# Patient Record
Sex: FEMALE | Race: WHITE | NOT HISPANIC OR LATINO | Employment: OTHER | URBAN - METROPOLITAN AREA
[De-identification: names, ages, dates, MRNs, and addresses within clinical notes are randomized per-mention and may not be internally consistent; named-entity substitution may affect disease eponyms.]

---

## 2017-01-03 PROCEDURE — 99000 SPECIMEN HANDLING OFFICE-LAB: CPT | Performed by: INTERNAL MEDICINE

## 2017-01-03 PROCEDURE — G0328 FECAL BLOOD SCRN IMMUNOASSAY: HCPCS | Performed by: INTERNAL MEDICINE

## 2017-01-04 DIAGNOSIS — Z12.11 COLON CANCER SCREENING: ICD-10-CM

## 2017-01-04 LAB — HEMOCCULT STL QL IA: POSITIVE

## 2017-01-12 ENCOUNTER — OFFICE VISIT (OUTPATIENT)
Dept: INTERNAL MEDICINE | Facility: CLINIC | Age: 77
End: 2017-01-12
Payer: COMMERCIAL

## 2017-01-12 VITALS
BODY MASS INDEX: 57.56 KG/M2 | DIASTOLIC BLOOD PRESSURE: 60 MMHG | HEIGHT: 59 IN | HEART RATE: 69 BPM | OXYGEN SATURATION: 98 % | SYSTOLIC BLOOD PRESSURE: 126 MMHG | WEIGHT: 285.5 LBS | TEMPERATURE: 97.2 F

## 2017-01-12 DIAGNOSIS — D64.9 ANEMIA, UNSPECIFIED TYPE: ICD-10-CM

## 2017-01-12 DIAGNOSIS — Z12.11 COLON CANCER SCREENING: ICD-10-CM

## 2017-01-12 DIAGNOSIS — R19.5 OCCULT BLOOD IN STOOLS: ICD-10-CM

## 2017-01-12 DIAGNOSIS — N18.30 CKD (CHRONIC KIDNEY DISEASE) STAGE 3, GFR 30-59 ML/MIN (H): Primary | ICD-10-CM

## 2017-01-12 DIAGNOSIS — Z78.0 ASYMPTOMATIC MENOPAUSAL STATE: ICD-10-CM

## 2017-01-12 DIAGNOSIS — E66.01 MORBID OBESITY DUE TO EXCESS CALORIES (H): ICD-10-CM

## 2017-01-12 PROCEDURE — 99214 OFFICE O/P EST MOD 30 MIN: CPT | Performed by: INTERNAL MEDICINE

## 2017-01-12 NOTE — NURSING NOTE
"Chief Complaint   Patient presents with     Results       Initial /60 mmHg  Pulse 69  Temp(Src) 97.2  F (36.2  C) (Oral)  Ht 4' 11\" (1.499 m)  Wt 285 lb 8 oz (129.502 kg)  BMI 57.63 kg/m2  SpO2 98% Estimated body mass index is 57.63 kg/(m^2) as calculated from the following:    Height as of this encounter: 4' 11\" (1.499 m).    Weight as of this encounter: 285 lb 8 oz (129.502 kg).  BP completed using cuff size: naeem Charles CMA      "

## 2017-01-12 NOTE — PROGRESS NOTES
SUBJECTIVE:                                                    Lucille Rojas is a 76 year old female who presents to clinic today for the following health issues:    Follow up 12/8/16 lab results for hgb and kidney function   Component      Latest Ref Rng 12/8/2016   Sodium      133 - 144 mmol/L 144   Potassium      3.4 - 5.3 mmol/L 5.3   Chloride      94 - 109 mmol/L 110 (H)   Carbon Dioxide      20 - 32 mmol/L 30   Anion Gap      3 - 14 mmol/L 4   Glucose      70 - 99 mg/dL 127 (H)   Urea Nitrogen      7 - 30 mg/dL 41 (H)   Creatinine      0.52 - 1.04 mg/dL 1.17 (H)   GFR Estimate      >60 mL/min/1.7m2 45 (L)   GFR Estimate If Black      >60 mL/min/1.7m2 54 (L)   Calcium      8.5 - 10.1 mg/dL 9.4   WBC      4.0 - 11.0 10e9/L 7.6   RBC Count      3.8 - 5.2 10e12/L 3.62 (L)   Hemoglobin      11.7 - 15.7 g/dL 10.0 (L)   Hematocrit      35.0 - 47.0 % 34.3 (L)   MCV      78 - 100 fl 95   MCH      26.5 - 33.0 pg 27.6   MCHC      31.5 - 36.5 g/dL 29.2 (L)   RDW      10.0 - 15.0 % 14.6   Platelet Count      150 - 450 10e9/L 288   Diff Method       Automated Method   Albumin Fraction      3.7 - 5.1 g/dL 3.7   Alpha 1 Fraction      0.2 - 0.4 g/dL 0.4   Alpha 2 Fraction      0.5 - 0.9 g/dL 1.0 (H)   Beta Fraction      0.6 - 1.0 g/dL 0.9   Gamma Fraction      0.7 - 1.6 g/dL 0.9   Monoclonal Peak      0.0 g/dL 0.0   ELP Interpretation:       Essentially normal electrophoretic pattern.  No monoclonal protein seen. . . .   Cholesterol      <200 mg/dL 149   Triglycerides      <150 mg/dL 126   HDL Cholesterol      >49 mg/dL 47 (L)   LDL Cholesterol Calculated      <100 mg/dL 77   Non HDL Cholesterol      <130 mg/dL 102   Iron      35 - 180 ug/dL 36   Iron Binding Cap      240 - 430 ug/dL 274   Iron Saturation Index      15 - 46 % 13 (L)   Ferritin      8 - 252 ng/mL 51   Vitamin B12      193 - 986 pg/mL 571   Occult Blood Scn FIT      NEG      Component      Latest Ref Rng 1/3/2017   Sodium      133 - 144 mmol/L     Potassium      3.4 - 5.3 mmol/L    Chloride      94 - 109 mmol/L    Carbon Dioxide      20 - 32 mmol/L    Anion Gap      3 - 14 mmol/L    Glucose      70 - 99 mg/dL    Urea Nitrogen      7 - 30 mg/dL    Creatinine      0.52 - 1.04 mg/dL    GFR Estimate      >60 mL/min/1.7m2    GFR Estimate If Black      >60 mL/min/1.7m2    Calcium      8.5 - 10.1 mg/dL    WBC      4.0 - 11.0 10e9/L    RBC Count      3.8 - 5.2 10e12/L    Hemoglobin      11.7 - 15.7 g/dL    Hematocrit      35.0 - 47.0 %    MCV      78 - 100 fl    MCH      26.5 - 33.0 pg    MCHC      31.5 - 36.5 g/dL    RDW      10.0 - 15.0 %    Platelet Count      150 - 450 10e9/L    Diff Method          Albumin Fraction      3.7 - 5.1 g/dL    Alpha 1 Fraction      0.2 - 0.4 g/dL    Alpha 2 Fraction      0.5 - 0.9 g/dL    Beta Fraction      0.6 - 1.0 g/dL    Gamma Fraction      0.7 - 1.6 g/dL    Monoclonal Peak      0.0 g/dL    ELP Interpretation:          Cholesterol      <200 mg/dL    Triglycerides      <150 mg/dL    HDL Cholesterol      >49 mg/dL    LDL Cholesterol Calculated      <100 mg/dL    Non HDL Cholesterol      <130 mg/dL    Iron      35 - 180 ug/dL    Iron Binding Cap      240 - 430 ug/dL    Iron Saturation Index      15 - 46 %    Ferritin      8 - 252 ng/mL    Vitamin B12      193 - 986 pg/mL    Occult Blood Scn FIT      NEG Positive (A)       Problem list and histories reviewed & adjusted, as indicated.  Additional history: as documented    Patient Active Problem List   Diagnosis     Hyperlipidemia LDL goal <100     Advance care planning     Macular degeneration     Apnea     Morbid obesity due to excess calories (H)     CKD (chronic kidney disease) stage 3, GFR 30-59 ml/min     Acquired hypothyroidism     Benign essential hypertension     Gastroesophageal reflux disease without esophagitis     Type 2 diabetes mellitus with stage 3 chronic kidney disease, without long-term current use of insulin (H)     Anemia, unspecified type     Past Surgical  History   Procedure Laterality Date     Colonoscopy       Tonsillectomy       Appendectomy       Hernia repair       inguinal x 2     Colonoscopy N/A 10/27/2014     Procedure: COMBINED COLONOSCOPY, SINGLE OR MULTIPLE BIOPSY/POLYPECTOMY BY BIOPSY;  Surgeon: Jose Alfredo Morejon MD;  Location:  GI       Social History   Substance Use Topics     Smoking status: Never Smoker      Smokeless tobacco: Never Used     Alcohol Use: 0.0 oz/week     0 Standard drinks or equivalent per week      Comment: rarely     History reviewed. No pertinent family history.      Current Outpatient Prescriptions   Medication Sig Dispense Refill     miconazole (MICATIN; MICRO GUARD) 2 % powder Apply topically as needed for itching or other       order for DME Equipment being ordered: wheeled walker 1 Device 0     Ferrous Sulfate (IRON SUPPLEMENT PO) Take 325 mg by mouth       Glycerin-Polysorbate 80 (REFRESH DRY EYE THERAPY OP)        levothyroxine (SYNTHROID, LEVOTHROID) 200 MCG tablet Take 1 tablet (200 mcg) by mouth daily 90 tablet 3     simvastatin (ZOCOR) 10 MG tablet Take 1 tablet (10 mg) by mouth At Bedtime 90 tablet 3     lisinopril (PRINIVIL,ZESTRIL) 10 MG tablet Take 1 tablet (10 mg) by mouth daily 90 tablet 3     nystatin (MYCOSTATIN) cream Apply topically 2 times daily       Multiple Vitamins-Minerals (PRESERVISION AREDS) CAPS Take 1 tablet by mouth 2 times daily        aspirin 81 MG tablet Take 1 tablet by mouth daily. 30 tablet 0     Allergies   Allergen Reactions     Penicillins      BP Readings from Last 3 Encounters:   01/12/17 126/60   12/08/16 127/67   11/22/16 114/60    Wt Readings from Last 3 Encounters:   01/12/17 285 lb 8 oz (129.502 kg)   12/08/16 289 lb (131.09 kg)   11/22/16 291 lb 8 oz (132.224 kg)                    ROS:  C: NEGATIVE for fever, chills, change in weight  E/M: NEGATIVE for ear, mouth and throat problems  R: NEGATIVE for significant cough or SOB  CV: NEGATIVE for chest pain, palpitations or  "peripheral edema  GI: NEGATIVE for nausea, abdominal pain, heartburn, or change in bowel habits  : NEGATIVE for frequency, dysuria, or hematuria  M: NEGATIVE for significant arthralgias or myalgia  P: NEGATIVE for changes in mood or affect    OBJECTIVE:                                                    /60 mmHg  Pulse 69  Temp(Src) 97.2  F (36.2  C) (Oral)  Ht 4' 11\" (1.499 m)  Wt 285 lb 8 oz (129.502 kg)  BMI 57.63 kg/m2  SpO2 98%  Body mass index is 57.63 kg/(m^2).  GENERAL:  alert and no distress  EYES: Eyes grossly normal to inspection, extraocular movements - intact, and PERRL  HENT: ear canals- normal; TMs- normal; Nose- normal; Mouth- no ulcers, no lesions  NECK: no tenderness, no adenopathy, no asymmetry, no masses, no stiffness; thyroid- normal to palpation  RESP: lungs clear to auscultation - no rales, no rhonchi, no wheezes  CV: regular rates and rhythm, normal S1 S2, no S3 or S4 and no click or rub   ABDOMEN: obese, morbid  PSYCH: Alert and oriented times 3; speech- coherent , normal rate and volume; able to articulate logical thoughts, able to abstract reason, no tangential thoughts, no hallucinations or delusions, affect- normal         ASSESSMENT/PLAN:                                                      (N18.3) CKD (chronic kidney disease) stage 3, GFR 30-59 ml/min  (primary encounter diagnosis)  Comment: suspect CKD and suggested need for baseline assessment per Nephrology referral  Plan: NEPHROLOGY ADULT REFERRAL            (D64.9) Anemia, unspecified type  Comment: suspect ACD due to underlying CKD, see labs done  Plan:     (E66.01) Morbid obesity due to excess calories (H)  Comment: weight loss again discussed  Plan:     (R19.5) Occult blood in stools  Comment: discussed options available and patient feels may be related to recent issues with hemorrhoids, suggested treating with repeat FIT, if + then consider UGI assessment  Plan: Fecal colorectal cancer screen (FIT)        "     (Z12.11) Colon cancer screening  Comment: as above  Plan: Fecal colorectal cancer screen (FIT)            (Z78.0) Asymptomatic menopausal state  Comment: ordered as screening  Plan: DX Hip/Pelvis/Spine            See Patient Instructions    Fausto Flynn MD  [facility]    25 minutes spent with this patient, face to face, discussing treatment options for listed problems above as well as side effects of appropriate medications.  Counseling time extended beyond 50% of the clinic visit.  Medication dosing, treatment plan and follow-up were discussed. Also reviewed need for primary care testing for patient.

## 2017-01-12 NOTE — MR AVS SNAPSHOT
After Visit Summary   1/12/2017    Lucille Rojas    MRN: [id number]           Patient Information     Date Of Birth          1940        Visit Information        Provider Department      1/12/2017 9:40 AM Fausto Flynn MD [facility]        Today's Diagnoses     CKD (chronic kidney disease) stage 3, GFR 30-59 ml/min    -  1     Anemia, unspecified type         Morbid obesity due to excess calories (H)         Occult blood in stools         Colon cancer screening            Follow-ups after your visit        Additional Services     NEPHROLOGY ADULT REFERRAL       Your provider has referred you to: JOAO: Zhanna Bryan [phone]   [email or web address]    Please be aware that coverage of these services is subject to the terms and limitations of your health insurance plan.  Call member services at your health plan with any benefit or coverage questions.      Reason for referral:  CKD  Please bring the following to your appointment:    >>   Any x-rays, CTs or MRIs which have been performed.  Contact the facility where they were done to arrange for  prior to your scheduled appointment.   >>   List of current medications   >>   This referral request   >>   Any documents/labs given to you for this referral                  Future tests that were ordered for you today     Open Future Orders        Priority Expected Expires Ordered    Fecal colorectal cancer screen (FIT) Routine 2/2/2017 4/6/2017 1/12/2017            Who to contact     If you have questions or need follow up information about today's clinic visit or your schedule please contact [facility] directly at [phone].  Normal or non-critical lab and imaging results will be communicated to you by MyChart, letter or phone within 4 business days after the clinic has received the results. If you do not hear from us within 7 days, please contact the clinic  "through [employer] or phone. If you have a critical or abnormal lab result, we will notify you by phone as soon as possible.  Submit refill requests through [employer] or call your pharmacy and they will forward the refill request to us. Please allow 3 business days for your refill to be completed.          Additional Information About Your Visit        Soufunhart Information     [employer] lets you send messages to your doctor, view your test results, renew your prescriptions, schedule appointments and more. To sign up, go to [email or web address] . Click on \"Log in\" on the left side of the screen, which will take you to the Welcome page. Then click on \"Sign up Now\" on the right side of the page.     You will be asked to enter the access code listed below, as well as some personal information. Please follow the directions to create your username and password.     Your access code is: [email or web address]  Expires: 2017  1:21 PM     Your access code will  in 90 days. If you need help or a new code, please call your [place] clinic or [phone].        Care EveryWhere ID     This is your Care EveryWhere ID. This could be used by other organizations to access your [place] medical records  [id number]        Your Vitals Were     Pulse Temperature Height BMI (Body Mass Index) Pulse Oximetry       69 97.2  F (36.2  C) (Oral) 4' 11\" (1.499 m) 57.63 kg/m2 98%        Blood Pressure from Last 3 Encounters:   17 126/60   16 127/67   16 114/60    Weight from Last 3 Encounters:   17 285 lb 8 oz (129.502 kg)   16 289 lb (131.09 kg)   16 291 lb 8 oz (132.224 kg)              We Performed the Following     NEPHROLOGY ADULT REFERRAL        Primary Care Provider Office Phone # Fax #    Fausto Flynn [phone]       [facility] [place] [address] [facility] [address]        Thank you!     Thank you for choosing [facility]  for your care. Our " goal is always to provide you with excellent care. Hearing back from our patients is one way we can continue to improve our services. Please take a few minutes to complete the written survey that you may receive in the mail after your visit with us. Thank you!             Your Updated Medication List - Protect others around you: Learn how to safely use, store and throw away your medicines at www.disposemymeds.org.          This list is accurate as of: 1/12/17  9:59 AM.  Always use your most recent med list.                   Brand Name Dispense Instructions for use    aspirin 81 MG tablet     30 tablet    Take 1 tablet by mouth daily.       IRON SUPPLEMENT PO      Take 325 mg by mouth       levothyroxine 200 MCG tablet    SYNTHROID/LEVOTHROID    90 tablet    Take 1 tablet (200 mcg) by mouth daily       lisinopril 10 MG tablet    PRINIVIL/ZESTRIL    90 tablet    Take 1 tablet (10 mg) by mouth daily       miconazole 2 % powder    MICATIN; MICRO GUARD     Apply topically as needed for itching or other       nystatin cream    MYCOSTATIN     Apply topically 2 times daily       order for DME     1 Device    Equipment being ordered: wheeled walker       PRESERVISION AREDS Caps      Take 1 tablet by mouth 2 times daily       REFRESH DRY EYE THERAPY OP          simvastatin 10 MG tablet    ZOCOR    90 tablet    Take 1 tablet (10 mg) by mouth At Bedtime

## 2017-01-19 ENCOUNTER — RADIANT APPOINTMENT (OUTPATIENT)
Dept: BONE DENSITY | Facility: CLINIC | Age: 77
End: 2017-01-19
Attending: INTERNAL MEDICINE
Payer: COMMERCIAL

## 2017-01-19 DIAGNOSIS — Z78.0 ASYMPTOMATIC MENOPAUSAL STATE: ICD-10-CM

## 2017-01-19 PROCEDURE — 77080 DXA BONE DENSITY AXIAL: CPT | Performed by: INTERNAL MEDICINE

## 2017-01-23 ENCOUNTER — TELEPHONE (OUTPATIENT)
Dept: INTERNAL MEDICINE | Facility: CLINIC | Age: 77
End: 2017-01-23

## 2017-02-21 ENCOUNTER — TRANSFERRED RECORDS (OUTPATIENT)
Dept: HEALTH INFORMATION MANAGEMENT | Facility: CLINIC | Age: 77
End: 2017-02-21

## 2017-02-21 DIAGNOSIS — R19.5 OCCULT BLOOD IN STOOLS: ICD-10-CM

## 2017-02-21 DIAGNOSIS — Z12.11 COLON CANCER SCREENING: ICD-10-CM

## 2017-02-21 LAB
CREAT SERPL-MCNC: 1.16 MG/DL (ref 0.6–0.93)
GLUCOSE SERPL-MCNC: 114 MG/DL (ref 65–99)
POTASSIUM SERPL-SCNC: 5.8 MMOL/L (ref 3.5–5.3)

## 2017-02-21 PROCEDURE — 82274 ASSAY TEST FOR BLOOD FECAL: CPT | Performed by: INTERNAL MEDICINE

## 2017-02-22 LAB — HEMOCCULT STL QL IA: NEGATIVE

## 2017-02-28 ENCOUNTER — OFFICE VISIT (OUTPATIENT)
Dept: INTERNAL MEDICINE | Facility: CLINIC | Age: 77
End: 2017-02-28
Payer: COMMERCIAL

## 2017-02-28 VITALS
WEIGHT: 280.8 LBS | BODY MASS INDEX: 56.61 KG/M2 | SYSTOLIC BLOOD PRESSURE: 122 MMHG | TEMPERATURE: 97.8 F | HEART RATE: 101 BPM | OXYGEN SATURATION: 97 % | DIASTOLIC BLOOD PRESSURE: 68 MMHG | HEIGHT: 59 IN

## 2017-02-28 DIAGNOSIS — N18.30 TYPE 2 DIABETES MELLITUS WITH STAGE 3 CHRONIC KIDNEY DISEASE, WITHOUT LONG-TERM CURRENT USE OF INSULIN (H): ICD-10-CM

## 2017-02-28 DIAGNOSIS — E11.22 TYPE 2 DIABETES MELLITUS WITH STAGE 3 CHRONIC KIDNEY DISEASE, WITHOUT LONG-TERM CURRENT USE OF INSULIN (H): ICD-10-CM

## 2017-02-28 DIAGNOSIS — N18.30 CKD (CHRONIC KIDNEY DISEASE) STAGE 3, GFR 30-59 ML/MIN (H): Primary | ICD-10-CM

## 2017-02-28 DIAGNOSIS — E66.01 MORBID OBESITY DUE TO EXCESS CALORIES (H): ICD-10-CM

## 2017-02-28 PROCEDURE — 99213 OFFICE O/P EST LOW 20 MIN: CPT | Performed by: INTERNAL MEDICINE

## 2017-02-28 NOTE — MR AVS SNAPSHOT
After Visit Summary   2/28/2017    Lucille Rojas    MRN: [id number]           Patient Information     Date Of Birth          1940        Visit Information        Provider Department      2/28/2017 8:40 AM Fausto Flynn MD [facility]        Today's Diagnoses     CKD (chronic kidney disease) stage 3, GFR 30-59 ml/min    -  1    Type 2 diabetes mellitus with stage 3 chronic kidney disease, without long-term current use of insulin (H)        Morbid obesity due to excess calories (H)           Follow-ups after your visit        Follow-up notes from your care team     Return in about 6 months (around 8/28/2017) for Lab Work.      Future tests that were ordered for you today     Open Future Orders        Priority Expected Expires Ordered    Hemoglobin Routine 6/1/2017 6/30/2017 2/28/2017    Basic metabolic panel Routine 6/1/2017 6/30/2017 2/28/2017    Hemoglobin A1c Routine 6/1/2017 6/30/2017 2/28/2017            Who to contact     If you have questions or need follow up information about today's clinic visit or your schedule please contact [facility] directly at [phone].  Normal or non-critical lab and imaging results will be communicated to you by MyChart, letter or phone within 4 business days after the clinic has received the results. If you do not hear from us within 7 days, please contact the clinic through FTBprohart or phone. If you have a critical or abnormal lab result, we will notify you by phone as soon as possible.  Submit refill requests through ADARTIS or call your pharmacy and they will forward the refill request to us. Please allow 3 business days for your refill to be completed.          Additional Information About Your Visit        MyChart Information     ADARTIS lets you send messages to your doctor, view your test results, renew your prescriptions, schedule appointments and more. To sign up, go to [email or web address] .  "Click on \"Log in\" on the left side of the screen, which will take you to the Welcome page. Then click on \"Sign up Now\" on the right side of the page.     You will be asked to enter the access code listed below, as well as some personal information. Please follow the directions to create your username and password.     Your access code is: [email or web address]  Expires: 2017  9:06 AM     Your access code will  in 90 days. If you need help or a new code, please call your [place] clinic or [phone].        Care EveryWhere ID     This is your Care EveryWhere ID. This could be used by other organizations to access your [place] medical records  [id number]        Your Vitals Were     Pulse Temperature Height Pulse Oximetry BMI (Body Mass Index)       101 97.8  F (36.6  C) (Oral) 4' 11\" (1.499 m) 97% 56.71 kg/m2        Blood Pressure from Last 3 Encounters:   17 122/68   17 126/60   16 127/67    Weight from Last 3 Encounters:   17 280 lb 12.8 oz (127.4 kg)   17 285 lb 8 oz (129.5 kg)   16 289 lb (131.1 kg)                 Today's Medication Changes          These changes are accurate as of: 17  9:06 AM.  If you have any questions, ask your nurse or doctor.               Start taking these medicines.        Dose/Directions    ACE/ARB NOT PRESCRIBED (INTENTIONAL)   Used for:  CKD (chronic kidney disease) stage 3, GFR 30-59 ml/min, Type 2 diabetes mellitus with stage 3 chronic kidney disease, without long-term current use of insulin (H)   Started by:  Fausto Flynn MD        Please choose reason not prescribed, below   Refills:  0            Where to get your medicines      Some of these will need a paper prescription and others can be bought over the counter.  Ask your nurse if you have questions.     You don't need a prescription for these medications     ACE/ARB NOT PRESCRIBED (INTENTIONAL)                Primary Care Provider Office Phone # Fax #    Fausto Flynn MD " [phone]       [facility] [address]  [facility] [address]        Thank you!     Thank you for choosing [facility]  for your care. Our goal is always to provide you with excellent care. Hearing back from our patients is one way we can continue to improve our services. Please take a few minutes to complete the written survey that you may receive in the mail after your visit with us. Thank you!             Your Updated Medication List - Protect others around you: Learn how to safely use, store and throw away your medicines at www.disposemymeds.org.          This list is accurate as of: 2/28/17  9:06 AM.  Always use your most recent med list.                   Brand Name Dispense Instructions for use    ACE/ARB NOT PRESCRIBED (INTENTIONAL)      Please choose reason not prescribed, below       aspirin 81 MG tablet     30 tablet    Take 1 tablet by mouth daily.       IRON SUPPLEMENT PO      Take 325 mg by mouth       levothyroxine 200 MCG tablet    SYNTHROID/LEVOTHROID    90 tablet    Take 1 tablet (200 mcg) by mouth daily       miconazole 2 % powder    MICATIN; MICRO GUARD     Apply topically as needed for itching or other       nystatin cream    MYCOSTATIN     Apply topically 2 times daily       order for DME     1 Device    Equipment being ordered: wheeled walker       PRESERVISION AREDS Caps      Take 1 tablet by mouth 2 times daily       REFRESH DRY EYE THERAPY OP          simvastatin 10 MG tablet    ZOCOR    90 tablet    Take 1 tablet (10 mg) by mouth At Bedtime

## 2017-02-28 NOTE — NURSING NOTE
"Chief Complaint   Patient presents with     Chronic Disease Management       Initial /68 (BP Location: Left arm, Patient Position: Chair, Cuff Size: Adult Regular)  Pulse 101  Temp 97.8  F (36.6  C) (Oral)  Ht 4' 11\" (1.499 m)  Wt 280 lb 12.8 oz (127.4 kg)  SpO2 97%  BMI 56.71 kg/m2 Estimated body mass index is 56.71 kg/(m^2) as calculated from the following:    Height as of this encounter: 4' 11\" (1.499 m).    Weight as of this encounter: 280 lb 12.8 oz (127.4 kg).  Medication Reconciliation: complete   Kim Charles CMA      "

## 2017-02-28 NOTE — PROGRESS NOTES
SUBJECTIVE:                                                    Lucille Rojas is a 76 year old female who presents to clinic today for the following health issues:    Recheck hgb and kidney function from 12/8/16 labs     Creatinine   Date Value Ref Range Status   12/08/2016 1.17 (H) 0.52 - 1.04 mg/dL Final       Problem list and histories reviewed & adjusted, as indicated.  Additional history: as documented    Patient Active Problem List   Diagnosis     Hyperlipidemia LDL goal <100     Advance care planning     Macular degeneration     Apnea     Morbid obesity due to excess calories (H)     CKD (chronic kidney disease) stage 3, GFR 30-59 ml/min     Acquired hypothyroidism     Benign essential hypertension     Gastroesophageal reflux disease without esophagitis     Type 2 diabetes mellitus with stage 3 chronic kidney disease, without long-term current use of insulin (H)     Anemia, unspecified type     Past Surgical History   Procedure Laterality Date     Colonoscopy       Tonsillectomy       Appendectomy       Hernia repair       inguinal x 2     Colonoscopy N/A 10/27/2014     Procedure: COMBINED COLONOSCOPY, SINGLE OR MULTIPLE BIOPSY/POLYPECTOMY BY BIOPSY;  Surgeon: Jose Alfredo Morejon MD;  Location:  GI       Social History   Substance Use Topics     Smoking status: Never Smoker     Smokeless tobacco: Never Used     Alcohol use 0.0 oz/week     0 Standard drinks or equivalent per week      Comment: rarely     History reviewed. No pertinent family history.      Current Outpatient Prescriptions   Medication Sig Dispense Refill     ACE/ARB NOT PRESCRIBED, INTENTIONAL, Please choose reason not prescribed, below       miconazole (MICATIN; MICRO GUARD) 2 % powder Apply topically as needed for itching or other       order for DME Equipment being ordered: wheeled walker 1 Device 0     Ferrous Sulfate (IRON SUPPLEMENT PO) Take 325 mg by mouth       Glycerin-Polysorbate 80 (REFRESH DRY EYE THERAPY OP)         "levothyroxine (SYNTHROID, LEVOTHROID) 200 MCG tablet Take 1 tablet (200 mcg) by mouth daily 90 tablet 3     simvastatin (ZOCOR) 10 MG tablet Take 1 tablet (10 mg) by mouth At Bedtime 90 tablet 3     nystatin (MYCOSTATIN) cream Apply topically 2 times daily       Multiple Vitamins-Minerals (PRESERVISION AREDS) CAPS Take 1 tablet by mouth 2 times daily        aspirin 81 MG tablet Take 1 tablet by mouth daily. 30 tablet 0     Allergies   Allergen Reactions     Penicillins      BP Readings from Last 3 Encounters:   02/28/17 122/68   01/12/17 126/60   12/08/16 127/67    Wt Readings from Last 3 Encounters:   02/28/17 280 lb 12.8 oz (127.4 kg)   01/12/17 285 lb 8 oz (129.5 kg)   12/08/16 289 lb (131.1 kg)                  Labs reviewed in EPIC    Reviewed and updated as needed this visit by clinical staff  Tobacco  Allergies  Med Hx  Surg Hx  Fam Hx  Soc Hx      Reviewed and updated as needed this visit by Provider         ROS:  C: NEGATIVE for fever, chills, change in weight  E/M: NEGATIVE for ear, mouth and throat problems  R: NEGATIVE for significant cough or SOB  CV: NEGATIVE for chest pain, palpitations or peripheral edema  GI: NEGATIVE for nausea, abdominal pain, heartburn, or change in bowel habits  : NEGATIVE for frequency, dysuria, or hematuria  M: NEGATIVE for significant arthralgias or myalgia  H: NEGATIVE for bleeding problems  P: NEGATIVE for changes in mood or affect    OBJECTIVE:                                                    /68 (BP Location: Left arm, Patient Position: Chair, Cuff Size: Adult Regular)  Pulse 101  Temp 97.8  F (36.6  C) (Oral)  Ht 4' 11\" (1.499 m)  Wt 280 lb 12.8 oz (127.4 kg)  SpO2 97%  BMI 56.71 kg/m2  Body mass index is 56.71 kg/(m^2).  GENERAL: healthy, alert and no distressusing wheeled walker  EYES: Eyes grossly normal to inspection, extraocular movements - intact, and PERRL  HENT: ear canals- normal; TMs- normal; Nose- normal; Mouth- no ulcers, no lesions  NECK: no " tenderness, no adenopathy, no asymmetry, no masses, no stiffness; thyroid- normal to palpation  RESP: lungs clear to auscultation - no rales, no rhonchi, no wheezes  CV: regular rates and rhythm, normal S1 S2, no S3 or S4 and no click or rub   ABDOMEN: obese, morbid  PSYCH: Alert and oriented times 3; speech- coherent , normal rate and volume; able to articulate logical thoughts, able to abstract reason, no tangential thoughts, no hallucinations or delusions, affect- normal     ASSESSMENT/PLAN:                                                      (N18.3) CKD (chronic kidney disease) stage 3, GFR 30-59 ml/min  (primary encounter diagnosis)  Comment: following per Nephrology and Dr. Jain  Plan: ACE/ARB NOT PRESCRIBED, INTENTIONAL,,         Hemoglobin, Basic metabolic panel, holding ACE therapy            (E11.22,  N18.3) Type 2 diabetes mellitus with stage 3 chronic kidney disease, without long-term current use of insulin (H)  Comment:   Lab Results   Component Value Date    A1C 5.6 12/01/2016    A1C 5.9 08/01/2016    A1C 6.1 09/24/2015    A1C 6.0 12/29/2014    A1C 5.9 10/21/2014     Plan: ACE/ARB NOT PRESCRIBED, INTENTIONAL,,         Hemoglobin A1c        Stable on therapy    (E66.01) Morbid obesity due to excess calories (H)  Comment: discussed dietary changes as is losing weight  Plan:     See Patient Instructions    Fausto Flynn MD  [facility]    THE MEDICATION LIST HAS BEEN FULLY RECONCILED BY THE M.D. AND THE NURSING STAFF.

## 2017-03-02 ENCOUNTER — MEDICAL CORRESPONDENCE (OUTPATIENT)
Dept: HEALTH INFORMATION MANAGEMENT | Facility: CLINIC | Age: 77
End: 2017-03-02

## 2017-03-02 DIAGNOSIS — N18.30 CHRONIC KIDNEY DISEASE, STAGE III (MODERATE) (H): Primary | ICD-10-CM

## 2017-03-02 DIAGNOSIS — E87.5 HYPERKALEMIA: ICD-10-CM

## 2017-03-27 ENCOUNTER — OFFICE VISIT (OUTPATIENT)
Dept: FAMILY MEDICINE | Facility: CLINIC | Age: 77
End: 2017-03-27
Payer: COMMERCIAL

## 2017-03-27 VITALS
HEART RATE: 82 BPM | RESPIRATION RATE: 16 BRPM | TEMPERATURE: 98.8 F | HEIGHT: 59 IN | WEIGHT: 289 LBS | OXYGEN SATURATION: 93 % | BODY MASS INDEX: 58.26 KG/M2

## 2017-03-27 DIAGNOSIS — S80.12XA: Primary | ICD-10-CM

## 2017-03-27 PROCEDURE — 99213 OFFICE O/P EST LOW 20 MIN: CPT | Performed by: FAMILY MEDICINE

## 2017-03-27 NOTE — MR AVS SNAPSHOT
"              After Visit Summary   3/27/2017    Lucille Rojas    MRN: [id number]           Patient Information     Date Of Birth          1940        Visit Information        Provider Department      3/27/2017 1:00 PM Amari Park MD [facility]        Today's Diagnoses     Contusion of lower limb, left, initial encounter    -  1      Care Instructions    Ice pack to area for 4-5 days, then Alternate ice & heat.          Follow-ups after your visit        Who to contact     If you have questions or need follow up information about today's clinic visit or your schedule please contact [facility] directly at [phone].  Normal or non-critical lab and imaging results will be communicated to you by MyChart, letter or phone within 4 business days after the clinic has received the results. If you do not hear from us within 7 days, please contact the clinic through [employer] or phone. If you have a critical or abnormal lab result, we will notify you by phone as soon as possible.  Submit refill requests through [employer] or call your pharmacy and they will forward the refill request to us. Please allow 3 business days for your refill to be completed.          Additional Information About Your Visit        MyChart Information     Ins[employer] lets you send messages to your doctor, view your test results, renew your prescriptions, schedule appointments and more. To sign up, go to [email or web address] . Click on \"Log in\" on the left side of the screen, which will take you to the Welcome page. Then click on \"Sign up Now\" on the right side of the page.     You will be asked to enter the access code listed below, as well as some personal information. Please follow the directions to create your username and password.     Your access code is: [email or web address]  Expires: 2017 10:06 AM     Your access code will  in 90 days. If you need help or a new code, " "please call your [place] clinic or [phone].        Care EveryWhere ID     This is your Care EveryWhere ID. This could be used by other organizations to access your [place] medical records  [id number]        Your Vitals Were     Pulse Temperature Respirations Height Pulse Oximetry BMI (Body Mass Index)    82 98.8  F (37.1  C) (Tympanic) 16 4' 11\" (1.499 m) 93% 58.37 kg/m2       Blood Pressure from Last 3 Encounters:   02/28/17 122/68   01/12/17 126/60   12/08/16 127/67    Weight from Last 3 Encounters:   03/27/17 289 lb (131.1 kg)   02/28/17 280 lb 12.8 oz (127.4 kg)   01/12/17 285 lb 8 oz (129.5 kg)              Today, you had the following     No orders found for display       Primary Care Provider Office Phone # Fax #    Fausto Flynn [phone]       [facility] [address] [place] [id number]        Thank you!     Thank you for choosing [facility]  for your care. Our goal is always to provide you with excellent care. Hearing back from our patients is one way we can continue to improve our services. Please take a few minutes to complete the written survey that you may receive in the mail after your visit with us. Thank you!             Your Updated Medication List - Protect others around you: Learn how to safely use, store and throw away your medicines at www.disposemymeds.org.          This list is accurate as of: 3/27/17  1:39 PM.  Always use your most recent med list.                   Brand Name Dispense Instructions for use    ACE/ARB NOT PRESCRIBED (INTENTIONAL)      Please choose reason not prescribed, below       aspirin 81 MG tablet     30 tablet    Take 1 tablet by mouth daily.       IRON SUPPLEMENT PO      Take 325 mg by mouth       levothyroxine 200 MCG tablet    SYNTHROID/LEVOTHROID    90 tablet    Take 1 tablet (200 mcg) by mouth daily       miconazole 2 % powder    MICATIN; MICRO GUARD     Apply topically as needed for " itching or other Reported on 3/27/2017       nystatin cream    MYCOSTATIN     Apply topically 2 times daily       order for DME     1 Device    Equipment being ordered: wheeled walker       PRESERVISION AREDS Caps      Take 1 tablet by mouth 2 times daily       REFRESH DRY EYE THERAPY OP          simvastatin 10 MG tablet    ZOCOR    90 tablet    Take 1 tablet (10 mg) by mouth At Bedtime

## 2017-03-27 NOTE — PROGRESS NOTES
"  SUBJECTIVE:                                                    Lucille Rojas is a 76 year old female who presents to clinic today for the following health issues:      Musculoskeletal problem/pain      Duration: 12 days    Description  Location: right leg    Intensity:  moderate    Accompanying signs and symptoms: lump on upper outer thigh    History  Previous similar problem: no   Previous evaluation:  none    Precipitating or alleviating factors:  Trauma or overuse: YES- fell  Aggravating factors include: sleeping    Therapies tried and outcome: nothing           Problem list and histories reviewed & adjusted, as indicated.  Additional history: as documented    Labs reviewed in EPIC    Reviewed and updated as needed this visit by clinical staff  Tobacco  Allergies  Med Hx  Surg Hx  Fam Hx  Soc Hx      Reviewed and updated as needed this visit by Provider         ROS:  CONSTITUTIONAL:NEGATIVE for fever, chills, change in weight  INTEGUMENTARY/SKIN: NEGATIVE for worrisome rashes, moles or lesions  MUSCULOSKELETAL: NEGATIVE for significant arthralgias or myalgia and POSITIVE  for lump Rt lateral thigh    OBJECTIVE:                                                    Pulse 82  Temp 98.8  F (37.1  C) (Tympanic)  Resp 16  Ht 4' 11\" (1.499 m)  Wt 289 lb (131.1 kg)  SpO2 93%  BMI 58.37 kg/m2  Body mass index is 58.37 kg/(m^2).  GENERAL APPEARANCE: healthy, alert and no distress  MS: extremities normal- no gross deformities noted  ORTHO: Rt thigh lateral lower shows 2 cm subcutaneous swelling, not tender         ASSESSMENT/PLAN:                                                        ICD-10-CM    1. Contusion of lower limb, left, initial encounter S80.12XA        Follow up with Provider - as needed if not improving   Patient Instructions   Ice pack to area for 4-5 days, then alternate ice & heat.        Amari Park MD  [facility]    "

## 2017-04-04 DIAGNOSIS — E78.5 HYPERLIPIDEMIA LDL GOAL <100: ICD-10-CM

## 2017-04-04 RX ORDER — SIMVASTATIN 10 MG
TABLET ORAL
Qty: 90 TABLET | Refills: 3 | Status: SHIPPED | OUTPATIENT
Start: 2017-04-04 | End: 2018-04-03

## 2017-04-04 NOTE — TELEPHONE ENCOUNTER
simvastatin (ZOCOR     Last Written Prescription Date: 02/15/17  Last Fill Quantity: 90, # refills: 3  Last Office Visit with G, P or [facility] prescribing provider: 03/27/17       Lab Results   Component Value Date    CHOL 149 12/08/2016     Lab Results   Component Value Date    HDL 47 12/08/2016     Lab Results   Component Value Date    LDL 77 12/08/2016     Lab Results   Component Value Date    TRIG 126 12/08/2016     Lab Results   Component Value Date    CHOLHDLRATIO 3.4 05/09/2013

## 2017-07-20 ENCOUNTER — TELEPHONE (OUTPATIENT)
Dept: NURSING | Facility: CLINIC | Age: 77
End: 2017-07-20

## 2017-07-20 ENCOUNTER — OFFICE VISIT (OUTPATIENT)
Dept: INTERNAL MEDICINE | Facility: CLINIC | Age: 77
End: 2017-07-20
Payer: COMMERCIAL

## 2017-07-20 ENCOUNTER — RADIANT APPOINTMENT (OUTPATIENT)
Dept: ULTRASOUND IMAGING | Facility: CLINIC | Age: 77
End: 2017-07-20
Attending: INTERNAL MEDICINE
Payer: COMMERCIAL

## 2017-07-20 VITALS
DIASTOLIC BLOOD PRESSURE: 72 MMHG | BODY MASS INDEX: 58.57 KG/M2 | HEART RATE: 78 BPM | WEIGHT: 290 LBS | OXYGEN SATURATION: 95 % | SYSTOLIC BLOOD PRESSURE: 126 MMHG | TEMPERATURE: 97.6 F

## 2017-07-20 DIAGNOSIS — M79.604 BILATERAL LEG PAIN: ICD-10-CM

## 2017-07-20 DIAGNOSIS — M79.605 BILATERAL LEG PAIN: ICD-10-CM

## 2017-07-20 DIAGNOSIS — M71.22 BAKER'S CYST, LEFT: ICD-10-CM

## 2017-07-20 DIAGNOSIS — M79.605 BILATERAL LEG PAIN: Primary | ICD-10-CM

## 2017-07-20 DIAGNOSIS — M79.604 BILATERAL LEG PAIN: Primary | ICD-10-CM

## 2017-07-20 PROCEDURE — 99214 OFFICE O/P EST MOD 30 MIN: CPT | Performed by: INTERNAL MEDICINE

## 2017-07-20 PROCEDURE — 93970 EXTREMITY STUDY: CPT

## 2017-07-20 RX ORDER — PREDNISONE 20 MG/1
20 TABLET ORAL DAILY
Qty: 5 TABLET | Refills: 0 | Status: SHIPPED | OUTPATIENT
Start: 2017-07-20 | End: 2017-07-23

## 2017-07-20 NOTE — PROGRESS NOTES
"  SUBJECTIVE:                                                    Lucille Rojas is a 77 year old female who presents to clinic today for the following health issues:      Leg pain    Problems taking medications regularly: No    Medication side effects: none    Diet: regular (no restrictions)      Pain  Bilateral LE in proximal calf areas. No localizing pain in the  Ankles or toes. Some tenderness left posterior knee.. Hx elevated Uric acid levels  Nov 2016 when had acute gout flare. Not on preventative med. Hx CKD3 and followed by nephrology.  PCP is Dr Flynn. No trauma.  No acute warmth or erythema noted by pt in the joints. Hx well controlled DM. Not checking sugars    Pt's past medical history, family history, habits, medications and allergies were reviewed with the patient today.   Most recent lab results reviewed with pt. Problem list and histories reviewed & adjusted, as indicated.  Additional history as below:      Additional ROS:   Constitutional, HEENT, Cardiovascular, Pulmonary, GI and , Neuro, MSK and Psych review of systems/symptoms are otherwise negative or unchanged from previous, except as noted above.      OBJECTIVE:  /72  Pulse 78  Temp 97.6  F (36.4  C) (Oral)  Wt 290 lb (131.5 kg)  SpO2 95%  BMI 58.57 kg/m2   Estimated body mass index is 58.57 kg/(m^2) as calculated from the following:    Height as of 3/27/17: 4' 11\" (1.499 m).    Weight as of this encounter: 290 lb (131.5 kg).  Eye: PERRL, EOMI  HENT: ear canals and TM's normal and nose and mouth without ulcers or lesions   Neck: no adenopathy. Thyroid normal to palpation. No bruits  Pulm: Lungs clear to auscultation   CV: Regular rates and rhythm  GI: Soft, obese, nontender, Normal active bowel sounds, No hepatosplenomegaly or masses palpable  Ext: Peripheral pulses intact. Mild BLE edema (which pt states is chronic). Tenderness to palpation left popliteal area with possible Baker's cyst palpable. Tenderness to palpation " bilateral posterior calf area over superficial veins. No calf mass palpable  Neuro: Normal strength and tone, sensory exam grossly normal    Assessment/Plan: (See plan discussion below for further details)  1. Bilateral leg pain  - US Lower Extremity Venous Duplex Bilateral; Future  - ORTHOPEDICS ADULT REFERRAL  Prednisone 20mg daily for 5 days    2. Baker's cyst, left  Prednisone 20mg daily for 5 days  - ORTHOPEDICS ADULT REFERRAL    Plan discussion:  Venous Doppler ultrasound obtained showing left Baker's cyst but no DVT. Exam also consistent with possible mild phlebitis though no significant phlebitis visualized on ultrasound. Patient has a history of diabetes but last A1c 5.6. Unable to use nonsteroidal anti-inflammatory meds with history of CK deep. Will therefore treat with prednisone 20 mg daily for 5 days. Patient to see orthopedics in addition regarding the Julian's cyst       Colton Oh MD  Internal Medicine Department  [facility]

## 2017-07-20 NOTE — MR AVS SNAPSHOT
After Visit Summary   7/20/2017    Lucille Rojas    MRN: [id number]           Patient Information     Date Of Birth          1940        Visit Information        Provider Department      7/20/2017 11:00 AM Colton Oh MD [facility]        Today's Diagnoses     Bilateral leg pain    -  1    Baker's cyst, left           Follow-ups after your visit        Additional Services     ORTHOPEDICS ADULT REFERRAL       Your provider has referred you to: [facility] [phone]   [email or web address]      Please be aware that coverage of these services is subject to the terms and limitations of your health insurance plan.  Call member services at your health plan with any benefit or coverage questions.      Please bring the following to your appointment:    >>   Any x-rays, CTs or MRIs which have been performed.  Contact the facility where they were done to arrange for  prior to your scheduled appointment.    >>   List of current medications   >>   This referral request   >>   Any documents/labs given to you for this referral                  Who to contact     If you have questions or need follow up information about today's clinic visit or your schedule please contact [facility] directly at [phone].  Normal or non-critical lab and imaging results will be communicated to you by MyChart, letter or phone within 4 business days after the clinic has received the results. If you do not hear from us within 7 days, please contact the clinic through MyChart or phone. If you have a critical or abnormal lab result, we will notify you by phone as soon as possible.  Submit refill requests through [employer] or call your pharmacy and they will forward the refill request to us. Please allow 3 business days for your refill to be completed.          Additional Information About Your Visit        MyChart  "Information     [employer] lets you send messages to your doctor, view your test results, renew your prescriptions, schedule appointments and more. To sign up, go to [email or web address] . Click on \"Log in\" on the left side of the screen, which will take you to the Welcome page. Then click on \"Sign up Now\" on the right side of the page.     You will be asked to enter the access code listed below, as well as some personal information. Please follow the directions to create your username and password.     Your access code is: [email or web address]  Expires: 10/21/2017  5:40 PM     Your access code will  in 90 days. If you need help or a new code, please call your [place] clinic or [phone].        Care EveryWhere ID     This is your Care EveryWhere ID. This could be used by other organizations to access your [place] medical records  [id number]        Your Vitals Were     Pulse Temperature Pulse Oximetry BMI (Body Mass Index)          78 97.6  F (36.4  C) (Oral) 95% 58.57 kg/m2         Blood Pressure from Last 3 Encounters:   17 126/72   17 122/68   17 126/60    Weight from Last 3 Encounters:   17 290 lb (131.5 kg)   17 289 lb (131.1 kg)   17 280 lb 12.8 oz (127.4 kg)              We Performed the Following     ORTHOPEDICS ADULT REFERRAL          Today's Medication Changes          These changes are accurate as of: 17 11:59 PM.  If you have any questions, ask your nurse or doctor.               Start taking these medicines.        Dose/Directions    predniSONE 20 MG tablet   Commonly known as:  DELTASONE   Used for:  Bilateral leg pain   Started by:  Colton Oh MD        Dose:  20 mg   Take 1 tablet (20 mg) by mouth daily for 5 days   Quantity:  5 tablet   Refills:  0            Where to get your medicines      These medications were sent to [facility] [address] [place], [place] - [address] AT [facility] [place]  [address], " [facility] [address]     Phone:  [phone]     predniSONE 20 MG tablet                Primary Care Provider Office Phone # Fax #    Fausto Flynn [phone]       [facility] [address]  [facility] [address]        Equal Access to Services     IVAN IVERSON : Hadii aad ku hadasho Soomaali, waaxda luqadaha, qaybta kaalmada adeegyada, waxay idiin hayaan adeeg khorlysh la'annabellen ah. So [facility] [phone].    ATENCIÓN: Si habla español, tiene a zurita disposición servicios gratuitos de asistencia lingüística. Llame al [phone].    We comply with applicable federal civil rights laws and [place] laws. We do not discriminate on the basis of race, color, national origin, age, disability sex, sexual orientation or gender identity.            Thank you!     Thank you for choosing [facility]  for your care. Our goal is always to provide you with excellent care. Hearing back from our patients is one way we can continue to improve our services. Please take a few minutes to complete the written survey that you may receive in the mail after your visit with us. Thank you!             Your Updated Medication List - Protect others around you: Learn how to safely use, store and throw away your medicines at www.disposemymeds.org.          This list is accurate as of: 7/20/17 11:59 PM.  Always use your most recent med list.                   Brand Name Dispense Instructions for use Diagnosis    ACE/ARB NOT PRESCRIBED (INTENTIONAL)      Please choose reason not prescribed, below    CKD (chronic kidney disease) stage 3, GFR 30-59 ml/min, Type 2 diabetes mellitus with stage 3 chronic kidney disease, without long-term current use of insulin (H)       aspirin 81 MG tablet     30 tablet    Take 1 tablet by mouth daily.    Type 2 diabetes, HbA1C goal < 8% (H)       IRON SUPPLEMENT PO      Take 325 mg by mouth        levothyroxine 200 MCG tablet    SYNTHROID/LEVOTHROID    90  tablet    Take 1 tablet (200 mcg) by mouth daily    Type 2 diabetes mellitus with stage 3 chronic kidney disease (H)       miconazole 2 % powder    MICATIN; MICRO GUARD     Apply topically as needed for itching or other Reported on 3/27/2017        nystatin cream    MYCOSTATIN     Apply topically 2 times daily        order for DME     1 Device    Equipment being ordered: wheeled walker    Morbid obesity due to excess calories (H), Type 2 diabetes mellitus with stage 3 chronic kidney disease, without long-term current use of insulin (H)       predniSONE 20 MG tablet    DELTASONE    5 tablet    Take 1 tablet (20 mg) by mouth daily for 5 days    Bilateral leg pain       PRESERVISION AREDS Caps      Take 1 tablet by mouth 2 times daily        REFRESH DRY EYE THERAPY OP           simvastatin 10 MG tablet    ZOCOR    90 tablet    TAKE 1 TABLET BY MOUTH AT BEDTIME    Hyperlipidemia LDL goal <100

## 2017-07-20 NOTE — TELEPHONE ENCOUNTER
Made patient 11 AM appointment with Dr. Oh. She is willing to be seen but wants to know if this can be addressed over the phone, as it is hard for her to walk. Otherwise she will come if for appointment and use wheelchair.    Lucille Rojas is a 77 year old female who calls with possible gout flare up, says was told to call if this happens again.    NURSING ASSESSMENT:  Description:  Pain in both of legs, can hardly walk. Hurts to put foot down/bear weight. Pain from knee to ankle. Calf and shin pain. Swelling in both ankles, right >left. Skin is tight.  Precip. factors:  Has had this before in one leg and took pills for 5 days that helped  Associated symptoms:  Denies redness/warmth/CP/SOB.  Pain scale (0-10)   9/10  Last exam/Treatment:  2/28/17  Allergies:   Allergies   Allergen Reactions     Penicillins        MEDICATIONS:   Appears was prescribed prednisone in ED in November for acute gouty arthritis.      NURSING PLAN: Routed to provider Yes    RECOMMENDED DISPOSITION:  See in 2-4 hours, another person to drive   Will comply with recommendation: Yes  If further questions/concerns or if symptoms do not improve, worsen or new symptoms develop, call your PCP or [facility] as soon as possible.      Guideline used:  Telephone Triage Protocols for Nurses, Fifth Edition, Pamela Buckley RN

## 2017-07-20 NOTE — NURSING NOTE
"Chief Complaint   Patient presents with     Arthritis       Initial /72  Pulse 78  Temp 97.6  F (36.4  C) (Oral)  Wt 290 lb (131.5 kg)  SpO2 95%  BMI 58.57 kg/m2 Estimated body mass index is 58.57 kg/(m^2) as calculated from the following:    Height as of 3/27/17: 4' 11\" (1.499 m).    Weight as of this encounter: 290 lb (131.5 kg).  Medication Reconciliation: complete  "

## 2017-07-21 ASSESSMENT — PATIENT HEALTH QUESTIONNAIRE - PHQ9: SUM OF ALL RESPONSES TO PHQ QUESTIONS 1-9: 15

## 2017-07-23 ENCOUNTER — NURSE TRIAGE (OUTPATIENT)
Dept: NURSING | Facility: CLINIC | Age: 77
End: 2017-07-23

## 2017-07-23 ENCOUNTER — TELEPHONE (OUTPATIENT)
Dept: NURSING | Facility: CLINIC | Age: 77
End: 2017-07-23

## 2017-07-23 DIAGNOSIS — M79.604 BILATERAL LEG PAIN: ICD-10-CM

## 2017-07-23 DIAGNOSIS — M79.605 BILATERAL LEG PAIN: ICD-10-CM

## 2017-07-23 RX ORDER — PREDNISONE 20 MG/1
20 TABLET ORAL DAILY
Qty: 3 TABLET | Refills: 0 | Status: SHIPPED | OUTPATIENT
Start: 2017-07-23 | End: 2017-09-06

## 2017-07-23 NOTE — TELEPHONE ENCOUNTER
Clinic Action Needed:None  Reason for Call:Patient calling reporting she was seen in clinic on 7/20/17 for a baker's cyst and leg pain. Patient started Prednisone 20 mg and lost prescription on 7/22/17 after completing 2 doses.  Patient is requesting to reorder prescription for remaining 3 tablets today.  Paged Dr Neeta Fuentes through [facility] at 1143 a.m. To call Margaret at  [phone].  Dr Fuentes returned page. Approved refill of Prednisone 20 mg tablets 3 tablets no refills.   Patient notified and verbalized understanding.     Margaret Bhakta RN  [facility]

## 2017-07-23 NOTE — TELEPHONE ENCOUNTER
Clinic Action Needed:None  Reason for Call:Patient calling reporting she was seen in clinic on 7/20/17 for a baker's cyst and leg pain. Patient started Prednisone 20 mg and lost prescription on 7/22/17 after completing 2 doses.  Patient is requesting to reorder prescription for remaining 3 tablets today.  Paged Dr Neeta Fuentes through [facility] at 1143 a.m. To call Margaret at  [phone].  Dr Fuentes returned page. Approved refill of Prednisone 20 mg tablets 3 tablets no refills.   Patient notified and verbalized understanding.     Margaret Bhakta RN  [facility]

## 2017-08-24 ENCOUNTER — TRANSFERRED RECORDS (OUTPATIENT)
Dept: HEALTH INFORMATION MANAGEMENT | Facility: CLINIC | Age: 77
End: 2017-08-24

## 2017-09-01 ENCOUNTER — TELEPHONE (OUTPATIENT)
Dept: NURSING | Facility: CLINIC | Age: 77
End: 2017-09-01

## 2017-09-01 NOTE — TELEPHONE ENCOUNTER
I cannot fill out a form until patient is seen for BP check.  If dental appt is urgent then suggest she seek another clinic to get BP checked and form signed

## 2017-09-01 NOTE — TELEPHONE ENCOUNTER
Patient was at [facility] dental hygiene clinic on 8/15/17.  They would not clean teeth d/t high blood pressure at appointment.  [facility] sent over a form for PCP to fill out to give medical clearance.  Form located under Media tab in Epic.  Patient has taken blood pressure several times since dental appointment and blood pressure has ranged from -150 and DBP 60-80.  Denies any symptoms of high blood pressure.  Reports she was very stressed prior to dental appointment this is why her blood pressure was elevated at appointment.  Please review form from [facility] and inform patient once form is filled out.  Patient has another dental appointment set up on 9/6/17, requesting form be completed prior to then.  [place]17) please call [phone] to inform patient on or before Tuesday.

## 2017-09-02 DIAGNOSIS — N18.30 TYPE 2 DIABETES MELLITUS WITH STAGE 3 CHRONIC KIDNEY DISEASE (H): ICD-10-CM

## 2017-09-02 DIAGNOSIS — E11.22 TYPE 2 DIABETES MELLITUS WITH STAGE 3 CHRONIC KIDNEY DISEASE (H): ICD-10-CM

## 2017-09-02 RX ORDER — LEVOTHYROXINE SODIUM 200 UG/1
TABLET ORAL
Qty: 90 TABLET | Refills: 3 | Status: CANCELLED | OUTPATIENT
Start: 2017-09-02

## 2017-09-03 NOTE — TELEPHONE ENCOUNTER
levothyroxine (SYNTHROID, LEVOTHROID) 200 MCG tablet     Last Written Prescription Date: 8/1/16  Last Quantity: 90, # refills: 3  Last Office Visit with [facility], [facility] or [facility] prescribing provider: 7/20/17        TSH   Date Value Ref Range Status   12/01/2016 2.81 0.30 - 5.00 mcU/mL Final

## 2017-09-06 ENCOUNTER — OFFICE VISIT (OUTPATIENT)
Dept: INTERNAL MEDICINE | Facility: CLINIC | Age: 77
End: 2017-09-06
Payer: COMMERCIAL

## 2017-09-06 VITALS
SYSTOLIC BLOOD PRESSURE: 137 MMHG | HEIGHT: 59 IN | BODY MASS INDEX: 58 KG/M2 | WEIGHT: 287.7 LBS | DIASTOLIC BLOOD PRESSURE: 72 MMHG | TEMPERATURE: 98 F | HEART RATE: 78 BPM

## 2017-09-06 DIAGNOSIS — I10 BENIGN ESSENTIAL HYPERTENSION: Chronic | ICD-10-CM

## 2017-09-06 DIAGNOSIS — N18.30 TYPE 2 DIABETES MELLITUS WITH STAGE 3 CHRONIC KIDNEY DISEASE, WITHOUT LONG-TERM CURRENT USE OF INSULIN (H): Primary | ICD-10-CM

## 2017-09-06 DIAGNOSIS — E11.22 TYPE 2 DIABETES MELLITUS WITH STAGE 3 CHRONIC KIDNEY DISEASE, WITHOUT LONG-TERM CURRENT USE OF INSULIN (H): Primary | ICD-10-CM

## 2017-09-06 DIAGNOSIS — E03.9 ACQUIRED HYPOTHYROIDISM: Chronic | ICD-10-CM

## 2017-09-06 DIAGNOSIS — N18.30 CKD (CHRONIC KIDNEY DISEASE) STAGE 3, GFR 30-59 ML/MIN (H): ICD-10-CM

## 2017-09-06 LAB
ANION GAP SERPL CALCULATED.3IONS-SCNC: 3 MMOL/L (ref 3–14)
BUN SERPL-MCNC: 19 MG/DL (ref 7–30)
CALCIUM SERPL-MCNC: 9.4 MG/DL (ref 8.5–10.1)
CHLORIDE SERPL-SCNC: 106 MMOL/L (ref 94–109)
CO2 SERPL-SCNC: 34 MMOL/L (ref 20–32)
CREAT SERPL-MCNC: 1 MG/DL (ref 0.52–1.04)
GFR SERPL CREATININE-BSD FRML MDRD: 53 ML/MIN/1.7M2
GLUCOSE SERPL-MCNC: 147 MG/DL (ref 70–99)
HBA1C MFR BLD: 6.2 % (ref 4.3–6)
HGB BLD-MCNC: 11.8 G/DL (ref 11.7–15.7)
POTASSIUM SERPL-SCNC: 4.8 MMOL/L (ref 3.4–5.3)
SODIUM SERPL-SCNC: 143 MMOL/L (ref 133–144)

## 2017-09-06 PROCEDURE — 83036 HEMOGLOBIN GLYCOSYLATED A1C: CPT | Performed by: INTERNAL MEDICINE

## 2017-09-06 PROCEDURE — 80048 BASIC METABOLIC PNL TOTAL CA: CPT | Performed by: INTERNAL MEDICINE

## 2017-09-06 PROCEDURE — 99214 OFFICE O/P EST MOD 30 MIN: CPT | Performed by: INTERNAL MEDICINE

## 2017-09-06 PROCEDURE — 85018 HEMOGLOBIN: CPT | Performed by: INTERNAL MEDICINE

## 2017-09-06 PROCEDURE — 36415 COLL VENOUS BLD VENIPUNCTURE: CPT | Performed by: INTERNAL MEDICINE

## 2017-09-06 RX ORDER — LEVOTHYROXINE SODIUM 200 UG/1
200 TABLET ORAL DAILY
Qty: 90 TABLET | Refills: 3 | Status: SHIPPED | OUTPATIENT
Start: 2017-09-06 | End: 2018-10-03

## 2017-09-06 NOTE — MR AVS SNAPSHOT
"              After Visit Summary   9/6/2017    Lucille Rojas    MRN: [id number]           Patient Information     Date Of Birth          1940        Visit Information        Provider Department      9/6/2017 9:00 AM Fausto Flynn MD [facility]        Today's Diagnoses     Type 2 diabetes mellitus with stage 3 chronic kidney disease, without long-term current use of insulin (H)    -  1    CKD (chronic kidney disease) stage 3, GFR 30-59 ml/min        Benign essential hypertension        Acquired hypothyroidism           Follow-ups after your visit        Follow-up notes from your care team     Return in about 6 weeks (around 10/18/2017), or if symptoms worsen or fail to improve, for Lab Work, Routine Visit.      Who to contact     If you have questions or need follow up information about today's clinic visit or your schedule please contact [facility] directly at [phone].  Normal or non-critical lab and imaging results will be communicated to you by MyChart, letter or phone within 4 business days after the clinic has received the results. If you do not hear from us within 7 days, please contact the clinic through [employer] or phone. If you have a critical or abnormal lab result, we will notify you by phone as soon as possible.  Submit refill requests through [employer] or call your pharmacy and they will forward the refill request to us. Please allow 3 business days for your refill to be completed.          Additional Information About Your Visit        MyChart Information     [employer] lets you send messages to your doctor, view your test results, renew your prescriptions, schedule appointments and more. To sign up, go to [email or web address] . Click on \"Log in\" on the left side of the screen, which will take you to the Welcome page. Then click on \"Sign up Now\" on the right side of the page.     You will be asked to enter the access code listed below, as " "well as some personal information. Please follow the directions to create your username and password.     Your access code is: [email or web address]  Expires: 10/21/2017  5:40 PM     Your access code will  in 90 days. If you need help or a new code, please call your [place] clinic or [phone].        Care EveryWhere ID     This is your Care EveryWhere ID. This could be used by other organizations to access your [place] medical records  [id number]        Your Vitals Were     Pulse Temperature Height BMI (Body Mass Index)          78 98  F (36.7  C) (Oral) 4' 11\" (1.499 m) 58.11 kg/m2         Blood Pressure from Last 3 Encounters:   17 137/72   17 126/72   17 122/68    Weight from Last 3 Encounters:   17 287 lb 11.2 oz (130.5 kg)   17 290 lb (131.5 kg)   17 289 lb (131.1 kg)              We Performed the Following     Basic metabolic panel     Hemoglobin A1c     Hemoglobin          Where to get your medicines      These medications were sent to [facility] - [facility] [address]  [address], [facility] [address]     Phone:  [phone]     levothyroxine 200 MCG tablet          Primary Care Provider Office Phone # Fax #    Fausto Flynn [phone]       [address] [place] [id number]        Equal Access to Services     GORDON IVERSON : Hadii ginger henning hadasho Soliu, waaxda luqadaha, qaybta kaalmada adeegyada, waxgumaro vanessa lloyd. So [facility] [phone].    ATENCIÓN: Si habla español, tiene a zurita disposición servicios gratuitos de asistencia lingüística. Ronni al [phone].    We comply with applicable federal civil rights laws and [place] laws. We do not discriminate on the basis of race, color, national origin, age, disability sex, sexual orientation or gender identity.            Thank you!     Thank you for choosing [facility]  for your care. Our goal is always " to provide you with excellent care. Hearing back from our patients is one way we can continue to improve our services. Please take a few minutes to complete the written survey that you may receive in the mail after your visit with us. Thank you!             Your Updated Medication List - Protect others around you: Learn how to safely use, store and throw away your medicines at www.disposemymeds.org.          This list is accurate as of: 9/6/17  9:21 AM.  Always use your most recent med list.                   Brand Name Dispense Instructions for use Diagnosis    ACE/ARB NOT PRESCRIBED (INTENTIONAL)      Please choose reason not prescribed, below    CKD (chronic kidney disease) stage 3, GFR 30-59 ml/min, Type 2 diabetes mellitus with stage 3 chronic kidney disease, without long-term current use of insulin (H)       aspirin 81 MG tablet     30 tablet    Take 1 tablet by mouth daily.    Type 2 diabetes, HbA1C goal < 8% (H)       IRON SUPPLEMENT PO      Take 325 mg by mouth        levothyroxine 200 MCG tablet    SYNTHROID/LEVOTHROID    90 tablet    Take 1 tablet (200 mcg) by mouth daily    Acquired hypothyroidism       miconazole 2 % powder    MICATIN; MICRO GUARD     Apply topically as needed for itching or other Reported on 3/27/2017        nystatin cream    MYCOSTATIN     Apply topically 2 times daily        order for DME     1 Device    Equipment being ordered: wheeled walker    Morbid obesity due to excess calories (H), Type 2 diabetes mellitus with stage 3 chronic kidney disease, without long-term current use of insulin (H)       PRESERVISION AREDS Caps      Take 1 tablet by mouth 2 times daily        REFRESH DRY EYE THERAPY OP           simvastatin 10 MG tablet    ZOCOR    90 tablet    TAKE 1 TABLET BY MOUTH AT BEDTIME    Hyperlipidemia LDL goal <100

## 2017-09-06 NOTE — LETTER
[facility]  [address] [address]  [phone]      9/6/2017       Lucille Rojas  [address] [facility] [address]        Dear Lucille,      Your Hemoglobin A1C and blood sugar tests look good and I would continue with your medication without change.  These tests should be repeated in 6 months.    I am pleased to inform you that your routine blood work including your hemoglobin, sodium, potassium, calcium and kidney function tests are stable.    Your Hemoglobin A1C and blood sugar tests look good although are higher and I would continue with your medication without change.  This test should be repeated in 6 months.    Sincerely,      {[facility] Physiian List:[id number]}  Internal Medicine

## 2017-09-06 NOTE — NURSING NOTE
"Chief Complaint   Patient presents with     Hypertension     Forms       Initial /72  Pulse 78  Temp 98  F (36.7  C) (Oral)  Ht 4' 11\" (1.499 m)  Wt 287 lb 11.2 oz (130.5 kg)  BMI 58.11 kg/m2 Estimated body mass index is 58.11 kg/(m^2) as calculated from the following:    Height as of this encounter: 4' 11\" (1.499 m).    Weight as of this encounter: 287 lb 11.2 oz (130.5 kg).  Medication Reconciliation: complete   Kim Charles, CHELO      "

## 2017-09-06 NOTE — PROGRESS NOTES
SUBJECTIVE:   Lucille Rojas is a 77 year old female who presents to clinic today for the following health issues:    Forms received from [facility] dental [facility] stating that patient was seen for dental work on 8/15/17 and had elevated BP readings (188/72, 167/68). Dental clinic requesting that patient is evaluated and cleared by PCP prior to additional dental work.     Hypertension Follow-up      Outpatient blood pressures are being checked at home.  Results are 130's/70's.    Low Salt Diet: low salt        Amount of exercise or physical activity: None    Problems taking medications regularly: No    Medication side effects: none  Diet: regular (no restrictions)      Problem list and histories reviewed & adjusted, as indicated.  Additional history: as documented    Patient Active Problem List   Diagnosis     Hyperlipidemia LDL goal <100     Advance care planning     Macular degeneration     Apnea     Morbid obesity due to excess calories (H)     CKD (chronic kidney disease) stage 3, GFR 30-59 ml/min     Acquired hypothyroidism     Benign essential hypertension     Gastroesophageal reflux disease without esophagitis     Type 2 diabetes mellitus with stage 3 chronic kidney disease, without long-term current use of insulin (H)     Anemia, unspecified type     Past Surgical History:   Procedure Laterality Date     APPENDECTOMY       COLONOSCOPY       COLONOSCOPY N/A 10/27/2014    Procedure: COMBINED COLONOSCOPY, SINGLE OR MULTIPLE BIOPSY/POLYPECTOMY BY BIOPSY;  Surgeon: Jose Alfredo Morejon MD;  Location:  GI     HERNIA REPAIR      inguinal x 2     TONSILLECTOMY         Social History   Substance Use Topics     Smoking status: Never Smoker     Smokeless tobacco: Never Used     Alcohol use 0.0 oz/week     0 Standard drinks or equivalent per week      Comment: rarely     History reviewed. No pertinent family history.      Current Outpatient Prescriptions   Medication Sig Dispense Refill     simvastatin (ZOCOR) 10 MG  "tablet TAKE 1 TABLET BY MOUTH AT BEDTIME 90 tablet 3     miconazole (MICATIN; MICRO GUARD) 2 % powder Apply topically as needed for itching or other Reported on 3/27/2017       Ferrous Sulfate (IRON SUPPLEMENT PO) Take 325 mg by mouth       Glycerin-Polysorbate 80 (REFRESH DRY EYE THERAPY OP)        levothyroxine (SYNTHROID, LEVOTHROID) 200 MCG tablet Take 1 tablet (200 mcg) by mouth daily 90 tablet 3     nystatin (MYCOSTATIN) cream Apply topically 2 times daily       Multiple Vitamins-Minerals (PRESERVISION AREDS) CAPS Take 1 tablet by mouth 2 times daily        aspirin 81 MG tablet Take 1 tablet by mouth daily. 30 tablet 0     ACE/ARB NOT PRESCRIBED, INTENTIONAL, Please choose reason not prescribed, below       order for DME Equipment being ordered: wheeled walker (Patient not taking: Reported on 7/20/2017) 1 Device 0     Allergies   Allergen Reactions     Penicillins      BP Readings from Last 3 Encounters:   09/06/17 137/72   07/20/17 126/72   02/28/17 122/68    Wt Readings from Last 3 Encounters:   09/06/17 287 lb 11.2 oz (130.5 kg)   07/20/17 290 lb (131.5 kg)   03/27/17 289 lb (131.1 kg)                        Reviewed and updated as needed this visit by clinical staffTobacco  Allergies  Med Hx  Surg Hx  Fam Hx  Soc Hx      Reviewed and updated as needed this visit by Provider         ROS:  C: NEGATIVE for fever, chills, change in weight  E/M: NEGATIVE for ear, mouth and throat problems  R: NEGATIVE for significant cough or SOB  CV: NEGATIVE for chest pain, palpitations or peripheral edema  GI: NEGATIVE for nausea, abdominal pain, heartburn, or change in bowel habits  : NEGATIVE for frequency, dysuria, or hematuria  M: NEGATIVE for significant arthralgias or myalgia  H: NEGATIVE for bleeding problems  P: NEGATIVE for changes in mood or affect    OBJECTIVE:                                                    /72  Pulse 78  Temp 98  F (36.7  C) (Oral)  Ht 4' 11\" (1.499 m)  Wt 287 lb 11.2 oz " (130.5 kg)  BMI 58.11 kg/m2  Body mass index is 58.11 kg/(m^2).  GENERAL: alert and no distress using walker  EYES: Eyes grossly normal to inspection, extraocular movements - intact, and PERRL  HENT: ear canals- normal; TMs- normal; Nose- normal; Mouth- no ulcers, no lesions  NECK: no tenderness, no adenopathy, no asymmetry, no masses, no stiffness; thyroid- normal to palpation  RESP: lungs clear to auscultation - no rales, no rhonchi, no wheezes  CV: regular rates and rhythm, normal S1 S2, no S3 or S4 and no click or rub   ABDOMEN: obese  MS: extremities- no gross deformities noted  PSYCH: Alert and oriented times 3; speech- coherent , normal rate and volume; able to articulate logical thoughts, able to abstract reason, no tangential thoughts, no hallucinations or delusions, affect- normal       Lab Results   Component Value Date    A1C 5.6 12/01/2016    A1C 5.9 08/01/2016    A1C 6.1 09/24/2015    A1C 6.0 12/29/2014    A1C 5.9 10/21/2014       ASSESSMENT/PLAN:                                                      (E11.22,  N18.3) Type 2 diabetes mellitus with stage 3 chronic kidney disease, without long-term current use of insulin (H)  (primary encounter diagnosis)  Comment: stable on therapy, diet  Plan:     (N18.3) CKD (chronic kidney disease) stage 3, GFR 30-59 ml/min  Comment:    Creatinine   Date Value Ref Range Status   02/21/2017 1.16 (H) 0.60 - 0.93 mg/dL Final   ]    Plan: repeat at 6 months    (I10) Benign essential hypertension  Comment: stable on therapy, OK for teeth cleaning  Plan:       (E03.9) Acquired hypothyroidism  Comment: labs as ordered  Plan: levothyroxine (SYNTHROID/LEVOTHROID) 200 MCG         tablet            See Patient Instructions    Fausto Flynn MD  [facility]    THE MEDICATION LIST HAS BEEN FULLY RECONCILED BY THE M.D. AND THE NURSING STAFF.  25 minutes spent with this patient, face to face, discussing treatment options for listed problems above as well as  side effects of appropriate medications.  Counseling time extended beyond 50% of the clinic visit.  Medication dosing, treatment plan and follow-up were discussed. Also reviewed need for primary care testing for patient.

## 2017-11-22 ENCOUNTER — RADIANT APPOINTMENT (OUTPATIENT)
Dept: GENERAL RADIOLOGY | Facility: CLINIC | Age: 77
End: 2017-11-22
Attending: INTERNAL MEDICINE
Payer: COMMERCIAL

## 2017-11-22 ENCOUNTER — OFFICE VISIT (OUTPATIENT)
Dept: INTERNAL MEDICINE | Facility: CLINIC | Age: 77
End: 2017-11-22
Payer: COMMERCIAL

## 2017-11-22 VITALS
HEART RATE: 89 BPM | OXYGEN SATURATION: 94 % | TEMPERATURE: 98.4 F | SYSTOLIC BLOOD PRESSURE: 128 MMHG | DIASTOLIC BLOOD PRESSURE: 74 MMHG

## 2017-11-22 DIAGNOSIS — M25.571 PAIN IN JOINT, ANKLE AND FOOT, RIGHT: Primary | ICD-10-CM

## 2017-11-22 DIAGNOSIS — E66.01 MORBID OBESITY DUE TO EXCESS CALORIES (H): ICD-10-CM

## 2017-11-22 DIAGNOSIS — M25.571 PAIN IN JOINT, ANKLE AND FOOT, RIGHT: ICD-10-CM

## 2017-11-22 PROCEDURE — 99214 OFFICE O/P EST MOD 30 MIN: CPT | Performed by: INTERNAL MEDICINE

## 2017-11-22 PROCEDURE — 73610 X-RAY EXAM OF ANKLE: CPT | Mod: RT

## 2017-11-22 PROCEDURE — 73590 X-RAY EXAM OF LOWER LEG: CPT | Mod: RT

## 2017-11-22 RX ORDER — HYDROCODONE BITARTRATE AND ACETAMINOPHEN 5; 325 MG/1; MG/1
1-2 TABLET ORAL EVERY 4 HOURS PRN
Qty: 20 TABLET | Refills: 0 | Status: SHIPPED | OUTPATIENT
Start: 2017-11-22 | End: 2018-06-21

## 2017-11-22 NOTE — NURSING NOTE
"Chief Complaint   Patient presents with     Leg Pain       Initial /74  Pulse 89  Temp 98.4  F (36.9  C) (Oral)  SpO2 94% Estimated body mass index is 58.11 kg/(m^2) as calculated from the following:    Height as of 9/6/17: 4' 11\" (1.499 m).    Weight as of 9/6/17: 287 lb 11.2 oz (130.5 kg).  Medication Reconciliation: complete   Kim Charles CMA      "

## 2017-11-22 NOTE — PROGRESS NOTES
SUBJECTIVE:   Lucille Rojas is a 77 year old female who presents to clinic today for the following health issues:    30 minutes late for appt. but seen    Poor historian.    Musculoskeletal problem/pain      Duration: 10 days     Description  Location: R>L leg pain, swelling, pain in ankles R>L    Intensity:  Mild?    Accompanying signs and symptoms: swelling, pain in shins     History  Previous similar problem: no   Previous evaluation:  none    Precipitating or alleviating factors:  Trauma or overuse: no   Aggravating factors include: standing and walking    Therapies tried and outcome: aspirin- slight relief       Problem list and histories reviewed & adjusted, as indicated.  Additional history: as documented    Patient Active Problem List   Diagnosis     Hyperlipidemia LDL goal <100     Advance care planning     Macular degeneration     Apnea     Morbid obesity due to excess calories (H)     CKD (chronic kidney disease) stage 3, GFR 30-59 ml/min     Acquired hypothyroidism     Benign essential hypertension     Gastroesophageal reflux disease without esophagitis     Type 2 diabetes mellitus with stage 3 chronic kidney disease, without long-term current use of insulin (H)     Anemia, unspecified type     Past Surgical History:   Procedure Laterality Date     APPENDECTOMY       COLONOSCOPY       COLONOSCOPY N/A 10/27/2014    Procedure: COMBINED COLONOSCOPY, SINGLE OR MULTIPLE BIOPSY/POLYPECTOMY BY BIOPSY;  Surgeon: Jose Alfredo Morejon MD;  Location:  GI     HERNIA REPAIR      inguinal x 2     TONSILLECTOMY         Social History   Substance Use Topics     Smoking status: Never Smoker     Smokeless tobacco: Never Used     Alcohol use 0.0 oz/week     0 Standard drinks or equivalent per week      Comment: rarely     History reviewed. No pertinent family history.      Current Outpatient Prescriptions   Medication Sig Dispense Refill     levothyroxine (SYNTHROID/LEVOTHROID) 200 MCG tablet Take 1 tablet (200  mcg) by mouth daily 90 tablet 3     simvastatin (ZOCOR) 10 MG tablet TAKE 1 TABLET BY MOUTH AT BEDTIME 90 tablet 3     ACE/ARB NOT PRESCRIBED, INTENTIONAL, Please choose reason not prescribed, below       miconazole (MICATIN; MICRO GUARD) 2 % powder Apply topically as needed for itching or other Reported on 3/27/2017       order for DME Equipment being ordered: wheeled walker (Patient not taking: Reported on 7/20/2017) 1 Device 0     Ferrous Sulfate (IRON SUPPLEMENT PO) Take 325 mg by mouth       Glycerin-Polysorbate 80 (REFRESH DRY EYE THERAPY OP)        nystatin (MYCOSTATIN) cream Apply topically 2 times daily       Multiple Vitamins-Minerals (PRESERVISION AREDS) CAPS Take 1 tablet by mouth 2 times daily        aspirin 81 MG tablet Take 1 tablet by mouth daily. 30 tablet 0     Allergies   Allergen Reactions     Penicillins      BP Readings from Last 3 Encounters:   09/06/17 137/72   07/20/17 126/72   02/28/17 122/68    Wt Readings from Last 3 Encounters:   09/06/17 287 lb 11.2 oz (130.5 kg)   07/20/17 290 lb (131.5 kg)   03/27/17 289 lb (131.1 kg)         Reviewed and updated as needed this visit by clinical staffTobacco  Allergies  Med Hx  Surg Hx  Fam Hx  Soc Hx      Reviewed and updated as needed this visit by Provider         ROS:  C: NEGATIVE for fever, chills, change in weight  E/M: NEGATIVE for ear, mouth and throat problems  R: NEGATIVE for significant cough or SOB  CV: NEGATIVE for chest pain, palpitations or peripheral edema  GI: NEGATIVE for nausea, abdominal pain, heartburn, or change in bowel habits  : NEGATIVE for frequency, dysuria, or hematuria  N: NEGATIVE for weakness, dizziness or paresthesias  H: NEGATIVE for bleeding problems  P: NEGATIVE for changes in mood or affect    OBJECTIVE:                                                    /74  Pulse 89  Temp 98.4  F (36.9  C) (Oral)  SpO2 94%  There is no height or weight on file to calculate BMI.  GENERAL: alert  In wheelchair,  morbidly obese  RESP: lungs clear to auscultation - no rales, no rhonchi, no wheezes  CV: regular rates and rhythm, normal S1 S2, no S3 or S4 and  no click or rub   MS: extremities- no gross deformities noted, noted 1+ edema LE bilaterally, no assymetry, no calf pain, mild tenderness to ankle joint space, able to bear weight.  Negative Homans  NEURO: no focal changes, slowed gait  PSYCH: Alert and oriented times 3; speech- coherent , normal rate and volume; able to articulate logical thoughts, able to abstract reason, no tangential thoughts, no hallucinations or delusions, affect- normal     ASSESSMENT/PLAN:                                                      (M25.571) Pain in joint, ankle and foot, right  (primary encounter diagnosis)  Comment: etiology unclear, vague history, appears more mechanical, suggest imaging  Plan: XR Ankle Right G/E 3 Views, XR Tibia & Fibula         Right 2 Views, HYDROcodone-acetaminophen         (NORCO) 5-325 MG per tablet            (E66.01) Morbid obesity due to excess calories (H)  Comment: noted as complicating above  Plan:     See Patient Instructions    Fausto Flynn MD  [facility]    25 minutes spent with this patient, face to face, discussing treatment options for listed problems above as well as side effects of appropriate medications.  Counseling time extended beyond 50% of the clinic visit.  Medication dosing, treatment plan and follow-up were discussed. Also reviewed need for primary care testing for patient.

## 2017-11-22 NOTE — MR AVS SNAPSHOT
"              After Visit Summary   2017    Lucille Rojas    MRN: [id number]           Patient Information     Date Of Birth          1940        Visit Information        Provider Department      2017 11:00 AM Fausto Flynn MD [facility]        Today's Diagnoses     Pain in joint, ankle and foot, right    -  1    Morbid obesity due to excess calories (H)           Follow-ups after your visit        Follow-up notes from your care team     Return if symptoms worsen or fail to improve.      Who to contact     If you have questions or need follow up information about today's clinic visit or your schedule please contact [facility] directly at [phone].  Normal or non-critical lab and imaging results will be communicated to you by MyChart, letter or phone within 4 business days after the clinic has received the results. If you do not hear from us within 7 days, please contact the clinic through MyChart or phone. If you have a critical or abnormal lab result, we will notify you by phone as soon as possible.  Submit refill requests through [employer] or call your pharmacy and they will forward the refill request to us. Please allow 3 business days for your refill to be completed.          Additional Information About Your Visit        MyChart Information     [employer] lets you send messages to your doctor, view your test results, renew your prescriptions, schedule appointments and more. To sign up, go to [email or web address] . Click on \"Log in\" on the left side of the screen, which will take you to the Welcome page. Then click on \"Sign up Now\" on the right side of the page.     You will be asked to enter the access code listed below, as well as some personal information. Please follow the directions to create your username and password.     Your access code is: [email or web address]  Expires: 2018 12:05 PM     Your access code will  in 90 days. If " you need help or a new code, please call your [place] clinic or [phone].        Care EveryWhere ID     This is your Care EveryWhere ID. This could be used by other organizations to access your [place] medical records  [id number]        Your Vitals Were     Pulse Temperature Pulse Oximetry             89 98.4  F (36.9  C) (Oral) 94%          Blood Pressure from Last 3 Encounters:   11/22/17 128/74   09/06/17 137/72   07/20/17 126/72    Weight from Last 3 Encounters:   09/06/17 287 lb 11.2 oz (130.5 kg)   07/20/17 290 lb (131.5 kg)   03/27/17 289 lb (131.1 kg)                 Today's Medication Changes          These changes are accurate as of: 11/22/17 12:05 PM.  If you have any questions, ask your nurse or doctor.               Start taking these medicines.        Dose/Directions    HYDROcodone-acetaminophen 5-325 MG per tablet   Commonly known as:  NORCO   Used for:  Pain in joint, ankle and foot, right   Started by:  Fausto Flynn MD        Dose:  1-2 tablet   Take 1-2 tablets by mouth every 4 hours as needed for moderate to severe pain maximum 3 tablet(s) per day   Quantity:  20 tablet   Refills:  0            Where to get your medicines      Some of these will need a paper prescription and others can be bought over the counter.  Ask your nurse if you have questions.     Bring a paper prescription for each of these medications     HYDROcodone-acetaminophen 5-325 MG per tablet                Primary Care Provider Office Phone # Fax #    Fausto Flynn [phone]       [address] [place] [address]        Equal Access to Services     [facility]: Hadii ginger ku hadasho Soomaali, waaxda luqadaha, qaybta kaalmada miky pineda. So [facility] [phone].    ATENCIÓN: Si habla español, tiene a zurita disposición servicios gratuitos de asistencia lingüística. Llame al [phone].    We comply with applicable federal civil rights laws and  [place] laws. We do not discriminate on the basis of race, color, national origin, age, disability, sex, sexual orientation, or gender identity.            Thank you!     Thank you for choosing [facility]  for your care. Our goal is always to provide you with excellent care. Hearing back from our patients is one way we can continue to improve our services. Please take a few minutes to complete the written survey that you may receive in the mail after your visit with us. Thank you!             Your Updated Medication List - Protect others around you: Learn how to safely use, store and throw away your medicines at www.disposemymeds.org.          This list is accurate as of: 11/22/17 12:05 PM.  Always use your most recent med list.                   Brand Name Dispense Instructions for use Diagnosis    ACE/ARB/ARNI NOT PRESCRIBED (INTENTIONAL)      Please choose reason not prescribed, below    CKD (chronic kidney disease) stage 3, GFR 30-59 ml/min, Type 2 diabetes mellitus with stage 3 chronic kidney disease, without long-term current use of insulin (H)       aspirin 81 MG tablet     30 tablet    Take 1 tablet by mouth daily.    Type 2 diabetes, HbA1C goal < 8% (H)       HYDROcodone-acetaminophen 5-325 MG per tablet    NORCO    20 tablet    Take 1-2 tablets by mouth every 4 hours as needed for moderate to severe pain maximum 3 tablet(s) per day    Pain in joint, ankle and foot, right       IRON SUPPLEMENT PO      Take 325 mg by mouth        levothyroxine 200 MCG tablet    SYNTHROID/LEVOTHROID    90 tablet    Take 1 tablet (200 mcg) by mouth daily    Acquired hypothyroidism       miconazole 2 % powder    MICATIN; MICRO GUARD     Apply topically as needed for itching or other Reported on 3/27/2017        nystatin cream    MYCOSTATIN     Apply topically 2 times daily        order for DME     1 Device    Equipment being ordered: wheeled walker    Morbid obesity due to excess calories (H), Type 2  diabetes mellitus with stage 3 chronic kidney disease, without long-term current use of insulin (H)       PRESERVISION AREDS Caps      Take 1 tablet by mouth 2 times daily        REFRESH DRY EYE THERAPY OP           simvastatin 10 MG tablet    ZOCOR    90 tablet    TAKE 1 TABLET BY MOUTH AT BEDTIME    Hyperlipidemia LDL goal <100

## 2017-11-30 ENCOUNTER — TELEPHONE (OUTPATIENT)
Dept: INTERNAL MEDICINE | Facility: CLINIC | Age: 77
End: 2017-11-30

## 2017-11-30 ENCOUNTER — HOSPITAL ENCOUNTER (EMERGENCY)
Facility: CLINIC | Age: 77
Discharge: HOME | End: 2017-11-30
Attending: EMERGENCY MEDICINE | Admitting: EMERGENCY MEDICINE
Payer: MEDICARE

## 2017-11-30 VITALS
HEART RATE: 84 BPM | BODY MASS INDEX: 56.93 KG/M2 | WEIGHT: 290 LBS | SYSTOLIC BLOOD PRESSURE: 152 MMHG | DIASTOLIC BLOOD PRESSURE: 51 MMHG | HEIGHT: 60 IN | OXYGEN SATURATION: 97 % | RESPIRATION RATE: 16 BRPM | TEMPERATURE: 97.5 F

## 2017-11-30 DIAGNOSIS — R04.0 EPISTAXIS: ICD-10-CM

## 2017-11-30 PROCEDURE — 30901 CONTROL OF NOSEBLEED: CPT | Mod: LT

## 2017-11-30 PROCEDURE — 99284 EMERGENCY DEPT VISIT MOD MDM: CPT | Mod: 25

## 2017-11-30 RX ORDER — CLINDAMYCIN HCL 300 MG
300 CAPSULE ORAL 3 TIMES DAILY
Qty: 9 CAPSULE | Refills: 0 | Status: SHIPPED | OUTPATIENT
Start: 2017-11-30 | End: 2017-12-03

## 2017-11-30 NOTE — ED AVS SNAPSHOT
Emergency Department    [address] [facility] [address]    Phone:  [phone]    Fax:  [phone]                                       Lucille Rojas   MRN: [id number]    Department:   Emergency Department   Date of Visit:  11/30/2017           Patient Information     Date Of Birth          1940        Your diagnoses for this visit were:     Epistaxis        You were seen by Michael Joel MD.      Follow-up Information     Schedule an appointment as soon as possible for a visit with Sebastián Blanton MD.    Specialty:  Otolaryngology    Why:  For pack removal, Monday at the latest    Contact information:    [facility]  [address] [id number] [phone]          Discharge Instructions         Nosebleed  The skin inside your nose is fragile and filled with blood vessels. That's why even a slight injury to your nose sometimes may cause bleeding. Hard nose blowing, dry winter air, colds, and nose-picking can also cause nosebleeds. Medicines such as warfarin, aspirin, and other blood thinners can make it more likely to have a nosebleed that is difficult to stop. Normally, nosebleeds aren't a cause for concern. But in some cases, they can mean that you have a more serious health problem. Know when to seek medical care for a nosebleed.  When to go to the emergency room (ER)  Most nosebleeds aren t a medical emergency. In fact, you often can treat them yourself. But see your healthcare provider if you have nosebleeds often. And seek care right away if you:    Have a head injury    Have bleeding that lasts more than 15 to 30 minutes or is severe    Feel weak or faint    Have trouble breathing  What to expect in the ER    You will be examined and may have blood tests.    You may be given medicated nose drops to stop the nosebleed.    The doctor may pack gauze into your nose to put pressure on the vessel and help stop bleeding.    The bleeding vessel may be  cauterized. During this procedure, the vessel is burned with an electrical device or chemical. Your nose is first numbed so you won t feel any pain.    In rare cases, you may need surgery to control the bleeding.  Home care for a nosebleed    Don't blow your nose for 12 hours after the bleeding stops. This will allow a strong blood clot to form. Don't pick your nose. This may restart bleeding.    Don't drink alcohol or hot liquids for the next 2 days. Alcohol and hot liquids can dilate blood vessels in your nose. This can cause bleeding to start again.    Don't take ibuprofen, naproxen, or medicines that contain aspirin. These thin the blood and may cause your nose to bleed. You may take acetaminophen for pain, unless another pain medicine was prescribed.    If the bleeding starts again, sit up and lean forward to prevent swallowing blood. Pinch your nose tightly on both sides for 10 to 15 minutes. Time yourself. Don t release the pressure on your nose until 10 minutes is up. If bleeding doesn't stop, continue to pinch your nose. Call your healthcare provider.    If you have a cold, allergies, or dry nasal membranes, lubricate the nasal passages. Apply a small amount of petroleum jelly inside the nose with a cotton swab twice a day (morning and night).    Don't overheat your home. This can dry the air and make your condition worse.    Put a humidifier in the room where you sleep. This will add moisture to the air.    Use a saline nasal spray to keep nasal passages moist.    Don't pick your nose. Keep fingernails trimmed to decrease risk of bleeds.    Don't smoke.    Follow all other home care instructions from your healthcare provider.    Call your healthcare provider if you have any questions or concerns.  Date Last Reviewed: 10/1/2016    7920-6989 [employer]. [address] [facility], [place] [address]. All rights reserved. This information is not intended as a substitute for professional medical  care. Always follow your healthcare professional's instructions.          Nosebleed (Adult)    Bleeding from the nose most commonly occurs because of injury or drying and cracking of the inner lining of the nose. Most nosebleeds are because of dry air or nose-picking. They can occur during a common cold or an allergy attack. They can also occur on a very hot day, or from dry air in the winter.  If the bleeding site is found, it may be cauterized. This means it is treated to cause a blood clot to form. This may be done with a chemical, heat, or electricity. If the bleeding continues after the site is cauterized, or if the site cannot be found, packing may be put in your nose. This is to apply pressure and stop the bleeding. The packing may be made of gauze or sponge. A small balloon catheter is sometimes used. These must be removed by your doctor. Some types of packing dissolve on their own.  Home care    If packing was put in your nose, unless told otherwise, do not pull on it or try to remove it yourself. You will be given an appointment to have it removed. You may also have been given antibiotics to prevent a sinus infection. If so, finish all of the medicine.    Do not blow your nose for 12 hours after the bleeding stops. This will allow a strong blood clot to form. Do not pick your nose. This may restart bleeding.    Avoid drinking alcohol and hot liquids for the next 2 days. Alcohol or hot liquids in your mouth can dilate blood vessels in your nose. This can cause bleeding to start again.    Do not take ibuprofen, naproxen, or medicines that contain aspirin. These thin the blood and may cause your nose to bleed. You may take acetaminophen for pain, unless another pain medicine was prescribed.    If the bleeding starts again, sit up and lean forward to prevent swallowing blood. Pinch your nose tightly on both sides, as shown above, for 10 to 15 minutes. Time yourself. Don t release the pressure on your nose  "until 10 minutes is up. If bleeding does not stop, continue to pinch your nose and call your healthcare provider or return to this facility.    If you have a cold, allergies, or dry nasal membranes, lubricate the nasal passages. Apply a small amount of petroleum jelly inside the nose with a cotton swab twice a day (morning and night).    Avoid overheating your home. This can dry the air and make your condition worse.    Put a humidifier in the room where you sleep. This will add moisture to the air.    Use a saline nasal spray to keep nasal passages moist.    Do not pick your nose. Keep fingernails trimmed to decrease risk of bleeds.    Do not smoke.  Follow-up care  Follow up with your healthcare provider, or as advised. Nasal packing should be rechecked or removed within 2 to 3 days.  When to seek medical advice  Call your healthcare provider right away if any of these occur.    You have another nosebleed that you cannot control    Dizziness, weakness, or fainting    You become tired or confused    Fever of 100.4 F (38 C) or higher, or as directed by your healthcare provider    Headache    Sinus or facial pain    Shortness of breath or trouble breathing  Date Last Reviewed: 3/22/2015    2639-2082 [employer]. [address] [place] [address]. All rights reserved. This information is not intended as a substitute for professional medical care. Always follow your healthcare professional's instructions.      Discharge Instructions  Epistaxis    Today you were seen for a nosebleed.   Nosebleeds (the medical term is \"epistaxis\") are very common. Almost every person has had at least one in their lifetime.  Although the amount of blood loss can appear dramatic, nosebleeds rarely cause serious problems.  The most common causes are dry air or nose picking, but they also are common in people who have allergies, high blood pressure, or are on blood thinners (such as Coumadin, Aspirin or Plavix). If you " or your child gets a nosebleed, the important thing is to know how to take care of it. With the right self-care, most nosebleeds will stop on their own.    Generally, every Emergency Department visit should have a follow-up clinic visit with either a primary or a specialty clinic/provider. Please follow-up as instructed by your emergency provider today.    Return to the Emergency Department if:    Your nose is bleeding a very large amount of blood and you are unable to stop it.    You get very pale, faint, or tired.    You cannot get the bleeding to stop after following these instructions.    Treatment:    Your provider may tell you to use a decongestant nose spray, like Afrin  (oxymetazoline), in both nostrils in the morning and at night for the next three days. Do not use this medicine for more than three days at a time.  If you do, it will cause nasal congestion.     Use a moisturizer. A small amount of Vaseline  to the inside of your nostrils for moisture before bed is one option. There are nasal sprays available over-the-counter for this purpose as well.  Using a humidifier in your bedroom or home will help as well when the air is dry.    For the next three days, do not blow your nose or put anything in your nose. You may sniffle, or dab the outside of your nose.    Do not bend with your head below your waist for the next three days. Do not lift anything so heavy that you have to strain.     If you received nasal packing, please do not remove the packing until seen by an Ear, Nose, and Throat (ENT) specialist.  If antibiotics have been given with the packing, please take as directed.    If your nose starts to bleed again:    Blow your nose to get rid of some of the clots that have formed inside your nostrils. This may increase the bleeding temporarily, but that is okay.    Spray decongestant nose spray (like Afrin ) into both nostrils to constrict the blood vessels.    Sit or stand while bending forward  slightly at the waist. Do not lie down or tilt your head back. This may cause you to swallow blood and can lead to vomiting.     the soft part of BOTH nostrils at the bottom of your nose and squeeze your nose closed for at least 5 minutes (for children) or 10 to 15 minutes (for adults). Use a clock to time yourself. Do not release the pressure every so often to check whether the bleeding has stopped. Many people hurt their chances of stopping the bleeding by releasing the pressure too soon or too often.    If you follow the steps outlined above, and your nose continues to bleed, repeat all the steps once more. Apply pressure for a total of at least 30 minutes. If you continue to bleed even then, seek medical attention.  If you were given a prescription for medicine here today, be sure to read all of the information (including the package insert) that comes with your prescription.  This will include important information about the medicine, its side effects, and any warnings that you need to know about.  The pharmacist who fills the prescription can provide more information and answer questions you may have about the medicine.  If you have questions or concerns that the pharmacist cannot address, please call or return to the Emergency Department.   Remember that you can always come back to the Emergency Department if you are not able to see your regular provider in the amount of time listed above, if you get any new symptoms, or if there is anything that worries you.      He may remove the packing in 2 days, sooner if needed given your CPAP  .        24 Hour Appointment Hotline       To make an appointment at any [facility], call [phone] ([phone]). If you don't have a family doctor or clinic, we will help you find one. [facility] are conveniently located to serve the needs of you and your family.             Review of your medicines      START taking        Dose / Directions Last dose  taken    clindamycin 300 MG capsule   Commonly known as:  CLEOCIN   Dose:  300 mg   Quantity:  9 capsule        Take 1 capsule (300 mg) by mouth 3 times daily for 3 days   Refills:  0          Our records show that you are taking the medicines listed below. If these are incorrect, please call your family doctor or clinic.        Dose / Directions Last dose taken    ACE/ARB/ARNI NOT PRESCRIBED (INTENTIONAL)        Please choose reason not prescribed, below   Refills:  0        aspirin 81 MG tablet   Dose:  81 mg   Quantity:  30 tablet        Take 1 tablet by mouth daily.   Refills:  0        HYDROcodone-acetaminophen 5-325 MG per tablet   Commonly known as:  NORCO   Dose:  1-2 tablet   Quantity:  20 tablet        Take 1-2 tablets by mouth every 4 hours as needed for moderate to severe pain maximum 3 tablet(s) per day   Refills:  0        IRON SUPPLEMENT PO   Dose:  325 mg        Take 325 mg by mouth   Refills:  0        levothyroxine 200 MCG tablet   Commonly known as:  SYNTHROID/LEVOTHROID   Dose:  200 mcg   Quantity:  90 tablet        Take 1 tablet (200 mcg) by mouth daily   Refills:  3        miconazole 2 % powder   Commonly known as:  MICATIN; MICRO GUARD        Apply topically as needed for itching or other Reported on 3/27/2017   Refills:  0        nystatin cream   Commonly known as:  MYCOSTATIN        Apply topically 2 times daily   Refills:  0        order for DME   Quantity:  1 Device        Equipment being ordered: wheeled walker   Refills:  0        PRESERVISION AREDS Caps   Dose:  1 tablet        Take 1 tablet by mouth 2 times daily   Refills:  0        REFRESH DRY EYE THERAPY OP        Refills:  0        simvastatin 10 MG tablet   Commonly known as:  ZOCOR   Quantity:  90 tablet        TAKE 1 TABLET BY MOUTH AT BEDTIME   Refills:  3                Prescriptions were sent or printed at these locations (1 Prescription)                   Other Prescriptions                Printed at Department/Unit printer  (1 of 1)         clindamycin (CLEOCIN) 300 MG capsule                Orders Needing Specimen Collection     None      Pending Results     No orders found from 11/28/2017 to 12/1/2017.            Pending Culture Results     No orders found from 11/28/2017 to 12/1/2017.            Pending Results Instructions     If you had any lab results that were not finalized at the time of your Discharge, you can call the ED Lab Result RN at [phone]. You will be contacted by this team for any positive Lab results or changes in treatment. The nurses are available 7 days a week from 10A to 6:30P.  You can leave a message 24 hours per day and they will return your call.        Test Results From Your Hospital Stay               Clinical Quality Measure: Blood Pressure Screening     Your blood pressure was checked while you were in the emergency department today. The last reading we obtained was  BP: 152/51 . Please read the guidelines below about what these numbers mean and what you should do about them.  If your systolic blood pressure (the top number) is less than 120 and your diastolic blood pressure (the bottom number) is less than 80, then your blood pressure is normal. There is nothing more that you need to do about it.  If your systolic blood pressure (the top number) is 120-139 or your diastolic blood pressure (the bottom number) is 80-89, your blood pressure may be higher than it should be. You should have your blood pressure rechecked within a year by a primary care provider.  If your systolic blood pressure (the top number) is 140 or greater or your diastolic blood pressure (the bottom number) is 90 or greater, you may have high blood pressure. High blood pressure is treatable, but if left untreated over time it can put you at risk for heart attack, stroke, or kidney failure. You should have your blood pressure rechecked by a primary care provider within the next 4 weeks.  If your provider in the emergency department  "today gave you specific instructions to follow-up with your doctor or provider even sooner than that, you should follow that instruction and not wait for up to 4 weeks for your follow-up visit.        Thank you for choosing [facility]       Thank you for choosing [facility] for your care. Our goal is always to provide you with excellent care. Hearing back from our patients is one way we can continue to improve our services. Please take a few minutes to complete the written survey that you may receive in the mail after you visit with us. Thank you!        [employer] Information     [employer] lets you send messages to your doctor, view your test results, renew your prescriptions, schedule appointments and more. To sign up, go to [email or web address] . Click on \"Log in\" on the left side of the screen, which will take you to the Welcome page. Then click on \"Sign up Now\" on the right side of the page.     You will be asked to enter the access code listed below, as well as some personal information. Please follow the directions to create your username and password.     Your access code is: [email or web address]  Expires: 2018 12:05 PM     Your access code will  in 90 days. If you need help or a new code, please call your [facility] clinic or [phone].        Care EveryWhere ID     This is your Care EveryWhere ID. This could be used by other organizations to access your [facility] medical records  [id number]        Equal Access to Services     IVAN IVERSON : Hadii ginger Bruno, waaxda shaina, qaybta kaalmada miriam, miky neal . So [facility] [phone].    ATENCIÓN: Si habla español, tiene a zurita disposición servicios gratuitos de asistencia lingüística. Ronni danielle [phone].    We comply with applicable federal civil rights laws and [place] laws. We do not discriminate on the basis of race, color, national origin, age, disability, sex, sexual orientation, or gender identity.       "      After Visit Summary       This is your record. Keep this with you and show to your community pharmacist(s) and doctor(s) at your next visit.

## 2017-11-30 NOTE — ED AVS SNAPSHOT
Emergency Department    [address] [place] [id number]    Phone:  [phone]    Fax:  [phone]                                       Lucille Rojas   MRN: [id number]    Department:   Emergency Department   Date of Visit:  11/30/2017           After Visit Summary Signature Page     I have received my discharge instructions, and my questions have been answered. I have discussed any challenges I see with this plan with the nurse or doctor.    ..........................................................................................................................................  Patient/Patient Representative Signature      ..........................................................................................................................................  Patient Representative Print Name and Relationship to Patient    ..................................................               ................................................  Date                                            Time    ..........................................................................................................................................  Reviewed by Signature/Title    ...................................................              ..............................................  Date                                                            Time

## 2017-11-30 NOTE — ED PROVIDER NOTES
History     Chief Complaint:  Epistaxis      HPI   Lucille Rojas is a 77 year old female with a medical history significant for diabetes type 2 and hypertension, among others, who presents to the emergency department today for evaluation of epistaxis. The patient reports that yesterday she woke up with blood on her pajamas and under her nose. She states that her nose did not bleed for the rest of the day yesterday. She reports that she woke up this morning around 0700 with blood coming out of her nose. She notes that the majority of the blood is coming from the left nares and has also felt blood running down her throat. She also reports noting clots in the blood. The patient states that she takes 81 mg of aspirin once daily and also took acetaminophen late last night. She denies taking any other anti-coagulants. The patient reports that she uses CPAP and oxygen at night for her sleep apnea.    Allergies:  Penicillins     Medications:    Norco  Synthroid  Zocor  Miconazole  Refresh eye drops  Mycostatin cream  Aspirin 81 mg    Past Medical History:    Chronic kidney disease stage 3  Gastroesophageal reflux disease without esophagitis  Hypertension  Hyperlipidemia  Hypothyroidism  Macular degeneration  Sleep apnea  Type 2 diabetes    Past Surgical History:    Appendectomy  Hernia repair (x2), inguinal  Tonsillectomy    Family History:    No past pertinent family history.    Social History:  The patient was accompanied to the ED by her .  Smoking Status: Never Smoker  Smokeless Tobacco: Never Used  Alcohol Use: Positive (rarely)  Marital Status:       Review of Systems   HENT: Positive for nosebleeds (left nares).         Positive for blood in the posterior throat.   All other systems reviewed and are negative.    Physical Exam     Patient Vitals for the past 24 hrs:   BP Temp Pulse Resp SpO2 Height Weight   11/30/17 0908 152/51 97.5  F (36.4  C) 84 16 97 % 1.524 m (5') 131.5 kg (290 lb)     Physical  Exam  General: Resting comfortably on the gurney  Head:  The scalp, face, and head appear normal  Eyes:  The pupils are normal    Conjunctivae and sclera appear normal  ENT:    The nose is normal    There is no active bleeding at this time    There are no recently bleeding areas to the anterior septum bilaterally    She notes most of the bleeding had been on the left    Given the bleeding recurrence, I placed a posterior pack on the left    Ears/pinnae are normal  Neck:  Normal range of motion  Skin:  No rash or lesions noted.  Neuro: Speech is normal and fluent  Psych:  Awake. Alert.  Normal affect.      Appropriate interactions    Emergency Department Course     Procedures:  Physician: Michael Joel MD    Procedure:  Nosebleed management    The patient's left nare was prepped with atomized 4% Lidocaine and Afrin nasal spray. The nose was packed with a long Rapid Rhino, and no bleeding ensued.  After a period of observation, the patient was reassessed and no bleeding was noted.  There were no complications to the procedure.    Emergency Department Course:  Nursing notes and vitals reviewed.  I performed an exam of the patient as documented above.   0959 I performed a rapid rhino nasal packing, as per the above procedure note.  1005 I discussed the treatment plan with the patient. They expressed understanding of this plan and consented to discharge. They will be discharged home with instructions for care and follow up. In addition, the patient will return to the emergency department if their symptoms persist, worsen, if new symptoms arise or if there is any concern.  All questions were answered.    Impression & Plan      Medical Decision Making:  This patient presents with recent epistaxis on the left. Bleeding is controlled at this time with direct pressure. It appears that there had been some left posterior nasal bleeding. The exact site of the bleeding is not seen on detailed examination of both nostrils. A  left posterior nosebleed is most likely. The patient is on CPAP and has not been using the humidification. This may play a role. She is also on oxygen at night. A pack was placed empirically to allow healing of the left posterior presumed nosebleed source. If the patient cannot navigate her CPAP she was instructed in how to remove the pack. Ideally she can place the CPAP and oxygen mask over the nose and leave the pack in for at least 2 days. She will be placed on empiric clindamycin.    Diagnosis:    ICD-10-CM    1. Epistaxis R04.0        Disposition:  The patient is discharged to home.    Discharge Medications:  Discharge Medication List as of 11/30/2017 10:20 AM      START taking these medications    Details   clindamycin (CLEOCIN) 300 MG capsule Take 1 capsule (300 mg) by mouth 3 times daily for 3 days, Disp-9 capsule, R-0, Local Print           Scribe Disclosure:  Satya COLLAZO, am serving as a scribe at 9:55 AM on 11/30/2017 to document services personally performed by Michael Joel MD based on my observations and the provider's statements to me.   EMERGENCY DEPARTMENT       Michael Joel MD  11/30/17 1198

## 2017-11-30 NOTE — DISCHARGE INSTRUCTIONS
Nosebleed  The skin inside your nose is fragile and filled with blood vessels. That's why even a slight injury to your nose sometimes may cause bleeding. Hard nose blowing, dry winter air, colds, and nose-picking can also cause nosebleeds. Medicines such as warfarin, aspirin, and other blood thinners can make it more likely to have a nosebleed that is difficult to stop. Normally, nosebleeds aren't a cause for concern. But in some cases, they can mean that you have a more serious health problem. Know when to seek medical care for a nosebleed.  When to go to the emergency room (ER)  Most nosebleeds aren t a medical emergency. In fact, you often can treat them yourself. But see your healthcare provider if you have nosebleeds often. And seek care right away if you:    Have a head injury    Have bleeding that lasts more than 15 to 30 minutes or is severe    Feel weak or faint    Have trouble breathing  What to expect in the ER    You will be examined and may have blood tests.    You may be given medicated nose drops to stop the nosebleed.    The doctor may pack gauze into your nose to put pressure on the vessel and help stop bleeding.    The bleeding vessel may be cauterized. During this procedure, the vessel is burned with an electrical device or chemical. Your nose is first numbed so you won t feel any pain.    In rare cases, you may need surgery to control the bleeding.  Home care for a nosebleed    Don't blow your nose for 12 hours after the bleeding stops. This will allow a strong blood clot to form. Don't pick your nose. This may restart bleeding.    Don't drink alcohol or hot liquids for the next 2 days. Alcohol and hot liquids can dilate blood vessels in your nose. This can cause bleeding to start again.    Don't take ibuprofen, naproxen, or medicines that contain aspirin. These thin the blood and may cause your nose to bleed. You may take acetaminophen for pain, unless another pain medicine was  prescribed.    If the bleeding starts again, sit up and lean forward to prevent swallowing blood. Pinch your nose tightly on both sides for 10 to 15 minutes. Time yourself. Don t release the pressure on your nose until 10 minutes is up. If bleeding doesn't stop, continue to pinch your nose. Call your healthcare provider.    If you have a cold, allergies, or dry nasal membranes, lubricate the nasal passages. Apply a small amount of petroleum jelly inside the nose with a cotton swab twice a day (morning and night).    Don't overheat your home. This can dry the air and make your condition worse.    Put a humidifier in the room where you sleep. This will add moisture to the air.    Use a saline nasal spray to keep nasal passages moist.    Don't pick your nose. Keep fingernails trimmed to decrease risk of bleeds.    Don't smoke.    Follow all other home care instructions from your healthcare provider.    Call your healthcare provider if you have any questions or concerns.  Date Last Reviewed: 10/1/2016    8410-6749 [employer]. [address] [place] [address]. All rights reserved. This information is not intended as a substitute for professional medical care. Always follow your healthcare professional's instructions.          Nosebleed (Adult)    Bleeding from the nose most commonly occurs because of injury or drying and cracking of the inner lining of the nose. Most nosebleeds are because of dry air or nose-picking. They can occur during a common cold or an allergy attack. They can also occur on a very hot day, or from dry air in the winter.  If the bleeding site is found, it may be cauterized. This means it is treated to cause a blood clot to form. This may be done with a chemical, heat, or electricity. If the bleeding continues after the site is cauterized, or if the site cannot be found, packing may be put in your nose. This is to apply pressure and stop the bleeding. The packing may be made  of gauze or sponge. A small balloon catheter is sometimes used. These must be removed by your doctor. Some types of packing dissolve on their own.  Home care    If packing was put in your nose, unless told otherwise, do not pull on it or try to remove it yourself. You will be given an appointment to have it removed. You may also have been given antibiotics to prevent a sinus infection. If so, finish all of the medicine.    Do not blow your nose for 12 hours after the bleeding stops. This will allow a strong blood clot to form. Do not pick your nose. This may restart bleeding.    Avoid drinking alcohol and hot liquids for the next 2 days. Alcohol or hot liquids in your mouth can dilate blood vessels in your nose. This can cause bleeding to start again.    Do not take ibuprofen, naproxen, or medicines that contain aspirin. These thin the blood and may cause your nose to bleed. You may take acetaminophen for pain, unless another pain medicine was prescribed.    If the bleeding starts again, sit up and lean forward to prevent swallowing blood. Pinch your nose tightly on both sides, as shown above, for 10 to 15 minutes. Time yourself. Don t release the pressure on your nose until 10 minutes is up. If bleeding does not stop, continue to pinch your nose and call your healthcare provider or return to this facility.    If you have a cold, allergies, or dry nasal membranes, lubricate the nasal passages. Apply a small amount of petroleum jelly inside the nose with a cotton swab twice a day (morning and night).    Avoid overheating your home. This can dry the air and make your condition worse.    Put a humidifier in the room where you sleep. This will add moisture to the air.    Use a saline nasal spray to keep nasal passages moist.    Do not pick your nose. Keep fingernails trimmed to decrease risk of bleeds.    Do not smoke.  Follow-up care  Follow up with your healthcare provider, or as advised. Nasal packing should be  "rechecked or removed within 2 to 3 days.  When to seek medical advice  Call your healthcare provider right away if any of these occur.    You have another nosebleed that you cannot control    Dizziness, weakness, or fainting    You become tired or confused    Fever of 100.4 F (38 C) or higher, or as directed by your healthcare provider    Headache    Sinus or facial pain    Shortness of breath or trouble breathing  Date Last Reviewed: 3/22/2015    9856-4711 [employer]. [address] [place] [address]. All rights reserved. This information is not intended as a substitute for professional medical care. Always follow your healthcare professional's instructions.      Discharge Instructions  Epistaxis    Today you were seen for a nosebleed.   Nosebleeds (the medical term is \"epistaxis\") are very common. Almost every person has had at least one in their lifetime.  Although the amount of blood loss can appear dramatic, nosebleeds rarely cause serious problems.  The most common causes are dry air or nose picking, but they also are common in people who have allergies, high blood pressure, or are on blood thinners (such as Coumadin, Aspirin or Plavix). If you or your child gets a nosebleed, the important thing is to know how to take care of it. With the right self-care, most nosebleeds will stop on their own.    Generally, every Emergency Department visit should have a follow-up clinic visit with either a primary or a specialty clinic/provider. Please follow-up as instructed by your emergency provider today.    Return to the Emergency Department if:    Your nose is bleeding a very large amount of blood and you are unable to stop it.    You get very pale, faint, or tired.    You cannot get the bleeding to stop after following these instructions.    Treatment:    Your provider may tell you to use a decongestant nose spray, like Afrin  (oxymetazoline), in both nostrils in the morning and at night for the " next three days. Do not use this medicine for more than three days at a time.  If you do, it will cause nasal congestion.     Use a moisturizer. A small amount of Vaseline  to the inside of your nostrils for moisture before bed is one option. There are nasal sprays available over-the-counter for this purpose as well.  Using a humidifier in your bedroom or home will help as well when the air is dry.    For the next three days, do not blow your nose or put anything in your nose. You may sniffle, or dab the outside of your nose.    Do not bend with your head below your waist for the next three days. Do not lift anything so heavy that you have to strain.     If you received nasal packing, please do not remove the packing until seen by an Ear, Nose, and Throat (ENT) specialist.  If antibiotics have been given with the packing, please take as directed.    If your nose starts to bleed again:    Blow your nose to get rid of some of the clots that have formed inside your nostrils. This may increase the bleeding temporarily, but that is okay.    Spray decongestant nose spray (like Afrin ) into both nostrils to constrict the blood vessels.    Sit or stand while bending forward slightly at the waist. Do not lie down or tilt your head back. This may cause you to swallow blood and can lead to vomiting.     the soft part of BOTH nostrils at the bottom of your nose and squeeze your nose closed for at least 5 minutes (for children) or 10 to 15 minutes (for adults). Use a clock to time yourself. Do not release the pressure every so often to check whether the bleeding has stopped. Many people hurt their chances of stopping the bleeding by releasing the pressure too soon or too often.    If you follow the steps outlined above, and your nose continues to bleed, repeat all the steps once more. Apply pressure for a total of at least 30 minutes. If you continue to bleed even then, seek medical attention.  If you were given a  prescription for medicine here today, be sure to read all of the information (including the package insert) that comes with your prescription.  This will include important information about the medicine, its side effects, and any warnings that you need to know about.  The pharmacist who fills the prescription can provide more information and answer questions you may have about the medicine.  If you have questions or concerns that the pharmacist cannot address, please call or return to the Emergency Department.   Remember that you can always come back to the Emergency Department if you are not able to see your regular provider in the amount of time listed above, if you get any new symptoms, or if there is anything that worries you.      He may remove the packing in 2 days, sooner if needed given your CPAP  .

## 2017-12-01 ENCOUNTER — HOSPITAL ENCOUNTER (EMERGENCY)
Facility: CLINIC | Age: 77
Discharge: HOME | End: 2017-12-01
Attending: EMERGENCY MEDICINE | Admitting: EMERGENCY MEDICINE
Payer: MEDICARE

## 2017-12-01 ENCOUNTER — TELEPHONE (OUTPATIENT)
Dept: NURSING | Facility: CLINIC | Age: 77
End: 2017-12-01

## 2017-12-01 ENCOUNTER — NURSE TRIAGE (OUTPATIENT)
Dept: NURSING | Facility: CLINIC | Age: 77
End: 2017-12-01

## 2017-12-01 VITALS
BODY MASS INDEX: 56.92 KG/M2 | RESPIRATION RATE: 18 BRPM | TEMPERATURE: 98.2 F | HEART RATE: 89 BPM | DIASTOLIC BLOOD PRESSURE: 55 MMHG | WEIGHT: 289.9 LBS | SYSTOLIC BLOOD PRESSURE: 131 MMHG | HEIGHT: 60 IN | OXYGEN SATURATION: 95 %

## 2017-12-01 DIAGNOSIS — R04.0 EPISTAXIS: ICD-10-CM

## 2017-12-01 PROCEDURE — 99282 EMERGENCY DEPT VISIT SF MDM: CPT

## 2017-12-01 NOTE — ED PROVIDER NOTES
History     Chief Complaint:  Epistaxis     HPI   Lucille Rojas is a 77 year old female who takes 81mg of aspirin daily with history of anemia who presents to the emergency department today for evaluation of epistaxis. Per chart review the patient was seen in the emergency department yesterday for evaluation of similar symptoms at which time the below epistaxis management procedure was performed. She was then discharged home and instructed to follow up in clinic for Rapid Rhino removal from her nostril in 2-3 days. The patient states yesterday evening her nostril started to bleed again, coming out of her nose and draining down the back of her throat. She reports this morning some very mild bleeding from her nostril as well. She has not soaked through her present packing. She did not use her CPAP machine last night due to the nasal packing.     11/30/2017 ER Procedures:  Physician: Michael Joel MD  Procedure:  Nosebleed management  The patient's left nare was prepped with atomized 4% Lidocaine and Afrin nasal spray. The nose was packed with a long Rapid Rhino, and no bleeding ensued.  After a period of observation, the patient was reassessed and no bleeding was noted.  There were no complications to the procedure.    Allergies:  Penicillins      Medications:    levothyroxine (SYNTHROID/LEVOTHROID) 200 MCG tablet  simvastatin (ZOCOR) 10 MG tablet  ACE/ARB NOT PRESCRIBED, INTENTIONAL,  miconazole (MICATIN; MICRO GUARD) 2 % powder  Ferrous Sulfate (IRON SUPPLEMENT PO)  Glycerin-Polysorbate 80 (REFRESH DRY EYE THERAPY OP)  nystatin (MYCOSTATIN) cream  aspirin 81 MG tablet    Past Medical History:    HTN (hypertension)  Hyperlipidemia   Hypothyroidism   Macular degeneration  Sleep apnea   Type 2 diabetes    Past Surgical History:    Appendectomy  Colonoscopy x2  Hernia repair x2  Tonsillectomy     Family History:    History reviewed. No pertinent family history.      Social History:  The patient was accompanied  to the ED by her .  Smoking Status: Never smoker  Smokeless Tobacco: Never used   Alcohol Use: Rarely    Marital Status:        Review of Systems   HENT: Positive for nosebleeds.    All other systems reviewed and are negative.    Physical Exam     Patient Vitals for the past 24 hrs:   BP Temp Temp src Pulse Resp SpO2 Height Weight   12/01/17 1225 - - - - - 95 % - -   12/01/17 1149 131/55 98.2  F (36.8  C) Temporal 89 18 - 1.524 m (5') 131.5 kg (289 lb 14.5 oz)      Physical Exam  Physical Exam   General:  Sitting on bed with  at bedside, comfortable appearing.   HENT:  No obvious trauma to head. No blood in posterior oropharynx.   Right Ear:  External ear normal.   Left Ear:  External ear normal.   Nose:  Nose normal. Nasal packing in left nares. No bleeding or blood around it. No blood in right nares.  Eyes:  Conjunctivae and EOM are normal.  Neck: Normal range of motion. Neck supple. No tracheal deviation present.   Pulm/Chest: No respiratory distress  M/S: Normal range of motion.   Neuro: Alert. GCS 15.  Skin: Skin is warm and dry. No rash noted. Not diaphoretic.   Psych: Normal mood and affect. Behavior is normal.    Emergency Department Course     Emergency Department Course:  Nursing notes and vitals reviewed.  1208 I performed an exam of the patient as documented above.   Plan explained to the Patient. Patient discharged home with instructions regarding supportive care, medications, and reasons to return. The importance of close follow-up was reviewed .   Impression & Plan    Medical Decision Making:  Lucille Rojas is a very pleasant 77 year old female who presents for evaluation of nasal bleeding and epistaxis.  The bleeding was controlled with interventions in the ED yesterday at which time she was seen and Rhino Rocket was placed, but the balloon, she reports, was not filled as bleeding was controlled without inflating it then. Today, the deflated balloon in the packing was inflated  with 3 cc of air, and there did not appear to be any new or worsening bleeding. Therefore supportive outpatient management is indicated.  Close follow-up with ENT and primary care; see discharge instructions.    The treatment plan was discussed with the patient and they expressed understanding of this plan and consented to the plan.  In addition, the patient will return to the emergency department if their symptoms persist, worsen, if new symptoms arise or if there is any concern as other pathology may be present that is not evident at this time. They also understand the importance of close follow up in the clinic and if unable to do so will return to the emergency department for a reevaluation. All questions were answered.    Diagnosis:    ICD-10-CM    1. Epistaxis R04.0        Disposition:  Discharged to home.     Scribe Disclosure:  Darrell COLLAZO, am serving as a scribe at 11:58 AM on 12/1/2017 to document services personally performed by Sonu Blair DO based on my observations and the provider's statements to me.    12/1/2017    EMERGENCY DEPARTMENT         Sonu Blair DO  12/01/17 1878

## 2017-12-01 NOTE — ED AVS SNAPSHOT
Emergency Department    [address] [place] [id number]    Phone:  [phone]    Fax:  [phone]                                       Lucille Rojas   MRN: [id number]    Department:   Emergency Department   Date of Visit:  12/1/2017           After Visit Summary Signature Page     I have received my discharge instructions, and my questions have been answered. I have discussed any challenges I see with this plan with the nurse or doctor.    ..........................................................................................................................................  Patient/Patient Representative Signature      ..........................................................................................................................................  Patient Representative Print Name and Relationship to Patient    ..................................................               ................................................  Date                                            Time    ..........................................................................................................................................  Reviewed by Signature/Title    ...................................................              ..............................................  Date                                                            Time

## 2017-12-01 NOTE — ED AVS SNAPSHOT
Emergency Department    [address] [facility] [address]    Phone:  [phone]    Fax:  [phone]                                       Lucille Rojas   MRN: [id number]    Department:   Emergency Department   Date of Visit:  12/1/2017           Patient Information     Date Of Birth          1940        Your diagnoses for this visit were:     Epistaxis        You were seen by Sonu Blair DO.      Follow-up Information     Follow up with Lee Lott MD In 3 days.    Specialty:  Otolaryngology    Contact information:    [facility] OTOLARYNGOLOGY  [address] [id number] [phone]          Follow up with  Emergency Department.    Specialty:  EMERGENCY MEDICINE    Why:  If symptoms worsen    Contact information:    [address] [facility] [id number] [phone]        Discharge Instructions         Nosebleed  The skin inside your nose is fragile and filled with blood vessels. That's why even a slight injury to your nose sometimes may cause bleeding. Hard nose blowing, dry winter air, colds, and nose-picking can also cause nosebleeds. Medicines such as warfarin, aspirin, and other blood thinners can make it more likely to have a nosebleed that is difficult to stop. Normally, nosebleeds aren't a cause for concern. But in some cases, they can mean that you have a more serious health problem. Know when to seek medical care for a nosebleed.  When to go to the emergency room (ER)  Most nosebleeds aren t a medical emergency. In fact, you often can treat them yourself. But see your healthcare provider if you have nosebleeds often. And seek care right away if you:    Have a head injury    Have bleeding that lasts more than 15 to 30 minutes or is severe    Feel weak or faint    Have trouble breathing  What to expect in the ER    You will be examined and may have blood tests.    You may be given medicated nose drops to stop the nosebleed.    The  doctor may pack gauze into your nose to put pressure on the vessel and help stop bleeding.    The bleeding vessel may be cauterized. During this procedure, the vessel is burned with an electrical device or chemical. Your nose is first numbed so you won t feel any pain.    In rare cases, you may need surgery to control the bleeding.  Home care for a nosebleed    Don't blow your nose for 12 hours after the bleeding stops. This will allow a strong blood clot to form. Don't pick your nose. This may restart bleeding.    Don't drink alcohol or hot liquids for the next 2 days. Alcohol and hot liquids can dilate blood vessels in your nose. This can cause bleeding to start again.    Don't take ibuprofen, naproxen, or medicines that contain aspirin. These thin the blood and may cause your nose to bleed. You may take acetaminophen for pain, unless another pain medicine was prescribed.    If the bleeding starts again, sit up and lean forward to prevent swallowing blood. Pinch your nose tightly on both sides for 10 to 15 minutes. Time yourself. Don t release the pressure on your nose until 10 minutes is up. If bleeding doesn't stop, continue to pinch your nose. Call your healthcare provider.    If you have a cold, allergies, or dry nasal membranes, lubricate the nasal passages. Apply a small amount of petroleum jelly inside the nose with a cotton swab twice a day (morning and night).    Don't overheat your home. This can dry the air and make your condition worse.    Put a humidifier in the room where you sleep. This will add moisture to the air.    Use a saline nasal spray to keep nasal passages moist.    Don't pick your nose. Keep fingernails trimmed to decrease risk of bleeds.    Don't smoke.    Follow all other home care instructions from your healthcare provider.    Call your healthcare provider if you have any questions or concerns.  Date Last Reviewed: 10/1/2016    2381-9642 [employer]. [address] [place] [address]  Road, [place] [address]. All rights reserved. This information is not intended as a substitute for professional medical care. Always follow your healthcare professional's instructions.          24 Hour Appointment Hotline       To make an appointment at any [facility], call [phone] ([phone]). If you don't have a family doctor or clinic, we will help you find one. [facility] are conveniently located to serve the needs of you and your family.             Review of your medicines      Our records show that you are taking the medicines listed below. If these are incorrect, please call your family doctor or clinic.        Dose / Directions Last dose taken    ACE/ARB/ARNI NOT PRESCRIBED (INTENTIONAL)        Please choose reason not prescribed, below   Refills:  0        aspirin 81 MG tablet   Dose:  81 mg   Quantity:  30 tablet        Take 1 tablet by mouth daily.   Refills:  0        clindamycin 300 MG capsule   Commonly known as:  CLEOCIN   Dose:  300 mg   Quantity:  9 capsule        Take 1 capsule (300 mg) by mouth 3 times daily for 3 days   Refills:  0        HYDROcodone-acetaminophen 5-325 MG per tablet   Commonly known as:  NORCO   Dose:  1-2 tablet   Quantity:  20 tablet        Take 1-2 tablets by mouth every 4 hours as needed for moderate to severe pain maximum 3 tablet(s) per day   Refills:  0        IRON SUPPLEMENT PO   Dose:  325 mg        Take 325 mg by mouth   Refills:  0        levothyroxine 200 MCG tablet   Commonly known as:  SYNTHROID/LEVOTHROID   Dose:  200 mcg   Quantity:  90 tablet        Take 1 tablet (200 mcg) by mouth daily   Refills:  3        miconazole 2 % powder   Commonly known as:  MICATIN; MICRO GUARD        Apply topically as needed for itching or other Reported on 3/27/2017   Refills:  0        nystatin cream   Commonly known as:  MYCOSTATIN        Apply topically 2 times daily   Refills:  0        order for DME   Quantity:  1 Device        Equipment being ordered:  wheeled walker   Refills:  0        PRESERVISION AREDS Caps   Dose:  1 tablet        Take 1 tablet by mouth 2 times daily   Refills:  0        REFRESH DRY EYE THERAPY OP        Refills:  0        simvastatin 10 MG tablet   Commonly known as:  ZOCOR   Quantity:  90 tablet        TAKE 1 TABLET BY MOUTH AT BEDTIME   Refills:  3                Orders Needing Specimen Collection     None      Pending Results     No orders found from 11/29/2017 to 12/2/2017.            Pending Culture Results     No orders found from 11/29/2017 to 12/2/2017.            Pending Results Instructions     If you had any lab results that were not finalized at the time of your Discharge, you can call the ED Lab Result RN at [phone]. You will be contacted by this team for any positive Lab results or changes in treatment. The nurses are available 7 days a week from 10A to 6:30P.  You can leave a message 24 hours per day and they will return your call.        Test Results From Your Hospital Stay               Clinical Quality Measure: Blood Pressure Screening     Your blood pressure was checked while you were in the emergency department today. The last reading we obtained was  BP: 131/55 . Please read the guidelines below about what these numbers mean and what you should do about them.  If your systolic blood pressure (the top number) is less than 120 and your diastolic blood pressure (the bottom number) is less than 80, then your blood pressure is normal. There is nothing more that you need to do about it.  If your systolic blood pressure (the top number) is 120-139 or your diastolic blood pressure (the bottom number) is 80-89, your blood pressure may be higher than it should be. You should have your blood pressure rechecked within a year by a primary care provider.  If your systolic blood pressure (the top number) is 140 or greater or your diastolic blood pressure (the bottom number) is 90 or greater, you may have high blood pressure. High  "blood pressure is treatable, but if left untreated over time it can put you at risk for heart attack, stroke, or kidney failure. You should have your blood pressure rechecked by a primary care provider within the next 4 weeks.  If your provider in the emergency department today gave you specific instructions to follow-up with your doctor or provider even sooner than that, you should follow that instruction and not wait for up to 4 weeks for your follow-up visit.        Thank you for choosing [facility]       Thank you for choosing [facility] for your care. Our goal is always to provide you with excellent care. Hearing back from our patients is one way we can continue to improve our services. Please take a few minutes to complete the written survey that you may receive in the mail after you visit with us. Thank you!        [facility] Information     [employer] lets you send messages to your doctor, view your test results, renew your prescriptions, schedule appointments and more. To sign up, go to [email or web address] . Click on \"Log in\" on the left side of the screen, which will take you to the Welcome page. Then click on \"Sign up Now\" on the right side of the page.     You will be asked to enter the access code listed below, as well as some personal information. Please follow the directions to create your username and password.     Your access code is: [email or web address]  Expires: 2018 12:05 PM     Your access code will  in 90 days. If you need help or a new code, please call your [facility] clinic or [phone].        Care EveryWhere ID     This is your Care EveryWhere ID. This could be used by other organizations to access your [facility] medical records  [id number]        Equal Access to Services     GORDON IVERSON : Hadmaritza Bruno, mihir merritt, miky dillard. So [facility] [phone].    ATENCIÓN: Si habla español, tiene a zurita disposición " servicios gratuitos de asistencia lingüística. Ronni danielle [phone].    We comply with applicable federal civil rights laws and [place] laws. We do not discriminate on the basis of race, color, national origin, age, disability, sex, sexual orientation, or gender identity.            After Visit Summary       This is your record. Keep this with you and show to your community pharmacist(s) and doctor(s) at your next visit.

## 2017-12-01 NOTE — TELEPHONE ENCOUNTER
"Lucille Rojas is a 77 year old female who calls with epistaxis.    NURSING ASSESSMENT:  Description:  Epistaxis   Onset/duration:  Onset - initially began yesterday morning and was seen in the ER.   Bleeding subsided in the ER and nose was packed with a long Rapid Rhino.  Reports bleeding started again last night and has continued to start and stop since then.  Stated, \"I can taste the blood in the back of my mouth.\"  Bleeding past packing.    Precip. factors:  unknown  Associated symptoms:  Reports slight dizziness and intermittent headaches. No headache now.        Allergies:   Allergies   Allergen Reactions     Penicillins      NURSING PLAN: Nursing advice to patient to seek medical care within 2 hours    RECOMMENDED DISPOSITION:  To ED/UC for evaluation, another person to drive - patient stated her  will drive her to the ER now  Will comply with recommendation: Yes  If further questions/concerns or if symptoms do not improve, worsen or new symptoms develop, call your PCP or [facility] as soon as possible.      Guideline used:  Telephone Triage Protocols for Nurses, Fifth Edition, Pamela Nguyen RN    "

## 2017-12-01 NOTE — TELEPHONE ENCOUNTER
"Patient seen in ER yesterday for a nosebleed.  Had the left nostril packed.   Patient states that since last evening she has had a pink drainage that \"drips\" from the left nostril.   \"I have to dab below my nose to get it to stop.\"   Denies pain.  Was up 8 times during the night. Was not able to wear her cpap, could not get a good seal with the nasal packing in. Put her cpap mask up to her face a few times during the night to get some oxygen.   Is able to taste blood.     Protocol and care advice reviewed.  Patient will go to the ER at [facility] for eval now.    Reason for Disposition    [1] Has nasal packing AND [2] now bleeding around the packing (Exception: few drops or ooze)    Additional Information    Negative: Fainted or too weak to stand following large blood loss    Negative: Sounds like a life-threatening emergency to the triager    Negative: [1] Bleeding present > 30 minutes AND [2] using correct method of direct pressure    Negative: [1] Bleeding now AND [2] second call after being instructed in correct technique of direct pressure    Negative: Dizziness or lightheadedness    Negative: Pale skin (pallor) of new onset or worsening    Negative: [1] Has nasal packing (inserted by health care provider to control bleeding) AND [2] new rash    Protocols used: NOSEBLEED-ADULT-    "

## 2017-12-01 NOTE — DISCHARGE INSTRUCTIONS
Nosebleed  The skin inside your nose is fragile and filled with blood vessels. That's why even a slight injury to your nose sometimes may cause bleeding. Hard nose blowing, dry winter air, colds, and nose-picking can also cause nosebleeds. Medicines such as warfarin, aspirin, and other blood thinners can make it more likely to have a nosebleed that is difficult to stop. Normally, nosebleeds aren't a cause for concern. But in some cases, they can mean that you have a more serious health problem. Know when to seek medical care for a nosebleed.  When to go to the emergency room (ER)  Most nosebleeds aren t a medical emergency. In fact, you often can treat them yourself. But see your healthcare provider if you have nosebleeds often. And seek care right away if you:    Have a head injury    Have bleeding that lasts more than 15 to 30 minutes or is severe    Feel weak or faint    Have trouble breathing  What to expect in the ER    You will be examined and may have blood tests.    You may be given medicated nose drops to stop the nosebleed.    The doctor may pack gauze into your nose to put pressure on the vessel and help stop bleeding.    The bleeding vessel may be cauterized. During this procedure, the vessel is burned with an electrical device or chemical. Your nose is first numbed so you won t feel any pain.    In rare cases, you may need surgery to control the bleeding.  Home care for a nosebleed    Don't blow your nose for 12 hours after the bleeding stops. This will allow a strong blood clot to form. Don't pick your nose. This may restart bleeding.    Don't drink alcohol or hot liquids for the next 2 days. Alcohol and hot liquids can dilate blood vessels in your nose. This can cause bleeding to start again.    Don't take ibuprofen, naproxen, or medicines that contain aspirin. These thin the blood and may cause your nose to bleed. You may take acetaminophen for pain, unless another pain medicine was  prescribed.    If the bleeding starts again, sit up and lean forward to prevent swallowing blood. Pinch your nose tightly on both sides for 10 to 15 minutes. Time yourself. Don t release the pressure on your nose until 10 minutes is up. If bleeding doesn't stop, continue to pinch your nose. Call your healthcare provider.    If you have a cold, allergies, or dry nasal membranes, lubricate the nasal passages. Apply a small amount of petroleum jelly inside the nose with a cotton swab twice a day (morning and night).    Don't overheat your home. This can dry the air and make your condition worse.    Put a humidifier in the room where you sleep. This will add moisture to the air.    Use a saline nasal spray to keep nasal passages moist.    Don't pick your nose. Keep fingernails trimmed to decrease risk of bleeds.    Don't smoke.    Follow all other home care instructions from your healthcare provider.    Call your healthcare provider if you have any questions or concerns.  Date Last Reviewed: 10/1/2016    7768-3731 [employer]. [address] [place] [address], [place] [address]. All rights reserved. This information is not intended as a substitute for professional medical care. Always follow your healthcare professional's instructions.

## 2017-12-05 ENCOUNTER — TELEPHONE (OUTPATIENT)
Dept: NURSING | Facility: CLINIC | Age: 77
End: 2017-12-05

## 2017-12-05 ENCOUNTER — TRANSFERRED RECORDS (OUTPATIENT)
Dept: HEALTH INFORMATION MANAGEMENT | Facility: CLINIC | Age: 77
End: 2017-12-05

## 2017-12-05 NOTE — TELEPHONE ENCOUNTER
Patient asking for xray results from 11/22 again. Informed. Notes that she will be seeing ortho doctor for back issues. Ankle hurts when getting in bed, she will think about seeing podiatry. In ED twice recently for nose bleed. Better now. Bleeding has stopped. Has balloon. Said she will be seeing ENT in next couple days and getting packing removed.  FYI to PCP.

## 2017-12-18 ENCOUNTER — TRANSFERRED RECORDS (OUTPATIENT)
Dept: HEALTH INFORMATION MANAGEMENT | Facility: CLINIC | Age: 77
End: 2017-12-18

## 2018-02-12 ENCOUNTER — OFFICE VISIT (OUTPATIENT)
Dept: INTERNAL MEDICINE | Facility: CLINIC | Age: 78
End: 2018-02-12
Payer: COMMERCIAL

## 2018-02-12 VITALS
BODY MASS INDEX: 59.07 KG/M2 | OXYGEN SATURATION: 98 % | RESPIRATION RATE: 18 BRPM | SYSTOLIC BLOOD PRESSURE: 130 MMHG | HEIGHT: 59 IN | HEART RATE: 68 BPM | WEIGHT: 293 LBS | DIASTOLIC BLOOD PRESSURE: 72 MMHG | TEMPERATURE: 98 F

## 2018-02-12 DIAGNOSIS — I10 BENIGN ESSENTIAL HYPERTENSION: Chronic | ICD-10-CM

## 2018-02-12 DIAGNOSIS — N18.30 TYPE 2 DIABETES MELLITUS WITH STAGE 3 CHRONIC KIDNEY DISEASE, WITHOUT LONG-TERM CURRENT USE OF INSULIN (H): ICD-10-CM

## 2018-02-12 DIAGNOSIS — F32.0 MILD MAJOR DEPRESSION (H): Primary | ICD-10-CM

## 2018-02-12 DIAGNOSIS — E11.22 TYPE 2 DIABETES MELLITUS WITH STAGE 3 CHRONIC KIDNEY DISEASE, WITHOUT LONG-TERM CURRENT USE OF INSULIN (H): ICD-10-CM

## 2018-02-12 DIAGNOSIS — N18.30 CKD (CHRONIC KIDNEY DISEASE) STAGE 3, GFR 30-59 ML/MIN (H): ICD-10-CM

## 2018-02-12 DIAGNOSIS — E03.9 ACQUIRED HYPOTHYROIDISM: Chronic | ICD-10-CM

## 2018-02-12 DIAGNOSIS — E66.01 MORBID OBESITY DUE TO EXCESS CALORIES (H): ICD-10-CM

## 2018-02-12 DIAGNOSIS — Z12.31 VISIT FOR SCREENING MAMMOGRAM: ICD-10-CM

## 2018-02-12 PROCEDURE — 99214 OFFICE O/P EST MOD 30 MIN: CPT | Performed by: INTERNAL MEDICINE

## 2018-02-12 RX ORDER — CITALOPRAM HYDROBROMIDE 10 MG/1
10 TABLET ORAL DAILY
Qty: 90 TABLET | Refills: 1 | Status: SHIPPED | OUTPATIENT
Start: 2018-02-12 | End: 2018-07-19

## 2018-02-12 RX ORDER — CITALOPRAM HYDROBROMIDE 20 MG/1
20 TABLET ORAL DAILY
Qty: 90 TABLET | Refills: 1 | Status: CANCELLED | OUTPATIENT
Start: 2018-02-12

## 2018-02-12 NOTE — NURSING NOTE
"Chief Complaint   Patient presents with     Depression       Initial /72  Pulse 68  Temp 98  F (36.7  C)  Resp 18  Ht 4' 11\" (1.499 m)  Wt 300 lb (136.1 kg)  SpO2 98%  BMI 60.59 kg/m2 Estimated body mass index is 60.59 kg/(m^2) as calculated from the following:    Height as of this encounter: 4' 11\" (1.499 m).    Weight as of this encounter: 300 lb (136.1 kg).  Medication Reconciliation: complete   Kim Charles, CHELO      "

## 2018-02-12 NOTE — MR AVS SNAPSHOT
After Visit Summary   2/12/2018    Lucille Rojas    MRN: [id number]           Patient Information     Date Of Birth          1940        Visit Information        Provider Department      2/12/2018 10:20 AM Fausto Flynn MD [facility]        Today's Diagnoses     Mild major depression (H)    -  1    Type 2 diabetes mellitus with stage 3 chronic kidney disease, without long-term current use of insulin (H)        Morbid obesity due to excess calories (H)        CKD (chronic kidney disease) stage 3, GFR 30-59 ml/min        Acquired hypothyroidism        Benign essential hypertension        Visit for screening mammogram           Follow-ups after your visit        Additional Services     MENTAL HEALTH REFERRAL  - Adult; Outpatient Treatment; Individual/Couples/Family/Group Therapy/Health Psychology; [facility]: Located within [facility] [phone]; We will contact you to schedule the appointment or please call with any questions       All scheduling is subject to the client's specific insurance plan & benefits, provider/location availability, and provider clinical specialities.  Please arrive 15 minutes early for your first appointment and bring your completed paperwork.    Please be aware that coverage of these services is subject to the terms and limitations of your health insurance plan.  Call member services at your health plan with any benefit or coverage questions.                            Follow-up notes from your care team     Return in about 2 months (around 4/12/2018), or if symptoms worsen or fail to improve, for Lab Work.      Future tests that were ordered for you today     Open Future Orders        Priority Expected Expires Ordered    *MA Screening Digital Bilateral Routine  2/12/2019 2/12/2018    Comprehensive metabolic panel Routine 2/12/2018 5/31/2018 2/12/2018    Lipid Profile Routine 2/12/2018 5/31/2018 2/12/2018    Hemoglobin A1c Routine 2/12/2018  "2018    Albumin Random Urine Quantitative with Creat Ratio Routine 2018    TSH with free T4 reflex Routine 2018            Who to contact     If you have questions or need follow up information about today's clinic visit or your schedule please contact [facility] directly at [phone].  Normal or non-critical lab and imaging results will be communicated to you by OBMedicalhart, letter or phone within 4 business days after the clinic has received the results. If you do not hear from us within 7 days, please contact the clinic through HeatSynct or phone. If you have a critical or abnormal lab result, we will notify you by phone as soon as possible.  Submit refill requests through SumAll or call your pharmacy and they will forward the refill request to us. Please allow 3 business days for your refill to be completed.          Additional Information About Your Visit        SumAll Information     SumAll lets you send messages to your doctor, view your test results, renew your prescriptions, schedule appointments and more. To sign up, go to [email or web address] . Click on \"Log in\" on the left side of the screen, which will take you to the Welcome page. Then click on \"Sign up Now\" on the right side of the page.     You will be asked to enter the access code listed below, as well as some personal information. Please follow the directions to create your username and password.     Your access code is: [email or web address]  Expires: 2018 12:05 PM     Your access code will  in 90 days. If you need help or a new code, please call your [place] clinic or [phone].        Care EveryWhere ID     This is your Care EveryWhere ID. This could be used by other organizations to access your [place] medical records  [id number]        Your Vitals Were     Pulse Temperature Respirations Height Pulse Oximetry BMI (Body Mass Index)    68 98  F " "(36.7  C) 18 4' 11\" (1.499 m) 98% 60.59 kg/m2       Blood Pressure from Last 3 Encounters:   02/12/18 130/72   12/01/17 131/55   11/30/17 152/51    Weight from Last 3 Encounters:   02/12/18 300 lb (136.1 kg)   12/01/17 289 lb 14.5 oz (131.5 kg)   11/30/17 290 lb (131.5 kg)              We Performed the Following     MENTAL HEALTH REFERRAL  - Adult; Outpatient Treatment; Individual/Couples/Family/Group Therapy/Health Psychology; G: [facility] [phone]; We will contact you to schedule the appointment or please call with any questions          Today's Medication Changes          These changes are accurate as of 2/12/18 10:52 AM.  If you have any questions, ask your nurse or doctor.               Start taking these medicines.        Dose/Directions    citalopram 10 MG tablet   Commonly known as:  celeXA   Used for:  Mild major depression (H)   Started by:  Fausto Flynn MD        Dose:  10 mg   Take 1 tablet (10 mg) by mouth daily   Quantity:  90 tablet   Refills:  1            Where to get your medicines      These medications were sent to [facility] [address]  [address], [facility] [address]     Phone:  [phone]     citalopram 10 MG tablet                Primary Care Provider Office Phone # Fax #    Fausto Flynn [phone]       [address] [place] [phone]        Equal Access to Services     [facility] AH: Hadii ginger ku hadasho Soomaali, waaxda luqadaha, qaybta kaalmada adeegyada, miky lloyd. So [facility] [phone].    ATENCIÓN: Si habla español, tiene a zurita disposición servicios gratuitos de asistencia lingüística. Llame al [phone].    We comply with applicable federal civil rights laws and [place] laws. We do not discriminate on the basis of race, color, national origin, age, disability, sex, sexual orientation, or gender identity.            Thank you!     Thank " you for choosing [facility]  for your care. Our goal is always to provide you with excellent care. Hearing back from our patients is one way we can continue to improve our services. Please take a few minutes to complete the written survey that you may receive in the mail after your visit with us. Thank you!             Your Updated Medication List - Protect others around you: Learn how to safely use, store and throw away your medicines at www.disposemymeds.org.          This list is accurate as of 2/12/18 10:52 AM.  Always use your most recent med list.                   Brand Name Dispense Instructions for use Diagnosis    ACE/ARB/ARNI NOT PRESCRIBED (INTENTIONAL)      Please choose reason not prescribed, below    CKD (chronic kidney disease) stage 3, GFR 30-59 ml/min, Type 2 diabetes mellitus with stage 3 chronic kidney disease, without long-term current use of insulin (H)       aspirin 81 MG tablet     30 tablet    Take 1 tablet by mouth daily.    Type 2 diabetes, HbA1C goal < 8% (H)       citalopram 10 MG tablet    celeXA    90 tablet    Take 1 tablet (10 mg) by mouth daily    Mild major depression (H)       HYDROcodone-acetaminophen 5-325 MG per tablet    NORCO    20 tablet    Take 1-2 tablets by mouth every 4 hours as needed for moderate to severe pain maximum 3 tablet(s) per day    Pain in joint, ankle and foot, right       IRON SUPPLEMENT PO      Take 325 mg by mouth        levothyroxine 200 MCG tablet    SYNTHROID/LEVOTHROID    90 tablet    Take 1 tablet (200 mcg) by mouth daily    Acquired hypothyroidism       miconazole 2 % powder    MICATIN; MICRO GUARD     Apply topically as needed for itching or other Reported on 3/27/2017        nystatin cream    MYCOSTATIN     Apply topically 2 times daily        order for DME     1 Device    Equipment being ordered: wheeled walker    Morbid obesity due to excess calories (H), Type 2 diabetes mellitus with stage 3 chronic kidney disease,  without long-term current use of insulin (H)       PRESERVISION AREDS Caps      Take 1 tablet by mouth 2 times daily        REFRESH DRY EYE THERAPY OP           simvastatin 10 MG tablet    ZOCOR    90 tablet    TAKE 1 TABLET BY MOUTH AT BEDTIME    Hyperlipidemia LDL goal <100

## 2018-02-12 NOTE — PROGRESS NOTES
SUBJECTIVE:   Lucille Rojas is a 77 year old female who presents to clinic today for the following health issues:      Abnormal Mood Symptoms      Duration: Couple of years- on and off     Description:  Depression: YES  Anxiety: YES  Panic attacks: no      Accompanying signs and symptoms: see PHQ-9 and RUSLAN scores    History (similar episodes/previous evaluation): None    Precipitating or alleviating factors: Increased arguing with      Therapies tried and outcome: none    Patient states that she has struggled with depression and anxiety symptoms for many years but has put off doing anything about it.  She recently has been arguing more with her  about financial issues and had a discussion with her  during confession who at that time suggested that she may benefit from a medical assessment.  She reports oftentimes thinking about the stressors in her life particularly in her marriage.  She at times has thought about death but has no active plans or arrangements to harm herself or others.  She states that she gets anxious at times and oftentimes does not have many people to talk to other than her .  She is open to treatment options which were discussed.      Problem list and histories reviewed & adjusted, as indicated.  Additional history: as documented    Patient Active Problem List   Diagnosis     Hyperlipidemia LDL goal <100     Advance care planning     Macular degeneration     Apnea     Morbid obesity due to excess calories (H)     CKD (chronic kidney disease) stage 3, GFR 30-59 ml/min     Acquired hypothyroidism     Benign essential hypertension     Gastroesophageal reflux disease without esophagitis     Type 2 diabetes mellitus with stage 3 chronic kidney disease, without long-term current use of insulin (H)     Anemia, unspecified type     Past Surgical History:   Procedure Laterality Date     APPENDECTOMY       COLONOSCOPY       COLONOSCOPY N/A 10/27/2014    Procedure: COMBINED  COLONOSCOPY, SINGLE OR MULTIPLE BIOPSY/POLYPECTOMY BY BIOPSY;  Surgeon: Jose Alfredo Morejon MD;  Location:  GI     HERNIA REPAIR      inguinal x 2     TONSILLECTOMY         Social History   Substance Use Topics     Smoking status: Never Smoker     Smokeless tobacco: Never Used     Alcohol use 0.0 oz/week     0 Standard drinks or equivalent per week      Comment: rarely     History reviewed. No pertinent family history.      Current Outpatient Prescriptions   Medication Sig Dispense Refill     citalopram (CELEXA) 10 MG tablet Take 1 tablet (10 mg) by mouth daily 90 tablet 1     levothyroxine (SYNTHROID/LEVOTHROID) 200 MCG tablet Take 1 tablet (200 mcg) by mouth daily 90 tablet 3     simvastatin (ZOCOR) 10 MG tablet TAKE 1 TABLET BY MOUTH AT BEDTIME 90 tablet 3     order for DME Equipment being ordered: wheeled walker 1 Device 0     Ferrous Sulfate (IRON SUPPLEMENT PO) Take 325 mg by mouth       Glycerin-Polysorbate 80 (REFRESH DRY EYE THERAPY OP)        nystatin (MYCOSTATIN) cream Apply topically 2 times daily       Multiple Vitamins-Minerals (PRESERVISION AREDS) CAPS Take 1 tablet by mouth 2 times daily        aspirin 81 MG tablet Take 1 tablet by mouth daily. 30 tablet 0     HYDROcodone-acetaminophen (NORCO) 5-325 MG per tablet Take 1-2 tablets by mouth every 4 hours as needed for moderate to severe pain maximum 3 tablet(s) per day 20 tablet 0     ACE/ARB NOT PRESCRIBED, INTENTIONAL, Please choose reason not prescribed, below       miconazole (MICATIN; MICRO GUARD) 2 % powder Apply topically as needed for itching or other Reported on 3/27/2017       Allergies   Allergen Reactions     Penicillins      BP Readings from Last 3 Encounters:   12/01/17 131/55   11/30/17 152/51   11/22/17 128/74    Wt Readings from Last 3 Encounters:   12/01/17 289 lb 14.5 oz (131.5 kg)   11/30/17 290 lb (131.5 kg)   09/06/17 287 lb 11.2 oz (130.5 kg)           Reviewed and updated as needed this visit by clinical staff  Tobacco   "Allergies  Meds  Problems  Med Hx  Surg Hx  Fam Hx  Soc Hx        Reviewed and updated as needed this visit by Provider       ROS:  C: NEGATIVE for fever, chills, change in weight  E/M: NEGATIVE for ear, mouth and throat problems  R: NEGATIVE for significant cough or SOB  CV: NEGATIVE for chest pain, palpitations or peripheral edema  GI: NEGATIVE for nausea, abdominal pain, heartburn, or change in bowel habits  : NEGATIVE for frequency, dysuria, or hematuria  M: NEGATIVE for significant arthralgias or myalgia  H: NEGATIVE for bleeding problems    OBJECTIVE:                                                    /72  Pulse 68  Temp 98  F (36.7  C)  Resp 18  Ht 4' 11\" (1.499 m)  Wt 300 lb (136.1 kg)  SpO2 98%  BMI 60.59 kg/m2  Body mass index is 60.59 kg/(m^2).  GENERAL: alert and no distress using wheeled walker  EYES: Eyes grossly normal to inspection, extraocular movements - intact, and PERRL  HENT: ear canals- normal; TMs- normal; Nose- normal; Mouth- no ulcers, no lesions  NECK: no tenderness, no adenopathy, no asymmetry, no masses, no stiffness; thyroid- normal to palpation  RESP: lungs clear to auscultation - no rales, no rhonchi, no wheezes  CV: regular rates and rhythm, normal S1 S2, no S3 or S4 and no click or rub   MS: extremities- no gross deformities noted  Morbidly obese.  NEURO: no focal changes  PSYCH: Alert and oriented times 3; speech- coherent , normal rate and volume; able to articulate logical thoughts, able to abstract reason, no tangential thoughts, no hallucinations or delusions, affect- flat and has good insight into concerns.       ASSESSMENT/PLAN:                                                      (F32.0) Mild major depression (H)  (primary encounter diagnosis)  Comment: I have elected to start a low-dose SSRI and also arrange for outpatient counseling and a follow-up with me in 6 weeks.  I discussed with the patient the options of observational care versus counseling " versus medical therapy versus combination therapy.  After discussion about the risks and benefits of each considering her individual status we have opted to start low-dose medication and counseling in conjunction.  Plan: TSH with free T4 reflex, MENTAL HEALTH REFERRAL        - Adult; Outpatient Treatment;         Individual/Couples/Family/Group Therapy/Health         [place] [facility] [place] [facility]         [phone]; We will contact you to schedule        the appointment or please call with any         questions, citalopram (CELEXA) 10 MG tablet        I've explained to her that drugs of the SSRI class can have side effects such as weight gain, sexual dysfunction, insomnia, headache, nausea. These medications are generally effective at alleviating symptoms of anxiety and/or depression. Let me know if significant side effects do occur.      (E11.22,  N18.3) Type 2 diabetes mellitus with stage 3 chronic kidney disease, without long-term current use of insulin (H)  Comment: Patient states that her labs as well as medication refills are all being done through her endocrinologist Dr. Roman  Plan: Comprehensive metabolic panel, Lipid Profile,         Hemoglobin A1c, Albumin Random Urine         Quantitative with Creat Ratio, TSH with free T4        reflex        Informed her that labs have been placed for her to get those done at her convenience per    (E66.01) Morbid obesity due to excess calories (H)  Comment: We discussed exercise and the benefits potentially of weight reduction and improving not only her blood sugar control but also her mood and depression as lack of activity is contributing to the symptom  Plan:     (N18.3) CKD (chronic kidney disease) stage 3, GFR 30-59 ml/min  Comment: Repeat labs as noted.  Plan:   Creatinine   Date Value Ref Range Status   09/06/2017 1.00 0.52 - 1.04 mg/dL Final       (E03.9) Acquired hypothyroidism  Comment:   TSH   Date Value Ref Range Status   12/01/2016 2.81  0.30 - 5.00 mcU/mL Final     Plan: I have reordered a TSH to be completed    (I10) Benign essential hypertension  Comment: Blood pressure appears to be controlled and stable on therapy per  Plan: Comprehensive metabolic panel            (Z12.31) Visit for screening mammogram  Comment: I have also advised patient that it appears that she is due for her mammogram.  Plan: *MA Screening Digital Bilateral        I have ordered as future    Reviewed also colonoscopy which appears to be up-to-date    See Patient Instructions    Fausto Flynn MD  [facility]    25 minutes spent with this patient, face to face, discussing treatment options for listed problems above as well as side effects of appropriate medications.  Counseling time extended beyond 50% of the clinic visit.  Medication dosing, treatment plan and follow-up were discussed. Also reviewed need for primary care testing for patient.

## 2018-02-13 ASSESSMENT — PATIENT HEALTH QUESTIONNAIRE - PHQ9: SUM OF ALL RESPONSES TO PHQ QUESTIONS 1-9: 9

## 2018-03-21 ENCOUNTER — OFFICE VISIT (OUTPATIENT)
Dept: PSYCHOLOGY | Facility: CLINIC | Age: 78
End: 2018-03-21
Attending: INTERNAL MEDICINE
Payer: COMMERCIAL

## 2018-03-21 DIAGNOSIS — F33.0 MDD (MAJOR DEPRESSIVE DISORDER), RECURRENT EPISODE, MILD (H): Primary | ICD-10-CM

## 2018-03-21 PROCEDURE — 90791 PSYCH DIAGNOSTIC EVALUATION: CPT | Performed by: SOCIAL WORKER

## 2018-03-21 ASSESSMENT — ANXIETY QUESTIONNAIRES
5. BEING SO RESTLESS THAT IT IS HARD TO SIT STILL: SEVERAL DAYS
GAD7 TOTAL SCORE: 9
3. WORRYING TOO MUCH ABOUT DIFFERENT THINGS: MORE THAN HALF THE DAYS
2. NOT BEING ABLE TO STOP OR CONTROL WORRYING: SEVERAL DAYS
7. FEELING AFRAID AS IF SOMETHING AWFUL MIGHT HAPPEN: SEVERAL DAYS
1. FEELING NERVOUS, ANXIOUS, OR ON EDGE: SEVERAL DAYS
6. BECOMING EASILY ANNOYED OR IRRITABLE: SEVERAL DAYS
IF YOU CHECKED OFF ANY PROBLEMS ON THIS QUESTIONNAIRE, HOW DIFFICULT HAVE THESE PROBLEMS MADE IT FOR YOU TO DO YOUR WORK, TAKE CARE OF THINGS AT HOME, OR GET ALONG WITH OTHER PEOPLE: SOMEWHAT DIFFICULT

## 2018-03-21 ASSESSMENT — PATIENT HEALTH QUESTIONNAIRE - PHQ9: 5. POOR APPETITE OR OVEREATING: MORE THAN HALF THE DAYS

## 2018-03-21 NOTE — Clinical Note
Completed assesment. Full DA to follow. Seems interested in continuing with therapy. Feels helped by the antidepressant you prescribed.

## 2018-03-21 NOTE — MR AVS SNAPSHOT
"                  MRN:[id number]                      After Visit Summary   3/21/2018    Lucille Rojas    MRN: [id number]           Visit Information        Provider Department      3/21/2018 2:00 PM Adamaris Urias, [facility] Generic      Your next 10 appointments already scheduled     Mar 28, 2018 11:00 AM CDT   Return Visit with Adamaris Urias [facility]    [address]  [facility] [address]   [phone]            2018  1:00 PM CDT   Return Visit with Adamaris Urias [facility]    [address]  [facility] [address] [phone]              MyChart Information     [email or web address] lets you send messages to your doctor, view your test results, renew your prescriptions, schedule appointments and more. To sign up, go to [email or web address] . Click on \"Log in\" on the left side of the screen, which will take you to the Welcome page. Then click on \"Sign up Now\" on the right side of the page.     You will be asked to enter the access code listed below, as well as some personal information. Please follow the directions to create your username and password.     Your access code is: [email or web address]  Expires: 2018  3:14 PM     Your access code will  in 90 days. If you need help or a new code, please call your [place] clinic or [phone].        Care EveryWhere ID     This is your Care EveryWhere ID. This could be used by other organizations to access your [place] medical records  [id number]        Equal Access to Services     [facility] ZAYRA AH: Hadii ginger Bruno, wabrigidoda luqadaha, qaybta kaalmada miriam, miky lloyd. So [facility] [phone].    ATENCIÓN: Si habla español, tiene a zurita disposición servicios gratuitos de asistencia lingüística. Llame al [phone].    We comply with applicable federal civil rights " laws and [place] laws. We do not discriminate on the basis of race, color, national origin, age, disability, sex, sexual orientation, or gender identity.

## 2018-03-21 NOTE — PROGRESS NOTES
Progress Note - Initial Session    Client Name:  Lucille Rojas Date: 3/21/2018           Service Type: Individual      Session Start Time: 2pm  Session End Time: 250      Session Length: 38 - 52      Session #: 1     Attendees: Client attended alone         Diagnostic Assessment in progress.  Unable to complete documentation at the conclusion of the first session due to time limits.      Mental Status Assessment:  Appearance:   casual, mild body odor   Eye Contact:   Good   Psychomotor Behavior: Normal   Attitude:   Cooperative   Orientation:   All  Speech   Rate / Production: Slow    Volume:  Normal   Mood:    Anxious  Depressed   Affect:    Appropriate   Thought Content:  Clear  Rumination   Thought Form:  Coherent  Logical   Insight:    Fair       Safety Issues and Plan for Safety and Risk Management:  Client denies current fears or concerns for personal safety.  Client reports the following current or recent suicidal ideation or behaviors: 6 months ago to about a month and a half ago experienced passive SI with the depression. Since starting medication, this has ceased..  Client denies current or recent homicidal ideation or behaviors.  Client denies current or recent self injurious behavior or ideation.  Client denies other safety concerns.  A safety and risk management plan has been developed including: agrees should SI return or intensify she will have  remove guns from home and get a gun safe and reach out for support.Agreeing to present at an ER when indicated for emergent evaluation.   Client reports there are firearms in the house. are stored unloaded  with ammunition kept separate..      Diagnostic Criteria:   - Depressed mood. Note: In children and adolescents, can be irritable mood.     - Diminished interest or pleasure in all, or almost all, activities.    - Fatigue or loss of energy.    - Feelings of worthlessness or excessive guilt.    - Recurrent thoughts of death (not  just fear of dying), recurrent suicidal ideation without a specific plan, or a suicide attempt or a specific plan for committing suicide.         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  WHODAS 2.0 (12 item)            This questionnaire asks about difficulties due to health conditions. Health conditions  include  disease or illnesses, other health problems that may be short or long lasting,  injuries, mental health or emotional problems, and problems with alcohol or drugs.                     Think back over the past 30 days and answer these questions, thinking about how much  difficulty you had doing the following activities. For each question, please [place] only  one response.    S1 Standing for long periods such as 30 minutes? Severe =       4   S2 Taking care of household responsibilities? Moderate =   3   S3 Learning a new task, for example, learning how to get to a new place? None =         1   S4 How much of a problem do you have joining community activities (for example, festivals, Oriental orthodox or other activities) in the same way as anyone else can? Mild =           2   S5 How much have you been emotionally affected by your health problems? Severe =       4     In the past 30 days, how much difficulty did you have in:   S6 Concentrating on doing something for ten minutes? Mild =           2   S7 Walking a long distance such as a kilometer (or equivalent)? Extreme / or cannot do = 5   S8 Washing your whole body? None =         1   S9 Getting dressed? None =         1   S10 Dealing with people you do not know? Mild =           2   S11 Maintaining a friendship? Moderate =   3   S12 Your day to day work? None =         1     H1 Overall, in the past 30  days, how many days were these difficulties present? Record number of days 30   H2 In the past 30 days, for how many days were you totally unable to carry out your usual activities or work because of any health condition? Record number of days  3   H3 In the past 30 days, not counting the days that you were totally unable, for how many days did you cut back or reduce your usual activities or work because of any health condition? Record number of days 20       Collateral Reports Completed:  Routed note to PCP      PLAN: (Homework, other):  Client stated that she may follow up for ongoing services with [facility].  Scheduled for future appnts. Returns to clinic in one week.  Day tx may be indicated if SI returns. C/o feeling in a rut and spending too much of her time at home. Ct reports that the medication prescribed is helping a lot as evidenced by her low mood scores, denial of further SI, and self report in general.      Adamaris Urias, [facility], 3/21/2018

## 2018-03-22 ASSESSMENT — PATIENT HEALTH QUESTIONNAIRE - PHQ9: SUM OF ALL RESPONSES TO PHQ QUESTIONS 1-9: 5

## 2018-03-22 ASSESSMENT — ANXIETY QUESTIONNAIRES: GAD7 TOTAL SCORE: 9

## 2018-03-28 ENCOUNTER — OFFICE VISIT (OUTPATIENT)
Dept: PSYCHOLOGY | Facility: CLINIC | Age: 78
End: 2018-03-28
Attending: INTERNAL MEDICINE
Payer: COMMERCIAL

## 2018-03-28 DIAGNOSIS — F33.0 MAJOR DEPRESSIVE DISORDER, RECURRENT EPISODE, MILD (H): Primary | ICD-10-CM

## 2018-03-28 PROCEDURE — 90834 PSYTX W PT 45 MINUTES: CPT | Performed by: SOCIAL WORKER

## 2018-03-28 ASSESSMENT — ANXIETY QUESTIONNAIRES
5. BEING SO RESTLESS THAT IT IS HARD TO SIT STILL: NOT AT ALL
7. FEELING AFRAID AS IF SOMETHING AWFUL MIGHT HAPPEN: NOT AT ALL
1. FEELING NERVOUS, ANXIOUS, OR ON EDGE: SEVERAL DAYS
6. BECOMING EASILY ANNOYED OR IRRITABLE: NOT AT ALL
2. NOT BEING ABLE TO STOP OR CONTROL WORRYING: NOT AT ALL
3. WORRYING TOO MUCH ABOUT DIFFERENT THINGS: NOT AT ALL
IF YOU CHECKED OFF ANY PROBLEMS ON THIS QUESTIONNAIRE, HOW DIFFICULT HAVE THESE PROBLEMS MADE IT FOR YOU TO DO YOUR WORK, TAKE CARE OF THINGS AT HOME, OR GET ALONG WITH OTHER PEOPLE: SOMEWHAT DIFFICULT
GAD7 TOTAL SCORE: 1

## 2018-03-28 ASSESSMENT — PATIENT HEALTH QUESTIONNAIRE - PHQ9: 5. POOR APPETITE OR OVEREATING: NOT AT ALL

## 2018-03-28 NOTE — MR AVS SNAPSHOT
"                  MRN:[id number]                      After Visit Summary   3/28/2018    Lucille Rojas    MRN: [id number]           Visit Information        Provider Department      3/28/2018 11:00 AM Adamaris Urias [facility] Generic      Your next 10 appointments already scheduled     2018  1:00 PM CDT   Return Visit with Adamaris Urias [facility] ([facility])    [address]  [facility] [address]   [phone]            May 02, 2018 11:00 AM CDT   Return Visit with Adamaris Urias [facility] ([facility])    [address]  [facility] [address]   [phone]              MyChart Information     [facility] lets you send messages to your doctor, view your test results, renew your prescriptions, schedule appointments and more. To sign up, go to [email or web address] . Click on \"Log in\" on the left side of the screen, which will take you to the Welcome page. Then click on \"Sign up Now\" on the right side of the page.     You will be asked to enter the access code listed below, as well as some personal information. Please follow the directions to create your username and password.     Your access code is: [email or web address]  Expires: 2018  3:14 PM     Your access code will  in 90 days. If you need help or a new code, please call your [facility] clinic or [phone].        Care EveryWhere ID     This is your Care EveryWhere ID. This could be used by other organizations to access your [facility] medical records  [id number]        Equal Access to Services     [facility] ZAYRA AH: Hadii ginger Bruno, wabrigidoda luqadaha, qaybta kaalmada miriam, miky lloyd. So [facility] [phone].    ATENCIÓN: Si habla español, tiene a zurita disposición servicios gratuitos de asistencia lingüística. Llame al [phone].    We comply with applicable federal civil rights " laws and [place] laws. We do not discriminate on the basis of race, color, national origin, age, disability, sex, sexual orientation, or gender identity.

## 2018-03-28 NOTE — PROGRESS NOTES
Progress Note    Client Name: Lucille Rojas  Date: 3/28/2018           Service Type: Individual      Session Start Time: 11am  Session End Time: 1145      Session Length: 45     Session #: 2     Attendees: Client attended alone    Treatment Plan Last Reviewed: in progress  PHQ-9 / RUSLAN-7 : 3;2     DATA      Progress Since Last Session (Related to Symptoms / Goals / Homework):   Symptoms: Stable    Homework: Completed in session      Episode of Care Goals: Minimal progress - PREPARATION (Decided to change - considering how); Intervened by negotiating a change plan and determining options / strategies for behavior change, identifying triggers, exploring social supports, and working towards setting a date to begin behavior change     Current / Ongoing Stressors and Concerns:   Aging and health concerns; relational estrangement with an adult daughter; mood issues.     Treatment Objective(s) Addressed in This Session:   Improve management of mood and ct would like to learn to appreciate herself more.  Would also like to dev better boundaries with oldest daughter.     Intervention:   Build self awareness, educate through CBT lens.        ASSESSMENT: Current Emotional / Mental Status (status of significant symptoms):   Risk status (Self / Other harm or suicidal ideation)   Client denies current fears or concerns for personal safety.   Client denies current or recent suicidal ideation or behaviors.   Client denies current or recent homicidal ideation or behaviors.   Client denies current or recent self injurious behavior or ideation.   Client denies other safety concerns.   A safety and risk management plan has not been developed at this time, however client was given the after-hours number / 911 should there be a change in any of these risk factors.     Appearance:   Appropriate    Eye Contact:   Good    Psychomotor Behavior: Normal    Attitude:   Cooperative     Orientation:   All   Speech    Rate / Production: Normal     Volume:  Soft    Mood:    Anxious  Depressed  Irritable    Affect:    Appropriate    Thought Content:  Clear  Rumination    Thought Form:  Coherent  Logical    Insight:    Fair      Medication Review:   No changes to current psychiatric medication(s)     Medication Compliance:   Yes with good benefit.     Changes in Health Issues:   None reported     Chemical Use Review:   Substance Use: Chemical use reviewed, no active concerns identified      Tobacco Use: No current tobacco use.       Collateral Reports Completed:   Not Applicable    PLAN: (Client Tasks / Therapist Tasks / Other)  Complete tx plan; returns in 2 weeks.        PREMA Del Rosario; 3/28/2018                                                           ________________________________________________________________________

## 2018-03-29 ASSESSMENT — ANXIETY QUESTIONNAIRES: GAD7 TOTAL SCORE: 1

## 2018-03-29 ASSESSMENT — PATIENT HEALTH QUESTIONNAIRE - PHQ9: SUM OF ALL RESPONSES TO PHQ QUESTIONS 1-9: 3

## 2018-04-03 DIAGNOSIS — E78.5 HYPERLIPIDEMIA LDL GOAL <100: ICD-10-CM

## 2018-04-03 NOTE — LETTER
[facility]  [address] [id number] [phone]            Lucille Rojas  [address] [place] [address]        April 4, 2018    Dear Lucille,    While refilling your prescription today, we noticed that you are due to have labs drawn.  We will refill your prescription for 30 days, but a follow-up appointment must be made before any additional refills can be approved.     Taking care of your health is important to us and we look forward to seeing you in the near future.  Please call us at [phone] or [phone] (or use [facility]) to schedule an appointment.     Please disregard this notice if you have already made an appointment.    Sincerely,        [facility]

## 2018-04-03 NOTE — TELEPHONE ENCOUNTER
"Requested Prescriptions   Pending Prescriptions Disp Refills     simvastatin (ZOCOR) 10 MG tablet [Pharmacy Med Name: SIMVASTATIN 10 MG Tablet] 90 tablet 3     Sig: TAKE 1 TABLET AT BEDTIME    Statins Protocol Failed    4/3/2018  4:20 PM       Failed - LDL on file in past 12 months    Recent Labs   Lab Test  12/08/16   0906   LDL  77            Passed - No abnormal creatine kinase in past 12 months    No lab results found.            Passed - Recent (12 mo) or future (30 days) visit within the authorizing provider's specialty    Patient had office visit in the last 12 months or has a visit in the next 30 days with authorizing provider or within the authorizing provider's specialty.  See \"Patient Info\" tab in inbasket, or \"Choose Columns\" in Meds & Orders section of the refill encounter.           Passed - Patient is age 18 or older       Passed - No active pregnancy on record       Passed - No positive pregnancy test in past 12 months        Last Written Prescription Date:  4/4/17  Last Fill Quantity: 90,  # refills: 3   Last office visit: 2/12/2018 with prescribing provider:  2/12/18   Future Office Visit:   Next 5 appointments (look out 90 days)     Apr 18, 2018  1:00 PM CDT   Return Visit with Adamaris Urias, [facility] ([facility])    [address] [place] [address] [address]  [facility] [address]   [phone]            May 02, 2018 11:00 AM CDT   Return Visit with Adamaris Urias, [facility] ([facility])    [address] [address]  [facility] [address]   [phone]                   "

## 2018-04-04 RX ORDER — SIMVASTATIN 10 MG
TABLET ORAL
Qty: 30 TABLET | Refills: 0 | Status: SHIPPED | OUTPATIENT
Start: 2018-04-04 | End: 2018-06-21

## 2018-04-10 NOTE — PROGRESS NOTES
"Service Date: 2018      INITIAL ASSESSMENT      IDENTIFYING INFORMATION:  Lucille is a 77-year-old  white female referred to this intake by Dr. Fausto Flynn, her current GP, and she is alone in session with the author.      CLIENT'S STATEMENT OF PRESENTING CONCERN:  \"Depression.\"      HISTORY OF PRESENTING CONCERN:  Client reports that her PCP felt this was a good idea for her to engage in individual therapy, stating she feels she is \"in a rut,\" having some sleep issues, depression, arguing more with spouse, increased stress and kind of being irritable.\"  She has macular degeneration and does not drive and her spouse drives her everywhere and she feels she is with him .  She states depression began in  during a separation from her  around marital difficulties and has been gradually increasing over the last year or year and a half.  She recently, 6 weeks ago, began taking Citalopram 10 mg and feels this has had a very positive benefit.  She states she had passive SI 6 months ago, but since the medication, this has dissipated.  She has some family and friends for support and does not list strengths.      SOCIAL HISTORY:  Client has been  for 57 years and lives alone with spouse in their own home.  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  She was born and raised in [place], [place] and describes her childhood as \"worked on a farm, loved by parents, 4H member, 17 years of [employer]ism school\" and states she is the youngest of 5.  She has 4 living adult children and describes these relationships as \"not too good at times.\"   A son, Zaki,  13 years ago in a car accident at the age of 42.  Client denies involvement with the legal system, past or current.  She achieved 1 year of college at the [employer] in the [employer] and did complete an AA degree.  She was raised Pentecostalism and continues to practice attending weekly mass and English is " her preferred written and spoken language.      MENTAL HEALTH HISTORY:  Client is unaware of mental health issues in her family of origin.  She herself has had no previous counseling.      CHEMICAL HEALTH HISTORY:  Client reports that her father had alcohol issues and her brother also has alcohol issues.  No one has been in recovery.  She herself describes no problems historically or currently with substance abuse issues.  Her CAGE-AID score is 0.  She drinks once a month, 1 drink, diet pop and 1-2 cups of coffee a day.  Denies tobacco, marijuana or other abuse.      SIGNIFICANT LOSSES, TRAUMATIC EVENTS AND ABUSE HISTORY:  Client cites a significant loss the loss of her parents and all of her siblings  as well as her son, Shayne.  Client denies a history of physical, sexual or emotional abuse or neglect.      SAFETY ISSUES AND PLAN FOR SAFETY AND RISK MANAGEMENT:  Client reports passive SI without attempt or plan 6 months ago, but none since.  She denies a history of attempt or problems with this.  She denies harm to other people or property.  She states that there are firearms in her home, but they are stored unloaded and the ammunition is kept separate.      MEDICAL HISTORY:  Client obtains her medical care from Dr. Flynn at [facility].  She does not have a history of psychiatry.  Her medical history is significant for high blood pressure, diabetes type 2, obesity, high cholesterol, macular degeneration diagnosed 5 years ago.  She also has arthritis in her ankle and gout.  She reports chronic pain sometimes to a level of 8.  She is overweight and is trying to eat less.        ALLERGIES:  SHE IS ALLERGIC TO PENICILLIN.        MEDICATIONS: Citalopram 10mg. See EPIC for list of other medications.     MENTAL STATUS ASSESSMENT:  Client appeared her stated age and was casually groomed and dressed.  Her eye contact was good and she was oriented x 4.  Her mood was down and affect appropriate.  Thought  content was clear with denial of hallucinations, delusions, paranoia, SI or HI.  Thought form was logical, coherent and goal directed.  Psychomotor behavior was normal and speech rate and volume were both normal.      PSYCHOLOGICAL SYMPTOMS:  Client reports relationship problems with her adult children, financial stressors, her 's health as a stressor.  Her PHQ9 score is a 5, citing feeling depressed, sleep disturbance, fatigue and feeling badly about herself.  Her GAD7 score is a 9, citing worry, nervousness, difficulty relaxing, restlessness, irritability and fearfulness.      FUNCTIONAL STATUS:  Client reports stable ability generally to manage activities of daily living, but is looking forward to broadening her base of support by attending individual therapy in an effort to better cope with her life stressors and mood concerns.      DSM 5 DIAGNOSES:   1.  Anxiety and depression.   2.  Psychosocial stressors and context: health and aging issues in herself and spouse; relational difficulties within family.     3.  WhoDos 2.0 is a 31; attendance agreement has been reviewed and signed by client.      PRELIMINARY TREATMENT PLAN:  Initial focus will be on the provision of individual supportive therapy with a CBT focus.  Goal is to improve client's coping with stressors and mood issues.  Involvement of family is not indicated at this point.  Client returns to clinic in 1-2 weeks at which point we will begin the process of treatment goal planning.  This intake may be released to client upon her request with written authorization.         MACHO ALEJANDRO, LICSW             D: 04/10/2018   T: 04/10/2018   MT: AL      Name:     GLORIA LARA   MRN:      [id number]        Account:      [id number]   :      1940           Service Date: 2018      Document: [id number]

## 2018-04-18 ENCOUNTER — OFFICE VISIT (OUTPATIENT)
Dept: PSYCHOLOGY | Facility: CLINIC | Age: 78
End: 2018-04-18
Payer: COMMERCIAL

## 2018-04-18 DIAGNOSIS — F33.0 MAJOR DEPRESSIVE DISORDER, RECURRENT EPISODE, MILD (H): Primary | ICD-10-CM

## 2018-04-18 PROCEDURE — 90834 PSYTX W PT 45 MINUTES: CPT | Performed by: SOCIAL WORKER

## 2018-04-18 ASSESSMENT — ANXIETY QUESTIONNAIRES
GAD7 TOTAL SCORE: 7
3. WORRYING TOO MUCH ABOUT DIFFERENT THINGS: SEVERAL DAYS
7. FEELING AFRAID AS IF SOMETHING AWFUL MIGHT HAPPEN: SEVERAL DAYS
1. FEELING NERVOUS, ANXIOUS, OR ON EDGE: SEVERAL DAYS
2. NOT BEING ABLE TO STOP OR CONTROL WORRYING: SEVERAL DAYS
6. BECOMING EASILY ANNOYED OR IRRITABLE: SEVERAL DAYS
5. BEING SO RESTLESS THAT IT IS HARD TO SIT STILL: SEVERAL DAYS
IF YOU CHECKED OFF ANY PROBLEMS ON THIS QUESTIONNAIRE, HOW DIFFICULT HAVE THESE PROBLEMS MADE IT FOR YOU TO DO YOUR WORK, TAKE CARE OF THINGS AT HOME, OR GET ALONG WITH OTHER PEOPLE: SOMEWHAT DIFFICULT

## 2018-04-18 ASSESSMENT — PATIENT HEALTH QUESTIONNAIRE - PHQ9: 5. POOR APPETITE OR OVEREATING: SEVERAL DAYS

## 2018-04-18 NOTE — MR AVS SNAPSHOT
"                  MRN:[id number]                      After Visit Summary   2018    Lucille Rojas    MRN: [id number]           Visit Information        Provider Department      2018 1:00 PM Adamaris Urias LICSW [facility] Generic      Your next 10 appointments already scheduled     May 11, 2018 10:30 AM CDT   Return Visit with Adamaris UriasPREMA   [facility] ([facility])    [address]  [facility] [address] [phone]              MyChart Information     [facility] [employer] lets you send messages to your doctor, view your test results, renew your prescriptions, schedule appointments and more. To sign up, go to [email or web address] . Click on \"Log in\" on the left side of the screen, which will take you to the Welcome page. Then click on \"Sign up Now\" on the right side of the page.     You will be asked to enter the access code listed below, as well as some personal information. Please follow the directions to create your username and password.     Your access code is: [email or web address]  Expires: 2018  3:14 PM     Your access code will  in 90 days. If you need help or a new code, please call your [place] clinic or [phone].        Care EveryWhere ID     This is your Care EveryWhere ID. This could be used by other organizations to access your [facility] medical records  [id number]        Equal Access to Services     [facility] FarmJULIET : Hadii ginger henning hadasho Soomaali, waaxda luqadaha, qaybta kaalmada adeegyada, waxay vanessa neal . So [facility] [phone].    ATENCIÓN: Si habla español, tiene a zurita disposición servicios gratuitos de asistencia lingüística. Llame al [phone].    We comply with applicable federal civil rights laws and [place] laws. We do not discriminate on the basis of race, color, national origin, age, disability, sex, sexual orientation, or gender identity.            "

## 2018-04-18 NOTE — PROGRESS NOTES
"                                             Progress Note    Client Name: Lucille Rojas  Date: 4/18/2018           Service Type: Individual      Session Start Time: 115  Session End Time: 145      Session Length: 30     Session #: 3     Attendees: Client attended alone    Treatment Plan Last Reviewed: see below.  PHQ-9 / RUSLAN-7 : 9;7     DATA      Progress Since Last Session (Related to Symptoms / Goals / Homework):   Symptoms: Stable    Homework: Achieved / completed to satisfaction      Episode of Care Goals: Satisfactory progress - ACTION (Actively working towards change); Intervened by reinforcing change plan / affirming steps taken.Able to follow through with calling her granddaughter and chatting for 30 minutes. Trying to get family to agree on a family portrait.     Current / Ongoing Stressors and Concerns:   Health and aging issues. Extended family issues. Doesn't get along with a daughter and her kids. Recently began taking an SSRI with very good benefit.     Treatment Objective(s) Addressed in This Session:   improve management of mood  Reports: \"I'm doing really good; anabella like I don't have to come anymore\".     Intervention:   However agreeing  to come in once a month for check in around mood management and support.        ASSESSMENT: Current Emotional / Mental Status (status of significant symptoms):   Risk status (Self / Other harm or suicidal ideation)   Client denies current fears or concerns for personal safety.   Client denies current or recent suicidal ideation or behaviors.   Client denies current or recent homicidal ideation or behaviors.   Client denies current or recent self injurious behavior or ideation.   Client denies other safety concerns.   A safety and risk management plan has not been developed at this time, however client was given the after-hours number / 911 should there be a change in any of these risk factors.     Appearance:   casually dressed with mild body odor.    Eye " Contact:   Good    Psychomotor Behavior: Normal    Attitude:   Cooperative    Orientation:   All   Speech    Rate / Production: Normal     Volume:  Soft    Mood:    Normal, pleasant, proud of her improvement.   Affect:    Appropriate    Thought Content:  Clear  Rumination    Thought Form:  Coherent  Logical    Insight:    Fair      Medication Review:   No changes to current psychiatric medication(s)     Medication Compliance:   Yes     Changes in Health Issues:   None reported     Chemical Use Review:   Substance Use: Chemical use reviewed, no active concerns identified      Tobacco Use: No current tobacco use.       Collateral Reports Completed:   Not Applicable    PLAN: (Client Tasks / Therapist Tasks / Other)  Returns in 3-4 weeks; cont as planned.        Adamaris Urias, [facility]4/18/2018                                                           ________________________________________________________________________    Treatment Plan    Client's Name: Lucille Rojas  YOB: 1940    Date: 4/18/2018      DSM-V Diagnoses: 296.32 (F33.1) Major Depressive Disorder, Recurrent Episode, Moderate _ and With anxious distress  Psychosocial / Contextual Factors: health and aging; extended family stressors.  WHODAS: see intake    Referral / Collaboration:  Referral to another professional/service is not indicated at this time..    Anticipated number of session or this episode of care: eval every 90 days.      MeasurableTreatment Goal(s) related to diagnosis / functional impairment(s)  Goal 1: Client will improve management of mood    I will know I've met my goal when I feel less irritable and I'm nicer to my .      Objective #A (Client Action)    Client will Decrease frequency and intensity of feeling down, depressed, hopeless  Identify negative self-talk and behaviors: challenge core beliefs, myths, and actions.  Status: new     Intervention(s)  Therapist will teach CBT and use mood screens as evidence  of progress..    Objective #B  Client will learn & utilize at least 1 assertive communication skills weekly; thereby improving communication with her family members.  Status: new     Intervention(s)  Therapist will teach assertiveness skills..    Client has reviewed and agreed to the above plan.      Adamaris Urias, PREMA  April 18, 2018

## 2018-04-19 ASSESSMENT — ANXIETY QUESTIONNAIRES: GAD7 TOTAL SCORE: 7

## 2018-04-19 ASSESSMENT — PATIENT HEALTH QUESTIONNAIRE - PHQ9: SUM OF ALL RESPONSES TO PHQ QUESTIONS 1-9: 9

## 2018-05-11 ENCOUNTER — OFFICE VISIT (OUTPATIENT)
Dept: PSYCHOLOGY | Facility: CLINIC | Age: 78
End: 2018-05-11
Payer: COMMERCIAL

## 2018-05-11 DIAGNOSIS — F33.0 MAJOR DEPRESSIVE DISORDER, RECURRENT EPISODE, MILD (H): Primary | ICD-10-CM

## 2018-05-11 PROCEDURE — 90834 PSYTX W PT 45 MINUTES: CPT | Performed by: SOCIAL WORKER

## 2018-05-11 ASSESSMENT — ANXIETY QUESTIONNAIRES
7. FEELING AFRAID AS IF SOMETHING AWFUL MIGHT HAPPEN: NOT AT ALL
3. WORRYING TOO MUCH ABOUT DIFFERENT THINGS: NOT AT ALL
2. NOT BEING ABLE TO STOP OR CONTROL WORRYING: NOT AT ALL
IF YOU CHECKED OFF ANY PROBLEMS ON THIS QUESTIONNAIRE, HOW DIFFICULT HAVE THESE PROBLEMS MADE IT FOR YOU TO DO YOUR WORK, TAKE CARE OF THINGS AT HOME, OR GET ALONG WITH OTHER PEOPLE: NOT DIFFICULT AT ALL
5. BEING SO RESTLESS THAT IT IS HARD TO SIT STILL: SEVERAL DAYS
GAD7 TOTAL SCORE: 2
6. BECOMING EASILY ANNOYED OR IRRITABLE: NOT AT ALL
1. FEELING NERVOUS, ANXIOUS, OR ON EDGE: NOT AT ALL

## 2018-05-11 ASSESSMENT — PATIENT HEALTH QUESTIONNAIRE - PHQ9: 5. POOR APPETITE OR OVEREATING: SEVERAL DAYS

## 2018-05-11 NOTE — MR AVS SNAPSHOT
"                  MRN:[id number]                      After Visit Summary   2018    Lucille Rojas    MRN: [id number]           Visit Information        Provider Department      2018 10:30 AM Adamaris Urias LICSW [facility] Generic      Your next 10 appointments already scheduled     2018  1:00 PM CDT   Return Visit with Adamaris PREMA Urias   [facility] ([facility])    [address]  [address]  [facility] [address] [phone]              MyChart Information     [employer] lets you send messages to your doctor, view your test results, renew your prescriptions, schedule appointments and more. To sign up, go to [email or web address] . Click on \"Log in\" on the left side of the screen, which will take you to the Welcome page. Then click on \"Sign up Now\" on the right side of the page.     You will be asked to enter the access code listed below, as well as some personal information. Please follow the directions to create your username and password.     Your access code is: [email or web address]  Expires: 2018  3:14 PM     Your access code will  in 90 days. If you need help or a new code, please call your [place] clinic or [phone].        Care EveryWhere ID     This is your Care EveryWhere ID. This could be used by other organizations to access your [place] medical records  [id number]        Equal Access to Services     [facility]: Hadii ginger henning hadasho Soomaali, waaxda luqadaha, qaybta kaalmada adeegyada, waxay vanessa neal . So [facility] [phone].    ATENCIÓN: Si habla español, tiene a zurita disposición servicios gratuitos de asistencia lingüística. Llame al [phone].    We comply with applicable federal civil rights laws and [place] laws. We do not discriminate on the basis of race, color, national origin, age, disability, sex, sexual orientation, or gender identity.            "

## 2018-05-11 NOTE — PROGRESS NOTES
"                                             Progress Note    Client Name: Lucille Rojas  Date: 5/11/2018           Service Type: Individual      Session Start Time: 1030am  Session End Time: 1120      Session Length: 50     Session #: 4     Attendees: Client and Spouse / Significant Other    Treatment Plan Last Reviewed: see below.  PHQ-9 / RUSLAN-7 : 4;2     DATA      Progress Since Last Session (Related to Symptoms / Goals / Homework):   Symptoms: Stable    Homework: Achieved / completed to satisfaction      Episode of Care Goals: Satisfactory progress - ACTION (Actively working towards change); Intervened by reinforcing change plan / affirming steps taken.  Shw invited 80 yo spouse Ayad into appnt so he \"could see what we talk about\".       Current / Ongoing Stressors and Concerns:   Health and aging issues. Extended family issues. Doesn't get along with a daughter and her kids. Recently began taking an SSRI with very good benefit.     Treatment Objective(s) Addressed in This Session:   improve management of mood  Last session:Reports: \"I'm doing really good; anabella like I don't have to come anymore\".   Current: Continuing to do well.  concurs and says she is much easier to live with. Both chimed in re their conflicts with 2 out of the 4 adult children.  Both reported having had a good time at the [place] for her bday.     Intervention:   However agreeing  to come in once a month for check in around mood management and support. Today focus was on improved mood and its impact as well as on strategies for letting go of expectations that the 2 middle children would change.        ASSESSMENT: Current Emotional / Mental Status (status of significant symptoms):   Risk status (Self / Other harm or suicidal ideation)   Client denies current fears or concerns for personal safety.   Client denies current or recent suicidal ideation or behaviors.   Client denies current or recent homicidal ideation or " behaviors.   Client denies current or recent self injurious behavior or ideation.   Client denies other safety concerns.   A safety and risk management plan has not been developed at this time, however client was given the after-hours number / 911 should there be a change in any of these risk factors.     Appearance:   better grooming-no odor.    Eye Contact:   Good    Psychomotor Behavior: Normal    Attitude:   Cooperative    Orientation:   All   Speech    Rate / Production: Normal     Volume:  Soft    Mood:    Normal, pleasant, proud of her improvement.   Affect:    Appropriate    Thought Content:  Clear  Rumination    Thought Form:  Coherent  Logical    Insight:    Fair      Medication Review:   No changes to current psychiatric medication(s)     Medication Compliance:   Yes     Changes in Health Issues:   None reported     Chemical Use Review:   Substance Use: Chemical use reviewed, no active concerns identified      Tobacco Use: No current tobacco use.       Collateral Reports Completed:   Routed note to PCP    PLAN: (Client Tasks / Therapist Tasks / Other)  Returns in 4 weeks; cont as planned.        Adamaris Urias, [facility] 5/11/2018                                                           ________________________________________________________________________    Treatment Plan    Client's Name: Lucille Rojas  YOB: 1940    Date: 4/18/2018      DSM-V Diagnoses: 296.32 (F33.1) Major Depressive Disorder, Recurrent Episode, Moderate _ and With anxious distress  Psychosocial / Contextual Factors: health and aging; extended family stressors.  WHODAS: see intake    Referral / Collaboration:  Referral to another professional/service is not indicated at this time..    Anticipated number of session or this episode of care: eval every 90 days.      MeasurableTreatment Goal(s) related to diagnosis / functional impairment(s)  Goal 1: Client will improve management of mood    I will know I've met my goal  when I feel less irritable and I'm nicer to my .      Objective #A (Client Action)    Client will Decrease frequency and intensity of feeling down, depressed, hopeless  Identify negative self-talk and behaviors: challenge core beliefs, myths, and actions.  Status: new     Intervention(s)  Therapist will teach CBT and use mood screens as evidence of progress..    Objective #B  Client will learn & utilize at least 1 assertive communication skills weekly; thereby improving communication with her family members.  Status: new     Intervention(s)  Therapist will teach assertiveness skills..    Client has reviewed and agreed to the above plan.      Adamaris Urias, [facility]  April 18, 2018

## 2018-05-12 ASSESSMENT — ANXIETY QUESTIONNAIRES: GAD7 TOTAL SCORE: 2

## 2018-05-12 ASSESSMENT — PATIENT HEALTH QUESTIONNAIRE - PHQ9: SUM OF ALL RESPONSES TO PHQ QUESTIONS 1-9: 4

## 2018-06-07 ENCOUNTER — OFFICE VISIT (OUTPATIENT)
Dept: PSYCHOLOGY | Facility: CLINIC | Age: 78
End: 2018-06-07
Payer: COMMERCIAL

## 2018-06-07 DIAGNOSIS — F33.0 MAJOR DEPRESSIVE DISORDER, RECURRENT EPISODE, MILD (H): Primary | ICD-10-CM

## 2018-06-07 PROCEDURE — 90834 PSYTX W PT 45 MINUTES: CPT | Performed by: SOCIAL WORKER

## 2018-06-07 ASSESSMENT — ANXIETY QUESTIONNAIRES
1. FEELING NERVOUS, ANXIOUS, OR ON EDGE: NOT AT ALL
5. BEING SO RESTLESS THAT IT IS HARD TO SIT STILL: SEVERAL DAYS
6. BECOMING EASILY ANNOYED OR IRRITABLE: SEVERAL DAYS
7. FEELING AFRAID AS IF SOMETHING AWFUL MIGHT HAPPEN: SEVERAL DAYS
3. WORRYING TOO MUCH ABOUT DIFFERENT THINGS: SEVERAL DAYS
IF YOU CHECKED OFF ANY PROBLEMS ON THIS QUESTIONNAIRE, HOW DIFFICULT HAVE THESE PROBLEMS MADE IT FOR YOU TO DO YOUR WORK, TAKE CARE OF THINGS AT HOME, OR GET ALONG WITH OTHER PEOPLE: SOMEWHAT DIFFICULT
GAD7 TOTAL SCORE: 5
2. NOT BEING ABLE TO STOP OR CONTROL WORRYING: NOT AT ALL

## 2018-06-07 ASSESSMENT — PATIENT HEALTH QUESTIONNAIRE - PHQ9: 5. POOR APPETITE OR OVEREATING: SEVERAL DAYS

## 2018-06-07 NOTE — MR AVS SNAPSHOT
"                  MRN:[id number]                      After Visit Summary   2018    Lucille Rojas    MRN: [id number]           Visit Information        Provider Department      2018 1:00 PM Adamaris Urias [facility] Generic      Your next 10 appointments already scheduled     2018  8:30 AM CDT   LAB with [facility]   [facility] ([address] [address]   [phone]           Please do not eat 10-12 hours before your appointment if you are coming in fasting for labs on lipids, cholesterol, or glucose (sugar). This does not apply to pregnant women. Water, hot tea and black coffee (with nothing added) are okay. Do not drink other fluids, diet soda or chew gum.            2018  8:45 AM CDT   New Visit with Michael Nolasco DPM   [facility]    [address] [place] [address]   [phone]            2018 12:00 PM CDT   Return Visit with Adamaris Urias [facility] ([facility])    [address] [place] [address] [address]  [facility] [address] [phone]              MyChart Information     [employer] lets you send messages to your doctor, view your test results, renew your prescriptions, schedule appointments and more. To sign up, go to [email or web address] . Click on \"Log in\" on the left side of the screen, which will take you to the Welcome page. Then click on \"Sign up Now\" on the right side of the page.     You will be asked to enter the access code listed below, as well as some personal information. Please follow the directions to create your username and password.     Your access code is: [email or web address]  Expires: 2018  3:14 PM     Your access code will  in 90 days. If you need help or a new code, please call your [place] clinic or [phone].      "   Care EveryWhere ID     This is your Care EveryWhere ID. This could be used by other organizations to access your [place] medical records  [id number]        Equal Access to Services     IVAN IVERSON : Lorena Bruno, mihir merritt, alix pineda, miky lloyd. So [facility] [phone].    ATENCIÓN: Si habla español, tiene a zurita disposición servicios gratuitos de asistencia lingüística. Llame al [phone].    We comply with applicable federal civil rights laws and [place] laws. We do not discriminate on the basis of race, color, national origin, age, disability, sex, sexual orientation, or gender identity.

## 2018-06-07 NOTE — PROGRESS NOTES
Progress Note    Client Name: Lucille Rojas  Date: 6/7/2018             Service Type: Individual      Session Start Time: 1pm  Session End Time: 145      Session Length: 45     Session #: 5     Attendees: Client attended alone    Treatment Plan Last Reviewed: see below.  PHQ-9 / RUSLAN-7 : 7;5     DATA      Progress Since Last Session (Related to Symptoms / Goals / Homework):   Symptoms: Stable    Homework: Achieved / completed to satisfaction      Episode of Care Goals: Satisfactory progress - ACTION (Actively working towards change); Intervened by reinforcing change plan / affirming steps taken.  Continues to work on letting go of certain expectations she has around her family relationships. Had a great mothers day. Some increased stress with having had to take spouse to the ER last weekend after he began slurring words. She reported being able to handle this better than she thought she could. He had an MRI with negative findings.     Current / Ongoing Stressors and Concerns:   Health and aging issues. Extended family issues. Doesn't get along with a daughter and her kids. Recently began taking an SSRI with very good benefit.     Treatment Objective(s) Addressed in This Session:   improve management of mood     Current: Continuing to do well. Encouraging activites without kids.  Processed her upcoming worries about oldest daughters sons wedding in July.Discussed strategies to ensure she and spouse stay in present mode and have a good time.     Intervention:   However agreeing  to come in once a month for check in around mood management and support. Today focus was on improved mood and its impact as well as on continuing with strategies for letting go of expectations that the 2 middle children would change.        ASSESSMENT: Current Emotional / Mental Status (status of significant symptoms):   Risk status (Self / Other harm or suicidal ideation)   Client denies current  fears or concerns for personal safety.   Client denies current or recent suicidal ideation or behaviors.   Client denies current or recent homicidal ideation or behaviors.   Client denies current or recent self injurious behavior or ideation.   Client denies other safety concerns.   A safety and risk management plan has not been developed at this time, however client was given the after-hours number / 911 should there be a change in any of these risk factors.     Appearance:   better grooming-no odor.    Eye Contact:   Good    Psychomotor Behavior: Normal    Attitude:   Cooperative    Orientation:   All   Speech    Rate / Production: Normal     Volume:  Soft    Mood:    Normal, pleasant, proud of her improvement.   Affect:    Appropriate    Thought Content:  Clear  Rumination    Thought Form:  Coherent  Logical    Insight:    Fair      Medication Review:   No changes to current psychiatric medication(s). Has an upcoming appt with her GP and will discuss med efficacy then     Medication Compliance:   Yes     Changes in Health Issues:   None reported     Chemical Use Review:   Substance Use: Chemical use reviewed, no active concerns identified      Tobacco Use: No current tobacco use.       Collateral Reports Completed:   Routed note to PCP    PLAN: (Client Tasks / Therapist Tasks / Other)  Returns in 4 weeks; cont as planned.        Adamaris Urias, [facility] 6/7/2018                                                           ________________________________________________________________________    Treatment Plan    Client's Name: Lucille Rojas  YOB: 1940    Date: 4/18/2018      DSM-V Diagnoses: 296.32 (F33.1) Major Depressive Disorder, Recurrent Episode, Moderate _ and With anxious distress  Psychosocial / Contextual Factors: health and aging; extended family stressors.  WHODAS: see intake    Referral / Collaboration:  Referral to another professional/service is not indicated at this  time..    Anticipated number of session or this episode of care: eval every 90 days.      MeasurableTreatment Goal(s) related to diagnosis / functional impairment(s)  Goal 1: Client will improve management of mood    I will know I've met my goal when I feel less irritable and I'm nicer to my .      Objective #A (Client Action)    Client will Decrease frequency and intensity of feeling down, depressed, hopeless  Identify negative self-talk and behaviors: challenge core beliefs, myths, and actions.  Status: new     Intervention(s)  Therapist will teach CBT and use mood screens as evidence of progress..    Objective #B  Client will learn & utilize at least 1 assertive communication skills weekly; thereby improving communication with her family members.  Status: new     Intervention(s)  Therapist will teach assertiveness skills..    Client has reviewed and agreed to the above plan.      Adamaris Urias, [facility]  April 18, 2018

## 2018-06-08 ASSESSMENT — PATIENT HEALTH QUESTIONNAIRE - PHQ9: SUM OF ALL RESPONSES TO PHQ QUESTIONS 1-9: 7

## 2018-06-08 ASSESSMENT — ANXIETY QUESTIONNAIRES: GAD7 TOTAL SCORE: 5

## 2018-06-12 DIAGNOSIS — N18.30 TYPE 2 DIABETES MELLITUS WITH STAGE 3 CHRONIC KIDNEY DISEASE, WITHOUT LONG-TERM CURRENT USE OF INSULIN (H): ICD-10-CM

## 2018-06-12 DIAGNOSIS — E11.22 TYPE 2 DIABETES MELLITUS WITH STAGE 3 CHRONIC KIDNEY DISEASE, WITHOUT LONG-TERM CURRENT USE OF INSULIN (H): ICD-10-CM

## 2018-06-12 DIAGNOSIS — E78.5 HYPERLIPIDEMIA LDL GOAL <100: ICD-10-CM

## 2018-06-12 DIAGNOSIS — F32.0 MILD MAJOR DEPRESSION (H): ICD-10-CM

## 2018-06-12 DIAGNOSIS — I10 BENIGN ESSENTIAL HYPERTENSION: Chronic | ICD-10-CM

## 2018-06-12 LAB — HBA1C MFR BLD: 5.7 % (ref 0–5.6)

## 2018-06-12 PROCEDURE — 84443 ASSAY THYROID STIM HORMONE: CPT | Performed by: INTERNAL MEDICINE

## 2018-06-12 PROCEDURE — 80053 COMPREHEN METABOLIC PANEL: CPT | Performed by: INTERNAL MEDICINE

## 2018-06-12 PROCEDURE — 36415 COLL VENOUS BLD VENIPUNCTURE: CPT | Performed by: INTERNAL MEDICINE

## 2018-06-12 PROCEDURE — 80061 LIPID PANEL: CPT | Performed by: INTERNAL MEDICINE

## 2018-06-12 PROCEDURE — 83036 HEMOGLOBIN GLYCOSYLATED A1C: CPT | Performed by: INTERNAL MEDICINE

## 2018-06-12 NOTE — LETTER
[facility]  [address] [address]  [phone]      6/13/2018       Lucille Rojas  [address] [facility] [address]        Dear Lucille,    Your sodium, potassium and kidney function tests are slightly abnormal and should be addressed with a follow-up appointment here in the clinic.  Please make sure you are not taking any supplemental potassium and not drinking enough fluid and keeping yourself well-hydrated.    Your Hemoglobin A1C and blood sugar tests look good and I would continue with your medication without change.  These tests should be repeated in 6 months.    Your cholesterol looks good and I would not change anything at this point but would repeat your labs in 6 months.  In addition, your liver function tests are completely normal and should be rechecked with your next cholesterol level.    Your thyroid function tests look good and thus I would not change anything at this point.      Sincerely,      Fausto Flynn MD  Internal Medicine

## 2018-06-13 LAB
ALBUMIN SERPL-MCNC: 3.5 G/DL (ref 3.4–5)
ALP SERPL-CCNC: 110 U/L (ref 40–150)
ALT SERPL W P-5'-P-CCNC: 15 U/L (ref 0–50)
ANION GAP SERPL CALCULATED.3IONS-SCNC: 10 MMOL/L (ref 3–14)
AST SERPL W P-5'-P-CCNC: 14 U/L (ref 0–45)
BILIRUB SERPL-MCNC: 0.2 MG/DL (ref 0.2–1.3)
BUN SERPL-MCNC: 19 MG/DL (ref 7–30)
CALCIUM SERPL-MCNC: 8.9 MG/DL (ref 8.5–10.1)
CHLORIDE SERPL-SCNC: 109 MMOL/L (ref 94–109)
CHOLEST SERPL-MCNC: 114 MG/DL
CO2 SERPL-SCNC: 29 MMOL/L (ref 20–32)
CREAT SERPL-MCNC: 1.14 MG/DL (ref 0.52–1.04)
GFR SERPL CREATININE-BSD FRML MDRD: 46 ML/MIN/1.7M2
GLUCOSE SERPL-MCNC: 118 MG/DL (ref 70–99)
HDLC SERPL-MCNC: 41 MG/DL
LDLC SERPL CALC-MCNC: 51 MG/DL
NONHDLC SERPL-MCNC: 73 MG/DL
POTASSIUM SERPL-SCNC: 5.4 MMOL/L (ref 3.4–5.3)
PROT SERPL-MCNC: 7.2 G/DL (ref 6.8–8.8)
SODIUM SERPL-SCNC: 148 MMOL/L (ref 133–144)
TRIGL SERPL-MCNC: 109 MG/DL
TSH SERPL DL<=0.005 MIU/L-ACNC: 2 MU/L (ref 0.4–4)

## 2018-06-15 ENCOUNTER — OFFICE VISIT (OUTPATIENT)
Dept: PODIATRY | Facility: CLINIC | Age: 78
End: 2018-06-15
Payer: COMMERCIAL

## 2018-06-15 VITALS
SYSTOLIC BLOOD PRESSURE: 152 MMHG | WEIGHT: 293 LBS | HEIGHT: 60 IN | BODY MASS INDEX: 57.52 KG/M2 | DIASTOLIC BLOOD PRESSURE: 88 MMHG

## 2018-06-15 DIAGNOSIS — E66.01 MORBID OBESITY DUE TO EXCESS CALORIES (H): ICD-10-CM

## 2018-06-15 DIAGNOSIS — E11.42 DIABETIC POLYNEUROPATHY ASSOCIATED WITH TYPE 2 DIABETES MELLITUS (H): ICD-10-CM

## 2018-06-15 DIAGNOSIS — M21.41 PES PLANUS OF BOTH FEET: ICD-10-CM

## 2018-06-15 DIAGNOSIS — M21.42 PES PLANUS OF BOTH FEET: ICD-10-CM

## 2018-06-15 DIAGNOSIS — L84 PRE-ULCERATIVE CORN OR CALLOUS: ICD-10-CM

## 2018-06-15 DIAGNOSIS — M79.671 PAIN IN BOTH FEET: Primary | ICD-10-CM

## 2018-06-15 DIAGNOSIS — M79.672 PAIN IN BOTH FEET: Primary | ICD-10-CM

## 2018-06-15 PROCEDURE — 99203 OFFICE O/P NEW LOW 30 MIN: CPT | Performed by: PODIATRIST

## 2018-06-15 NOTE — MR AVS SNAPSHOT
"              After Visit Summary   6/15/2018    Lucille Rojas    MRN: [id number]           Patient Information     Date Of Birth          1940        Visit Information        Provider Department      6/15/2018 1:45 PM Karo Cates DPM, Podiatry/Foot and Ankle Surgery [facility]        Today's Diagnoses     Pain in both feet    -  1    Pre-ulcerative corn or callous        Diabetic polyneuropathy associated with type 2 diabetes mellitus (H)        Morbid obesity due to excess calories (H)          Care Instructions    Thank you for choosing [facility]!    DR. CATES'S CLINIC SCHEDULE  MONDAY AM - SEYM[place] TUESDAY - [place]   [address] [facility] [address] [address] [place] [address] [place] [address]   [phone] / [phone] / [phone]       WEDNESDAY - [place] FRIDAY AM - [facility]   [address]   [place] [address] [place] [address]   [phone] / [phone]       FRIDAY PM - [place] SCHEDULE SURGERY: [phone]   [address] BILLING QUESTIONS: [phone]   TON[place] H[place] [address] AFTER HOURS: [phone] / [phone] APPOINTMENTS: [phone]     Consumer Price Line (CPL) [phone]       DIABETES AND YOUR FEET  Diabetes can result in several problems in the feet including ulcers (open sores) and amputations. Two of the most important reasons why people develop foot problems when they have diabetes is : 1. Neuropathy (loss of feeling)  2. Vascular disease (loss or decrease of blood flow).    Neuropathy is a term used to describe a loss of nerve function.  Patients with diabetes are at risk of developing neuropathy if their sugars continue to run high and are above the normal value. One theory for neuropathy is that the \"extra\" sugar in the body enters the nerves and is broken down. These by-products build up in the nerve causing it to " swell and impairing nerve function. Often times, this can be prevented by controlling your sugars, dieting and exercise.    When a person develops neuropathy, they usually begin to feel numbness or tingling in their feet and sometime in their legs.  Other symptoms may include painful burning or hot feet, tingling or feeling like insects or ants are crawling on your feet or legs.  If the diabetes is sever and the sugars run high for long periods of time, neuropathy can also occur in the hands.    Vascular disease  is a term used to describe a loss or decrease in circulation (blood flow). There is a problem in getting blood and oxygen to areas that need it. Similar to neuropathy, sugars can build up in the walls of the arteries (blood vessels) and cause them to become swollen, thickened and hardened. This decreases the amount of blood that can go to an area that needs it. Though this is common in the legs of diabetic patients, it can also affect other arteries (blood vessels) in the body such as in the heart and eyes.    In the legs, vascular disease usually results in cramping. Patients who develop leg cramps after walking the same distance every time (i.e. One block, half a mile, ect.) need to let their doctors know so that their circulation may be checked. Cramps causing severe pain in the feet and/or legs while sleeping and the cramps go away when you stand or hang your legs off the side of the bed, may also be a sign of poor blood circulation.  Occasional cramping in cold weather or on rare occasions with activity may not be due to poor circulation, but you should inform your doctor.    PREVENTION OF THESE DISEASES  The key to prevention is good blood sugar control. Poor blood sugar control is a big reason many of these problems start. Physical activity (exercise) is a very good way to help decrease your blood sugars. Exercise can lower your blood sugar, blood pressure, and cholesterol. It also reduces your risk  for heart disease and stroke, relieves stress, and strengthens your heart, muscles and bones.  In addition, regular activity helps insulin work better, improves your blood circulation, and keeps your joints flexible. If you're trying to lose weight, a combination of exercise and wise food choices can help you reach your target weight and maintain it.      PAIN MANAGEMENT  1.Blood Sugar Control - Most important  2. Medications such as:  Amytriptylline, duloxetine, gabapentin, lyrica, tramadol  3. Nutritional therapy:  Vitamin B6 (100mg daily), Vitamin B12 (75mcg daily), Vitamin D 2000 IU daily), Alpha-Lipoic Acid (600-1800mg daily), Acetyl-L-Carnitine (500-1000mg TID, L-methyl folate (1500mcg daily)    ** Metformin can block Vitamin B6 and B12 so it is important to supplement**    FOOT CARE RECOMMENDATIONS   1. Wash your feet with lukewarm water and a mild soap and then dry them thoroughly, especially between the toes.     2. Examine your feet daily looking for cuts, corns, blisters, cracks, ect, especially after wearing new shoes. Make sure to look between your toes. If you cannot see the bottom of your feet, set a mirror on the floor and hold your foot over it, or ask a spouse, friend or family member to examine your feet for you. Contact your doctor immediately if new problems are noted or if sores are not healing.     3. Immediately apply moisturizer to the tops and bottoms of your feet, avoiding areas between the toes. Hand lotion (Intesive Care, Lisa, Eucerin, Neutrogena, Curel, ect) is sufficient unless your doctor prescribes a medicated lotion. Apply sunscreen to your feet when going swimming outside.     4. Use clean comfortable shoes, wear white socks (if you have any bleeding or drainage, you will see it on white socks). Socks should not have thick seams or cut off the circulation around the leg. Break in new shoes slowly and rotate with older shoes until broken in. Check the inside of your shoes with  your hand to look for areas of irritation or objects that may have fallen into your shoes.       5. Keep slippers by the side of your bed for use during the night.     6.  Shoes should be fitted by a professional and should not cause areas of irritation.  Check your feet regularly when wearing a new pair of shoes and replace them as needed.     7.  Talk to your doctor about proper exercise. Exercise and stretching stimulate blood flow to your feet and maintain proper glucose levels.     8.  Monitor your blood glucose level as instructed by your doctor. Notify your doctor immediately if your blood sugar is abnormally high or low.    9. Cut your nails straight across, but then gently round any sharp edges with a cardboard nail file. If you have neuropathy, peripheral vascular disease or cannot see that well to trim your own toenails contact [employer] ([phone]) or [employer] ([phone]).      THINGS TO AVOID DOING   1.  Do not soak your feet if you have an open sore. Use only lukewarm water and always check the temperature with your hand as hot water can easily burn your feet.       2.  Never use a hot water bottle or heating pad on your feet. Also do not apply cold compresses to your feet. With decreased sensation, you could burn or freeze your feet.       3.  Do not apply any of these to your feet:    -  Over the counter medicine for corns or warts    -  Harsh chemicals like boric acid    -  Do not self-treat corns, cuts, blisters or infections. Always consult your doctor.       4.  Do not wear sandals, slippers or walk barefoot, especially on hot sand or concrete or other harsh surfaces.     5.  If you smoke, stop!!!    CALLUS / CORNS / IPKs  When there is excessive friction or pressure on the skin, the body responds by making the skin thicker to protect the deeper structures from becoming exposed. While this works well to protect the deeper structures, the thickened skin can increase pressure and  pain.    CALLUS: Flat, diffuse thickening are simple calluses and they are usually caused by friction. Often these are the result of rubbing on a shoe or going barefoot.    CORNS: Calluses with a central core between the toes are called corns. These result from prominent joints on adjacent toes rubbing together. Theses are a symptom of bone malalignment and will always recur unless the underlying bones are addressed surgically.    IPKs: Calluses with a central core on the ball of the foot are usually IPKs (intractable plantar keratosis). These are caused by excessive pressure from the metatarsals, the bones that make up the ball of the foot. Often one of these bones is too long or too prominent.  Again, these will always recur unless the underlying bone issue is addressed. There is no cure for these. They will either go away by themselves, recur, or more could develop.    ROUTINE MAINTENANCE  1. File them down with a pumice stone or callus file a couple times a week.   2. An electric callus removing device. Amope Pedi Perfect Electronic Pedicure Foot File and Callus Remover can be a good option.   3. Lotion can be applied to soften the callus. A urea based cream such as Kersal or Vanicream or thicker cream with shea butter are good options.  4. Toe spacers or toe covers can be used for corns, gel pads can be used for other lesions on the bottom of the foot.   If there is a surgical pathology noted, such as a prominent bone, often this needs to be addressed surgically to minimize recurrence. However, sometimes the lesion simply migrates to another spot after surgery, so it is not a guaranteed cure.               Body Mass Index (BMI)  Many things can cause foot and ankle problems. Foot structure, activity level, foot mechanics and injuries are common causes of pain.  One very important issue that often goes unmentioned, is body weight. Extra weight can cause increased stress on muscles, ligaments, bones and tendons.   Sometimes just a few extra pounds is all it takes to put one over her/his threshold. Without reducing that stress, it can be difficult to alleviate pain. Some people are uncomfortable addressing this issue, but we feel it is important for you to think about it. As Foot &  Ankle specialists, our job is addressing the lower extremity problem and possible causes. Regarding extra body weight, we encourage patients to discuss diet and weight management plans with their primary care doctors. It is this team approach that gives you the best opportunity for pain relief and getting you back on your feet.                  Follow-ups after your visit        Additional Services     ORTHOTICS REFERRAL       Please be aware that coverage of these services is subject to the terms and limitations of your health insurance plan.  Call member services at your health plan with any benefit or coverage questions.      Please bring the following to your appointment:    >>   Any x-rays, CTs or MRIs which have been performed.  Contact the facility where they were done to arrange for  prior to your scheduled appointment.  Any new CT, MRI or other procedures ordered by your specialist must be performed at a [facility] facility or coordinated by your clinic's referral office.    >>   List of current medications   >>   This referral request   >>   Any documents/labs given to you for this referral    ==This Referral PRINTS in the [facility] (ORTHOTICS & PROSTHETICS) Central scheduling office ==     The [facility] Central Scheduling staff will contact patient to arrange appointments. Central Scheduling Phone #:  TONIA Honeycutt  [phone]     Diabetic shoes and inserts with offloading under right great toe and left 5th metatarsal head                  Your next 10 appointments already scheduled     Jun 21, 2018 10:00 AM CDT   Office Visit with Fausto Flynn MD   [facility] ([facility]  [facility])    [address] [id number] [phone]           Bring a current list of meds and any records pertaining to this visit. For Physicals, please bring immunization records and any forms needing to be filled out. Please arrive 10 minutes early to complete paperwork.            Jul 24, 2018 12:00 PM CDT   [facility] [place] [facility] [place]    [address]  [address]  [place] [address] [phone]              Who to contact     If you have questions or need follow up information about today's clinic visit or your schedule please contact [facility] directly at [phone].  Normal or non-critical lab and imaging results will be communicated to you by MyChart, letter or phone within 4 business days after the clinic has received the results. If you do not hear from us within 7 days, please contact the clinic through MyChart or phone. If you have a critical or abnormal lab result, we will notify you by phone as soon as possible.  Submit refill requests through [employer] or call your pharmacy and they will forward the refill request to us. Please allow 3 business days for your refill to be completed.          Additional Information About Your Visit        Care EveryWhere ID     This is your Care EveryWhere ID. This could be used by other organizations to access your [facility] medical records  [id number]        Your Vitals Were     Height BMI (Body Mass Index)                5' (1.524 m) 58.59 kg/m2           Blood Pressure from Last 3 Encounters:   06/15/18 152/88   02/12/18 130/72   12/01/17 131/55    Weight from Last 3 Encounters:   06/15/18 300 lb (136.1 kg)   02/12/18 300 lb (136.1 kg)   12/01/17 289 lb 14.5 oz (131.5 kg)              We Performed the Following     ORTHOTICS REFERRAL        Primary Care Provider Office Phone # Fax #    Fausto Flynn [phone]       [address]  [facility] [address]        Equal Access to Services     SEAMUSGORDON ZAYRA : Hadii ginger henning jaxsonmarques Kaseyali, wabrigidoda aliciadavidha, qakaseyta darwinsisijordan pineda, miky elliottorlykartik lloyd. So [facility] [phone].    ATENCIÓN: Si habla español, tiene a zurita disposición servicios gratuitos de asistencia lingüística. Llame al [phone].    We comply with applicable federal civil rights laws and [place] laws. We do not discriminate on the basis of race, color, national origin, age, disability, sex, sexual orientation, or gender identity.            Thank you!     Thank you for choosing [facility]  for your care. Our goal is always to provide you with excellent care. Hearing back from our patients is one way we can continue to improve our services. Please take a few minutes to complete the written survey that you may receive in the mail after your visit with us. Thank you!             Your Updated Medication List - Protect others around you: Learn how to safely use, store and throw away your medicines at www.disposemymeds.org.          This list is accurate as of 6/15/18  2:05 PM.  Always use your most recent med list.                   Brand Name Dispense Instructions for use Diagnosis    ACE/ARB/ARNI NOT PRESCRIBED (INTENTIONAL)      Please choose reason not prescribed, below    CKD (chronic kidney disease) stage 3, GFR 30-59 ml/min, Type 2 diabetes mellitus with stage 3 chronic kidney disease, without long-term current use of insulin (H)       aspirin 81 MG tablet     30 tablet    Take 1 tablet by mouth daily.    Type 2 diabetes, HbA1C goal < 8% (H)       citalopram 10 MG tablet    celeXA    90 tablet    Take 1 tablet (10 mg) by mouth daily    Mild major depression (H)       HYDROcodone-acetaminophen 5-325 MG per tablet    NORCO    20 tablet    Take 1-2 tablets by mouth every 4 hours as needed for moderate to severe pain maximum 3 tablet(s) per day    Pain in joint, ankle and foot, right        IRON SUPPLEMENT PO      Take 325 mg by mouth        levothyroxine 200 MCG tablet    SYNTHROID/LEVOTHROID    90 tablet    Take 1 tablet (200 mcg) by mouth daily    Acquired hypothyroidism       miconazole 2 % powder    MICATIN; MICRO GUARD     Apply topically as needed for itching or other Reported on 3/27/2017        nystatin cream    MYCOSTATIN     Apply topically 2 times daily        order for DME     1 Device    Equipment being ordered: wheeled walker    Morbid obesity due to excess calories (H), Type 2 diabetes mellitus with stage 3 chronic kidney disease, without long-term current use of insulin (H)       PRESERVISION AREDS Caps      Take 1 tablet by mouth 2 times daily        REFRESH DRY EYE THERAPY OP           simvastatin 10 MG tablet    ZOCOR    30 tablet    TAKE 1 TABLET AT BEDTIME    Hyperlipidemia LDL goal <100

## 2018-06-15 NOTE — LETTER
6/15/2018         RE: Lucille Rojas  [address] [facility] [address]        Dear Colleague,    Thank you for referring your patient, Lucille Rojas, to the [facility]. Please see a copy of my visit note below.    PATIENT HISTORY:   Lucille Rojas is a 78 year old female who presents to clinic for pain to both feet. Under right great toe and left 5th toe. Mostly when walking. Denies injury. Notes she get callus and nail care done every 6 weeks and it tends to feel better after that. Wondering if anything else can be done for the calluses.     Review of Systems:  Patient denies fever, chills, rash, wound, stiffness,numbness, weakness, heart burn, blood in stool, chest pain with activity, calf pain when walking, shortness of breath with activity, chronic cough, easy bleeding/bruising, swelling of ankles, excessive thirst, fatigue, depression, anxiety.  Patient admits to limping.     PAST MEDICAL HISTORY:   Past Medical History:   Diagnosis Date     HTN (hypertension) 5/9/2013     Hyperlipidemia LDL goal <130 5/9/2013     Hypothyroidism 5/9/2013     Macular degeneration 9/18/2013     Sleep apnea     CPAP at night, O2 during naps     Type 2 diabetes, HbA1C goal < 8% (H) 5/9/2013        PAST SURGICAL HISTORY:   Past Surgical History:   Procedure Laterality Date     APPENDECTOMY       COLONOSCOPY       COLONOSCOPY N/A 10/27/2014    Procedure: COMBINED COLONOSCOPY, SINGLE OR MULTIPLE BIOPSY/POLYPECTOMY BY BIOPSY;  Surgeon: Jose Alfredo Morejon MD;  Location:  GI     HERNIA REPAIR      inguinal x 2     TONSILLECTOMY          MEDICATIONS:   Current Outpatient Prescriptions:      ACE/ARB NOT PRESCRIBED, INTENTIONAL,, Please choose reason not prescribed, below, Disp: , Rfl:      aspirin 81 MG tablet, Take 1 tablet by mouth daily., Disp: 30 tablet, Rfl: 0     citalopram (CELEXA) 10 MG tablet, Take 1 tablet (10 mg) by mouth daily, Disp: 90 tablet, Rfl: 1     Ferrous Sulfate (IRON  SUPPLEMENT PO), Take 325 mg by mouth, Disp: , Rfl:      Glycerin-Polysorbate 80 (REFRESH DRY EYE THERAPY OP), , Disp: , Rfl:      HYDROcodone-acetaminophen (NORCO) 5-325 MG per tablet, Take 1-2 tablets by mouth every 4 hours as needed for moderate to severe pain maximum 3 tablet(s) per day, Disp: 20 tablet, Rfl: 0     levothyroxine (SYNTHROID/LEVOTHROID) 200 MCG tablet, Take 1 tablet (200 mcg) by mouth daily, Disp: 90 tablet, Rfl: 3     miconazole (MICATIN; MICRO GUARD) 2 % powder, Apply topically as needed for itching or other Reported on 3/27/2017, Disp: , Rfl:      Multiple Vitamins-Minerals (PRESERVISION AREDS) CAPS, Take 1 tablet by mouth 2 times daily , Disp: , Rfl:      nystatin (MYCOSTATIN) cream, Apply topically 2 times daily, Disp: , Rfl:      order for DME, Equipment being ordered: wheeled walker, Disp: 1 Device, Rfl: 0     simvastatin (ZOCOR) 10 MG tablet, TAKE 1 TABLET AT BEDTIME, Disp: 30 tablet, Rfl: 0     ALLERGIES:    Allergies   Allergen Reactions     Penicillins         SOCIAL HISTORY:   Social History     Social History     Marital status:      Spouse name: N/A     Number of children: N/A     Years of education: N/A     Occupational History     Not on file.     Social History Main Topics     Smoking status: Never Smoker     Smokeless tobacco: Never Used     Alcohol use 0.0 oz/week     0 Standard drinks or equivalent per week      Comment: rarely     Drug use: No     Sexual activity: No     Other Topics Concern     Not on file     Social History Narrative        FAMILY HISTORY: No family history on file.     EXAM:Vitals: /88  Ht 5' (1.524 m)  Wt 300 lb (136.1 kg)  BMI 58.59 kg/m2  BMI= Body mass index is 58.59 kg/(m^2).    General appearance: Patient is alert and fully cooperative with history & exam.  No sign of distress is noted during the visit. Morbidly obese.     Psychiatric: Affect is pleasant & appropriate.  Patient appears motivated to improve health.     Respiratory:  Breathing is regular & unlabored while sitting.     HEENT: Hearing is intact to spoken word.  Speech is clear.  No gross evidence of visual impairment that would impact ambulation.     Dermatologic: localized pre ulcerative lesions under right great toe and left 5th metatarsal head. No open lesions on debridement.      Vascular: DP & PT pulses are intact & regular bilaterally.  edema and varicosities noted.  CFT and skin temperature is normal to both lower extremities.     Neurologic: Lower extremity sensation is diminished.      Musculoskeletal: Patient is ambulatory with cane and wheelchair. Decrease arch height.     A1C: 5.7 (6/2018)     ASSESSMENT:    Pain in both feet  Pre-ulcerative corn or callous  Diabetic polyneuropathy associated with type 2 diabetes mellitus (H)  Morbid obesity due to excess calories (H)  Pes planus of both feet     PLAN:  Review[facility] in [facility]. Talked about diabetic foot care. Recommend continue seeing nurses for nail and callus care. Discussed causes of keratomas.  They are due to areas of increase friction.  Hammertoes can create these as they put more pressure to the metatarsal head.  Discussed treatments such as using foot file, pumice stone, metatarsal pads, orthotics, and not walking barefoot.     Discussed calluses can lead to ulcers. Recommend diabetic shoes and inserts. Recommend lotioning feet daily       Karo Cates DPM, Podiatry/Foot and Ankle Surgery    Weight management plan: Patient was referred to their PCP to discuss a diet and exercise plan.    Patient to follow up with Primary Care provider regarding elevated blood pressure.        Again, thank you for allowing me to participate in the care of your patient.        Sincerely,        Karo Cates DPM, Podiatry/Foot and Ankle Surgery

## 2018-06-15 NOTE — PATIENT INSTRUCTIONS
"Thank you for choosing [facility]!    DR. EDEN'S CLINIC SCHEDULE  MONDAY AM - DAWN TUESDAY - [place] [place]   [address] [address] [address] [address] [place] [address]   [phone] / [phone] / [phone]       WEDNESDAY - [place] FRIDAY AM - [facility]   [address]   [place] [address] [place] [address] [address]   [phone] / [phone]       FRIDAY PM - [place] SCHEDULE SURGERY: [phone]   [address] BILLING QUESTIONS: [phone]   [place] [address] AFTER HOURS: [phone] APPOINTMENTS: [phone]     Consumer Price Line (CPL) [phone]       DIABETES AND YOUR FEET  Diabetes can result in several problems in the feet including ulcers (open sores) and amputations. Two of the most important reasons why people develop foot problems when they have diabetes is : 1. Neuropathy (loss of feeling)  2. Vascular disease (loss or decrease of blood flow).    Neuropathy is a term used to describe a loss of nerve function.  Patients with diabetes are at risk of developing neuropathy if their sugars continue to run high and are above the normal value. One theory for neuropathy is that the \"extra\" sugar in the body enters the nerves and is broken down. These by-products build up in the nerve causing it to swell and impairing nerve function. Often times, this can be prevented by controlling your sugars, dieting and exercise.    When a person develops neuropathy, they usually begin to feel numbness or tingling in their feet and sometime in their legs.  Other symptoms may include painful burning or hot feet, tingling or feeling like insects or ants are crawling on your feet or legs.  If the diabetes is sever and the sugars run high for long periods of time, neuropathy can also occur in the hands.    Vascular disease  is a term used to describe a " loss or decrease in circulation (blood flow). There is a problem in getting blood and oxygen to areas that need it. Similar to neuropathy, sugars can build up in the walls of the arteries (blood vessels) and cause them to become swollen, thickened and hardened. This decreases the amount of blood that can go to an area that needs it. Though this is common in the legs of diabetic patients, it can also affect other arteries (blood vessels) in the body such as in the heart and eyes.    In the legs, vascular disease usually results in cramping. Patients who develop leg cramps after walking the same distance every time (i.e. One block, half a mile, ect.) need to let their doctors know so that their circulation may be checked. Cramps causing severe pain in the feet and/or legs while sleeping and the cramps go away when you stand or hang your legs off the side of the bed, may also be a sign of poor blood circulation.  Occasional cramping in cold weather or on rare occasions with activity may not be due to poor circulation, but you should inform your doctor.    PREVENTION OF THESE DISEASES  The key to prevention is good blood sugar control. Poor blood sugar control is a big reason many of these problems start. Physical activity (exercise) is a very good way to help decrease your blood sugars. Exercise can lower your blood sugar, blood pressure, and cholesterol. It also reduces your risk for heart disease and stroke, relieves stress, and strengthens your heart, muscles and bones.  In addition, regular activity helps insulin work better, improves your blood circulation, and keeps your joints flexible. If you're trying to lose weight, a combination of exercise and wise food choices can help you reach your target weight and maintain it.      PAIN MANAGEMENT  1.Blood Sugar Control - Most important  2. Medications such as:  Amytriptylline, duloxetine, gabapentin, lyrica, tramadol  3. Nutritional therapy:  Vitamin B6 (100mg daily),  Vitamin B12 (75mcg daily), Vitamin D 2000 IU daily), Alpha-Lipoic Acid (600-1800mg daily), Acetyl-L-Carnitine (500-1000mg TID, L-methyl folate (1500mcg daily)    ** Metformin can block Vitamin B6 and B12 so it is important to supplement**    FOOT CARE RECOMMENDATIONS   1. Wash your feet with lukewarm water and a mild soap and then dry them thoroughly, especially between the toes.     2. Examine your feet daily looking for cuts, corns, blisters, cracks, ect, especially after wearing new shoes. Make sure to look between your toes. If you cannot see the bottom of your feet, set a mirror on the floor and hold your foot over it, or ask a spouse, friend or family member to examine your feet for you. Contact your doctor immediately if new problems are noted or if sores are not healing.     3. Immediately apply moisturizer to the tops and bottoms of your feet, avoiding areas between the toes. Hand lotion (Intesive Care, Lisa, Eucerin, Neutrogena, Curel, ect) is sufficient unless your doctor prescribes a medicated lotion. Apply sunscreen to your feet when going swimming outside.     4. Use clean comfortable shoes, wear white socks (if you have any bleeding or drainage, you will see it on white socks). Socks should not have thick seams or cut off the circulation around the leg. Break in new shoes slowly and rotate with older shoes until broken in. Check the inside of your shoes with your hand to look for areas of irritation or objects that may have fallen into your shoes.       5. Keep slippers by the side of your bed for use during the night.     6.  Shoes should be fitted by a professional and should not cause areas of irritation.  Check your feet regularly when wearing a new pair of shoes and replace them as needed.     7.  Talk to your doctor about proper exercise. Exercise and stretching stimulate blood flow to your feet and maintain proper glucose levels.     8.  Monitor your blood glucose level as instructed by your  doctor. Notify your doctor immediately if your blood sugar is abnormally high or low.    9. Cut your nails straight across, but then gently round any sharp edges with a cardboard nail file. If you have neuropathy, peripheral vascular disease or cannot see that well to trim your own toenails contact [facility] ([phone]) or [facility] ([phone]).      THINGS TO AVOID DOING   1.  Do not soak your feet if you have an open sore. Use only lukewarm water and always check the temperature with your hand as hot water can easily burn your feet.       2.  Never use a hot water bottle or heating pad on your feet. Also do not apply cold compresses to your feet. With decreased sensation, you could burn or freeze your feet.       3.  Do not apply any of these to your feet:    -  Over the counter medicine for corns or warts    -  Harsh chemicals like boric acid    -  Do not self-treat corns, cuts, blisters or infections. Always consult your doctor.       4.  Do not wear sandals, slippers or walk barefoot, especially on hot sand or concrete or other harsh surfaces.     5.  If you smoke, stop!!!    CALLUS / CORNS / IPKs  When there is excessive friction or pressure on the skin, the body responds by making the skin thicker to protect the deeper structures from becoming exposed. While this works well to protect the deeper structures, the thickened skin can increase pressure and pain.    CALLUS: Flat, diffuse thickening are simple calluses and they are usually caused by friction. Often these are the result of rubbing on a shoe or going barefoot.    CORNS: Calluses with a central core between the toes are called corns. These result from prominent joints on adjacent toes rubbing together. Theses are a symptom of bone malalignment and will always recur unless the underlying bones are addressed surgically.    IPKs: Calluses with a central core on the ball of the foot are usually IPKs (intractable plantar keratosis). These are  caused by excessive pressure from the metatarsals, the bones that make up the ball of the foot. Often one of these bones is too long or too prominent.  Again, these will always recur unless the underlying bone issue is addressed. There is no cure for these. They will either go away by themselves, recur, or more could develop.    ROUTINE MAINTENANCE  1. File them down with a pumice stone or callus file a couple times a week.   2. An electric callus removing device. Amope Pedi Perfect Electronic Pedicure Foot File and Callus Remover can be a good option.   3. Lotion can be applied to soften the callus. A urea based cream such as Kersal or Vanicream or thicker cream with shea butter are good options.  4. Toe spacers or toe covers can be used for corns, gel pads can be used for other lesions on the bottom of the foot.   If there is a surgical pathology noted, such as a prominent bone, often this needs to be addressed surgically to minimize recurrence. However, sometimes the lesion simply migrates to another spot after surgery, so it is not a guaranteed cure.               Body Mass Index (BMI)  Many things can cause foot and ankle problems. Foot structure, activity level, foot mechanics and injuries are common causes of pain.  One very important issue that often goes unmentioned, is body weight. Extra weight can cause increased stress on muscles, ligaments, bones and tendons.  Sometimes just a few extra pounds is all it takes to put one over her/his threshold. Without reducing that stress, it can be difficult to alleviate pain. Some people are uncomfortable addressing this issue, but we feel it is important for you to think about it. As Foot &  Ankle specialists, our job is addressing the lower extremity problem and possible causes. Regarding extra body weight, we encourage patients to discuss diet and weight management plans with their primary care doctors. It is this team approach that gives you the best opportunity  for pain relief and getting you back on your feet.

## 2018-06-15 NOTE — PROGRESS NOTES
PATIENT HISTORY:   Lucille Rojas is a 78 year old female who presents to clinic for pain to both feet. Under right great toe and left 5th toe. Mostly when walking. Denies injury. Notes she get callus and nail care done every 6 weeks and it tends to feel better after that. Wondering if anything else can be done for the calluses.     Review of Systems:  Patient denies fever, chills, rash, wound, stiffness,numbness, weakness, heart burn, blood in stool, chest pain with activity, calf pain when walking, shortness of breath with activity, chronic cough, easy bleeding/bruising, swelling of ankles, excessive thirst, fatigue, depression, anxiety.  Patient admits to limping.     PAST MEDICAL HISTORY:   Past Medical History:   Diagnosis Date     HTN (hypertension) 5/9/2013     Hyperlipidemia LDL goal <130 5/9/2013     Hypothyroidism 5/9/2013     Macular degeneration 9/18/2013     Sleep apnea     CPAP at night, O2 during naps     Type 2 diabetes, HbA1C goal < 8% (H) 5/9/2013        PAST SURGICAL HISTORY:   Past Surgical History:   Procedure Laterality Date     APPENDECTOMY       COLONOSCOPY       COLONOSCOPY N/A 10/27/2014    Procedure: COMBINED COLONOSCOPY, SINGLE OR MULTIPLE BIOPSY/POLYPECTOMY BY BIOPSY;  Surgeon: Jose Alfredo Morejon MD;  Location:  GI     HERNIA REPAIR      inguinal x 2     TONSILLECTOMY          MEDICATIONS:   Current Outpatient Prescriptions:      ACE/ARB NOT PRESCRIBED, INTENTIONAL,, Please choose reason not prescribed, below, Disp: , Rfl:      aspirin 81 MG tablet, Take 1 tablet by mouth daily., Disp: 30 tablet, Rfl: 0     citalopram (CELEXA) 10 MG tablet, Take 1 tablet (10 mg) by mouth daily, Disp: 90 tablet, Rfl: 1     Ferrous Sulfate (IRON SUPPLEMENT PO), Take 325 mg by mouth, Disp: , Rfl:      Glycerin-Polysorbate 80 (REFRESH DRY EYE THERAPY OP), , Disp: , Rfl:      HYDROcodone-acetaminophen (NORCO) 5-325 MG per tablet, Take 1-2 tablets by mouth every 4 hours as needed for moderate to  severe pain maximum 3 tablet(s) per day, Disp: 20 tablet, Rfl: 0     levothyroxine (SYNTHROID/LEVOTHROID) 200 MCG tablet, Take 1 tablet (200 mcg) by mouth daily, Disp: 90 tablet, Rfl: 3     miconazole (MICATIN; MICRO GUARD) 2 % powder, Apply topically as needed for itching or other Reported on 3/27/2017, Disp: , Rfl:      Multiple Vitamins-Minerals (PRESERVISION AREDS) CAPS, Take 1 tablet by mouth 2 times daily , Disp: , Rfl:      nystatin (MYCOSTATIN) cream, Apply topically 2 times daily, Disp: , Rfl:      order for DME, Equipment being ordered: wheeled walker, Disp: 1 Device, Rfl: 0     simvastatin (ZOCOR) 10 MG tablet, TAKE 1 TABLET AT BEDTIME, Disp: 30 tablet, Rfl: 0     ALLERGIES:    Allergies   Allergen Reactions     Penicillins         SOCIAL HISTORY:   Social History     Social History     Marital status:      Spouse name: N/A     Number of children: N/A     Years of education: N/A     Occupational History     Not on file.     Social History Main Topics     Smoking status: Never Smoker     Smokeless tobacco: Never Used     Alcohol use 0.0 oz/week     0 Standard drinks or equivalent per week      Comment: rarely     Drug use: No     Sexual activity: No     Other Topics Concern     Not on file     Social History Narrative        FAMILY HISTORY: No family history on file.     EXAM:Vitals: /88  Ht 5' (1.524 m)  Wt 300 lb (136.1 kg)  BMI 58.59 kg/m2  BMI= Body mass index is 58.59 kg/(m^2).    General appearance: Patient is alert and fully cooperative with history & exam.  No sign of distress is noted during the visit. Morbidly obese.     Psychiatric: Affect is pleasant & appropriate.  Patient appears motivated to improve health.     Respiratory: Breathing is regular & unlabored while sitting.     HEENT: Hearing is intact to spoken word.  Speech is clear.  No gross evidence of visual impairment that would impact ambulation.     Dermatologic: localized pre ulcerative lesions under right great toe  and left 5th metatarsal head. No open lesions on debridement.      Vascular: DP & PT pulses are intact & regular bilaterally.  edema and varicosities noted.  CFT and skin temperature is normal to both lower extremities.     Neurologic: Lower extremity sensation is diminished.      Musculoskeletal: Patient is ambulatory with cane and wheelchair. Decrease arch height.     A1C: 5.7 (6/2018)     ASSESSMENT:    Pain in both feet  Pre-ulcerative corn or callous  Diabetic polyneuropathy associated with type 2 diabetes mellitus (H)  Morbid obesity due to excess calories (H)  Pes planus of both feet     PLAN:  Reviewed patient[facility] chart in [facility]. Talked about diabetic foot care. Recommend continue seeing nurses for nail and callus care. Discussed causes of keratomas.  They are due to areas of increase friction.  Hammertoes can create these as they put more pressure to the metatarsal head.  Discussed treatments such as using foot file, pumice stone, metatarsal pads, orthotics, and not walking barefoot.     Discussed calluses can lead to ulcers. Recommend diabetic shoes and inserts. Recommend lotioning feet daily       Karo Cates DPM, Podiatry/Foot and Ankle Surgery    Weight management plan: Patient was referred to their PCP to discuss a diet and exercise plan.    Patient to follow up with Primary Care provider regarding elevated blood pressure.

## 2018-06-21 ENCOUNTER — OFFICE VISIT (OUTPATIENT)
Dept: INTERNAL MEDICINE | Facility: CLINIC | Age: 78
End: 2018-06-21
Payer: COMMERCIAL

## 2018-06-21 VITALS
RESPIRATION RATE: 16 BRPM | DIASTOLIC BLOOD PRESSURE: 68 MMHG | WEIGHT: 278.2 LBS | OXYGEN SATURATION: 93 % | HEART RATE: 76 BPM | BODY MASS INDEX: 54.33 KG/M2 | TEMPERATURE: 98 F | SYSTOLIC BLOOD PRESSURE: 116 MMHG

## 2018-06-21 DIAGNOSIS — E66.01 MORBID OBESITY DUE TO EXCESS CALORIES (H): ICD-10-CM

## 2018-06-21 DIAGNOSIS — N18.30 CKD (CHRONIC KIDNEY DISEASE) STAGE 3, GFR 30-59 ML/MIN (H): ICD-10-CM

## 2018-06-21 DIAGNOSIS — I10 BENIGN ESSENTIAL HYPERTENSION: Chronic | ICD-10-CM

## 2018-06-21 DIAGNOSIS — Z13.89 SCREENING FOR DIABETIC PERIPHERAL NEUROPATHY: ICD-10-CM

## 2018-06-21 DIAGNOSIS — E78.5 HYPERLIPIDEMIA LDL GOAL <100: ICD-10-CM

## 2018-06-21 DIAGNOSIS — N18.30 TYPE 2 DIABETES MELLITUS WITH STAGE 3 CHRONIC KIDNEY DISEASE, WITHOUT LONG-TERM CURRENT USE OF INSULIN (H): Primary | ICD-10-CM

## 2018-06-21 DIAGNOSIS — E11.22 TYPE 2 DIABETES MELLITUS WITH STAGE 3 CHRONIC KIDNEY DISEASE, WITHOUT LONG-TERM CURRENT USE OF INSULIN (H): Primary | ICD-10-CM

## 2018-06-21 LAB
ANION GAP SERPL CALCULATED.3IONS-SCNC: 7 MMOL/L (ref 3–14)
BUN SERPL-MCNC: 20 MG/DL (ref 7–30)
CALCIUM SERPL-MCNC: 9 MG/DL (ref 8.5–10.1)
CHLORIDE SERPL-SCNC: 105 MMOL/L (ref 94–109)
CO2 SERPL-SCNC: 32 MMOL/L (ref 20–32)
CREAT SERPL-MCNC: 1.1 MG/DL (ref 0.52–1.04)
CREAT UR-MCNC: 11 MG/DL
GFR SERPL CREATININE-BSD FRML MDRD: 48 ML/MIN/1.7M2
GLUCOSE SERPL-MCNC: 123 MG/DL (ref 70–99)
MICROALBUMIN UR-MCNC: 26 MG/L
MICROALBUMIN/CREAT UR: 240.74 MG/G CR (ref 0–25)
POTASSIUM SERPL-SCNC: 4.8 MMOL/L (ref 3.4–5.3)
SODIUM SERPL-SCNC: 144 MMOL/L (ref 133–144)

## 2018-06-21 PROCEDURE — 82043 UR ALBUMIN QUANTITATIVE: CPT | Performed by: INTERNAL MEDICINE

## 2018-06-21 PROCEDURE — 99207 C FOOT EXAM  NO CHARGE: CPT | Performed by: INTERNAL MEDICINE

## 2018-06-21 PROCEDURE — 36415 COLL VENOUS BLD VENIPUNCTURE: CPT | Performed by: INTERNAL MEDICINE

## 2018-06-21 PROCEDURE — 80048 BASIC METABOLIC PNL TOTAL CA: CPT | Performed by: INTERNAL MEDICINE

## 2018-06-21 PROCEDURE — 99214 OFFICE O/P EST MOD 30 MIN: CPT | Performed by: INTERNAL MEDICINE

## 2018-06-21 RX ORDER — SIMVASTATIN 10 MG
10 TABLET ORAL AT BEDTIME
Qty: 90 TABLET | Refills: 3 | Status: SHIPPED | OUTPATIENT
Start: 2018-06-21 | End: 2019-05-16

## 2018-06-21 NOTE — LETTER
[facility]  [address] [address]  [phone]      6/21/2018       Lucille Rojas  [address] [address]        Dear Lucille,    Your sodium and potassium have returned to normal and your basic panel remains stable including your kidney function.  I would consider rechecking these for reassurance again in 6 months when you are due for your blood sugar tests.    Please make sure that you are drinking enough fluid as we discussed.    Sincerely,      Fausto Flynn MD  Internal Medicine

## 2018-06-21 NOTE — MR AVS SNAPSHOT
After Visit Summary   6/21/2018    Lucille Rojas    MRN: [id number]           Patient Information     Date Of Birth          1940        Visit Information        Provider Department      6/21/2018 10:00 AM Fausto Flynn MD [facility]        Today's Diagnoses     Type 2 diabetes mellitus with stage 3 chronic kidney disease, without long-term current use of insulin (H)    -  1    Morbid obesity due to excess calories (H)        CKD (chronic kidney disease) stage 3, GFR 30-59 ml/min        Benign essential hypertension        Hyperlipidemia LDL goal <100        Screening for diabetic peripheral neuropathy           Follow-ups after your visit        Follow-up notes from your care team     Return if symptoms worsen or fail to improve.      Your next 10 appointments already scheduled     Jul 24, 2018 12:00 PM CDT   Return Visit with PREMA Del Rosario   [facility] ([facility])    [address]  [facility] [address] [phone]              Who to contact     If you have questions or need follow up information about today's clinic visit or your schedule please contact [facility] directly at [phone].  Normal or non-critical lab and imaging results will be communicated to you by MyChart, letter or phone within 4 business days after the clinic has received the results. If you do not hear from us within 7 days, please contact the clinic through MyChart or phone. If you have a critical or abnormal lab result, we will notify you by phone as soon as possible.  Submit refill requests through [employer] or call your pharmacy and they will forward the refill request to us. Please allow 3 business days for your refill to be completed.          Additional Information About Your Visit        Care EveryWhere ID     This is your Care EveryWhere ID. This could be used by other organizations to access your [facility]  records  [id number]        Your Vitals Were     Pulse Temperature Respirations Pulse Oximetry BMI (Body Mass Index)       76 98  F (36.7  C) (Oral) 16 93% 54.33 kg/m2        Blood Pressure from Last 3 Encounters:   06/21/18 116/68   06/15/18 152/88   02/12/18 130/72    Weight from Last 3 Encounters:   06/21/18 278 lb 3.2 oz (126.2 kg)   06/15/18 300 lb (136.1 kg)   02/12/18 300 lb (136.1 kg)              We Performed the Following     Albumin Random Urine Quantitative with Creat Ratio     Basic metabolic panel     DEPRESSION ACTION PLAN (DAP)     FOOT EXAM  NO CHARGE [20250.114]          Today's Medication Changes          These changes are accurate as of 6/21/18 10:21 AM.  If you have any questions, ask your nurse or doctor.               These medicines have changed or have updated prescriptions.        Dose/Directions    simvastatin 10 MG tablet   Commonly known as:  ZOCOR   This may have changed:  See the new instructions.   Used for:  Hyperlipidemia LDL goal <100   Changed by:  Fausto Flynn MD        Dose:  10 mg   Take 1 tablet (10 mg) by mouth At Bedtime   Quantity:  90 tablet   Refills:  3            Where to get your medicines      These medications were sent to [facility] - [facility] [address]  [address] [address]     Phone:  [phone]     simvastatin 10 MG tablet                Primary Care Provider Office Phone # Fax #    Fausto Flynn [phone]       [address] [place] [address]        Equal Access to Services     GORDON IVERSON AH: Hadii ginger ku hadasho Soomaali, waaxda luqadaha, qaybta kaalmada adeegyada, miky idiin hayaan adesherif lloyd. So [facility] [phone].    ATENCIÓN: Si habla español, tiene a zurita disposición servicios gratuitos de asistencia lingüística. Llame al [phone].    We comply with applicable federal civil rights laws and [place] laws. We do not discriminate on the basis of race,  color, national origin, age, disability, sex, sexual orientation, or gender identity.            Thank you!     Thank you for choosing [facility]  for your care. Our goal is always to provide you with excellent care. Hearing back from our patients is one way we can continue to improve our services. Please take a few minutes to complete the written survey that you may receive in the mail after your visit with us. Thank you!             Your Updated Medication List - Protect others around you: Learn how to safely use, store and throw away your medicines at www.disposemymeds.org.          This list is accurate as of 6/21/18 10:21 AM.  Always use your most recent med list.                   Brand Name Dispense Instructions for use Diagnosis    ACE/ARB/ARNI NOT PRESCRIBED (INTENTIONAL)      Please choose reason not prescribed, below    CKD (chronic kidney disease) stage 3, GFR 30-59 ml/min, Type 2 diabetes mellitus with stage 3 chronic kidney disease, without long-term current use of insulin (H)       aspirin 81 MG tablet     30 tablet    Take 1 tablet by mouth daily.    Type 2 diabetes, HbA1C goal < 8% (H)       citalopram 10 MG tablet    celeXA    90 tablet    Take 1 tablet (10 mg) by mouth daily    Mild major depression (H)       IRON SUPPLEMENT PO      Take 325 mg by mouth        levothyroxine 200 MCG tablet    SYNTHROID/LEVOTHROID    90 tablet    Take 1 tablet (200 mcg) by mouth daily    Acquired hypothyroidism       miconazole 2 % powder    MICATIN; MICRO GUARD     Apply topically as needed for itching or other Reported on 3/27/2017        nystatin cream    MYCOSTATIN     Apply topically 2 times daily        order for DME     1 Device    Equipment being ordered: wheeled walker    Morbid obesity due to excess calories (H), Type 2 diabetes mellitus with stage 3 chronic kidney disease, without long-term current use of insulin (H)       PRESERVISION AREDS Caps      Take 1 tablet by mouth 2  times daily        REFRESH DRY EYE THERAPY OP           simvastatin 10 MG tablet    ZOCOR    90 tablet    Take 1 tablet (10 mg) by mouth At Bedtime    Hyperlipidemia LDL goal <100

## 2018-06-21 NOTE — PROGRESS NOTES
SUBJECTIVE:   Lucille Rojas is a 78 year old female who presents to clinic today for the following health issues:    Follow up 6/13 lab results for kidney function, sodium and potassium based on recent labs done as documented and listed below.  She is feeling well defines no other complaints.    She reports that she is not taking any obvious supplements or over-the-counter potassium tablets.  She does not do the best she can do and probably keeping hydrated and her medications of otherwise not changed.    Chronic Kidney Disease Follow-up      Current NSAID use?  No    Depression Followup    Status since last visit: Improved     See PHQ-9 for current symptoms.  Other associated symptoms: None    Complicating factors:   Significant life event:  No   Current substance abuse:  None  Anxiety or Panic symptoms:  No    PHQ-9 4/18/2018 5/11/2018 6/7/2018   Total Score 9 4 7   Q9: Suicide Ideation Not at all Not at all Not at all     PHQ-9  English  PHQ-9   Any Language  Suicide Assessment Five-step Evaluation and Treatment (SAFE-T)      Amount of exercise or physical activity: None    Problems taking medications regularly: No    Medication side effects: none    Diet: regular (no restrictions)    LDL Cholesterol Calculated   Date Value Ref Range Status   06/12/2018 51 <100 mg/dL Final     Comment:     Desirable:       <100 mg/dl       Problem list and histories reviewed & adjusted, as indicated.  Additional history: as documented    Patient Active Problem List   Diagnosis     Hyperlipidemia LDL goal <100     Advance care planning     Macular degeneration     Apnea     Morbid obesity due to excess calories (H)     CKD (chronic kidney disease) stage 3, GFR 30-59 ml/min     Acquired hypothyroidism     Benign essential hypertension     Gastroesophageal reflux disease without esophagitis     Type 2 diabetes mellitus with stage 3 chronic kidney disease, without long-term current use of insulin (H)     Anemia, unspecified type      MDD (major depressive disorder), recurrent episode, mild (H)     Past Surgical History:   Procedure Laterality Date     APPENDECTOMY       COLONOSCOPY       COLONOSCOPY N/A 10/27/2014    Procedure: COMBINED COLONOSCOPY, SINGLE OR MULTIPLE BIOPSY/POLYPECTOMY BY BIOPSY;  Surgeon: Jose Alfredo Morejon MD;  Location:  GI     HERNIA REPAIR      inguinal x 2     TONSILLECTOMY         Social History   Substance Use Topics     Smoking status: Never Smoker     Smokeless tobacco: Never Used     Alcohol use 0.0 oz/week     0 Standard drinks or equivalent per week      Comment: rarely     No family history on file.      Current Outpatient Prescriptions   Medication Sig Dispense Refill     aspirin 81 MG tablet Take 1 tablet by mouth daily. 30 tablet 0     citalopram (CELEXA) 10 MG tablet Take 1 tablet (10 mg) by mouth daily 90 tablet 1     Ferrous Sulfate (IRON SUPPLEMENT PO) Take 325 mg by mouth       Glycerin-Polysorbate 80 (REFRESH DRY EYE THERAPY OP)        levothyroxine (SYNTHROID/LEVOTHROID) 200 MCG tablet Take 1 tablet (200 mcg) by mouth daily 90 tablet 3     miconazole (MICATIN; MICRO GUARD) 2 % powder Apply topically as needed for itching or other Reported on 3/27/2017       Multiple Vitamins-Minerals (PRESERVISION AREDS) CAPS Take 1 tablet by mouth 2 times daily        nystatin (MYCOSTATIN) cream Apply topically 2 times daily       simvastatin (ZOCOR) 10 MG tablet Take 1 tablet (10 mg) by mouth At Bedtime 90 tablet 3     ACE/ARB NOT PRESCRIBED, INTENTIONAL, Please choose reason not prescribed, below       order for DME Equipment being ordered: wheeled walker 1 Device 0     [DISCONTINUED] simvastatin (ZOCOR) 10 MG tablet TAKE 1 TABLET AT BEDTIME 30 tablet 0     Allergies   Allergen Reactions     Penicillins      BP Readings from Last 3 Encounters:   06/15/18 152/88   02/12/18 130/72   12/01/17 131/55    Wt Readings from Last 3 Encounters:   06/15/18 300 lb (136.1 kg)   02/12/18 300 lb (136.1 kg)   12/01/17 289  lb 14.5 oz (131.5 kg)           Reviewed and updated as needed this visit by clinical staff       Reviewed and updated as needed this visit by Provider       ROS:  CONSTITUTIONAL: NEGATIVE for fever, chills, change in weight  ENT/MOUTH: NEGATIVE for ear, mouth and throat problems  RESP: NEGATIVE for significant cough or SOB  CV: NEGATIVE for chest pain, palpitations or peripheral edema  GI: NEGATIVE for nausea, abdominal pain, heartburn, or change in bowel habits  : NEGATIVE for frequency, dysuria, or hematuria  MUSCULOSKELETAL: NEGATIVE for significant arthralgias or myalgia  HEME: NEGATIVE for bleeding problems  PSYCHIATRIC: NEGATIVE for changes in mood or affect    OBJECTIVE:                                                    /68  Pulse 76  Temp 98  F (36.7  C) (Oral)  Resp 16  Wt 278 lb 3.2 oz (126.2 kg)  SpO2 93%  BMI 54.33 kg/m2  Body mass index is 54.33 kg/(m^2).  GENERAL: alert and no distress  EYES: Eyes grossly normal to inspection, extraocular movements - intact, and PERRL  HENT: ear canals- normal; TMs- normal; Nose- normal; Mouth- no ulcers, no lesions  NECK: no tenderness, no adenopathy, no asymmetry, no masses, no stiffness; thyroid- normal to palpation  RESP: lungs clear to auscultation - no rales, no rhonchi, no wheezes  CV: regular rates and rhythm, normal S1 S2, no S3 or S4 and no click or rub   ABDOMEN: obese, morbid  MS: extremities- no gross deformities noted  PSYCH: Alert and oriented times 3; speech- coherent , normal rate and volume; able to articulate logical thoughts, able to abstract reason, no tangential thoughts, no hallucinations or delusions, affect- normal    TSH   Date Value Ref Range Status   06/12/2018 2.00 0.40 - 4.00 mU/L Final          ASSESSMENT/PLAN:                                                        (E11.22,  N18.3) Type 2 diabetes mellitus with stage 3 chronic kidney disease, without long-term current use of insulin (H)  (primary encounter  diagnosis)  Comment: Ace/ARB held due to risk of hyperkalemia  Lab Results   Component Value Date    A1C 5.7 06/12/2018    A1C 6.2 09/06/2017    A1C 5.6 12/01/2016    A1C 5.9 08/01/2016    A1C 6.1 09/24/2015     Plan: FOOT EXAM  NO CHARGE [09952.114], Albumin         Random Urine Quantitative with Creat Ratio        Stable on current therapy continue with meds as ordered    (E66.01) Morbid obesity due to excess calories (H)  Comment: Encouraged ongoing and continued weight redock  Plan:     (N18.3) CKD (chronic kidney disease) stage 3, GFR 30-59 ml/min  Comment: Repeat labs as ordered and encouraged hydration  Plan: Basic metabolic panel            (I10) Benign essential hypertension  Comment: Stable on current therapy continue with meds as  Plan: Basic metabolic panel            (E78.5) Hyperlipidemia LDL goal <100  Comment: At goal on therapy continue with medicine as a  Plan: simvastatin (ZOCOR) 10 MG tablet            (Z13.89) Screening for diabetic peripheral neuropathy  Comment: Stable without issue  Plan: FOOT EXAM  NO CHARGE [42580.114]            Patient was again advised to mammogram need.    See Patient Instructions    Fausto Flynn MD  [facility]    THE MEDICATION LIST HAS BEEN FULLY RECONCILED BY THE M.D. AND THE NURSING STAFF.    25 minutes spent with this patient, face to face, discussing treatment options for listed problems above as well as side effects of appropriate medications.  Counseling time extended beyond 50% of the clinic visit.  Medication dosing, treatment plan and follow-up were discussed. Also reviewed need for primary care testing for patient.

## 2018-06-21 NOTE — LETTER
My Depression Action Plan  Name: Lucille Rojas   Date of Birth 1940  Date: 6/21/2018    My doctor: Fausto Flynn   My clinic: [address] [address] [phone]          GREEN    ZONE   Good Control    What it looks like:     Things are going generally well. You have normal up s and down s. You may even feel depressed from time to time, but bad moods usually last less than a day.   What you need to do:  1. Continue to care for yourself (see self care plan)  2. Check your depression survival kit and update it as needed  3. Follow your physician s recommendations including any medication.  4. Do not stop taking medication unless you consult with your physician first.           YELLOW         ZONE Getting Worse    What it looks like:     Depression is starting to interfere with your life.     It may be hard to get out of bed; you may be starting to isolate yourself from others.    Symptoms of depression are starting to last most all day and this has happened for several days.     You may have suicidal thoughts but they are not constant.   What you need to do:     1. Call your care team, your response to treatment will improve if you keep your care team informed of your progress. Yellow periods are signs an adjustment may need to be made.     2. Continue your self-care, even if you have to fake it!    3. Talk to someone in your support network    4. Open up your depression survival kit           RED    ZONE Medical Alert - Get Help    What it looks like:     Depression is seriously interfering with your life.     You may experience these or other symptoms: You can t get out of bed most days, can t work or engage in other necessary activities, you have trouble taking care of basic hygiene, or basic responsibilities, thoughts of suicide or death that will not go away, self-injurious behavior.     What you need to do:  1. Call your care  team and request a same-day appointment. If they are not available (weekends or after hours) call your local crisis line, emergency room or 911.            Depression Self Care Plan / Survival Kit    Self-Care for Depression  Here s the deal. Your body and mind are really not as separate as most people think.  What you do and think affects how you feel and how you feel influences what you do and think. This means if you do things that people who feel good do, it will help you feel better.  Sometimes this is all it takes.  There is also a place for medication and therapy depending on how severe your depression is, so be sure to consult with your medical provider and/ or Behavioral Health Consultant if your symptoms are worsening or not improving.     In order to better manage my stress, I will:    Exercise  Get some form of exercise, every day. This will help reduce pain and release endorphins, the  feel good  chemicals in your brain. This is almost as good as taking antidepressants!  This is not the same as joining a gym and then never going! (they count on that by the way ) It can be as simple as just going for a walk or doing some gardening, anything that will get you moving.      Hygiene   Maintain good hygiene (Get out of bed in the morning, Make your bed, Brush your teeth, Take a shower, and Get dressed like you were going to work, even if you are unemployed).  If your clothes don't fit try to get ones that do.    Diet  I will strive to eat foods that are good for me, drink plenty of water, and avoid excessive sugar, caffeine, alcohol, and other mood-altering substances.  Some foods that are helpful in depression are: complex carbohydrates, B vitamins, flaxseed, fish or fish oil, fresh fruits and vegetables.    Psychotherapy  I agree to participate in Individual Therapy (if recommended).    Medication  If prescribed medications, I agree to take them.  Missing doses can result in serious side effects.  I  understand that drinking alcohol, or other illicit drug use, may cause potential side effects.  I will not stop my medication abruptly without first discussing it with my provider.    Staying Connected With Others  I will stay in touch with my friends, family members, and my primary care provider/team.    Use your imagination  Be creative.  We all have a creative side; it doesn t matter if it s oil painting, sand castles, or mud pies! This will also kick up the endorphins.    Witness Beauty  (AKA stop and smell the roses) Take a look outside, even in mid-winter. Notice colors, textures. Watch the squirrels and birds.     Service to others  Be of service to others.  There is always someone else in need.  By helping others we can  get out of ourselves  and remember the really important things.  This also provides opportunities for practicing all the other parts of the program.    Humor  Laugh and be silly!  Adjust your TV habits for less news and crime-drama and more comedy.    Control your stress  Try breathing deep, massage therapy, biofeedback, and meditation. Find time to relax each day.     My support system    Clinic Contact:  Phone number:    Contact 1:  Phone number:    Contact 2:  Phone number:    [facility]/:  Phone number:    Therapist:  Phone number:    Local crisis center:    Phone number:    Other community support:  Phone number:

## 2018-07-19 DIAGNOSIS — F32.0 MILD MAJOR DEPRESSION (H): ICD-10-CM

## 2018-07-19 RX ORDER — CITALOPRAM HYDROBROMIDE 10 MG/1
TABLET ORAL
Qty: 90 TABLET | Refills: 1 | Status: SHIPPED | OUTPATIENT
Start: 2018-07-19 | End: 2018-12-19

## 2018-07-19 NOTE — TELEPHONE ENCOUNTER
"Requested Prescriptions   Pending Prescriptions Disp Refills     citalopram (CELEXA) 10 MG tablet [Pharmacy Med Name: CITALOPRAM HYDROBROMIDE 10 MG Tablet] 90 tablet 1     Sig: TAKE 1 TABLET (10 MG) BY MOUTH DAILY    SSRIs Protocol Passed    7/19/2018  3:30 AM       Passed - PHQ-9 score less than 5 in past 6 months    Please review last PHQ-9 score.          Passed - Patient is age 18 or older       Passed - No active pregnancy on record       Passed - No positive pregnancy test in last 12 months       Passed - Recent (6 mo) or future (30 days) visit within the authorizing provider's specialty    Patient had office visit in the last 6 months or has a visit in the next 30 days with authorizing provider or within the authorizing provider's specialty.  See \"Patient Info\" tab in inbasket, or \"Choose Columns\" in Meds & Orders section of the refill encounter.       Last Written Prescription Date:  02/12/2018  Last Fill Quantity: 90,  # refills: 01   Last office visit: 6/21/2018 with prescribing provider:  06/21/2018   Future Office Visit:   Next 5 appointments (look out 90 days)     Jul 24, 2018 12:00 PM CDT   Return Visit with Adamaris Urias, [facility] ([facility])    [address]  [address]  [place] [address] [phone]                        "

## 2018-07-19 NOTE — TELEPHONE ENCOUNTER
PHQ-9 SCORE 4/18/2018 5/11/2018 6/7/2018   Total Score 9 4 7     Routing refill request to provider for review/approval because:   out of range:  phq9 >4

## 2018-07-24 ENCOUNTER — OFFICE VISIT (OUTPATIENT)
Dept: PSYCHOLOGY | Facility: CLINIC | Age: 78
End: 2018-07-24
Payer: COMMERCIAL

## 2018-07-24 DIAGNOSIS — F33.0 MAJOR DEPRESSIVE DISORDER, RECURRENT EPISODE, MILD (H): Primary | ICD-10-CM

## 2018-07-24 PROCEDURE — 90834 PSYTX W PT 45 MINUTES: CPT | Performed by: SOCIAL WORKER

## 2018-07-24 ASSESSMENT — PATIENT HEALTH QUESTIONNAIRE - PHQ9: 5. POOR APPETITE OR OVEREATING: NOT AT ALL

## 2018-07-24 ASSESSMENT — ANXIETY QUESTIONNAIRES
5. BEING SO RESTLESS THAT IT IS HARD TO SIT STILL: SEVERAL DAYS
7. FEELING AFRAID AS IF SOMETHING AWFUL MIGHT HAPPEN: NOT AT ALL
6. BECOMING EASILY ANNOYED OR IRRITABLE: SEVERAL DAYS
1. FEELING NERVOUS, ANXIOUS, OR ON EDGE: SEVERAL DAYS
2. NOT BEING ABLE TO STOP OR CONTROL WORRYING: NOT AT ALL
IF YOU CHECKED OFF ANY PROBLEMS ON THIS QUESTIONNAIRE, HOW DIFFICULT HAVE THESE PROBLEMS MADE IT FOR YOU TO DO YOUR WORK, TAKE CARE OF THINGS AT HOME, OR GET ALONG WITH OTHER PEOPLE: SOMEWHAT DIFFICULT
3. WORRYING TOO MUCH ABOUT DIFFERENT THINGS: SEVERAL DAYS
GAD7 TOTAL SCORE: 4

## 2018-07-24 NOTE — MR AVS SNAPSHOT
MRN:[id number]                      After Visit Summary   7/24/2018    Lucille Rojas    MRN: [id number]           Visit Information        Provider Department      7/24/2018 12:00 PM Adamaris Urias LICSW [facility] Generic      Your next 10 appointments already scheduled     Aug 29, 2018 10:00 AM CDT   Return Visit with Adamaris PREMA Urias   [facility] ([facility])    [address]  [place] [address] [phone]              Care EveryWhere ID     This is your Care EveryWhere ID. This could be used by other organizations to access your [place] medical records  [id number]        Equal Access to Services     IVAN IVERSON : Lorena Bruno, wabrigidoda shaina, qaoliver kaalmajordan pineda, miky lloyd. So [facility] [phone].    ATENCIÓN: Si habla español, tiene a zurita disposición servicios gratuitos de asistencia lingüística. Llame al [phone].    We comply with applicable federal civil rights laws and [place] laws. We do not discriminate on the basis of race, color, national origin, age, disability, sex, sexual orientation, or gender identity.

## 2018-07-24 NOTE — PROGRESS NOTES
Progress Note    Client Name: Lucille Rojas  Date: 7/24/2018               Service Type: Individual      Session Start Time: 12pm  Session End Time: 1245      Session Length: 45     Session #: 6     Attendees: Client attended alone    Treatment Plan Last Reviewed: see below.  PHQ-9 / RUSLAN-7 : 6;4, decreasing     DATA      Progress Since Last Session (Related to Symptoms / Goals / Homework):   Symptoms: Stable    Homework: Achieved / completed to satisfaction      Episode of Care Goals: Satisfactory progress - ACTION (Actively working towards change); Intervened by reinforcing change plan / affirming steps taken.  Continues to work on letting go of certain expectations she has around her family relationships. Family wedding went well.        Current / Ongoing Stressors and Concerns:   Health and aging issues. Extended family issues. Doesn't get along with a daughter and her kids. Recently began taking an SSRI with very good benefit.  Maintaining benefit.     Treatment Objective(s) Addressed in This Session:   improve management of mood     Current: Continuing to do well. Encouraging activites without kids.  Letting go of conflicts with more ease. Complained today that spouse is not wearing his hearing aids which can cause conflict between them. May consider bringing him into session is she cannot get him to return to his dr for consultation.     Intervention:   However agreeing  to come in once a month for check in around mood management and support. Today focus was on improved mood and its impact as well as on continuing with strategies for letting go of expectations. Some discussion around fact  has knee replacement surgery tomorrow and her hesitations around asking adult children for help at home.       ASSESSMENT: Current Emotional / Mental Status (status of significant symptoms):   Risk status (Self / Other harm or suicidal ideation)   Client denies current  fears or concerns for personal safety.   Client denies current or recent suicidal ideation or behaviors.   Client denies current or recent homicidal ideation or behaviors.   Client denies current or recent self injurious behavior or ideation.   Client denies other safety concerns.   A safety and risk management plan has not been developed at this time, however client was given the after-hours number / 911 should there be a change in any of these risk factors.     Appearance:   better grooming-no odor.    Eye Contact:   Good    Psychomotor Behavior: Normal    Attitude:   Cooperative    Orientation:   All   Speech    Rate / Production: Normal     Volume:  Soft    Mood:    Normal, pleasant, proud of her improvement.   Affect:    Appropriate    Thought Content:  Clear  Rumination    Thought Form:  Coherent  Logical    Insight:    Fair      Medication Review:   No changes to current psychiatric medication(s).    Medication Compliance:   Yes     Changes in Health Issues:   None reported     Chemical Use Review:   Substance Use: Chemical use reviewed, no active concerns identified      Tobacco Use: No current tobacco use.       Collateral Reports Completed:   Routed note to PCP    PLAN: (Client Tasks / Therapist Tasks / Other)  Returns in 4 weeks; cont as planned.        Adamaris Urias, [facility] 7/24/2018                                                           ________________________________________________________________________    Treatment Plan    Client's Name: Lucille Rojas  YOB: 1940    Date: 4/18/2018      DSM-V Diagnoses: 296.32 (F33.1) Major Depressive Disorder, Recurrent Episode, Moderate _ and With anxious distress  Psychosocial / Contextual Factors: health and aging; extended family stressors.  WHODAS: see intake    Referral / Collaboration:  Referral to another professional/service is not indicated at this time..    Anticipated number of session or this episode of care: eval every 90  days.      MeasurableTreatment Goal(s) related to diagnosis / functional impairment(s)  Goal 1: Client will improve management of mood    I will know I've met my goal when I feel less irritable and I'm nicer to my .      Objective #A (Client Action)    Client will Decrease frequency and intensity of feeling down, depressed, hopeless  Identify negative self-talk and behaviors: challenge core beliefs, myths, and actions.  Status: new     Intervention(s)  Therapist will teach CBT and use mood screens as evidence of progress..    Objective #B  Client will learn & utilize at least 1 assertive communication skills weekly; thereby improving communication with her family members.  Status: new     Intervention(s)  Therapist will teach assertiveness skills..    Client has reviewed and agreed to the above plan.      Adamaris Urias, PREMA  April 18, 2018

## 2018-07-25 ASSESSMENT — PATIENT HEALTH QUESTIONNAIRE - PHQ9: SUM OF ALL RESPONSES TO PHQ QUESTIONS 1-9: 6

## 2018-07-25 ASSESSMENT — ANXIETY QUESTIONNAIRES: GAD7 TOTAL SCORE: 4

## 2018-08-23 ENCOUNTER — OFFICE VISIT (OUTPATIENT)
Dept: PSYCHOLOGY | Facility: CLINIC | Age: 78
End: 2018-08-23
Payer: COMMERCIAL

## 2018-08-23 DIAGNOSIS — F33.0 MAJOR DEPRESSIVE DISORDER, RECURRENT EPISODE, MILD (H): Primary | ICD-10-CM

## 2018-08-23 PROCEDURE — 90834 PSYTX W PT 45 MINUTES: CPT | Performed by: SOCIAL WORKER

## 2018-08-23 NOTE — Clinical Note
Dr Flynn, Are you aware that this client may be in need of meeting with your care coordinator for SW intervention, referral for services? She reported to me in session that the police cited them for having a hoarding situation making access difficult and unsafe after she called 911 last month when spouse had fallen and couldn't get back up. I have invited her adult daughter into next session but that is a month away.  Ct states her kids are aware of their living conditions. Eladio

## 2018-08-23 NOTE — MR AVS SNAPSHOT
MRN:[id number]                      After Visit Summary   8/23/2018    Lucille Rojas    MRN: [id number]           Visit Information        Provider Department      8/23/2018 3:00 PM Adamaris Urias LICSW [facility] Generic      Your next 10 appointments already scheduled     Oct 03, 2018 11:00 AM CDT   Return Visit with Adamaris PREMA Urias   [facility] ([facility])    [address]  [place] [address] [phone]              Care EveryWhere ID     This is your Care EveryWhere ID. This could be used by other organizations to access your [place] medical records  [id number]        Equal Access to Services     IVAN IVERSON : Lorena Bruno, mihir merritt, qaoliver kaalmajordan pineda, miky lloyd. So [facility] [phone].    ATENCIÓN: Si habla español, tiene a zurita disposición servicios gratuitos de asistencia lingüística. Llame al [phone].    We comply with applicable federal civil rights laws and [place] laws. We do not discriminate on the basis of race, color, national origin, age, disability, sex, sexual orientation, or gender identity.

## 2018-08-24 ASSESSMENT — ANXIETY QUESTIONNAIRES
2. NOT BEING ABLE TO STOP OR CONTROL WORRYING: SEVERAL DAYS
GAD7 TOTAL SCORE: 4
3. WORRYING TOO MUCH ABOUT DIFFERENT THINGS: SEVERAL DAYS
7. FEELING AFRAID AS IF SOMETHING AWFUL MIGHT HAPPEN: NOT AT ALL
6. BECOMING EASILY ANNOYED OR IRRITABLE: SEVERAL DAYS
IF YOU CHECKED OFF ANY PROBLEMS ON THIS QUESTIONNAIRE, HOW DIFFICULT HAVE THESE PROBLEMS MADE IT FOR YOU TO DO YOUR WORK, TAKE CARE OF THINGS AT HOME, OR GET ALONG WITH OTHER PEOPLE: SOMEWHAT DIFFICULT
5. BEING SO RESTLESS THAT IT IS HARD TO SIT STILL: NOT AT ALL
1. FEELING NERVOUS, ANXIOUS, OR ON EDGE: NOT AT ALL

## 2018-08-24 ASSESSMENT — PATIENT HEALTH QUESTIONNAIRE - PHQ9: 5. POOR APPETITE OR OVEREATING: SEVERAL DAYS

## 2018-08-24 NOTE — PROGRESS NOTES
Progress Note    Client Name: Lucille Roajs  Date: 8/23/2018               Service Type: Individual      Session Start Time: 3pm  Session End Time: 345      Session Length: 45     Session #: 7     Attendees: Client attended alone    Treatment Plan Last Reviewed: see below.  PHQ-9 / RUSLAN-7 : 3;4  DATA      Progress Since Last Session (Related to Symptoms / Goals / Homework):   Symptoms: Stable    Homework: Achieved / completed to satisfaction      Episode of Care Goals: Satisfactory progress - ACTION (Actively working towards change); Intervened by reinforcing change plan / affirming steps taken.  Asked to bring in her 78 yo .  They both are using walkers. He had knee replacement last month and presents slowed with slurred speech. She reported they have been getting pressured by their adult children to clean out their house and think about moving. They are both reluctant and point to much conflict between them and Nancy pritchett-daughter.  This daughter thinks dad is depressed; he doesn't nor does client feel he is. Provider recommended they see his PCP for consultation.  He apparently fell last month before knee surgery and hit his head. She could not lift him and 911 was called. The police cited them for having an unsafe house and ct admitted they are hoarders. She stated the adult children have come over to try and help but admits she and spouse are reluctant to make change or to move.   Current / Ongoing Stressors and Concerns:   Health and aging issues. Extended family issues. Doesn't get along with a daughter and her kids. Recently began taking an SSRI with very good benefit.  Maintaining benefit.     Treatment Objective(s) Addressed in This Session:   improve management of mood     Current: While they both say their relationship is better than at last session, both are stressed by his recovery process and the state of their home and need ot make changes. Ct  agreeing ot invite her adult daughter Carmen into next appnt for collateral information gathering.     Intervention:   Encouraging them to stay open to the fact that their helath and aging needs are necessitating certain changes in their lives. Attempted to help them see that some of these changes will make them both more safe and andless stressed.    ASSESSMENT: Current Emotional / Mental Status (status of significant symptoms):   Risk status (Self / Other harm or suicidal ideation)   Client denies current fears or concerns for personal safety.   Client denies current or recent suicidal ideation or behaviors.   Client denies current or recent homicidal ideation or behaviors.   Client denies current or recent self injurious behavior or ideation.   Client denies other safety concerns.   A safety and risk management plan has not been developed at this time, however client was given the after-hours number / 911 should there be a change in any of these risk factors.     Appearance:   better grooming-no odor.    Eye Contact:   Good    Psychomotor Behavior: Normal    Attitude:   Cooperative    Orientation:   All   Speech    Rate / Production: Normal     Volume:  Soft    Mood:    Anxious  Irritable    Affect:    Appropriate    Thought Content:  Clear  Rumination    Thought Form:  Coherent  Logical    Insight:    Fair      Medication Review:   No changes to current psychiatric medication(s).    Medication Compliance:   Yes     Changes in Health Issues:   None reported     Chemical Use Review:   Substance Use: Chemical use reviewed, no active concerns identified      Tobacco Use: No current tobacco use.       Collateral Reports Completed:   Routed note to PCP    PLAN: (Client Tasks / Therapist Tasks / Other)  Returns in 4 weeks;  Will engage adult  system to assess need for resources and referrals.      Adamaris Urias, TANNERSW 8/23/2018                                                            ________________________________________________________________________    Treatment Plan    Client's Name: Lucille Rojas  YOB: 1940    Date: 4/18/2018      DSM-V Diagnoses: 296.32 (F33.1) Major Depressive Disorder, Recurrent Episode, Moderate _ and With anxious distress  Psychosocial / Contextual Factors: health and aging; extended family stressors.  WHODAS: see intake    Referral / Collaboration:  Referral to another professional/service is not indicated at this time..    Anticipated number of session or this episode of care: eval every 90 days.      MeasurableTreatment Goal(s) related to diagnosis / functional impairment(s)  Goal 1: Client will improve management of mood    I will know I've met my goal when I feel less irritable and I'm nicer to my .      Objective #A (Client Action)    Client will Decrease frequency and intensity of feeling down, depressed, hopeless  Identify negative self-talk and behaviors: challenge core beliefs, myths, and actions.  Status: new     Intervention(s)  Therapist will teach CBT and use mood screens as evidence of progress..    Objective #B  Client will learn & utilize at least 1 assertive communication skills weekly; thereby improving communication with her family members.  Status: new     Intervention(s)  Therapist will teach assertiveness skills..    Client has reviewed and agreed to the above plan.      Adamaris Urias, [facility]  April 18, 2018

## 2018-08-25 ASSESSMENT — PATIENT HEALTH QUESTIONNAIRE - PHQ9: SUM OF ALL RESPONSES TO PHQ QUESTIONS 1-9: 3

## 2018-08-25 ASSESSMENT — ANXIETY QUESTIONNAIRES: GAD7 TOTAL SCORE: 4

## 2018-10-03 DIAGNOSIS — E03.9 ACQUIRED HYPOTHYROIDISM: Chronic | ICD-10-CM

## 2018-10-03 RX ORDER — LEVOTHYROXINE SODIUM 200 UG/1
TABLET ORAL
Qty: 90 TABLET | Refills: 2 | Status: SHIPPED | OUTPATIENT
Start: 2018-10-03 | End: 2019-05-16

## 2018-10-03 NOTE — TELEPHONE ENCOUNTER
"Requested Prescriptions   Pending Prescriptions Disp Refills     levothyroxine (SYNTHROID/LEVOTHROID) 200 MCG tablet [Pharmacy Med Name: LEVOTHYROXINE SODIUM 200 MCG Tablet] 90 tablet 3     Sig: TAKE 1 TABLET EVERY DAY    Thyroid Protocol Passed    10/3/2018  3:33 PM       Passed - Patient is 12 years or older       Passed - Recent (12 mo) or future (30 days) visit within the authorizing provider's specialty    Patient had office visit in the last 12 months or has a visit in the next 30 days with authorizing provider or within the authorizing provider's specialty.  See \"Patient Info\" tab in inbasket, or \"Choose Columns\" in Meds & Orders section of the refill encounter.           Passed - Normal TSH on file in past 12 months    Recent Labs   Lab Test  06/12/18   0834   TSH  2.00             Passed - No active pregnancy on record    If patient is pregnant or has had a positive pregnancy test, please check TSH.         Passed - No positive pregnancy test in past 12 months    If patient is pregnant or has had a positive pregnancy test, please check TSH.          Prescription approved per [facility] Refill Protocol.    "

## 2018-11-19 ENCOUNTER — ALLIED HEALTH/NURSE VISIT (OUTPATIENT)
Dept: NURSING | Facility: CLINIC | Age: 78
End: 2018-11-19
Payer: COMMERCIAL

## 2018-11-19 DIAGNOSIS — Z23 NEED FOR PROPHYLACTIC VACCINATION AND INOCULATION AGAINST INFLUENZA: Primary | ICD-10-CM

## 2018-11-19 PROCEDURE — 90662 IIV NO PRSV INCREASED AG IM: CPT

## 2018-11-19 PROCEDURE — G0008 ADMIN INFLUENZA VIRUS VAC: HCPCS

## 2018-11-19 NOTE — MR AVS SNAPSHOT
After Visit Summary   11/19/2018    Lucille Rojas    MRN: [id number]           Patient Information     Date Of Birth          1940        Visit Information        Provider Department      11/19/2018 4:00 PM [facility] - NURSE [facility]        Today's Diagnoses     Need for prophylactic vaccination and inoculation against influenza    -  1       Follow-ups after your visit        Who to contact     If you have questions or need follow up information about today's clinic visit or your schedule please contact [facility] directly at [phone].  Normal or non-critical lab and imaging results will be communicated to you by MyChart, letter or phone within 4 business days after the clinic has received the results. If you do not hear from us within 7 days, please contact the clinic through MyChart or phone. If you have a critical or abnormal lab result, we will notify you by phone as soon as possible.  Submit refill requests through [employer] or call your pharmacy and they will forward the refill request to us. Please allow 3 business days for your refill to be completed.          Additional Information About Your Visit        Care EveryWhere ID     This is your Care EveryWhere ID. This could be used by other organizations to access your [facility] medical records  [id number]         Blood Pressure from Last 3 Encounters:   06/21/18 116/68   06/15/18 152/88   02/12/18 130/72    Weight from Last 3 Encounters:   06/21/18 278 lb 3.2 oz (126.2 kg)   06/15/18 300 lb (136.1 kg)   02/12/18 300 lb (136.1 kg)              We Performed the Following     ADMIN INFLUENZA (For MEDICARE Patients ONLY) []     FLU VACCINE, INCREASED ANTIGEN, PRESV FREE, AGE 65+ [12239]        Primary Care Provider Office Phone # Fax #    Fausto Flynn [phone]       [address] [place] [id number]        Equal Access to Services     IVAN IVERSON AH:  Hadii aad ku hadpaolao Sopadminiali, waaxda luqadaha, qaybta kaalmada miriam, miky miramontesalton gabino. So [facility] [phone].    ATENCIÓN: Si nam corral, tiene a zurita disposición servicios gratuitos de asistencia lingüística. Llame al [phone].    We comply with applicable federal civil rights laws and [place] laws. We do not discriminate on the basis of race, color, national origin, age, disability, sex, sexual orientation, or gender identity.            Thank you!     Thank you for choosing [facility]  for your care. Our goal is always to provide you with excellent care. Hearing back from our patients is one way we can continue to improve our services. Please take a few minutes to complete the written survey that you may receive in the mail after your visit with us. Thank you!             Your Updated Medication List - Protect others around you: Learn how to safely use, store and throw away your medicines at www.disposemymeds.org.          This list is accurate as of 11/19/18  4:15 PM.  Always use your most recent med list.                   Brand Name Dispense Instructions for use Diagnosis    ACE/ARB/ARNI NOT PRESCRIBED (INTENTIONAL)      Please choose reason not prescribed, below    CKD (chronic kidney disease) stage 3, GFR 30-59 ml/min (H), Type 2 diabetes mellitus with stage 3 chronic kidney disease, without long-term current use of insulin (H)       aspirin 81 MG tablet     30 tablet    Take 1 tablet by mouth daily.    Type 2 diabetes, HbA1C goal < 8% (H)       citalopram 10 MG tablet    celeXA    90 tablet    TAKE 1 TABLET (10 MG) BY MOUTH DAILY    Mild major depression (H)       IRON SUPPLEMENT PO      Take 325 mg by mouth        levothyroxine 200 MCG tablet    SYNTHROID/LEVOTHROID    90 tablet    TAKE 1 TABLET EVERY DAY    Acquired hypothyroidism       miconazole 2 % powder    MICATIN; MICRO GUARD     Apply topically as needed for itching or other Reported on  3/27/2017        nystatin cream    MYCOSTATIN     Apply topically 2 times daily        order for DME     1 Device    Equipment being ordered: wheeled walker    Morbid obesity due to excess calories (H), Type 2 diabetes mellitus with stage 3 chronic kidney disease, without long-term current use of insulin (H)       PRESERVISION AREDS Caps      Take 1 tablet by mouth 2 times daily        REFRESH DRY EYE THERAPY OP           simvastatin 10 MG tablet    ZOCOR    90 tablet    Take 1 tablet (10 mg) by mouth At Bedtime    Hyperlipidemia LDL goal <100

## 2018-11-19 NOTE — PROGRESS NOTES

## 2018-12-19 DIAGNOSIS — F32.0 MILD MAJOR DEPRESSION (H): ICD-10-CM

## 2018-12-19 RX ORDER — CITALOPRAM HYDROBROMIDE 10 MG/1
TABLET ORAL
Qty: 30 TABLET | Refills: 0 | Status: SHIPPED | OUTPATIENT
Start: 2018-12-19 | End: 2019-05-16

## 2018-12-19 NOTE — LETTER
[facility]  [address] [id number] [phone]            Lucille Rojas  [id number]  [facility] [id number]        December 19, 2018    Dear Lucille,    While refilling your prescription today, we noticed that you are due for an appointment with your provider.  We will refill your prescription for 30 days, but a follow-up appointment must be made before any additional refills can be approved.     Taking care of your health is important to us and we look forward to seeing you in the near future.  Please call us at [phone] or [phone] (or use [email or web address]) to schedule an appointment.     Please disregard this notice if you have already made an appointment.    Sincerely,        [facility]

## 2018-12-19 NOTE — TELEPHONE ENCOUNTER
"Requested Prescriptions   Pending Prescriptions Disp Refills     citalopram (CELEXA) 10 MG tablet [Pharmacy Med Name: CITALOPRAM HYDROBROMIDE 10 MG Tablet] 90 tablet 1     Sig: TAKE 1 TABLET EVERY DAY    SSRIs Protocol Failed - 12/19/2018  3:18 PM       Failed - Recent (6 mo) or future (30 days) visit within the authorizing provider's specialty    Patient had office visit in the last 6 months or has a visit in the next 30 days with authorizing provider or within the authorizing provider's specialty.  See \"Patient Info\" tab in inbasket, or \"Choose Columns\" in Meds & Orders section of the refill encounter.           Passed - PHQ-9 score less than 5 in past 6 months    Please review last PHQ-9 score.          Passed - Patient is age 18 or older       Passed - No active pregnancy on record       Passed - No positive pregnancy test in last 12 months      Last Written Prescription Date:  7/19/18  Last Fill Quantity: 90,  # refills: 1   Last office visit: 6/21/2018 with prescribing provider:  PCP   Future Office Visit:      PHQ-9 SCORE 6/7/2018 7/24/2018 8/24/2018   PHQ-9 Total Score 7 6 3     Medication is being filled for 1 time refill only due to:  Patient needs to be seen because due for office visit. letter sent.    "

## 2019-04-03 ENCOUNTER — FCC EXTENDED DOCUMENTATION (OUTPATIENT)
Dept: PSYCHOLOGY | Facility: CLINIC | Age: 79
End: 2019-04-03

## 2019-04-03 NOTE — PROGRESS NOTES
"                    Discharge Summary  Multiple Sessions    Client Name: Lucille Rojas MRN#: [id number] YOB: 1940      Intake / Discharge Date: 3/21/18; 8/23/18      DSM5 Diagnoses: (Sustained by DSM5 Criteria Listed Above)  Diagnoses: 296.31 (F33.0) Major Depressive Disorder, Recurrent Episode, Mild With anxious distress  Psychosocial & Contextual Factors: relational strain with adult children  WHODAS 2.0 (12 item) Score: see intake          Presenting Concern:  \"Depression\".      Reason for Discharge:  Client is satisfied with progress and Client did not return      Disposition at Time of Last Encounter:   Comments:   Stable with improvement at last encounter.     Risk Management:   Client denies a history of suicidal ideation, suicide attempts, self-injurious behavior, homicidal ideation, homicidal behavior and and other safety concerns  A safety and risk management plan has not been developed at this time, however client was given the after-hours number / 911 should there be a change in any of these risk factors.      Referred To:  Discharged but may resume with [facility] in future if desired.        Adamaris Urias, LICSW   4/3/2019  "

## 2019-04-29 ENCOUNTER — OFFICE VISIT (OUTPATIENT)
Dept: PODIATRY | Facility: CLINIC | Age: 79
End: 2019-04-29
Payer: MEDICARE

## 2019-04-29 VITALS
DIASTOLIC BLOOD PRESSURE: 72 MMHG | HEIGHT: 60 IN | BODY MASS INDEX: 53.2 KG/M2 | SYSTOLIC BLOOD PRESSURE: 116 MMHG | WEIGHT: 271 LBS

## 2019-04-29 DIAGNOSIS — L84 CALLUS OF FOOT: ICD-10-CM

## 2019-04-29 DIAGNOSIS — M79.671 BILATERAL FOOT PAIN: Primary | ICD-10-CM

## 2019-04-29 DIAGNOSIS — N18.30 TYPE 2 DIABETES MELLITUS WITH STAGE 3 CHRONIC KIDNEY DISEASE, WITHOUT LONG-TERM CURRENT USE OF INSULIN (H): ICD-10-CM

## 2019-04-29 DIAGNOSIS — M79.672 BILATERAL FOOT PAIN: Primary | ICD-10-CM

## 2019-04-29 DIAGNOSIS — E11.22 TYPE 2 DIABETES MELLITUS WITH STAGE 3 CHRONIC KIDNEY DISEASE, WITHOUT LONG-TERM CURRENT USE OF INSULIN (H): ICD-10-CM

## 2019-04-29 PROCEDURE — 99213 OFFICE O/P EST LOW 20 MIN: CPT | Performed by: PODIATRIST

## 2019-04-29 ASSESSMENT — MIFFLIN-ST. JEOR: SCORE: 1620.75

## 2019-04-29 NOTE — PROGRESS NOTES
ASSESSMENT/PLAN:    Encounter Diagnoses   Name Primary?     Bilateral foot pain Yes     Callus of foot      Type 2 diabetes mellitus with stage 3 chronic kidney disease, without long-term current use of insulin (H)      Using a #15 scalpel I trimmed both calluses down. No bleeding.  I told her that if this provided relief and if she wants the service done in the future, she will need to sign an ABN waiver form and there might be an out of pocket expense. I did not charge for it today, as I am not sure she was aware of the potential cost.     Pt is instructed to inspect feet daily, wear a supportive shoe at all times, and avoid walking barefoot.  She is instructed to call if any sores develop.   Importance of good blood sugar control emphasized.   Pt verbalized understanding.    Pt is referred to the [facility] for caden prescription orthoses and extra-depth shoes.      No diagnosis found.    Body mass index is 52.93 kg/m .    Weight management plan: Patient was referred to their PCP to discuss a diet and exercise plan.      Michael Nolasco DPM, FACFAS, MS    [facility]      ____________________________________________________________________    HPI:         Chief Complaint: she questions if she has corns on both feet  Onset of problem: 6 months  Pain/ discomfort is described as:  Burning, deep ache, throbbing, shooting  Frequency:  Daily     The pain is made worse with weight bearing  Previous treatment: application of a cream    Type 2 DM; Last Hgb A1C = 5.7.  She reports numbness in her feet.    *  Patient Active Problem List   Diagnosis     Hyperlipidemia LDL goal <100     Advance care planning     Macular degeneration     Apnea     Morbid obesity due to excess calories (H)     CKD (chronic kidney disease) stage 3, GFR 30-59 ml/min (H)     Acquired hypothyroidism     Benign essential hypertension     Gastroesophageal reflux disease without  esophagitis     Type 2 diabetes mellitus with stage 3 chronic kidney disease, without long-term current use of insulin (H)     Anemia, unspecified type     MDD (major depressive disorder), recurrent episode, mild (H)   *  *  Past Surgical History:   Procedure Laterality Date     APPENDECTOMY       COLONOSCOPY       COLONOSCOPY N/A 10/27/2014    Procedure: COMBINED COLONOSCOPY, SINGLE OR MULTIPLE BIOPSY/POLYPECTOMY BY BIOPSY;  Surgeon: Jose Alfredo Morejon MD;  Location: [facility]     HERNIA REPAIR      inguinal x 2     TONSILLECTOMY     *  *  Current Outpatient Medications   Medication Sig Dispense Refill     ACE/ARB NOT PRESCRIBED, INTENTIONAL, Please choose reason not prescribed, below       aspirin 81 MG tablet Take 1 tablet by mouth daily. 30 tablet 0     citalopram (CELEXA) 10 MG tablet TAKE 1 TABLET EVERY DAY 30 tablet 0     Ferrous Sulfate (IRON SUPPLEMENT PO) Take 325 mg by mouth       Glycerin-Polysorbate 80 (REFRESH DRY EYE THERAPY OP)        levothyroxine (SYNTHROID/LEVOTHROID) 200 MCG tablet TAKE 1 TABLET EVERY DAY 90 tablet 2     miconazole (MICATIN; MICRO GUARD) 2 % powder Apply topically as needed for itching or other Reported on 3/27/2017       Multiple Vitamins-Minerals (PRESERVISION AREDS) CAPS Take 1 tablet by mouth 2 times daily        nystatin (MYCOSTATIN) cream Apply topically 2 times daily       order for DME Equipment being ordered: wheeled walker 1 Device 0     simvastatin (ZOCOR) 10 MG tablet Take 1 tablet (10 mg) by mouth At Bedtime 90 tablet 3       ROS:     A 10-point review of systems was performed and is positive for that noted above in the HPI and as seen below.  All other areas are negative.     Numbness in feet?  yes   Calf pain with walking? yes  Recent foot/ankle injury? no  Weight change over past 12 months? 30# loss  Self perception as overweight? yes  Recent flu-like symptoms? no  Joint pain other than feet ? hands    Social History: Employment:  no;  Exercise/Physical activity:  " no;  Tobacco use:  no  Social History     Socioeconomic History     Marital status:      Spouse name: Not on file     Number of children: Not on file     Years of education: Not on file     Highest education level: Not on file   Occupational History     Not on file   Social Needs     Financial resource strain: Not on file     Food insecurity:     Worry: Not on file     Inability: Not on file     Transportation needs:     Medical: Not on file     Non-medical: Not on file   Tobacco Use     Smoking status: Never Smoker     Smokeless tobacco: Never Used   Substance and Sexual Activity     Alcohol use: Yes     Alcohol/week: 0.0 oz     Comment: rarely     Drug use: No     Sexual activity: Never   Lifestyle     Physical activity:     Days per week: Not on file     Minutes per session: Not on file     Stress: Not on file   Relationships     Social connections:     Talks on phone: Not on file     Gets together: Not on file     Attends [employer]ism service: Not on file     Active member of club or organization: Not on file     Attends meetings of clubs or organizations: Not on file     Relationship status: Not on file     Intimate partner violence:     Fear of current or ex partner: Not on file     Emotionally abused: Not on file     Physically abused: Not on file     Forced sexual activity: Not on file   Other Topics Concern     Parent/sibling w/ CABG, MI or angioplasty before 65F 55M? Not Asked   Social History Narrative     Not on file       Family history:  No family history on file.    Rheumatoid arthritis:  parent  Foot Problems: no  Diabetes: no      EXAM:    Vitals: /72   Ht 1.524 m (5')   Wt 122.9 kg (271 lb)   BMI 52.93 kg/m    BMI: Body mass index is 52.93 kg/m .  Height: 5' 0\"    Constitutional/ general:  Pt is in no apparent distress, appears well-nourished.  Cooperative with history and physical exam.     Vascular:  Pedal pulses are palpable bilaterally for both the DP and PT arteries.  CFT < 3 " sec.  No edema.  Pedal hair growth noted.     Neuro:  Alert and oriented x 3. Coordinated gait.  Light touch sensation is intact to the L4, L5, S1 distributions. No obvious deficits.  No evidence of neurological-based weakness, spasticity, or contracture in the lower extremities.     Derm: Normal texture and turgor.  No erythema, ecchymosis, or cyanosis.  No open lesions. Thick, prominent hyperkeratotic lesions:  Plantar medial right hallux; plantar lateral left 5th metatarsal head region    Nails are elongated x 10. Some are discolored and thickened.     Musculoskeletal:    Lower extremity muscle strength is normal.  Patient is ambulatory without an assistive device or brace .  Decrease in medial longitudinal arch with weight bearing.   Michael Nolasco DPM, FACFAS, MS    [facility] Podiat[facility] & Ankle Surgery

## 2019-04-29 NOTE — LETTER
4/29/2019         RE: Lucille Rojas  [id number]  [facility] [address]        Dear Colleague,    Thank you for referring your patient, Lucille Rojas, to the [facility]. Please see a copy of my visit note below.      ASSESSMENT/PLAN:    Encounter Diagnoses   Name Primary?     Bilateral foot pain Yes     Callus of foot      Type 2 diabetes mellitus with stage 3 chronic kidney disease, without long-term current use of insulin (H)      Using a #15 scalpel I trimmed both calluses down. No bleeding.  I told her that if this provided relief and if she wants the service done in the future, she will need to sign an ABN waiver form and there might be an out of pocket expense. I did not charge for it today, as I am not sure she was aware of the potential cost.     Pt is instructed to inspect feet daily, wear a supportive shoe at all times, and avoid walking barefoot.  She is instructed to call if any sores develop.   Importance of good blood sugar control emphasized.   Pt verbalized understanding.    Pt is referred to the [facility] for caden prescription orthoses and extra-depth shoes.      No diagnosis found.    Body mass index is 52.93 kg/m .    Weight management plan: Patient was referred to their PCP to discuss a diet and exercise plan.      Michael Nolasco DPM, FACFAS, MS    [facility]      ____________________________________________________________________    HPI:         Chief Complaint: she questions if she has corns on both feet  Onset of problem: 6 months  Pain/ discomfort is described as:  Burning, deep ache, throbbing, shooting  Frequency:  Daily     The pain is made worse with weight bearing  Previous treatment: application of a cream    Type 2 DM; Last Hgb A1C = 5.7.  She reports numbness in her feet.    *  Patient Active Problem List   Diagnosis     Hyperlipidemia LDL goal <100     Advance care  planning     Macular degeneration     Apnea     Morbid obesity due to excess calories (H)     CKD (chronic kidney disease) stage 3, GFR 30-59 ml/min (H)     Acquired hypothyroidism     Benign essential hypertension     Gastroesophageal reflux disease without esophagitis     Type 2 diabetes mellitus with stage 3 chronic kidney disease, without long-term current use of insulin (H)     Anemia, unspecified type     MDD (major depressive disorder), recurrent episode, mild (H)   *  *  Past Surgical History:   Procedure Laterality Date     APPENDECTOMY       COLONOSCOPY       COLONOSCOPY N/A 10/27/2014    Procedure: COMBINED COLONOSCOPY, SINGLE OR MULTIPLE BIOPSY/POLYPECTOMY BY BIOPSY;  Surgeon: Jose Alfredo Morejon MD;  Location:  GI     HERNIA REPAIR      inguinal x 2     TONSILLECTOMY     *  *  Current Outpatient Medications   Medication Sig Dispense Refill     ACE/ARB NOT PRESCRIBED, INTENTIONAL, Please choose reason not prescribed, below       aspirin 81 MG tablet Take 1 tablet by mouth daily. 30 tablet 0     citalopram (CELEXA) 10 MG tablet TAKE 1 TABLET EVERY DAY 30 tablet 0     Ferrous Sulfate (IRON SUPPLEMENT PO) Take 325 mg by mouth       Glycerin-Polysorbate 80 (REFRESH DRY EYE THERAPY OP)        levothyroxine (SYNTHROID/LEVOTHROID) 200 MCG tablet TAKE 1 TABLET EVERY DAY 90 tablet 2     miconazole (MICATIN; MICRO GUARD) 2 % powder Apply topically as needed for itching or other Reported on 3/27/2017       Multiple Vitamins-Minerals (PRESERVISION AREDS) CAPS Take 1 tablet by mouth 2 times daily        nystatin (MYCOSTATIN) cream Apply topically 2 times daily       order for DME Equipment being ordered: wheeled walker 1 Device 0     simvastatin (ZOCOR) 10 MG tablet Take 1 tablet (10 mg) by mouth At Bedtime 90 tablet 3       ROS:     A 10-point review of systems was performed and is positive for that noted above in the HPI and as seen below.  All other areas are negative.     Numbness in feet?  yes   Calf pain  with walking? yes  Recent foot/ankle injury? no  Weight change over past 12 months? 30# loss  Self perception as overweight? yes  Recent flu-like symptoms? no  Joint pain other than feet ? hands    Social History: Employment:  no;  Exercise/Physical activity:  no;  Tobacco use:  no  Social History     Socioeconomic History     Marital status:      Spouse name: Not on file     Number of children: Not on file     Years of education: Not on file     Highest education level: Not on file   Occupational History     Not on file   Social Needs     Financial resource strain: Not on file     Food insecurity:     Worry: Not on file     Inability: Not on file     Transportation needs:     Medical: Not on file     Non-medical: Not on file   Tobacco Use     Smoking status: Never Smoker     Smokeless tobacco: Never Used   Substance and Sexual Activity     Alcohol use: Yes     Alcohol/week: 0.0 oz     Comment: rarely     Drug use: No     Sexual activity: Never   Lifestyle     Physical activity:     Days per week: Not on file     Minutes per session: Not on file     Stress: Not on file   Relationships     Social connections:     Talks on phone: Not on file     Gets together: Not on file     Attends Jainism service: Not on file     Active member of club or organization: Not on file     Attends meetings of clubs or organizations: Not on file     Relationship status: Not on file     Intimate partner violence:     Fear of current or ex partner: Not on file     Emotionally abused: Not on file     Physically abused: Not on file     Forced sexual activity: Not on file   Other Topics Concern     Parent/sibling w/ CABG, MI or angioplasty before 65F 55M? Not Asked   Social History Narrative     Not on file       Family history:  No family history on file.    Rheumatoid arthritis:  parent  Foot Problems: no  Diabetes: no      EXAM:    Vitals: /72   Ht 1.524 m (5')   Wt 122.9 kg (271 lb)   BMI 52.93 kg/m     BMI: Body mass  "index is 52.93 kg/m .  Height: 5' 0\"    Constitutional/ general:  Pt is in no apparent distress, appears well-nourished.  Cooperative with history and physical exam.     Vascular:  Pedal pulses are palpable bilaterally for both the DP and PT arteries.  CFT < 3 sec.  No edema.  Pedal hair growth noted.     Neuro:  Alert and oriented x 3. Coordinated gait.  Light touch sensation is intact to the L4, L5, S1 distributions. No obvious deficits.  No evidence of neurological-based weakness, spasticity, or contracture in the lower extremities.     Derm: Normal texture and turgor.  No erythema, ecchymosis, or cyanosis.  No open lesions. Thick, prominent hyperkeratotic lesions:  Plantar medial right hallux; plantar lateral left 5th metatarsal head region    Nails are elongated x 10. Some are discolored and thickened.     Musculoskeletal:    Lower extremity muscle strength is normal.  Patient is ambulatory without an assistive device or brace .  Decrease in medial longitudinal arch with weight bearing.   Michael Nolasco DPM, FACFAS, MS    [facility]le [facility]                Again, thank you for allowing me to participate in the care of your patient.        Sincerely,        Michael Nolasco DPM    "

## 2019-04-29 NOTE — PATIENT INSTRUCTIONS
Thank you for choosing [facility]!    DR. GORMAN'S CLINIC LOCATIONS     MONDAY - [place] WEDNESDAY - [place]   [address] [place] [address] TONIA Ibanez [id number] [phone] / [phone] / [phone]       THURSDAY - [place] SCHEDULE SURGERY: [phone]   [address] APPOINTMENTS: [phone]   [place] [address] BILLING QUESTIONS: [phone] / [phone]       FOOT CARE NURSES    [employer]   $55 nails - $10 calluses  [phone]  (Travels to your home) [employer] - $40  [phone]  [email or web address] for locations or they can come to your home   Yonny Egan DPM  [address] [place] [address] [phone] [employer]  [phone]  (Travels to your home)   [facility]  [address], TONIA Ibanez [id number] [phone] Footworks  [phone]  [facility]   Catalina Parrish DPM  [address] [place] [address] [phone] [facility]   [phone]  [facility]   [facility]  [phone]  [facility]  (does not take BCBS) [facility]   [phone]         [facility]UST[facility] ORTHOTICS LOCATIONS  [facility]  [address]  [place] [address]  Phone: [phone]  Fax: [phone] [facility]  [address]  [place] [address]  Phone: [phone]   Fax: [phone]   [facility]  [address]  [place] [address]  Phone: [phone]  Fax: [phone] [facility]  [address]  [place] [address]  Phone: [phone]   Fax: [phone]   [facility]   [address]  [place] [address]  Phone: [phone]   Fax: [phone] * Please call any location listed to make an appointment for a  casting/fitting. Your referral was sent to their central office and they will all have the order on file.     WEARING YOUR CUSTOM FOOT ORTHOTICS   Most insurance plans cover one pair of orthotics per year. You must check with your   insurance plan to see what your payment responsibility will be. Please call your   insurance company by calling the number on the back of your insurance card.   Orthotic's are non-refundable and non-returnable.   Orthotics are made of various designs. Some orthotics are covered with material that extends beyond your toes. If your orthotic is of this design, you will likely need to trim the toe end to get a proper fit. The insole from your shoe can be used as a template. Simply overlay the shoe insert on top of the custom orthotic. Align the heel end while tracing the length of the insert onto the custom orthotic. Use a large scissor to trim the toe end until you get a proper fit in the shoe.   The orthotic needs to be pushed as far back in the shoe as possible. The heel portion should not ride forward so as not to irritate your heel.   Orthotics are designed to work with socks. Excessive perspiration will shorten the life span of the orthotics. Remove the orthotic from the shoe frequently for proper drying.   The break-in period lasts for weeks. People new to orthotics will likely experience new aches and pains. The orthotic is forcing your foot into a new position. Arch, foot and leg muscle aches and fatigue are common during these weeks. Minor discomfort can be considered normal break in phenomenon. Start wearing your orthotic around your home your first day. Limited activity for one to two hours is recommended. You can increase one or two additional hours each day provided the aches and pains are subsiding. The degree of discomfort, fatigue and problems will dictate the speed of break in. You may require multiple weeks to work up to full time use.   Do not continue wearing your  orthotics if they are creating problems such as blisters or sores. Do not hesitate to call the clinic to speak with a nurse regarding orthotic   break in, fit, trimming, etc. You may also need to see the doctor if the orthotics are   simply not working out. Adjustments are sometimes made to improve orthotic   function.     Orthotics will only work in certain styles and types of shoes. Orthotics rarely work in dress shoes. Slip-ons, clogs, sandals and heels are particularly troublesome. Specially designed orthotics may be necessary for these types of shoes. Your custom orthotic was designed for activities that require appropriate walking or running shoes. Lace up athletic shoes, walking shoes or work boots should work appropriately. You may need a wider or longer shoe. Shoes with a removable  or insert work best. In general, you want to remove an insert from the shoe before placing the orthotic into the shoe. Shoes without a removable liner may not work as well.     When purchasing new shoes, bring your orthotics along to get a proper fit. Shop at stores that are familiar with orthotics.   Frequent washing of the orthotic may shorten the life span of the top cover. The top cover can be replaced but will generally last one to five years depending on use and foot perspiration.     CALLUS / CORNS / IPKs  When there is excessive friction or pressure on the skin, the body responds by making the skin thicker to protect the deeper structures from becoming exposed. While this works well to protect the deeper structures, the thickened skin can increase pressure and pain.    CALLUS: Flat, diffuse thickening are simple calluses and they are usually caused by friction. Often these are the result of rubbing on a shoe or going barefoot.    CORNS: Calluses with a central core between the toes are called corns. These result from prominent joints on adjacent toes rubbing together. Theses are a symptom of bone malalignment  and will always recur unless the underlying bones are addressed surgically.    IPKs: Calluses with a central core on the ball of the foot are usually IPKs (intractable plantar keratosis). These are caused by excessive pressure from the metatarsals, the bones that make up the ball of the foot. Often one of these bones is too long or too prominent.  Again, these will always recur unless the underlying bone issue is addressed. There is no cure for these. They will either go away by themselves, recur, or more could develop.    ROUTINE MAINTENANCE  1. File them down with a pumice stone or callus file a couple times a week.   2. An electric callus removing device. Amope Pedi Perfect Electronic Pedicure Foot File and Callus Remover can be a good option.   3. Lotion can be applied to soften the callus. A urea based cream such as Kersal or Vanicream or thicker cream with shea butter are good options.  4. Toe spacers or toe covers can be used for corns, gel pads can be used for other lesions on the bottom of the foot.   If there is a surgical pathology noted, such as a prominent bone, often this needs to be addressed surgically to minimize recurrence. However, sometimes the lesion simply migrates to another spot after surgery, so it is not a guaranteed cure.     There was also discussion of the cost structure of callus care if they were to come back and have it treated in the clinic. Explained that if insurance does not cover it, they would be billed. This charge could range from $100 - $160.  They were also provided information on places to get the callus treatment.              BODY WEIGHT AND YOUR FEET  The following information is included in the after visit summary for all patients. Body weight can be a sensitive issue to discuss in clinic, but we think the following information is very important. Although we focus on the feet and ankles, we do support the overall health of our patients.     Many things can cause foot  and ankle problems. Foot structure, activity level, foot mechanics and injuries are common causes of pain. One very important issue that often goes unmentioned, is body weight. Extra weight can cause increased stress on muscles, ligaments, bones and tendons. Sometimes just a few extra pounds is all it takes to put one over her/his threshold. Without reducing that stress, it can be difficult to alleviate pain. As Foot & Ankle specialists, our job is addressing the lower extremity problem and possible causes. Regarding extra body weight, we encourage patients to discuss diet and weight management plans with their primary care doctors. It is this team approach that gives you the best opportunity for pain relief and getting you back on your feet.      [facility] has a Comprehensive Weight Management Program. This program includes counseling, education, non-surgical and surgical approaches to weight loss. If you are interested in learning more either talk to you primary care provider or call [phone].

## 2019-05-13 ENCOUNTER — TRANSFERRED RECORDS (OUTPATIENT)
Dept: HEALTH INFORMATION MANAGEMENT | Facility: CLINIC | Age: 79
End: 2019-05-13

## 2019-05-15 ENCOUNTER — MEDICAL CORRESPONDENCE (OUTPATIENT)
Dept: HEALTH INFORMATION MANAGEMENT | Facility: CLINIC | Age: 79
End: 2019-05-15

## 2019-05-16 ENCOUNTER — TRANSFERRED RECORDS (OUTPATIENT)
Dept: HEALTH INFORMATION MANAGEMENT | Facility: CLINIC | Age: 79
End: 2019-05-16

## 2019-05-16 ENCOUNTER — OFFICE VISIT (OUTPATIENT)
Dept: INTERNAL MEDICINE | Facility: CLINIC | Age: 79
End: 2019-05-16
Payer: MEDICARE

## 2019-05-16 VITALS
TEMPERATURE: 98.4 F | BODY MASS INDEX: 54.15 KG/M2 | OXYGEN SATURATION: 90 % | RESPIRATION RATE: 19 BRPM | HEART RATE: 64 BPM | DIASTOLIC BLOOD PRESSURE: 59 MMHG | WEIGHT: 275.8 LBS | HEIGHT: 60 IN | SYSTOLIC BLOOD PRESSURE: 132 MMHG

## 2019-05-16 DIAGNOSIS — F32.0 MILD MAJOR DEPRESSION (H): ICD-10-CM

## 2019-05-16 DIAGNOSIS — E66.01 MORBID OBESITY DUE TO EXCESS CALORIES (H): ICD-10-CM

## 2019-05-16 DIAGNOSIS — E78.5 HYPERLIPIDEMIA LDL GOAL <100: ICD-10-CM

## 2019-05-16 DIAGNOSIS — E03.9 ACQUIRED HYPOTHYROIDISM: Chronic | ICD-10-CM

## 2019-05-16 DIAGNOSIS — Z12.31 VISIT FOR SCREENING MAMMOGRAM: ICD-10-CM

## 2019-05-16 DIAGNOSIS — N18.30 TYPE 2 DIABETES MELLITUS WITH STAGE 3 CHRONIC KIDNEY DISEASE, WITHOUT LONG-TERM CURRENT USE OF INSULIN (H): ICD-10-CM

## 2019-05-16 DIAGNOSIS — Z12.11 COLON CANCER SCREENING: ICD-10-CM

## 2019-05-16 DIAGNOSIS — Z00.00 MEDICARE ANNUAL WELLNESS VISIT, SUBSEQUENT: Primary | ICD-10-CM

## 2019-05-16 DIAGNOSIS — E11.22 TYPE 2 DIABETES MELLITUS WITH STAGE 3 CHRONIC KIDNEY DISEASE, WITHOUT LONG-TERM CURRENT USE OF INSULIN (H): ICD-10-CM

## 2019-05-16 DIAGNOSIS — I10 BENIGN ESSENTIAL HYPERTENSION: Chronic | ICD-10-CM

## 2019-05-16 LAB
ALBUMIN SERPL-MCNC: 3.6 G/DL (ref 3.4–5)
ALP SERPL-CCNC: 104 U/L (ref 40–150)
ALT SERPL W P-5'-P-CCNC: 17 U/L (ref 0–50)
ANION GAP SERPL CALCULATED.3IONS-SCNC: 7 MMOL/L (ref 3–14)
AST SERPL W P-5'-P-CCNC: 13 U/L (ref 0–45)
BILIRUB SERPL-MCNC: 0.3 MG/DL (ref 0.2–1.3)
BUN SERPL-MCNC: 26 MG/DL (ref 7–30)
CALCIUM SERPL-MCNC: 8.9 MG/DL (ref 8.5–10.1)
CHLORIDE SERPL-SCNC: 108 MMOL/L (ref 94–109)
CO2 SERPL-SCNC: 29 MMOL/L (ref 20–32)
CREAT SERPL-MCNC: 1.03 MG/DL (ref 0.52–1.04)
GFR SERPL CREATININE-BSD FRML MDRD: 51 ML/MIN/{1.73_M2}
GLUCOSE SERPL-MCNC: 102 MG/DL (ref 70–99)
HGB BLD-MCNC: 11.2 G/DL (ref 11.7–15.7)
POTASSIUM SERPL-SCNC: 4.3 MMOL/L (ref 3.4–5.3)
PROT SERPL-MCNC: 7.1 G/DL (ref 6.8–8.8)
SODIUM SERPL-SCNC: 144 MMOL/L (ref 133–144)

## 2019-05-16 PROCEDURE — 36415 COLL VENOUS BLD VENIPUNCTURE: CPT | Performed by: INTERNAL MEDICINE

## 2019-05-16 PROCEDURE — G0439 PPPS, SUBSEQ VISIT: HCPCS | Performed by: INTERNAL MEDICINE

## 2019-05-16 PROCEDURE — 85018 HEMOGLOBIN: CPT | Performed by: INTERNAL MEDICINE

## 2019-05-16 PROCEDURE — 80053 COMPREHEN METABOLIC PANEL: CPT | Performed by: INTERNAL MEDICINE

## 2019-05-16 RX ORDER — LEVOTHYROXINE SODIUM 200 UG/1
200 TABLET ORAL DAILY
Qty: 90 TABLET | Refills: 3 | Status: SHIPPED | OUTPATIENT
Start: 2019-05-16 | End: 2020-07-08

## 2019-05-16 RX ORDER — CITALOPRAM HYDROBROMIDE 10 MG/1
10 TABLET ORAL DAILY
Qty: 90 TABLET | Refills: 3 | Status: SHIPPED | OUTPATIENT
Start: 2019-05-16 | End: 2020-07-08

## 2019-05-16 RX ORDER — SIMVASTATIN 10 MG
10 TABLET ORAL AT BEDTIME
Qty: 90 TABLET | Refills: 3 | Status: SHIPPED | OUTPATIENT
Start: 2019-05-16 | End: 2020-07-08

## 2019-05-16 ASSESSMENT — MIFFLIN-ST. JEOR: SCORE: 1637.52

## 2019-05-16 ASSESSMENT — PATIENT HEALTH QUESTIONNAIRE - PHQ9: SUM OF ALL RESPONSES TO PHQ QUESTIONS 1-9: 3

## 2019-05-16 NOTE — LETTER
[facility] [place]  [address] [place] [address]  [phone]      5/16/2019       Lucille Rojas  [address]  [facility] [address]        Dear Lucille,    These note that I have placed an order for you to do your mammogram and if you would like to schedule it you can make an appointment at your convenience and I will then get to the results.    Sincerely,      Fausto Flynn MD  Internal Medicine

## 2019-05-16 NOTE — LETTER
[facility]  [address] [place] [address]  [phone]      5/16/2019       Lucille Rojas  [address]  [facility] [address]        Dear Lucille,    I am pleased to inform you that your routine blood work including your sodium, potassium, calcium, kidney and liver function tests are all stable.    Your hemoglobin remains stable although still slightly low.  There are many things that can cause a low hemoglobin and there are some further tests that can be done to better evaluate this.  If you would like to pursue this further please follow-up at your convenience so we can discuss your options otherwise I would repeat your hemoglobin test in 3 months for reassurance comparison.    Sincerely,      Fausto Flynn MD  Internal Medicine

## 2019-05-16 NOTE — PROGRESS NOTES
"  SUBJECTIVE:   Lucille Rojas is a 79 year old female who presents for Preventive Visit.  Are you in the first 12 months of your Medicare Part B coverage?  No    Physical Health:    In general, how would you rate your overall physical health? fair    Outside of work, how many days during the week do you exercise? none    Outside of work, approximately how many minutes a day do you exercise?not applicable    If you drink alcohol do you typically have >3 drinks per day or >7 drinks per week? No    Do you usually eat at least 4 servings of fruit and vegetables a day, include whole grains & fiber and avoid regularly eating high fat or \"junk\" foods? NO    Do you have any problems taking medications regularly?  No    Do you have any side effects from medications? none    Needs assistance for the following daily activities: transportation, preparing meals and bathing    Which of the following safety concerns are present in your home?  none identified     Hearing impairment: No    In the past 6 months, have you been bothered by leaking of urine? yes    Mental Health:    In general, how would you rate your overall mental or emotional health? good  PHQ-2 Score:      Do you feel safe in your environment? Yes    Do you have a Health Care Directive? No: Advance care planning was reviewed with patient; patient declined at this time.    Additional concerns to address?  No    Fall risk:  Fall Risk Assessment not completed.    Cognitive Screenin) Repeat 3 items (Leader, Season, Table)    2) Clock draw: NORMAL  3) 3 item recall Recalls 2 objects   Results: NORMAL clock, 1-2 items recalled: COGNITIVE IMPAIRMENT LESS LIKELY    Mini-CogTM Copyright ALEAH Jones. Licensed by the author for use in [facility]; reprinted with permission (mariah@.[email or web address]). All rights reserved.      Do you have sleep apnea, excessive snoring or daytime drowsiness?: no    PROBLEMS TO ADD ON...  1. Patient would like to discuss getting a new " walker     Reviewed and updated as needed this visit by clinical staff         Reviewed and updated as needed this visit by Provider        Social History     Tobacco Use     Smoking status: Never Smoker     Smokeless tobacco: Never Used   Substance Use Topics     Alcohol use: Yes     Alcohol/week: 0.0 oz     Comment: rarely                           Current providers sharing in care for this patient include:   Patient Care Team:  Fausto Flynn MD as PCP - General (Internal Medicine)  Fausto Flynn MD as Assigned PCP    The following health maintenance items are reviewed in Epic and correct as of today:  Health Maintenance   Topic Date Due     EYE EXAM Q1 YEAR  05/10/1941     ZOSTER IMMUNIZATION (1 of 2) 05/10/1988     MEDICARE ANNUAL WELLNESS VISIT  12/08/2017     ADVANCE DIRECTIVE PLANNING Q5 YRS  09/18/2018     A1C Q6 MO  12/12/2018     DTAP/TDAP/TD IMMUNIZATION (2 - Td) 01/01/2019     PHQ-9 Q6 MONTHS  02/23/2019     LIPID MONITORING Q1 YEAR  06/12/2019     FOOT EXAM Q1 YEAR  06/21/2019     CREATININE Q1 YEAR  06/21/2019     FALL RISK ASSESSMENT  06/21/2019     MICROALBUMIN Q1 YEAR  06/21/2019     TSH W/ FREE T4 REFLEX Q2 YEAR  06/12/2020     DEXA SCAN SCREENING (SYSTEM ASSIGNED)  Completed     DEPRESSION ACTION PLAN  Completed     INFLUENZA VACCINE  Completed     PNEUMOCOCCAL IMMUNIZATION 65+ LOW/MEDIUM RISK  Completed     IPV IMMUNIZATION  Aged Out     MENINGITIS IMMUNIZATION  Aged Out       Immunization History   Administered Date(s) Administered     Influenza (High Dose) 3 valent vaccine 10/29/2013, 11/20/2014, 09/24/2015, 10/20/2016, 11/19/2018     Pneumo Conj 13-V (2010&after) 09/24/2015     Pneumococcal 23 valent 01/01/2013     Tdap (Adacel,Boostrix) 01/01/2009         ROS:  CONSTITUTIONAL: NEGATIVE for fever, chills, change in weight  INTEGUMENTARY/SKIN: NEGATIVE for worrisome rashes, moles or lesions  EYES: NEGATIVE for vision changes or irritation  ENT/MOUTH: NEGATIVE for ear, mouth and throat  problems  RESP: NEGATIVE for significant cough or SOB  BREAST: NEGATIVE for masses, tenderness or discharge  CV: NEGATIVE for chest pain, palpitations or peripheral edema  GI: NEGATIVE for nausea, abdominal pain, heartburn, or change in bowel habits  : NEGATIVE for frequency, dysuria, or hematuria  MUSCULOSKELETAL: NEGATIVE for significant arthralgias or myalgia  NEURO: NEGATIVE for weakness, dizziness or paresthesias  ENDOCRINE: NEGATIVE for temperature intolerance, skin/hair changes  HEME: NEGATIVE for bleeding problems  PSYCHIATRIC: NEGATIVE for changes in mood or affect    OBJECTIVE:   /59   Pulse 64   Temp 98.4  F (36.9  C) (Oral)   Resp 19   Ht 1.524 m (5')   Wt 125.1 kg (275 lb 12.8 oz)   SpO2 90%   BMI 53.86 kg/m   Estimated body mass index is 52.93 kg/m  as calculated from the following:    Height as of 4/29/19: 1.524 m (5').    Weight as of 4/29/19: 122.9 kg (271 lb).  EXAM:   GENERAL: healthy, alert and no distress  EYES: Eyes grossly normal to inspection, PERRL and conjunctivae and sclerae normal  HENT: ear canals and TM's normal, nose and mouth without ulcers or lesions  NECK: no adenopathy, no asymmetry, masses, or scars and thyroid normal to palpation  RESP: lungs clear to auscultation - no rales, rhonchi or wheezes  CV: regular rate and rhythm, normal S1 S2, no S3 or S4, no murmur, click or rub, no peripheral edema and peripheral pulses strong  ABDOMEN: obese, soft, nontender, no hepatosplenomegaly, no masses and bowel sounds normal  MS: no gross musculoskeletal defects noted, no edema  NEURO: Normal strength and tone, mentation intact and speech normal  PSYCH: mentation appears normal, affect normal/bright    Lab Results   Component Value Date    A1C 5.7 06/12/2018    A1C 6.2 09/06/2017    A1C 5.6 12/01/2016    A1C 5.9 08/01/2016    A1C 6.1 09/24/2015       ASSESSMENT / PLAN:   1. Medicare annual wellness visit, subsequent  Advised Shingrix vaccination and ADT update  - Hemoglobin  -  Comprehensive metabolic panel    2. Type 2 diabetes mellitus with stage 3 chronic kidney disease, without long-term current use of insulin (H)  Stable and followed per Dr. Roman and recently seen, see scanned lab sheet  - order for DME; Equipment being ordered: wheeled walker with hand breaks  Dispense: 1 Device; Refill: 0    3. Morbid obesity due to excess calories (H)  Advised weight loss  - order for DME; Equipment being ordered: wheeled walker with hand breaks  Dispense: 1 Device; Refill: 0    4. Hyperlipidemia LDL goal <100  Labs as fasting per rotuine  - simvastatin (ZOCOR) 10 MG tablet; Take 1 tablet (10 mg) by mouth At Bedtime  Dispense: 90 tablet; Refill: 3    5. Benign essential hypertension  Stable on therapy    6. Acquired hypothyroidism  Labs as fasting done per Endocrinology  - levothyroxine (SYNTHROID/LEVOTHROID) 200 MCG tablet; Take 1 tablet (200 mcg) by mouth daily  Dispense: 90 tablet; Refill: 3    7. Mild major depression (H)  Stable per PHQ 9  - citalopram (CELEXA) 10 MG tablet; Take 1 tablet (10 mg) by mouth daily  Dispense: 90 tablet; Refill: 3    8. Colon cancer screening  As per FIT screen  - Fecal colorectal cancer screen (FIT); Future      9. (Z12.31) Visit for screening mammogram  Comment:   Plan: *MA Screening Digital Bilateral                End of Life Planning:  Patient currently has an advanced directive: No.  I have verified the patient's ablity to prepare an advanced directive/make health care decisions.  Literature was provided to assist patient in preparing an advanced directive.    COUNSELING:  Reviewed preventive health counseling, as reflected in patient instructions       Regular exercise       Healthy diet/nutrition    Estimated body mass index is 52.93 kg/m  as calculated from the following:    Height as of 4/29/19: 1.524 m (5').    Weight as of 4/29/19: 122.9 kg (271 lb).       reports that she has never smoked. She has never used smokeless tobacco.      Appropriate  preventive services were discussed with this patient, including applicable screening as appropriate for cardiovascular disease, diabetes, osteopenia/osteoporosis, and glaucoma.  As appropriate for age/gender, discussed screening for colorectal cancer, prostate cancer, breast cancer, and cervical cancer. Checklist reviewing preventive services available has been given to the patient.    Reviewed patients plan of care and provided an AVS. The Basic Care Plan (routine screening as documented in Health Maintenance) for Lucille meets the Care Plan requirement. This Care Plan has been established and reviewed with the Patient.    Counseling Resources:  ATP IV Guidelines  Pooled Cohorts Equation Calculator  Breast Cancer Risk Calculator  FRAX Risk Assessment  ICSI Preventive Guidelines  Dietary Guidelines for Americans, 2010  USDA's MyPlate  ASA Prophylaxis  Lung CA Screening    Fausto Flynn MD  [facility]

## 2019-05-16 NOTE — PATIENT INSTRUCTIONS
Patient Education   Personalized Prevention Plan  You are due for the preventive services outlined below.  Your care team is available to assist you in scheduling these services.  If you have already completed any of these items, please share that information with your care team to update in your medical record.  Health Maintenance Due   Topic Date Due     Eye Exam - yearly  [birth year]10/1941     Zoster (Shingles) Vaccine (1 of 2) 05/10/1988     Annual Wellness Visit  12/08/2017     Discuss Advance Directive Planning  09/18/2018     A1C (Diabetes) Lab - every 6 months  12/12/2018     Diptheria Tetanus Pertussis (DTAP/TDAP/TD) Vaccine (2 - Td) 01/01/2019     Depression Assessment - every 6 months  02/23/2019     Cholesterol Lab - yearly  06/12/2019

## 2019-07-02 ENCOUNTER — TELEPHONE (OUTPATIENT)
Dept: INTERNAL MEDICINE | Facility: CLINIC | Age: 79
End: 2019-07-02

## 2019-12-03 ENCOUNTER — PATIENT OUTREACH (OUTPATIENT)
Dept: CARE COORDINATION | Facility: CLINIC | Age: 79
End: 2019-12-03

## 2019-12-03 DIAGNOSIS — N18.30 TYPE 2 DIABETES MELLITUS WITH STAGE 3 CHRONIC KIDNEY DISEASE, WITHOUT LONG-TERM CURRENT USE OF INSULIN (H): ICD-10-CM

## 2019-12-03 DIAGNOSIS — E11.22 TYPE 2 DIABETES MELLITUS WITH STAGE 3 CHRONIC KIDNEY DISEASE, WITHOUT LONG-TERM CURRENT USE OF INSULIN (H): ICD-10-CM

## 2019-12-03 DIAGNOSIS — E66.01 MORBID OBESITY DUE TO EXCESS CALORIES (H): Primary | ICD-10-CM

## 2019-12-03 ASSESSMENT — ACTIVITIES OF DAILY LIVING (ADL): DEPENDENT_IADLS:: TRANSPORTATION

## 2019-12-03 NOTE — LETTER
Left Hand CARE COORDINATION  [facility]  [address] , [place] [address]    December 3, 2019    Lucille Rojas  [id number]  [facility] [address]      Dear Lucille,    I am a clinic care coordinator who works with Fausto Flynn MD at [facility]. I wanted to thank you for spending the time to talk with me and provide you with my contact information so that you can call me with questions or concerns about your health care. Below is a description of clinic care coordination and how I can further assist you.     The clinic care coordinator is a registered nurse and/or  who understand the health care system. The goal of clinic care coordination is to help you manage your health and improve access to the [facility] system in the most efficient manner. The registered nurse can assist you in meeting your health care goals by providing education, coordinating services, and strengthening the communication among your providers. The  can assist you with financial, behavioral, psychosocial, chemical dependency, counseling, and/or psychiatric resources.    Please feel free to contact me at [phone] with any questions or concerns. We at [facility] are focused on providing you with the highest-quality healthcare experience possible and that all starts with you.     Sincerely,     Philip Beltran RN  Clinic Care Coordinator  [facility]  Ph: [phone]    Enclosed: I have enclosed a copy of the Complex Care Plan. This has helpful information and goals that we have talked about. Please keep this in an easy to access place to use as needed.  Additionally, on the following page, I have included a list of snow removal companies you can look into as we discussed.      SNOW REMOVAL COMPANIES    [employer] -- Ph: [phone]    [facility] -- Ph: [phone]    [email or web address]  (sliding scale) -- Ph: [phone]    [employer] -- Ph: [phone]

## 2019-12-03 NOTE — LETTER
[facility]  Complex Care Plan  About Me:    Patient Name:  Lucille Rojas    YOB: 1940  Age:         79 year old   [place] MRN:    [id number] Telephone Information:  Home Phone [phone]   Mobile none       Address:  [address]  [place] [place] [address] Email address:  No e-mail address on record      Emergency Contact(s)    Name Relationship Lgl Grd Work Phone Home Phone Mobile Phone   1. EMIL ROJAS Spouse  none [phone] none   2. HARRIET ROJAS Daughter  none [phone] none           Primary language:  English     needed? No   [facility] [employer] [facility]:  [phone] op. 1  Other communication barriers: Glasses  Preferred Method of Communication:  Mail  Current living arrangement: I live in a private home with spouse  Mobility Status/ Medical Equipment: Independent w/Device    Health Maintenance  Health Maintenance Reviewed: Due/Overdue   Health Maintenance Due   Topic Date Due     EYE EXAM  1940     ZOSTER IMMUNIZATION (1 of 2) 05/10/1988     A1C  12/12/2018     DTAP/TDAP/TD IMMUNIZATION (2 - Td) 01/01/2019     LIPID  06/12/2019     MICROALBUMIN  06/21/2019     DIABETIC FOOT EXAM  06/21/2019     INFLUENZA VACCINE (1) 09/01/2019     PHQ-9  11/16/2019     My Access Plan  Medical Emergency 911   Primary Clinic Line [facility] - [phone]   24 Hour Appointment Line [phone] or  [phone]) (toll-free)   24 Hour Nurse Line [phone] (toll-free)   Preferred Urgent Care [facility], [phone]   Preferred Hospital [facility]  [phone]   Preferred Pharmacy [id number] - [place], [place] - [address] AT [facility] OF [address]     Behavioral Health Crisis Line The National Suicide Prevention Lifeline at 1-601.563.6805 or 911       My Care Team Members  Patient Care Team       Relationship Specialty  Notifications Start End    Fausto Flynn MD PCP - General Internal Medicine  10/6/14     Phone: [phone] Fax: [phone]         [address] [place] [address]    Fausto Flynn MD Assigned PCP   10/12/14     Phone: [phone] Fax: [phone]         [address] [place] [address]    Philip Beltran, RN Lead Care Coordinator Primary Care -   12/3/19     Phone: [phone]         Yonny Roman MD MD INTERNAL MEDICINE - ENDOCRINOLOGY, DIABETES & METABOLISM  12/3/19     Phone: [phone] Fax: [phone]         [facility] [address] [address]            My Care Plans  Self Management and Treatment Plan  Goals and (Comments)  Goals        General    Being Active (pt-stated)     Notes - Note edited  12/3/2019 11:00 AM by Philip Beltran, RN    Goal Statement: I will begin exercising regularly to improve my overall wellbeing.  Measure of Success: The patient will confirm regular exercise routine.  Supportive Steps to Achieve: The patient will utilize her membership at the [facility] at least 1 time per week.  CCC RN will continue routine patient outreaches to provide ongoing support and additional resources as needed.  Barriers: Limited mobility.  Strengths: The patient understands the importance of regular exercise in order to improve and maintain health.  Date to Achieve By: 5/31/20  Patient expressed understanding of goal: Yes             Action Plans on File:    Depression       My Medical and Care Information  Problem List   Patient Active Problem List   Diagnosis     Hyperlipidemia LDL goal <100     Advance care planning     Macular degeneration     Apnea     Morbid obesity due to excess calories (H)     CKD (chronic kidney disease) stage 3, GFR 30-59 ml/min (H)     Acquired hypothyroidism     Benign essential hypertension     Gastroesophageal reflux disease without esophagitis     Type 2 diabetes mellitus with stage 3 chronic kidney  disease, without long-term current use of insulin (H)     Anemia, unspecified type     MDD (major depressive disorder), recurrent episode, mild (H)      Current Medications:  Current Outpatient Medications   Medication     ACE/ARB NOT PRESCRIBED, INTENTIONAL,     aspirin 81 MG tablet     citalopram (CELEXA) 10 MG tablet     Ferrous Sulfate (IRON SUPPLEMENT PO)     Glycerin-Polysorbate 80 (REFRESH DRY EYE THERAPY OP)     levothyroxine (SYNTHROID/LEVOTHROID) 200 MCG tablet     miconazole (MICATIN; MICRO GUARD) 2 % powder     Multiple Vitamins-Minerals (PRESERVISION AREDS) CAPS     nystatin (MYCOSTATIN) cream     order for DME     order for DME     simvastatin (ZOCOR) 10 MG tablet     No current facility-administered medications for this visit.      Care Coordination Start Date: 12/3/2019   Frequency of Care Coordination: monthly   Form Last Updated: 12/03/2019

## 2019-12-03 NOTE — PROGRESS NOTES
Clinic Care Coordination Contact    Clinic Care Coordination Contact  OUTREACH    Referral Information:  Referral Source: Self-patient/Caregiver  Primary Diagnosis: Psychosocial  Chief Complaint   Patient presents with     Clinic Care Coordination - Initial     exercise goal     Clinical Concerns:  The patient began speaking with CCC RN while CCC RN was outreaching to her  for follow-up.  The patient was interested in partnering with Care Coordination to work toward goal of exercising; see Goal below.    The patient updated that she and her  have a membership to the [employer] but have not been going lately.  She would like to start going regularly as she understands the importance of exercise for her overall health.    During conversation, patient also mentioned that her  has been going out to shovel occasionally, but that when there is a lot of snow, it's not very safe for him to be the one doing it.  They do have children that come by intermittently and help with snow shoveling as needed, but they do not have a consistent service they use at this time.  CCC RN will provide patient/ with a list of snow removal companies in their area so she can call and see if any of the companies would work for them.    Medication Management:  No current medication questions or concerns per patient.  The patient stated she has not yet received her flu vaccine but is willing to do so.  CCC RN explained importance of receiving the flu vaccine and encouraged her to get hers this week, if possible; she was agreeable.    Functional Status:  Dependent ADLs: Ambulation-cane, Ambulation-walker  Dependent IADLs: Transportation  Mobility Status: Independent w/Device    Living Situation:  Current living arrangement: I live in a private home with spouse  Type of residence: Private home - stairs    Diet/Exercise/Sleep:  Diet: Diabetic diet  Exercise: Currently not exercising  Inadequate activity/exercise (GOAL):  Yes  Significant changes in sleep pattern (GOAL): No    Transportation:  Transportation concerns (GOAL): No  Transportation means: [employer] mobility     Psychosocial:  Mental health DX: Yes  Mental health DX how managed: Medication  Mental health management concern (GOAL): No  Informal Support system: Spouse, Children     Resources and Interventions:  Supplies used at home: Diabetic Supplies  Equipment Currently Used at Home: cane, straight, walker, standard, glucometer    Referrals Placed: snow removal [employer]     Goals:   Goals        General    Being Active (pt-stated)     Notes - Note edited  12/3/2019 11:00 AM by Philip Beltran RN    Goal Statement: I will begin exercising regularly to improve my overall wellbeing.  Measure of Success: The patient will confirm regular exercise routine.  Supportive Steps to Achieve: The patient will utilize her membership at the [facility] at least 1 time per week.  CCC RN will continue routine patient outreaches to provide ongoing support and additional resources as needed.  Barriers: Limited mobility.  Strengths: The patient understands the importance of regular exercise in order to improve and maintain health.  Date to Achieve By: 5/31/20  Patient expressed understanding of goal: Yes          Patient/Caregiver understanding: Yes    Outreach Frequency: monthly    Plan: The patient will work with her  to go to the [facility] weekly in order to establish regular exercise routine as discussed.  She will get her flu vaccine this week as discussed.  She will inquire with snow removal companies as provided by CCC RN to see if any would be able to assist them with snow removal this winter.  The patient will contact CCC RN with questions or concerns.  CCC RN will outreach to patient in approximately 1 month to get updates on patient status, assess goal progress, and offer additional support and resources as indicated.    Philip Beltran, RN  Clinic Care Coordinator  [facility]   [facility]  Ph: [phone]

## 2019-12-04 ENCOUNTER — TRANSFERRED RECORDS (OUTPATIENT)
Dept: HEALTH INFORMATION MANAGEMENT | Facility: CLINIC | Age: 79
End: 2019-12-04

## 2020-01-23 ENCOUNTER — PATIENT OUTREACH (OUTPATIENT)
Dept: CARE COORDINATION | Facility: CLINIC | Age: 80
End: 2020-01-23

## 2020-01-23 ASSESSMENT — ACTIVITIES OF DAILY LIVING (ADL): DEPENDENT_IADLS:: TRANSPORTATION

## 2020-01-23 NOTE — PROGRESS NOTES
Clinic Care Coordination Contact    Follow Up Progress Note      Assessment: The patient updated that she has continued working to clean and organize their house.  They have a lot of stuff and have been working to go through it for some time now.  The patient has been doing seated/lying exercises when she remembers but hasn't been doing it consistently.  She also hasn't been going to the [facility] as previously discussed due to the weather.  She would like to focus on exercising more routinely, however, because she wants to lose weight this year.  At this time, she is interested in trying to utilize the stationary bike in their basement and continuing to do seated/lying exercises.    Goals addressed this encounter:   Goals Addressed                 This Visit's Progress       Patient Stated      Being Active (pt-stated)   30%     Goal Statement: I will begin exercising regularly to improve my overall wellbeing.  Measure of Success: The patient will confirm regular exercise routine.  Supportive Steps to Achieve: The patient will utilize her membership at the [facility] at least 1 time per week.  CCC RN will continue routine patient outreaches to provide ongoing support and additional resources as needed.  Barriers: Limited mobility.  Strengths: The patient understands the importance of regular exercise in order to improve and maintain health.  Date to Achieve By: 5/31/20  Patient expressed understanding of goal: Yes    As of today's date 1/23/2020 goal is met at 26 - 50%.   Goal Status:  Active        Intervention/Education provided during outreach: CCC RN reviewed the patient's goal and discussed barriers she mentioned such as the weather and working on other projects.  CCC RN provided the patient with encouragement for already trying to do seated/lying exercises.  The patient feels these types of exercises are manageable for her so CCC RN will mail her additional exercises she can try.  The patient denied need for further  resources/supports as it relates to her goal at this time.     Outreach Frequency: monthly    Plan: The patient will continue working toward regular exercise as discussed and will consider some new exercises as provided by CCC RN.  She will contact CCC RN with any additional questions or concerns.  Care Coordination will outreach in approximately 1 month to get updates on patient status, assess goal progress, and offer additional support and resources as indicated.    Delegation to CHW:    Patient outreach in approximately 1 month to inquire about patient's progress with Goal.    Philip Beltran RN  Clinic Care Coordinator  [facility]  Ph: [phone]

## 2020-02-20 ENCOUNTER — PATIENT OUTREACH (OUTPATIENT)
Dept: CARE COORDINATION | Facility: CLINIC | Age: 80
End: 2020-02-20

## 2020-02-20 NOTE — PROGRESS NOTES
02/20/20-Community Health Worker called the patient for Clinic Care Coordination outreach follow up on goals and action steps.  Spoke to Lucille.    Discussed:    Patient has been doing exercising waist up.    Patient has been taking care of  for the last 4 weeks due to 's severe pain, so patient has been getting more exercising in due to being a caregiver to .    Patient has [employer] and is planning on going to [employer] for food next week.    [facility]  _______________________________________________  Delegation from BREONNA Raphael to CHW:  Please outreach to patient to check in on her goal of exercising more regularly.  Delegation Complete.  _______________________________________________  Goals:  Goal Statement: I will begin exercising regularly to improve my overall wellbeing.  Measure of Success: The patient will confirm regular exercise routine.  Supportive Steps to Achieve: The patient will utilize her membership at the [facility] at least 1 time per week.  CCC RN will continue routine patient outreaches to provide ongoing support and additional resources as needed.  Barriers: Limited mobility.  Strengths: The patient understands the importance of regular exercise in order to improve and maintain health.  Date to Achieve By: 5/31/20  _______________________________________________Next Outreach: 03/20/20  Planned Outreach Frequency: Monthly  Preferred Phone Number: [phone]     Enrollment Date: 12/03/19  Last Care Plan Assessment Date: 12/03/19

## 2020-03-23 ENCOUNTER — PATIENT OUTREACH (OUTPATIENT)
Dept: CARE COORDINATION | Facility: CLINIC | Age: 80
End: 2020-03-23

## 2020-03-23 NOTE — PROGRESS NOTES
Clinic Care Coordination Contact     Follow Up Progress Note   The Community Health Worker called the patient for follow up on goals and action steps. Spoke to Lucille.     Discussed:     Patient stated that her and her  are staying in unless they have a doctor's appointment.  They both have been in the house since the end of January.    Patient stated that her exercising has not been going to good. Patient stated that she has a baker's cyst behind her knee and is causing some discomfort.    Patient expressed that she will be out of milk soon and a few other groceries.  Patient will ask her son if he can do some grocery shopping.    Patient's prescriptions are now mailed to her home, so she will not have to pick them up  ______________________________________________     Goals addressed this encounter:   Goals addressed:   Goal Statement: I will begin exercising regularly to improve my overall wellbeing.  Measure of Success: The patient will confirm regular exercise routine.  Supportive Steps to Achieve: The patient will utilize her membership at the [facility] at least 1 time per week.  CCC RN will continue routine patient outreaches to provide ongoing support and additional resources as needed.  Barriers: Limited mobility.  Strengths: The patient understands the importance of regular exercise in order to improve and maintain health.  Date to Achieve By: 5/31/20  Patient expressed understanding of goal: Yes   As of today's date 3/23/2020 goal is met at 0 - 25%.   Goal Status:  Showing progress     Intervention/Education provided during outreach: CHW gave patient resources to assist with grocery shopping:  Help at your door, [employer].        Care Coordinator will follow up in 04/24/20     Next Outreach: 04/24/20     ZANE Demarco  Clinic Care Coordination  [facility]  Phone: [phone]

## 2020-03-24 NOTE — PROGRESS NOTES
Clinic Care Coordination Contact    Follow Up Progress Note   The Community Health Worker called the patient for follow up on goals and action steps. Spoke to Lucille.    Discussed:     Patient stated that her and her  are staying in unless they have a doctor's appointment.  They both have been in the house since the end of January.    Patient stated that her exercising has not been going to good. Patient stated that she has a baker's cyst behind her knee and is causing some discomfort.    Patient expressed that she will be out of milk soon and a few other groceries.  Patient will ask her son if he can do some grocery shopping.    Patient's prescriptions are now mailed to her home, so she will not have to pick them up  ______________________________________________    Goals addressed this encounter:   Goals addressed:   Goal Statement: I will begin exercising regularly to improve my overall wellbeing.  Measure of Success: The patient will confirm regular exercise routine.  Supportive Steps to Achieve: The patient will utilize her membership at the [facility] at least 1 time per week.  CCC RN will continue routine patient outreaches to provide ongoing support and additional resources as needed.  Barriers: Limited mobility.  Strengths: The patient understands the importance of regular exercise in order to improve and maintain health.  Date to Achieve By: 5/31/20  Patient expressed understanding of goal: Yes   As of today's date 3/23/2020 goal is met at 0 - 25%.   Goal Status:  Showing progress    Intervention/Education provided during outreach: CHW gave patient resources to assist with grocery shopping:  Help at your door, [employer].      Care Coordinator will follow up in 04/24/20    Next Outreach: 04/24/20    ZANE Demarco  Clinic Care Coordination  [facility]  Phone: [phone]

## 2020-03-25 ENCOUNTER — PATIENT OUTREACH (OUTPATIENT)
Dept: CARE COORDINATION | Facility: CLINIC | Age: 80
End: 2020-03-25

## 2020-03-25 ASSESSMENT — ACTIVITIES OF DAILY LIVING (ADL): DEPENDENT_IADLS:: TRANSPORTATION

## 2020-03-25 NOTE — PROGRESS NOTES
Clinic Care Coordination Contact    Situation: Patient chart reviewed by care coordinator.    Background: Care Coordination following patient to assist with Goal as below.    Assessment: Per chart review, patient has been actively working with CC CHW to accomplish Goal.  Patient's goal remains appropriate and relevant at this time.    Goals Addressed                 This Visit's Progress       Patient Stated      Being Active (pt-stated)   30%     Goal Statement: I will begin exercising regularly to improve my overall wellbeing.  Measure of Success: The patient will confirm regular exercise routine.  Supportive Steps to Achieve: The patient will utilize her membership at the [facility] at least 1 time per week.  CCC RN will continue routine patient outreaches to provide ongoing support and additional resources as needed.  Barriers: Limited mobility.  Strengths: The patient understands the importance of regular exercise in order to improve and maintain health.  Date to Achieve By: 5/31/20  Patient expressed understanding of goal: Yes    As of today's date 1/23/2020 goal is met at 26 - 50%.   Goal Status:  Active   As of today's date 3/23/2020 goal is met at 0 - 25%.   Goal Status:  Active - Patient has baker's cyst behind knee causing discomfort and making it difficult to exercise currently.          Plan/Recommendations: The patient will continue working with Care Coordination to achieve Goal as above.  CC RN will send patient updated copy of Complex Care Plan.  CC RN will review patient chart again in approximately 6 weeks to assess patient status and goal progress with outreach to patient as indicated.    Philip Beltran RN  Clinic Care Coordinator  [facility]  Ph: [phone]

## 2020-03-25 NOTE — LETTER
[facility]  Complex Care Plan  About Me:    Patient Name:  Lucille Rojas    YOB: 1940  Age:         79 year old   [facility] MRN:    [id number] Telephone Information:  Home Phone [phone]   Mobile none       Address:  [address]  [place] [address] Email address:  No e-mail address on record      Emergency Contact(s)    Name Relationship Lgl Grd Work Phone Home Phone Mobile Phone   1. EMIL ROJAS Spouse  none [phone] none   2. HARRIET ROJAS Daughter  none [phone] none           Primary language:  English     needed? No   [employer]:  [phone] op. 1  Other communication barriers: Glasses  Preferred Method of Communication:  Mail  Current living arrangement: I live in a private home with spouse  Mobility Status/ Medical Equipment: Independent w/Device    Health Maintenance  Health Maintenance Reviewed: Due/Overdue   Health Maintenance Due   Topic Date Due     EYE EXAM  1940     ZOSTER IMMUNIZATION (1 of 2) 05/10/1988     A1C  12/12/2018     DTAP/TDAP/TD IMMUNIZATION (2 - Td) 01/01/2019     LIPID  06/12/2019     MICROALBUMIN  06/21/2019     DIABETIC FOOT EXAM  06/21/2019     INFLUENZA VACCINE (1) 09/01/2019     PHQ-9  11/16/2019     My Access Plan  Medical Emergency 911   Primary Clinic Line [facility] - [phone]   24 Hour Appointment Line [phone] or  [phone]) (toll-free)   24 Hour Nurse Line [phone] (toll-free)   Preferred Urgent Care [facility], [phone]   Preferred Hospital [facility]  [phone]   Preferred Pharmacy [id number] - [place], [place] - [address] AT [facility] [address]     Behavioral Health Crisis Line The National Suicide Prevention Lifeline at 1-295.506.5259 or 911       My Care Team Members  Patient Care Team       Relationship Specialty  Notifications Start End    Fausto Flynn MD PCP - General Internal Medicine  10/6/14     Phone: [phone] Fax: [phone]         [address] [place] [address]    Fausto Flynn MD Assigned PCP   10/12/14     Phone: [phone] Fax: [phone]         [address] [place] [address]    Philip Beltran, RN Lead Care Coordinator Primary Care - CC  12/3/19     Phone: [phone]         Yonny Roman MD MD INTERNAL MEDICINE - ENDOCRINOLOGY, DIABETES & METABOLISM  12/3/19     Phone: [phone] Fax: [phone]         [facility] [address] [facility] [address] [facility] [address]    Rosanne Valadez MA Community Health Worker Primary Care - CC  1/22/20             My Care Plans  Self Management and Treatment Plan  Goals and (Comments)  Goals        General    Being Active (pt-stated)     Notes - Note edited  3/25/2020  1:44 PM by Philip Beltran, RN    Goal Statement: I will begin exercising regularly to improve my overall wellbeing.  Measure of Success: The patient will confirm regular exercise routine.  Supportive Steps to Achieve: The patient will utilize her membership at the [facility] at least 1 time per week.  [facility] RN will continue routine patient outreaches to provide ongoing support and additional resources as needed.  Barriers: Limited mobility.  Strengths: The patient understands the importance of regular exercise in order to improve and maintain health.  Date to Achieve By: 5/31/20  Patient expressed understanding of goal: Yes             Action Plans on File:    Depression      My Medical and Care Information  Problem List   Patient Active Problem List   Diagnosis     Hyperlipidemia LDL goal <100     Advance care planning     Macular degeneration     Apnea     Morbid obesity due to excess calories (H)     CKD (chronic kidney disease) stage 3, GFR 30-59 ml/min (H)     Acquired hypothyroidism     Benign essential hypertension     Gastroesophageal reflux disease  without esophagitis     Type 2 diabetes mellitus with stage 3 chronic kidney disease, without long-term current use of insulin (H)     Anemia, unspecified type     MDD (major depressive disorder), recurrent episode, mild (H)      Current Medications:  Current Outpatient Medications   Medication     ACE/ARB NOT PRESCRIBED, INTENTIONAL,     aspirin 81 MG tablet     citalopram (CELEXA) 10 MG tablet     Ferrous Sulfate (IRON SUPPLEMENT PO)     Glycerin-Polysorbate 80 (REFRESH DRY EYE THERAPY OP)     levothyroxine (SYNTHROID/LEVOTHROID) 200 MCG tablet     miconazole (MICATIN; MICRO GUARD) 2 % powder     Multiple Vitamins-Minerals (PRESERVISION AREDS) CAPS     nystatin (MYCOSTATIN) cream     order for DME     order for DME     simvastatin (ZOCOR) 10 MG tablet     No current facility-administered medications for this visit.        Care Coordination Start Date: 12/3/2019   Frequency of Care Coordination: monthly   Form Last Updated: 03/25/2020

## 2020-05-01 ENCOUNTER — NURSE TRIAGE (OUTPATIENT)
Dept: CARE COORDINATION | Facility: CLINIC | Age: 80
End: 2020-05-01

## 2020-05-01 NOTE — TELEPHONE ENCOUNTER
CC RN received voicemail from patient reporting that she thinks she might have a Baker's cyst behind her knee; she is wondering if she needs to come into clinic to see PCP Dr. Flynn.  Her  recently had a fall and was in the ED so he will likely be needing a follow-up appointment with Dr. Flynn, as well, so the patient is wanting to coordinate their appointments if she also needs to come in to be seen.    CC RN will request assistance from PCP clinic triage for identifying what visit type would be needed and scheduling patient accordingly.    Philip Beltran, RN  Clinic Care Coordinator  [facility], & [facility]  Ph: [phone]

## 2020-05-01 NOTE — TELEPHONE ENCOUNTER
Patient Contact    Attempt # 1    Was call answered?  No.  No answer.    On call back, triage knee pain/symptoms. Try again 5/1/2020.

## 2020-05-04 ENCOUNTER — PATIENT OUTREACH (OUTPATIENT)
Dept: CARE COORDINATION | Facility: CLINIC | Age: 80
End: 2020-05-04

## 2020-05-04 ASSESSMENT — ACTIVITIES OF DAILY LIVING (ADL): DEPENDENT_IADLS:: TRANSPORTATION

## 2020-05-04 NOTE — PROGRESS NOTES
Clinic Care Coordination Contact    Follow Up Progress Note      Assessment:    The patient reports she has been having continued pain behind her right knee which is the same pain she previously had with her baker's cyst.    The patient was originally going to follow up with PCP for her knee pain, but now plans to contact [facility] for follow-up instead as that is who she previously saw for her knee.  She hopes she can get a cortisone injection again as that previously helped her pain.    The patient stated she has been working to do seated exercises during TV commercials in order to keep up her strength and keep active despite her knee pain slowing her down.    The patient reported good support from her children during the current COVID-19 pandemic.  Her children bring them groceries and sometimes come and just sit in their vehicles so they can check on them and talk from a safe distance.    The patient has been working on cleaning her basement some so they can get a new washer and dryer.    Goals addressed this encounter:   Goals Addressed                 This Visit's Progress       Patient Stated      Being Active (pt-stated)   40%     Goal Statement: I will begin exercising regularly to improve my overall wellbeing.  Measure of Success: The patient will confirm regular exercise routine.  Supportive Steps to Achieve: The patient will utilize her membership at the [facility] at least 1 time per week.  CCC RN will continue routine patient outreaches to provide ongoing support and additional resources as needed.  Barriers: Limited mobility.  Strengths: The patient understands the importance of regular exercise in order to improve and maintain health.  Date to Achieve By: date extended to 8/31/20  Patient expressed understanding of goal: Yes    As of today's date 1/23/2020 goal is met at 26 - 50%.   Goal Status:  Active   As of today's date 3/23/2020 goal is met at 0 - 25%.   Goal Status:  Active - Patient  has baker's cyst behind knee causing discomfort and making it difficult to exercise currently.  As of today's date 5/4/2020 goal is met at 26 - 50%.   Goal Status:  Active - Doing seated exercises most days during TV commercials.  Continues having pain to her right knee due to what she thinks is a baker's cyst.        Intervention/Education provided during outreach: CC RN provided the patient with encouragement for continued physical activity despite her knee pain.  CC RN inquired if patient need any additional examples of seated exercises; she declined stating she has plenty and her  has some from when he did PT.  CC RN ensured patient has contact information for [facility]; she confirmed she has the phone number to schedule.    Plan:     The patient will schedule an appointment with [facility] for follow-up/management of the baker's cyst behind her right knee.    The patient will continue doing seated exercises regularly as discussed per her Goal.    The patient will contact CC RN with additional questions or concerns.    Care Coordination will outreach to patient in approximately 1 month to get updates on patient status, assess goal progress, and offer additional support and resources as indicated.    Philip Beltran, RN  Clinic Care Coordinator  [facility]  Ph: [phone]

## 2020-05-04 NOTE — TELEPHONE ENCOUNTER
CC RN spoke with patient; she will schedule an appointment with [facility] as she remembers that is where she previously went for her knee pain.  No further action needed; closing encounter/

## 2020-06-01 ENCOUNTER — TRANSFERRED RECORDS (OUTPATIENT)
Dept: HEALTH INFORMATION MANAGEMENT | Facility: CLINIC | Age: 80
End: 2020-06-01

## 2020-06-01 LAB — RETINOPATHY: NORMAL

## 2020-06-04 ENCOUNTER — TRANSFERRED RECORDS (OUTPATIENT)
Dept: HEALTH INFORMATION MANAGEMENT | Facility: CLINIC | Age: 80
End: 2020-06-04

## 2020-06-04 LAB
ALBUMIN (URINE) MG/SPEC: 3.1 MG/DL
ALBUMIN/CREATININE RATIO: 37 MCG/MG CREAT
CHOLEST SERPL-MCNC: 148 MG/DL
CREAT SERPL-MCNC: 1.07 MG/DL (ref 0.6–0.88)
CREATININE (URINE): 84 MG/DL (ref 20–275)
GFR SERPL CREATININE-BSD FRML MDRD: 48 ML/MIN/1.73M2
GLUCOSE SERPL-MCNC: 93 MG/DL (ref 65–99)
HBA1C MFR BLD: 5.6 % (ref 4–6)
HDLC SERPL-MCNC: 47 MG/DL
LDLC SERPL CALC-MCNC: 77 MG/DL
NONHDLC SERPL-MCNC: 101 MG/DL
POTASSIUM SERPL-SCNC: 5.4 MMOL/L (ref 3.5–5.3)
TRIGL SERPL-MCNC: 140 MG/DL
TSH SERPL-ACNC: 0.93 UIU/ML (ref 0.3–5)

## 2020-06-09 ENCOUNTER — TELEPHONE (OUTPATIENT)
Dept: INTERNAL MEDICINE | Facility: CLINIC | Age: 80
End: 2020-06-09

## 2020-06-09 ENCOUNTER — TELEPHONE (OUTPATIENT)
Dept: PODIATRY | Facility: CLINIC | Age: 80
End: 2020-06-09

## 2020-06-09 DIAGNOSIS — L84 CALLUS OF FOOT: ICD-10-CM

## 2020-06-09 DIAGNOSIS — N18.3 TYPE 2 DIABETES MELLITUS WITH STAGE 3 CHRONIC KIDNEY DISEASE, UNSPECIFIED WHETHER LONG TERM INSULIN USE: Primary | ICD-10-CM

## 2020-06-09 DIAGNOSIS — R20.2 PARESTHESIA OF BOTH FEET: ICD-10-CM

## 2020-06-09 DIAGNOSIS — M79.672 BILATERAL FOOT PAIN: ICD-10-CM

## 2020-06-09 DIAGNOSIS — M79.671 BILATERAL FOOT PAIN: ICD-10-CM

## 2020-06-09 DIAGNOSIS — E11.22 TYPE 2 DIABETES MELLITUS WITH STAGE 3 CHRONIC KIDNEY DISEASE, UNSPECIFIED WHETHER LONG TERM INSULIN USE: Primary | ICD-10-CM

## 2020-06-09 NOTE — TELEPHONE ENCOUNTER
Reason for Call: Request for an order or referral:    Order or referral being requested: orthotic shoes for diabetic pts    Date needed: as soon as possible    Has the patient been seen by the PCP for this problem? YES    Additional comments: Pt asking for New Rx for orthotic shoes from the diabetes store/[facility]    Phone number Patient can be reached at:  Home number on file [phone] (home)    Best Time:  any    Can we leave a detailed message on this number?  Not Applicable   Please call pt to help with new rx for the shoes.    Thank you.  [facility]    Call taken on 6/9/2020 at 5:08 PM by Hilaria Lopes

## 2020-06-10 ENCOUNTER — PATIENT OUTREACH (OUTPATIENT)
Dept: CARE COORDINATION | Facility: CLINIC | Age: 80
End: 2020-06-10

## 2020-06-10 NOTE — PROGRESS NOTES
"Clinic Care Coordination Contact  Community Health Worker Follow Up    Patient Reports:    Patient stated that \" I'm alive doing okay\".    Patient stated that she is really discouraged.    Patient has been working on getting a washer and dryer for two years, and she is getting tired washing clothes by hand.  Patient can not longer carry the clothes to put on a clothesline outside.  Patient has been trying to clean a room on the main floor to get the washer and dryer installed.  One daughter has been \"fighting\" her on that.  Patient stated that she can not longer go up and down the basement stairs, that it is \"too much\" for her.    Patient stated that her  is not helping at all with any of the chores, and is having a hard time keeping his balance.    Patient stated that she did not get her stimulus check, but her  did.  They filed taxes together.  The person at [employer] is looking into it.    Patient stated that the neighbors have been helping with their grocery shopping- getting milk and bread.    Patient stated that [employer] was able to deliver food to them.  Patient has not gone out in months.    Patient stated that one of her daughter's helps with a few things around the house, and she wears a mask.    Patient stated that her arthritis is getting bad, but it seems a bit better this week.    Patient is having a difficult time reading anything, and even seeing faces on the TV.  Patient stated that her eyes are going worse.    Patient is having a hard time writing out the bills.     Patient stated that she is meeting with the masha, and hired someone to do mow their lawn.    One daughter came over with first alert wristbands for the patient and spouse.    Patient stated that she is getting exhausted.    Patient stated that her other children are staying away due to Covid-19.    Patient stated that her son gave her a bush for her garden, and it is still not planted.         Goals:   Goals Addressed as " of 6/10/2020 at 4:20 PM                 Today    5/4/20       Patient Stated      Being Active (pt-stated)   On track  40%    Added 12/3/19 by Philip Beltran, RN     Goal Statement: I will begin exercising regularly to improve my overall wellbeing.  Measure of Success: The patient will confirm regular exercise routine.  Supportive Steps to Achieve: The patient will utilize her membership at the [facility] at least 1 time per week.  CCC RN will continue routine patient outreaches to provide ongoing support and additional resources as needed.  Barriers: Limited mobility.  Strengths: The patient understands the importance of regular exercise in order to improve and maintain health.  Date to Achieve By: date extended to 8/31/20  Patient expressed understanding of goal: Yes    As of today's date 1/23/2020 goal is met at 26 - 50%.   Goal Status:  Active   As of today's date 3/23/2020 goal is met at 0 - 25%.   Goal Status:  Active - Patient has baker's cyst behind knee causing discomfort and making it difficult to exercise currently.  As of today's date 5/4/2020 goal is met at 26 - 50%.   Goal Status:  Active - Doing seated exercises most days during TV commercials.  Continues having pain to her right knee due to what she thinks is a baker's cyst.  As of today's date 6/10/2020 goal is met at 26 - 50%.   Goal Status:  Active           Intervention/Education: CHW used Empathy and Active listening to understand the patients barriers and struggles.    CHW Next Follow-up: One Month    CHW Plan: Patient will reach out to other daughter for assistance with writing out bills.    Next Outreach: 07/9/20    ZANE Demarco  Clinic Care Coordination  [facility] Clinics: [facility], [facility], and [facility]  Phone: [phone]

## 2020-06-15 NOTE — TELEPHONE ENCOUNTER
I called and spoke with patient and informed her orders have been placed. Patient was also given contact information below.  Patient verbalized understanding to me.     [facility]   [id number]  [place] [address]  Phone: [phone]: [phone]      Lynette ALFARO CMA

## 2020-07-07 DIAGNOSIS — F32.0 MILD MAJOR DEPRESSION (H): ICD-10-CM

## 2020-07-07 DIAGNOSIS — E03.9 ACQUIRED HYPOTHYROIDISM: Chronic | ICD-10-CM

## 2020-07-07 DIAGNOSIS — E78.5 HYPERLIPIDEMIA LDL GOAL <100: ICD-10-CM

## 2020-07-08 RX ORDER — CITALOPRAM HYDROBROMIDE 10 MG/1
TABLET ORAL
Qty: 90 TABLET | Refills: 0 | Status: SHIPPED | OUTPATIENT
Start: 2020-07-08 | End: 2020-07-09

## 2020-07-08 RX ORDER — LEVOTHYROXINE SODIUM 200 UG/1
TABLET ORAL
Qty: 90 TABLET | Refills: 0 | Status: SHIPPED | OUTPATIENT
Start: 2020-07-08 | End: 2020-07-09

## 2020-07-08 RX ORDER — SIMVASTATIN 10 MG
TABLET ORAL
Qty: 90 TABLET | Refills: 0 | Status: SHIPPED | OUTPATIENT
Start: 2020-07-08 | End: 2020-07-09

## 2020-07-09 ENCOUNTER — PATIENT OUTREACH (OUTPATIENT)
Dept: CARE COORDINATION | Facility: CLINIC | Age: 80
End: 2020-07-09

## 2020-07-09 ENCOUNTER — VIRTUAL VISIT (OUTPATIENT)
Dept: INTERNAL MEDICINE | Facility: CLINIC | Age: 80
End: 2020-07-09
Payer: MEDICARE

## 2020-07-09 DIAGNOSIS — F32.0 MILD MAJOR DEPRESSION (H): ICD-10-CM

## 2020-07-09 DIAGNOSIS — E03.9 ACQUIRED HYPOTHYROIDISM: Chronic | ICD-10-CM

## 2020-07-09 DIAGNOSIS — E11.22 TYPE 2 DIABETES MELLITUS WITH STAGE 3 CHRONIC KIDNEY DISEASE, WITHOUT LONG-TERM CURRENT USE OF INSULIN (H): Primary | ICD-10-CM

## 2020-07-09 DIAGNOSIS — N18.30 TYPE 2 DIABETES MELLITUS WITH STAGE 3 CHRONIC KIDNEY DISEASE, WITHOUT LONG-TERM CURRENT USE OF INSULIN (H): Primary | ICD-10-CM

## 2020-07-09 DIAGNOSIS — E78.5 HYPERLIPIDEMIA LDL GOAL <100: ICD-10-CM

## 2020-07-09 DIAGNOSIS — N18.30 CKD (CHRONIC KIDNEY DISEASE) STAGE 3, GFR 30-59 ML/MIN (H): ICD-10-CM

## 2020-07-09 DIAGNOSIS — I10 BENIGN ESSENTIAL HYPERTENSION: Chronic | ICD-10-CM

## 2020-07-09 DIAGNOSIS — E66.01 MORBID OBESITY DUE TO EXCESS CALORIES (H): ICD-10-CM

## 2020-07-09 PROCEDURE — 99442 ZZC PHYSICIAN TELEPHONE EVALUATION 11-20 MIN: CPT | Performed by: INTERNAL MEDICINE

## 2020-07-09 RX ORDER — CITALOPRAM HYDROBROMIDE 10 MG/1
TABLET ORAL
Qty: 90 TABLET | Refills: 1 | Status: SHIPPED | OUTPATIENT
Start: 2020-07-09 | End: 2021-04-01

## 2020-07-09 RX ORDER — SIMVASTATIN 10 MG
TABLET ORAL
Qty: 90 TABLET | Refills: 1 | Status: SHIPPED | OUTPATIENT
Start: 2020-07-09 | End: 2021-04-01

## 2020-07-09 RX ORDER — LEVOTHYROXINE SODIUM 200 UG/1
200 TABLET ORAL DAILY
Qty: 90 TABLET | Refills: 1 | Status: SHIPPED | OUTPATIENT
Start: 2020-07-09 | End: 2021-04-01

## 2020-07-09 ASSESSMENT — ACTIVITIES OF DAILY LIVING (ADL): DEPENDENT_IADLS:: TRANSPORTATION

## 2020-07-09 ASSESSMENT — PATIENT HEALTH QUESTIONNAIRE - PHQ9: SUM OF ALL RESPONSES TO PHQ QUESTIONS 1-9: 6

## 2020-07-09 NOTE — Clinical Note
Please abstract the following data from this visit with this patient into the appropriate field in Epic:    Tests that can be patient reported without a hard copy:    Eye exam with ophthalmology on this date: 06/2020    Other Tests found in the patient's chart through Chart Review/Care Everywhere:        Note to Abstraction: If this section is blank, no results were found via Chart Review/Care Everywhere.

## 2020-07-09 NOTE — NURSING NOTE
Chief Complaint   Patient presents with     Depression     Thyroid Disease     Lipids     There were no vitals taken for this visit. Estimated body mass index is 53.86 kg/m  as calculated from the following:    Height as of 5/16/19: 1.524 m (5').    Weight as of 5/16/19: 125.1 kg (275 lb 12.8 oz).        Health Maintenance due pending provider review:  PHQ9 and Eye Exam    ABSTRACT--Pt had Eye Exam done on this date: 06/2020 with this group:   Dr. Shepard, [facility], results were normal, SENT TO ABSTRACTING     Jacki Pathak CMA

## 2020-07-09 NOTE — PROGRESS NOTES
"Clinic Care Coordination Contact    Situation: Patient chart reviewed by care coordinator.     Background: Care Coordination following patient to assist with Goal as below.     Assessment: Per chart review, patient spoke with CC CHW on 6/12/20 and reported difficulty working toward her Goal as below due to her arthritis and feeling \"exhausted\"; patient reported she can no longer go down to their basement due to difficulty with the stairs.  Patient also reported to CC CHW that she has been having trouble keeping up with household tasks.    Goals        Patient Stated      Being Active (pt-stated)      Goal Statement: I will begin exercising regularly to improve my overall wellbeing.  Measure of Success: The patient will confirm regular exercise routine.  Supportive Steps to Achieve: The patient will utilize her membership at the [facility] at least 1 time per week.  CCC RN will continue routine patient outreaches to provide ongoing support and additional resources as needed.  Barriers: Limited mobility.  Strengths: The patient understands the importance of regular exercise in order to improve and maintain health.  Date to Achieve By: date extended to 8/31/20  Patient expressed understanding of goal: Yes        Plan/Recommendations: CC RN will request that CC CHW discuss with patient her reported difficulties with mobility and household tasks and inquire if patient would like to explore options for assistance and support.  CC RN will await updates from CC CHW following patient outreach.    Philip Beltran, RN  Clinic Care Coordinator  [facility] [place]  Ph: [phone]  "

## 2020-07-09 NOTE — PROGRESS NOTES
"Lucille Rojas is a 80 year old female who is being evaluated via a billable telephone visit.      The patient has been notified of following:     \"This telephone visit will be conducted via a call between you and your physician/provider. We have found that certain health care needs can be provided without the need for a physical exam.  This service lets us provide the care you need with a short phone conversation.  If a prescription is necessary we can send it directly to your pharmacy.  If lab work is needed we can place an order for that and you can then stop by our lab to have the test done at a later time.    Telephone visits are billed at different rates depending on your insurance coverage. During this emergency period, for some insurers they may be billed the same as an in-person visit.  Please reach out to your insurance provider with any questions.    If during the course of the call the physician/provider feels a telephone visit is not appropriate, you will not be charged for this service.\"    Patient has given verbal consent for Telephone visit?  Yes    What phone number would you like to be contacted at? [phone]    How would you like to obtain your AVS? Mail a copy    Subjective     Lucille Rojas is a 80 year old female who presents via phone visit today for the following health issues:    HPI:    Hyperlipidemia Follow-Up      Are you regularly taking any medication or supplement to lower your cholesterol?   Yes- simvastatin    Are you having muscle aches or other side effects that you think could be caused by your cholesterol lowering medication?  No    Depression Followup    How are you doing with your depression since your last visit? No change    Are you having other symptoms that might be associated with depression? No    Have you had a significant life event?  OTHER: at home since middle of [place]     Are you feeling anxious or having panic attacks?   No    Do you have any concerns with " your use of alcohol or other drugs? No    Social History     Tobacco Use     Smoking status: Never Smoker     Smokeless tobacco: Never Used   Substance Use Topics     Alcohol use: Yes     Alcohol/week: 0.0 standard drinks     Comment: rarely     Drug use: No     PHQ 7/24/2018 8/24/2018 5/16/2019   PHQ-9 Total Score 6 3 3   Q9: Thoughts of better off dead/self-harm past 2 weeks Not at all Not at all Not at all     RUSLAN-7 SCORE 6/7/2018 7/24/2018 8/24/2018   Total Score 5 4 4     Last PHQ-9 7/9/2020   1.  Little interest or pleasure in doing things 0   2.  Feeling down, depressed, or hopeless 3   3.  Trouble falling or staying asleep, or sleeping too much 0   4.  Feeling tired or having little energy 3   5.  Poor appetite or overeating 0   6.  Feeling bad about yourself 0   7.  Trouble concentrating 0   8.  Moving slowly or restless 0   Q9: Thoughts of better off dead/self-harm past 2 weeks 0   PHQ-9 Total Score 6   Difficulty at work, home, or with people Very difficult     She states that overall she really feels that her depression and anxiety symptoms are under pretty good control and is not interested in changing any medication at present.    Suicide Assessment Five-step Evaluation and Treatment (SAFE-T)    Hypothyroidism Follow-up      Since last visit, patient describes the following symptoms: Weight stable, no hair loss, no skin changes, no constipation, no loose stools      How many servings of fruits and vegetables do you eat daily?  2-3    On average, how many sweetened beverages do you drink each day (Examples: soda, juice, sweet tea, etc.  Do NOT count diet or artificially sweetened beverages)?   1-2    How many days per week do you exercise enough to make your heart beat faster? 3 or less    How many minutes a day do you exercise enough to make your heart beat faster? 9 or less    How many days per week do you miss taking your medication? 0       Patient Active Problem List   Diagnosis     Hyperlipidemia  LDL goal <100     Advance care planning     Macular degeneration     Apnea     Morbid obesity due to excess calories (H)     CKD (chronic kidney disease) stage 3, GFR 30-59 ml/min (H)     Acquired hypothyroidism     Benign essential hypertension     Gastroesophageal reflux disease without esophagitis     Type 2 diabetes mellitus with stage 3 chronic kidney disease, without long-term current use of insulin (H)     Anemia, unspecified type     MDD (major depressive disorder), recurrent episode, mild (H)     Past Surgical History:   Procedure Laterality Date     APPENDECTOMY       COLONOSCOPY       COLONOSCOPY N/A 10/27/2014    Procedure: COMBINED COLONOSCOPY, SINGLE OR MULTIPLE BIOPSY/POLYPECTOMY BY BIOPSY;  Surgeon: Jose Alfredo Morejon MD;  Location:  GI     HERNIA REPAIR      inguinal x 2     TONSILLECTOMY         Social History     Tobacco Use     Smoking status: Never Smoker     Smokeless tobacco: Never Used   Substance Use Topics     Alcohol use: Yes     Alcohol/week: 0.0 standard drinks     Comment: rarely     No family history on file.      Current Outpatient Medications   Medication Sig Dispense Refill     ACE/ARB NOT PRESCRIBED, INTENTIONAL, Please choose reason not prescribed, below       aspirin 81 MG tablet Take 1 tablet by mouth daily. 30 tablet 0     citalopram (CELEXA) 10 MG tablet TAKE 1 TABLET (10MG) BY MOUTH DAILY 90 tablet 0     Ferrous Sulfate (IRON SUPPLEMENT PO) Take 325 mg by mouth       Glycerin-Polysorbate 80 (REFRESH DRY EYE THERAPY OP)        levothyroxine (SYNTHROID/LEVOTHROID) 200 MCG tablet TAKE 1 TABLET BY MOUTH DAILY 90 tablet 0     miconazole (MICATIN; MICRO GUARD) 2 % powder Apply topically as needed for itching or other Reported on 3/27/2017       nystatin (MYCOSTATIN) cream Apply topically 2 times daily       order for DME Equipment being ordered: wheeled walker with hand breaks 1 Device 0     simvastatin (ZOCOR) 10 MG tablet TAKE ONE TABLET BY MOUTH AT BEDTIME  90 tablet 0      Multiple Vitamins-Minerals (PRESERVISION AREDS) CAPS Take 1 tablet by mouth 2 times daily        order for DME Equipment being ordered: wheeled walker (Patient not taking: Reported on 7/9/2020) 1 Device 0     Allergies   Allergen Reactions     Penicillins      BP Readings from Last 3 Encounters:   05/16/19 132/59   04/29/19 116/72   06/21/18 116/68    Wt Readings from Last 3 Encounters:   05/16/19 125.1 kg (275 lb 12.8 oz)   04/29/19 122.9 kg (271 lb)   06/21/18 126.2 kg (278 lb 3.2 oz)            Reviewed and updated as needed this visit by Provider         Review of Systems   CONSTITUTIONAL: NEGATIVE for fever, chills, change in weight  EYES: NEGATIVE for vision changes or irritation  ENT/MOUTH: NEGATIVE for ear, mouth and throat problems  RESP: NEGATIVE for significant cough or SOB  CV: NEGATIVE for chest pain, palpitations or peripheral edema  GI: NEGATIVE for nausea, abdominal pain, heartburn, or change in bowel habits  : NEGATIVE for frequency, dysuria, or hematuria  MUSCULOSKELETAL: NEGATIVE for significant arthralgias or myalgia  NEURO: NEGATIVE for weakness, dizziness or paresthesias  HEME: NEGATIVE for bleeding problems       Objective   Reported vitals:  There were no vitals taken for this visit.   healthy, alert and no distress  PSYCH: Alert and oriented times 3; coherent speech, normal   rate and volume, able to articulate logical thoughts, able   to abstract reason, no tangential thoughts, no hallucinations   or delusions  Her affect is normal  RESP: No cough, no audible wheezing, able to talk in full sentences  Remainder of exam unable to be completed due to telephone visits        Assessment/Plan:    1. Type 2 diabetes mellitus with stage 3 chronic kidney disease, without long-term current use of insulin (H)  Patient followed per endocrinology and Dr. Mata.  Recent A1c done and appears stable    2. CKD (chronic kidney disease) stage 3, GFR 30-59 ml/min (H)  Noted creatinine 1.07, 1-month ago  essentially stable    3. Benign essential hypertension  At goal on therapy continuing with current medical management    4. Hyperlipidemia LDL goal <100  Lipid panel performed per endocrinology and have recommended hepatic panel as this appears not to have been done  - Hepatic panel; Future  - simvastatin (ZOCOR) 10 MG tablet; TAKE ONE TABLET BY MOUTH AT BEDTIME  Dispense: 90 tablet; Refill: 1    5. Morbid obesity due to excess calories (H)  Encouraged weight loss    6. Mild major depression (H)  Stable on therapy per patient  - citalopram (CELEXA) 10 MG tablet; TAKE 1 TABLET (10MG) BY MOUTH DAILY  Dispense: 90 tablet; Refill: 1    7. Acquired hypothyroidism  Labs done with TSH as noted  - levothyroxine (SYNTHROID/LEVOTHROID) 200 MCG tablet; Take 1 tablet (200 mcg) by mouth daily  Dispense: 90 tablet; Refill: 1    Advised again need for tetanus booster and shingles vaccination.    Return in about 2 weeks (around 7/23/2020) for Lab Work appointment.    Phone call duration:  13 minutes    Fausto Flynn MD

## 2020-07-13 ENCOUNTER — PATIENT OUTREACH (OUTPATIENT)
Dept: NURSING | Facility: CLINIC | Age: 80
End: 2020-07-13
Payer: MEDICARE

## 2020-07-14 NOTE — PROGRESS NOTES
Clinic Care Coordination Contact  Community Health Worker Follow Up  Spoke with Lucille    Goals:   Goals Addressed as of 7/13/2020 at 1:21 PM                 7/13/20    6/10/20       Patient Stated      Being Active (pt-stated)   50%  On track    Added 12/3/19 by Philip Beltran, RN     Goal Statement: I will begin exercising regularly to improve my overall wellbeing.  Measure of Success: The patient will confirm regular exercise routine.  Supportive Steps to Achieve: The patient will utilize her membership at the [facility] at least 1 time per week.  CCC RN will continue routine patient outreaches to provide ongoing support and additional resources as needed.  Barriers: Limited mobility.  Strengths: The patient understands the importance of regular exercise in order to improve and maintain health.  Date to Achieve By: date extended to 8/31/20  Patient expressed understanding of goal: Yes    As of today's date 1/23/2020 goal is met at 26 - 50%.   Goal Status:  Active   As of today's date 3/23/2020 goal is met at 0 - 25%.   Goal Status:  Active - Patient has baker's cyst behind knee causing discomfort and making it difficult to exercise currently.  As of today's date 5/4/2020 goal is met at 26 - 50%.   Goal Status:  Active - Doing seated exercises most days during TV commercials.  Continues having pain to her right knee due to what she thinks is a baker's cyst.  As of today's date 6/10/2020 goal is met at 26 - 50%.   Goal Status:  Active   7/13/20-CHW    Patient reported that the exercises she is currently doing is while she is sitting down.  Patient does her exercises while watching TV.  Patient reported that she does arm exercises.  Patient is not doing bending exercises due to her baker's cyst.        Discussed:  Patient reported that her  is currently in the hospital due to pneumonia.  Patient's  called out for her, he was having a difficult time breathing, and the patient called 911.  The patient  stated that in two weeks, her children will be over to help sort and throw away items.  The patient reported that a masha will be out in August to get the hoses and the plumbing ready for a new washer and dryer located on the main level instead of the basement. Patient reported that she is interested in resources for assistance and support at home, but would like to wait until her house gets cleared out a bit.     Intervention and Education during outreach: CHW discussed with the patient that there are resources available to help her with cleaning and other in home support.    CHW Plan: will call in one month.  Patient will continue going through her items.    Next Outreach: 8/13/20    ZANE Demarco  Clinic Care Coordination  [facility]: [facility]  Phone: [phone]

## 2020-07-17 ENCOUNTER — PATIENT OUTREACH (OUTPATIENT)
Dept: NURSING | Facility: CLINIC | Age: 80
End: 2020-07-17
Payer: MEDICARE

## 2020-07-17 ASSESSMENT — ACTIVITIES OF DAILY LIVING (ADL): DEPENDENT_IADLS:: TRANSPORTATION

## 2020-07-17 NOTE — LETTER
[place] CARE COORDINATION  [facility]  [address], [place] [address]    July 20, 2020        Lucille Rojas  [address] [place] [address]          Dear Connor Adkins is an updated Complex Care Plan for your continued enrollment in Care Coordination. Please let us know if you have additional questions, concerns, or goals that we can assist with.    Sincerely,    Philip Beltran RN        Critical access hospital  Complex Care Plan  About Me:    Patient Name:  Lucille Rojas    YOB: 1940  Age:         80 year old   [facility] MRN:    [id number] Telephone Information:  Home Phone [phone]   Mobile none       Address:  [address]  [place] [address] Email address:  No e-mail address on record      Emergency Contact(s)    Name Relationship Lgl Grd Work Phone Home Phone Mobile Phone   1. EMIL ROJAS Spouse  none [phone] none   2. HARRIET ROJAS Daughter  none [phone] none           Primary language:  English     needed? No   [employer]:  [phone] op. 1  Other communication barriers: Glasses  Preferred Method of Communication:  Mail  Current living arrangement: I live in a private home with spouse  Mobility Status/ Medical Equipment: Independent w/Device    Health Maintenance  Health Maintenance Reviewed: Due/Overdue   Health Maintenance Due   Topic Date Due     HEPATITIS B IMMUNIZATION (1 of 3 - Risk 3-dose series) 05/10/1957     ZOSTER IMMUNIZATION (1 of 2) 05/10/1988     DTAP/TDAP/TD IMMUNIZATION (2 - Td) 01/01/2019     DIABETIC FOOT EXAM  06/21/2019     MEDICARE ANNUAL WELLNESS VISIT  05/16/2020     BMP  05/16/2020     My Access Plan  Medical Emergency 911   Primary Clinic Line [facility] - [phone]   24 Hour Appointment Line [phone] or  [phone]) (toll-free)   24 Hour Nurse Line [phone] (toll-free)   Preferred Urgent  Care [facility], [phone]   Preferred Hospital [facility] [place]  [phone]   Preferred Pharmacy [id number] - [place], [place] - [address] AT [facility] OF [address]     Behavioral Health Crisis Line The National Suicide Prevention Lifeline at 1-273.564.5505 or 911     My Care Team Members  Patient Care Team       Relationship Specialty Notifications Start End    Fausto Flynn MD PCP - General Internal Medicine  10/6/14     Phone: [phone] Fax: [phone]         [address][1] [address]    Fausto Flynn MD Assigned PCP   10/12/14     Phone: [phone] Fax: [phone]         [address] [place] [address]    Philip Beltran RN Lead Care Coordinator Primary Care - CC  12/3/19     Phone: [phone]         Yonny Roman MD MD INTERNAL MEDICINE - ENDOCRINOLOGY, DIABETES & METABOLISM  12/3/19     Phone: [phone] Fax: [phone]         [facility]Y CLINIC [facility] [address] [facility] [address] [facility] [address]    Rosanne Valadez MA Community Health Worker Primary Care - CC  1/22/20             My Care Plans  Self Management and Treatment Plan  Goals and (Comments)  Goals        General    1. Psychosocial (pt-stated)     Notes - Note edited  7/20/2020 10:30 AM by Philip Beltran, RN    Goal Statement: I will explore additional options for support.  Date Goal set: 7/17/20  Barriers: limited mobility, poor eyesight, limited knowledge of available resources  Strengths: motivated to find additional sources of help  Date to Achieve By: 1/31/21  Patient expressed understanding of goal: Yes  Action steps to achieve this goal:  1. I will inquire about financial resources through Financial Resource referral with help from Care Coordination.  2. I will explore community options for help with cleaning/de-cluttering my home.  3. I will continue working with Care Coordination to address barriers to  achieving my goal.      2. Being Active (pt-stated)     Notes - Note edited  6/10/2020  4:19 PM by Rosanne Valadez MA    Goal Statement: I will begin exercising regularly to improve my overall wellbeing.  Measure of Success: The patient will confirm regular exercise routine.  Supportive Steps to Achieve: The patient will utilize her membership at the [facility] at least 1 time per week.  CCC RN will continue routine patient outreaches to provide ongoing support and additional resources as needed.  Barriers: Limited mobility.  Strengths: The patient understands the importance of regular exercise in order to improve and maintain health.  Date to Achieve By: date extended to 8/31/20  Patient expressed understanding of goal: Yes           Action Plans on File:   Depression       My Medical and Care Information  Problem List   Patient Active Problem List   Diagnosis     Hyperlipidemia LDL goal <100     Advance care planning     Macular degeneration     Apnea     Morbid obesity due to excess calories (H)     CKD (chronic kidney disease) stage 3, GFR 30-59 ml/min (H)     Acquired hypothyroidism     Benign essential hypertension     Gastroesophageal reflux disease without esophagitis     Type 2 diabetes mellitus with stage 3 chronic kidney disease, without long-term current use of insulin (H)     Anemia, unspecified type     MDD (major depressive disorder), recurrent episode, mild (H)      Current Medications:  Current Outpatient Medications   Medication     ACE/ARB NOT PRESCRIBED, INTENTIONAL,     aspirin 81 MG tablet     citalopram (CELEXA) 10 MG tablet     Ferrous Sulfate (IRON SUPPLEMENT PO)     Glycerin-Polysorbate 80 (REFRESH DRY EYE THERAPY OP)     levothyroxine (SYNTHROID/LEVOTHROID) 200 MCG tablet     miconazole (MICATIN; MICRO GUARD) 2 % powder     Multiple Vitamins-Minerals (PRESERVISION AREDS) CAPS     nystatin (MYCOSTATIN) cream     order for DME     order for DME     simvastatin (ZOCOR) 10 MG tablet     Care  Coordination Start Date: 12/3/2019   Frequency of Care Coordination: monthly   Form Last Updated: 07/20/2020

## 2020-07-17 NOTE — PROGRESS NOTES
Clinic Care Coordination Contact    Follow Up Progress Note - CC RN follow-up related to CC CHW recent outreach to patient with patient report of interest in additional resources for support/assitance at home.     Assessment:    Patient reported feeling overwhelmed and stressed in setting of ongoing efforts of cleaning/de-cluttering their home and having very little support.  They have 4 children, but only 2 of them have been helping much, and of those 2, one is their daughter who is currently upset with patient/ and hasn't been speaking to them.    They have been primarily been relying on their son for help recently.  He has been helping to drive them for appointments and/or errands.    Patient reported they will be having a  coming out in a couple of weeks to work on getting their plumbing up-to-date so they can get a new washer and dryer installed on their main level so they don't have to go down to the basement.    Patient stated that they need to have things cleaned up and de-cluttered prior to the  coming out so she has been feeling more stress recently.  Their children did arrange for a dumpster to be placed at their home in a couple of weeks so they can make more progress getting rid of things.    Patient updated that she has been making efforts to get up-to-date with some of her health maintenance items.  She did try to go get the shingles vaccine recently but stated she was quoted $175 for one vaccine, which she cannot currently afford.    Goals addressed this encounter:   Goals Addressed                 This Visit's Progress       Patient Stated      1. Psychosocial (pt-stated)        Goal Statement: I will explore additional options for support.  Date Goal set: 7/17/20  Barriers: limited mobility, poor eyesight, limited knowledge of available resources  Strengths: motivated to find additional sources of help  Date to Achieve By: 1/31/21  Patient expressed understanding of goal:  Yes  Action steps to achieve this goal:  1. I will inquire about financial resources through Financial Resource referral with help from Care Coordination.  2. I will explore community options for help with cleaning/de-cluttering my home.  3. I will continue working with Care Coordination to address barriers to achieving my goal.        2. Being Active (pt-stated)   50%     Goal Statement: I will begin exercising regularly to improve my overall wellbeing.  Measure of Success: The patient will confirm regular exercise routine.  Supportive Steps to Achieve: The patient will utilize her membership at the [facility] at least 1 time per week.  CCC RN will continue routine patient outreaches to provide ongoing support and additional resources as needed.  Barriers: Limited mobility.  Strengths: The patient understands the importance of regular exercise in order to improve and maintain health.  Date to Achieve By: date extended to 8/31/20  Patient expressed understanding of goal: Yes    As of today's date 1/23/2020 goal is met at 26 - 50%.   Goal Status:  Active   As of today's date 3/23/2020 goal is met at 0 - 25%.   Goal Status:  Active - Patient has baker's cyst behind knee causing discomfort and making it difficult to exercise currently.  As of today's date 5/4/2020 goal is met at 26 - 50%.   Goal Status:  Active - Doing seated exercises most days during TV commercials.  Continues having pain to her right knee due to what she thinks is a baker's cyst.  As of today's date 6/10/2020 goal is met at 26 - 50%.   Goal Status:  Active         Intervention/Education provided during outreach:    CC RN provided supportive listening to patient as she expressed ongoing stress and difficulty in light of limited support and ability to do things on her own.    CC RN provided patient with phone number for patient assistance for shingles vaccine company ([employer] for Shing[employer] vaccine) and encouraged her to call to inquire about eligibility to  receive financial assistance for vaccine.    CC RN provided patient with information about [facility] donation pick-up; she will consider this option and enlist the help of her son to set this up, if needed.    CC RN discussed option of county resources; patient agreeable to Financial Resource Worker referral to begin process.  CC RN completed FRW referral screening questions with patient and will refer patient for FRW follow-up.     Outreach Frequency: monthly    Plan:     CC RN will forward financial screening questions to Financial Resource Worker and CC CHW and request patient follow-up for financial/community resource options, if applicable.    The patient will contact [employer] patient assistance to inquire about eligibility to receive financial assistance for shingles vaccine.    The patient will consider using donation pick-up services to assist in continued clearing out/de-cluttering of her home.    The patient agreed to contact CC RN with additional questions or concerns.    Care Coordination will outreach to patient in approximately 1 month to get updates on patient status, assess goal progress, and offer additional support and resources as indicated.    Philip Beltran RN  Clinic Care Coordinator  [facility]  Ph: [phone]

## 2020-07-20 ENCOUNTER — PATIENT OUTREACH (OUTPATIENT)
Dept: NURSING | Facility: CLINIC | Age: 80
End: 2020-07-20
Payer: MEDICARE

## 2020-07-20 ASSESSMENT — ACTIVITIES OF DAILY LIVING (ADL): DEPENDENT_IADLS:: TRANSPORTATION

## 2020-07-20 NOTE — PROGRESS NOTES
"Clinic Care Coordination Contact    Follow Up Progress Note      Assessment:  CC RN received following message from FRW:    \"Pt is over income for SNAP and for MA. SNAP guidelines for a household of 2 is $2,326/m and for MA for a household of 2 is $1,428/m. Their household income is $2,700/m. FRW's don't help pt's apply for waiver services, that will need to be a SW referral and they will connect the pt with the county for a waiver assessment.\"    Per huddle with GAVIN TINEO, called and spoke with patient to discuss option of pursuing disability certification.    Goals Addressed                 This Visit's Progress       Patient Stated      1. Psychosocial (pt-stated)   0%     Goal Statement: I will explore additional options for support.  Date Goal set: 7/17/20  Barriers: limited mobility, poor eyesight, limited knowledge of available resources  Strengths: motivated to find additional sources of help  Date to Achieve By: 1/31/21  Patient expressed understanding of goal: Yes  Action steps to achieve this goal:  1. I will inquire about financial resources through Financial Resource referral with help from Care Coordination.  2. I will explore community options for help with cleaning/de-cluttering my home.  3. I will continue working with Care Coordination to address barriers to achieving my goal.        Intervention/Education provided during outreach:     Patient agreeable to applying for disability certification.  CC RN provided patient with information and phone number for [facility] (ph: [phone]) which patient agreed to call to inquire about beginning process of disability certification.      Outreach Frequency: monthly    Plan:     CC RN will request CC CHW in a couple of weeks to follow up with patient's progress with initiating disability certification.    CHW Delegation:   1)  Due Date: 8/3/20       Delegation: Outreach to patient to get updates on progress with contacting [facility] (ph " [phone]) to inquire about disability certification.  If patient has not yet called, please assist patient to make initial call.    Philip Beltran RN  Clinic Care Coordinator  [facility]  Ph: [phone]

## 2020-08-03 ENCOUNTER — PATIENT OUTREACH (OUTPATIENT)
Dept: NURSING | Facility: CLINIC | Age: 80
End: 2020-08-03
Payer: MEDICARE

## 2020-08-03 NOTE — PROGRESS NOTES
Clinic Care Coordination Contact  Community Health Worker Follow Up  Scheduled an appointment with patient on 8/12/20 at 10:30am to have a 3 way conference call with [facility] ( [phone]) to inquire about disability certification.      Next Outreach: 8/12/20    ZANE Demarco  Clinic Care Coordination  [facility] Clinics: [facility], [facility], and [facility]  Phone: [phone]

## 2020-08-11 ENCOUNTER — PATIENT OUTREACH (OUTPATIENT)
Dept: CARE COORDINATION | Facility: CLINIC | Age: 80
End: 2020-08-11

## 2020-08-11 ASSESSMENT — ACTIVITIES OF DAILY LIVING (ADL): DEPENDENT_IADLS:: TRANSPORTATION

## 2020-08-11 NOTE — PROGRESS NOTES
Clinic Care Coordination Contact    Situation: Patient chart reviewed by care coordinator.    Background: Patient enrolled with Care Coordination; current goals of obtaining additional support and regular exercise.  Care Coordination currently assisting patient with SMRT assessment to inquire about her eligibility to be certified disabled.    Assessment: Per chart review, CC CHW has scheduled conference call for tomorrow 8/12/20 with patient and [facility] to discuss disability certification.    Plan/Recommendations: CC RN will await updates following CC CHW call with patient and [facility].    Philip Beltran RN  Clinic Care Coordinator  [facility]  Ph: [phone]

## 2020-08-13 ENCOUNTER — PATIENT OUTREACH (OUTPATIENT)
Dept: CARE COORDINATION | Facility: CLINIC | Age: 80
End: 2020-08-13

## 2020-08-18 NOTE — PROGRESS NOTES
Program: MA- SMRT   County: [facility] Case #:  [facility] Worker:   ATIF #:   Renewal Month:  Date Applied:     FRW Outreach:   8/19/20: Certain population application and release of information complete. This will be mailed to pt to sign and return back to [facility] with verification. FRW will check in in 2 weeks. Once pt returns application to [facility], FRW will notify team.    8/18/20: Scheduled phone visit for 10am tomorrow to complete certain population for MA with SMRT referral.     Verification: social security benefit letter, pension statement, assisted account, bank account, life insurance, trust amount, burial plot, bcbs monthly premiums     Health Insurance:  Medicare and BCBS    Referral/Screening:  The patient is trying to apply for MA with the goal of getting SMRT determination.       Household: 2  Income: social security for both and pension for spouse

## 2020-08-27 ENCOUNTER — OFFICE VISIT (OUTPATIENT)
Dept: INTERNAL MEDICINE | Facility: CLINIC | Age: 80
End: 2020-08-27
Payer: MEDICARE

## 2020-08-27 VITALS
BODY MASS INDEX: 53.91 KG/M2 | DIASTOLIC BLOOD PRESSURE: 72 MMHG | TEMPERATURE: 97 F | OXYGEN SATURATION: 93 % | HEART RATE: 61 BPM | RESPIRATION RATE: 17 BRPM | HEIGHT: 59 IN | SYSTOLIC BLOOD PRESSURE: 132 MMHG | WEIGHT: 267.4 LBS

## 2020-08-27 DIAGNOSIS — E78.5 HYPERLIPIDEMIA LDL GOAL <100: ICD-10-CM

## 2020-08-27 DIAGNOSIS — E66.01 MORBID OBESITY DUE TO EXCESS CALORIES (H): ICD-10-CM

## 2020-08-27 DIAGNOSIS — N18.30 CKD (CHRONIC KIDNEY DISEASE) STAGE 3, GFR 30-59 ML/MIN (H): ICD-10-CM

## 2020-08-27 DIAGNOSIS — E11.22 TYPE 2 DIABETES MELLITUS WITH STAGE 3 CHRONIC KIDNEY DISEASE, WITHOUT LONG-TERM CURRENT USE OF INSULIN (H): ICD-10-CM

## 2020-08-27 DIAGNOSIS — Z12.11 COLON CANCER SCREENING: ICD-10-CM

## 2020-08-27 DIAGNOSIS — N18.30 TYPE 2 DIABETES MELLITUS WITH STAGE 3 CHRONIC KIDNEY DISEASE, WITHOUT LONG-TERM CURRENT USE OF INSULIN (H): ICD-10-CM

## 2020-08-27 DIAGNOSIS — I10 BENIGN ESSENTIAL HYPERTENSION: Chronic | ICD-10-CM

## 2020-08-27 DIAGNOSIS — Z00.00 ENCOUNTER FOR MEDICARE ANNUAL WELLNESS EXAM: Primary | ICD-10-CM

## 2020-08-27 LAB
ALBUMIN SERPL-MCNC: 3.6 G/DL (ref 3.4–5)
ALP SERPL-CCNC: 112 U/L (ref 40–150)
ALT SERPL W P-5'-P-CCNC: 15 U/L (ref 0–50)
ANION GAP SERPL CALCULATED.3IONS-SCNC: 4 MMOL/L (ref 3–14)
AST SERPL W P-5'-P-CCNC: 13 U/L (ref 0–45)
BILIRUB DIRECT SERPL-MCNC: <0.1 MG/DL (ref 0–0.2)
BILIRUB SERPL-MCNC: 0.2 MG/DL (ref 0.2–1.3)
BUN SERPL-MCNC: 19 MG/DL (ref 7–30)
CALCIUM SERPL-MCNC: 9.6 MG/DL (ref 8.5–10.1)
CHLORIDE SERPL-SCNC: 109 MMOL/L (ref 94–109)
CO2 SERPL-SCNC: 31 MMOL/L (ref 20–32)
CREAT SERPL-MCNC: 0.98 MG/DL (ref 0.52–1.04)
CREAT UR-MCNC: 154 MG/DL
GFR SERPL CREATININE-BSD FRML MDRD: 54 ML/MIN/{1.73_M2}
GLUCOSE SERPL-MCNC: 112 MG/DL (ref 70–99)
HGB BLD-MCNC: 11 G/DL (ref 11.7–15.7)
MICROALBUMIN UR-MCNC: 104 MG/L
MICROALBUMIN/CREAT UR: 67.53 MG/G CR (ref 0–25)
POTASSIUM SERPL-SCNC: 4.1 MMOL/L (ref 3.4–5.3)
PROT SERPL-MCNC: 7.3 G/DL (ref 6.8–8.8)
SODIUM SERPL-SCNC: 144 MMOL/L (ref 133–144)

## 2020-08-27 PROCEDURE — 80048 BASIC METABOLIC PNL TOTAL CA: CPT | Performed by: INTERNAL MEDICINE

## 2020-08-27 PROCEDURE — 36415 COLL VENOUS BLD VENIPUNCTURE: CPT | Performed by: INTERNAL MEDICINE

## 2020-08-27 PROCEDURE — 85018 HEMOGLOBIN: CPT | Performed by: INTERNAL MEDICINE

## 2020-08-27 PROCEDURE — 99213 OFFICE O/P EST LOW 20 MIN: CPT | Mod: 25 | Performed by: INTERNAL MEDICINE

## 2020-08-27 PROCEDURE — 80076 HEPATIC FUNCTION PANEL: CPT | Performed by: INTERNAL MEDICINE

## 2020-08-27 PROCEDURE — 82043 UR ALBUMIN QUANTITATIVE: CPT | Performed by: INTERNAL MEDICINE

## 2020-08-27 PROCEDURE — G0439 PPPS, SUBSEQ VISIT: HCPCS | Performed by: INTERNAL MEDICINE

## 2020-08-27 ASSESSMENT — MIFFLIN-ST. JEOR: SCORE: 1578.55

## 2020-08-27 NOTE — PATIENT INSTRUCTIONS
Patient Education   Personalized Prevention Plan  You are due for the preventive services outlined below.  Your care team is available to assist you in scheduling these services.  If you have already completed any of these items, please share that information with your care team to update in your medical record.  Health Maintenance Due   Topic Date Due     Hepatitis B Vaccine (1 of 3 - Risk 3-dose series) 05/10/1959     Zoster (Shingles) Vaccine (1 of 2) 05/10/1988     Diptheria Tetanus Pertussis (DTAP/TDAP/TD) Vaccine (2 - Td) 01/01/2019     Diabetic Foot Exam  06/21/2019     Annual Wellness Visit  05/16/2020     Basic Metabolic Panel  05/16/2020     Flu Vaccine (1) 09/01/2020        Patient Education   Personalized Prevention Plan  You are due for the preventive services outlined below.  Your care team is available to assist you in scheduling these services.  If you have already completed any of these items, please share that information with your care team to update in your medical record.  Health Maintenance Due   Topic Date Due     Hepatitis B Vaccine (1 of 3 - Risk 3-dose series) [birth year]10/1959     Zoster (Shingles) Vaccine (1 of 2) 05/10/1988     Diptheria Tetanus Pertussis (DTAP/TDAP/TD) Vaccine (2 - Td) 01/01/2019     Diabetic Foot Exam  06/21/2019     Annual Wellness Visit  05/16/2020     Basic Metabolic Panel  05/16/2020     Flu Vaccine (1) 09/01/2020

## 2020-08-27 NOTE — LETTER
[facility]  [address] [address]  [phone]      8/27/2020       Lucille Rojas  [address]  [facility] [address]        Dear Lucille,    Your hemoglobin still remains low but is stable.  If you would like to further evaluate this  Further studies could be performed.  The studies may help us better determine the exact cause of your slightly low baseline hemoglobin.    I am pleased to inform you that your routine blood work including your sodium, potassium, calcium, kidney and liver function tests are all normal.    Your blood sugar function test is also slightly abnormal and elevated and should be rechecked here in the clinic in 3-6 months with a follow-up visit with me, fasting.  I will look forward to seeing you at that time and please call to make an appointment.  In the meantime, please work on your diet limiting your carbohydrates and getting some good, regular exercise.      Sincerely,      Fausto Flynn MD  Internal Medicine

## 2020-08-27 NOTE — PROGRESS NOTES
"  SUBJECTIVE:   Lucille Rojas is a 80 year old female who presents for Preventive Visit.  Are you in the first 12 months of your Medicare Part B coverage?  No    Physical Health:    In general, how would you rate your overall physical health? fair    Outside of work, how many days during the week do you exercise? none    Outside of work, approximately how many minutes a day do you exercise?not applicable    If you drink alcohol do you typically have >3 drinks per day or >7 drinks per week? No    Do you usually eat at least 4 servings of fruit and vegetables a day, include whole grains & fiber and avoid regularly eating high fat or \"junk\" foods? NO    Do you have any problems taking medications regularly?  No    Do you have any side effects from medications? none    Needs assistance for the following daily activities: transportation, shopping, preparing meals, housework and bathing    Which of the following safety concerns are present in your home?  none identified     Hearing impairment: No    In the past 6 months, have you been bothered by leaking of urine? yes    Mental Health:    In general, how would you rate your overall mental or emotional health? good  PHQ-2 Score:      Do you feel safe in your environment? Yes    Have you ever done Advance Care Planning? (For example, a Health Directive, POLST, or a discussion with a medical provider or your loved ones about your wishes): No, advance care planning information given to patient to review.  Patient declined advance care planning discussion at this time.    Additional concerns to address?  No    Fall risk:  Fallen 2 or more times in the past year?: No  Any fall with injury in the past year?: No  Cognitive Screenin) Repeat 3 items (Leader, Season, Table)    2) Clock draw: NORMAL  3) 3 item recall: Recalls 3 objects  Results: 3 items recalled: COGNITIVE IMPAIRMENT LESS LIKELY    Mini-CogTM Copyright S Karen. Licensed by the author for use in [facility]; reprinted with permission (soob@.[facility]). All rights reserved.      Do you have sleep apnea, excessive snoring or daytime drowsiness?: no        Reviewed and updated as needed this visit by clinical staff         Reviewed and updated as needed this visit by Provider        Social History     Tobacco Use     Smoking status: Never Smoker     Smokeless tobacco: Never Used   Substance Use Topics     Alcohol use: Yes     Alcohol/week: 0.0 standard drinks     Comment: rarely                           Current providers sharing in care for this patient include:   Patient Care Team:  Fausto Flynn MD as PCP - General (Internal Medicine)  Fausto Flynn MD as Assigned PCP  Philip Beltran RN as Lead Care Coordinator (Primary Care - CC)  Yonny Roman MD as MD (INTERNAL MEDICINE - ENDOCRINOLOGY, DIABETES & METABOLISM)  Rosanne Valadez MA as Community Health Worker (Primary Care - CC)  Domonique Sands as Financial Resource Worker    The following health maintenance items are reviewed in Epic and correct as of today:  Health Maintenance   Topic Date Due     HEPATITIS B IMMUNIZATION (1 of 3 - Risk 3-dose series) [birth year]10[birth year]1957     ZOSTER IMMUNIZATION (1 of 2) 05/10/1988     DTAP/TDAP/TD IMMUNIZATION (2 - Td) 01/01/2019     DIABETIC FOOT EXAM  06/21/2019     MEDICARE ANNUAL WELLNESS VISIT  05/16/2020     BMP  05/16/2020     INFLUENZA VACCINE (1) 09/01/2020     A1C  12/04/2020     PHQ-9  01/09/2021     FALL RISK ASSESSMENT  05/04/2021     EYE EXAM  06/01/2021     LIPID  06/04/2021     MICROALBUMIN  06/04/2021     ADVANCE CARE PLANNING  05/16/2024     DEXA  Completed     DEPRESSION ACTION PLAN  Completed     PNEUMOCOCCAL IMMUNIZATION 65+ LOW/MEDIUM RISK  Completed     IPV IMMUNIZATION  Aged Out     MENINGITIS IMMUNIZATION  Aged Out       Immunization History   Administered Date(s) Administered     Influenza (High Dose) 3 valent vaccine 10/29/2013, 11/20/2014, 09/24/2015, 10/20/2016, 11/19/2018     Pneumo  "Conj 13-V (2010&after) 09/24/2015     Pneumococcal 23 valent 01/01/2013     Tdap (Adacel,Boostrix) 01/01/2009         ROS:  CONSTITUTIONAL: NEGATIVE for fever, chills, change in weight  EYES: NEGATIVE for vision changes or irritation  ENT/MOUTH: NEGATIVE for ear, mouth and throat problems  RESP: NEGATIVE for significant cough or SOB  CV: NEGATIVE for chest pain, palpitations or peripheral edema  GI: NEGATIVE for nausea, abdominal pain, heartburn, or change in bowel habits  : NEGATIVE for frequency, dysuria, or hematuria  NEURO: NEGATIVE for weakness, dizziness or paresthesias  HEME: NEGATIVE for bleeding problems  PSYCHIATRIC: NEGATIVE for changes in mood or affect    OBJECTIVE:   /72   Pulse 61   Temp 97  F (36.1  C) (Temporal)   Resp 17   Ht 1.499 m (4' 11\")   Wt 121.3 kg (267 lb 6.4 oz)   SpO2 93%   BMI 54.01 kg/m   Estimated body mass index is 53.86 kg/m  as calculated from the following:    Height as of 5/16/19: 1.524 m (5').    Weight as of 5/16/19: 125.1 kg (275 lb 12.8 oz).  EXAM:   GENERAL: alert and no distress, using assist aide  EYES: Eyes grossly normal to inspection, PERRL and conjunctivae and sclerae normal  HENT: ear canals and TM's normal, nose and mouth without ulcers or lesions  NECK: no adenopathy, no asymmetry, masses, or scars and thyroid normal to palpation  RESP: lungs clear to auscultation - no rales, rhonchi or wheezes  CV: regular rate and rhythm, normal S1 S2, no S3 or S4, no click or rub, no peripheral edema and peripheral pulses strong  ABDOMEN: obese  MS: no gross musculoskeletal defects noted, no edema  NEURO: Normal strength and tone, mentation intact and speech normal  PSYCH: mentation appears normal, affect normal/bright    Lab Results   Component Value Date    A1C 5.6 06/04/2020    A1C 5.7 06/12/2018    A1C 6.2 09/06/2017    A1C 5.6 12/01/2016    A1C 5.9 08/01/2016     LDL Cholesterol Calculated   Date Value Ref Range Status   06/04/2020 77 <100 mg/dL Final "     Creatinine   Date Value Ref Range Status   06/04/2020 1.07 (A) 0.60 - 0.88 mg/dL Final       ASSESSMENT / PLAN:   1. Encounter for Medicare annual wellness exam  Advised updated shingles vaccine and tetanus booster.  - Hemoglobin    2. Type 2 diabetes mellitus with stage 3 chronic kidney disease, without long-term current use of insulin (H)  Stable with most noted A1c 5.6.  Recommend recheck every 6 months  - Albumin Random Urine Quantitative with Creat Ratio    3. Morbid obesity due to excess calories (H)  Encouraged ongoing weight loss for which patient is having some success    4. CKD (chronic kidney disease) stage 3, GFR 30-59 ml/min (H)  Following and recheck baseline creatinine  - Basic metabolic panel  (Ca, Cl, CO2, Creat, Gluc, K, Na, BUN)    5. Benign essential hypertension  Stable on therapy continue with current medical management  - Basic metabolic panel  (Ca, Cl, CO2, Creat, Gluc, K, Na, BUN)    6. Hyperlipidemia LDL goal <100  At goal with recent LDL level of 77, check hepatic panel    7. Colon cancer screening  Advise routine FIT testing, she has no interest in further colonoscopies.  - Fecal colorectal cancer screen (FIT); Future    COUNSELING:  Reviewed preventive health counseling, as reflected in patient instructions       Regular exercise       Healthy diet/nutrition    Estimated body mass index is 53.86 kg/m  as calculated from the following:    Height as of 5/16/19: 1.524 m (5').    Weight as of 5/16/19: 125.1 kg (275 lb 12.8 oz).    Weight management plan: Discussed healthy diet and exercise guidelines    She reports that she has never smoked. She has never used smokeless tobacco.    Appropriate preventive services were discussed with this patient, including applicable screening as appropriate for cardiovascular disease, diabetes, osteopenia/osteoporosis, and glaucoma.  As appropriate for age/gender, discussed screening for colorectal cancer, prostate cancer, breast cancer, and cervical  cancer. Checklist reviewing preventive services available has been given to the patient.    Reviewed patients plan of care and provided an AVS. The Basic Care Plan (routine screening as documented in Health Maintenance) for Lucille meets the Care Plan requirement. This Care Plan has been established and reviewed with the Patient.    Counseling Resources:  ATP IV Guidelines  Pooled Cohorts Equation Calculator  Breast Cancer Risk Calculator  BRCA-Related Cancer Risk Assessment: FHS-7 Tool  FRAX Risk Assessment  ICSI Preventive Guidelines  Dietary Guidelines for Americans, 2010  USDA's MyPlate  ASA Prophylaxis  Lung CA Screening    Fausto Flynn MD  [facility]

## 2020-08-28 ENCOUNTER — PATIENT OUTREACH (OUTPATIENT)
Dept: NURSING | Facility: CLINIC | Age: 80
End: 2020-08-28
Payer: MEDICARE

## 2020-08-28 DIAGNOSIS — Z12.11 COLON CANCER SCREENING: ICD-10-CM

## 2020-08-28 NOTE — PROGRESS NOTES
Clinic Care Coordination Contact  Community Health Worker Follow Up    Discussed:  Patient stated that she is waiting to hear back from [facility] and the [facility].  Patient stated that her electrical work has been done, and now she is looking for a washer and dryer.    Did not discuss goals at this time.  Call for an outreach on patient's , and talked with patient as well.    Next Outreach: 9/15/20  Planned Outreach Frequency: Monthly    ZANE Demarco  Clinic Care Coordination  [facility]  Phone: [phone]

## 2020-08-29 PROCEDURE — 82274 ASSAY TEST FOR BLOOD FECAL: CPT | Performed by: INTERNAL MEDICINE

## 2020-09-03 ENCOUNTER — PATIENT OUTREACH (OUTPATIENT)
Dept: CARE COORDINATION | Facility: CLINIC | Age: 80
End: 2020-09-03

## 2020-09-03 NOTE — PROGRESS NOTES
Clinic Care Coordination Contact  Program: MA- SMRT   County: [facility] Case #:  [facility] Worker:   ATIF #:   Renewal Month:  Date Applied:     FRW Outreach:   9/8/20: FRW received a VM from pt that she got my message and is going to drop off the application at [facility]. FRW will connect with her in 2-3 weeks to allow time for processing.   9/3/20: Outreach attempted x 1. Left message on voicemail with call back information and requested return call.  Plan: FRW will call again within one week.   8/19/20: Certain population application and release of information complete. This will be mailed to pt to sign and return back to [facility] with verification. FRW will check in in 2 weeks. Once pt returns application to [facility], FRW will notify team.    8/18/20: Scheduled phone visit for 10am tomorrow to complete certain population for MA with SMRT referral.     Verification: social security benefit letter, pension statement, long term account, bank account, life insurance, trust amount, burial plot, bcbs monthly premiums     Health Insurance:  Medicare and BCBS    Referral/Screening:  The patient is trying to apply for MA with the goal of getting SMRT determination.       Household: 2  Income: social security for both and pension for spouse          Goals addressed this encounter:   Goals Addressed                 This Visit's Progress      Financial Wellbeing (pt-stated)   20%     Goal Statement: I will apply for MA with SMRT eligibility within the next 2 weeks   Date goal set: 8/18/2020  Barriers: n/a at this time  Strengths: n/a at this time   Date to Achieve By: December 2020  Patient expressed understanding of goal:  Action steps to achieve this goal:  1. I applied for MA through [facility] on 9/4/2020  2. I will receive notices in the mail regarding verification or forms I may need to complete and return by the due date.   3. I understand I may receive a phone call from [facility]  or SMRT team for further information regarding my application.   4. I will connect with my team if I have any questions.   5. I will connect with my Financial Resource Worker at the Clinic Kristen at [phone] if I need any assistance.

## 2020-09-04 ENCOUNTER — PATIENT OUTREACH (OUTPATIENT)
Dept: CARE COORDINATION | Facility: CLINIC | Age: 80
End: 2020-09-04

## 2020-09-04 ASSESSMENT — ACTIVITIES OF DAILY LIVING (ADL): DEPENDENT_IADLS:: TRANSPORTATION

## 2020-09-04 NOTE — PROGRESS NOTES
Clinic Care Coordination Contact    Situation: Patient chart reviewed by care coordinator.     Background: Care Coordination following patient to assist with goals as below.     Assessment: Per chart review, patient outreach completed by  CHW on 8/28/20.  Patient updated that she was waiting to hear from [facility] and [facility] at that time.  Per chart review, patient outreach attempted by FRW on 9/3/20; unable to reach patient, another attempt to be made in 1 week.  Patient's goals remain appropriate and relevant at this time.    Goals        Patient Stated      1. Psychosocial (pt-stated)      Goal Statement: I will explore additional options for support.  Date Goal set: 7/17/20  Barriers: limited mobility, poor eyesight, limited knowledge of available resources  Strengths: motivated to find additional sources of help  Date to Achieve By: 1/31/21  Patient expressed understanding of goal: Yes  Action steps to achieve this goal:  1. I will inquire about financial resources through Financial Resource referral with help from Care Coordination.  2. I will explore community options for help with cleaning/de-cluttering my home.  3. I will continue working with Care Coordination to address barriers to achieving my goal.        2. Being Active (pt-stated)      Goal Statement: I will begin exercising regularly to improve my overall wellbeing.  Measure of Success: The patient will confirm regular exercise routine.  Supportive Steps to Achieve: The patient will utilize her membership at the [facility] at least 1 time per week.  CCC RN will continue routine patient outreaches to provide ongoing support and additional resources as needed.  Barriers: Limited mobility.  Strengths: The patient understands the importance of regular exercise in order to improve and maintain health.  Date to Achieve By: date extended to 8/31/20  Patient expressed understanding of goal: Yes        Financial Wellbeing (pt-stated)      Goal Statement: I  will apply for MA with SMRT eligibility within the next 2 weeks   Date goal set: 8/18/2020  Barriers: n/a at this time  Strengths: n/a at this time   Date to Achieve By: December 2020  Patient expressed understanding of goal:  Action steps to achieve this goal:  1. I applied for MA through [facility] on 8/19/20.   2. I will receive notices in the mail regarding verification or forms I may need to complete and return by the due date.   3. I understand I may receive a phone call from [facility] for further information regarding my application.   4. I will connect with my team if I have any questions.   5. I will connect with my Financial Resource Worker at the Clinic [facility] at [phone] if I need any assistance.         Plan/Recommendations: The patient will continue working with Care Coordination and FRW to achieve goals as above.  CC RN will review patient chart again in approximately 2 weeks to assess patient status and goal progress with outreach to patient as indicated.    Philip Beltrna RN  Clinic Care Coordinator  [facility]  Ph: [phone]

## 2020-09-09 LAB — HEMOCCULT STL QL IA: NEGATIVE

## 2020-09-28 ENCOUNTER — PATIENT OUTREACH (OUTPATIENT)
Dept: CARE COORDINATION | Facility: CLINIC | Age: 80
End: 2020-09-28

## 2020-09-28 NOTE — PROGRESS NOTES
Clinic Care Coordination Contact  Program: MA- SMRT   County: [facility] Case #:  [facility] Worker:   ATIF #:   Renewal Month:  Date Applied:     FRW Outreach:   9/28/20: FRW spoke with pt. It turns out she never sent in the application. FRW reminded her that the processing time is almost 3 months and the importance of getting the application in. She is going to mail it to [facility] this week. FRW will connect with her early next week to make sure she sent it in.   9/8/20: FRW received a VM from pt that she got my message and is going to drop off the application at [facility]. FRW will connect with her in 2-3 weeks to allow time for processing.   9/3/20: Outreach attempted x 1. Left message on voicemail with call back information and requested return call.  Plan: FRW will call again within one week.   8/19/20: Certain population application and release of information complete. This will be mailed to pt to sign and return back to [facility] with verification. FRW will check in in 2 weeks. Once pt returns application to [facility], FRW will notify team.    8/18/20: Scheduled phone visit for 10am tomorrow to complete certain population for MA with SMRT referral.     Verification: social security benefit letter, pension statement, half-way account, bank account, life insurance, trust amount, burial plot, bcbs monthly premiums     Health Insurance:  Medicare and BCBS    Referral/Screening:  The patient is trying to apply for MA with the goal of getting SMRT determination.       Household: 2  Income: social security for both and pension for spouse.         Goals addressed this encounter:   Goals Addressed                 This Visit's Progress      Financial Wellbeing (pt-stated)   20%     Goal Statement: I will apply for MA with SMRT eligibility within the next 2 weeks   Date goal set: 8/18/2020  Barriers: n/a at this time  Strengths: n/a at this time   Date to Achieve By: December 2020  Patient  expressed understanding of goal:  Action steps to achieve this goal:  1. I applied for MA through [facility] on 9/4/2020  2. I will receive notices in the mail regarding verification or forms I may need to complete and return by the due date.   3. I understand I may receive a phone call from [facility] or [facility] team for further information regarding my application.   4. I will connect with my team if I have any questions.   5. I will connect with my Financial Resource Worker at the Clinic [facility] at [phone] if I need any assistance.

## 2020-10-01 ENCOUNTER — TELEPHONE (OUTPATIENT)
Dept: INTERNAL MEDICINE | Facility: CLINIC | Age: 80
End: 2020-10-01

## 2020-10-01 ENCOUNTER — NURSE TRIAGE (OUTPATIENT)
Dept: CARE COORDINATION | Facility: CLINIC | Age: 80
End: 2020-10-01

## 2020-10-01 ENCOUNTER — PATIENT OUTREACH (OUTPATIENT)
Dept: NURSING | Facility: CLINIC | Age: 80
End: 2020-10-01
Payer: MEDICARE

## 2020-10-01 NOTE — TELEPHONE ENCOUNTER
On review of patient's chart and correspondence that has taken place, would agree with plan for patient to see Podiatry as recommended.  Patient also provided with a nail cutting service ([facility]) which she can keep as reference for future use.  No further recommendations from Care Coordination.    Philip Beltran RN  Clinic Care Coordinator  [facility]  Ph: [phone]

## 2020-10-01 NOTE — TELEPHONE ENCOUNTER
We do not have the ability to cut these toenails if needed suggest she may be better suited to see podiatry.

## 2020-10-01 NOTE — TELEPHONE ENCOUNTER
Consent to communicate on file.   Left voicemail informing patient that Dr. Nolasco is not able to work her in tomorrow. He recommends she keep her appointment with Dr. Aguirre at 8:15. Address and phone number given. Asked that she call back if she is unable to keep the appointment.     LACI Rodriguez RN

## 2020-10-01 NOTE — TELEPHONE ENCOUNTER
Called pt and gave her [facility] phone number . Also left message for [facility] nurse to see if she charges through insurance.Mariely Lucas RN

## 2020-10-01 NOTE — TELEPHONE ENCOUNTER
"Clinic Care Coordination Contact  Patient stated that she is having a lot of toe, foot, and leg pain.  Patient stated that the pain is going up her legs, and \"feels\" like the pain is going into the bone.    Sent a telephone message to [facility] to please call patient.    ZANE Demarco  Clinic Care Coordination  [facility]  Phone: [phone]  "

## 2020-10-01 NOTE — TELEPHONE ENCOUNTER
Issues of ankle pain, foot pain should be directed to podiatry.  Changes in fungus of nails might be best suited to be seen in dermatology to determine whether treatment is warranted

## 2020-10-01 NOTE — TELEPHONE ENCOUNTER
See separate message from today. Patient is looking for new nail cutting service, has not had done since January due to COVID pandemic. Has questions regarding insurance and this too. Can CC help at all? Or should she be directed to speak to podiatry about this? She will hopefully be seeing them tomorrow.

## 2020-10-01 NOTE — TELEPHONE ENCOUNTER
Nurse gave patient number for [facility] per below but there are still Multiple other issues as noted- possible gout, possible fungus, feet/ankle/leg pain, foot swelling, etc. Should she see podiatry or IM ?

## 2020-10-01 NOTE — TELEPHONE ENCOUNTER
I am bound by physical distancing guidelines, with a full AM clinic and a meeting in the middle of it.  I am not able to see Lucille tomorrow.  This does not sound urgent or emergent. She should keep her appointment with Dr. Aguirre, otherwise I am happy to see her when there is an opening available.     Dr. Nolasco

## 2020-10-01 NOTE — TELEPHONE ENCOUNTER
Patient informed. She will start by seeing podiatry. She is really wanting to see Dr. Nolasco tomorrow. Appears Dr. Mir has opening tomorrow so transferred to schedule with him, but she is wondering if Dr. Nolasco can work her in instead? She wants to start by seeing him for all of below issues and then see derm and/or IM if needed if directed to do so by podiatry. Please advise.

## 2020-10-01 NOTE — TELEPHONE ENCOUNTER
"Called patient. Is a diabetic. Usually gets toenails trimmed every 6 weeks but with COVID pandemic has not had them done since January.  has been doing \"doctoring\".  says patient's nails look like claws. They hurt when goes to bed, even just having sheet on them hurts. Takes a couple tylenol before going to bed and that helps. Hurts all the way up into legs too. Hurts in both feet but right worse. Mostly the ankle and the foot hurt, gets pain in knee once in awhile too as well. Feels like sharp pain like bones are not matching up or \"slipping\". That pain does not happen every day and is a quick hurt. Feet feel bad all the time and ankle too. Has had gout in right foot before. Also notes the last time got nails trimmed the lady that did it said she had fungus. When the leg pain comes it is both legs but more in the ankle. Right foot is swollen. Cannot tell if red or warm. Even the toenails themselves hurt. [facility] get [facility]ails [facility]. Now they moved somewhere else and there is a lot of people there so has not gone there yet, concerned for exposure to COVID. Advised patient be seen today especially with symptoms and diabetes but she declines due to having an  coming to her house. Declines UC/appt today.     Ok to schedule appt tomorrow with partner? Or she is wondering if she should see Dr. Nolasco? Says she has seen him before for swelling of foot and thinks he ordered diabetic shoes.  "

## 2020-10-01 NOTE — TELEPHONE ENCOUNTER
Additional Information    Foot pain is the main symptom    Negative: Followed an ankle or foot injury    Negative: Ankle pain is the main symptom    Negative: Entire foot is cool or blue in comparison to other foot    Negative: Purple or black skin on foot or toe    Negative: Red area or streak and fever    Negative: Swollen foot and fever    Negative: Patient sounds very sick or weak to the triager    Swollen foot (EXCEPTIONs: localized bump from bunions, calluses, insect bite, sting)    SEVERE pain (e.g., excruciating, unable to do any normal activities)     sometimes    Commented on: Looks like a boil, infected sore, deep ulcer, or other infected rash (spreading redness, pus)     ?    Protocols used: LEG PAIN-A-OH, FOOT PAIN-A-OH

## 2020-10-02 ENCOUNTER — OFFICE VISIT (OUTPATIENT)
Dept: PODIATRY | Facility: CLINIC | Age: 80
End: 2020-10-02
Payer: MEDICARE

## 2020-10-02 VITALS
DIASTOLIC BLOOD PRESSURE: 70 MMHG | HEIGHT: 59 IN | SYSTOLIC BLOOD PRESSURE: 138 MMHG | WEIGHT: 268 LBS | BODY MASS INDEX: 54.03 KG/M2

## 2020-10-02 DIAGNOSIS — L60.3 DYSTROPHIC NAIL: Primary | ICD-10-CM

## 2020-10-02 DIAGNOSIS — L85.9 HYPERKERATOSIS: ICD-10-CM

## 2020-10-02 DIAGNOSIS — B37.2 CANDIDA ONYCHOMYCOSIS: ICD-10-CM

## 2020-10-02 DIAGNOSIS — M79.671 RIGHT FOOT PAIN: ICD-10-CM

## 2020-10-02 PROCEDURE — 99214 OFFICE O/P EST MOD 30 MIN: CPT | Performed by: PODIATRIST

## 2020-10-02 ASSESSMENT — MIFFLIN-ST. JEOR: SCORE: 1581.27

## 2020-10-02 NOTE — PATIENT INSTRUCTIONS
Thank you for choosing [facility] / Foot & Ankle Surgery!    DR. MAHAJAN'S CLINIC LOCATIONS:   [place] [id number] [facility] [address]   [place] [address]                        [place] [address]   [phone]      SET UP SURGERY: [phone]   APPOINTMENTS: [phone]   BILLING QUESTIONS: [phone]   FAX NUMBER: [phone]     Follow up: as needed      Please read through the following handouts and if you have any questions, please feel free to call us or send a [employer] message!      PRICE THERAPY  Many aches and pains throughout the foot and ankle can be helped with many simple treatments. This is usually described as PRICE Therapy.      P - Protection - often times, inflammation/pain in the lower extremity is not able to improve simply because the areas involved are never allowed to rest. Every step we take can bother the problematic area. Protecting those areas is an important step in the healing process. This may involve a walking cast boot, a special insert/orthotic device, an ankle brace, or simply avoiding barefoot walking.    R - Rest - in addition to protecting the foot/ankle, resting is an important, but often times difficult, treatment option. Getting off your feet when they bother you, and specifically avoiding activities that cause pain/discomfort, are very beneficial to prevent, and treat, foot/ankle pain.      I - Ice - icing regularly can help to decrease inflammation and swelling in the foot, thus decreasing pain. Using an ice pack or a bag of frozen veggies works very well. Ice for 20 minutes multiple times per day as needed.  Do not place the ice directly on the skin as this can cause tissue damage.    C - Compression - using a compression wrap or an ACE wrap can help to decrease swelling, which can help to decrease pain. Wearing the  wraps is generally not needed at night, but they should be worn on a regular basis when you are going to be on your feet for prolonged periods as gravity tends to pull fluids down to your feet/ankles.    E - Elevation - elevating your lower extremities multiple times daily for 15-20 minutes can help to decrease swelling, which works well in decreasing pain levels.    NSAID/Tylenol - Anti-inflammatories like Aleve or ibuprofen, and/or a pain medication, such as Tylenol, can help to improve pain levels and get the issue resolved sooner rather than later. Anyone with liver issues should be careful with Tylenol, and anyone with high blood pressure or heart, stomach or kidney issues should be careful with anti-inflammatories. Please ask if you have questions about these medications, including dosage.    ROUTINE FOOT CARE (NAIL TRIMMING / CALLUSES)    Go to afcna.org (American Foot Care Nurses Association) and search for providers near you.  Otherwise, this is a list we have gathered of  recommended locations/providers in [place].    [facility]   [phone]   [facility]  [phone]  [email or web address]   [facility]  [phone]  [place] [facility]  [phone]  [facility].   Catalina Parrish, DPM  [address] [place] [address] [phone] Yonny Egan, DPM  [address] [place] [address] [phone]   [facility]  [phone] [address] [place] [address]  [facility]  Dr. Lee Huffman  [phone]  [facility]  [phone]  [place] & [place] Locations  (does not take BCBS) FYI:  *Some providers accept insurance while others are out of pocket. Please contact them for details*       (BMI) Body Mass Index  Many things can cause foot and ankle problems. Foot structure, activity level, foot mechanics and injuries are common causes of pain.One very important issue that often goes unmentioned, is body  weight.  Extra weight can cause increased stress on muscles, ligaments, bones and tendons. Sometimes just a few extra pounds is all it takes to put one over her/his threshold. Without reducing that stress, it can be difficult to alleviate pain. Some people are uncomfortable addressing this issue, but we feel it is important for you to think about it. As Foot & Ankle specialists, our job is addressing the lower extremity problem and possible causes. Regarding extra body weight, we encourage patients to discuss diet and weight management plans with their primary care doctors. It is this team approach that gives you the best opportunity for pain relief and getting you back on your feet.

## 2020-10-02 NOTE — PROGRESS NOTES
"Foot & Ankle Surgery   October 2, 2020    S:  Pt is seen today for evaluation of swelling, fungus, as well as right foot pain.  Has seen Dr Nolasco, got inserts from him June 2020.  Right foot pain with foot elevated or being off feet, dangles feet over edge of bed.  Better with shoes vs without.  Pain 9/10 \"daily, worse \"right away when gets up\".  Tylenol, elevation for treatment.    Vitals:    10/02/20 0811   BP: 138/70   Weight: 121.6 kg (268 lb)   Height: 1.499 m (4' 11\")   '      ROS - Pos for CC.  Patient denies current nausea, vomiting, chills, fevers, belly pain, calf pain, chest pain or SOB.  Complete remainder of ROS it otherwise neg.      PE:  Gen:   No apparent distress  Eye:    Visual scanning without deficit  Ear:    Response to auditory stimuli wnl  Lung:    Non-labored breathing on RA noted  Abd:    NTND per patient report  Lymph:    Mild bilateral lower extremity edema, including dorsal R forefoot  Vasc:    Pulses are palpable, CFT minimally delayed  Neuro:    Light touch sensation intact to all sensory nerve distributions without paresthesias  Derm:    Mycotic, dystrophic long nails 1-5 bilateral.  Small hyperkeratosis plantar R great toe  MSK:    right lower extremity - tender 3rd and 2nd interspace, but main issue is diffuse dorsal forefoot discomfort without evidence of stress fracture  Calf:    Neg for redness, swelling or tenderness      Assessment:  80 year old female with dystrophic/mycoti long nails bilateral; painful hyperkeratosis R great toe in setting of DMII; right foot pain/swelling      Plan:  Discussed etiologies, anatomy and options  1.  Dystrophic/mycotic nail 1-5 bilateral in setting of DMII  -debrided in length/thickness, no charge  -nail care handout for future routine nail care    2.  Painful hyperkeratotic lesion R great toe plantar  -debrided with 15 blade without incident, no wound noted.  No charge    3.  Right foot pain/swelling  -initially was described as rest pain, but " her pulses are readily palpable  -with more questioning, it seems dorsal R foot pain/swelling is cause of discomfort  -RICE/NSAID vs tylenol  -tensogrip for edema  Control  -comfortable shoe gear; minimize shoeless ambulation  -consider xrays, walking boot. However, she uses a walker or 2 canes and may be unstable with a boot      Follow up:  prn or sooner with acute issues           Archie Aguirre DPM FACFAS FACFAOM  Podiatric Foot & Ankle Surgeon  [facility]  [phone]

## 2020-10-02 NOTE — TELEPHONE ENCOUNTER
"Was nail cutting services addressed at podiatry appt today? Patient reported toenails like \"claws\", not cut since January due to COVID pandemic. Had questions on services available and insurance coverage.  "

## 2020-10-02 NOTE — TELEPHONE ENCOUNTER
Yes, toenails were addressed in Podiatry today. Patient was given AVS with a list of routine nail care places to get nails trimmed, [facility]     She would need to contact the nail care places given to her and her insurance regarding coverage.     Lynette ALFARO CMA

## 2020-10-05 NOTE — PROGRESS NOTES
Clinic Care Coordination Contact  Community Health Worker Follow Up    Goals:   Goals Addressed as of 10/5/2020 at 7:50 AM                 10/1/20    9/3/20       Patient Stated      1. Psychosocial (pt-stated)   20%      Added 7/17/20 by Philip Beltran, RN     Goal Statement: I will explore additional options for support.  Date Goal set: 7/17/20  Barriers: limited mobility, poor eyesight, limited knowledge of available resources  Strengths: motivated to find additional sources of help  Date to Achieve By: 1/31/21  Patient expressed understanding of goal: Yes  Action steps to achieve this goal:  1. I will inquire about financial resources through Financial Resource referral with help from Care Coordination.  2. I will explore community options for help with cleaning/de-cluttering my home.  3. I will continue working with Care Coordination to address barriers to achieving my goal.        2. Being Active (pt-stated)   50%      Added 12/3/19 by Philip Beltran, RN     Goal Statement: I will begin exercising regularly to improve my overall wellbeing.  Measure of Success: The patient will confirm regular exercise routine.  Supportive Steps to Achieve: The patient will utilize her membership at the [facility] at least 1 time per week.  CCC RN will continue routine patient outreaches to provide ongoing support and additional resources as needed.  Barriers: Limited mobility.  Strengths: The patient understands the importance of regular exercise in order to improve and maintain health.  Date to Achieve By: date extended to 8/31/20  Patient expressed understanding of goal: Yes    As of today's date 1/23/2020 goal is met at 26 - 50%.   Goal Status:  Active   As of today's date 3/23/2020 goal is met at 0 - 25%.   Goal Status:  Active - Patient has baker's cyst behind knee causing discomfort and making it difficult to exercise currently.  As of today's date 5/4/2020 goal is met at 26 - 50%.   Goal Status:  Active - Doing seated  exercises most days during TV commercials.  Continues having pain to her right knee due to what she thinks is a baker's cyst.  As of today's date 6/10/2020 goal is met at 26 - 50%.   Goal Status:  Active         Financial Wellbeing (pt-stated)   20%  20%    Added 9/3/20 by Domonique Sands     Goal Statement: I will apply for MA with SMRT eligibility within the next 2 weeks   Date goal set: 8/18/2020  Barriers: n/a at this time  Strengths: n/a at this time   Date to Achieve By: December 2020  Patient expressed understanding of goal:  Action steps to achieve this goal:  1. I applied for MA through [facility] on 9/4/2020  2. I will receive notices in the mail regarding verification or forms I may need to complete and return by the due date.   3. I understand I may receive a phone call from [facility] or SMRT team for further information regarding my application.   4. I will connect with my team if I have any questions.   5. I will connect with my Financial Resource Worker at the Clinic [facility] at [phone] if I need any assistance.           Discussed:  Patient stated that her plumbing is now done, and is on the first floor for her to be able to wash and dry her clothes.  Patient stated that she hasn't had a washer or a dryer for two years.  Patient stated that the  will be going to her home today for review.  Patient stated that the washer and dryer will be delivered in a few weeks.  Patient stated that she is still going through stuff in the house.  Patient reported getting rid of 23 dozen of fernando jars, and she is going to try to look through a few boxes everyday.  Patient stated that her feet really hurt, so she has not been exercising.  Patient stated that he toe nails and very long, and they haven't been trimmed since January, and she is a diabetic.  Patient stated that her pain goes from her toes up to her knee, and it is a sharp pain.  Patient reported that her foot is swollen, and it is  hard to put on shoes.  Patient reported that due to her pain, she is not exercising or doing too much walking.  Patient stated that she does not want to have any in her home right now due to Covid-19.    Intervention and Education during outreach: Used Empathy and Active listening to understand the patients barriers and struggles.  CHW encouraged patient to contact CC if there are any other changes or resources needed. Patient acknowledged understanding      CHW Plan: sent a telephone message to [facility] to please call patient regarding her pain.  Will follow up in one month.      Next Outreach: 10/30/20    ZANE Demarco  Clinic Care Coordination  [facility]: [facility]  Phone: [phone]

## 2020-10-07 ENCOUNTER — PATIENT OUTREACH (OUTPATIENT)
Dept: CARE COORDINATION | Facility: CLINIC | Age: 80
End: 2020-10-07

## 2020-10-07 NOTE — PROGRESS NOTES
Clinic Care Coordination Contact  Program: MA- SMRT   County: [facility] Case #:  [facility] Worker:   PMI #:   Renewal Month:  Date Applied:     FRW Outreach:   10/13/20: FRW received a message yesterday that pt still has questions on what to sign for her MA application. FRW called pt and left detailed VM that she needs to sign all the highlighted signature areas and if she still has questions she can call [phone]. FRW will attempt to call her again within one week.   10/7/20: FRW spoke with pt. She is having her daughter come over tomorrow to fill in a couple account numbers on the application that she can't see. FRW told pt to have her daughter call if needed tomorrow and reminded her again how important it is to get that application in.   9/28/20: FRW spoke with pt. It turns out she never sent in the application. FRW reminded her that the processing time is almost 3 months and the importance of getting the application in. She is going to mail it to [facility] this week. FRW will connect with her early next week to make sure she sent it in.       Verification: social security benefit letter, pension statement, correction account, bank account, life insurance, trust amount, burial plot, bcbs monthly premiums     Health Insurance:  Medicare and BCBS    Referral/Screening:  The patient is trying to apply for MA with the goal of getting SMRT determination.       Household: 2  Income: social security for both and pension for spouse.          Goals addressed this encounter:   Goals Addressed                 This Visit's Progress      Financial Wellbeing (pt-stated)   20%     Goal Statement: I will apply for MA with SMRT eligibility within the next 2 weeks   Date goal set: 8/18/2020  Barriers: n/a at this time  Strengths: n/a at this time   Date to Achieve By: December 2020  Patient expressed understanding of goal:  Action steps to achieve this goal:  1. I applied for MA through [facility] on  9/4/2020  2. I will receive notices in the mail regarding verification or forms I may need to complete and return by the due date.   3. I understand I may receive a phone call from [facility] or [facility] team for further information regarding my application.   4. I will connect with my team if I have any questions.   5. I will connect with my Financial Resource Worker at the Clinic [facility] at [phone] if I need any assistance.

## 2020-10-12 ENCOUNTER — PATIENT OUTREACH (OUTPATIENT)
Dept: NURSING | Facility: CLINIC | Age: 80
End: 2020-10-12
Payer: MEDICARE

## 2020-10-12 ASSESSMENT — ACTIVITIES OF DAILY LIVING (ADL): DEPENDENT_IADLS:: TRANSPORTATION

## 2020-10-12 NOTE — PROGRESS NOTES
Clinic Care Coordination Contact    Follow Up Progress Note      Assessment:    Patient updated that things have been busy at their house lately.  They have been working with their daughter to finally get a new washer and dryer on the main level of their home.  Their washer is installed, they are waiting for the dryer to be delivered.    Patient reported that she is nearly done with her MA application but is stuck on one section that she thinks her daughter may need to sign, but patient isn't sure.  She requested a call back from [facility] for guidance.    Patient still has not received any updates/information about why she didn't receive her stimulus check earlier this year.  She stated her daughter is helping look into it.      Patient confirmed she did get her toenails cut during her recent Podiatry appointment.  She was given a tensogrip compression bandage to wear on her right foot as it has been painful and swollen.  Patient noted her symptoms to be improving each day.  She will return for a follow-up Podiatry visit if she continues having problems.    Patient reported she has been more active lately due to how busy they have been around the house, though she hasn't done any formal exercise.    Goals addressed this encounter:   Goals Addressed                 This Visit's Progress       Patient Stated      1. Psychosocial (pt-stated)   30%     Goal Statement: I will explore additional options for support.  Date Goal set: 7/17/20  Barriers: limited mobility, poor eyesight, limited knowledge of available resources  Strengths: motivated to find additional sources of help  Date to Achieve By: 1/31/21  Patient expressed understanding of goal: Yes    Action steps to achieve this goal:  1. I will inquire about financial resources through Financial Resource referral with help from Care Coordination.  2. I will explore community options for help with cleaning/de-cluttering my home.  3. I will continue working with Care  Coordination to address barriers to achieving my goal.        2. Being Active (pt-stated)   50%     Goal Statement: I will begin exercising regularly to improve my overall wellbeing.  Measure of Success: The patient will confirm regular exercise routine.  Supportive Steps to Achieve: The patient will utilize her membership at the [facility] at least 1 time per week.  CCC RN will continue routine patient outreaches to provide ongoing support and additional resources as needed.  Barriers: Limited mobility.  Strengths: The patient understands the importance of regular exercise in order to improve and maintain health.  Date to Achieve By: date extended to 11/30/20  Patient expressed understanding of goal: Yes        Intervention/Education provided during outreach:    CC RN provided patient with encouragement for continued efforts toward goals as above.    CC RN provided the patient with IRS help number ([phone]) in case she needs additional assistance regarding her stimulus check.    CC RN instructed patient to monitor her feet closely and to return for follow-up Podiatry appointment with any worsening symptoms.    CC RN encouraged patient to continue leaning on her children, as needed, for additional support.     Outreach Frequency: monthly    Plan:    Patient will continue working toward above goals as discussed.    CC RN will request that FRW follow up with patient to address patient's question regarding MA application.    Patient will continue monitoring her feet and will follow up with Podiatry for any additional concerns.    Patient agreed to contact CC RN with any additional questions or concerns.    Care Coordination will outreach to patient in approximately 1 month to get updates on patient status, assess goal progress, and offer additional support and resources as indicated.    Philip Beltran, RN  Clinic Care Coordinator  [facility]  Ph: [phone]

## 2020-10-12 NOTE — LETTER
[place] CARE COORDINATION  [facility]  [address], [place] [address]    October 12, 2020        Lucille Rojas  [address] [facility] [address]          Dear Connor Adkins is an updated Complex Care Plan for your continued enrollment in Care Coordination. Please let us know if you have additional questions, concerns, or goals that we can assist with.    Sincerely,    Philip Beltran RN        [facility]  Complex Care Plan  About Me:    Patient Name:  Lucille Rojas    YOB: 1940  Age:         80 year old   [place] MRN:    [id number] Telephone Information:  Home Phone [phone]   Mobile none       Address:  [address]  [place] [facility] [place] [address] Email address:  No e-mail address on record      Emergency Contact(s)    Name Relationship Lgl Grd Work Phone Home Phone Mobile Phone   1. EMIL ROJAS Spouse  none [phone] none   2. HARRIET ROJAS Daughter  none [phone] none           Primary language:  English     needed? No   [place] [facility]:  [phone] op. 1  Other communication barriers: Glasses  Preferred Method of Communication:  Mail  Current living arrangement: I live in a private home with spouse  Mobility Status/ Medical Equipment: Independent w/Device    Health Maintenance  Health Maintenance Reviewed: Due/Overdue   Health Maintenance Due   Topic Date Due     HEPATITIS B IMMUNIZATION (1 of 3 - Risk 3-dose series) 05/10/1957     ZOSTER IMMUNIZATION (1 of 2) 05/10/1988     DTAP/TDAP/TD IMMUNIZATION (2 - Td) 01/01/2019     DIABETIC FOOT EXAM  06/21/2019     INFLUENZA VACCINE (1) 09/01/2020     My Access Plan  Medical Emergency 911   Primary Clinic Line [facility] - [phone]   24 Hour Appointment Line [phone] or  [phone]) (toll-free)   24 Hour Nurse Line [phone] (toll-free)   Preferred Urgent Care [facility]  13-Mar-2020 14:08 [place], [phone]   Preferred Hospital [facility]  [phone]   Preferred Pharmacy [id number] - [place], [place] - [address] [facility]     Behavioral Health Crisis Line The National Suicide Prevention Lifeline at 1-127.863.8997 or 911     My Care Team Members  Patient Care Team       Relationship Specialty Notifications Start End    Fausto Flynn MD PCP - General Internal Medicine  10/6/14     Phone: [phone] Fax: [phone]         [address] [place] [address]    Fausto Flynn MD Assigned PCP   10/12/14     Phone: [phone] Fax: [phone]         [address] [place] [address]    Philip Beltran, RN Lead Care Coordinator Primary Care - CC  12/3/19     Phone: [phone]         Yonny Roman MD MD INTERNAL MEDICINE - ENDOCRINOLOGY, DIABETES & METABOLISM  12/3/19     Phone: [phone] Fax: [phone]         ENDOCRINOLOGY CLINIC [facility] [address] [facility] [address] [facility] [address]    Rosanne Valadez MA Community Health Worker Primary Care - CC  1/22/20     Phone: [phone]         Domonique Sands Financial Resource Worker   8/18/20             My Care Plans  Self Management and Treatment Plan  Goals and (Comments)  Goals        Patient Stated      1. Psychosocial (pt-stated)      Goal Statement: I will explore additional options for support.  Date Goal set: 7/17/20  Barriers: limited mobility, poor eyesight, limited knowledge of available resources  Strengths: motivated to find additional sources of help  Date to Achieve By: 1/31/21  Patient expressed understanding of goal: Yes    Action steps to achieve this goal:  1. I will inquire about financial resources through Financial Resource referral with help from Care Coordination.  2. I will explore community options for help with cleaning/de-cluttering my home.  3. I will continue working with Care Coordination to address barriers to achieving my goal.        2.  Being Active (pt-stated)      Goal Statement: I will begin exercising regularly to improve my overall wellbeing.  Measure of Success: The patient will confirm regular exercise routine.  Supportive Steps to Achieve: The patient will utilize her membership at the [facility] at least 1 time per week.  CCC RN will continue routine patient outreaches to provide ongoing support and additional resources as needed.  Barriers: Limited mobility.  Strengths: The patient understands the importance of regular exercise in order to improve and maintain health.  Date to Achieve By: date extended to 11/30/20  Patient expressed understanding of goal: Yes        Financial Wellbeing (pt-stated)      Goal Statement: I will apply for MA with SMRT eligibility within the next 2 weeks   Date goal set: 8/18/2020  Barriers: n/a at this time  Strengths: n/a at this time   Date to Achieve By: December 2020  Patient expressed understanding of goal:  Action steps to achieve this goal:  1. I applied for MA through [facility] on 9/4/2020  2. I will receive notices in the mail regarding verification or forms I may need to complete and return by the due date.   3. I understand I may receive a phone [facility] for further information regarding my application.   4. I will connect with my team if I have any questions.   5. I will connect with my Financial Resource Worker at the Clinic [facility] at [phone] if I need any assistance.           Action Plans on File:   Depression    Advance Care Plans/Directives Type:   None on file       My Medical and Care Information  Problem List   Patient Active Problem List   Diagnosis     Hyperlipidemia LDL goal <100     Advance care planning     Macular degeneration     Apnea     Morbid obesity due to excess calories (H)     CKD (chronic kidney disease) stage 3, GFR 30-59 ml/min     Acquired hypothyroidism     Benign essential hypertension     Gastroesophageal reflux disease without  esophagitis     Type 2 diabetes mellitus with stage 3 chronic kidney disease, without long-term current use of insulin (H)     Anemia, unspecified type     MDD (major depressive disorder), recurrent episode, mild (H)      Current Medications:    Current Outpatient Medications   Medication Instructions     ACE/ARB NOT PRESCRIBED, INTENTIONAL, Please choose reason not prescribed, below     aspirin (ASA) 81 mg, Oral, DAILY     citalopram (CELEXA) 10 MG tablet TAKE 1 TABLET (10MG) BY MOUTH DAILY     Ferrous Sulfate (IRON SUPPLEMENT PO) 325 mg, Oral     Glycerin-Polysorbate 80 (REFRESH DRY EYE THERAPY OP) Ophthalmic     levothyroxine (SYNTHROID/LEVOTHROID) 200 mcg, Oral, DAILY     miconazole (MICATIN; MICRO GUARD) 2 % powder Topical, PRN, Reported on 3/27/2017     Multiple Vitamins-Minerals (PRESERVISION AREDS) CAPS 1 tablet, Oral, 2 TIMES DAILY     nystatin (MYCOSTATIN) cream Topical, 2 TIMES DAILY     order for DME Equipment being ordered: wheeled walker     order for DME Equipment being ordered: wheeled walker with hand breaks     simvastatin (ZOCOR) 10 MG tablet TAKE ONE TABLET BY MOUTH AT BEDTIME      Care Coordination Start Date: 12/3/2019   Frequency of Care Coordination: monthly   Form Last Updated: 10/12/2020

## 2020-10-21 ENCOUNTER — PATIENT OUTREACH (OUTPATIENT)
Dept: CARE COORDINATION | Facility: CLINIC | Age: 80
End: 2020-10-21

## 2020-10-21 NOTE — PROGRESS NOTES
Clinic Care Coordination Contact  Program: MA- SMRT   County: [facility] Case #:  [facility] Worker:   ATIF #:   Renewal Month:  Date Applied:     FRW Outreach:   10/2720: FRW received a VM from pt that she did sent in the application to [facility]. FRW will call [facility] in 2 weeks to ensure that application was received.   10/21/20: Outreach attempted x 1. Left message on voicemail with call back information and requested return call.  Plan: FRW will call again within one week. Calling to make sure pt returned rashel to [facility].   10/13/20: FRW received a message yesterday that pt still has questions on what to sign for her MA application. FRW called pt and left detailed VM that she needs to sign all the highlighted signature areas and if she still has questions she can call [phone]. FRW will attempt to call her again within one week. - DARCII spoke with pt later in the afternoon, FRW went over the areas that she needs to sign. Pt understands. FRW will follow up next week to make sure application was sent to [facility].  10/7/20: FRW spoke with pt. She is having her daughter come over tomorrow to fill in a couple account numbers on the application that she can't see. FRW told pt to have her daughter call if needed tomorrow and reminded her again how important it is to get that application in.   9/28/20: FRW spoke with pt. It turns out she never sent in the application. FRW reminded her that the processing time is almost 3 months and the importance of getting the application in. She is going to mail it to [facility] this week. FRW will connect with her early next week to make sure she sent it in.       Verification: social security benefit letter, pension statement, [employer] account, bank account, life insurance, trust amount, burial plot, bcbs monthly premiums     Health Insurance:  Medicare and BCBS    Referral/Screening:  The patient is trying to apply for MA with the goal of  getting SMRT determination.       Household: 2  Income: social security for both and pension for spouse.          Goals addressed this encounter:   Goals Addressed                 This Visit's Progress      Financial Wellbeing (pt-stated)   20%     Goal Statement: I will apply for MA with SMRT eligibility within the next 2 weeks   Date goal set: 8/18/2020  Barriers: n/a at this time  Strengths: n/a at this time   Date to Achieve By: December 2020  Patient expressed understanding of goal:  Action steps to achieve this goal:  1. I applied for MA through [facility] on 9/4/2020  2. I will receive notices in the mail regarding verification or forms I may need to complete and return by the due date.   3. I understand I may receive a phone call from [facility] or SMRT team for further information regarding my application.   4. I will connect with my team if I have any questions.   5. I will connect with my Financial Resource Worker at the Clinic [facility] at [phone] if I need any assistance.

## 2020-11-10 ENCOUNTER — PATIENT OUTREACH (OUTPATIENT)
Dept: CARE COORDINATION | Facility: CLINIC | Age: 80
End: 2020-11-10

## 2020-11-10 NOTE — PROGRESS NOTES
Clinic Care Coordination Contact  Program: MA- SMRT   County: [facility] Case #:   [facility] Worker:   PMI #:   Renewal Month:  Date Applied: Around 10/13-10/21    FRW Outreach:   11/10/20: FRW called [facility] and spoke with Representative, Rosanne. Application was received and is pending verification of all assets. Patient had called [facility] yesterday and was told which verification is needed. Rosanne stated that the case was not flagged for SMRT even though it was listed on all the pages of her application. Rosanne is going to work on this the next couple days and get the case flagged for a SMRT review. CCC team updated. FRW will check in again next week.   10/27/20: FRW received a VM from pt that she did sent in the application to [facility]. FRW will call [facility] in 2 weeks to ensure that application was received.       Verification: social security benefit letter, pension statement, snf account, bank account, life insurance, trust amount, burial plot, bcbs monthly premiums     Health Insurance:  Medicare and BCBS    Referral/Screening:  The patient is trying to apply for MA with the goal of getting SMRT determination.       Household: 2  Income: social security for both and pension for spouse.          Goals addressed this encounter:   Goals Addressed                 This Visit's Progress      Financial Wellbeing (pt-stated)   20%     Goal Statement: I will apply for MA with SMRT eligibility within the next 2 weeks   Date goal set: 8/18/2020  Barriers: n/a at this time  Strengths: n/a at this time   Date to Achieve By: December 2020  Patient expressed understanding of goal:  Action steps to achieve this goal:  1. I applied for MA through [facility] on 9/4/2020  2. I will receive notices in the mail regarding verification or forms I may need to complete and return by the due date.   3. I understand I may receive a phone call from [facility] or SMRT team for further  information regarding my application.   4. I will connect with my team if I have any questions.   5. I will connect with my Financial Resource Worker at the Clinic [facility] at [phone] if I need any assistance.

## 2020-11-13 ENCOUNTER — PATIENT OUTREACH (OUTPATIENT)
Dept: NURSING | Facility: CLINIC | Age: 80
End: 2020-11-13
Payer: MEDICARE

## 2020-11-13 NOTE — PROGRESS NOTES
Clinic Care Coordination Contact  The Community Health Worker called for a Care Coordination outreach.   Spoke to Lucille.    Phone visit is scheduled on 11/19/20 at 10:00am with CHW.    ZANE Demarco  Clinic Care Coordination  [facility]: Margie [facility]   Phone: [phone]

## 2020-11-19 ENCOUNTER — PATIENT OUTREACH (OUTPATIENT)
Dept: NURSING | Facility: CLINIC | Age: 80
End: 2020-11-19
Payer: MEDICARE

## 2020-11-19 NOTE — PROGRESS NOTES
Clinic Care Coordination Contact  Community Health Worker Follow Up    Goals:   Goals Addressed as of 11/19/2020 at 10:40 AM                 Today    10/12/20       Patient Stated      1. Psychosocial (pt-stated)   40%  30%    Added 7/17/20 by Philip Beltran, RN     Goal Statement: I will explore additional options for support.  Date Goal set: 7/17/20  Barriers: limited mobility, poor eyesight, limited knowledge of available resources  Strengths: motivated to find additional sources of help  Date to Achieve By: 1/31/21  Patient expressed understanding of goal: Yes    Action steps to achieve this goal:  1. I will inquire about financial resources through Financial Resource referral with help from Care Coordination.  2. I will explore community options for help with cleaning/de-cluttering my home.  3. I will continue working with Care Coordination to address barriers to achieving my goal.        2. Being Active (pt-stated)   60%  50%    Added 12/3/19 by Philip Beltran, RN     Goal Statement: I will begin exercising regularly to improve my overall wellbeing.  Measure of Success: The patient will confirm regular exercise routine.  Supportive Steps to Achieve: The patient will utilize her membership at the [facility] at least 1 time per week.  CCC RN will continue routine patient outreaches to provide ongoing support and additional resources as needed.  Barriers: Limited mobility.  Strengths: The patient understands the importance of regular exercise in order to improve and maintain health.  Date to Achieve By: date extended to 11/30/20  Patient expressed understanding of goal: Yes    As of today's date 1/23/2020 goal is met at 26 - 50%.   Goal Status:  Active   As of today's date 3/23/2020 goal is met at 0 - 25%.   Goal Status:  Active - Patient has baker's cyst behind knee causing discomfort and making it difficult to exercise currently.  As of today's date 5/4/2020 goal is met at 26 - 50%.   Goal Status:  Active - Doing  seated exercises most days during TV commercials.  Continues having pain to her right knee due to what she thinks is a baker's cyst.  As of today's date 6/10/2020 goal is met at 26 - 50%.   Goal Status:  Active         Financial Wellbeing (pt-stated)   40%      Added 9/3/20 by Domonique Sands     Goal Statement: I will apply for MA with SMRT eligibility within the next 2 weeks   Date goal set: 8/18/2020  Barriers: n/a at this time  Strengths: n/a at this time   Date to Achieve By: December 2020  Patient expressed understanding of goal:  Action steps to achieve this goal:  1. I applied for MA through [facility] on 9/4/2020  2. I will receive notices in the mail regarding verification or forms I may need to complete and return by the due date.   3. I understand I may receive a phone call from [facility] or [facility] for further information regarding my application.   4. I will connect with my team if I have any questions.   5. I will connect with my Financial Resource Worker at the Clinic [facility] at [phone] if I need any assistance.           Discussed:    Patient stated that she is doing pretty good.    Patient stated that she has been working on some legal stuff involving her life insurance policy, but her daughter will now be helping with that.    Patient stated that she did not qualify for any waivers or services from the [facility].      Patient stated that she finally received her stimulus check.    Patient stated that she has been enjoying washing clothes in her new washer, but the dryer hasn't arrived yet, since on backorder from September.    Patient stated that she is doing her exercises with her , more of the arm, and getting up using the legs.    Patient stated that she has been trying to eat more greens.  Food is being delivered twice a month from [employer].    Patient stated that she is going through a lot of sanitary napkins because she is leaking at night.    Patient stated  That she uses  a cane when walking due to her knee pain.    Patient stated that she is interested in more in home resources, but first she has to more room and clear a path.  She is going through a lot of stuff still in her home.      Intervention and Education during outreach: CHW encouraged patient to contact CC if there are any other changes or resources needed.  Patient acknowledged understanding.      CHW Plan: Patient will continue going through her stuff at home.  Patient will continue to exercise daily.    Next Outreach:  12/14/2020    ZANE Demarco  Clinic Care Coordination  [facility]: [place] [facility] [place]  Phone: [phone]

## 2020-11-20 ENCOUNTER — PATIENT OUTREACH (OUTPATIENT)
Dept: CARE COORDINATION | Facility: CLINIC | Age: 80
End: 2020-11-20

## 2020-11-20 NOTE — PROGRESS NOTES
Clinic Care Coordination Contact  Program: [facility]   County: [facility] Case #:  [id number]   [facility] Worker:   PMI #:   Renewal Month:  Date Applied: Around 10/13-10/21    FRW Outreach:   11/23/20: FRW spoke with a [facility] representative regarding patient's case. Patient does not qualify to be referred to SMRT based off age and other factors. [facility] spoke with patient last week. The  asked her what she is needing help with, she said shingle shots and prescriptions. The  referred her to Medicare Part D. The  also discussed the Elderly Waiver with her which would help with in-home care. Patient was interested in this. She is within income guidelines for Elderly Waiver but is over the $3,000 asset limit. She can shift her assets to her . FRW will send a message to CCC team to follow up with patient on Elderly Waiver and other resources if needed. No further needs from FRW.   11/20/20: FRW attempted to call patient twice. The phone just kept ringing, no voicemail box. FRW will call [facility] to check on case status.   11/10/20: FRW called [facility] and spoke with Representative, Rosanne. Application was received and is pending verification of all assets. Patient had called [facility] yesterday and was told which verification is needed. Rosanne stated that the case was not flagged for SMRT even though it was listed on all the pages of her application. Rosanne is going to work on this the next couple days and get the case flagged for a SMRT review. CCC team updated. FRW will check in again next week.   10/27/20: FRW received a VM from pt that she did sent in the application to [facility]. FRW will call [facility] in 2 weeks to ensure that application was received.       Verification: social security benefit letter, pension statement, [employer] account, bank account, life insurance, trust amount, burial plot, bcbs monthly premiums      Health Insurance:  Medicare and BCBS    Referral/Screening:  The patient is trying to apply for MA with the goal of getting SMRT determination.       Household: 2  Income: social security for both and pension for spouse.          Goals addressed this encounter:   Goals Addressed                 This Visit's Progress      Financial Wellbeing (pt-stated)   20%     Goal Statement: I will apply for MA with SMRT eligibility within the next 2 weeks   Date goal set: 8/18/2020  Barriers: n/a at this time  Strengths: n/a at this time   Date to Achieve By: December 2020  Patient expressed understanding of goal:  Action steps to achieve this goal:  1. I applied for MA through [facility] on 9/4/2020  2. I will receive notices in the mail regarding verification or forms I may need to complete and return by the due date.   3. I understand I may receive a phone call from [facility] or SMRT team for further information regarding my application.   4. I will connect with my team if I have any questions.   5. I will connect with my Financial Resource Worker at the Clinic [facility] at [phone] if I need any assistance.

## 2020-12-07 ENCOUNTER — PATIENT OUTREACH (OUTPATIENT)
Dept: NURSING | Facility: CLINIC | Age: 80
End: 2020-12-07
Payer: MEDICARE

## 2020-12-07 NOTE — PROGRESS NOTES
Clinic Care Coordination Contact  Community Health Worker Follow Up    Goals:   Goals Addressed as of 12/7/2020 at 11:00 AM                 Today    11/19/20       Patient Stated      1. Psychosocial (pt-stated)   40%  40%    Added 7/17/20 by Philip Beltran, RN     Goal Statement: I will explore additional options for support.  Date Goal set: 7/17/20  Barriers: limited mobility, poor eyesight, limited knowledge of available resources  Strengths: motivated to find additional sources of help  Date to Achieve By: 1/31/21  Patient expressed understanding of goal: Yes    Action steps to achieve this goal:  1. I will inquire about financial resources through Financial Resource referral with help from Care Coordination.  2. I will explore community options for help with cleaning/de-cluttering my home.  3. I will continue working with Care Coordination to address barriers to achieving my goal.        2. Being Active (pt-stated)   60%  60%    Added 12/3/19 by Philip Beltran, RN     Goal Statement: I will begin exercising regularly to improve my overall wellbeing.  Measure of Success: The patient will confirm regular exercise routine.  Supportive Steps to Achieve: The patient will utilize her membership at the [facility] at least 1 time per week.  CCC RN will continue routine patient outreaches to provide ongoing support and additional resources as needed.  Barriers: Limited mobility.  Strengths: The patient understands the importance of regular exercise in order to improve and maintain health.  Date to Achieve By: date extended to 11/30/20  Patient expressed understanding of goal: Yes    As of today's date 1/23/2020 goal is met at 26 - 50%.   Goal Status:  Active   As of today's date 3/23/2020 goal is met at 0 - 25%.   Goal Status:  Active - Patient has baker's cyst behind knee causing discomfort and making it difficult to exercise currently.  As of today's date 5/4/2020 goal is met at 26 - 50%.   Goal Status:  Active - Doing  seated exercises most days during TV commercials.  Continues having pain to her right knee due to what she thinks is a baker's cyst.  As of today's date 6/10/2020 goal is met at 26 - 50%.   Goal Status:  Active         Discussed:  Patient stated that she is doing exercises everyday.  Patient reported that it is getting harder for her to see the TV.  Patient stated that [employer] delivers food twice a month, and her kids will drop off milk and bread.  Patient reported that she is still waiting for the dryer to be delivered.  It was ordered in September.  Patient is going to call today to check on the status of the order.  Patient stated that she is going to call [facility] to check on coverage for a shingles shot.  Patient stated that [facility] is going to visit her home on 12/16/20.    Intervention and Education during outreach: CHW encouraged patient to contact CC if there are any other changes or resources needed.  Patient acknowledged understanding.     CHW Plan: Patient will continue to do her exercises.     Next Outreach: 1/5/2021    AZNE Demarco  Clinic Care Coordination  [facility] Clin[facility]: [place]ie [place]  Phone: [phone]

## 2020-12-08 ENCOUNTER — PATIENT OUTREACH (OUTPATIENT)
Dept: CARE COORDINATION | Facility: CLINIC | Age: 80
End: 2020-12-08

## 2020-12-08 ASSESSMENT — ACTIVITIES OF DAILY LIVING (ADL): DEPENDENT_IADLS:: TRANSPORTATION

## 2020-12-08 NOTE — PROGRESS NOTES
Clinic Care Coordination Contact    Situation: Patient chart reviewed by care coordinator.     Background: Care Coordination following patient to assist with goals as below.     Assessment: Per chart review, patient outreach completed by CC CHW on 12/7/20.  Patient is actively working to accomplish goals.  Per FRW update, [facility] and other factors.  Patient had been in contact with [facility]  who discussed Elderly Waiver which patient was interested in exploring.  Per CC CHW note, [facility] would be coming to patient's home on 12/16/20.  Patient noted she is in the process of communicating with her insurance regarding the shingles shot.  Patient continues doing daily home exercises as part of her goal to be active. Patient's goals remain appropriate and relevant at this time.    Goals        Patient Stated      1. Psychosocial (pt-stated)      Goal Statement: I will explore additional options for support.  Date Goal set: 7/17/20  Barriers: limited mobility, poor eyesight, limited knowledge of available resources  Strengths: motivated to find additional sources of help  Date to Achieve By: 1/31/21  Patient expressed understanding of goal: Yes    Action steps to achieve this goal:  1. I will inquire about financial resources through Financial Resource referral with help from Care Coordination.  2. I will explore community options for help with cleaning/de-cluttering my home.  3. I will continue working with Care Coordination to address barriers to achieving my goal.        2. Being Active (pt-stated)      Goal Statement: I will begin exercising regularly to improve my overall wellbeing.  Measure of Success: The patient will confirm regular exercise routine.  Supportive Steps to Achieve: The patient will utilize her membership at the [facility] at least 1 time per week.  CCC RN will continue routine patient outreaches to provide ongoing support and  additional resources as needed.  Barriers: Limited mobility.  Strengths: The patient understands the importance of regular exercise in order to improve and maintain health.  Date to Achieve By: date extended to 11/30/20  Patient expressed understanding of goal: Yes    As of today's date 1/23/2020 goal is met at 26 - 50%.   Goal Status:  Active   As of today's date 3/23/2020 goal is met at 0 - 25%.   Goal Status:  Active - Patient has baker's cyst behind knee causing discomfort and making it difficult to exercise currently.  As of today's date 5/4/2020 goal is met at 26 - 50%.   Goal Status:  Active - Doing seated exercises most days during TV commercials.  Continues having pain to her right knee due to what she thinks is a baker's cyst.  As of today's date 6/10/2020 goal is met at 26 - 50%.   Goal Status:  Active         Plan/Recommendations: The patient will continue working with Care Coordination to achieve goals as above.  CC CHW will continue with outreaches at this time with next outreach in approximately 1 month. CC CHW will inform CC RN of any concerns or changes regarding patient as needed.  CC RN will review patient's chart in approximately 6 weeks and outreach to patient as indicated.    Philip Beltran, RN  Clinic Care Coordinator  [facility]  Ph: [phone]

## 2021-01-27 ENCOUNTER — PATIENT OUTREACH (OUTPATIENT)
Dept: NURSING | Facility: CLINIC | Age: 81
End: 2021-01-27
Payer: MEDICARE

## 2021-01-27 NOTE — PROGRESS NOTES
Clinic Care Coordination Contact  Community Health Worker Follow Up    Goals:   Goals Addressed as of 1/27/2021 at 12:04 PM                 Today    12/7/20       Patient Stated      1. Psychosocial (pt-stated)   40%  40%    Added 7/17/20 by Philip Beltran, RN     Goal Statement: I will explore additional options for support.  Date Goal set: 7/17/20  Barriers: limited mobility, poor eyesight, limited knowledge of available resources  Strengths: motivated to find additional sources of help  Date to Achieve By: 1/31/21  Patient expressed understanding of goal: Yes    Action steps to achieve this goal:  1. I will inquire about financial resources through Financial Resource referral with help from Care Coordination.  2. I will explore community options for help with cleaning/de-cluttering my home.  3. I will continue working with Care Coordination to address barriers to achieving my goal.        2. Being Active (pt-stated)   70%  60%    Added 12/3/19 by Philip Beltran, RN     Goal Statement: I will begin exercising regularly to improve my overall wellbeing.  Measure of Success: The patient will confirm regular exercise routine.  Supportive Steps to Achieve: The patient will utilize her membership at the [facility] at least 1 time per week.  CCC RN will continue routine patient outreaches to provide ongoing support and additional resources as needed.  Barriers: Limited mobility.  Strengths: The patient understands the importance of regular exercise in order to improve and maintain health.  Date to Achieve By: date extended to 11/30/20  Patient expressed understanding of goal: Yes    As of today's date 1/23/2020 goal is met at 26 - 50%.   Goal Status:  Active   As of today's date 3/23/2020 goal is met at 0 - 25%.   Goal Status:  Active - Patient has baker's cyst behind knee causing discomfort and making it difficult to exercise currently.  As of today's date 5/4/2020 goal is met at 26 - 50%.   Goal Status:  Active - Doing  seated exercises most days during TV commercials.  Continues having pain to her right knee due to what she thinks is a baker's cyst.  As of today's date 6/10/2020 goal is met at 26 - 50%.   Goal Status:  Active         Discussed:    Patient stated that she exercises daily, and walks around the house a lot.  Patient stated that she has not been to the [facility] due to Covid, but is exercising at home.    Patient stated that her daughter is helping her with paperwork and insurance medical claims.  Daughter spent 4 hours one day filling out forms.    Patient stated that it is difficult for her to sit more than an hour at a time.    Patient stated that she broke her eye glasses, and has an appointment with the eye doctor.    Patient stated that there are also other upcoming appointments for her and her .    Patient stated that her dryer finally arrived.  The dryer that was on order for many months was not available, so they paid more money to get a different dryer.  The dryer is installed, so now she has both a working washing machine and a dryer on the main floor.    Patient stated that her kids came to help clean the house, but unsure of where certain things are now.    Patient stated that she believes both her and her  are both physically better than one month ago except her eyes are progressively getting worse.     Patient stated that [employer] is still delivering food to there home.    Patient stated that she still would like her goal of in home support, but her home is cluttered at this time.  She is working on this goal.    Intervention and Education during outreach: CHW encouraged patient to contact CC if there are any other changes or resources needed.  Patient acknowledged understanding.     Plan: Patient will continue to do daily walking and exercises.  Patient will continue to de-clutter her home.  CHW will follow up in one month.    Next Outreach: 2/26/21    ZANE Demarco  Clinic Care  Coordination  [facility]: [place]i [place], [place] [place], Women's, and [facility]  Phone: [phone]

## 2021-02-04 ENCOUNTER — TELEPHONE (OUTPATIENT)
Dept: CARE COORDINATION | Facility: CLINIC | Age: 81
End: 2021-02-04

## 2021-02-04 DIAGNOSIS — E66.01 MORBID OBESITY DUE TO EXCESS CALORIES (H): Primary | ICD-10-CM

## 2021-02-04 DIAGNOSIS — E11.22 TYPE 2 DIABETES MELLITUS WITH STAGE 3 CHRONIC KIDNEY DISEASE, WITHOUT LONG-TERM CURRENT USE OF INSULIN, UNSPECIFIED WHETHER STAGE 3A OR 3B CKD (H): ICD-10-CM

## 2021-02-04 DIAGNOSIS — N18.30 TYPE 2 DIABETES MELLITUS WITH STAGE 3 CHRONIC KIDNEY DISEASE, WITHOUT LONG-TERM CURRENT USE OF INSULIN, UNSPECIFIED WHETHER STAGE 3A OR 3B CKD (H): ICD-10-CM

## 2021-02-04 DIAGNOSIS — H35.30 MACULAR DEGENERATION, UNSPECIFIED LATERALITY, UNSPECIFIED TYPE: ICD-10-CM

## 2021-02-04 NOTE — TELEPHONE ENCOUNTER
CC RN called to speak with patient's  for Care Coordination follow-up and patient asked for help getting a cane.    Patient noted she uses two canes to walk.  Their home is not well-equipped for her to use a walker, hence why she uses two separate canes instead.  Patient requesting orders for two canes from PCP.  She would like orders sent to [facility] (ph: [phone] fax: [phone]).    CC RN will pend DME order to PCP for review and approval.    Philip Beltran, RN  Clinic Care Coordinator  [facility]  [facility]  Ph: [phone]

## 2021-02-09 ENCOUNTER — IMMUNIZATION (OUTPATIENT)
Dept: NURSING | Facility: CLINIC | Age: 81
End: 2021-02-09
Payer: MEDICARE

## 2021-02-09 ENCOUNTER — PATIENT OUTREACH (OUTPATIENT)
Dept: CARE COORDINATION | Facility: CLINIC | Age: 81
End: 2021-02-09

## 2021-02-09 PROCEDURE — 91300 PR COVID VAC PFIZER DIL RECON 30 MCG/0.3 ML IM: CPT

## 2021-02-09 PROCEDURE — 0001A PR COVID VAC PFIZER DIL RECON 30 MCG/0.3 ML IM: CPT

## 2021-02-09 ASSESSMENT — ACTIVITIES OF DAILY LIVING (ADL): DEPENDENT_IADLS:: TRANSPORTATION

## 2021-02-09 NOTE — PROGRESS NOTES
Clinic Care Coordination Contact    Situation: Patient chart reviewed by care coordinator.     Background: Care Coordination following patient to assist with goals as below.     Assessment: Per chart review, patient outreach completed by CC CHW on 1/27/21.  Patient is actively working to accomplish goals.  Patient reported daily exercise at home; they haven't been going to the [facility] due to current pandemic.  Patient's daughter has been assisting patient/ with things like paperwork in light of patient's limitations due to her poor vision.  Patient's children have also been coming to help clean their home.  Patient's Psychosocial goal below remains appropriate and relevant at this time; Being Active goal will be completed per patient report of goal met.    Goals Addressed                 This Visit's Progress       Patient Stated      1. Psychosocial (pt-stated)        Goal Statement: I will explore additional options for support.  Date Goal set: 7/17/20 // revised on 2/9/21  Barriers: limited mobility, poor eyesight, limited knowledge of available resources  Strengths: motivated to find additional sources of help  Date to Achieve By: 1/31/21 // extended to 5/31/21  Patient expressed understanding of goal: Yes    Action steps to achieve this goal:  1. I will inquire about financial resources through Financial Resource referral with help from Care Coordination.  2. I will explore community options for help with cleaning/de-cluttering my home.  3. I will continue leaning on support from my children/family for the things I am unable to do.  4. I will consider in-home options for additional support.  5. I will continue working with Care Coordination to address barriers to achieving my goal.        COMPLETED: 2. Being Active (pt-stated)   100%     Goal Statement: I will begin exercising regularly to improve my overall wellbeing.  Measure of Success: The patient will confirm regular exercise routine.  Supportive Steps  to Achieve: The patient will utilize her membership at the [facility] at least 1 time per week.  CCC RN will continue routine patient outreaches to provide ongoing support and additional resources as needed.  Barriers: Limited mobility.  Strengths: The patient understands the importance of regular exercise in order to improve and maintain health.  Date to Achieve By: date extended to 11/30/20  Patient expressed understanding of goal: Yes    As of today's date 1/23/2020 goal is met at 26 - 50%.   Goal Status:  Active   As of today's date 3/23/2020 goal is met at 0 - 25%.   Goal Status:  Active - Patient has baker's cyst behind knee causing discomfort and making it difficult to exercise currently.  As of today's date 5/4/2020 goal is met at 26 - 50%.   Goal Status:  Active - Doing seated exercises most days during TV commercials.  Continues having pain to her right knee due to what she thinks is a baker's cyst.  As of today's date 6/10/2020 goal is met at 26 - 50%.   Goal Status:  Active         Plan/Recommendations: The patient will continue working with Care Coordination to achieve Psychosocial goal as above.  CC RN will send patient updated Complex Care Plan.  CC CHW will continue with outreaches at this time with next outreach in approximately 2-3 weeks. CC CHW will inform CC RN of any concerns or changes regarding patient as needed.  CC RN will review patient's chart in approximately 6 weeks and outreach to patient as indicated.    Philip Beltran, RN  Clinic Care Coordinator  [facility]  Ph: [phone]

## 2021-02-09 NOTE — LETTER
[facility] CARE COORDINATION  [facility]  [address], [place] [address]    February 9, 2021        Lucille Rojas  [address] [facility] [address]          Dear Connor Adkins is an updated Complex Care Plan for your continued enrollment in Care Coordination. Please let us know if you have additional questions, concerns, or goals that we can assist with.    Sincerely,    Philip Beltran RN        Pending sale to [facility]  Complex Care Plan  About Me:    Patient Name:  Lucille Rojas    YOB: 1940  Age:         80 year old   [place] MRN:    [id number] Telephone Information:  Home Phone [phone]   Mobile none       Address:  [address]  [facility] [address] Email address:  No e-mail address on record      Emergency Contact(s)    Name Relationship Lgl Grd Work Phone Home Phone Mobile Phone   1. EMIL ROJAS Spouse  none [phone] none   2. HARRIET ROJAS Daughter  none [phone] none           Primary language:  English     needed? No   [facility] [employer]:  [phone] op. 1  Other communication barriers: Glasses  Preferred Method of Communication:  Mail  Current living arrangement: I live in a private home with spouse  Mobility Status/ Medical Equipment: Independent w/Device    Health Maintenance  Health Maintenance Reviewed: Due/Overdue   Health Maintenance Due   Topic Date Due     COVID-19 Vaccine (1 of 2) 05[birth year]10/1956     ZOSTER IMMUNIZATION (1 of 2) 05/10/1988     DTAP/TDAP/TD IMMUNIZATION (2 - Td) 01/01/2019     DIABETIC FOOT EXAM  06/21/2019     A1C  12/04/2020     PHQ-9  01/09/2021     My Access Plan  Medical Emergency 911   Primary Clinic Line [facility] - [phone]   24 Hour Appointment Line [phone] or  [phone]) (toll-free)   24 Hour Nurse Line [phone] (toll-free)   Preferred Urgent Care [facility], [phone]    Preferred Hospital [facility]  [phone]   Preferred Pharmacy [facility] [place], [place] - [address] [address] [facility]     Behavioral Health Crisis Line The National Suicide Prevention Lifeline at 1-957.252.7599 or 911     My Care Team Members  Patient Care Team       Relationship Specialty Notifications Start End    Fausto Flynn MD PCP - General Internal Medicine  10/6/14     Phone: [phone] Fax: [phone]         [address] [place] [address]    Fausto Flynn MD Assigned PCP   10/12/14     Phone: [phone] Fax: [phone]         [address] [place] [address]    Philip Beltran, RN Lead Care Coordinator Primary Care - CC  12/3/19     Phone: [phone]         Yonny Roman MD MD INTERNAL MEDICINE - ENDOCRINOLOGY, DIABETES & METABOLISM  12/3/19     Phone: [phone] Fax: [phone]         ENDOCRINOLOGY CLINIC [facility] [address] [facility] [address] [facility] [address]    Rosanne Valadez MA Community Health Worker Primary Care - CC  1/22/20     Phone: [phone]         Archie Aguirre DPM Assigned Musculoskeletal Provider   10/23/20     Phone: [phone] Fax: [phone]         [address] [facility] [address] [facility] [address]            My Care Plans  Self Management and Treatment Plan  Goals and (Comments)  Goals        General    1. Psychosocial (pt-stated)     Notes - Note edited  2/9/2021  2:25 PM by Philip Beltran, RN    Goal Statement: I will explore additional options for support.  Date Goal set: 7/17/20 // revised on 2/9/21  Barriers: limited mobility, poor eyesight, limited knowledge of available resources  Strengths: motivated to find additional sources of help  Date to Achieve By: 1/31/21 // extended to 5/31/21  Patient expressed understanding of goal: Yes    Action steps to achieve this goal:  1. I will inquire about financial resources through Financial Resource referral with help from Care Coordination.  2. I  will explore community options for help with cleaning/de-cluttering my home.  3. I will continue leaning on support from my children/family for the things I am unable to do.  4. I will consider in-home options for additional support.  5. I will continue working with Care Coordination to address barriers to achieving my goal.           Action Plans on File:   Depression    Advance Care Plans/Directives Type: None on file       My Medical and Care Information  Problem List   Patient Active Problem List   Diagnosis     Hyperlipidemia LDL goal <100     Advance care planning     Macular degeneration     Apnea     Morbid obesity due to excess calories (H)     CKD (chronic kidney disease) stage 3, GFR 30-59 ml/min     Acquired hypothyroidism     Benign essential hypertension     Gastroesophageal reflux disease without esophagitis     Type 2 diabetes mellitus with stage 3 chronic kidney disease, without long-term current use of insulin (H)     Anemia, unspecified type     MDD (major depressive disorder), recurrent episode, mild (H)      Current Medications:  Current Outpatient Medications   Medication Instructions     ACE/ARB NOT PRESCRIBED, INTENTIONAL, Please choose reason not prescribed, below     aspirin (ASA) 81 mg, Oral, DAILY     citalopram (CELEXA) 10 MG tablet TAKE 1 TABLET (10MG) BY MOUTH DAILY     Ferrous Sulfate (IRON SUPPLEMENT PO) 325 mg, Oral     Glycerin-Polysorbate 80 (REFRESH DRY EYE THERAPY OP) Ophthalmic     levothyroxine (SYNTHROID/LEVOTHROID) 200 mcg, Oral, DAILY     miconazole (MICATIN; MICRO GUARD) 2 % powder Topical, PRN, Reported on 3/27/2017     Multiple Vitamins-Minerals (PRESERVISION AREDS) CAPS 1 tablet, Oral, 2 TIMES DAILY     nystatin (MYCOSTATIN) cream Topical, 2 TIMES DAILY     order for DME Equipment being ordered: wheeled walker     order for DME Equipment being ordered: wheeled walker with hand breaks     simvastatin (ZOCOR) 10 MG tablet TAKE ONE TABLET BY MOUTH AT BEDTIME      Care  Coordination Start Date: 12/3/2019   Frequency of Care Coordination: monthly   Form Last Updated: 02/09/2021

## 2021-02-11 ENCOUNTER — TRANSFERRED RECORDS (OUTPATIENT)
Dept: HEALTH INFORMATION MANAGEMENT | Facility: CLINIC | Age: 81
End: 2021-02-11

## 2021-03-02 ENCOUNTER — IMMUNIZATION (OUTPATIENT)
Dept: NURSING | Facility: CLINIC | Age: 81
End: 2021-03-02
Payer: MEDICARE

## 2021-03-02 PROCEDURE — 91300 PR COVID VAC PFIZER DIL RECON 30 MCG/0.3 ML IM: CPT

## 2021-03-02 PROCEDURE — 0002A PR COVID VAC PFIZER DIL RECON 30 MCG/0.3 ML IM: CPT

## 2021-03-18 ENCOUNTER — PATIENT OUTREACH (OUTPATIENT)
Dept: NURSING | Facility: CLINIC | Age: 81
End: 2021-03-18
Payer: MEDICARE

## 2021-03-18 NOTE — PROGRESS NOTES
Clinic Care Coordination Contact  Community Health Worker Follow Up    Goals:   Goals Addressed as of 3/18/2021 at 1:14 PM                 Today    1/27/21       Patient Stated      1. Psychosocial (pt-stated)   50%  40%    Added 7/17/20 by Philip Beltran RN     Goal Statement: I will explore additional options for support.  Date Goal set: 7/17/20 // revised on 2/9/21  Barriers: limited mobility, poor eyesight, limited knowledge of available resources  Strengths: motivated to find additional sources of help  Date to Achieve By: 1/31/21 // extended to 5/31/21  Patient expressed understanding of goal: Yes    Action steps to achieve this goal:  1. I will inquire about financial resources through Financial Resource referral with help from Care Coordination.  2. I will explore community options for help with cleaning/de-cluttering my home.  3. I will continue leaning on support from my children/family for the things I am unable to do.  4. I will consider in-home options for additional support.  5. I will continue working with Care Coordination to address barriers to achieving my goal.        Discussed:    Patient reported that her daughter is helping with the paperwork and with paying the bills.  Patient stated that there was an issue with a life insurance policy, but her daughter is taking care of that.    Patient stated that it is too difficult for her to write a check.    Patient stated that she and her daughter are trying to figure out how much it will cost to run the household.      Patient reported that she and her  had the Coivid-19 vaccinations.    Patient reported that yesterday, she and her  along with her daughter's family drove downtown to talk with a .    Patient stated that her daughter is in charge of the family land of 40 acres, and her other children are not happy about this decision.    Patient stated that her 's best friend of 40 years will be told that he can no longer  hunt on their land.    Patient stated that on the way back home from visiting the , they had a [employer] hamburger, and the patient enjoyed this.    Patient reported that her single son is being more active and helpful.    Patient stated that she has been doing some cleaning and found a box from 1983 and threw everything away.    Patient reported that she is in pain from her ankle to her leg.    Patient stated that she uses two canes at night or if she is sitting for awhile.  Patient stated that she moves both of her legs at night so they don't hurt.    Patient stated that she broke her glasses, and she thought it would cost her $150.00 to replace, but it ended up being a missing screw, no charge.    Patient stated that she had a scab on her face, and it was removed.  It was pre-cancerous.    Patient stated that [employer] continues to bring groceries.    Patient stated that she and her  use [employer] for $3.50 for both of them instead of per person for one way.    Patient stated that she really appreciates the call.      Intervention and Education during outreach: CHW encouraged patient to contact CC if there are any other changes or resources needed.  Patient acknowledged understanding.      Plan: Patient will continue to use her canes.  Patient will continue to work with her daughter on the family bills.  Patient will work on going through items in her home.    Next Outreach: 4/14/21    ZANE Demarco  Clinic Care Coordination  [facility] Clinics: [facility] [place], [place], [place], and [facility]  Phone: [phone]

## 2021-03-24 ENCOUNTER — PATIENT OUTREACH (OUTPATIENT)
Dept: CARE COORDINATION | Facility: CLINIC | Age: 81
End: 2021-03-24

## 2021-03-24 ASSESSMENT — ACTIVITIES OF DAILY LIVING (ADL): DEPENDENT_IADLS:: TRANSPORTATION

## 2021-03-24 NOTE — PROGRESS NOTES
Care Coordination Clinician Chart Review    Situation: Patient chart reviewed by care coordinator.    Background: Care Coordination initial assessment and enrollment to Care Coordination was 12/3/2019.  Patient centered goals were developed with participation from patient.  CC RN handed patient off to CHW for continued outreach every 30 days.    Assessment: Per chart review, patient outreach completed by CHW on 3/18/21.  Patient is actively working to accomplish goal.  Patient continues receiving assistance from her daughter to get their affairs in order and ensure they have appropriate resources.  Patient's goal remains appropriate and relevant at this time.  Patient is not due for updated Complex Care Plan.  Annual assessment to be completed annually and as needed.    Goals        Patient Stated      1. Psychosocial (pt-stated)      Goal Statement: I will explore additional options for support.  Date Goal set: 7/17/20 // revised on 2/9/21  Barriers: limited mobility, poor eyesight, limited knowledge of available resources  Strengths: motivated to find additional sources of help  Date to Achieve By: 1/31/21 // extended to 5/31/21  Patient expressed understanding of goal: Yes    Action steps to achieve this goal:  1. I will inquire about financial resources through Financial Resource referral with help from Care Coordination.  2. I will explore community options for help with cleaning/de-cluttering my home.  3. I will continue leaning on support from my children/family for the things I am unable to do.  4. I will consider in-home options for additional support.  5. I will continue working with Care Coordination to address barriers to achieving my goal.          Plan/Recommendations: The patient will continue working with Care Coordination to achieve goal as above.  On next CHW outreach, would recommend inquiring with patient if she feels goal can be completed (i.e. does patient feel they have appropriate  support/resources in place at this time) and transition to Maintenance.  CHW will involve CC RN as needed or if patient is ready to move to maintenance.  CC RN will continue to monitor progress to goals and CHW outreaches every 6 weeks.  Care Plan updated and sent to patient: Shante Beltran RN  Clinic Care Coordinator  [facility]  Ph: [phone]

## 2021-03-31 ENCOUNTER — NURSE TRIAGE (OUTPATIENT)
Dept: INTERNAL MEDICINE | Facility: CLINIC | Age: 81
End: 2021-03-31

## 2021-03-31 DIAGNOSIS — E03.9 ACQUIRED HYPOTHYROIDISM: Chronic | ICD-10-CM

## 2021-03-31 DIAGNOSIS — E78.5 HYPERLIPIDEMIA LDL GOAL <100: ICD-10-CM

## 2021-03-31 DIAGNOSIS — F32.0 MILD MAJOR DEPRESSION (H): ICD-10-CM

## 2021-04-01 RX ORDER — LEVOTHYROXINE SODIUM 200 UG/1
200 TABLET ORAL DAILY
Qty: 90 TABLET | Refills: 0 | Status: SHIPPED | OUTPATIENT
Start: 2021-04-01 | End: 2021-06-03

## 2021-04-01 RX ORDER — CITALOPRAM HYDROBROMIDE 10 MG/1
TABLET ORAL
Qty: 90 TABLET | Refills: 0 | Status: SHIPPED | OUTPATIENT
Start: 2021-04-01 | End: 2021-04-07 | Stop reason: DRUGHIGH

## 2021-04-01 RX ORDER — SIMVASTATIN 10 MG
TABLET ORAL
Qty: 90 TABLET | Refills: 0 | Status: SHIPPED | OUTPATIENT
Start: 2021-04-01 | End: 2021-06-03

## 2021-04-01 ASSESSMENT — PATIENT HEALTH QUESTIONNAIRE - PHQ9: SUM OF ALL RESPONSES TO PHQ QUESTIONS 1-9: 21

## 2021-04-01 NOTE — TELEPHONE ENCOUNTER
Pt advised, she is aware that if she has any acute mental health status changes, she knows she can go into the ER for eval prior to her clinic appt. Pt stated understanding, and agreed to plan of care.    appt with Dr. Flynn: 4/7/2021 1:00 PM.

## 2021-04-01 NOTE — TELEPHONE ENCOUNTER
"Patient screened positive for thoughts of better off dead or hurting herself in some way. She stated, \" I know I would never do anything like that though.\" Patient states that she feels like everything has been going wrong lately. Things breaking down, does not feel like putting things away so there is stuff everywhere. States that she is having difficulty seeing so daughter has taken over paying her bills. Has to walk with 2 canes. Kids don't want to visit do to stuff on chairs. Has a friend who is negative and brings her down.  Patient states that she spoke with a therapist that she liked a few years ago. States that she would really like someone to talk to.    Patient very willing to schedule appointment with Dr. Flynn. Needs to call [facility], so scheduled for next week.    Gave patient the clinic number if she needs someone to talk to in the meantime.       Additional Information    Negative: Patient attempted suicide    Negative: Patient is threatening suicide now    Negative: Violent behavior, or threatening to physically hurt or kill someone    Negative: Patient is very confused (disoriented, slurred speech) and no other adult (e.g., friend or family member) available    Negative: Difficult to awaken or acting very confused (disoriented, slurred speech) and of new onset    Negative: Sounds like a life-threatening emergency to the triager    Depression is the main symptom and is not threatening suicide    Negative: Patient attempted suicide    Negative: Patient is threatening suicide now    Negative: Violent behavior, or threatening to physically hurt or kill someone    Negative: Patient is very confused (disoriented, slurred speech) and no other adult (e.g., friend or family member) available    Negative: Difficult to awaken or acting very confused (disoriented, slurred speech) and new onset    Negative: Sounds like a life-threatening emergency to the triager    Negative: Alcohol use, abuse or " dependence, question or problem related to    Negative: Drug abuse or dependence, question or problem related to    Negative: Suicide thoughts, threats, attempts, or questions    Negative: Anxiety is main problem or symptom    Negative: Depression and unable to do any of normal activities (e.g., self care, school, work; in comparison to baseline).    Negative: Very strange or confused behavior    Negative: Patient sounds very sick or weak to the triager    Requesting to talk with a counselor (mental health worker, psychiatrist, etc.)    Negative: Fever > 101 F (38.3 C)    Sometimes has thoughts of suicide    Symptoms interfere with work or school    Depression is worsening (e.g.,sleeping poorly, less able to do activities of daily living)    Negative: Alcohol or drug abuse, known or suspected    Negative: New or changed psychiatric medications > 2 weeks ago and not feeling any better    Negative: Significant weight loss (> 10 pounds or 5 kg) and not dieting    Protocols used: SUICIDE SGFTBRIC-J-LF, DEPRESSION-A-OH    Pamela Arellano RN

## 2021-04-01 NOTE — TELEPHONE ENCOUNTER
Simvastatin and levothyroxine refilled per protocol. Due for labs in June.  Citalopram - Routing refill request to provider for review/approval because:  PHQ 9 21, thoughts better off dead. See note below.  Patient was very grateful for the call and scheduled for next week.  Pamela Arellano RN

## 2021-04-07 ENCOUNTER — OFFICE VISIT (OUTPATIENT)
Dept: INTERNAL MEDICINE | Facility: CLINIC | Age: 81
End: 2021-04-07
Payer: MEDICARE

## 2021-04-07 VITALS
HEIGHT: 59 IN | DIASTOLIC BLOOD PRESSURE: 66 MMHG | OXYGEN SATURATION: 92 % | BODY MASS INDEX: 55.48 KG/M2 | TEMPERATURE: 97.2 F | WEIGHT: 275.2 LBS | RESPIRATION RATE: 17 BRPM | SYSTOLIC BLOOD PRESSURE: 130 MMHG | HEART RATE: 78 BPM

## 2021-04-07 DIAGNOSIS — N18.30 STAGE 3 CHRONIC KIDNEY DISEASE, UNSPECIFIED WHETHER STAGE 3A OR 3B CKD (H): ICD-10-CM

## 2021-04-07 DIAGNOSIS — E66.01 MORBID OBESITY DUE TO EXCESS CALORIES (H): ICD-10-CM

## 2021-04-07 DIAGNOSIS — I10 BENIGN ESSENTIAL HYPERTENSION: Chronic | ICD-10-CM

## 2021-04-07 DIAGNOSIS — N18.30 TYPE 2 DIABETES MELLITUS WITH STAGE 3 CHRONIC KIDNEY DISEASE, WITHOUT LONG-TERM CURRENT USE OF INSULIN, UNSPECIFIED WHETHER STAGE 3A OR 3B CKD (H): ICD-10-CM

## 2021-04-07 DIAGNOSIS — E11.22 TYPE 2 DIABETES MELLITUS WITH STAGE 3 CHRONIC KIDNEY DISEASE, WITHOUT LONG-TERM CURRENT USE OF INSULIN, UNSPECIFIED WHETHER STAGE 3A OR 3B CKD (H): ICD-10-CM

## 2021-04-07 DIAGNOSIS — F33.0 MDD (MAJOR DEPRESSIVE DISORDER), RECURRENT EPISODE, MILD (H): Primary | ICD-10-CM

## 2021-04-07 LAB
HBA1C MFR BLD: 5.8 % (ref 0–5.6)
HGB BLD-MCNC: 10.6 G/DL (ref 11.7–15.7)

## 2021-04-07 PROCEDURE — 85018 HEMOGLOBIN: CPT | Performed by: INTERNAL MEDICINE

## 2021-04-07 PROCEDURE — 36415 COLL VENOUS BLD VENIPUNCTURE: CPT | Performed by: INTERNAL MEDICINE

## 2021-04-07 PROCEDURE — 83036 HEMOGLOBIN GLYCOSYLATED A1C: CPT | Performed by: INTERNAL MEDICINE

## 2021-04-07 PROCEDURE — 99214 OFFICE O/P EST MOD 30 MIN: CPT | Performed by: INTERNAL MEDICINE

## 2021-04-07 RX ORDER — CITALOPRAM HYDROBROMIDE 20 MG/1
20 TABLET ORAL DAILY
Qty: 90 TABLET | Refills: 1 | Status: SHIPPED | OUTPATIENT
Start: 2021-04-07 | End: 2022-03-16

## 2021-04-07 ASSESSMENT — MIFFLIN-ST. JEOR: SCORE: 1613.93

## 2021-04-07 NOTE — PROGRESS NOTES
"    Assessment & Plan     MDD (major depressive disorder), recurrent episode, mild (H)  We will replace referral for mental health counseling, increase citalopram to 20 mg daily.  - citalopram (CELEXA) 20 MG tablet; Take 1 tablet (20 mg) by mouth daily  - MENTAL HEALTH REFERRAL  - Adult; Outpatient Treatment; Individual/Couples/Family/Group Therapy/Health Psychology; [facility] [phone]; We will contact you to schedule the appointment or please call with any questions    Type 2 diabetes mellitus with stage 3 chronic kidney disease, without long-term current use of insulin, unspecified whether stage 3a or 3b CKD (H)  Recheck A1c level with noted lab values listed below.  - Hemoglobin A1c    Stage 3 chronic kidney disease, unspecified whether stage 3a or 3b CKD  Stable and following hemoglobin, creatinine listed:    Creatinine   Date Value Ref Range Status   08/27/2020 0.98 0.52 - 1.04 mg/dL Final       - Hemoglobin    Morbid obesity due to excess calories (H)  Encourage ongoing weight loss.    Benign essential hypertension  Stable on therapy continue with current medical management       BMI:   Estimated body mass index is 55.58 kg/m  as calculated from the following:    Height as of this encounter: 1.499 m (4' 11\").    Weight as of this encounter: 124.8 kg (275 lb 3.2 oz).   Weight management plan: Discussed healthy diet and exercise guidelines    See Patient Instructions    Discussed with her updating her immunizations including her shingles and tetanus booster as well as obtaining a follow-up mammogram and she would like to wait to her physical exam.    Return in about 6 months (around 10/7/2021) for if symptoms recur or worsen, Medicare Wellness Exam.    Fausto Flynn MD  [facility] Ad[facility] is a 80 year old who presents for the following health issues     HPI:    Depression Followup    How are you doing with your depression since " your last visit? Worsened     Are you having other symptoms that might be associated with depression? Yes:  problems concentrating     Have you had a significant life event?  OTHER: Isolation with pandemic, vision problems     Are you feeling anxious or having panic attacks?   YES    Do you have any concerns with your use of alcohol or other drugs? No     She states he has had some issues in regards to some stressors in her life that centered around her children.  She has seen a licensed social worker and counseling back in 2018 and does have interest in seeing them again.  She has been taking her citalopram at 10 mg daily.    Social History     Tobacco Use     Smoking status: Never Smoker     Smokeless tobacco: Never Used   Substance Use Topics     Alcohol use: Yes     Alcohol/week: 0.0 standard drinks     Comment: rarely     Drug use: No     PHQ 5/16/2019 7/9/2020 4/1/2021   PHQ-9 Total Score 3 6 21   Q9: Thoughts of better off dead/self-harm past 2 weeks Not at all Not at all Several days     RUSLAN-7 SCORE 6/7/2018 7/24/2018 8/24/2018   Total Score 5 4 4     Last PHQ-9 4/1/2021   1.  Little interest or pleasure in doing things 3   2.  Feeling down, depressed, or hopeless 2   3.  Trouble falling or staying asleep, or sleeping too much 2   4.  Feeling tired or having little energy 3   5.  Poor appetite or overeating 3   6.  Feeling bad about yourself 3   7.  Trouble concentrating 3   8.  Moving slowly or restless 1   Q9: Thoughts of better off dead/self-harm past 2 weeks 1   PHQ-9 Total Score 21   Difficulty at work, home, or with people Extremely dIfficult     RUSLAN-7  8/24/2018   1. Feeling nervous, anxious, or on edge 0   2. Not being able to stop or control worrying 1   3. Worrying too much about different things 1   4. Trouble relaxing 1   5. Being so restless that it is hard to sit still 0   6. Becoming easily annoyed or irritable 1   7. Feeling afraid, as if something awful might happen 0   RUSLAN-7 Total Score 4  "  If you checked any problems, how difficult have they made it for you to do your work, take care of things at home, or get along with other people? Somewhat difficult       Review of Systems     EYES: NEGATIVE for vision changes or irritation  ENT/MOUTH: NEGATIVE for ear, mouth and throat problems  RESP: NEGATIVE for significant cough or SOB  CV: NEGATIVE for chest pain, palpitations or peripheral edema  GI: NEGATIVE for nausea, abdominal pain, heartburn, or change in bowel habits  : NEGATIVE for frequency, dysuria, or hematuria  MUSCULOSKELETAL: NEGATIVE for significant arthralgias or myalgia  NEURO: NEGATIVE for weakness, dizziness or paresthesias  HEME: NEGATIVE for bleeding problems      Objective    /66   Pulse 78   Temp 97.2  F (36.2  C) (Temporal)   Resp 17   Ht 1.499 m (4' 11\")   Wt 124.8 kg (275 lb 3.2 oz)   SpO2 92%   BMI 55.58 kg/m    Body mass index is 55.58 kg/m .     Physical Exam   GENERAL: alert and no distress  EYES: Eyes grossly normal to inspection, PERRL and conjunctivae and sclerae normal  HENT: ear canals and TM's normal, nose and mouth without ulcers or lesions  NECK: no adenopathy, no asymmetry, masses, or scars and thyroid normal to palpation  Morbidly obese.  RESP: lungs clear to auscultation - no rales, rhonchi or wheezes  CV: regular rate and rhythm, normal S1 S2, no S3 or S4, no click or rub  MS: no gross musculoskeletal defects noted using a wheeled walker  NEURO: No focal changes  PSYCH: mentation appears normal, affect flat        Lab Results   Component Value Date    A1C 5.6 06/04/2020    A1C 5.7 06/12/2018    A1C 6.2 09/06/2017    A1C 5.6 12/01/2016    A1C 5.9 08/01/2016       TSH   Date Value Ref Range Status   06/04/2020 0.93 0.30 - 5.00 uIU/mL Final     Creatinine   Date Value Ref Range Status   08/27/2020 0.98 0.52 - 1.04 mg/dL Final     Lab Results   Component Value Date    ALT 15 08/27/2020       "

## 2021-04-07 NOTE — LETTER
[facility]  [address] [address]  [phone]      4/7/2021       Lucille Rojas  [address]  [facility] [address]        Dear Lucille,    Your Hemoglobin A1C and blood sugar tests look good and I would continue with your medication without change.  These tests should be repeated in 6 months.    Your hemoglobin still remains slightly low but is stable as compared to the last few times it is been checked.  Your hemoglobin results are consistent with a normochromic normocytic anemia which typically can include chronic infectious/inflammatory conditions, hypersplenomegaly, renal disease, medication reactions, nutritional deficiencies, and acute hemorrhage.  There are some additional studies, as previously discussed, that could be done to better evaluate this if you are interested.    Sincerely,      Fausto Flynn MD  Internal Medicine

## 2021-04-22 ENCOUNTER — PATIENT OUTREACH (OUTPATIENT)
Dept: CARE COORDINATION | Facility: CLINIC | Age: 81
End: 2021-04-22

## 2021-04-22 NOTE — PROGRESS NOTES
Clinic Care Coordination Contact  [facility]/Voicemail  The Community Health Worker called for a scheduled phone Care Coordination outreach visit 4/22/21 at 1:30pm to follow up on goals and action steps.   Patient was a no show.  CHW called three times within 30 minutes.     Clinical Data: Care Coordinator Outreach  Outreach attempted x 1.  Left message on patient's voicemail with call back information and requested return call.  Plan: Care Coordinator will try to reach patient again in 3-5 business days.    Next Outreach: 4/29/21    ZANE Demarco  Clinic Care Coordination  [facility] Clin[facility]: [facility] [place], [facility], and [facility]  Phone: [phone]

## 2021-05-14 ENCOUNTER — PATIENT OUTREACH (OUTPATIENT)
Dept: NURSING | Facility: CLINIC | Age: 81
End: 2021-05-14
Payer: MEDICARE

## 2021-05-14 NOTE — PROGRESS NOTES
Clinic Care Coordination Contact  Community Health Worker Follow Up    Goals:   Goals Addressed as of 5/14/2021 at 11:56 AM                 Today    3/18/21       Patient Stated      1. Psychosocial (pt-stated)   60%  50%    Added 7/17/20 by Philip Beltran RN     Goal Statement: I will explore additional options for support.  Date Goal set: 7/17/20 // revised on 2/9/21  Barriers: limited mobility, poor eyesight, limited knowledge of available resources  Strengths: motivated to find additional sources of help  Date to Achieve By: 1/31/21 // extended to 5/31/21  Patient expressed understanding of goal: Yes    Action steps to achieve this goal:  1. I will inquire about financial resources through Financial Resource referral with help from Care Coordination.  2. I will explore community options for help with cleaning/de-cluttering my home.  3. I will continue leaning on support from my children/family for the things I am unable to do.  4. I will consider in-home options for additional support.  5. I will continue working with Care Coordination to address barriers to achieving my goal.        Discussed:    Patient stated that her daughter is taking care of the paperwork, and she is not too worry about it.    Patient stated that she is not in too much pain recently, and it doesn't take her as long to settle into bed.    Patient stated that she is doing exercises mostly while sitting, but the exercises are helping.    Patient stated that she is walking better with her cane, but does not go downstairs.    Patient stated that she hired a cleaning lady, and she comes to clean for 2 hours every two weeks.  This person was hired and came out 2 weeks ago for the first time.  She cleaned the bathroom for the entire time.  She will ask about cleaning her refrigerator, and go through her cupboards.  Patient stated that she pays $20.00 an hour, $40.00 each visit, $80.00 per month.    Patient stated that she has been talking with a  counselor over the phone, and feels much better.  Patient will call again if needed.      Patient stated that the counselor raised her dose of her medication.  She has a noticed a difference, she is trying to be less critical.  Patient stated that she hasn't felt like moving out.    Patient stated that the counselor suggested that she gets out with lady friends.  Patient stated that she will plan to visit with a nearby friend or have a friend over and sit outside.      Patient stated that she is going to open the windows to let the outside in with the fresh air.    Patient stated that she and her  went out of town for one day up [place] to [place] to visit her niece and her niece's . The drive work out well, and her son-in-law stopped when needed for breaks.  The patient has 4 large boxes of fabric that she wanted to give to her niece for quilting.      Intervention and Education during outreach: CHW gave patient affirmations to have a cleaning lady help with household chores.  CHW offered encouragement to patient for talking with a counselor and to reach out again if needed.  CHW encouraged patient to contact CC if there are any other changes or resources needed.  Patient acknowledged understanding.        Plan: Patient will visit with a friend.  Patient will contact the counselor if needed.  Patient will continue to do exercises.  CHW will outreach in one month.    Next Outreach: 6/14/21    ZANE Demarco  Clinic Care Coordination  [facility] Clinics: [facility] [place], [facility] and [place] [facility]  Phone: [phone]

## 2021-05-24 ENCOUNTER — PATIENT OUTREACH (OUTPATIENT)
Dept: CARE COORDINATION | Facility: CLINIC | Age: 81
End: 2021-05-24

## 2021-05-24 ASSESSMENT — ACTIVITIES OF DAILY LIVING (ADL): DEPENDENT_IADLS:: TRANSPORTATION

## 2021-05-24 NOTE — PROGRESS NOTES
Care Coordination Clinician Chart Review    Situation: Patient chart reviewed by care coordinator.    Background: Care Coordination initial assessment and enrollment to Care Coordination was 12/3/2019.  Patient centered goals were developed with participation from patient.  CC RN handed patient off to CHW for continued outreach every 30 days.    Assessment: Per chart review, patient outreach completed by CHW on 5/14/21.  Patient is actively working to accomplish goal.  Patient noted her daughter has continued to help her with paperwork items.  Patient hired a cleaning lady to help with household cleaning.  Patient has been walking fairly well with her cane.  She has been following up with mental health counselor for her depression.  Patient's goal remains appropriate and relevant at this time.  Patient is due for updated Complex Care Plan.  Annual assessment to be completed annually and as needed.    Goals        Patient Stated      1. Psychosocial (pt-stated)      Goal Statement: I will explore additional options for support.  Date Goal set: 7/17/20 // revised on 2/9/21  Barriers: limited mobility, poor eyesight, limited knowledge of available resources  Strengths: motivated to find additional sources of help  Date to Achieve By: 1/31/21 // extended to 5/31/21  Patient expressed understanding of goal: Yes    Action steps to achieve this goal:  1. I will inquire about financial resources through Financial Resource referral with help from Care Coordination.  2. I will explore community options for help with cleaning/de-cluttering my home.  3. I will continue leaning on support from my children/family for the things I am unable to do.  4. I will consider in-home options for additional support.  5. I will continue working with Care Coordination to address barriers to achieving my goal.        Plan/Recommendations: The patient will continue working with Care Coordination to achieve goal as above.  CHW will involve GAVIN ANN  as needed or if patient is ready to move to maintenance.  CC RN will continue to monitor progress to goals and CHW outreaches every 6 weeks.  Care Plan updated and sent to patient: Yes    Philip Beltran, RN  Clinic Care Coordinator  [facility]  [facility]  Ph: [phone]

## 2021-05-24 NOTE — LETTER
[facility] CARE COORDINATION  [facility]  [address], [place] [address]    May 24, 2021        Lucille Rojas  [address] [facility] [address]          Dear Connor Adkins is an updated Complex Care Plan for your continued enrollment in Care Coordination. Please let us know if you have additional questions, concerns, or goals that we can assist with.    Sincerely,    Philip Beltran, RN  Clinic Care Coordinator  [facility]  [facility], [facility]  Ph: [phone]            [facility]  Complex Care Plan  About Me:    Patient Name:  Lucille Rojas    YOB: 1940  Age:         81 year old   [place] MRN:    [id number] Telephone Information:  Home Phone [phone]   Mobile none       Address:  [address] [facility] [address] Email address:  No e-mail address on record      Emergency Contact(s)    Name Relationship Lgl Grd Work Phone Home Phone Mobile Phone   1. EMIL RJOAS Spouse  none [phone] none   2. HARRIET ROJAS Daughter  none [phone] none           Primary language:  English     needed? No   [place] [employer]:  [phone] op. 1  Other communication barriers: Glasses  Preferred Method of Communication:  Mail  Current living arrangement: I live in a private home with spouse  Mobility Status/ Medical Equipment: Independent w/Device    Health Maintenance  Health Maintenance Reviewed:   Health Maintenance Due   Topic Date Due     ZOSTER IMMUNIZATION (1 of 2) Never done     DTAP/TDAP/TD IMMUNIZATION (2 - Td) 01/01/2019     DIABETIC FOOT EXAM  06/21/2019     EYE EXAM  06/01/2021     LIPID  06/04/2021     My Access Plan  Medical Emergency 911   Primary Clinic Line [facility] - [phone]   24 Hour Appointment Line [phone] or  [phone]) (toll-free)   24 Hour Nurse Line [phone]  (toll-free)   Preferred Urgent Care [facility], [phone]   Preferred Hospital [facility] [place]  [phone]   Preferred Pharmacy [id number] - [place], [place] - [address] [facility] Crisis Line The National Suicide Prevention Lifeline at 1-244.572.3227 or 911       My Care Team Members  Patient Care Team       Relationship Specialty Notifications Start End    Fausto Flynn MD PCP - General Internal Medicine  10/6/14     Phone: [phone] Fax: [phone]         [address] [place] [id number]    Fausto Flynn MD Assigned PCP   10/12/14     Phone: [phone] Fax: [phone]         [address] [place] [id number]    Philip Beltran RN Lead Care Coordinator Primary Care - [facility] Admissions 12/3/19     Phone: [phone]         Yonny Roman MD MD INTERNAL MEDICINE - ENDOCRINOLOGY, DIABETES & METABOLISM  12/3/19     Phone: [phone] Fax: [phone]         ENDOCR[facility]OLOGY CLINIC [facility] [address] [facility] [address] [facility] [address]    Rosanne Valadez MA Community Health Worker Primary Care - [facility]  1/22/20     Phone: [phone]         Archie Aguirre DPM Assigned Musculoskeletal Provider   10/23/20     Phone: [phone] Fax: [phone]         [address] [facility] [address] [facility] [address]            My Care Plans  Self Management and Treatment Plan  Goals and (Comments)  Goals        Patient Stated      1. Psychosocial (pt-stated)      Goal Statement: I will explore additional options for support.  Date Goal set: 7/17/20 // revised on 2/9/21  Barriers: limited mobility, poor eyesight, limited knowledge of available resources  Strengths: motivated to find additional sources of help  Date to Achieve By: 1/31/21 // extended to 5/31/21  Patient expressed understanding of goal: Yes    Action steps to achieve this goal:  1. I will inquire about financial resources through Financial Resource referral  with help from Care Coordination.  2. I will explore community options for help with cleaning/de-cluttering my home.  3. I will continue leaning on support from my children/family for the things I am unable to do.  4. I will consider in-home options for additional support.  5. I will continue working with Care Coordination to address barriers to achieving my goal.        Action Plans on File:   Depression  Advance Care Plans/Directives Type: none on file    Honoring Choices    Advance Care Planning and Health Care Directives  When it comes to decisions about your health care, it s important that your voice is heard. You may not always be able to speak for yourself.    We encourage you to have discussions with your loved ones, cultural or spiritual leaders and health care providers about your goals, values, beliefs and choices.    We are a part of [employer] [place] , supporting and promoting the benefits of advance care planning conversations.    Our goals are to:    Help you make informed decisions about your healthcare choices and ensure that those choices are honored    Offer advance care planning discussions with trained staff    Ensure your choices are clearly defined, documented and available in your medical record    Translate your choices into medical orders as needed    Why is Advance Care Planning important?    Know what your health care choices are and decide what is right for you    An unexpected illness or injury could leave you unable to participate in important treatment decisions    When choices are left to others to decide that responsibility can be difficult and stressful    By discussing and outlining your choices, your voice is heard in the care you want to receive    How can I learn more?  We offer free classes at multiple locations, days and times. Our trained facilitators will provide information and guide you through a Health Care Directive document. They can also review, notarize and  add your document to your medical record.    Call scrible at [phone] or toll free at [phone] for assistance.       My Medical and Care Information  Problem List   Patient Active Problem List   Diagnosis     Hyperlipidemia LDL goal <100     Advance care planning     Macular degeneration     Apnea     Morbid obesity due to excess calories (H)     CKD (chronic kidney disease) stage 3, GFR 30-59 ml/min     Acquired hypothyroidism     Benign essential hypertension     Gastroesophageal reflux disease without esophagitis     Type 2 diabetes mellitus with stage 3 chronic kidney disease, without long-term current use of insulin (H)     Anemia, unspecified type     MDD (major depressive disorder), recurrent episode, mild (H)      Current Medications:  Current Outpatient Medications   Medication Instructions     ACE/ARB NOT PRESCRIBED, INTENTIONAL, Please choose reason not prescribed, below     aspirin (ASA) 81 mg, Oral, DAILY     citalopram (CELEXA) 20 mg, Oral, DAILY     Ferrous Sulfate (IRON SUPPLEMENT PO) 325 mg, Oral     Glycerin-Polysorbate 80 (REFRESH DRY EYE THERAPY OP) Ophthalmic     levothyroxine (SYNTHROID/LEVOTHROID) 200 mcg, Oral, DAILY     miconazole (MICATIN; MICRO GUARD) 2 % powder Topical, PRN, Reported on 3/27/2017     Multiple Vitamins-Minerals (PRESERVISION AREDS) CAPS 1 tablet, Oral, 2 TIMES DAILY     nystatin (MYCOSTATIN) cream Topical, 2 TIMES DAILY     order for DME Equipment being ordered: wheeled walker with hand breaks     simvastatin (ZOCOR) 10 MG tablet TAKE ONE TABLET BY MOUTH AT BEDTIME      Care Coordination Start Date: 12/3/2019   Frequency of Care Coordination: monthly   Form Last Updated: 05/24/2021

## 2021-06-02 DIAGNOSIS — E78.5 HYPERLIPIDEMIA LDL GOAL <100: ICD-10-CM

## 2021-06-02 DIAGNOSIS — E03.9 ACQUIRED HYPOTHYROIDISM: Chronic | ICD-10-CM

## 2021-06-03 RX ORDER — SIMVASTATIN 10 MG
TABLET ORAL
Qty: 90 TABLET | Refills: 3 | Status: SHIPPED | OUTPATIENT
Start: 2021-06-03 | End: 2021-11-22

## 2021-06-03 RX ORDER — LEVOTHYROXINE SODIUM 200 UG/1
TABLET ORAL
Qty: 90 TABLET | Refills: 3 | Status: SHIPPED | OUTPATIENT
Start: 2021-06-03 | End: 2022-03-31

## 2021-06-09 ENCOUNTER — TRANSFERRED RECORDS (OUTPATIENT)
Dept: HEALTH INFORMATION MANAGEMENT | Facility: CLINIC | Age: 81
End: 2021-06-09

## 2021-06-17 ENCOUNTER — PATIENT OUTREACH (OUTPATIENT)
Dept: NURSING | Facility: CLINIC | Age: 81
End: 2021-06-17
Payer: MEDICARE

## 2021-06-17 NOTE — PROGRESS NOTES
Clinic Care Coordination Contact  The Community Health Worker called for a Care Coordination outreach.      Phone Visit with CHW is scheduled for 6/23/21 at 1:00pm     Next Outreach: 6/23/21    ZANE Demarco  Clinic Care Coordination  [facility] Clinics: Dayami [place], [place], [place], and [facility]  Phone: [phone]

## 2021-06-23 ENCOUNTER — PATIENT OUTREACH (OUTPATIENT)
Dept: NURSING | Facility: CLINIC | Age: 81
End: 2021-06-23
Payer: MEDICARE

## 2021-06-23 NOTE — PROGRESS NOTES
"Clinic Care Coordination Contact  Community Health Worker Follow Up    Goals:   Goals Addressed as of 2021 at 1:20 PM                 Today    21       Patient Stated      1. Psychosocial (pt-stated)   70%  60%    Added 20 by Philip Beltran RN     Goal Statement: I will explore additional options for support.  Date Goal set: 20 // revised on 21  Barriers: limited mobility, poor eyesight, limited knowledge of available resources  Strengths: motivated to find additional sources of help  Date to Achieve By: 21 // extended to 21  Patient expressed understanding of goal: Yes    Action steps to achieve this goal:  1. I will inquire about financial resources through Financial Resource referral with help from Care Coordination.  2. I will explore community options for help with cleaning/de-cluttering my home.  3. I will continue leaning on support from my children/family for the things I am unable to do.  4. I will consider in-home options for additional support.  5. I will continue working with Care Coordination to address barriers to achieving my goal.        Discussed:    Patient stated that she is doing \"pretty good\".    Patient stated that she fell out of bed two weeks ago, and her area near the bed is so full of stuff, that she couldn't fall flat.  EMS was called and lifted her up.  Patient did not have any sores, her knee hurt.  Patient did not go to the hospital.    Patient stated that the cleaning lady helped clean the bedroom yesterday, and there is room to move around.      Patient stated that the cleaning lady still comes twice a month to help clean.    Patient stated that the stove , it finally gave out.  The stove is from 8.  Patient was told that there are no longer parts, and she will need a new stove.    Patient stated that she hasn't had any lady friends over yet, and she is trying hard not to talk to one friend.    Patient stated that her daughter that helps her " and her , recently had surgery.      Patient stated that her  has been talking more to her.    Patient stated that things are much better and she hasn't needed to call her counselor.      Intervention and Education during outreach: CHW encouraged patient to contact CC if there are any other changes or resources needed.  Patient acknowledged understanding.        Plan: Patient will continue to have someone clean her home.  Patient will continue to have her daughter do the billing.    Next Outreach:  7/23/21    ZANE Demarco  Clinic Care Coordination  [facility] Clinics: [place], and [place] for Women  Phone: [phone]

## 2021-06-29 ENCOUNTER — PATIENT OUTREACH (OUTPATIENT)
Dept: CARE COORDINATION | Facility: CLINIC | Age: 81
End: 2021-06-29

## 2021-06-29 ASSESSMENT — ACTIVITIES OF DAILY LIVING (ADL): DEPENDENT_IADLS:: TRANSPORTATION

## 2021-06-30 NOTE — PROGRESS NOTES
Care Coordination Clinician Chart Review    Situation: Patient chart reviewed by care coordinator.    Background: Care Coordination initial assessment and enrollment to Care Coordination was 12/3/2019; updated assessment completed on 10/12/2020.  Patient centered goals were developed with participation from patient.  CC RN handed patient off to CHW for continued outreach every 30 days.    Assessment: Per chart review, patient outreach completed by CHW on 6/23/21.  Patient is actively working to accomplish goal.  Patient noted she continues receiving housekeeping services and that her daughter has been helping her and her  more recently.  Patient's goal remains appropriate and relevant at this time.  Patient is not due for updated Complex Care Plan.  Annual assessment to be completed annually and as needed.    Goals        Patient Stated      1. Psychosocial (pt-stated)      Goal Statement: I will explore additional options for support.  Date Goal set: 7/17/20 // revised on 2/9/21  Barriers: limited mobility, poor eyesight, limited knowledge of available resources  Strengths: motivated to find additional sources of help  Date to Achieve By: 1/31/21 // extended to 7/31/21  Patient expressed understanding of goal: Yes    Action steps to achieve this goal:  1. I will inquire about financial resources through Financial Resource referral with help from Care Coordination.  2. I will explore community options for help with cleaning/de-cluttering my home.  3. I will continue leaning on support from my children/family for the things I am unable to do.  4. I will consider in-home options for additional support.  5. I will continue working with Care Coordination to address barriers to achieving my goal.        Plan/Recommendations: The patient will continue working with Care Coordination to achieve goal as above.  CHW will involve CC RN as needed or if patient is ready to move to maintenance.  CC RN will continue to  monitor progress to goals and CHW outreaches every 6 weeks.  Care Plan updated and sent to patient: Shante Beltran RN  Clinic Care Coordinator  [facility]  [facility]  Ph: [phone]

## 2021-07-29 ENCOUNTER — PATIENT OUTREACH (OUTPATIENT)
Dept: NURSING | Facility: CLINIC | Age: 81
End: 2021-07-29
Payer: MEDICARE

## 2021-07-29 ENCOUNTER — TELEPHONE (OUTPATIENT)
Dept: CARE COORDINATION | Facility: CLINIC | Age: 81
End: 2021-07-29

## 2021-07-29 NOTE — PROGRESS NOTES
Clinic Care Coordination Contact  The Community Health Worker called for a Care Coordination outreach to follow up on goals and action steps. Spoke to Lucille.      Patient stated that she is not too well today.    Patient stated that she is trying to get a new CPAP machine and an oxygen concentrator.  Patient's old CPAP machine is not working and has been recalled.  Patient stated that she spoke with Clint and was told that she will need to come to their office to bring the old CPAP machine for an exchange.  Patient discussed with Clint that she does not drive or have transportation, and it is difficult for her.      Patient stated that she contact the [facility], and has not heard back from them.      Patient stated that she is getting really frustrated, and being passed around with no help.    CHW scheduled a phone visit to discuss goals. The appointment is on 8/2/21 at 1:00pm.      CHW will route a message to [facility] regarding CPAP and an oxygen concentrator.      Next Outreach: 8/2/21    ZANE Demarco  Clinic Care Coordination  [facility] Clinics: [place], and [place] for Women  Phone: [phone]

## 2021-07-29 NOTE — TELEPHONE ENCOUNTER
Clinic Care Coordination Contact  Patient stated that she needs a new CPAP.  The one she has is no longer working and has been recalled.  Patient also needs an oxygen concentrator.  Patient stated that she would need these delivered as she does not drive.  Please call patient to discuss.  Thank you.    Rosanne Valadez, KM  Clinic Care Coordination  [facility] Clinics: Dayami [place], Randi, [place], and [employer] for Women  Phone: [phone]

## 2021-07-30 NOTE — TELEPHONE ENCOUNTER
Pt needs to contact the [facility] for this.   They have managed this in the past.  Pt advised, stated understanding, and agreed to plan of care.

## 2021-08-02 ENCOUNTER — PATIENT OUTREACH (OUTPATIENT)
Dept: NURSING | Facility: CLINIC | Age: 81
End: 2021-08-02
Payer: MEDICARE

## 2021-08-02 NOTE — PROGRESS NOTES
Clinic Care Coordination Contact  Community Health Worker Follow Up    Goals:   Goals Addressed as of 8/2/2021 at 1:00 PM                    Today    6/23/21       Patient Stated       1. Psychosocial (pt-stated)   80%  70%    Added 7/17/20 by Philip Beltran RN      Goal Statement: I will explore additional options for support.  Date Goal set: 7/17/20 // revised on 2/9/21  Barriers: limited mobility, poor eyesight, limited knowledge of available resources  Strengths: motivated to find additional sources of help  Date to Achieve By: 1/31/21 // extended to 8/30/21  Patient expressed understanding of goal: Yes    Action steps to achieve this goal:  1. I will inquire about financial resources through Financial Resource referral with help from Care Coordination.  2. I will explore community options for help with cleaning/de-cluttering my home.  3. I will continue leaning on support from my children/family for the things I am unable to do.  4. I will consider in-home options for additional support.  5. I will continue working with Care Coordination to address barriers to achieving my goal.        Discussed:    Patient stated that her daughter has been busy getting all of their legal paperwork / documents completed.    Patient stated that she has not yet purchased a new stove since our last conversation.  The stove was 50 years old.  Patient stated that she has been using an air fryer for some meals since she does not have a working stove right now.    Patient stated that [employer] is still delivering food twice a month.      Intervention and Education during outreach: CHW encouraged patient to contact CC if there are any other changes or resources needed.  Patient acknowledged understanding.      Plan: CHW will follow up in one month.    Next Outreach: 9/1/21    ZANE Demarco  Clinic Care Coordination  [facility] Clinics: Dayami [place], Randi, [place], and [place] for Women  Phone: [phone]

## 2021-08-23 ENCOUNTER — PATIENT OUTREACH (OUTPATIENT)
Dept: CARE COORDINATION | Facility: CLINIC | Age: 81
End: 2021-08-23

## 2021-08-23 ASSESSMENT — ACTIVITIES OF DAILY LIVING (ADL): DEPENDENT_IADLS:: TRANSPORTATION

## 2021-08-23 NOTE — PROGRESS NOTES
Clinic Care Coordination Contact    Care Coordination Clinician Chart Review  Situation: Patient chart reviewed by care coordinator.       Background: Care Coordination initial assessment and enrollment to Care Coordination was 12/3/2019.   Patient centered goals were developed with participation from patient.  NOMAN ALONSO handed patient off to CHW for continued outreach every 30 days.        Assessment: Per chart review, patient outreach completed by CC CHW on 8/2/2021.  Patient is actively working to accomplish goal.  Patient's goal remains appropriate and relevant at this time.   Patient is not due for updated Plan of Care.  Annual assessment will be due 12/3/2021.      Goals        Patient Stated       1. Psychosocial (pt-stated)       Goal Statement: I will explore additional options for support.  Date Goal set: 7/17/20 // revised on 2/9/21  Barriers: limited mobility, poor eyesight, limited knowledge of available resources  Strengths: motivated to find additional sources of help  Date to Achieve By: 1/31/21 // extended to 8/30/21  Patient expressed understanding of goal: Yes    Action steps to achieve this goal:  1. I will inquire about financial resources through Financial Resource referral with help from Care Coordination.  2. I will explore community options for help with cleaning/de-cluttering my home.  3. I will continue leaning on support from my children/family for the things I am unable to do.  4. I will consider in-home options for additional support.  5. I will continue working with Care Coordination to address barriers to achieving my goal.                Plan/Recommendations: The patient will continue working with Care Coordination to achieve goal as above.  CHW will involve NOMAN ALONSO as needed or if patient is ready to move to maintenance.  NOMAN CC will continue to monitor progress to goals and CHW outreaches every 6 weeks.   Plan of Care updated and mailed to patient: SNOW BerryW  Clinic  Care Coordinator  [facility]  [phone]  [email or web address]

## 2021-08-30 ENCOUNTER — TRANSFERRED RECORDS (OUTPATIENT)
Dept: HEALTH INFORMATION MANAGEMENT | Facility: CLINIC | Age: 81
End: 2021-08-30

## 2021-09-16 ENCOUNTER — PATIENT OUTREACH (OUTPATIENT)
Dept: NURSING | Facility: CLINIC | Age: 81
End: 2021-09-16
Payer: MEDICARE

## 2021-09-16 NOTE — PROGRESS NOTES
Clinic Care Coordination Contact  The Community Health Worker called for a Care Coordination outreach to follow up on goals and action steps. Spoke to Lucille.    Scheduled a phone visit with CHW on 9/21/21 at 2:00pm.    CHW gave patient contact information to call [facility]  [phone].    Rosanne Valadez, ZANE  Clinic Care Coordination  [facility]: [facility], [facility], [facility], and [facility]  Phone: [phone]

## 2021-09-20 ENCOUNTER — NURSE TRIAGE (OUTPATIENT)
Dept: INTERNAL MEDICINE | Facility: CLINIC | Age: 81
End: 2021-09-20

## 2021-09-20 ENCOUNTER — HOSPITAL ENCOUNTER (INPATIENT)
Facility: CLINIC | Age: 81
LOS: 19 days | Discharge: NURSING, REHAB OR LONG-TERM CARE | DRG: 840 | End: 2021-10-09
Attending: EMERGENCY MEDICINE | Admitting: STUDENT IN AN ORGANIZED HEALTH CARE EDUCATION/TRAINING PROGRAM
Payer: MEDICARE

## 2021-09-20 ENCOUNTER — APPOINTMENT (OUTPATIENT)
Dept: CT IMAGING | Facility: CLINIC | Age: 81
DRG: 840 | End: 2021-09-20
Attending: EMERGENCY MEDICINE
Payer: MEDICARE

## 2021-09-20 DIAGNOSIS — R06.81 APNEA: ICD-10-CM

## 2021-09-20 DIAGNOSIS — D64.9 SEVERE ANEMIA: ICD-10-CM

## 2021-09-20 DIAGNOSIS — K86.89 PANCREATIC MASS: ICD-10-CM

## 2021-09-20 DIAGNOSIS — E66.01 MORBID OBESITY DUE TO EXCESS CALORIES (H): ICD-10-CM

## 2021-09-20 DIAGNOSIS — D64.9 ANEMIA, UNSPECIFIED TYPE: Primary | ICD-10-CM

## 2021-09-20 LAB
ABO/RH(D): NORMAL
ALBUMIN SERPL-MCNC: 3.4 G/DL (ref 3.4–5)
ALP SERPL-CCNC: 101 U/L (ref 40–150)
ALT SERPL W P-5'-P-CCNC: 16 U/L (ref 0–50)
ANION GAP SERPL CALCULATED.3IONS-SCNC: 5 MMOL/L (ref 3–14)
ANTIBODY SCREEN: NEGATIVE
AST SERPL W P-5'-P-CCNC: 10 U/L (ref 0–45)
ATRIAL RATE - MUSE: 69 BPM
BASOPHILS # BLD AUTO: 0 10E3/UL (ref 0–0.2)
BASOPHILS NFR BLD AUTO: 1 %
BILIRUB SERPL-MCNC: 0.4 MG/DL (ref 0.2–1.3)
BLD PROD TYP BPU: NORMAL
BLD PROD TYP BPU: NORMAL
BLOOD COMPONENT TYPE: NORMAL
BLOOD COMPONENT TYPE: NORMAL
BUN SERPL-MCNC: 24 MG/DL (ref 7–30)
CALCIUM SERPL-MCNC: 8.5 MG/DL (ref 8.5–10.1)
CHLORIDE BLD-SCNC: 108 MMOL/L (ref 94–109)
CO2 SERPL-SCNC: 28 MMOL/L (ref 20–32)
CODING SYSTEM: NORMAL
CODING SYSTEM: NORMAL
CREAT SERPL-MCNC: 1.09 MG/DL (ref 0.52–1.04)
CROSSMATCH: NORMAL
CROSSMATCH: NORMAL
D DIMER PPP FEU-MCNC: 0.43 UG/ML FEU (ref 0–0.5)
DIASTOLIC BLOOD PRESSURE - MUSE: NORMAL MMHG
EOSINOPHIL # BLD AUTO: 0.3 10E3/UL (ref 0–0.7)
EOSINOPHIL NFR BLD AUTO: 4 %
ERYTHROCYTE [DISTWIDTH] IN BLOOD BY AUTOMATED COUNT: 20.8 % (ref 10–15)
GFR SERPL CREATININE-BSD FRML MDRD: 47 ML/MIN/1.73M2
GLUCOSE BLD-MCNC: 105 MG/DL (ref 70–99)
GLUCOSE BLDC GLUCOMTR-MCNC: 115 MG/DL (ref 70–99)
HCT VFR BLD AUTO: 19.7 % (ref 35–47)
HGB BLD-MCNC: 4.7 G/DL (ref 11.7–15.7)
IMM GRANULOCYTES # BLD: 0.1 10E3/UL
IMM GRANULOCYTES NFR BLD: 1 %
INTERPRETATION ECG - MUSE: NORMAL
IRON SATN MFR SERPL: 2 % (ref 15–46)
IRON SERPL-MCNC: 9 UG/DL (ref 35–180)
ISSUE DATE AND TIME: NORMAL
ISSUE DATE AND TIME: NORMAL
LIPASE SERPL-CCNC: 124 U/L (ref 73–393)
LYMPHOCYTES # BLD AUTO: 1.4 10E3/UL (ref 0.8–5.3)
LYMPHOCYTES NFR BLD AUTO: 18 %
MCH RBC QN AUTO: 15.9 PG (ref 26.5–33)
MCHC RBC AUTO-ENTMCNC: 23.9 G/DL (ref 31.5–36.5)
MCV RBC AUTO: 67 FL (ref 78–100)
MONOCYTES # BLD AUTO: 0.7 10E3/UL (ref 0–1.3)
MONOCYTES NFR BLD AUTO: 10 %
NEUTROPHILS # BLD AUTO: 5.1 10E3/UL (ref 1.6–8.3)
NEUTROPHILS NFR BLD AUTO: 66 %
NRBC # BLD AUTO: 0.1 10E3/UL
NRBC BLD AUTO-RTO: 1 /100
NT-PROBNP SERPL-MCNC: 880 PG/ML (ref 0–1800)
P AXIS - MUSE: 22 DEGREES
PLATELET # BLD AUTO: 412 10E3/UL (ref 150–450)
POTASSIUM BLD-SCNC: 4.1 MMOL/L (ref 3.4–5.3)
PR INTERVAL - MUSE: 160 MS
PROT SERPL-MCNC: 6.9 G/DL (ref 6.8–8.8)
QRS DURATION - MUSE: 102 MS
QT - MUSE: 388 MS
QTC - MUSE: 415 MS
R AXIS - MUSE: -25 DEGREES
RBC # BLD AUTO: 2.96 10E6/UL (ref 3.8–5.2)
SARS-COV-2 RNA RESP QL NAA+PROBE: NEGATIVE
SODIUM SERPL-SCNC: 141 MMOL/L (ref 133–144)
SPECIMEN EXPIRATION DATE: NORMAL
SYSTOLIC BLOOD PRESSURE - MUSE: NORMAL MMHG
T AXIS - MUSE: 46 DEGREES
TIBC SERPL-MCNC: 416 UG/DL (ref 240–430)
TROPONIN I SERPL-MCNC: <0.015 UG/L (ref 0–0.04)
TSH SERPL DL<=0.005 MIU/L-ACNC: 1.73 MU/L (ref 0.4–4)
UNIT ABO/RH: NORMAL
UNIT ABO/RH: NORMAL
UNIT NUMBER: NORMAL
UNIT NUMBER: NORMAL
UNIT STATUS: NORMAL
UNIT STATUS: NORMAL
UNIT TYPE ISBT: 6200
UNIT TYPE ISBT: 6200
VENTRICULAR RATE- MUSE: 69 BPM
WBC # BLD AUTO: 7.6 10E3/UL (ref 4–11)

## 2021-09-20 PROCEDURE — 99223 1ST HOSP IP/OBS HIGH 75: CPT | Mod: AI | Performed by: STUDENT IN AN ORGANIZED HEALTH CARE EDUCATION/TRAINING PROGRAM

## 2021-09-20 PROCEDURE — 84443 ASSAY THYROID STIM HORMONE: CPT | Performed by: EMERGENCY MEDICINE

## 2021-09-20 PROCEDURE — 85379 FIBRIN DEGRADATION QUANT: CPT | Performed by: EMERGENCY MEDICINE

## 2021-09-20 PROCEDURE — 93005 ELECTROCARDIOGRAM TRACING: CPT

## 2021-09-20 PROCEDURE — P9016 RBC LEUKOCYTES REDUCED: HCPCS | Performed by: EMERGENCY MEDICINE

## 2021-09-20 PROCEDURE — 82040 ASSAY OF SERUM ALBUMIN: CPT | Performed by: EMERGENCY MEDICINE

## 2021-09-20 PROCEDURE — 86923 COMPATIBILITY TEST ELECTRIC: CPT | Performed by: EMERGENCY MEDICINE

## 2021-09-20 PROCEDURE — 85025 COMPLETE CBC W/AUTO DIFF WBC: CPT | Performed by: EMERGENCY MEDICINE

## 2021-09-20 PROCEDURE — 36415 COLL VENOUS BLD VENIPUNCTURE: CPT | Performed by: EMERGENCY MEDICINE

## 2021-09-20 PROCEDURE — 99207 PR NO CHARGE LOS: CPT | Performed by: NURSE PRACTITIONER

## 2021-09-20 PROCEDURE — 84466 ASSAY OF TRANSFERRIN: CPT | Performed by: STUDENT IN AN ORGANIZED HEALTH CARE EDUCATION/TRAINING PROGRAM

## 2021-09-20 PROCEDURE — U0005 INFEC AGEN DETEC AMPLI PROBE: HCPCS | Performed by: EMERGENCY MEDICINE

## 2021-09-20 PROCEDURE — 120N000001 HC R&B MED SURG/OB

## 2021-09-20 PROCEDURE — 83880 ASSAY OF NATRIURETIC PEPTIDE: CPT | Performed by: EMERGENCY MEDICINE

## 2021-09-20 PROCEDURE — 250N000011 HC RX IP 250 OP 636: Performed by: EMERGENCY MEDICINE

## 2021-09-20 PROCEDURE — 99285 EMERGENCY DEPT VISIT HI MDM: CPT | Mod: 25

## 2021-09-20 PROCEDURE — C9803 HOPD COVID-19 SPEC COLLECT: HCPCS

## 2021-09-20 PROCEDURE — 250N000009 HC RX 250: Performed by: EMERGENCY MEDICINE

## 2021-09-20 PROCEDURE — 74177 CT ABD & PELVIS W/CONTRAST: CPT | Mod: MG

## 2021-09-20 PROCEDURE — 86901 BLOOD TYPING SEROLOGIC RH(D): CPT | Performed by: EMERGENCY MEDICINE

## 2021-09-20 PROCEDURE — 83550 IRON BINDING TEST: CPT | Performed by: STUDENT IN AN ORGANIZED HEALTH CARE EDUCATION/TRAINING PROGRAM

## 2021-09-20 PROCEDURE — 84484 ASSAY OF TROPONIN QUANT: CPT | Performed by: EMERGENCY MEDICINE

## 2021-09-20 PROCEDURE — 83690 ASSAY OF LIPASE: CPT | Performed by: EMERGENCY MEDICINE

## 2021-09-20 RX ORDER — PROCHLORPERAZINE MALEATE 5 MG
5 TABLET ORAL EVERY 6 HOURS PRN
Status: DISCONTINUED | OUTPATIENT
Start: 2021-09-20 | End: 2021-10-09 | Stop reason: HOSPADM

## 2021-09-20 RX ORDER — IOPAMIDOL 755 MG/ML
135 INJECTION, SOLUTION INTRAVASCULAR ONCE
Status: COMPLETED | OUTPATIENT
Start: 2021-09-20 | End: 2021-09-20

## 2021-09-20 RX ORDER — ONDANSETRON 4 MG/1
4 TABLET, ORALLY DISINTEGRATING ORAL EVERY 6 HOURS PRN
Status: DISCONTINUED | OUTPATIENT
Start: 2021-09-20 | End: 2021-09-22

## 2021-09-20 RX ORDER — ACETAMINOPHEN 650 MG/1
650 SUPPOSITORY RECTAL EVERY 6 HOURS PRN
Status: DISCONTINUED | OUTPATIENT
Start: 2021-09-20 | End: 2021-10-09 | Stop reason: HOSPADM

## 2021-09-20 RX ORDER — POLYETHYLENE GLYCOL 3350 17 G/17G
17 POWDER, FOR SOLUTION ORAL 2 TIMES DAILY PRN
Status: DISCONTINUED | OUTPATIENT
Start: 2021-09-20 | End: 2021-10-09 | Stop reason: HOSPADM

## 2021-09-20 RX ORDER — ONDANSETRON 2 MG/ML
4 INJECTION INTRAMUSCULAR; INTRAVENOUS EVERY 6 HOURS PRN
Status: DISCONTINUED | OUTPATIENT
Start: 2021-09-20 | End: 2021-09-22

## 2021-09-20 RX ORDER — PROCHLORPERAZINE 25 MG
12.5 SUPPOSITORY, RECTAL RECTAL EVERY 12 HOURS PRN
Status: DISCONTINUED | OUTPATIENT
Start: 2021-09-20 | End: 2021-10-09 | Stop reason: HOSPADM

## 2021-09-20 RX ORDER — ACETAMINOPHEN 325 MG/1
650 TABLET ORAL EVERY 6 HOURS PRN
Status: DISCONTINUED | OUTPATIENT
Start: 2021-09-20 | End: 2021-10-09 | Stop reason: HOSPADM

## 2021-09-20 RX ORDER — NICOTINE POLACRILEX 4 MG
15-30 LOZENGE BUCCAL
Status: DISCONTINUED | OUTPATIENT
Start: 2021-09-20 | End: 2021-10-09 | Stop reason: HOSPADM

## 2021-09-20 RX ORDER — BISACODYL 10 MG
10 SUPPOSITORY, RECTAL RECTAL DAILY PRN
Status: DISCONTINUED | OUTPATIENT
Start: 2021-09-20 | End: 2021-10-09 | Stop reason: HOSPADM

## 2021-09-20 RX ORDER — ACETAMINOPHEN 325 MG/1
650 TABLET ORAL 3 TIMES DAILY PRN
COMMUNITY
End: 2021-12-21

## 2021-09-20 RX ORDER — DEXTROSE MONOHYDRATE 25 G/50ML
25-50 INJECTION, SOLUTION INTRAVENOUS
Status: DISCONTINUED | OUTPATIENT
Start: 2021-09-20 | End: 2021-10-09 | Stop reason: HOSPADM

## 2021-09-20 RX ORDER — FUROSEMIDE 10 MG/ML
20 INJECTION INTRAMUSCULAR; INTRAVENOUS ONCE
Status: COMPLETED | OUTPATIENT
Start: 2021-09-21 | End: 2021-09-21

## 2021-09-20 RX ORDER — LIDOCAINE 40 MG/G
CREAM TOPICAL
Status: DISCONTINUED | OUTPATIENT
Start: 2021-09-20 | End: 2021-10-09 | Stop reason: HOSPADM

## 2021-09-20 RX ORDER — SENNOSIDES 8.6 MG
8.6 TABLET ORAL 2 TIMES DAILY PRN
Status: DISCONTINUED | OUTPATIENT
Start: 2021-09-20 | End: 2021-10-09 | Stop reason: HOSPADM

## 2021-09-20 RX ADMIN — SODIUM CHLORIDE 80 ML: 9 INJECTION, SOLUTION INTRAVENOUS at 19:15

## 2021-09-20 RX ADMIN — IOPAMIDOL 135 ML: 755 INJECTION, SOLUTION INTRAVENOUS at 19:14

## 2021-09-20 ASSESSMENT — ENCOUNTER SYMPTOMS
DIARRHEA: 0
ANAL BLEEDING: 0
FLANK PAIN: 0
BLOOD IN STOOL: 0
FEVER: 0
DYSURIA: 0
SORE THROAT: 0
COUGH: 0
CHILLS: 1
ABDOMINAL PAIN: 1
CHEST TIGHTNESS: 1
SHORTNESS OF BREATH: 1
VOMITING: 0
CONSTIPATION: 1

## 2021-09-20 NOTE — TELEPHONE ENCOUNTER
"Pt called the clinic with 2 problems: SOB and chest pain and constipation.     Chest Pain:   1. LOCATION: \"Where does it hurt?\"     Chest pain up front.   2. RADIATION: \"Does the pain go anywhere else?\" (e.g., into neck, jaw, arms, back)      Pain to back, shoulders, arms, and torso.  3. ONSET: \"When did the chest pain begin?\" (Minutes, hours or days)       1 month ago.   4. PATTERN \"Does the pain come and go, or has it been constant since it started?\"  \"Does it get worse with exertion?\"       Comes and goes, SOB worse with going up the stairs. Feels like stabbing and squeezing. Feels palpitations.   5. DURATION: \"How long does it last\" (e.g., seconds, minutes, hours)      Unclear.   6. SEVERITY: \"How bad is the pain?\"  (e.g., Scale 1-10; mild, moderate, or severe)     Moderate.   7. CARDIAC RISK FACTORS: \"Do you have any history of heart problems or risk factors for heart disease?\" (e.g., prior heart attack, angina; high blood pressure, diabetes, being overweight, high cholesterol, smoking, or strong family history of heart disease)      Overweight, GERD, see problem list for more.   8. PULMONARY RISK FACTORS: \"Do you have any history of lung disease?\"  (e.g., blood clots in lung, asthma, emphysema, birth control pills)      Using CPAP at home, has trouble getting oxygen from [facility] [place].   9. CAUSE: \"What do you think is causing the chest pain?\"      Pt not sure.   10. OTHER SYMPTOMS: \"Do you have any other symptoms?\" (e.g., dizziness, nausea, vomiting, sweating, fever, difficulty breathing, cough)        Sometimes dizziness, no nausea, SOB present.     Constipation:     Problem started about 2 weeks ago. Pt had 1 BM 1 week ago orange/yellow color the size of a finger. This week pt had several pea size BMs which were black in color (with much effort). Passes gas, has urge to go but, can't. Pain from belly button down is intermittent and sometimes goes up to the chest - longest pain was an hour. Rated as moderate to " severe. Denies nausea and vomiting.Two hernia surgeries 10 years ago.    This could be a bowel obstruction. Pt advised to go to ER now with a  or call 911. Pt agreed with this plan. Plans on [facility].     Reason for Disposition    Pain also present in shoulder(s) or arm(s) or jaw  (Exception: pain is clearly made worse by movement)    Difficulty breathing    Dizziness or lightheadedness    Protocols used: CHEST PAIN-A-AH

## 2021-09-20 NOTE — ED PROVIDER NOTES
History   Chief Complaint:  Shortness of Breath and Constipation     HPI   Lucille Rojas is a 81 year old female who presents with SOB and constipation.  She reports that she has had SOB with exertion and chest tightness for almost 2 months.  When she gets the chest tightness, it feels like someone has their arms around her giving her a bear hug.  She does not currently have any chest pain.  The chest tightness and SOB resolve with rest.  She states she can't walk very far before developing the symptoms.  She also states that she hasn't had a BM in 1.5 weeks and will intermittently develop generalized abdominal cramping. She has a history of Obstructive Sleep Apnea and uses Bipap with an oxygen concentrator at night.  She states she is supposed to have oxygen at night also, however she has been having trouble with the Oxygen company who told her that she has to have another Sleep Study at the Pulmonologist's office in order for them to deliver the Oxygen, and there has been trouble with communication from her doctor's office about scheduling the test.  No UTI symptoms.  No cough/cold symptoms.  No fever/chills.  No sore throat.  No COVID-19 exposure.  NO vomiting. No leg pain/swelling. She has not been eating much for the past 2 weeks.  She received COVID-19 vaccinations in February.  She has not noticed any blood in her stool however she has had some intermittent dark stools in the recent past.    Review of Systems   Constitutional: Positive for chills. Negative for fever.   HENT: Negative for congestion and sore throat.    Respiratory: Positive for chest tightness and shortness of breath. Negative for cough.    Cardiovascular: Positive for chest pain. Negative for leg swelling.   Gastrointestinal: Positive for abdominal pain and constipation. Negative for anal bleeding, blood in stool, diarrhea and vomiting.   Genitourinary: Negative for dysuria, flank pain and urgency.      Allergies:  Penicillins    Medications:  Aspirin 81 mg  Celexa  Synthroid/Levothroid  Micatin; micro guard  Mycostatin  Zocor    Past Medical History:    Hypertension  Hypothyroidism  Macular degeneration  Sleep apnea  Type II diabetes  MDD  Anemia  GERD without esophagitis  Acquired hypothyroidism  Stage 3 CKD    Past Surgical History:    Appendectomy  Colonoscopy (x2)  Hernia repair (inguinal, x2)  Tonsillectomy    Social History:  PCP: Fausto Flynn  Presents with a family member    Physical Exam     Patient Vitals for the past 24 hrs:   BP Temp Temp src Pulse Resp SpO2   09/20/21 1850 (!) 172/71 -- -- 91 24 100 %   09/20/21 1835 -- -- -- -- -- (!) 86 %   09/20/21 1303 (!) 148/33 97.6  F (36.4  C) Temporal 61 22 98 %       Physical Exam  Nursing note and vitals reviewed.  Constitutional:  Appears well-developed and well-nourished. Morbidly obese.  Appears SOB when she moves around the bed   HENT:   Head:    Atraumatic.   Mouth/Throat:   Oropharynx is clear and moist. No oropharyngeal exudate.   Eyes:    Pupils are equal, round, and reactive to light.   Neck:    Normal range of motion. Neck supple.      No tracheal deviation present. No thyromegaly present.   Cardiovascular:  Normal rate, regular rhythm, no murmur   Pulmonary/Chest: Breath sounds are clear and equal without wheezes or crackles.  Abdominal:   Soft. Bowel sounds are normal. Exhibits no distension and      no mass. There is no tenderness.      There is no rebound and no guarding.   Musculoskeletal:  Exhibits no edema.   Lymphadenopathy:  No cervical adenopathy.   Neurological:   Alert and oriented to person, place, and time.   Skin:    Skin is warm and dry. No rash noted. No pallor.     Emergency Department Course   ECG  ECG taken at 1734, ECG read at 1757  Normal sinus rhythm with sinus arrhythmia. Nonspecific ST wave abnormality. Abnormal ECG.    No significant changes as compared to prior, dated 11/18/16.  Rate 69 bpm. MD interval 160 ms.  QRS duration 102 ms. QT/QTc 388/415 ms. P-R-T axes 22 -25 46.     Imaging:  CT Chest/Abdomen/Pelvis with IV Contrast:  1.  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 This encases the celiac trunk and superior mesenteric artery and vein. GI consultation recommended.  2.  Moderate size right pleural effusion and right basilar atelectasis.  As per radiology.    Laboratory:  Symptomatic influenza A/B & SARS-CoV2 (COVID-19) virus PCR multiplex: negative    ABO/RH Type and Screen: ABO/RH(D) A pos, Antibody Screen negative  Nt Probnp Inpatient (BNP): 880  CMP: Creatinine 1.09 (H), Glucose 105 (H), o/w WNL   TSH with Free T4 Reflex: 1.73  (1727) Troponin: <0.015  Lipase: 124  D Dimer Qualitative: 0.43  CBC: WBC: 7.6, HGB: 4.7 (L!!), PLT: 412  Transferrin: pending  Iron and Iron Binding Capacity: Iron 9 (L), Iron Bindin, Iron Saturation 2 (L)    Emergency Department Course:    Reviewed:  I reviewed nursing notes, vitals and past medical history    Assessments:   I obtained history and examined the patient as noted above.    I rechecked the patient and explained findings.     Consults:    I spoke with hospitalist Dr. Rodriguez about the patient's condition and need for admission.     Disposition:  The patient was admitted to the hospital under the care of Dr. Rodriguez.     Impression & Plan     Medical Decision Making:  I found this patient to have severe anemia as well as a large pancreatic mass concerning for acute pancreatic cancer.  Her anemia could be due to a combination of anemia of chronic disease plus occult GI bleeding since she has been having dark stools.  She was given blood transfusion of 2 units packed RBCs and admitted to the hospital under the care of the hospitalist.  There was no sign of bowel obstruction or infection.    Covid-19  Lucille Rojas was evaluated during a global COVID-19 pandemic, which necessitated consideration that the patient might be at  risk for infection with the SARS-CoV-2 virus that causes COVID-19.   Applicable protocols for evaluation were followed during the patient's care. COVID-19 was considered as part of the patient's evaluation. The plan for testing is: a test was obtained during this visit.    Diagnosis:    ICD-10-CM    1. Severe anemia  D64.9    2. Pancreatic mass  K86.89      Scribe Disclosure:  Karen COLLAZO, am serving as a scribe at 5:05 PM on 9/20/2021 to document services personally performed by Cristina Burns MD based on my observations and the provider's statements to me.          Cristina Burns MD  09/21/21 0120

## 2021-09-20 NOTE — ED NOTES
[facility]  ED Nurse Handoff Report    ED Chief complaint: Shortness of Breath and Constipation      ED Diagnosis:   Final diagnoses:   None       Code Status: Full Code    Allergies:   Allergies   Allergen Reactions     Penicillins        Patient Story: Lucille comes to the ER for shortness of breath that's been happening for the last few months. She uses oxygen at night. Her hgb is 4.7. Awaiting type and screen and transfusion orders. She is pale. She denies rectal bleeding. She also c/o constipation.      Treatments and/or interventions provided: blood  Patient's response to treatments and/or interventions: tbd    To be done/followed up on inpatient unit:  cont to monitor, endo    Does this patient have any cognitive concerns?: none    Activity level - Baseline/Home:  Stand with Assist  Activity Level - Current:   Stand with Assist    Patient's Preferred language: English   Needed?: No    Isolation: None  Infection: Not Applicable  Patient tested for COVID 19 prior to admission: YES  Bariatric?: No    Vital Signs:   Vitals:    09/20/21 1303   BP: (!) 148/33   Pulse: 61   Resp: 22   Temp: 97.6  F (36.4  C)   TempSrc: Temporal   SpO2: 98%       Cardiac Rhythm:     Was the PSS-3 completed:   Yes  What interventions are required if any?               Family Comments: none  OBS brochure/video discussed/provided to patient/family: N/A              Name of person given brochure if not patient: na              Relationship to patient: na    For the majority of the shift this patient's behavior was Green.   Behavioral interventions performed were na.    ED NURSE PHONE NUMBER: [phone]

## 2021-09-20 NOTE — ED NOTES
DATE:  9/20/2021   TIME OF RECEIPT FROM LAB:  0001  LAB TEST:  hgb  LAB VALUE:  4.7  RESULTS GIVEN WITH READ-BACK TO (PROVIDER):  Cristina Burns MD  TIME LAB VALUE REPORTED TO PROVIDER:   5757

## 2021-09-20 NOTE — H&P
[facility]    History and Physical - Hospitalist Service       Date of Admission:  9/20/2021    Assessment & Plan      Lucille Rojas is a 81 year old female admitted on 9/20/2021. She presents with SOB and constipation.    Severe, symptomatic anemia  Acute on chronic anemia  Likely GI Bleed, possibly from metastatic spread of presumed pancreatic cancer  Reports several weeks of ongoing symptoms indicating a somewhat chronic course. Has noted black, tarry stools for at least 3-4 weeks.  - admit to inpatient  - 2 unit(s) PRBC ordered in ED, recheck hgb post  - conditional unit PRBC ordered  - iron studies  - GI consult to consider EGD vs colonoscopy either inpatient or outpatient    Presumed primary pancreatic cancer  Noted on CT.  -Consult to GI  -Consult to heme-onc  -Discussed with patient option of palliative care/hospice care at any point and she wished.  She will plan to fully explore her options before making a decision  -Offered to call her  or other family members on admission, but patient declined stating she wants them to get some sleep and she will tell them in the morning.    Constipation  Reports 1 BM in last 1.5 weeks. No significant stool burden on admission CT.  No obstruction noted.  - PRN stool regimen    CKD 3  - monitor    DMT2  - diet controlled  - last A1c 5.8 on 4/7/21  - will not recheck A1c due to severe anemia  - BID BG checks, if normal in 24-48 hours, consider stopping  - monitor off insulin for now    Obesity  -noted    SHAVON  - on BiPAP with O2 PTA, continue    Hypothyroid  - continue PTA levothyroxine once verified    HLD  - continue PTA statin once verified       Diet:   Regular, NPO at MN in case of scope 9/21  DVT Prophylaxis: Pneumatic Compression Devices  Espitia Catheter: Not present  Central Lines: None  Code Status:   FULL CODE, discussed on admission. She will think about code status in coming days and consider change, but is not ready at this  time.     Clinically Significant Risk Factors Present on Admission              # Platelet Defect: home medication list includes an antiplatelet medication      Disposition Plan   Expected discharge: 2-4 days recommended to TBD once hemoglobin stable and safe disposition plan/ TCU bed available.     The patient's care was discussed with the Bedside Nurse, Patient and ED MD Team.    Desmond Rodriguez MD  [facility]  Securely message with the Vocera Web Console (learn more here)  Text page via [facility] Paging/Directory      ______________________________________________________________________    Chief Complaint   SOB and constipation    History is obtained from the patient, electronic health record and emergency department physician    History of Present Illness   Lucille Rojas is a 81 year old female who history of obesity, SHAVON, DMT2 diet-controlled, HLD who presents to the hospital on 9/20/2021 with approximately 3 months of progressive shortness of breath, dyspnea on exertion, chest tightness on exertion.  She also reports dark black tarry stools for approximately 3 to 4 weeks.  She states that her chest tightness only occurs when she is exerting herself or immediately after exerting herself.  It never comes on after period of rest.  She otherwise notes that she has been generally fatigued, had decreased appetite over the same time.  As well.  No other obvious source of bleeding other than a single bruise on her leg noted several months ago, she cannot remember which leg.    In the emergency department she is under the care of Dr. Burns, case discussed with her.  Labs are notable for hemoglobin of 4.7.  MCV 67.  CT is notable for a large pancreatic head mass that is encasing her celiac trunk.  There is a right-sided pleural effusion.    Review of Systems    The 10 point Review of Systems is negative other than noted in the HPI or here.     Past Medical History    I have reviewed this  patient's medical history and updated it with pertinent information if needed.   Past Medical History:   Diagnosis Date     HTN (hypertension) 5/9/2013     Hyperlipidemia LDL goal <130 5/9/2013     Hypothyroidism 5/9/2013     Macular degeneration 9/18/2013     Sleep apnea     CPAP at night, O2 during naps     Type 2 diabetes, HbA1C goal < 8% (H) 5/9/2013       Past Surgical History   I have reviewed this patient's surgical history and updated it with pertinent information if needed.  Past Surgical History:   Procedure Laterality Date     APPENDECTOMY       COLONOSCOPY       COLONOSCOPY N/A 10/27/2014    Procedure: COMBINED COLONOSCOPY, SINGLE OR MULTIPLE BIOPSY/POLYPECTOMY BY BIOPSY;  Surgeon: Jose Alfredo Morejon MD;  Location:  GI     HERNIA REPAIR      inguinal x 2     TONSILLECTOMY         Social History   I have reviewed this patient's social history and updated it with pertinent information if needed.  Social History     Tobacco Use     Smoking status: Never Smoker     Smokeless tobacco: Never Used   Substance Use Topics     Alcohol use: Yes     Alcohol/week: 0.0 standard drinks     Comment: rarely     Drug use: No       Family History     Reports history of leukemia in her son, ovarian cancer in a daughter, thyroid cancer in a daughter, and prostate cancer in her father.    Prior to Admission Medications   Prior to Admission Medications   Prescriptions Last Dose Informant Patient Reported? Taking?   ACE/ARB NOT PRESCRIBED, INTENTIONAL,   No No   Sig: Please choose reason not prescribed, below   Ferrous Sulfate (IRON SUPPLEMENT PO)   Yes No   Sig: Take 325 mg by mouth   Glycerin-Polysorbate 80 (REFRESH DRY EYE THERAPY OP)   Yes No   Multiple Vitamins-Minerals (PRESERVISION AREDS) CAPS   Yes No   Sig: Take 1 tablet by mouth 2 times daily    aspirin 81 MG tablet   No No   Sig: Take 1 tablet by mouth daily.   citalopram (CELEXA) 20 MG tablet   No No   Sig: Take 1 tablet (20 mg) by mouth daily   levothyroxine  (SYNTHROID/LEVOTHROID) 200 MCG tablet   No No   Sig: TAKE ONE TABLET BY MOUTH ONE TIME DAILY    miconazole (MICATIN; MICRO GUARD) 2 % powder   Yes No   Sig: Apply topically as needed for itching or other Reported on 3/27/2017   nystatin (MYCOSTATIN) cream   Yes No   Sig: Apply topically 2 times daily   order for DME   No No   Sig: Equipment being ordered: wheeled walker with hand breaks   simvastatin (ZOCOR) 10 MG tablet   No No   Sig: TAKE ONE TABLET BY MOUTH AT BEDTIME       Facility-Administered Medications: None     Allergies   Allergies   Allergen Reactions     Penicillins        Physical Exam   Vital Signs: Temp: 97.6  F (36.4  C) Temp src: Temporal BP: (!) 148/33 Pulse: 61   Resp: 22 SpO2: 98 % O2 Device: None (Room air)    Weight: 0 lbs 0 oz    Constitutional: Awake, alert, cooperative, no apparent cardiopulmonary distress.  Does appear fatigued.  Obese  Eyes: Conjunctiva and pupils examined and normal.  HEENT: Moist mucous membranes, normal dentition.  Respiratory: Clear to auscultation bilaterally, but somewhat diminished on the right.  Lung sounds distant throughout, likely due to body habitus.  No clear adventitial sounds.  Cardiovascular: Regular rate and rhythm, normal S1 and S2, and no murmur noted.  GI: Soft, non-distended, non-tender, normal bowel sounds.  Skin: No rashes, no cyanosis, no edema noted on exposed skin.  Musculoskeletal: No joint swelling, erythema or tenderness. No gross bony abnormalities  Neurologic: Cranial nerves 2-12 grossly intact, normal strength and sensation.  Psychiatric: Alert, oriented to person, place and time, no obvious anxiety or depression.      Data   Data reviewed today: I reviewed all medications, new labs and imaging results over the last 24 hours. I personally reviewed the chest CT image(s) showing Right-sided pleural effusion and the abdominal CT image(s) showing Large pancreatic head mass encasing celiac trunk.    Recent Labs   Lab 09/20/21  1727   WBC 7.6    HGB 4.7*   MCV 67*         POTASSIUM 4.1   CHLORIDE 108   CO2 28   BUN 24   CR 1.09*   ANIONGAP 5   IGOR 8.5   *   ALBUMIN 3.4   PROTTOTAL 6.9   BILITOTAL 0.4   ALKPHOS 101   ALT 16   AST 10   LIPASE 124   TROPONIN <0.015     Most Recent 3 CBC's:Recent Labs   Lab Test 09/20/21  1727 04/07/21  1327 08/27/20  1305 09/06/17  0957 12/08/16  0906 11/18/16  1040 11/18/16  1040   WBC 7.6  --   --   --  7.6  --  8.5   HGB 4.7* 10.6* 11.0*   < > 10.0*   < > 10.7*   MCV 67*  --   --   --  95  --  92     --   --   --  288  --  273    < > = values in this interval not displayed.     Most Recent 3 BMP's:Recent Labs   Lab Test 09/20/21  1727 08/27/20  1305 06/04/20  0000 05/16/19  0951 05/16/19  0951    144  --   --  144   POTASSIUM 4.1 4.1 5.4*   < > 4.3   CHLORIDE 108 109  --   --  108   CO2 28 31  --   --  29   BUN 24 19  --   --  26   CR 1.09* 0.98 1.07*   < > 1.03   ANIONGAP 5 4  --   --  7   IGOR 8.5 9.6  --   --  8.9   * 112* 93   < > 102*    < > = values in this interval not displayed.     Most Recent 2 LFT's:Recent Labs   Lab Test 09/20/21 1727 08/27/20  1305   AST 10 13   ALT 16 15   ALKPHOS 101 112   BILITOTAL 0.4 0.2     Recent Results (from the past 24 hour(s))   CT Chest/Abdomen/Pelvis w Contrast    Narrative    EXAM: CT CHEST/ABDOMEN/PELVIS W CONTRAST  LOCATION: [facility]  DATE/TIME: 9/20/2021 7:15 PM    INDICATION: new severe anemia, SOB, chest tightness, abdominal pain and constipation  COMPARISON: None.  TECHNIQUE: CT scan of the chest, abdomen, and pelvis was performed following injection of IV contrast. Multiplanar reformats were obtained. Dose reduction techniques were used.   CONTRAST: 135mL Isovue-370    FINDINGS:   LUNGS AND PLEURA: Moderate size right pleural effusion and subsegmental atelectasis right lower lobe. The left lung is clear.    MEDIASTINUM/AXILLAE: No enlarged mediastinal or hilar lymph nodes. Atherosclerotic disease thoracic  aorta.    CORONARY ARTERY CALCIFICATION: Mild.    HEPATOBILIARY: Hepatomegaly measuring 23 cm in length. Several tiny stones in the gallbladder.    PANCREAS: Soft tissue mass arising from the uncinate process of the pancreatic head measuring 5.5 x 3.1 x 7.6 cm. This extends superiorly and encases the celiac trunk, the superior mesenteric artery and the superior mesenteric vein. The splenic and   portal veins are patent.    SPLEEN: Normal.    ADRENAL GLANDS: Normal.    KIDNEYS/BLADDER: Tiny hypodensity lower pole right kidney too small to further characterize, but likely represents a cyst. No renal calculi or hydronephrosis.    BOWEL: No evidence for bowel obstruction. No significant constipation.    LYMPH NODES: No enlarged retroperitoneal nodes.    VASCULATURE: Atherosclerotic disease abdominal aorta and iliac arteries.    PELVIC ORGANS: Hysterectomy.    MUSCULOSKELETAL: Postop changes anterior abdominal wall. Edematous changes subcutaneous fat anterior abdominal wall. Hypertrophic changes thoracolumbar spine.      Impression    IMPRESSION:  1.  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 This encases the celiac trunk and superior mesenteric artery and vein. GI consultation recommended.  2.  Moderate size right pleural effusion and right basilar atelectasis.

## 2021-09-20 NOTE — ED TRIAGE NOTES
Pt reports SOB and constipation. Pt states only 1 BM in the last 2 weeks.   Wears oxygen at noc time only. COVID vaccinated

## 2021-09-21 ENCOUNTER — ANESTHESIA EVENT (OUTPATIENT)
Dept: GASTROENTEROLOGY | Facility: CLINIC | Age: 81
DRG: 840 | End: 2021-09-21
Payer: MEDICARE

## 2021-09-21 ENCOUNTER — ANESTHESIA (OUTPATIENT)
Dept: GASTROENTEROLOGY | Facility: CLINIC | Age: 81
DRG: 840 | End: 2021-09-21
Payer: MEDICARE

## 2021-09-21 ENCOUNTER — PATIENT OUTREACH (OUTPATIENT)
Dept: CARE COORDINATION | Facility: CLINIC | Age: 81
End: 2021-09-21

## 2021-09-21 LAB
ALBUMIN SERPL-MCNC: 3.3 G/DL (ref 3.4–5)
ALP SERPL-CCNC: 104 U/L (ref 40–150)
ALT SERPL W P-5'-P-CCNC: 17 U/L (ref 0–50)
ANION GAP SERPL CALCULATED.3IONS-SCNC: 6 MMOL/L (ref 3–14)
AST SERPL W P-5'-P-CCNC: 14 U/L (ref 0–45)
BILIRUB SERPL-MCNC: 0.5 MG/DL (ref 0.2–1.3)
BUN SERPL-MCNC: 22 MG/DL (ref 7–30)
CALCIUM SERPL-MCNC: 8.4 MG/DL (ref 8.5–10.1)
CHLORIDE BLD-SCNC: 107 MMOL/L (ref 94–109)
CO2 SERPL-SCNC: 26 MMOL/L (ref 20–32)
CREAT SERPL-MCNC: 1.1 MG/DL (ref 0.52–1.04)
ERYTHROCYTE [DISTWIDTH] IN BLOOD BY AUTOMATED COUNT: 22.5 % (ref 10–15)
GFR SERPL CREATININE-BSD FRML MDRD: 47 ML/MIN/1.73M2
GLUCOSE BLD-MCNC: 123 MG/DL (ref 70–99)
GLUCOSE BLDC GLUCOMTR-MCNC: 112 MG/DL (ref 70–99)
GLUCOSE BLDC GLUCOMTR-MCNC: 151 MG/DL (ref 70–99)
GLUCOSE BLDC GLUCOMTR-MCNC: 96 MG/DL (ref 70–99)
HCT VFR BLD AUTO: 27.3 % (ref 35–47)
HGB BLD-MCNC: 7.1 G/DL (ref 11.7–15.7)
HGB BLD-MCNC: 7.2 G/DL (ref 11.7–15.7)
LDH SERPL L TO P-CCNC: 155 U/L (ref 81–234)
MCH RBC QN AUTO: 18.7 PG (ref 26.5–33)
MCHC RBC AUTO-ENTMCNC: 26.4 G/DL (ref 31.5–36.5)
MCV RBC AUTO: 71 FL (ref 78–100)
PLATELET # BLD AUTO: 412 10E3/UL (ref 150–450)
POTASSIUM BLD-SCNC: 4.1 MMOL/L (ref 3.4–5.3)
PROT SERPL-MCNC: 6.7 G/DL (ref 6.8–8.8)
PROT SERPL-MCNC: 6.9 G/DL (ref 6.8–8.8)
RBC # BLD AUTO: 3.85 10E6/UL (ref 3.8–5.2)
SODIUM SERPL-SCNC: 139 MMOL/L (ref 133–144)
TRANSFERRIN SERPL-MCNC: 321 MG/DL (ref 210–360)
TROPONIN I SERPL-MCNC: <0.015 UG/L (ref 0–0.04)
UPPER EUS: NORMAL
WBC # BLD AUTO: 15.6 10E3/UL (ref 4–11)

## 2021-09-21 PROCEDURE — 88271 CYTOGENETICS DNA PROBE: CPT | Performed by: INTERNAL MEDICINE

## 2021-09-21 PROCEDURE — 370N000017 HC ANESTHESIA TECHNICAL FEE, PER MIN: Performed by: INTERNAL MEDICINE

## 2021-09-21 PROCEDURE — 0DB98ZZ EXCISION OF DUODENUM, VIA NATURAL OR ARTIFICIAL OPENING ENDOSCOPIC: ICD-10-PCS | Performed by: INTERNAL MEDICINE

## 2021-09-21 PROCEDURE — 250N000013 HC RX MED GY IP 250 OP 250 PS 637: Performed by: INTERNAL MEDICINE

## 2021-09-21 PROCEDURE — 84484 ASSAY OF TROPONIN QUANT: CPT | Performed by: INTERNAL MEDICINE

## 2021-09-21 PROCEDURE — 99222 1ST HOSP IP/OBS MODERATE 55: CPT | Performed by: INTERNAL MEDICINE

## 2021-09-21 PROCEDURE — 88305 TISSUE EXAM BY PATHOLOGIST: CPT | Mod: TC | Performed by: INTERNAL MEDICINE

## 2021-09-21 PROCEDURE — 36415 COLL VENOUS BLD VENIPUNCTURE: CPT | Performed by: INTERNAL MEDICINE

## 2021-09-21 PROCEDURE — 88275 CYTOGENETICS 100-300: CPT | Performed by: INTERNAL MEDICINE

## 2021-09-21 PROCEDURE — 999N000157 HC STATISTIC RCP TIME EA 10 MIN

## 2021-09-21 PROCEDURE — 250N000009 HC RX 250: Performed by: NURSE ANESTHETIST, CERTIFIED REGISTERED

## 2021-09-21 PROCEDURE — 36415 COLL VENOUS BLD VENIPUNCTURE: CPT | Performed by: STUDENT IN AN ORGANIZED HEALTH CARE EDUCATION/TRAINING PROGRAM

## 2021-09-21 PROCEDURE — 85018 HEMOGLOBIN: CPT | Performed by: INTERNAL MEDICINE

## 2021-09-21 PROCEDURE — 120N000001 HC R&B MED SURG/OB

## 2021-09-21 PROCEDURE — 250N000011 HC RX IP 250 OP 636: Performed by: INTERNAL MEDICINE

## 2021-09-21 PROCEDURE — 250N000011 HC RX IP 250 OP 636: Performed by: NURSE PRACTITIONER

## 2021-09-21 PROCEDURE — 0DB68ZX EXCISION OF STOMACH, VIA NATURAL OR ARTIFICIAL OPENING ENDOSCOPIC, DIAGNOSTIC: ICD-10-PCS | Performed by: INTERNAL MEDICINE

## 2021-09-21 PROCEDURE — 0FBG8ZX EXCISION OF PANCREAS, VIA NATURAL OR ARTIFICIAL OPENING ENDOSCOPIC, DIAGNOSTIC: ICD-10-PCS | Performed by: INTERNAL MEDICINE

## 2021-09-21 PROCEDURE — 250N000011 HC RX IP 250 OP 636: Performed by: REGISTERED NURSE

## 2021-09-21 PROCEDURE — 258N000003 HC RX IP 258 OP 636: Performed by: REGISTERED NURSE

## 2021-09-21 PROCEDURE — 84155 ASSAY OF PROTEIN SERUM: CPT | Performed by: INTERNAL MEDICINE

## 2021-09-21 PROCEDURE — 43242 EGD US FINE NEEDLE BX/ASPIR: CPT | Performed by: INTERNAL MEDICINE

## 2021-09-21 PROCEDURE — 250N000025 HC SEVOFLURANE, PER MIN: Performed by: INTERNAL MEDICINE

## 2021-09-21 PROCEDURE — 250N000009 HC RX 250: Performed by: REGISTERED NURSE

## 2021-09-21 PROCEDURE — 83615 LACTATE (LD) (LDH) ENZYME: CPT | Performed by: INTERNAL MEDICINE

## 2021-09-21 PROCEDURE — 94660 CPAP INITIATION&MGMT: CPT

## 2021-09-21 PROCEDURE — 43239 EGD BIOPSY SINGLE/MULTIPLE: CPT | Performed by: INTERNAL MEDICINE

## 2021-09-21 PROCEDURE — 258N000003 HC RX IP 258 OP 636: Performed by: INTERNAL MEDICINE

## 2021-09-21 PROCEDURE — 88342 IMHCHEM/IMCYTCHM 1ST ANTB: CPT | Mod: TC | Performed by: INTERNAL MEDICINE

## 2021-09-21 PROCEDURE — 999N000010 HC STATISTIC ANES STAT CODE-CRNA PER MINUTE: Performed by: INTERNAL MEDICINE

## 2021-09-21 PROCEDURE — 99233 SBSQ HOSP IP/OBS HIGH 50: CPT | Performed by: INTERNAL MEDICINE

## 2021-09-21 PROCEDURE — 85027 COMPLETE CBC AUTOMATED: CPT | Performed by: STUDENT IN AN ORGANIZED HEALTH CARE EDUCATION/TRAINING PROGRAM

## 2021-09-21 PROCEDURE — BD49ZZZ ULTRASONOGRAPHY OF DUODENUM: ICD-10-PCS | Performed by: INTERNAL MEDICINE

## 2021-09-21 PROCEDURE — 80053 COMPREHEN METABOLIC PANEL: CPT | Performed by: STUDENT IN AN ORGANIZED HEALTH CARE EDUCATION/TRAINING PROGRAM

## 2021-09-21 RX ORDER — ONDANSETRON 2 MG/ML
4 INJECTION INTRAMUSCULAR; INTRAVENOUS EVERY 30 MIN PRN
Status: DISCONTINUED | OUTPATIENT
Start: 2021-09-21 | End: 2021-09-21 | Stop reason: HOSPADM

## 2021-09-21 RX ORDER — DEXAMETHASONE SODIUM PHOSPHATE 4 MG/ML
INJECTION, SOLUTION INTRA-ARTICULAR; INTRALESIONAL; INTRAMUSCULAR; INTRAVENOUS; SOFT TISSUE PRN
Status: DISCONTINUED | OUTPATIENT
Start: 2021-09-21 | End: 2021-09-21

## 2021-09-21 RX ORDER — PROPOFOL 10 MG/ML
INJECTION, EMULSION INTRAVENOUS PRN
Status: DISCONTINUED | OUTPATIENT
Start: 2021-09-21 | End: 2021-09-21

## 2021-09-21 RX ORDER — LEVOTHYROXINE SODIUM 100 UG/1
200 TABLET ORAL
Status: DISCONTINUED | OUTPATIENT
Start: 2021-09-22 | End: 2021-10-09 | Stop reason: HOSPADM

## 2021-09-21 RX ORDER — CITALOPRAM HYDROBROMIDE 20 MG/1
20 TABLET ORAL DAILY
Status: DISCONTINUED | OUTPATIENT
Start: 2021-09-21 | End: 2021-10-09 | Stop reason: HOSPADM

## 2021-09-21 RX ORDER — SODIUM CHLORIDE, SODIUM LACTATE, POTASSIUM CHLORIDE, CALCIUM CHLORIDE 600; 310; 30; 20 MG/100ML; MG/100ML; MG/100ML; MG/100ML
INJECTION, SOLUTION INTRAVENOUS CONTINUOUS PRN
Status: DISCONTINUED | OUTPATIENT
Start: 2021-09-21 | End: 2021-09-21

## 2021-09-21 RX ORDER — ONDANSETRON 2 MG/ML
4 INJECTION INTRAMUSCULAR; INTRAVENOUS EVERY 6 HOURS PRN
Status: DISCONTINUED | OUTPATIENT
Start: 2021-09-21 | End: 2021-10-09 | Stop reason: HOSPADM

## 2021-09-21 RX ORDER — NALOXONE HYDROCHLORIDE 0.4 MG/ML
0.2 INJECTION, SOLUTION INTRAMUSCULAR; INTRAVENOUS; SUBCUTANEOUS
Status: DISCONTINUED | OUTPATIENT
Start: 2021-09-21 | End: 2021-10-09 | Stop reason: HOSPADM

## 2021-09-21 RX ORDER — LABETALOL 20 MG/4 ML (5 MG/ML) INTRAVENOUS SYRINGE
10
Status: DISCONTINUED | OUTPATIENT
Start: 2021-09-21 | End: 2021-09-21 | Stop reason: HOSPADM

## 2021-09-21 RX ORDER — ONDANSETRON 4 MG/1
4 TABLET, ORALLY DISINTEGRATING ORAL EVERY 6 HOURS PRN
Status: DISCONTINUED | OUTPATIENT
Start: 2021-09-21 | End: 2021-10-09 | Stop reason: HOSPADM

## 2021-09-21 RX ORDER — SODIUM CHLORIDE, SODIUM LACTATE, POTASSIUM CHLORIDE, CALCIUM CHLORIDE 600; 310; 30; 20 MG/100ML; MG/100ML; MG/100ML; MG/100ML
INJECTION, SOLUTION INTRAVENOUS CONTINUOUS
Status: DISCONTINUED | OUTPATIENT
Start: 2021-09-21 | End: 2021-09-21 | Stop reason: HOSPADM

## 2021-09-21 RX ORDER — FENTANYL CITRATE 50 UG/ML
50 INJECTION, SOLUTION INTRAMUSCULAR; INTRAVENOUS EVERY 5 MIN PRN
Status: DISCONTINUED | OUTPATIENT
Start: 2021-09-21 | End: 2021-09-21 | Stop reason: HOSPADM

## 2021-09-21 RX ORDER — SIMVASTATIN 10 MG
10 TABLET ORAL AT BEDTIME
Status: DISCONTINUED | OUTPATIENT
Start: 2021-09-21 | End: 2021-10-09 | Stop reason: HOSPADM

## 2021-09-21 RX ORDER — LIDOCAINE 40 MG/G
CREAM TOPICAL
Status: DISCONTINUED | OUTPATIENT
Start: 2021-09-21 | End: 2021-09-21 | Stop reason: HOSPADM

## 2021-09-21 RX ORDER — NALOXONE HYDROCHLORIDE 0.4 MG/ML
0.4 INJECTION, SOLUTION INTRAMUSCULAR; INTRAVENOUS; SUBCUTANEOUS
Status: DISCONTINUED | OUTPATIENT
Start: 2021-09-21 | End: 2021-10-09 | Stop reason: HOSPADM

## 2021-09-21 RX ORDER — ONDANSETRON 2 MG/ML
INJECTION INTRAMUSCULAR; INTRAVENOUS PRN
Status: DISCONTINUED | OUTPATIENT
Start: 2021-09-21 | End: 2021-09-21

## 2021-09-21 RX ORDER — MEPERIDINE HYDROCHLORIDE 25 MG/ML
12.5 INJECTION INTRAMUSCULAR; INTRAVENOUS; SUBCUTANEOUS EVERY 5 MIN PRN
Status: DISCONTINUED | OUTPATIENT
Start: 2021-09-21 | End: 2021-09-21 | Stop reason: HOSPADM

## 2021-09-21 RX ORDER — ONDANSETRON 4 MG/1
4 TABLET, ORALLY DISINTEGRATING ORAL EVERY 30 MIN PRN
Status: DISCONTINUED | OUTPATIENT
Start: 2021-09-21 | End: 2021-09-21 | Stop reason: HOSPADM

## 2021-09-21 RX ORDER — HYDROMORPHONE HYDROCHLORIDE 1 MG/ML
0.4 INJECTION, SOLUTION INTRAMUSCULAR; INTRAVENOUS; SUBCUTANEOUS EVERY 5 MIN PRN
Status: DISCONTINUED | OUTPATIENT
Start: 2021-09-21 | End: 2021-09-21 | Stop reason: HOSPADM

## 2021-09-21 RX ORDER — HYDROXYZINE HYDROCHLORIDE 10 MG/1
10 TABLET, FILM COATED ORAL EVERY 6 HOURS PRN
Status: DISCONTINUED | OUTPATIENT
Start: 2021-09-21 | End: 2021-09-21 | Stop reason: HOSPADM

## 2021-09-21 RX ORDER — FLUMAZENIL 0.1 MG/ML
0.2 INJECTION, SOLUTION INTRAVENOUS
Status: ACTIVE | OUTPATIENT
Start: 2021-09-21 | End: 2021-09-22

## 2021-09-21 RX ORDER — PANTOPRAZOLE SODIUM 40 MG/1
40 TABLET, DELAYED RELEASE ORAL
Status: DISCONTINUED | OUTPATIENT
Start: 2021-09-21 | End: 2021-09-24

## 2021-09-21 RX ADMIN — PHENYLEPHRINE HYDROCHLORIDE 100 MCG: 10 INJECTION INTRAVENOUS at 15:42

## 2021-09-21 RX ADMIN — PHENYLEPHRINE HYDROCHLORIDE 150 MCG: 10 INJECTION INTRAVENOUS at 15:21

## 2021-09-21 RX ADMIN — PHENYLEPHRINE HYDROCHLORIDE 100 MCG: 10 INJECTION INTRAVENOUS at 15:25

## 2021-09-21 RX ADMIN — ONDANSETRON 4 MG: 2 INJECTION INTRAMUSCULAR; INTRAVENOUS at 15:28

## 2021-09-21 RX ADMIN — CITALOPRAM HYDROBROMIDE 20 MG: 20 TABLET ORAL at 17:54

## 2021-09-21 RX ADMIN — SUCCINYLCHOLINE CHLORIDE 100 MG: 20 INJECTION, SOLUTION INTRAMUSCULAR; INTRAVENOUS; PARENTERAL at 15:11

## 2021-09-21 RX ADMIN — PHENYLEPHRINE HYDROCHLORIDE 100 MCG: 10 INJECTION INTRAVENOUS at 15:49

## 2021-09-21 RX ADMIN — SUCCINYLCHOLINE CHLORIDE 100 MG: 20 INJECTION, SOLUTION INTRAMUSCULAR; INTRAVENOUS; PARENTERAL at 15:08

## 2021-09-21 RX ADMIN — SIMVASTATIN 10 MG: 10 TABLET, FILM COATED ORAL at 22:20

## 2021-09-21 RX ADMIN — DEXAMETHASONE SODIUM PHOSPHATE 4 MG: 4 INJECTION, SOLUTION INTRA-ARTICULAR; INTRALESIONAL; INTRAMUSCULAR; INTRAVENOUS; SOFT TISSUE at 15:20

## 2021-09-21 RX ADMIN — ROCURONIUM BROMIDE 30 MG: 10 INJECTION INTRAVENOUS at 15:21

## 2021-09-21 RX ADMIN — FUROSEMIDE 20 MG: 10 INJECTION, SOLUTION INTRAVENOUS at 00:14

## 2021-09-21 RX ADMIN — IRON SUCROSE 300 MG: 20 INJECTION, SOLUTION INTRAVENOUS at 17:44

## 2021-09-21 RX ADMIN — PROPOFOL 50 MG: 10 INJECTION, EMULSION INTRAVENOUS at 15:10

## 2021-09-21 RX ADMIN — SUGAMMADEX 200 MG: 100 INJECTION, SOLUTION INTRAVENOUS at 15:59

## 2021-09-21 RX ADMIN — PANTOPRAZOLE SODIUM 40 MG: 40 TABLET, DELAYED RELEASE ORAL at 17:54

## 2021-09-21 RX ADMIN — PROPOFOL 50 MG: 10 INJECTION, EMULSION INTRAVENOUS at 15:08

## 2021-09-21 RX ADMIN — SODIUM CHLORIDE, POTASSIUM CHLORIDE, SODIUM LACTATE AND CALCIUM CHLORIDE: 600; 310; 30; 20 INJECTION, SOLUTION INTRAVENOUS at 15:02

## 2021-09-21 RX ADMIN — PANTOPRAZOLE SODIUM 40 MG: 40 TABLET, DELAYED RELEASE ORAL at 10:36

## 2021-09-21 ASSESSMENT — ACTIVITIES OF DAILY LIVING (ADL)
ADLS_ACUITY_SCORE: 21
ADLS_ACUITY_SCORE: 21
ADLS_ACUITY_SCORE: 15
ADLS_ACUITY_SCORE: 19
ADLS_ACUITY_SCORE: 17

## 2021-09-21 ASSESSMENT — MIFFLIN-ST. JEOR: SCORE: 1619.36

## 2021-09-21 NOTE — PLAN OF CARE
Summary: SOB, constipation, pancreatic mass  DATE & TIME 9/21/21 3765-8180  Cognitive Concerns/ Orientation : A&0x4   BEHAVIOR & AGGRESSION TOOL COLOR: green  CIWA SCORE: na   ABNL VS/O2: BP: 147/46, O2 98% on 2ltrs NC, desat to 81% on RA with minimal activity. LS clear/dim. TAVARES  MOBILITY: Up w 1 GB and cane to BSC and chair  PAIN MANAGMENT: Denies  DIET: Reg - NPO for EUS and EGD  BOWEL/BLADDER: BSC, continent  ABNL LAB/BG: HGB 7.2 (s/p 2u PRBC overnight for HGB 4.7), checking q8h starting 1400, no s/s of active bleeding. Creat 1.10  DRAIN/DEVICES: PIV SL  TELEMETRY RHYTHM: na  SKIN: pale  TESTS/PROCEDURES: EUS and EGD scheduled at 1400.  D/C DAY/GOALS/PLACE: TBD  OTHER IMPORTANT INFO:   GI and hem/onc following  Uses BiPAP at HS  Pt will receive 1x dose of iron sucrose 300 mg after procedures.

## 2021-09-21 NOTE — PROGRESS NOTES
Clinic Care Coordination Contact  The Community Health Worker planned to call for a scheduled Care Coordination outreach to follow up on goals and action steps.     Did not call patient.    Per Chart Review, patient is currently admitted at [facility] as of 9/20/21 for Severe anemia, Pancreatic mass.    AZNE Demarco  Clinic Care Coordination  [facility] Clinics: [facility] [place], [place], [place], and [facility]  Phone: [phone]

## 2021-09-21 NOTE — ANESTHESIA PROCEDURE NOTES
Airway       Patient location during procedure: OR       Procedure Start/Stop Times: 9/21/2021 3:11 PM  Staff -        Anesthesiologist:  Teto Blair DO       CRNA: Bubba Juarez APRN CRNA       Performed By: CRNA  Consent for Airway        Urgency: elective  Indications and Patient Condition       Indications for airway management: meagan-procedural       Induction type:intravenous       Mask difficulty assessment: 2 - vent by mask + OA or adjuvant +/- NMBA    Final Airway Details       Final airway type: endotracheal airway       Successful airway: ETT - single  Endotracheal Airway Details        ETT size (mm): 7.0       Cuffed: yes       Cuff volume (mL): 10       Successful intubation technique: video laryngoscopy       VL Blade Size: Crocker 3       Grade View of Cords: 1       Adjucts: stylet       Position: Right       Measured from: lips       Secured at (cm): 22       Bite block used: None    Post intubation assessment        Placement verified by: capnometry and equal breath sounds        Number of attempts at approach: 1       Number of other approaches attempted: 0       Secured with: pink tape       Ease of procedure: easy       Dentition: Intact and Unchanged

## 2021-09-21 NOTE — CONSULTS
Consult Date: 09/21/2021    ONCOLOGY CONSULTATION    REQUESTING PHYSICIAN:    This consult has been requested by Dr. Desmond Rodriguez for pancreatic mass and anemia.    HISTORY OF PRESENT ILLNESS:    Mrs. Rojas is an 81-year-old female with multiple medical problems including hypertension, diabetes mellitus and hyperlipidemia.  She has not been feeling well for about the last 3 months.  She has worsening fatigue.  She was also getting short of breath on minimal exertion.  Because of these worsening symptoms, she presented to the Emergency Room on 09/20/2021 and had multiple investigations done.  -Hemoglobin of 4.7 with MCV of 67. Normal WBC and platelets.  -CMP normal except mildly elevated creatinine of 1.09.  -Iron of 9 and saturation index of 2%.   -D-dimer normal.  -COVID-19 negative.  -CT chest, abdomen and pelvis reveals large pancreatic head mass.  This encases the celiac trunk, superior mesenteric artery and superior mesenteric vein.  There is also moderate-size right pleural effusion.  There is hepatomegaly.  There are gallstones.  No lymphadenopathy.    REVIEW OF SYSTEMS:  No headache.  Some lightheadedness.  No neck pain.  Sometimes she feels some chest tightness on walking.  She has been having some pain in upper abdomen and lower chest. Sometimes in the back/flank area.  No urinary complaint.  Some constipation.  No bleeding from any site.  No black stool.  No fever or chills.  Denies any recent weight loss.  All other review of systems negative.    ALLERGIES:  REVIEWED.    MEDICATIONS:  Reviewed.    PAST MEDICAL HISTORY:     1.  Hypertension.  2.  Hyperlipidemia.  3.  Hypothyroidism.  4.  Sleep apnea.  5.  Type 2 diabetes mellitus.  6.  Appendectomy.  7.  Hernia repair times twice.  8.  Tonsillectomy.    SOCIAL HISTORY:    -No history of smoking.  -No alcohol use.    PHYSICAL EXAMINATION:    GENERAL:  She is alert, oriented x 3.  VITAL SIGNS:  Reviewed.  Not in any distress.  Rest of the systems not  examined.    LABORATORY DATA:  Reviewed.    ASSESSMENT:     1.  An 81-year-old female with large pancreatic head mass.  This is clinically consistent with pancreatic cancer.  2.  Severe microcytic anemia.  3.  Right pleural effusion.  4.  Fatigue and shortness of breath secondary to anemia.  5.  Iron deficiency.  She likely has been having chronic upper gastrointestinal bleed.  6.  Other medical problems including hypertension and diabetes mellitus.    RECOMMENDATIONS:     1.  I had a long discussion with the patient.   and daughter were at the bedside.    Labs were reviewed with the patient.  The patient has severe microcytic anemia secondary to iron deficiency.  I think the patient has been having chronic upper GI bleed.  She does have a pancreatic mass likely invading into duodenum causing bleeding.  Other possibility is colon mass/polyp causing bleeding.      I explained to the patient that because of severe anemia, she has been having symptoms of fatigue and shortness of breath on exertion.    2.  For treatment of her anemia, she has received blood transfusion.  Her hemoglobin is up to 7.2.  She should be transfused for hemoglobin below 7.    She does have iron deficiency.  We will give her IV Venofer.  This will help further with anemia.    3.  The patient needs GI workup for her severe anemia.  She is going to get EGD and EUS.  If EGD does not reveal any cause of anemia, she should get a colonoscopy.  She did have her last colonoscopy done in 10/2014 which had revealed diverticulosis and 3 mm colon polyp.    4.  Discussed regarding pancreatic head mass.  This is clinically consistent with pancreatic cancer.  The patient is going to get EUS today.  We will wait for the biopsy.    5.  The patient has right pleural effusion.  This could be malignant effusion.  The patient should have an ultrasound-guided thoracocentesis and fluid should be sent for cytology.    6.  The patient and family had multiple  questions, which were all answered.  Oncology will continue to follow.  Case discussed with Dr. Catina Loomis.    Thanks for the consult.    TOTAL TIME SPENT:  60 minutes.  Time spent in today's visit, review of chart/investigations today, communicating with other physicians and documentations.    John Srinivasan MD        D: 2021   T: 2021   MT: ROSALVA    Name:     GLORIA LARA  MRN:      [id number]        Account:      [id number]   :      1940           Consult Date: 2021     Document: [id number]

## 2021-09-21 NOTE — PROGRESS NOTES
[facility]    Hospitalist Progress Note    Date of Service (when I saw the patient): 09/21/2021  Admit date: 9/20/2021    Assessment & Plan   Lucille Rojas is a 81 year old female admitted on 9/20/2021. She presents with SOB and constipation.     Severe, symptomatic anemia  Acute on chronic anemia  Reports several weeks of ongoing symptoms indicating a somewhat chronic course. Has noted black, tarry stools for at least 3-4 weeks.  * S/p 2 units with Hgb 4.7 > 7.2    - Protonix 40 mg BID  - conditional unit PRBC ordered  - EGD on 09/21/21 revealed a few gastric erosions and a few tiny aphthous ulcers in the duodenum, but not severe enough to account for anemia.   - Colonoscopy likely.   - Iron studies reveal low levels > venofer ordered.   - Transfuse to keep hgb > 7. Hgb ordered q 12 hours.     Recent Labs   Lab 09/21/21  0324 09/20/21  1727   HGB 7.2* 4.7*       Presumed primary pancreatic cancer Noted on CT.  R pleural effusion   - EUS on 09/21/21 with biopsy. Preliminary cyto positive for malignancy, awaiting final path.   - Discussed with  daughter and patient.   - Discussed with Dr. Srinivasan, oncology, get thoracentesis with cytology tomorrow.     Loud systolic murmur  TAVARES, CP and light-headedness  with minimal exertion. Undoubtedly,  Anemia contributes and also on chronic oxygen at home, but recently has not been able to get continued oxygen through PCP. Initial troponin negative. CXR with R pleural effusion.   - check echo, place on telemetry, follow up troponin.   - consider further workup for ischemic disease pending results of echo.      Constipation  Reports 1 BM in last 1.5 weeks. No significant stool burden on admission CT.  No obstruction noted.  - PRN stool regimen     CKD 3  - monitor     DMT2 diet controlled.  last A1c 5.8 on 4/7/21. Did not recheck due to severe anemia  - BID BS, will stop if remains well controlled when eating again.     Recent Labs   Lab  09/21/21  1654 09/21/21  1006 09/21/21  0324 09/20/21  2244 09/20/21  1727   * 96 123* 115* 105*        Obesity, SHAVON with hypoventilation - Per family she requires oxygen during the day as well, but has not been having trouble getting it ordered through PCP.   - on BiPAP with O2 PTA, continue     Hypothyroid  - continue PTA levothyroxine     Depression  - continue PTA citalopram     HLD  - continue PTA statin    Diet: Orders Placed This Encounter      NPO per Anesthesia Guidelines for Procedure/Surgery Except for: Meds     IVF: None  Espitia Catheter: Not present     DVT Prophylaxis: Pneumatic Compression Devices  Code Status: No CPR- Do NOT Intubate     Disposition: Expected discharge 2 days once we have all tests complete, including colonoscopy and echocardiogram. Considering ischemic workup.   Communication: Discussed with patient, daughter, , GI and oncology on 09/21/21    Catina Loomis  Hospitalist Service  [facility]  Securely message with the Vocera Web Console (learn more here)  Text page via [employer] Paging/Directory    Total time spent:  > 35 minutes  Time spent counseling, coordination of care: 25 minutes including discussion with care team,family, GI and oncology, personal review and interpretation of labs and studies as noted above    Interval History   Events over last 24 hours as outlined above.   When directly asked about chest pain, patient states she has been having recurring chest pressure that radiates into her jaw with minimal exertion, sometimes coming on at night. No significant heart burn. She also notes associated SOB and light-headedness. No palpitations or fainting spells. She has noted some difficulty swallowing bread, no pain with swallowing.      No abdominal pain, N/V/D.     -Data reviewed today: I reviewed all new labs and imaging results over the last 24 hours. I personally reviewed the EKG tracing showing NSR without acute ischemic changes.  .    Physical Exam   Temp: 97.9  F (36.6  C) Temp src: Oral BP: (!) 147/46 Pulse: 73   Resp: 18 SpO2: 96 % O2 Device: Nasal cannula Oxygen Delivery: 3 LPM  There were no vitals filed for this visit.  Vital Signs with Ranges  Temp:  [97.4  F (36.3  C)-98.2  F (36.8  C)] 97.9  F (36.6  C)  Pulse:  [] 73  Resp:  [17-32] 18  BP: (125-186)/(33-94) 147/46  FiO2 (%):  [30 %] 30 %  SpO2:  [86 %-100 %] 96 %  I/O last 3 completed shifts:  In: 832   Out: 925 [Urine:925]    Today's Exam  Constitutional: NAD,   Neuropsyche:  alert and oriented, answers questions appropriately.   Respiratory: Breathing appears mildly labored on O2, decreased air exchange, Decreased in R lower base.   no wheezes, no crackles. Cardiovascular: Regular rate and rhythm, 1+ edema.  GI:  soft, NT/ND, BS normal  Skin/Integumen:  No acute rash or sign of bleeding.     Medications   All medications reviewed on 09/21/21        citalopram  20 mg Oral Daily     iron sucrose (VENOFER) intermittent infusion  300 mg Intravenous Once     [START ON 9/22/2021] levothyroxine  200 mcg Oral QAM AC     pantoprazole  40 mg Oral BID AC     simvastatin  10 mg Oral At Bedtime     sodium chloride (PF)  3 mL Intracatheter Q8H       Data   Recent Labs   Lab 09/21/21  0324 09/20/21  2244 09/20/21  1727   WBC 15.6*  --  7.6   HGB 7.2*  --  4.7*   MCV 71*  --  67*     --  412     --  141   POTASSIUM 4.1  --  4.1   CHLORIDE 107  --  108   CO2 26  --  28   BUN 22  --  24   CR 1.10*  --  1.09*   ANIONGAP 6  --  5   IGOR 8.4*  --  8.5   * 115* 105*   ALBUMIN 3.3*  --  3.4   PROTTOTAL 6.7*  --  6.9   BILITOTAL 0.5  --  0.4   ALKPHOS 104  --  101   ALT 17  --  16   AST 14  --  10   LIPASE  --   --  124   TROPONIN  --   --  <0.015       Recent Results (from the past 24 hour(s))   CT Chest/Abdomen/Pelvis w Contrast    Narrative    EXAM: CT CHEST/ABDOMEN/PELVIS W CONTRAST  LOCATION: [facility]  DATE/TIME: 9/20/2021 7:15 PM    INDICATION:  new severe anemia, SOB, chest tightness, abdominal pain and constipation  COMPARISON: None.  TECHNIQUE: CT scan of the chest, abdomen, and pelvis was performed following injection of IV contrast. Multiplanar reformats were obtained. Dose reduction techniques were used.   CONTRAST: 135mL Isovue-370    FINDINGS:   LUNGS AND PLEURA: Moderate size right pleural effusion and subsegmental atelectasis right lower lobe. The left lung is clear.    MEDIASTINUM/AXILLAE: No enlarged mediastinal or hilar lymph nodes. Atherosclerotic disease thoracic aorta.    CORONARY ARTERY CALCIFICATION: Mild.    HEPATOBILIARY: Hepatomegaly measuring 23 cm in length. Several tiny stones in the gallbladder.    PANCREAS: Soft tissue mass arising from the uncinate process of the pancreatic head measuring 5.5 x 3.1 x 7.6 cm. This extends superiorly and encases the celiac trunk, the superior mesenteric artery and the superior mesenteric vein. The splenic and   portal veins are patent.    SPLEEN: Normal.    ADRENAL GLANDS: Normal.    KIDNEYS/BLADDER: Tiny hypodensity lower pole right kidney too small to further characterize, but likely represents a cyst. No renal calculi or hydronephrosis.    BOWEL: No evidence for bowel obstruction. No significant constipation.    LYMPH NODES: No enlarged retroperitoneal nodes.    VASCULATURE: Atherosclerotic disease abdominal aorta and iliac arteries.    PELVIC ORGANS: Hysterectomy.    MUSCULOSKELETAL: Postop changes anterior abdominal wall. Edematous changes subcutaneous fat anterior abdominal wall. Hypertrophic changes thoracolumbar spine.      Impression    IMPRESSION:  1.  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 This encases the celiac trunk and superior mesenteric artery and vein. GI consultation recommended.  2.  Moderate size right pleural effusion and right basilar atelectasis.

## 2021-09-21 NOTE — PROGRESS NOTES
SPIRITUAL HE[facility] SERVICES Progress Note  [facility]    Visited pt due to [facility] request in pt chart, via staff. I introduced myself to pt and her spouse Cooper, who was present.    Lucille shared her illness history with me and the feelings of unexpectedness that she feels. She also talked openly about he seriousness of the condition she may have and also shared life review stories about family dynamics, interspersed with humor and laughter. She reported that she has family support and that prayer is helpful to her and spouse. I offered prayer and words of our care and support for them.     I offered reflective listening and affirmation of feelings, meaning, and experience.    [facility] remains available.      Remedios Duarte  Chaplain Resident

## 2021-09-21 NOTE — CONSULTS
GASTROENTEROLOGY CONSULTATION      Lucille Rojas  [id number]  [facility] [id number]  81 year old female     Admission Date/Time: 9/20/2021  Primary Care Provider: Fausto Flynn     We were asked to see the patient in consultation by Dr. Loomis  for evaluation of anemia and pancreatic mass.    ASSESSMENT:    1. Pancreatic mass - suspicious for malignancy, discussed with patient and her  today. We will plan for EUS today for diagnosis. Oncology already consulted.  2. Anemia - one episode of melena, thus concern for subacute GI bleed. We will pursue EGD today with the EUS for further evaluation, pending the EUS diagnosis, not certain colonoscopy will need be pursued, but await the EUS results and clinical course    RECOMMENDATIONS:  1. Pantoprazole twice a day  2. Monitor hemoglobin, transfuse, as necessary  3. EGD/EUS today  4. Keep NPO until after the EGD/EUS    Mahamed Rodriguez MD   [facility]  [phone]      ________________________________________________________________________        HPI:  Lucille Rojas is a 81 year old female who has a history of type II diabetes, obstructive sleep apnea, obesity. She presents with dyspnea on exertion and fatigue. She reports one episode of black stools a few weeks ago, but otherwise has had constipation with passing small, hard stools. No hematochezia. No fevers. No weight loss. Some generalized abdominal discomfort and bloating. Decreased appetite the last 2 weeks.      - Hemoglobin was 10.6 on 4/7/2021 and now 4.7 on admit, increase to 7.2 after transfusion    Previous endoscopies:  - Colonoscopy 10/2014: Diverticulosis in sigmoid and descending colon, small tubular adenoma removed from splenic flexure    ROS: A comprehensive ten point review of systems was negative aside from those in mentioned in the HPI.      PAST MEDICAL HISTORY:  Past Medical History:   Diagnosis Date     HTN (hypertension) 5/9/2013     Hyperlipidemia LDL goal  <130 5/9/2013     Hypothyroidism 5/9/2013     Macular degeneration 9/18/2013     Sleep apnea     CPAP at night, O2 during naps     Type 2 diabetes, HbA1C goal < 8% (H) 5/9/2013     SOCIAL HISTORY:  Social History     Tobacco Use     Smoking status: Never Smoker     Smokeless tobacco: Never Used   Substance Use Topics     Alcohol use: Yes     Alcohol/week: 0.0 standard drinks     Comment: rarely     Drug use: No     FAMILY HISTORY:  No family history on file.  ALLERGIES:   Allergies   Allergen Reactions     Penicillins      MEDICATIONS:   Prior to Admission medications    Medication Sig Start Date End Date Taking? Authorizing Provider   acetaminophen (TYLENOL) 325 MG tablet Take 325-650 mg by mouth 2 times daily as needed for mild pain   Yes Unknown, Entered By History   aspirin 81 MG tablet Take 1 tablet by mouth daily. 5/9/13  Yes Fausto Flynn MD   citalopram (CELEXA) 20 MG tablet Take 1 tablet (20 mg) by mouth daily 4/7/21  Yes Fausto Flynn MD   Glycerin-Polysorbate 80 (REFRESH DRY EYE THERAPY OP) Apply to eye 2 times daily as needed    Yes Reported, Patient   levothyroxine (SYNTHROID/LEVOTHROID) 200 MCG tablet TAKE ONE TABLET BY MOUTH ONE TIME DAILY  6/3/21  Yes Fausto Flynn MD   Multiple Vitamins-Minerals (PRESERVISION AREDS) CAPS Take 1 tablet by mouth 2 times daily    Yes Reported, Patient   simvastatin (ZOCOR) 10 MG tablet TAKE ONE TABLET BY MOUTH AT BEDTIME  6/3/21  Yes Fausto Flynn MD   ACE/ARB NOT PRESCRIBED, INTENTIONAL, Please choose reason not prescribed, below 2/28/17   Fausto Flynn MD   order for DME Equipment being ordered: wheeled walker with hand breaks 5/16/19   Fausto Flynn MD     PHYSICAL EXAM:   BP (!) 147/46 (BP Location: Left arm)   Pulse 73   Temp 97.9  F (36.6  C) (Oral)   Resp 18   SpO2 96%    GEN: Alert, oriented x3, NAD.  TESSY: AT, anicteric, OP without erythema, exudate, or ulcers.    NECK: Supple.    LYMPH: No LAD noted.  HRT: RRR, no JOSH  LUNGS: CTA  ABD:  +BS, mild epi tender, obese, no rebound or guarding.  SKIN: No rash, jaundice   NEURO: MS intact       ADDITIONAL DATA:   I reviewed the patient's new clinical lab test results.   Recent Labs   Lab Test 09/21/21 0324 09/20/21 1727 04/07/21  1327 09/06/17  0957 12/08/16  0906   WBC 15.6* 7.6  --   --  7.6   HGB 7.2* 4.7* 10.6*   < > 10.0*   MCV 71* 67*  --   --  95    412  --   --  288    < > = values in this interval not displayed.     Recent Labs   Lab Test 09/21/21 0324 09/20/21  2244 09/20/21 1727 08/27/20  1305     --  141 144   POTASSIUM 4.1  --  4.1 4.1   CHLORIDE 107  --  108 109   CO2 26  --  28 31   BUN 22  --  24 19   CR 1.10*  --  1.09* 0.98   ANIONGAP 6  --  5 4   IGOR 8.4*  --  8.5 9.6   * 115* 105* 112*     Recent Labs   Lab Test 09/21/21 0324 09/20/21 1727 08/27/20  1305 08/01/16  0713 09/11/15  1542   ALBUMIN 3.3* 3.4 3.6   < > 3.5   BILITOTAL 0.5 0.4 0.2   < > 0.2   ALT 17 16 15   < > 23   AST 14 10 13   < > 12   LIPASE  --  124  --   --   --    AMYLASE  --   --   --   --  64    < > = values in this interval not displayed.        I reviewed the patient's new imaging results.     EXAM: CT CHEST/ABDOMEN/PELVIS W CONTRAST  LOCATION: [facility]  DATE/TIME: 9/20/2021 7:15 PM     INDICATION: new severe anemia, SOB, chest tightness, abdominal pain and constipation  COMPARISON: None.  TECHNIQUE: CT scan of the chest, abdomen, and pelvis was performed following injection of IV contrast. Multiplanar reformats were obtained. Dose reduction techniques were used.   CONTRAST: 135mL Isovue-370     FINDINGS:   LUNGS AND PLEURA: Moderate size right pleural effusion and subsegmental atelectasis right lower lobe. The left lung is clear.     MEDIASTINUM/AXILLAE: No enlarged mediastinal or hilar lymph nodes. Atherosclerotic disease thoracic aorta.     CORONARY ARTERY CALCIFICATION: Mild.     HEPATOBILIARY: Hepatomegaly measuring 23 cm in length. Several tiny stones in  the gallbladder.     PANCREAS: Soft tissue mass arising from the uncinate process of the pancreatic head measuring 5.5 x 3.1 x 7.6 cm. This extends superiorly and encases the celiac trunk, the superior mesenteric artery and the superior mesenteric vein. The splenic and   portal veins are patent.     SPLEEN: Normal.     ADRENAL GLANDS: Normal.     KIDNEYS/BLADDER: Tiny hypodensity lower pole right kidney too small to further characterize, but likely represents a cyst. No renal calculi or hydronephrosis.     BOWEL: No evidence for bowel obstruction. No significant constipation.     LYMPH NODES: No enlarged retroperitoneal nodes.     VASCULATURE: Atherosclerotic disease abdominal aorta and iliac arteries.     PELVIC ORGANS: Hysterectomy.     MUSCULOSKELETAL: Postop changes anterior abdominal wall. Edematous changes subcutaneous fat anterior abdominal wall. Hypertrophic changes thoracolumbar spine.                                                                      IMPRESSION:  1.  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 This encases the celiac trunk and superior mesenteric artery and vein. GI consultation recommended.  2.  Moderate size right pleural effusion and right basilar atelectasis.

## 2021-09-21 NOTE — PROGRESS NOTES
Hospital Medicine Cross Cover:  Paged for elevated blood pressure just after starting the second unit of blood.  Blood pressure has been elevated since arrival in the ED.  Heart rate has been fluctuating from the 70s to 90s.  After first unit has some faint wheezing.  No recent echocardiogram.  CT chest abdomen pelvis tonight with moderate size right pleural effusion and right basilar atelectasis.    Plan:  -Continue blood 100 an hour  -Lasix 20 mg IV x1 now    ALIE Alfaro, CNP  Hospitalist Service, House Officer  [facility]     Text Page  Pager: [phone]

## 2021-09-21 NOTE — PLAN OF CARE
Patient A&0x4. Up with 1 GB and cane to BSC. Patient appears pale. Lungs diminished. On 2 ltrs 02 via NC. @nd unit of PRBC started on arrival to the floor. At 15 min check BP had elevated 139 to 183 and had an episode of wheezing with decreased 02.   Notified Hospitalist - received order to give IV lasix and continue Blood at 100ml/hr. NO edema noted. Denies pain. DNR/I.

## 2021-09-21 NOTE — ANESTHESIA CARE TRANSFER NOTE
Patient: Lucille Rojas    Procedure(s):  ESOPHAGOGASTRODUODENOSCOPY, WITH FINE NEEDLE ASPIRATION BIOPSY, WITH ENDOSCOPIC ULTRASOUND GUIDANCE  Esophagogastroduodenoscopy, With Biopsy    Diagnosis: Pancreatic mass [K86.89]  Anemia [D64.9]  Diagnosis Additional Information: No value filed.    Anesthesia Type:   General     Note:    Oropharynx: oropharynx clear of all foreign objects  Level of Consciousness: awake  Oxygen Supplementation: face mask  Level of Supplemental Oxygen (L/min / FiO2): 4  Independent Airway: airway patency satisfactory and stable  Dentition: dentition unchanged  Vital Signs Stable: post-procedure vital signs reviewed and stable  Report to RN Given: handoff report given  Patient transferred to: PACU    Handoff Report: Identifed the Patient, Identified the Reponsible Provider, Reviewed the pertinent medical history, Discussed the surgical course, Reviewed Intra-OP anesthesia mangement and issues during anesthesia, Set expectations for post-procedure period and Allowed opportunity for questions and acknowledgement of understanding      Vitals:  Vitals Value Taken Time   /63 09/21/21 1615   Temp 36.3  C (97.3  F) 09/21/21 1615   Pulse 72 09/21/21 1620   Resp 18 09/21/21 1620   SpO2 94 % 09/21/21 1620   Vitals shown include unvalidated device data.    Electronically Signed By: ALIE Jesus CRNA  September 21, 2021  4:20 PM

## 2021-09-21 NOTE — PROGRESS NOTES
notified Hospitalist of patients BP increase from 139/94 to 183/68 at 15 min check at start of 2nd unit of blood along w episode decrease in 02 to 92% on 2ltrs that self corrected.. Received order to give Lasix and continue blood at 100ml/hr

## 2021-09-21 NOTE — PROVIDER NOTIFICATION
Paged on call hospitalist, Margaret Mejia regarding if wanting telementry orders for acute GI bleed and low hmg of 4.7. Deferred tele for now.

## 2021-09-21 NOTE — PLAN OF CARE
Pt admitted with severe anemia and pancreatic mass,A/O x4, VSS , O2sats 94% on 3LPM,has exertional dyspnea, received 2 pints of blood. Hb Currently 7.2 post transfusion, Denies pain, Ax1 per cane and GB, on Bipap at night, NPO since midnight, awaits GI and oncology consult

## 2021-09-21 NOTE — ANESTHESIA PREPROCEDURE EVALUATION
Anesthesia Pre-Procedure Evaluation    Patient: Lucille Rojas   MRN: [id number] : 1940        Preoperative Diagnosis: Pancreatic mass [K86.89]  Anemia [D64.9]   Procedure : Procedure(s):  ENDOSCOPIC ULTRASOUND, ESOPHAGOSCOPY / UPPER GASTROINTESTINAL TRACT (GI)     Past Medical History:   Diagnosis Date     HTN (hypertension) 2013     Hyperlipidemia LDL goal <130 2013     Hypothyroidism 2013     Macular degeneration 2013     Sleep apnea     CPAP at night, O2 during naps     Type 2 diabetes, HbA1C goal < 8% (H) 2013      Past Surgical History:   Procedure Laterality Date     APPENDECTOMY       COLONOSCOPY       COLONOSCOPY N/A 10/27/2014    Procedure: COMBINED COLONOSCOPY, SINGLE OR MULTIPLE BIOPSY/POLYPECTOMY BY BIOPSY;  Surgeon: Jose Alfredo Morejon MD;  Location: [facility]     HERNIA REPAIR      inguinal x 2     TONSILLECTOMY        Allergies   Allergen Reactions     Penicillins       Social History     Tobacco Use     Smoking status: Never Smoker     Smokeless tobacco: Never Used   Substance Use Topics     Alcohol use: Yes     Alcohol/week: 0.0 standard drinks     Comment: rarely      Wt Readings from Last 1 Encounters:   21 124.8 kg (275 lb 3.2 oz)        Anesthesia Evaluation   Pt has had prior anesthetic.     No history of anesthetic complications       ROS/MED HX  ENT/Pulmonary:     (+) sleep apnea, uses CPAP,     Neurologic:       Cardiovascular:     (+) Dyslipidemia hypertension-----    METS/Exercise Tolerance:     Hematologic: Comments: Severe, symptomatic anemia  Acute on chronic anemia  Likely GI Bleed, possibly from metastatic spread of presumed pancreatic cancer    (+) anemia,     Musculoskeletal:       GI/Hepatic:     (+) GERD,     Renal/Genitourinary:     (+) renal disease,     Endo:     (+) type II DM, thyroid problem, hypothyroidism, Obesity,     Psychiatric/Substance Use:       Infectious Disease:       Malignancy:       Other:            Physical Exam    Airway         Mallampati: III       Respiratory Devices and Support         Dental       (+) missing      Cardiovascular       Comment: Harsh systolic ejection murmur    (+) murmur       Pulmonary   pulmonary exam normal                OUTSIDE LABS:  CBC:   Lab Results   Component Value Date    WBC 15.6 (H) 09/21/2021    WBC 7.6 09/20/2021    HGB 7.2 (L) 09/21/2021    HGB 4.7 (LL) 09/20/2021    HCT 27.3 (L) 09/21/2021    HCT 19.7 (L) 09/20/2021     09/21/2021     09/20/2021     BMP:   Lab Results   Component Value Date     09/21/2021     09/20/2021    POTASSIUM 4.1 09/21/2021    POTASSIUM 4.1 09/20/2021    CHLORIDE 107 09/21/2021    CHLORIDE 108 09/20/2021    CO2 26 09/21/2021    CO2 28 09/20/2021    BUN 22 09/21/2021    BUN 24 09/20/2021    CR 1.10 (H) 09/21/2021    CR 1.09 (H) 09/20/2021    GLC 96 09/21/2021     (H) 09/21/2021     COAGS: No results found for: PTT, INR, FIBR  POC:   Lab Results   Component Value Date    BG 94 10/27/2014     HEPATIC:   Lab Results   Component Value Date    ALBUMIN 3.3 (L) 09/21/2021    PROTTOTAL 6.7 (L) 09/21/2021    ALT 17 09/21/2021    AST 14 09/21/2021    ALKPHOS 104 09/21/2021    BILITOTAL 0.5 09/21/2021     OTHER:   Lab Results   Component Value Date    A1C 5.8 (H) 04/07/2021    IGOR 8.4 (L) 09/21/2021    LIPASE 124 09/20/2021    AMYLASE 64 09/11/2015    TSH 1.73 09/20/2021    T4 1.65 05/09/2013       Anesthesia Plan    ASA Status:  3   NPO Status:  NPO Appropriate    Anesthesia Type: General.     - Airway: ETT   Induction: Intravenous.   Maintenance: Balanced.        Consents    Anesthesia Plan(s) and associated risks, benefits, and realistic alternatives discussed. Questions answered and patient/representative(s) expressed understanding.     - Discussed with:  Patient      - Extended Intubation/Ventilatory Support Discussed: No.      - Patient is DNR/DNI Status: No    Use of blood products discussed: Yes.     - Discussed with: Patient.     -  Consented: consented to blood products            Reason for refusal: other.     Postoperative Care    Pain management: IV analgesics, Oral pain medications, Multi-modal analgesia.   PONV prophylaxis: Ondansetron (or other 5HT-3), Dexamethasone or Solumedrol     Comments:                Teto Blair DO

## 2021-09-21 NOTE — H&P (VIEW-ONLY)
GASTROENTEROLOGY CONSULTATION      Lucille Rojas  [id number]  [facility] [id number]  81 year old female     Admission Date/Time: 9/20/2021  Primary Care Provider: Fausto Flynn     We were asked to see the patient in consultation by Dr. Loomis  for evaluation of anemia and pancreatic mass.    ASSESSMENT:    1. Pancreatic mass - suspicious for malignancy, discussed with patient and her  today. We will plan for EUS today for diagnosis. Oncology already consulted.  2. Anemia - one episode of melena, thus concern for subacute GI bleed. We will pursue EGD today with the EUS for further evaluation, pending the EUS diagnosis, not certain colonoscopy will need be pursued, but await the EUS results and clinical course    RECOMMENDATIONS:  1. Pantoprazole twice a day  2. Monitor hemoglobin, transfuse, as necessary  3. EGD/EUS today  4. Keep NPO until after the EGD/EUS    Mahamed Rodriguez MD   [facility]  [phone]      ________________________________________________________________________        HPI:  Lucille Rojas is a 81 year old female who has a history of type II diabetes, obstructive sleep apnea, obesity. She presents with dyspnea on exertion and fatigue. She reports one episode of black stools a few weeks ago, but otherwise has had constipation with passing small, hard stools. No hematochezia. No fevers. No weight loss. Some generalized abdominal discomfort and bloating. Decreased appetite the last 2 weeks.      - Hemoglobin was 10.6 on 4/7/2021 and now 4.7 on admit, increase to 7.2 after transfusion    Previous endoscopies:  - Colonoscopy 10/2014: Diverticulosis in sigmoid and descending colon, small tubular adenoma removed from splenic flexure    ROS: A comprehensive ten point review of systems was negative aside from those in mentioned in the HPI.      PAST MEDICAL HISTORY:  Past Medical History:   Diagnosis Date     HTN (hypertension) 5/9/2013     Hyperlipidemia LDL goal  <130 5/9/2013     Hypothyroidism 5/9/2013     Macular degeneration 9/18/2013     Sleep apnea     CPAP at night, O2 during naps     Type 2 diabetes, HbA1C goal < 8% (H) 5/9/2013     SOCIAL HISTORY:  Social History     Tobacco Use     Smoking status: Never Smoker     Smokeless tobacco: Never Used   Substance Use Topics     Alcohol use: Yes     Alcohol/week: 0.0 standard drinks     Comment: rarely     Drug use: No     FAMILY HISTORY:  No family history on file.  ALLERGIES:   Allergies   Allergen Reactions     Penicillins      MEDICATIONS:   Prior to Admission medications    Medication Sig Start Date End Date Taking? Authorizing Provider   acetaminophen (TYLENOL) 325 MG tablet Take 325-650 mg by mouth 2 times daily as needed for mild pain   Yes Unknown, Entered By History   aspirin 81 MG tablet Take 1 tablet by mouth daily. 5/9/13  Yes Fausto Flynn MD   citalopram (CELEXA) 20 MG tablet Take 1 tablet (20 mg) by mouth daily 4/7/21  Yes Fausto Flynn MD   Glycerin-Polysorbate 80 (REFRESH DRY EYE THERAPY OP) Apply to eye 2 times daily as needed    Yes Reported, Patient   levothyroxine (SYNTHROID/LEVOTHROID) 200 MCG tablet TAKE ONE TABLET BY MOUTH ONE TIME DAILY  6/3/21  Yes Fausto Flynn MD   Multiple Vitamins-Minerals (PRESERVISION AREDS) CAPS Take 1 tablet by mouth 2 times daily    Yes Reported, Patient   simvastatin (ZOCOR) 10 MG tablet TAKE ONE TABLET BY MOUTH AT BEDTIME  6/3/21  Yes Fausto Flynn MD   ACE/ARB NOT PRESCRIBED, INTENTIONAL, Please choose reason not prescribed, below 2/28/17   Fausto Flynn MD   order for DME Equipment being ordered: wheeled walker with hand breaks 5/16/19   Fausto Flynn MD     PHYSICAL EXAM:   BP (!) 147/46 (BP Location: Left arm)   Pulse 73   Temp 97.9  F (36.6  C) (Oral)   Resp 18   SpO2 96%    GEN: Alert, oriented x3, NAD.  TESSY: AT, anicteric, OP without erythema, exudate, or ulcers.    NECK: Supple.    LYMPH: No LAD noted.  HRT: RRR, no JOSH  LUNGS: CTA  ABD:  +BS, mild epi tender, obese, no rebound or guarding.  SKIN: No rash, jaundice   NEURO: MS intact       ADDITIONAL DATA:   I reviewed the patient's new clinical lab test results.   Recent Labs   Lab Test 09/21/21 0324 09/20/21 1727 04/07/21  1327 09/06/17  0957 12/08/16  0906   WBC 15.6* 7.6  --   --  7.6   HGB 7.2* 4.7* 10.6*   < > 10.0*   MCV 71* 67*  --   --  95    412  --   --  288    < > = values in this interval not displayed.     Recent Labs   Lab Test 09/21/21 0324 09/20/21  2244 09/20/21 1727 08/27/20  1305     --  141 144   POTASSIUM 4.1  --  4.1 4.1   CHLORIDE 107  --  108 109   CO2 26  --  28 31   BUN 22  --  24 19   CR 1.10*  --  1.09* 0.98   ANIONGAP 6  --  5 4   IGOR 8.4*  --  8.5 9.6   * 115* 105* 112*     Recent Labs   Lab Test 09/21/21 0324 09/20/21 1727 08/27/20  1305 08/01/16  0713 09/11/15  1542   ALBUMIN 3.3* 3.4 3.6   < > 3.5   BILITOTAL 0.5 0.4 0.2   < > 0.2   ALT 17 16 15   < > 23   AST 14 10 13   < > 12   LIPASE  --  124  --   --   --    AMYLASE  --   --   --   --  64    < > = values in this interval not displayed.        I reviewed the patient's new imaging results.     EXAM: CT CHEST/ABDOMEN/PELVIS W CONTRAST  LOCATION: [facility]  DATE/TIME: 9/20/2021 7:15 PM     INDICATION: new severe anemia, SOB, chest tightness, abdominal pain and constipation  COMPARISON: None.  TECHNIQUE: CT scan of the chest, abdomen, and pelvis was performed following injection of IV contrast. Multiplanar reformats were obtained. Dose reduction techniques were used.   CONTRAST: 135mL Isovue-[id number]     FINDINGS:   LUNGS AND PLEURA: Moderate size right pleural effusion and subsegmental atelectasis right lower lobe. The left lung is clear.     MEDIASTINUM/AXILLAE: No enlarged mediastinal or hilar lymph nodes. Atherosclerotic disease thoracic aorta.     CORONARY ARTERY CALCIFICATION: Mild.     HEPATOBILIARY: Hepatomegaly measuring 23 cm in length. Several tiny stones in  the gallbladder.     PANCREAS: Soft tissue mass arising from the uncinate process of the pancreatic head measuring 5.5 x 3.1 x 7.6 cm. This extends superiorly and encases the celiac trunk, the superior mesenteric artery and the superior mesenteric vein. The splenic and   portal veins are patent.     SPLEEN: Normal.     ADRENAL GLANDS: Normal.     KIDNEYS/BLADDER: Tiny hypodensity lower pole right kidney too small to further characterize, but likely represents a cyst. No renal calculi or hydronephrosis.     BOWEL: No evidence for bowel obstruction. No significant constipation.     LYMPH NODES: No enlarged retroperitoneal nodes.     VASCULATURE: Atherosclerotic disease abdominal aorta and iliac arteries.     PELVIC ORGANS: Hysterectomy.     MUSCULOSKELETAL: Postop changes anterior abdominal wall. Edematous changes subcutaneous fat anterior abdominal wall. Hypertrophic changes thoracolumbar spine.                                                                      IMPRESSION:  1.  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 This encases the celiac trunk and superior mesenteric artery and vein. GI consultation recommended.  2.  Moderate size right pleural effusion and right basilar atelectasis.

## 2021-09-21 NOTE — PROGRESS NOTES
RECEIVING UNIT ED HANDOFF REVIEW    ED Nurse Handoff Report was reviewed by: Lina Thomas RN on September 20, 2021 at 9:39 PM

## 2021-09-22 ENCOUNTER — APPOINTMENT (OUTPATIENT)
Dept: ULTRASOUND IMAGING | Facility: CLINIC | Age: 81
DRG: 840 | End: 2021-09-22
Attending: INTERNAL MEDICINE
Payer: MEDICARE

## 2021-09-22 ENCOUNTER — ANESTHESIA EVENT (OUTPATIENT)
Dept: MEDSURG UNIT | Facility: CLINIC | Age: 81
End: 2021-09-22

## 2021-09-22 LAB
APPEARANCE FLD: ABNORMAL
BLD PROD TYP BPU: NORMAL
BLD PROD TYP BPU: NORMAL
BLOOD COMPONENT TYPE: NORMAL
BLOOD COMPONENT TYPE: NORMAL
CODING SYSTEM: NORMAL
CODING SYSTEM: NORMAL
COLOR FLD: YELLOW
CROSSMATCH: NORMAL
CROSSMATCH: NORMAL
GLUCOSE BLDC GLUCOMTR-MCNC: 111 MG/DL (ref 70–99)
GLUCOSE BLDC GLUCOMTR-MCNC: 139 MG/DL (ref 70–99)
GLUCOSE BLDC GLUCOMTR-MCNC: 146 MG/DL (ref 70–99)
GLUCOSE BLDC GLUCOMTR-MCNC: 151 MG/DL (ref 70–99)
GLUCOSE FLD-MCNC: 112 MG/DL
GRAM STAIN RESULT: NORMAL
GRAM STAIN RESULT: NORMAL
HGB BLD-MCNC: 6.4 G/DL (ref 11.7–15.7)
HGB BLD-MCNC: 7.5 G/DL (ref 11.7–15.7)
ISSUE DATE AND TIME: NORMAL
ISSUE DATE AND TIME: NORMAL
LDH FLD L TO P-CCNC: 83 U/L
LYMPHOCYTES NFR FLD MANUAL: NORMAL %
MONOS+MACROS NFR FLD MANUAL: NORMAL %
NEUTS BAND NFR FLD MANUAL: NORMAL %
PROT FLD-MCNC: 2.3 G/DL
UNIT ABO/RH: NORMAL
UNIT ABO/RH: NORMAL
UNIT NUMBER: NORMAL
UNIT NUMBER: NORMAL
UNIT STATUS: NORMAL
UNIT STATUS: NORMAL
UNIT TYPE ISBT: 6200
UNIT TYPE ISBT: 6200

## 2021-09-22 PROCEDURE — 250N000013 HC RX MED GY IP 250 OP 250 PS 637: Performed by: INTERNAL MEDICINE

## 2021-09-22 PROCEDURE — 250N000009 HC RX 250: Performed by: STUDENT IN AN ORGANIZED HEALTH CARE EDUCATION/TRAINING PROGRAM

## 2021-09-22 PROCEDURE — 85018 HEMOGLOBIN: CPT | Performed by: INTERNAL MEDICINE

## 2021-09-22 PROCEDURE — 0W9B3ZZ DRAINAGE OF LEFT PLEURAL CAVITY, PERCUTANEOUS APPROACH: ICD-10-PCS | Performed by: RADIOLOGY

## 2021-09-22 PROCEDURE — 36415 COLL VENOUS BLD VENIPUNCTURE: CPT | Performed by: INTERNAL MEDICINE

## 2021-09-22 PROCEDURE — 99233 SBSQ HOSP IP/OBS HIGH 50: CPT | Performed by: INTERNAL MEDICINE

## 2021-09-22 PROCEDURE — 99232 SBSQ HOSP IP/OBS MODERATE 35: CPT | Performed by: INTERNAL MEDICINE

## 2021-09-22 PROCEDURE — 83615 LACTATE (LD) (LDH) ENZYME: CPT | Performed by: INTERNAL MEDICINE

## 2021-09-22 PROCEDURE — P9016 RBC LEUKOCYTES REDUCED: HCPCS | Performed by: STUDENT IN AN ORGANIZED HEALTH CARE EDUCATION/TRAINING PROGRAM

## 2021-09-22 PROCEDURE — 999N000157 HC STATISTIC RCP TIME EA 10 MIN

## 2021-09-22 PROCEDURE — 87070 CULTURE OTHR SPECIMN AEROBIC: CPT | Performed by: INTERNAL MEDICINE

## 2021-09-22 PROCEDURE — 94660 CPAP INITIATION&MGMT: CPT

## 2021-09-22 PROCEDURE — 86923 COMPATIBILITY TEST ELECTRIC: CPT | Performed by: STUDENT IN AN ORGANIZED HEALTH CARE EDUCATION/TRAINING PROGRAM

## 2021-09-22 PROCEDURE — 272N000706 US THORACENTESIS

## 2021-09-22 PROCEDURE — 84157 ASSAY OF PROTEIN OTHER: CPT | Performed by: INTERNAL MEDICINE

## 2021-09-22 PROCEDURE — 88305 TISSUE EXAM BY PATHOLOGIST: CPT | Mod: TC | Performed by: INTERNAL MEDICINE

## 2021-09-22 PROCEDURE — 89050 BODY FLUID CELL COUNT: CPT | Performed by: INTERNAL MEDICINE

## 2021-09-22 PROCEDURE — 120N000001 HC R&B MED SURG/OB

## 2021-09-22 PROCEDURE — 999N000154 HC STATISTIC RADIOLOGY XRAY, US, CT, MAR, NM

## 2021-09-22 PROCEDURE — 82945 GLUCOSE OTHER FLUID: CPT | Performed by: INTERNAL MEDICINE

## 2021-09-22 PROCEDURE — 87205 SMEAR GRAM STAIN: CPT | Performed by: INTERNAL MEDICINE

## 2021-09-22 PROCEDURE — 89051 BODY FLUID CELL COUNT: CPT | Performed by: INTERNAL MEDICINE

## 2021-09-22 RX ORDER — LIDOCAINE HYDROCHLORIDE 10 MG/ML
10 INJECTION, SOLUTION EPIDURAL; INFILTRATION; INTRACAUDAL; PERINEURAL ONCE
Status: COMPLETED | OUTPATIENT
Start: 2021-09-22 | End: 2021-09-22

## 2021-09-22 RX ORDER — MAGNESIUM CARB/ALUMINUM HYDROX 105-160MG
296 TABLET,CHEWABLE ORAL ONCE
Status: COMPLETED | OUTPATIENT
Start: 2021-09-23 | End: 2021-09-23

## 2021-09-22 RX ORDER — POLYETHYLENE GLYCOL 3350 17 G/17G
238 POWDER, FOR SOLUTION ORAL ONCE
Status: COMPLETED | OUTPATIENT
Start: 2021-09-22 | End: 2021-09-22

## 2021-09-22 RX ORDER — BISACODYL 5 MG
10 TABLET, DELAYED RELEASE (ENTERIC COATED) ORAL ONCE
Status: COMPLETED | OUTPATIENT
Start: 2021-09-22 | End: 2021-09-22

## 2021-09-22 RX ORDER — LIDOCAINE 40 MG/G
CREAM TOPICAL
Status: CANCELLED | OUTPATIENT
Start: 2021-09-22

## 2021-09-22 RX ORDER — OXYCODONE HYDROCHLORIDE 5 MG/1
5 TABLET ORAL EVERY 4 HOURS PRN
Status: DISCONTINUED | OUTPATIENT
Start: 2021-09-22 | End: 2021-10-09 | Stop reason: HOSPADM

## 2021-09-22 RX ORDER — ONDANSETRON 2 MG/ML
4 INJECTION INTRAMUSCULAR; INTRAVENOUS
Status: CANCELLED | OUTPATIENT
Start: 2021-09-22

## 2021-09-22 RX ADMIN — PANTOPRAZOLE SODIUM 40 MG: 40 TABLET, DELAYED RELEASE ORAL at 15:48

## 2021-09-22 RX ADMIN — SIMVASTATIN 10 MG: 10 TABLET, FILM COATED ORAL at 22:20

## 2021-09-22 RX ADMIN — LIDOCAINE HYDROCHLORIDE 10 ML: 10 INJECTION, SOLUTION EPIDURAL; INFILTRATION; INTRACAUDAL; PERINEURAL at 10:40

## 2021-09-22 RX ADMIN — BISACODYL 10 MG: 5 TABLET, COATED ORAL at 15:48

## 2021-09-22 RX ADMIN — POLYETHYLENE GLYCOL 3350 238 G: 17 POWDER, FOR SOLUTION ORAL at 16:39

## 2021-09-22 RX ADMIN — LEVOTHYROXINE SODIUM 200 MCG: 100 TABLET ORAL at 08:43

## 2021-09-22 RX ADMIN — CITALOPRAM HYDROBROMIDE 20 MG: 20 TABLET ORAL at 08:43

## 2021-09-22 RX ADMIN — PANTOPRAZOLE SODIUM 40 MG: 40 TABLET, DELAYED RELEASE ORAL at 08:43

## 2021-09-22 ASSESSMENT — ACTIVITIES OF DAILY LIVING (ADL)
ADLS_ACUITY_SCORE: 17
ADLS_ACUITY_SCORE: 15
ADLS_ACUITY_SCORE: 17
ADLS_ACUITY_SCORE: 15

## 2021-09-22 NOTE — PLAN OF CARE
DATE & TIME: 9/22/21 5911-8706    Cognitive Concerns/ Orientation : Pt A/Ox4   BEHAVIOR & AGGRESSION TOOL COLOR: Green   ABNL VS/O2: VSS on 3L NC sating mid 90%, desat to 88-90% on 2L while asleep.   MOBILITY: Assist x1 with gait belt and cane to BSC, fall risk  PAIN MANAGMENT: Denies  DIET: Clear liquids, NPO from midnight for colonoscopy tomorrow.   BOWEL/BLADDER: Continent, prefers to use external catheter at night   ABNL LAB/BG: Hgb 6.4, s/p 1U PRBC, recheck at 1800, no signs of active bleeding. , 146.   DRAIN/DEVICES: IV SL  TELEMETRY RHYTHM: NSR   SKIN: Pale, some redness around groin area, otherwise intact.   TESTS/PROCEDURES: s/p US guided thoracentesis today, 450 ml verenice color fluid drained, cytology results pending. Colonoscopy and Echo complete tomorrow.   D/C DATE: Discharge pending GI, Hem/Onc work up.   OTHER IMPORTANT INFO: Lung sounds diminished. Pt lethargic since returning from thoracentesis, arouses to voice and gentle touch. Thoracentesis site band aid on Rt middle back CDI. Colonoscopy tomorrow, has received po Dulcolax 10 mg x1, bowel prep (miralax) in progress, tolerating so far, no BM yet. Scheduled for Mag citrate at 0400. GI/Hem/Onc following.

## 2021-09-22 NOTE — PLAN OF CARE
Summary: SOB, constipation  DATE & TIME 9/21/21 3189-5031  Cognitive Concerns/ Orientation : A&0x4   BEHAVIOR & AGGRESSION TOOL COLOR: green  CIWA SCORE: na   ABNL VS/O2: VSS, O2 low 90% on 4L NC, TAVARES  MOBILITY: Up w 1 GB and cane to BSC and chair  PAIN MANAGMENT: Denies  DIET: clear liquid   BOWEL/BLADDER: BSC, continent  ABNL LAB/BG: HGB 7.1 (s/p 2u PRBC overnight for HGB 4.7), checking q12h, no s/s of active bleeding. Creat 1.10  DRAIN/DEVICES: PIV SL  TELEMETRY RHYTHM: na  SKIN: pale  TESTS/PROCEDURES: s/p EUS and EGD, US guided thoracentesis tomorrow.    D/C DAY/GOALS/PLACE: TBD  OTHER IMPORTANT INFO:   GI and hem/onc following  Uses BiPAP at HS  Pt receiving 1x dose of iron sucrose 300 mg after procedures.

## 2021-09-22 NOTE — PROVIDER NOTIFICATION
MD Notification    Notified Person: MD    Notified Person Name: Dr. Puente    Notification Date/Time: 9/22/21 2530    Notification Interaction: Telephone    Purpose of Notification: Critical Hgb 6.4    Orders Received:    Comments:

## 2021-09-22 NOTE — PROGRESS NOTES
RADIOLOGY PROCEDURE NOTE  Patient name: Lucille Rojas  MRN: [id number]  : 1940    Pre-procedure diagnosis: Left pleural effusion  Post-procedure diagnosis: Same    Procedure Date/Time: 2021  10:57 AM  Procedure: Thoracentesis  Estimated blood loss: None  Specimen(s) collected with description: Pleural fluid.  The patient tolerated the procedure well with no immediate complications.    See imaging dictation for procedural details and findings.    Provider name: Yonny Cornell MD  Assistant(s):None

## 2021-09-22 NOTE — PROGRESS NOTES
Care Suites Procedure Nursing Note    Patient Information  Name: Lucille Rojas  Age: 81 year old    Procedure  Procedure: right thoracentesis  Procedure start time: 1030  Procedure complete time: 1048  Concerns/abnormal assessment: n/a  If abnormal assessment, provider notified: N/A  Plan/Other: Removed 450 ml yellow fluid from right sided paracentesis.  Site CDI.  Blood transfusing without difficulty.  Flying squad notified to return patient to [facility] [place].  Monitor.    Marya Tesfaye RN

## 2021-09-22 NOTE — PROGRESS NOTES
[facility]    Hospitalist Progress Note    Date of Service (when I saw the patient): 09/22/2021  Admit date: 9/20/2021    Assessment & Plan   Lucille Rojas is a 81 year old female admitted on 9/20/2021. She presents with SOB and constipation.     Severe, symptomatic anemia  Acute blood loss anemia on chronic anemia  Reports several weeks of ongoing symptoms indicating a somewhat chronic course. Has noted black, tarry stools for at least 3-4 weeks.  * S/p 2 units with Hgb 4.7 > 7.2    - Protonix 40 mg BID  - Transfuse 1 unit pRBC for hgb 6.5 on 09/22/21, remains hemodynamically stable.   - EGD on 09/21/21 revealed a few gastric erosions and a few tiny aphthous ulcers in the duodenum, but not severe enough to account for anemia.   - NPO at midnight with colonoscopy planned for 9/23/21.   - Iron studies reveal low levels > venofer ordered.   - Transfuse to keep hgb > 7. Hgb ordered q 12 hours.     Recent Labs   Lab 09/22/21  0612 09/21/21  1802 09/21/21  0324 09/20/21  1727   HGB 6.4* 7.1* 7.2* 4.7*       Presumed primary pancreatic cancer Noted on CT.  R pleural effusion, transudate   S/p thoracentesis - 450 ml of verenice fluid on 09/22/21   S/p EUS on 09/21/21 with biopsy. Preliminary cyto positive for malignancy, awaiting final path.   - follow biopsy results and cytology on pleural fluid.   - follow up with oncology, Dr. Srinivasan to discuss results following discharge.     Loud systolic murmur  TAVARES, CP and light-headedness with minimal exertion. Undoubtedly,  Anemia contributes and also on chronic oxygen at home, but recently has not been able to get continued oxygen through PCP. Initial troponin negative. CXR with R pleural effusion.   Transudate R pleural effusion, edema as above.   * serial troponins negative    - awaiting echo, place on telemetry, follow up troponin.   - check BNP  - consider further workup for ischemic disease pending results of echo.      Constipation  Reports 1 BM in  last 1.5 weeks. No significant stool burden on admission CT.  No obstruction noted.  - PRN stool regimen  - bowel prep initiated on 09/22/21 for colonoscopy.      CKD 3  - monitor     DMT2 diet controlled.  last A1c 5.8 on 4/7/21. Did not recheck due to severe anemia  - BID BS, will stop if remains well controlled when eating again.     Recent Labs   Lab 09/22/21  0937 09/22/21  0210 09/21/21  2107 09/21/21  1654 09/21/21  1006 09/21/21  0324   * 151* 151* 112* 96 123*        Obesity, SHAVON with hypoventilation syndrome   Chronic hypoxia on home oxygen - Per family she requires oxygen during the day as well, but has not been having trouble getting it ordered through PCP.     - on BiPAP with O2 PTA  - wean O2 as tolerates     Hypothyroid  - continue PTA levothyroxine     Depression  - continue PTA citalopram     HLD  - continue PTA statin    Diet: Orders Placed This Encounter      Clear Liquid Diet      NPO per Anesthesia Guidelines for Procedure/Surgery Except for: Meds     IVF: None  Espitia Catheter: Not present     DVT Prophylaxis: Pneumatic Compression Devices  Code Status: No CPR- Do NOT Intubate     Disposition: Expected discharge 2 days once we have all tests complete, including colonoscopy and echocardiogram. Considering ischemic workup.   Communication: Discussed with patient, daughter, , GI and oncology on 09/21/21    Catina Loomis  Hospitalist Service  [facility]  Securely message with the Vocera Web Console (learn more here)  Text page via [employer] Paging/Directory    Interval History   Events over last 24 hours as outlined above.   Patient sleeping and did not want to fully awaken to talk to me. Denied any concerns. Discussed with RN. No CP, SOB, or light-headedness. No N/V/D.  Tolerating diet.     -Data reviewed today: I reviewed all new labs and imaging results over the last 24 hours. I personally reviewed no images or EKG's today.    Physical Exam   Temp: 97.7  F  (36.5  C) (end of blood transfusion) Temp src: Oral BP: (!) 152/58 Pulse: 81   Resp: 16 SpO2: 96 % O2 Device: Nasal cannula Oxygen Delivery: 3 LPM  Vitals:    09/21/21 1451   Weight: 124.3 kg (274 lb)     Vital Signs with Ranges  Temp:  [97.3  F (36.3  C)-99.9  F (37.7  C)] 97.7  F (36.5  C)  Pulse:  [66-89] 81  Resp:  [16-24] 16  BP: (114-166)/(35-74) 152/58  FiO2 (%):  [30 %-40 %] 40 %  SpO2:  [88 %-100 %] 96 %  I/O last 3 completed shifts:  In: 100 [I.V.:100]  Out: 775 [Urine:775]    Today's Exam  Constitutional: NAD,   Neuropsyche:  Sleeping, awakens momentarily, answers questions appropriately.   Respiratory: Breathing comfortably, decreased air exchange, Decreased in R lower base.   no wheezes, no crackles.   Cardiovascular: Regular rate and rhythm with loud early systolic murmur, 1+ edema.  GI:  soft, NT/ND, BS normal  Skin/Integumen:  No acute rash or sign of bleeding.     Medications   All medications reviewed on 09/22/21     - MEDICATION INSTRUCTIONS -         citalopram  20 mg Oral Daily     [START ON 9/23/2021] iron sucrose (VENOFER) intermittent infusion  300 mg Intravenous Once     levothyroxine  200 mcg Oral QAM AC     [START ON 9/23/2021] magnesium citrate  296 mL Oral Once     pantoprazole  40 mg Oral BID AC     simvastatin  10 mg Oral At Bedtime     sodium chloride (PF)  3 mL Intracatheter Q8H       Data   Recent Labs   Lab 09/22/21  0937 09/22/21  0612 09/22/21  0210 09/21/21  2107 09/21/21  1802 09/21/21  1654 09/21/21  1006 09/21/21  0324 09/20/21  2244 09/20/21  1727   WBC  --   --   --   --   --   --   --  15.6*  --  7.6   HGB  --  6.4*  --   --  7.1*  --   --  7.2*  --  4.7*   MCV  --   --   --   --   --   --   --  71*  --  67*   PLT  --   --   --   --   --   --   --  412  --  412   NA  --   --   --   --   --   --   --  139  --  141   POTASSIUM  --   --   --   --   --   --   --  4.1  --  4.1   CHLORIDE  --   --   --   --   --   --   --  107  --  108   CO2  --   --   --   --   --   --   --  26   --  28   BUN  --   --   --   --   --   --   --  22  --  24   CR  --   --   --   --   --   --   --  1.10*  --  1.09*   ANIONGAP  --   --   --   --   --   --   --  6  --  5   IGOR  --   --   --   --   --   --   --  8.4*  --  8.5   *  --  151* 151*  --    < >   < > 123*   < > 105*   ALBUMIN  --   --   --   --   --   --   --  3.3*  --  3.4   PROTTOTAL  --   --   --   --  6.9  --   --  6.7*  --  6.9   BILITOTAL  --   --   --   --   --   --   --  0.5  --  0.4   ALKPHOS  --   --   --   --   --   --   --  104  --  101   ALT  --   --   --   --   --   --   --  17  --  16   AST  --   --   --   --   --   --   --  14  --  10   LIPASE  --   --   --   --   --   --   --   --   --  124   TROPONIN  --   --   --   --  <0.015  --   --   --   --  <0.015    < > = values in this interval not displayed.       Recent Results (from the past 24 hour(s))   US Thoracentesis    Narrative    ULTRASOUND GUIDED THORACENTESIS  9/22/2021 11:01 AM     HISTORY: Pancreatic malignancy with right pleural effusion.    TECHNIQUE:  Ultrasound was used to evaluate for the presence and best  approach for drainage of a pleural effusion. Written and oral informed  consent was obtained. A pause for the cause procedure to verify the  correct patient and correct procedure. The skin overlying the right  chest posteriorly was prepped and draped in the usual sterile fashion.  The subcutaneous tissues were anesthetized with 10mL 1% Lidocaine. A  catheter was advanced into the pleural space and 450 mL of  verenice  colored fluid was drained. Patient was monitored by nurse under my  direct supervision throughout the exam. Ultrasound images were  permanently stored.  There were no immediate complications. Patient  left the ultrasound suite in satisfactory condition.      Impression    IMPRESSION: Technically successful thoracentesis without immediate  complications.     KARO RUSHING MD         SYSTEM ID:  [id number]

## 2021-09-22 NOTE — PLAN OF CARE
DATE & TIME: 9/21/21 6501-2945    Cognitive Concerns/ Orientation : Pt A/Ox4   BEHAVIOR & AGGRESSION TOOL COLOR: Green   ABNL VS/O2: VSS on BiPap overnight  MOBILITY: Assist x1 with gait belt and cane to BSC, fall risk  PAIN MANAGMENT: Denies  DIET: Clear liquids  BOWEL/BLADDER: Continent. Pt requests purewick overnight.  ABNL LAB/BG: Hgb 7.1, 6.4(unit currently being prepared)  DRAIN/DEVICES: IV SL  TELEMETRY RHYTHM: SR  SKIN: Pale  TESTS/PROCEDURES: EUS and EGD today/US guided thoracentesis today  D/C DATE: Discharge pending progress  OTHER IMPORTANT INFO: Lung sounds diminished. Pt lethargic this evening.

## 2021-09-22 NOTE — PROGRESS NOTES
GASTROENTEROLOGY PROGRESS NOTE     IMPRESSION:  1.  Pancreatic mass-see EUS results below, await pathology.  2.  Severe anemia-EGD did reveal some small duodenal ulcers, however, no findings significant to account for the severe anemia, thus we will pursue colonoscopy tomorrow.    RECOMMENDATIONS:  1.  Clear diet today.  2.  N.p.o. at midnight.  3.  Colonoscopy tomorrow with MAC sedation.    Mahamed Rodriguez MD  [facility]  [phone]      ________________________________________________________________________      SUBJECTIVE:  Patient reports she feels much better compared to when she came in.  No GI bleeding.       OBJECTIVE:  BP (!) 152/58   Pulse 81   Temp 97.7  F (36.5  C) (Oral)   Resp 16   Ht 1.524 m (5')   Wt 124.3 kg (274 lb)   SpO2 96%   BMI 53.51 kg/m    Temp (24hrs), Av.3  F (36.8  C), Min:97.3  F (36.3  C), Max:99.9  F (37.7  C)    Patient Vitals for the past 72 hrs:   Weight   21 1451 124.3 kg (274 lb)        PHYSICAL EXAM  GEN: Alert, NAD.    HRT: reg  LUNGS: CTA  ABD: +BS, non-tender, non-distended, no rebound or guarding.    Additional Data:  I have reviewed the patient's new clinical lab results:     Recent Labs   Lab Test 21  0612 21  1802 21  0324 21  1727 21  1727 17  0957 16  0906   WBC  --   --  15.6*  --  7.6  --  7.6   HGB 6.4* 7.1* 7.2*   < > 4.7*   < > 10.0*   MCV  --   --  71*  --  67*  --  95   PLT  --   --  412  --  412  --  288    < > = values in this interval not displayed.     Recent Labs   Lab Test 21  0937 21  0210 21  2107 21  1006 21  0324 21  2244 21  1727 20  1305   0000   NA  --   --   --   --  139  --  141 144  --    POTASSIUM  --   --   --   --  4.1  --  4.1 4.1  --    CHLORIDE  --   --   --   --  107  --  108 109  --    CO2  --   --   --   --  26  --  28 31  --    BUN  --   --   --   --  22  --  24 19  --    CR  --   --   --   --  1.10*  --  1.09* 0.98   --    ANIONGAP  --   --   --   --  6  --  5 4  --    IGOR  --   --   --   --  8.4*  --  8.5 9.6  --    * 151* 151*   < > 123*   < > 105* 112*   < >    < > = values in this interval not displayed.     Recent Labs   Lab Test 09/21/21  0324 09/20/21  1727 08/27/20  1305 08/01/16  0713 09/11/15  1542   ALBUMIN 3.3* 3.4 3.6   < > 3.5   BILITOTAL 0.5 0.4 0.2   < > 0.2   ALT 17 16 15   < > 23   AST 14 10 13   < > 12   LIPASE  --  124  --   --   --    AMYLASE  --   --   --   --  64    < > = values in this interval not displayed.        EGD/EUS 9/21/21    Impression:               - Normal esophagus.                             - Non-bleeding erosive gastropathy. Biopsied.                             - Few tiny aphthous ulcers in duodenal bulb                             - A single tiny duodenal polyp. Removed with biopsy                             forceps                             - There was no sign of significant pathology in the                             common bile duct.                             - A mass was identified in the uncinate process of                             the pancreas. This was staged T4 N0 M0 by                             endosonographic criteria. The staging applies if                             malignancy is confirmed. Fine needle aspiration                             performed. Appearance not typical for                             adenocarcinoma.                             - EGD findings not significant enough to account                             for degree of anemia

## 2021-09-22 NOTE — PROGRESS NOTES
Service Date: 2021    SUBJECTIVE:  Mrs. Lara is an 81-year-old female with pancreatic mass.  EUS was done yesterday.  There is a mass in the uncinate process of the pancreas measuring 33 mm.  There was evidence of invasion into superior mesenteric artery and the celiac trunk.  FNA was done.  No enlarged lymph nodes seen.    The patient also has severe anemia.  She does have iron deficiency.  Cause of anemia is not clear. EGD/EUS revealed nonbleeding erosive gastropathy.  There were a few tiny aphthous ulcers in duodenal bulb.   There might have been some upper GI bleed.  For further workup, colonoscopy is planned.    She does have a pleural effusion.  Thoracocentesis of right pleural effusion was done.    I met with the patient.  She feels weak.  No headache.  Some lightheadedness.  She has mild shortness of breath.  No recurrent vomiting.  Appetite is poor.    PHYSICAL EXAMINATION:    GENERAL:  She was not in any distress.  She looked weak.  Rest of the system not examined.    LABORATORY DATA:  Reviewed.    ASSESSMENT:    1.  An 81-year-old female with pancreatic mass suspicious for pancreatic cancer.  2.  Severe iron deficiency anemia.  3.  Right pleural effusion, status post thoracocentesis.    PLAN:     1.  The patient has pancreatic mass.  FNA was done.  Results will be available in the next few days.  Depending on the pathology, further investigation will be planned.  2.  The patient has iron deficiency anemia.  For evaluation, she will get a colonoscopy.  For iron deficiency, she received IV Venofer yesterday.  We will give her another dose tomorrow.  3.  She has pleural effusion.  Thoracocentesis was done.  We will wait for cytology.  4.  Case discussed with Dr. Vladislav Dominique.  Oncology will continue to follow.    John Srinivasan MD        D: 2021   T: 2021   MT: [id number]    Name:     GLORIA LARA  MRN:      [id number]        Account:      [id number]   :      1940            Service Date: 09/22/2021       Document: [id number]

## 2021-09-22 NOTE — PROGRESS NOTES
"CLINICAL NUTRITION SERVICES  -  ASSESSMENT NOTE      Malnutrition:   % Weight Loss:  None noted  % Intake:  <75% for > 7 days (non-severe malnutrition) - suspected, at least   Subcutaneous Fat Loss:  None observed  Muscle Loss:  None observed - potentially masked by adiposity   Fluid Retention:  None noted    Malnutrition Diagnosis: Patient does not meet two of the above criteria necessary for diagnosing malnutrition at this time, remains at risk pending further decline         REASON FOR ASSESSMENT  Lucille Rojas is a 81 year old female seen by Registered Dietitian for Admission Nutrition Risk Screen, MST score 2  - Have you recently lost weight without trying in the last six months?: Unsure (2)   - Have you been eating poorly because of a decreased appetite?: no (0)      NUTRITION HISTORY  Chart reviewed and discussed with patient (very limited communication, keeping her eyes closed and only responding to yes or no questions)  Nods yes when asked if she has had trouble with appetite lately, whispers \"for a few weeks\"  Shakes head no when asked if she has noticed any recent accidental wt loss     Per H&P, patient here with acute on chronic anemia from likely GI bleed in the setting of presumed metastatic spread of pancreatic cancer   No known food allergies       CURRENT NUTRITION ORDERS  Diet Order:     NPO    Current Intake/Tolerance:  None yet  Colonoscopy planned tomorrow   Pancreatic mass pathology pending       NUTRITION FOCUSED PHYSICAL ASSESSMENT FOR DIAGNOSING MALNUTRITION)  Yes         Observed:    No nutrition-related physical findings observed - potential for losses masked by adiposity     Obtained from Chart/Interdisciplinary Team:  None noted      ANTHROPOMETRICS  Height: 5' 0\"  Weight: 274 lbs 0 oz  Body mass index is 53.51 kg/m .  Weight Status:  Obesity Grade III BMI >40  IBW: 45.4 kg   % IBW: 273%  Weight History: No sig wt loss recorded   Wt Readings from Last 10 Encounters:   09/21/21 124.3 " kg (274 lb)   04/07/21 124.8 kg (275 lb 3.2 oz)   10/02/20 121.6 kg (268 lb)   08/27/20 121.3 kg (267 lb 6.4 oz)   05/16/19 125.1 kg (275 lb 12.8 oz)   04/29/19 122.9 kg (271 lb)   06/21/18 126.2 kg (278 lb 3.2 oz)   06/15/18 136.1 kg (300 lb)   02/12/18 136.1 kg (300 lb)   12/01/17 131.5 kg (289 lb 14.5 oz)       LABS  Labs reviewed    MEDICATIONS  Medications reviewed      ASSESSED NUTRITION NEEDS PER APPROVED PRACTICE GUIDELINES:    Dosing Weight 124 kg actual (energy); 45 kg IBW (protein)  Estimated Energy Needs: 5646-9968 kcals (11-14 Kcal/Kg)  Justification: obese  Estimated Protein Needs: 90+ grams protein (2+ kg/kg IBW)  Justification: obesity guidelines  and preservation of lean body mass  Estimated Fluid Needs: per MD     MALNUTRITION:  % Weight Loss:  None noted  % Intake:  <75% for > 7 days (non-severe malnutrition) - suspected, at least   Subcutaneous Fat Loss:  None observed  Muscle Loss:  None observed - potentially masked by adiposity   Fluid Retention:  None noted    Malnutrition Diagnosis: Patient does not meet two of the above criteria necessary for diagnosing malnutrition at this time, remains at risk pending further decline     NUTRITION DIAGNOSIS:  Inadequate oral intake related to decreased appetite and NPO as evidenced by intakes 0% for the past 2 days and suspected <75% for the past 7+ days       NUTRITION INTERVENTIONS  Recommendations / Nutrition Prescription  ADAT  Will offer nutritional supplements w/ diet advancement       Implementation  Nutrition education: Per Provider order if indicated       Nutrition Goals  Diet adv >FL with 48 hrs       MONITORING AND EVALUATION:  Progress towards goals will be monitored and evaluated per protocol and Practice Guidelines          Lupe Prado RD, LD  Clinical Dietitian   Unit pager [phone]

## 2021-09-23 ENCOUNTER — ANESTHESIA (OUTPATIENT)
Dept: MEDSURG UNIT | Facility: CLINIC | Age: 81
End: 2021-09-23

## 2021-09-23 ENCOUNTER — APPOINTMENT (OUTPATIENT)
Dept: GENERAL RADIOLOGY | Facility: CLINIC | Age: 81
DRG: 840 | End: 2021-09-23
Attending: INTERNAL MEDICINE
Payer: MEDICARE

## 2021-09-23 ENCOUNTER — APPOINTMENT (OUTPATIENT)
Dept: CARDIOLOGY | Facility: CLINIC | Age: 81
DRG: 840 | End: 2021-09-23
Attending: INTERNAL MEDICINE
Payer: MEDICARE

## 2021-09-23 LAB
ALBUMIN UR-MCNC: 50 MG/DL
AMMONIA PLAS-SCNC: 38 UMOL/L (ref 10–50)
ANION GAP SERPL CALCULATED.3IONS-SCNC: 4 MMOL/L (ref 3–14)
APPEARANCE UR: ABNORMAL
BACTERIA #/AREA URNS HPF: ABNORMAL /HPF
BASE EXCESS BLDA CALC-SCNC: 5.8 MMOL/L (ref -9–1.8)
BASE EXCESS BLDA CALC-SCNC: 8.8 MMOL/L (ref -9–1.8)
BILIRUB UR QL STRIP: NEGATIVE
BUN SERPL-MCNC: 18 MG/DL (ref 7–30)
CALCIUM SERPL-MCNC: 8.2 MG/DL (ref 8.5–10.1)
CHLORIDE BLD-SCNC: 105 MMOL/L (ref 94–109)
CO2 SERPL-SCNC: 32 MMOL/L (ref 20–32)
COLOR UR AUTO: ABNORMAL
CREAT SERPL-MCNC: 1.16 MG/DL (ref 0.52–1.04)
ERYTHROCYTE [DISTWIDTH] IN BLOOD BY AUTOMATED COUNT: 24 % (ref 10–15)
GFR SERPL CREATININE-BSD FRML MDRD: 44 ML/MIN/1.73M2
GLUCOSE BLD-MCNC: 135 MG/DL (ref 70–99)
GLUCOSE BLDC GLUCOMTR-MCNC: 113 MG/DL (ref 70–99)
GLUCOSE BLDC GLUCOMTR-MCNC: 134 MG/DL (ref 70–99)
GLUCOSE BLDC GLUCOMTR-MCNC: 150 MG/DL (ref 70–99)
GLUCOSE UR STRIP-MCNC: NEGATIVE MG/DL
HCO3 BLD-SCNC: 35 MMOL/L (ref 21–28)
HCO3 BLD-SCNC: 37 MMOL/L (ref 21–28)
HCT VFR BLD AUTO: 28.4 % (ref 35–47)
HGB BLD-MCNC: 7.5 G/DL (ref 11.7–15.7)
HGB BLD-MCNC: 7.5 G/DL (ref 11.7–15.7)
HGB UR QL STRIP: ABNORMAL
KETONES UR STRIP-MCNC: NEGATIVE MG/DL
LEUKOCYTE ESTERASE UR QL STRIP: ABNORMAL
LVEF ECHO: NORMAL
MCH RBC QN AUTO: 20.2 PG (ref 26.5–33)
MCHC RBC AUTO-ENTMCNC: 26.4 G/DL (ref 31.5–36.5)
MCV RBC AUTO: 77 FL (ref 78–100)
MUCOUS THREADS #/AREA URNS LPF: PRESENT /LPF
NITRATE UR QL: NEGATIVE
NT-PROBNP SERPL-MCNC: 4405 PG/ML (ref 0–1800)
O2/TOTAL GAS SETTING VFR VENT: 35 %
O2/TOTAL GAS SETTING VFR VENT: 5 %
PATH REPORT.COMMENTS IMP SPEC: NORMAL
PATH REPORT.COMMENTS IMP SPEC: NORMAL
PATH REPORT.FINAL DX SPEC: NORMAL
PATH REPORT.GROSS SPEC: NORMAL
PATH REPORT.MICROSCOPIC SPEC OTHER STN: NORMAL
PCO2 BLD: 77 MM HG (ref 35–45)
PCO2 BLD: 85 MM HG (ref 35–45)
PH BLD: 7.22 [PH] (ref 7.35–7.45)
PH BLD: 7.29 [PH] (ref 7.35–7.45)
PH UR STRIP: 5.5 [PH] (ref 5–7)
PHOTO IMAGE: NORMAL
PLATELET # BLD AUTO: 351 10E3/UL (ref 150–450)
PO2 BLD: 121 MM HG (ref 80–105)
PO2 BLD: 56 MM HG (ref 80–105)
POTASSIUM BLD-SCNC: 4.2 MMOL/L (ref 3.4–5.3)
RBC # BLD AUTO: 3.71 10E6/UL (ref 3.8–5.2)
RBC URINE: 40 /HPF
SODIUM SERPL-SCNC: 141 MMOL/L (ref 133–144)
SP GR UR STRIP: 1.02 (ref 1–1.03)
SQUAMOUS EPITHELIAL: 1 /HPF
UROBILINOGEN UR STRIP-MCNC: NORMAL MG/DL
WBC # BLD AUTO: 14 10E3/UL (ref 4–11)
WBC CLUMPS #/AREA URNS HPF: PRESENT /HPF
WBC URINE: 166 /HPF

## 2021-09-23 PROCEDURE — 250N000011 HC RX IP 250 OP 636: Performed by: INTERNAL MEDICINE

## 2021-09-23 PROCEDURE — 36415 COLL VENOUS BLD VENIPUNCTURE: CPT | Performed by: INTERNAL MEDICINE

## 2021-09-23 PROCEDURE — 255N000002 HC RX 255 OP 636: Performed by: STUDENT IN AN ORGANIZED HEALTH CARE EDUCATION/TRAINING PROGRAM

## 2021-09-23 PROCEDURE — 83880 ASSAY OF NATRIURETIC PEPTIDE: CPT | Performed by: INTERNAL MEDICINE

## 2021-09-23 PROCEDURE — 999N000208 ECHOCARDIOGRAM COMPLETE

## 2021-09-23 PROCEDURE — 87086 URINE CULTURE/COLONY COUNT: CPT | Performed by: INTERNAL MEDICINE

## 2021-09-23 PROCEDURE — 258N000003 HC RX IP 258 OP 636: Performed by: INTERNAL MEDICINE

## 2021-09-23 PROCEDURE — 250N000013 HC RX MED GY IP 250 OP 250 PS 637: Performed by: INTERNAL MEDICINE

## 2021-09-23 PROCEDURE — 93306 TTE W/DOPPLER COMPLETE: CPT | Mod: 26 | Performed by: INTERNAL MEDICINE

## 2021-09-23 PROCEDURE — 120N000001 HC R&B MED SURG/OB

## 2021-09-23 PROCEDURE — 99291 CRITICAL CARE FIRST HOUR: CPT | Performed by: INTERNAL MEDICINE

## 2021-09-23 PROCEDURE — 82140 ASSAY OF AMMONIA: CPT | Performed by: INTERNAL MEDICINE

## 2021-09-23 PROCEDURE — 81001 URINALYSIS AUTO W/SCOPE: CPT | Performed by: INTERNAL MEDICINE

## 2021-09-23 PROCEDURE — 88305 TISSUE EXAM BY PATHOLOGIST: CPT | Mod: 26 | Performed by: PATHOLOGY

## 2021-09-23 PROCEDURE — 85018 HEMOGLOBIN: CPT | Performed by: INTERNAL MEDICINE

## 2021-09-23 PROCEDURE — 94660 CPAP INITIATION&MGMT: CPT

## 2021-09-23 PROCEDURE — 82803 BLOOD GASES ANY COMBINATION: CPT | Performed by: INTERNAL MEDICINE

## 2021-09-23 PROCEDURE — 250N000013 HC RX MED GY IP 250 OP 250 PS 637: Performed by: STUDENT IN AN ORGANIZED HEALTH CARE EDUCATION/TRAINING PROGRAM

## 2021-09-23 PROCEDURE — 80048 BASIC METABOLIC PNL TOTAL CA: CPT | Performed by: INTERNAL MEDICINE

## 2021-09-23 PROCEDURE — 71045 X-RAY EXAM CHEST 1 VIEW: CPT

## 2021-09-23 PROCEDURE — 85027 COMPLETE CBC AUTOMATED: CPT | Performed by: INTERNAL MEDICINE

## 2021-09-23 PROCEDURE — 999N000157 HC STATISTIC RCP TIME EA 10 MIN

## 2021-09-23 PROCEDURE — 87040 BLOOD CULTURE FOR BACTERIA: CPT | Performed by: INTERNAL MEDICINE

## 2021-09-23 PROCEDURE — 99232 SBSQ HOSP IP/OBS MODERATE 35: CPT | Performed by: INTERNAL MEDICINE

## 2021-09-23 PROCEDURE — 36600 WITHDRAWAL OF ARTERIAL BLOOD: CPT

## 2021-09-23 RX ORDER — CEFTRIAXONE 1 G/1
1 INJECTION, POWDER, FOR SOLUTION INTRAMUSCULAR; INTRAVENOUS EVERY 24 HOURS
Status: DISCONTINUED | OUTPATIENT
Start: 2021-09-23 | End: 2021-09-26

## 2021-09-23 RX ORDER — FUROSEMIDE 10 MG/ML
40 INJECTION INTRAMUSCULAR; INTRAVENOUS 2 TIMES DAILY
Status: COMPLETED | OUTPATIENT
Start: 2021-09-23 | End: 2021-09-24

## 2021-09-23 RX ORDER — FUROSEMIDE 10 MG/ML
40 INJECTION INTRAMUSCULAR; INTRAVENOUS ONCE
Status: COMPLETED | OUTPATIENT
Start: 2021-09-23 | End: 2021-09-23

## 2021-09-23 RX ORDER — POTASSIUM CHLORIDE 1500 MG/1
20 TABLET, EXTENDED RELEASE ORAL DAILY
Status: DISCONTINUED | OUTPATIENT
Start: 2021-09-23 | End: 2021-09-24

## 2021-09-23 RX ORDER — POTASSIUM CHLORIDE 750 MG/1
20 CAPSULE, EXTENDED RELEASE ORAL DAILY
Status: DISCONTINUED | OUTPATIENT
Start: 2021-09-23 | End: 2021-09-23 | Stop reason: CLARIF

## 2021-09-23 RX ADMIN — HUMAN ALBUMIN MICROSPHERES AND PERFLUTREN 9 ML: 10; .22 INJECTION, SOLUTION INTRAVENOUS at 08:38

## 2021-09-23 RX ADMIN — IRON SUCROSE 300 MG: 20 INJECTION, SOLUTION INTRAVENOUS at 05:51

## 2021-09-23 RX ADMIN — ACETAMINOPHEN 650 MG: 325 TABLET, FILM COATED ORAL at 04:57

## 2021-09-23 RX ADMIN — CITALOPRAM HYDROBROMIDE 20 MG: 20 TABLET ORAL at 10:43

## 2021-09-23 RX ADMIN — LEVOTHYROXINE SODIUM 200 MCG: 100 TABLET ORAL at 10:43

## 2021-09-23 RX ADMIN — MAGNESIUM CITRATE 296 ML: 1.75 LIQUID ORAL at 04:06

## 2021-09-23 RX ADMIN — CEFTRIAXONE SODIUM 1 G: 1 INJECTION, POWDER, FOR SOLUTION INTRAMUSCULAR; INTRAVENOUS at 13:46

## 2021-09-23 RX ADMIN — ACETAMINOPHEN 650 MG: 325 TABLET, FILM COATED ORAL at 22:24

## 2021-09-23 RX ADMIN — FUROSEMIDE 40 MG: 10 INJECTION, SOLUTION INTRAVENOUS at 13:46

## 2021-09-23 RX ADMIN — FUROSEMIDE 40 MG: 10 INJECTION, SOLUTION INTRAVENOUS at 16:57

## 2021-09-23 RX ADMIN — PANTOPRAZOLE SODIUM 40 MG: 40 TABLET, DELAYED RELEASE ORAL at 10:43

## 2021-09-23 ASSESSMENT — ACTIVITIES OF DAILY LIVING (ADL)
ADLS_ACUITY_SCORE: 23
ADLS_ACUITY_SCORE: 17
ADLS_ACUITY_SCORE: 19
ADLS_ACUITY_SCORE: 17
ADLS_ACUITY_SCORE: 19
ADLS_ACUITY_SCORE: 23

## 2021-09-23 NOTE — PROGRESS NOTES
Pt was on Bipap with ST mode. I changed to AVAPS setting to improve her returm volumes and minute ventilation. RT will continue to follow.

## 2021-09-23 NOTE — PLAN OF CARE
DATE & TIME: 9/23/21 7748-7975                Cognitive Concerns/Orientation : has been lethargic, arouses to voice and gentle touch but drifts off in mid conversation.   BEHAVIOR & AGGRESSION TOOL COLOR: Green             ABNL VS/O2: VSS on 3-5L NC sating mid 90%, d/t increased pCO2 85, pt has been on BiPAP.   MOBILITY: Assist x1 with gait belt and cane to BSC, fall risk. Has not been OOB today d/t lethargy.   PAIN MANAGMENT: Denies  DIET: NPO while on Bipap.   BOWEL/BLADDER: has been incontinent of bowel and bladder today, BM loose stools x3, bowel prepped overnight for colonoscopy today, urbina placed today to monitor output, adequate UOP (has received 40 mg IV lasix x2).   ABNL LAB/BG: Hgb 7.5 (s/p 1U PRBC for hgb 6.4), no signs of active bleeding. , 134, pCO2 85, repeat was 77, Pro BNP 4405.   DRAIN/DEVICES: IV SL  TELEMETRY RHYTHM: NSR   SKIN: Pale, some redness around groin area, otherwise intact.   TESTS/PROCEDURES: Colonoscopy cancelled d/t lethargy and acute encephalopathy.    D/C DATE: Discharge pending GI, Hem/Onc work up.   OTHER IMPORTANT INFO: Lung sounds diminished. ABG with critical pCO2 85, placed on Bipap, repeat pCO2 was 77, RT following for Bipap management, pt remains lethargic. Arouses to voice and gentle touch. UA abn, UC pending, started on IV Rocephin q24h. Also started on IV Lasix 40 mg BID, adequate UOP. Repeat BC x2 drawn, results pending. New [facility] consult in place.

## 2021-09-23 NOTE — PROGRESS NOTES
[facility] note    Spoke with Dr. Loomis. Patient has mental status changes, increase BNP/CHF. We will cancel procedure today. Ok for PO intake when mental status changes improve. We will likely pursue the colonoscopy just prior to discharge after the current issues are resolved. She will need anesthesia for her colonoscopy.    We will continue to follow the patient with you. We will see her tomorrow. Please call with any GI questions or concerns in the interim.     Mahamed Rodriguez MD   [facility]  [phone]

## 2021-09-23 NOTE — ANESTHESIA POSTPROCEDURE EVALUATION
Patient: Lucille Rojas    Procedure(s):  ESOPHAGOGASTRODUODENOSCOPY, WITH FINE NEEDLE ASPIRATION BIOPSY, WITH ENDOSCOPIC ULTRASOUND GUIDANCE  Esophagogastroduodenoscopy, With Biopsy    Diagnosis:Pancreatic mass [K86.89]  Anemia [D64.9]  Diagnosis Additional Information: No value filed.    Anesthesia Type:  General    Note:  Disposition: Inpatient   Postop Pain Control: Uneventful            Sign Out: Well controlled pain   PONV: No   Neuro/Psych: Uneventful            Sign Out: Acceptable/Baseline neuro status   Airway/Respiratory: Uneventful            Sign Out: Acceptable/Baseline resp. status   CV/Hemodynamics: Uneventful            Sign Out: Acceptable CV status; No obvious hypovolemia; No obvious fluid overload   Other NRE: NONE   DID A NON-ROUTINE EVENT OCCUR? No           Last vitals:  Vitals Value Taken Time   /65 09/21/21 1710   Temp 37.2  C (98.9  F) 09/21/21 1710   Pulse 71 09/21/21 1711   Resp 19 09/21/21 1712   SpO2 96 % 09/21/21 1711   Vitals shown include unvalidated device data.    Electronically Signed By: Teto Blair DO  September 23, 2021  5:47 AM

## 2021-09-23 NOTE — PROGRESS NOTES
Pt placed on Bipap after her ABG came back with high PCO2.     Results for GLORIA LARA (MRN [id number]) as of 9/23/2021 12:38   Ref. Range 9/23/2021 12:07   pH Arterial Latest Ref Range: 7.35 - 7.45  7.22 (L)   pCO2 Arterial Latest Ref Range: 35 - 45 mm Hg 85 (HH)   PO2 Arterial Latest Ref Range: 80 - 105 mm Hg 121 (H)   Bicarbonate Arterial Latest Ref Range: 21 - 28 mmol/L 35 (H)   Base Excess Art Latest Ref Range: -9.0 - 1.8 mmol/L 5.8 (H)   FIO2 Unknown 5

## 2021-09-23 NOTE — PROVIDER NOTIFICATION
MD Notification    Notified Person: MD    Notified Person Name: Vladislav    Notification Date/Time: 9/23/2021 1228    Notification Interaction: Text     Purpose of Notification: Critical PCO2 Arterial 85    Orders Received: Pending    Comments: Took critical for bedside RN

## 2021-09-23 NOTE — PLAN OF CARE
Reviewed follow up VBG (although reported as ABG, appears to be venous)   Some improvement in CO2   Discussed with RT, they are going to adjust setting on BiPAP to improve ventilation.  Patient is DNR/DNI.   Keep saturations > 89% but < 94%.   Discussed with daughter and . They would like to be notified if any clinically worsening.   They understand prognosis is guarded but currently stable.

## 2021-09-23 NOTE — PROGRESS NOTES
[facility]    Hospitalist Progress Note    Date of Service (when I saw the patient): 09/23/2021  Admit date: 9/20/2021    Assessment & Plan   Lucille Rojas is a 81 year old female admitted on 9/20/2021. She presents with SOB and constipation.     Severe, symptomatic anemia  Acute blood loss anemia on chronic anemia  Reports several weeks of ongoing symptoms indicating a somewhat chronic course. Has noted black, tarry stools for at least 3-4 weeks.  S/p EGD on 09/21/21 revealed a few gastric erosions and a few tiny aphthous ulcers in the duodenum, but not severe enough to account for anemia.   * S/p 2 units with Hgb 4.7 > 7.2 on admission  * S/p 1 unit for hgb 6.4 > 7.4 on 9/22/21.     - Protonix 40 mg BID   - Hold on colonoscopy planned for 9/23/21, due to acute encephalopathy. Discussed with Dr. Rodriguez.   - Iron studies reveal low levels > venofer ordered.   - Transfuse to keep hgb > 7. Hgb ordered q 12 hours.     Recent Labs   Lab 09/23/21  0557 09/22/21  1910 09/22/21  0612 09/21/21  1802 09/21/21  0324 09/20/21  1727   HGB 7.5* 7.5* 6.4* 7.1* 7.2* 4.7*       Presumed primary pancreatic cancer Noted on CT.  R pleural effusion, transudate   S/p thoracentesis - 450 ml of verenice fluid on 09/22/21   S/p EUS on 09/21/21 with biopsy. Preliminary cyto positive for malignancy, awaiting final path.   - follow biopsy results and cytology on pleural fluid.   - follow up with oncology, Dr. Srinivasan to discuss results following discharge.     Acute encephalopathy - On 09/23/21, patient remains oriented and following commands but very lethargic on 09/23/21. She drifts off in mid-sentence. Labs significant for leukocytosis, hypercapnia on ABG and elevated BNP. She is afebrile, hemodynamically stable, stable oxygenation.   - Resume Home bipap, with recheck ABG ordered.   - Treat for suspected CHF (see below)    - Infection evaluation and treatment of UTI as below.     Acute on chronic hypercapnia -  ABG:  pH 7.22, CO2 85, O2 121.   Obesity, SHAVON with hypoventilation syndrome   Chronic hypoxia on home oxygen - Per family she requires oxygen during the day as well, but has not been having trouble getting it ordered through PCP.   - on BiPAP with O2 PTA  - wean O2 as tolerates to keep O2 saturations under 94%    Probable UTI - Patient lethargic and unable to give clear history, regarding urinary symptoms. Her UA shows 166 WBC, small nitrates.   Leukocytosis - No T over 100 since admission. Hemodynamically stable. No localizing symptoms of infection. No Urinary symptoms. UA done. CT chest/abdomen/pelvis as above, no sign of localized infection.     Recent Labs   Lab 09/23/21  0557 09/21/21  0324 09/20/21  1727   WBC 14.0* 15.6* 7.6     - F/u on pleural cx but very low suspicion of infection in this location.   - Check BCx and UCx on 09/23/21 due to encephalopathy with leukocytosis.     Acute decompensated HF, unknown EF - presenting with acute encephalopathy, hypercapnia on 09/23/21. Workup revealed BNP up to 4000's from 800 at admission. Mild pulmonary edema on CXR. Oxygenation stable, but on 3 L.   Loud systolic murmur concerning for aortic stenosis.   TAVARES, CP and light-headedness with minimal exertion. Undoubtedly,  Anemia contributes and also on chronic oxygen at home, but recently has not been able to get continued oxygen through PCP. Initial troponin negative. CXR with R pleural effusion.   Transudate R pleural effusion, edema on admission.   * Serial troponins negative    - awaiting echo,   - On 09/23/21, BNP quite elevated at 4000 on 09/23/21 (up from 800s at admission) CXR reviewed and shows mild intestinal markings, suspect edema. > start lasix 40 mg IV BID.   - strict I and Os (place urbina if needed), daily weights.   - consider further workup for ischemic disease pending results of echo.      Constipation  Reports 1 BM in last 1.5 weeks. No significant stool burden on admission CT.  No obstruction noted.  -  PRN stool regimen  - bowel prep initiated on 09/22/21 for colonoscopy.      CKD 3  - monitor     DMT2 diet controlled.  last A1c 5.8 on 4/7/21. Did not recheck due to severe anemia  - BID BS, will stop if remains well controlled when eating again.     Recent Labs   Lab 09/23/21  0557 09/23/21  0208 09/22/21  2235 09/22/21  1725 09/22/21  0937 09/22/21  0210   * 150* 139* 146* 111* 151*        Hypothyroid  - continue PTA levothyroxine     Depression  - continue PTA citalopram     HLD  - continue PTA statin    Diet: Orders Placed This Encounter      NPO per Anesthesia Guidelines for Procedure/Surgery Except for: Meds     IVF: None  Espitia Catheter: Not present     DVT Prophylaxis: Pneumatic Compression Devices  Code Status: No CPR- Do NOT Intubate     Disposition: Expected discharge 2 days once we have all tests complete, including colonoscopy and echocardiogram. Considering ischemic workup.   Communication: Discussed with patient, GI and RN on 09/23/21     Catina Loomis  Hospitalist Service  [facility]  Securely message with the Vocera Web Console (learn more here)  Text page via [employer] Paging/Directory    Time Spent on this Encounter   Lucille was seen and evaluated by me on 09/23/21.  She was in critical condition as the result of acute encephalopathy from hypercapnia secondary to acute decompensated heart failure, obesity hypoventilation syndrome and probable UTI. .    Her condition is now Stable.      The acute issues managed by me today include  As above.    Supportive interventions provided and/or ordered by me include as above.     Total Critical Care time spent by me, excluding procedures, was 40 minutes.    Catina Loomis MD      Interval History   Events over last 24 hours as outlined above.   Patient sleeping and did not want to fully awaken to talk to me. Denied any concerns. Discussed with RN. No CP, SOB, or light-headedness. No N/V/D.  Tolerating diet.     -Data  reviewed today: I reviewed all new labs and imaging results over the last 24 hours. I personally reviewed no images or EKG's today.    Physical Exam   Temp: 97.3  F (36.3  C) Temp src: Oral BP: 134/65 Pulse: 85   Resp: 16 SpO2: 95 % O2 Device: Oxymask Oxygen Delivery: 5 LPM  Vitals:    09/21/21 1451   Weight: 124.3 kg (274 lb)     Vital Signs with Ranges  Temp:  [97.3  F (36.3  C)-99.6  F (37.6  C)] 97.3  F (36.3  C)  Pulse:  [75-90] 85  Resp:  [16-22] 16  BP: (124-152)/(35-65) 134/65  SpO2:  [91 %-97 %] 95 %  I/O last 3 completed shifts:  In: -   Out: 300 [Urine:300]    Today's Exam  Constitutional: NAD,   Neuropsyche:  Sleeping, awakens momentarily, answers questions appropriately.   Respiratory: Breathing comfortably, decreased air exchange, Decreased in R lower base.   no wheezes, no crackles.   Cardiovascular: Regular rate and rhythm with loud early systolic murmur, 1+ edema.  GI:  soft, NT/ND, BS normal  Skin/Integumen:  No acute rash or sign of bleeding.     Medications   All medications reviewed on 09/22/21     - MEDICATION INSTRUCTIONS -         citalopram  20 mg Oral Daily     levothyroxine  200 mcg Oral QAM AC     pantoprazole  40 mg Oral BID AC     simvastatin  10 mg Oral At Bedtime     sodium chloride (PF)  3 mL Intracatheter Q8H       Data   Recent Labs   Lab 09/23/21  0557 09/23/21  0208 09/22/21  2235 09/22/21  1910 09/22/21  1725 09/22/21  0937 09/22/21  0612 09/21/21  2107 09/21/21  1802 09/21/21  1006 09/21/21  0324 09/20/21  2244 09/20/21  1727   WBC 14.0*  --   --   --   --   --   --   --   --   --  15.6*  --  7.6   HGB 7.5*  --   --  7.5*  --   --  6.4*   < > 7.1*  --  7.2*  --  4.7*   MCV 77*  --   --   --   --   --   --   --   --   --  71*  --  67*     --   --   --   --   --   --   --   --   --  412  --  412     --   --   --   --   --   --   --   --   --  139  --  141   POTASSIUM 4.2  --   --   --   --   --   --   --   --   --  4.1  --  4.1   CHLORIDE 105  --   --   --   --   --    --   --   --   --  107  --  108   CO2 32  --   --   --   --   --   --   --   --   --  26  --  28   BUN 18  --   --   --   --   --   --   --   --   --  22  --  24   CR 1.16*  --   --   --   --   --   --   --   --   --  1.10*  --  1.09*   ANIONGAP 4  --   --   --   --   --   --   --   --   --  6  --  5   IGOR 8.2*  --   --   --   --   --   --   --   --   --  8.4*  --  8.5   * 150* 139*  --    < >   < >  --    < >  --    < > 123*   < > 105*   ALBUMIN  --   --   --   --   --   --   --   --   --   --  3.3*  --  3.4   PROTTOTAL  --   --   --   --   --   --   --   --  6.9  --  6.7*  --  6.9   BILITOTAL  --   --   --   --   --   --   --   --   --   --  0.5  --  0.4   ALKPHOS  --   --   --   --   --   --   --   --   --   --  104  --  101   ALT  --   --   --   --   --   --   --   --   --   --  17  --  16   AST  --   --   --   --   --   --   --   --   --   --  14  --  10   LIPASE  --   --   --   --   --   --   --   --   --   --   --   --  124   TROPONIN  --   --   --   --   --   --   --   --  <0.015  --   --   --  <0.015    < > = values in this interval not displayed.       Recent Results (from the past 24 hour(s))   US Thoracentesis    Narrative    ULTRASOUND GUIDED THORACENTESIS  9/22/2021 11:01 AM     HISTORY: Pancreatic malignancy with right pleural effusion.    TECHNIQUE:  Ultrasound was used to evaluate for the presence and best  approach for drainage of a pleural effusion. Written and oral informed  consent was obtained. A pause for the cause procedure to verify the  correct patient and correct procedure. The skin overlying the right  chest posteriorly was prepped and draped in the usual sterile fashion.  The subcutaneous tissues were anesthetized with 10mL 1% Lidocaine. A  catheter was advanced into the pleural space and 450 mL of  verenice  colored fluid was drained. Patient was monitored by nurse under my  direct supervision throughout the exam. Ultrasound images were  permanently stored.  There were no immediate  complications. Patient  left the ultrasound suite in satisfactory condition.      Impression    IMPRESSION: Technically successful thoracentesis without immediate  complications.     KARO RUSHING MD         SYSTEM ID:  [id number]

## 2021-09-23 NOTE — PLAN OF CARE
DATE & TIME: 9/22/21 1900-0730                   Cognitive Concerns/ Orientation : Pt A/Ox4, lethargic arouses to repeated stimulation and voice  BEHAVIOR & AGGRESSION TOOL COLOR: Green             ABNL VS/O2: VSS on 5L Oxymask sating mid 90%, BIPAP overnight  MOBILITY: Total cares, not OOB, fall risk  PAIN MANAGMENT: Denies  DIET: Clear liquids, NPO from midnight    BOWEL/BLADDER: Incontinent, Purewick overnight, bowel prepped stools watery tan   ABNL LAB/BG: Hgb 7.5, , 150.   DRAIN/DEVICES: IV SL  TELEMETRY RHYTHM: NSR   SKIN: Pale, some redness around groin area, otherwise intact, thoracentesis site CDI   TESTS/PROCEDURES: CT showed pancreatic mass and pleural effusion Had thoracentesis with 450 ml removed and EUS with biopsy on Wed. Colonoscopy and Echo complete planned for Thurs  D/C DATE: Discharge pending consults  OTHER IMPORTANT INFO: GI, Hem/Onc following

## 2021-09-24 LAB
ABO/RH(D): NORMAL
ANION GAP SERPL CALCULATED.3IONS-SCNC: 2 MMOL/L (ref 3–14)
ANTIBODY SCREEN: NEGATIVE
BASE EXCESS BLDV CALC-SCNC: 12.2 MMOL/L (ref -7.7–1.9)
BLD PROD TYP BPU: NORMAL
BLOOD COMPONENT TYPE: NORMAL
BUN SERPL-MCNC: 22 MG/DL (ref 7–30)
CALCIUM SERPL-MCNC: 8.2 MG/DL (ref 8.5–10.1)
CHLORIDE BLD-SCNC: 104 MMOL/L (ref 94–109)
CO2 SERPL-SCNC: 37 MMOL/L (ref 20–32)
CODING SYSTEM: NORMAL
CREAT SERPL-MCNC: 1.28 MG/DL (ref 0.52–1.04)
CREAT SERPL-MCNC: 1.32 MG/DL (ref 0.52–1.04)
CROSSMATCH: NORMAL
ERYTHROCYTE [DISTWIDTH] IN BLOOD BY AUTOMATED COUNT: 25.8 % (ref 10–15)
GFR SERPL CREATININE-BSD FRML MDRD: 37 ML/MIN/1.73M2
GFR SERPL CREATININE-BSD FRML MDRD: 39 ML/MIN/1.73M2
GLUCOSE BLD-MCNC: 94 MG/DL (ref 70–99)
GLUCOSE BLDC GLUCOMTR-MCNC: 136 MG/DL (ref 70–99)
GLUCOSE BLDC GLUCOMTR-MCNC: 89 MG/DL (ref 70–99)
GLUCOSE BLDC GLUCOMTR-MCNC: 90 MG/DL (ref 70–99)
GLUCOSE BLDC GLUCOMTR-MCNC: 95 MG/DL (ref 70–99)
GLUCOSE BLDC GLUCOMTR-MCNC: 96 MG/DL (ref 70–99)
HCO3 BLDV-SCNC: 39 MMOL/L (ref 21–28)
HCT VFR BLD AUTO: 26.1 % (ref 35–47)
HGB BLD-MCNC: 7 G/DL (ref 11.7–15.7)
ISSUE DATE AND TIME: NORMAL
MCH RBC QN AUTO: 20.7 PG (ref 26.5–33)
MCHC RBC AUTO-ENTMCNC: 26.8 G/DL (ref 31.5–36.5)
MCV RBC AUTO: 77 FL (ref 78–100)
O2/TOTAL GAS SETTING VFR VENT: 2 %
PATH REPORT.COMMENTS IMP SPEC: NORMAL
PATH REPORT.FINAL DX SPEC: NORMAL
PATH REPORT.GROSS SPEC: NORMAL
PATH REPORT.RELEVANT HX SPEC: NORMAL
PCO2 BLDV: 70 MM HG (ref 40–50)
PH BLDV: 7.36 [PH] (ref 7.32–7.43)
PLATELET # BLD AUTO: 293 10E3/UL (ref 150–450)
PO2 BLDV: 75 MM HG (ref 25–47)
POTASSIUM BLD-SCNC: 3.8 MMOL/L (ref 3.4–5.3)
POTASSIUM BLD-SCNC: 3.8 MMOL/L (ref 3.4–5.3)
RBC # BLD AUTO: 3.38 10E6/UL (ref 3.8–5.2)
SODIUM SERPL-SCNC: 143 MMOL/L (ref 133–144)
SPECIMEN EXPIRATION DATE: NORMAL
TROPONIN I SERPL-MCNC: <0.015 UG/L (ref 0–0.04)
UNIT ABO/RH: NORMAL
UNIT NUMBER: NORMAL
UNIT STATUS: NORMAL
UNIT TYPE ISBT: 6200
WBC # BLD AUTO: 11.2 10E3/UL (ref 4–11)

## 2021-09-24 PROCEDURE — 82565 ASSAY OF CREATININE: CPT | Performed by: INTERNAL MEDICINE

## 2021-09-24 PROCEDURE — 84484 ASSAY OF TROPONIN QUANT: CPT | Performed by: INTERNAL MEDICINE

## 2021-09-24 PROCEDURE — 36415 COLL VENOUS BLD VENIPUNCTURE: CPT | Performed by: INTERNAL MEDICINE

## 2021-09-24 PROCEDURE — 250N000013 HC RX MED GY IP 250 OP 250 PS 637: Performed by: INTERNAL MEDICINE

## 2021-09-24 PROCEDURE — 120N000001 HC R&B MED SURG/OB

## 2021-09-24 PROCEDURE — 85027 COMPLETE CBC AUTOMATED: CPT | Performed by: INTERNAL MEDICINE

## 2021-09-24 PROCEDURE — 88112 CYTOPATH CELL ENHANCE TECH: CPT | Mod: 26

## 2021-09-24 PROCEDURE — 94660 CPAP INITIATION&MGMT: CPT

## 2021-09-24 PROCEDURE — 86923 COMPATIBILITY TEST ELECTRIC: CPT | Performed by: STUDENT IN AN ORGANIZED HEALTH CARE EDUCATION/TRAINING PROGRAM

## 2021-09-24 PROCEDURE — 80048 BASIC METABOLIC PNL TOTAL CA: CPT | Performed by: INTERNAL MEDICINE

## 2021-09-24 PROCEDURE — P9016 RBC LEUKOCYTES REDUCED: HCPCS | Performed by: STUDENT IN AN ORGANIZED HEALTH CARE EDUCATION/TRAINING PROGRAM

## 2021-09-24 PROCEDURE — 88305 TISSUE EXAM BY PATHOLOGIST: CPT | Mod: 26

## 2021-09-24 PROCEDURE — 86900 BLOOD TYPING SEROLOGIC ABO: CPT | Performed by: INTERNAL MEDICINE

## 2021-09-24 PROCEDURE — 84132 ASSAY OF SERUM POTASSIUM: CPT | Performed by: INTERNAL MEDICINE

## 2021-09-24 PROCEDURE — 82803 BLOOD GASES ANY COMBINATION: CPT | Performed by: INTERNAL MEDICINE

## 2021-09-24 PROCEDURE — 999N000157 HC STATISTIC RCP TIME EA 10 MIN

## 2021-09-24 PROCEDURE — 99222 1ST HOSP IP/OBS MODERATE 55: CPT | Mod: 25 | Performed by: INTERNAL MEDICINE

## 2021-09-24 PROCEDURE — 250N000011 HC RX IP 250 OP 636: Performed by: INTERNAL MEDICINE

## 2021-09-24 PROCEDURE — 86301 IMMUNOASSAY TUMOR CA 19-9: CPT | Performed by: INTERNAL MEDICINE

## 2021-09-24 PROCEDURE — 99233 SBSQ HOSP IP/OBS HIGH 50: CPT | Performed by: INTERNAL MEDICINE

## 2021-09-24 RX ORDER — POTASSIUM CHLORIDE 1500 MG/1
20 TABLET, EXTENDED RELEASE ORAL 2 TIMES DAILY
Status: COMPLETED | OUTPATIENT
Start: 2021-09-24 | End: 2021-09-25

## 2021-09-24 RX ORDER — FUROSEMIDE 10 MG/ML
40 INJECTION INTRAMUSCULAR; INTRAVENOUS 2 TIMES DAILY
Status: COMPLETED | OUTPATIENT
Start: 2021-09-24 | End: 2021-09-25

## 2021-09-24 RX ORDER — POTASSIUM CHLORIDE 1500 MG/1
20 TABLET, EXTENDED RELEASE ORAL 2 TIMES DAILY
Status: DISCONTINUED | OUTPATIENT
Start: 2021-09-24 | End: 2021-09-24

## 2021-09-24 RX ORDER — PANTOPRAZOLE SODIUM 20 MG/1
20 TABLET, DELAYED RELEASE ORAL
Status: DISCONTINUED | OUTPATIENT
Start: 2021-09-25 | End: 2021-09-26

## 2021-09-24 RX ADMIN — LEVOTHYROXINE SODIUM 200 MCG: 100 TABLET ORAL at 08:49

## 2021-09-24 RX ADMIN — FUROSEMIDE 40 MG: 10 INJECTION, SOLUTION INTRAVENOUS at 21:57

## 2021-09-24 RX ADMIN — SIMVASTATIN 10 MG: 10 TABLET, FILM COATED ORAL at 21:57

## 2021-09-24 RX ADMIN — POTASSIUM CHLORIDE 20 MEQ: 1500 TABLET, EXTENDED RELEASE ORAL at 08:52

## 2021-09-24 RX ADMIN — PANTOPRAZOLE SODIUM 40 MG: 40 TABLET, DELAYED RELEASE ORAL at 08:48

## 2021-09-24 RX ADMIN — FUROSEMIDE 40 MG: 10 INJECTION, SOLUTION INTRAVENOUS at 08:49

## 2021-09-24 RX ADMIN — POTASSIUM CHLORIDE 20 MEQ: 1500 TABLET, EXTENDED RELEASE ORAL at 21:57

## 2021-09-24 RX ADMIN — CITALOPRAM HYDROBROMIDE 20 MG: 20 TABLET ORAL at 08:48

## 2021-09-24 RX ADMIN — CEFTRIAXONE SODIUM 1 G: 1 INJECTION, POWDER, FOR SOLUTION INTRAMUSCULAR; INTRAVENOUS at 13:28

## 2021-09-24 ASSESSMENT — ACTIVITIES OF DAILY LIVING (ADL)
ADLS_ACUITY_SCORE: 19
ADLS_ACUITY_SCORE: 23
ADLS_ACUITY_SCORE: 19

## 2021-09-24 NOTE — PROGRESS NOTES
Service Date: 2021    SUBJECTIVE:  Ms. Rojas is an 81-year-old female with a large pancreatic mass.  EUS and FNA were done.  Pathology is pending.  She also has a right pleural effusion.  Thoracocentesis was done.  Cytology is pending.    The patient also is anemic.  She has received blood transfusion.  Hemoglobin is stable around 7.5.  She has iron deficiency.  IV Venofer has been given.    I met with the patient.  She is very weak.  She is also short of breath.  She is on BiPAP.    PHYSICAL EXAMINATION:  She is very weak.  She is on BiPAP.  Rest of systems not examined.    LABS:  Reviewed.    ASSESSMENT:    1.  An 81-year-old female with pancreatic mass.  This is highly suspicious for malignancy.  2.  Right pleural effusion, status post thoracocentesis.  Could be malignant effusion.  3.  Anemia.  4.  Iron deficiency.  5.  Worsening weakness and shortness of breath.    PLAN:    1.  FNA from pancreatic mass and pleural fluid cytology are pending.  Hopefully, they will be back in the next few days.  2.  There has been no change in the patient's overall condition.  She is more weak.  She is more short of breath.  If her condition does not improve, we may not be able to do anything if pancreatic cancer is confirmed.  3.  The patient is anemic.  She received transfusion and IV iron.  She should be transfused for hemoglobin below 7.  4.  Hematology/Oncology team will continue to follow.    John Srinivasan MD        D: 2021   T: 2021   MT: [facility]    Name:     GLORIA ROJAS  MRN:      [id number]        Account:      [id number]   :      1940           Service Date: 2021       Document: [id number]

## 2021-09-24 NOTE — PLAN OF CARE
Pt is A&Ox2-3 disoriented to time and occasionally place. VSS on 5L O2 via NC. Type and screen done this shift, released transfuse order at 0900 but still waiting for blood to be ready. Biopsy results pending. Nupur for strict I&O's. NPO- Hospitalist paged for clarification whether Pt was NPO d/t bipap or planned colonoscopy. Tele is SR w/ PAC. Discharge pending colonoscopy. Denied pain this shift. PIV, SL.

## 2021-09-24 NOTE — PLAN OF CARE
DATE & TIME: 21 4574-4647                Cognitive Concerns/Orientation: has been lethargic, arouses to voice and gentle touch but drifts off in mid conversation. Awake and alert for two hours.  BEHAVIOR & AGGRESSION TOOL COLOR: Green             ABNL VS/O2: VSS on AVAPS. 5L Oxymask use for 2 hours in evening and then returned to AVAPS.   MOBILITY: Total cares not OOB. T&R  PAIN MANAGMENT: Upper Back pain gave PRN Tylenol 625 mg   DIET: NPO  BOWEL/BLADDER: Espitia patent for strict I&Os.   ABNL LAB/BG: Hgb 7.5. , 89, Pro BNP 4405.   DRAIN/DEVICES: IV SL  TELEMETRY RHYTHM: NSR   SKIN: Pale, some redness around groin area, otherwise intact.   TESTS/PROCEDURES: None scheduled.   D/C DATE: Discharge pending GI, Hem/Onc work up.   OTHER IMPORTANT INFO: Rocephin Q24hr. IV Lasix BID. New [facility] consult in place. Hem/Onc and GI following.         Update 30: Morning Hgb 7.0. Type and screen , order placed for new one. Will release transfusion orders when resulted.

## 2021-09-24 NOTE — PROGRESS NOTES
[facility]  [facility]  Gastroenterology Progress note    Interval History:      Patient awake on Bipap.  Hemoglobin 7.0.      Vital Signs:      /42 (BP Location: Left arm)   Pulse 68   Temp 99  F (37.2  C) (Axillary)   Resp 18   Ht 1.524 m (5')   Wt 124.3 kg (274 lb)   SpO2 99%   BMI 53.51 kg/m    Temp (24hrs), Av.6  F (37  C), Min:97.8  F (36.6  C), Max:99.4  F (37.4  C)    Patient Vitals for the past 72 hrs:   Weight   21 1451 124.3 kg (274 lb)       Intake/Output Summary (Last 24 hours) at 2021 0907  Last data filed at 2021 0600  Gross per 24 hour   Intake --   Output 2900 ml   Net -2900 ml         Constitutional: NAD, comfortable  Cardiovascular: RRR, normal S1, S2   Respiratory: CTAB  Abdomen: soft, non-tender, nondistended    Additional Comments:  ROS, FH, SH: See initial GI consult for details.    Laboratory Data:  Recent Labs   Lab Test 21  0609 21  1921 21  0557 21  1802 21  0324   WBC 11.2*  --  14.0*  --  15.6*   HGB 7.0* 7.5* 7.5*   < > 7.2*   MCV 77*  --  77*  --  71*     --  351  --  412    < > = values in this interval not displayed.     Recent Labs   Lab Test 21  0609 21  0557 21  0324    141 139   POTASSIUM 3.8 4.2 4.1   CHLORIDE 104 105 107   CO2 37* 32 26   BUN 22 18 22   CR 1.32* 1.16* 1.10*   ANIONGAP 2* 4 6   IGOR 8.2* 8.2* 8.4*     Recent Labs   Lab Test 21  1149 21  0324 21  1727 20  1305 16  0713 09/11/15  1542   ALBUMIN  --  3.3* 3.4 3.6   < > 3.5   BILITOTAL  --  0.5 0.4 0.2   < > 0.2   DBIL  --   --   --  <0.1  --   --    ALT  --  17 16 15   < > 23   AST  --  14 10 13   < > 12   ALKPHOS  --  104 101 112   < > 110   PROTEIN 50 *  --   --   --   --   --    LIPASE  --   --  124  --   --   --    AMYLASE  --   --   --   --   --  64    < > = values in this interval not displayed.         Assessment:  80 yo female with pancreatic  mass.  Endoscopic ultrasound showed non-bleeding erosive gastropathy, few tiny aphthous ulcers in the duodenal bulb, single tiny duodenal polyp, no significant pathology in the common bile duct, mass in the uncinate process of the pancreas.  Stage T4 N0 M0.  Biopsy pending.   Patient also with severe anemia.  Biopsy findings were not felt to be enough to account for anemia.  Colonoscopy planned for yesterday but cancelled due to mental status changed and increased BNP/CHF.   Hemoglobin 7.0.   Plan:    -  Biopsy pending.  -  Colonoscopy on hold due to CHF/mental status changes.  Can consider pursuing prior to discharge depending on clinical course.  -  Monitor H/H; transfuse prn.  -  Will follow peripherally over the weekend.  Dr. Patel rounding.  Please call with questions.        DIANA Segura  [facility]  Office:  [phone] call if needed after 12PM  Cell:  [phone], not available after 12PM at this number

## 2021-09-24 NOTE — PROGRESS NOTES
FNA pending. CA 19-9 ordered. Please call if acute questions over the weekend. Otherwise will see her Monday when path returns     Alexander Sebastian MD  [phone]

## 2021-09-24 NOTE — PROGRESS NOTES
[facility]    Hospitalist Progress Note    Date of Service (when I saw the patient): 09/24/2021  Admit date: 9/20/2021    Assessment & Plan   Lucille Rojas is a 81 year old female admitted on 9/20/2021. She presents with SOB and constipation.     Severe, symptomatic anemia at admission.   Acute blood loss anemia on chronic anemia  Reports several weeks of ongoing symptoms indicating a somewhat chronic course. Has noted black, tarry stools for at least 3-4 weeks.  S/p EGD on 09/21/21 revealed a few gastric erosions and a few tiny aphthous ulcers in the duodenum, but not severe enough to account for anemia.   * S/p 2 units with Hgb 4.7 > 7.2 on admission  * S/p 1 unit for hgb 6.4 > 7.4 on 9/22/21.   * Iron studies reveal low levels > S/p venofer 300 mg IV x 2, last dose 9/23/21.     - Change protonix to 20 mg daily.   - Plan for colonoscopy to investigate source of blood loss. On hold due to acute encephalopathy with respiratory failure (see below) on 9/23/21. Discussed with Dr. Rodriguez. GI will reevaluate on Monday.   - Transfuse to keep hgb > 7.      Recent Labs   Lab 09/24/21  0609 09/23/21  1921 09/23/21  0557 09/22/21  1910 09/22/21  0612 09/21/21  1802   HGB 7.0* 7.5* 7.5* 7.5* 6.4* 7.1*       Presumed primary pancreatic cancer Noted on CT.  R pleural effusion, transudate   S/p thoracentesis - 450 ml of verenice fluid on 09/22/21   S/p EUS on 09/21/21 with biopsy. Preliminary cyto positive for malignancy, awaiting final path.     - follow biopsy results and cytology on pleural fluid. (abnormal cells noted on cell count) Pending on 09/24/21.   - Oncology will follow up re: pathology results on Monday, 9/27.     Acute encephalopathy, RESOLVING - On 09/23/21, patient remained oriented and following commands but very lethargic, drifting off in mid-sentence. Labs significant for leukocytosis, hypercapnia on ABG and elevated BNP. She is afebrile, hemodynamically stable, stable oxygenation.      - On 09/24/21 patient is alert and answering questions appropriately, making good eye contact.   - Treat hypercapnia as below.   - Treat CHF as below.   - Treat UTI as below.     Acute on chronic hypercapnia -  ABG: pH 7.22, CO2 85, O2 121 while on 5L on 9/23/21.   Obesity, SHAVON with hypoventilation syndrome   Chronic hypoxia on home oxygen - Per family she requires oxygen during the day as well, but has had trouble getting this ordered through PCP. On BiPAP at night with O2 PTA    - wean O2 as tolerates to keep O2 saturations under 94%  - BiPAP whenever in bed. Appreciate RT for adjusting BIPAP setting to improve ventilation during acute hypercapnia.   - When awake > up in chair.   - VBG this AM 09/24/21 pH 7.36 / CO2 70.    UTI - Patient lethargic and unable to give clear history, regarding urinary symptoms. Her UA shows 166 WBC, small nitrates.   Leukocytosis - No T over 100 since admission. Hemodynamically stable. No localizing symptoms of infection. No Urinary symptoms. UA done. CT chest/abdomen/pelvis as above, no sign of localized infection.     Recent Labs   Lab 09/24/21  0609 09/23/21  0557 09/21/21  0324 09/20/21  1727   WBC 11.2* 14.0* 15.6* 7.6     - F/u on pleural cx but very low suspicion of infection in this location.   - UCx from 9/23/21: > 100,000 gram negative bacilli. Susceptibility pending.   - Started on IV ceftriaxone 1 g daily on 9/23/21.   - BCx NGTD    Acute decompensated HF, unknown EF - developing acute encephalopathy, hypercapnia on 09/23/21. Workup revealed BNP up to 4000's from 800 at admission. Mild pulmonary edema on CXR. Oxygenation stable, but on 3 L.   Mod to severe aortic stenosis, new diagnosis   TAVARES, CP and light-headedness with minimal exertion - Concerning for symptomatic aortic stenosis, although anemia and chronic obesity hypoventilation syndrome with chronic hyoxia undoubtedly contribute. Initial troponin negative. CXR with R pleural effusion.   Transudate R pleural  "effusion, edema on admission. S/p pleurocentesis on 9/22.   * Serial troponins negative  * Echo EF 60-65% No WMAs. RV fxn normal. Mod to severe aortic stenosis with aortic mean valve area of 1 cm2, mean gradient 37.5 mm Hg.     - On 09/23/21, BNP quite elevated at 4000 on 09/23/21 (up from 800s at admission) CXR reviewed and shows mild intestinal markings, suspect edema.   - Start lasix 40 mg IV BID on 9/23/21 x 3 doses.   - On 09/24/21 Cr up a bit at 1.3, K 3.8,  2.9 L UOP over last 24 hours. No weight today.   - Recheck Cr this afternoon. If continues to go up, hold further doses of lasix for now.   - urbina placed on 9/23/21 for strict I and O's.   - please get daily weights.   - Cardiology consulted on 09/24/21 re: input on options (TAVR) and prognosis.     Addendum Cr down despite receiving lasix this AM, therefore continue lasix x 2 more doses with potassium.     Goals of Care. I do not think she is a candidate for TAVR and that aortic stenosis as well as obesity hypoventilation syndrome will limit options for treatment of cancer. I have discussed this with patient and family. Awaiting input from cardiology and oncology. Her daughter is hopeful to be able to get her home so she can spend \"few days in her own bed.\" They are very realistic about current prognosis. Awaiting input from cardiology and oncology.     Addendum: Discussed with Dr. Srinivasan, oncology, who agreed that she is not a candidate for chemotherapy and that he would recommend palliative approach. Reviewed cardiology notes who recommended palliative approach as well, not a candidate for TAVR. Met with patient, daughter and  and discussed guarded prognosis and limited options for treatment of aortic stenosis, anemia, and cancer. I recommended that we try to optimize her respiratory status and ensure anemia is stable prior to discharge. However, with regards to anemia, cancer and aortic stenosis, she is not likely to tolerate further " interventions. The risk of decompensation is too great and rather than prolonging her life, they would likely shorten it. I answered all their questions and they expressed understanding and acceptance, but need time to process. They would like to meet with palliative care to consider options of remaining open to non-aggressive inpatient treatments in the future vs home on hospice.      Constipation  Reports 1 BM in last 1.5 weeks. No significant stool burden on admission CT.  No obstruction noted.  - PRN stool regimen  - bowel prep on 09/22/21 for colonoscopy.      CKD 3  - monitor     DMT2 diet controlled.  last A1c 5.8 on 4/7/21. Did not recheck due to severe anemia  - BID BS, will stop if remains well controlled when eating again.     Recent Labs   Lab 09/24/21  1235 09/24/21  0825 09/24/21  0609 09/24/21  0209 09/23/21  2218 09/23/21  1158   GLC 90 96 94 89 113* 134*        Hypothyroid TSH normal at admission.   - continue PTA levothyroxine     Depression  - continue PTA citalopram     HLD  - continue PTA statin    Diet: Orders Placed This Encounter      NPO per Anesthesia Guidelines for Procedure/Surgery Except for: Meds     IVF: None  Espitia Catheter: PRESENT, indication: Strict 1-2 Hour I&O     DVT Prophylaxis: Pneumatic Compression Devices  Code Status: No CPR- Do NOT Intubate     Disposition: Expected discharge TBD.   Communication: Discussed with patient, RN, daughter on 09/24/21 Discussed guarded prognosis. Awaiting input from oncology and cardiology.     Caitna Loomis  Hospitalist Service  [facility]  Securely message with the Vocera Web Console (learn more here)  Text page via [employer] Paging/Directory      Catina Loomis MD    Total time spent:  > 35 minutes  Time spent counseling, coordination of care: 25 minutes including discussion with care team, daughter, , personal review and interpretation of labs and studies as noted above    Time Spent on this Encounter   I  spent from 4:35 PM to 5:10 PM  an additional 35 minutes today face to face with Lucille Rojas and family.  This time is in addition to the time spent doing today's subsequent visit or admission.     Catina Loomis MD      Interval History   Events over last 24 hours as outlined above.   Patient awake, responding to questions appropriately, oriented to situation. Breathing stable, denies pain. No N/V/D. Remains in bed on BiPAP.     -Data reviewed today: I reviewed all new labs and imaging results over the last 24 hours. I personally reviewed no images or EKG's today.    Physical Exam   Temp: 99  F (37.2  C) Temp src: Axillary BP: 116/42 Pulse: 68   Resp: 18 SpO2: 99 % O2 Device: BiPAP/CPAP Oxygen Delivery: 5 LPM  Vitals:    09/21/21 1451   Weight: 124.3 kg (274 lb)     Vital Signs with Ranges  Temp:  [97.8  F (36.6  C)-99.4  F (37.4  C)] 99  F (37.2  C)  Pulse:  [68-83] 68  Resp:  [16-18] 18  BP: (116-143)/(42-56) 116/42  SpO2:  [92 %-99 %] 99 %  I/O last 3 completed shifts:  In: -   Out: 3710 [Urine:3710]    Today's Exam  Constitutional: NAD,   Neuropsyche:  Alert and oriented, answers questions appropriately.   Respiratory: Breathing comfortably on BiPAP, decreased air exchange. No crackles or wheezes.   Cardiovascular: Regular rate and rhythm with loud early systolic murmur, 1+ edema.  GI:  soft, NT/ND, BS normal  Skin/Integumen:  No acute rash or sign of bleeding.     Medications   All medications reviewed on 09/24/21.     - MEDICATION INSTRUCTIONS -         cefTRIAXone  1 g Intravenous Q24H     citalopram  20 mg Oral Daily     levothyroxine  200 mcg Oral QAM AC     pantoprazole  40 mg Oral BID AC     potassium chloride  20 mEq Oral BID     simvastatin  10 mg Oral At Bedtime     sodium chloride (PF)  3 mL Intracatheter Q8H       Data   Recent Labs   Lab 09/24/21  1235 09/24/21  0825 09/24/21  0609 09/23/21  2218 09/23/21  1921 09/23/21  1158 09/23/21  0557 09/21/21  2107 09/21/21  1802 09/21/21  1006  09/21/21  0324 09/20/21  2244 09/20/21  1727   WBC  --   --  11.2*  --   --   --  14.0*  --   --   --  15.6*  --  7.6   HGB  --   --  7.0*  --  7.5*  --  7.5*   < > 7.1*  --  7.2*  --  4.7*   MCV  --   --  77*  --   --   --  77*  --   --   --  71*  --  67*   PLT  --   --  293  --   --   --  351  --   --   --  412  --  412   NA  --   --  143  --   --   --  141  --   --   --  139  --  141   POTASSIUM  --   --  3.8  --   --   --  4.2  --   --   --  4.1  --  4.1   CHLORIDE  --   --  104  --   --   --  105  --   --   --  107  --  108   CO2  --   --  37*  --   --   --  32  --   --   --  26  --  28   BUN  --   --  22  --   --   --  18  --   --   --  22  --  24   CR  --   --  1.32*  --   --   --  1.16*  --   --   --  1.10*  --  1.09*   ANIONGAP  --   --  2*  --   --   --  4  --   --   --  6  --  5   IGOR  --   --  8.2*  --   --   --  8.2*  --   --   --  8.4*  --  8.5   GLC 90 96 94   < >  --    < > 135*   < >  --    < > 123*   < > 105*   ALBUMIN  --   --   --   --   --   --   --   --   --   --  3.3*  --  3.4   PROTTOTAL  --   --   --   --   --   --   --   --  6.9  --  6.7*  --  6.9   BILITOTAL  --   --   --   --   --   --   --   --   --   --  0.5  --  0.4   ALKPHOS  --   --   --   --   --   --   --   --   --   --  104  --  101   ALT  --   --   --   --   --   --   --   --   --   --  17  --  16   AST  --   --   --   --   --   --   --   --   --   --  14  --  10   LIPASE  --   --   --   --   --   --   --   --   --   --   --   --  124   TROPONIN  --   --  <0.015  --   --   --   --   --  <0.015  --   --   --  <0.015    < > = values in this interval not displayed.       No results found for this or any previous visit (from the past 24 hour(s)).

## 2021-09-24 NOTE — CONSULTS
[facility]    Cardiology Consultation     Date of Admission:  9/20/2021    Assessment & Plan       This is a 81 year old female with PMH significant for anemia with hemoglobin of 7 in setting of black, tarry stools, newly diagnosed acute encephalopathy, presumed pancreatic cancer noted on CT s/p biopsy and EUS 9/21, moderate aortic stenosis and acute respiratory failure - combined hypercapnic and hypoxic, with acute decompensated heart failure with BNP 4000s and pulmonary edema on CXR. Cardiology consulted for aortic stenosis.    1. Acute decompensated heart failure: HFpEF, diastolic dysfunction   -continue 40 IV lasix bid and daily weights and Is/Os  -echocardiogram completed, demonstrated LVEF 60-65% with moderate-severe Aortic stenosis  -unable to get ischemic evaluation in setting of possible GI bleed and anemia (cannot give anticoagulants)    2. Aortic stenosis: mean gradient 38 mmHg with NASIR 1.0 cm2 consistent with moderate-severe (more on the severe side).  -patient given comorbidities is not a candidate for TAVR or SAVR at this time   -would consider palliative consult depending on clinical course, aortic stenosis would make her a poor surgical and procedural candidate and she could not undergo repair of aortic valve without ischemic evaluation and the ability to be on blood thinner therapy thus would need full GI work up beforehand   -avoid nitrates     Please page on call cardiologist over the weekend with any questions or if assistance needed with diuresis or any hemodynamic changes. Will sign off for now.     Ginna Johnson MD    Code Status    No CPR- Do NOT Intubate    Reason for Consult     Aortic stenosis     Primary Care Physician   *Fausto Flynn    Chief Complaint     SOB    History of Present Illness     This is a 81 year old female with PMH HFpEF, mod-severe aortic stenosis, recently diagnosed pancreatic  Mass likely malignancy, hypoxic respiratory failure combined  hypercapneic and hypoxic, with melena and anemia, Hemoglobin of 7, admitted to medicine for work up of her anemia and pancreatic mass, s/p biopsy on 9/21. Cardiology consulted for aortic stenosis and decompensated heart failure. BNPs in 4000s. Echocardiogram demonstrates normal LVEF with moderate-severe aortic stenosis, mean gradient 37 mmHg. Evidence of increased filling pressures with mild LVH.     History is difficult to obtain. Patient is uncomfortable on bipap and complains of mainly being thirsty. She has shortness of breath. No pain, pressure. No syncope.       Past Medical History   I have reviewed this patient's medical history and updated it with pertinent information if needed.   Past Medical History:   Diagnosis Date     HTN (hypertension) 5/9/2013     Hyperlipidemia LDL goal <130 5/9/2013     Hypothyroidism 5/9/2013     Macular degeneration 9/18/2013     Sleep apnea     CPAP at night, O2 during naps     Type 2 diabetes, HbA1C goal < 8% (H) 5/9/2013       Past Surgical History   I have reviewed this patient's surgical history and updated it with pertinent information if needed.  Past Surgical History:   Procedure Laterality Date     APPENDECTOMY       COLONOSCOPY       COLONOSCOPY N/A 10/27/2014    Procedure: COMBINED COLONOSCOPY, SINGLE OR MULTIPLE BIOPSY/POLYPECTOMY BY BIOPSY;  Surgeon: Jose Alfredo Morejon MD;  Location:  GI     ESOPHAGOSCOPY, GASTROSCOPY, DUODENOSCOPY (EGD), COMBINED N/A 9/21/2021    Procedure: ESOPHAGOGASTRODUODENOSCOPY, WITH FINE NEEDLE ASPIRATION BIOPSY, WITH ENDOSCOPIC ULTRASOUND GUIDANCE;  Surgeon: Vera Benitez MD;  Location:  GI     ESOPHAGOSCOPY, GASTROSCOPY, DUODENOSCOPY (EGD), COMBINED N/A 9/21/2021    Procedure: Esophagogastroduodenoscopy, With Biopsy;  Surgeon: Vera Benitez MD;  Location:  GI     HERNIA REPAIR      inguinal x 2     TONSILLECTOMY         Prior to Admission Medications   Prior to Admission Medications   Prescriptions Last  Dose Informant Patient Reported? Taking?   ACE/ARB NOT PRESCRIBED, INTENTIONAL,   No No   Sig: Please choose reason not prescribed, below   Glycerin-Polysorbate 80 (REFRESH DRY EYE THERAPY OP) prn  Yes Yes   Sig: Apply to eye 2 times daily as needed    Multiple Vitamins-Minerals (PRESERVISION AREDS) CAPS 9/19/2021 at Unknown time  Yes Yes   Sig: Take 1 tablet by mouth 2 times daily    acetaminophen (TYLENOL) 325 MG tablet prn  Yes Yes   Sig: Take 325-650 mg by mouth 2 times daily as needed for mild pain   aspirin 81 MG tablet 9/19/2021 at Unknown time  No Yes   Sig: Take 1 tablet by mouth daily.   citalopram (CELEXA) 20 MG tablet 9/19/2021 at Unknown time  No Yes   Sig: Take 1 tablet (20 mg) by mouth daily   levothyroxine (SYNTHROID/LEVOTHROID) 200 MCG tablet 9/19/2021 at Unknown time  No Yes   Sig: TAKE ONE TABLET BY MOUTH ONE TIME DAILY    order for DME   No No   Sig: Equipment being ordered: wheeled walker with hand breaks   simvastatin (ZOCOR) 10 MG tablet 9/19/2021 at Unknown time  No Yes   Sig: TAKE ONE TABLET BY MOUTH AT BEDTIME       Facility-Administered Medications: None     Allergies   Allergies   Allergen Reactions     Penicillins        Social History   I have reviewed this patient's social history and updated it with pertinent information if needed. Lucille Rojas  reports that she has never smoked. She has never used smokeless tobacco. She reports current alcohol use. She reports that she does not use drugs.    Family History   I have reviewed this patient's family history and updated it with pertinent information if needed.   History reviewed. No pertinent family history.    Review of Systems   The 10 point Review of Systems is negative other than noted in the HPI or here.     Physical Exam   Temp: 99  F (37.2  C) Temp src: Axillary BP: 116/42 Pulse: 68   Resp: 18 SpO2: 99 % O2 Device: BiPAP/CPAP Oxygen Delivery: 5 LPM  Vital Signs with Ranges  Temp:  [97.8  F (36.6  C)-99.4  F (37.4  C)] 99  F  (37.2  C)  Pulse:  [68-83] 68  Resp:  [16-18] 18  BP: (116-143)/(42-56) 116/42  SpO2:  [92 %-99 %] 99 %  274 lbs 0 oz    Gen: on bipap, alert and oriented  CV: rrr, no mrg although distant heart sounds  Lungs: diminished BS at bases, exp wheezing  ABD: soft, non distended  EXT: 1+  Edema, wwp     Data   Results for orders placed or performed during the hospital encounter of 09/20/21 (from the past 24 hour(s))   Blood gas arterial   Result Value Ref Range    pH Arterial 7.29 (L) 7.35 - 7.45    pCO2 Arterial 77 (HH) 35 - 45 mm Hg    pO2 Arterial 56 (L) 80 - 105 mm Hg    FIO2 35     Bicarbonate Arterial 37 (H) 21 - 28 mmol/L    Base Excess/Deficit (+/-) 8.8 (H) -9.0 - 1.8 mmol/L   Hemoglobin   Result Value Ref Range    Hemoglobin 7.5 (L) 11.7 - 15.7 g/dL   Glucose by meter   Result Value Ref Range    GLUCOSE BY METER POCT 113 (H) 70 - 99 mg/dL   Glucose by meter   Result Value Ref Range    GLUCOSE BY METER POCT 89 70 - 99 mg/dL   Blood gas venous   Result Value Ref Range    pH Venous 7.36 7.32 - 7.43    pCO2 Venous 70 (H) 40 - 50 mm Hg    pO2 Venous 75 (H) 25 - 47 mm Hg    Bicarbonate Venous 39 (H) 21 - 28 mmol/L    Base Excess/Deficit (+/-) 12.2 (H) -7.7 - 1.9 mmol/L    FIO2 2    Basic metabolic panel   Result Value Ref Range    Sodium 143 133 - 144 mmol/L    Potassium 3.8 3.4 - 5.3 mmol/L    Chloride 104 94 - 109 mmol/L    Carbon Dioxide (CO2) 37 (H) 20 - 32 mmol/L    Anion Gap 2 (L) 3 - 14 mmol/L    Urea Nitrogen 22 7 - 30 mg/dL    Creatinine 1.32 (H) 0.52 - 1.04 mg/dL    Calcium 8.2 (L) 8.5 - 10.1 mg/dL    Glucose 94 70 - 99 mg/dL    GFR Estimate 37 (L) >60 mL/min/1.73m2   CBC with platelets   Result Value Ref Range    WBC Count 11.2 (H) 4.0 - 11.0 10e3/uL    RBC Count 3.38 (L) 3.80 - 5.20 10e6/uL    Hemoglobin 7.0 (L) 11.7 - 15.7 g/dL    Hematocrit 26.1 (L) 35.0 - 47.0 %    MCV 77 (L) 78 - 100 fL    MCH 20.7 (L) 26.5 - 33.0 pg    MCHC 26.8 (L) 31.5 - 36.5 g/dL    RDW 25.8 (H) 10.0 - 15.0 %    Platelet Count 293  150 - 450 10e3/uL   Troponin I   Result Value Ref Range    Troponin I <0.015 0.000 - 0.045 ug/L   ABO/Rh type and screen *Canceled*    Narrative    The following orders were created for panel order ABO/Rh type and screen.  Procedure                               Abnormality         Status                     ---------                               -----------         ------                       Please view results for these tests on the individual orders.   ABO/Rh type and screen    Narrative    The following orders were created for panel order ABO/Rh type and screen.  Procedure                               Abnormality         Status                     ---------                               -----------         ------                     Adult Type and Screen[[id number]]                            Edited Result - FINAL        Please view results for these tests on the individual orders.   Adult Type and Screen   Result Value Ref Range    ABO/RH(D) A POS     Antibody Screen Negative Negative    SPECIMEN EXPIRATION DATE [id number]    Glucose by meter   Result Value Ref Range    GLUCOSE BY METER POCT 96 70 - 99 mg/dL   Glucose by meter   Result Value Ref Range    GLUCOSE BY METER POCT 90 70 - 99 mg/dL

## 2021-09-25 LAB
ANION GAP SERPL CALCULATED.3IONS-SCNC: 4 MMOL/L (ref 3–14)
BACTERIA UR CULT: ABNORMAL
BACTERIA UR CULT: ABNORMAL
BASE EXCESS BLDV CALC-SCNC: 15.2 MMOL/L (ref -7.7–1.9)
BUN SERPL-MCNC: 25 MG/DL (ref 7–30)
CALCIUM SERPL-MCNC: 8.2 MG/DL (ref 8.5–10.1)
CANCER AG19-9 SERPL IA-ACNC: 5 U/ML
CHLORIDE BLD-SCNC: 99 MMOL/L (ref 94–109)
CO2 SERPL-SCNC: 39 MMOL/L (ref 20–32)
CREAT SERPL-MCNC: 1.17 MG/DL (ref 0.52–1.04)
GFR SERPL CREATININE-BSD FRML MDRD: 43 ML/MIN/1.73M2
GLUCOSE BLD-MCNC: 107 MG/DL (ref 70–99)
GLUCOSE BLDC GLUCOMTR-MCNC: 100 MG/DL (ref 70–99)
GLUCOSE BLDC GLUCOMTR-MCNC: 119 MG/DL (ref 70–99)
GLUCOSE BLDC GLUCOMTR-MCNC: 93 MG/DL (ref 70–99)
HCO3 BLDV-SCNC: 43 MMOL/L (ref 21–28)
HGB BLD-MCNC: 8 G/DL (ref 11.7–15.7)
O2/TOTAL GAS SETTING VFR VENT: 50 %
PCO2 BLDV: 71 MM HG (ref 40–50)
PH BLDV: 7.39 [PH] (ref 7.32–7.43)
PO2 BLDV: 66 MM HG (ref 25–47)
POTASSIUM BLD-SCNC: 3.7 MMOL/L (ref 3.4–5.3)
SODIUM SERPL-SCNC: 142 MMOL/L (ref 133–144)

## 2021-09-25 PROCEDURE — 250N000013 HC RX MED GY IP 250 OP 250 PS 637: Performed by: INTERNAL MEDICINE

## 2021-09-25 PROCEDURE — 82803 BLOOD GASES ANY COMBINATION: CPT | Performed by: INTERNAL MEDICINE

## 2021-09-25 PROCEDURE — 99233 SBSQ HOSP IP/OBS HIGH 50: CPT | Performed by: HOSPITALIST

## 2021-09-25 PROCEDURE — 250N000011 HC RX IP 250 OP 636: Performed by: HOSPITALIST

## 2021-09-25 PROCEDURE — 250N000011 HC RX IP 250 OP 636: Performed by: INTERNAL MEDICINE

## 2021-09-25 PROCEDURE — 250N000013 HC RX MED GY IP 250 OP 250 PS 637: Performed by: STUDENT IN AN ORGANIZED HEALTH CARE EDUCATION/TRAINING PROGRAM

## 2021-09-25 PROCEDURE — 120N000001 HC R&B MED SURG/OB

## 2021-09-25 PROCEDURE — 85018 HEMOGLOBIN: CPT | Performed by: INTERNAL MEDICINE

## 2021-09-25 PROCEDURE — 36415 COLL VENOUS BLD VENIPUNCTURE: CPT | Performed by: INTERNAL MEDICINE

## 2021-09-25 PROCEDURE — 80048 BASIC METABOLIC PNL TOTAL CA: CPT | Performed by: INTERNAL MEDICINE

## 2021-09-25 RX ORDER — FUROSEMIDE 10 MG/ML
40 INJECTION INTRAMUSCULAR; INTRAVENOUS DAILY
Status: COMPLETED | OUTPATIENT
Start: 2021-09-25 | End: 2021-09-26

## 2021-09-25 RX ADMIN — SIMVASTATIN 10 MG: 10 TABLET, FILM COATED ORAL at 20:16

## 2021-09-25 RX ADMIN — ACETAMINOPHEN 650 MG: 325 TABLET, FILM COATED ORAL at 16:02

## 2021-09-25 RX ADMIN — CITALOPRAM HYDROBROMIDE 20 MG: 20 TABLET ORAL at 08:12

## 2021-09-25 RX ADMIN — FUROSEMIDE 40 MG: 10 INJECTION, SOLUTION INTRAVENOUS at 16:03

## 2021-09-25 RX ADMIN — Medication 1 MG: at 21:56

## 2021-09-25 RX ADMIN — ACETAMINOPHEN 650 MG: 325 TABLET, FILM COATED ORAL at 21:56

## 2021-09-25 RX ADMIN — LEVOTHYROXINE SODIUM 200 MCG: 100 TABLET ORAL at 08:12

## 2021-09-25 RX ADMIN — POTASSIUM CHLORIDE 20 MEQ: 1500 TABLET, EXTENDED RELEASE ORAL at 08:12

## 2021-09-25 RX ADMIN — FUROSEMIDE 40 MG: 10 INJECTION, SOLUTION INTRAVENOUS at 08:12

## 2021-09-25 RX ADMIN — CEFTRIAXONE SODIUM 1 G: 1 INJECTION, POWDER, FOR SOLUTION INTRAMUSCULAR; INTRAVENOUS at 14:18

## 2021-09-25 RX ADMIN — PANTOPRAZOLE SODIUM 20 MG: 20 TABLET, DELAYED RELEASE ORAL at 08:12

## 2021-09-25 ASSESSMENT — ACTIVITIES OF DAILY LIVING (ADL)
ADLS_ACUITY_SCORE: 21
ADLS_ACUITY_SCORE: 19
ADLS_ACUITY_SCORE: 21
ADLS_ACUITY_SCORE: 19

## 2021-09-25 ASSESSMENT — MIFFLIN-ST. JEOR: SCORE: 1552.5

## 2021-09-25 NOTE — PLAN OF CARE
A&Ox3, confused. Disoriented to situation at times, pt is in disbelief and not sleeping well. Turn/repo on nocs. Not OOB this shift. VSS on 6 LPM NC. 2 gm NA diet. Blood transfusion complete. AM lab recheck. IV SL. Espitia intact with good UOP. No BM this shift. Tele NSR. Biopsy pending. Discharge pending colonoscopy.

## 2021-09-25 NOTE — PLAN OF CARE
A&Ox4 this shift. VSS on 5L O2. Denies pain. Up A2/lift. Espitia in place with adequate UOP. Large BM x1. Blood transfusion to be started. Biopsy pending. Discharge pending colonoscopy.

## 2021-09-25 NOTE — PROGRESS NOTES
[facility]    Hospitalist Progress Note    Date of Service (when I saw the patient): 09/25/2021  Admit date: 9/20/2021      Interval History      Care resumed, chart reviewed, discussed with nursing staff and patient was evaluated.  Patient reports she woke up early and could not sleep since then.  -She reports she is feeling better overall.  -Noted good diuresis, close to 3L in last 24 hours with 2 doses of Lasix IV.  Oxygen requirement has improved to 4 LPM, could be titrated down.  Patient did not use BiPAP overnight.  -Reports history of SHAVON, uses BiPAP at home, 4 LPM NC O2 at night with BiPAP.  -Denies chest pain, cough, nausea, diarrhea, abdominal pain.  -Received 1 unit PRBC, hemoglobin increased appropriately from 7 to 8 on recheck this morning.     Assessment & Plan   Lucille Rojas is a 81 year old female admitted on 9/20/2021. She presented with SOB and constipation.     Severe, symptomatic anemia at admission.   Acute blood loss anemia on chronic anemia  Reports several weeks of ongoing symptoms indicating a somewhat chronic course. Has noted black, tarry stools for at least 3-4 weeks.  S/p EGD on 09/21/21 revealed a few gastric erosions and a few tiny aphthous ulcers in the duodenum, but not severe enough to account for anemia.   * S/p 2 units with Hgb 4.7 > 7.2 on admission  * S/p 1 unit for hgb 6.4 > 7.4 on 9/22/21. And 1 unit on 9/24 Hb 7->8  * Iron studies reveal low levels > S/p venofer 300 mg IV x 2, last dose 9/23/21.   - Changed protonix to 20 mg daily.   - Plan for colonoscopy to investigate source of blood loss but on hold due to acute encephalopathy with respiratory failure (see below) on 9/23/21. GI will reevaluate on Monday.   - Transfuse to keep hgb > 7.      Recent Labs   Lab 09/25/21  0737 09/24/21  0609 09/23/21  1921 09/23/21  0557 09/22/21  1910 09/22/21  0612   HGB 8.0* 7.0* 7.5* 7.5* 7.5* 6.4*       Presumed primary pancreatic cancer Noted on CT.  R  pleural effusion, transudate   S/p thoracentesis - 450 ml of verenice fluid on 09/22/21   S/p EUS on 09/21/21 with biopsy. Preliminary cyto positive for malignancy, awaiting final path.     - Cytology on pleural fluid negative for malignant cells  - Oncology will follow up re: pathology results on Monday, 9/27.     Acute encephalopathy, Resolved - On 09/23/21, patient remained oriented and following commands but very lethargic, drifting off in mid-sentence. Labs significant for leukocytosis, hypercapnia on ABG and elevated BNP. She is afebrile, hemodynamically stable, stable oxygenation.   -  - On 09/25, patient is AAOx3, and answering questions appropriately, making good eye contact.   - Treat hypercapnia as below.   - Treat CHF as below.   - Treat UTI as below.     Acute on chronic hypercapnia -  ABG: pH 7.22, CO2 85, O2 121 while on 5L on 9/23/21.   Obesity, SHAVON with hypoventilation syndrome   Chronic hypoxia on home oxygen - Per family she requires oxygen during the day as well, but has had trouble getting this ordered through PCP. On BiPAP at night with4 LPM O2 PTA    - wean O2 as tolerates to keep O2 saturations around 90-93%  - BiPAP whenever in bed. Appreciate RT for adjusting BIPAP setting to improve ventilation during acute hypercapnia.   - When awake > up in chair.      UTI - Patient lethargic and unable to give clear history, regarding urinary symptoms. Her UA shows 166 WBC, small nitrates.   Leukocytosis - No T over 100 since admission. Hemodynamically stable. No localizing symptoms of infection. No Urinary symptoms. UA done. CT chest/abdomen/pelvis as above, no sign of localized infection.     Recent Labs   Lab 09/24/21  0609 09/23/21  0557 09/21/21  0324 09/20/21  1727   WBC 11.2* 14.0* 15.6* 7.6     - F/u on pleural cx but very low suspicion of infection in this location.   - UCx from 9/23/21: > 100,000 gram negative bacilli. Susceptibility pending.   - Started on IV ceftriaxone 1 g daily on 9/23/21.  "  - BCx NGTD    Acute decompensated HF, unknown EF - developing acute encephalopathy, hypercapnia on 09/23/21. Workup revealed BNP up to 4000's from 800 at admission. Mild pulmonary edema on CXR. Oxygenation stable, but on 3 L.   Mod to severe aortic stenosis, new diagnosis   TAVARES, CP and light-headedness with minimal exertion - Concerning for symptomatic aortic stenosis, although anemia and chronic obesity hypoventilation syndrome with chronic hyoxia undoubtedly contribute. Initial troponin negative. CXR with R pleural effusion.   Transudate R pleural effusion, edema on admission. S/p thoracentesis on 9/22.   * Serial troponins negative  * Echo EF 60-65% No WMAs. RV fxn normal. Mod to severe aortic stenosis with aortic mean valve area of 1 cm2, mean gradient 37.5 mm Hg.     - On 09/23/21, BNP quite elevated at 4000 on 09/23/21 (up from 800s at admission) CXR reviewed and shows mild intestinal markings, suspect edema.   - Will continue lasix 40 mg IV daily, follow BMP, Cr 1.1   - urbina placed on 9/23/21 for strict I and O's. follow Daily weights.   - Cardiology consulted on 09/24/21 re: input on options (TAVR) and prognosis.      Goals of Care. I do not think she is a candidate for TAVR and that aortic stenosis as well as obesity hypoventilation syndrome will limit options for treatment of cancer. This was discussed this with patient and family. Her daughter is hopeful to be able to get her home so she can spend \"few days in her own bed.\" They are very realistic about current prognosis.      Dr. Srinivasan, oncology, also agrees that she is not a candidate for chemotherapy and that he would recommend palliative approach. Reviewed cardiology notes who recommended palliative approach as well, not a candidate for TAVR. this was discussed with her daughter and , including a guarded prognosis and limited options for treatment of aortic stenosis, anemia, and cancer. I recommended that we try to optimize her respiratory " status and ensure anemia is stable prior to discharge. However, with regards to anemia, cancer and aortic stenosis, she is not likely to tolerate further interventions. The risk of decompensation is too great and rather than prolonging her life, they would likely shorten it. I answered all their questions and they expressed understanding and acceptance, but need time to process. They would like to meet with palliative care to consider options of remaining open to non-aggressive inpatient treatments in the future vs home on hospice.      Constipation  Reports 1 BM in last 1.5 weeks. No significant stool burden on admission CT.  No obstruction noted.  - PRN stool regimen  - bowel prep on 09/22/21 for colonoscopy.      CKD 3  - monitor     DMT2 diet controlled.  last A1c 5.8 on 4/7/21. Did not recheck due to severe anemia  -Blood sugars within normal limit, discontinue checks    Recent Labs   Lab 09/25/21  0909 09/25/21  0737 09/25/21  0217 09/24/21  2049 09/24/21  1736 09/24/21  1235   * 107* 93 136* 95 90        Hypothyroid TSH normal at admission.   - Continue PTA levothyroxine     Depression  - Continue PTA citalopram     HLD  - Continue PTA statin    Diet: Orders Placed This Encounter      Combination Diet 2 gm NA Diet     IVF: None  Espitia Catheter: PRESENT, indication: Strict 1-2 Hour I&O     DVT Prophylaxis: Pneumatic Compression Devices  Code Status: No CPR- Do NOT Intubate     Disposition: Expected discharge TBD.   Communication: Discussed with patient, RN, daughter on 09/25    Ben Eubanks  Hospitalist Service  [facility]  Securely message with the Vocera Web Console (learn more here)  Text page via [employer] Paging/Directory      Ben Eubanks MD  Hosptialist    -Data reviewed today: I reviewed all new labs and imaging results over the last 24 hours. I personally reviewed no images or EKG's today.    Physical Exam   Temp: 98.2  F (36.8  C) Temp src: Oral BP: 129/48 Pulse:  74   Resp: 20 SpO2: 97 % O2 Device: Nasal cannula Oxygen Delivery: 6 LPM  Vitals:    09/21/21 1451 09/25/21 0558   Weight: 124.3 kg (274 lb) 117.6 kg (259 lb 4.2 oz)     Vital Signs with Ranges  Temp:  [97.9  F (36.6  C)-98.2  F (36.8  C)] 98.2  F (36.8  C)  Pulse:  [73-78] 74  Resp:  [16-22] 20  BP: (106-135)/(39-74) 129/48  SpO2:  [94 %-98 %] 97 %  I/O last 3 completed shifts:  In: 350   Out: 2960 [Urine:2960]       Constitutional: Alert awake, oriented x3, very pleasant and appears comfortable.  HEENT: PERRLA, EOMI.  Respiratory: Bilateral equal air entry, clear though diminished anteriorly, bibasilar crackles present.  No wheezing.  Normal work of breathing.  Was on 6 LPM NC O2, decreased to 4 LPM during exam and saturating 94-96%.     Cardiovascular: S1-S2 regular with loud early systolic murmur, 1+ edema.  GI:  soft, NT/ND, BS normal  Skin/Integumen:  No acute rash or sign of bleeding.     Medications   All medications reviewed on 09/25/2021      - MEDICATION INSTRUCTIONS -         cefTRIAXone  1 g Intravenous Q24H     citalopram  20 mg Oral Daily     levothyroxine  200 mcg Oral QAM AC     pantoprazole  20 mg Oral QAM AC     simvastatin  10 mg Oral At Bedtime     sodium chloride (PF)  3 mL Intracatheter Q8H       Data   Recent Labs   Lab 09/25/21  0909 09/25/21  0737 09/25/21  0217 09/24/21  1736 09/24/21  1523 09/24/21  0825 09/24/21  0609 09/23/21  2218 09/23/21  1921 09/23/21  1158 09/23/21  0557 09/21/21  2107 09/21/21  1802 09/21/21  1006 09/21/21  0324 09/20/21  2244 09/20/21  1727   WBC  --   --   --   --   --   --  11.2*  --   --   --  14.0*  --   --   --  15.6*  --  7.6   HGB  --  8.0*  --   --   --   --  7.0*  --  7.5*  --  7.5*   < > 7.1*  --  7.2*  --  4.7*   MCV  --   --   --   --   --   --  77*  --   --   --  77*  --   --   --  71*  --  67*   PLT  --   --   --   --   --   --  293  --   --   --  351  --   --   --  412  --  412   NA  --  142  --   --   --   --  143  --   --   --  141  --   --   --   139  --  141   POTASSIUM  --  3.7  --   --  3.8  --  3.8  --   --   --  4.2  --   --   --  4.1  --  4.1   CHLORIDE  --  99  --   --   --   --  104  --   --   --  105  --   --   --  107  --  108   CO2  --  39*  --   --   --   --  37*  --   --   --  32  --   --   --  26  --  28   BUN  --  25  --   --   --   --  22  --   --   --  18  --   --   --  22  --  24   CR  --  1.17*  --   --  1.28*  --  1.32*  --   --   --  1.16*  --   --   --  1.10*  --  1.09*   ANIONGAP  --  4  --   --   --   --  2*  --   --   --  4  --   --   --  6  --  5   IGOR  --  8.2*  --   --   --   --  8.2*  --   --   --  8.2*  --   --   --  8.4*  --  8.5   * 107* 93   < >  --    < > 94   < >  --    < > 135*   < >  --    < > 123*   < > 105*   ALBUMIN  --   --   --   --   --   --   --   --   --   --   --   --   --   --  3.3*  --  3.4   PROTTOTAL  --   --   --   --   --   --   --   --   --   --   --   --  6.9  --  6.7*  --  6.9   BILITOTAL  --   --   --   --   --   --   --   --   --   --   --   --   --   --  0.5  --  0.4   ALKPHOS  --   --   --   --   --   --   --   --   --   --   --   --   --   --  104  --  101   ALT  --   --   --   --   --   --   --   --   --   --   --   --   --   --  17  --  16   AST  --   --   --   --   --   --   --   --   --   --   --   --   --   --  14  --  10   LIPASE  --   --   --   --   --   --   --   --   --   --   --   --   --   --   --   --  124   TROPONIN  --   --   --   --   --   --  <0.015  --   --   --   --   --  <0.015  --   --   --  <0.015    < > = values in this interval not displayed.       No results found for this or any previous visit (from the past 24 hour(s)).

## 2021-09-25 NOTE — PLAN OF CARE
Pt is A&Ox2/3- disoriented to situation and confused. Pt complained of abdominal pain, Tylenol given x1. VSS on 4L O2 NC. Espitia in place. BM this shift. 2g Na diet. Ax1/2 w/ GB, pivot to BSC. Hgb resulted at 8.0 this AM. Discharge pending colonoscopy.

## 2021-09-26 LAB
ANION GAP SERPL CALCULATED.3IONS-SCNC: 3 MMOL/L (ref 3–14)
BASOPHILS # BLD AUTO: 0.1 10E3/UL (ref 0–0.2)
BASOPHILS NFR BLD AUTO: 1 %
BUN SERPL-MCNC: 28 MG/DL (ref 7–30)
CALCIUM SERPL-MCNC: 8.4 MG/DL (ref 8.5–10.1)
CHLORIDE BLD-SCNC: 96 MMOL/L (ref 94–109)
CO2 SERPL-SCNC: 40 MMOL/L (ref 20–32)
CREAT SERPL-MCNC: 1.17 MG/DL (ref 0.52–1.04)
EOSINOPHIL # BLD AUTO: 0.7 10E3/UL (ref 0–0.7)
EOSINOPHIL NFR BLD AUTO: 6 %
ERYTHROCYTE [DISTWIDTH] IN BLOOD BY AUTOMATED COUNT: 27.9 % (ref 10–15)
GFR SERPL CREATININE-BSD FRML MDRD: 43 ML/MIN/1.73M2
GLUCOSE BLD-MCNC: 116 MG/DL (ref 70–99)
GLUCOSE BLDC GLUCOMTR-MCNC: 116 MG/DL (ref 70–99)
GLUCOSE BLDC GLUCOMTR-MCNC: 170 MG/DL (ref 70–99)
HCT VFR BLD AUTO: 33 % (ref 35–47)
HGB BLD-MCNC: 8.9 G/DL (ref 11.7–15.7)
IMM GRANULOCYTES # BLD: 0.1 10E3/UL
IMM GRANULOCYTES NFR BLD: 1 %
LYMPHOCYTES # BLD AUTO: 1.2 10E3/UL (ref 0.8–5.3)
LYMPHOCYTES NFR BLD AUTO: 11 %
MCH RBC QN AUTO: 21.5 PG (ref 26.5–33)
MCHC RBC AUTO-ENTMCNC: 27 G/DL (ref 31.5–36.5)
MCV RBC AUTO: 80 FL (ref 78–100)
MONOCYTES # BLD AUTO: 1.1 10E3/UL (ref 0–1.3)
MONOCYTES NFR BLD AUTO: 10 %
NEUTROPHILS # BLD AUTO: 7.5 10E3/UL (ref 1.6–8.3)
NEUTROPHILS NFR BLD AUTO: 71 %
NRBC # BLD AUTO: 0 10E3/UL
NRBC BLD AUTO-RTO: 0 /100
PLATELET # BLD AUTO: 300 10E3/UL (ref 150–450)
POTASSIUM BLD-SCNC: 3.7 MMOL/L (ref 3.4–5.3)
RBC # BLD AUTO: 4.14 10E6/UL (ref 3.8–5.2)
SODIUM SERPL-SCNC: 139 MMOL/L (ref 133–144)
WBC # BLD AUTO: 10.6 10E3/UL (ref 4–11)

## 2021-09-26 PROCEDURE — 80048 BASIC METABOLIC PNL TOTAL CA: CPT | Performed by: HOSPITALIST

## 2021-09-26 PROCEDURE — 36415 COLL VENOUS BLD VENIPUNCTURE: CPT | Performed by: HOSPITALIST

## 2021-09-26 PROCEDURE — 250N000013 HC RX MED GY IP 250 OP 250 PS 637: Performed by: STUDENT IN AN ORGANIZED HEALTH CARE EDUCATION/TRAINING PROGRAM

## 2021-09-26 PROCEDURE — 120N000001 HC R&B MED SURG/OB

## 2021-09-26 PROCEDURE — 85025 COMPLETE CBC W/AUTO DIFF WBC: CPT | Performed by: HOSPITALIST

## 2021-09-26 PROCEDURE — 99233 SBSQ HOSP IP/OBS HIGH 50: CPT | Performed by: HOSPITALIST

## 2021-09-26 PROCEDURE — 94660 CPAP INITIATION&MGMT: CPT

## 2021-09-26 PROCEDURE — 250N000013 HC RX MED GY IP 250 OP 250 PS 637: Performed by: HOSPITALIST

## 2021-09-26 PROCEDURE — 250N000013 HC RX MED GY IP 250 OP 250 PS 637: Performed by: INTERNAL MEDICINE

## 2021-09-26 PROCEDURE — 250N000013 HC RX MED GY IP 250 OP 250 PS 637: Performed by: ANESTHESIOLOGY

## 2021-09-26 PROCEDURE — 999N000157 HC STATISTIC RCP TIME EA 10 MIN

## 2021-09-26 PROCEDURE — 250N000011 HC RX IP 250 OP 636: Performed by: HOSPITALIST

## 2021-09-26 RX ORDER — CEFUROXIME AXETIL 500 MG/1
500 TABLET ORAL EVERY 12 HOURS SCHEDULED
Status: COMPLETED | OUTPATIENT
Start: 2021-09-26 | End: 2021-09-29

## 2021-09-26 RX ORDER — PANTOPRAZOLE SODIUM 40 MG/1
40 TABLET, DELAYED RELEASE ORAL
Status: DISCONTINUED | OUTPATIENT
Start: 2021-09-27 | End: 2021-10-09 | Stop reason: HOSPADM

## 2021-09-26 RX ADMIN — OXYCODONE HYDROCHLORIDE 5 MG: 5 TABLET ORAL at 01:30

## 2021-09-26 RX ADMIN — ACETAMINOPHEN 650 MG: 325 TABLET, FILM COATED ORAL at 20:06

## 2021-09-26 RX ADMIN — CEFUROXIME AXETIL 500 MG: 500 TABLET ORAL at 20:06

## 2021-09-26 RX ADMIN — FUROSEMIDE 40 MG: 10 INJECTION, SOLUTION INTRAVENOUS at 08:56

## 2021-09-26 RX ADMIN — SIMVASTATIN 10 MG: 10 TABLET, FILM COATED ORAL at 20:06

## 2021-09-26 RX ADMIN — PANTOPRAZOLE SODIUM 20 MG: 20 TABLET, DELAYED RELEASE ORAL at 08:55

## 2021-09-26 RX ADMIN — LEVOTHYROXINE SODIUM 200 MCG: 100 TABLET ORAL at 08:54

## 2021-09-26 RX ADMIN — CEFUROXIME AXETIL 500 MG: 500 TABLET ORAL at 10:44

## 2021-09-26 RX ADMIN — CITALOPRAM HYDROBROMIDE 20 MG: 20 TABLET ORAL at 08:55

## 2021-09-26 ASSESSMENT — ACTIVITIES OF DAILY LIVING (ADL)
ADLS_ACUITY_SCORE: 21
ADLS_ACUITY_SCORE: 19
ADLS_ACUITY_SCORE: 19
ADLS_ACUITY_SCORE: 21

## 2021-09-26 NOTE — PLAN OF CARE
A&Ox3, d/t time, forgetful. Came in the room with pt without O2 on (was laying on tubing), applied O2 again with SaO2 in the 50's range, jumped back up to >90%--pt then stable on 3L O2, MD aware. VSS, now on 1L O2. TAVARES, dim LS. Pt agreed to using Bipap tonight. Denies pain. Up A1 gb+w, sat in chair. Espitia in place with adequate UOP, IV lasix given. No BM this shift, uses BSC. Tolerates 2g Na diet, BG checks WNL. Bandaid on back CDI. Redness to abd folds & under breasts. Hgb: 8.9. PIV SL. Biopsy pending. Possible colonoscopy in near future. Continue to monitor.

## 2021-09-26 NOTE — PROGRESS NOTES
[facility]    Hospitalist Progress Note    Date of Service (when I saw the patient): 09/26/2021  Admit date: 9/20/2021      Interval History      Patient was seen and evaluated this morning, sitting up in the chair, asking about care conference tomorrow.  She seems well oriented today even though nursing reporting patient to be quite forgetful and disoriented.  Reports dyspnea has improved, hypoxia has improved and currently down to 3 LPM NC O2.  Denies chest pain.  Per nursing, patient had taken her O2 off and was desaturating this morning, SPO2 came up promptly to mid 90s on 3 LPM NC O2.  -Had bowel movement, no hematochezia or melena.  Anemia is stable, in fact Hb is 8.9 today, was 8 yesterday.  -Continues to have good response with diuretic, 1.7L urine output, creatinine stable.  -As per the nursing staff, she did not want to use BiPAP overnight.  I discussed with patient at length and she is agreeable to use BiPAP tonight.   -Received 1 unit PRBC, hemoglobin increased appropriately from 7 to 8 on recheck this morning.     Assessment & Plan   Lucille Rojas is a 81 year old female admitted on 9/20/2021. She presented with SOB and constipation.     Severe, symptomatic anemia at admission.   Acute blood loss anemia on chronic anemia  Reports several weeks of ongoing symptoms indicating a somewhat chronic course. Has noted black, tarry stools for at least 3-4 weeks.  S/p EGD on 09/21/21 revealed a few gastric erosions and a few tiny aphthous ulcers in the duodenum, but not severe enough to account for anemia.   * S/p 2 units with Hgb 4.7 > 7.2 on admission  * S/p 1 unit for hgb 6.4 > 7.4 on 9/22/21. And 1 unit on 9/24 Hb 7->8  * Iron studies reveal low levels > S/p venofer 300 mg IV x 2, last dose 9/23/21.   - Protonix 40 mg daily.   - Plan for colonoscopy to investigate source of blood loss but on hold due to acute encephalopathy with respiratory failure (see below) on 9/23/21. GI will  reevaluate on Monday.   - Transfuse to keep hgb > 7.      Recent Labs   Lab 09/26/21  0907 09/25/21  0737 09/24/21  0609 09/23/21  1921 09/23/21  0557 09/22/21  1910   HGB 8.9* 8.0* 7.0* 7.5* 7.5* 7.5*       Presumed primary pancreatic cancer Noted on CT.  R pleural effusion, transudate   S/p thoracentesis - 450 ml of verenice fluid on 09/22/21   S/p EUS on 09/21/21 with biopsy. Preliminary cyto positive for malignancy, awaiting final path.     - Cytology on pleural fluid negative for malignant cells  - Oncology will follow up re: pathology results on Monday, 9/27.   - care conference 9/27    Acute encephalopathy, Resolved - On 09/23/21, patient remained oriented and following commands but very lethargic, drifting off in mid-sentence. Labs significant for leukocytosis, hypercapnia on ABG and elevated BNP. She is afebrile, hemodynamically stable, stable oxygenation.   - Since 09/25, patient has been AAOx3, and answering questions appropriately, making good eye contact. Per nursing, patient does appear forgetful.  - Treating hypercapnia CHF and UTI as below.     Acute on chronic hypercapnia -  ABG: pH 7.22, CO2 85, O2 121 while on 5L on 9/23/21.   Obesity, SHAVON with hypoventilation syndrome   Chronic hypoxia on home oxygen - Per family she requires oxygen during the day as well, but has had trouble getting this ordered through PCP. On BiPAP at night with4 LPM O2 PTA  - Continue to wean O2 as tolerates to keep O2 saturations around 90-93%  - BiPAP whenever in bed. Appreciate RT for adjusting BIPAP setting to improve ventilation during acute hypercapnia.   - When awake > up in chair.      E coli UTI - Patient lethargic and unable to give clear history, regarding urinary symptoms. Her UA shows 166 WBC, small nitrates.   Leukocytosis - No T over 100 since admission. Hemodynamically stable. No localizing symptoms of infection. No Urinary symptoms. UA done. CT chest/abdomen/pelvis as above, no sign of localized infection.  "    Recent Labs   Lab 09/26/21  0907 09/24/21  0609 09/23/21  0557 09/21/21  0324 09/20/21  1727   WBC 10.6 11.2* 14.0* 15.6* 7.6     - F/u on pleural cx -NGTD but very low suspicion of infection in this location.   - UCx from 9/23/21: 2 strains of E. coli noted, sensitive to Rocephin which the patient is currently getting.  -Change Rocephin to p.o. Ceftin.   - BCx NGTD and patient is afebrile    Acute decompensated HF, unknown EF - developing acute encephalopathy, hypercapnia on 09/23/21. Workup revealed BNP up to 4000's from 800 at admission. Mild pulmonary edema on CXR. Oxygenation stable, but on 3 L.   Mod to severe aortic stenosis, new diagnosis   TAVARES, CP and light-headedness with minimal exertion - Concerning for symptomatic aortic stenosis, although anemia and chronic obesity hypoventilation syndrome with chronic hyoxia undoubtedly contribute. Initial troponin negative. CXR with R pleural effusion.   Transudate R pleural effusion, edema on admission. S/p thoracentesis on 9/22.   * Serial troponins negative  * Echo EF 60-65% No WMAs. RV fxn normal. Mod to severe aortic stenosis with aortic mean valve area of 1 cm2, mean gradient 37.5 mm Hg.     - On 09/23/21, BNP quite elevated at 4000 on 09/23/21 (up from 800s at admission) CXR reviewed and shows mild intestinal markings, suspect edema.   - has been on IV lasix, received this am 9/26 already, Cr 1.1, follow I&O and will order lasix tomorrow AM, likely PO.   - Strict I and O's. follow Daily weights.   - Cardiology consulted on 09/24/21 re: input on options (TAVR) and prognosis.      Goals of Care. I do not think she is a candidate for TAVR and that aortic stenosis as well as obesity hypoventilation syndrome will limit options for treatment of cancer. This was discussed this with patient and family. Her daughter is hopeful to be able to get her home so she can spend \"few days in her own bed.\" They are very realistic about current prognosis.      Dr. Srinivasan, " oncology, also agrees that she is not a candidate for chemotherapy and that he would recommend palliative approach. Reviewed cardiology notes who recommended palliative approach as well, not a candidate for TAVR. this was discussed with her daughter and , including a guarded prognosis and limited options for treatment of aortic stenosis, anemia, and cancer. I recommended that we try to optimize her respiratory status and ensure anemia is stable prior to discharge. However, with regards to anemia, cancer and aortic stenosis, she is not likely to tolerate further interventions. The risk of decompensation is too great and rather than prolonging her life, they would likely shorten it. I answered all their questions and they expressed understanding and acceptance, but need time to process. They would like to meet with palliative care to consider options of remaining open to non-aggressive inpatient treatments in the future vs home on hospice.   - Care conference 9/27, family prefer in the afternoon.     Constipation  Reports 1 BM in last 1.5 weeks. No significant stool burden on admission CT.  No obstruction noted.  - PRN stool regimen     CKD 3  - monitor     DMT2 diet controlled.  last A1c 5.8 on 4/7/21. Did not recheck due to severe anemia  -Blood sugars within normal limit, discontinue checks    Recent Labs   Lab 09/26/21  0907 09/25/21  1733 09/25/21  0909 09/25/21  0737 09/25/21  0217 09/24/21  2049   * 100* 119* 107* 93 136*        Hypothyroid TSH normal at admission.   - Continue PTA levothyroxine     Depression  - Continue PTA citalopram     HLD  - Continue PTA statin    Diet: Orders Placed This Encounter      Combination Diet 2 gm NA Diet     IVF: None  Espitia Catheter: PRESENT, indication: Strict 1-2 Hour I&O     DVT Prophylaxis: Pneumatic Compression Devices  Code Status: No CPR- Do NOT Intubate     Disposition: Expected discharge TBD, pending care conference and goals of care and further work  up. Likely 2 days.   Communication: Discussed with patient, RN, daughter on 09/26    Ben Eubanks  Hospitalist Service  [facility]  Securely message with the Vocera Web Console (learn more here)  Text page via [employer] Paging/Directory      Ben Eubanks MD  Hosptialist    -Data reviewed today: I reviewed all new labs and imaging results over the last 24 hours. I personally reviewed no images or EKG's today.    Physical Exam   Temp: 97.6  F (36.4  C) Temp src: Oral BP: 121/54 Pulse: 71   Resp: 20 SpO2: 95 % O2 Device: Nasal cannula Oxygen Delivery: 3 LPM  Vitals:    09/21/21 1451 09/25/21 0558   Weight: 124.3 kg (274 lb) 117.6 kg (259 lb 4.2 oz)     Vital Signs with Ranges  Temp:  [97.4  F (36.3  C)-98  F (36.7  C)] 97.6  F (36.4  C)  Pulse:  [] 71  Resp:  [16-20] 20  BP: (120-135)/(53-59) 121/54  SpO2:  [95 %-97 %] 95 %  I/O last 3 completed shifts:  In: 150 [P.O.:150]  Out: 1725 [Urine:1725]       Constitutional: Up in chair, Alert awake, oriented x3, very pleasant and appears comfortable.  HEENT: PERRLA, EOMI.  Respiratory: Bilateral equal air entry, clear though diminished anteriorly, bibasilar crackles present.  No wheezing.  Normal work of breathing.  On 3 LPM saturating 94-96%.     Cardiovascular: S1-S2 regular with loud early systolic murmur, 1+ edema.  GI:  soft, NT/ND, BS normal  Skin/Integumen:  No acute rash or sign of bleeding.     Medications   All medications reviewed on 09/26/2021      - MEDICATION INSTRUCTIONS -         cefuroxime  500 mg Oral Q12H DAVID     citalopram  20 mg Oral Daily     levothyroxine  200 mcg Oral QAM AC     [START ON 9/27/2021] pantoprazole  40 mg Oral QAM AC     simvastatin  10 mg Oral At Bedtime     sodium chloride (PF)  3 mL Intracatheter Q8H       Data   Recent Labs   Lab 09/26/21  0907 09/25/21  1733 09/25/21  0909 09/25/21  0737 09/25/21  0217 09/24/21  1736 09/24/21  1523 09/24/21  0825 09/24/21  0609 09/23/21  1158 09/23/21  0557  09/21/21  2107 09/21/21  1802 09/21/21  1006 09/21/21  0324 09/20/21  2244 09/20/21  1727   WBC 10.6  --   --   --   --   --   --   --  11.2*  --  14.0*  --   --   --  15.6*  --  7.6   HGB 8.9*  --   --  8.0*  --   --   --   --  7.0*   < > 7.5*   < > 7.1*  --  7.2*  --  4.7*   MCV 80  --   --   --   --   --   --   --  77*  --  77*  --   --   --  71*  --  67*     --   --   --   --   --   --   --  293  --  351  --   --   --  412  --  412     --   --  142  --   --   --   --  143  --  141  --   --   --  139  --  141   POTASSIUM 3.7  --   --  3.7  --   --  3.8   < > 3.8  --  4.2  --   --   --  4.1  --  4.1   CHLORIDE 96  --   --  99  --   --   --   --  104  --  105  --   --   --  107  --  108   CO2 40*  --   --  39*  --   --   --   --  37*  --  32  --   --   --  26  --  28   BUN 28  --   --  25  --   --   --   --  22  --  18  --   --   --  22  --  24   CR 1.17*  --   --  1.17*  --   --  1.28*   < > 1.32*  --  1.16*  --   --   --  1.10*  --  1.09*   ANIONGAP 3  --   --  4  --   --   --   --  2*  --  4  --   --   --  6  --  5   IGOR 8.4*  --   --  8.2*  --   --   --   --  8.2*  --  8.2*  --   --   --  8.4*  --  8.5   * 100* 119* 107*   < >   < >  --    < > 94   < > 135*   < >  --    < > 123*   < > 105*   ALBUMIN  --   --   --   --   --   --   --   --   --   --   --   --   --   --  3.3*  --  3.4   PROTTOTAL  --   --   --   --   --   --   --   --   --   --   --   --  6.9  --  6.7*  --  6.9   BILITOTAL  --   --   --   --   --   --   --   --   --   --   --   --   --   --  0.5  --  0.4   ALKPHOS  --   --   --   --   --   --   --   --   --   --   --   --   --   --  104  --  101   ALT  --   --   --   --   --   --   --   --   --   --   --   --   --   --  17  --  16   AST  --   --   --   --   --   --   --   --   --   --   --   --   --   --  14  --  10   LIPASE  --   --   --   --   --   --   --   --   --   --   --   --   --   --   --   --  124   TROPONIN  --   --   --   --   --   --   --   --  <0.015  --   --   --   <0.015  --   --   --  <0.015    < > = values in this interval not displayed.       No results found for this or any previous visit (from the past 24 hour(s)).

## 2021-09-27 LAB
ANION GAP SERPL CALCULATED.3IONS-SCNC: 4 MMOL/L (ref 3–14)
BACTERIA PLR CULT: NO GROWTH
BUN SERPL-MCNC: 28 MG/DL (ref 7–30)
CALCIUM SERPL-MCNC: 8.5 MG/DL (ref 8.5–10.1)
CHLORIDE BLD-SCNC: 95 MMOL/L (ref 94–109)
CO2 SERPL-SCNC: 40 MMOL/L (ref 20–32)
CREAT SERPL-MCNC: 1.02 MG/DL (ref 0.52–1.04)
GFR SERPL CREATININE-BSD FRML MDRD: 51 ML/MIN/1.73M2
GLUCOSE BLD-MCNC: 114 MG/DL (ref 70–99)
GLUCOSE BLDC GLUCOMTR-MCNC: 117 MG/DL (ref 70–99)
HGB BLD-MCNC: 9.4 G/DL (ref 11.7–15.7)
POTASSIUM BLD-SCNC: 3.9 MMOL/L (ref 3.4–5.3)
SODIUM SERPL-SCNC: 139 MMOL/L (ref 133–144)

## 2021-09-27 PROCEDURE — 999N000157 HC STATISTIC RCP TIME EA 10 MIN

## 2021-09-27 PROCEDURE — 250N000013 HC RX MED GY IP 250 OP 250 PS 637: Performed by: HOSPITALIST

## 2021-09-27 PROCEDURE — 82947 ASSAY GLUCOSE BLOOD QUANT: CPT | Performed by: HOSPITALIST

## 2021-09-27 PROCEDURE — 250N000013 HC RX MED GY IP 250 OP 250 PS 637: Performed by: INTERNAL MEDICINE

## 2021-09-27 PROCEDURE — 120N000001 HC R&B MED SURG/OB

## 2021-09-27 PROCEDURE — 99233 SBSQ HOSP IP/OBS HIGH 50: CPT | Performed by: INTERNAL MEDICINE

## 2021-09-27 PROCEDURE — 99232 SBSQ HOSP IP/OBS MODERATE 35: CPT | Performed by: HOSPITALIST

## 2021-09-27 PROCEDURE — 94660 CPAP INITIATION&MGMT: CPT

## 2021-09-27 PROCEDURE — 36415 COLL VENOUS BLD VENIPUNCTURE: CPT | Performed by: INTERNAL MEDICINE

## 2021-09-27 PROCEDURE — 85018 HEMOGLOBIN: CPT | Performed by: INTERNAL MEDICINE

## 2021-09-27 PROCEDURE — 82374 ASSAY BLOOD CARBON DIOXIDE: CPT | Performed by: HOSPITALIST

## 2021-09-27 RX ADMIN — PANTOPRAZOLE SODIUM 40 MG: 40 TABLET, DELAYED RELEASE ORAL at 06:37

## 2021-09-27 RX ADMIN — CEFUROXIME AXETIL 500 MG: 500 TABLET ORAL at 21:04

## 2021-09-27 RX ADMIN — LEVOTHYROXINE SODIUM 200 MCG: 100 TABLET ORAL at 09:04

## 2021-09-27 RX ADMIN — SIMVASTATIN 10 MG: 10 TABLET, FILM COATED ORAL at 21:04

## 2021-09-27 RX ADMIN — CITALOPRAM HYDROBROMIDE 20 MG: 20 TABLET ORAL at 09:04

## 2021-09-27 RX ADMIN — CEFUROXIME AXETIL 500 MG: 500 TABLET ORAL at 09:04

## 2021-09-27 ASSESSMENT — ACTIVITIES OF DAILY LIVING (ADL)
ADLS_ACUITY_SCORE: 19

## 2021-09-27 NOTE — PLAN OF CARE
DATE & TIME:  9/27/2021 2123-5885            Cognitive Concerns/Orientation : A&Ox3, forgetful  BEHAVIOR & AGGRESSION TOOL COLOR: Green             ABNL VS/O2: VSS, on 2L sating 95%, desat to 88% on RA. Using bipap when sleeping.  MOBILITY: Assist of one with walker/cane and gait belt   PAIN MANAGMENT: Denies pain.  DIET: 2 gm Na, fair appetite.  BOWEL/BLADDER: Espitia in place, incontinent of BM at times  ABNL LAB/BG:  Hgb 9.4, Cr 1.02  DRAIN/DEVICES: R arm PIV SL  TELEMETRY RHYTHM: Sinus dysrhythmia.  SKIN: Pale, some redness around groin area and under breasts- miconazole in use. Scattered bruising   TESTS/PROCEDURES: None.  D/C DATE: Discharge pending consults  OTHER IMPORTANT INFO:  continues on oral ceftin- plan for family care conference between 1-4pm today. Final results of pancreatic biopsy are pending, initial was positive for malignancy. BGM discontinued.

## 2021-09-27 NOTE — PLAN OF CARE
DATE & TIME: 9/27/2021 4238-6826           Cognitive Concerns/Orientation : A&Ox3, forgetful  BEHAVIOR & AGGRESSION TOOL COLOR: Green             ABNL VS/O2: VSS, on 2L sating 95%, desat to 88% on RA. MD ordered to use bipap when sleeping.   MOBILITY: Assist of one with walker and gait belt   PAIN MANAGMENT: Denies pain.  DIET: 2 gm Na, fair appetite.  BOWEL/BLADDER: Espitia in place, incontinent of bowel, BM x1 earlier today.   ABNL LAB/BG:  Hgb 9.4, Cr 1.02  DRAIN/DEVICES: R arm PIV SL  TELEMETRY RHYTHM: Sinus dysrhythmia.  SKIN: Pale, some redness around groin area and under breasts- miconazole in use. Scattered bruising   TESTS/PROCEDURES: None.  D/C DATE: Discharge pending consults  OTHER IMPORTANT INFO:  continues on oral ceftin q12h. Final results of pancreatic biopsy are pending, initial was positive for malignancy. BGM discontinued.

## 2021-09-27 NOTE — PROGRESS NOTES
GASTROENTEROLOGY PROGRESS NOTE     SUBJECTIVE:  Confusion improving. On 2.5-3L O2 (baseline 2L prn at home and nocturnal BiPAP). No stools today. Had brown stool yesterday. No abdominal pain. Tolerating diet.     FNA results still pending.     OBJECTIVE:  BP (!) 130/39 (BP Location: Left arm)   Pulse 100   Temp 97.9  F (36.6  C) (Axillary)   Resp 21   Ht 1.524 m (5')   Wt 117.6 kg (259 lb 4.2 oz)   SpO2 99%   BMI 50.63 kg/m    Temp (24hrs), Av.9  F (36.6  C), Min:97.9  F (36.6  C), Max:97.9  F (36.6  C)    Patient Vitals for the past 72 hrs:   Weight   21 0558 117.6 kg (259 lb 4.2 oz)       Intake/Output Summary (Last 24 hours) at 2021 1005  Last data filed at 2021 0641  Gross per 24 hour   Intake 270 ml   Output 1600 ml   Net -1330 ml        PHYSICAL EXAM  Gen: alert, oriented to self/time, place  Abd: obese, soft, mild epigastric tenderness, otherwise nontender.     Additional Comments:  ROS, FH, SH: See initial GI consult for details.     I have reviewed the patient's new clinical lab results:     Recent Labs   Lab Test 21  0859 21  0907 21  0737 21  0609 21  0609 21  1921 21  0557   WBC  --  10.6  --   --  11.2*  --  14.0*   HGB 9.4* 8.9* 8.0*   < > 7.0*   < > 7.5*   MCV  --  80  --   --  77*  --  77*   PLT  --  300  --   --  293  --  351    < > = values in this interval not displayed.     Recent Labs   Lab Test 21  0859 21  0907 21  0737   POTASSIUM 3.9 3.7 3.7   CHLORIDE 95 96 99   CO2 40* 40* 39*   BUN 28 28 25   ANIONGAP 4 3 4     Recent Labs   Lab Test 21  1149 21  0324 21  1727 20  1305 16  0713 09/11/15  1542   ALBUMIN  --  3.3* 3.4 3.6   < > 3.5   BILITOTAL  --  0.5 0.4 0.2   < > 0.2   ALT  --  17 16 15   < > 23   AST  --  14 10 13   < > 12   PROTEIN 50 *  --   --   --   --   --    LIPASE  --   --  124  --   --   --    AMYLASE  --   --   --   --   --  64    < > = values in this interval  not displayed.        Assessment/Plan:  81 year old female with PMH significant for obesity, SHAVON, chronic hypoxia on home O2 (nocturnal BiPAP), admitted 9/20 with symptoms of SOB, constipation, melena with workup notable for CHF, UTI, severe anemia (HGB 4.7), and CT concerning for pancreatic cancer with right pleural effusion s/p thoracentesis and EUS, FNA.     1. Pancreatic mass. EUS 9/21 noted pancreatic mass in the uncinate process s/p FNA. Results pending. No significant abdominal pain. Tolerating diet.     2. Severe anemia. HGB overall stable. Looks like she last received a unit of blood on 9/24 with improvement in HGB to 8.9 yesterday, improved further today 9.4. No overt bleeding noted. EUS 9/21 showed nonbleeding erosive gastropathy, and a few tiny duodenal aphthous ulcers, small duodenal polyp consistent with tubular adenoma without dysplasia. Gastric biopsy with scant chronic inflammation, no H pylori. Was to have colonoscopy last week but had increased confusion/encephalopathy, increased breathing requirements. Therefore colonoscopy was canceled.     --Patient is not showing any overt bleeding with stable HGB therefore urgent colonoscopy not needed at this time.   --Care conference arranged for later today. In light of possible pancreatic cancer and further discussions regarding goals of care, will hold off on arranging colonoscopy until we can review care conference decisions/treatment plan.   --Monitor HGB and transfuse prn.   --Daily PPI.   --EVE.     Reviewed with Dr. Keene.     Gypsy Burks, [facility] ([facility])

## 2021-09-27 NOTE — PLAN OF CARE
DATE & TIME:  9/26/21 3-11pm shift              Cognitive Concerns/Orientation : A&Ox3, d/t time/situation  BEHAVIOR & AGGRESSION TOOL COLOR: Green             ABNL VS/O2: VSS, on 2L when awake in bed/ambulating desats. BIPAP PTA overnight- MD strictly encouraging continued use when sleeping  MOBILITY: A1 gb+w/cane  PAIN MANAGMENT: uncomfortable in bed- moved to bariatric chair tonight for rest.  DIET: 2 gm Na, BG checks  BOWEL/BLADDER: Espitia in place, incontinent of BM at times  ABNL LAB/BG: Co2 77, Hgb 8.9, crt 1.17  DRAIN/DEVICES: R arm PIV SL  TELEMETRY RHYTHM: sinus dysrhythmia with occasional PVC  SKIN: Pale, some redness around groin area and under breasts- miconazole in use.  TESTS/PROCEDURES: Colonoscopy cancelled 9/23 d/t lethargy and acute encephalopathy  D/C DATE: Discharge pending consults  OTHER IMPORTANT INFO: IV Lasix BID completed, continues on oral ceftin- plan for family  care conference between 1-4pm tomorrow 9/27. Final results of pancreatic biopsy are pending, initial was positive for malignancy.

## 2021-09-27 NOTE — PROGRESS NOTES
[facility]    Hospitalist Progress Note    Date of Service (when I saw the patient): 09/27/2021  Admit date: 9/20/2021      Interval History      Patient was seen and evaluated this morning, up in the recliner using CPAP.  No acute issues reported.  Denies abdominal pain or dyspnea.  Hypoxia has continued to improve and currently on 2 LPM NC O2.  Hemoglobin is trending up.     Assessment & Plan   Lucille Rojas is a 81 year old female admitted on 9/20/2021. She presented with SOB and constipation.     Severe, symptomatic anemia at admission.   Acute blood loss anemia on chronic anemia  Reports several weeks of ongoing symptoms indicating a somewhat chronic course. Has noted black, tarry stools for at least 3-4 weeks.  S/p EGD on 09/21/21 revealed a few gastric erosions and a few tiny aphthous ulcers in the duodenum, but not severe enough to account for anemia.   * S/p 2 units with Hgb 4.7 > 7.2 on admission, 1 unit on 9/22/21 and 1 unit on 9/24 with appropriate rise in Hb.  9.4 today.  * Iron studies reveal low levels > S/p venofer 300 mg IV x 2, last dose 9/23/21.   - Protonix 40 mg daily.   - Colonoscopy on hold due to acute encephalopathy with respiratory failure (see below) on 9/23/21.  Now with no overt bleeding and stable hemoglobin, will discuss further with family during care conference.  - Transfuse to keep hgb > 7.      Recent Labs   Lab 09/27/21  0859 09/26/21  0907 09/25/21  0737 09/24/21  0609 09/23/21  1921 09/23/21  0557   HGB 9.4* 8.9* 8.0* 7.0* 7.5* 7.5*       Presumed primary pancreatic cancer Noted on CT.  R pleural effusion, transudate   S/p thoracentesis - 450 ml of verenice fluid on 09/22/21   S/p EUS on 09/21/21 with biopsy. Preliminary cyto positive for malignancy, awaiting final path.     - Cytology on pleural fluid negative for malignant cells  - Oncology will follow up re: pathology results    - care conference 9/27    Acute encephalopathy, Resolved - On 09/23/21,  patient remained oriented and following commands but very lethargic, drifting off in mid-sentence. Labs significant for leukocytosis, hypercapnia on ABG and elevated BNP. She is afebrile, hemodynamically stable, stable oxygenation.   - Since 09/25, patient has been AAOx3, and answering questions appropriately, making good eye contact. Per nursing, patient does appear forgetful.  - Treating hypercapnia CHF and UTI as below.     Acute on chronic hypercapnia -  ABG: pH 7.22, CO2 85, O2 121 while on 5L on 9/23/21.   Obesity, SHAVON with hypoventilation syndrome   Chronic hypoxia on home oxygen - Per family she requires oxygen during the day as well, but has had trouble getting this ordered through PCP. On BiPAP at night with4 LPM O2 PTA  - Continue to wean O2 as tolerates to keep O2 saturations around 90-93%  - BiPAP whenever in bed. Appreciate RT for adjusting BIPAP setting to improve ventilation during acute hypercapnia.   - When awake > up in chair.      E coli UTI - Patient lethargic and unable to give clear history, regarding urinary symptoms. Her UA shows 166 WBC, small nitrates.   Leukocytosis - No T over 100 since admission. Hemodynamically stable. No localizing symptoms of infection. No Urinary symptoms. UA done. CT chest/abdomen/pelvis as above, no sign of localized infection.     Recent Labs   Lab 09/26/21  0907 09/24/21  0609 09/23/21  0557 09/21/21  0324 09/20/21  1727   WBC 10.6 11.2* 14.0* 15.6* 7.6     - F/u on pleural cx -NGTD but very low suspicion of infection in this location.   - UCx from 9/23/21: 2 strains of E. coli noted, sensitive to Rocephin which the patient is currently getting.  -Change Rocephin to p.o. Ceftin.   - BCx NGTD and patient is afebrile    Acute decompensated HF, unknown EF - developing acute encephalopathy, hypercapnia on 09/23/21. Workup revealed BNP up to 4000's from 800 at admission. Mild pulmonary edema on CXR. Oxygenation stable, but on 3 L.   Mod to severe aortic stenosis, new  "diagnosis   TAVARES, CP and light-headedness with minimal exertion - Concerning for symptomatic aortic stenosis, although anemia and chronic obesity hypoventilation syndrome with chronic hyoxia undoubtedly contribute. Initial troponin negative. CXR with R pleural effusion.   Transudate R pleural effusion, edema on admission. S/p thoracentesis on 9/22.   * Serial troponins negative  * Echo EF 60-65% No WMAs. RV fxn normal. Mod to severe aortic stenosis with aortic mean valve area of 1 cm2, mean gradient 37.5 mm Hg.     - On 09/23/21, BNP quite elevated at 4000 on 09/23/21 (up from 800s at admission) CXR reviewed and shows mild intestinal markings, suspect edema.   - was managed with IV lasix, Cr up and stable at 1.1, respiratory status is markedly improved.  Skip Lasix today, follow weight and possibly p.o. Lasix starting 9/28.  - Strict I and O's. follow Daily weights.   - Cardiology consulted on 09/24/21 re: input on options (TAVR) and prognosis.      Goals of Care. I do not think she is a candidate for TAVR and that aortic stenosis as well as obesity hypoventilation syndrome will limit options for treatment of cancer. This was discussed this with patient and family. Her daughter is hopeful to be able to get her home so she can spend \"few days in her own bed.\" They are very realistic about current prognosis.      Dr. Srinivasan, oncology, also agrees that she is not a candidate for chemotherapy and that he would recommend palliative approach. Reviewed cardiology notes who recommended palliative approach as well, not a candidate for TAVR. this was discussed with her daughter and , including a guarded prognosis and limited options for treatment of aortic stenosis, anemia, and cancer. I recommended that we try to optimize her respiratory status and ensure anemia is stable prior to discharge. However, with regards to anemia, cancer and aortic stenosis, she is not likely to tolerate further interventions. The risk of " decompensation is too great and rather than prolonging her life, they would likely shorten it. I answered all their questions and they expressed understanding and acceptance, but need time to process. They would like to meet with palliative care to consider options of remaining open to non-aggressive inpatient treatments in the future vs home on hospice.   - Care conference this afternoon.     Constipation  No significant stool burden on admission CT.  No obstruction noted.  - PRN stool regimen     CKD 3  - monitor     DMT2 diet controlled.  last A1c 5.8 on 4/7/21. Did not recheck due to severe anemia  -Blood sugars within normal limit, discontinue checks    Recent Labs   Lab 09/27/21  0918 09/27/21  0859 09/26/21  2121 09/26/21  1726 09/26/21  0907 09/25/21  1733   * 114* 116* 170* 116* 100*        Hypothyroid TSH normal at admission.   - Continue PTA levothyroxine     Depression  - Continue PTA citalopram     HLD  - Continue PTA statin    Diet: Orders Placed This Encounter      Combination Diet 2 gm NA Diet     IVF: None  Espitia Catheter: PRESENT, indication: Strict 1-2 Hour I&O     DVT Prophylaxis: Pneumatic Compression Devices  Code Status: No CPR- Do NOT Intubate     Disposition: Expected discharge TBD, pending care conference and goals of care and further work up. Likely 1-2 days.   Communication: Discussed with patient, RN, daughter on 09/27    Ben Eubanks  Hospitalist Service  [facility]  Securely message with the Vocera Web Console (learn more here)  Text page via [facility] Paging/Directory      Ben Eubanks MD  Hosptialist    -Data reviewed today: I reviewed all new labs  results over the last 24 hours. I personally reviewed no images or EKG's today.    Physical Exam   Temp: 98  F (36.7  C) Temp src: Oral BP: 137/73 Pulse: 99   Resp: 18 SpO2: 95 % O2 Device: BiPAP/CPAP Oxygen Delivery: 2 LPM  Vitals:    09/21/21 1451 09/25/21 0558   Weight: 124.3 kg (274 lb) 117.6 kg  (259 lb 4.2 oz)     Vital Signs with Ranges  Temp:  [97.9  F (36.6  C)-98  F (36.7  C)] 98  F (36.7  C)  Pulse:  [] 99  Resp:  [18-21] 18  BP: (130-137)/(39-73) 137/73  SpO2:  [92 %-99 %] 95 %  I/O last 3 completed shifts:  In: 270 [P.O.:270]  Out: 1600 [Urine:1600]       Constitutional: Up in chair, Alert awake, oriented x3, very pleasant and appears comfortable.  HEENT: PERRLA, EOMI.  Respiratory: Bilateral equal air entry, clear though diminished anteriorly, bibasilar crackles present.  No wheezing.  Normal work of breathing.  On 3 LPM saturating 94-96%.     Cardiovascular: S1-S2 regular with loud early systolic murmur, 1+ edema.  GI:  soft, NT/ND, BS normal  Skin/Integumen:  No acute rash or sign of bleeding.     Medications   All medications reviewed on 09/27/2021      - MEDICATION INSTRUCTIONS -         cefuroxime  500 mg Oral Q12H DAVID     citalopram  20 mg Oral Daily     levothyroxine  200 mcg Oral QAM AC     pantoprazole  40 mg Oral QAM AC     simvastatin  10 mg Oral At Bedtime     sodium chloride (PF)  3 mL Intracatheter Q8H       Data   Recent Labs   Lab 09/27/21  0918 09/27/21  0859 09/26/21  2121 09/26/21  1726 09/26/21  0907 09/25/21  0909 09/25/21  0737 09/24/21  0825 09/24/21  0609 09/23/21  1158 09/23/21  0557 09/21/21  2107 09/21/21  1802 09/21/21  1006 09/21/21  0324 09/20/21  2244 09/20/21  1727   WBC  --   --   --   --  10.6  --   --   --  11.2*  --  14.0*  --   --   --  15.6*  --  7.6   HGB  --  9.4*  --   --  8.9*  --  8.0*   < > 7.0*   < > 7.5*   < > 7.1*  --  7.2*  --  4.7*   MCV  --   --   --   --  80  --   --   --  77*  --  77*  --   --   --  71*  --  67*   PLT  --   --   --   --  300  --   --   --  293  --  351  --   --   --  412  --  412   NA  --  139  --   --  139  --  142   < > 143  --  141  --   --   --  139  --  141   POTASSIUM  --  3.9  --   --  3.7  --  3.7   < > 3.8  --  4.2  --   --   --  4.1  --  4.1   CHLORIDE  --  95  --   --  96  --  99   < > 104  --  105  --   --   --   107  --  108   CO2  --  40*  --   --  40*  --  39*   < > 37*  --  32  --   --   --  26  --  28   BUN  --  28  --   --  28  --  25   < > 22  --  18  --   --   --  22  --  24   CR  --  1.02  --   --  1.17*  --  1.17*   < > 1.32*  --  1.16*  --   --   --  1.10*  --  1.09*   ANIONGAP  --  4  --   --  3  --  4   < > 2*  --  4  --   --   --  6  --  5   IGOR  --  8.5  --   --  8.4*  --  8.2*   < > 8.2*  --  8.2*  --   --   --  8.4*  --  8.5   * 114* 116*   < > 116*   < > 107*   < > 94   < > 135*   < >  --    < > 123*   < > 105*   ALBUMIN  --   --   --   --   --   --   --   --   --   --   --   --   --   --  3.3*  --  3.4   PROTTOTAL  --   --   --   --   --   --   --   --   --   --   --   --  6.9  --  6.7*  --  6.9   BILITOTAL  --   --   --   --   --   --   --   --   --   --   --   --   --   --  0.5  --  0.4   ALKPHOS  --   --   --   --   --   --   --   --   --   --   --   --   --   --  104  --  101   ALT  --   --   --   --   --   --   --   --   --   --   --   --   --   --  17  --  16   AST  --   --   --   --   --   --   --   --   --   --   --   --   --   --  14  --  10   LIPASE  --   --   --   --   --   --   --   --   --   --   --   --   --   --   --   --  124   TROPONIN  --   --   --   --   --   --   --   --  <0.015  --   --   --  <0.015  --   --   --  <0.015    < > = values in this interval not displayed.       No results found for this or any previous visit (from the past 24 hour(s)).

## 2021-09-27 NOTE — PLAN OF CARE
Cognitive Concerns/Orientation : A&Ox3, forgetful  BEHAVIOR & AGGRESSION TOOL COLOR: Green             ABNL VS/O2: VSS, on 2L when awake in bed/ambulating desats. BIPAP PTA overnight- MD strictly encouraging continued use when sleeping  MOBILITY: Assist of one with walker/cane and gait belt   PAIN MANAGMENT: uncomfortable in bed- moved to bariatric chair tonight for rest.  DIET: 2 gm Na  BOWEL/BLADDER: Espitia in place, incontinent of BM at times  ABNL LAB/BG: Co2 77, Hgb 8.9, crt 1.17  DRAIN/DEVICES: R arm PIV SL  TELEMETRY RHYTHM: Sinus dysrhythmia with occasional PVC  SKIN: Pale, some redness around groin area and under breasts- miconazole in use. Scattered bruising   TESTS/PROCEDURES: Colonoscopy cancelled 9/23 d/t lethargy and acute encephalopathy  D/C DATE: Discharge pending consults  OTHER IMPORTANT INFO: IV Lasix BID completed, continues on oral ceftin- plan for family care conference between 1-4pm today. Final results of pancreatic biopsy are pending, initial was positive for malignancy.

## 2021-09-28 ENCOUNTER — APPOINTMENT (OUTPATIENT)
Dept: PHYSICAL THERAPY | Facility: CLINIC | Age: 81
DRG: 840 | End: 2021-09-28
Attending: HOSPITALIST
Payer: MEDICARE

## 2021-09-28 LAB
BACTERIA BLD CULT: NO GROWTH
BACTERIA BLD CULT: NO GROWTH
FERRITIN SERPL-MCNC: 151 NG/ML (ref 8–252)
HGB BLD-MCNC: 8.4 G/DL (ref 11.7–15.7)
IRON SATN MFR SERPL: 14 % (ref 15–46)
IRON SERPL-MCNC: 33 UG/DL (ref 35–180)
SARS-COV-2 RNA RESP QL NAA+PROBE: NEGATIVE
TIBC SERPL-MCNC: 229 UG/DL (ref 240–430)

## 2021-09-28 PROCEDURE — 250N000011 HC RX IP 250 OP 636: Performed by: HOSPITALIST

## 2021-09-28 PROCEDURE — 258N000003 HC RX IP 258 OP 636: Performed by: HOSPITALIST

## 2021-09-28 PROCEDURE — 99232 SBSQ HOSP IP/OBS MODERATE 35: CPT | Performed by: INTERNAL MEDICINE

## 2021-09-28 PROCEDURE — 120N000001 HC R&B MED SURG/OB

## 2021-09-28 PROCEDURE — 36415 COLL VENOUS BLD VENIPUNCTURE: CPT | Performed by: INTERNAL MEDICINE

## 2021-09-28 PROCEDURE — 94660 CPAP INITIATION&MGMT: CPT

## 2021-09-28 PROCEDURE — 250N000013 HC RX MED GY IP 250 OP 250 PS 637: Performed by: INTERNAL MEDICINE

## 2021-09-28 PROCEDURE — 85018 HEMOGLOBIN: CPT | Performed by: INTERNAL MEDICINE

## 2021-09-28 PROCEDURE — 250N000013 HC RX MED GY IP 250 OP 250 PS 637: Performed by: HOSPITALIST

## 2021-09-28 PROCEDURE — 99233 SBSQ HOSP IP/OBS HIGH 50: CPT | Performed by: HOSPITALIST

## 2021-09-28 PROCEDURE — 97116 GAIT TRAINING THERAPY: CPT | Mod: GP

## 2021-09-28 PROCEDURE — 82728 ASSAY OF FERRITIN: CPT | Performed by: INTERNAL MEDICINE

## 2021-09-28 PROCEDURE — 83550 IRON BINDING TEST: CPT | Performed by: INTERNAL MEDICINE

## 2021-09-28 PROCEDURE — 97161 PT EVAL LOW COMPLEX 20 MIN: CPT | Mod: GP

## 2021-09-28 PROCEDURE — U0003 INFECTIOUS AGENT DETECTION BY NUCLEIC ACID (DNA OR RNA); SEVERE ACUTE RESPIRATORY SYNDROME CORONAVIRUS 2 (SARS-COV-2) (CORONAVIRUS DISEASE [COVID-19]), AMPLIFIED PROBE TECHNIQUE, MAKING USE OF HIGH THROUGHPUT TECHNOLOGIES AS DESCRIBED BY CMS-2020-01-R: HCPCS | Performed by: HOSPITALIST

## 2021-09-28 PROCEDURE — 97530 THERAPEUTIC ACTIVITIES: CPT | Mod: GP

## 2021-09-28 PROCEDURE — 999N000157 HC STATISTIC RCP TIME EA 10 MIN

## 2021-09-28 RX ORDER — CARBOXYMETHYLCELLULOSE SODIUM 5 MG/ML
1 SOLUTION/ DROPS OPHTHALMIC
Status: DISCONTINUED | OUTPATIENT
Start: 2021-09-28 | End: 2021-10-09 | Stop reason: HOSPADM

## 2021-09-28 RX ORDER — FUROSEMIDE 20 MG
20 TABLET ORAL DAILY
Status: DISCONTINUED | OUTPATIENT
Start: 2021-09-28 | End: 2021-10-09 | Stop reason: HOSPADM

## 2021-09-28 RX ADMIN — LEVOTHYROXINE SODIUM 200 MCG: 100 TABLET ORAL at 08:46

## 2021-09-28 RX ADMIN — CITALOPRAM HYDROBROMIDE 20 MG: 20 TABLET ORAL at 08:46

## 2021-09-28 RX ADMIN — IRON SUCROSE 200 MG: 20 INJECTION, SOLUTION INTRAVENOUS at 10:11

## 2021-09-28 RX ADMIN — PANTOPRAZOLE SODIUM 40 MG: 40 TABLET, DELAYED RELEASE ORAL at 08:46

## 2021-09-28 RX ADMIN — SIMVASTATIN 10 MG: 10 TABLET, FILM COATED ORAL at 21:28

## 2021-09-28 RX ADMIN — CEFUROXIME AXETIL 500 MG: 500 TABLET ORAL at 21:27

## 2021-09-28 RX ADMIN — CEFUROXIME AXETIL 500 MG: 500 TABLET ORAL at 08:46

## 2021-09-28 RX ADMIN — FUROSEMIDE 20 MG: 20 TABLET ORAL at 09:17

## 2021-09-28 ASSESSMENT — ACTIVITIES OF DAILY LIVING (ADL)
ADLS_ACUITY_SCORE: 19
ADLS_ACUITY_SCORE: 23
ADLS_ACUITY_SCORE: 23
ADLS_ACUITY_SCORE: 19

## 2021-09-28 ASSESSMENT — MIFFLIN-ST. JEOR: SCORE: 1539.5

## 2021-09-28 NOTE — PLAN OF CARE
DATE & TIME: 9/28/2021 [id number]          Cognitive Concerns/Orientation : Alert and oriented x 4, forgetful at times.   BEHAVIOR & AGGRESSION TOOL COLOR: Green             ABNL VS/O2: VSS on 2L, AVAPS when asleep  MOBILITY: A x1, GB/W, T&R when in bed  PAIN MANAGMENT: Denies  DIET: 2 gm Na  BOWEL/BLADDER: Espitia removed earlier shift, incontinent of bowel and bladder, purewick in place.  ABNL LAB/BG:  Hgb 8.4, no signs of active bleeding.  DRAIN/DEVICES: R arm PIV SL  TELEMETRY RHYTHM: SR   SKIN: Rash underneath breasts and abdominal fold, Miconazole powder applied. Scattered bruising   TESTS/PROCEDURES: None  D/C DATE: Discharge pending consults  OTHER IMPORTANT INFO: PO Ceftin q12h. Final results of pancreatic biopsy are pending, initial was positive for malignancy. GI and hem/onc following. Received  IV iron sucrose x 1. Asymptomatic COVID19 negative. PT recommending home with home care PT vs TCU.

## 2021-09-28 NOTE — PROGRESS NOTES
09/28/21 1700   Quick Adds   Type of Visit Initial PT Evaluation   Living Environment   People in home spouse   Current Living Arrangements house   Home Accessibility stairs to enter home   Number of Stairs, Main Entrance 4   Stair Railings, Main Entrance railings on both sides of stairs   Self-Care   Usual Activity Tolerance moderate   Current Activity Tolerance fair   Disability/Function   Fall history within last six months yes   Number of times patient has fallen within last six months 2   General Information   Onset of Illness/Injury or Date of Surgery 09/20/21   Referring Physician Ben Eubanks MD   Patient/Family Therapy Goals Statement (PT) Not stated   Pertinent History of Current Problem (include personal factors and/or comorbidities that impact the POC) Patient admitted on 9/20/21 for SOB and constipation. Patient found to have Severe, symptomatic anemia (hgb 4.6 on admission) and Presumed primary pancreatic cancer. Patient with PMH of newly diagnosed acute encephalopathy, presumed pancreatic cancer noted on CT s/p biopsy and EUS 9/21, moderate aortic stenosis and acute respiratory failure - combined hypercapnic and hypoxic, with acute decompensated heart failure with BNP 4000s and pulmonary edema on CXR.    Existing Precautions/Restrictions fall   Weight-Bearing Status - LLE full weight-bearing   Weight-Bearing Status - RLE full weight-bearing   General Observations Patient sitting in chair, agreeable to working with PT   Pain Assessment   Patient Currently in Pain No   Integumentary/Edema   Integumentary/Edema no deficits were identifed   Posture    Posture Protracted shoulders;Forward head position   Range of Motion (ROM)   ROM Comment B LE ROM WNL   Strength   Strength Comments Not formally assessed but at least 3+/5 with functional transfers and gait   Bed Mobility   Comment (Bed Mobility) Not assessed   Transfers   Transfer Safety Comments Sit>stand from chair with FWW and Min A    Gait/Stairs (Locomotion)   Comment (Gait/Stairs) Patient able to take a few steps from bed to chair with FWW and CGA to Min A, small slow steps taken bilaterally   Balance   Balance Comments Somewhat unsteady with ambulation, but only needing CGA to support    Clinical Impression   Criteria for Skilled Therapeutic Intervention yes, treatment indicated   PT Diagnosis (PT) Impaired mobility and gait   Influenced by the following impairments weakness, decreased tolerance to activity   Functional limitations due to impairments bed mobility, transfers, gait, stairs   Clinical Presentation Stable/Uncomplicated   Clinical Presentation Rationale Based on PMH, current presentation, and social support    Clinical Decision Making (Complexity) low complexity   Therapy Frequency (PT) Daily   Predicted Duration of Therapy Intervention (days/wks) 3 days   Planned Therapy Interventions (PT) bed mobility training;balance training;gait training;stair training;strengthening;transfer training   Risk & Benefits of therapy have been explained evaluation/treatment results reviewed;care plan/treatment goals reviewed;risks/benefits reviewed;current/potential barriers reviewed;participants voiced agreement with care plan;participants included;patient   PT Discharge Planning    PT Discharge Recommendation (DC Rec) home with assist;home with home care physical therapy;Transitional Care Facility   PT Rationale for DC Rec Patient mod I at baseline with SEC in the home and 4WW out of the home; notes decreased tolerance to activity at baseline, limited to short household ambulation distances; wears O2 at home, but only at night. Patient currently requiring CGA for all mobility/ambulation with FWW, patient fatigues easily. Patient would benefit from ongoing skilled therapy intervention and discharge to TCU to further address functional limitations and improve overall independence with mobility/ambulation. If patient declining transfer to TCU,  would require discharge to home with A x 1 for all mobility/ambulation with FWW; would benefit from HH PT to above functional limitations; would qualify as she requires use of AD to leave the home.    PT Brief overview of current status  Sit<>stand CGA to Min A with FWW, short gait in room with FWW and CGA   Total Evaluation Time   Total Evaluation Time (Minutes) 7

## 2021-09-28 NOTE — PROGRESS NOTES
Service Date: 09/27/2021    SUBJECTIVE:  Ms. Rojas is an 81-year-old female with large pancreatic head mass.  The patient had EUS and FNA done.  I spoke to the pathologist.  It is not pancreatic cancer. Could be lymphoma.  The patient had right pleural effusion.  Thoracocentesis was done.  Cytology is negative for malignancy.    The patient also had severe anemia.  She has received transfusion and IV iron.  Hemoglobin is better at 9.4 today.    She also has aortic stenosis.  Cardiology following.     Family meeting was done.  I attended it.  Dr. Eubanks  was there.  Multiple family members including  and daughter were present.  Son was on the phone.    I explained to them that final pathology from pancreatic mass FNA is pending.  This does not look like pancreatic adenocarcinoma.  It is being worked up for lymphoma.  If we do not get an answer, patient will need a repeat biopsy.    I explained to them that pleural fluid cytology is negative for malignancy.  This pleural effusion is from aortic stenosis.    In last 1-2 days, the patient's overall condition is better.  Her shortness of breath is better.  Her fatigue is less.    PHYSICAL EXAMINATION:    GENERAL:  Alert and oriented x3.  Not in acute distress.  Rest of systems not examined.    LABS:  Reviewed.    ASSESSMENT:    1.  An 81-year-old female with pancreatic mass.  2.  Right pleural effusion secondary to aortic stenosis.  3.  Microcytic anemia secondary to iron deficiency.  4.  Iron deficiency.  5.  Other medical problems including hypertension and diabetes mellitus.    PLAN:    1.  We will wait for final pathology from pancreatic mass FNA.  If that is negative, we will schedule repeat biopsy.  2.  For anemia, patient has received blood transfusion and also IV iron.  Her hemoglobin has improved.  We will continue to monitor it.  3.  She has iron deficiency.  Colonoscopy will be done when her overall condition is stable.  4.  Family had multiple  questions, which were all answered.  Oncology team will continue to follow.    TOTAL TIME SPENT:  40 minutes.  Time spent in today's visit, review of chart/investigations today and documentations.    John Srinivasan MD        D: 2021   T: 2021   MT: [id number]    Name:     GLORIA LARA  MRN:      [id number]        Account:      [id number]   :      [id number]           Service Date: 2021       Document: [id number]

## 2021-09-28 NOTE — PROGRESS NOTES
[facility]    Hospitalist Progress Note    Date of Service (when I saw the patient): 09/28/2021  Admit date: 9/20/2021      Interval History      No acute issues reported, no hematochezia or melena.  -Denies dyspnea, reports that she slept good with CPAP overnight, on 2 LPM NC O2.     Assessment & Plan   Lucille Rojas is a 81 year old female admitted on 9/20/2021. She presented with SOB and constipation.     Severe, symptomatic anemia at admission.   Acute blood loss anemia on chronic anemia  Reports several weeks of ongoing symptoms indicating a somewhat chronic course. Has noted black, tarry stools for at least 3-4 weeks.  S/p EGD on 09/21/21 revealed a few gastric erosions and a few tiny aphthous ulcers in the duodenum, but not severe enough to account for anemia.   * S/p 2 units with Hgb 4.7 > 7.2 on admission, 1 unit on 9/22/21 and 1 unit on 9/24 with appropriate rise in Hb.  9.4 today.  * Iron studies reveal low levels > S/p venofer 300 mg IV x 2, last dose 9/23/21.  Recheck iron level is a still low, will give 2 more doses of 200 mg IV each.  - Protonix 40 mg daily.   - Colonoscopy on hold due to acute encephalopathy with respiratory failure (see below) on 9/23/21.  Now with no overt bleeding and stable hemoglobin, and given risk with procedure given cardiac disease, no plan for colonoscopy at this time.  - Transfuse to keep hgb > 7.      Recent Labs   Lab 09/28/21  0818 09/27/21  0859 09/26/21  0907 09/25/21  0737 09/24/21  0609 09/23/21  1921   HGB 8.4* 9.4* 8.9* 8.0* 7.0* 7.5*       Presumed primary pancreatic cancer Noted on CT.  R pleural effusion, transudate   S/p thoracentesis - 450 ml of verenice fluid on 09/22/21   S/p EUS on 09/21/21 with biopsy. Awaiting final path.     - Cytology on pleural fluid negative for malignant cells  - care conference 9/27, discussed at length, plan is to follow up on pathology results and re discuss further plan     Acute encephalopathy,  Resolved - On 09/23/21, patient remained oriented and following commands but very lethargic, drifting off in mid-sentence. Labs significant for leukocytosis, hypercapnia on ABG and elevated BNP. She is afebrile, hemodynamically stable, stable oxygenation.   - Since 09/25, patient has been AAOx3, and answering questions appropriately, making good eye contact. Per nursing, patient does appear forgetful.  - Treating hypercapnia CHF and UTI as below.     Acute on chronic hypercapnia -  ABG: pH 7.22, CO2 85, O2 121 while on 5L on 9/23/21.   Obesity, SHAVON with hypoventilation syndrome   Chronic hypoxia on home oxygen - Per family she requires oxygen during the day as well, but has had trouble getting this ordered through PCP. On BiPAP at night with4 LPM O2 PTA  - Continue to wean O2 as tolerates to keep O2 saturations around 90-93%  - BiPAP whenever in bed. Appreciate RT for adjusting BIPAP setting to improve ventilation during acute hypercapnia.   - When awake > up in chair.      E coli UTI - Patient lethargic and unable to give clear history regarding urinary symptoms at presentation. UA showed 166 WBC, small nitrates.   Leukocytosis - No T over 100 since admission. Hemodynamically stable. No localizing symptoms of infection. No Urinary symptoms. UA done. CT chest/abdomen/pelvis as above, no sign of localized infection.  - F/u on pleural cx -NGTD but very low suspicion of infection in this location.   - UCx from 9/23/21: 2 strains of E. coli noted, sensitive to Rocephin   -Initially on IV Rocephin, changed to p.o. Ceftin based on sensitivity to complete course.   - BCx NGTD and patient is afebrile, encephalopathy now resolved.    Acute decompensated HF, unknown EF - developing acute encephalopathy, hypercapnia on 09/23/21. Workup revealed BNP up to 4000's from 800 at admission. Mild pulmonary edema on CXR. Oxygenation stable, but on 3 L.   Mod to severe aortic stenosis, new diagnosis   TAVARES, CP and light-headedness with  minimal exertion - Concerning for symptomatic aortic stenosis, although anemia and chronic obesity hypoventilation syndrome with chronic hyoxia undoubtedly contribute. Initial troponin negative. CXR with R pleural effusion.   Transudate R pleural effusion, edema on admission. S/p thoracentesis on 9/22.   * Serial troponins negative  * Echo EF 60-65% No WMAs. RV fxn normal. Mod to severe aortic stenosis with aortic mean valve area of 1 cm2, mean gradient 37.5 mm Hg.     - On 09/23/21, BNP quite elevated at 4000 on 09/23/21 (up from 800s at admission) CXR reviewed and shows mild intestinal markings, suspect edema.   - was managed with IV lasix, Cr up and stable at 1.1, respiratory status has markedly improved.   -Now starting p.o. Lasix low-dose for maintenance.  Check BMP.  Recurring.  -Patient is incontinent, given her recent UTI, DC Espitia catheter now.  Follow Daily weights.   - Cardiology consulted on 09/24/21 re: input on options (TAVR) and prognosis.  With her unknown source of bleeding and since GI work-up could not be completed, patient not on candidate for anticoagulation and so not a candidate for further work-up including angiogram or TAVR.     Goals of Care. Care conference 9/27, myself and Dr. Srinivasan present.  Patient, her , 2 daughters as well present and her son also present over the phone.  It was reviewed with family that patient is not a candidate for TAVR and that aortic stenosis as well as obesity hypoventilation and since GI work up could not be completed to find source of bleeding. Also she is of high risk for a GI work up as well. And her illnesses could also limit options for treatment of presumed cancer. Although we do not have a definitive diagnosis yet. So.  This point, plan is to await final pathology report.  It is possible that it may not yield a diagnosis in which case family would like to call her repeat biopsy.  Family hopeful to be able to get her home eventually so she can  "spend \"few days in her own bed.\" They are very realistic about current prognosis.        Constipation  No significant stool burden on admission CT.  No obstruction noted.  - PRN stool regimen     CKD 3  - monitor     DMT2 diet controlled.  last A1c 5.8 on 4/7/21. Did not recheck due to severe anemia  -Blood sugars within normal limit, discontinue checks     Hypothyroid TSH normal at admission.   - Continue PTA levothyroxine     Depression  - Continue PTA citalopram     HLD  - Continue PTA statin    Diet: Orders Placed This Encounter      Combination Diet 2 gm NA Diet     IVF: None  Espitia Catheter: PRESENT, indication: Strict 1-2 Hour I&O     DVT Prophylaxis: Pneumatic Compression Devices  Code Status: No CPR- Do NOT Intubate     Disposition: Expected discharge TBD, based on final pathology report, which is expected in another day or 2.    Communication: Discussed with patient and family during extended care conference on 09/27    Ben Eubanks  Hospitalist Service  [facility]  Securely message with the Vocera Web Console (learn more here)  Text page via [employer] Paging/Directory      Ben Eubanks MD  Hosptialist    -Data reviewed today: I reviewed all new labs  results over the last 24 hours. I personally reviewed no images or EKG's today.    Physical Exam   Temp: 98  F (36.7  C) Temp src: Oral BP: 138/57 Pulse: 71   Resp: 16 SpO2: 96 % O2 Device: Nasal cannula Oxygen Delivery: 3 LPM  Vitals:    09/21/21 1451 09/25/21 0558 09/28/21 0632   Weight: 124.3 kg (274 lb) 117.6 kg (259 lb 4.2 oz) 116.3 kg (256 lb 6.3 oz)     Vital Signs with Ranges  Temp:  [98  F (36.7  C)-98.2  F (36.8  C)] 98  F (36.7  C)  Pulse:  [71-99] 71  Resp:  [16-18] 16  BP: (115-138)/(55-79) 138/57  SpO2:  [95 %-97 %] 96 %  I/O last 3 completed shifts:  In: 240 [P.O.:240]  Out: 1050 [Urine:1050]       Constitutional: Alert awake, oriented x3, very pleasant and appears comfortable.  HEENT: PERRLA, EOMI.  Respiratory: " Bilateral equal air entry, no crackles or wheezing, normal work of breathing.  On 2-3 LPM    Cardiovascular: S1-S2 regular with loud early systolic murmur, trace edema.  GI:  soft, NT/ND, BS normal  Skin/Integumen:  No acute rash or sign of bleeding.     Medications   All medications reviewed on 09/28/2021        cefuroxime  500 mg Oral Q12H DAVID     citalopram  20 mg Oral Daily     levothyroxine  200 mcg Oral QAM AC     pantoprazole  40 mg Oral QAM AC     simvastatin  10 mg Oral At Bedtime     sodium chloride (PF)  3 mL Intracatheter Q8H       Data   Recent Labs   Lab 09/28/21  0818 09/27/21  0918 09/27/21  0859 09/26/21  2121 09/26/21  1726 09/26/21  0907 09/25/21  0909 09/25/21  0737 09/24/21  0825 09/24/21  0609 09/23/21  1158 09/23/21  0557 09/21/21  2107 09/21/21  1802   WBC  --   --   --   --   --  10.6  --   --   --  11.2*  --  14.0*  --   --    HGB 8.4*  --  9.4*  --   --  8.9*   < > 8.0*   < > 7.0*   < > 7.5*   < > 7.1*   MCV  --   --   --   --   --  80  --   --   --  77*  --  77*  --   --    PLT  --   --   --   --   --  300  --   --   --  293  --  351  --   --    NA  --   --  139  --   --  139  --  142   < > 143  --  141   < >  --    POTASSIUM  --   --  3.9  --   --  3.7  --  3.7   < > 3.8  --  4.2   < >  --    CHLORIDE  --   --  95  --   --  96  --  99   < > 104  --  105   < >  --    CO2  --   --  40*  --   --  40*  --  39*   < > 37*  --  32   < >  --    BUN  --   --  28  --   --  28  --  25   < > 22  --  18   < >  --    CR  --   --  1.02  --   --  1.17*  --  1.17*   < > 1.32*  --  1.16*   < >  --    ANIONGAP  --   --  4  --   --  3  --  4   < > 2*  --  4   < >  --    IGOR  --   --  8.5  --   --  8.4*  --  8.2*   < > 8.2*  --  8.2*   < >  --    GLC  --  117* 114* 116*   < > 116*   < > 107*   < > 94   < > 135*   < >  --    PROTTOTAL  --   --   --   --   --   --   --   --   --   --   --   --   --  6.9   TROPONIN  --   --   --   --   --   --   --   --   --  <0.015  --   --   --  <0.015    < > = values in this  interval not displayed.       No results found for this or any previous visit (from the past 24 hour(s)).

## 2021-09-28 NOTE — PROGRESS NOTES
Pt remained on AVAPS overnight without complication.  Mepilex and liquicell in place and no skin breakdown was observed.

## 2021-09-28 NOTE — PROGRESS NOTES
"CLINICAL NUTRITION SERVICES - REASSESSMENT NOTE    Recommendations Ordered by Registered Dietitian (RD):   - Encourage balanced meals TID.    Malnutrition:   % Weight Loss:  Weight loss does not meet criteria for malnutrition   % Intake:  <75% for > 7 days (non-severe malnutrition)  Subcutaneous Fat Loss:  Thoracic region mild-moderate depletion  Muscle Loss:  Clavicle bone region mild depletion, Acromion bone region mild depletion and Scapular bone region mild depletion  Fluid Retention:  None noted    Malnutrition Diagnosis: Non-Severe malnutrition  In Context of:  Acute illness or injury  Chronic illness or disease     EVALUATION OF PROGRESS TOWARD GOALS   Diet: 2g Na diet  Intake/Tolerance:   - eating better. Tolerated 100% of 2 adequate meals yesterday. This morning pt tells me she ate most of her breakfast - ryan, fruit, pancake, cereal, milk.   - \"I'm probably eating more here than I would at home\".   - Noted that her abdomen is large, and feeling hard this morning.   - BM x2 yesterday   - Weight 116.3 kg today. Stable from 9/25. ?loos of up to ~20# since April 2021 (7.2% in ~6 months)     ASSESSED NUTRITION NEEDS: 9/22  Dosing Weight 124 kg actual (energy); 45 kg IBW (protein)  Estimated Energy Needs: 2177-8219 kcals (11-14 Kcal/Kg)  Justification: obese  Estimated Protein Needs: 90+ grams protein (2+ kg/kg IBW)  Justification: obesity guidelines  and preservation of lean body mass  Estimated Fluid Needs: per MD     Previous Goals:   Diet adv >FL with 48 hrs   Evaluation: Met    Previous Nutrition Diagnosis:   Inadequate oral intake related to decreased appetite and NPO as evidenced by intakes 0% for the past 2 days and suspected <75% for the past 7+ days   Evaluation: Improving    MALNUTRITION  % Weight Loss:  Weight loss does not meet criteria for malnutrition   % Intake:  <75% for > 7 days (non-severe malnutrition)  Subcutaneous Fat Loss:  Thoracic region mild-moderate depletion  Muscle Loss:  Clavicle " bone region mild depletion, Acromion bone region mild depletion and Scapular bone region mild depletion  Fluid Retention:  None noted    Malnutrition Diagnosis: Non-Severe malnutrition  In Context of:  Acute illness or injury  Chronic illness or disease    CURRENT NUTRITION DIAGNOSIS  Inadequate oral intake related to decreased appetite and period of NPO/liquid diets as evidenced by only ~1 day with good oral intake since admission.     INTERVENTIONS  Recommendations / Nutrition Prescription  Diet per MD  Encourage balanced meals TID.     Implementation  None new    Goals  Intake of >75% balanced meals TID.       MONITORING AND EVALUATION:  Progress towards goals will be monitored and evaluated per protocol and Practice Guidelines    Fariha Smart RD, LD  [facility] [place], 64, 53, [facility]   Pager: [phone]  Weekend Pager: [phone]

## 2021-09-28 NOTE — PLAN OF CARE
DATE & TIME: 9/27/2021 9271-1811           Cognitive Concerns/Orientation : Alert, mostly confused. Disoriented to time, place, and situation at times. Forgetful.  BEHAVIOR & AGGRESSION TOOL COLOR: Green             ABNL VS/O2: VSS on 2L, AVAPS when asleep  MOBILITY: A x1, GB/W, T&R when in bed  PAIN MANAGMENT: Denies  DIET: 2 gm Na  BOWEL/BLADDER: Espitia patent, incontinent of bowel  ABNL LAB/BG:  Hgb 9.4, Cr 1.02  DRAIN/DEVICES: R arm PIV SL  TELEMETRY RHYTHM: SR with BBB  SKIN: Rash underneath breasts and abdominal fold, Miconazole powder applied. Scattered bruising   TESTS/PROCEDURES: None  D/C DATE: Discharge pending consults  OTHER IMPORTANT INFO: PO Ceftin q12h. Final results of pancreatic biopsy are pending, initial was positive for malignancy. GI and hem/onc following

## 2021-09-28 NOTE — PROGRESS NOTES
Service Date: 2021    SUBJECTIVE:  Ms. Rojas is an 81-year-old female with a large pancreatic head mass.  EUS and FNA were done.  Final pathology is pending.  The patient also had right pleural effusion.  Thoracocentesis was done.  Cytology is negative. The patient also has aortic stenosis.  Cardiology is following.    On admission, she had severe anemia.  She has iron deficiency.  She received IV iron and blood transfusion.  Hemoglobin is better.  GI is following.  Initial plan was for colonoscopy.  Because of her health, they are not planning for colonoscopy at this time.    I met with the patient.  The patient feels little better. A little stronger. Shortness of breath has improved.  Not in any severe pain.  No nausea or vomiting.    PHYSICAL EXAMINATION:    GENERAL:  She was alert and oriented x 3.  Not in any distress.  The rest of the systems not examined.    LABORATORY DATA:  Reviewed.    ASSESSMENT:    1.  An 81-year-old female with large pancreatic mass.  Pathology is pending.  2.  Microcytic anemia.  3.  Iron deficiency.  4.  Aortic stenosis.    PLAN:    1.  I explained to the patient that pathology from pancreatic mass FNA is still pending. If it is nondiagnostic/inconclusive, she wants to have a repeat biopsy.  Hopefully, pathology will be available in the next 1-2 days.  2.  Her anemia overall has been stable.  She will be transfused as needed for hemoglobin below 7.  3.  Iron studies were checked.  She is still iron deficient.  We will give her 1 more dose of IV Venofer.  4.  She had a few questions, which were all answered.  Hematology/Oncology will continue to follow.    John Srinivasan MD        D: 2021   T: 2021   MT: [id number]    Name:     GLORIA ROJAS  MRN:      [id number]        Account:      [id number]   :      1940           Service Date: 2021       Document: [id number]

## 2021-09-28 NOTE — PROGRESS NOTES
GASTROENTEROLOGY PROGRESS NOTE     SUBJECTIVE:  Confusion seems better today, only mild. On 2.5-3L O2 (baseline 2L prn at home and nocturnal BiPAP). Stools are brown. No abdominal pain. Tolerating diet.     FNA results still pending.     OBJECTIVE:  /57   Pulse 71   Temp 98  F (36.7  C) (Oral)   Resp 16   Ht 1.524 m (5')   Wt 116.3 kg (256 lb 6.3 oz)   SpO2 96%   BMI 50.07 kg/m    Temp (24hrs), Av.9  F (36.6  C), Min:97.9  F (36.6  C), Max:97.9  F (36.6  C)    Patient Vitals for the past 72 hrs:   Weight   21 0632 116.3 kg (256 lb 6.3 oz)       Intake/Output Summary (Last 24 hours) at 2021 1005  Last data filed at 2021 0641  Gross per 24 hour   Intake 270 ml   Output 1600 ml   Net -1330 ml        PHYSICAL EXAM  Gen: alert, oriented to self/time, place  Abd: obese, soft, mild epigastric tenderness, otherwise nontender.     Additional Comments:  ROS, FH, SH: See initial GI consult for details.     I have reviewed the patient's new clinical lab results:     Recent Labs   Lab Test 21  0818 21  0859 21  0907 21  0737 21  0609 21  1921 21  0557   WBC  --   --  10.6  --  11.2*  --  14.0*   HGB 8.4* 9.4* 8.9*   < > 7.0*   < > 7.5*   MCV  --   --  80  --  77*  --  77*   PLT  --   --  300  --  293  --  351    < > = values in this interval not displayed.     Recent Labs   Lab Test 21  0859 21  0907 21  0737   POTASSIUM 3.9 3.7 3.7   CHLORIDE 95 96 99   CO2 40* 40* 39*   BUN 28 28 25   ANIONGAP 4 3 4     Recent Labs   Lab Test 21  1149 21  0324 21  1727 20  1305 16  0713 09/11/15  1542   ALBUMIN  --  3.3* 3.4 3.6   < > 3.5   BILITOTAL  --  0.5 0.4 0.2   < > 0.2   ALT  --  17 16 15   < > 23   AST  --  14 10 13   < > 12   PROTEIN 50 *  --   --   --   --   --    LIPASE  --   --  124  --   --   --    AMYLASE  --   --   --   --   --  64    < > = values in this interval not displayed.        Assessment/Plan:  81  year old female with PMH significant for obesity, SHAVON, chronic hypoxia on home O2 (nocturnal BiPAP), admitted 9/20 with symptoms of SOB, constipation, melena with workup notable for CHF, UTI, severe anemia (HGB 4.7), and CT concerning for pancreatic cancer with right pleural effusion s/p thoracentesis and EUS, FNA.     1. Pancreatic mass. EUS 9/21 noted pancreatic mass in the uncinate process s/p FNA. Results pending, pathology is favoring possible lymphoma but final results not yet available. No significant abdominal pain. Tolerating diet.     2. Severe anemia. HGB overall stable. Looks like she last received a unit of blood on 9/24 with hgb now stable. No overt bleeding noted. EUS 9/21 showed nonbleeding erosive gastropathy, and a few tiny duodenal aphthous ulcers, small duodenal polyp consistent with tubular adenoma without dysplasia. Gastric biopsy with scant chronic inflammation, no H pylori. Was to have colonoscopy last week but canceled due to cardiac status, breathing, and confusion. With stable hgb, no plan for colonoscopy at this time. Last colonoscopy 2014.  - continue ppi    Lisa Keene MD  [facility]  [phone] (cell/pager)

## 2021-09-28 NOTE — PLAN OF CARE
DATE & TIME: 9/28/2021 3349-3410           Cognitive Concerns/Orientation : Alert and oriented x 4, forgetful at times.   BEHAVIOR & AGGRESSION TOOL COLOR: Green             ABNL VS/O2: VSS on 2L, AVAPS when asleep  MOBILITY: A x1, GB/W, T&R when in bed  PAIN MANAGMENT: Denies  DIET: 2 gm Na  BOWEL/BLADDER: Espitia removed, incontinent of bowel and bladder, purewick in place.  ABNL LAB/BG:  Hgb 8.4, no signs of active bleeding.  DRAIN/DEVICES: R arm PIV SL  TELEMETRY RHYTHM: SR   SKIN: Rash underneath breasts and abdominal fold, Miconazole powder applied. Scattered bruising   TESTS/PROCEDURES: None  D/C DATE: Discharge pending consults  OTHER IMPORTANT INFO: PO Ceftin q12h. Final results of pancreatic biopsy are pending, initial was positive for malignancy. GI and hem/onc following. Received  IV iron sucrose x 1. Asymptomatic COVID 19 sample collected, result pending.

## 2021-09-29 ENCOUNTER — APPOINTMENT (OUTPATIENT)
Dept: OCCUPATIONAL THERAPY | Facility: CLINIC | Age: 81
DRG: 840 | End: 2021-09-29
Attending: HOSPITALIST
Payer: MEDICARE

## 2021-09-29 ENCOUNTER — APPOINTMENT (OUTPATIENT)
Dept: PHYSICAL THERAPY | Facility: CLINIC | Age: 81
DRG: 840 | End: 2021-09-29
Payer: MEDICARE

## 2021-09-29 LAB
ANION GAP SERPL CALCULATED.3IONS-SCNC: 2 MMOL/L (ref 3–14)
BUN SERPL-MCNC: 23 MG/DL (ref 7–30)
CALCIUM SERPL-MCNC: 8.6 MG/DL (ref 8.5–10.1)
CHLORIDE BLD-SCNC: 96 MMOL/L (ref 94–109)
CO2 SERPL-SCNC: 38 MMOL/L (ref 20–32)
CREAT SERPL-MCNC: 1.04 MG/DL (ref 0.52–1.04)
GFR SERPL CREATININE-BSD FRML MDRD: 50 ML/MIN/1.73M2
GLUCOSE BLD-MCNC: 151 MG/DL (ref 70–99)
HGB BLD-MCNC: 9.5 G/DL (ref 11.7–15.7)
POTASSIUM BLD-SCNC: 3.6 MMOL/L (ref 3.4–5.3)
SODIUM SERPL-SCNC: 136 MMOL/L (ref 133–144)

## 2021-09-29 PROCEDURE — 85018 HEMOGLOBIN: CPT | Performed by: INTERNAL MEDICINE

## 2021-09-29 PROCEDURE — 250N000011 HC RX IP 250 OP 636: Performed by: HOSPITALIST

## 2021-09-29 PROCEDURE — 97535 SELF CARE MNGMENT TRAINING: CPT | Mod: GO | Performed by: OCCUPATIONAL THERAPIST

## 2021-09-29 PROCEDURE — 120N000001 HC R&B MED SURG/OB

## 2021-09-29 PROCEDURE — 258N000003 HC RX IP 258 OP 636: Performed by: HOSPITALIST

## 2021-09-29 PROCEDURE — 99232 SBSQ HOSP IP/OBS MODERATE 35: CPT | Performed by: HOSPITALIST

## 2021-09-29 PROCEDURE — 97165 OT EVAL LOW COMPLEX 30 MIN: CPT | Mod: GO | Performed by: OCCUPATIONAL THERAPIST

## 2021-09-29 PROCEDURE — 250N000013 HC RX MED GY IP 250 OP 250 PS 637: Performed by: STUDENT IN AN ORGANIZED HEALTH CARE EDUCATION/TRAINING PROGRAM

## 2021-09-29 PROCEDURE — 94660 CPAP INITIATION&MGMT: CPT

## 2021-09-29 PROCEDURE — 250N000013 HC RX MED GY IP 250 OP 250 PS 637: Performed by: HOSPITALIST

## 2021-09-29 PROCEDURE — 250N000013 HC RX MED GY IP 250 OP 250 PS 637: Performed by: INTERNAL MEDICINE

## 2021-09-29 PROCEDURE — 82374 ASSAY BLOOD CARBON DIOXIDE: CPT | Performed by: HOSPITALIST

## 2021-09-29 PROCEDURE — 36415 COLL VENOUS BLD VENIPUNCTURE: CPT | Performed by: INTERNAL MEDICINE

## 2021-09-29 PROCEDURE — 97530 THERAPEUTIC ACTIVITIES: CPT | Mod: GP

## 2021-09-29 PROCEDURE — 999N000157 HC STATISTIC RCP TIME EA 10 MIN

## 2021-09-29 RX ADMIN — Medication 1 MG: at 00:10

## 2021-09-29 RX ADMIN — LEVOTHYROXINE SODIUM 200 MCG: 100 TABLET ORAL at 08:44

## 2021-09-29 RX ADMIN — IRON SUCROSE 200 MG: 20 INJECTION, SOLUTION INTRAVENOUS at 08:51

## 2021-09-29 RX ADMIN — CITALOPRAM HYDROBROMIDE 20 MG: 20 TABLET ORAL at 08:44

## 2021-09-29 RX ADMIN — SIMVASTATIN 10 MG: 10 TABLET, FILM COATED ORAL at 21:25

## 2021-09-29 RX ADMIN — Medication 1 DROP: at 00:11

## 2021-09-29 RX ADMIN — FUROSEMIDE 20 MG: 20 TABLET ORAL at 08:44

## 2021-09-29 RX ADMIN — CEFUROXIME AXETIL 500 MG: 500 TABLET ORAL at 21:24

## 2021-09-29 RX ADMIN — PANTOPRAZOLE SODIUM 40 MG: 40 TABLET, DELAYED RELEASE ORAL at 08:44

## 2021-09-29 RX ADMIN — CEFUROXIME AXETIL 500 MG: 500 TABLET ORAL at 08:44

## 2021-09-29 ASSESSMENT — ACTIVITIES OF DAILY LIVING (ADL)
ADLS_ACUITY_SCORE: 21
ADLS_ACUITY_SCORE: 23
ADLS_ACUITY_SCORE: 25
ADLS_ACUITY_SCORE: 25
ADLS_ACUITY_SCORE: 23
ADLS_ACUITY_SCORE: 25

## 2021-09-29 NOTE — PROGRESS NOTES
MD Notification    Notified Person: MD    Notified Person Name: Reji    Notification Date/Time: 9/28/21 2130    Notification Interaction: Paged     Purpose of Notification:  Pt requesting refresh eye drops.    Orders Received: Ordered.    Comments:

## 2021-09-29 NOTE — PLAN OF CARE
DATE & TIME: 9/28/2021 4941-3399          Cognitive Concerns/Orientation: Alert and oriented x 4, forgetful at times.   BEHAVIOR & AGGRESSION TOOL COLOR: Green             ABNL VS/O2: VSS on 3L, AVAPS when asleep  MOBILITY: A x1, GB/W  PAIN MANAGMENT: Denies  DIET: 2 gm Na  BOWEL/BLADDER: Incontinent of bowel and bladder, purewick in place. BM x1  ABNL LAB/BG:  Hgb 8.4, no signs of active bleeding. Asymptomatic COVID19 negative.   DRAIN/DEVICES: L arm PIV SL  TELEMETRY RHYTHM: SR   SKIN: Scattered bruising   TESTS/PROCEDURES: None  D/C DATE: Discharge pending consults. PT recommending home with home care PT vs TCU.   OTHER IMPORTANT INFO: PO Ceftin q12h. Final results of pancreatic biopsy are pending, initial was positive for malignancy. GI and hem/onc following.    Detail Level: Detailed Number Of Freeze-Thaw Cycles: 3 freeze-thaw cycles Render Note In Bullet Format When Appropriate: No Medical Necessity Information: It is in your best interest to select a reason for this procedure from the list below. All of these items fulfill various CMS LCD requirements except the new and changing color options. Post-Care Instructions: I reviewed with the patient in detail post-care instructions. Patient is to wear sunprotection, and avoid picking at any of the treated lesions. Pt may apply Vaseline to crusted or scabbing areas. Medical Necessity Clause: This procedure was medically necessary because the lesions that were treated were: Consent: The patient's consent was obtained including but not limited to risks of crusting, scabbing, blistering, scarring, darker or lighter pigmentary change, recurrence, incomplete removal and infection.

## 2021-09-29 NOTE — PROGRESS NOTES
Patient was last exposed to a COVID+ person 9/25. Patient's day 1 of their 14 day quarantine is 9/25 (last day of exposure). If patient remains asymptomatic for the full 14 days and has no concerns for exposure patient can come out of quarantine October 9th..

## 2021-09-29 NOTE — PROGRESS NOTES
09/29/21 1200   Quick Adds   Type of Visit Initial Occupational Therapy Evaluation   Living Environment   People in home spouse   Current Living Arrangements house   Home Accessibility stairs to enter home   Number of Stairs, Main Entrance 4   Stair Railings, Main Entrance railings on both sides of stairs   Self-Care   Usual Activity Tolerance moderate   Current Activity Tolerance fair   Equipment Currently Used at Home grab bar, toilet;grab bar, tub/shower;shower chair;walker, rolling;other (see comments)  (comfort height toilet)   Activity/Exercise/Self-Care Comment Per pt. has become progressively weaker over the last fewe weeks;Pt.'s spouse assists w/ LE dressing--shoes/socks, pt. able to yaima underwear/panst;pt. gets assist for bathing/tub transfer, and for medication/pill box set-up;pt. does the laundry, both pt./spouse cook. Pt. no longer drives.   Instrumental Activities of Daily Living (IADL)   IADL Comments pt. does the laundry, some cooking   Disability/Function   Wear Glasses or Blind yes   Vision Management glases;h/o macular degeneration   Concentrating, Remembering or Making Decisions Difficulty   (per chart, forgetful at times)   Difficulty Eating/Swallowing no   Walking or Climbing Stairs Difficulty yes   Walking or Climbing Stairs ambulation difficulty, requires equipment   Dressing/Bathing Difficulty yes   Dressing/Bathing bathing difficulty, requires equipment;bathing difficulty, assistance 1 person;dressing difficulty, requires equipment;dressing difficulty, assistance 1 person   Toileting issues yes   Toileting Assistance toileting difficulty, requires equipment   Fall history within last six months yes   Number of times patient has fallen within last six months 2   Change in Functional Status Since Onset of Current Illness/Injury yes   General Information   Onset of Illness/Injury or Date of Surgery 09/20/21   Referring Physician Ben Eubanks MD    Additional Occupational Profile  Info/Pertinent History of Current Problem OT:Per chart--Patient admitted on 9/20/21 for SOB and constipation. Patient found to have Severe, symptomatic anemia (hgb 4.6 on admission) and Presumed primary pancreatic cancer. Patient with PMH of newly diagnosed acute encephalopathy, presumed pancreatic cancer noted on CT s/p biopsy and EUS 9/21, moderate aortic stenosis and acute respiratory failure - combined hypercapnic and hypoxic, with acute decompensated heart failure with BNP 4000s and pulmonary edema on CXR.    Existing Precautions/Restrictions fall;oxygen therapy device and L/min  (2L)   General Observations and Info Pt. up in chair, agreeable to OT;pleasant and coooperative   Cognitive Status Examination   Orientation Status orientation to person, place and time   Follows Commands WNL;WFL   Cognitive Status Comments slow processing/responses noted;difficulty word-finding;STM recall 2/3 words after delay   Visual Perception   Impact of Vision Impairment on Function (Vision) h/o macular degeneration   Sensory   Sensory Comments no numbness/tingling reported   Pain Assessment   Patient Currently in Pain No   Posture   Posture forward head position;protracted shoulders   Range of Motion Comprehensive   Comment, General Range of Motion BUE WFL   Strength Comprehensive (MMT)   Comment, General Manual Muscle Testing (MMT) Assessment gen. weakness;decreased activity paul.    Coordination   Upper Extremity Coordination No deficits were identified   Transfers   Transfer Comments sit-stand w/ CGA/WW   Balance   Balance Comments impaired   Lower Body Dressing Assessment   [place] Level (Lower Body Dressing) moderate assist (50% patient effort);maximum assist (25% patient effort)   Grooming Assessment   [place] Level (Grooming) contact guard assist   Clinical Impression   Criteria for Skilled Therapeutic Interventions Met (OT) yes   OT Diagnosis Decline in ADL performance   OT Problem List-Impairments impacting ADL  problems related to;activity tolerance impaired;balance;cognition;flexibility;mobility;strength;vision  (will monitor cognition)   Assessment of Occupational Performance 3-5 Performance Deficits   Identified Performance Deficits dressing, bathing, toileting, grooming/standing ADL's, fx. transfers/mobility   Planned Therapy Interventions (OT) ADL retraining;cognition;strengthening;home program guidelines;progressive activity/exercise  (will monitor cognition)   Clinical Decision Making Complexity (OT) low complexity   Therapy Frequency (OT) Daily   Predicted Duration of Therapy 3-5 days   Risk & Benefits of therapy have been explained evaluation/treatment results reviewed;care plan/treatment goals reviewed;risks/benefits reviewed;current/potential barriers reviewed;participants voiced agreement with care plan;participants included;patient   OT Discharge Planning    OT Discharge Recommendation (DC Rec) home;Home with assist;home with home care occupational therapy;Transitional Care Facility   OT Rationale for DC Rec OT:Pt. currently below I/ADL baseline and would benfit from cont.skilled OT intervention to maximize safety/indep. prior to discharge home;if pt. were to DC home, would need A X 1 for ADL's, transfers, mobility + Home OT/Home PT/Home RN/HHC for bathing assist   OT Brief overview of current status  CGA sit-stand transfer + room mobility using WW   Total Evaluation Time (Minutes)   Total Evaluation Time (Minutes) 14

## 2021-09-29 NOTE — PROGRESS NOTES
[facility]    Hospitalist Progress Note    Date of Service (when I saw the patient): 09/29/2021  Admit date: 9/20/2021      Interval History      Up in the recliner this am, denies dyspnea. Afebrile. Stable on 2 LPM O2.   - Patient's daughter Carmen reported that patient's Son had visited patient on 25th, then he was not feeling good and had COVID test on 28th and is positive.    - No acute issues reported, no hematochezia or melena.     Assessment & Plan   Lucille Rojas is a 81 year old female admitted on 9/20/2021. She presented with SOB and constipation.     Severe, symptomatic anemia at admission.   Acute blood loss anemia on chronic anemia  Reports several weeks of ongoing symptoms indicating a somewhat chronic course. Has noted black, tarry stools for at least 3-4 weeks.  S/p EGD on 09/21/21 revealed a few gastric erosions and a few tiny aphthous ulcers in the duodenum, but not severe enough to account for anemia.   * S/p 2 units with Hgb 4.7 > 7.2 on admission, 1 unit on 9/22/21 and 1 unit on 9/24 with appropriate rise in Hb.  9.4 today.  * Iron studies reveal low levels > S/p venofer 300 mg IV x 2, last dose 9/23/21.  Recheck iron level is a still low, will give 2 more doses of 200 mg IV each.  - Protonix 40 mg daily.   - Colonoscopy held initially due to acute encephalopathy with respiratory failure (see below) on 9/23/21.  Now with no overt bleeding and stable hemoglobin, and given risk with procedure given cardiac conditions, no plan for colonoscopy at this time.   - Transfuse to keep hgb > 7.      Recent Labs   Lab 09/28/21  0818 09/27/21  0859 09/26/21  0907 09/25/21  0737 09/24/21  0609 09/23/21  1921   HGB 8.4* 9.4* 8.9* 8.0* 7.0* 7.5*       Presumed primary pancreatic cancer Noted on CT.  R pleural effusion, transudate   S/p thoracentesis - 450 ml of verenice fluid on 09/22/21   S/p EUS on 09/21/21 with biopsy. Awaiting final path.     - Cytology on pleural fluid negative for  malignant cells  - Care conference 9/27, discussed at length, plan is to follow up on pathology results and re discuss further plan. Per Dr Srinivasan who is following, and have discussed with pathologist, result expected in 1-2 days.    Acute encephalopathy, Resolved - On 09/23/21, patient remained oriented and following commands but very lethargic, drifting off in mid-sentence. Labs significant for leukocytosis, hypercapnia on ABG and elevated BNP. She is afebrile, hemodynamically stable, stable oxygenation.   - Since 09/25, patient has been AAOx3, and answering questions appropriately, making good eye contact. Per nursing, patient does appear forgetful.  - Treating hypercapnia CHF and UTI as below.     Acute on chronic hypercapnia -  ABG: pH 7.22, CO2 85, O2 121 while on 5L on 9/23/21.   Obesity, SHAVON with hypoventilation syndrome   Chronic hypoxia on home oxygen - Per family she requires oxygen during the day as well, but has had trouble getting this ordered through PCP. On BiPAP at night with4 LPM O2 PTA  - Continue to wean O2 as tolerates to keep O2 saturations around 90-93%  - BiPAP at night and during naps during day as well.   - When awake > up in chair.    E coli UTI - Patient lethargic and unable to give clear history regarding urinary symptoms at presentation. UA showed 166 WBC, small nitrates.   Leukocytosis - No T over 100 since admission. Hemodynamically stable. No localizing symptoms of infection. No Urinary symptoms. UA done. CT chest/abdomen/pelvis as above, no sign of localized infection.  - F/u on pleural cx -NGTD but very low suspicion of infection in this location.   - UCx from 9/23/21: 2 strains of E. coli noted, sensitive to Rocephin   - Initially on IV Rocephin, changed to p.o. Ceftin based on sensitivity to complete 7 days course.   - BCx NGTD and patient is afebrile, encephalopathy now resolved.    Acute decompensated HF, unknown EF - developing acute encephalopathy, hypercapnia on 09/23/21.  Workup revealed BNP up to 4000's from 800 at admission. Mild pulmonary edema on CXR. Oxygenation stable, but on 3 L.   Mod to severe aortic stenosis, new diagnosis   TAVARES, CP and light-headedness with minimal exertion - Concerning for symptomatic aortic stenosis, although anemia and chronic obesity hypoventilation syndrome with chronic hyoxia undoubtedly contribute. Initial troponin negative. CXR with R pleural effusion.   Transudate R pleural effusion, edema on admission. S/p thoracentesis on 9/22.   * Serial troponins negative  * Echo EF 60-65% No WMAs. RV fxn normal. Mod to severe aortic stenosis with aortic mean valve area of 1 cm2, mean gradient 37.5 mm Hg.     - On 09/23/21, BNP quite elevated at 4000 on 09/23/21 (up from 800s at admission) CXR reviewed and shows mild intestinal markings, suspect edema.   - was managed with IV lasix, Cr slightly up and so lasix held and stable at 1.1, respiratory status has markedly improved and now stable at 2 LPM.   - Low dose p.o. Lasix started 9/28, follow daily weight and periodic BMP. Patient is incontinent, given her recent UTI, Espitai catheter discontinued.  - Cardiology consulted on 09/24/21 re: input on options (TAVR) and prognosis.  With her unknown source of bleeding and since GI work-up could not be completed, patient not on candidate for anticoagulation and so not a candidate for further work-up including angiogram or TAVR.     Goals of Care. Care conference 9/27, myself and Dr. Srinivasan present.  Patient, her , 2 daughters as well present and her son also present over the phone.  It was reviewed with family that patient is not a candidate for TAVR and that aortic stenosis as well as obesity hypoventilation and since GI work up could not be completed to find source of bleeding. Also she is of high risk for a GI work up as well. And her illnesses could also limit options for treatment of presumed cancer. Although we do not have a definitive diagnosis yet. So.   "This point, plan is to await final pathology report.  It is possible that it may not yield a diagnosis in which case family would like to call her repeat biopsy.  Family hopeful to be able to get her home eventually so she can spend \"few days in her own bed.\" They are very realistic about current prognosis.        Constipation  No significant stool burden on admission CT.  No obstruction noted.  - PRN stool regimen     CKD 3  - monitor     DMT2 diet controlled.  last A1c 5.8 on 4/7/21. Did not recheck due to severe anemia  -Blood sugars within normal limit, discontinue checks     Hypothyroid TSH normal at admission.   - Continue PTA levothyroxine     Depression  - Continue PTA citalopram     HLD  - Continue PTA statin    COVID 19 exposure  Per her daughter, patient's Son who visited patient 9/25 had symptoms and tested positive on 9/28. Patient considered exposed and needs to be on quarantine for 14 days per protocol. Continue weekly testing as per routine protocol. To test sooner if any COVID 19 symptoms. Last test 9/28 negative.     Diet: Orders Placed This Encounter      Combination Diet 2 gm NA Diet     IVF: None  Espitia Catheter: Not present     DVT Prophylaxis: Pneumatic Compression Devices  Code Status: No CPR- Do NOT Intubate     Disposition: Expected discharge TBD, based on further plan after final pathology report, which is expected today or tomorrow.   Communication: Discussed with patient and family during extended care conference on 09/27, daughter Carmen updated today 9/29    Ben Eubanks  Hospitalist Service  [facility]  Securely message with the Vocera Web Console (learn more here)  Text page via [employer] Paging/Directory    -Data reviewed today: I reviewed all new labs  results over the last 24 hours. I personally reviewed no images or EKG's today.    Physical Exam   Temp: 97.5  F (36.4  C) Temp src: Axillary BP: 136/59 Pulse: 70   Resp: 18 SpO2: 97 % O2 Device: BiPAP/CPAP " Oxygen Delivery: 3 LPM  Vitals:    09/21/21 1451 09/25/21 0558 09/28/21 0632   Weight: 124.3 kg (274 lb) 117.6 kg (259 lb 4.2 oz) 116.3 kg (256 lb 6.3 oz)     Vital Signs with Ranges  Temp:  [97.5  F (36.4  C)-98.1  F (36.7  C)] 97.5  F (36.4  C)  Pulse:  [70-78] 70  Resp:  [16-18] 18  BP: (106-136)/(44-61) 136/59  FiO2 (%):  [50 %] 50 %  SpO2:  [97 %-100 %] 97 %  I/O last 3 completed shifts:  In: 240 [P.O.:240]  Out: 850 [Urine:850]       Constitutional: Alert awake, oriented x3 though forgetful, very pleasant and appears comfortable.  HEENT: PERRLA, EOMI.  Respiratory: Bilateral equal air entry, no crackles or wheezing, normal work of breathing.  On 2-3 LPM    Cardiovascular: S1-S2 regular with loud early systolic murmur, trace edema.  GI:  soft, NT/ND, BS normal  Skin/Integumen:  No acute rash or sign of bleeding.     Medications   All medications reviewed on 09/29/2021        cefuroxime  500 mg Oral Q12H DAVID     citalopram  20 mg Oral Daily     furosemide  20 mg Oral Daily     levothyroxine  200 mcg Oral QAM AC     pantoprazole  40 mg Oral QAM AC     simvastatin  10 mg Oral At Bedtime     sodium chloride (PF)  3 mL Intracatheter Q8H       Data   Recent Labs   Lab 09/28/21  0818 09/27/21  0918 09/27/21  0859 09/26/21  2121 09/26/21  1726 09/26/21  0907 09/25/21  0909 09/25/21  0737 09/24/21  0825 09/24/21  0609 09/23/21  1158 09/23/21  0557   WBC  --   --   --   --   --  10.6  --   --   --  11.2*  --  14.0*   HGB 8.4*  --  9.4*  --   --  8.9*   < > 8.0*   < > 7.0*   < > 7.5*   MCV  --   --   --   --   --  80  --   --   --  77*  --  77*   PLT  --   --   --   --   --  300  --   --   --  293  --  351   NA  --   --  139  --   --  139  --  142   < > 143  --  141   POTASSIUM  --   --  3.9  --   --  3.7  --  3.7   < > 3.8  --  4.2   CHLORIDE  --   --  95  --   --  96  --  99   < > 104  --  105   CO2  --   --  40*  --   --  40*  --  39*   < > 37*  --  32   BUN  --   --  28  --   --  28  --  25   < > 22  --  18   CR  --    --  1.02  --   --  1.17*  --  1.17*   < > 1.32*  --  1.16*   ANIONGAP  --   --  4  --   --  3  --  4   < > 2*  --  4   IGOR  --   --  8.5  --   --  8.4*  --  8.2*   < > 8.2*  --  8.2*   GLC  --  117* 114* 116*   < > 116*   < > 107*   < > 94   < > 135*   TROPONIN  --   --   --   --   --   --   --   --   --  <0.015  --   --     < > = values in this interval not displayed.       No results found for this or any previous visit (from the past 24 hour(s)).

## 2021-09-29 NOTE — PLAN OF CARE
DATE & TIME: 9/29/2021 4972-5959         Cognitive Concerns/Orientation : A&Ox4, forgetful at times.   BEHAVIOR & AGGRESSION TOOL COLOR: Green             ABNL VS/O2: VSS on 2L, AVAPS when asleep  MOBILITY: A x1, GB/W, T&R when in bed  PAIN MANAGMENT: Denies  DIET: 2 gm Na  BOWEL/BLADDER:  incontinent of bowel and bladder, purewick in place. BM x 2.  ABNL LAB/BG:  Hgb 9.5, no signs of active bleeding.  DRAIN/DEVICES: R arm PIV SL  TELEMETRY RHYTHM: SR/SD with first degree AVB  SKIN: Rash underneath breasts and abdominal fold, applied powder. Scattered bruising   TESTS/PROCEDURES: None  D/C DATE: Discharge pending final results of pancreatic biopsy, initial was positive for malignancy. Hem/Onc following.   OTHER IMPORTANT INFO: PO Ceftin q12h. GI and hem/onc following. Received  IV iron sucrose x 1. Droplet isolation initiated due to exposure to COVID+ person. Quarantine for 14 days per current guidelines. The day of exposure was 9/25, if pt remains asymptomatic, can discontinue isolation on 10/8.

## 2021-09-29 NOTE — CONSULTS
Care Management Initial Consult    General Information  Assessment completed with: Patient, Family, Spouse or significant other,    Type of CM/SW Visit: Initial Assessment    Primary Care Provider verified and updated as needed:     Readmission within the last 30 days: no previous admission in last 30 days      Reason for Consult: discharge planning  Advance Care Planning: Advance Care Planning Reviewed: no concerns identified          Communication Assessment  Patient's communication style: spoken language (English or Bilingual)    Hearing Difficulty or Deaf: no   Wear Glasses or Blind: yes    Cognitive  Cognitive/Neuro/Behavioral: WDL  Level of Consciousness: alert  Arousal Level: opens eyes spontaneously  Orientation: oriented x 4  Mood/Behavior: calm, cooperative  Best Language: 0 - No aphasia  Speech: clear, spontaneous, logical    Living Environment:   People in home: spouse     Current living Arrangements: house      Able to return to prior arrangements:  (plan is pending; PT is recommending TCU currently)       Family/Social Support:  Care provided by: self  Provides care for: no one, unable/limited ability to care for self  Marital Status:   Wife, Children          Description of Support System: Involved, Supportive    Support Assessment: Adequate family and caregiver support, Adequate social supports    Current Resources:   Patient receiving home care services:       Community Resources:    Equipment currently used at home: cane, straight, grab bar, toilet, grab bar, tub/shower, raised toilet seat, shower chair, walker, standard  Supplies currently used at home:      Employment/Financial:  Employment Status: retired        Financial Concerns: No concerns identified           Lifestyle & Psychosocial Needs:  Social Determinants of Health     Tobacco Use: Low Risk      Smoking Tobacco Use: Never Smoker     Smokeless Tobacco Use: Never Used   Alcohol Use:      Frequency of Alcohol Consumption:       Average Number of Drinks:      Frequency of Binge Drinking:    Financial Resource Strain:      Difficulty of Paying Living Expenses:    Food Insecurity:      Worried About Running Out of Food in the Last Year:      Ran Out of Food in the Last Year:    Transportation Needs:      Lack of Transportation (Medical):      Lack of Transportation (Non-Medical):    Physical Activity:      Days of Exercise per Week:      Minutes of Exercise per Session:    Stress:      Feeling of Stress :    Social Connections:      Frequency of Communication with Friends and Family:      Frequency of Social Gatherings with Friends and Family:      Attends Anabaptism Services:      Active Member of Clubs or Organizations:      Attends Club or Organization Meetings:      Marital Status:    Intimate Partner Violence:      Fear of Current or Ex-Partner:      Emotionally Abused:      Physically Abused:      Sexually Abused:    Depression: At risk     PHQ-2 Score: 5   Housing Stability:      Unable to Pay for Housing in the Last Year:      Number of Places Lived in the Last Year:      Unstable Housing in the Last Year:        Functional Status:  Prior to admission patient needed assistance:              Mental Health Status:          Chemical Dependency Status:                Values/Beliefs:  Spiritual, Cultural Beliefs, Anabaptism Practices, Values that affect care:  (Synagogue)               Additional Information:    Per chart review pt was admitted on 9/20/21 for severe anemia.  Family meeting/care conference was held on 9/27/21 with multiple family members present; final pathology report remains pending and will help guide discharge plans once results are in.  PT has assessed pt and recommends TCU or home with assist x1 with all mobility + HC.  OT has yet to assess pt.      Dio reached out to pt to discuss potential discharge plans and pt asked that spouse be called.  Dio called spouse, Cooper, and he asked that Sw contact dtrRadha.  Dio  discussed with Cooper PT assessment and per Cooper, it would be difficult for him to assist pt at home.  Cooper confirms that he and pt reside together in a home independently.  Sw spoke with Radha and Radha also stated that returning home with needing assistance could also be difficult.  Radha and Cooper are familiar with TCU and is agreeable to this plan if appropriate and recommended by care team.  Per Cooper/Radha, they prefer [facility], [facility], [facility] (not agreeable to [facility] as they had poor previous experience).  Sw will f/u with spouse/dtr once results are in and discharge plan is more concrete.      Family confirmed pt was exposed Saturday, 9/25/21, by son that later tested positive with COVID.        Lucille Barragan, TANNERSW

## 2021-09-29 NOTE — PLAN OF CARE
DATE & TIME: 9/29/2021 [id number]           Cognitive Concerns/Orientation : Alert and oriented x 4, forgetful at times.   BEHAVIOR & AGGRESSION TOOL COLOR: Green             ABNL VS/O2: VSS on 2L, AVAPS when asleep  MOBILITY: A x1, GB/W, T&R when in bed  PAIN MANAGMENT: Denies  DIET: 2 gm Na  BOWEL/BLADDER:  incontinent of bowel and bladder, purewick in place. BM x 2.  ABNL LAB/BG:  Hgb 9.5, no signs of active bleeding.  DRAIN/DEVICES: R arm PIV SL  TELEMETRY RHYTHM: SR   SKIN: Rash underneath breasts and abdominal fold, Miconazole powder applied. Scattered bruising   TESTS/PROCEDURES: None  D/C DATE: Discharge pending consults  OTHER IMPORTANT INFO: PO Ceftin q12h. Final results of pancreatic biopsy are pending, initial was positive for malignancy. GI and hem/onc following. Received  IV iron sucrose x 1. Droplet isolation initiated due to COVID+ family member.

## 2021-09-30 ENCOUNTER — APPOINTMENT (OUTPATIENT)
Dept: PHYSICAL THERAPY | Facility: CLINIC | Age: 81
DRG: 840 | End: 2021-09-30
Payer: MEDICARE

## 2021-09-30 LAB
HGB BLD-MCNC: 9.3 G/DL (ref 11.7–15.7)
PATH REPORT.COMMENTS IMP SPEC: ABNORMAL
PATH REPORT.COMMENTS IMP SPEC: YES
PATH REPORT.COMMENTS IMP SPEC: YES
PATH REPORT.FINAL DX SPEC: ABNORMAL
PATH REPORT.GROSS SPEC: ABNORMAL
PATH REPORT.MICROSCOPIC SPEC OTHER STN: ABNORMAL

## 2021-09-30 PROCEDURE — 88172 CYTP DX EVAL FNA 1ST EA SITE: CPT | Mod: 26 | Performed by: PATHOLOGY

## 2021-09-30 PROCEDURE — 999N000157 HC STATISTIC RCP TIME EA 10 MIN

## 2021-09-30 PROCEDURE — 99232 SBSQ HOSP IP/OBS MODERATE 35: CPT | Performed by: HOSPITALIST

## 2021-09-30 PROCEDURE — 88341 IMHCHEM/IMCYTCHM EA ADD ANTB: CPT | Mod: 26 | Performed by: PATHOLOGY

## 2021-09-30 PROCEDURE — 85018 HEMOGLOBIN: CPT | Performed by: INTERNAL MEDICINE

## 2021-09-30 PROCEDURE — 88342 IMHCHEM/IMCYTCHM 1ST ANTB: CPT | Mod: 26 | Performed by: PATHOLOGY

## 2021-09-30 PROCEDURE — 88173 CYTOPATH EVAL FNA REPORT: CPT | Mod: 26 | Performed by: PATHOLOGY

## 2021-09-30 PROCEDURE — 250N000013 HC RX MED GY IP 250 OP 250 PS 637: Performed by: HOSPITALIST

## 2021-09-30 PROCEDURE — 36415 COLL VENOUS BLD VENIPUNCTURE: CPT | Performed by: INTERNAL MEDICINE

## 2021-09-30 PROCEDURE — 97116 GAIT TRAINING THERAPY: CPT | Mod: GP | Performed by: PHYSICAL THERAPIST

## 2021-09-30 PROCEDURE — 250N000013 HC RX MED GY IP 250 OP 250 PS 637: Performed by: INTERNAL MEDICINE

## 2021-09-30 PROCEDURE — 120N000001 HC R&B MED SURG/OB

## 2021-09-30 PROCEDURE — 97530 THERAPEUTIC ACTIVITIES: CPT | Mod: GP | Performed by: PHYSICAL THERAPIST

## 2021-09-30 PROCEDURE — 88305 TISSUE EXAM BY PATHOLOGIST: CPT | Mod: 26 | Performed by: PATHOLOGY

## 2021-09-30 RX ORDER — VIT C/E/ZN/COPPR/LUTEIN/ZEAXAN 60 MG-6 MG
2 CAPSULE ORAL DAILY
Status: DISCONTINUED | OUTPATIENT
Start: 2021-09-30 | End: 2021-10-09 | Stop reason: HOSPADM

## 2021-09-30 RX ORDER — CARBOXYMETHYLCELLULOSE SODIUM 5 MG/ML
1 SOLUTION/ DROPS OPHTHALMIC 2 TIMES DAILY
Status: DISCONTINUED | OUTPATIENT
Start: 2021-09-30 | End: 2021-10-09 | Stop reason: HOSPADM

## 2021-09-30 RX ADMIN — LEVOTHYROXINE SODIUM 200 MCG: 100 TABLET ORAL at 09:22

## 2021-09-30 RX ADMIN — FUROSEMIDE 20 MG: 20 TABLET ORAL at 09:22

## 2021-09-30 RX ADMIN — Medication 2 CAPSULE: at 12:38

## 2021-09-30 RX ADMIN — SIMVASTATIN 10 MG: 10 TABLET, FILM COATED ORAL at 21:00

## 2021-09-30 RX ADMIN — Medication 1 DROP: at 20:59

## 2021-09-30 RX ADMIN — PANTOPRAZOLE SODIUM 40 MG: 40 TABLET, DELAYED RELEASE ORAL at 09:22

## 2021-09-30 RX ADMIN — CITALOPRAM HYDROBROMIDE 20 MG: 20 TABLET ORAL at 09:22

## 2021-09-30 RX ADMIN — Medication 1 DROP: at 12:39

## 2021-09-30 ASSESSMENT — ACTIVITIES OF DAILY LIVING (ADL)
ADLS_ACUITY_SCORE: 27
ADLS_ACUITY_SCORE: 25

## 2021-09-30 NOTE — PROGRESS NOTES
Spoke to pathologist. Pancreatic FNA does not reveal adenocarcinoma or neuroendocrine tumor. There is atypical lymphoid cells suspicious for lymphoma.  -Patient will need repeat pancreatic biopsy. Sample should be sent for flow cytometry.    John Srinivasan MD          Final Diagnosis   Specimen A: Pancreas, head, mass, endoscopic ultrasound-guided fine needle aspiration:                 Interpretation:                  Atypical cells present.              See comment.                 Adequacy:                 Satisfactory for evaluation         Electronically signed by Cynthia Sepulveda on 9/30/2021 at  4:44 PM   Comment   [facility]   Immunohistochemical stains are performed with appropriate controls. Cytokeratin AE1/AE3 and CD56 are negative, arguing against an epithelial or neuroendocrine malignancy. CD45 LCA is strongly positive, consistent with a lymphoid population, with CD3 and CD20 immunostains positive for T and B cell population. Based on the current immunoprofile and limited tissue sample, it is challenging to discern whether this population is reactive or compatible with a malignant process. FISH cytogenetics for Bcl-2, Bcl-6 and c-myc are ordered and will be reported separately. Resampling is recommended for a complete workout of the lymphoid population.     This case was shown to hematopathology and the diagnosis represents a consensus.  Dr. Srinivasan was called with results on September 30, 2021 at 4:40 PM.

## 2021-09-30 NOTE — PROGRESS NOTES
Care Management Follow Up    Length of Stay (days): 10    Expected Discharge Date: 10/01/2021     Concerns to be Addressed: discharge planning     Patient plan of care discussed at interdisciplinary rounds: Yes    Anticipated Discharge Disposition:       Anticipated Discharge Services:    Anticipated Discharge DME:      Patient/family educated on Medicare website which has current facility and service quality ratings: yes  Education Provided on the Discharge Plan:    Patient/Family in Agreement with the Plan:      Referrals Placed by CM/SW:    Private pay costs discussed: Not applicable    Additional Information:  Read through progress notes to see tentative discharge date. Sent out referrals to [facility] via discharge on the double to [facility], and [facility].    Will continue to follow.     TORIE Pollard

## 2021-09-30 NOTE — PLAN OF CARE
Cognitive Concerns/Orientation : A&Ox4, forgetful at times.   BEHAVIOR & AGGRESSION TOOL COLOR: Green             ABNL VS/O2: VSS on 2L, AVAPS when asleep  MOBILITY: A x1, GB/W  PAIN MANAGMENT: Denies  DIET: 2 gm Na  BOWEL/BLADDER:  incontinent of bowel and bladder,  No BM this shift.   ABNL LAB/BG:  Hgb 9.5, no signs of active bleeding. Pancreatic biopsy initial positive for malignancy.   DRAIN/DEVICES: L arm PIV SL  TELEMETRY RHYTHM: N/A  SKIN: Rash underneath breasts and abdominal fold. Scattered bruising. Red area over old IV site on R forearm.   TESTS/PROCEDURES: None  D/C DATE: Discharge pending final results of pancreatic biopsy. Planning TCU  OTHER IMPORTANT INFO: PO Ceftin q12h completed. GI and hem/onc following. Droplet isolation initiated due to exposure to COVID+ person. Quarantine for 14 days per current guidelines. The day of exposure was 9/25, if pt remains asymptomatic, can discontinue isolation on 10/8.

## 2021-09-30 NOTE — PROGRESS NOTES
Care Management Follow Up    Length of Stay (days): 10    Expected Discharge Date: 10/01/2021     Concerns to be Addressed: discharge planning     Patient plan of care discussed at interdisciplinary rounds: Yes    Anticipated Discharge Disposition:       Anticipated Discharge Services:    Anticipated Discharge DME:      Patient/family educated on Medicare website which has current facility and service quality ratings: yes  Education Provided on the Discharge Plan:  yes  Patient/Family in Agreement with the Plan:  yes    Referrals Placed by CM/SW:  Post Acute Facilities   Private pay costs discussed: Not applicable    Additional Information:  SW following for discharge planning. Referral sent to TCU choices, per previous SW note. Noted pt in covid isolation due to exposure, but covid negative. This could make placement challenging. [facility] said no. [facility] (Covid TCU) said no. [facility] and [facility] (Covid TCU) full. [facility] is full. [facility] is assessing. Waiting for returning calls from [facility] and [place].       MACHO Willett, LGSW  [phone]  [facility]

## 2021-09-30 NOTE — PROGRESS NOTES
"[facility]    Hospitalist Progress Note    Date of Service (when I saw the patient): 09/30/2021  Admit date: 9/20/2021      Interval History      Up in the recliner this am, denies SOB or other acute symptoms. Continues on 2L NC. She is on \"quarantine\" status due to possible covid exposure. Reports no concerning symptoms. She would like eye drops and Preservision vitamins ordered - discussed that we have generic form of vitamin. Changed eye drops to scheduled BID  Patient requests to speak to dietitian as she cannot read menu due to poor eyesight and has had trouble with ordering due to low salt dietary restriction as well.     - Patient's daughter Carmen reported that patient's Son had visited patient on 25th, then he was not feeling good and had COVID test on 28th and is positive.    - No acute issues reported, no hematochezia or melena.     Assessment & Plan   Lucille Rojas is a 81 year old female admitted on 9/20/2021. She presented with SOB and constipation.     Severe, symptomatic anemia at admission.   Acute blood loss anemia on chronic anemia  Reports several weeks of ongoing symptoms indicating a somewhat chronic course. Has noted black, tarry stools for at least 3-4 weeks.  S/p EGD on 09/21/21 revealed a few gastric erosions and a few tiny aphthous ulcers in the duodenum, but not severe enough to account for anemia.   * S/p 2 units with Hgb 4.7 > 7.2 on admission, 1 unit on 9/22/21 and 1 unit on 9/24 with appropriate rise in Hb.  9.4 today.  * Iron studies reveal low levels > S/p venofer 300 mg IV x 2, last dose 9/23/21.  Recheck iron level is a still low, will give 2 more doses of 200 mg IV each.  - Protonix 40 mg daily.   - Colonoscopy held initially due to acute encephalopathy with respiratory failure (see below) on 9/23/21.  Now with no overt bleeding and stable hemoglobin, and given risk with procedure given cardiac conditions, no plan for colonoscopy at this time.   - " Is This A New Presentation, Or A Follow-Up?: Skin Lesion How Severe Is Your Skin Lesion?: moderate Transfuse to keep hgb > 7.    - Hgb has been stable for days    Presumed primary pancreatic cancer Noted on CT.  R pleural effusion, transudate   S/p thoracentesis - 450 ml of verenice fluid on 09/22/21   S/p EUS on 09/21/21 with biopsy. Awaiting final path.   - Cytology on pleural fluid negative for malignant cells  - Care conference 9/27, discussed at length, plan is to follow up on pathology results and re discuss further plan. Per Dr Srinivasan who is following, and have discussed with pathologist, result expected in 1-2 days. Still pending today.    Acute encephalopathy, Resolved - On 09/23/21, patient remained oriented and following commands but very lethargic, drifting off in mid-sentence. Labs significant for leukocytosis, hypercapnia on ABG and elevated BNP. She is afebrile, hemodynamically stable, stable oxygenation.   - Since 09/25, patient has been AAOx3, and answering questions appropriately, making good eye contact. Per nursing, patient does appear forgetful.  - Treating hypercapnia CHF and UTI as below.     Acute on chronic hypercapnia -  ABG: pH 7.22, CO2 85, O2 121 while on 5L on 9/23/21.   Obesity, SHAVON with hypoventilation syndrome   Chronic hypoxia on home oxygen - Per family she requires oxygen during the day as well, but has had trouble getting this ordered through PCP. On BiPAP at night with4 LPM O2 PTA  - Continue to wean O2 as tolerates to keep O2 saturations around 90-93%  - BiPAP at night and during naps during day as well.   - When awake > up in chair.    E coli UTI - Patient lethargic and unable to give clear history regarding urinary symptoms at presentation. UA showed 166 WBC, small nitrates.   Leukocytosis - No T over 100 since admission. Hemodynamically stable. No localizing symptoms of infection. No Urinary symptoms. UA done. CT chest/abdomen/pelvis as above, no sign of localized infection.  - F/u on pleural cx -NGTD but very low suspicion of infection in this location.   - UCx from 9/23/21:  2 strains of E. coli noted, sensitive to Rocephin   - Initially on IV Rocephin, changed to p.o. Ceftin based on sensitivity to complete 7 days course.   - BCx NGTD and patient is afebrile, encephalopathy now resolved.    Acute decompensated HF, unknown EF - developing acute encephalopathy, hypercapnia on 09/23/21. Workup revealed BNP up to 4000's from 800 at admission. Mild pulmonary edema on CXR. Oxygenation stable, but on 3 L.   Mod to severe aortic stenosis, new diagnosis   TAVARES, CP and light-headedness with minimal exertion - Concerning for symptomatic aortic stenosis, although anemia and chronic obesity hypoventilation syndrome with chronic hyoxia undoubtedly contribute. Initial troponin negative. CXR with R pleural effusion.   Transudate R pleural effusion, edema on admission. S/p thoracentesis on 9/22.   * Serial troponins negative  * Echo EF 60-65% No WMAs. RV fxn normal. Mod to severe aortic stenosis with aortic mean valve area of 1 cm2, mean gradient 37.5 mm Hg.   - On 09/23/21, BNP quite elevated at 4000 on 09/23/21 (up from 800s at admission) CXR reviewed and shows mild intestinal markings, suspect edema.   - was managed with IV lasix, Cr slightly up and so lasix held and stable at 1.1, respiratory status has markedly improved and now stable at 2 LPM.   - Low dose p.o. Lasix started 9/28, follow daily weight and periodic BMP. Patient is incontinent, given her recent UTI, Espitia catheter discontinued.  - Cardiology consulted on 09/24/21 re: input on options (TAVR) and prognosis.  With her unknown source of bleeding and since GI work-up could not be completed, patient not on candidate for anticoagulation and so not a candidate for further work-up including angiogram or TAVR.     Severe macular degeneration  Vision loss  - ordered schedule eye drops and ocular vitamins  - assistance with menu per nutrition. Appreciate assistance.    Goals of Care. Care conference 9/27, myself and Dr. Srinivasan present.  Patient,  Has Your Skin Lesion Been Treated?: not been treated "her , 2 daughters as well present and her son also present over the phone.  It was reviewed with family that patient is not a candidate for TAVR and that aortic stenosis as well as obesity hypoventilation and since GI work up could not be completed to find source of bleeding. Also she is of high risk for a GI work up as well. And her illnesses could also limit options for treatment of presumed cancer. Although we do not have a definitive diagnosis yet. So.  This point, plan is to await final pathology report.  It is possible that it may not yield a diagnosis in which case family would like to repeat biopsy.  Family hopeful to be able to get her home eventually so she can spend \"few days in her own bed.\" They are very realistic about current prognosis.      CKD 3  - monitor     DMT2 diet controlled.  last A1c 5.8 on 4/7/21. Did not recheck due to severe anemia  -Blood sugars within normal limit, discontinue checks     Hypothyroid TSH normal at admission.   - Continue PTA levothyroxine     Depression  - Continue PTA citalopram     HLD  - Continue PTA statin    COVID 19 exposure  Per her daughter, patient's Son who visited patient 9/25 had symptoms and tested positive on 9/28. Patient considered exposed and needs to be on quarantine for 14 days per protocol. Continue weekly testing as per routine protocol. To test sooner if any COVID 19 symptoms. Last test 9/28 negative.     Diet: Orders Placed This Encounter      Combination Diet 2 gm NA Diet     Espitia Catheter: Not present     DVT Prophylaxis: Pneumatic Compression Devices  Code Status: No CPR- Do NOT Intubate     Disposition: Expected discharge TBD, based on further plan after final pathology report  Communication: Discussed with patient and bedside RN.     Vera Chisholm  Hospitalist Service  [facility]  Securely message with the Vocera Web Console (learn more here)  Text page via [employer] Paging/Directory    -Data reviewed today: I " What Type Of Note Output Would You Prefer (Optional)?: Standard Output reviewed all new labs  results over the last 24 hours. I personally reviewed no images or EKG's today.    Physical Exam   Temp: 97.9  F (36.6  C) Temp src: Oral BP: 128/53 Pulse: 62   Resp: 18 SpO2: 97 % O2 Device: BiPAP/CPAP Oxygen Delivery: 2 LPM  Vitals:    09/21/21 1451 09/25/21 0558 09/28/21 0632   Weight: 124.3 kg (274 lb) 117.6 kg (259 lb 4.2 oz) 116.3 kg (256 lb 6.3 oz)     Vital Signs with Ranges  Temp:  [97.9  F (36.6  C)-98.1  F (36.7  C)] 97.9  F (36.6  C)  Pulse:  [62-72] 62  Resp:  [18] 18  BP: (128-144)/(53-58) 128/53  FiO2 (%):  [50 %] 50 %  SpO2:  [96 %-100 %] 97 %  I/O last 3 completed shifts:  In: 840 [P.O.:840]  Out: 600 [Urine:600]       Constitutional: Alert awake, oriented x3 though forgetful, very pleasant and appears comfortable.  HEENT: PERRLA, EOMI.  Respiratory: Bilateral equal air entry, no crackles or wheezing, normal work of breathing.  On 2-3 LPM    Cardiovascular: S1-S2 regular with loud early systolic murmur, trace edema.  GI:  soft, NT/ND, BS normal  Skin/Integumen:  No acute rash or sign of bleeding.     Medications   All medications reviewed on 09/30/2021        citalopram  20 mg Oral Daily     furosemide  20 mg Oral Daily     levothyroxine  200 mcg Oral QAM AC     pantoprazole  40 mg Oral QAM AC     simvastatin  10 mg Oral At Bedtime     sodium chloride (PF)  3 mL Intracatheter Q8H       Data   Recent Labs   Lab 09/29/21  1031 09/28/21  0818 09/27/21  0918 09/27/21  0859 09/26/21  1726 09/26/21  0907 09/24/21  0825 09/24/21  0609   WBC  --   --   --   --   --  10.6  --  11.2*   HGB 9.5* 8.4*  --  9.4*  --  8.9*   < > 7.0*   MCV  --   --   --   --   --  80  --  77*   PLT  --   --   --   --   --  300  --  293     --   --  139  --  139   < > 143   POTASSIUM 3.6  --   --  3.9  --  3.7   < > 3.8   CHLORIDE 96  --   --  95  --  96   < > 104   CO2 38*  --   --  40*  --  40*   < > 37*   BUN 23  --   --  28  --  28   < > 22   CR 1.04  --   --  1.02  --  1.17*   < > 1.32*   ANIONGAP  2*  --   --  4  --  3   < > 2*   IGOR 8.6  --   --  8.5  --  8.4*   < > 8.2*   *  --  117* 114*   < > 116*   < > 94   TROPONIN  --   --   --   --   --   --   --  <0.015    < > = values in this interval not displayed.       No results found for this or any previous visit (from the past 24 hour(s)).

## 2021-09-30 NOTE — PLAN OF CARE
DATE & TIME: 9/29/2021 7644-5820       Cognitive Concerns/Orientation : A&Ox4, forgetful at times.   BEHAVIOR & AGGRESSION TOOL COLOR: Green             ABNL VS/O2: VSS on 2L, AVAPS when asleep  MOBILITY: A x1, GB/W  PAIN MANAGMENT: Denies  DIET: 2 gm Na  BOWEL/BLADDER:  incontinent of bowel and bladder, purewick in place. No BM this shift.   ABNL LAB/BG:  Hgb 9.5, no signs of active bleeding. Pancreatic biopsy initial positive for malignancy.   DRAIN/DEVICES: L arm PIV SL  TELEMETRY RHYTHM: SR with BBB  SKIN: Rash underneath breasts and abdominal fold. Scattered bruising. Red area over old IV site on R forearm.   TESTS/PROCEDURES: None  D/C DATE: Discharge pending final results of pancreatic biopsy.  OTHER IMPORTANT INFO: PO Ceftin q12h completed. GI and hem/onc following. Droplet isolation initiated due to exposure to COVID+ person. Quarantine for 14 days per current guidelines. The day of exposure was 9/25, if pt remains asymptomatic, can discontinue isolation on 10/8.

## 2021-09-30 NOTE — PROGRESS NOTES
"Consult received - Pt/Family request \"Patient request for questions about menu. Also cannot read menu and is unclear on what she can order. Low salt diet.\"    - We are following. See RD note dated 9/28 for last assessment.   - Ordered Room Service w/ Assistance to help patient order and answer questions.   - RD will follow per policy.     Fariha Smart RD,   [facility] [place], [address], [facility]   Pager: [phone]  Weekend Pager: [phone]    "

## 2021-10-01 ENCOUNTER — APPOINTMENT (OUTPATIENT)
Dept: OCCUPATIONAL THERAPY | Facility: CLINIC | Age: 81
DRG: 840 | End: 2021-10-01
Payer: MEDICARE

## 2021-10-01 ENCOUNTER — APPOINTMENT (OUTPATIENT)
Dept: PHYSICAL THERAPY | Facility: CLINIC | Age: 81
DRG: 840 | End: 2021-10-01
Payer: MEDICARE

## 2021-10-01 ENCOUNTER — DOCUMENTATION ONLY (OUTPATIENT)
Dept: OTHER | Facility: CLINIC | Age: 81
End: 2021-10-01

## 2021-10-01 PROCEDURE — 120N000001 HC R&B MED SURG/OB

## 2021-10-01 PROCEDURE — 999N000157 HC STATISTIC RCP TIME EA 10 MIN

## 2021-10-01 PROCEDURE — 99232 SBSQ HOSP IP/OBS MODERATE 35: CPT | Performed by: HOSPITALIST

## 2021-10-01 PROCEDURE — 250N000013 HC RX MED GY IP 250 OP 250 PS 637: Performed by: INTERNAL MEDICINE

## 2021-10-01 PROCEDURE — 97535 SELF CARE MNGMENT TRAINING: CPT | Mod: GO

## 2021-10-01 PROCEDURE — 250N000013 HC RX MED GY IP 250 OP 250 PS 637: Performed by: HOSPITALIST

## 2021-10-01 PROCEDURE — 94660 CPAP INITIATION&MGMT: CPT

## 2021-10-01 PROCEDURE — 97116 GAIT TRAINING THERAPY: CPT | Mod: GP | Performed by: PHYSICAL THERAPIST

## 2021-10-01 PROCEDURE — 97530 THERAPEUTIC ACTIVITIES: CPT | Mod: GP | Performed by: PHYSICAL THERAPIST

## 2021-10-01 RX ADMIN — CITALOPRAM HYDROBROMIDE 20 MG: 20 TABLET ORAL at 09:28

## 2021-10-01 RX ADMIN — LEVOTHYROXINE SODIUM 200 MCG: 100 TABLET ORAL at 09:28

## 2021-10-01 RX ADMIN — Medication 2 CAPSULE: at 09:28

## 2021-10-01 RX ADMIN — SIMVASTATIN 10 MG: 10 TABLET, FILM COATED ORAL at 21:55

## 2021-10-01 RX ADMIN — FUROSEMIDE 20 MG: 20 TABLET ORAL at 09:28

## 2021-10-01 RX ADMIN — PANTOPRAZOLE SODIUM 40 MG: 40 TABLET, DELAYED RELEASE ORAL at 09:28

## 2021-10-01 RX ADMIN — Medication 1 DROP: at 21:55

## 2021-10-01 RX ADMIN — Medication 1 DROP: at 09:28

## 2021-10-01 ASSESSMENT — ACTIVITIES OF DAILY LIVING (ADL)
ADLS_ACUITY_SCORE: 25
ADLS_ACUITY_SCORE: 23
ADLS_ACUITY_SCORE: 25

## 2021-10-01 NOTE — PLAN OF CARE
Cognitive Concerns/Orientation : A&Ox4, forgetful at times.   BEHAVIOR & AGGRESSION TOOL COLOR: Green             ABNL VS/O2: VSS on 2L O2 NC while awake, BiPAP overnight.   MOBILITY: Assist of 1, walker, GB   PAIN MANAGMENT: Denies pain   DIET: 2 gm Na diet, tolerating, good appetite.   BOWEL/BLADDER:  incontinent of b/b at times, using purewick in bed, otherwise up to bathroom. Loose stools, no signs of bleeding.   ABNL LAB/BG:  Hgb 9.3  DRAIN/DEVICES: L arm PIV, saline locked   TELEMETRY RHYTHM: N/A  SKIN: Rash underneath breasts and abdominal fold. Scattered bruising. Red area over old IV site on R forearm.   TESTS/PROCEDURES: Plan for EUS with fine needle biopsy 10/5  D/C DATE: Discharge pending final results of pancreatic biopsy. Planning TCU  OTHER IMPORTANT INFO: GI and hem/onc following. Droplet isolation maintained, continues to be COVID asymptomatic.

## 2021-10-01 NOTE — PLAN OF CARE
DATE & TIME: 9/30/2021   Cognitive Concerns/Orientation : A&Ox4, forgetful at times.   BEHAVIOR & AGGRESSION TOOL COLOR: Green             ABNL VS/O2: VSS on 2L, AVAPS when asleep  MOBILITY: A x1, GB/W  PAIN MANAGMENT: Denies  DIET: 2 gm Na  BOWEL/BLADDER:  incontinent of bowel and bladder,  No BM this shift. Purewick in place  ABNL LAB/BG:  Hgb 9.5, no signs of active bleeding. Pancreatic biopsy initial positive for malignancy.   DRAIN/DEVICES: L arm PIV SL  TELEMETRY RHYTHM: N/A  SKIN: Rash underneath breasts and abdominal fold. Scattered bruising. Red area over old IV site on R forearm.   TESTS/PROCEDURES: None  D/C DATE: Discharge pending final results of pancreatic biopsy. Planning TCU  OTHER IMPORTANT INFO: PO Ceftin q12h completed. GI and hem/onc following. Droplet isolation initiated due to exposure to COVID+ person. Quarantine for 14 days per current guidelines. The day of exposure was 9/25, if pt remains asymptomatic, can discontinue isolation on 10/8.

## 2021-10-01 NOTE — PROGRESS NOTES
GI recommendations noted. Await repeated biopsy results . Chart check only today. EUS possbily next week per GI .    Please call if questions.    Ritu Sebastian MD  [phone]

## 2021-10-01 NOTE — PROGRESS NOTES
[facility]  [facility]  Gastroenterology Progress note    Interval History:      Biopsy difficult to discern between reactive vs malignant process per heme/onc.  FISH cytogenetics ordered.  Resampling recommended.  Patient exposed to person positive for COVID-19 .  Patient currently in quarantine, can discontinue isolation 10/8 if asymptomatic.        Vital Signs:      /55 (BP Location: Left arm)   Pulse 71   Temp 97.9  F (36.6  C) (Axillary)   Resp 20   Ht 1.524 m (5')   Wt 116.3 kg (256 lb 6.3 oz)   SpO2 97%   BMI 50.07 kg/m    Temp (24hrs), Av.7  F (36.5  C), Min:97.3  F (36.3  C), Max:97.9  F (36.6  C)    No data found.    Intake/Output Summary (Last 24 hours) at 10/1/2021 0817  Last data filed at 2021 1633  Gross per 24 hour   Intake 500 ml   Output --   Net 500 ml         Constitutional: NAD, comfortable  Cardiovascular: RRR, normal S1, S2   Respiratory: CTAB      Additional Comments:  ROS, FH, SH: See initial GI consult for details.    Laboratory Data:  Recent Labs   Lab Test 21  1018 21  1031 21  0818 21  0859 21  0907 21  0737 21  0609 21  1921 21  0557   WBC  --   --   --   --  10.6  --  11.2*  --  14.0*   HGB 9.3* 9.5* 8.4*   < > 8.9*   < > 7.0*   < > 7.5*   MCV  --   --   --   --  80  --  77*  --  77*   PLT  --   --   --   --  300  --  293  --  351    < > = values in this interval not displayed.     Recent Labs   Lab Test 21  1031 21  0859 21  0907    139 139   POTASSIUM 3.6 3.9 3.7   CHLORIDE 96 95 96   CO2 38* 40* 40*   BUN 23 28 28   CR 1.04 1.02 1.17*   ANIONGAP 2* 4 3   IGOR 8.6 8.5 8.4*     Recent Labs   Lab Test 21  1149 21  0324 21  1727 20  1305 16  0713 09/11/15  1542   ALBUMIN  --  3.3* 3.4 3.6   < > 3.5   BILITOTAL  --  0.5 0.4 0.2   < > 0.2   DBIL  --   --   --  <0.1  --   --    ALT  --  17 16 15   < > 23   AST  --  14 10  13   < > 12   ALKPHOS  --  104 101 112   < > 110   PROTEIN 50 *  --   --   --   --   --    LIPASE  --   --  124  --   --   --    AMYLASE  --   --   --   --   --  64    < > = values in this interval not displayed.         Assessment:  82 yo female with past medical history of obesity, SHAVON, chronic hypoxia on home O2, admitted 9/20 with symptoms of SOB, constipation, melena with workup notable for CHF, UTI, severe anemia, and CT concerning for pancreatic cancer with right pleural effusion s/p thoracentesis.  EUS with FNA performed 9.21.  Results reactive vs malignancy per heme/onc.  FISH testing ordered.  Re-sampling recommended.  Case complicated by patient exposure to COVID-19 9/25.  She is currently asymptomatic and in quarantine until 10/8.  Hemoglobin stable at 9.3.  No overt bleeding.  EUS showed nonbleeding erosive gastropathy and a few tiny duodenal aphtous ulcers, small duodenal polyp consistent with tubular adenoma without dysplasia.  Biopsy with scant chronic inflammation, no H. Pylori.  Colonoscopy last week cancelled due to cardiac status, confusion.   Plan:    -  Will discuss timing of repeat EUS with FNA with Dr. Benitez in the setting of recent COVID-19 exposure and quarantine.  -  Monitor H/H; transfuse prn.  -  No plans for colonoscopy given stable hemoglobin.  -  PPI.    ADDENDUM;  Case discussed with Dr. Benitez.  Patient not npo today.  Will plan for EUS next week for re-biopsy.      Izabella Smith, PAC  [facility]  Office:  [phone] call if needed after 12PM  Cell:  [phone], not available after 12PM at this number

## 2021-10-01 NOTE — PLAN OF CARE
DATE & TIME: 9/30/2021 3490-5336  Cognitive Concerns/Orientation : A&Ox4, forgetful at times.   BEHAVIOR & AGGRESSION TOOL COLOR: Green             ABNL VS/O2: VSS on 2L, CPAP at HS  MOBILITY: A x1, GB/W  PAIN MANAGMENT: Denies  DIET: 2 gm Na  BOWEL/BLADDER:  incontinent of bowel and bladder,  No BM this shift. Purewick in place  ABNL LAB/BG:  Hgb 9.5, no signs of active bleeding. Pancreatic biopsy initial positive for malignancy.   DRAIN/DEVICES: L arm PIV SL  TELEMETRY RHYTHM: N/A  SKIN: Rash underneath breasts and abdominal fold. Scattered bruising. Red area over old IV site on R forearm.   TESTS/PROCEDURES: None  D/C DATE: Discharge pending final results of pancreatic biopsy. Planning TCU  OTHER IMPORTANT INFO: PO Ceftin q12h completed. GI and hem/onc following. Droplet isolation initiated due to exposure to COVID+ person. Quarantine for 14 days per current guidelines. The day of exposure was 9/25, if pt remains asymptomatic, can discontinue isolation on 10/8.

## 2021-10-01 NOTE — PROGRESS NOTES
[facility]    Hospitalist Progress Note    Date of Service (when I saw the patient): 10/01/2021  Admit date: 9/20/2021      Interval History   Patient took a break from walking with PT to discuss pathology results and plan of care. She had no acute complaints today. I also updated her daughter Carmen by phone.    - Patient's daughter Carmen reported that patient's Son had visited patient on 25th, then he was not feeling good and had COVID test on 28th and is positive.    - No acute issues reported, no hematochezia or melena.     Assessment & Plan   Lucille Rojas is a 81 year old female admitted on 9/20/2021. She presented with SOB and constipation.     Severe, symptomatic anemia at admission.   Acute blood loss anemia on chronic anemia  Reports several weeks of ongoing symptoms indicating a somewhat chronic course. Has noted black, tarry stools for at least 3-4 weeks.  S/p EGD on 09/21/21 revealed a few gastric erosions and a few tiny aphthous ulcers in the duodenum, but not severe enough to account for anemia.   * S/p 2 units with Hgb 4.7 > 7.2 on admission, 1 unit on 9/22/21 and 1 unit on 9/24 with appropriate rise in Hb.  9.4 today.  * Iron studies reveal low levels > S/p venofer 300 mg IV x 2, last dose 9/23/21.  Recheck iron level is a still low, will give 2 more doses of 200 mg IV each.  - Protonix 40 mg daily.   - Colonoscopy held initially due to acute encephalopathy with respiratory failure (see below) on 9/23/21.  Now with no overt bleeding and stable hemoglobin, and given risk with procedure given cardiac conditions, no plan for colonoscopy at this time.   - Transfuse to keep hgb > 7.    - Hgb has been stable for days    Presumed primary pancreatic cancer Noted on CT.  R pleural effusion, transudate   S/p thoracentesis - 450 ml of verenice fluid on 09/22/21   S/p EUS on 09/21/21 with biopsy. Awaiting final path.   - Cytology on pleural fluid negative for malignant cells  - Pathology  inconclusive. Additional tests sent on sample by oncology. Possible repeat endoscopy next week for additional tissue sampling.    Acute encephalopathy, Resolved - On 09/23/21, patient remained oriented and following commands but very lethargic, drifting off in mid-sentence. Labs significant for leukocytosis, hypercapnia on ABG and elevated BNP. She is afebrile, hemodynamically stable, stable oxygenation.   - Since 09/25, patient has been AAOx3, and answering questions appropriately, making good eye contact. Per nursing, patient does appear forgetful.  - Treating hypercapnia CHF and UTI as below.     Acute on chronic hypercapnia -  ABG: pH 7.22, CO2 85, O2 121 while on 5L on 9/23/21.   Obesity, SHAVON with hypoventilation syndrome   Chronic hypoxia on home oxygen - Per family she requires oxygen during the day as well, but has had trouble getting this ordered through PCP. On BiPAP at night with4 LPM O2 PTA  - Continue to wean O2 as tolerates to keep O2 saturations around 90-93%  - BiPAP at night and during naps during day as well.   - When awake > up in chair.    E coli UTI - Patient lethargic and unable to give clear history regarding urinary symptoms at presentation. UA showed 166 WBC, small nitrates.   Leukocytosis - No T over 100 since admission. Hemodynamically stable. No localizing symptoms of infection. No Urinary symptoms. UA done. CT chest/abdomen/pelvis as above, no sign of localized infection.  - F/u on pleural cx -NGTD but very low suspicion of infection in this location.   - UCx from 9/23/21: 2 strains of E. coli noted, sensitive to Rocephin   - Initially on IV Rocephin, changed to p.o. Ceftin based on sensitivity to complete 7 days course.   - BCx NGTD and patient is afebrile, encephalopathy now resolved.    Acute decompensated HF, unknown EF - developing acute encephalopathy, hypercapnia on 09/23/21. Workup revealed BNP up to 4000's from 800 at admission. Mild pulmonary edema on CXR. Oxygenation stable,  but on 3 L.   Mod to severe aortic stenosis, new diagnosis   TAVARES, CP and light-headedness with minimal exertion - Concerning for symptomatic aortic stenosis, although anemia and chronic obesity hypoventilation syndrome with chronic hyoxia undoubtedly contribute. Initial troponin negative. CXR with R pleural effusion.   Transudate R pleural effusion, edema on admission. S/p thoracentesis on 9/22.   * Serial troponins negative  * Echo EF 60-65% No WMAs. RV fxn normal. Mod to severe aortic stenosis with aortic mean valve area of 1 cm2, mean gradient 37.5 mm Hg.   - On 09/23/21, BNP quite elevated at 4000 on 09/23/21 (up from 800s at admission) CXR reviewed and shows mild intestinal markings, suspect edema.   - was managed with IV lasix, Cr slightly up and so lasix held and stable at 1.1, respiratory status has markedly improved and now stable at 2 LPM.   - Low dose p.o. Lasix started 9/28, follow daily weight and periodic BMP. Patient is incontinent, given her recent UTI, Espitia catheter discontinued.  - Cardiology consulted on 09/24/21 re: input on options (TAVR) and prognosis.  With her unknown source of bleeding and since GI work-up could not be completed, patient not on candidate for anticoagulation and so not a candidate for further work-up including angiogram or TAVR.     Severe macular degeneration  Vision loss  - ordered schedule eye drops and ocular vitamins  - assistance with menu per nutrition. Appreciate assistance.    Goals of Care. Care conference 9/27, myself and Dr. Srinivasan present.  Patient, her , 2 daughters as well present and her son also present over the phone.  It was reviewed with family that patient is not a candidate for TAVR and that aortic stenosis as well as obesity hypoventilation and since GI work up could not be completed to find source of bleeding. Also she is of high risk for a GI work up as well. And her illnesses could also limit options for treatment of presumed cancer. Although  "we do not have a definitive diagnosis yet. So.  This point, plan is to await final pathology report.  It is possible that it may not yield a diagnosis in which case family would like to repeat biopsy.  Family hopeful to be able to get her home eventually so she can spend \"few days in her own bed.\" They are very realistic about current prognosis.      CKD 3  - monitor     DMT2 diet controlled.  last A1c 5.8 on 4/7/21. Did not recheck due to severe anemia  -Blood sugars within normal limit, discontinue checks     Hypothyroid TSH normal at admission.   - Continue PTA levothyroxine     Depression  - Continue PTA citalopram     HLD  - Continue PTA statin    COVID 19 exposure  Per her daughter, patient's Son who visited patient 9/25 had symptoms and tested positive on 9/28. Patient considered exposed and needs to be on quarantine for 14 days per protocol. Continue weekly testing as per routine protocol. To test sooner if any COVID 19 symptoms. Last test 9/28 negative.     Diet: Orders Placed This Encounter      Combination Diet 2 gm NA Diet      NPO for Medical/Clinical Reasons Except for: Meds     Espitia Catheter: Not present     DVT Prophylaxis: Pneumatic Compression Devices  Code Status: No CPR- Do NOT Intubate     Disposition: Expected discharge TBD, based on further plan after repeat biopsy with pathology  Communication: Discussed with patient and bedside RN.     Vera Chisholm  Hospitalist Service  [facility]  Securely message with the Vocera Web Console (learn more here)  Text page via [employer] Paging/Directory    -Data reviewed today: I reviewed all new labs  results over the last 24 hours. I personally reviewed no images or EKG's today.    Physical Exam   Temp: 97.9  F (36.6  C) Temp src: Axillary BP: 133/55 Pulse: 71   Resp: 20 SpO2: 97 % O2 Device: BiPAP/CPAP Oxygen Delivery: 1 LPM  Vitals:    09/21/21 1451 09/25/21 0558 09/28/21 0632   Weight: 124.3 kg (274 lb) 117.6 kg (259 lb 4.2 oz) " 116.3 kg (256 lb 6.3 oz)     Vital Signs with Ranges  Temp:  [97.3  F (36.3  C)-97.9  F (36.6  C)] 97.9  F (36.6  C)  Pulse:  [71-86] 71  Resp:  [18-22] 20  BP: (125-137)/(43-86) 133/55  SpO2:  [93 %-99 %] 97 %  I/O last 3 completed shifts:  In: 500 [P.O.:500]  Out: -        Constitutional: Alert awake, oriented x3 though forgetful, very pleasant and appears comfortable.  HEENT: PERRLA, EOMI.  Respiratory: Bilateral equal air entry, no crackles or wheezing, normal work of breathing.  On 2-3 LPM    Cardiovascular: S1-S2 regular with loud early systolic murmur, trace edema.  GI:  soft, NT/ND, BS normal  Skin/Integumen:  No acute rash or sign of bleeding.           Medications   All medications reviewed on 10/01/2021        artificial tears ophthalmic solution  1 drop Both Eyes BID     citalopram  20 mg Oral Daily     furosemide  20 mg Oral Daily     levothyroxine  200 mcg Oral QAM AC     multivitamin  with lutein  2 capsule Oral Daily     pantoprazole  40 mg Oral QAM AC     simvastatin  10 mg Oral At Bedtime     sodium chloride (PF)  3 mL Intracatheter Q8H       Data   Recent Labs   Lab 09/30/21  1018 09/29/21  1031 09/28/21  0818 09/27/21  0918 09/27/21  0859 09/27/21  0859 09/26/21  1726 09/26/21  0907   WBC  --   --   --   --   --   --   --  10.6   HGB 9.3* 9.5* 8.4*  --    < > 9.4*  --  8.9*   MCV  --   --   --   --   --   --   --  80   PLT  --   --   --   --   --   --   --  300   NA  --  136  --   --   --  139  --  139   POTASSIUM  --  3.6  --   --   --  3.9  --  3.7   CHLORIDE  --  96  --   --   --  95  --  96   CO2  --  38*  --   --   --  40*  --  40*   BUN  --  23  --   --   --  28  --  28   CR  --  1.04  --   --   --  1.02  --  1.17*   ANIONGAP  --  2*  --   --   --  4  --  3   IGOR  --  8.6  --   --   --  8.5  --  8.4*   GLC  --  151*  --  117*  --  114*   < > 116*    < > = values in this interval not displayed.       No results found for this or any previous visit (from the past 24 hour(s)).

## 2021-10-02 ENCOUNTER — APPOINTMENT (OUTPATIENT)
Dept: PHYSICAL THERAPY | Facility: CLINIC | Age: 81
DRG: 840 | End: 2021-10-02
Payer: MEDICARE

## 2021-10-02 ENCOUNTER — APPOINTMENT (OUTPATIENT)
Dept: OCCUPATIONAL THERAPY | Facility: CLINIC | Age: 81
DRG: 840 | End: 2021-10-02
Payer: MEDICARE

## 2021-10-02 PROCEDURE — 250N000013 HC RX MED GY IP 250 OP 250 PS 637: Performed by: HOSPITALIST

## 2021-10-02 PROCEDURE — 250N000013 HC RX MED GY IP 250 OP 250 PS 637: Performed by: INTERNAL MEDICINE

## 2021-10-02 PROCEDURE — 999N000157 HC STATISTIC RCP TIME EA 10 MIN

## 2021-10-02 PROCEDURE — 120N000001 HC R&B MED SURG/OB

## 2021-10-02 PROCEDURE — 99232 SBSQ HOSP IP/OBS MODERATE 35: CPT | Performed by: HOSPITALIST

## 2021-10-02 PROCEDURE — 97535 SELF CARE MNGMENT TRAINING: CPT | Mod: GO

## 2021-10-02 PROCEDURE — 97116 GAIT TRAINING THERAPY: CPT | Mod: GP

## 2021-10-02 PROCEDURE — 97530 THERAPEUTIC ACTIVITIES: CPT | Mod: GP

## 2021-10-02 RX ADMIN — Medication 2 CAPSULE: at 09:22

## 2021-10-02 RX ADMIN — CITALOPRAM HYDROBROMIDE 20 MG: 20 TABLET ORAL at 09:22

## 2021-10-02 RX ADMIN — Medication 1 DROP: at 20:13

## 2021-10-02 RX ADMIN — SIMVASTATIN 10 MG: 10 TABLET, FILM COATED ORAL at 20:13

## 2021-10-02 RX ADMIN — LEVOTHYROXINE SODIUM 200 MCG: 100 TABLET ORAL at 09:22

## 2021-10-02 RX ADMIN — FUROSEMIDE 20 MG: 20 TABLET ORAL at 09:22

## 2021-10-02 RX ADMIN — Medication 1 DROP: at 09:22

## 2021-10-02 RX ADMIN — PANTOPRAZOLE SODIUM 40 MG: 40 TABLET, DELAYED RELEASE ORAL at 09:22

## 2021-10-02 ASSESSMENT — ACTIVITIES OF DAILY LIVING (ADL)
ADLS_ACUITY_SCORE: 23
ADLS_ACUITY_SCORE: 24
ADLS_ACUITY_SCORE: 23
ADLS_ACUITY_SCORE: 24

## 2021-10-02 ASSESSMENT — MIFFLIN-ST. JEOR: SCORE: 1542.24

## 2021-10-02 NOTE — PLAN OF CARE
DATE & TIME: 10/1/2021 1033-3762  Cognitive Concerns/Orientation : A&Ox4, forgetful at times.   BEHAVIOR & AGGRESSION TOOL COLOR: Green             ABNL VS/O2: VSS on 2L O2 NC while awake, BiPAP placed by RT for overnight.    MOBILITY: Assist of 1, walker, GB   PAIN MANAGMENT: Denies pain   DIET: 2 gm Na diet, tolerating, good appetite.   BOWEL/BLADDER:  incontinent of b/b at times, using purewick in bed, otherwise up to bathroom.   ABNL LAB/BG:  Hgb 9.3  DRAIN/DEVICES: L arm PIV, saline locked   TELEMETRY RHYTHM: N/A  SKIN: Rash underneath breasts and abdominal fold. Scattered bruising. Red area over old IV site on R forearm.   TESTS/PROCEDURES: Plan for EUS with fine needle biopsy 10/5  D/C DATE: Discharge pending final results of pancreatic biopsy. Planning TCU  OTHER IMPORTANT INFO: GI and hem/onc following. Droplet isolation maintained, continues to be COVID asymptomatic.

## 2021-10-02 NOTE — PLAN OF CARE
Cognitive Concerns/ Orientation : A&O x 4, forgetful at times   BEHAVIOR & AGGRESSION TOOL COLOR: Green  ABNL VS/O2: VSS on 2L O2 NC, BiPAP overnight.   MOBILITY: Assist x 1, GB and walker  PAIN MANAGMENT: Denies pain   DIET: 2 gram Na diet, tolerating, good appetite.   BOWEL/BLADDER: Up to bathroom, incontinent at times. BM x1 today.  ABNL LAB/BG: no new labs today   DRAIN/DEVICES: PIV, saline locked   TELEMETRY RHYTHM: n/a  SKIN: Scattered bruises. Redness underneath breasts and abdominal folds, powder applied. Redness to old IV site on right forearm  TESTS/PROCEDURES: Plan for EUS with fine needle biopsy on 10/5/21  D/C DATE: Discharge pending final results of repeat pancreatic biopsy. TCU recommended.   OTHER IMPORTANT INFO: GI, Hem/Onc following. Droplet precautions maintained. Continues to be asymptomatic for COVID. LS diminished, TAVARES, denies CP/tightness.

## 2021-10-02 NOTE — PROGRESS NOTES
[facility]    Hospitalist Progress Note    Date of Service (when I saw the patient): 10/02/2021  Admit date: 9/20/2021      Interval History   Patient noted that she normally has 4 loose-antwan stools daily but had not yet had a BM today. She was sitting up eating lunch at around 11:30 am. Told her I would follow up on bowel tomorrow.    - Patient's daughter Carmen reported that patient's Son had visited patient on 25th, then he was not feeling good and had COVID test on 28th and is positive.    - No acute issues reported, no hematochezia or melena.     Assessment & Plan   Lucille Rojas is a 81 year old female admitted on 9/20/2021. She presented with SOB and constipation.     Severe, symptomatic anemia at admission.   Acute blood loss anemia on chronic anemia  Reports several weeks of ongoing symptoms indicating a somewhat chronic course. Has noted black, tarry stools for at least 3-4 weeks.  S/p EGD on 09/21/21 revealed a few gastric erosions and a few tiny aphthous ulcers in the duodenum, but not severe enough to account for anemia.   * S/p 2 units with Hgb 4.7 > 7.2 on admission, 1 unit on 9/22/21 and 1 unit on 9/24 with appropriate rise in Hb.  9.4 today.  * Iron studies reveal low levels > S/p venofer 300 mg IV x 2, last dose 9/23/21.  Recheck iron level is a still low, will give 2 more doses of 200 mg IV each.  - Protonix 40 mg daily.   - Colonoscopy held initially due to acute encephalopathy with respiratory failure (see below) on 9/23/21.  Now with no overt bleeding and stable hemoglobin, and given risk with procedure given cardiac conditions, no plan for colonoscopy at this time.   - Transfuse to keep hgb > 7.    - Hgb has been stable for days    Presumed primary pancreatic cancer Noted on CT.  R pleural effusion, transudate   S/p thoracentesis - 450 ml of verenice fluid on 09/22/21   S/p EUS on 09/21/21 with biopsy. Awaiting final path.   - Cytology on pleural fluid negative for  malignant cells  - Pathology inconclusive. Additional tests sent on sample by oncology. Possible repeat endoscopy next week for additional tissue sampling.    Acute encephalopathy, Resolved - On 09/23/21, patient remained oriented and following commands but very lethargic, drifting off in mid-sentence. Labs significant for leukocytosis, hypercapnia on ABG and elevated BNP. She is afebrile, hemodynamically stable, stable oxygenation.   - Since 09/25, patient has been AAOx3, and answering questions appropriately, making good eye contact. Per nursing, patient does appear forgetful.  - Treating hypercapnia CHF and UTI as below.     Acute on chronic hypercapnia -  ABG: pH 7.22, CO2 85, O2 121 while on 5L on 9/23/21.   Obesity, SHAVON with hypoventilation syndrome   Chronic hypoxia on home oxygen - Per family she requires oxygen during the day as well, but has had trouble getting this ordered through PCP. On BiPAP at night with4 LPM O2 PTA  - Continue to wean O2 as tolerates to keep O2 saturations around 90-93%  - BiPAP at night and during naps during day as well.   - When awake > up in chair.    E coli UTI - Patient lethargic and unable to give clear history regarding urinary symptoms at presentation. UA showed 166 WBC, small nitrates.   Leukocytosis - No T over 100 since admission. Hemodynamically stable. No localizing symptoms of infection. No Urinary symptoms. UA done. CT chest/abdomen/pelvis as above, no sign of localized infection.  - F/u on pleural cx -NGTD but very low suspicion of infection in this location.   - UCx from 9/23/21: 2 strains of E. coli noted, sensitive to Rocephin   - Initially on IV Rocephin, changed to p.o. Ceftin based on sensitivity. Completed 7 day course.  - BCx NGTD and patient is afebrile, encephalopathy now resolved.    Acute decompensated HF, unknown EF - developing acute encephalopathy, hypercapnia on 09/23/21. Workup revealed BNP up to 4000's from 800 at admission. Mild pulmonary edema on  CXR. Oxygenation stable, but on 3 L.   Mod to severe aortic stenosis, new diagnosis   TAVARES, CP and light-headedness with minimal exertion - Concerning for symptomatic aortic stenosis, although anemia and chronic obesity hypoventilation syndrome with chronic hyoxia undoubtedly contribute. Initial troponin negative. CXR with R pleural effusion.   Transudate R pleural effusion, edema on admission. S/p thoracentesis on 9/22.   * Serial troponins negative  * Echo EF 60-65% No WMAs. RV fxn normal. Mod to severe aortic stenosis with aortic mean valve area of 1 cm2, mean gradient 37.5 mm Hg.   - On 09/23/21, BNP quite elevated at 4000 on 09/23/21 (up from 800s at admission) CXR reviewed and shows mild intestinal markings, suspect edema.   - was managed with IV lasix, Cr slightly up and so lasix held and stable at 1.1, respiratory status has markedly improved and now stable at 2 LPM.   - Low dose p.o. Lasix started 9/28, follow daily weight and periodic BMP. Patient is incontinent, given her recent UTI, Espitia catheter discontinued.  - Cardiology consulted on 09/24/21 re: input on options (TAVR) and prognosis.  With her unknown source of bleeding and since GI work-up could not be completed, patient not on candidate for anticoagulation and so not a candidate for further work-up including angiogram or TAVR.     Severe macular degeneration  Vision loss  - ordered schedule eye drops and ocular vitamins  - assistance with menu per nutrition. Appreciate assistance.    Goals of Care. Care conference 9/27, myself and Dr. Srinivasan present.  Patient, her , 2 daughters as well present and her son also present over the phone.  It was reviewed with family that patient is not a candidate for TAVR and that aortic stenosis as well as obesity hypoventilation and since GI work up could not be completed to find source of bleeding. Also she is of high risk for a GI work up as well. And her illnesses could also limit options for treatment of  "presumed cancer. Although we do not have a definitive diagnosis yet. So.  This point, plan is to await final pathology report.  It is possible that it may not yield a diagnosis in which case family would like to repeat biopsy.  Family hopeful to be able to get her home eventually so she can spend \"few days in her own bed.\" They are very realistic about current prognosis.      CKD 3  - monitor     DMT2 diet controlled.  last A1c 5.8 on 4/7/21. Did not recheck due to severe anemia  -Blood sugars within normal limit, discontinue checks     Hypothyroid TSH normal at admission.   - Continue PTA levothyroxine     Depression  - Continue PTA citalopram     HLD  - Continue PTA statin    COVID 19 exposure  Per her daughter, patient's Son who visited patient 9/25 had symptoms and tested positive on 9/28. Patient considered exposed and needs to be on quarantine for 14 days per protocol. Continue weekly testing as per routine protocol. To test sooner if any COVID 19 symptoms. Last test 9/28 negative.     Diet: Orders Placed This Encounter      Combination Diet 2 gm NA Diet      NPO for Medical/Clinical Reasons Except for: Meds     Espitia Catheter: Not present     DVT Prophylaxis: Pneumatic Compression Devices  Code Status: No CPR- Do NOT Intubate     Disposition: Expected discharge TBD, based on further plan after repeat biopsy with pathology  Communication: Discussed with patient and bedside RN. Carmen called 10/1.    Vera [facility]  Hospitalist Service  [facility]  Securely message with the Vocera Web Console (learn more here)  Text page via [employer] Paging/Directory    -Data reviewed today: I reviewed all new labs  results over the last 24 hours. I personally reviewed no images or EKG's today.    Physical Exam   Temp: 97.9  F (36.6  C) Temp src: Axillary BP: 127/58 Pulse: 71   Resp: 18 SpO2: 95 % O2 Device: Nasal cannula Oxygen Delivery: 3 LPM  Vitals:    09/25/21 0558 09/28/21 0632 10/02/21 0410 "   Weight: 117.6 kg (259 lb 4.2 oz) 116.3 kg (256 lb 6.3 oz) 116.6 kg (257 lb)     Vital Signs with Ranges  Temp:  [97.5  F (36.4  C)-97.9  F (36.6  C)] 97.9  F (36.6  C)  Pulse:  [65-80] 71  Resp:  [16-20] 18  BP: (127-135)/(47-58) 127/58  SpO2:  [95 %-100 %] 95 %  I/O last 3 completed shifts:  In: 720 [P.O.:720]  Out: -        Constitutional: Alert awake, oriented x3 though forgetful, very pleasant and appears comfortable.  HEENT: PERRLA, EOMI.  Respiratory: Bilateral equal air entry, no crackles or wheezing, normal work of breathing.  On 2-3 LPM    Cardiovascular: S1-S2 regular with loud early systolic murmur, trace edema.  GI:  soft, NT/ND, BS normal  Skin/Integumen:  No acute rash or sign of bleeding.           Medications   All medications reviewed on 10/02/2021        artificial tears ophthalmic solution  1 drop Both Eyes BID     citalopram  20 mg Oral Daily     furosemide  20 mg Oral Daily     levothyroxine  200 mcg Oral QAM AC     multivitamin  with lutein  2 capsule Oral Daily     pantoprazole  40 mg Oral QAM AC     simvastatin  10 mg Oral At Bedtime     sodium chloride (PF)  3 mL Intracatheter Q8H       Data   Recent Labs   Lab 09/30/21  1018 09/29/21  1031 09/28/21  0818 09/27/21  0918 09/27/21  0859 09/27/21  0859 09/26/21  1726 09/26/21  0907   WBC  --   --   --   --   --   --   --  10.6   HGB 9.3* 9.5* 8.4*  --    < > 9.4*  --  8.9*   MCV  --   --   --   --   --   --   --  80   PLT  --   --   --   --   --   --   --  300   NA  --  136  --   --   --  139  --  139   POTASSIUM  --  3.6  --   --   --  3.9  --  3.7   CHLORIDE  --  96  --   --   --  95  --  96   CO2  --  38*  --   --   --  40*  --  40*   BUN  --  23  --   --   --  28  --  28   CR  --  1.04  --   --   --  1.02  --  1.17*   ANIONGAP  --  2*  --   --   --  4  --  3   IGOR  --  8.6  --   --   --  8.5  --  8.4*   GLC  --  151*  --  117*  --  114*   < > 116*    < > = values in this interval not displayed.       No results found for this or any  previous visit (from the past 24 hour(s)).

## 2021-10-02 NOTE — PROGRESS NOTES
I put pt on BIPAP AVAPS 350/20,EPAP 8,50%No flowsheet data found.   P 12,Max P 26. BBS: Diminished. SPO2 93%

## 2021-10-02 NOTE — PLAN OF CARE
DATE & TIME: 10/1/21, 5240 - 9286    Cognitive Concerns/ Orientation : A&O x 4, forgetful at times   BEHAVIOR & AGGRESSION TOOL COLOR: Green  ABNL VS/O2: VSS on BIPAP  MOBILITY: Assist x 1, GB and walker  PAIN MANAGMENT: Denied  DIET: 2 gram Sodium  BOWEL/BLADDER: Up to BSC. Incontinent at times  ABNL LAB/BG: NA  DRAIN/DEVICES: PIV SL  TELEMETRY RHYTHM: NA  SKIN: Scattered bruises. Rash underneath breasts and abdominal folds. Redness to old IV site on right forearm  TESTS/PROCEDURES: Plan for EUS with fine needle biopsy on 10/5/21  D/C DATE: Discharge pending final results of repeat pancreatic biopsy. TCU recommended  OTHER IMPORTANT INFO: GI, Hem/Onc following. Droplet precautions maintained. Asymptomatic for COVID

## 2021-10-03 ENCOUNTER — APPOINTMENT (OUTPATIENT)
Dept: OCCUPATIONAL THERAPY | Facility: CLINIC | Age: 81
DRG: 840 | End: 2021-10-03
Payer: MEDICARE

## 2021-10-03 ENCOUNTER — APPOINTMENT (OUTPATIENT)
Dept: PHYSICAL THERAPY | Facility: CLINIC | Age: 81
DRG: 840 | End: 2021-10-03
Payer: MEDICARE

## 2021-10-03 PROCEDURE — 999N000128 HC STATISTIC PERIPHERAL IV START W/O US GUIDANCE

## 2021-10-03 PROCEDURE — 999N000157 HC STATISTIC RCP TIME EA 10 MIN

## 2021-10-03 PROCEDURE — 97116 GAIT TRAINING THERAPY: CPT | Mod: GP

## 2021-10-03 PROCEDURE — 97530 THERAPEUTIC ACTIVITIES: CPT | Mod: GP

## 2021-10-03 PROCEDURE — 120N000001 HC R&B MED SURG/OB

## 2021-10-03 PROCEDURE — 250N000013 HC RX MED GY IP 250 OP 250 PS 637: Performed by: HOSPITALIST

## 2021-10-03 PROCEDURE — 250N000013 HC RX MED GY IP 250 OP 250 PS 637: Performed by: INTERNAL MEDICINE

## 2021-10-03 PROCEDURE — 94660 CPAP INITIATION&MGMT: CPT

## 2021-10-03 PROCEDURE — 97535 SELF CARE MNGMENT TRAINING: CPT | Mod: GO

## 2021-10-03 PROCEDURE — 99232 SBSQ HOSP IP/OBS MODERATE 35: CPT | Performed by: HOSPITALIST

## 2021-10-03 RX ADMIN — Medication 2 CAPSULE: at 09:35

## 2021-10-03 RX ADMIN — FUROSEMIDE 20 MG: 20 TABLET ORAL at 09:35

## 2021-10-03 RX ADMIN — SIMVASTATIN 10 MG: 10 TABLET, FILM COATED ORAL at 22:02

## 2021-10-03 RX ADMIN — Medication 1 DROP: at 22:02

## 2021-10-03 RX ADMIN — LEVOTHYROXINE SODIUM 200 MCG: 100 TABLET ORAL at 09:35

## 2021-10-03 RX ADMIN — PANTOPRAZOLE SODIUM 40 MG: 40 TABLET, DELAYED RELEASE ORAL at 09:35

## 2021-10-03 RX ADMIN — CITALOPRAM HYDROBROMIDE 20 MG: 20 TABLET ORAL at 09:35

## 2021-10-03 RX ADMIN — Medication 1 DROP: at 09:34

## 2021-10-03 ASSESSMENT — ACTIVITIES OF DAILY LIVING (ADL)
ADLS_ACUITY_SCORE: 24

## 2021-10-03 NOTE — PROGRESS NOTES
[facility]    Hospitalist Progress Note    Date of Service (when I saw the patient): 10/03/2021  Admit date: 9/20/2021      Interval History   No acute issues today. Possible procedure tomorrow.    - Patient's daughter Carmen reported that patient's Son had visited patient on 25th, then he was not feeling good and had COVID test on 28th and is positive.    - No acute issues reported, no hematochezia or melena.     Assessment & Plan   Lucille Rojas is a 81 year old female admitted on 9/20/2021. She presented with SOB and constipation.     Severe, symptomatic anemia at admission.   Acute blood loss anemia on chronic anemia  Reports several weeks of ongoing symptoms indicating a somewhat chronic course. Has noted black, tarry stools for at least 3-4 weeks.  S/p EGD on 09/21/21 revealed a few gastric erosions and a few tiny aphthous ulcers in the duodenum, but not severe enough to account for anemia.   * S/p 2 units with Hgb 4.7 > 7.2 on admission, 1 unit on 9/22/21 and 1 unit on 9/24 with appropriate rise in Hb.  9.4 today.  * Iron studies reveal low levels > S/p venofer 300 mg IV x 2, last dose 9/23/21.  Recheck iron level is a still low, will give 2 more doses of 200 mg IV each.  - Protonix 40 mg daily.   - Colonoscopy held initially due to acute encephalopathy with respiratory failure (see below) on 9/23/21.  Now with no overt bleeding and stable hemoglobin, and given risk with procedure given cardiac conditions, no plan for colonoscopy at this time.   - Transfuse to keep hgb > 7.    - Hgb has been stable for days. Will recheck labs tomorrow.    Presumed primary pancreatic cancer Noted on CT.  R pleural effusion, transudate   S/p thoracentesis - 450 ml of verenice fluid on 09/22/21   S/p EUS on 09/21/21 with biopsy. Awaiting final path.   - Cytology on pleural fluid negative for malignant cells  - Pathology inconclusive. Additional tests sent on sample by oncology. Possible repeat endoscopy  next week for additional tissue sampling.  - NPO at midnight in case endoscopy can be done tomorrow.    Acute encephalopathy, Resolved - On 09/23/21, patient remained oriented and following commands but very lethargic, drifting off in mid-sentence. Labs significant for leukocytosis, hypercapnia on ABG and elevated BNP. She is afebrile, hemodynamically stable, stable oxygenation.   - Since 09/25, patient has been AAOx3, and answering questions appropriately, making good eye contact. Per nursing, patient does appear forgetful.  - Treating hypercapnia CHF and UTI as below.     Acute on chronic hypercapnia -  ABG: pH 7.22, CO2 85, O2 121 while on 5L on 9/23/21.   Obesity, SHAVON with hypoventilation syndrome   Chronic hypoxia on home oxygen - Per family she requires oxygen during the day as well, but has had trouble getting this ordered through PCP. On BiPAP at night with4 LPM O2 PTA  - Continue to wean O2 as tolerates to keep O2 saturations around 90-93%  - BiPAP at night and during naps during day as well.   - When awake > up in chair.    E coli UTI - Patient lethargic and unable to give clear history regarding urinary symptoms at presentation. UA showed 166 WBC, small nitrates.   Leukocytosis - No T over 100 since admission. Hemodynamically stable. No localizing symptoms of infection. No Urinary symptoms. UA done. CT chest/abdomen/pelvis as above, no sign of localized infection.  - F/u on pleural cx -NGTD but very low suspicion of infection in this location.   - UCx from 9/23/21: 2 strains of E. coli noted, sensitive to Rocephin   - Initially on IV Rocephin, changed to p.o. Ceftin based on sensitivity. Completed 7 day course.  - BCx NGTD and patient is afebrile, encephalopathy now resolved.    Acute decompensated HF, unknown EF - developing acute encephalopathy, hypercapnia on 09/23/21. Workup revealed BNP up to 4000's from 800 at admission. Mild pulmonary edema on CXR. Oxygenation stable, but on 3 L.   Mod to severe  aortic stenosis, new diagnosis   TAVARES, CP and light-headedness with minimal exertion - Concerning for symptomatic aortic stenosis, although anemia and chronic obesity hypoventilation syndrome with chronic hyoxia undoubtedly contribute. Initial troponin negative. CXR with R pleural effusion.   Transudate R pleural effusion, edema on admission. S/p thoracentesis on 9/22.   * Serial troponins negative  * Echo EF 60-65% No WMAs. RV fxn normal. Mod to severe aortic stenosis with aortic mean valve area of 1 cm2, mean gradient 37.5 mm Hg.   - On 09/23/21, BNP quite elevated at 4000 on 09/23/21 (up from 800s at admission) CXR reviewed and shows mild intestinal markings, suspect edema.   - was managed with IV lasix, Cr slightly up and so lasix held and stable at 1.1, respiratory status has markedly improved and now stable at 2 LPM.   - Low dose p.o. Lasix started 9/28, follow daily weight and periodic BMP. Patient is incontinent, given her recent UTI, Espitia catheter discontinued.  - Cardiology consulted on 09/24/21 re: input on options (TAVR) and prognosis.  With her unknown source of bleeding and since GI work-up could not be completed, patient not on candidate for anticoagulation and so not a candidate for further work-up including angiogram or TAVR.     Severe macular degeneration  Vision loss  - ordered schedule eye drops and ocular vitamins  - assistance with menu per nutrition. Appreciate assistance.    Goals of Care. Care conference 9/27, myself and Dr. Srinivasan present.  Patient, her , 2 daughters as well present and her son also present over the phone.  It was reviewed with family that patient is not a candidate for TAVR and that aortic stenosis as well as obesity hypoventilation and since GI work up could not be completed to find source of bleeding. Also she is of high risk for a GI work up as well. And her illnesses could also limit options for treatment of presumed cancer. Although we do not have a definitive  "diagnosis yet. So.  This point, plan is to await final pathology report.  It is possible that it may not yield a diagnosis in which case family would like to repeat biopsy.  Family hopeful to be able to get her home eventually so she can spend \"few days in her own bed.\" They are very realistic about current prognosis.      CKD 3  - monitor     DMT2 diet controlled.  last A1c 5.8 on 4/7/21. Did not recheck due to severe anemia  -Blood sugars within normal limit, discontinue checks     Hypothyroid TSH normal at admission.   - Continue PTA levothyroxine     Depression  - Continue PTA citalopram     HLD  - Continue PTA statin    COVID 19 exposure  Per her daughter, patient's Son who visited patient 9/25 had symptoms and tested positive on 9/28. Patient considered exposed and needs to be on quarantine for 14 days per protocol. Continue weekly testing as per routine protocol. To test sooner if any COVID 19 symptoms. Last test 9/28 negative.     Diet: Orders Placed This Encounter      Combination Diet 2 gm NA Diet      NPO for Medical/Clinical Reasons Except for: Meds      NPO per Anesthesia Guidelines for Procedure/Surgery Except for: Meds     Espitia Catheter: Not present     DVT Prophylaxis: Pneumatic Compression Devices  Code Status: No CPR- Do NOT Intubate     Disposition: Expected discharge TBD, based on further plan after repeat biopsy with pathology  Communication: Discussed with patient and bedside RN. Carmen called 10/1.    [facility]  Hospitalist Service  [facility]  Securely message with the Vocera Web Console (learn more here)  Text page via [employer] Paging/Directory    -Data reviewed today: I reviewed all new labs  results over the last 24 hours. I personally reviewed no images or EKG's today.    Physical Exam   Temp: 97.4  F (36.3  C) Temp src: Axillary BP: (!) 147/49 Pulse: 74   Resp: 20 SpO2: 97 % O2 Device: BiPAP/CPAP Oxygen Delivery: 2 LPM  Vitals:    09/25/21 0558 09/28/21 0632 " 10/02/21 0410   Weight: 117.6 kg (259 lb 4.2 oz) 116.3 kg (256 lb 6.3 oz) 116.6 kg (257 lb)     Vital Signs with Ranges  Temp:  [97.4  F (36.3  C)-97.8  F (36.6  C)] 97.4  F (36.3  C)  Pulse:  [74-87] 74  Resp:  [18-20] 20  BP: (112-147)/(44-53) 147/49  SpO2:  [96 %-100 %] 97 %  I/O last 3 completed shifts:  In: 120 [P.O.:120]  Out: -        Constitutional: Alert awake, oriented x3 though forgetful, very pleasant and appears comfortable.  HEENT: PERRLA, EOMI.  Respiratory: Bilateral equal air entry, no crackles or wheezing, normal work of breathing.  On 2-3 LPM    Cardiovascular: S1-S2 regular with loud early systolic murmur, trace edema.  GI:  soft, NT/ND, BS normal  Skin/Integumen:  No acute rash or sign of bleeding.           Medications   All medications reviewed on 10/03/2021        artificial tears ophthalmic solution  1 drop Both Eyes BID     citalopram  20 mg Oral Daily     furosemide  20 mg Oral Daily     levothyroxine  200 mcg Oral QAM AC     multivitamin  with lutein  2 capsule Oral Daily     pantoprazole  40 mg Oral QAM AC     simvastatin  10 mg Oral At Bedtime     sodium chloride (PF)  3 mL Intracatheter Q8H       Data   Recent Labs   Lab 09/30/21  1018 09/29/21  1031 09/28/21  0818 09/27/21  0918 09/27/21  0859 09/27/21  0859   HGB 9.3* 9.5* 8.4*  --    < > 9.4*   NA  --  136  --   --   --  139   POTASSIUM  --  3.6  --   --   --  3.9   CHLORIDE  --  96  --   --   --  95   CO2  --  38*  --   --   --  40*   BUN  --  23  --   --   --  28   CR  --  1.04  --   --   --  1.02   ANIONGAP  --  2*  --   --   --  4   IGOR  --  8.6  --   --   --  8.5   GLC  --  151*  --  117*  --  114*    < > = values in this interval not displayed.       No results found for this or any previous visit (from the past 24 hour(s)).

## 2021-10-03 NOTE — PLAN OF CARE
DATE & TIME: 10/2/2021 6172-2863  Cognitive Concerns/ Orientation : A/O x 4, forgetful at times   BEHAVIOR & AGGRESSION TOOL COLOR: Green, calm/cooperative  ABNL VS/O2: VSS on 2L O2 NC, BiPAP overnight.   MOBILITY: Assist x 1/GB/walker  PAIN MANAGMENT: Denies pain   DIET: 2 gram Na diet, tolerating, good appetite.   BOWEL/BLADDER: Up to bathroom, incontinent at times, no BM this shift  ABNL LAB/BG: none today  DRAIN/DEVICES: PIV, SL  TELEMETRY RHYTHM: n/a  SKIN: Scattered bruises. Redness underneath breasts and abdominal folds, powder applied. Redness to old IV site on right forearm  TESTS/PROCEDURES: Plan for EUS with fine needle biopsy on 10/5/21  D/C DATE: Discharge pending final results of repeat pancreatic biopsy. TCU recommended.   OTHER IMPORTANT INFO: Pt denies pain/SOB, LS-diminished, TAVARES, no cough, GI, Hem/Onc following. Droplet precautions maintained. Continues to be asymptomatic for COVID.

## 2021-10-03 NOTE — PROGRESS NOTES
GI Brief Note    Repeat EUS for biopsies is currently scheduled for Tuesday, Oct. 5 at 2:30pm with Dr. Olivier.    Lisa Keene MD  [facility]  [phone]

## 2021-10-03 NOTE — PLAN OF CARE
Cognitive Concerns/ Orientation : A/O x 4, forgetful at times   BEHAVIOR & AGGRESSION TOOL COLOR: Green, calm/cooperative  ABNL VS/O2: VSS on 2L O2 NC, BiPAP overnight.   MOBILITY: SBA w/walker and GB  PAIN MANAGMENT: Denies pain   DIET: 2 gram Na diet, tolerating, good appetite. - NPO at midnight, incase procedure can be done tomorrow.  BOWEL/BLADDER: Up to bathroom, incontinent at times,  ABNL LAB/BG: none today  DRAIN/DEVICES: PIV, SL  TELEMETRY RHYTHM: n/a  SKIN: Scattered bruises. Redness underneath breasts and abdominal folds, powder applied. Redness to old IV site on right forearm  TESTS/PROCEDURES: Plan for EUS with fine needle biopsy on 10/5/21 - NPO at midnight tonight incase it can be moved to tomorrow.   D/C DATE: Discharge pending final results of repeat pancreatic biopsy. TCU recommended.   OTHER IMPORTANT INFO: LS diminished, TAVARES, no cough. GI, Hem/Onc following. Droplet precautions maintained. Continues to be asymptomatic for COVID.

## 2021-10-04 ENCOUNTER — APPOINTMENT (OUTPATIENT)
Dept: OCCUPATIONAL THERAPY | Facility: CLINIC | Age: 81
DRG: 840 | End: 2021-10-04
Payer: MEDICARE

## 2021-10-04 ENCOUNTER — APPOINTMENT (OUTPATIENT)
Dept: PHYSICAL THERAPY | Facility: CLINIC | Age: 81
DRG: 840 | End: 2021-10-04
Payer: MEDICARE

## 2021-10-04 LAB
ANION GAP SERPL CALCULATED.3IONS-SCNC: 2 MMOL/L (ref 3–14)
BASOPHILS # BLD AUTO: 0 10E3/UL (ref 0–0.2)
BASOPHILS NFR BLD AUTO: 0 %
BUN SERPL-MCNC: 24 MG/DL (ref 7–30)
CALCIUM SERPL-MCNC: 8.5 MG/DL (ref 8.5–10.1)
CHLORIDE BLD-SCNC: 100 MMOL/L (ref 94–109)
CO2 SERPL-SCNC: 38 MMOL/L (ref 20–32)
CREAT SERPL-MCNC: 1.01 MG/DL (ref 0.52–1.04)
EOSINOPHIL # BLD AUTO: 0.4 10E3/UL (ref 0–0.7)
EOSINOPHIL NFR BLD AUTO: 6 %
ERYTHROCYTE [DISTWIDTH] IN BLOOD BY AUTOMATED COUNT: ABNORMAL %
GFR SERPL CREATININE-BSD FRML MDRD: 52 ML/MIN/1.73M2
GLUCOSE BLD-MCNC: 92 MG/DL (ref 70–99)
HCT VFR BLD AUTO: 28.8 % (ref 35–47)
HGB BLD-MCNC: 8.1 G/DL (ref 11.7–15.7)
IMM GRANULOCYTES # BLD: 0 10E3/UL
IMM GRANULOCYTES NFR BLD: 0 %
LYMPHOCYTES # BLD AUTO: 1.1 10E3/UL (ref 0.8–5.3)
LYMPHOCYTES NFR BLD AUTO: 17 %
MCH RBC QN AUTO: 23.8 PG (ref 26.5–33)
MCHC RBC AUTO-ENTMCNC: 28.1 G/DL (ref 31.5–36.5)
MCV RBC AUTO: 85 FL (ref 78–100)
MONOCYTES # BLD AUTO: 0.8 10E3/UL (ref 0–1.3)
MONOCYTES NFR BLD AUTO: 11 %
NEUTROPHILS # BLD AUTO: 4.6 10E3/UL (ref 1.6–8.3)
NEUTROPHILS NFR BLD AUTO: 66 %
NRBC # BLD AUTO: 0 10E3/UL
NRBC BLD AUTO-RTO: 0 /100
PLATELET # BLD AUTO: 291 10E3/UL (ref 150–450)
POTASSIUM BLD-SCNC: 4.2 MMOL/L (ref 3.4–5.3)
RBC # BLD AUTO: 3.4 10E6/UL (ref 3.8–5.2)
SODIUM SERPL-SCNC: 140 MMOL/L (ref 133–144)
WBC # BLD AUTO: 6.9 10E3/UL (ref 4–11)

## 2021-10-04 PROCEDURE — 250N000013 HC RX MED GY IP 250 OP 250 PS 637: Performed by: STUDENT IN AN ORGANIZED HEALTH CARE EDUCATION/TRAINING PROGRAM

## 2021-10-04 PROCEDURE — 999N000157 HC STATISTIC RCP TIME EA 10 MIN

## 2021-10-04 PROCEDURE — 85025 COMPLETE CBC W/AUTO DIFF WBC: CPT | Performed by: HOSPITALIST

## 2021-10-04 PROCEDURE — 250N000013 HC RX MED GY IP 250 OP 250 PS 637: Performed by: INTERNAL MEDICINE

## 2021-10-04 PROCEDURE — 80048 BASIC METABOLIC PNL TOTAL CA: CPT | Performed by: HOSPITALIST

## 2021-10-04 PROCEDURE — 99232 SBSQ HOSP IP/OBS MODERATE 35: CPT | Performed by: HOSPITALIST

## 2021-10-04 PROCEDURE — 250N000013 HC RX MED GY IP 250 OP 250 PS 637: Performed by: HOSPITALIST

## 2021-10-04 PROCEDURE — 36415 COLL VENOUS BLD VENIPUNCTURE: CPT | Performed by: HOSPITALIST

## 2021-10-04 PROCEDURE — 94660 CPAP INITIATION&MGMT: CPT

## 2021-10-04 PROCEDURE — 120N000001 HC R&B MED SURG/OB

## 2021-10-04 PROCEDURE — 97116 GAIT TRAINING THERAPY: CPT | Mod: GP

## 2021-10-04 PROCEDURE — 97535 SELF CARE MNGMENT TRAINING: CPT | Mod: GO

## 2021-10-04 PROCEDURE — 97530 THERAPEUTIC ACTIVITIES: CPT | Mod: GP

## 2021-10-04 RX ADMIN — Medication 1 MG: at 01:25

## 2021-10-04 RX ADMIN — Medication 1 DROP: at 08:54

## 2021-10-04 RX ADMIN — SIMVASTATIN 10 MG: 10 TABLET, FILM COATED ORAL at 22:00

## 2021-10-04 RX ADMIN — Medication 1 DROP: at 21:59

## 2021-10-04 RX ADMIN — CITALOPRAM HYDROBROMIDE 20 MG: 20 TABLET ORAL at 08:55

## 2021-10-04 RX ADMIN — FUROSEMIDE 20 MG: 20 TABLET ORAL at 08:55

## 2021-10-04 RX ADMIN — PANTOPRAZOLE SODIUM 40 MG: 40 TABLET, DELAYED RELEASE ORAL at 08:55

## 2021-10-04 RX ADMIN — LEVOTHYROXINE SODIUM 200 MCG: 100 TABLET ORAL at 08:55

## 2021-10-04 RX ADMIN — Medication 2 CAPSULE: at 08:56

## 2021-10-04 ASSESSMENT — ACTIVITIES OF DAILY LIVING (ADL)
ADLS_ACUITY_SCORE: 25
ADLS_ACUITY_SCORE: 24
ADLS_ACUITY_SCORE: 23
ADLS_ACUITY_SCORE: 25

## 2021-10-04 ASSESSMENT — MIFFLIN-ST. JEOR: SCORE: 1551.5

## 2021-10-04 NOTE — PLAN OF CARE
DATE & TIME: 10/4/2021 days     Cognitive Concerns/ Orientation : alert and oriented x4    BEHAVIOR & AGGRESSION TOOL COLOR: green   CIWA SCORE:  na    ABNL VS/O2: VSS on 02 at 1 liter sats well uses BiPap at night   MOBILITY:  up with assistance of 1 and walker and gait belt   PAIN MANAGMENT:  denies   DIET:  2 gram Na and NPO after MN for procedure tomorrow  BOWEL/BLADDER:  continent occasionally has some incontinence no stools this shift   ABNL LAB/BG:  C02 38 hgb 8.1 anion gap 2   DRAIN/DEVICES:  SL  TELEMETRY RHYTHM:  na  SKIN:  redness under breasts and panus   TESTS/PROCEDURES: will have EUS and pancreatic biopsy at 1430 tomorrow. NPO after MN   D/C DATE:  uncertain   Discharge Barriers: needs to have EUS and pancreatic biopsy tomorrow and results    OTHER IMPORTANT INFO:  Pt up in the room and tolerated well, up in the chair for most of this shift. Ate well. VSS ,pt aware of biopsy tomorrow. Sats well on 1 liter.

## 2021-10-04 NOTE — PROGRESS NOTES
[facility] Progress Note     Interval History:    Denies abdominal pain. Good appetite.     Physical Exam:    /52 (BP Location: Left arm)   Pulse 75   Temp 97.5  F (36.4  C) (Axillary)   Resp 18   Ht 1.524 m (5')   Wt 117.5 kg (259 lb 0.7 oz)   SpO2 94%   BMI 50.59 kg/m    Temp (24hrs), Av.8  F (36.6  C), Min:97.5  F (36.4  C), Max:98  F (36.7  C)    Patient Vitals for the past 72 hrs:   Weight   10/04/21 0703 117.5 kg (259 lb 0.7 oz)   10/02/21 0410 116.6 kg (257 lb)       Intake/Output Summary (Last 24 hours) at 10/4/2021 0958  Last data filed at 10/3/2021 1900  Gross per 24 hour   Intake 240 ml   Output --   Net 240 ml       Constitutional: No acute distress  Cardiovascular: RRR, normal S1/S2,  Respiratory: Effort normal, CTA bilaterally  Abdomen: Soft, large, BS+, nontender    Laboratory Data  Recent Labs   Lab Test 10/04/21  0808 21  1018 21  1031 21  0859 21  0907 21  0737 21  0609   WBC 6.9  --   --   --  10.6  --  11.2*   HGB 8.1* 9.3* 9.5*   < > 8.9*   < > 7.0*   MCV 85  --   --   --  80  --  77*     --   --   --  300  --  293    < > = values in this interval not displayed.     Recent Labs   Lab Test 10/04/21  0808 21  1031 21  0859    136 139   POTASSIUM 4.2 3.6 3.9   CHLORIDE 100 96 95   CO2 38* 38* 40*   BUN 24 23 28   CR 1.01 1.04 1.02   ANIONGAP 2* 2* 4   IGOR 8.5 8.6 8.5     Recent Labs   Lab Test 21  1149 21  0324 21  1727 20  1305 16  0713 09/11/15  1542   ALBUMIN  --  3.3* 3.4 3.6   < > 3.5   BILITOTAL  --  0.5 0.4 0.2   < > 0.2   DBIL  --   --   --  <0.1  --   --    ALT  --  17 16 15   < > 23   AST  --  14 10 13   < > 12   ALKPHOS  --  104 101 112   < > 110   PROTEIN 50 *  --   --   --   --   --    LIPASE  --   --  124  --   --   --    AMYLASE  --   --   --   --   --  64    < > = values in this interval not displayed.       Imaging  EXAM: CT CHEST/ABDOMEN/PELVIS W CONTRAST  LOCATION: [facility]  [facility]  DATE/TIME: 9/20/2021 7:15 PM     IMPRESSION:  1.  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 This encases the celiac trunk and superior mesenteric artery and vein. GI consultation recommended.  2.  Moderate size right pleural effusion and right basilar atelectasis.  Endoscopic Workup  Upper EUS 09/21/2021  2:03 PM     Impression:               - Normal esophagus.                             - Non-bleeding erosive gastropathy. Biopsied.                             - Few tiny aphthous ulcers in duodenal bulb                             - A single tiny duodenal polyp. Removed with biopsy                             forceps                             - There was no sign of significant pathology in the                             common bile duct.                             - A mass was identified in the uncinate process of                             the pancreas. This was staged T4 N0 M0 by                             endosonographic criteria. The staging applies if                             malignancy is confirmed. Fine needle aspiration                             performed. Appearance not typical for                             adenocarcinoma.                             - EGD findings not significant enough to account                             for degree of anemia     Assessment & Plan:  82 yo female with past medical history of obesity, SHAVON, chronic hypoxia on home O2, admitted 9/20 with symptoms of SOB, constipation, melena with workup notable for CHF, UTI, severe anemia, and CT concerning for pancreatic cancer with right pleural effusion s/p thoracentesis.  EUS with FNA performed 9.21.  Results reactive vs malignancy per heme/onc.  FISH testing ordered.  Re-sampling recommended.    Case complicated by patient exposure to COVID-19 9/25.  She is currently asymptomatic and in quarantine until 10/8. COVID 19 9/20 and 9/28 negative     Hemoglobin  stable 8-9 without overt bleeding.  EUS showed nonbleeding erosive gastropathy and a few tiny duodenal aphtous ulcers, small duodenal polyp consistent with tubular adenoma without dysplasia.  Biopsy with scant chronic inflammation, no H. Pylori.  Colonoscopy last week cancelled due to cardiac status, confusion.     Plan  - EUS with repeat biopsy 2:30pm on Tuesday,Oct. 5, 2021 with Dr. Olivier   - Monitor H/H; transfuse prn.  - PPI  - Can eat today. NPO at midnight  - Discussed plan with RN     Patience Burris, CNP  [facility]  Cell: [phone] until 12PM   Office: [phone]

## 2021-10-04 NOTE — PROGRESS NOTES
CLINICAL NUTRITION SERVICES - REASSESSMENT NOTE    Recommendations Ordered by Registered Dietitian (RD):   - Room service w/ assist    Malnutrition: (9/28)   % Weight Loss:  Weight loss does not meet criteria for malnutrition   % Intake:  <75% for > 7 days (non-severe malnutrition)  Subcutaneous Fat Loss:  Thoracic region mild-moderate depletion  Muscle Loss:  Clavicle bone region mild depletion, Acromion bone region mild depletion and Scapular bone region mild depletion  Fluid Retention:  None noted     Malnutrition Diagnosis: Non-Severe malnutrition  In Context of:  Acute illness or injury  Chronic illness or disease     EVALUATION OF PROGRESS TOWARD GOALS   Diet: 2g Na diet  Room service w/ assist    Intake/Tolerance:   - Good appetite. Pt is being seen by nutrition associate to help her better understand sodium content of foods, and offer suggestions. She tells me that she has trouble reading due to macular degeneration. Ordered Green[place]ic toast, ryan for breakfast.   - Tolerating % of meals since 9/25.    - Last BM x3 on 10/1  - Last wt 117.5 kg today. Stable.     ASSESSED NUTRITION NEEDS: 9/22  Dosing Weight 124 kg actual (energy); 45 kg IBW (protein)  Estimated Energy Needs: 9234-5075 kcals (11-14 Kcal/Kg)  Justification: obese  Estimated Protein Needs: 90+ grams protein (2+ kg/kg IBW)  Justification: obesity guidelines  and preservation of lean body mass  Estimated Fluid Needs: per MD     NEW FINDINGS:   - Plan EUS w/ repeat biopsy tomorrow.     Previous Goals:   Intake of >75% balanced meals TID.   Evaluation: Met    Previous Nutrition Diagnosis:   Inadequate oral intake related to decreased appetite and period of NPO/liquid diets as evidenced by only ~1 day with good oral intake since admission  Evaluation: Completed    CURRENT NUTRITION DIAGNOSIS  No nutrition diagnosis identified at this time    INTERVENTIONS  Recommendations / Nutrition Prescription  Continue 2g Na diet  Room service w/ assistance      Implementation  Nutrition Education: reviewed diet order and role of nutrition associate in assisting with ordering process on an altered diet.     Goals  Intake of >75% balanced meals TID.     MONITORING AND EVALUATION:  Progress towards goals will be monitored and evaluated per protocol and Practice Guidelines    Fariha Smart RD, LD  [facility], [facility], [facility], [facility]   Pager: [phone]  Weekend Pager: [phone]

## 2021-10-04 NOTE — PLAN OF CARE
DATE & TIME: 10/3/21, 2300 - 0730   Cognitive Concerns/ Orientation : A&O x 4   BEHAVIOR & AGGRESSION TOOL COLOR: Green   ABNL VS/O2: VSS on BIPAP  MOBILITY: Up SBA with GB and walker to the bathroom  PAIN MANAGMENT: Denied  DIET: NPO maintained at midnight in case Endoscopy can be done today  BOWEL/BLADDER: Up to bathroom with some incontinence  ABNL LAB/BG: AM labs pending  DRAIN/DEVICES: PIV SL  TELEMETRY RHYTHM: NA  SKIN: Scattered bruises. Redness underneath breast and abdominal folds. Redness to old IV site on right forearm  TESTS/PROCEDURES: For possible Esophagogastroduodenoscopy, with fine needle aspiration biopsy with Endoscopic ultrasound guidance today. If not procedure is scheduled for Tuesday, 10/5/21  D/C DATE: Discharge pending final results of repeat pancreatic biopsy. TCU recommended  OTHER IMPORTANT INFO: LS diminished. TAVARES. GI, Hem/Onc following. Patient remains asymptomatic for COVID-19. Droplet precautions maintained

## 2021-10-04 NOTE — PLAN OF CARE
DATE & TIME: 10/3/2021 4780-3153  Cognitive Concerns/ Orientation : A/O x 4, forgetful at times, pleasant.   BEHAVIOR & AGGRESSION TOOL COLOR: Green, calm/cooperative  ABNL VS/O2: VSS on 2L O2 NC, BiPAP overnight. Desats when up and moving on room air.   MOBILITY: SBA w/walker and GB  PAIN MANAGMENT: Denies pain   DIET: 2 gram Na diet, tolerating, good appetite. NPO at midnight in case procedure can be done tomorrow.  BOWEL/BLADDER: Up to bathroom, incontinent at times. No BM.  ABNL LAB/BG: none today  DRAIN/DEVICES: New PIV placed, SL  TELEMETRY RHYTHM: n/a  SKIN: Scattered bruises. Redness underneath breasts and abdominal folds, powder applied. Redness to old IV site on right forearm  TESTS/PROCEDURES: Plan for EUS with fine needle biopsy on 10/5/21. NPO at midnight tonight incase it can be done tomorrow.   D/C DATE: Discharge pending final results of repeat pancreatic biopsy. TCU recommended.   OTHER IMPORTANT INFO: LS diminished, TAVARES, no cough. GI, Hem/Onc following. Droplet precautions maintained. Continues to be asymptomatic for COVID.

## 2021-10-04 NOTE — PROGRESS NOTES
[facility]    Hospitalist Progress Note    Date of Service (when I saw the patient): 10/04/2021  Admit date: 9/20/2021      Interval History   - Patient very talkative as usual. No acute complaints. Updated daughter Carmen at end of day.   - Address tomorrow whether patient should stay for pathology result and if so, whether there is leeway on visitor issue. This has been a daily question. I gave daughter Carmen number to floor to follow up on visitor issue and also gave her instructions on how to drop off herbal supplement they would like patient to take - explained to her that it would need to be reviewed by pharmacist to r/o interactions, etc.  - No acute issues reported, no hematochezia or melena.     - Patient's daughter Carmen reported that patient's Son had visited patient on 25th, then he was not feeling good and had COVID test on 28th and is positive.        Assessment & Plan   Lucille Rojas is a 81 year old female admitted on 9/20/2021. She presented with SOB and constipation.     Severe, symptomatic anemia at admission.   Acute blood loss anemia on chronic anemia  Reports several weeks of ongoing symptoms indicating a somewhat chronic course. Has noted black, tarry stools for at least 3-4 weeks.  S/p EGD on 09/21/21 revealed a few gastric erosions and a few tiny aphthous ulcers in the duodenum, but not severe enough to account for anemia.   * S/p 2 units with Hgb 4.7 > 7.2 on admission, 1 unit on 9/22/21 and 1 unit on 9/24 with appropriate rise in Hb.  9.4 today.  * Iron studies reveal low levels > S/p venofer 300 mg IV x 2, last dose 9/23/21.  Recheck iron level is a still low, will give 2 more doses of 200 mg IV each.  - Protonix 40 mg daily.   - Colonoscopy held initially due to acute encephalopathy with respiratory failure (see below) on 9/23/21.  Now with no overt bleeding and stable hemoglobin, and given risk with procedure given cardiac conditions, no plan for colonoscopy at  this time.   - Transfuse to keep hgb > 7.    - Hgb relatively stable at 8.1    Presumed primary pancreatic cancer Noted on CT.  R pleural effusion, transudate   S/p thoracentesis - 450 ml of verenice fluid on 09/22/21   S/p EUS on 09/21/21 with biopsy. Awaiting final path.   - Cytology on pleural fluid negative for malignant cells  - Pathology inconclusive. Additional tests sent on sample by oncology. Possible repeat endoscopy next week for additional tissue sampling.  - NPO at midnight for endoscopy tomorrow.    Acute encephalopathy, Resolved - On 09/23/21, patient remained oriented and following commands but very lethargic, drifting off in mid-sentence. Labs significant for leukocytosis, hypercapnia on ABG and elevated BNP. She is afebrile, hemodynamically stable, stable oxygenation.   - Since 09/25, patient has been AAOx3, and answering questions appropriately, making good eye contact. Per nursing, patient does appear forgetful.  - Treating hypercapnia CHF and UTI as below.     Acute on chronic hypercapnia -  ABG: pH 7.22, CO2 85, O2 121 while on 5L on 9/23/21.   Obesity, SHAVON with hypoventilation syndrome   Chronic hypoxia on home oxygen - Per family she requires oxygen during the day as well, but has had trouble getting this ordered through PCP. On BiPAP at night with4 LPM O2 PTA  - Continue to wean O2 as tolerates to keep O2 saturations around 90-93%  - BiPAP at night and during naps during day as well.   - When awake > up in chair.    E coli UTI - Patient lethargic and unable to give clear history regarding urinary symptoms at presentation. UA showed 166 WBC, small nitrates.   Leukocytosis - No T over 100 since admission. Hemodynamically stable. No localizing symptoms of infection. No Urinary symptoms. UA done. CT chest/abdomen/pelvis as above, no sign of localized infection.  - F/u on pleural cx -NGTD but very low suspicion of infection in this location.   - UCx from 9/23/21: 2 strains of E. coli noted,  sensitive to Rocephin   - Initially on IV Rocephin, changed to p.o. Ceftin based on sensitivity. Completed 7 day course.  - BCx NGTD and patient is afebrile, encephalopathy now resolved.    Acute decompensated HF, unknown EF - developing acute encephalopathy, hypercapnia on 09/23/21. Workup revealed BNP up to 4000's from 800 at admission. Mild pulmonary edema on CXR. Oxygenation stable, but on 3 L.   Mod to severe aortic stenosis, new diagnosis   TAVARES, CP and light-headedness with minimal exertion - Concerning for symptomatic aortic stenosis, although anemia and chronic obesity hypoventilation syndrome with chronic hyoxia undoubtedly contribute. Initial troponin negative. CXR with R pleural effusion.   Transudate R pleural effusion, edema on admission. S/p thoracentesis on 9/22.   * Serial troponins negative  * Echo EF 60-65% No WMAs. RV fxn normal. Mod to severe aortic stenosis with aortic mean valve area of 1 cm2, mean gradient 37.5 mm Hg.   - On 09/23/21, BNP quite elevated at 4000 on 09/23/21 (up from 800s at admission) CXR reviewed and shows mild intestinal markings, suspect edema.   - was managed with IV lasix, Cr slightly up and so lasix held and stable at 1.1, respiratory status has markedly improved and now stable at 2 LPM.   - Low dose p.o. Lasix started 9/28, follow daily weight and periodic BMP. Patient is incontinent, given her recent UTI, Espitia catheter discontinued.  - Cardiology consulted on 09/24/21 re: input on options (TAVR) and prognosis.  With her unknown source of bleeding and since GI work-up could not be completed, patient not on candidate for anticoagulation and so not a candidate for further work-up including angiogram or TAVR.     Severe macular degeneration  Vision loss  - ordered schedule eye drops and ocular vitamins  - assistance with menu per nutrition. Appreciate assistance.    Goals of Care. Care conference 9/27, myself and Dr. Srinivasan present.  Patient, her , 2 daughters as  "well present and her son also present over the phone.  It was reviewed with family that patient is not a candidate for TAVR and that aortic stenosis as well as obesity hypoventilation and since GI work up could not be completed to find source of bleeding. Also she is of high risk for a GI work up as well. And her illnesses could also limit options for treatment of presumed cancer. Although we do not have a definitive diagnosis yet. So.  This point, plan is to await final pathology report.  It is possible that it may not yield a diagnosis in which case family would like to repeat biopsy.  Family hopeful to be able to get her home eventually so she can spend \"few days in her own bed.\" They are very realistic about current prognosis.      CKD 3  - monitor     DMT2 diet controlled.  last A1c 5.8 on 4/7/21. Did not recheck due to severe anemia  -Blood sugars within normal limit, discontinue checks     Hypothyroid TSH normal at admission.   - Continue PTA levothyroxine     Depression  - Continue PTA citalopram     HLD  - Continue PTA statin    COVID 19 exposure  Per her daughter, patient's Son who visited patient 9/25 had symptoms and tested positive on 9/28. Patient considered exposed and needs to be on quarantine for 14 days per protocol. Continue weekly testing as per routine protocol. To test sooner if any COVID 19 symptoms. Last test 9/28 negative.     Diet: Orders Placed This Encounter      NPO for Medical/Clinical Reasons Except for: Meds      Combination Diet 2 gm NA Diet      NPO per Anesthesia Guidelines for Procedure/Surgery Except for: Meds     Espitia Catheter: Not present     DVT Prophylaxis: Pneumatic Compression Devices  Code Status: No CPR- Do NOT Intubate     Disposition: Expected discharge TBD, based on further plan after repeat biopsy with pathology  Communication: Discussed with patient and bedside RN. Carmen called 10/1.    [facility]  Hospitalist Service  [facility]  Securely message with the Vocera Web Console (learn more here)  Text page via [employer] Paging/Directory    -Data reviewed today: I reviewed all new labs  results over the last 24 hours. I personally reviewed no images or EKG's today.    Physical Exam   Temp: 97.5  F (36.4  C) Temp src: Axillary BP: 120/52 Pulse: 75   Resp: 18 SpO2: 93 % O2 Device: Nasal cannula Oxygen Delivery: 2 LPM  Vitals:    09/28/21 0632 10/02/21 0410 10/04/21 0703   Weight: 116.3 kg (256 lb 6.3 oz) 116.6 kg (257 lb) 117.5 kg (259 lb 0.7 oz)     Vital Signs with Ranges  Temp:  [97.5  F (36.4  C)-98  F (36.7  C)] 97.5  F (36.4  C)  Pulse:  [74-78] 75  Resp:  [18-20] 18  BP: (120-130)/(52-56) 120/52  SpO2:  [93 %-97 %] 93 %  I/O last 3 completed shifts:  In: 240 [P.O.:240]  Out: -        Constitutional: Alert awake, oriented x3 though forgetful, very pleasant and appears comfortable.  HEENT: PERRLA, EOMI.  Respiratory: Bilateral equal air entry, no crackles or wheezing, normal work of breathing.  On 2-3 LPM    Cardiovascular: S1-S2 regular with loud early systolic murmur, trace edema.  GI:  soft, NT/ND, BS normal  Skin/Integumen:  No acute rash or sign of bleeding.           Medications   All medications reviewed on 10/04/2021        artificial tears ophthalmic solution  1 drop Both Eyes BID     citalopram  20 mg Oral Daily     furosemide  20 mg Oral Daily     levothyroxine  200 mcg Oral QAM AC     multivitamin  with lutein  2 capsule Oral Daily     pantoprazole  40 mg Oral QAM AC     simvastatin  10 mg Oral At Bedtime     sodium chloride (PF)  3 mL Intracatheter Q8H       Data   Recent Labs   Lab 10/04/21  0808 09/30/21  1018 09/29/21  1031   WBC 6.9  --   --    HGB 8.1* 9.3* 9.5*   MCV 85  --   --      --   --      --  136   POTASSIUM 4.2  --  3.6   CHLORIDE 100  --  96   CO2 38*  --  38*   BUN 24  --  23   CR 1.01  --  1.04   ANIONGAP 2*  --  2*   IGOR 8.5  --  8.6   GLC 92  --  151*       No results found for this or any  previous visit (from the past 24 hour(s)).

## 2021-10-05 ENCOUNTER — ANESTHESIA (OUTPATIENT)
Dept: GASTROENTEROLOGY | Facility: CLINIC | Age: 81
DRG: 840 | End: 2021-10-05
Payer: MEDICARE

## 2021-10-05 ENCOUNTER — RESULTS ONLY (OUTPATIENT)
Dept: ENDOSCOPY | Facility: CLINIC | Age: 81
End: 2021-10-05

## 2021-10-05 ENCOUNTER — ANESTHESIA EVENT (OUTPATIENT)
Dept: GASTROENTEROLOGY | Facility: CLINIC | Age: 81
DRG: 840 | End: 2021-10-05
Payer: MEDICARE

## 2021-10-05 ENCOUNTER — APPOINTMENT (OUTPATIENT)
Dept: PHYSICAL THERAPY | Facility: CLINIC | Age: 81
DRG: 840 | End: 2021-10-05
Payer: MEDICARE

## 2021-10-05 LAB
GLUCOSE BLDC GLUCOMTR-MCNC: 125 MG/DL (ref 70–99)
SARS-COV-2 RNA RESP QL NAA+PROBE: NEGATIVE
UPPER EUS: NORMAL

## 2021-10-05 PROCEDURE — 250N000011 HC RX IP 250 OP 636: Performed by: NURSE ANESTHETIST, CERTIFIED REGISTERED

## 2021-10-05 PROCEDURE — 88342 IMHCHEM/IMCYTCHM 1ST ANTB: CPT | Mod: TC | Performed by: INTERNAL MEDICINE

## 2021-10-05 PROCEDURE — 250N000013 HC RX MED GY IP 250 OP 250 PS 637: Performed by: INTERNAL MEDICINE

## 2021-10-05 PROCEDURE — 999N000010 HC STATISTIC ANES STAT CODE-CRNA PER MINUTE: Performed by: INTERNAL MEDICINE

## 2021-10-05 PROCEDURE — 999N000157 HC STATISTIC RCP TIME EA 10 MIN

## 2021-10-05 PROCEDURE — 250N000013 HC RX MED GY IP 250 OP 250 PS 637: Performed by: HOSPITALIST

## 2021-10-05 PROCEDURE — 250N000013 HC RX MED GY IP 250 OP 250 PS 637: Performed by: NURSE ANESTHETIST, CERTIFIED REGISTERED

## 2021-10-05 PROCEDURE — 43242 EGD US FINE NEEDLE BX/ASPIR: CPT | Performed by: INTERNAL MEDICINE

## 2021-10-05 PROCEDURE — 258N000003 HC RX IP 258 OP 636: Performed by: NURSE ANESTHETIST, CERTIFIED REGISTERED

## 2021-10-05 PROCEDURE — 87635 SARS-COV-2 COVID-19 AMP PRB: CPT | Performed by: INTERNAL MEDICINE

## 2021-10-05 PROCEDURE — 99233 SBSQ HOSP IP/OBS HIGH 50: CPT | Performed by: INTERNAL MEDICINE

## 2021-10-05 PROCEDURE — 120N000001 HC R&B MED SURG/OB

## 2021-10-05 PROCEDURE — 0FBG8ZX EXCISION OF PANCREAS, VIA NATURAL OR ARTIFICIAL OPENING ENDOSCOPIC, DIAGNOSTIC: ICD-10-PCS | Performed by: INTERNAL MEDICINE

## 2021-10-05 PROCEDURE — 97530 THERAPEUTIC ACTIVITIES: CPT | Mod: GP

## 2021-10-05 PROCEDURE — 370N000017 HC ANESTHESIA TECHNICAL FEE, PER MIN: Performed by: INTERNAL MEDICINE

## 2021-10-05 PROCEDURE — 250N000025 HC SEVOFLURANE, PER MIN: Performed by: INTERNAL MEDICINE

## 2021-10-05 PROCEDURE — 88185 FLOWCYTOMETRY/TC ADD-ON: CPT | Performed by: PATHOLOGY

## 2021-10-05 PROCEDURE — 88189 FLOWCYTOMETRY/READ 16 & >: CPT | Performed by: PATHOLOGY

## 2021-10-05 PROCEDURE — 97116 GAIT TRAINING THERAPY: CPT | Mod: GP

## 2021-10-05 PROCEDURE — 88305 TISSUE EXAM BY PATHOLOGIST: CPT | Mod: TC | Performed by: INTERNAL MEDICINE

## 2021-10-05 PROCEDURE — 94660 CPAP INITIATION&MGMT: CPT

## 2021-10-05 PROCEDURE — 88184 FLOWCYTOMETRY/ TC 1 MARKER: CPT | Performed by: PATHOLOGY

## 2021-10-05 PROCEDURE — 250N000009 HC RX 250: Performed by: NURSE ANESTHETIST, CERTIFIED REGISTERED

## 2021-10-05 RX ORDER — FENTANYL CITRATE 50 UG/ML
25 INJECTION, SOLUTION INTRAMUSCULAR; INTRAVENOUS
Status: CANCELLED | OUTPATIENT
Start: 2021-10-05

## 2021-10-05 RX ORDER — ONDANSETRON 2 MG/ML
INJECTION INTRAMUSCULAR; INTRAVENOUS PRN
Status: DISCONTINUED | OUTPATIENT
Start: 2021-10-05 | End: 2021-10-05

## 2021-10-05 RX ORDER — ONDANSETRON 2 MG/ML
4 INJECTION INTRAMUSCULAR; INTRAVENOUS EVERY 6 HOURS PRN
Status: DISCONTINUED | OUTPATIENT
Start: 2021-10-05 | End: 2021-10-05

## 2021-10-05 RX ORDER — SODIUM CHLORIDE, SODIUM LACTATE, POTASSIUM CHLORIDE, CALCIUM CHLORIDE 600; 310; 30; 20 MG/100ML; MG/100ML; MG/100ML; MG/100ML
INJECTION, SOLUTION INTRAVENOUS CONTINUOUS
Status: CANCELLED | OUTPATIENT
Start: 2021-10-05

## 2021-10-05 RX ORDER — ONDANSETRON 4 MG/1
4 TABLET, ORALLY DISINTEGRATING ORAL EVERY 30 MIN PRN
Status: CANCELLED | OUTPATIENT
Start: 2021-10-05

## 2021-10-05 RX ORDER — LIDOCAINE HYDROCHLORIDE 20 MG/ML
INJECTION, SOLUTION INFILTRATION; PERINEURAL PRN
Status: DISCONTINUED | OUTPATIENT
Start: 2021-10-05 | End: 2021-10-05

## 2021-10-05 RX ORDER — PROPOFOL 10 MG/ML
INJECTION, EMULSION INTRAVENOUS PRN
Status: DISCONTINUED | OUTPATIENT
Start: 2021-10-05 | End: 2021-10-05

## 2021-10-05 RX ORDER — ALBUTEROL SULFATE 90 UG/1
AEROSOL, METERED RESPIRATORY (INHALATION) PRN
Status: DISCONTINUED | OUTPATIENT
Start: 2021-10-05 | End: 2021-10-05

## 2021-10-05 RX ORDER — LIDOCAINE 40 MG/G
CREAM TOPICAL
Status: DISCONTINUED | OUTPATIENT
Start: 2021-10-05 | End: 2021-10-05 | Stop reason: HOSPADM

## 2021-10-05 RX ORDER — HYDROMORPHONE HYDROCHLORIDE 1 MG/ML
0.2 INJECTION, SOLUTION INTRAMUSCULAR; INTRAVENOUS; SUBCUTANEOUS EVERY 5 MIN PRN
Status: CANCELLED | OUTPATIENT
Start: 2021-10-05

## 2021-10-05 RX ORDER — ONDANSETRON 2 MG/ML
4 INJECTION INTRAMUSCULAR; INTRAVENOUS EVERY 30 MIN PRN
Status: CANCELLED | OUTPATIENT
Start: 2021-10-05

## 2021-10-05 RX ORDER — FENTANYL CITRATE 50 UG/ML
25 INJECTION, SOLUTION INTRAMUSCULAR; INTRAVENOUS EVERY 5 MIN PRN
Status: CANCELLED | OUTPATIENT
Start: 2021-10-05

## 2021-10-05 RX ORDER — DEXAMETHASONE SODIUM PHOSPHATE 4 MG/ML
INJECTION, SOLUTION INTRA-ARTICULAR; INTRALESIONAL; INTRAMUSCULAR; INTRAVENOUS; SOFT TISSUE PRN
Status: DISCONTINUED | OUTPATIENT
Start: 2021-10-05 | End: 2021-10-05

## 2021-10-05 RX ORDER — OXYCODONE HYDROCHLORIDE 5 MG/1
5 TABLET ORAL EVERY 4 HOURS PRN
Status: CANCELLED | OUTPATIENT
Start: 2021-10-05

## 2021-10-05 RX ORDER — FLUMAZENIL 0.1 MG/ML
0.2 INJECTION, SOLUTION INTRAVENOUS
Status: ACTIVE | OUTPATIENT
Start: 2021-10-05 | End: 2021-10-06

## 2021-10-05 RX ORDER — SODIUM CHLORIDE, SODIUM LACTATE, POTASSIUM CHLORIDE, CALCIUM CHLORIDE 600; 310; 30; 20 MG/100ML; MG/100ML; MG/100ML; MG/100ML
INJECTION, SOLUTION INTRAVENOUS CONTINUOUS PRN
Status: DISCONTINUED | OUTPATIENT
Start: 2021-10-05 | End: 2021-10-05

## 2021-10-05 RX ORDER — ONDANSETRON 4 MG/1
4 TABLET, ORALLY DISINTEGRATING ORAL EVERY 6 HOURS PRN
Status: DISCONTINUED | OUTPATIENT
Start: 2021-10-05 | End: 2021-10-05

## 2021-10-05 RX ADMIN — ALBUTEROL SULFATE 2 PUFF: 108 INHALANT RESPIRATORY (INHALATION) at 16:06

## 2021-10-05 RX ADMIN — PHENYLEPHRINE HYDROCHLORIDE 100 MCG: 10 INJECTION INTRAVENOUS at 15:20

## 2021-10-05 RX ADMIN — ALBUTEROL SULFATE 6 PUFF: 108 INHALANT RESPIRATORY (INHALATION) at 15:23

## 2021-10-05 RX ADMIN — PANTOPRAZOLE SODIUM 40 MG: 40 TABLET, DELAYED RELEASE ORAL at 08:01

## 2021-10-05 RX ADMIN — DEXAMETHASONE SODIUM PHOSPHATE 4 MG: 4 INJECTION, SOLUTION INTRA-ARTICULAR; INTRALESIONAL; INTRAMUSCULAR; INTRAVENOUS; SOFT TISSUE at 15:28

## 2021-10-05 RX ADMIN — Medication 1 DROP: at 21:17

## 2021-10-05 RX ADMIN — LIDOCAINE HYDROCHLORIDE 60 MG: 20 INJECTION, SOLUTION INFILTRATION; PERINEURAL at 15:20

## 2021-10-05 RX ADMIN — Medication 1 CAPSULE: at 08:53

## 2021-10-05 RX ADMIN — PHENYLEPHRINE HYDROCHLORIDE 100 MCG: 10 INJECTION INTRAVENOUS at 15:49

## 2021-10-05 RX ADMIN — PROPOFOL 50 MG: 10 INJECTION, EMULSION INTRAVENOUS at 15:20

## 2021-10-05 RX ADMIN — CITALOPRAM HYDROBROMIDE 20 MG: 20 TABLET ORAL at 08:53

## 2021-10-05 RX ADMIN — SODIUM CHLORIDE, POTASSIUM CHLORIDE, SODIUM LACTATE AND CALCIUM CHLORIDE: 600; 310; 30; 20 INJECTION, SOLUTION INTRAVENOUS at 15:18

## 2021-10-05 RX ADMIN — Medication 1 DROP: at 08:10

## 2021-10-05 RX ADMIN — FUROSEMIDE 20 MG: 20 TABLET ORAL at 08:53

## 2021-10-05 RX ADMIN — SUCCINYLCHOLINE CHLORIDE 160 MG: 20 INJECTION, SOLUTION INTRAMUSCULAR; INTRAVENOUS; PARENTERAL at 15:20

## 2021-10-05 RX ADMIN — ONDANSETRON 4 MG: 2 INJECTION INTRAMUSCULAR; INTRAVENOUS at 15:31

## 2021-10-05 RX ADMIN — SIMVASTATIN 10 MG: 10 TABLET, FILM COATED ORAL at 21:17

## 2021-10-05 RX ADMIN — LEVOTHYROXINE SODIUM 200 MCG: 100 TABLET ORAL at 08:00

## 2021-10-05 ASSESSMENT — ACTIVITIES OF DAILY LIVING (ADL)
ADLS_ACUITY_SCORE: 23
ADLS_ACUITY_SCORE: 23
ADLS_ACUITY_SCORE: 24
ADLS_ACUITY_SCORE: 23
ADLS_ACUITY_SCORE: 23

## 2021-10-05 ASSESSMENT — COPD QUESTIONNAIRES
CAT_SEVERITY: MODERATE
COPD: 1

## 2021-10-05 ASSESSMENT — LIFESTYLE VARIABLES: TOBACCO_USE: 1

## 2021-10-05 NOTE — PROGRESS NOTES
Care Management Follow Up    Length of Stay (days): 15    Expected Discharge Date: 10/06/2021     Concerns to be Addressed: discharge planning     Patient plan of care discussed at interdisciplinary rounds: Yes    Anticipated Discharge Disposition: Transitional Care     Anticipated Discharge Services:    Anticipated Discharge DME:      Patient/family educated on Medicare website which has current facility and service quality ratings: yes  Education Provided on the Discharge Plan:    Patient/Family in Agreement with the Plan: yes    Referrals Placed by CM/SW: Post Acute Facilities  Private pay costs discussed: Not applicable    Additional Information:    Per DOD all TCU referrals have declined except for [facility] which remains pending.  Sw called [facility] and left vm with admissions.    Update:  [facility] has declined.  Sw called spouse to discuss pt discharging home with assist per PT/OT recommendations.  Spouse stated that he is not healthy himself and would not be able to assist.  Spouse stated that all other family members work and are busy, also being unable to help.  Spouse is hopeful pt can remain in hospital until results are known so a more concrete discharge plan can be made; Sw encouraged spouse to discuss this with MD.  Spouse is in agreement with still looking for TCU placements.  Spouse wants TCUs that offer good care, with higher medicare ratings.  All previous referrals have declined but Sw stated that these TCUs will be reviewed again as bed availability may have changed.  [facility] declines due to BiPap and [facility] will not consider a pt in isolation for COVID.  Sw resent referral to [facility] as they previously declined due to not having beds.  Dio then connected with dtr and she asked that referrals be sent to [facility]; referrals sent.      Update:  [facility] does not have a private room available at this time; declined.  Current pending are:  [facility] (would need  private room for isolation)    Update:  [facility] will reconsider pt with bipap b/c she has her own, however, they do not have a bed available; recommended Sw call back in a few days.      Lucille Barragan, LICSW

## 2021-10-05 NOTE — PLAN OF CARE
DATE & TIME: 10/4/21, 9925 - 3065    Cognitive Concerns/ Orientation : A&O x 4   BEHAVIOR & AGGRESSION TOOL COLOR: Green   ABNL VS/O2: VSS on BIPAP  MOBILITY: SBA with GB and walker  PAIN MANAGMENT: Denied  DIET: NPO at midnight  BOWEL/BLADDER: Voids in bathroom with some urinary incontinence  ABNL LAB/BG: NA  DRAIN/DEVICES: None  TELEMETRY RHYTHM: NA  SKIN: Redness beneath breast and abdominal folds  TESTS/PROCEDURES: For EUS and pancreatic biopsy at 1430 today  D/C DATE: TBD, based on further plans after repeat biopsy with biopsy  Discharge Barriers: For EUS and pancreatic biopsy today  OTHER IMPORTANT INFO: Hem/Onc and GI following. Patient asymptomatic of COVID-19. Droplets precautions maintained

## 2021-10-05 NOTE — PLAN OF CARE
DATE & TIME: 10/5/2021 days     Cognitive Concerns/ Orientation :  alert and oriented x 4   BEHAVIOR & AGGRESSION TOOL COLOR: green   CIWA SCORE:  na    ABNL VS/O2:  VSS on 02 at 1 liter uses Bipap at night   MOBILITY:  up with sba and walker and gaitbelt   PAIN MANAGMENT:  denies   DIET:  NPO for EUS  BOWEL/BLADDER:  continent some incontinence at times   ABNL LAB/BG:  hgb 8.1  DRAIN/DEVICES:  none   TELEMETRY RHYTHM:  na   SKIN:  bruises noted   TESTS/PROCEDURES:  Will have EUS with biopsy today around 1430  D/C DATE:  possibly tomorrow  Discharge Barriers:  needs acceptance at TCU   OTHER IMPORTANT INFO:  Pt was up in the chair and walked in the halls and tolerated well. NPO for EUS and biopsy. MD states pt is ready for discharge tomorrow if has TCU to go to.

## 2021-10-05 NOTE — PROGRESS NOTES
[facility]    HOSPITALIST PROGRESS NOTE :   --------------------------------------------------    Date of Admission:  9/20/2021    Cumulative Summary: Lucille Rojas is a 81 year old female who was admitted on 9/20/2021 when she presented with shortness of breath and constipation and was found to have severe symptomatic anemia of 4.7.    Assessment & Plan     Severe, symptomatic anemia on admission, presented with Hgb of 4.7  Acute blood loss anemia on chronic anemia  Reported several weeks of ongoing symptoms indicating a somewhat chronic course. Has noted black, tarry stools for at least 3-4 weeks.  S/p EGD on 09/21/21 revealed a few gastric erosions and a few tiny aphthous ulcers in the duodenum, but not severe enough to account for anemia.   * S/p 2 units with Hgb 4.7 > 7.2 on admission, 1 unit on 9/22/21 and 1 unit on 9/24 with appropriate rise in Hb is 9.3 this morning and stable  * Iron studies revealed low levels > S/p venofer 300 mg IV x 2, last dose 9/23/21.  Recheck iron level was still low, gave 2 more doses of 200 mg IV each.    --Patient care was assumed this morning, patient was seen and examined.  --Continue patient on Protonix 40 mg p.o. daily.  --Currently patient is n.p.o., plan to undergo repeat biopsy this afternoon around 2:30 PM.  --Colonoscopy was discussed initially but then was held later due to acute encephalopathy with respiratory failure see below, patient now has no overt bleeding and has a stable hemoglobin.  Given risk with procedure given cardiac conditions at this point there is no plan for colonoscopy.  --Transfuse to keep hemoglobin more than 7.  --Discussed with patient that post procedure, hopefully patient is ready to be discharged and does not have to wait in the hospital for biopsy results.     Presumed primary pancreatic cancer Noted on CT.  R pleural effusion, transudate   S/p thoracentesis - 450 ml of verenice fluid on 09/22/21   S/p EUS on  09/21/21 with biopsy. Awaiting final path.   -- Cytology on pleural fluid negative for malignant cells  -- Pathology inconclusive. Additional tests sent on sample by oncology.Plan for repeat endoscopy later today for additional tissue sampling.  -- NPO at midnight for endoscopy today, start on diet after the endoscopy as per GI recommendations      Acute encephalopathy, Resolved - On 09/23/21, patient remained oriented and following commands but very lethargic, drifting off in mid-sentence. Labs significant for leukocytosis, hypercapnia on ABG and elevated BNP. She is afebrile, hemodynamically stable, stable oxygenation.     -- Since 09/25, patient has been AAOx3, and answering questions appropriately, making good eye contact. Per nursing, patient does appear forgetful.  -- Treating hypercapnia CHF and UTI as below.      Acute on chronic hypercapnia -  ABG: pH 7.22, CO2 85, O2 121 while on 5L on 9/23/21.   Obesity, SHAVON with hypoventilation syndrome   Chronic hypoxia on home oxygen - Per family she requires oxygen during the day as well, but has had trouble getting this ordered through PCP. On BiPAP at night with4 LPM O2 PTA    -- Continue to wean O2 as tolerates to keep O2 saturations around 90-93%  -- BiPAP at night and during naps during day as well.   -- When awake > up in chair.     E coli UTI - Patient lethargic and unable to give clear history regarding urinary symptoms at presentation. UA showed 166 WBC, small nitrates.   Leukocytosis - No T over 100 since admission. Hemodynamically stable. No localizing symptoms of infection. No Urinary symptoms. UA done. CT chest/abdomen/pelvis as above, no sign of localized infection.  - F/u on pleural cx -NGTD but very low suspicion of infection in this location.     -- UCx from 9/23/21: 2 strains of E. coli noted, sensitive to Rocephin   -- Initially on IV Rocephin, changed to p.o. Ceftin based on sensitivity.Completed 7 day course.  - BCx NGTD and patient is afebrile,  encephalopathy now resolved.     Acute decompensated HF, unknown EF - developing acute encephalopathy, hypercapnia on 09/23/21. Workup revealed BNP up to 4000's from 800 at admission. Mild pulmonary edema on CXR. Oxygenation stable, but on 3 L.   Mod to severe aortic stenosis, new diagnosis   TAVARES, CP and light-headedness with minimal exertion - Concerning for symptomatic aortic stenosis, although anemia and chronic obesity hypoventilation syndrome with chronic hyoxia undoubtedly contribute. Initial troponin negative. CXR with R pleural effusion.   Transudate R pleural effusion, edema on admission. S/p thoracentesis on 9/22.     * Serial troponins negative  * Echo EF 60-65% No WMAs. RV fxn normal. Mod to severe aortic stenosis with aortic mean valve area of 1 cm2, mean gradient 37.5 mm Hg.   -- On 09/23/21, BNP quite elevated at 4000 on 09/23/21 (up from 800s at admission) CXR reviewed and shows mild intestinal markings, suspect edema.   -- was managed with IV lasix, Cr slightly up and so lasix held and stable at 1.1, respiratory status has markedly improved and now stable at 2 LPM.   -- Low dose p.o. Lasix started 9/28, follow daily weight and periodic BMP. Patient is incontinent, given her recent UTI, Espitia catheter discontinued.  -- Cardiology consulted on 09/24/21 re: input on options (TAVR) and prognosis. With her unknown source of bleeding and since GI work-up could not be completed, patient not on candidate for anticoagulation and so not a candidate for further work-up including angiogram or TAVR.      Severe macular degeneration  Vision loss  -- ordered schedule eye drops and ocular vitamins  -- assistance with menu per nutrition. Appreciate assistance.     CKD 3  -- monitor     DMT2 diet controlled.  last A1c 5.8 on 4/7/21. Did not recheck due to severe anemia  --Blood sugars within normal limit, discontinue checks      Hypothyroid TSH normal at admission.   -- Continue PTA levothyroxine      Depression  --  "Continue PTA citalopram     HLD  -- Continue PTA statin     COVID 19 exposure  Per her daughter, patient's Son who visited patient 9/25 had symptoms and tested positive on 9/28. Patient considered exposed and needs to be on quarantine for 14 days per protocol. Continue weekly testing as per routine protocol. To test sooner if any COVID 19 symptoms. Last test 9/28 negative.  -- will order weekly COVID test today      Goals of Care. Care conference 9/27, myself and Dr. Srinivasan present.  Patient, her , 2 daughters as well present and her son also present over the phone.  It was reviewed with family that patient is not a candidate for TAVR and that aortic stenosis as well as obesity hypoventilation and since GI work up could not be completed to find source of bleeding. Also she is of high risk for a GI work up as well. And her illnesses could also limit options for treatment of presumed cancer. Although we do not have a definitive diagnosis yet. So.  This point, plan is to await final pathology report.  It is possible that it may not yield a diagnosis in which case family would like to repeat biopsy.  Family hopeful to be able to get her home eventually so she can spend \"few days in her own bed.\" They are very realistic about current prognosis.     Diet: Room Service  NPO per Anesthesia Guidelines for Procedure/Surgery Except for: Meds    Espitia Catheter: Not present  DVT Prophylaxis: Pneumatic Compression Devices  Code Status: No CPR- Do NOT Intubate    The patient's care was discussed with the Bedside Nurse, Patient and Patient's Family.    Disposition Plan   Expected discharge: 10/06/2021 , patient was initially recommended to be discharged to TCU, care coordinators they have been sending referral, so far she has been declined by most TCU's.  Patient did work with physical therapy this morning who now are recommending home with assist.  Unfortunately patient elderly 82-year-old  would not be able to " provide the assist neither does the family members who work full-time.  I did review discharge planning with patient and then also called his daughter Carmen.  I updated them that patient will probably be ready to be discharged from medical point of view tomorrow after the biopsy procedure but we will wait for safe disposition planning.  Highly appreciate care coordinators and  help. More than 35 min of the time was spend in care today, more than 50% of the time was spent in patient care coordination and counseling.    Chelsy Swann MD, FACP  Text Page (7am - 6pm)    ----------------------------------------------------------------------------------------------------------------------    Interval History   Patient care was assumed this morning, patient was seen and examined.  Sitting in chair, currently n.p.o., aware about plan for undergoing repeat biopsy this afternoon.  Patient is otherwise denying any chest pain, palpitations or shortness of breath.  She did work with therapy for now recommending discharge to home with assist.  Unfortunately according to patient her 82-year-old  who is not in a very good health himself would not be able to provide to assist at this time, the same goes for her family members who works full-time.  At this time aware option remains to continue to see if patient can be accepted to one of the transitional care unit.  I did discuss with patient and family that patient will probably be medically ready for discharge tomorrow and does not need to wait in the hospital for biopsy results which can take several days.    -Data reviewed today: I reviewed all new labs and imaging results over the last 24 hours.    I personally reviewed no images or EKG's today.    Physical Exam   Temp: 97.9  F (36.6  C) Temp src: Oral BP: (!) 143/64 Pulse: 71   Resp: 18 SpO2: 98 % O2 Device: Nasal cannula Oxygen Delivery: 1 LPM  Vitals:    09/28/21 0632 10/02/21 0410 10/04/21 0703   Weight:  116.3 kg (256 lb 6.3 oz) 116.6 kg (257 lb) 117.5 kg (259 lb 0.7 oz)     Vital Signs with Ranges  Temp:  [97.2  F (36.2  C)-98.1  F (36.7  C)] 97.9  F (36.6  C)  Pulse:  [71-77] 71  Resp:  [16-18] 18  BP: (117-143)/(43-64) 143/64  SpO2:  [85 %-98 %] 98 %  I/O last 3 completed shifts:  In: 770 [P.O.:770]  Out: 100 [Urine:100]    GENERAL: Alert , awake and oriented. NAD. Conversational, appropriate, wearing nasal cannula   HEENT: Normocephalic. EOMI. No icterus or injection. Nares normal.   LUNGS: Clear to auscultation. No dyspnea at rest.   HEART: Regular rate. Extremities perfused.   ABDOMEN: Soft, nontender, and nondistended. Positive bowel sounds.   EXTREMITIES: No LE edema noted.   NEUROLOGIC: Moves extremities x4 on command. No acute focal neurologic abnormalities noted.     Medications       artificial tears ophthalmic solution  1 drop Both Eyes BID     citalopram  20 mg Oral Daily     furosemide  20 mg Oral Daily     levothyroxine  200 mcg Oral QAM AC     multivitamin  with lutein  2 capsule Oral Daily     pantoprazole  40 mg Oral QAM AC     simvastatin  10 mg Oral At Bedtime     sodium chloride (PF)  3 mL Intracatheter Q8H       Data   Recent Labs   Lab 10/04/21  0808 09/30/21  1018 09/29/21  1031   WBC 6.9  --   --    HGB 8.1* 9.3* 9.5*   MCV 85  --   --      --   --      --  136   POTASSIUM 4.2  --  3.6   CHLORIDE 100  --  96   CO2 38*  --  38*   BUN 24  --  23   CR 1.01  --  1.04   ANIONGAP 2*  --  2*   IGOR 8.5  --  8.6   GLC 92  --  151*       Imaging:   No results found for this or any previous visit (from the past 24 hour(s)).

## 2021-10-05 NOTE — ANESTHESIA PREPROCEDURE EVALUATION
Anesthesia Pre-Procedure Evaluation    Patient: Lucille Rojas   MRN: [id number] : 1940        Preoperative Diagnosis: Pancreatic mass [K86.89]    Procedure : Procedure(s):  ESOPHAGOGASTRODUODENOSCOPY, WITH FINE NEEDLE ASPIRATION BIOPSY, WITH ENDOSCOPIC ULTRASOUND GUIDANCE          Past Medical History:   Diagnosis Date     HTN (hypertension) 2013     Hyperlipidemia LDL goal <130 2013     Hypothyroidism 2013     Macular degeneration 2013     Sleep apnea     CPAP at night, O2 during naps     Type 2 diabetes, HbA1C goal < 8% (H) 2013      Past Surgical History:   Procedure Laterality Date     APPENDECTOMY       COLONOSCOPY       COLONOSCOPY N/A 10/27/2014    Procedure: COMBINED COLONOSCOPY, SINGLE OR MULTIPLE BIOPSY/POLYPECTOMY BY BIOPSY;  Surgeon: Jose Alfredo Morejon MD;  Location: [facility]     ESOPHAGOSCOPY, GASTROSCOPY, DUODENOSCOPY (EGD), COMBINED N/A 2021    Procedure: ESOPHAGOGASTRODUODENOSCOPY, WITH FINE NEEDLE ASPIRATION BIOPSY, WITH ENDOSCOPIC ULTRASOUND GUIDANCE;  Surgeon: Vera Benitez MD;  Location: [facility]     ESOPHAGOSCOPY, GASTROSCOPY, DUODENOSCOPY (EGD), COMBINED N/A 2021    Procedure: Esophagogastroduodenoscopy, With Biopsy;  Surgeon: Vera Benitez MD;  Location:  GI     HERNIA REPAIR      inguinal x 2     TONSILLECTOMY        Allergies   Allergen Reactions     Penicillins       Social History     Tobacco Use     Smoking status: Never Smoker     Smokeless tobacco: Never Used   Substance Use Topics     Alcohol use: Yes     Alcohol/week: 0.0 standard drinks     Comment: rarely      Wt Readings from Last 1 Encounters:   10/04/21 117.5 kg (259 lb 0.7 oz)        Anesthesia Evaluation            ROS/MED HX  ENT/Pulmonary:     (+) sleep apnea, uses CPAP, tobacco use, Past use, moderate,  COPD,     Neurologic: Comment: encephalopathy      Cardiovascular:     (+) Dyslipidemia hypertension----- (-) CAD and CHF   METS/Exercise Tolerance:      Hematologic:     (+) anemia,     Musculoskeletal:       GI/Hepatic: Comment: Pancreatic mass, likely metastatic cancer    (+) GERD,     Renal/Genitourinary:       Endo:     (+) type II DM, thyroid problem, hypothyroidism, Obesity,     Psychiatric/Substance Use:       Infectious Disease:       Malignancy:       Other:            Physical Exam    Airway        Mallampati: II   TM distance: > 3 FB   Neck ROM: full   Mouth opening: > 3 cm    Respiratory Devices and Support         Dental           Cardiovascular   cardiovascular exam normal          Pulmonary           (+) decreased breath sounds           OUTSIDE LABS:  CBC:   Lab Results   Component Value Date    WBC 6.9 10/04/2021    WBC 10.6 09/26/2021    HGB 8.1 (L) 10/04/2021    HGB 9.3 (L) 09/30/2021    HCT 28.8 (L) 10/04/2021    HCT 33.0 (L) 09/26/2021     10/04/2021     09/26/2021     BMP:   Lab Results   Component Value Date     10/04/2021     09/29/2021    POTASSIUM 4.2 10/04/2021    POTASSIUM 3.6 09/29/2021    CHLORIDE 100 10/04/2021    CHLORIDE 96 09/29/2021    CO2 38 (H) 10/04/2021    CO2 38 (H) 09/29/2021    BUN 24 10/04/2021    BUN 23 09/29/2021    CR 1.01 10/04/2021    CR 1.04 09/29/2021    GLC 92 10/04/2021     (H) 09/29/2021     COAGS: No results found for: PTT, INR, FIBR  POC:   Lab Results   Component Value Date    BGM 94 10/27/2014     HEPATIC:   Lab Results   Component Value Date    ALBUMIN 3.3 (L) 09/21/2021    PROTTOTAL 6.9 09/21/2021    ALT 17 09/21/2021    AST 14 09/21/2021    ALKPHOS 104 09/21/2021    BILITOTAL 0.5 09/21/2021    ARLEY 38 09/23/2021     OTHER:   Lab Results   Component Value Date    PH 7.29 (L) 09/23/2021    A1C 5.8 (H) 04/07/2021    IGOR 8.5 10/04/2021    LIPASE 124 09/20/2021    AMYLASE 64 09/11/2015    TSH 1.73 09/20/2021    T4 1.65 05/09/2013       Anesthesia Plan    ASA Status:  4      Anesthesia Type: General.     - Airway: ETT   Induction: Intravenous, RSI, Propofol.   Maintenance:  Balanced.   Techniques and Equipment:     - Airway: Video-Laryngoscope         Consents    Anesthesia Plan(s) and associated risks, benefits, and realistic alternatives discussed. Questions answered and patient/representative(s) expressed understanding.     - Discussed with:  Patient         Postoperative Care       PONV prophylaxis: Ondansetron (or other 5HT-3), Dexamethasone or Solumedrol     Comments:                Shawn Escobar MD

## 2021-10-05 NOTE — ANESTHESIA CARE TRANSFER NOTE
Patient: Lucille Rojas    Procedure: Procedure(s):  ESOPHAGOGASTRODUODENOSCOPY, WITH FINE NEEDLE ASPIRATION BIOPSY, WITH ENDOSCOPIC ULTRASOUND GUIDANCE       Diagnosis: Pancreatic mass [K86.89]  Diagnosis Additional Information: No value filed.    Anesthesia Type:   General     Note:    Oropharynx: oropharynx clear of all foreign objects  Level of Consciousness: awake  Patient oxygen source: Bipap in PACU.    Independent Airway: airway patency satisfactory and stable  Dentition: dentition unchanged  Vital Signs Stable: post-procedure vital signs reviewed and stable  Report to RN Given: handoff report given  Patient transferred to: PACU    Handoff Report: Identifed the Patient, Identified the Reponsible Provider, Reviewed the pertinent medical history, Discussed the surgical course, Reviewed Intra-OP anesthesia mangement and issues during anesthesia, Set expectations for post-procedure period and Allowed opportunity for questions and acknowledgement of understanding      Vitals:  Vitals Value Taken Time   /77 10/05/21 1626   Temp     Pulse 81 10/05/21 1629   Resp 20 10/05/21 1629   SpO2 98 % 10/05/21 1629   Vitals shown include unvalidated device data.    Electronically Signed By: ALIE Cruz CRNA  October 5, 2021  4:29 PM

## 2021-10-05 NOTE — ANESTHESIA PROCEDURE NOTES
Airway       Patient location during procedure: OR       Procedure Start/Stop Times: 10/5/2021 3:22 PM  Staff -        Anesthesiologist:  Shawn Escobar MD       CRNA: Jordyn Oconnell APRN CRNA       Performed By: CRNAIndications and Patient Condition       Indications for airway management: meagan-procedural       Induction type:RSI       Mask difficulty assessment: 0 - not attempted    Final Airway Details       Final airway type: endotracheal airway       Successful airway: ETT - single  Endotracheal Airway Details        ETT size (mm): 7.0       Cuffed: yes       Successful intubation technique: video laryngoscopy       VL Blade Size: Crocker 3       Grade View of Cords: 1       Adjucts: stylet       Position: Right       Measured from: gums/teeth       Secured at (cm): 22       Bite block used: None    Post intubation assessment        Placement verified by: capnometry, equal breath sounds and chest rise        Number of attempts at approach: 1       Secured with: pink tape and commercial tube morales       Ease of procedure: easy       Dentition: Intact and Unchanged

## 2021-10-05 NOTE — PLAN OF CARE
DATE & TIME: 10/4/2021 5031-2127                   Cognitive Concerns/ Orientation : AOx4    BEHAVIOR & AGGRESSION TOOL COLOR: green   CIWA SCORE:  na     ABNL VS/O2: VSS on NC 1L 1 liter through out evening, BiPAP on @ HS.  MOBILITY:  Ax1 walker and gait belt   PAIN MANAGMENT:  denies   DIET:  2 gram Na, NPO @ MN for procedure tomorrow  BOWEL/BLADDER:  continent occasionally has some incontinence no stools this shift   ABNL LAB/BG:  C02 38 hgb 8.1 anion gap 2   DRAIN/DEVICES:  no IV access  TELEMETRY RHYTHM:  na  SKIN:  redness under breasts and panus   TESTS/PROCEDURES: EUS with pancreatic biopsy at 1430 tomorrow. NPO after MN   D/C DATE:  uncertain   Discharge Barriers: EUS and pancreatic biopsy tomorrow and results    OTHER IMPORTANT INFO:  Droplet precautions r/t potential covid exposure. Pt asymptomatic this shift.

## 2021-10-05 NOTE — OR NURSING
Okay per MDA to transfer back to floor with instructions to give floor RN to have patient wear her home Bipap (with oxygen as needed) when resting tonight and sleeping overnight.

## 2021-10-06 ENCOUNTER — APPOINTMENT (OUTPATIENT)
Dept: OCCUPATIONAL THERAPY | Facility: CLINIC | Age: 81
DRG: 840 | End: 2021-10-06
Payer: MEDICARE

## 2021-10-06 ENCOUNTER — APPOINTMENT (OUTPATIENT)
Dept: PHYSICAL THERAPY | Facility: CLINIC | Age: 81
DRG: 840 | End: 2021-10-06
Payer: MEDICARE

## 2021-10-06 LAB
HGB BLD-MCNC: 8.1 G/DL (ref 11.7–15.7)
PATH REPORT.COMMENTS IMP SPEC: ABNORMAL
PATH REPORT.COMMENTS IMP SPEC: YES
PATH REPORT.FINAL DX SPEC: ABNORMAL
PATH REPORT.MICROSCOPIC SPEC OTHER STN: ABNORMAL
PATH REPORT.RELEVANT HX SPEC: ABNORMAL

## 2021-10-06 PROCEDURE — 97116 GAIT TRAINING THERAPY: CPT | Mod: GP

## 2021-10-06 PROCEDURE — 999N000157 HC STATISTIC RCP TIME EA 10 MIN

## 2021-10-06 PROCEDURE — 85018 HEMOGLOBIN: CPT | Performed by: INTERNAL MEDICINE

## 2021-10-06 PROCEDURE — 99232 SBSQ HOSP IP/OBS MODERATE 35: CPT | Performed by: INTERNAL MEDICINE

## 2021-10-06 PROCEDURE — 97535 SELF CARE MNGMENT TRAINING: CPT | Mod: GO | Performed by: OCCUPATIONAL THERAPIST

## 2021-10-06 PROCEDURE — 82787 IGG 1 2 3 OR 4 EACH: CPT | Performed by: INTERNAL MEDICINE

## 2021-10-06 PROCEDURE — 36415 COLL VENOUS BLD VENIPUNCTURE: CPT | Performed by: INTERNAL MEDICINE

## 2021-10-06 PROCEDURE — 250N000013 HC RX MED GY IP 250 OP 250 PS 637: Performed by: HOSPITALIST

## 2021-10-06 PROCEDURE — 94660 CPAP INITIATION&MGMT: CPT

## 2021-10-06 PROCEDURE — 120N000001 HC R&B MED SURG/OB

## 2021-10-06 PROCEDURE — 250N000013 HC RX MED GY IP 250 OP 250 PS 637: Performed by: INTERNAL MEDICINE

## 2021-10-06 RX ADMIN — PANTOPRAZOLE SODIUM 40 MG: 40 TABLET, DELAYED RELEASE ORAL at 08:57

## 2021-10-06 RX ADMIN — Medication 1 DROP: at 08:56

## 2021-10-06 RX ADMIN — SIMVASTATIN 10 MG: 10 TABLET, FILM COATED ORAL at 21:46

## 2021-10-06 RX ADMIN — CITALOPRAM HYDROBROMIDE 20 MG: 20 TABLET ORAL at 08:57

## 2021-10-06 RX ADMIN — Medication 2 CAPSULE: at 08:56

## 2021-10-06 RX ADMIN — Medication 1 DROP: at 21:46

## 2021-10-06 RX ADMIN — LEVOTHYROXINE SODIUM 200 MCG: 100 TABLET ORAL at 08:56

## 2021-10-06 RX ADMIN — FUROSEMIDE 20 MG: 20 TABLET ORAL at 08:57

## 2021-10-06 ASSESSMENT — ACTIVITIES OF DAILY LIVING (ADL)
ADLS_ACUITY_SCORE: 23
ADLS_ACUITY_SCORE: 23
ADLS_ACUITY_SCORE: 24
ADLS_ACUITY_SCORE: 23

## 2021-10-06 ASSESSMENT — MIFFLIN-ST. JEOR: SCORE: 1551.32

## 2021-10-06 NOTE — PLAN OF CARE
Cognitive Concerns/ Orientation : A/O x 4   BEHAVIOR & AGGRESSION TOOL COLOR: Green, calm/cooperative    ABNL VS/O2: VSS on O2 4l, BIPAP on this shift  MOBILITY: SBA/GB/walker  PAIN MANAGMENT: Denies   DIET: 2gm sodium, tolerating well  BOWEL/BLADDER: Voids in bathroom with incontinent at times, no BM  ABNL LAB/BG: hgb 8.1- recheck today  DRAIN/DEVICES: PIV is SL  TELEMETRY RHYTHM: NA  SKIN: Redness beneath breast and abdominal folds  TESTS/PROCEDURES: had EUS and pancreatic biopsy @ 1430 10/5  D/C DATE: pending  TCU placement  Discharge Barriers: Needs acceptance at TCU since  unable to assist pt.  OTHER IMPORTANT INFO: Pt slept well on BIPAP, up to BR x 2 voiding, no BM, denies pain, TAVARES, no cough, set to be in droplet isolation until 10/8 and may be a barrier to TCU acceptance.

## 2021-10-06 NOTE — PROGRESS NOTES
[facility]    HOSPITALIST PROGRESS NOTE :   --------------------------------------------------    Date of Admission:  9/20/2021    Cumulative Summary: Lucille Rojas is a 81 year old female who was admitted on 9/20/2021 when she presented with shortness of breath and constipation and was found to have severe symptomatic anemia of 4.7.    Assessment & Plan     Severe, symptomatic anemia on admission, presented with Hgb of 4.7  Acute blood loss anemia on chronic anemia  Reported several weeks of ongoing symptoms indicating a somewhat chronic course. Has noted black, tarry stools for at least 3-4 weeks.  S/p EGD on 09/21/21 revealed a few gastric erosions and a few tiny aphthous ulcers in the duodenum, but not severe enough to account for anemia.   * S/p 2 units with Hgb 4.7 > 7.2 on admission, 1 unit on 9/22/21 and 1 unit on 9/24 with appropriate rise in Hb is 9.3 this morning and stable  * Iron studies revealed low levels > S/p venofer 300 mg IV x 2, last dose 9/23/21.  Recheck iron level was still low, gave 2 more doses of 200 mg IV each.    --Patient was seen and examined, denying any complaints, encourage her to continue to increase her mobilization during the hospitalization.  --Protonix 40 mg p.o. daily.  --S/p EUS and biopsy on October 5, awaiting pathology result might take some time.  --Appreciate gastroenterology help, will follow up on IgG subclasses, per GI differentials include autoimmune pancreatitis versus lymphoma.  --Colonoscopy was discussed initially but then was held later due to acute encephalopathy with respiratory failure see below, patient now has no overt bleeding and has a stable hemoglobin.  Given risk with procedure given cardiac conditions at this point there is no plan for colonoscopy.  --Transfuse to keep hemoglobin more than 7.    Addendum;   Flow Interpretation   A. Pancreas, Tissue:  -CD10 positive lambda-monotypic B cells with increased forward scatter  (71%)  No aberrant immunophenotype on T cells  See comment      Electronically signed by Erik Garcia MD on 10/6/2021 at  1:55 PM   Comment     These immunophenotypic findings are supportive of B-cell lymphoma in the appropriate morphologic context but are not independently diagnostic of B-cell lymphoma. Please correlate with separately reported cytologic and/or morphologic interpretation  for final diagnosis and subclassification.    The immunophenotypic findings are consistent with a clonal B cell population. 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  The differential diagnosis includes diffuse large B-cell lymphoma, follicular lymphoma, Burkitt lymphoma, and other.  Final interpretation requires correlation with results of other ancillary studies, morphologic, and clinical features.     -- will discuss with GI , will also inform       Presumed primary pancreatic cancer Noted on CT.  R pleural effusion, transudate   S/p thoracentesis - 450 ml of verenice fluid on 09/22/21   S/p EUS on 09/21/21 with biopsy. Awaiting final path.   -- Cytology on pleural fluid negative for malignant cells  -- Pathology inconclusive. Additional tests sent on sample by oncology.  --Patient underwent repeat endoscopy, endoscopic ultrasound and tissue biopsy on October 5, awaiting pathology result  --Further follow-up with gastroenterology.     Acute encephalopathy, Resolved - On 09/23/21, patient remained oriented and following commands but very lethargic, drifting off in mid-sentence. Labs significant for leukocytosis, hypercapnia on ABG and elevated BNP. She is afebrile, hemodynamically stable, stable oxygenation.     -- Since 09/25, patient is alert awake and oriented, following commands seems to be at the baseline.  -- Treating hypercapnia CHF and UTI as below.      Acute on chronic hypercapnia -  ABG: pH 7.22, CO2 85, O2 121 while on 5L on 9/23/21.   Obesity, SHAVON with hypoventilation syndrome   Chronic hypoxia on home  oxygen - Per family she requires oxygen during the day as well, but has had trouble getting this ordered through PCP. On BiPAP at night with4 LPM O2 PTA    -- Continue to wean O2 as tolerates to keep O2 saturations around 90-93%  -- BiPAP at night and during naps during day as well.   -- When awake > up in chair.     E coli UTI - Patient lethargic and unable to give clear history regarding urinary symptoms at presentation. UA showed 166 WBC, small nitrates.   Leukocytosis - No T over 100 since admission. Hemodynamically stable. No localizing symptoms of infection. No Urinary symptoms. UA done. CT chest/abdomen/pelvis as above, no sign of localized infection.  - F/u on pleural cx -NGTD but very low suspicion of infection in this location.     -- UCx from 9/23/21: 2 strains of E. coli noted, sensitive to Rocephin   -- Initially on IV Rocephin, changed to p.o. Ceftin based on sensitivity.Completed 7 day course.  - BCx NGTD and patient is afebrile, encephalopathy now resolved.     Acute decompensated HF, unknown EF - developing acute encephalopathy, hypercapnia on 09/23/21. Workup revealed BNP up to 4000's from 800 at admission. Mild pulmonary edema on CXR. Oxygenation stable, but on 3 L.   Mod to severe aortic stenosis, new diagnosis   TAVARES, CP and light-headedness with minimal exertion - Concerning for symptomatic aortic stenosis, although anemia and chronic obesity hypoventilation syndrome with chronic hyoxia undoubtedly contribute. Initial troponin negative. CXR with R pleural effusion.   Transudate R pleural effusion, edema on admission. S/p thoracentesis on 9/22.     * Serial troponins negative  * Echo EF 60-65% No WMAs. RV fxn normal. Mod to severe aortic stenosis with aortic mean valve area of 1 cm2, mean gradient 37.5 mm Hg.     -- On 09/23/21, BNP quite elevated at 4000 on 09/23/21 (up from 800s at admission) CXR reviewed and showed mild intestinal markings, suspect edema.   -- was managed with IV lasix, Cr  slightly up and so lasix held and stable at 1.1, respiratory status has markedly improved and now stable at 2 LPM.   -- Low dose p.o. Lasix started 9/28, follow daily weight and periodic BMP. Patient is incontinent, given her recent UTI, Espitia catheter discontinued.  -- Cardiology consulted on 09/24/21 re: input on options (TAVR) and prognosis. With her unknown source of bleeding and since GI work-up could not be completed, patient not on candidate for anticoagulation and so not a candidate for further work-up including angiogram or TAVR.      Severe macular degeneration  Vision loss  -- Ordered schedule eye drops and ocular vitamins  -- assistance with menu per nutrition. Appreciate assistance.     CKD 3  -- monitor     DMT2 diet controlled.  last A1c 5.8 on 4/7/21. Did not recheck due to severe anemia  --Blood sugars within normal limit, discontinue checks      Hypothyroid TSH normal at admission.   -- Continue PTA levothyroxine      Depression  -- Continue PTA citalopram     HLD  -- Continue PTA statin     COVID 19 exposure  Per her daughter, patient's Son who visited patient 9/25 had symptoms and tested positive on 9/28. Patient considered exposed and needs to be on quarantine for 14 days per protocol. Continue weekly testing as per routine protocol. To test sooner if any COVID 19 symptoms. Last test 9/28 negative.  -- will order weekly COVID test today      Goals of Care.   Care conference 9/27,  and Dr. Srinivasan present.   Patient, her , 2 daughters were also present and her son also present over the phone.  It was reviewed with family that patient is not a candidate for TAVR and that aortic stenosis as well as obesity hypoventilation and since GI work up could not be completed to find source of bleeding. Also she is of high risk for a GI work up as well. And her illnesses could also limit options for treatment of presumed cancer. Although we do not have a definitive diagnosis yet.Biopsy was  repeated yesterday as it was discussed in family conference.  Patient and her daughter Carmen were updated yesterday that patient is now stable to be discharged from medical point of view and does not have to wait in hospital for biopsy result.  Unfortunately patient remains in quarantine till October 8, patient does not think that at this time she can be discharged home with assist of family.  Family is hopeful to be able to get her home eventually.    Diet: Room Service  2 Gram Sodium Diet    Espitia Catheter: Not present  DVT Prophylaxis: Pneumatic Compression Devices  Code Status: No CPR- Do NOT Intubate    The patient's care was discussed with the Bedside Nurse, Patient and Patient's Family.    Disposition Plan   Expected discharge: 10/06/2021 , patient was initially recommended to be discharged to TCU, care coordinators they have been sending referral, so far she has been declined by most TCU's.   Patient continues to work with therapy, they are recommending now home with assist.  Unfortunately patient elderly 82-year-old  would not be able to provide the assist neither does the family members who work full-time.  I did review discharge planning with patient. Her daughter Carmen was updated yesterday . I updated them that patient is ready to be discharged from medical point of view , awaiting safe disposition plan.    Chelsy Swann MD, FACP  Text Page (7am - 6pm)    ----------------------------------------------------------------------------------------------------------------------    Interval History    Patient was seen and examined this morning, lying in bed, looks clinically improved, underwent biopsy yesterday and tolerated the procedure well  Encourage patient to increase her mobilization during the hospitalization as we are waiting for her to go to TCU, as patient currently remains in quarantine it is difficult to find TCU for her, she was relieved to hear that her Covid PCR came back negative from  October 5.  Patient is otherwise denying any chest pain, palpitations or shortness of breath.,  Discussed with her that at this time she seems to be medically stable for discharge and we just need to come up with safe disposition plan.    -Data reviewed today: I reviewed all new labs and imaging results over the last 24 hours.    I personally reviewed no images or EKG's today.    Physical Exam   Temp: 97.9  F (36.6  C) Temp src: Oral BP: 113/52 Pulse: 69   Resp: 16 SpO2: 96 % O2 Device: Nasal cannula Oxygen Delivery: 3 LPM  Vitals:    10/02/21 0410 10/04/21 0703 10/06/21 0333   Weight: 116.6 kg (257 lb) 117.5 kg (259 lb 0.7 oz) 117.5 kg (259 lb)     Vital Signs with Ranges  Temp:  [97.4  F (36.3  C)-98.5  F (36.9  C)] 97.9  F (36.6  C)  Pulse:  [69-85] 69  Resp:  [16-23] 16  BP: (113-181)/(52-71) 113/52  FiO2 (%):  [60 %] 60 %  SpO2:  [93 %-100 %] 96 %  I/O last 3 completed shifts:  In: 200 [I.V.:200]  Out: -     GENERAL: Alert , awake and oriented. NAD. Conversational, appropriate, wearing nasal cannula   HEENT: Normocephalic. EOMI. No icterus or injection. Nares normal.   LUNGS: Clear to auscultation. No dyspnea at rest.   HEART: Regular rate. Extremities perfused.   ABDOMEN: Soft, nontender, and nondistended. Positive bowel sounds.   EXTREMITIES: No LE edema noted.   NEUROLOGIC: Moves extremities x4 on command. No acute focal neurologic abnormalities noted.     Medications       artificial tears ophthalmic solution  1 drop Both Eyes BID     citalopram  20 mg Oral Daily     furosemide  20 mg Oral Daily     levothyroxine  200 mcg Oral QAM AC     multivitamin  with lutein  2 capsule Oral Daily     pantoprazole  40 mg Oral QAM AC     simvastatin  10 mg Oral At Bedtime     sodium chloride (PF)  3 mL Intracatheter Q8H       Data   Recent Labs   Lab 10/06/21  0822 10/05/21  1755 10/04/21  0808 09/30/21  1018 09/29/21  1031 09/29/21  1031   WBC  --   --  6.9  --   --   --    HGB 8.1*  --  8.1* 9.3*   < > 9.5*   MCV  --    --  85  --   --   --    PLT  --   --  291  --   --   --    NA  --   --  140  --   --  136   POTASSIUM  --   --  4.2  --   --  3.6   CHLORIDE  --   --  100  --   --  96   CO2  --   --  38*  --   --  38*   BUN  --   --  24  --   --  23   CR  --   --  1.01  --   --  1.04   ANIONGAP  --   --  2*  --   --  2*   IGOR  --   --  8.5  --   --  8.6   GLC  --  125* 92  --   --  151*    < > = values in this interval not displayed.       Imaging:   No results found for this or any previous visit (from the past 24 hour(s)).

## 2021-10-06 NOTE — PROGRESS NOTES
GI   Awaiting path from 10/5 EUS with DDx AIP vs lymphoma  IgG subclasses ordered.    Damian Yancey DO   [facility]  Cell [phone]

## 2021-10-06 NOTE — PLAN OF CARE
DATE & TIME: 10/6/21 [id number]                      Cognitive Concerns/ Orientation : A/O x 4   BEHAVIOR & AGGRESSION TOOL COLOR: Green, calm/cooperative    ABNL VS/O2: VSS on O2 2L sats @mid 90s  MOBILITY: SBA/GB/walker  PAIN MANAGMENT: Denies   DIET: 2gm sodium, tolerating well  BOWEL/BLADDER: Voids in bathroom with incontinent at times, 3 BMs this shift  ABNL LAB/BG: hgb 8.1 (stable)  DRAIN/DEVICES: PIV is SL  TELEMETRY RHYTHM: NA  SKIN: Redness beneath breast and abdominal folds  TESTS/PROCEDURES: had EUS and pancreatic biopsy @ 1430 10/5  D/C DATE: pending TCU placement  Discharge Barriers: Needs acceptance at TCU since  unable to assist pt.  OTHER IMPORTANT INFO: Set to be in droplet isolation until 10/8 and may be a barrier to TCU acceptance.

## 2021-10-06 NOTE — PLAN OF CARE
DATE & TIME: 10/5/21 3-11pm shift                      Cognitive Concerns/ Orientation : A&O x 4   BEHAVIOR & AGGRESSION TOOL COLOR: Green     ABNL VS/O2: VSS on 02  weaning from 5L after endoscopy, 1 liter used for Bipap at night   MOBILITY: SBA with GB and walker  PAIN MANAGMENT: Denied, ex sore throat from EUS under general   DIET: 2gm sodium diet tolerating well  BOWEL/BLADDER: Voids in bathroom with some urinary incontinence  ABNL LAB/BG: hgb 8.1- recheck in AM  DRAIN/DEVICES: PIV is SL  TELEMETRY RHYTHM: NA  SKIN: Redness beneath breast and abdominal folds  TESTS/PROCEDURES: had EUS and pancreatic biopsy @ 1430 today  D/C DATE: MD states she would be ready tomorrow for discharge since biopsy results would take around 9 days  Discharge Barriers: Needs acceptance at TCU since  unable to assist pt.  OTHER IMPORTANT INFO: set to be in droplet isolation until 10/8 and may be a barrier to TCU acceptance.

## 2021-10-07 ENCOUNTER — APPOINTMENT (OUTPATIENT)
Dept: OCCUPATIONAL THERAPY | Facility: CLINIC | Age: 81
DRG: 840 | End: 2021-10-07
Payer: MEDICARE

## 2021-10-07 ENCOUNTER — APPOINTMENT (OUTPATIENT)
Dept: PHYSICAL THERAPY | Facility: CLINIC | Age: 81
DRG: 840 | End: 2021-10-07
Payer: MEDICARE

## 2021-10-07 PROCEDURE — 97530 THERAPEUTIC ACTIVITIES: CPT | Mod: GP | Performed by: PHYSICAL THERAPIST

## 2021-10-07 PROCEDURE — 250N000013 HC RX MED GY IP 250 OP 250 PS 637: Performed by: INTERNAL MEDICINE

## 2021-10-07 PROCEDURE — 120N000001 HC R&B MED SURG/OB

## 2021-10-07 PROCEDURE — 97535 SELF CARE MNGMENT TRAINING: CPT | Mod: GO

## 2021-10-07 PROCEDURE — 999N000157 HC STATISTIC RCP TIME EA 10 MIN

## 2021-10-07 PROCEDURE — 97116 GAIT TRAINING THERAPY: CPT | Mod: GP | Performed by: PHYSICAL THERAPIST

## 2021-10-07 PROCEDURE — 250N000013 HC RX MED GY IP 250 OP 250 PS 637: Performed by: HOSPITALIST

## 2021-10-07 PROCEDURE — 99232 SBSQ HOSP IP/OBS MODERATE 35: CPT | Performed by: INTERNAL MEDICINE

## 2021-10-07 RX ADMIN — Medication 2 CAPSULE: at 09:05

## 2021-10-07 RX ADMIN — PANTOPRAZOLE SODIUM 40 MG: 40 TABLET, DELAYED RELEASE ORAL at 09:05

## 2021-10-07 RX ADMIN — FUROSEMIDE 20 MG: 20 TABLET ORAL at 09:05

## 2021-10-07 RX ADMIN — Medication 1 DROP: at 09:06

## 2021-10-07 RX ADMIN — Medication 1 DROP: at 21:30

## 2021-10-07 RX ADMIN — SIMVASTATIN 10 MG: 10 TABLET, FILM COATED ORAL at 21:31

## 2021-10-07 RX ADMIN — LEVOTHYROXINE SODIUM 200 MCG: 100 TABLET ORAL at 09:04

## 2021-10-07 RX ADMIN — CITALOPRAM HYDROBROMIDE 20 MG: 20 TABLET ORAL at 09:05

## 2021-10-07 ASSESSMENT — ACTIVITIES OF DAILY LIVING (ADL)
ADLS_ACUITY_SCORE: 23

## 2021-10-07 ASSESSMENT — MIFFLIN-ST. JEOR: SCORE: 1556.76

## 2021-10-07 NOTE — PLAN OF CARE
DATE & TIME: 10/07/2021 [id number]        Cognitive Concerns/ Orientation : A&O x 4   BEHAVIOR & AGGRESSION TOOL COLOR: Green     ABNL VS/O2: VSS; desats to low-80's on RA - currently on 2L per NC with O2sats low-90's; Uses BIPAP overnight  MOBILITY: SBA with GB and walker in room/halls  PAIN MANAGMENT: Denies  DIET: 2 gram sodium  BOWEL/BLADDER: Incontinent of urine at times  ABNL LAB/BG: NA  DRAIN/DEVICES: PIV SL'd  TELEMETRY RHYTHM: NA  SKIN: redness beneath breasts and abdominal folds - powder applied  TESTS/PROCEDURES: EUS and pancreatic biopsy 10/5/21 - final results pending  D/C DATE: 10/08/2021 , TCU vs home with assist  Discharge Barriers:  is also in his 80's and unable to care for patient at home  OTHER IMPORTANT INFO: Heme/Onc following - awaiting pathology result. GI signed off. Droplet precautions maintained - may discontinue 10/8 if patient remains asymptomatic.

## 2021-10-07 NOTE — PLAN OF CARE
DATE & TIME: 10/6/21 3-11pm shift                 Cognitive Concerns/ Orientation : A/O x 4   BEHAVIOR & AGGRESSION TOOL COLOR: Green, calm/cooperative    ABNL VS/O2: VSS on O2 2L sats @mid 90s continue to wean, cpap at ngt with oxygen  MOBILITY: SBA/GB/walker  PAIN MANAGMENT: Denies   DIET: 2gm sodium, tolerating well  BOWEL/BLADDER: Voids in bathroom with incontinent at times, 3 BMs today, not diarrhea  ABNL LAB/BG: hgb 8.1 (stable)  DRAIN/DEVICES: PIV is SL  TELEMETRY RHYTHM: NA  SKIN: Redness beneath breast and abdominal folds  TESTS/PROCEDURES: had EUS and pancreatic biopsy 10/5- final results not expected for another 8 days.  D/C DATE: pending  TCU placement  Discharge Barriers: Needs acceptance at TCU since  unable to assist pt.  OTHER IMPORTANT INFO: Set to be in droplet isolation until 10/8 and may be a barrier to TCU acceptance.

## 2021-10-07 NOTE — PROGRESS NOTES
Chart check.  Pathology pending.  Flow cytometry noted.  We will see the patient after pathology result is available.

## 2021-10-07 NOTE — ANESTHESIA POSTPROCEDURE EVALUATION
Patient: Lucille Rojas    Procedure: Procedure(s):  ESOPHAGOGASTRODUODENOSCOPY, WITH FINE NEEDLE ASPIRATION BIOPSY, WITH ENDOSCOPIC ULTRASOUND GUIDANCE       Diagnosis:Pancreatic mass [K86.89]  Diagnosis Additional Information: No value filed.    Anesthesia Type:  General    Note:  Disposition: Inpatient   Postop Pain Control: Uneventful            Sign Out: Well controlled pain   PONV: No   Neuro/Psych: Uneventful            Sign Out: Acceptable/Baseline neuro status   Airway/Respiratory:             Sign Out: Acceptable/Baseline resp. status   CV/Hemodynamics: Uneventful            Sign Out: Acceptable CV status   Other NRE: NONE   DID A NON-ROUTINE EVENT OCCUR? No    Event details/Postop Comments:  Required bipap on arrival to PACU (on at baseline)           Last vitals:  Vitals Value Taken Time   /69 10/05/21 1720   Temp 36.4  C (97.5  F) 10/05/21 1720   Pulse 78 10/05/21 1728   Resp 25 10/05/21 1728   SpO2 90 % 10/05/21 1728   Vitals shown include unvalidated device data.    Electronically Signed By: Payam Calvo MD  October 7, 2021  3:07 PM

## 2021-10-07 NOTE — PROGRESS NOTES
[facility]    HOSPITALIST PROGRESS NOTE :   --------------------------------------------------    Date of Admission:  9/20/2021    Cumulative Summary: Lucille Rojas is a 81 year old female who was admitted on 9/20/2021 when she presented with shortness of breath and constipation and was found to have severe symptomatic anemia of 4.7.    Assessment & Plan     Severe, symptomatic anemia on admission, presented with Hgb of 4.7  Acute blood loss anemia on chronic anemia  Reported several weeks of ongoing symptoms indicating a somewhat chronic course. Has noted black, tarry stools for at least 3-4 weeks.  S/p EGD on 09/21/21 revealed a few gastric erosions and a few tiny aphthous ulcers in the duodenum, but not severe enough to account for anemia.   * S/p 2 units with Hgb 4.7 > 7.2 on admission, 1 unit on 9/22/21 and 1 unit on 9/24 with appropriate rise in Hb is 9.3 this morning and stable  * Iron studies revealed low levels > S/p venofer 300 mg IV x 2, last dose 9/23/21.  Recheck iron level was still low, gave 2 more doses of 200 mg IV each.    -- Patient was seen and examined , feeling better, has been working with therapies, still requires assistance and does not think that her elderly  at this point is able to assist her.  --Discussed with patient regarding multiple options for discharge including discharge to rehab which care coordinators are working on, other options which were discussed included daughter coming in is staying at her home versus patient going and is staying with her daughter Carmen for a week before going back to her home.  Discussed with patient to have further discussion with her family while we continue to work on finding her safe disposition plan at the rehab.  --Continue patient on Protonix 40 mg p.o. daily.  --S/p EUS and biopsy on October 5, biopsy results are pending, will follow up on the results.  --Flow cytometry showing CD10 positive lambda moderate  Hypaque B cells with increase forward to scatter 71% which can be supported by B-cell lymphoma and appropriate morphological context but are not independently diagnostic of B-cell lymphoma.  --Gastroenterology and oncology were made aware of the results, discussed with patient that her pathology results are pending and her cytometry results will be explained further by oncology.  --Hemoglobin has been stable around 8.1, transfuse if hemoglobin drops below 7  -- FISH test is pending   -- Flow cytometry results are as below :    Flow Interpretation   A. Pancreas, Tissue:  -CD10 positive lambda-monotypic B cells with increased forward scatter (71%)  No aberrant immunophenotype on T cells See comment      Electronically signed by Erik Garcia MD on 10/6/2021 at  1:55 PM   Comment     These immunophenotypic findings are supportive of B-cell lymphoma in the appropriate morphologic context but are not independently diagnostic of B-cell lymphoma. Please correlate with separately reported cytologic and/or morphologic interpretation  for final diagnosis and subclassification.    The immunophenotypic findings are consistent with a clonal B cell population. 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  The differential diagnosis includes diffuse large B-cell lymphoma, follicular lymphoma, Burkitt lymphoma, and other.  Final interpretation requires correlation with results of other ancillary studies, morphologic, and clinical features.      Presumed primary pancreatic cancer Noted on CT, flow cytometry is concerning for possible B cell lymphoma as above   R pleural effusion, transudate   S/p thoracentesis  450 ml of verenice fluid on 09/22/21   S/p EUS on 09/21/21 with biopsy: Right pleural cytology came back negative for malignancy.  Recommend pathology result showed tubular adenoma from the duodenum polyp with no evidence of high-grade dysplasia or invasive carcinoma.  Gastric biopsies showed oxyntic and antral pattern  mucosal tissue with a scant chronic inflammation, no evidence of acute inflammation, no evidence of Helicobacter on routine stain.    --Follow-up on pathology results from October 5.  --Flow cytometry results as above, will follow up on oncology recommendations.  --Patient underwent repeat endoscopy, endoscopic ultrasound and tissue biopsy on October 5, awaiting pathology result  --Further follow-up with gastroenterology.     Acute encephalopathy, Resolved - On 09/23/21, patient remained oriented and following commands but very lethargic, drifting off in mid-sentence. Labs significant for leukocytosis, hypercapnia on ABG and elevated BNP. She is afebrile, hemodynamically stable, stable oxygenation.     -- Since 09/25, patient is alert awake and oriented, following commands seems to be at the baseline.  -- Treating hypercapnia CHF and UTI as below.      Acute on chronic hypercapnia -  ABG: pH 7.22, CO2 85, O2 121 while on 5L on 9/23/21.   Obesity, SHAVON with hypoventilation syndrome   Chronic hypoxia on home oxygen - Per family she requires oxygen during the day as well, but has had trouble getting this ordered through PCP. On BiPAP at night with4 LPM O2 PTA    -- Continue to wean O2 as tolerates to keep O2 saturations around 90-93%  -- BiPAP at night and during naps during day as well.   -- When awake > up in chair.  -- Patient might need Oxygen during the day time also on discharge , will order at the time of discharge.     E coli UTI - Patient lethargic and unable to give clear history regarding urinary symptoms at presentation. UA showed 166 WBC, small nitrates.   Leukocytosis - No T over 100 since admission. Hemodynamically stable. No localizing symptoms of infection. No Urinary symptoms. UA done. CT chest/abdomen/pelvis as above, no sign of localized infection.  - F/u on pleural cx -NGTD but very low suspicion of infection in this location.     -- UCx from 9/23/21: 2 strains of E. coli noted, sensitive to  Rocephin   -- Initially on IV Rocephin, changed to p.o. Ceftin based on sensitivity.Completed 7 day course.  - BCx NGTD and patient is afebrile, encephalopathy now resolved.     Acute decompensated HF, unknown EF - developing acute encephalopathy, hypercapnia on 09/23/21. Workup revealed BNP up to 4000's from 800 at admission. Mild pulmonary edema on CXR. Oxygenation stable, but on 3 L.   Mod to severe aortic stenosis, new diagnosis   TAVARES, CP and light-headedness with minimal exertion - Concerning for symptomatic aortic stenosis, although anemia and chronic obesity hypoventilation syndrome with chronic hyoxia undoubtedly contribute. Initial troponin negative. CXR with R pleural effusion.   Transudate R pleural effusion, edema on admission. S/p thoracentesis on 9/22.     * Serial troponins negative  * Echo EF 60-65% No WMAs. RV fxn normal. Mod to severe aortic stenosis with aortic mean valve area of 1 cm2, mean gradient 37.5 mm Hg.     -- On 09/23/21, BNP quite elevated at 4000 on 09/23/21 (up from 800s at admission) CXR reviewed and showed mild intestinal markings, suspect edema.   -- Was managed with IV lasix, Cr slightly up and so lasix held and stable at 1.1, respiratory status has markedly improved and now stable at 2 LPM.   -- Low dose p.o. Lasix started 9/28, follow daily weight and periodic BMP. Patient is incontinent, given her recent UTI, Espitia catheter discontinued.  -- Cardiology consulted on 09/24/21 re: input on options (TAVR) and prognosis. With her unknown source of bleeding and since GI work-up could not be completed, patient not on candidate for anticoagulation and so not a candidate for further work-up including angiogram or TAVR.      Severe macular degeneration  Vision loss  -- Ordered schedule eye drops and ocular vitamins  -- assistance with menu per nutrition. Appreciate assistance.     CKD 3  -- monitor     DMT2 diet controlled.  last A1c 5.8 on 4/7/21. Did not recheck due to severe  anemia  --Blood sugars within normal limit, discontinue checks      Hypothyroid TSH normal at admission.   -- Continue PTA levothyroxine      Depression  -- Continue PTA citalopram     HLD  -- Continue PTA statin     COVID 19 exposure  Per her daughter, patient's Son who visited patient 9/25 had symptoms and tested positive on 9/28. Patient considered exposed and needs to be on quarantine for 14 days per protocol. Continue weekly testing as per routine protocol. To test sooner if any COVID 19 symptoms. Last test 9/28 negative.  -- Weekly COVID test came back negative      Goals of Care.   Care conference 9/27,  and Dr. Srinivasan present.   Patient, her , 2 daughters were also present and her son also present over the phone.  It was reviewed with family that patient is not a candidate for TAVR and that aortic stenosis as well as obesity hypoventilation and since GI work up could not be completed to find source of bleeding. Also she is of high risk for a GI work up as well. And her illnesses could also limit options for treatment of presumed cancer. Although we do not have a definitive diagnosis yet.Biopsy was repeated yesterday as it was discussed in family conference.   Will first oncology update the patient and family regarding the cytology results as pathology is still pending   Unfortunately patient remains in quarantine till October 8, patient does not think that at this time she can be discharged home with assist of family.  Family is hopeful to be able to get her home eventually.    Diet: Room Service  2 Gram Sodium Diet    Espitia Catheter: Not present  DVT Prophylaxis: Pneumatic Compression Devices  Code Status: No CPR- Do NOT Intubate    The patient's care was discussed with the Bedside Nurse, Patient and Patient's Family.    Disposition Plan   Expected discharge: 10/08/2021 , patient was initially recommended to be discharged to TCU, care coordinators they have been sending referral, so far she  has been declined by most TCU's.   Patient continues to work with therapy, they are recommending now home with assist.  Unfortunately patient elderly 82-year-old  would not be able to provide the assist neither does the family members who work full-time. I did review discharge options with patient this morning regarding going to TCU, or go home with assist of family or go to daughter,s home for some time if that's an option, patient will think about those options and will also discuss with family.  I updated them that patient is ready to be discharged from medical point of view , awaiting safe disposition plan.    Chelsy Swann MD, FACP  Text Page (7am - 6pm)    ----------------------------------------------------------------------------------------------------------------------    Interval History    Patient was seen and examined this morning , sitting in chair , looks clinically improved, working with therapy and trying to walk with the help of aid on daily basis.  Using the walker, also requiring oxygen during the daytime.  Does not think that at this point her  would be able to assist her at home, she also thinks that her bed is high and the stool to the bed is wobbly.  We also discussed about looking into options for her going to her daughter's house or if one of her daughter can come and stay with them for a week.  Patient will continue to look into that and will have further discussion with her family.  Discussed with her that we are also working on finding her the safe disposition plan for rehab but so far she has been declined.  She will also be out of quarantine tomorrow which might help in disposition planning.  I discussed with her that her pathology report is pending but her flow cytometry results are back which are showing abundance of B-cell but we will have further discussion with oncology to explain the results to patient.    -Data reviewed today: I reviewed all new labs and imaging  results over the last 24 hours.    I personally reviewed no images or EKG's today.    Physical Exam   Temp: 97.8  F (36.6  C) Temp src: Axillary BP: 116/53 Pulse: 67   Resp: 16 SpO2: 91 % O2 Device: Nasal cannula Oxygen Delivery: 2 LPM  Vitals:    10/04/21 0703 10/06/21 0333 10/07/21 0346   Weight: 117.5 kg (259 lb 0.7 oz) 117.5 kg (259 lb) 118 kg (260 lb 3.2 oz)     Vital Signs with Ranges  Temp:  [97.5  F (36.4  C)-97.8  F (36.6  C)] 97.8  F (36.6  C)  Pulse:  [66-69] 67  Resp:  [16] 16  BP: (116-143)/(53-62) 116/53  SpO2:  [91 %-97 %] 91 %  I/O last 3 completed shifts:  In: 640 [P.O.:640]  Out: 230 [Urine:230]    GENERAL: Alert , awake and oriented. NAD. Conversational, appropriate, wearing nasal cannula   HEENT: Normocephalic. EOMI. No icterus or injection. Nares normal.   LUNGS: Clear to auscultation. No dyspnea at rest.   HEART: Regular rate. Extremities perfused.   ABDOMEN: Soft, nontender, and nondistended. Positive bowel sounds.   EXTREMITIES: No LE edema noted.   NEUROLOGIC: Moves extremities x4 on command. No acute focal neurologic abnormalities noted.     Medications       artificial tears ophthalmic solution  1 drop Both Eyes BID     citalopram  20 mg Oral Daily     furosemide  20 mg Oral Daily     levothyroxine  200 mcg Oral QAM AC     multivitamin  with lutein  2 capsule Oral Daily     pantoprazole  40 mg Oral QAM AC     simvastatin  10 mg Oral At Bedtime     sodium chloride (PF)  3 mL Intracatheter Q8H       Data   Recent Labs   Lab 10/06/21  0822 10/05/21  1755 10/04/21  0808   WBC  --   --  6.9   HGB 8.1*  --  8.1*   MCV  --   --  85   PLT  --   --  291   NA  --   --  140   POTASSIUM  --   --  4.2   CHLORIDE  --   --  100   CO2  --   --  38*   BUN  --   --  24   CR  --   --  1.01   ANIONGAP  --   --  2*   IGOR  --   --  8.5   GLC  --  125* 92       Imaging:   No results found for this or any previous visit (from the past 24 hour(s)).

## 2021-10-07 NOTE — PLAN OF CARE
DATE & TIME: 10/6/21, 7020 - 9447   Cognitive Concerns/ Orientation : A&O x 4   BEHAVIOR & AGGRESSION TOOL COLOR: Green   ABNL VS/O2: VSS. Used BIPAP overnight  MOBILITY: SBA with GB and walker.  PAIN MANAGMENT: denied  DIET: 2 gram sodium  BOWEL/BLADDER: Voids well with some urinary incontinence  ABNL LAB/BG: NA  DRAIN/DEVICES: PIV SL  TELEMETRY RHYTHM: NA  SKIN: redness beneath breasts and abdominal folds  TESTS/PROCEDURES: EUS and pancreatic biopsy 10/5/21. Final results pending  D/C DATE: Pending TCU placement  Discharge Barriers: Needs acceptance at TCU since  unable to assist patient  OTHER IMPORTANT INFO: GI following. Droplet precautions maintained. To be in droplet isolation until 10/8/21

## 2021-10-08 ENCOUNTER — APPOINTMENT (OUTPATIENT)
Dept: OCCUPATIONAL THERAPY | Facility: CLINIC | Age: 81
DRG: 840 | End: 2021-10-08
Payer: MEDICARE

## 2021-10-08 ENCOUNTER — APPOINTMENT (OUTPATIENT)
Dept: PHYSICAL THERAPY | Facility: CLINIC | Age: 81
DRG: 840 | End: 2021-10-08
Payer: MEDICARE

## 2021-10-08 ENCOUNTER — PATIENT OUTREACH (OUTPATIENT)
Dept: CARE COORDINATION | Facility: CLINIC | Age: 81
End: 2021-10-08

## 2021-10-08 LAB
IGG SERPL-MCNC: 628 MG/DL (ref 610–1616)
IGG1 SER-MCNC: 366 MG/DL (ref 382–929)
IGG2 SER-MCNC: 154 MG/DL (ref 242–700)
IGG3 SER-MCNC: 80 MG/DL (ref 22–176)
IGG4 SER-MCNC: 22 MG/DL (ref 4–86)
PATH REPORT.COMMENTS IMP SPEC: ABNORMAL
PATH REPORT.COMMENTS IMP SPEC: YES
PATH REPORT.COMMENTS IMP SPEC: YES
PATH REPORT.FINAL DX SPEC: ABNORMAL
PATH REPORT.GROSS SPEC: ABNORMAL
PATH REPORT.MICROSCOPIC SPEC OTHER STN: ABNORMAL
PATH REPORT.RELEVANT HX SPEC: ABNORMAL
SUBCLASSES, PERCENT: 99 %

## 2021-10-08 PROCEDURE — 250N000013 HC RX MED GY IP 250 OP 250 PS 637: Performed by: HOSPITALIST

## 2021-10-08 PROCEDURE — 97535 SELF CARE MNGMENT TRAINING: CPT | Mod: GO

## 2021-10-08 PROCEDURE — 88305 TISSUE EXAM BY PATHOLOGIST: CPT | Mod: 26 | Performed by: PATHOLOGY

## 2021-10-08 PROCEDURE — 97116 GAIT TRAINING THERAPY: CPT | Mod: GP

## 2021-10-08 PROCEDURE — 88173 CYTOPATH EVAL FNA REPORT: CPT | Mod: 26 | Performed by: PATHOLOGY

## 2021-10-08 PROCEDURE — 88342 IMHCHEM/IMCYTCHM 1ST ANTB: CPT | Mod: 26 | Performed by: PATHOLOGY

## 2021-10-08 PROCEDURE — 99233 SBSQ HOSP IP/OBS HIGH 50: CPT | Performed by: INTERNAL MEDICINE

## 2021-10-08 PROCEDURE — 120N000001 HC R&B MED SURG/OB

## 2021-10-08 PROCEDURE — 88172 CYTP DX EVAL FNA 1ST EA SITE: CPT | Mod: 26 | Performed by: PATHOLOGY

## 2021-10-08 PROCEDURE — 250N000013 HC RX MED GY IP 250 OP 250 PS 637: Performed by: INTERNAL MEDICINE

## 2021-10-08 PROCEDURE — 999N000157 HC STATISTIC RCP TIME EA 10 MIN

## 2021-10-08 PROCEDURE — 88360 TUMOR IMMUNOHISTOCHEM/MANUAL: CPT | Mod: 26 | Performed by: PATHOLOGY

## 2021-10-08 PROCEDURE — 88341 IMHCHEM/IMCYTCHM EA ADD ANTB: CPT | Mod: 26 | Performed by: PATHOLOGY

## 2021-10-08 RX ADMIN — CITALOPRAM HYDROBROMIDE 20 MG: 20 TABLET ORAL at 10:37

## 2021-10-08 RX ADMIN — FUROSEMIDE 20 MG: 20 TABLET ORAL at 10:37

## 2021-10-08 RX ADMIN — Medication 2 CAPSULE: at 10:37

## 2021-10-08 RX ADMIN — SIMVASTATIN 10 MG: 10 TABLET, FILM COATED ORAL at 20:43

## 2021-10-08 RX ADMIN — Medication 1 DROP: at 10:40

## 2021-10-08 RX ADMIN — PANTOPRAZOLE SODIUM 40 MG: 40 TABLET, DELAYED RELEASE ORAL at 10:37

## 2021-10-08 RX ADMIN — LEVOTHYROXINE SODIUM 200 MCG: 100 TABLET ORAL at 10:40

## 2021-10-08 RX ADMIN — Medication 1 DROP: at 20:43

## 2021-10-08 ASSESSMENT — ACTIVITIES OF DAILY LIVING (ADL)
ADLS_ACUITY_SCORE: 23
ADLS_ACUITY_SCORE: 21
ADLS_ACUITY_SCORE: 23
ADLS_ACUITY_SCORE: 23

## 2021-10-08 ASSESSMENT — MIFFLIN-ST. JEOR: SCORE: 1552.5

## 2021-10-08 NOTE — PROGRESS NOTES
FNA still pending. Will see the patient when results. Please call if acute questions    Alexander Sebastian MD

## 2021-10-08 NOTE — PROGRESS NOTES
Care Management Follow Up    Length of Stay (days): 18    Expected Discharge Date: 10/09/2021     Concerns to be Addressed: discharge planning     Patient plan of care discussed at interdisciplinary rounds: Yes    Anticipated Discharge Disposition: Transitional Care     Anticipated Discharge Services:    Anticipated Discharge DME:      Patient/family educated on Medicare website which has current facility and service quality ratings: yes  Education Provided on the Discharge Plan:    Patient/Family in Agreement with the Plan: yes    Referrals Placed by CM/SW: Post Acute Facilities  Private pay costs discussed: Not applicable    Additional Information:  Pt accepted to [facility] for Saturday. It is a private room at $36 per day. Private room fee waived for the first 14 days. RN CC, Gracie, has discussed with daughter, Carmen. See her note. Family to transport at 1400.       MACHO Willett, [facility]  [phone]  [facility]    PAS-RR    D: Per DHS regulation, SW completed and submitted PAS-RR to [place] [employer] Direct Connect via the Senior [employer] Line.  PAS-RR confirmation # is : [id number]    I: SW spoke with dtr and they are aware a PAS-RR has been submitted.  SW reviewed with dtr that they may be contacted for a follow up appointment within 10 days of hospital discharge if their SNF stay is < 30 days.  Contact information for Senior [employer] Line was also provided.    A: Dtr verbalized understanding.    P: Further questions may be directed to Senior [employer] Line at #[phone], option #4 for PAS-RR staff.

## 2021-10-08 NOTE — PROGRESS NOTES
Patient was exposed to COVID+ person 9/25. On 10/9/21 patient can discontinue droplet precautions as long as patient is asymptomatic and no concerns for COVID-19.

## 2021-10-08 NOTE — PROGRESS NOTES
Care Management Follow Up    Length of Stay (days): 18    Expected Discharge Date: 10/9/21     Concerns to be Addressed: transportation to the TCU/ O2 need    Patient plan of care discussed at interdisciplinary rounds: Yes    Anticipated Discharge Disposition: Transitional Care     Anticipated Discharge Services:  TCU  Anticipated Discharge DME:  O2    Patient/family educated on Medicare website which has current facility and service quality ratings: yes  Education Provided on the Discharge Plan:  yes  Patient/Family in Agreement with the Plan: yes    Referrals Placed by CM/SW: Post Acute Facilities  Private pay costs discussed: transportation costs    Additional Information:  Conversed with scooter Cormierist this AM and thought pt could possibly transition home if she had some additional help from her family.  Pt was in communication with her family concerning this.  Meanwhile, the SW has confirmation that [email or web address] can [facility] tomorrow.  Writer met with pt again concerning discharge planning.  She asked that writer calls her Daughter Carmen, as pt is leaning more towards transitioning to the TCU.  Carmen is in agreement.  Pt will come off quarantine status tomorrow.  Carmen will call writer back concerning if they will be transporting or if we need to set-up wheelchair transport.  Pt will be needing O2 for transport and most likely a bariatric wheelchair if we set this up.  Also, Carmen was updated that we will need her Bipap delivered here or at [facility].  NOMAN updated that pt has decided on TCU.    Addendum: Pt will not need O2 for transport to the TCU, as she has been maintaining O2 sat >92%.      Gracie Remy, RN   Inpatient Care Management  [phone]

## 2021-10-08 NOTE — PLAN OF CARE
DATE & TIME: 10/08/2021        Cognitive Concerns/ Orientation : Alert and Oriented x4   BEHAVIOR & AGGRESSION TOOL COLOR: Green     ABNL VS/O2: VSS on 2L, sats in low to mid 90's, TAVARES.    MOBILITY: SBA with GB and walker.  PAIN MANAGMENT: Denies   DIET: 2 gram sodium  BOWEL/BLADDER: Incontinent of urine at times  ABNL LAB/BG: NA  DRAIN/DEVICES: PIV SL  TELEMETRY RHYTHM: NA  SKIN: redness beneath breasts and abdominal folds  TESTS/PROCEDURES: EUS and pancreatic biopsy 10/5/21. Final results pending  D/C DATE: 10/08/2021 , TCU vs home with assist  Discharge Barriers:    is unable to care for patient at home  OTHER IMPORTANT INFO: Heme/Onc following. GI signed off; Droplet precautions maintained. To be in droplet isolation until 10/8/21 if remains asymptomatic

## 2021-10-08 NOTE — PROGRESS NOTES
[facility]    HOSPITALIST PROGRESS NOTE :   --------------------------------------------------    Date of Admission:  9/20/2021    Cumulative Summary: Lucille Rojas is a 81 year old female who was admitted on 9/20/2021 when she presented with shortness of breath and constipation and was found to have severe symptomatic anemia of 4.7.    Assessment & Plan     Severe, symptomatic anemia on admission, presented with Hgb of 4.7  Acute blood loss anemia on chronic anemia  Reported several weeks of ongoing symptoms indicating a somewhat chronic course. Has noted black, tarry stools for at least 3-4 weeks.  S/p EGD on 09/21/21 revealed a few gastric erosions and a few tiny aphthous ulcers in the duodenum, but not severe enough to account for anemia.   * S/p 2 units with Hgb 4.7 > 7.2 on admission, 1 unit on 9/22/21 and 1 unit on 9/24 with appropriate rise in Hb is 9.3 this morning and stable  * Iron studies revealed low levels > S/p venofer 300 mg IV x 2, last dose 9/23/21.  Recheck iron level was still low, gave 2 more doses of 200 mg IV each.    -- Patient was seen and examined , feeling better, hoping to be discharged today , will talk further with CC if bed is available or she she should go home , she will; also talk to her daughter if she can help initially at home if no beds are available , after discussion with patient, care coordinator and family, patient would like to be discharged to TCU tomorrow morning.  --Continue patient on Protonix 40 mg p.o. daily.  --S/p EUS and biopsy done on October 5, biopsy results are pending.  --Flow cytometry showed CD10 positive lambda moderate B cells which can be supported of B-cell lymphoma in appropriate morphological context but are not independently diagnostic of B-cell lymphoma.  --I did discuss the flow cytometry results with oncology, they would like to wait on FNA results before making any further evaluation and having further discussion with  patient and family.  --Gastroenterology has signed off at this point.  --Hemoglobin has been stable around 8.1, transfuse if hemoglobin drops below 7  -- FISH test is pending   -- Flow cytometry results are as below :    Flow Interpretation   A. Pancreas, Tissue:  -CD10 positive lambda-monotypic B cells with increased forward scatter (71%)  No aberrant immunophenotype on T cells See comment      Electronically signed by Erik Garcia MD on 10/6/2021 at  1:55 PM   Comment     These immunophenotypic findings are supportive of B-cell lymphoma in the appropriate morphologic context but are not independently diagnostic of B-cell lymphoma. Please correlate with separately reported cytologic and/or morphologic interpretation  for final diagnosis and subclassification.    The immunophenotypic findings are consistent with a clonal B cell population. 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  The differential diagnosis includes diffuse large B-cell lymphoma, follicular lymphoma, Burkitt lymphoma, and other.  Final interpretation requires correlation with results of other ancillary studies, morphologic, and clinical features.        Presumed primary pancreatic cancer Noted on CT, flow cytometry is concerning for possible B cell lymphoma as above   R pleural effusion, transudate   S/p thoracentesis  450 ml of verenice fluid on 09/22/21   S/p EUS on 09/21/21 with biopsy: Right pleural cytology came back negative for malignancy.  Recommend pathology result showed tubular adenoma from the duodenum polyp with no evidence of high-grade dysplasia or invasive carcinoma.  Gastric biopsies showed oxyntic and antral pattern mucosal tissue with a scant chronic inflammation, no evidence of acute inflammation, no evidence of Helicobacter on routine stain.    --Patient underwent repeat endoscopy, endoscopic ultrasound and tissue biopsy on October 5, awaiting pathology result  --Flow cytometry results as above, will follow up on oncology  recommendations.  --Plan for follow-up with oncology after the discharge as an outpatient.     Acute encephalopathy, Resolved - On 09/23/21, patient remained oriented and following commands but very lethargic, drifting off in mid-sentence. Labs significant for leukocytosis, hypercapnia on ABG and elevated BNP. She is afebrile, hemodynamically stable, stable oxygenation.     -- Since 09/25, patient is alert awake and oriented, following commands seems to be at the baseline.  -- Treating hypercapnia CHF and UTI as below.      Acute on chronic hypercapnia -  ABG: pH 7.22, CO2 85, O2 121 while on 5L on 9/23/21.   Obesity, SHAVON with hypoventilation syndrome   Chronic hypoxia on home oxygen - Per family she requires oxygen during the day as well, but has had trouble getting this ordered through PCP. On BiPAP at night with4 LPM O2 PTA    -- Continue to wean O2 as tolerates to keep O2 saturations around 90-93%  -- BiPAP at night and during naps during day as well.   -- When awake > up in chair.  --Continue patient will not need oxygen during that hospitalization as she is able to maintain her oxygen levels during the daytime but definitely needs oxygen at nighttime.  --It seems like that patient does not have any oxygen delivered so far at her home, I will order the nocturnal oxygen for the discharge.     E coli UTI - Patient lethargic and unable to give clear history regarding urinary symptoms at presentation. UA showed 166 WBC, small nitrates.   Leukocytosis - No T over 100 since admission. Hemodynamically stable. No localizing symptoms of infection. No Urinary symptoms. UA done. CT chest/abdomen/pelvis as above, no sign of localized infection.  - F/u on pleural cx -NGTD but very low suspicion of infection in this location.     -- UCx from 9/23/21: 2 strains of E. coli noted, sensitive to Rocephin   -- Initially on IV Rocephin, changed to p.o. Ceftin based on sensitivity.Completed 7 day course.  - BCx NGTD and patient is  afebrile, encephalopathy now resolved.     Acute decompensated HF, unknown EF - developing acute encephalopathy, hypercapnia on 09/23/21. Workup revealed BNP up to 4000's from 800 at admission. Mild pulmonary edema on CXR. Oxygenation stable, but on 3 L.   Mod to severe aortic stenosis, new diagnosis   TAVARES, CP and light-headedness with minimal exertion - Concerning for symptomatic aortic stenosis, although anemia and chronic obesity hypoventilation syndrome with chronic hyoxia undoubtedly contribute. Initial troponin negative. CXR with R pleural effusion.   Transudate R pleural effusion, edema on admission. S/p thoracentesis on 9/22.     * Serial troponins negative  * Echo EF 60-65% No WMAs. RV fxn normal. Mod to severe aortic stenosis with aortic mean valve area of 1 cm2, mean gradient 37.5 mm Hg.     -- On 09/23/21, BNP quite elevated at 4000 on 09/23/21 (up from 800s at admission) CXR reviewed and showed mild intestinal markings, suspect edema.   -- Was managed with IV lasix, Cr slightly up and so lasix held and stable at 1.1, respiratory status has markedly improved and now stable at 2 LPM.   -- Low dose p.o. Lasix started 9/28, follow daily weight and periodic BMP. Patient is incontinent, given her recent UTI, Espitia catheter discontinued.  -- Cardiology consulted on 09/24/21 re: input on options (TAVR) and prognosis. With her unknown source of bleeding and since GI work-up could not be completed, patient not on candidate for anticoagulation and so not a candidate for further work-up including angiogram or TAVR.   --Patient will be continued on Lasix 20 mg p.o. daily, will recommend repeating the BMP in 1 week after the discharge    Severe macular degeneration  Vision loss  -- Ordered schedule eye drops and ocular vitamins  -- assistance with menu per nutrition. Appreciate assistance.     CKD 3  -- monitor     DMT2 diet controlled.  last A1c 5.8 on 4/7/21. Did not recheck due to severe anemia  --Blood sugars  within normal limit, discontinue checks      Hypothyroid TSH normal at admission.   -- Continue PTA levothyroxine      Depression  -- Continue PTA citalopram     HLD  -- Continue PTA statin     COVID 19 exposure  Per her daughter, patient's Son who visited patient 9/25 had symptoms and tested positive on 9/28. Patient considered exposed and needs to be on quarantine for 14 days per protocol. Continue weekly testing as per routine protocol. To test sooner if any COVID 19 symptoms. Last test 9/28 negative.  -- Weekly COVID test came back negative , she will be done with her last day of quarantine today.  Patient has 3 negative  COVID-19 PCR test done on September 20, September 28 and then October 5th.    Goals of Care.   Care conference 9/27,  and Dr. Srinivasan present.   Patient, her , 2 daughters were also present and her son also present over the phone.  It was reviewed with family that patient is not a candidate for TAVR and that aortic stenosis as well as obesity hypoventilation and since GI work up could not be completed to find source of bleeding. Also she is of high risk for a GI work up as well. And her illnesses could also limit options for treatment of presumed cancer. Although we do not have a definitive diagnosis yet.Biopsy was repeated yesterday as it was discussed in family conference.   Will first oncology update the patient and family regarding the cytology results as pathology is still pending   Unfortunately patient remains in quarantine till October 8, patient does not think that at this time she can be discharged home with assist of family.  Family is hopeful to be able to get her home soon after she gets done with the rehab at TCU.    Diet: Room Service  2 Gram Sodium Diet    Espitia Catheter: Not present  DVT Prophylaxis: Pneumatic Compression Devices  Code Status: No CPR- Do NOT Intubate    The patient's care was discussed with the Bedside Nurse, Patient and Patient's  Family.    Disposition Plan   Expected discharge: 10/09/2021 , significant amount of time was a spent this morning in discussion with patient, care coordinator, charge nurse regarding a safe disposition planning.  Although patient continues to make significant improvement and now has been much more stable and walking with walker, there are still concerns regarding some aspects of mobility especially her getting out of the bed head without any assist from family.  Patient is planned to be discharged to TCU tomorrow morning.  About 40 minutes of the time was a spent in patient care this morning, more than 70% of the time was spent in counseling and coordination of the discharge plan with patient, her family, care coordinator and charge nurse.    Chelsy Swann MD, [facility]  Text Page (7am - 6pm)    ----------------------------------------------------------------------------------------------------------------------    Interval History    Patient was seen and examined this morning, sitting in chair, eating breakfast.  Patient looks clinically improved.  Patient thinks that she is getting much more stable and walking with assist of walker.  Patient still remains concerned about getting in and out of her bed which is quite high and she has to use the stool to get into the bed.  She does not think that at this time her family would be able to assist in the bed her for few days after the discharge.  She is open to the idea of going to TCU.  Discussed with patient that she will be done with quarantine tonight.  Multiple detail options regarding patient prolonged his stay in hospital, going to home with assist of family, going to TCU, bed without oxygen, while family is working to get her oxygen approved for the nighttime oral dose topics were discussed in detail and all the questions were answered.    -Data reviewed today: I reviewed all new labs and imaging results over the last 24 hours.    I personally reviewed no images or  EKG's today.    Physical Exam   Temp: 97.6  F (36.4  C) Temp src: Oral BP: 131/50 Pulse: 68   Resp: 18 SpO2: 92 % O2 Device: None (Room air) Oxygen Delivery: 2 LPM  Vitals:    10/06/21 0333 10/07/21 0346 10/08/21 0700   Weight: 117.5 kg (259 lb) 118 kg (260 lb 3.2 oz) 117.6 kg (259 lb 4.2 oz)     Vital Signs with Ranges  Temp:  [97.2  F (36.2  C)-98.1  F (36.7  C)] 97.6  F (36.4  C)  Pulse:  [59-68] 68  Resp:  [16-18] 18  BP: (121-131)/(48-57) 131/50  SpO2:  [92 %-97 %] 92 %  I/O last 3 completed shifts:  In: 480 [P.O.:480]  Out: 900 [Urine:900]    GENERAL: Alert , awake and oriented. NAD. Conversational, appropriate, eating breakfast, sitting in chair, looks clinically improved.  HEENT: Normocephalic. EOMI. No icterus or injection. Nares normal.   LUNGS: Clear to auscultation. No dyspnea at rest.   HEART: Regular rate. Extremities perfused.   ABDOMEN: Soft, nontender, and nondistended. Positive bowel sounds.   EXTREMITIES: No LE edema noted.   NEUROLOGIC: Moves extremities x4 on command. No acute focal neurologic abnormalities noted.     Medications       artificial tears ophthalmic solution  1 drop Both Eyes BID     citalopram  20 mg Oral Daily     furosemide  20 mg Oral Daily     levothyroxine  200 mcg Oral QAM AC     multivitamin  with lutein  2 capsule Oral Daily     pantoprazole  40 mg Oral QAM AC     simvastatin  10 mg Oral At Bedtime     sodium chloride (PF)  3 mL Intracatheter Q8H       Data   Recent Labs   Lab 10/06/21  0822 10/05/21  1755 10/04/21  0808   WBC  --   --  6.9   HGB 8.1*  --  8.1*   MCV  --   --  85   PLT  --   --  291   NA  --   --  140   POTASSIUM  --   --  4.2   CHLORIDE  --   --  100   CO2  --   --  38*   BUN  --   --  24   CR  --   --  1.01   ANIONGAP  --   --  2*   IGOR  --   --  8.5   GLC  --  125* 92       Imaging:   No results found for this or any previous visit (from the past 24 hour(s)).

## 2021-10-08 NOTE — PROGRESS NOTES
Clinic Care Coordination Contact  Ambulatory Care Coordination to Inpatient Care Management   Hand-In Communication    Date:  October 8, 2021  Name: Lucille Rojas is enrolled in Ambulatory Care Coordination program and I am the Lead Care Coordinator.  CC Contact Information: Epic [employer] + phone  Payor Source: Payor: MEDICARE / Plan: MEDICARE / Product Type: Medicare /   Current services in place:     Please see the CC Snaphot and Care Management Flowsheets for specific  details of this Lucille Rojas care plan.   Additional details/specific concerns r/t this admission:    Financial Concerns Action steps to achieve this goal:  1. I will inquire about financial resources through Financial Resource referral with help from Care Coordination.  2. I will explore community options for help with cleaning/de-cluttering my home.  3. I will continue leaning on support from my children/family for the things I am unable to do.  4. I will consider in-home options for additional support.  I will follow this admission in Epic. Please feel free to contact me with questions or for further collaboration in discharge planning.      MIQUEL Parker  Clinic Care Coordinator  [facility]  [phone]  [email or web address]

## 2021-10-08 NOTE — PLAN OF CARE
"DATE & TIME: 10/08/2021 7-3 pm       Cognitive Concerns/ Orientation : Alert and Oriented x4, forgetful. Pleasant.   BEHAVIOR & AGGRESSION TOOL COLOR: Green     ABNL VS/O2: VSS on RA. Mild TAVARES with distance walks. Pt's oxygen saturation dropped to 89% on RA after walking. Recovered well. Denies SOB.   MOBILITY: SBA/A1 with GB and walker.  PAIN MANAGMENT: Denies   DIET: 2 gram sodium  BOWEL/BLADDER: Incontinent of urine at times. Last BM 10/06.   ABNL LAB/BG: Last Hgb 8.1, recheck tomorrow.   DRAIN/DEVICES: PIV SL  TELEMETRY RHYTHM: NA  SKIN: Pale. Scattered bruising. Redness beneath breasts and abdominal folds.   TESTS/PROCEDURES: EUS and pancreatic biopsy 10/5/21. Final results pending. No other tests ordered.   [facility] [place] [facility] Family to transport at 1400.   Discharge Barriers: None.   OTHER IMPORTANT INFO: Per Hem/Onc note \"We will see the patient after pathology result is available\". GI signed off; Droplet precautions maintained. To be in droplet isolation until 10/9/21 if remains asymptomatic. Doing well ambulated x2-3. Up in chair. LS diminished, tolerating IS.   "

## 2021-10-09 VITALS
TEMPERATURE: 97.7 F | WEIGHT: 261.02 LBS | BODY MASS INDEX: 51.25 KG/M2 | OXYGEN SATURATION: 100 % | SYSTOLIC BLOOD PRESSURE: 135 MMHG | RESPIRATION RATE: 18 BRPM | DIASTOLIC BLOOD PRESSURE: 62 MMHG | HEART RATE: 73 BPM | HEIGHT: 60 IN

## 2021-10-09 LAB
ANION GAP SERPL CALCULATED.3IONS-SCNC: 5 MMOL/L (ref 3–14)
BUN SERPL-MCNC: 24 MG/DL (ref 7–30)
CALCIUM SERPL-MCNC: 8.7 MG/DL (ref 8.5–10.1)
CHLORIDE BLD-SCNC: 103 MMOL/L (ref 94–109)
CO2 SERPL-SCNC: 33 MMOL/L (ref 20–32)
CREAT SERPL-MCNC: 1.02 MG/DL (ref 0.52–1.04)
ERYTHROCYTE [DISTWIDTH] IN BLOOD BY AUTOMATED COUNT: ABNORMAL %
GFR SERPL CREATININE-BSD FRML MDRD: 51 ML/MIN/1.73M2
GLUCOSE BLD-MCNC: 160 MG/DL (ref 70–99)
HCT VFR BLD AUTO: 31.6 % (ref 35–47)
HGB BLD-MCNC: 8.9 G/DL (ref 11.7–15.7)
MCH RBC QN AUTO: 24.5 PG (ref 26.5–33)
MCHC RBC AUTO-ENTMCNC: 28.2 G/DL (ref 31.5–36.5)
MCV RBC AUTO: 87 FL (ref 78–100)
PLATELET # BLD AUTO: 381 10E3/UL (ref 150–450)
POTASSIUM BLD-SCNC: 3.9 MMOL/L (ref 3.4–5.3)
RBC # BLD AUTO: 3.63 10E6/UL (ref 3.8–5.2)
SODIUM SERPL-SCNC: 141 MMOL/L (ref 133–144)
WBC # BLD AUTO: 9.1 10E3/UL (ref 4–11)

## 2021-10-09 PROCEDURE — 250N000013 HC RX MED GY IP 250 OP 250 PS 637: Performed by: INTERNAL MEDICINE

## 2021-10-09 PROCEDURE — 999N000157 HC STATISTIC RCP TIME EA 10 MIN

## 2021-10-09 PROCEDURE — 85027 COMPLETE CBC AUTOMATED: CPT | Performed by: INTERNAL MEDICINE

## 2021-10-09 PROCEDURE — 36415 COLL VENOUS BLD VENIPUNCTURE: CPT | Performed by: INTERNAL MEDICINE

## 2021-10-09 PROCEDURE — 99231 SBSQ HOSP IP/OBS SF/LOW 25: CPT | Performed by: INTERNAL MEDICINE

## 2021-10-09 PROCEDURE — 250N000013 HC RX MED GY IP 250 OP 250 PS 637: Performed by: HOSPITALIST

## 2021-10-09 PROCEDURE — 99239 HOSP IP/OBS DSCHRG MGMT >30: CPT | Performed by: INTERNAL MEDICINE

## 2021-10-09 PROCEDURE — 80048 BASIC METABOLIC PNL TOTAL CA: CPT | Performed by: INTERNAL MEDICINE

## 2021-10-09 RX ORDER — FUROSEMIDE 20 MG
20 TABLET ORAL DAILY
DISCHARGE
Start: 2021-10-10 | End: 2021-12-03

## 2021-10-09 RX ORDER — PANTOPRAZOLE SODIUM 40 MG/1
40 TABLET, DELAYED RELEASE ORAL
DISCHARGE
Start: 2021-10-10 | End: 2021-11-02

## 2021-10-09 RX ADMIN — PANTOPRAZOLE SODIUM 40 MG: 40 TABLET, DELAYED RELEASE ORAL at 09:00

## 2021-10-09 RX ADMIN — Medication 1 DROP: at 08:59

## 2021-10-09 RX ADMIN — Medication 2 CAPSULE: at 09:00

## 2021-10-09 RX ADMIN — FUROSEMIDE 20 MG: 20 TABLET ORAL at 09:00

## 2021-10-09 RX ADMIN — CITALOPRAM HYDROBROMIDE 20 MG: 20 TABLET ORAL at 09:00

## 2021-10-09 RX ADMIN — LEVOTHYROXINE SODIUM 200 MCG: 100 TABLET ORAL at 09:01

## 2021-10-09 ASSESSMENT — ACTIVITIES OF DAILY LIVING (ADL)
ADLS_ACUITY_SCORE: 25
ADLS_ACUITY_SCORE: 25
ADLS_ACUITY_SCORE: 23
ADLS_ACUITY_SCORE: 21

## 2021-10-09 ASSESSMENT — MIFFLIN-ST. JEOR: SCORE: 1560.5

## 2021-10-09 NOTE — PLAN OF CARE
Discharge    Patient discharged to TCU via wheelchair and oxygen with Daughter as transport.    Care plan note  DATE & TIME: 10/09/2021 7-3 pm      Cognitive Concerns & Orientation : Alert and Oriented x4, forgetful. Pleasant.    BEHAVIOR & AGGRESSION TOOL COLOR: Green     ABNL VS/O2: VSS on 2L oxygen, needed oxygen at rest this morning.   MOBILITY: SBA/A1 with GB and walker.  PAIN MANAGMENT: Denies pain.  DIET: 2 gram sodium. Good appetite. Strict I&O's.   BOWEL/BLADDER: Continent, using bathroom. Dribbling. Last BM 10/09.   ABNL LAB/BG: Hgb stable at 8.9 and .    DRAIN/DEVICES: PIV SL and removed.   TELEMETRY RHYTHM: NA  SKIN: Pale. Scattered bruising. Redness beneath breasts and abdominal folds. 1+ edema to ankles and feet.  TESTS/PROCEDURES: EUS and pancreatic biopsy 10/5/21. Final results back. No other tests ordered.   [facility] [place] [facility] Family to transport at 1500.   Discharge Barriers: None.   OTHER IMPORTANT INFO: Per Hem/Onc note discussed with pt results of biopsy. Writer discussed at length what knowledge I could regarding diagnosis and that follow up is needed with Dr. Srinivasan. GI signed off; Droplet precautions discontinued. LS diminished, tolerating IS.     Listed belongings gathered and given to patient (including from security/pharmacy). Yes  Care Plan and Patient education resolved: Yes  Prescriptions if needed, hard copies sent with patient  NA  Medication Bin checked and emptied on discharge No  SW/care coordinator/charge RN aware of discharge: No

## 2021-10-09 NOTE — PLAN OF CARE
"Cognitive Concerns/ Orientation : Alert and Oriented x4, forgetful. Pleasant. Calls as needed.   BEHAVIOR & AGGRESSION TOOL COLOR: Green     ABNL VS/O2: VSS on RA. Mild TAVARES with ambulation to bathroom. Quick recovery with SaO2. Pt using IS at bedside.   MOBILITY: SBA/A1 with GB and walker.  PAIN MANAGMENT: Denies   DIET: 2 gram sodium. Good appetite. Strict I&O's.   BOWEL/BLADDER: Continent this evening, using bathroom. Last BM 10/06.   ABNL LAB/BG: Hgb stable at 8.1, recheck tomorrow.   DRAIN/DEVICES: PIV SL in L wrist.   TELEMETRY RHYTHM: NA  SKIN: Pale. Scattered bruising. Redness beneath breasts and abdominal folds.   TESTS/PROCEDURES: EUS and pancreatic biopsy 10/5/21. Final results pending. No other tests ordered.   [facility] [place] [facility] Family to transport at 1400.   Discharge Barriers: None.   OTHER IMPORTANT INFO: Per Hem/Onc note \"We will see the patient after pathology result is available\". GI signed off; Droplet precautions maintained. To be in droplet isolation until 10/9/21 if remains asymptomatic. Up in chair most of the evening. LS diminished, tolerating IS.   "

## 2021-10-09 NOTE — PROGRESS NOTES
Care Management Discharge Note    Discharge Date: 10/09/2021  Expected Time of Departure: 1500    Discharge Disposition: Transitional Care    Discharge Services: Transportation Services    Discharge DME: None    Discharge Transportation: family or friend will provide    Private pay costs discussed: Not applicable    PAS Confirmation Code: [id number]  Patient/family educated on Medicare website which has current facility and service quality ratings: yes    Education Provided on the Discharge Plan:    Persons Notified of Discharge Plans: charge nurse, admission at [facility] and patient's daughter  Patient/Family in Agreement with the Plan: yes    Handoff Referral Completed: No    Additional Information:  Received discharge orders, faxed orders to [facility]. NOMAN spoke with patients daughter who informs that she will be able to transport at 1500. SW updated [facility] on this and they are fine with it. Patient has tank of oxygen that has been delivered by [facility] to the room and she will be able to use this for transport as she is now needing oxygen. Daughter updated on this and she feels comfortable using current tank.         TORIE Clark

## 2021-10-09 NOTE — PLAN OF CARE
DATE & TIME: October 9, 2021  [id number]    Cognitive Concerns/ Orientation : A&O x4, forgetful.   BEHAVIOR & AGGRESSION TOOL COLOR: Green  CIWA SCORE: N/A   ABNL VS/O2: Bipap on overnight.  MOBILITY: Ax1 with GB & walker. Up to bedside commode.  PAIN MANAGMENT: Denies pain.  DIET: 2 gram sodium diet.  BOWEL/BLADDER: Using bedside commode. Calls appropriately, but is intermittently incontinent in brief as well. Difficult to measure output strictly due to some incontinence and missing the hat when voiding.  ABNL LAB/BG: N/A  DRAIN/DEVICES: PIV SL.  TELEMETRY RHYTHM: N/A  SKIN: 1+ edema to feet and ankles. Pale skin. Blanchable redness to coccyx.  TESTS/PROCEDURES: N/A  D/C DATE: 10/9/21 to [facility] in [place]. Family will transport the patient at 1400 per SW note.  Discharge Barriers: N/A  OTHER IMPORTANT INFO: N/A

## 2021-10-09 NOTE — PROGRESS NOTES
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     I discussed pathology with patient   We discussed need for furthur w/u as OP and f/u with Dr Srinivasan     We discussed indolent vs aggressive B cell lymphomas and touched on Rx options     Her condition has improved since the hospitalization and she will go to TCU for rehab today       She will need f/u with Dr Srinivasan as OP in next few weeks   Her Hb is better at 8.9     Other indices normal on cbc       O/E VSS   No adenopthy felt    Neck or axilla     Decrease BS R base     Abd   Soft  Hepatomegaly     No splenomegaly     Legs trace -1+ edema         IMP   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

## 2021-10-09 NOTE — DISCHARGE SUMMARY
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  S/p EGD, aphthous ulcer in duodenum, stable.  Pancreatic mass, as above concerning for pancreatic cancer.  Transudate right pleural effusion.  Acute encephalopathy, resolved.  Acute on chronic hypercapnia.  Obesity, obstructive sleep apnea with hypoventilation syndrome.  Chronic hypoxia on home oxygen.  E. coli UTI, treated.  Acute decompensated diastolic heart failure, likely secondary to underlying valvular disease with severe aortic stenosis.  Moderate to severe valvular aortic stenosis.  Macular degeneration.  Vision loss, secondary to above.  CKD stage III, stable.  Type 2 diabetes mellitus, diet controlled.  Hypothyroidism.  Depression.  Hyperlipidemia.    Follow-ups Needed After Discharge   Follow-up Appointments     Follow Up and recommended labs and tests      Follow up with senior living physician.  The following labs/tests are   recommended: BMP and CBC in 4-5 days  Follow up with oncology as recommended             Unresulted Labs Ordered in the Past 30 Days of this Admission     Date and Time Order Name Status Description    9/30/2021  7:38 PM FISH In process       These results will be followed up by PCP and oncology    Discharge Disposition   Discharged to short-term care facility  Condition at discharge: Stable    Hospital Course   Lucille Rojas is a 81 year old female who was admitted on 9/20/2021 when she presented with shortness of breath and constipation and was found to have severe symptomatic anemia of 4.7  Here are further details regarding her current hospitalization.     Severe, symptomatic anemia on admission, presented with Hgb of 4.7  Acute blood loss anemia on chronic  anemia  Reported several weeks of ongoing symptoms indicating a somewhat chronic course. Has noted black, tarry stools for at least 3-4 weeks.  S/p EGD on 09/21/21 revealed a few gastric erosions and a few tiny aphthous ulcers in the duodenum, but not severe enough to account for anemia.   * S/p 2 units with Hgb 4.7 > 7.2 on admission, 1 unit on 9/22/21 and 1 unit on 9/24 with appropriate rise in Hb is 9.3 this morning and stable  * Iron studies revealed low levels > S/p venofer 300 mg IV x 2, last dose 9/23/21.  Recheck iron level was still low, gave 2 more doses of 200 mg IV each.    --Patient was seen and examined, feeling better, hoping to be discharged later today, family will be transferring her out, we discussed about ordering oxygen at the time of discharge.  --I also discussed with patient regarding biopsy results, she is aware that she will also be evaluated by oncologist, see below.  --Patient will be continued on Protonix 40 mg p.o. daily.  --Pathology and flow cytometry results are concerning for B-cell lymphoma.  FNA results just came back this morning.  --Hemoglobin has been is stable.  -- FISH test is pending   -- Flow cytometry results are as below :          Flow Interpretation   A. Pancreas, Tissue:  -CD10 positive lambda-monotypic B cells with increased forward scatter (71%)  No aberrant immunophenotype on T cells See comment      Electronically signed by Erik Garcia MD on 10/6/2021 at  1:55 PM   Comment       These immunophenotypic findings are supportive of B-cell lymphoma in the appropriate morphologic context but are not independently diagnostic of B-cell lymphoma. Please correlate with separately reported cytologic and/or morphologic interpretation  for final diagnosis and subclassification.    The immunophenotypic findings are consistent with a clonal B cell population. 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  The differential diagnosis includes diffuse large B-cell lymphoma,  follicular lymphoma, Burkitt lymphoma, and other.  Final interpretation requires correlation with results of other ancillary studies, morphologic, and clinical features.         Presumed primary pancreatic cancer , possibility pancreatic B-cell lymphoma noted on CT, flow cytometry is concerning for possible B cell lymphoma as above , FNA results just came back this morning, showing positive CD10 B-cell lymphoma.  R pleural effusion, transudate   S/p thoracentesis  450 ml of verenice fluid on 09/22/21   S/p EUS on 09/21/21 with biopsy: Right pleural cytology came back negative for malignancy.  Recommend pathology result showed tubular adenoma from the duodenum polyp with no evidence of high-grade dysplasia or invasive carcinoma.  Gastric biopsies showed oxyntic and antral pattern mucosal tissue with a scant chronic inflammation, no evidence of acute inflammation, no evidence of Helicobacter on routine stain.    --Patient underwent endoscopy, EUS and tissue biopsy on October 5, pathology results came back concerning for B-cell lymphoma this morning.  --Results were reviewed with patient, also made oncology aware regarding FNA results, highly appreciate Dr. Castaneda help, results are reviewed with patient at this time and patient is recommended to have outpatient follow-up with Dr. Srinivasan to discuss regarding further work-up and treatment options.  --Patient was in agreement with the plan.     Acute encephalopathy, Resolved - On 09/23/21, patient remained oriented and following commands but very lethargic, drifting off in mid-sentence. Labs significant for leukocytosis, hypercapnia on ABG and elevated BNP. She is afebrile, hemodynamically stable, stable oxygenation.      -- Since 09/25, patient is alert awake and oriented, following commands seems to be at the baseline.  -- Treating hypercapnia CHF and UTI as below.      Acute on chronic hypercapnia -  ABG: pH 7.22, CO2 85, O2 121 while on 5L on 9/23/21.   Obesity, SHAVON with  hypoventilation syndrome   Chronic hypoxia on home oxygen - Per family she requires oxygen during the day as well, but has had trouble getting this ordered through PCP. On BiPAP at night with4 LPM O2 PTA     -- Continue to wean O2 as tolerates to keep O2 saturations around 90-93%  -- BiPAP at night and during naps during day as well.   -- When awake > up in chair.  -- Continue patient will not need oxygen during that hospitalization as she is able to maintain her oxygen levels during the daytime but definitely needs oxygen at nighttime, patient does need as needed oxygen during the daytime also.  --Patient will need oxygen delivered to her home once patient is leaving TCU.  --I have ordered oxygen at the time of discharge.     E coli UTI - Patient lethargic and unable to give clear history regarding urinary symptoms at presentation. UA showed 166 WBC, small nitrates.   Leukocytosis - No T over 100 since admission. Hemodynamically stable. No localizing symptoms of infection. No Urinary symptoms. UA done. CT chest/abdomen/pelvis as above, no sign of localized infection.  - F/u on pleural cx -NGTD but very low suspicion of infection in this location.      -- UCx from 9/23/21: 2 strains of E. coli noted, sensitive to Rocephin   -- Initially on IV Rocephin, changed to p.o. Ceftin based on sensitivity.Completed 7 day course.  - BCx NGTD and patient is afebrile, encephalopathy now resolved.     Acute decompensated HF, unknown EF - developing acute encephalopathy, hypercapnia on 09/23/21. Workup revealed BNP up to 4000's from 800 at admission. Mild pulmonary edema on CXR. Oxygenation stable, but on 3 L.   Mod to severe aortic stenosis, new diagnosis   TAVARES, CP and light-headedness with minimal exertion - Concerning for symptomatic aortic stenosis, although anemia and chronic obesity hypoventilation syndrome with chronic hyoxia undoubtedly contribute. Initial troponin negative. CXR with R pleural effusion.   Transudate R  pleural effusion, edema on admission. S/p thoracentesis on 9/22.      * Serial troponins negative  * Echo EF 60-65% No WMAs. RV fxn normal. Mod to severe aortic stenosis with aortic mean valve area of 1 cm2, mean gradient 37.5 mm Hg.      -- On 09/23/21, BNP quite elevated at 4000 on 09/23/21 (up from 800s at admission) CXR reviewed and showed mild intestinal markings, suspect edema.   -- Was managed with IV lasix, Cr slightly up and so lasix held and stable at 1.1, respiratory status has markedly improved and now stable at 2 LPM.   -- Low dose p.o. Lasix started 9/28, follow daily weight and periodic BMP. Patient is incontinent, given her recent UTI, Espitia catheter discontinued.  -- Cardiology consulted on 09/24/21 re: input on options (TAVR) and prognosis. With her unknown source of bleeding and since GI work-up could not be completed, patient not a candidate for anticoagulation and so not a candidate for further work-up including angiogram or TAVR.   --Patient will be continued on Lasix 20 mg p.o. daily, will recommend repeating the BMP in 1 week after the discharge     Severe macular degeneration  Vision loss  -- Ordered schedule eye drops and ocular vitamins  -- assistance with menu per nutrition. Appreciate assistance.     CKD 3  -- monitor     DMT2 diet controlled.  last A1c 5.8 on 4/7/21. Did not recheck due to severe anemia  --Blood sugars within normal limit, discontinue checks      Hypothyroid TSH normal at admission.   -- Continue PTA levothyroxine      Depression  -- Continue PTA citalopram     HLD  -- Continue PTA statin     COVID 19 exposure  Per her daughter, patient's Son who visited patient 9/25 had symptoms and tested positive on 9/28. Patient considered exposed and needs to be on quarantine for 14 days per protocol. Continue weekly testing as per routine protocol. To test sooner if any COVID 19 symptoms. Last test 9/28 negative.  -- Weekly COVID test came back negative , is done with her 14 days  quarantine  Patient has 3 negative  COVID-19 PCR test done on September 20, September 28 and then October 5th.     Goals of Care.   Care conference 9/27,  and Dr. Srinivasan present.   Patient, her , 2 daughters were also present and her son also present over the phone.  It was reviewed with family that patient is not a candidate for TAVR and that aortic stenosis as well as obesity hypoventilation and since GI work up could not be completed to find source of bleeding. Also she is of high risk for a GI work up as well. And her illnesses could also limit options for treatment of presumed cancer.  Patient and family were interested in pursuing the diagnosis and then having further discussion regarding pursuing treatment options.  As above FNA results just came back this morning, B-cell lymphoma findings have been discussed with patient, patient is planning to meet with Dr. Srinivasan in an outpatient setting where they can have further discussion regarding risk work-up and possible treatment options for her new diagnosis.  Patient was in agreement with the plan.    Patient was seen and examined on the day of discharge ,she is feeling well, does not have any complaints , I did review the discharge medications and instructions with the patient and plan for her to follow up with the PCP after the hospitalization .patient was in agreement , she is discharged in stable condition to her home/ TCU.      Consultations This Hospital Stay   GASTROENTEROLOGY IP CONSULT  HEMATOLOGY & ONCOLOGY IP CONSULT  SPIRITUAL HEALTH SERVICES IP CONSULT  HEMATOLOGY & ONCOLOGY IP CONSULT  CARDIOLOGY IP CONSULT  PHYSICAL THERAPY ADULT IP CONSULT  OCCUPATIONAL THERAPY ADULT IP CONSULT  CARE MANAGEMENT / SOCIAL WORK IP CONSULT  NUTRITION SERVICES ADULT IP CONSULT  PHYSICAL THERAPY ADULT IP CONSULT  OCCUPATIONAL THERAPY ADULT IP CONSULT    Code Status   No CPR- Do NOT Intubate    Time Spent on this Encounter   I, Chelsy Swann MD, personally  saw the patient today and spent greater than 30 minutes discharging this patient.       Chelsy Swann MD  [place] [address] [facility]  [address] [place] [address]  Phone: [phone]  ______________________________________________________________________    Physical Exam   Vital Signs: Temp: 97.8  F (36.6  C) Temp src: Axillary BP: 139/58 Pulse: 70   Resp: 20 SpO2: 94 % O2 Device: Nasal cannula Oxygen Delivery: 2 LPM  Weight: 259 lbs 4.18 oz    Physical Exam  Vitals and nursing note reviewed.   Constitutional:       Appearance: She is well-developed.   HENT:      Head: Normocephalic and atraumatic.   Eyes:      Conjunctiva/sclera: Conjunctivae normal.      Pupils: Pupils are equal, round, and reactive to light.   Neck:      Thyroid: No thyromegaly.   Cardiovascular:      Rate and Rhythm: Normal rate and regular rhythm.      Heart sounds: Normal heart sounds. No murmur heard.     Pulmonary:      Effort: Pulmonary effort is normal. No respiratory distress.      Breath sounds: Normal breath sounds. No wheezing.   Abdominal:      General: Bowel sounds are normal.      Palpations: Abdomen is soft.      Tenderness: There is no abdominal tenderness. There is no guarding or rebound.   Musculoskeletal:         General: No deformity. Normal range of motion.      Cervical back: Normal range of motion and neck supple.   Skin:     General: Skin is warm and dry.   Neurological:      Mental Status: She is alert and oriented to person, place, and time.   Psychiatric:         Behavior: Behavior normal.          Primary Care Physician   Fausto Flynn    Discharge Orders      General info for SNF    Length of Stay Estimate: Short Term Care: Estimated # of Days <30  Condition at Discharge: Improving  Level of care:skilled   Rehabilitation Potential: Good  Admission H&P remains valid and up-to-date: Yes  Recent Chemotherapy: N/A  Use Nursing Home Standing Orders: Yes     Mantoux instructions     Give two-step Mantoux (PPD) Per Facility Policy Yes     Follow Up and recommended labs and tests    Follow up with prison physician.  The following labs/tests are recommended: BMP and CBC in 4-5 days  Follow up with oncology as recommended     Activity - Up with nursing assistance     Reason for your hospital stay    You were admitted to the hospital secondary to abdominal issues and was found to have pancreatic mass concerning for malignancy , pathology results will be followed by Oncology team, you were also noticed to have UTI ( treated ) and fluid water load from valvular disease and you have been started on diuretic     Glucose monitor nursing POCT    Before meals and at bedtime     Intake and output    Every shift. Please use Oxygen PRN during the day time also as some times patient also need 1-2 liters of Oxygen during the day time thanks     Daily weights    Call Provider for weight gain of more than 2 pounds per day or 5 pounds per week.     No CPR- Do NOT Intubate     Physical Therapy Adult Consult    Evaluate and treat as clinically indicated.    Reason:  Acute illness myopathy     Occupational Therapy Adult Consult    Evaluate and treat as clinically indicated.    Reason:  Acute illness myopathy     Fall precautions     Oxygen Adult/Peds    Oxygen Documentation:   I certify that this patient, Lucille Rojas has been under my care (or a nurse practitioner or physican's assistant working with me). This is the face-to-face encounter for oxygen medical necessity.      Lucille Rojas is now in a chronic stable state and continues to require supplemental oxygen. Patient has continued oxygen desaturation due to Chronic Heart Failure I50  Chronic Respiratory Failure with Hypoxia J93.11  COPD J44.9.    Alternative treatment(s) tried or considered and deemed clinically infective for treatment of Chronic Heart Failure I50  Chronic Respiratory Failure with Hypoxia J93.11  COPD J44.9 include nebulizers,  inhalers, and pulmonary toileting.  If portability is ordered, is the patient mobile within the home? yes     Advance Diet as Tolerated    Follow this diet upon discharge: Orders Placed This Encounter      Room Service      2 Gram Sodium Diet         Significant Results and Procedures   Results for orders placed or performed during the hospital encounter of 09/20/21   CT Chest/Abdomen/Pelvis w Contrast    Narrative    EXAM: CT CHEST/ABDOMEN/PELVIS W CONTRAST  LOCATION: [facility]  DATE/TIME: 9/20/2021 7:15 PM    INDICATION: new severe anemia, SOB, chest tightness, abdominal pain and constipation  COMPARISON: None.  TECHNIQUE: CT scan of the chest, abdomen, and pelvis was performed following injection of IV contrast. Multiplanar reformats were obtained. Dose reduction techniques were used.   CONTRAST: 135mL Isovue-370    FINDINGS:   LUNGS AND PLEURA: Moderate size right pleural effusion and subsegmental atelectasis right lower lobe. The left lung is clear.    MEDIASTINUM/AXILLAE: No enlarged mediastinal or hilar lymph nodes. Atherosclerotic disease thoracic aorta.    CORONARY ARTERY CALCIFICATION: Mild.    HEPATOBILIARY: Hepatomegaly measuring 23 cm in length. Several tiny stones in the gallbladder.    PANCREAS: Soft tissue mass arising from the uncinate process of the pancreatic head measuring 5.5 x 3.1 x 7.6 cm. This extends superiorly and encases the celiac trunk, the superior mesenteric artery and the superior mesenteric vein. The splenic and   portal veins are patent.    SPLEEN: Normal.    ADRENAL GLANDS: Normal.    KIDNEYS/BLADDER: Tiny hypodensity lower pole right kidney too small to further characterize, but likely represents a cyst. No renal calculi or hydronephrosis.    BOWEL: No evidence for bowel obstruction. No significant constipation.    LYMPH NODES: No enlarged retroperitoneal nodes.    VASCULATURE: Atherosclerotic disease abdominal aorta and iliac arteries.    PELVIC ORGANS:  Hysterectomy.    MUSCULOSKELETAL: Postop changes anterior abdominal wall. Edematous changes subcutaneous fat anterior abdominal wall. Hypertrophic changes thoracolumbar spine.      Impression    IMPRESSION:  1.  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 This encases the celiac trunk and superior mesenteric artery and vein. GI consultation recommended.  2.  Moderate size right pleural effusion and right basilar atelectasis.   US Thoracentesis    Narrative    ULTRASOUND GUIDED THORACENTESIS  9/22/2021 11:01 AM     HISTORY: Pancreatic malignancy with right pleural effusion.    TECHNIQUE:  Ultrasound was used to evaluate for the presence and best  approach for drainage of a pleural effusion. Written and oral informed  consent was obtained. A pause for the cause procedure to verify the  correct patient and correct procedure. The skin overlying the right  chest posteriorly was prepped and draped in the usual sterile fashion.  The subcutaneous tissues were anesthetized with 10mL 1% Lidocaine. A  catheter was advanced into the pleural space and 450 mL of  verenice  colored fluid was drained. Patient was monitored by nurse under my  direct supervision throughout the exam. Ultrasound images were  permanently stored.  There were no immediate complications. Patient  left the ultrasound suite in satisfactory condition.      Impression    IMPRESSION: Technically successful thoracentesis without immediate  complications.     KARO RUSHING MD         SYSTEM ID:  [id number]   XR Chest Port 1 View    Narrative    XR CHEST PORT 1 VIEW 9/23/2021 11:53 AM    HISTORY: elevated bnp, SOB    COMPARISON: None.      Impression    IMPRESSION: Mild linear and patchy opacities in both lungs compatible  with mild pulmonary edema. No pleural effusions or pneumothorax. No  cardiomegaly.    MILTON HINES MD         SYSTEM ID:  [id number]   Echocardiogram Complete     Value    LVEF  60-65%    Narrative     [id number]  [facility]  [id number]  [id number]^MELTIA^KALE^MARSHAL     [facility]  Echocardiography Laboratory  [address] [place] [address]     Name: GLORIA LARA  MRN: [id number]  : 1940  Study Date: 2021 08:17 AM  Age: 81 yrs  Gender: Female  Patient Location: [facility]  Reason For Study: Murmur  Ordering Physician: KALE HUA  Referring Physician: Fausto Flynn  Performed By: Mahamed Ro RDCS     BSA: 2.1 m2  Height: 60 in  Weight: 274 lb  HR: 90  BP: 134/65 mmHg  ______________________________________________________________________________  Procedure  Complete Portable Echo Adult. Optison (NDC #[id number]) given intravenously.  ______________________________________________________________________________  Interpretation Summary     Left ventricular systolic function is normal. The visual ejection fraction is  60-65%. No regional wall motion abnormalities noted.  The right ventricular systolic function is normal.  Moderate to severe valvular aortic stenosis. The calculated aortic valve area  is 1.0 cm^2. The mean AoV pressure gradient is 37.5 mmHg.  No prior study.  ______________________________________________________________________________  Left Ventricle  The left ventricle is normal in size. There is mild concentric left  ventricular hypertrophy. Left ventricular systolic function is normal. The  visual ejection fraction is 60-65%. Diastolic Doppler findings (E/E' ratio  and/or other parameters) suggest left ventricular filling pressures are  increased. No regional wall motion abnormalities noted.     Right Ventricle  The right ventricle is not well visualized. The right ventricular systolic  function is normal.     Atria  The left atrium is mildly dilated. Right atrial size is normal.     Mitral Valve  There is mild mitral annular calcification. There is trace mitral  regurgitation.     Tricuspid Valve  The tricuspid valve is not well visualized.     Aortic  Valve  Moderate to severe valvular aortic stenosis. The calculated aortic valve are  is 1.0 cm^2. The mean AoV pressure gradient is 37.5 mmHg. The peak AoV  pressure gradient is 67.6 mmHg.     Pulmonic Valve  The pulmonic valve is not well visualized.     Vessels  Normal size aorta. IVC diameter >2.1 cm collapsing <50% with sniff suggests a  high RA pressure estimated at 15 mmHg or greater.     Pericardium  There is no pericardial effusion.     Rhythm  Sinus rhythm was noted.  ______________________________________________________________________________  MMode/2D Measurements & Calculations  IVSd: 1.2 cm     LVIDd: 5.4 cm  LVIDs: 3.9 cm  LVPWd: 1.2 cm  FS: 28.6 %  LV mass(C)d: 271.0 grams  LV mass(C)dI: 127.0 grams/m2  Ao root diam: 3.0 cm  LA dimension: 3.9 cm  asc Aorta Diam: 2.6 cm  LA/Ao: 1.3  LVOT diam: 2.0 cm  LVOT area: 3.0 cm2  LA Volume (BP): 72.0 ml  LA Volume Index (BP): 33.8 ml/m2  RWT: 0.43     Doppler Measurements & Calculations  MV E max andre: 111.0 cm/sec  MV A max andre: 90.2 cm/sec  MV E/A: 1.2  MV dec slope: 571.1 cm/sec2  MV dec time: 0.19 sec  Ao V2 max: 410.9 cm/sec  Ao max P.6 mmHg  Ao V2 mean: 290.9 cm/sec  Ao mean P.5 mmHg  Ao V2 VTI: 84.9 cm  NASIR(I,D): 1.0 cm2  NASIR(V,D): 0.96 cm2  LV V1 max P.9 mmHg  LV V1 max: 131.3 cm/sec  LV V1 VTI: 28.6 cm  SV(LVOT): 85.5 ml  SI(LVOT): 40.1 ml/m2  AV Andre Ratio (DI): 0.32  NASIR Index (cm2/m2): 0.47  E/E' av.8  Lateral E/e': 14.8  Medial E/e': 18.9     ______________________________________________________________________________  Report approved by: Aminata Cohen 2021 02:11 PM               Discharge Medications   Current Discharge Medication List      START taking these medications    Details   furosemide (LASIX) 20 MG tablet Take 1 tablet (20 mg) by mouth daily    Associated Diagnoses: Pancreatic mass; Apnea; Morbid obesity due to excess calories (H)      pantoprazole (PROTONIX) 40 MG EC tablet Take 1 tablet (40 mg) by mouth  every morning (before breakfast)    Associated Diagnoses: Pancreatic mass; Apnea; Morbid obesity due to excess calories (H)         CONTINUE these medications which have NOT CHANGED    Details   acetaminophen (TYLENOL) 325 MG tablet Take 325-650 mg by mouth 2 times daily as needed for mild pain      aspirin 81 MG tablet Take 1 tablet by mouth daily.  Qty: 30 tablet, Refills: 0    Associated Diagnoses: Type 2 diabetes, HbA1C goal < 8% (H)      citalopram (CELEXA) 20 MG tablet Take 1 tablet (20 mg) by mouth daily  Qty: 90 tablet, Refills: 1    Associated Diagnoses: MDD (major depressive disorder), recurrent episode, mild (H)      Glycerin-Polysorbate 80 (REFRESH DRY EYE THERAPY OP) Apply to eye 2 times daily as needed       levothyroxine (SYNTHROID/LEVOTHROID) 200 MCG tablet TAKE ONE TABLET BY MOUTH ONE TIME DAILY   Qty: 90 tablet, Refills: 3    Associated Diagnoses: Acquired hypothyroidism      Multiple Vitamins-Minerals (PRESERVISION AREDS) CAPS Take 1 tablet by mouth 2 times daily       simvastatin (ZOCOR) 10 MG tablet TAKE ONE TABLET BY MOUTH AT BEDTIME   Qty: 90 tablet, Refills: 3    Associated Diagnoses: Hyperlipidemia LDL goal <100      ACE/ARB NOT PRESCRIBED, INTENTIONAL, Please choose reason not prescribed, below    Associated Diagnoses: CKD (chronic kidney disease) stage 3, GFR 30-59 ml/min (H); Type 2 diabetes mellitus with stage 3 chronic kidney disease, without long-term current use of insulin (H)      order for DME Equipment being ordered: wheeled walker with hand breaks  Qty: 1 Device, Refills: 0    Associated Diagnoses: Type 2 diabetes mellitus with stage 3 chronic kidney disease, without long-term current use of insulin (H); Morbid obesity due to excess calories (H)           Allergies   Allergies   Allergen Reactions     Penicillins

## 2021-10-10 ASSESSMENT — MIFFLIN-ST. JEOR: SCORE: 1558.12

## 2021-10-11 ENCOUNTER — TRANSITIONAL CARE UNIT VISIT (OUTPATIENT)
Dept: GERIATRICS | Facility: CLINIC | Age: 81
End: 2021-10-11
Payer: MEDICARE

## 2021-10-11 VITALS
RESPIRATION RATE: 19 BRPM | DIASTOLIC BLOOD PRESSURE: 53 MMHG | OXYGEN SATURATION: 98 % | WEIGHT: 260.5 LBS | BODY MASS INDEX: 51.14 KG/M2 | TEMPERATURE: 97.6 F | HEART RATE: 73 BPM | SYSTOLIC BLOOD PRESSURE: 128 MMHG | HEIGHT: 60 IN

## 2021-10-11 DIAGNOSIS — H35.30 MACULAR DEGENERATION OF BOTH EYES, UNSPECIFIED TYPE: ICD-10-CM

## 2021-10-11 DIAGNOSIS — I12.9 TYPE 2 DM WITH CKD STAGE 3 AND HYPERTENSION (H): ICD-10-CM

## 2021-10-11 DIAGNOSIS — I35.2 NONRHEUMATIC AORTIC (VALVE) STENOSIS WITH INSUFFICIENCY: ICD-10-CM

## 2021-10-11 DIAGNOSIS — R53.81 PHYSICAL DECONDITIONING: ICD-10-CM

## 2021-10-11 DIAGNOSIS — E03.9 HYPOTHYROIDISM, UNSPECIFIED TYPE: ICD-10-CM

## 2021-10-11 DIAGNOSIS — D62 ACUTE ON CHRONIC BLOOD LOSS ANEMIA: Primary | ICD-10-CM

## 2021-10-11 DIAGNOSIS — K86.89 PANCREATIC MASS: ICD-10-CM

## 2021-10-11 DIAGNOSIS — C85.12 B-CELL LYMPHOMA OF INTRATHORACIC LYMPH NODES, UNSPECIFIED B-CELL LYMPHOMA TYPE (H): ICD-10-CM

## 2021-10-11 DIAGNOSIS — F32.9 MAJOR DEPRESSIVE DISORDER WITH CURRENT ACTIVE EPISODE, UNSPECIFIED DEPRESSION EPISODE SEVERITY, UNSPECIFIED WHETHER RECURRENT: ICD-10-CM

## 2021-10-11 DIAGNOSIS — N18.30 TYPE 2 DM WITH CKD STAGE 3 AND HYPERTENSION (H): ICD-10-CM

## 2021-10-11 DIAGNOSIS — H53.459 PERIPHERAL VISION LOSS, UNSPECIFIED LATERALITY: ICD-10-CM

## 2021-10-11 DIAGNOSIS — E66.01 SEVERE OBESITY (BMI >= 40) (H): ICD-10-CM

## 2021-10-11 DIAGNOSIS — G47.33 OSA (OBSTRUCTIVE SLEEP APNEA): ICD-10-CM

## 2021-10-11 DIAGNOSIS — I50.30 DIASTOLIC HEART FAILURE, UNSPECIFIED HF CHRONICITY (H): ICD-10-CM

## 2021-10-11 DIAGNOSIS — E11.22 TYPE 2 DM WITH CKD STAGE 3 AND HYPERTENSION (H): ICD-10-CM

## 2021-10-11 PROCEDURE — 99310 SBSQ NF CARE HIGH MDM 45: CPT | Performed by: NURSE PRACTITIONER

## 2021-10-11 RX ORDER — NYSTATIN 100000 [USP'U]/G
POWDER TOPICAL 2 TIMES DAILY
COMMUNITY
End: 2021-11-02

## 2021-10-11 RX ORDER — CARBOXYMETHYLCELLULOSE SODIUM 5 MG/ML
2 SOLUTION/ DROPS OPHTHALMIC 2 TIMES DAILY
COMMUNITY

## 2021-10-11 NOTE — PROGRESS NOTES
[facility]  PRIMARY CARE PROVIDER AND CLINIC:  Fausto Flynn MD, [address] [place] [id number]  Chief Complaint   Patient presents with     Establish Care     [facility] Record Number:  [id number]  Place of Service where encounter took place:  [facility] (TC) [[id number]]    Lucille Rojas  is a 81 year old  (1940), admitted to the above facility from  [facility]. Hospital stay 9/20/21 through 10/9/21..  Admitted to this facility for  rehab, medical management and nursing care.     Acute on chronic blood loss anemia  B-cell lymphoma of intrathoracic lymph nodes, unspecified B-cell lymphoma type (H)  Pancreatic mass  Severe obesity (BMI >= 40) (H)  SHAVON (obstructive sleep apnea)  Nonrheumatic aortic (valve) stenosis with insufficiency  Diastolic heart failure, unspecified HF chronicity (H)  Type 2 DM with CKD stage 3 and hypertension (H)  Physical deconditioning  Hypothyroidism, unspecified type  Macular degeneration of both eyes, unspecified type  Peripheral vision loss, unspecified laterality  Major depressive disorder with current active episode, unspecified depression episode severity, unspecified whether recurrent    HPI:    HPI information obtained from: facility chart records, facility staff, patient report and [facility] chart review.     Brief Summary of Hospital Course: pt was admitted with SOB, and constipation.. she had been having tarry stools for a few weeks. Her Hgb was 4., she was given  A total of 4 units of rbcs, also IV FE. She was seen by GI--EGD showed a few gastric erosions and tiny aphthous ulcers in the duodenum but on further  Workup was found to have likely B Cell lymphoma and likely a primary site of pancrease.  A thoracenteses was done of 450 cc fluid on 9/22 right pleural effusions.  The fluid cytology came back (-). Her mentation cleared, she takes CPAP at home with O2 at night and she has  been on that since hospital. She was treated for an EColi UTI also with 7 days ax.  BNP was >4000 and she improved with diuresis, rbcs, pleural tap and prbcs.  She is diet controlled with her DM--a1c was 5.8 in April. She improved and was sent out with lasix and  A PPI and will need further f/u with Dr Srinivasan from oncology.  She was sent for rehab    TODAY DURING EXAM HPI and ROS:  No CP,  dizziness, fevers, chills, HA, N/V, dysuria or Bowel Abnormalities. Appetite is down.  No pain. Feels tired and has occas TAVARES and cough. She has poor vision and is not able read any longer.      CODE STATUS/ADVANCE DIRECTIVES DISCUSSION:   FULL CODE  Patient's living condition: lives with spouse  ALLERGIES: Penicillins  PAST MEDICAL HISTORY:  has a past medical history of HTN (hypertension) (5/9/2013), Hyperlipidemia LDL goal <130 (5/9/2013), Hypothyroidism (5/9/2013), Macular degeneration (9/18/2013), Sleep apnea, and Type 2 diabetes, HbA1C goal < 8% (H) (5/9/2013).  PAST SURGICAL HISTORY:   has a past surgical history that includes colonoscopy; tonsillectomy; appendectomy; hernia repair; Colonoscopy (N/A, 10/27/2014); Esophagoscopy, gastroscopy, duodenoscopy (EGD), combined (N/A, 9/21/2021); Esophagoscopy, gastroscopy, duodenoscopy (EGD), combined (N/A, 9/21/2021); and Esophagoscopy, gastroscopy, duodenoscopy (EGD), combined (N/A, 10/5/2021).  FAMILY HISTORY: family history is not on file.  SOCIAL HISTORY:   reports that she has never smoked. She has never used smokeless tobacco. She reports current alcohol use. She reports that she does not use drugs.    Post Discharge Medication Reconciliation Status: discharge medications reconciled and changed, per note/orders     Current Outpatient Medications   Medication Sig Dispense Refill     acetaminophen (TYLENOL) 325 MG tablet Take 650 mg by mouth 3 times daily as needed for mild pain        aspirin 81 MG tablet Take 1 tablet by mouth daily. 30 tablet 0     carboxymethylcellulose PF  (REFRESH PLUS) 0.5 % ophthalmic solution Place 2 drops into both eyes 2 times daily       citalopram (CELEXA) 20 MG tablet Take 1 tablet (20 mg) by mouth daily 90 tablet 1     furosemide (LASIX) 20 MG tablet Take 1 tablet (20 mg) by mouth daily       levothyroxine (SYNTHROID/LEVOTHROID) 200 MCG tablet TAKE ONE TABLET BY MOUTH ONE TIME DAILY  90 tablet 3     Multiple Vitamins-Minerals (PRESERVISION AREDS) CAPS Take 1 tablet by mouth 2 times daily        nystatin (MYCOSTATIN) 991109 UNIT/GM external powder Apply topically 2 times daily Under breasts and skin folds BID & BID PRN for redness       order for DME Equipment being ordered: wheeled walker with hand breaks 1 Device 0     pantoprazole (PROTONIX) 40 MG EC tablet Take 1 tablet (40 mg) by mouth every morning (before breakfast)       simvastatin (ZOCOR) 10 MG tablet TAKE ONE TABLET BY MOUTH AT BEDTIME  90 tablet 3     ACE/ARB NOT PRESCRIBED, INTENTIONAL, Please choose reason not prescribed, below (Patient not taking: Reported on 10/11/2021)             ROS:see above under HPI  Vitals:  /53   Pulse 73   Temp 97.6  F (36.4  C)   Resp 19   Ht 1.524 m (5')   Wt 118.2 kg (260 lb 8 oz)   SpO2 98%   BMI 50.88 kg/m    Exam:  GENERAL APPEARANCE:  Alert, in no distress, oriented, morbidly obese, cooperative  ENT:  Mouth and posterior oropharynx normal, moist mucous membranes, normal hearing acuity  EYES:  EOM, conjunctivae, lids, pupils and irises normal  RESP:  respiratory effort and palpation of chest normal, lungs clear to auscultation , no respiratory distress, diminished breath sounds globally but archie in bases  CV:  Palpation and auscultation of heart done , regular rate and rhythm.  + Ejection murmur. No rub, or gallop. Trace pedal bilat edema, +pedal pulses  ABDOMEN:  normal bowel sounds, soft, nontender, no hepatosplenomegaly or other masses  M/S:   up with therapies--using walker with SBA  SKIN:  Inspection of skin and subcutaneous tissue baseline, but  limited as pt dressed--skin folds in abd, breasts and groin are red per staff and pt   NEURO:   Cranial nerves 2-12 are normal tested and grossly at patient's baseline  PSYCH:  oriented X 3, normal insight, judgement and memory, affect and mood normal    Lab/Diagnostic data:  Lab Test 10/09/21  1200 10/04/21  0808    140   POTASSIUM 3.9 4.2   CHLORIDE 103 100   CO2 33* 38*   ANIONGAP 5 2*   * 92   BUN 24 24   CR 1.02 1.01   IGOR 8.7 8.5     CBC RESULTS: Recent Labs   Lab Test 10/09/21  1200 09/26/21  0907   WBC 9.1 10.6   RBC 3.63* 4.14   HGB 8.9* 8.9*   HCT 31.6* 33.0*   MCV 87 80   MCH 24.5* 21.5*   MCHC 28.2* 27.0*   RDW  --  27.9*    300    < > = values in this interval not displayed.     ASSESSMENT / PLAN:  (D62) Acute on chronic blood loss anemia  (primary encounter diagnosis)  Comment/Plan: improved, check CBC on 10/14, f/u GI per their recs    (C85.12) B-cell lymphoma of intrathoracic lymph nodes, unspecified B-cell lymphoma type (H)   (K86.89) Pancreatic mass  Comment/Plan: needs further appts with oncology for plan of care/treatment--[facility]    (E66.01) Severe obesity (BMI >= 40) (H)  (G47.33) SHAVON (obstructive sleep apnea)  Comment/Plan: O2 2l--uses CPAP, wean O2 to nighttime only if able.   Unfortunately pt's weight is clinically significant and contributes adversely to current health issues/co-morbidities such as diabetes, HF, HTN, mobility.  Patient would benefit strongly from wt loss after discharge to improve overall health. This may be difficult due to these issues and the cancer    (I35.2) Non rheumatic aortic (valve) stenosis with insufficiency   (I50.30) Diastolic heart failure, unspecified HF chronicity (H)  Comment/Plan: on lasix now, check BMP on 10/14, monitor.    (E11.22,  I12.9,  N18.30) Type 2 DM with CKD stage 3 and hypertension (H)  Comment/plan: diet controlled, checking BMP on 10/14, cont asa,monitor.    (R53.81) Physical deconditioning  Comment/Plan: PT OT    (E03.9)  Hypothyroidism, unspecified type  Comment/Plan: cont synthroid, check TSH prn or yearly    (H35.30) Macular degeneration of both eyes, unspecified type   (H53.459) Peripheral vision loss, unspecified laterality  Comment/Plan: very poor sight--can see me but not my glasses    (F32.9) Major depressive disorder with current active episode, unspecified depression episode severity, unspecified whether recurrent  Comment/Plan: Celexa, monitor.      Total time spent with patient visit at the [facility] was 50 min including patient visit, review of past records and d/w pt re O2 CPAP, hope to wean to home regimen,  Nystatin powder for her skin folds and to keep clean and dry. Further f/u needed with oncology.. Greater than 50% of total time spent with counseling and coordinating care as noted in this paragraph and above data..     Electronically signed by:  ALIE Freitas CNP

## 2021-10-11 NOTE — PLAN OF CARE
Occupational Therapy Discharge Summary    Reason for therapy discharge:    Discharged to transitional care facility.    Progress towards therapy goal(s). See goals on Care Plan in [facility] electronic health record for goal details.  Goals not met.  Barriers to achieving goals:   discharge from facility.    Therapy recommendation(s):    skilled OT in TCU setting for enhanced independence/safety with engagement in I/ADLs.

## 2021-10-11 NOTE — PLAN OF CARE
Physical Therapy Discharge Summary    Reason for therapy discharge:    Discharged to transitional care facility.    Progress towards therapy goal(s). See goals on Care Plan in [facility] electronic health record for goal details.  Goals partially met.  Barriers to achieving goals:   discharge from facility.    Therapy recommendation(s):    Continued therapy is recommended.  Rationale/Recommendations:  Pt to go to TCU to continue to maximize strength and independence.

## 2021-10-12 ENCOUNTER — PATIENT OUTREACH (OUTPATIENT)
Dept: ONCOLOGY | Facility: CLINIC | Age: 81
End: 2021-10-12

## 2021-10-12 NOTE — PROGRESS NOTES
Writer called patient to assist with scheduling follow up with Dr. Srinivasan to review positive pathology for B cell lymphoma and was unable to reach patient or leave a message.    Will try again tomorrow.    Harriet Guillermo RN

## 2021-10-13 ENCOUNTER — LAB REQUISITION (OUTPATIENT)
Dept: LAB | Facility: CLINIC | Age: 81
End: 2021-10-13
Payer: MEDICARE

## 2021-10-13 DIAGNOSIS — N18.9 CHRONIC KIDNEY DISEASE, UNSPECIFIED: ICD-10-CM

## 2021-10-13 DIAGNOSIS — D64.9 ANEMIA, UNSPECIFIED: ICD-10-CM

## 2021-10-14 ENCOUNTER — TRANSFERRED RECORDS (OUTPATIENT)
Dept: HEALTH INFORMATION MANAGEMENT | Facility: CLINIC | Age: 81
End: 2021-10-14

## 2021-10-14 ENCOUNTER — TRANSITIONAL CARE UNIT VISIT (OUTPATIENT)
Dept: GERIATRICS | Facility: CLINIC | Age: 81
End: 2021-10-14
Payer: MEDICARE

## 2021-10-14 VITALS
BODY MASS INDEX: 51.24 KG/M2 | SYSTOLIC BLOOD PRESSURE: 119 MMHG | WEIGHT: 261 LBS | HEIGHT: 60 IN | RESPIRATION RATE: 19 BRPM | DIASTOLIC BLOOD PRESSURE: 50 MMHG | HEART RATE: 89 BPM | TEMPERATURE: 97.3 F | OXYGEN SATURATION: 93 %

## 2021-10-14 DIAGNOSIS — E11.22 TYPE 2 DM WITH CKD STAGE 3 AND HYPERTENSION (H): ICD-10-CM

## 2021-10-14 DIAGNOSIS — I12.9 TYPE 2 DM WITH CKD STAGE 3 AND HYPERTENSION (H): ICD-10-CM

## 2021-10-14 DIAGNOSIS — G47.33 OSA (OBSTRUCTIVE SLEEP APNEA): ICD-10-CM

## 2021-10-14 DIAGNOSIS — K86.89 PANCREATIC MASS: ICD-10-CM

## 2021-10-14 DIAGNOSIS — I50.30 DIASTOLIC HEART FAILURE, UNSPECIFIED HF CHRONICITY (H): ICD-10-CM

## 2021-10-14 DIAGNOSIS — F32.9 MAJOR DEPRESSIVE DISORDER WITH CURRENT ACTIVE EPISODE, UNSPECIFIED DEPRESSION EPISODE SEVERITY, UNSPECIFIED WHETHER RECURRENT: ICD-10-CM

## 2021-10-14 DIAGNOSIS — E66.01 SEVERE OBESITY (BMI >= 40) (H): ICD-10-CM

## 2021-10-14 DIAGNOSIS — R53.81 PHYSICAL DECONDITIONING: ICD-10-CM

## 2021-10-14 DIAGNOSIS — D62 ACUTE ON CHRONIC BLOOD LOSS ANEMIA: Primary | ICD-10-CM

## 2021-10-14 DIAGNOSIS — C85.12 B-CELL LYMPHOMA OF INTRATHORACIC LYMPH NODES, UNSPECIFIED B-CELL LYMPHOMA TYPE (H): ICD-10-CM

## 2021-10-14 DIAGNOSIS — N18.30 TYPE 2 DM WITH CKD STAGE 3 AND HYPERTENSION (H): ICD-10-CM

## 2021-10-14 DIAGNOSIS — I35.2 NONRHEUMATIC AORTIC (VALVE) STENOSIS WITH INSUFFICIENCY: ICD-10-CM

## 2021-10-14 DIAGNOSIS — E03.9 HYPOTHYROIDISM, UNSPECIFIED TYPE: ICD-10-CM

## 2021-10-14 LAB
ANION GAP SERPL CALCULATED.3IONS-SCNC: 10 MMOL/L (ref 5–18)
BUN SERPL-MCNC: 27 MG/DL (ref 8–28)
CALCIUM SERPL-MCNC: 9.2 MG/DL (ref 8.5–10.5)
CHLORIDE BLD-SCNC: 101 MMOL/L (ref 98–107)
CO2 SERPL-SCNC: 32 MMOL/L (ref 22–31)
CREAT SERPL-MCNC: 0.94 MG/DL (ref 0.6–1.1)
ERYTHROCYTE [DISTWIDTH] IN BLOOD BY AUTOMATED COUNT: ABNORMAL %
GFR SERPL CREATININE-BSD FRML MDRD: 56 ML/MIN/1.73M2
GLUCOSE BLD-MCNC: 104 MG/DL (ref 70–125)
HCT VFR BLD AUTO: 30.5 % (ref 35–47)
HGB BLD-MCNC: 8.6 G/DL (ref 11.7–15.7)
MCH RBC QN AUTO: 25.4 PG (ref 26.5–33)
MCHC RBC AUTO-ENTMCNC: 28.2 G/DL (ref 31.5–36.5)
MCV RBC AUTO: 90 FL (ref 78–100)
PLATELET # BLD AUTO: 315 10E3/UL (ref 150–450)
POTASSIUM BLD-SCNC: 4.1 MMOL/L (ref 3.5–5)
RBC # BLD AUTO: 3.39 10E6/UL (ref 3.8–5.2)
SODIUM SERPL-SCNC: 143 MMOL/L (ref 136–145)
WBC # BLD AUTO: 7.6 10E3/UL (ref 4–11)

## 2021-10-14 PROCEDURE — 36415 COLL VENOUS BLD VENIPUNCTURE: CPT | Performed by: INTERNAL MEDICINE

## 2021-10-14 PROCEDURE — 80048 BASIC METABOLIC PNL TOTAL CA: CPT | Performed by: INTERNAL MEDICINE

## 2021-10-14 PROCEDURE — 85027 COMPLETE CBC AUTOMATED: CPT | Performed by: INTERNAL MEDICINE

## 2021-10-14 PROCEDURE — 99305 1ST NF CARE MODERATE MDM 35: CPT | Performed by: INTERNAL MEDICINE

## 2021-10-14 ASSESSMENT — MIFFLIN-ST. JEOR: SCORE: 1560.39

## 2021-10-14 NOTE — LETTER
"    10/14/2021        RE: Lucille Rojas  [id number]  [facility] [id number]        Lucille Rojas is a 81 year old female seen October 14, 2021 at [facility] where she was admitted after [facility] hospitalization 9/20-10/9 for severe anemia.   Pt presented with shortness of breath, black stools and found to have a hgb 4.7   She was transfused 4 units pRBCs over the course of several days.   EGD showed a few gastric erosions and tiny aphthous ulcers in the duodenum.   She was given IV Venofer on 2 occasions.  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 She has not seen Oncology yet.    Patient also had decompensated HFpEF, BNP >4000 and severe aortic stenosis with valve area 1.0 cm2.  She was seen by Cardiology, determined to not be a candidate for AVR.  She was diuresed with IV furosemide and had thoracentesis for right pleural effusion, removing 450 ccs transudative fluid.    She is on home BiPAP for obesity hypoventilation syndrome past 15 years, states she sleeps with her BiPAP up to 8 hours/night.   Reports she fell out of bed 6 months ago and was unable to get up, had to call paramedics.   She is seen today up to recliner in her room.  States \"I feel much better.\"   Still has shortness of breath and fatigue, but has been working with therapies and is hoping to return home soon.        Past Medical History:   Diagnosis Date     HTN (hypertension) 5/9/2013     Hyperlipidemia LDL goal <130 5/9/2013     Hypothyroidism 5/9/2013     Macular degeneration 9/18/2013     Sleep apnea     CPAP at night, O2 during naps     Type 2 diabetes, HbA1C goal < 8% (H) 5/9/2013   Pancreatic B cell lymphoma  Obesity hypoventilation syndrome  Severe aortic stenosis  Depression    Past Surgical History:   Procedure Laterality Date     APPENDECTOMY       COLONOSCOPY       COLONOSCOPY N/A 10/27/2014    Procedure: COMBINED COLONOSCOPY, SINGLE OR MULTIPLE BIOPSY/POLYPECTOMY BY " "BIOPSY;  Surgeon: Jose Alfredo Morejon MD;  Location:  GI     ESOPHAGOSCOPY, GASTROSCOPY, DUODENOSCOPY (EGD), COMBINED N/A 9/21/2021    Procedure: ESOPHAGOGASTRODUODENOSCOPY, WITH FINE NEEDLE ASPIRATION BIOPSY, WITH ENDOSCOPIC ULTRASOUND GUIDANCE;  Surgeon: Vera Benitez MD;  Location:  GI     ESOPHAGOSCOPY, GASTROSCOPY, DUODENOSCOPY (EGD), COMBINED N/A 9/21/2021    Procedure: Esophagogastroduodenoscopy, With Biopsy;  Surgeon: Vera Benitez MD;  Location:  GI     ESOPHAGOSCOPY, GASTROSCOPY, DUODENOSCOPY (EGD), COMBINED N/A 10/5/2021    Procedure: ESOPHAGOGASTRODUODENOSCOPY, WITH FINE NEEDLE ASPIRATION BIOPSY, WITH ENDOSCOPIC ULTRASOUND GUIDANCE;  Surgeon: Dixon Olivier MD;  Location:  GI     HERNIA REPAIR      inguinal x 2     TONSILLECTOMY       Social History     Tobacco Use     Smoking status: Never Smoker     Smokeless tobacco: Never Used   Substance Use Topics     Alcohol use: Yes     Alcohol/week: 0.0 standard drinks     Comment: rarely      :  Lives with her  [place], two-level house in [place].   They have 2 daughters and 2 sons.     FH: history reviewed, not pertinent to current admission     Review Of Systems  Skin: negative   Eyes: impaired vision  Ears/Nose/Throat: hearing loss  Respiratory: as above  Cardiovascular: dyspnea on exertion and exercise intolerance  Gastrointestinal: negative  Genitourinary: negative  Musculoskeletal: ambulatory with FWW, has a red tag  Notes left knee pain, \"clicking\"   Tylenol helps  Tinetti 16  TUG 60s   CGA to max assist for dressing and hygiene.  Ambulates 130' with FWW and CGA  Neurologic: negative  Psychiatric: depression, follows with Psychology  Hematologic/Lymphatic/Immunologic: negative  Endocrine: diabetes, hypothyroidism       GENERAL APPEARANCE: alert and no distress  /50   Pulse 89   Temp 97.3  F (36.3  C)   Resp 19   Ht 1.524 m (5')   Wt 118.4 kg (261 lb)   SpO2 93%   BMI 50.97 kg/m   "   HEENT: normocephalic, no lesion or abnormalities  NECK: no adenopathy, no asymmetry, masses, or scars and thyroid normal to palpation  RESP: decreased BS in RLL, on O2 by NC at 3 L/min  CV: regular rate and rhythm, normal S1 S2@70, slow III/VI ZAY  ABDOMEN: obese, soft, nontender, no HSM or masses and bowel sounds normal  MS: extremities normal, 1+ LE edema without skin changes.  SKIN: no suspicious lesions or rashes  NEURO: Normal strength and tone, sensory exam grossly normal, and speech normal  PSYCH: affect okay  LYMPHATICS: No cervical,  or supraclavicular nodes     Last Comprehensive Metabolic Panel:  Sodium   Date Value Ref Range Status   10/14/2021 143 136 - 145 mmol/L Final   08/27/2020 144 133 - 144 mmol/L Final     Potassium   Date Value Ref Range Status   10/14/2021 4.1 3.5 - 5.0 mmol/L Final   08/27/2020 4.1 3.4 - 5.3 mmol/L Final     Chloride   Date Value Ref Range Status   10/14/2021 101 98 - 107 mmol/L Final   08/27/2020 109 94 - 109 mmol/L Final     Carbon Dioxide   Date Value Ref Range Status   08/27/2020 31 20 - 32 mmol/L Final     Carbon Dioxide (CO2)   Date Value Ref Range Status   10/14/2021 32 (H) 22 - 31 mmol/L Final     Anion Gap   Date Value Ref Range Status   10/14/2021 10 5 - 18 mmol/L Final   08/27/2020 4 3 - 14 mmol/L Final     Glucose   Date Value Ref Range Status   10/14/2021 104 70 - 125 mg/dL Final   08/27/2020 112 (H) 70 - 99 mg/dL Final     Urea Nitrogen   Date Value Ref Range Status   10/14/2021 27 8 - 28 mg/dL Final   08/27/2020 19 7 - 30 mg/dL Final     Creatinine   Date Value Ref Range Status   10/14/2021 0.94 0.60 - 1.10 mg/dL Final   08/27/2020 0.98 0.52 - 1.04 mg/dL Final     GFR Estimate   Date Value Ref Range Status   10/14/2021 56 (L) >60 mL/min/1.73m2 Final     Comment:     As of July 11, 2021, eGFR is calculated by the CKD-EPI creatinine equation, without race adjustment. eGFR can be influenced by muscle mass, exercise, and diet. The reported eGFR is an estimation  only and is only applicable if the renal function is stable.   08/27/2020 54 (L) >60 mL/min/[1.73_m2] Final     Comment:     Non  GFR Calc  Starting 12/18/2018, serum creatinine based estimated GFR (eGFR) will be   calculated using the Chronic Kidney Disease Epidemiology Collaboration   (CKD-EPI) equation.       Calcium   Date Value Ref Range Status   10/14/2021 9.2 8.5 - 10.5 mg/dL Final   08/27/2020 9.6 8.5 - 10.1 mg/dL Final     Lab Results   Component Value Date    AST 14 09/21/2021      ALBUMIN 3.3 09/21/2021      ALKPHOS 104 09/21/2021     Lab Results   Component Value Date    WBC 7.6 10/14/2021      HGB 8.6 10/14/2021    HGB 10.6 04/07/2021      MCV 90 10/14/2021       10/14/2021     TSH   Date Value Ref Range Status   09/20/2021 1.73 0.40 - 4.00 mU/L Final   06/04/2020 0.93 0.30 - 5.00 uIU/mL Final      Lab Results   Component Value Date    A1C 5.8 04/07/2021      EXAM: CT CHEST/ABDOMEN/PELVIS W CONTRAST       DATE/TIME: 9/20/2021 7:15 PM   LUNGS AND PLEURA: Moderate size right pleural effusion and subsegmental atelectasis right lower lobe. The left lung is clear.  PANCREAS: Soft tissue mass arising from the uncinate process of the pancreatic head measuring 5.5 x 3.1 x 7.6 cm. This extends superiorly and encases the celiac trunk, the superior mesenteric artery and the superior mesenteric vein. The splenic and   portal veins are patent.  VASCULATURE: Atherosclerotic disease abdominal aorta and iliac arteries.  PELVIC ORGANS: Hysterectomy.  MUSCULOSKELETAL: Postop changes anterior abdominal wall. Edematous changes subcutaneous fat anterior abdominal wall. Hypertrophic changes thoracolumbar spine.                                                              IMPRESSION:  1.  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 This encases the celiac trunk and superior mesenteric artery and vein. GI consultation recommended.  2.  Moderate size right pleural effusion  and right basilar atelectasis.      IMP/PLAN:   (D62) Acute on chronic blood loss anemia  (primary encounter diagnosis)  Comment: s/p transfusion 4 units pRBCs and IV Venofer x2  Plan: pantoprazole 40 mg/day   Follow hgb, consider adding Fe replacement 3x/week       (K86.89) Pancreatic mass  (C85.12) B-cell lymphoma of intrathoracic lymph nodes, unspecified B-cell lymphoma type (H)  Comment: new dx  Plan: follow up Oncology as scheduled       (I35.2) Nonrheumatic aortic (valve) stenosis with insufficiency  (I50.30) Diastolic heart failure, unspecified HF chronicity (H)  Comment: not a candidate for AVR per Cardiology    S/p thoracentesis removing 450 ccs fluid     Plan: furosemide 20 mg/day    Follow weights, exam, BMP     (E66.01) Severe obesity (BMI >= 40) (H)  (E11.22,  I12.9,  N18.30) Type 2 DM with CKD stage 3 and hypertension (H)  Comment:   Lab Results   Component Value Date    A1C 5.8 04/07/2021      Plan: diet controlled.   She is on daily ASA and simvastatin 10 mg/day   May need to hold ASA if hgb drops again       (G47.33) SHAVON (obstructive sleep apnea)  Comment: on BiPAP, follows with [facility]   Plan: home settings with O2 at 4 L/min      (E03.9) Hypothyroidism, unspecified type  Comment:   TSH   Date Value Ref Range Status   09/20/2021 1.73 0.40 - 4.00 mU/L Final   06/04/2020 0.93 0.30 - 5.00 uIU/mL Final      Plan: levothyroxine 200 mcg/day     (F32.9) Major depressive disorder with current active episode, unspecified depression episode severity, unspecified whether recurrent  Comment: follows with Psychology as outpatient     Plan: citalopram 20 mg/day     (R53.81) Physical deconditioning  Comment: after prolonged illness   Plan: PHYSICAL THERAPY / OCCUPATIONAL THERAPY for strengthening, transfers, balance, gait, ADLs.   Discharge goal is return home with her , [facility]       Lisandra Brown MD            Sincerely,        Lisandra Brown MD

## 2021-10-15 NOTE — PROGRESS NOTES
"Lucille Rojas is a 81 year old female seen October 14, 2021 at [facility] where she was admitted after [facility] hospitalization 9/20-10/9 for severe anemia.   Pt presented with shortness of breath, black stools and found to have a hgb 4.7   She was transfused 4 units pRBCs over the course of several days.   EGD showed a few gastric erosions and tiny aphthous ulcers in the duodenum.   She was given IV Venofer on 2 occasions.  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 She has not seen Oncology yet.    Patient also had decompensated HFpEF, BNP >4000 and severe aortic stenosis with valve area 1.0 cm2.  She was seen by Cardiology, determined to not be a candidate for AVR.  She was diuresed with IV furosemide and had thoracentesis for right pleural effusion, removing 450 ccs transudative fluid.    She is on home BiPAP for obesity hypoventilation syndrome past 15 years, states she sleeps with her BiPAP up to 8 hours/night.   Reports she fell out of bed 6 months ago and was unable to get up, had to call paramedics.   She is seen today up to recliner in her room.  States \"I feel much better.\"   Still has shortness of breath and fatigue, but has been working with therapies and is hoping to return home soon.        Past Medical History:   Diagnosis Date     HTN (hypertension) 5/9/2013     Hyperlipidemia LDL goal <130 5/9/2013     Hypothyroidism 5/9/2013     Macular degeneration 9/18/2013     Sleep apnea     CPAP at night, O2 during naps     Type 2 diabetes, HbA1C goal < 8% (H) 5/9/2013   Pancreatic B cell lymphoma  Obesity hypoventilation syndrome  Severe aortic stenosis  Depression    Past Surgical History:   Procedure Laterality Date     APPENDECTOMY       COLONOSCOPY       COLONOSCOPY N/A 10/27/2014    Procedure: COMBINED COLONOSCOPY, SINGLE OR MULTIPLE BIOPSY/POLYPECTOMY BY BIOPSY;  Surgeon: Jose Alfredo Morejon MD;  Location:  GI     ESOPHAGOSCOPY, GASTROSCOPY, " "DUODENOSCOPY (EGD), COMBINED N/A 9/21/2021    Procedure: ESOPHAGOGASTRODUODENOSCOPY, WITH FINE NEEDLE ASPIRATION BIOPSY, WITH ENDOSCOPIC ULTRASOUND GUIDANCE;  Surgeon: Vera Benitez MD;  Location:  GI     ESOPHAGOSCOPY, GASTROSCOPY, DUODENOSCOPY (EGD), COMBINED N/A 9/21/2021    Procedure: Esophagogastroduodenoscopy, With Biopsy;  Surgeon: Vera Benitez MD;  Location:  GI     ESOPHAGOSCOPY, GASTROSCOPY, DUODENOSCOPY (EGD), COMBINED N/A 10/5/2021    Procedure: ESOPHAGOGASTRODUODENOSCOPY, WITH FINE NEEDLE ASPIRATION BIOPSY, WITH ENDOSCOPIC ULTRASOUND GUIDANCE;  Surgeon: Dixon Olivier MD;  Location:  GI     HERNIA REPAIR      inguinal x 2     TONSILLECTOMY       Social History     Tobacco Use     Smoking status: Never Smoker     Smokeless tobacco: Never Used   Substance Use Topics     Alcohol use: Yes     Alcohol/week: 0.0 standard drinks     Comment: rarely      :  Lives with her  [place], two-level house in [place].   They have 2 daughters and 2 sons.     FH: history reviewed, not pertinent to current admission     Review Of Systems  Skin: negative   Eyes: impaired vision  Ears/Nose/Throat: hearing loss  Respiratory: as above  Cardiovascular: dyspnea on exertion and exercise intolerance  Gastrointestinal: negative  Genitourinary: negative  Musculoskeletal: ambulatory with FWW, has a red tag  Notes left knee pain, \"clicking\"   Tylenol helps  Tinetti 16  TUG 60s   CGA to max assist for dressing and hygiene.  Ambulates 130' with FWW and CGA  Neurologic: negative  Psychiatric: depression, follows with Psychology  Hematologic/Lymphatic/Immunologic: negative  Endocrine: diabetes, hypothyroidism       GENERAL APPEARANCE: alert and no distress  /50   Pulse 89   Temp 97.3  F (36.3  C)   Resp 19   Ht 1.524 m (5')   Wt 118.4 kg (261 lb)   SpO2 93%   BMI 50.97 kg/m     HEENT: normocephalic, no lesion or abnormalities  NECK: no adenopathy, no asymmetry, masses, " or scars and thyroid normal to palpation  RESP: decreased BS in RLL, on O2 by NC at 3 L/min  CV: regular rate and rhythm, normal S1 S2@70, slow III/VI ZAY  ABDOMEN: obese, soft, nontender, no HSM or masses and bowel sounds normal  MS: extremities normal, 1+ LE edema without skin changes.  SKIN: no suspicious lesions or rashes  NEURO: Normal strength and tone, sensory exam grossly normal, and speech normal  PSYCH: affect okay  LYMPHATICS: No cervical,  or supraclavicular nodes     Last Comprehensive Metabolic Panel:  Sodium   Date Value Ref Range Status   10/14/2021 143 136 - 145 mmol/L Final   08/27/2020 144 133 - 144 mmol/L Final     Potassium   Date Value Ref Range Status   10/14/2021 4.1 3.5 - 5.0 mmol/L Final   08/27/2020 4.1 3.4 - 5.3 mmol/L Final     Chloride   Date Value Ref Range Status   10/14/2021 101 98 - 107 mmol/L Final   08/27/2020 109 94 - 109 mmol/L Final     Carbon Dioxide   Date Value Ref Range Status   08/27/2020 31 20 - 32 mmol/L Final     Carbon Dioxide (CO2)   Date Value Ref Range Status   10/14/2021 32 (H) 22 - 31 mmol/L Final     Anion Gap   Date Value Ref Range Status   10/14/2021 10 5 - 18 mmol/L Final   08/27/2020 4 3 - 14 mmol/L Final     Glucose   Date Value Ref Range Status   10/14/2021 104 70 - 125 mg/dL Final   08/27/2020 112 (H) 70 - 99 mg/dL Final     Urea Nitrogen   Date Value Ref Range Status   10/14/2021 27 8 - 28 mg/dL Final   08/27/2020 19 7 - 30 mg/dL Final     Creatinine   Date Value Ref Range Status   10/14/2021 0.94 0.60 - 1.10 mg/dL Final   08/27/2020 0.98 0.52 - 1.04 mg/dL Final     GFR Estimate   Date Value Ref Range Status   10/14/2021 56 (L) >60 mL/min/1.73m2 Final     Comment:     As of July 11, 2021, eGFR is calculated by the CKD-EPI creatinine equation, without race adjustment. eGFR can be influenced by muscle mass, exercise, and diet. The reported eGFR is an estimation only and is only applicable if the renal function is stable.   08/27/2020 54 (L) >60  mL/min/[1.73_m2] Final     Comment:     Non  GFR Calc  Starting 12/18/2018, serum creatinine based estimated GFR (eGFR) will be   calculated using the Chronic Kidney Disease Epidemiology Collaboration   (CKD-EPI) equation.       Calcium   Date Value Ref Range Status   10/14/2021 9.2 8.5 - 10.5 mg/dL Final   08/27/2020 9.6 8.5 - 10.1 mg/dL Final     Lab Results   Component Value Date    AST 14 09/21/2021      ALBUMIN 3.3 09/21/2021      ALKPHOS 104 09/21/2021     Lab Results   Component Value Date    WBC 7.6 10/14/2021      HGB 8.6 10/14/2021    HGB 10.6 04/07/2021      MCV 90 10/14/2021       10/14/2021     TSH   Date Value Ref Range Status   09/20/2021 1.73 0.40 - 4.00 mU/L Final   06/04/2020 0.93 0.30 - 5.00 uIU/mL Final      Lab Results   Component Value Date    A1C 5.8 04/07/2021      EXAM: CT CHEST/ABDOMEN/PELVIS W CONTRAST       DATE/TIME: 9/20/2021 7:15 PM   LUNGS AND PLEURA: Moderate size right pleural effusion and subsegmental atelectasis right lower lobe. The left lung is clear.  PANCREAS: Soft tissue mass arising from the uncinate process of the pancreatic head measuring 5.5 x 3.1 x 7.6 cm. This extends superiorly and encases the celiac trunk, the superior mesenteric artery and the superior mesenteric vein. The splenic and   portal veins are patent.  VASCULATURE: Atherosclerotic disease abdominal aorta and iliac arteries.  PELVIC ORGANS: Hysterectomy.  MUSCULOSKELETAL: Postop changes anterior abdominal wall. Edematous changes subcutaneous fat anterior abdominal wall. Hypertrophic changes thoracolumbar spine.                                                              IMPRESSION:  1.  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 This encases the celiac trunk and superior mesenteric artery and vein. GI consultation recommended.  2.  Moderate size right pleural effusion and right basilar atelectasis.      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   She is on daily ASA and simvastatin 10 mg/day   May need to hold ASA if hgb drops again       (G47.33) SHAVON (obstructive sleep apnea)  Comment: on BiPAP, follows with [facility]   Plan: home settings with O2 at 4 L/min      (E03.9) Hypothyroidism, unspecified type  Comment:   TSH   Date Value Ref Range Status   09/20/2021 1.73 0.40 - 4.00 mU/L Final   06/04/2020 0.93 0.30 - 5.00 uIU/mL Final      Plan: levothyroxine 200 mcg/day     (F32.9) Major depressive disorder with current active episode, unspecified depression episode severity, unspecified whether recurrent  Comment: follows with Psychology as outpatient     Plan: citalopram 20 mg/day     (R53.81) Physical deconditioning  Comment: after prolonged illness   Plan: PHYSICAL THERAPY / OCCUPATIONAL THERAPY for strengthening, transfers, balance, gait, ADLs.   Discharge goal is return home with her , [facility]       Lisandra Brown MD

## 2021-10-17 LAB — INTERPRETATION: NORMAL

## 2021-10-17 PROCEDURE — 88368 INSITU HYBRIDIZATION MANUAL: CPT | Mod: 26 | Performed by: MEDICAL GENETICS

## 2021-10-17 PROCEDURE — 88369 M/PHMTRC ALYSISHQUANT/SEMIQ: CPT | Mod: 26 | Performed by: MEDICAL GENETICS

## 2021-10-17 ASSESSMENT — MIFFLIN-ST. JEOR: SCORE: 1561.75

## 2021-10-18 NOTE — PROGRESS NOTES
"[facility]  [place] Medical Record Number:  [id number]  Place of Service where encounter took place:  [facility] ([facility]) [[id number]]  Chief Complaint   Patient presents with     Nursing Home Acute       HPI:    Lucille Rojas  is a 81 year old (1940), who is being seen today for an episodic care visit.  HPI information obtained from: facility chart records, facility staff, patient report and [facility] chart review. Today's concern is: feels \"I had a shower today\".     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    MEDICATIONS:     Current Outpatient Medications   Medication Sig Dispense Refill     ACE/ARB NOT PRESCRIBED, INTENTIONAL, Please choose reason not prescribed, below (Patient not taking: Reported on 10/11/2021)       acetaminophen (TYLENOL) 325 MG tablet Take 650 mg by mouth 3 times daily as needed for mild pain        aspirin 81 MG tablet Take 1 tablet by mouth daily. 30 tablet 0     carboxymethylcellulose PF (REFRESH PLUS) 0.5 % ophthalmic solution Place 2 drops into both eyes 2 times daily       citalopram (CELEXA) 20 MG tablet Take 1 tablet (20 mg) by mouth daily 90 tablet 1     furosemide (LASIX) 20 MG tablet Take 1 tablet (20 mg) by mouth daily       levothyroxine (SYNTHROID/LEVOTHROID) 200 MCG tablet TAKE ONE TABLET BY MOUTH ONE TIME DAILY  90 tablet 3     Multiple Vitamins-Minerals (PRESERVISION AREDS) CAPS Take 1 " tablet by mouth 2 times daily        nystatin (MYCOSTATIN) 235561 UNIT/GM external powder Apply topically 2 times daily Under breasts and skin folds BID & BID PRN for redness       order for DME Equipment being ordered: wheeled walker with hand breaks 1 Device 0     pantoprazole (PROTONIX) 40 MG EC tablet Take 1 tablet (40 mg) by mouth every morning (before breakfast)       simvastatin (ZOCOR) 10 MG tablet TAKE ONE TABLET BY MOUTH AT BEDTIME  90 tablet 3     TODAY DURING EXAM/ROS:  No CP, SOB, Cough, dizziness, fevers, chills, HA, N/V, dysuria or Bowel Abnormalities. Appetite is good.  No pains. Uses O2.    Objective:  /60   Pulse 65   Temp 97.4  F (36.3  C)   Resp 20   Ht 1.524 m (5')   Wt 118.5 kg (261 lb 4.8 oz)   SpO2 94%   BMI 51.03 kg/m    Exam:  GENERAL APPEARANCE:  Alert, in no distress, oriented, morbidly obese, cooperative  ENT:  Mouth with moist mucous membranes l  RESP:  respiratory effort of chest normal, lungs clear to auscultation but decreased, no respiratory distress-02/2l NC  CV:  Auscultation of heart done ,RRR.  + Ejection murmur.. Trace pedal bilat edema, +pedal pulses  ABDOMEN:  normal bowel sounds, soft, nontender, obese  M/S:   up with therapies--using walker--up with walker  SKIN:  Inspection of skin at baseline, but limited as pt dressed--skin folds in abd, breasts and groin redness better per staff--not seen by me  NEURO:   Cranial nerves are grossly intact and at patient's baseline  PSYCH:  oriented X 3, normal insight, judgement and memory, affect and mood normal      Labs:   Recent Labs   Lab Test 10/14/21  1330 10/09/21  1200    141   POTASSIUM 4.1 3.9   CHLORIDE 101 103   CO2 32* 33*   ANIONGAP 10 5    160*   BUN 27 24   CR 0.94 1.02   IGOR 9.2 8.7     CBC RESULTS: Recent Labs   Lab Test 10/14/21  1330 09/26/21  0907   WBC 7.6 10.6   RBC 3.39* 4.14   HGB 8.6* 8.9*   HCT 30.5* 33.0*   MCV 90 80   MCH 25.4* 21.5*   MCHC 28.2* 27.0*   RDW  --  27.9*    300  "   < > = values in this interval not displayed.     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      (I35.2) Nonrheumatic aortic (valve) stenosis with insufficiency   (I50.30) Diastolic heart failure, unspecified HF chronicity (H)   (E11.22,  I12.9,  N18.30) Type 2 DM with CKD stage 3 and hypertension (H)  Comment/Plan: lasix, cont meds,  Accuchecks low 100's for most part, monitor.    (R53.81) Physical deconditioning  Comment/Plan: no LCD, cont PT OT    (F32.9) Major depressive disorder with current active episode, unspecified depression episode severity, unspecified whether recurrent  Comment/Plan: cont Celexa, no acute issues, monitor.      Electronically signed by:  ALIE Freitas CNP         **please see the  note is done by the student for her/his learning only,which is in the \"'NURSING NOTE SECTION\".       "

## 2021-10-19 ENCOUNTER — TRANSITIONAL CARE UNIT VISIT (OUTPATIENT)
Dept: GERIATRICS | Facility: CLINIC | Age: 81
End: 2021-10-19
Payer: MEDICARE

## 2021-10-19 VITALS
SYSTOLIC BLOOD PRESSURE: 133 MMHG | OXYGEN SATURATION: 94 % | TEMPERATURE: 97.4 F | DIASTOLIC BLOOD PRESSURE: 60 MMHG | WEIGHT: 261.3 LBS | RESPIRATION RATE: 20 BRPM | BODY MASS INDEX: 51.3 KG/M2 | HEIGHT: 60 IN | HEART RATE: 65 BPM

## 2021-10-19 DIAGNOSIS — D62 ACUTE ON CHRONIC BLOOD LOSS ANEMIA: ICD-10-CM

## 2021-10-19 DIAGNOSIS — F32.9 MAJOR DEPRESSIVE DISORDER WITH CURRENT ACTIVE EPISODE, UNSPECIFIED DEPRESSION EPISODE SEVERITY, UNSPECIFIED WHETHER RECURRENT: ICD-10-CM

## 2021-10-19 DIAGNOSIS — R53.81 PHYSICAL DECONDITIONING: ICD-10-CM

## 2021-10-19 DIAGNOSIS — I35.2 NONRHEUMATIC AORTIC (VALVE) STENOSIS WITH INSUFFICIENCY: ICD-10-CM

## 2021-10-19 DIAGNOSIS — C85.12 B-CELL LYMPHOMA OF INTRATHORACIC LYMPH NODES, UNSPECIFIED B-CELL LYMPHOMA TYPE (H): Primary | ICD-10-CM

## 2021-10-19 DIAGNOSIS — E11.22 TYPE 2 DM WITH CKD STAGE 3 AND HYPERTENSION (H): ICD-10-CM

## 2021-10-19 DIAGNOSIS — G47.33 OSA (OBSTRUCTIVE SLEEP APNEA): ICD-10-CM

## 2021-10-19 DIAGNOSIS — N18.30 TYPE 2 DM WITH CKD STAGE 3 AND HYPERTENSION (H): ICD-10-CM

## 2021-10-19 DIAGNOSIS — I12.9 TYPE 2 DM WITH CKD STAGE 3 AND HYPERTENSION (H): ICD-10-CM

## 2021-10-19 DIAGNOSIS — I50.30 DIASTOLIC HEART FAILURE, UNSPECIFIED HF CHRONICITY (H): ICD-10-CM

## 2021-10-19 DIAGNOSIS — Z99.81 OXYGEN DEPENDENT: ICD-10-CM

## 2021-10-19 DIAGNOSIS — E66.01 SEVERE OBESITY (BMI >= 40) (H): ICD-10-CM

## 2021-10-19 PROCEDURE — 99309 SBSQ NF CARE MODERATE MDM 30: CPT | Performed by: NURSE PRACTITIONER

## 2021-10-19 NOTE — NURSING NOTE
"[facility]    Chief Complaint   Patient presents with     Nursing Home Acute     HPI:  Lucille Ms. Rojas is a 81 year old  (1940), who is being seen today for an episodic care visit at: [facility] ([facility]) [[id number]].where she was admitted after [facility] hospitalization 9/20-10/9 for severe anemia. At the time of her hospitalization, Ms. Rojas presented with shortness of breath, and tarry stools and was found to have a Hgb 4.7   She was transfused 4 units pRBCs over the course of several days.  EGD showed a few gastric erosions and tiny aphthous ulcers in the duodenum.   She was given IV Venofer on 2 occasions.  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 She is scheduled to see Oncology on 10/27/21  Additionally, patient had decompensated HFpEF, BNP >4000 and severe aortic stenosis with valve area 1.0 cm2.  She was seen by Cardiology, determined to not be a candidate for AVR.  She was diuresed with IV furosemide and had thoracentesis for right pleural effusion, removing 450 ccs transudative fluid.    She is on home BiPAP for obesity hypoventilation syndrome past 15 years.She sleeps with her BiPAP up to 8 hours/night. She states that at home she uses oxygen only at night but has been using it  throughout her stay here at [facility]. Ms. Rojas did report sustaining a fall from her bed  6 months ago and experienced difficulty getting up and had to call paramedics.        Today's concern is:   Mrs. Rojas was laying in bed, awake at the time of my visit. Patient appeared slightly anxious but not in any acute distress. She became  a bit teary when sharing  regarding her current state of health however she feels like she has \"come along way\". She states that she is continuing to improve especially from the therapies that she has been receiving. Her hope is to return home soon.    Allergies, and PMH/PSH reviewed in EPIC today.    Objective:   /60 " "  Pulse 65   Temp 97.4  F (36.3  C)   Resp 20   Ht 1.524 m (5')   Wt 118.5 kg (261 lb 4.8 oz)   SpO2 94%   BMI 51.03 kg/m    GENERAL APPEARANCE:  Alert, in no distress, anxious  ENT:  Mouth and posterior oropharynx normal, moist mucous membranes  EYES:  EOM, conjunctivae, lids, pupils and irises normal  NECK:  No adenopathy,masses or thyromegaly  RESP: Dyspnea on exertion and exercise intolerance   respiratory effort and palpation of chest normal, lungs clear to auscultation   CV:  Palpation and auscultation of heart done , regular rate and rhythm, no murmur, rub, or gallop  ABDOMEN:  normal bowel sounds, soft, nontender, no hepatosplenomegaly or other masses  SKIN: Brownish/tan mole noted above her sternum, patient states that dermatology is aware and has been monitoring  Musculoskeletal: ambulatory with FWW, has a red tag  Experiences intermittent  left knee \"clicking\" relieved with Tylenol  Tinetti 16  TUG 60s   CGA to max assist for dressing and hygiene.  Ambulates 130' with FWW and CGA  Neurologic: negative  PSYCH: Tearful regarding diagnosis  LYMPHATICS: No cervical,  or supraclavicular nodes    Labs done in SNF are in [place] [facility]. Please refer to them using [facility] Everywhere. and   Recent Results (from the past 240 hour(s))   Basic metabolic panel    Collection Time: 10/14/21  1:30 PM   Result Value Ref Range    Sodium 143 136 - 145 mmol/L    Potassium 4.1 3.5 - 5.0 mmol/L    Chloride 101 98 - 107 mmol/L    Carbon Dioxide (CO2) 32 (H) 22 - 31 mmol/L    Anion Gap 10 5 - 18 mmol/L    Urea Nitrogen 27 8 - 28 mg/dL    Creatinine 0.94 0.60 - 1.10 mg/dL    Calcium 9.2 8.5 - 10.5 mg/dL    Glucose 104 70 - 125 mg/dL    GFR Estimate 56 (L) >60 mL/min/1.73m2   CBC with platelets    Collection Time: 10/14/21  1:30 PM   Result Value Ref Range    WBC Count 7.6 4.0 - 11.0 10e3/uL    RBC Count 3.39 (L) 3.80 - 5.20 10e6/uL    Hemoglobin 8.6 (L) 11.7 - 15.7 g/dL    Hematocrit 30.5 (L) 35.0 - 47.0 %    MCV 90 78 - " 100 fL    MCH 25.4 (L) 26.5 - 33.0 pg    MCHC 28.2 (L) 31.5 - 36.5 g/dL    RDW      Platelet Count 315 150 - 450 10e3/uL       Assessment/Plan:     Acute on chronic blood loss anemia  (primary encounter diagnosis)  Plan: s/p transfusion 4 units pRBCs and IV Venofer x2 Pantoprazole 40 mg/day   Follow hgb, consider adding Fe replacement 3x/week        Pancreatic mass   B-cell lymphoma of intrathoracic lymph nodes, unspecified B-cell lymphoma type (H)  Comment: new dx  Plan: Will follow up with Oncology as 10/27/21      (Nonrheumatic aortic (valve) stenosis with insufficiency   Diastolic heart failure, unspecified HF chronicity (H)  Comment: not a candidate for AVR per Cardiology    S/p thoracentesis removing 450 ccs fluid     Plan: furosemide 20 mg/day    Monitor weights, exam, BMP      Severe obesity (BMI >= 40) (H)  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   She is on daily ASA and simvastatin 10 mg/day   May need to hold ASA if hgb drops again        SHAVON (obstructive sleep apnea)  Comment: on BiPAP, follows with [facility]   Plan: home settings with O2 at 4 L/min       (E03.9) Hypothyroidism, unspecified type  Comment:         TSH   Date Value Ref Range Status   09/20/2021 1.73 0.40 - 4.00 mU/L Final   06/04/2020 0.93 0.30 - 5.00 uIU/mL Final      Plan: levothyroxine 200 mcg/day      Major depressive disorder with current active episode, unspecified depression episode severity, unspecified whether recurrent  Comment: follows with Psychology as outpatient     Plan: citalopram 20 mg/day     Physical deconditioning  Comment: after prolonged illness   Plan: PHYSICAL THERAPY / OCCUPATIONAL THERAPY for strengthening, transfers, balance, gait, ADLs.   Discharge goal is return home with her , [facility]        Electronically signed by: Camille Piañ NP student

## 2021-10-19 NOTE — Clinical Note
NAVDEEP Srinivasan--do you want us to do labs on Monday before your appt with pt on 10/27:  BMP and CBC or would you like to do at your appt?  Thanks Radha Calhoun RN, CNP  [facility]

## 2021-10-23 ENCOUNTER — HEALTH MAINTENANCE LETTER (OUTPATIENT)
Age: 81
End: 2021-10-23

## 2021-10-24 ASSESSMENT — MIFFLIN-ST. JEOR: SCORE: 1548.59

## 2021-10-25 ENCOUNTER — TRANSITIONAL CARE UNIT VISIT (OUTPATIENT)
Dept: GERIATRICS | Facility: CLINIC | Age: 81
End: 2021-10-25
Payer: MEDICARE

## 2021-10-25 ENCOUNTER — LAB REQUISITION (OUTPATIENT)
Dept: LAB | Facility: CLINIC | Age: 81
End: 2021-10-25
Payer: MEDICARE

## 2021-10-25 VITALS
SYSTOLIC BLOOD PRESSURE: 138 MMHG | HEART RATE: 67 BPM | TEMPERATURE: 97.8 F | OXYGEN SATURATION: 98 % | BODY MASS INDEX: 50.73 KG/M2 | HEIGHT: 60 IN | RESPIRATION RATE: 28 BRPM | DIASTOLIC BLOOD PRESSURE: 57 MMHG | WEIGHT: 258.4 LBS

## 2021-10-25 DIAGNOSIS — R53.81 PHYSICAL DECONDITIONING: ICD-10-CM

## 2021-10-25 DIAGNOSIS — E11.9 TYPE 2 DIABETES MELLITUS WITHOUT COMPLICATIONS (H): ICD-10-CM

## 2021-10-25 DIAGNOSIS — I12.9 TYPE 2 DM WITH CKD STAGE 3 AND HYPERTENSION (H): ICD-10-CM

## 2021-10-25 DIAGNOSIS — D64.9 ANEMIA, UNSPECIFIED: ICD-10-CM

## 2021-10-25 DIAGNOSIS — K86.89 PANCREATIC MASS: ICD-10-CM

## 2021-10-25 DIAGNOSIS — N18.30 TYPE 2 DM WITH CKD STAGE 3 AND HYPERTENSION (H): ICD-10-CM

## 2021-10-25 DIAGNOSIS — E66.01 SEVERE OBESITY (BMI >= 40) (H): ICD-10-CM

## 2021-10-25 DIAGNOSIS — D62 ACUTE ON CHRONIC BLOOD LOSS ANEMIA: Primary | ICD-10-CM

## 2021-10-25 DIAGNOSIS — G47.33 OSA (OBSTRUCTIVE SLEEP APNEA): ICD-10-CM

## 2021-10-25 DIAGNOSIS — I89.0 LYMPHEDEMA, NOT ELSEWHERE CLASSIFIED: ICD-10-CM

## 2021-10-25 DIAGNOSIS — E11.22 TYPE 2 DM WITH CKD STAGE 3 AND HYPERTENSION (H): ICD-10-CM

## 2021-10-25 DIAGNOSIS — N18.9 CHRONIC KIDNEY DISEASE, UNSPECIFIED: ICD-10-CM

## 2021-10-25 DIAGNOSIS — I35.2 NONRHEUMATIC AORTIC (VALVE) STENOSIS WITH INSUFFICIENCY: ICD-10-CM

## 2021-10-25 PROCEDURE — 99309 SBSQ NF CARE MODERATE MDM 30: CPT | Performed by: NURSE PRACTITIONER

## 2021-10-25 ASSESSMENT — MIFFLIN-ST. JEOR: SCORE: 1543.15

## 2021-10-25 NOTE — PROGRESS NOTES
[facility]  [place] [facility] Record Number:  [id number]  Place of Service where encounter took place:  [facility] ([facility]) [[id number]]  Chief Complaint   Patient presents with     Nursing Home Acute       HPI:    Lucille Rojas  is a 81 year old (1940), who is being seen today for an episodic care visit.  HPI information obtained from: facility chart records, facility staff, patient report and [facility] chart review. Today's concern is: feeling stronger, no other concerns, talked about upcoming appt with oncology     Acute on chronic blood loss anemia  Pancreatic mass  Nonrheumatic aortic (valve) stenosis with insufficiency  Severe obesity (BMI >= 40) (H)  Type 2 DM with CKD stage 3 and hypertension (H)  SHAVON (obstructive sleep apnea)  Physical deconditioning      Past Medical and Surgical History reviewed in Epic today.    MEDICATIONS:     Current Outpatient Medications   Medication Sig Dispense Refill     ACE/ARB NOT PRESCRIBED, INTENTIONAL, Please choose reason not prescribed, below (Patient not taking: Reported on 10/11/2021)       acetaminophen (TYLENOL) 325 MG tablet Take 650 mg by mouth 3 times daily as needed for mild pain        aspirin 81 MG tablet Take 1 tablet by mouth daily. 30 tablet 0     carboxymethylcellulose PF (REFRESH PLUS) 0.5 % ophthalmic solution Place 2 drops into both eyes 2 times daily       citalopram (CELEXA) 20 MG tablet Take 1 tablet (20 mg) by mouth daily 90 tablet 1     furosemide (LASIX) 20 MG tablet Take 1 tablet (20 mg) by mouth daily       levothyroxine (SYNTHROID/LEVOTHROID) 200 MCG tablet TAKE ONE TABLET BY MOUTH ONE TIME DAILY  90 tablet 3     Multiple Vitamins-Minerals (PRESERVISION AREDS) CAPS Take 1 tablet by mouth 2 times daily        nystatin (MYCOSTATIN) 524610 UNIT/GM external powder Apply topically 2 times daily Under breasts and skin folds BID & BID PRN for redness       order for DME Equipment being ordered:  wheeled walker with hand breaks 1 Device 0     pantoprazole (PROTONIX) 40 MG EC tablet Take 1 tablet (40 mg) by mouth every morning (before breakfast)       simvastatin (ZOCOR) 10 MG tablet TAKE ONE TABLET BY MOUTH AT BEDTIME  90 tablet 3     TODAY DURING EXAM/ROS:  No CP, SOB, Cough, dizziness, fevers, chills, HA, N/V, dysuria or Bowel Abnormalities. Appetite is good.  No pains. Uses O2 at 2l.no    Objective:  /57   Pulse 67   Temp 97.8  F (36.6  C)   Resp 28   Ht 1.524 m (5')   Wt 117.2 kg (258 lb 6.4 oz)   SpO2 98%   BMI 50.47 kg/m    Exam:  GENERAL APPEARANCE:  Alert, in no distress, oriented, morbidly obese, cooperative  RESP:  respiratory effort of chest normal, lungs CTA, no respiratory distress-02/2l NC  CV:  Auscultation of heart done ,RRR.  + systolic murmur.. Trace pedal bilat edema Rt > Lt, +pedal pulses  ABDOMEN:  normal bowel sounds, soft, nontender, obese  M/S:   up with therapies--using walker--up with walker  SKIN:  Inspection of skin at baseline, but limited as pt dressed--pt thinks skin folds' redness better  NEURO:   Cranial nerves are grossly intact and at patient's baseline  PSYCH:  oriented X 3, normal insight, judgement and memory, affect and mood normal      Labs:   Recent Labs   Lab Test 10/14/21  1330 10/09/21  1200    141   POTASSIUM 4.1 3.9   CHLORIDE 101 103   CO2 32* 33*   ANIONGAP 10 5    160*   BUN 27 24   CR 0.94 1.02   IGOR 9.2 8.7     CBC RESULTS: Recent Labs   Lab Test 10/14/21  1330 09/26/21  0907   WBC 7.6 10.6   RBC 3.39* 4.14   HGB 8.6* 8.9*   HCT 30.5* 33.0*   MCV 90 80   MCH 25.4* 21.5*   MCHC 28.2* 27.0*   RDW  --  27.9*    300    < > = values in this interval not displayed.     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 monitor      (I35.2) Nonrheumatic aortic (valve) stenosis with insufficiency   (I50.30) Diastolic heart failure, unspecified HF chronicity (H)   (E11.22,  I12.9,  N18.30) Type 2 DM with CKD stage 3 and hypertension (H)  Comment/Plan: wt 157-258#--highest 261#.SBP runs 120-130's mainly, lasix, cont meds,  Accuchecks cont to be in low 100's mostly,  monitor. BMP in am    (R53.81) Physical deconditioning  Comment/Plan: no LCD, cont PT OT       Electronically signed by:  ALIE Freitas CNP

## 2021-10-25 NOTE — PROGRESS NOTES
CC: Lab Recheck     HPI:    Lucille Rojas is a 81 year old  (1940), who is being seen today for an episodic care visit at [facility] ([facility] Today's concern: lab recheck of bmp, cbc with plts        OBJECTIVE: A & O x 3, NAD,      BP (!) 147/59   Pulse 72   Temp 97.5  F (36.4  C)   Resp 18   Ht 1.524 m (5')   Wt 116.7 kg (257 lb 3.2 oz)   SpO2 99%   BMI 50.23 kg/m      LABS: today   Recent Labs   Lab Test 10/26/21  0946 10/14/21  1330    143   POTASSIUM 4.0 4.1   CHLORIDE 98 101   CO2 36* 32*   ANIONGAP 9 10    104   BUN 27 27   CR 1.03 0.94   IGOR 9.7 9.2     CBC RESULTS: Recent Labs   Lab Test 10/26/21  0946   WBC 9.1   RBC 3.45*   HGB 9.4*   HCT 31.6*   MCV 92   MCH 27.2   MCHC 29.7*   RDW 25.7*        Hemoglobin   Date Value Ref Range Status   10/26/2021 9.4 (L) 11.7 - 15.7 g/dL Final   10/14/2021 8.6 (L) 11.7 - 15.7 g/dL Final   04/07/2021 10.6 (L) 11.7 - 15.7 g/dL Final   08/27/2020 11.0 (L) 11.7 - 15.7 g/dL Final       ASSESSMENT / PLAN:  (D62) Acute on chronic blood loss anemia  (primary encounter diagnosis)   (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

## 2021-10-26 ENCOUNTER — TRANSITIONAL CARE UNIT VISIT (OUTPATIENT)
Dept: GERIATRICS | Facility: CLINIC | Age: 81
End: 2021-10-26
Payer: MEDICARE

## 2021-10-26 VITALS
HEIGHT: 60 IN | BODY MASS INDEX: 50.49 KG/M2 | DIASTOLIC BLOOD PRESSURE: 59 MMHG | SYSTOLIC BLOOD PRESSURE: 147 MMHG | OXYGEN SATURATION: 99 % | RESPIRATION RATE: 18 BRPM | HEART RATE: 72 BPM | TEMPERATURE: 97.5 F | WEIGHT: 257.2 LBS

## 2021-10-26 DIAGNOSIS — D62 ACUTE ON CHRONIC BLOOD LOSS ANEMIA: Primary | ICD-10-CM

## 2021-10-26 DIAGNOSIS — I12.9 TYPE 2 DM WITH CKD STAGE 3 AND HYPERTENSION (H): ICD-10-CM

## 2021-10-26 DIAGNOSIS — N18.30 TYPE 2 DM WITH CKD STAGE 3 AND HYPERTENSION (H): ICD-10-CM

## 2021-10-26 DIAGNOSIS — E11.22 TYPE 2 DM WITH CKD STAGE 3 AND HYPERTENSION (H): ICD-10-CM

## 2021-10-26 DIAGNOSIS — C85.12 B-CELL LYMPHOMA OF INTRATHORACIC LYMPH NODES, UNSPECIFIED B-CELL LYMPHOMA TYPE (H): ICD-10-CM

## 2021-10-26 LAB
ANION GAP SERPL CALCULATED.3IONS-SCNC: 9 MMOL/L (ref 5–18)
BASOPHILS # BLD AUTO: 0 10E3/UL (ref 0–0.2)
BASOPHILS NFR BLD AUTO: 0 %
BUN SERPL-MCNC: 27 MG/DL (ref 8–28)
CALCIUM SERPL-MCNC: 9.7 MG/DL (ref 8.5–10.5)
CHLORIDE BLD-SCNC: 98 MMOL/L (ref 98–107)
CO2 SERPL-SCNC: 36 MMOL/L (ref 22–31)
CREAT SERPL-MCNC: 1.03 MG/DL (ref 0.6–1.1)
EOSINOPHIL # BLD AUTO: 0.6 10E3/UL (ref 0–0.7)
EOSINOPHIL NFR BLD AUTO: 6 %
ERYTHROCYTE [DISTWIDTH] IN BLOOD BY AUTOMATED COUNT: 25.7 % (ref 10–15)
GFR SERPL CREATININE-BSD FRML MDRD: 50 ML/MIN/1.73M2
GLUCOSE BLD-MCNC: 106 MG/DL (ref 70–125)
HCT VFR BLD AUTO: 31.6 % (ref 35–47)
HGB BLD-MCNC: 9.4 G/DL (ref 11.7–15.7)
IMM GRANULOCYTES # BLD: 0 10E3/UL
IMM GRANULOCYTES NFR BLD: 0 %
LYMPHOCYTES # BLD AUTO: 1.2 10E3/UL (ref 0.8–5.3)
LYMPHOCYTES NFR BLD AUTO: 13 %
MCH RBC QN AUTO: 27.2 PG (ref 26.5–33)
MCHC RBC AUTO-ENTMCNC: 29.7 G/DL (ref 31.5–36.5)
MCV RBC AUTO: 92 FL (ref 78–100)
MONOCYTES # BLD AUTO: 0.6 10E3/UL (ref 0–1.3)
MONOCYTES NFR BLD AUTO: 7 %
NEUTROPHILS # BLD AUTO: 6.7 10E3/UL (ref 1.6–8.3)
NEUTROPHILS NFR BLD AUTO: 74 %
NRBC # BLD AUTO: 0 10E3/UL
NRBC BLD AUTO-RTO: 0 /100
PLATELET # BLD AUTO: 318 10E3/UL (ref 150–450)
POTASSIUM BLD-SCNC: 4 MMOL/L (ref 3.5–5)
RBC # BLD AUTO: 3.45 10E6/UL (ref 3.8–5.2)
SODIUM SERPL-SCNC: 143 MMOL/L (ref 136–145)
WBC # BLD AUTO: 9.1 10E3/UL (ref 4–11)

## 2021-10-26 PROCEDURE — 99308 SBSQ NF CARE LOW MDM 20: CPT | Performed by: NURSE PRACTITIONER

## 2021-10-26 PROCEDURE — 85014 HEMATOCRIT: CPT | Performed by: INTERNAL MEDICINE

## 2021-10-26 PROCEDURE — 36415 COLL VENOUS BLD VENIPUNCTURE: CPT | Performed by: INTERNAL MEDICINE

## 2021-10-26 PROCEDURE — 82310 ASSAY OF CALCIUM: CPT | Performed by: INTERNAL MEDICINE

## 2021-10-27 ENCOUNTER — ONCOLOGY VISIT (OUTPATIENT)
Dept: ONCOLOGY | Facility: CLINIC | Age: 81
End: 2021-10-27
Attending: INTERNAL MEDICINE
Payer: MEDICARE

## 2021-10-27 VITALS
HEART RATE: 67 BPM | OXYGEN SATURATION: 100 % | RESPIRATION RATE: 18 BRPM | DIASTOLIC BLOOD PRESSURE: 57 MMHG | SYSTOLIC BLOOD PRESSURE: 145 MMHG

## 2021-10-27 DIAGNOSIS — C85.99 NON-HODGKIN LYMPHOMA OF SOLID ORGAN EXCLUDING SPLEEN, UNSPECIFIED NON-HODGKIN LYMPHOMA TYPE (H): Primary | ICD-10-CM

## 2021-10-27 DIAGNOSIS — I35.0 NONRHEUMATIC AORTIC VALVE STENOSIS: ICD-10-CM

## 2021-10-27 PROCEDURE — 99215 OFFICE O/P EST HI 40 MIN: CPT | Performed by: INTERNAL MEDICINE

## 2021-10-27 ASSESSMENT — MIFFLIN-ST. JEOR: SCORE: 1551.32

## 2021-10-27 ASSESSMENT — PAIN SCALES - GENERAL: PAINLEVEL: NO PAIN (0)

## 2021-10-27 NOTE — LETTER
10/27/2021         RE: Lucille Rojas  [address] [facility] [address]        Dear Colleague,    Thank you for referring your patient, Lucille Rojas, to the [facility]. Please see a copy of my visit note below.    Oncology Rooming Note    October 27, 2021 10:08 AM   Lucille Rojas is a 81 year old female who presents for:    Chief Complaint   Patient presents with     Oncology Clinic Visit     Initial Vitals: There were no vitals taken for this visit. Estimated body mass index is 50.23 kg/m  as calculated from the following:    Height as of 10/25/21: 1.524 m (5').    Weight as of 10/25/21: 116.7 kg (257 lb 3.2 oz). There is no height or weight on file to calculate BSA.  Data Unavailable Comment: Data Unavailable   No LMP recorded. Patient has had a hysterectomy.  Allergies reviewed: Yes  Medications reviewed: Yes    Medications: Medication refills not needed today.  Pharmacy [facility] entered into [employer]: [id number] [address] [facility]    Clinical concerns: no      Shari J. Schoenberger, CHELO              ONCOLOGY HISTORY: Ms. Rojas is a female with lymphoma.  1. On 09/20/2021:   -Hemoglobin of 4.7 with MCV of 67. Normal WBC and platelets.  -Iron of 9 and saturation index of 2%.   -CT chest, abdomen and pelvis reveals large pancreatic head mass.  This encases the celiac trunk, superior mesenteric artery and superior mesenteric vein.  There is moderate-size right pleural effusion. No lymphadenopathy.  2. Thoracocentesis on 09/22/2021. Cytology is negative for malignancy.  3. EUS on 09/21/2021 revealed is a mass in the uncinate process of the pancreas measuring 33 mm.  There was evidence of invasion into superior mesenteric artery and the celiac trunk. No enlarged lymph nodes seen. FNA revealed atypical cells.    4. EGD and EUS repeated on 10/05/2021.  -EGD is normal.  -EUS revealed pancreatic head mass.  FNA revealed CD10-positive B-cell  lymphoma. Flow cytometry revealed CD10-positive lambda monotypic B-cells.    SUBJECTIVE:  Ms. Rojas is an 81-year-old female with newly diagnosed lymphoma.  CT scan on 09/20/2021 had revealed large pancreatic head mass.  The patient was seen by GI.  First EUS was nondiagnostic.  She had repeat EUS done on 10/05/2021.  It revealed pancreatic head mass.  FNA revealed CD10-positive B-cell lymphoma.  Flow cytometry revealed CD10-positive lambda monotypic B-cells.     The patient presents to the clinic with multiple family members.  The patient is currently in a nursing home.     The patient is very weak.  She was brought in a wheelchair.  In the nursing home, she is able to walk short distances with a walker.     She is not in severe pain.  No nausea or vomiting.  Appetite is fair.  No fever or chills.  No bleeding.      All other review of systems is negative.     PHYSICAL EXAMINATION:    GENERAL:  She was alert and oriented x 3.   VITAL SIGNS:  Not in acute distress.  Rest of systems not examined.     LABORATORY:  CBC and BMP reviewed.     ASSESSMENT:    1.  An 81-year-old female with pancreatic head mass.  FNA reveals B-cell lymphoma.  Further characterization could not be done because of the small sample size.  2.  Multiple other medical problems including aortic stenosis, diabetes mellitus, chronic kidney disease and hypothyroidism.     PLAN:  1.  I had a long discussion with the patient and multiple family members.  I reviewed all the imaging studies.  EUS result was discussed.     FNA was reviewed in detail.  I explained to them that pancreatic FNA reveals non-Hodgkin's lymphoma.  It is B-cell lymphoma.  Because of the small size, further characterization could not be done.    2.  Discussed regarding non-Hodgkin's lymphoma.  I explained to them, it could be low-grade or high-grade lymphoma.     We discussed regarding further workup.  The patient needs a repeat biopsy to further characterize the malignancy.     I  explained to her that first we will get a PET scan.  After PET scan, we will decide which area to biopsy.  The patient is agreeable for it.      I also discussed regarding bone marrow biopsy.  We will wait for the PET scan and after that decide regarding bone marrow biopsy.     The patient does have multiple medical problems.  Putting her through anesthesia will be complicated.     3.  We discussed regarding treatment for lymphoma.  I explained to them that generally chemotherapy with rituximab is used for B-cell lymphoma.  Further discussion after we have the final pathology.    4.  The patient has cardiac problems including aortic stenosis.  We will set her up to see the cardiologist.    5.  The patient and family had multiple questions, which were all answered.  I will see her after the PET scan.     TOTAL FACE-TO-FACE TIME SPENT:  45 minutes, more than 50% of the time spent in counseling and coordination of care.    This office note has been dictated.          Again, thank you for allowing me to participate in the care of your patient.        Sincerely,        John Srinivasan MD

## 2021-10-27 NOTE — PROGRESS NOTES
Oncology Rooming Note    October 27, 2021 10:08 AM   Lucille Rojas is a 81 year old female who presents for:    Chief Complaint   Patient presents with     Oncology Clinic Visit     Initial Vitals: There were no vitals taken for this visit. Estimated body mass index is 50.23 kg/m  as calculated from the following:    Height as of 10/25/21: 1.524 m (5').    Weight as of 10/25/21: 116.7 kg (257 lb 3.2 oz). There is no height or weight on file to calculate BSA.  Data Unavailable Comment: Data Unavailable   No LMP recorded. Patient has had a hysterectomy.  Allergies reviewed: Yes  Medications reviewed: Yes    Medications: Medication refills not needed today.  Pharmacy name entered into [facility]: [id number] - [place], [place] - [address] [facility]    Clinical concerns: no      Shari J. Schoenberger, [facility]

## 2021-10-28 ENCOUNTER — DISCHARGE SUMMARY NURSING HOME (OUTPATIENT)
Dept: GERIATRICS | Facility: CLINIC | Age: 81
End: 2021-10-28
Payer: MEDICARE

## 2021-10-28 VITALS
HEART RATE: 70 BPM | OXYGEN SATURATION: 95 % | HEIGHT: 60 IN | SYSTOLIC BLOOD PRESSURE: 135 MMHG | RESPIRATION RATE: 18 BRPM | WEIGHT: 259 LBS | TEMPERATURE: 97.3 F | BODY MASS INDEX: 50.85 KG/M2 | DIASTOLIC BLOOD PRESSURE: 60 MMHG

## 2021-10-28 DIAGNOSIS — E66.01 SEVERE OBESITY (BMI >= 40) (H): ICD-10-CM

## 2021-10-28 DIAGNOSIS — G47.33 OSA (OBSTRUCTIVE SLEEP APNEA): ICD-10-CM

## 2021-10-28 DIAGNOSIS — H35.30 MACULAR DEGENERATION OF BOTH EYES, UNSPECIFIED TYPE: ICD-10-CM

## 2021-10-28 DIAGNOSIS — E03.9 HYPOTHYROIDISM, UNSPECIFIED TYPE: ICD-10-CM

## 2021-10-28 DIAGNOSIS — R53.81 PHYSICAL DECONDITIONING: ICD-10-CM

## 2021-10-28 DIAGNOSIS — I35.2 NONRHEUMATIC AORTIC (VALVE) STENOSIS WITH INSUFFICIENCY: ICD-10-CM

## 2021-10-28 DIAGNOSIS — D62 ACUTE ON CHRONIC BLOOD LOSS ANEMIA: Primary | ICD-10-CM

## 2021-10-28 DIAGNOSIS — N18.30 TYPE 2 DM WITH CKD STAGE 3 AND HYPERTENSION (H): ICD-10-CM

## 2021-10-28 DIAGNOSIS — I50.30 DIASTOLIC HEART FAILURE, UNSPECIFIED HF CHRONICITY (H): ICD-10-CM

## 2021-10-28 DIAGNOSIS — L84 CALLUS OF FOOT: ICD-10-CM

## 2021-10-28 DIAGNOSIS — E11.22 TYPE 2 DM WITH CKD STAGE 3 AND HYPERTENSION (H): ICD-10-CM

## 2021-10-28 DIAGNOSIS — F32.9 MAJOR DEPRESSIVE DISORDER WITH CURRENT ACTIVE EPISODE, UNSPECIFIED DEPRESSION EPISODE SEVERITY, UNSPECIFIED WHETHER RECURRENT: ICD-10-CM

## 2021-10-28 DIAGNOSIS — K86.89 PANCREATIC MASS: ICD-10-CM

## 2021-10-28 DIAGNOSIS — C85.12 B-CELL LYMPHOMA OF INTRATHORACIC LYMPH NODES, UNSPECIFIED B-CELL LYMPHOMA TYPE (H): ICD-10-CM

## 2021-10-28 DIAGNOSIS — H53.459 PERIPHERAL VISION LOSS, UNSPECIFIED LATERALITY: ICD-10-CM

## 2021-10-28 DIAGNOSIS — I12.9 TYPE 2 DM WITH CKD STAGE 3 AND HYPERTENSION (H): ICD-10-CM

## 2021-10-28 PROCEDURE — 99316 NF DSCHRG MGMT 30 MIN+: CPT | Performed by: NURSE PRACTITIONER

## 2021-10-28 NOTE — PATIENT INSTRUCTIONS
[facility] Discharge Orders    Name: Lucille Rojas  : 1940  Planned Discharge Date: 10/29/21  Discharged to: previous independent home    MEDICAL FOLLOW UP  Follow up with PCP in 1-2 weeks --pt to make appt  Follow up with Specialists Dr TERRIE Srinivasan per their recs and needs PET Scan before.  Pt or family  should call for appts      FUTURE LABS: No labs orders/due    ORDER CHANGES:  No changes   See below    DISCHARGE MEDICATIONS:  The patient s pharmacy is authorized to dispense a 30-day supply of medications. Refill requests should be directed to the primary provider, Fausto Flynn   No narcotics are prescribed at time of discharge.   Current Outpatient Medications   Medication Sig Dispense Refill     acetaminophen (TYLENOL) 325 MG tablet Take 650 mg by mouth 3 times daily as needed for mild pain        aspirin 81 MG tablet Take 1 tablet by mouth daily. 30 tablet 0     carboxymethylcellulose PF (REFRESH PLUS) 0.5 % ophthalmic solution Place 2 drops into both eyes 2 times daily       citalopram (CELEXA) 20 MG tablet Take 1 tablet (20 mg) by mouth daily 90 tablet 1     furosemide (LASIX) 20 MG tablet Take 1 tablet (20 mg) by mouth daily       levothyroxine (SYNTHROID/LEVOTHROID) 200 MCG tablet TAKE ONE TABLET BY MOUTH ONE TIME DAILY  90 tablet 3     Multiple Vitamins-Minerals (PRESERVISION AREDS) CAPS Take 1 tablet by mouth 2 times daily        nystatin (MYCOSTATIN) 535279 UNIT/GM external powder Apply topically 2 times daily Under breasts and skin folds BID & BID PRN for redness       order for DME Equipment being ordered: wheeled walker with hand breaks 1 Device 0     pantoprazole (PROTONIX) 40 MG EC tablet Take 1 tablet (40 mg) by mouth every morning (before breakfast)       simvastatin (ZOCOR) 10 MG tablet TAKE ONE TABLET BY MOUTH AT BEDTIME  90 tablet 3     ACE/ARB NOT PRESCRIBED, INTENTIONAL, Please choose reason not prescribed, below (Patient not taking: Reported on 10/28/2021)          SERVICES:  Home Care:  Occupational Therapy, Physical Therapy, Home Health Aide and From:  [facility]    ADDITIONAL INSTRUCTIONS:  None    ALIE Freitas CNP  This document was electronically signed on October 28, 2021

## 2021-10-28 NOTE — PROGRESS NOTES
[facility] DISCHARGE SUMMARY    PATIENT'S NAME: Lucille Rojas  YOB: 1940    PRIMARY CARE PROVIDER AND CLINIC RESPONSIBLE AFTER TRANSFER: Fausto Flynn     CODE STATUS: Full Code    TRANSFERRING PROVIDERS: ALIE Freitas CNP, Dr. Lisandra Brown MD      DATE OF SNF ADMISSION:  .    DATE OF SNF DISCHARGE (including anticipating DC): .    DISCHARGE DISPOSITION: [facility] Provider    Nursing Facility: [facility] stay 21 to 10/9/21.     Condition on Discharge:  Improving.    Function:  Ambulatin ft w/fww, Transfers: mod1  Cognitive Scores: SLUMS / and ACL:4.4/5.8     Physical Function: Tinetti Balance Assessment:   and TUG 18  Equipment: walker  DME: Walker    DISCHARGE DIAGNOSIS:      Acute on chronic blood loss anemia  B-cell lymphoma of intrathoracic lymph nodes, unspecified B-cell lymphoma type (H)  Pancreatic mass  Severe obesity (BMI >= 40) (H)  SHAVON (obstructive sleep apnea)  Nonrheumatic aortic (valve) stenosis with insufficiency  Diastolic heart failure, unspecified HF chronicity (H)  Type 2 DM with CKD stage 3 and hypertension (H)  Physical deconditioning  Hypothyroidism, unspecified type  Macular degeneration of both eyes, unspecified type  Peripheral vision loss, unspecified laterality  Major depressive disorder with current active episode, unspecified depression episode severity, unspecified whether recurrent  Callus of foot        HOSPITAL COURSE:  pt was admitted with SOB, and constipation.. she had been having tarry stools for a few weeks. Her Hgb was 4., she was given  A total of 4 units of rbcs, also IV FE. She was seen by GI--EGD showed a few gastric erosions and tiny aphthous ulcers in the duodenum but on further  Workup was found to have likely B Cell lymphoma and likely a primary site of pancrease.  A thoracenteses was done of 450 cc fluid  on 9/22 right pleural effusions.  The fluid cytology came back (-). Her mentation cleared, she takes CPAP at home with O2 at night and she has been on that since hospital. She was treated for an EColi UTI also with 7 days ax.  BNP was >4000 and she improved with diuresis, rbcs, pleural tap and prbcs.  She is diet controlled with her DM--a1c was 5.8 in April. She improved and was sent out with lasix and  A PPI and will need further f/u with Dr Srinivasan from oncology.  She was sent for rehab    TCU/SNF COURSE: pt has progressed with PT and OT---stronger and up by self in room. Off O2 during day not--uses nighttime O2 at home--. Saw Dr Srinivasan on 10/27--further plans for w/u with PET Scan as outpt..  Had skin fold rash/better with nystatin, per pt and staff      PAST MEDICAL HISTORY:  Past Medical History:   Diagnosis Date     HTN (hypertension) 5/9/2013     Hyperlipidemia LDL goal <130 5/9/2013     Hypothyroidism 5/9/2013     Macular degeneration 9/18/2013     Sleep apnea     CPAP at night, O2 during naps     Type 2 diabetes, HbA1C goal < 8% (H) 5/9/2013       DISCHARGE MEDICATIONS:  Current Outpatient Medications   Medication Sig Dispense Refill     acetaminophen (TYLENOL) 325 MG tablet Take 650 mg by mouth 3 times daily as needed for mild pain        aspirin 81 MG tablet Take 1 tablet by mouth daily. 30 tablet 0     carboxymethylcellulose PF (REFRESH PLUS) 0.5 % ophthalmic solution Place 2 drops into both eyes 2 times daily       citalopram (CELEXA) 20 MG tablet Take 1 tablet (20 mg) by mouth daily 90 tablet 1     furosemide (LASIX) 20 MG tablet Take 1 tablet (20 mg) by mouth daily       levothyroxine (SYNTHROID/LEVOTHROID) 200 MCG tablet TAKE ONE TABLET BY MOUTH ONE TIME DAILY  90 tablet 3     Multiple Vitamins-Minerals (PRESERVISION AREDS) CAPS Take 1 tablet by mouth 2 times daily        nystatin (MYCOSTATIN) 809112 UNIT/GM external powder Apply topically 2 times daily Under breasts and skin folds BID & BID PRN for  redness       order for DME Equipment being ordered: wheeled walker with hand breaks 1 Device 0     pantoprazole (PROTONIX) 40 MG EC tablet Take 1 tablet (40 mg) by mouth every morning (before breakfast)       simvastatin (ZOCOR) 10 MG tablet TAKE ONE TABLET BY MOUTH AT BEDTIME  90 tablet 3     ACE/ARB NOT PRESCRIBED, INTENTIONAL, Please choose reason not prescribed, below (Patient not taking: Reported on 10/28/2021)         MEDICATION CHANGES/RATIONALE: none    /60   Pulse 70   Temp 97.3  F (36.3  C)   Resp 18   Ht 1.524 m (5')   Wt 117.5 kg (259 lb)   SpO2 95%   BMI 50.58 kg/m      TODAY DURING EXAM/ROS:  No CP, SOB, Cough, dizziness, fevers, chills, HA, N/V, dysuria or Bowel Abnormalities. Appetite is good.  No pain except bottom right great toe      PHYSICAL EXAM Today:  A & O x 3, NAD. Lungs CTA, non labored. RRR, S1/S2 w/o murmur,gallop or rub.  Trace bilat edema.  Abdomen soft, nontender, +BT'S. No focal neurological deficits. ADAME and up with walker.   Dry callous noted bottom right great toe..       SNF LABS  Recent Labs   Lab Test 10/26/21  0946 10/14/21  1330    143   POTASSIUM 4.0 4.1   CHLORIDE 98 101   CO2 36* 32*   ANIONGAP 9 10    104   BUN 27 27   CR 1.03 0.94   IGOR 9.7 9.2     CBC RESULTS: Recent Labs   Lab Test 10/26/21  0946   WBC 9.1   RBC 3.45*   HGB 9.4*   HCT 31.6*   MCV 92   MCH 27.2   MCHC 29.7*   RDW 25.7*                DISCHARGE PLAN:  Occupational Therapy, Physical Therapy, Home Health Aide and From:  [facility] Follow-up with PCP in 7 days: 7 days.    Current [facility] or other scheduled appointments:  Future Appointments   Date Time Provider Department Center   11/2/2021 11:25 AM [facility] COVID VACCINE PFIZER MYVACC [facility]         MTM referral needed and placed by this provider: none    Pending labs: per oncology     Discharge Treatments:right big toe sole---soft pad in place over calloused area       TOTAL DISCHARGE TIME:   Greater than 30  ALIE Clifton CNP

## 2021-10-30 ENCOUNTER — NURSE TRIAGE (OUTPATIENT)
Dept: NURSING | Facility: CLINIC | Age: 81
End: 2021-10-30

## 2021-10-30 NOTE — TELEPHONE ENCOUNTER
PT Celsa, with [facility], calling to request verbal orders.      1) Skilled nurse evaluation  2) OT eval  3) Continue Home PT (One visit per week x 4 weeks, one visit every other week x 4 weeks)    Order from TCU discharge reads:   DISCHARGE PLAN:  Occupational Therapy, Physical Therapy, Home Health Aide and From:  [facility] Follow-up with PCP in 7 days: 7 days.      RN is able to give the order for the nurse jackyal per [facility].  Other orders appear to be covered under initial TCU provider rocío.     She verbalized understanding and had no further questions.     COVID 19 Nurse Triage Plan/Patient Instructions    Please be aware that novel coronavirus (COVID-19) may be circulating in the community. If you develop symptoms such as fever, cough, or SOB or if you have concerns about the presence of another infection including coronavirus (COVID-19), please contact your health care provider or visit [email or web address].     Disposition/Instructions    Home care recommended. Follow home care protocol based instructions.    Thank you for taking steps to prevent the spread of this virus.  o Limit your contact with others.  o Wear a simple mask to cover your cough.  o Wash your hands well and often.    Resources    [facility]: About COVID-19: [email or web address]    CDC: What to Do If You're Sick: www.cdc.gov/coronavirus/2019-ncov/about/steps-when-sick.html    CDC: Ending Home Isolation: www.cdc.gov/coronavirus/2019-ncov/hcp/disposition-in-home-patients.html     CDC: Caring for Someone: www.cdc.gov/coronavirus/2019-ncov/if-you-are-sick/care-for-someone.html     [facility]: Interim Guidance for Hospital Discharge to Home: www.health.[facility].[place]/diseases/coronavirus/hcp/hospdischarge.pdf    [facility] clinical trials (COVID-19 research studies): clinicalaffairs.[facility]/umn-clinical-trials     Below are the COVID-19 hotlines at the [place] Department of [facility]  ([facility]). Interpreters are available.   o For health questions: Call 610-979-1496 or 1-970.632.6957 (7 a.m. to 7 p.m.)  o For questions about schools and childcare: Call 398-645-8899 or 1-952.198.4516 (7 a.m. to 7 p.m.)           Samantha Curtis RN/RICK          Reason for Disposition    Health Information question, no triage required and triager able to answer question    Additional Information    Negative: RN needs further essential information from caller in order to complete triage    Negative: Requesting regular office appointment    Negative: [1] Caller requesting NON-URGENT health information AND [2] PCP's office is the best resource    Protocols used: INFORMATION ONLY CALL - NO TRIAGE-A-

## 2021-11-02 ENCOUNTER — OFFICE VISIT (OUTPATIENT)
Dept: INTERNAL MEDICINE | Facility: CLINIC | Age: 81
End: 2021-11-02
Payer: MEDICARE

## 2021-11-02 ENCOUNTER — IMMUNIZATION (OUTPATIENT)
Dept: NURSING | Facility: CLINIC | Age: 81
End: 2021-11-02
Payer: MEDICARE

## 2021-11-02 VITALS
HEART RATE: 61 BPM | TEMPERATURE: 97.4 F | DIASTOLIC BLOOD PRESSURE: 52 MMHG | RESPIRATION RATE: 17 BRPM | BODY MASS INDEX: 52.93 KG/M2 | WEIGHT: 271 LBS | OXYGEN SATURATION: 99 % | SYSTOLIC BLOOD PRESSURE: 124 MMHG

## 2021-11-02 DIAGNOSIS — I10 BENIGN ESSENTIAL HYPERTENSION: Chronic | ICD-10-CM

## 2021-11-02 DIAGNOSIS — E66.01 MORBID OBESITY DUE TO EXCESS CALORIES (H): ICD-10-CM

## 2021-11-02 DIAGNOSIS — Z09 HOSPITAL DISCHARGE FOLLOW-UP: Primary | ICD-10-CM

## 2021-11-02 DIAGNOSIS — D64.9 SEVERE ANEMIA: ICD-10-CM

## 2021-11-02 DIAGNOSIS — C85.12 B-CELL LYMPHOMA OF INTRATHORACIC LYMPH NODES, UNSPECIFIED B-CELL LYMPHOMA TYPE (H): ICD-10-CM

## 2021-11-02 DIAGNOSIS — R06.81 APNEA: ICD-10-CM

## 2021-11-02 LAB
ERYTHROCYTE [DISTWIDTH] IN BLOOD BY AUTOMATED COUNT: 22.6 % (ref 10–15)
HCT VFR BLD AUTO: 32.1 % (ref 35–47)
HGB BLD-MCNC: 9 G/DL (ref 11.7–15.7)
MCH RBC QN AUTO: 26.2 PG (ref 26.5–33)
MCHC RBC AUTO-ENTMCNC: 28 G/DL (ref 31.5–36.5)
MCV RBC AUTO: 93 FL (ref 78–100)
PLATELET # BLD AUTO: 349 10E3/UL (ref 150–450)
RBC # BLD AUTO: 3.44 10E6/UL (ref 3.8–5.2)
WBC # BLD AUTO: 8.5 10E3/UL (ref 4–11)

## 2021-11-02 PROCEDURE — 99495 TRANSJ CARE MGMT MOD F2F 14D: CPT | Performed by: INTERNAL MEDICINE

## 2021-11-02 PROCEDURE — 0004A PR COVID VAC PFIZER DIL RECON 30 MCG/0.3 ML IM: CPT

## 2021-11-02 PROCEDURE — 85027 COMPLETE CBC AUTOMATED: CPT | Performed by: INTERNAL MEDICINE

## 2021-11-02 PROCEDURE — 80048 BASIC METABOLIC PNL TOTAL CA: CPT | Performed by: INTERNAL MEDICINE

## 2021-11-02 PROCEDURE — G0008 ADMIN INFLUENZA VIRUS VAC: HCPCS

## 2021-11-02 PROCEDURE — 91300 PR COVID VAC PFIZER DIL RECON 30 MCG/0.3 ML IM: CPT

## 2021-11-02 PROCEDURE — 90662 IIV NO PRSV INCREASED AG IM: CPT

## 2021-11-02 PROCEDURE — 36415 COLL VENOUS BLD VENIPUNCTURE: CPT | Performed by: INTERNAL MEDICINE

## 2021-11-02 RX ORDER — NYSTATIN 100000 [USP'U]/G
POWDER TOPICAL 2 TIMES DAILY
Qty: 60 G | Refills: 1 | Status: SHIPPED | OUTPATIENT
Start: 2021-11-02 | End: 2022-03-31

## 2021-11-02 RX ORDER — PANTOPRAZOLE SODIUM 40 MG/1
40 TABLET, DELAYED RELEASE ORAL
Qty: 90 TABLET | Refills: 3 | Status: SHIPPED | OUTPATIENT
Start: 2021-11-02 | End: 2022-01-25

## 2021-11-02 ASSESSMENT — PATIENT HEALTH QUESTIONNAIRE - PHQ9
SUM OF ALL RESPONSES TO PHQ QUESTIONS 1-9: 19
SUM OF ALL RESPONSES TO PHQ QUESTIONS 1-9: 19
10. IF YOU CHECKED OFF ANY PROBLEMS, HOW DIFFICULT HAVE THESE PROBLEMS MADE IT FOR YOU TO DO YOUR WORK, TAKE CARE OF THINGS AT HOME, OR GET ALONG WITH OTHER PEOPLE: VERY DIFFICULT

## 2021-11-02 NOTE — PROGRESS NOTES
Assessment & Plan     Hospital discharge follow-up  Stable overall and continuing with routine follow-up    B-cell lymphoma of intrathoracic lymph nodes, unspecified B-cell lymphoma type (H)  Recheck labs and follow-up with oncology as scheduled  - CBC with platelets; Future  - nystatin (MYCOSTATIN) 763259 UNIT/GM external powder; Apply topically 2 times daily Under breasts and skin folds BID & BID PRN for redness  - CBC with platelets    Benign essential hypertension  Stable and continue with current medical management  - Basic metabolic panel  (Ca, Cl, CO2, Creat, Gluc, K, Na, BUN); Future  - Basic metabolic panel  (Ca, Cl, CO2, Creat, Gluc, K, Na, BUN)    Severe anemia  Recheck hemoglobin and follow-up accordingly  - CBC with platelets; Future  - CBC with platelets    Apnea  Noted as baseline.      Morbid obesity due to excess calories (H)  Encourage ongoing weight loss     BMI:   Estimated body mass index is 52.93 kg/m  as calculated from the following:    Height as of 10/27/21: 1.524 m (5').    Weight as of this encounter: 122.9 kg (271 lb).   Weight management plan: Discussed healthy diet and exercise guidelines    Depression Screening Follow Up    PHQ 11/2/2021   PHQ-9 Total Score 19   Q9: Thoughts of better off dead/self-harm past 2 weeks Several days   F/U: Thoughts of suicide or self-harm No   F/U: Safety concerns No       See Patient Instructions    No follow-ups on file.    Fausto Flynn MD  [facility] JANIS Adkins is a 81 year old who presents for the following health issues     HPI       Hospital Follow-up Visit:    Hospital/Nursing Home/ Rehab Facility: [facility] and [facility]  Date of Admission: 9/20/21  Date of Discharge: 10/29/21  Reason(s) for Admission: Severe anemia      Was your hospitalization related to COVID-19? No   Problems taking medications regularly:  None  Medication changes since  discharge: None  Problems adhering to non-medication therapy:  None    Summary of hospitalization:  [facility] discharge summary reviewed  Diagnostic Tests/Treatments reviewed.  Follow up needed: oncology  Other Healthcare Providers Involved in Patient s Care:         Homecare  Update since discharge: stable. Post Discharge Medication Reconciliation: discharge medications reconciled, continue medications without change.  Plan of care communicated with patient              Presumed primary pancreatic cancer , possibility pancreatic B-cell lymphoma noted on CT, flow cytometry is concerning for possible B cell lymphoma as above , FNA results just came back this morning, showing positive CD10 B-cell lymphoma.  R pleural effusion, transudate   S/p thoracentesis  450 ml of verenice fluid on 09/22/21   S/p EUS on 09/21/21 with biopsy: Right pleural cytology came back negative for malignancy.  Recommend pathology result showed tubular adenoma from the duodenum polyp with no evidence of high-grade dysplasia or invasive carcinoma.  Gastric biopsies showed oxyntic and antral pattern mucosal tissue with a scant chronic inflammation, no evidence of acute inflammation, no evidence of Helicobacter on routine stain.     --Patient underwent endoscopy, EUS and tissue biopsy on October 5, pathology results came back concerning for B-cell lymphoma this morning.  --Results were reviewed with patient, also made oncology aware regarding FNA results, highly appreciate Dr. Castaneda help, results are reviewed with patient at this time and patient is recommended to have outpatient follow-up with Dr. Srinivasan to discuss regarding further work-up and treatment options.  --Patient was in agreement with the plan.     Acute encephalopathy, Resolved - On 09/23/21, patient remained oriented and following commands but very lethargic, drifting off in mid-sentence. Labs significant for leukocytosis, hypercapnia on ABG and elevated BNP. She is  afebrile, hemodynamically stable, stable oxygenation.      -- Since 09/25, patient is alert awake and oriented, following commands seems to be at the baseline.  -- Treating hypercapnia CHF and UTI as below.      Acute on chronic hypercapnia -  ABG: pH 7.22, CO2 85, O2 121 while on 5L on 9/23/21.   Obesity, SHAVON with hypoventilation syndrome   Chronic hypoxia on home oxygen - Per family she requires oxygen during the day as well, but has had trouble getting this ordered through PCP. On BiPAP at night with4 LPM O2 PTA     -- Continue to wean O2 as tolerates to keep O2 saturations around 90-93%  -- BiPAP at night and during naps during day as well.   -- When awake > up in chair.  -- Continue patient will not need oxygen during that hospitalization as she is able to maintain her oxygen levels during the daytime but definitely needs oxygen at nighttime, patient does need as needed oxygen during the daytime also.  --Patient will need oxygen delivered to her home once patient is leaving TCU.  --I have ordered oxygen at the time of discharge.     E coli UTI - Patient lethargic and unable to give clear history regarding urinary symptoms at presentation. UA showed 166 WBC, small nitrates.   Leukocytosis - No T over 100 since admission. Hemodynamically stable. No localizing symptoms of infection. No Urinary symptoms. UA done. CT chest/abdomen/pelvis as above, no sign of localized infection.  - F/u on pleural cx -NGTD but very low suspicion of infection in this location.      -- UCx from 9/23/21: 2 strains of E. coli noted, sensitive to Rocephin   -- Initially on IV Rocephin, changed to p.o. Ceftin based on sensitivity.Completed 7 day course.  - BCx NGTD and patient is afebrile, encephalopathy now resolved.     Acute decompensated HF, unknown EF - developing acute encephalopathy, hypercapnia on 09/23/21. Workup revealed BNP up to 4000's from 800 at admission. Mild pulmonary edema on CXR. Oxygenation stable, but on 3 L.   Mod to  severe aortic stenosis, new diagnosis   TAVARES, CP and light-headedness with minimal exertion - Concerning for symptomatic aortic stenosis, although anemia and chronic obesity hypoventilation syndrome with chronic hyoxia undoubtedly contribute. Initial troponin negative. CXR with R pleural effusion.   Transudate R pleural effusion, edema on admission. S/p thoracentesis on 9/22.      * Serial troponins negative  * Echo EF 60-65% No WMAs. RV fxn normal. Mod to severe aortic stenosis with aortic mean valve area of 1 cm2, mean gradient 37.5 mm Hg.      -- On 09/23/21, BNP quite elevated at 4000 on 09/23/21 (up from 800s at admission) CXR reviewed and showed mild intestinal markings, suspect edema.   -- Was managed with IV lasix, Cr slightly up and so lasix held and stable at 1.1, respiratory status has markedly improved and now stable at 2 LPM.   -- Low dose p.o. Lasix started 9/28, follow daily weight and periodic BMP. Patient is incontinent, given her recent UTI, Espitia catheter discontinued.  -- Cardiology consulted on 09/24/21 re: input on options (TAVR) and prognosis. With her unknown source of bleeding and since GI work-up could not be completed, patient not a candidate for anticoagulation and so not a candidate for further work-up including angiogram or TAVR.   --Patient will be continued on Lasix 20 mg p.o. daily, will recommend repeating the BMP in 1 week after the discharge       Review of Systems   CONSTITUTIONAL: NEGATIVE for fever, chills, change in weight  EYES: NEGATIVE for vision changes or irritation  ENT/MOUTH: NEGATIVE for ear, mouth and throat problems  RESP: NEGATIVE for significant cough   CV: NEGATIVE for chest pain, palpitations  : NEGATIVE for frequency, dysuria, or hematuria  HEME: NEGATIVE for bleeding problems  PSYCHIATRIC: NEGATIVE for changes in mood or affect      Objective    /52   Pulse 61   Temp 97.4  F (36.3  C) (Temporal)   Resp 17   Wt 122.9 kg (271 lb)   SpO2 99%   BMI  52.93 kg/m    Body mass index is 52.93 kg/m .     Physical Exam :    GENERAL: alert and no distress in wheelchair on O2  EYES: Eyes grossly normal to inspection, PERRL and conjunctivae and sclerae normal  HENT: ear canals and TM's normal, nose and mouth without ulcers or lesions  NECK: no adenopathy, no asymmetry, masses, or scars and thyroid normal to palpation  Morbidly obese  RESP: lungs clear to auscultation - no rales, rhonchi or wheezes  CV: regular rate and rhythm, normal S1 S2, no S3 or S4, no click or rub  NEURO: No new focal changes  PSYCH: mentation appears normal, affect normal/bright

## 2021-11-03 ENCOUNTER — OFFICE VISIT (OUTPATIENT)
Dept: CARDIOLOGY | Facility: CLINIC | Age: 81
End: 2021-11-03
Attending: INTERNAL MEDICINE
Payer: MEDICARE

## 2021-11-03 ENCOUNTER — TELEPHONE (OUTPATIENT)
Dept: INTERNAL MEDICINE | Facility: CLINIC | Age: 81
End: 2021-11-03

## 2021-11-03 VITALS
WEIGHT: 258.1 LBS | OXYGEN SATURATION: 98 % | DIASTOLIC BLOOD PRESSURE: 68 MMHG | HEIGHT: 60 IN | SYSTOLIC BLOOD PRESSURE: 136 MMHG | BODY MASS INDEX: 50.67 KG/M2 | HEART RATE: 73 BPM

## 2021-11-03 DIAGNOSIS — I35.0 NONRHEUMATIC AORTIC VALVE STENOSIS: ICD-10-CM

## 2021-11-03 LAB
ANION GAP SERPL CALCULATED.3IONS-SCNC: 2 MMOL/L (ref 3–14)
BUN SERPL-MCNC: 26 MG/DL (ref 7–30)
CALCIUM SERPL-MCNC: 8.7 MG/DL (ref 8.5–10.1)
CHLORIDE BLD-SCNC: 105 MMOL/L (ref 94–109)
CO2 SERPL-SCNC: 35 MMOL/L (ref 20–32)
CREAT SERPL-MCNC: 1.25 MG/DL (ref 0.52–1.04)
GFR SERPL CREATININE-BSD FRML MDRD: 40 ML/MIN/1.73M2
GLUCOSE BLD-MCNC: 105 MG/DL (ref 70–99)
POTASSIUM BLD-SCNC: 4.7 MMOL/L (ref 3.4–5.3)
SODIUM SERPL-SCNC: 142 MMOL/L (ref 133–144)

## 2021-11-03 PROCEDURE — 99205 OFFICE O/P NEW HI 60 MIN: CPT | Performed by: INTERNAL MEDICINE

## 2021-11-03 ASSESSMENT — MIFFLIN-ST. JEOR: SCORE: 1547.23

## 2021-11-03 NOTE — TELEPHONE ENCOUNTER
Call from MARISSA Etienne with [facility].    Verbal orders given for:    Skilled Nursin x week for 4 weeks, and 3 PRN for disease education.

## 2021-11-03 NOTE — PROGRESS NOTES
HPI and Plan:   Ms Rojas is a pleasant 81-year-old female who is here for evaluation of aortic stenosis.  To be noted in September patient was admitted with the shortness of breath and anemia and congestive heart failure and was found to have severe anemia with new pancreatic mass and also new moderate to severe aortic stenosis.  She also has other course of obesity, obstructive sleep apnea, on home oxygen.  Today she is coming to cardiology clinic accompanied by her  and daughter.  Her initial hemoglobin during hospitalization was around 4 but she received transfusion.  She had an echocardiogram done on 23 September but that time hemoglobin has improved to 7.5.  Echocardiogram showing LVEF of 60 to 65% with the moderate to severe aortic valve stenosis, more closer and likely severe aortic valve stenosis on the basis of aortic valve area of 0.96 to 1 cm , mean clearance of 38 millimercury, peak aortic valve velocity of 4.1 m/s.  Patient tells me that now she is lives at home in [place] she uses walker and cane for exertion and after walking about 2 minutes she has to take a break because of dyspnea on exertion.  No chest discomfort no dizziness presyncope syncope.  She has recent been seen by oncology Dr. 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  Further work-up is pending including PET scan.  At this time patient tells me that they have not been told anything about potential prognosis as far as pancreatic lymphoma is concerned.  EKG done at the time of aspiration shows sinus rhythm with left intrafascicular block.    Assessment plan  A pleasant 81-year-old female recently diagnosed with severe anemia in the setting of newly diagnosed pancreatic mass which was found to be actually lymphoma and further work-up and evaluation is ongoing and patient has been seen by oncology.  She is here for aortic stenosis.  This overall appears moderate to severe likely closer to severe aortic valve  stenosis as noted above.  She also had decompensated congestive heart failure with preserved ejection fraction during recent hospitalization which was likely multifactorial in the setting of not just aortic valve stenosis but also severe anemia.  She still gets dyspnea on exertion which can again be multifactorial but some contribution from aortic stenosis cannot be entirely excluded.  I had a long discussion with patient and her family regarding management of aortic valve stenosis especially severe moderate to severe aortic valve stenosis which is symptomatic.  Valve replacement is definite cure.  Given her comorbidities she will definitely qualify for transcutaneous based aortic valve replacement.  However I did mention to patient that it is also important to know overall prognosis of her pancreatic malignancy.  It looks like further work-up and evaluation is ongoing in terms of pancreatic B-cell lymphoma.  I will also try to reach Dr. Srinivasan her oncologist.  At this time I recommend review in TAVR clinic and we can start the process in tandem with her oncology work-up which is ongoing.  I will also update my TAVR clinic team about the patient.  On examination she appears euvolemic and compensated on low-dose Lasix.    1.  Moderate to severe, likely closer to severe symptomatic aortic valve stenosis as noted above.  Normal LV function.  Recently had decompensated congestive heart failure.  Symptoms of dyspnea on exertion.  2.  Recently diagnosed pancreatic cancer, B-cell lymphoma, further work-up is ongoing.  At this time prognosis is not clear.  Patient follows oncology Dr. Srinivasan.  3.  Obesity.  4.  Anemia.    Recommendations  TAVR clinic evaluation for reasons noted above    Addendum  Was able to discuss with Dr. Srinivasan.  Given that the underlying pancreatic malignancy is not adenocarcinoma but lymphoma oncology is quite confident of good 1 year survival rate.  In this setting we will proceed with TAVR work-up and  evaluation.  Also updated TAVR clinic coordination team.      Orders Placed This Encounter   Procedures     [facility]       No orders of the defined types were placed in this encounter.      There are no discontinued medications.      Encounter Diagnosis   Name Primary?     Nonrheumatic aortic valve stenosis        CURRENT MEDICATIONS:  Current Outpatient Medications   Medication Sig Dispense Refill     acetaminophen (TYLENOL) 325 MG tablet Take 650 mg by mouth 3 times daily as needed for mild pain        aspirin 81 MG tablet Take 1 tablet by mouth daily. 30 tablet 0     carboxymethylcellulose PF (REFRESH PLUS) 0.5 % ophthalmic solution Place 2 drops into both eyes 2 times daily       citalopram (CELEXA) 20 MG tablet Take 1 tablet (20 mg) by mouth daily 90 tablet 1     furosemide (LASIX) 20 MG tablet Take 1 tablet (20 mg) by mouth daily       levothyroxine (SYNTHROID/LEVOTHROID) 200 MCG tablet TAKE ONE TABLET BY MOUTH ONE TIME DAILY  90 tablet 3     Multiple Vitamins-Minerals (PRESERVISION AREDS) CAPS Take 1 tablet by mouth 2 times daily        nystatin (MYCOSTATIN) 695811 UNIT/GM external powder Apply topically 2 times daily Under breasts and skin folds BID & BID PRN for redness 60 g 1     order for DME Equipment being ordered: wheeled walker with hand breaks 1 Device 0     pantoprazole (PROTONIX) 40 MG EC tablet Take 1 tablet (40 mg) by mouth every morning (before breakfast) 90 tablet 3     simvastatin (ZOCOR) 10 MG tablet TAKE ONE TABLET BY MOUTH AT BEDTIME  90 tablet 3     ACE/ARB NOT PRESCRIBED, INTENTIONAL, Please choose reason not prescribed, below (Patient not taking: Reported on 11/3/2021)         ALLERGIES     Allergies   Allergen Reactions     Penicillins        PAST MEDICAL HISTORY:  Past Medical History:   Diagnosis Date     Cancer (H) 10/2021     Depressive disorder      Heart disease 10/2021     History of blood transfusion 20/2021     HTN (hypertension) 5/9/2013      Hyperlipidemia LDL goal <130 5/9/2013     Hypothyroidism 5/9/2013     Macular degeneration 9/18/2013     Sleep apnea     CPAP at night, O2 during naps     Type 2 diabetes, HbA1C goal < 8% (H) 5/9/2013       PAST SURGICAL HISTORY:  Past Surgical History:   Procedure Laterality Date     APPENDECTOMY       COLONOSCOPY       COLONOSCOPY N/A 10/27/2014    Procedure: COMBINED COLONOSCOPY, SINGLE OR MULTIPLE BIOPSY/POLYPECTOMY BY BIOPSY;  Surgeon: Jose Alfredo Morejon MD;  Location:  GI     ESOPHAGOSCOPY, GASTROSCOPY, DUODENOSCOPY (EGD), COMBINED N/A 9/21/2021    Procedure: ESOPHAGOGASTRODUODENOSCOPY, WITH FINE NEEDLE ASPIRATION BIOPSY, WITH ENDOSCOPIC ULTRASOUND GUIDANCE;  Surgeon: Vera Benitez MD;  Location:  GI     ESOPHAGOSCOPY, GASTROSCOPY, DUODENOSCOPY (EGD), COMBINED N/A 9/21/2021    Procedure: Esophagogastroduodenoscopy, With Biopsy;  Surgeon: Vera Benitez MD;  Location:  GI     ESOPHAGOSCOPY, GASTROSCOPY, DUODENOSCOPY (EGD), COMBINED N/A 10/5/2021    Procedure: ESOPHAGOGASTRODUODENOSCOPY, WITH FINE NEEDLE ASPIRATION BIOPSY, WITH ENDOSCOPIC ULTRASOUND GUIDANCE;  Surgeon: Dixon Olivier MD;  Location:  GI     HERNIA REPAIR      inguinal x 2     TONSILLECTOMY         FAMILY HISTORY:  No family history on file.    SOCIAL HISTORY:  Social History     Socioeconomic History     Marital status:      Spouse name: None     Number of children: None     Years of education: None     Highest education level: None   Occupational History     None   Tobacco Use     Smoking status: Never Smoker     Smokeless tobacco: Never Used   Substance and Sexual Activity     Alcohol use: Yes     Alcohol/week: 0.0 standard drinks     Comment: rarely     Drug use: No     Sexual activity: Never   Other Topics Concern     Parent/sibling w/ CABG, MI or angioplasty before 65F 55M? Not Asked   Social History Narrative     None     Social Determinants of Health     Financial Resource Strain:       Difficulty of Paying Living Expenses:    Food Insecurity:      Worried About Running Out of Food in the Last Year:      Ran Out of Food in the Last Year:    Transportation Needs:      Lack of Transportation (Medical):      Lack of Transportation (Non-Medical):    Physical Activity:      Days of Exercise per Week:      Minutes of Exercise per Session:    Stress:      Feeling of Stress :    Social Connections:      Frequency of Communication with Friends and Family:      Frequency of Social Gatherings with Friends and Family:      Attends [employer] Services:      Active Member of Clubs or Organizations:      Attends Club or Organization Meetings:      Marital Status:    Intimate Partner Violence:      Fear of Current or Ex-Partner:      Emotionally Abused:      Physically Abused:      Sexually Abused:        Review of Systems:  Skin:  Negative       Eyes:  Positive for glasses    ENT:  Positive for hearing loss    Respiratory:  Positive for dyspnea on exertion;dyspnea at rest;sleep apnea;CPAP O2 at night with CPAP and during day PRN   Cardiovascular:    Denies any exertional chest pain, dizziness presyncope syncope    Gastroenterology: Negative      Genitourinary:  Negative      Musculoskeletal:  Positive for arthritis    Neurologic:  Positive for numbness or tingling of hands;numbness or tingling of feet    Psychiatric:  Positive for anxiety;depression    Heme/Lymph/Imm:  Negative      Endocrine:  Positive for thyroid disorder;diabetes Type II    Physical Exam:  Vitals: /68   Pulse 73   Ht 1.524 m (5')   Wt 117.1 kg (258 lb 1.6 oz)   SpO2 98%   BMI 50.41 kg/m      General patient appears comfortable  Neck normal JVP  Cardiovascular system grade 3 x 6 ejection systolic murmur with late systolic peaking, soft S2 heard, no S3-S4 rub or gallop  Respiratory system clear to auscultation, no crackles or wheezing  Extremities minimal pitting pedal edema  Neurological alert, oriented      CC  John Srinivasan,  MD  [facility]  [address]  [place] [id number]

## 2021-11-03 NOTE — LETTER
11/3/2021    Fausto Flynn MD  [address] [facility] [address]    RE: Lucille TUAN Crystal       Dear Colleague,    I had the pleasure of seeing Lucille Rojas in the [facility].  HPI and Plan:   Ms Rojas is a pleasant 81-year-old female who is here for evaluation of aortic stenosis.  To be noted in September patient was admitted with the shortness of breath and anemia and congestive heart failure and was found to have severe anemia with new pancreatic mass and also new moderate to severe aortic stenosis.  She also has other course of obesity, obstructive sleep apnea, on home oxygen.  Today she is coming to cardiology clinic accompanied by her  and daughter.  Her initial hemoglobin during hospitalization was around 4 but she received transfusion.  She had an echocardiogram done on 23 September but that time hemoglobin has improved to 7.5.  Echocardiogram showing LVEF of 60 to 65% with the moderate to severe aortic valve stenosis, more closer and likely severe aortic valve stenosis on the basis of aortic valve area of 0.96 to 1 cm , mean clearance of 38 millimercury, peak aortic valve velocity of 4.1 m/s.  Patient tells me that now she is lives at home in [place] she uses walker and cane for exertion and after walking about 2 minutes she has to take a break because of dyspnea on exertion.  No chest discomfort no dizziness presyncope syncope.  She has recent been seen by oncology Dr. 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  Further work-up is pending including PET scan.  At this time patient tells me that they have not been told anything about potential prognosis as far as pancreatic lymphoma is concerned.  EKG done at the time of aspiration shows sinus rhythm with left intrafascicular block.    Assessment plan  A pleasant 81-year-old female recently diagnosed with severe anemia in the setting of newly diagnosed  pancreatic mass which was found to be actually lymphoma and further work-up and evaluation is ongoing and patient has been seen by oncology.  She is here for aortic stenosis.  This overall appears moderate to severe likely closer to severe aortic valve stenosis as noted above.  She also had decompensated congestive heart failure with preserved ejection fraction during recent hospitalization which was likely multifactorial in the setting of not just aortic valve stenosis but also severe anemia.  She still gets dyspnea on exertion which can again be multifactorial but some contribution from aortic stenosis cannot be entirely excluded.  I had a long discussion with patient and her family regarding management of aortic valve stenosis especially severe moderate to severe aortic valve stenosis which is symptomatic.  Valve replacement is definite cure.  Given her comorbidities she will definitely qualify for transcutaneous based aortic valve replacement.  However I did mention to patient that it is also important to know overall prognosis of her pancreatic malignancy.  It looks like further work-up and evaluation is ongoing in terms of pancreatic B-cell lymphoma.  I will also try to reach Dr. Srinivasan her oncologist.  At this time I recommend review in TAVR clinic and we can start the process in tandem with her oncology work-up which is ongoing.  I will also update my TAVR clinic team about the patient.  On examination she appears euvolemic and compensated on low-dose Lasix.    1.  Moderate to severe, likely closer to severe symptomatic aortic valve stenosis as noted above.  Normal LV function.  Recently had decompensated congestive heart failure.  Symptoms of dyspnea on exertion.  2.  Recently diagnosed pancreatic cancer, B-cell lymphoma, further work-up is ongoing.  At this time prognosis is not clear.  Patient follows oncology Dr. Srinivasan.  3.  Obesity.  4.  Anemia.    Recommendations  TAVR clinic evaluation for reasons  noted above    Addendum  Was able to discuss with Dr. Srinivasan.  Given that the underlying pancreatic malignancy is not adenocarcinoma but lymphoma oncology is quite confident of good 1 year survival rate.  In this setting we will proceed with TAVR work-up and evaluation.  Also updated TAVR clinic coordination team.      Orders Placed This Encounter   Procedures     Cardiac Structural Heart Clinic       No orders of the defined types were placed in this encounter.      There are no discontinued medications.      Encounter Diagnosis   Name Primary?     Nonrheumatic aortic valve stenosis        CURRENT MEDICATIONS:  Current Outpatient Medications   Medication Sig Dispense Refill     acetaminophen (TYLENOL) 325 MG tablet Take 650 mg by mouth 3 times daily as needed for mild pain        aspirin 81 MG tablet Take 1 tablet by mouth daily. 30 tablet 0     carboxymethylcellulose PF (REFRESH PLUS) 0.5 % ophthalmic solution Place 2 drops into both eyes 2 times daily       citalopram (CELEXA) 20 MG tablet Take 1 tablet (20 mg) by mouth daily 90 tablet 1     furosemide (LASIX) 20 MG tablet Take 1 tablet (20 mg) by mouth daily       levothyroxine (SYNTHROID/LEVOTHROID) 200 MCG tablet TAKE ONE TABLET BY MOUTH ONE TIME DAILY  90 tablet 3     Multiple Vitamins-Minerals (PRESERVISION AREDS) CAPS Take 1 tablet by mouth 2 times daily        nystatin (MYCOSTATIN) 906077 UNIT/GM external powder Apply topically 2 times daily Under breasts and skin folds BID & BID PRN for redness 60 g 1     order for DME Equipment being ordered: wheeled walker with hand breaks 1 Device 0     pantoprazole (PROTONIX) 40 MG EC tablet Take 1 tablet (40 mg) by mouth every morning (before breakfast) 90 tablet 3     simvastatin (ZOCOR) 10 MG tablet TAKE ONE TABLET BY MOUTH AT BEDTIME  90 tablet 3     ACE/ARB NOT PRESCRIBED, INTENTIONAL, Please choose reason not prescribed, below (Patient not taking: Reported on 11/3/2021)         ALLERGIES     Allergies   Allergen  Reactions     Penicillins        PAST MEDICAL HISTORY:  Past Medical History:   Diagnosis Date     Cancer (H) 10/2021     Depressive disorder      Heart disease 10/2021     History of blood transfusion 20/2021     HTN (hypertension) 5/9/2013     Hyperlipidemia LDL goal <130 5/9/2013     Hypothyroidism 5/9/2013     Macular degeneration 9/18/2013     Sleep apnea     CPAP at night, O2 during naps     Type 2 diabetes, HbA1C goal < 8% (H) 5/9/2013       PAST SURGICAL HISTORY:  Past Surgical History:   Procedure Laterality Date     APPENDECTOMY       COLONOSCOPY       COLONOSCOPY N/A 10/27/2014    Procedure: COMBINED COLONOSCOPY, SINGLE OR MULTIPLE BIOPSY/POLYPECTOMY BY BIOPSY;  Surgeon: Jose Alfredo Morejon MD;  Location:  GI     ESOPHAGOSCOPY, GASTROSCOPY, DUODENOSCOPY (EGD), COMBINED N/A 9/21/2021    Procedure: ESOPHAGOGASTRODUODENOSCOPY, WITH FINE NEEDLE ASPIRATION BIOPSY, WITH ENDOSCOPIC ULTRASOUND GUIDANCE;  Surgeon: Vera Benitez MD;  Location:  GI     ESOPHAGOSCOPY, GASTROSCOPY, DUODENOSCOPY (EGD), COMBINED N/A 9/21/2021    Procedure: Esophagogastroduodenoscopy, With Biopsy;  Surgeon: Vera Benitez MD;  Location:  GI     ESOPHAGOSCOPY, GASTROSCOPY, DUODENOSCOPY (EGD), COMBINED N/A 10/5/2021    Procedure: ESOPHAGOGASTRODUODENOSCOPY, WITH FINE NEEDLE ASPIRATION BIOPSY, WITH ENDOSCOPIC ULTRASOUND GUIDANCE;  Surgeon: Dixon Olivier MD;  Location:  GI     HERNIA REPAIR      inguinal x 2     TONSILLECTOMY         FAMILY HISTORY:  No family history on file.    SOCIAL HISTORY:  Social History     Socioeconomic History     Marital status:      Spouse name: None     Number of children: None     Years of education: None     Highest education level: None   Occupational History     None   Tobacco Use     Smoking status: Never Smoker     Smokeless tobacco: Never Used   Substance and Sexual Activity     Alcohol use: Yes     Alcohol/week: 0.0 standard drinks     Comment:  rarely     Drug use: No     Sexual activity: Never   Other Topics Concern     Parent/sibling w/ CABG, MI or angioplasty before 65F 55M? Not Asked   Social History Narrative     None     Social Determinants of Health     Financial Resource Strain:      Difficulty of Paying Living Expenses:    Food Insecurity:      Worried About Running Out of Food in the Last Year:      Ran Out of Food in the Last Year:    Transportation Needs:      Lack of Transportation (Medical):      Lack of Transportation (Non-Medical):    Physical Activity:      Days of Exercise per Week:      Minutes of Exercise per Session:    Stress:      Feeling of Stress :    Social Connections:      Frequency of Communication with Friends and Family:      Frequency of Social Gatherings with Friends and Family:      Attends Denominational Services:      Active Member of Clubs or Organizations:      Attends Club or Organization Meetings:      Marital Status:    Intimate Partner Violence:      Fear of Current or Ex-Partner:      Emotionally Abused:      Physically Abused:      Sexually Abused:        Review of Systems:  Skin:  Negative       Eyes:  Positive for glasses    ENT:  Positive for hearing loss    Respiratory:  Positive for dyspnea on exertion;dyspnea at rest;sleep apnea;CPAP O2 at night with CPAP and during day PRN   Cardiovascular:    Denies any exertional chest pain, dizziness presyncope syncope    Gastroenterology: Negative      Genitourinary:  Negative      Musculoskeletal:  Positive for arthritis    Neurologic:  Positive for numbness or tingling of hands;numbness or tingling of feet    Psychiatric:  Positive for anxiety;depression    Heme/Lymph/Imm:  Negative      Endocrine:  Positive for thyroid disorder;diabetes Type II    Physical Exam:  Vitals: /68   Pulse 73   Ht 1.524 m (5')   Wt 117.1 kg (258 lb 1.6 oz)   SpO2 98%   BMI 50.41 kg/m      General patient appears comfortable  Neck normal JVP  Cardiovascular system grade 3 x 6 ejection  systolic murmur with late systolic peaking, soft S2 heard, no S3-S4 rub or gallop  Respiratory system clear to auscultation, no crackles or wheezing  Extremities minimal pitting pedal edema  Neurological alert, oriented      Dashawn Lucas MD   [facility]    cc:   John Srinivasan MD  [facility]  [address]  [place] [address]

## 2021-11-03 NOTE — TELEPHONE ENCOUNTER
Verbal order given for home OT for strength and energy once a week for 3 weeks. Pamela Arellano RN

## 2021-11-04 ENCOUNTER — PATIENT OUTREACH (OUTPATIENT)
Dept: NURSING | Facility: CLINIC | Age: 81
End: 2021-11-04
Payer: MEDICARE

## 2021-11-04 NOTE — PROGRESS NOTES
Clinic Care Coordination Contact  [facility]: Post-Discharge Note  SITUATION                                                      Admission:    Admission Date: 09/20/21   Reason for Admission: anemia  Discharge:   Discharge Date: 10/29/21  Discharge Diagnosis: Pancreatic B cell lymphoma and surrounding lymph node areaSevere symptomatic anemia, improved and is stable.S/p EGD, aphthous ulcer in duodenum, stable.Pancreatic mass, as above concerning for pancreatic cancer.Transudate right pleural effusion.Acute encephalopathy, resolved.Acute on chronic hypercapnia.Obesity, obstructive sleep apnea with hypoventilation syndrome.Chronic hypoxia on home oxygen.E. coli UTI, treated.Acute decompensated diastolic heart failure, likely secondary to underlying valvular disease with severe aortic stenosis.Moderate to severe valvular aortic stenosis.Macular degeneration.Vision loss, secondary to above.CKD stage III, stable.Type 2 diabetes mellitus, diet controlled.Hypothyroidism.Depression.Hyperlipidemia.    BACKGROUND                                                      Patient is a 81 year old female who was admitted on 9/20/2021 when she presented with shortness of breath and constipation and was found to have severe symptomatic anemia of 4.7    ASSESSMENT           Discharge Assessment  How are you doing now that you are home?: Patient is struggling to keep up with follow up appointments as well as organize her meds. Patient will be starting with home care this week.  How are your symptoms? (Red Flag symptoms escalate to triage hotline per guidelines): Improved  Do you feel your condition is stable enough to be safe at home until your provider visit?: Yes  Does the patient have their discharge instructions? : Yes  Does the patient have questions regarding their discharge instructions? : No  Were you started on any new medications or were there changes to any of your previous medications? : No  Does the patient have all  of their medications?: Yes  Do you have questions regarding any of your medications? : No  Do you have all of your needed medical supplies or equipment (DME)?  (i.e. oxygen tank, CPAP, cane, etc.): Yes  Discharge follow-up appointment scheduled within 14 calendar days? : Yes  Discharge Follow Up Appointment Date: 11/22/21  Discharge Follow Up Appointment Scheduled with?: Primary Care Provider    Post-op ([facility] Only)  If the patient had a surgery or procedure, do they have any questions for a nurse?: No    Post-op (Clinicians Only)  Did the patient have surgery or a procedure: No  Fever: No  Chills: No  Eating & Drinking: eating and drinking without complaints/concerns  PO Intake: regular diet  Bowel Function: normal  Urinary Status: voiding without complaint/concerns        PLAN                                                      Outpatient Plan:  Patient has the following appointments scheduled.     Will call the patient in 3 week to check in to see how home care is going.     Future Appointments   Date Time Provider Department Center   11/11/2021  8:15 AM Mary Jo Rodriguez MD [facility] PSA CLIN   11/18/2021 12:30 PM [address] [place] [address]   11/22/2021 11:30 AM Fausto Flynn MD [facility]   11/23/2021  2:15 PM Michael Nolasco DPM OXPOD    11/24/2021  2:20 PM John Srinivasan MD [facility]         For any urgent concerns, please contact our 24 hour nurse triage line: [phone] ([phone])         TORIE Boykin

## 2021-11-05 ENCOUNTER — TRANSFERRED RECORDS (OUTPATIENT)
Dept: HEALTH INFORMATION MANAGEMENT | Facility: CLINIC | Age: 81
End: 2021-11-05
Payer: MEDICARE

## 2021-11-06 NOTE — PROGRESS NOTES
ONCOLOGY HISTORY: Ms. Rojas is a female with lymphoma.  1. On 09/20/2021:   -Hemoglobin of 4.7 with MCV of 67. Normal WBC and platelets.  -Iron of 9 and saturation index of 2%.   -CT chest, abdomen and pelvis reveals large pancreatic head mass.  This encases the celiac trunk, superior mesenteric artery and superior mesenteric vein.  There is moderate-size right pleural effusion. No lymphadenopathy.  2. Thoracocentesis on 09/22/2021. Cytology is negative for malignancy.  3. EUS on 09/21/2021 revealed is a mass in the uncinate process of the pancreas measuring 33 mm.  There was evidence of invasion into superior mesenteric artery and the celiac trunk. No enlarged lymph nodes seen. FNA revealed atypical cells.    4. EGD and EUS repeated on 10/05/2021.  -EGD is normal.  -EUS revealed pancreatic head mass.  FNA revealed CD10-positive B-cell lymphoma. Flow cytometry revealed CD10-positive lambda monotypic B-cells.    SUBJECTIVE:  Ms. Rojas is an 81-year-old female with newly diagnosed lymphoma.  CT scan on 09/20/2021 had revealed large pancreatic head mass.  The patient was seen by GI.  First EUS was nondiagnostic.  She had repeat EUS done on 10/05/2021.  It revealed pancreatic head mass.  FNA revealed CD10-positive B-cell lymphoma.  Flow cytometry revealed CD10-positive lambda monotypic B-cells.     The patient presents to the clinic with multiple family members.  The patient is currently in a nursing home.     The patient is very weak.  She was brought in a wheelchair.  In the nursing home, she is able to walk short distances with a walker.     She is not in severe pain.  No nausea or vomiting.  Appetite is fair.  No fever or chills.  No bleeding.      All other review of systems is negative.     PHYSICAL EXAMINATION:    GENERAL:  She was alert and oriented x 3.   VITAL SIGNS:  Not in acute distress.  Rest of systems not examined.     LABORATORY:  CBC and BMP reviewed.     ASSESSMENT:    1.  An 81-year-old female with  pancreatic head mass.  FNA reveals B-cell lymphoma.  Further characterization could not be done because of the small sample size.  2.  Multiple other medical problems including aortic stenosis, diabetes mellitus, chronic kidney disease and hypothyroidism.     PLAN:  1.  I had a long discussion with the patient and multiple family members.  I reviewed all the imaging studies.  EUS result was discussed.     FNA was reviewed in detail.  I explained to them that pancreatic FNA reveals non-Hodgkin's lymphoma.  It is B-cell lymphoma.  Because of the small size, further characterization could not be done.    2.  Discussed regarding non-Hodgkin's lymphoma.  I explained to them, it could be low-grade or high-grade lymphoma.     We discussed regarding further workup.  The patient needs a repeat biopsy to further characterize the malignancy.     I explained to her that first we will get a PET scan.  After PET scan, we will decide which area to biopsy.  The patient is agreeable for it.      I also discussed regarding bone marrow biopsy.  We will wait for the PET scan and after that decide regarding bone marrow biopsy.     The patient does have multiple medical problems.  Putting her through anesthesia will be complicated.     3.  We discussed regarding treatment for lymphoma.  I explained to them that generally chemotherapy with rituximab is used for B-cell lymphoma.  Further discussion after we have the final pathology.    4.  The patient has cardiac problems including aortic stenosis.  We will set her up to see the cardiologist.    5.  The patient and family had multiple questions, which were all answered.  I will see her after the PET scan.     TOTAL FACE-TO-FACE TIME SPENT:  45 minutes, more than 50% of the time spent in counseling and coordination of care.

## 2021-11-08 ENCOUNTER — TELEPHONE (OUTPATIENT)
Dept: CARDIOLOGY | Facility: CLINIC | Age: 81
End: 2021-11-08
Payer: MEDICARE

## 2021-11-08 ENCOUNTER — PATIENT OUTREACH (OUTPATIENT)
Dept: ONCOLOGY | Facility: CLINIC | Age: 81
End: 2021-11-08

## 2021-11-08 ENCOUNTER — TELEPHONE (OUTPATIENT)
Dept: INTERNAL MEDICINE | Facility: CLINIC | Age: 81
End: 2021-11-08

## 2021-11-08 DIAGNOSIS — Z53.9 DIAGNOSIS NOT YET DEFINED: Primary | ICD-10-CM

## 2021-11-08 PROCEDURE — G0180 MD CERTIFICATION HHA PATIENT: HCPCS | Performed by: INTERNAL MEDICINE

## 2021-11-08 RX ORDER — LANCETS
EACH MISCELLANEOUS
COMMUNITY
Start: 2021-07-16

## 2021-11-08 NOTE — TELEPHONE ENCOUNTER
Referral from Dr. Lucas noted.   He did speak with Dr. Srinivasan regarding patient's recent lymphoma dx.    Last GFR/Cr:   Ref. Range 11/2/2021 16:37   Creatinine Latest Ref Range: 0.52 - 1.04 mg/dL 1.25 (H)   GFR Estimate Latest Ref Range: >60 mL/min/1.73m2 40 (L)   Due to GFR of 40, will hold on CT imaging until appt with TAVR MD.    Telephoned patient to introduce self, provide education on aortic stenosis and TAVR. Patient has PET scan scheduled 11/18/21, follow-up with Dr. Srinivasan 11/24. Patient understands we will want results of PET scan as part of evaluation. Patient would like to come hear about TAVR, will defer testing until seen by Dr. Rodriguez. Patient had EMS at her home this morning because she fell, she fell going to the bathroom and her  could not get her up. EMS evaluated patient, no injuries noted. Pt reports feeling sore.    Nazanin Mohamud, RN  Structural Heart Coordinator  [facility]

## 2021-11-10 ENCOUNTER — MEDICAL CORRESPONDENCE (OUTPATIENT)
Dept: HEALTH INFORMATION MANAGEMENT | Facility: CLINIC | Age: 81
End: 2021-11-10

## 2021-11-10 ENCOUNTER — APPOINTMENT (OUTPATIENT)
Dept: CT IMAGING | Facility: CLINIC | Age: 81
End: 2021-11-10
Attending: EMERGENCY MEDICINE
Payer: MEDICARE

## 2021-11-10 ENCOUNTER — HOSPITAL ENCOUNTER (EMERGENCY)
Facility: CLINIC | Age: 81
Discharge: HOME | End: 2021-11-10
Attending: EMERGENCY MEDICINE | Admitting: EMERGENCY MEDICINE
Payer: MEDICARE

## 2021-11-10 VITALS
TEMPERATURE: 97.5 F | DIASTOLIC BLOOD PRESSURE: 93 MMHG | HEIGHT: 60 IN | OXYGEN SATURATION: 100 % | SYSTOLIC BLOOD PRESSURE: 112 MMHG | HEART RATE: 64 BPM | WEIGHT: 270 LBS | BODY MASS INDEX: 53.01 KG/M2 | RESPIRATION RATE: 24 BRPM

## 2021-11-10 DIAGNOSIS — S20.211A CONTUSION OF RIGHT CHEST WALL, INITIAL ENCOUNTER: ICD-10-CM

## 2021-11-10 DIAGNOSIS — S30.1XXA CONTUSION OF ABDOMINAL WALL, INITIAL ENCOUNTER: ICD-10-CM

## 2021-11-10 DIAGNOSIS — S70.01XA CONTUSION OF RIGHT HIP, INITIAL ENCOUNTER: ICD-10-CM

## 2021-11-10 LAB
ALBUMIN SERPL-MCNC: 3.5 G/DL (ref 3.4–5)
ALP SERPL-CCNC: 115 U/L (ref 40–150)
ALT SERPL W P-5'-P-CCNC: 22 U/L (ref 0–50)
ANION GAP SERPL CALCULATED.3IONS-SCNC: 3 MMOL/L (ref 3–14)
AST SERPL W P-5'-P-CCNC: 15 U/L (ref 0–45)
ATRIAL RATE - MUSE: 65 BPM
BASOPHILS # BLD AUTO: 0.1 10E3/UL (ref 0–0.2)
BASOPHILS NFR BLD AUTO: 1 %
BILIRUB SERPL-MCNC: 0.3 MG/DL (ref 0.2–1.3)
BUN SERPL-MCNC: 30 MG/DL (ref 7–30)
CALCIUM SERPL-MCNC: 9.3 MG/DL (ref 8.5–10.1)
CHLORIDE BLD-SCNC: 105 MMOL/L (ref 94–109)
CO2 SERPL-SCNC: 31 MMOL/L (ref 20–32)
CREAT SERPL-MCNC: 1.11 MG/DL (ref 0.52–1.04)
DIASTOLIC BLOOD PRESSURE - MUSE: NORMAL MMHG
EOSINOPHIL # BLD AUTO: 0.5 10E3/UL (ref 0–0.7)
EOSINOPHIL NFR BLD AUTO: 5 %
ERYTHROCYTE [DISTWIDTH] IN BLOOD BY AUTOMATED COUNT: 19.1 % (ref 10–15)
GFR SERPL CREATININE-BSD FRML MDRD: 46 ML/MIN/1.73M2
GLUCOSE BLD-MCNC: 106 MG/DL (ref 70–99)
HCT VFR BLD AUTO: 35.6 % (ref 35–47)
HGB BLD-MCNC: 10.2 G/DL (ref 11.7–15.7)
HOLD SPECIMEN: NORMAL
IMM GRANULOCYTES # BLD: 0 10E3/UL
IMM GRANULOCYTES NFR BLD: 0 %
INTERPRETATION ECG - MUSE: NORMAL
LIPASE SERPL-CCNC: 129 U/L (ref 73–393)
LYMPHOCYTES # BLD AUTO: 1.4 10E3/UL (ref 0.8–5.3)
LYMPHOCYTES NFR BLD AUTO: 16 %
MCH RBC QN AUTO: 26.3 PG (ref 26.5–33)
MCHC RBC AUTO-ENTMCNC: 28.7 G/DL (ref 31.5–36.5)
MCV RBC AUTO: 92 FL (ref 78–100)
MONOCYTES # BLD AUTO: 0.7 10E3/UL (ref 0–1.3)
MONOCYTES NFR BLD AUTO: 8 %
NEUTROPHILS # BLD AUTO: 6.3 10E3/UL (ref 1.6–8.3)
NEUTROPHILS NFR BLD AUTO: 70 %
NRBC # BLD AUTO: 0 10E3/UL
NRBC BLD AUTO-RTO: 0 /100
NT-PROBNP SERPL-MCNC: 307 PG/ML (ref 0–1800)
P AXIS - MUSE: 47 DEGREES
PLATELET # BLD AUTO: 373 10E3/UL (ref 150–450)
POTASSIUM BLD-SCNC: 4.2 MMOL/L (ref 3.4–5.3)
PR INTERVAL - MUSE: 182 MS
PROT SERPL-MCNC: 7.7 G/DL (ref 6.8–8.8)
QRS DURATION - MUSE: 108 MS
QT - MUSE: 418 MS
QTC - MUSE: 434 MS
R AXIS - MUSE: -46 DEGREES
RBC # BLD AUTO: 3.88 10E6/UL (ref 3.8–5.2)
SODIUM SERPL-SCNC: 139 MMOL/L (ref 133–144)
SYSTOLIC BLOOD PRESSURE - MUSE: NORMAL MMHG
T AXIS - MUSE: 44 DEGREES
TROPONIN I SERPL-MCNC: <0.015 UG/L (ref 0–0.04)
VENTRICULAR RATE- MUSE: 65 BPM
WBC # BLD AUTO: 8.9 10E3/UL (ref 4–11)

## 2021-11-10 PROCEDURE — 71260 CT THORAX DX C+: CPT | Mod: MG

## 2021-11-10 PROCEDURE — 99285 EMERGENCY DEPT VISIT HI MDM: CPT | Mod: 25

## 2021-11-10 PROCEDURE — 36415 COLL VENOUS BLD VENIPUNCTURE: CPT | Performed by: EMERGENCY MEDICINE

## 2021-11-10 PROCEDURE — 80053 COMPREHEN METABOLIC PANEL: CPT | Performed by: EMERGENCY MEDICINE

## 2021-11-10 PROCEDURE — 250N000009 HC RX 250: Performed by: EMERGENCY MEDICINE

## 2021-11-10 PROCEDURE — 250N000011 HC RX IP 250 OP 636: Performed by: EMERGENCY MEDICINE

## 2021-11-10 PROCEDURE — 83880 ASSAY OF NATRIURETIC PEPTIDE: CPT | Performed by: EMERGENCY MEDICINE

## 2021-11-10 PROCEDURE — 84484 ASSAY OF TROPONIN QUANT: CPT | Performed by: EMERGENCY MEDICINE

## 2021-11-10 PROCEDURE — 93005 ELECTROCARDIOGRAM TRACING: CPT

## 2021-11-10 PROCEDURE — 85025 COMPLETE CBC W/AUTO DIFF WBC: CPT | Performed by: EMERGENCY MEDICINE

## 2021-11-10 PROCEDURE — 83690 ASSAY OF LIPASE: CPT | Performed by: EMERGENCY MEDICINE

## 2021-11-10 RX ORDER — IOPAMIDOL 755 MG/ML
135 INJECTION, SOLUTION INTRAVASCULAR ONCE
Status: COMPLETED | OUTPATIENT
Start: 2021-11-10 | End: 2021-11-10

## 2021-11-10 RX ORDER — AMOXICILLIN 250 MG
1 CAPSULE ORAL DAILY
Qty: 20 TABLET | Refills: 0 | Status: SHIPPED | OUTPATIENT
Start: 2021-11-10 | End: 2021-11-30

## 2021-11-10 RX ORDER — POLYETHYLENE GLYCOL 3350 17 G/17G
1 POWDER, FOR SOLUTION ORAL DAILY
Qty: 527 G | Refills: 0 | Status: SHIPPED | OUTPATIENT
Start: 2021-11-10 | End: 2021-12-10

## 2021-11-10 RX ADMIN — IOPAMIDOL 135 ML: 755 INJECTION, SOLUTION INTRAVENOUS at 12:20

## 2021-11-10 RX ADMIN — SODIUM CHLORIDE 80 ML: 900 INJECTION INTRAVENOUS at 12:20

## 2021-11-10 ASSESSMENT — ENCOUNTER SYMPTOMS
MYALGIAS: 1
SHORTNESS OF BREATH: 1
CONSTIPATION: 1
COUGH: 0

## 2021-11-10 ASSESSMENT — MIFFLIN-ST. JEOR: SCORE: 1601.21

## 2021-11-10 NOTE — ED PROVIDER NOTES
History   Chief Complaint:  Fall, Hip Pain, and Constipation     HPI   Lucille Rojas is a 81 year old female with history of cancer, CKD, heart disease who presents after a fall. The patient reports a mechanical fall 4 days ago and since then has had pain in her right hip, abdomen, breast, and ribs. She states that taking deep breaths worsens this pain and causes some shortness of breath. She did not hit her head during the fall. She also notes that she has not had a bowel movement for several days. She states that a few months ago, she was admitted to the hospital for a similar issue and was additionally diagnosed with a heart problem, cancer, and a pancreatic mass. She denies cough.    Bruise to right lateral chest/lateral breast  Bruising on right lower abdominal wall    Review of Systems   Respiratory: Positive for shortness of breath. Negative for cough.    Gastrointestinal: Positive for constipation.   Musculoskeletal: Positive for myalgias.   All other systems reviewed and are negative.    Allergies:  Penicillins    Medications:  Aspirin  Celexa  Lasix   Synthroid  Mycostatin  Protonix  Zocor    Past Medical History:     Cancer  Depression  Heart disease  Hypertension  Hyperlipidemia  Hypothyroidism  Sleep apnea  Diabetes  CKD  GERD  Chronic blood loss anemia  B cell lymphoma  Pancreatic mass  Nonrheumatic aortic stenosis with insufficiency  Diastolic heart failure      Past Surgical History:    Appendectomy  Colonoscopy  EGD x3  Inguinal hernia repair x2  Tonsillectomy     Social History:  Patient presents with her daughter  Lives at home with her      Physical Exam     Patient Vitals for the past 24 hrs:   BP Temp Pulse Resp SpO2 Height Weight   11/10/21 1259 -- -- 64 24 100 % -- --   11/10/21 1255 -- -- 67 26 (!) 86 % -- --   11/10/21 1250 -- -- 67 27 (!) 88 % -- --   11/10/21 1248 -- -- 67 25 (!) 88 % -- --   11/10/21 1235 -- -- 73 26 90 % -- --   11/10/21 1231 (!) 112/93 -- 76 -- -- -- --    11/10/21 1136 -- -- 64 23 93 % -- --   11/10/21 1123 -- -- 77 30 90 % -- --   11/10/21 1016 (!) 145/51 97.5  F (36.4  C) 63 20 96 % 1.524 m (5') 122.5 kg (270 lb)       Physical Exam    GENERAL: well developed, pleasant  HEAD: atraumatic  EYES: pupils reactive, extraocular muscles intact, conjunctivae normal  ENT:  mucus membranes moist  NECK:  trachea midline, normal range of motion  RESPIRATORY: no tachypnea, breath sounds clear to auscultation   CVS: normal S1/S2, no murmurs, intact distal pulses  ABDOMEN: soft, nontender, nondistention  MUSCULOSKELETAL: no deformities  SKIN: warm and dry, no acute rashes or ulceration,   Bruise to right lateral chest/lateral breast  Bruising on right lower abdominal wall  NEURO: GCS 15, cranial nerves intact, alert and oriented x3  PSYCH:  Mood/affect normal    Emergency Department Course     Imaging:  CT Chest/Abdomen/Pelvis w Contrast   Preliminary Result   IMPRESSION:   1.  No acute traumatic abnormality identified. No displaced fracture   is seen.   2.  Trace right pleural fluid is smaller in volume compared to   9/20/2021. No pneumothorax is seen.   3.  Stable prominent mass at the pancreas head and encasing vessels   consistent with malignancy. Stable adjacent enlarged lymph node that   is indeterminate.   4.  Other stable findings as above.        Report per radiology    Laboratory:  Labs Ordered and Resulted from Time of ED Arrival to Time of ED Departure   COMPREHENSIVE METABOLIC PANEL - Abnormal       Result Value    Sodium 139      Potassium 4.2      Chloride 105      Carbon Dioxide (CO2) 31      Anion Gap 3      Urea Nitrogen 30      Creatinine 1.11 (*)     Calcium 9.3      Glucose 106 (*)     Alkaline Phosphatase 115      AST 15      ALT 22      Protein Total 7.7      Albumin 3.5      Bilirubin Total 0.3      GFR Estimate 46 (*)    CBC WITH PLATELETS AND DIFFERENTIAL - Abnormal    WBC Count 8.9      RBC Count 3.88      Hemoglobin 10.2 (*)     Hematocrit 35.6       MCV 92      MCH 26.3 (*)     MCHC 28.7 (*)     RDW 19.1 (*)     Platelet Count 373      % Neutrophils 70      % Lymphocytes 16      % Monocytes 8      % Eosinophils 5      % Basophils 1      % Immature Granulocytes 0      NRBCs per 100 WBC 0      Absolute Neutrophils 6.3      Absolute Lymphocytes 1.4      Absolute Monocytes 0.7      Absolute Eosinophils 0.5      Absolute Basophils 0.1      Absolute Immature Granulocytes 0.0      Absolute NRBCs 0.0     LIPASE - Normal    Lipase 129     TROPONIN I - Normal    Troponin I <0.015     NT PROBNP INPATIENT - Normal    N terminal Pro BNP Inpatient 307         Emergency Department Course:  Reviewed:  I reviewed nursing notes, vitals, past medical history and Care Everywhere    Assessments:  1058 I obtained history and examined the patient as noted above.   1315 I rechecked the patient and explained findings.     Disposition:  The patient was discharged to home.     Impression & Plan     Medical Decision Making:    Presents from home with mechanical fall with bruising to the right breast and right abdominal wall and some right hip pain but mainly in the abdominal wall.  CT chest and pelvis is negative and no hip fracture.  She is able to ambulate.  Do not feel she needs an MRI.  Bruising that is on the abdominal wall and pannus but no bruising over the hip.  She is on oxygen at home at 3 L.  Patient looks safe for discharge.    Diagnosis:    ICD-10-CM    1. Contusion of right chest wall, initial encounter  S20.211A    2. Contusion of abdominal wall, initial encounter  S30.1XXA    3. Contusion of right hip, initial encounter  S70.01XA        Discharge Medications:  New Prescriptions    No medications on file       Scribe Disclosure:  I, Julianna Joya, am serving as a scribe at 10:57 AM on 11/10/2021 to document services personally performed by Jesus Solomon MD based on my observations and the provider's statements to me.              Jesus Solomon MD  11/10/21 1557

## 2021-11-10 NOTE — ED NOTES
Daughter will take pt home. Pt reports normally on 2L via NC at home. Pt on 2L in ED. Daughter reports pt has O2 in her car for transport home.

## 2021-11-11 ENCOUNTER — OFFICE VISIT (OUTPATIENT)
Dept: CARDIOLOGY | Facility: CLINIC | Age: 81
End: 2021-11-11
Attending: INTERNAL MEDICINE
Payer: MEDICARE

## 2021-11-11 VITALS
OXYGEN SATURATION: 100 % | WEIGHT: 270 LBS | HEIGHT: 60 IN | HEART RATE: 56 BPM | DIASTOLIC BLOOD PRESSURE: 52 MMHG | BODY MASS INDEX: 53.01 KG/M2 | SYSTOLIC BLOOD PRESSURE: 127 MMHG

## 2021-11-11 DIAGNOSIS — I35.0 NONRHEUMATIC AORTIC VALVE STENOSIS: ICD-10-CM

## 2021-11-11 PROCEDURE — 99203 OFFICE O/P NEW LOW 30 MIN: CPT | Performed by: INTERNAL MEDICINE

## 2021-11-11 ASSESSMENT — MIFFLIN-ST. JEOR: SCORE: 1601.21

## 2021-11-11 NOTE — LETTER
11/11/2021    Fausto Flynn MD  [address] [facility] [id number]    RE: Lucille Rojas       Dear Colleague,    I had the pleasure of seeing Lucille Rojas in the [facility].  CARDIOLOGY VALVE CLINIC CONSULT    REASON FOR CONSULT: aortic stenosis    PRIMARY CARE PHYSICIAN:  Fausto Flynn    HISTORY OF PRESENT ILLNESS:  Lucille Rojas is an 81-year-old woman with oxygen dependent COPD, gross morbid obesity BMI of 53, recent gastritis with hemoglobin of 4, recent fall, and a pancreatic head mass which was recently diagnosed as B-cell lymphoma.  She will have a PET scan and possibly EUS with biopsy and this evaluation is pending.  She is here in valve clinic to discuss aortic stenosis.  She was in the emergency department yesterday for evaluation after a fall which she sustained trying to ambulate to the toilet due to severe constipation.    She had echocardiogram in September which showed moderate to severe possibly severe aortic stenosis.  Her ejection fraction is normal and she has no other significant valvular heart disease.  She was seen by her primary cardiologist Dr. Lucas who referred her to TAVR clinic.  She is here with multiple family members.  She is living at home and has assistance from family members for activities of daily living.    Her primary symptoms are dyspnea and fatigue although in the past 3 months she reports she has had exertional chest discomfort which is relieved by rest.  She has chronic lower extremity edema which is treated with furosemide and is at baseline.    She is burdened by frequent health care visits.    History of adverse reaction to anesthesia or abnormal airway: No  History of bleeding diathesis: Yes, see HPI  Contrast allergy: No    PAST MEDICAL HISTORY:  Past Medical History:   Diagnosis Date     Cancer (H) 10/2021     Depressive disorder      Heart disease 10/2021     History of blood transfusion  20/2021     HTN (hypertension) 5/9/2013     Hyperlipidemia LDL goal <130 5/9/2013     Hypothyroidism 5/9/2013     Macular degeneration 9/18/2013     Sleep apnea     CPAP at night, O2 during naps     Type 2 diabetes, HbA1C goal < 8% (H) 5/9/2013       MEDICATIONS:  Current Outpatient Medications   Medication     acetaminophen (TYLENOL) 325 MG tablet     aspirin 81 MG tablet     carboxymethylcellulose PF (REFRESH PLUS) 0.5 % ophthalmic solution     citalopram (CELEXA) 20 MG tablet     CONTOUR NEXT TEST test strip     furosemide (LASIX) 20 MG tablet     levothyroxine (SYNTHROID/LEVOTHROID) 200 MCG tablet     Microlet Lancets MISC     Multiple Vitamins-Minerals (PRESERVISION AREDS) CAPS     nystatin (MYCOSTATIN) 886897 UNIT/GM external powder     order for DME     pantoprazole (PROTONIX) 40 MG EC tablet     polyethylene glycol (MIRALAX) 17 GM/Dose powder     senna-docusate (SENOKOT-S/PERICOLACE) 8.6-50 MG tablet     simvastatin (ZOCOR) 10 MG tablet     ACE/ARB NOT PRESCRIBED, INTENTIONAL,     No current facility-administered medications for this visit.       ALLERGIES:  Allergies   Allergen Reactions     Penicillins        SOCIAL HISTORY:  Chart was reviewed, she is a non-smoker      REVIEW OF SYSTEMS:  Skin:  Negative     Eyes:  Positive for    ENT:  Positive for postnasal drainage  Respiratory:  Positive for shortness of breath;dyspnea on exertion;sleep apnea  Cardiovascular:    Positive for;palpitations;syncope or near-syncope  Gastroenterology: Positive for reflux  Genitourinary:  Negative    Musculoskeletal:  Positive for arthritis  Neurologic:  Negative    Psychiatric:  Positive for anxiety;depression  Heme/Lymph/Imm:  Positive for allergies  Endocrine:  Positive for thyroid disorder;diabetes    PHYSICAL EXAM:      BP: 127/52 Pulse: 56     SpO2: 100 % (pt on O 2 --  2 lpm via NC)      Vital Signs with Ranges  Temp:  [97.5  F (36.4  C)] 97.5  F (36.4  C)  Pulse:  [56-77] 56  Resp:  [20-30] 24  BP: (112-145)/(51-93)  127/52  SpO2:  [86 %-100 %] 100 %  270 lbs 0 oz    Constitutional: awake, alert, no distress.  She is in a wheelchair.  She appears frail  Eyes: sclera nonicteric  ENT: trachea midline  Respiratory: Modest respiratory effort, intermittent soft dry crackles throughout the lung fields  Cardiovascular: Regular with III/VI systolic murmur appreciated at bilateral upper sternal borders  GI: nondistended, nontender, bowel sounds present, very large pannus  Lymph/Hematologic: no lymphadenopathy  Skin: dry, no rash 1+ pitting edema  Musculoskeletal: She is obese but it appears frail with grossly decreased muscle mass  Neurologic: no focal deficits  Neuropsychiatric: appropriate affect      Review of Systems:  Skin:  Negative     Eyes:  Positive for    ENT:  Positive for postnasal drainage  Respiratory:  Positive for shortness of breath;dyspnea on exertion;sleep apnea  Cardiovascular:    Positive for;palpitations;syncope or near-syncope  Gastroenterology: Positive for reflux  Genitourinary:  Negative    Musculoskeletal:  Positive for arthritis  Neurologic:  Negative    Psychiatric:  Positive for anxiety;depression  Heme/Lymph/Imm:  Positive for allergies  Endocrine:  Positive for thyroid disorder;diabetes       DATA:   LAST CHOLESTEROL:  Lab Results   Component Value Date    CHOL 148 06/04/2020     Lab Results   Component Value Date    HDL 47 06/04/2020     Lab Results   Component Value Date    LDL 77 06/04/2020     Lab Results   Component Value Date    TRIG 140 06/04/2020     Lab Results   Component Value Date    CHOLHDLRATIO 3.4 05/09/2013       LAST BMP:  Lab Results   Component Value Date     11/10/2021     08/27/2020      Lab Results   Component Value Date    POTASSIUM 4.2 11/10/2021    POTASSIUM 4.1 08/27/2020     Lab Results   Component Value Date    CHLORIDE 105 11/10/2021    CHLORIDE 109 08/27/2020     Lab Results   Component Value Date    IGOR 9.3 11/10/2021    IGOR 9.6 08/27/2020     Lab Results    Component Value Date    CO2 31 11/10/2021    CO2 31 08/27/2020     Lab Results   Component Value Date    BUN 30 11/10/2021    BUN 19 08/27/2020     Lab Results   Component Value Date    CR 1.11 11/10/2021    CR 0.98 08/27/2020     Lab Results   Component Value Date     11/10/2021     08/27/2020       LAST CBC:  Lab Results   Component Value Date    WBC 8.9 11/10/2021    WBC 7.6 12/08/2016     Lab Results   Component Value Date    RBC 3.88 11/10/2021    RBC 3.62 12/08/2016     Lab Results   Component Value Date    HGB 10.2 11/10/2021    HGB 10.6 04/07/2021     Lab Results   Component Value Date    HCT 35.6 11/10/2021    HCT 34.3 12/08/2016     Lab Results   Component Value Date    MCV 92 11/10/2021    MCV 95 12/08/2016     Lab Results   Component Value Date    MCH 26.3 11/10/2021    MCH 27.6 12/08/2016     Lab Results   Component Value Date    MCHC 28.7 11/10/2021    MCHC 29.2 12/08/2016     Lab Results   Component Value Date    RDW 19.1 11/10/2021    RDW 14.6 12/08/2016     Lab Results   Component Value Date     11/10/2021     12/08/2016         EKG sinus rhythm, left axis deviation    Echo 9/20/21 : Interpretation Summary Left ventricular systolic function is normal. The visual ejection fraction is60-65%. No regional wall motion abnormalities noted.The right ventricular systolic function is normal.Moderate to severe valvular aortic stenosis. The calculated aortic valve areais 1.0 cm^2. The mean AoV pressure gradient is 37.5 mmHg.No prior study.      ASSESSMENT:  1. Moderate to severe, approaching severe aortic stenosis.  2. Heart failure with preserved ejection fraction.  3. Newly diagnosed pancreatic head mass which is B-cell lymphoma, for which she is undergoing further evaluation determined whether it is high-grade or low-grade and appropriate treatment options.  4. Recent severe upper GI bleeding.  She states melena has resolved.  5. Hypertension,  controlled  6. Dyslipidemia  7. Chronic edema, reportedly stable, on furosemide      NYHA class III  STS risk estimate 4%    RECOMMENDATIONS:  1. We will follow-up with her after she undergoes further testing and differentiation for the B-cell lymphoma.  2. Fortunately her aortic stenosis is more in the moderate to severe range and does not need to be urgently treated.  3. We will defer coronary angiography at this time due to ongoing cancer and GI evaluation due to concern for risk of bleeding.  4. We will also defer scheduling TAVR CT at this time until prognosis from a cancer standpoint is more clear.  5. We discussed the general risk and benefits and treatment options for severe aortic stenosis including surgical and transcatheter valves.    Mary Jo Rodriguez MD [facility]  Text Page

## 2021-11-11 NOTE — PROGRESS NOTES
CARDIOLOGY VALVE CLINIC CONSULT    REASON FOR CONSULT: aortic stenosis    PRIMARY CARE PHYSICIAN:  Fausto Flynn    HISTORY OF PRESENT ILLNESS:  Lucille Rojas is an 81-year-old woman with oxygen dependent COPD, gross morbid obesity BMI of 53, recent gastritis with hemoglobin of 4, recent fall, and a pancreatic head mass which was recently diagnosed as B-cell lymphoma.  She will have a PET scan and possibly EUS with biopsy and this evaluation is pending.  She is here in valve clinic to discuss aortic stenosis.  She was in the emergency department yesterday for evaluation after a fall which she sustained trying to ambulate to the toilet due to severe constipation.    She had echocardiogram in September which showed moderate to severe possibly severe aortic stenosis.  Her ejection fraction is normal and she has no other significant valvular heart disease.  She was seen by her primary cardiologist Dr. Lucas who referred her to TAVR clinic.  She is here with multiple family members.  She is living at home and has assistance from family members for activities of daily living.    Her primary symptoms are dyspnea and fatigue although in the past 3 months she reports she has had exertional chest discomfort which is relieved by rest.  She has chronic lower extremity edema which is treated with furosemide and is at baseline.    She is burdened by frequent health care visits.    History of adverse reaction to anesthesia or abnormal airway: No  History of bleeding diathesis: Yes, see HPI  Contrast allergy: No    PAST MEDICAL HISTORY:  Past Medical History:   Diagnosis Date     Cancer (H) 10/2021     Depressive disorder      Heart disease 10/2021     History of blood transfusion 20/2021     HTN (hypertension) 5/9/2013     Hyperlipidemia LDL goal <130 5/9/2013     Hypothyroidism 5/9/2013     Macular degeneration 9/18/2013     Sleep apnea     CPAP at night, O2 during naps     Type 2 diabetes, HbA1C goal < 8% (H) 5/9/2013        MEDICATIONS:  Current Outpatient Medications   Medication     acetaminophen (TYLENOL) 325 MG tablet     aspirin 81 MG tablet     carboxymethylcellulose PF (REFRESH PLUS) 0.5 % ophthalmic solution     citalopram (CELEXA) 20 MG tablet     CONTOUR NEXT TEST test strip     furosemide (LASIX) 20 MG tablet     levothyroxine (SYNTHROID/LEVOTHROID) 200 MCG tablet     Microlet Lancets MISC     Multiple Vitamins-Minerals (PRESERVISION AREDS) CAPS     nystatin (MYCOSTATIN) 083512 UNIT/GM external powder     order for DME     pantoprazole (PROTONIX) 40 MG EC tablet     polyethylene glycol (MIRALAX) 17 GM/Dose powder     senna-docusate (SENOKOT-S/PERICOLACE) 8.6-50 MG tablet     simvastatin (ZOCOR) 10 MG tablet     ACE/ARB NOT PRESCRIBED, INTENTIONAL,     No current facility-administered medications for this visit.       ALLERGIES:  Allergies   Allergen Reactions     Penicillins        SOCIAL HISTORY:  Chart was reviewed, she is a non-smoker      REVIEW OF SYSTEMS:  Skin:  Negative     Eyes:  Positive for    ENT:  Positive for postnasal drainage  Respiratory:  Positive for shortness of breath;dyspnea on exertion;sleep apnea  Cardiovascular:    Positive for;palpitations;syncope or near-syncope  Gastroenterology: Positive for reflux  Genitourinary:  Negative    Musculoskeletal:  Positive for arthritis  Neurologic:  Negative    Psychiatric:  Positive for anxiety;depression  Heme/Lymph/Imm:  Positive for allergies  Endocrine:  Positive for thyroid disorder;diabetes    PHYSICAL EXAM:      BP: 127/52 Pulse: 56     SpO2: 100 % (pt on O 2 --  2 lpm via NC)      Vital Signs with Ranges  Temp:  [97.5  F (36.4  C)] 97.5  F (36.4  C)  Pulse:  [56-77] 56  Resp:  [20-30] 24  BP: (112-145)/(51-93) 127/52  SpO2:  [86 %-100 %] 100 %  270 lbs 0 oz    Constitutional: awake, alert, no distress.  She is in a wheelchair.  She appears frail  Eyes: sclera nonicteric  ENT: trachea midline  Respiratory: Modest respiratory effort, intermittent soft  dry crackles throughout the lung fields  Cardiovascular: Regular with III/VI systolic murmur appreciated at bilateral upper sternal borders  GI: nondistended, nontender, bowel sounds present, very large pannus  Lymph/Hematologic: no lymphadenopathy  Skin: dry, no rash 1+ pitting edema  Musculoskeletal: She is obese but it appears frail with grossly decreased muscle mass  Neurologic: no focal deficits  Neuropsychiatric: appropriate affect      Review of Systems:  Skin:  Negative     Eyes:  Positive for    ENT:  Positive for postnasal drainage  Respiratory:  Positive for shortness of breath;dyspnea on exertion;sleep apnea  Cardiovascular:    Positive for;palpitations;syncope or near-syncope  Gastroenterology: Positive for reflux  Genitourinary:  Negative    Musculoskeletal:  Positive for arthritis  Neurologic:  Negative    Psychiatric:  Positive for anxiety;depression  Heme/Lymph/Imm:  Positive for allergies  Endocrine:  Positive for thyroid disorder;diabetes       DATA:   LAST CHOLESTEROL:  Lab Results   Component Value Date    CHOL 148 06/04/2020     Lab Results   Component Value Date    HDL 47 06/04/2020     Lab Results   Component Value Date    LDL 77 06/04/2020     Lab Results   Component Value Date    TRIG 140 06/04/2020     Lab Results   Component Value Date    CHOLHDLRATIO 3.4 05/09/2013       LAST BMP:  Lab Results   Component Value Date     11/10/2021     08/27/2020      Lab Results   Component Value Date    POTASSIUM 4.2 11/10/2021    POTASSIUM 4.1 08/27/2020     Lab Results   Component Value Date    CHLORIDE 105 11/10/2021    CHLORIDE 109 08/27/2020     Lab Results   Component Value Date    IGOR 9.3 11/10/2021    IGOR 9.6 08/27/2020     Lab Results   Component Value Date    CO2 31 11/10/2021    CO2 31 08/27/2020     Lab Results   Component Value Date    BUN 30 11/10/2021    BUN 19 08/27/2020     Lab Results   Component Value Date    CR 1.11 11/10/2021    CR 0.98 08/27/2020     Lab Results    Component Value Date     11/10/2021     08/27/2020       LAST CBC:  Lab Results   Component Value Date    WBC 8.9 11/10/2021    WBC 7.6 12/08/2016     Lab Results   Component Value Date    RBC 3.88 11/10/2021    RBC 3.62 12/08/2016     Lab Results   Component Value Date    HGB 10.2 11/10/2021    HGB 10.6 04/07/2021     Lab Results   Component Value Date    HCT 35.6 11/10/2021    HCT 34.3 12/08/2016     Lab Results   Component Value Date    MCV 92 11/10/2021    MCV 95 12/08/2016     Lab Results   Component Value Date    MCH 26.3 11/10/2021    MCH 27.6 12/08/2016     Lab Results   Component Value Date    MCHC 28.7 11/10/2021    MCHC 29.2 12/08/2016     Lab Results   Component Value Date    RDW 19.1 11/10/2021    RDW 14.6 12/08/2016     Lab Results   Component Value Date     11/10/2021     12/08/2016         EKG sinus rhythm, left axis deviation    Echo 9/20/21 : Interpretation Summary Left ventricular systolic function is normal. The visual ejection fraction is60-65%. No regional wall motion abnormalities noted.The right ventricular systolic function is normal.Moderate to severe valvular aortic stenosis. The calculated aortic valve areais 1.0 cm^2. The mean AoV pressure gradient is 37.5 mmHg.No prior study.      ASSESSMENT:  1. Moderate to severe, approaching severe aortic stenosis.  2. Heart failure with preserved ejection fraction.  3. Newly diagnosed pancreatic head mass which is B-cell lymphoma, for which she is undergoing further evaluation determined whether it is high-grade or low-grade and appropriate treatment options.  4. Recent severe upper GI bleeding.  She states melena has resolved.  5. Hypertension, controlled  6. Dyslipidemia  7. Chronic edema, reportedly stable, on furosemide      NYHA class III  STS risk estimate 4%    RECOMMENDATIONS:  1. We will follow-up with her after she undergoes further testing and differentiation for the B-cell lymphoma.  2. Fortunately her  aortic stenosis is more in the moderate to severe range and does not need to be urgently treated.  3. We will defer coronary angiography at this time due to ongoing cancer and GI evaluation due to concern for risk of bleeding.  4. We will also defer scheduling TAVR CT at this time until prognosis from a cancer standpoint is more clear.  5. We discussed the general risk and benefits and treatment options for severe aortic stenosis including surgical and transcatheter valves.    Mary Jo Rodriguez MD [facility]  Text Page

## 2021-11-11 NOTE — NURSING NOTE
TAVR Coordinator visit:  Provided additional education regarding TAVR procedure, after being present for discussion with physician. Explained the work-up process and next steps for patient. Patient provided our direct contact number and instructed to call with any questions.     Completed frailty testing and KCCQ.   5 meter walk: Unable, in wheelchair    KCCQ Results:   1a. 6  1b. 6  1c. 6  2. 2  3. 3  4. 2  5. 1  6. 2  7. 2  8a. 6  8b. 6  8c. 4    Pt to have clearance by Dr. Srinivasan for recent finding of lymphoma.  Needs OK to proceed by GI.  Needs dental clearance  Needs TAVR CT and coronary angiogram yet.  Daughter given my number and will call us back with update.    Remedios Moore RN  [facility]

## 2021-11-16 ENCOUNTER — PATIENT OUTREACH (OUTPATIENT)
Dept: NURSING | Facility: CLINIC | Age: 81
End: 2021-11-16
Payer: MEDICARE

## 2021-11-16 ENCOUNTER — TRANSFERRED RECORDS (OUTPATIENT)
Dept: HEALTH INFORMATION MANAGEMENT | Facility: CLINIC | Age: 81
End: 2021-11-16

## 2021-11-16 NOTE — PROGRESS NOTES
Clinic Care Coordination Contact  Community Health Worker Follow Up        Goals:   Goals Addressed as of 11/16/2021 at 4:11 PM                    Today    11/4/21       Patient Stated       1. Psychosocial (pt-stated)   80%  80%    Added 7/17/20 by Philip Beltran RN      Goal Statement: I will explore additional options for support.  Date Goal set: 7/17/20 // revised on 2/9/21  Barriers: limited mobility, poor eyesight, limited knowledge of available resources  Strengths: motivated to find additional sources of help  Date to Achieve By: 1/31/21 // extended to 8/30/21  Patient expressed understanding of goal: Yes    Action steps to achieve this goal:  1. I will inquire about financial resources through Financial Resource referral with help from Care Coordination.  2. I will explore community options for help with cleaning/de-cluttering my home.  3. I will continue leaning on support from my children/family for the things I am unable to do.  4. I will consider in-home options for additional support.  5. I will continue working with Care Coordination to address barriers to achieving my goal.        Discussed:    Patient stated she has physical therapy once a week, and a nurse visit weekly. Patient stated the nurse did not show up today as scheduled.    Patient stated she can walk pretty good with both canes, but she has sharp pain in her side.  Patient stated when she hiccups, she can feel the pain.    Patient stated she checks her oxygen levels daily.  Patient can tell the difference when her oxygen is around 90, she doesn't have too much energy.  When her oxygen is in the high 90's or 100 she can tell the difference.    Patient stated she gets tired quickly.    Patient stated she hasn't done too much around the house.  Patient stated a gallon of milk is too much for her to lift.    Patient stated she fell alongside her bed, not all the way on the floor.  Patient was black and blue.  Patient was constipated after  the fall, and went to the ED at [facility].    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    Patient stated her daughter has been driving her to appointments.  Her daughter's  fell and may need ankle surgery.  Her other daughter recently got a new job and her  sprained his back, so patient stated she is not available too much to help at this time.    Patient stated she plans to call the cleaning lady soon to help around the house.    Patient stated [facility] [place] did a good job.      Patient stated she doesn't have too many family members left.  Patient thanked CHW for calling and checking in.       Intervention and Education during outreach: CHW encouraged patient to contact CC if there are any other changes or resources needed.  Patient acknowledged understanding.     Plan: CHW will outreach in one month.      ZANE Demarco  Clinic Care Coordination  [facility]: [facility] [place], [facility]  Phone: [phone]

## 2021-11-17 ENCOUNTER — PATIENT OUTREACH (OUTPATIENT)
Dept: CARE COORDINATION | Facility: CLINIC | Age: 81
End: 2021-11-17
Payer: MEDICARE

## 2021-11-17 ASSESSMENT — ACTIVITIES OF DAILY LIVING (ADL): DEPENDENT_IADLS:: TRANSPORTATION

## 2021-11-17 NOTE — PROGRESS NOTES
Clinic Care Coordination Contact      Care Coordination Clinician Chart Review  Situation: Patient chart reviewed by care coordinator.       Background: Care Coordination initial assessment and enrollment to Care Coordination was 06/29/2021.   Patient centered goals were developed with participation from patient.  NOMAN ALONSO handed patient off to CHW for continued outreach every 30 days.        Assessment: Per chart review, patient outreach completed by CC CHW on 11/16/2021  Patient is actively working to accomplish goal.  Patient's goal remains appropriate and relevant at this time.   Patient is due for updated Plan of Care.  Annual assessment will be due 6/29/2022.      Goals        Patient Stated       1. Psychosocial (pt-stated)       Goal Statement: I will explore additional options for support in the next 3 months.   Date Goal set: 7/17/20 // revised on 2/9/21  Update 11/17/2021  Barriers: limited mobility, poor eyesight, limited knowledge of available resources  Strengths: motivated to find additional sources of help  Date to Achieve By: 1/31/21 // extended to 8/30/21  Update achieve date: 2/17/2022  Patient expressed understanding of goal: Yes    Action steps to achieve this goal:  1. I will inquire about financial resources through Financial Resource referral with help from Care Coordination.  2. I will explore community options for help with cleaning/de-cluttering my home.  3. I will continue leaning on support from my children/family for the things I am unable to do.  4. I will consider in-home options for additional support.  5. I will continue working with Care Coordination to address barriers to achieving my goal.                Plan/Recommendations: The patient will continue working with Care Coordination to achieve goal as above.  CHW will involve NOMAN ALONSO as needed or if patient is ready to move to maintenance.  NOMAN ALONSO will continue to monitor progress to goals and CHW outreaches every 6 weeks.   Plan of  Care updated and mailed to patient: MIQUEL Berry  Clinic Care Coordinator  [facility] and [facility] [place]  [phone]  [email or web address]

## 2021-11-18 ENCOUNTER — HOSPITAL ENCOUNTER (OUTPATIENT)
Dept: PET IMAGING | Facility: CLINIC | Age: 81
Discharge: HOME | End: 2021-11-18
Attending: INTERNAL MEDICINE | Admitting: INTERNAL MEDICINE
Payer: MEDICARE

## 2021-11-18 DIAGNOSIS — C85.99 NON-HODGKIN LYMPHOMA OF SOLID ORGAN EXCLUDING SPLEEN, UNSPECIFIED NON-HODGKIN LYMPHOMA TYPE (H): ICD-10-CM

## 2021-11-18 PROCEDURE — 78816 PET IMAGE W/CT FULL BODY: CPT | Mod: PI,MG

## 2021-11-18 PROCEDURE — 78816 PET IMAGE W/CT FULL BODY: CPT | Mod: 26

## 2021-11-18 PROCEDURE — 343N000001 HC RX 343: Performed by: INTERNAL MEDICINE

## 2021-11-18 PROCEDURE — A9552 F18 FDG: HCPCS | Performed by: INTERNAL MEDICINE

## 2021-11-18 PROCEDURE — G1004 CDSM NDSC: HCPCS | Mod: GC

## 2021-11-18 RX ADMIN — FLUDEOXYGLUCOSE F-18 14.04 MCI.: 500 INJECTION, SOLUTION INTRAVENOUS at 12:30

## 2021-11-22 ENCOUNTER — OFFICE VISIT (OUTPATIENT)
Dept: INTERNAL MEDICINE | Facility: CLINIC | Age: 81
End: 2021-11-22
Payer: MEDICARE

## 2021-11-22 VITALS
TEMPERATURE: 97.1 F | HEART RATE: 62 BPM | OXYGEN SATURATION: 98 % | RESPIRATION RATE: 18 BRPM | HEIGHT: 60 IN | WEIGHT: 251 LBS | SYSTOLIC BLOOD PRESSURE: 108 MMHG | DIASTOLIC BLOOD PRESSURE: 54 MMHG | BODY MASS INDEX: 49.28 KG/M2

## 2021-11-22 DIAGNOSIS — Z00.00 ENCOUNTER FOR SUBSEQUENT ANNUAL WELLNESS VISIT IN MEDICARE PATIENT: Primary | ICD-10-CM

## 2021-11-22 DIAGNOSIS — E78.5 HYPERLIPIDEMIA LDL GOAL <100: ICD-10-CM

## 2021-11-22 DIAGNOSIS — E03.9 HYPOTHYROIDISM, UNSPECIFIED TYPE: ICD-10-CM

## 2021-11-22 DIAGNOSIS — N18.30 STAGE 3 CHRONIC KIDNEY DISEASE, UNSPECIFIED WHETHER STAGE 3A OR 3B CKD (H): ICD-10-CM

## 2021-11-22 DIAGNOSIS — Z12.11 COLON CANCER SCREENING: ICD-10-CM

## 2021-11-22 DIAGNOSIS — E11.22 TYPE 2 DIABETES MELLITUS WITH STAGE 3 CHRONIC KIDNEY DISEASE, WITHOUT LONG-TERM CURRENT USE OF INSULIN, UNSPECIFIED WHETHER STAGE 3A OR 3B CKD (H): ICD-10-CM

## 2021-11-22 DIAGNOSIS — C85.12 B-CELL LYMPHOMA OF INTRATHORACIC LYMPH NODES, UNSPECIFIED B-CELL LYMPHOMA TYPE (H): ICD-10-CM

## 2021-11-22 DIAGNOSIS — I10 BENIGN ESSENTIAL HYPERTENSION: Chronic | ICD-10-CM

## 2021-11-22 DIAGNOSIS — E66.01 SEVERE OBESITY (BMI >= 40) (H): ICD-10-CM

## 2021-11-22 DIAGNOSIS — N18.30 TYPE 2 DIABETES MELLITUS WITH STAGE 3 CHRONIC KIDNEY DISEASE, WITHOUT LONG-TERM CURRENT USE OF INSULIN, UNSPECIFIED WHETHER STAGE 3A OR 3B CKD (H): ICD-10-CM

## 2021-11-22 LAB
CHOLEST SERPL-MCNC: 159 MG/DL
CREAT UR-MCNC: 150 MG/DL
FASTING STATUS PATIENT QL REPORTED: YES
HBA1C MFR BLD: 5 % (ref 0–5.6)
HDLC SERPL-MCNC: 51 MG/DL
LDLC SERPL CALC-MCNC: 81 MG/DL
MICROALBUMIN UR-MCNC: 18 MG/L
MICROALBUMIN/CREAT UR: 12 MG/G CR (ref 0–25)
NONHDLC SERPL-MCNC: 108 MG/DL
TRIGL SERPL-MCNC: 133 MG/DL
TSH SERPL DL<=0.005 MIU/L-ACNC: 2.68 MU/L (ref 0.4–4)

## 2021-11-22 PROCEDURE — G0439 PPPS, SUBSEQ VISIT: HCPCS | Performed by: INTERNAL MEDICINE

## 2021-11-22 PROCEDURE — 82274 ASSAY TEST FOR BLOOD FECAL: CPT | Performed by: INTERNAL MEDICINE

## 2021-11-22 PROCEDURE — 80061 LIPID PANEL: CPT | Performed by: INTERNAL MEDICINE

## 2021-11-22 PROCEDURE — 36415 COLL VENOUS BLD VENIPUNCTURE: CPT | Performed by: INTERNAL MEDICINE

## 2021-11-22 PROCEDURE — 82043 UR ALBUMIN QUANTITATIVE: CPT | Performed by: INTERNAL MEDICINE

## 2021-11-22 PROCEDURE — 84443 ASSAY THYROID STIM HORMONE: CPT | Performed by: INTERNAL MEDICINE

## 2021-11-22 PROCEDURE — 83036 HEMOGLOBIN GLYCOSYLATED A1C: CPT | Performed by: INTERNAL MEDICINE

## 2021-11-22 RX ORDER — SIMVASTATIN 10 MG
10 TABLET ORAL AT BEDTIME
Qty: 90 TABLET | Refills: 3 | Status: SHIPPED | OUTPATIENT
Start: 2021-11-22 | End: 2022-03-31

## 2021-11-22 ASSESSMENT — ACTIVITIES OF DAILY LIVING (ADL)
CURRENT_FUNCTION: PREPARING MEALS REQUIRES ASSISTANCE
CURRENT_FUNCTION: SHOPPING REQUIRES ASSISTANCE
CURRENT_FUNCTION: HOUSEWORK REQUIRES ASSISTANCE
CURRENT_FUNCTION: BATHING REQUIRES ASSISTANCE
CURRENT_FUNCTION: TRANSPORTATION REQUIRES ASSISTANCE

## 2021-11-22 ASSESSMENT — MIFFLIN-ST. JEOR: SCORE: 1515.03

## 2021-11-22 NOTE — PROGRESS NOTES
"SUBJECTIVE:   Lucille Rojas is a 81 year old female who presents for Preventive Visit.  Patient has been advised of split billing requirements and indicates understanding: Yes   Are you in the first 12 months of your Medicare coverage?  No    Healthy Habits:     In general, how would you rate your overall health?  Poor    Frequency of exercise:  6-7 days/week    Duration of exercise:  Less than 15 minutes    Do you usually eat at least 4 servings of fruit and vegetables a day, include whole grains    & fiber and avoid regularly eating high fat or \"junk\" foods?  No    Taking medications regularly:  Yes    Medication side effects:  None    Ability to successfully perform activities of daily living:  Transportation requires assistance, shopping requires assistance, preparing meals requires assistance, housework requires assistance and bathing requires assistance    Home Safety:  No safety concerns identified    Hearing Impairment:  No hearing concerns    In the past 6 months, have you been bothered by leaking of urine? Yes    In general, how would you rate your overall mental or emotional health?  Fair      PHQ-2 Total Score: 2    Additional concerns today:  Yes    Do you feel safe in your environment? Yes    Have you ever done Advance Care Planning? (For example, a Health Directive, POLST, or a discussion with a medical provider or your loved ones about your wishes): Yes, advance care planning is on file.     Fall risk  Fallen 2 or more times in the past year?: (P) Yes  Any fall with injury in the past year?: (P) Yes    Cognitive Screening   1) Repeat 3 items (Leader, Season, Table)    2) Clock draw: NORMAL  3) 3 item recall: Recalls 3 objects  Results: NORMAL clock, 1-2 items recalled: COGNITIVE IMPAIRMENT LESS LIKELY    Mini-CogTM Copyright ALEAH Jones. Licensed by the author for use in [facility]; reprinted with permission (mariah@.[email or web address]). All rights reserved.          Reviewed and updated as needed " this visit by clinical staff  Tobacco               Reviewed and updated as needed this visit by Provider               Social History     Tobacco Use     Smoking status: Never Smoker     Smokeless tobacco: Never Used   Substance Use Topics     Alcohol use: Yes     Alcohol/week: 0.0 standard drinks     Comment: rarely     If you drink alcohol do you typically have >3 drinks per day or >7 drinks per week? No    Alcohol Use 12/8/2016   Prescreen: >3 drinks/day or >7 drinks/week? The patient does not drink >3 drinks per day nor >7 drinks per week.           Current providers sharing in care for this patient include:   Patient Care Team:  Fausto Flynn MD as PCP - General (Internal Medicine)  Fausto Flynn MD as Assigned PCP  Yonny Roman MD as MD (INTERNAL MEDICINE - ENDOCRINOLOGY, DIABETES & METABOLISM)  Rosanne Valadez MA as Community Health Worker (Primary Care - CC)  Archie Aguirre DPM as Assigned Musculoskeletal Provider  Ginna Oscar LSW as Lead Care Coordinator    The following health maintenance items are reviewed in Epic and correct as of today:  Health Maintenance Due   Topic Date Due     ANNUAL REVIEW OF HM ORDERS  Never done     ZOSTER IMMUNIZATION (1 of 2) Never done     DTAP/TDAP/TD IMMUNIZATION (2 - Td or Tdap) 01/01/2019     DIABETIC FOOT EXAM  06/21/2019     EYE EXAM  06/01/2021     LIPID  06/04/2021     MEDICARE ANNUAL WELLNESS VISIT  08/27/2021     MICROALBUMIN  08/27/2021     A1C  10/07/2021       Immunization History   Administered Date(s) Administered     COVID-19,PF,Pfizer (12+ Yrs) 02/09/2021, 03/02/2021, 11/02/2021     Influenza (High Dose) 3 valent vaccine 10/29/2013, 11/20/2014, 09/24/2015, 10/20/2016, 11/19/2018     Influenza Quad, Recombinant, pf(RIV4) (Flublok) 12/18/2019     Influenza, Quad, High Dose, Pf, 65yr+ (Fluzone HD) 10/28/2020, 11/02/2021     Pneumo Conj 13-V (2010&after) 09/24/2015     Pneumococcal 23 valent 01/01/2013     Tdap (Adacel,Boostrix)  01/01/2009         Pertinent mammograms are reviewed under the imaging tab.    Review of Systems  CONSTITUTIONAL: NEGATIVE for fever, chills, change in weight  EYES: NEGATIVE for vision changes or irritation  ENT/MOUTH: NEGATIVE for ear, mouth and throat problems  RESP: NEGATIVE for significant cough or SOB  CV: NEGATIVE for chest pain, palpitation  GI: NEGATIVE for nausea, abdominal pain, heartburn, or change in bowel habits  : NEGATIVE for frequency, dysuria, or hematuria  MUSCULOSKELETAL: NEGATIVE for significant arthralgias or myalgia  NEURO: NEGATIVE for weakness, dizziness or paresthesias  HEME: NEGATIVE for bleeding problems  PSYCHIATRIC: NEGATIVE for changes in mood or affect    OBJECTIVE:   /54   Pulse 62   Temp 97.1  F (36.2  C) (Temporal)   Resp 18   Ht 1.524 m (5')   Wt 113.9 kg (251 lb)   SpO2 98%   BMI 49.02 kg/m   Estimated body mass index is 49.02 kg/m  as calculated from the following:    Height as of this encounter: 1.524 m (5').    Weight as of this encounter: 113.9 kg (251 lb).  Physical Exam  GENERAL: alert and no distress in wheelchair  EYES: Eyes grossly normal to inspection, PERRL and conjunctivae and sclerae normal  HENT: ear canals and TM's normal, nose and mouth without ulcers or lesions  NECK: no adenopathy, no asymmetry, masses, or scars and thyroid normal to palpation  RESP: lungs clear to auscultation - no rales, rhonchi or wheezes  CV: regular rate and rhythm, normal S1 S2, no S3 or S4, no  click or rub  ABDOMEN: obese  NEURO: Normal strength and tone, mentation intact and speech normal  PSYCH: mentation appears normal, affect normal/bright      ASSESSMENT / PLAN:   (Z00.00) Encounter for subsequent annual wellness visit in Medicare patient  (primary encounter diagnosis)  Comment: Kimani updated vaccinations as listed inclusive of tetanus and shingles vaccinations.  Plan: Lipid Profile, TSH with free T4 reflex        Not interested in ongoing mammography.    (E66.01)  Severe obesity (BMI >= 40) (H)  Comment: Encouraged ongoing weight loss  Plan:     (E03.9) Hypothyroidism, unspecified type  Comment: Labs ordered as fasting per routine  Plan: TSH with free T4 reflex            (E78.5) Hyperlipidemia LDL goal <100  Comment: Continue with statin therapy and recheck lipid panel  Plan: Lipid Profile, simvastatin (ZOCOR) 10 MG tablet            (I10) Benign essential hypertension  Comment: Stable on therapy continue with current medical  Plan:     (E11.22,  N18.30) Type 2 diabetes mellitus with stage 3 chronic kidney disease, without long-term current use of insulin, unspecified whether stage 3a or 3b CKD (H)  Comment: Encouraged ongoing blood sugars to be checked, recommended annual eye exam  Plan: Hemoglobin A1c, Albumin Random Urine         Quantitative with Creat Ratio, simvastatin         (ZOCOR) 10 MG tablet            (N18.30) Stage 3 chronic kidney disease, unspecified whether stage 3a or 3b CKD (H)  Comment: Recheck creatinine as stable.  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  Plan:   Hemoglobin   Date Value Ref Range Status   11/10/2021 10.2 (L) 11.7 - 15.7 g/dL Final   04/07/2021 10.6 (L) 11.7 - 15.7 g/dL Final   ]    Patient has been advised of split billing requirements and indicates understanding: Yes  COUNSELING:  Reviewed preventive health counseling, as reflected in patient instructions       Regular exercise       Healthy diet/nutrition    Estimated body mass index is 49.02 kg/m  as calculated from the following:    Height as of  this encounter: 1.524 m (5').    Weight as of this encounter: 113.9 kg (251 lb).    Weight management plan: Discussed healthy diet and exercise guidelines    She reports that she has never smoked. She has never used smokeless tobacco.      Appropriate preventive services were discussed with this patient, including applicable screening as appropriate for cardiovascular disease, diabetes, osteopenia/osteoporosis, and glaucoma.  As appropriate for age/gender, discussed screening for colorectal cancer, prostate cancer, breast cancer, and cervical cancer. Checklist reviewing preventive services available has been given to the patient.    Reviewed patients plan of care and provided an AVS. The Basic Care Plan (routine screening as documented in Health Maintenance) for Lucille meets the Care Plan requirement. This Care Plan has been established and reviewed with the Patient.    Counseling Resources:  ATP IV Guidelines  Pooled Cohorts Equation Calculator  Breast Cancer Risk Calculator  Breast Cancer: Medication to Reduce Risk  FRAX Risk Assessment  ICSI Preventive Guidelines  Dietary Guidelines for Americans, 2010  USDA's MyPlate  ASA Prophylaxis  Lung CA Screening    Fausto Flynn MD  [facility]    Identified Health Risks:

## 2021-11-23 ENCOUNTER — TELEPHONE (OUTPATIENT)
Dept: CARDIOLOGY | Facility: CLINIC | Age: 81
End: 2021-11-23

## 2021-11-23 ENCOUNTER — OFFICE VISIT (OUTPATIENT)
Dept: PODIATRY | Facility: CLINIC | Age: 81
End: 2021-11-23
Payer: MEDICARE

## 2021-11-23 VITALS — BODY MASS INDEX: 49.28 KG/M2 | HEIGHT: 60 IN | WEIGHT: 251 LBS

## 2021-11-23 DIAGNOSIS — N18.30 TYPE 2 DM WITH CKD STAGE 3 AND HYPERTENSION (H): ICD-10-CM

## 2021-11-23 DIAGNOSIS — M20.5X1 HALLUX LIMITUS OF RIGHT FOOT: ICD-10-CM

## 2021-11-23 DIAGNOSIS — I12.9 TYPE 2 DM WITH CKD STAGE 3 AND HYPERTENSION (H): ICD-10-CM

## 2021-11-23 DIAGNOSIS — L97.511 DIABETIC ULCER OF TOE OF RIGHT FOOT ASSOCIATED WITH TYPE 2 DIABETES MELLITUS, LIMITED TO BREAKDOWN OF SKIN (H): Primary | ICD-10-CM

## 2021-11-23 DIAGNOSIS — E11.621 DIABETIC ULCER OF TOE OF RIGHT FOOT ASSOCIATED WITH TYPE 2 DIABETES MELLITUS, LIMITED TO BREAKDOWN OF SKIN (H): Primary | ICD-10-CM

## 2021-11-23 DIAGNOSIS — E11.22 TYPE 2 DM WITH CKD STAGE 3 AND HYPERTENSION (H): ICD-10-CM

## 2021-11-23 PROCEDURE — 99213 OFFICE O/P EST LOW 20 MIN: CPT | Mod: 25 | Performed by: PODIATRIST

## 2021-11-23 PROCEDURE — 97597 DBRDMT OPN WND 1ST 20 CM/<: CPT | Performed by: PODIATRIST

## 2021-11-23 ASSESSMENT — MIFFLIN-ST. JEOR: SCORE: 1515.03

## 2021-11-23 NOTE — TELEPHONE ENCOUNTER
Called daughter to discuss their appointment with Dr. Rodriguez on 11/29/21.  At this time they will cancel that appointment.  Scheduled to see Dr. Srinivasan tomorrow to discuss finding of pancreatic mass.  Daughter will call me Monday with update.  Patient scheduled to see Dentist on 12/16/21.

## 2021-11-23 NOTE — PROGRESS NOTES
"ASSESSMENT:  Encounter Diagnoses   Name Primary?     Diabetic ulcer of toe of right foot associated with type 2 diabetes mellitus, limited to breakdown of skin (H) Yes     Type 2 DM with CKD stage 3 and hypertension (H)      Hallux limitus of right foot      MEDICAL DECISION MAKING:  I explained how her limited right first metatarsophalangeal joint motion contributes to the nucleated callus and underlying ulceration.  She has at risk for ongoing pain and infection.     Excisional debridement was performed.  Please see below.    Basic wound cares were reviewed:  Daily cleansing with soap water separate from bath water  Blot dry  Application of a topical antibiotic  Cover with light dressing    At this time I do not think additional offloading is needed, beyond her diabetic shoes and orthoses.  Simple paring and then debridement of the wound should help offload and allow for healing.    Nonetheless, I would like her back in 1 month for a check of healing status.    Excisional Debridement    The excisional debridement procedure was discussed.  This included the goals of removing non-viable tissue, evaluating the full extent of wound, and promoting wound healing.  Lucille Rojas  provided verba consent.  The \"Time Out\" was called, confirming procedure and location.     Using a sterile #15 blade and tissue nippers, excisional debridment of the right hallux ulcer was performed. The hyperkeratotic eschar surrounding the wound was removed, by excising skin edges back to healthy, bleeding tissue. Non-viable tissue was excised.  Debridement was carried out to the depth of deeper skin. The ulcer base was scraped to remove bioburden and promote healing.  The area debrided was less than 20 square cm.   There was moderate bleeding with the procedure. No anesthesia was needed due to peripheral neuropathy.   A sterile dressing was applied.      Disclaimer: This note consists of symbols derived from keyboarding, dictation and/or " voice recognition software. As a result, there may be errors in the script that have gone undetected. Please consider this when interpreting information found in this chart.    Michael Nolasco, ANNA, FACFAS, MS    [facility] of Podiatry/Foot & Ankle Surgery      ____________________________________________________________________    HPI:       Lucille presents today with her .  She has pain or soreness in the right foot.  This has been going on for 5 years.  She rates her pain a 7 out of 10.  She experiences it daily with standing and walking.  Type 2 DM  Reports numbness in feet  Last Hgb A1C = 5.0.    Lucille shares multiple other medical issues including a lesion on her pancreas, the need for a new heart valve, and fluid on her lungs.  She said that hospice has been discussed.  *  Past Medical History:   Diagnosis Date     Cancer (H) 10/2021     Depressive disorder      Heart disease 10/2021     History of blood transfusion 20/2021     HTN (hypertension) 5/9/2013     Hyperlipidemia LDL goal <130 5/9/2013     Hypothyroidism 5/9/2013     Macular degeneration 9/18/2013     Sleep apnea     CPAP at night, O2 during naps     Type 2 diabetes, HbA1C goal < 8% (H) 5/9/2013   *  *  Past Surgical History:   Procedure Laterality Date     APPENDECTOMY       COLONOSCOPY       COLONOSCOPY N/A 10/27/2014    Procedure: COMBINED COLONOSCOPY, SINGLE OR MULTIPLE BIOPSY/POLYPECTOMY BY BIOPSY;  Surgeon: Jose Alfredo Morejon MD;  Location:  GI     ESOPHAGOSCOPY, GASTROSCOPY, DUODENOSCOPY (EGD), COMBINED N/A 9/21/2021    Procedure: ESOPHAGOGASTRODUODENOSCOPY, WITH FINE NEEDLE ASPIRATION BIOPSY, WITH ENDOSCOPIC ULTRASOUND GUIDANCE;  Surgeon: Vera Benitez MD;  Location:  GI     ESOPHAGOSCOPY, GASTROSCOPY, DUODENOSCOPY (EGD), COMBINED N/A 9/21/2021    Procedure: Esophagogastroduodenoscopy, With Biopsy;  Surgeon: Vera Benitez MD;  Location:  GI     ESOPHAGOSCOPY, GASTROSCOPY, DUODENOSCOPY  (EGD), COMBINED N/A 10/5/2021    Procedure: ESOPHAGOGASTRODUODENOSCOPY, WITH FINE NEEDLE ASPIRATION BIOPSY, WITH ENDOSCOPIC ULTRASOUND GUIDANCE;  Surgeon: Dixon Olivier MD;  Location: [facility]     HERNIA REPAIR      inguinal x 2     TONSILLECTOMY     *  *  Current Outpatient Medications   Medication Sig Dispense Refill     ACE/ARB/ARNI NOT PRESCRIBED (INTENTIONAL) Please choose reason not prescribed, below       acetaminophen (TYLENOL) 325 MG tablet Take 650 mg by mouth 3 times daily as needed for mild pain        aspirin 81 MG tablet Take 1 tablet by mouth daily. 30 tablet 0     carboxymethylcellulose PF (REFRESH PLUS) 0.5 % ophthalmic solution Place 2 drops into both eyes 2 times daily       citalopram (CELEXA) 20 MG tablet Take 1 tablet (20 mg) by mouth daily 90 tablet 1     CONTOUR NEXT TEST test strip USE TO TEST BLOOD GLUCOSE ONCE DAILY       furosemide (LASIX) 20 MG tablet Take 1 tablet (20 mg) by mouth daily       levothyroxine (SYNTHROID/LEVOTHROID) 200 MCG tablet TAKE ONE TABLET BY MOUTH ONE TIME DAILY  90 tablet 3     Microlet Lancets MISC USE TO TEST BLOOD GLUCOSE ONCE DAILY       Multiple Vitamins-Minerals (PRESERVISION AREDS) CAPS Take 1 tablet by mouth 2 times daily        nystatin (MYCOSTATIN) 826024 UNIT/GM external powder Apply topically 2 times daily Under breasts and skin folds BID & BID PRN for redness 60 g 1     order for DME Equipment being ordered: wheeled walker with hand breaks 1 Device 0     pantoprazole (PROTONIX) 40 MG EC tablet Take 1 tablet (40 mg) by mouth every morning (before breakfast) 90 tablet 3     polyethylene glycol (MIRALAX) 17 GM/Dose powder Take 17 g (1 capful) by mouth daily 527 g 0     senna-docusate (SENOKOT-S/PERICOLACE) 8.6-50 MG tablet Take 1 tablet by mouth daily for 20 days 20 tablet 0     simvastatin (ZOCOR) 10 MG tablet Take 1 tablet (10 mg) by mouth At Bedtime 90 tablet 3         EXAM:    Vitals: Ht 1.524 m (5')   Wt 113.9 kg (251 lb)   BMI 49.02  kg/m    BMI: Body mass index is 49.02 kg/m .    Constitutional:  Lucille Rojas is in no apparent distress, appears well-nourished.  Cooperative with history and physical exam.    Vascular:  Pedal pulses are faintly palpable for both the DP and PT arteries.  CFT < 3 sec.  No edema.      Neuro: Light touch sensation is intact to the L4, L5, S1 distributions  No evidence of weakness, spasticity, or contracture in the lower extremities.     Derm: There is a thick, nucleated, painful hyperkeratotic lesion on the plantar aspect of the right hallux.  There is hyperpigmentation, indicative of intraepidermal bleeding.  Post debridement, there is a 0.4 cm in diameter ulceration.  Depth to deeper skin.  No clinical signs of infection.    Musculoskeletal:    Lower extremity muscle strength is normal.  Flatfoot structure.

## 2021-11-23 NOTE — PATIENT INSTRUCTIONS
Thank you for choosing [facility] / Foot [facility] Ankle Surgery!    DR. GORMAN'S CLINIC LOCATIONS     [facility] SCHEDULE SURGERY: [phone]   [address] [place] [address] APPOINTMENTS: [phone]   [place] [address] BILLING QUESTIONS: [phone]  [phone] RADIOLOGY: [phone]       [place]    [id number]    [place], [place] [address] [phone]  [phone]      Follow up: 1 month    Next steps: wound care      Wound Care Recommendations:    1)  Keep the wound covered by a bandage when bathing.    2)  Gently clean the wound with soap water, separate from bath/shower water.      3)  Each day, apply a topical antibiotic ointment to the wound (Neosporin, Triple antibiotic, Bacitracin).   Cover with large band-aid or gauze.      5)  Please seek immediate medical attention if any increasing redness, drainage, smell, or pain related to the wound.     6)  Please return to clinic in the period of time requested by Dr. Gorman.

## 2021-11-23 NOTE — LETTER
"    11/23/2021         RE: Lucille Rojas  [address]  [facility] [address]        Dear Colleague,    Thank you for referring your patient, Lucille Rojas, to the [facility]. Please see a copy of my visit note below.    ASSESSMENT:  Encounter Diagnoses   Name Primary?     Diabetic ulcer of toe of right foot associated with type 2 diabetes mellitus, limited to breakdown of skin (H) Yes     Type 2 DM with CKD stage 3 and hypertension (H)      Hallux limitus of right foot      MEDICAL DECISION MAKING:  I explained how her limited right first metatarsophalangeal joint motion contributes to the nucleated callus and underlying ulceration.  She has at risk for ongoing pain and infection.     Excisional debridement was performed.  Please see below.    Basic wound cares were reviewed:  Daily cleansing with soap water separate from bath water  Blot dry  Application of a topical antibiotic  Cover with light dressing    At this time I do not think additional offloading is needed, beyond her diabetic shoes and orthoses.  Simple paring and then debridement of the wound should help offload and allow for healing.    Nonetheless, I would like her back in 1 month for a check of healing status.    Excisional Debridement    The excisional debridement procedure was discussed.  This included the goals of removing non-viable tissue, evaluating the full extent of wound, and promoting wound healing.  Lucille Rojas  provided verba consent.  The \"Time Out\" was called, confirming procedure and location.     Using a sterile #15 blade and tissue nippers, excisional debridment of the right hallux ulcer was performed. The hyperkeratotic eschar surrounding the wound was removed, by excising skin edges back to healthy, bleeding tissue. Non-viable tissue was excised.  Debridement was carried out to the depth of deeper skin. The ulcer base was scraped to remove bioburden and promote healing.  The " area debrided was less than 20 square cm.   There was moderate bleeding with the procedure. No anesthesia was needed due to peripheral neuropathy.   A sterile dressing was applied.      Disclaimer: This note consists of symbols derived from keyboarding, dictation and/or voice recognition software. As a result, there may be errors in the script that have gone undetected. Please consider this when interpreting information found in this chart.    Michael Nolasco, ANNA, FACFAS, MS    [facility]      ____________________________________________________________________    HPI:       Lucille presents today with her .  She has pain or soreness in the right foot.  This has been going on for 5 years.  She rates her pain a 7 out of 10.  She experiences it daily with standing and walking.  Type 2 DM  Reports numbness in feet  Last Hgb A1C = 5.0.    Lucille shares multiple other medical issues including a lesion on her pancreas, the need for a new heart valve, and fluid on her lungs.  She said that hospice has been discussed.  *  Past Medical History:   Diagnosis Date     Cancer (H) 10/2021     Depressive disorder      Heart disease 10/2021     History of blood transfusion 20/2021     HTN (hypertension) 5/9/2013     Hyperlipidemia LDL goal <130 5/9/2013     Hypothyroidism 5/9/2013     Macular degeneration 9/18/2013     Sleep apnea     CPAP at night, O2 during naps     Type 2 diabetes, HbA1C goal < 8% (H) 5/9/2013   *  *  Past Surgical History:   Procedure Laterality Date     APPENDECTOMY       COLONOSCOPY       COLONOSCOPY N/A 10/27/2014    Procedure: COMBINED COLONOSCOPY, SINGLE OR MULTIPLE BIOPSY/POLYPECTOMY BY BIOPSY;  Surgeon: Jose Alfredo Morejon MD;  Location:  GI     ESOPHAGOSCOPY, GASTROSCOPY, DUODENOSCOPY (EGD), COMBINED N/A 9/21/2021    Procedure: ESOPHAGOGASTRODUODENOSCOPY, WITH FINE NEEDLE ASPIRATION BIOPSY, WITH ENDOSCOPIC ULTRASOUND GUIDANCE;  Surgeon: Eli  Vera Wilkinson MD;  Location:  GI     ESOPHAGOSCOPY, GASTROSCOPY, DUODENOSCOPY (EGD), COMBINED N/A 9/21/2021    Procedure: Esophagogastroduodenoscopy, With Biopsy;  Surgeon: Vera Benitez MD;  Location:  GI     ESOPHAGOSCOPY, GASTROSCOPY, DUODENOSCOPY (EGD), COMBINED N/A 10/5/2021    Procedure: ESOPHAGOGASTRODUODENOSCOPY, WITH FINE NEEDLE ASPIRATION BIOPSY, WITH ENDOSCOPIC ULTRASOUND GUIDANCE;  Surgeon: Dixon Olivier MD;  Location:  GI     HERNIA REPAIR      inguinal x 2     TONSILLECTOMY     *  *  Current Outpatient Medications   Medication Sig Dispense Refill     ACE/ARB/ARNI NOT PRESCRIBED (INTENTIONAL) Please choose reason not prescribed, below       acetaminophen (TYLENOL) 325 MG tablet Take 650 mg by mouth 3 times daily as needed for mild pain        aspirin 81 MG tablet Take 1 tablet by mouth daily. 30 tablet 0     carboxymethylcellulose PF (REFRESH PLUS) 0.5 % ophthalmic solution Place 2 drops into both eyes 2 times daily       citalopram (CELEXA) 20 MG tablet Take 1 tablet (20 mg) by mouth daily 90 tablet 1     CONTOUR NEXT TEST test strip USE TO TEST BLOOD GLUCOSE ONCE DAILY       furosemide (LASIX) 20 MG tablet Take 1 tablet (20 mg) by mouth daily       levothyroxine (SYNTHROID/LEVOTHROID) 200 MCG tablet TAKE ONE TABLET BY MOUTH ONE TIME DAILY  90 tablet 3     Microlet Lancets MISC USE TO TEST BLOOD GLUCOSE ONCE DAILY       Multiple Vitamins-Minerals (PRESERVISION AREDS) CAPS Take 1 tablet by mouth 2 times daily        nystatin (MYCOSTATIN) 223749 UNIT/GM external powder Apply topically 2 times daily Under breasts and skin folds BID & BID PRN for redness 60 g 1     order for DME Equipment being ordered: wheeled walker with hand breaks 1 Device 0     pantoprazole (PROTONIX) 40 MG EC tablet Take 1 tablet (40 mg) by mouth every morning (before breakfast) 90 tablet 3     polyethylene glycol (MIRALAX) 17 GM/Dose powder Take 17 g (1 capful) by mouth daily 527 g 0      senna-docusate (SENOKOT-S/PERICOLACE) 8.6-50 MG tablet Take 1 tablet by mouth daily for 20 days 20 tablet 0     simvastatin (ZOCOR) 10 MG tablet Take 1 tablet (10 mg) by mouth At Bedtime 90 tablet 3         EXAM:    Vitals: Ht 1.524 m (5')   Wt 113.9 kg (251 lb)   BMI 49.02 kg/m    BMI: Body mass index is 49.02 kg/m .    Constitutional:  Lucille Rojas is in no apparent distress, appears well-nourished.  Cooperative with history and physical exam.    Vascular:  Pedal pulses are faintly palpable for both the DP and PT arteries.  CFT < 3 sec.  No edema.      Neuro: Light touch sensation is intact to the L4, L5, S1 distributions  No evidence of weakness, spasticity, or contracture in the lower extremities.     Derm: There is a thick, nucleated, painful hyperkeratotic lesion on the plantar aspect of the right hallux.  There is hyperpigmentation, indicative of intraepidermal bleeding.  Post debridement, there is a 0.4 cm in diameter ulceration.  Depth to deeper skin.  No clinical signs of infection.    Musculoskeletal:    Lower extremity muscle strength is normal.  Flatfoot structure.        Again, thank you for allowing me to participate in the care of your patient.        Sincerely,        Michael Nolasco DPM

## 2021-11-24 ENCOUNTER — ONCOLOGY VISIT (OUTPATIENT)
Dept: ONCOLOGY | Facility: CLINIC | Age: 81
End: 2021-11-24
Attending: INTERNAL MEDICINE
Payer: MEDICARE

## 2021-11-24 VITALS
SYSTOLIC BLOOD PRESSURE: 137 MMHG | OXYGEN SATURATION: 96 % | HEART RATE: 66 BPM | RESPIRATION RATE: 16 BRPM | DIASTOLIC BLOOD PRESSURE: 70 MMHG

## 2021-11-24 DIAGNOSIS — C85.12 B-CELL LYMPHOMA OF INTRATHORACIC LYMPH NODES, UNSPECIFIED B-CELL LYMPHOMA TYPE (H): ICD-10-CM

## 2021-11-24 DIAGNOSIS — C85.99 NON-HODGKIN LYMPHOMA OF SOLID ORGAN EXCLUDING SPLEEN, UNSPECIFIED NON-HODGKIN LYMPHOMA TYPE (H): Primary | ICD-10-CM

## 2021-11-24 DIAGNOSIS — Z51.89 ENCOUNTER FOR OTHER SPECIFIED AFTERCARE: ICD-10-CM

## 2021-11-24 PROCEDURE — 99215 OFFICE O/P EST HI 40 MIN: CPT | Performed by: INTERNAL MEDICINE

## 2021-11-24 ASSESSMENT — PAIN SCALES - GENERAL: PAINLEVEL: NO PAIN (1)

## 2021-11-24 NOTE — PROGRESS NOTES
EDUCATION CHEMOTHERAPY INFUSION    Discussed with patient, spouse, and daugter information regarding RCHOP infusions. Went over side affects of medications, as self care while on chemotherapy.   Informed patient and family when he should call the triage nurse at clinic or seek medical attention if you have chills and/or temperature greater than or equal to 100.5, uncontrolled nausea/vomiting, diarrhea, constipation, dizziness, shortness of breath, chest pain, heart palpitations, weakness or any other new or concerning symptoms, questions or concerns.  You can not have any live virus vaccines prior to or during treatment or up to 6 months post infusion.  If you have an upcoming surgery, medical procedure or dental procedure during treatment, this should be discussed with your ordering physician and your surgeon/dentist.  If you are having any concerning symptom, if you are unsure if you should get your next infusion or wish to speak to a provider before your next infusion, please call your care coordinator or triage nurse at your clinic to notify them so we can adequately serve you.    Port placement education provided on what to expect pre , intra, and post op.     Nancy Rajput, daughter, to be called regarding appointments.    Harriet Guillermo RN

## 2021-11-24 NOTE — LETTER
11/24/2021         RE: Lucille Rojas  [address]  [facility] [address]        Dear Colleague,    Thank you for referring your patient, Lucille Rojas, to the [facility]. Please see a copy of my visit note below.    Oncology Rooming Note    November 24, 2021 2:27 PM   Lucille Rojas is a 81 year old female who presents for:    Chief Complaint   Patient presents with     Oncology Clinic Visit     Initial Vitals: There were no vitals taken for this visit. Estimated body mass index is 49.02 kg/m  as calculated from the following:    Height as of 11/23/21: 1.524 m (5').    Weight as of 11/23/21: 113.9 kg (251 lb). There is no height or weight on file to calculate BSA.  Data Unavailable Comment: Data Unavailable   No LMP recorded. Patient has had a hysterectomy.  Allergies reviewed: Yes  Medications reviewed: Yes    Medications: Medication refills not needed today.  Pharmacy name entered into [employer]: [id number] - [place], [place] - [address]    Clinical concerns:  doctor was notified.      Tianna Beckman, [facility]              EDUCATION CHEMOTHERAPY INFUSION    Discussed with patient, spouse, and daugter information regarding RCHOP infusions. Went over side affects of medications, as self care while on chemotherapy.   Informed patient and family when he should call the triage nurse at clinic or seek medical attention if you have chills and/or temperature greater than or equal to 100.5, uncontrolled nausea/vomiting, diarrhea, constipation, dizziness, shortness of breath, chest pain, heart palpitations, weakness or any other new or concerning symptoms, questions or concerns.  You can not have any live virus vaccines prior to or during treatment or up to 6 months post infusion.  If you have an upcoming surgery, medical procedure or dental procedure during treatment, this should be discussed with your ordering physician and your surgeon/dentist.  If you are  having any concerning symptom, if you are unsure if you should get your next infusion or wish to speak to a provider before your next infusion, please call your care coordinator or triage nurse at your clinic to notify them so we can adequately serve you.    Port placement education provided on what to expect pre , intra, and post op.     Nancy Rajput, daughter, to be called regarding appointments.    Harriet Guillermo RN           ONCOLOGY HISTORY: Ms. Rojas is a female with non-hodgkin's lymphoma.  1. On 09/20/2021:   -Hemoglobin of 4.7 with MCV of 67. Normal WBC and platelets.  -Iron of 9 and saturation index of 2%.   -CT chest, abdomen and pelvis reveals large pancreatic head mass.  This encases the celiac trunk, superior mesenteric artery and superior mesenteric vein.  There is moderate-size right pleural effusion. No lymphadenopathy.  2. Thoracocentesis on 09/22/2021. Cytology is negative for malignancy.  3. EUS on 09/21/2021 revealed is a mass in the uncinate process of the pancreas measuring 33 mm.  There was evidence of invasion into superior mesenteric artery and the celiac trunk. No enlarged lymph nodes seen. FNA revealed atypical cells.    4. EGD and EUS repeated on 10/05/2021.  -EGD is normal.  -EUS revealed pancreatic head mass.  FNA revealed CD10-positive B-cell lymphoma. Flow cytometry revealed CD10-positive lambda monotypic B-cells.  5. PET scan on 11/18/2021 revealed 6 cm hypermetabolic pancreatic head mass and borderline hypermetabolic right axillary lymph node.     SUBJECTIVE:  Ms. Rojas is an 81-year-old female with non-Hodgkin's lymphoma involving the pancreas.  PET scan was done on 11/18/2021.  It revealed a 6 cm hypermetabolic pancreatic head mass.  There is borderline hypermetabolic right axillary lymph node.  There is focal uptake in the rib from traumatic fall.     The patient has chronic fatigue.  She was brought in a wheelchair.  No headache.  Some dizziness.  No chest pain.  She gets  short of breath on mild exertion.  No nausea or vomiting.  Appetite is fairly good.  No urinary complaint.  No bleeding.  Some constipation.  No fever or chills.  All other review of systems is negative.     PHYSICAL EXAMINATION:    She is alert, oriented x 3.  Not in distress.   Rest of the systems not examined.     LABORATORY DATA:  CBC and CMP done on 11/10/2021 reviewed.     ASSESSMENT:  1.  An 81-year-old female with non-Hodgkin's lymphoma involving the pancreas.  2.  Anemia.  3.  Elevated creatinine.  4.  Weakness.     PLAN:  1.  PET scan was personally reviewed.  Images shown to the patient and family.  I explained to them there is a hypermetabolic pancreatic head mass.  There is also borderline right axillary lymph node, which could be involved by lymphoma.    2.  Discussed regarding further workup.  I would recommend biopsy to further characterize the lymphoma.  She is agreeable for it.  We will get ultrasound-guided biopsy of either the abdominal mass or the axillary lymph node.     As PET scan does not show any bone lesion, I am not going to get a bone marrow biopsy.  Also, her treatment will not change.    3.  Discussed regarding treatment.  The patient has non-Hodgkin's lymphoma.  I do not know whether it is low-grade or high-grade lymphoma.     The pancreatic head mass is about 6 cm.  Untreated it will cause problems.  We discussed regarding different treatment options.  I would recommend chemotherapy.     I discussed regarding chemotherapy.  The patient is elderly.  She has an ECOG PS of 2. I discussed regarding mini R-CHOP.  This will cover high-grade lymphoma.  Regimen was discussed.  Side effects were discussed.  She is agreeable for it.  She will get mini R-CHOP.  We will give her 6 cycles with growth factor support.     Side-effects reviewed. It can cause hair loss, oral ulcer, thrush, nausea, vomiting, gastric ulcer, neutropenia, infection, anemia, fatigue, thrombocytopenia, bleeding,  cardiomyopathy, neuropathy and other side effects.  She did have an echocardiogram done in September, which revealed normal ejection fraction.    4.  Discussed regarding Port-A-Cath placement.  She is agreeable for it.  That will be scheduled.    5.  The patient and family had few questions, which were all answered.  We will see her when she comes to start chemotherapy.     TOTAL FACE-TO-FACE TIME SPENT:  45 minutes, more than 50% of the time spent in counseling and coordination of care.       This office note has been dictated.          Again, thank you for allowing me to participate in the care of your patient.        Sincerely,        John Srinivasan MD

## 2021-11-24 NOTE — PATIENT INSTRUCTIONS
1. Ultrasound guided biopsy of abdominal mass or right axillary lymph node.  2. Port placement by IR.  3. Side-effect of R-CHOP.  4. Start chemotherapy in next 1-2 weeks.  5. See me or Alexandr Ambriz when she comes to start chemotherapy.

## 2021-11-24 NOTE — PROGRESS NOTES
Oncology Rooming Note    November 24, 2021 2:27 PM   Lucille Rojas is a 81 year old female who presents for:    Chief Complaint   Patient presents with     Oncology Clinic Visit     Initial Vitals: There were no vitals taken for this visit. Estimated body mass index is 49.02 kg/m  as calculated from the following:    Height as of 11/23/21: 1.524 m (5').    Weight as of 11/23/21: 113.9 kg (251 lb). There is no height or weight on file to calculate BSA.  Data Unavailable Comment: Data Unavailable   No LMP recorded. Patient has had a hysterectomy.  Allergies reviewed: Yes  Medications reviewed: Yes    Medications: Medication refills not needed today.  Pharmacy name entered into [employer]: [id number] - [facility] [address]    Clinical concerns:  doctor was notified.      Tianna Beckman, [facility]

## 2021-11-26 LAB — HEMOCCULT STL QL IA: POSITIVE

## 2021-11-27 ENCOUNTER — MYC MEDICAL ADVICE (OUTPATIENT)
Dept: INTERNAL MEDICINE | Facility: CLINIC | Age: 81
End: 2021-11-27
Payer: MEDICARE

## 2021-11-28 PROBLEM — C85.90 NON-HODGKIN'S LYMPHOMA (H): Status: ACTIVE | Noted: 2021-11-28

## 2021-11-28 NOTE — PROGRESS NOTES
ONCOLOGY HISTORY: Ms. Rojas is a female with non-hodgkin's lymphoma.  1. On 09/20/2021:   -Hemoglobin of 4.7 with MCV of 67. Normal WBC and platelets.  -Iron of 9 and saturation index of 2%.   -CT chest, abdomen and pelvis reveals large pancreatic head mass.  This encases the celiac trunk, superior mesenteric artery and superior mesenteric vein.  There is moderate-size right pleural effusion. No lymphadenopathy.  2. Thoracocentesis on 09/22/2021. Cytology is negative for malignancy.  3. EUS on 09/21/2021 revealed is a mass in the uncinate process of the pancreas measuring 33 mm.  There was evidence of invasion into superior mesenteric artery and the celiac trunk. No enlarged lymph nodes seen. FNA revealed atypical cells.    4. EGD and EUS repeated on 10/05/2021.  -EGD is normal.  -EUS revealed pancreatic head mass.  FNA revealed CD10-positive B-cell lymphoma. Flow cytometry revealed CD10-positive lambda monotypic B-cells.  5. PET scan on 11/18/2021 revealed 6 cm hypermetabolic pancreatic head mass and borderline hypermetabolic right axillary lymph node.     SUBJECTIVE:  Ms. Rojas is an 81-year-old female with non-Hodgkin's lymphoma involving the pancreas.  PET scan was done on 11/18/2021.  It revealed a 6 cm hypermetabolic pancreatic head mass.  There is borderline hypermetabolic right axillary lymph node.  There is focal uptake in the rib from traumatic fall.     The patient has chronic fatigue.  She was brought in a wheelchair.  No headache.  Some dizziness.  No chest pain.  She gets short of breath on mild exertion.  No nausea or vomiting.  Appetite is fairly good.  No urinary complaint.  No bleeding.  Some constipation.  No fever or chills.  All other review of systems is negative.     PHYSICAL EXAMINATION:    She is alert, oriented x 3.  Not in distress.   Rest of the systems not examined.     LABORATORY DATA:  CBC and CMP done on 11/10/2021 reviewed.     ASSESSMENT:  1.  An 81-year-old female with  non-Hodgkin's lymphoma involving the pancreas.  2.  Anemia.  3.  Elevated creatinine.  4.  Weakness.     PLAN:  1.  PET scan was personally reviewed.  Images shown to the patient and family.  I explained to them there is a hypermetabolic pancreatic head mass.  There is also borderline right axillary lymph node, which could be involved by lymphoma.    2.  Discussed regarding further workup.  I would recommend biopsy to further characterize the lymphoma.  She is agreeable for it.  We will get ultrasound-guided biopsy of either the abdominal mass or the axillary lymph node.     As PET scan does not show any bone lesion, I am not going to get a bone marrow biopsy.  Also, her treatment will not change.    3.  Discussed regarding treatment.  The patient has non-Hodgkin's lymphoma.  I do not know whether it is low-grade or high-grade lymphoma.     The pancreatic head mass is about 6 cm.  Untreated it will cause problems.  We discussed regarding different treatment options.  I would recommend chemotherapy.     I discussed regarding chemotherapy.  The patient is elderly.  She has an ECOG PS of 2. I discussed regarding mini R-CHOP.  This will cover high-grade lymphoma.  Regimen was discussed.  Side effects were discussed.  She is agreeable for it.  She will get mini R-CHOP.  We will give her 6 cycles with growth factor support.     Side-effects reviewed. It can cause hair loss, oral ulcer, thrush, nausea, vomiting, gastric ulcer, neutropenia, infection, anemia, fatigue, thrombocytopenia, bleeding, cardiomyopathy, neuropathy and other side effects.  She did have an echocardiogram done in September, which revealed normal ejection fraction.    4.  Discussed regarding Port-A-Cath placement.  She is agreeable for it.  That will be scheduled.    5.  The patient and family had few questions, which were all answered.  We will see her when she comes to start chemotherapy.     TOTAL FACE-TO-FACE TIME SPENT:  45 minutes, more than 50%  of the time spent in counseling and coordination of care.

## 2021-11-29 ENCOUNTER — TELEPHONE (OUTPATIENT)
Dept: CARDIOLOGY | Facility: CLINIC | Age: 81
End: 2021-11-29

## 2021-11-29 ENCOUNTER — TELEPHONE (OUTPATIENT)
Dept: INTERNAL MEDICINE | Facility: CLINIC | Age: 81
End: 2021-11-29

## 2021-11-29 DIAGNOSIS — Z11.59 ENCOUNTER FOR SCREENING FOR OTHER VIRAL DISEASES: ICD-10-CM

## 2021-11-29 NOTE — TELEPHONE ENCOUNTER
Dr Flynn,     Please see my chart note, patient is scheduled for follow up on 12/16/21.     Maria Del Carmen Macias RN

## 2021-11-29 NOTE — TELEPHONE ENCOUNTER
I would assume that the message is referring to the patient's positive stool test.  This result would be independent of the issues in regards to her ongoing chemotherapy.  They probably need to discuss how they want to proceed as this evaluation would be separate from that.

## 2021-11-29 NOTE — TELEPHONE ENCOUNTER
Received a call from patient's daughter, Atiya Cruz after patient was seen by Dr. Srinivasan.  -EUS revealed pancreatic head mass.  FNA revealed CD10-positive B-cell lymphoma. Flow cytometry revealed CD10-positive lambda monotypic B-cells.  5. PET scan on 11/18/2021 revealed 6 cm hypermetabolic pancreatic head mass and borderline hypermetabolic right axillary lymph node.   Patient will be having a IV port placed and will start chemo in the next 1-2 weeks.  Will put TAVR on hold at this time.  I explained to daughter her mother would need 1 year longevity before we could proceed with TAVR.

## 2021-11-29 NOTE — TELEPHONE ENCOUNTER
Spoke with patient's daughter and explained PCP message below.  Scheduled f/u visit as requested by PCP.    From Dr Flynn:    Your stool test returned positive for blood.  I would like to discuss these results with you and asked that you make a follow-up visit at your convenience.    Pamela Ruano, MSN, RN   [facility] Triage

## 2021-12-01 DIAGNOSIS — Z11.59 ENCOUNTER FOR SCREENING FOR OTHER VIRAL DISEASES: ICD-10-CM

## 2021-12-01 DIAGNOSIS — C85.99 NON-HODGKIN LYMPHOMA OF SOLID ORGAN EXCLUDING SPLEEN, UNSPECIFIED NON-HODGKIN LYMPHOMA TYPE (H): Primary | ICD-10-CM

## 2021-12-03 ENCOUNTER — TELEPHONE (OUTPATIENT)
Dept: INTERNAL MEDICINE | Facility: CLINIC | Age: 81
End: 2021-12-03
Payer: MEDICARE

## 2021-12-03 DIAGNOSIS — I50.30 DIASTOLIC HEART FAILURE, UNSPECIFIED HF CHRONICITY (H): Primary | ICD-10-CM

## 2021-12-03 DIAGNOSIS — R06.81 APNEA: ICD-10-CM

## 2021-12-03 DIAGNOSIS — K86.89 PANCREATIC MASS: ICD-10-CM

## 2021-12-03 DIAGNOSIS — E66.01 MORBID OBESITY DUE TO EXCESS CALORIES (H): ICD-10-CM

## 2021-12-03 RX ORDER — FUROSEMIDE 20 MG
20 TABLET ORAL DAILY
Qty: 30 TABLET | Refills: 0 | Status: SHIPPED | OUTPATIENT
Start: 2021-12-03 | End: 2022-02-03

## 2021-12-03 NOTE — TELEPHONE ENCOUNTER
Jaimie nurse with [facility] calling requesting a refill on furosemide 20 mg take 1 tab daily.  States last rx was from md at rehab facility.    Also would like to continue to see pt for nursing 1W3W.    Verbal orders for homecare given for above and per protocol.    Routing refill request to provider for review/approval because:  Medication is reported/historical     Jaimie can be reached at [phone]    Liz MULLER RN  EP Triage

## 2021-12-06 ENCOUNTER — TELEPHONE (OUTPATIENT)
Dept: INTERVENTIONAL RADIOLOGY/VASCULAR | Facility: CLINIC | Age: 81
End: 2021-12-06
Payer: MEDICARE

## 2021-12-06 NOTE — TELEPHONE ENCOUNTER
IR pre-procedure call was completed with Lucille. Appointment details were reviewed and questions answered.  Voice message also left with daughter Nancy Rajput as requested by patient, who will be bringing her to appointment. Still needs to have COVID-19 testing scheduled. Denies symptoms or known exposure.    Melanie Barrios RN on 12/6/2021 at 10:42 AM

## 2021-12-08 ENCOUNTER — LAB (OUTPATIENT)
Dept: URGENT CARE | Facility: URGENT CARE | Age: 81
End: 2021-12-08
Payer: MEDICARE

## 2021-12-08 DIAGNOSIS — Z11.59 ENCOUNTER FOR SCREENING FOR OTHER VIRAL DISEASES: ICD-10-CM

## 2021-12-09 ENCOUNTER — LAB (OUTPATIENT)
Dept: URGENT CARE | Facility: URGENT CARE | Age: 81
End: 2021-12-09
Payer: MEDICARE

## 2021-12-09 DIAGNOSIS — Z20.822 COVID-19 RULED OUT: ICD-10-CM

## 2021-12-09 DIAGNOSIS — Z20.822 COVID-19 RULED OUT: Primary | ICD-10-CM

## 2021-12-09 LAB — SARS-COV-2 RNA RESP QL NAA+PROBE: NEGATIVE

## 2021-12-09 PROCEDURE — U0005 INFEC AGEN DETEC AMPLI PROBE: HCPCS

## 2021-12-09 PROCEDURE — U0003 INFECTIOUS AGENT DETECTION BY NUCLEIC ACID (DNA OR RNA); SEVERE ACUTE RESPIRATORY SYNDROME CORONAVIRUS 2 (SARS-COV-2) (CORONAVIRUS DISEASE [COVID-19]), AMPLIFIED PROBE TECHNIQUE, MAKING USE OF HIGH THROUGHPUT TECHNOLOGIES AS DESCRIBED BY CMS-2020-01-R: HCPCS

## 2021-12-10 ENCOUNTER — DOCUMENTATION ONLY (OUTPATIENT)
Dept: PHARMACY | Facility: CLINIC | Age: 81
End: 2021-12-10
Payer: MEDICARE

## 2021-12-10 ENCOUNTER — PATIENT OUTREACH (OUTPATIENT)
Dept: ONCOLOGY | Facility: CLINIC | Age: 81
End: 2021-12-10
Payer: MEDICARE

## 2021-12-10 DIAGNOSIS — C85.12 B-CELL LYMPHOMA OF INTRATHORACIC LYMPH NODES, UNSPECIFIED B-CELL LYMPHOMA TYPE (H): ICD-10-CM

## 2021-12-10 DIAGNOSIS — C85.99 NON-HODGKIN LYMPHOMA OF SOLID ORGAN EXCLUDING SPLEEN, UNSPECIFIED NON-HODGKIN LYMPHOMA TYPE (H): Primary | ICD-10-CM

## 2021-12-10 DIAGNOSIS — Z51.89 ENCOUNTER FOR OTHER SPECIFIED AFTERCARE: ICD-10-CM

## 2021-12-10 RX ORDER — ALLOPURINOL 300 MG/1
300 TABLET ORAL DAILY
Qty: 14 TABLET | Refills: 7 | Status: ON HOLD | OUTPATIENT
Start: 2021-12-10 | End: 2021-12-27

## 2021-12-10 RX ORDER — PROCHLORPERAZINE MALEATE 10 MG
10 TABLET ORAL EVERY 6 HOURS PRN
Qty: 30 TABLET | Refills: 7 | Status: SHIPPED | OUTPATIENT
Start: 2021-12-10 | End: 2022-04-12

## 2021-12-10 RX ORDER — LORAZEPAM 0.5 MG/1
0.5 TABLET ORAL EVERY 4 HOURS PRN
Qty: 30 TABLET | Refills: 5 | Status: ON HOLD | OUTPATIENT
Start: 2021-12-10 | End: 2021-12-27

## 2021-12-10 NOTE — PROGRESS NOTES
Writer called and spoke with patient and reviewed port placement and what to expect before, during, post procedure. Patient asked that I call her daughter to review the information.    Writer called and spoke with daughter, Nancy Rajput, and reinforce port placement and reviewed appointments as well.    Take home medications of Allopurinol, Compazine and ativan have been released.    Harriet Guillermo RN

## 2021-12-13 ENCOUNTER — DOCUMENTATION ONLY (OUTPATIENT)
Dept: PHARMACY | Facility: CLINIC | Age: 81
End: 2021-12-13
Payer: MEDICARE

## 2021-12-13 ENCOUNTER — MEDICAL CORRESPONDENCE (OUTPATIENT)
Dept: HEALTH INFORMATION MANAGEMENT | Facility: CLINIC | Age: 81
End: 2021-12-13

## 2021-12-13 ENCOUNTER — APPOINTMENT (OUTPATIENT)
Dept: INTERVENTIONAL RADIOLOGY/VASCULAR | Facility: CLINIC | Age: 81
End: 2021-12-13
Attending: INTERNAL MEDICINE
Payer: MEDICARE

## 2021-12-13 ENCOUNTER — HOSPITAL ENCOUNTER (OUTPATIENT)
Facility: CLINIC | Age: 81
Discharge: HOME | End: 2021-12-13
Attending: RADIOLOGY | Admitting: RADIOLOGY
Payer: MEDICARE

## 2021-12-13 VITALS
HEART RATE: 74 BPM | SYSTOLIC BLOOD PRESSURE: 142 MMHG | OXYGEN SATURATION: 98 % | RESPIRATION RATE: 20 BRPM | DIASTOLIC BLOOD PRESSURE: 51 MMHG | TEMPERATURE: 97 F

## 2021-12-13 DIAGNOSIS — C85.99 NON-HODGKIN LYMPHOMA OF SOLID ORGAN EXCLUDING SPLEEN, UNSPECIFIED NON-HODGKIN LYMPHOMA TYPE (H): ICD-10-CM

## 2021-12-13 PROCEDURE — 272N000602 HC WOUND GLUE CR1

## 2021-12-13 PROCEDURE — 999N000163 HC STATISTIC SIMPLE TUBE INSERTION/CHARGE, PORT, CATH, FISTULOGRAM

## 2021-12-13 PROCEDURE — 250N000009 HC RX 250: Performed by: PHYSICIAN ASSISTANT

## 2021-12-13 PROCEDURE — C1788 PORT, INDWELLING, IMP: HCPCS

## 2021-12-13 PROCEDURE — 258N000003 HC RX IP 258 OP 636: Performed by: PHYSICIAN ASSISTANT

## 2021-12-13 PROCEDURE — C1769 GUIDE WIRE: HCPCS

## 2021-12-13 PROCEDURE — 99152 MOD SED SAME PHYS/QHP 5/>YRS: CPT

## 2021-12-13 PROCEDURE — 250N000011 HC RX IP 250 OP 636: Performed by: PHYSICIAN ASSISTANT

## 2021-12-13 PROCEDURE — 272N000196 HC ACCESSORY CR5

## 2021-12-13 PROCEDURE — 250N000011 HC RX IP 250 OP 636: Performed by: RADIOLOGY

## 2021-12-13 PROCEDURE — 36561 INSERT TUNNELED CV CATH: CPT

## 2021-12-13 PROCEDURE — 250N000009 HC RX 250: Performed by: RADIOLOGY

## 2021-12-13 RX ORDER — NALOXONE HYDROCHLORIDE 0.4 MG/ML
0.2 INJECTION, SOLUTION INTRAMUSCULAR; INTRAVENOUS; SUBCUTANEOUS
Status: DISCONTINUED | OUTPATIENT
Start: 2021-12-13 | End: 2021-12-13 | Stop reason: HOSPADM

## 2021-12-13 RX ORDER — NALOXONE HYDROCHLORIDE 0.4 MG/ML
0.4 INJECTION, SOLUTION INTRAMUSCULAR; INTRAVENOUS; SUBCUTANEOUS
Status: DISCONTINUED | OUTPATIENT
Start: 2021-12-13 | End: 2021-12-13 | Stop reason: HOSPADM

## 2021-12-13 RX ORDER — CEFAZOLIN SODIUM 2 G/100ML
2 INJECTION, SOLUTION INTRAVENOUS
Status: COMPLETED | OUTPATIENT
Start: 2021-12-13 | End: 2021-12-13

## 2021-12-13 RX ORDER — HEPARIN SODIUM (PORCINE) LOCK FLUSH IV SOLN 100 UNIT/ML 100 UNIT/ML
500 SOLUTION INTRAVENOUS EVERY 8 HOURS
Status: DISCONTINUED | OUTPATIENT
Start: 2021-12-13 | End: 2021-12-13 | Stop reason: HOSPADM

## 2021-12-13 RX ORDER — LIDOCAINE 40 MG/G
CREAM TOPICAL
Status: DISCONTINUED | OUTPATIENT
Start: 2021-12-13 | End: 2021-12-13 | Stop reason: HOSPADM

## 2021-12-13 RX ORDER — FENTANYL CITRATE 50 UG/ML
25-50 INJECTION, SOLUTION INTRAMUSCULAR; INTRAVENOUS EVERY 5 MIN PRN
Status: DISCONTINUED | OUTPATIENT
Start: 2021-12-13 | End: 2021-12-13 | Stop reason: HOSPADM

## 2021-12-13 RX ORDER — FLUMAZENIL 0.1 MG/ML
0.2 INJECTION, SOLUTION INTRAVENOUS
Status: DISCONTINUED | OUTPATIENT
Start: 2021-12-13 | End: 2021-12-13 | Stop reason: HOSPADM

## 2021-12-13 RX ORDER — ACETAMINOPHEN 325 MG/1
650 TABLET ORAL
Status: DISCONTINUED | OUTPATIENT
Start: 2021-12-13 | End: 2021-12-13 | Stop reason: HOSPADM

## 2021-12-13 RX ADMIN — LIDOCAINE HYDROCHLORIDE 10 ML: 10; .005 INJECTION, SOLUTION EPIDURAL; INFILTRATION; INTRACAUDAL; PERINEURAL at 09:57

## 2021-12-13 RX ADMIN — HEPARIN SODIUM (PORCINE) LOCK FLUSH IV SOLN 100 UNIT/ML 500 UNITS: 100 SOLUTION at 09:58

## 2021-12-13 RX ADMIN — FENTANYL CITRATE 50 MCG: 50 INJECTION INTRAMUSCULAR; INTRAVENOUS at 09:38

## 2021-12-13 RX ADMIN — LIDOCAINE HYDROCHLORIDE 11 ML: 10 INJECTION, SOLUTION INFILTRATION; PERINEURAL at 09:56

## 2021-12-13 RX ADMIN — MIDAZOLAM HYDROCHLORIDE 1 MG: 1 INJECTION, SOLUTION INTRAMUSCULAR; INTRAVENOUS at 09:46

## 2021-12-13 RX ADMIN — FENTANYL CITRATE 50 MCG: 50 INJECTION INTRAMUSCULAR; INTRAVENOUS at 09:46

## 2021-12-13 RX ADMIN — CEFAZOLIN 2 G: 10 INJECTION, POWDER, FOR SOLUTION INTRAVENOUS at 08:51

## 2021-12-13 RX ADMIN — MIDAZOLAM HYDROCHLORIDE 1 MG: 1 INJECTION, SOLUTION INTRAMUSCULAR; INTRAVENOUS at 09:38

## 2021-12-13 NOTE — PRE-PROCEDURE
GENERAL PRE-PROCEDURE:   Procedure:  Port placement with sedation  Date/Time:  12/13/2021 8:33 AM    Written consent obtained?: Yes    Risks and benefits: Risks, benefits and alternatives were discussed    Consent given by:  Patient  Patient states understanding of procedure being performed: Yes    Patient's understanding of procedure matches consent: Yes    Procedure consent matches procedure scheduled: Yes    Expected level of sedation:  Moderate  Appropriately NPO:  Yes  ASA Class:  3  Mallampati  :  Grade 3- soft palate visible, posterior pharyngeal wall not visible  Lungs:  Lungs clear with good breath sounds bilaterally  Heart:  Normal heart sounds and rate  History & Physical reviewed:  History and physical reviewed and no updates needed  Statement of review:  I have reviewed the lab findings, diagnostic data, medications, and the plan for sedation

## 2021-12-13 NOTE — PROGRESS NOTES
Care Suites Admission Nursing Note    Patient Information  Name: Lucille Rojas  Age: 81 year old  Reason for admission: Port placement  Care Suites arrival time: ~0800    Visitor Information  Name: Alfonso castorena   Informed of visitor restrictions: Yes  1 visitor allowed per patient   Visitor must screen negative for COVID symptoms   Visitor must wear a mask  Waiting rooms closed to visitors    Patient Admission/Assessment   Pre-procedure assessment complete: Yes  If abnormal assessment/labs, provider notified: N/A  NPO: Yes  Medications held per instructions/orders: Yes  Consent: obtained  Patient oriented to room: Yes  Education/questions answered: Yes  Plan/other: Procedure @0900    Discharge Planning  Discharge name/phone number: Nancy holguin [phone]  Overnight post sedation caregiver: Daughter   Discharge location: home    Talita Mckeon RN

## 2021-12-13 NOTE — PROGRESS NOTES
Care Suites Discharge Nursing Note    Patient Information  Name: Lucille Rojas  Age: 81 year old    Discharge Education:  Discharge instructions reviewed: Yes AVS by Talita ANN  Additional education/resources provided: N/A  Patient/patient representative verbalizes understanding: Yes  Patient discharging on new medications: No  Medication education completed: N/A    Discharge Plans:   Discharge location: home  Discharge ride contacted: Yes  Approximate discharge time: 1130    Discharge Criteria:  Discharge criteria met and vital signs stable: Yes    Patient Belongs:  Patient belongings returned to patient: Yes    Kari Olivares RN

## 2021-12-13 NOTE — PROGRESS NOTES
"  Interventional Radiology - Pre-Procedure Note:  12/13/2021    Procedure Requested: Port placement  Requested by: John Srinivasan MD    History and Physical Reviewed: H&P documented within 30 days (by Dr Srinivasan on 11/24/21).  I have personally reviewed the patient's medical history and have updated the medical record as necessary.    Brief HPI: Lucille Rojas is a 81 year old female who underwent workup for anemia including CT (see below) demonstrating pancreatic head mass. FNA demonstrated B-Cell lymphoma. Patient is being followed by oncology who recommends chemotherapy. Referred to IR for port placement. No complaints today.     IMAGING:  CT C/A/P 11/10/21:  \"IMPRESSION:  1.  No acute traumatic abnormality identified. No displaced fracture  is seen.  2.  Trace right pleural fluid is smaller in volume compared to  9/20/2021. No pneumothorax is seen.  3.  Stable prominent mass at the pancreas head and encasing vessels  consistent with malignancy. Stable adjacent enlarged lymph node that  is indeterminate.  4.  Other stable findings as above.\"    NPO: YES since [place]  ANTICOAGULANTS: ASA 81mg PO daily  ANTIBIOTICS: Ancef 2g IV for periprocedural prophylaxis      ALLERGIES  Allergies   Allergen Reactions     Penicillins          LABS:  No results found for: INR   Hemoglobin   Date Value Ref Range Status   11/10/2021 10.2 (L) 11.7 - 15.7 g/dL Final   04/07/2021 10.6 (L) 11.7 - 15.7 g/dL Final   ]  Platelet Count   Date Value Ref Range Status   11/10/2021 373 150 - 450 10e3/uL Final   12/08/2016 288 150 - 450 10e9/L Final     Creatinine   Date Value Ref Range Status   11/10/2021 1.11 (H) 0.52 - 1.04 mg/dL Final   08/27/2020 0.98 0.52 - 1.04 mg/dL Final     Potassium   Date Value Ref Range Status   11/10/2021 4.2 3.4 - 5.3 mmol/L Final   08/27/2020 4.1 3.4 - 5.3 mmol/L Final         EXAM:  Vitals pending  General:  Stable.  In no acute distress.    Neuro:  A&O x 3. Moves all extremities equally.  Neck: No gross " JVD  Heart: RRR  Lungs: No increased work of breathing  Chest: No scarring or deformity      Pre-Sedation Code Status Assessment:  Code Status: Full Code intra procedure, per discussion with patient.         ASSESSMENT/PLAN:   Non-Hodgkins lymphoma    -Port placement today as scheduled    Procedure, risks/benefits, details, alternatives, and sedation reviewed with patient and patient verbalized understanding. All questions answered. OK to proceed with above radiology procedure.     Lawson Schneider PA-C  Interventional Radiology  [phone] (IR)  *[phone] ([facility] Office)      Total time spent on the date of the encounter is 30 minutes, including time spent counseling the patient, performing a medically appropriate evaluation, reviewing prior medical history, ordering medications and tests, documenting clinical information in the medical record, and communication of results.

## 2021-12-13 NOTE — IR NOTE
Interventional Radiology Intra-procedural Nursing Note    Patient Name: Lucille Rojas  Medical Record Number: [id number]  Today's Date: December 13, 2021    Start Time: 940  End of procedure time: 1008  Procedure: Port placement with moderate sedation  Report given to: Marisela ANN  Time pt departs:  1016    Other Notes: Pt into IR suite 2 via cart. Pt awake and alert. To table in supine position. Monitoring equipment applied. VSS. Tele SR. Dr. Collins in room. Time out and procedure started. Pt tolerated procedure well. Debrief with Dr. Collins. Port placed and pressure held until hemostasis achieved. Site CDI & glued. No complications. Pt transferred back to Care Suites.    Medications:    Versed 2 mg  Fentanyl 100 mcg  Lidocaine 1% 11 ml  Lidocaine 1% with EPI 10 ml    Aurelio Bruce RN

## 2021-12-13 NOTE — PROGRESS NOTES
PATIENT/VISITOR WELLNESS SCREENING    Step 1 Patient Screening    1. In the last month, have you been in contact with someone who was confirmed or suspected to have Coronavirus/COVID-19? No    2. Do you have the following symptoms?  Fever/Chills? No   Cough? No   Shortness of breath? No   New loss of taste or smell? No  Sore throat? No  Muscle or body aches? No  Headaches? No  Fatigue? No  Vomiting or diarrhea? No    Step 2 Visitor Screening    1. Name of Visitor (1 visitor per patient): Nancy pandya -sister     2. In the last month, have you been in contact with someone who was confirmed or suspected to have Coronavirus/COVID-19? No    3. Do you have the following symptoms?  Fever/Chills? No   Cough? No   Shortness of breath? No   Skin rash? No   Loss of taste or smell? No  Sore throat? No  Runny or stuffy nose? No  Muscle or body aches? No  Headaches? No  Fatigue? No  Vomiting or diarrhea? No    If the visitor has positive symptoms, notify supervisor/manger  Per policy, the visitor will need to leave the facility     Step 3 Refer to logic grid below for actions    NO SYMPTOM(S)    ACTIONS:  1. Standard rooming process  2. Provider to assess per normal protocol  3. Implement precautions as needed and per guidelines     POSITIVE SYMPTOM(S)  If positive for ANY of the following symptoms: fever, cough, shortness of breath, rash    ACTION:  1. Continue to have the patient wear a mask   2. Room patient as soon as possible  3. Don appropriate PPE when entering room  4. Provider evaluation

## 2021-12-14 ENCOUNTER — ONCOLOGY VISIT (OUTPATIENT)
Dept: ONCOLOGY | Facility: CLINIC | Age: 81
End: 2021-12-14
Attending: NURSE PRACTITIONER
Payer: MEDICARE

## 2021-12-14 VITALS
HEART RATE: 59 BPM | SYSTOLIC BLOOD PRESSURE: 156 MMHG | RESPIRATION RATE: 16 BRPM | OXYGEN SATURATION: 95 % | DIASTOLIC BLOOD PRESSURE: 67 MMHG

## 2021-12-14 DIAGNOSIS — C85.12 B-CELL LYMPHOMA OF INTRATHORACIC LYMPH NODES, UNSPECIFIED B-CELL LYMPHOMA TYPE (H): ICD-10-CM

## 2021-12-14 DIAGNOSIS — C85.99 NON-HODGKIN LYMPHOMA OF SOLID ORGAN EXCLUDING SPLEEN, UNSPECIFIED NON-HODGKIN LYMPHOMA TYPE (H): Primary | ICD-10-CM

## 2021-12-14 DIAGNOSIS — Z51.89 ENCOUNTER FOR OTHER SPECIFIED AFTERCARE: ICD-10-CM

## 2021-12-14 DIAGNOSIS — Z11.59 ENCOUNTER FOR SCREENING FOR OTHER VIRAL DISEASES: ICD-10-CM

## 2021-12-14 LAB
ALBUMIN SERPL-MCNC: 2.9 G/DL (ref 3.4–5)
ALP SERPL-CCNC: 120 U/L (ref 40–150)
ALT SERPL W P-5'-P-CCNC: 13 U/L (ref 0–50)
ANION GAP SERPL CALCULATED.3IONS-SCNC: 3 MMOL/L (ref 3–14)
AST SERPL W P-5'-P-CCNC: 9 U/L (ref 0–45)
BASOPHILS # BLD AUTO: 0 10E3/UL (ref 0–0.2)
BASOPHILS NFR BLD AUTO: 0 %
BILIRUB SERPL-MCNC: 0.2 MG/DL (ref 0.2–1.3)
BUN SERPL-MCNC: 22 MG/DL (ref 7–30)
CALCIUM SERPL-MCNC: 8.5 MG/DL (ref 8.5–10.1)
CHLORIDE BLD-SCNC: 108 MMOL/L (ref 94–109)
CO2 SERPL-SCNC: 31 MMOL/L (ref 20–32)
CREAT SERPL-MCNC: 1.06 MG/DL (ref 0.52–1.04)
EOSINOPHIL # BLD AUTO: 0.4 10E3/UL (ref 0–0.7)
EOSINOPHIL NFR BLD AUTO: 6 %
ERYTHROCYTE [DISTWIDTH] IN BLOOD BY AUTOMATED COUNT: 14.5 % (ref 10–15)
GFR SERPL CREATININE-BSD FRML MDRD: 49 ML/MIN/1.73M2
GLUCOSE BLD-MCNC: 137 MG/DL (ref 70–99)
HBV CORE AB SERPL QL IA: NONREACTIVE
HBV SURFACE AG SERPL QL IA: NONREACTIVE
HCT VFR BLD AUTO: 29.5 % (ref 35–47)
HGB BLD-MCNC: 8.4 G/DL (ref 11.7–15.7)
IMM GRANULOCYTES # BLD: 0 10E3/UL
IMM GRANULOCYTES NFR BLD: 0 %
IRON SATN MFR SERPL: 13 % (ref 15–46)
IRON SERPL-MCNC: 37 UG/DL (ref 35–180)
LYMPHOCYTES # BLD AUTO: 1 10E3/UL (ref 0.8–5.3)
LYMPHOCYTES NFR BLD AUTO: 16 %
MCH RBC QN AUTO: 25.7 PG (ref 26.5–33)
MCHC RBC AUTO-ENTMCNC: 28.5 G/DL (ref 31.5–36.5)
MCV RBC AUTO: 90 FL (ref 78–100)
MONOCYTES # BLD AUTO: 0.7 10E3/UL (ref 0–1.3)
MONOCYTES NFR BLD AUTO: 12 %
NEUTROPHILS # BLD AUTO: 4.3 10E3/UL (ref 1.6–8.3)
NEUTROPHILS NFR BLD AUTO: 66 %
NRBC # BLD AUTO: 0 10E3/UL
NRBC BLD AUTO-RTO: 0 /100
PLATELET # BLD AUTO: 286 10E3/UL (ref 150–450)
POTASSIUM BLD-SCNC: 3.9 MMOL/L (ref 3.4–5.3)
PROT SERPL-MCNC: 6.2 G/DL (ref 6.8–8.8)
RBC # BLD AUTO: 3.27 10E6/UL (ref 3.8–5.2)
SODIUM SERPL-SCNC: 142 MMOL/L (ref 133–144)
TIBC SERPL-MCNC: 281 UG/DL (ref 240–430)
WBC # BLD AUTO: 6.5 10E3/UL (ref 4–11)

## 2021-12-14 PROCEDURE — 99214 OFFICE O/P EST MOD 30 MIN: CPT | Performed by: NURSE PRACTITIONER

## 2021-12-14 PROCEDURE — 80053 COMPREHEN METABOLIC PANEL: CPT | Performed by: INTERNAL MEDICINE

## 2021-12-14 PROCEDURE — 86704 HEP B CORE ANTIBODY TOTAL: CPT | Performed by: INTERNAL MEDICINE

## 2021-12-14 PROCEDURE — 36415 COLL VENOUS BLD VENIPUNCTURE: CPT | Performed by: NURSE PRACTITIONER

## 2021-12-14 PROCEDURE — 87340 HEPATITIS B SURFACE AG IA: CPT | Performed by: INTERNAL MEDICINE

## 2021-12-14 PROCEDURE — 83550 IRON BINDING TEST: CPT | Performed by: INTERNAL MEDICINE

## 2021-12-14 PROCEDURE — 85004 AUTOMATED DIFF WBC COUNT: CPT | Performed by: INTERNAL MEDICINE

## 2021-12-14 RX ORDER — PREDNISONE 50 MG/1
40 TABLET ORAL DAILY
Qty: 10 TABLET | Refills: 0 | Status: SHIPPED | OUTPATIENT
Start: 2021-12-14 | End: 2021-12-19

## 2021-12-14 ASSESSMENT — PAIN SCALES - GENERAL: PAINLEVEL: NO PAIN (0)

## 2021-12-14 NOTE — PROGRESS NOTES
Oncology/Hematology Visit Note  Dec 14, 2021    Reason for Visit: follow up of non-Hodgkin's lymphoma involving the pancreas  EUS revealed pancreatic head mass.  FNA revealed CD10-positive B-cell lymphoma. Flow cytometry revealed CD10-positive lambda monotypic B-cells.  PET scan on 11/18/2021 revealed 6 cm hypermetabolic pancreatic head mass and borderline hypermetabolic right axillary lymph node.     Patient met with Dr. Srinivasan who recommended starting treatment with mini R-CHOP    Patient comes here today prior to cycle 1 day 1 tomorrow    Interval History:  Overall patient reports she continues to have some weakness.  denies fever cough shortness of breath chest pain.  Denies nausea vomiting diarrhea abdominal pain bleeding        Review of Systems:  14 point ROS of systems including Constitutional, Eyes, Respiratory, Cardiovascular, Gastroenterology, Genitourinary, Integumentary, Muscularskeletal, Psychiatric were all negative except for pertinent positives noted in my HPI.    Physical Examination:  General: The patient is a pleasant female in no acute distress.  BP (!) 156/67   Pulse 59   Resp 16   SpO2 95%   HEENT: EOMI, PERRL. Sclerae are anicteric. Oral mucosa is pink and moist with no lesions or thrush.   Lymph: Neck is supple with no lymphadenopathy in the cervical or supraclavicular areas.   Heart: Regular rate and rhythm.   Lungs: Clear to auscultation bilaterally.   GI: Bowel sounds present, soft, nontender with no palpable hepatosplenomegaly or masses.   Extremities: No lower extremity edema noted bilaterally.   Skin: No rashes, petechiae, or bruising noted on exposed skin.    Laboratory Data:    CBC reviewed with patient  CMP is pending    Assessment and Plan:    This is a 81-year-old female with    Lymphoma  non-Hodgkin's lymphoma involving the pancreas  Patient of Dr. Srinivasan  Patient to start mini R-CHOP tomorrow  -Plan is for 6 cycles of treatment  Regimen and side effects discussed in detail with  patient and family  -Patient verbalized understanding and agrees to proceed with treatment  CBC reviewed today CMP is pending  -Dr. Srinivasan to sign the order for tomorrow since it is cycle 1 day 1  -Schedule with me next week with CBC CMP and follow-up   -Schedule with cycle 2 with me  -Plan will be to get PET scan after cycle 2 and follow-up with Dr. Srinivasan prior to cycle 3      Anemia  Patient is asymptomatic  S/p EGD on 09/21/21 revealed a few gastric erosions and a few tiny aphthous ulcers in the duodenum, but not severe enough to account for anemia  -Status post Venofer infusions in September 2021   -Continue with Protonix  Recheck iron studies-today  Transfuse for hemoglobin less than 8 or symptomatic      Weakness  Multifactorial  Monitor closely for now  I advised patient to let us know if worsening of weakness    Hypertension  Patient is symptomatic  Follow-up with primary care physician      Call our clinic with any changes in health condition or questions    ALIE Carreon [facility]     Chart documentation with Dragon Voice recognition Software. Although reviewed after completion, some words and grammatical errors may remain.

## 2021-12-14 NOTE — PROGRESS NOTES
Oncology Rooming Note    December 14, 2021 9:03 AM   Lucille Rojas is a 81 year old female who presents for:    Chief Complaint   Patient presents with     Oncology Clinic Visit     Initial Vitals: There were no vitals taken for this visit. Estimated body mass index is 49.02 kg/m  as calculated from the following:    Height as of 11/23/21: 1.524 m (5').    Weight as of 11/23/21: 113.9 kg (251 lb). There is no height or weight on file to calculate BSA.  Data Unavailable Comment: Data Unavailable   No LMP recorded. Patient has had a hysterectomy.  Allergies reviewed: Yes  Medications reviewed: Yes    Medications: Medication refills not needed today.  Pharmacy name entered into [employer]: [id number] - [facility] [address]    Clinical concerns:  doctor was notified.      Tianna Beckman, [facility]

## 2021-12-14 NOTE — LETTER
12/14/2021         RE: Lucille Rojas  [address] [facility] [address]        Dear Colleague,    Thank you for referring your patient, Lucille Rojas, to the [facility]. Please see a copy of my visit note below.    Oncology Rooming Note    December 14, 2021 9:03 AM   Lucille Rojas is a 81 year old female who presents for:    Chief Complaint   Patient presents with     Oncology Clinic Visit     Initial Vitals: There were no vitals taken for this visit. Estimated body mass index is 49.02 kg/m  as calculated from the following:    Height as of 11/23/21: 1.524 m (5').    Weight as of 11/23/21: 113.9 kg (251 lb). There is no height or weight on file to calculate BSA.  Data Unavailable Comment: Data Unavailable   No LMP recorded. Patient has had a hysterectomy.  Allergies reviewed: Yes  Medications reviewed: Yes    Medications: Medication refills not needed today.  Pharmacy name entered into [employer]: [id number] - [place], [place] - [address]    Clinical concerns:  doctor was notified.      Tianna Beckman, [facility]                                   Nursing Note:  Lucille Rojas presents today for Port labs for tx 12/15.    Patient seen by provider today: Yes: Alexandr   present during visit today: Not Applicable.    Note: N/A.    Intravenous Access:  Labs drawn without difficulty.  Implanted Port.    Discharge Plan:   Patient was sent to clinic for provider appointment.    Dereck Thompson RN              Oncology/Hematology Visit Note  Dec 14, 2021    Reason for Visit: follow up of non-Hodgkin's lymphoma involving the pancreas  EUS revealed pancreatic head mass.  FNA revealed CD10-positive B-cell lymphoma. Flow cytometry revealed CD10-positive lambda monotypic B-cells.  PET scan on 11/18/2021 revealed 6 cm hypermetabolic pancreatic head mass and borderline hypermetabolic right axillary lymph node.     Patient met with Dr. Srinivasan who recommended  starting treatment with mini R-CHOP    Patient comes here today prior to cycle 1 day 1 tomorrow    Interval History:  Overall patient reports she continues to have some weakness.  denies fever cough shortness of breath chest pain.  Denies nausea vomiting diarrhea abdominal pain bleeding        Review of Systems:  14 point ROS of systems including Constitutional, Eyes, Respiratory, Cardiovascular, Gastroenterology, Genitourinary, Integumentary, Muscularskeletal, Psychiatric were all negative except for pertinent positives noted in my HPI.    Physical Examination:  General: The patient is a pleasant female in no acute distress.  BP (!) 156/67   Pulse 59   Resp 16   SpO2 95%   HEENT: EOMI, PERRL. Sclerae are anicteric. Oral mucosa is pink and moist with no lesions or thrush.   Lymph: Neck is supple with no lymphadenopathy in the cervical or supraclavicular areas.   Heart: Regular rate and rhythm.   Lungs: Clear to auscultation bilaterally.   GI: Bowel sounds present, soft, nontender with no palpable hepatosplenomegaly or masses.   Extremities: No lower extremity edema noted bilaterally.   Skin: No rashes, petechiae, or bruising noted on exposed skin.    Laboratory Data:    CBC reviewed with patient  CMP is pending    Assessment and Plan:    This is a 81-year-old female with    Lymphoma  non-Hodgkin's lymphoma involving the pancreas  Patient of Dr. Srinivasan  Patient to start mini R-CHOP tomorrow  -Plan is for 6 cycles of treatment  Regimen and side effects discussed in detail with patient and family  -Patient verbalized understanding and agrees to proceed with treatment  CBC reviewed today CMP is pending  -Dr. Srinivasan to sign the order for tomorrow since it is cycle 1 day 1  -Schedule with me next week with CBC CMP and follow-up   -Schedule with cycle 2 with me  -Plan will be to get PET scan after cycle 2 and follow-up with Dr. Srinivasan prior to cycle 3      Anemia  Patient is asymptomatic  S/p EGD on 09/21/21 revealed a few  gastric erosions and a few tiny aphthous ulcers in the duodenum, but not severe enough to account for anemia  -Status post Venofer infusions in September 2021   -Continue with Protonix  Recheck iron studies-today  Transfuse for hemoglobin less than 8 or symptomatic      Weakness  Multifactorial  Monitor closely for now  I advised patient to let us know if worsening of weakness    Hypertension  Patient is symptomatic  Follow-up with primary care physician      Call our clinic with any changes in health condition or questions    ALIE Carreon CNP  [facility]     Chart documentation with Dragon Voice recognition Software. Although reviewed after completion, some words and grammatical errors may remain.            Again, thank you for allowing me to participate in the care of your patient.        Sincerely,        ALIE Carreon CNP

## 2021-12-14 NOTE — PROGRESS NOTES
Nursing Note:  Lucille Rojas presents today for Port labs for tx 12/15.    Patient seen by provider today: Yes: Alexandr   present during visit today: Not Applicable.    Note: N/A.    Intravenous Access:  Labs drawn without difficulty.  Implanted Port.    Discharge Plan:   Patient was sent to clinic for provider appointment.    Dereck Thompson RN

## 2021-12-15 ENCOUNTER — INFUSION THERAPY VISIT (OUTPATIENT)
Dept: INFUSION THERAPY | Facility: CLINIC | Age: 81
End: 2021-12-15
Attending: NURSE PRACTITIONER
Payer: MEDICARE

## 2021-12-15 ENCOUNTER — MEDICAL CORRESPONDENCE (OUTPATIENT)
Dept: HEALTH INFORMATION MANAGEMENT | Facility: CLINIC | Age: 81
End: 2021-12-15

## 2021-12-15 VITALS
HEART RATE: 72 BPM | BODY MASS INDEX: 49.67 KG/M2 | HEIGHT: 60 IN | DIASTOLIC BLOOD PRESSURE: 53 MMHG | TEMPERATURE: 98 F | RESPIRATION RATE: 18 BRPM | SYSTOLIC BLOOD PRESSURE: 137 MMHG | WEIGHT: 253 LBS | OXYGEN SATURATION: 93 %

## 2021-12-15 DIAGNOSIS — Z51.89 ENCOUNTER FOR OTHER SPECIFIED AFTERCARE: Primary | ICD-10-CM

## 2021-12-15 DIAGNOSIS — C85.12 B-CELL LYMPHOMA OF INTRATHORACIC LYMPH NODES, UNSPECIFIED B-CELL LYMPHOMA TYPE (H): ICD-10-CM

## 2021-12-15 DIAGNOSIS — C85.99 NON-HODGKIN LYMPHOMA OF SOLID ORGAN EXCLUDING SPLEEN, UNSPECIFIED NON-HODGKIN LYMPHOMA TYPE (H): ICD-10-CM

## 2021-12-15 PROCEDURE — 96372 THER/PROPH/DIAG INJ SC/IM: CPT | Performed by: INTERNAL MEDICINE

## 2021-12-15 PROCEDURE — 96413 CHEMO IV INFUSION 1 HR: CPT

## 2021-12-15 PROCEDURE — 96367 TX/PROPH/DG ADDL SEQ IV INF: CPT

## 2021-12-15 PROCEDURE — 96366 THER/PROPH/DIAG IV INF ADDON: CPT

## 2021-12-15 PROCEDURE — 250N000013 HC RX MED GY IP 250 OP 250 PS 637: Performed by: INTERNAL MEDICINE

## 2021-12-15 PROCEDURE — 96375 TX/PRO/DX INJ NEW DRUG ADDON: CPT

## 2021-12-15 PROCEDURE — 250N000011 HC RX IP 250 OP 636: Performed by: INTERNAL MEDICINE

## 2021-12-15 PROCEDURE — 258N000003 HC RX IP 258 OP 636: Performed by: INTERNAL MEDICINE

## 2021-12-15 PROCEDURE — 96377 APPLICATON ON-BODY INJECTOR: CPT | Mod: XS | Performed by: INTERNAL MEDICINE

## 2021-12-15 PROCEDURE — 96411 CHEMO IV PUSH ADDL DRUG: CPT

## 2021-12-15 RX ORDER — HEPARIN SODIUM (PORCINE) LOCK FLUSH IV SOLN 100 UNIT/ML 100 UNIT/ML
5 SOLUTION INTRAVENOUS
Status: DISCONTINUED | OUTPATIENT
Start: 2021-12-15 | End: 2021-12-15 | Stop reason: HOSPADM

## 2021-12-15 RX ORDER — ACETAMINOPHEN 325 MG/1
650 TABLET ORAL ONCE
Status: CANCELLED | OUTPATIENT
Start: 2021-12-15

## 2021-12-15 RX ORDER — PALONOSETRON 0.05 MG/ML
0.25 INJECTION, SOLUTION INTRAVENOUS ONCE
Status: CANCELLED | OUTPATIENT
Start: 2021-12-15

## 2021-12-15 RX ORDER — ALBUTEROL SULFATE 90 UG/1
1-2 AEROSOL, METERED RESPIRATORY (INHALATION)
Status: CANCELLED
Start: 2021-12-15

## 2021-12-15 RX ORDER — DOXORUBICIN HYDROCHLORIDE 2 MG/ML
25 INJECTION, SOLUTION INTRAVENOUS ONCE
Status: COMPLETED | OUTPATIENT
Start: 2021-12-15 | End: 2021-12-15

## 2021-12-15 RX ORDER — NALOXONE HYDROCHLORIDE 0.4 MG/ML
0.2 INJECTION, SOLUTION INTRAMUSCULAR; INTRAVENOUS; SUBCUTANEOUS
Status: CANCELLED | OUTPATIENT
Start: 2021-12-15

## 2021-12-15 RX ORDER — HEPARIN SODIUM,PORCINE 10 UNIT/ML
5 VIAL (ML) INTRAVENOUS
Status: CANCELLED | OUTPATIENT
Start: 2021-12-15

## 2021-12-15 RX ORDER — MEPERIDINE HYDROCHLORIDE 25 MG/ML
25 INJECTION INTRAMUSCULAR; INTRAVENOUS; SUBCUTANEOUS
Status: CANCELLED
Start: 2021-12-15

## 2021-12-15 RX ORDER — EPINEPHRINE 1 MG/ML
0.3 INJECTION, SOLUTION, CONCENTRATE INTRAVENOUS EVERY 5 MIN PRN
Status: CANCELLED | OUTPATIENT
Start: 2021-12-15

## 2021-12-15 RX ORDER — ALBUTEROL SULFATE 0.83 MG/ML
2.5 SOLUTION RESPIRATORY (INHALATION)
Status: CANCELLED | OUTPATIENT
Start: 2021-12-15

## 2021-12-15 RX ORDER — PALONOSETRON 0.05 MG/ML
0.25 INJECTION, SOLUTION INTRAVENOUS ONCE
Status: COMPLETED | OUTPATIENT
Start: 2021-12-15 | End: 2021-12-15

## 2021-12-15 RX ORDER — DIPHENHYDRAMINE HYDROCHLORIDE 50 MG/ML
50 INJECTION INTRAMUSCULAR; INTRAVENOUS
Status: CANCELLED
Start: 2021-12-15

## 2021-12-15 RX ORDER — HEPARIN SODIUM (PORCINE) LOCK FLUSH IV SOLN 100 UNIT/ML 100 UNIT/ML
5 SOLUTION INTRAVENOUS
Status: CANCELLED | OUTPATIENT
Start: 2021-12-15

## 2021-12-15 RX ORDER — DOXORUBICIN HYDROCHLORIDE 2 MG/ML
25 INJECTION, SOLUTION INTRAVENOUS ONCE
Status: CANCELLED | OUTPATIENT
Start: 2021-12-15

## 2021-12-15 RX ORDER — MEPERIDINE HYDROCHLORIDE 25 MG/ML
25 INJECTION INTRAMUSCULAR; INTRAVENOUS; SUBCUTANEOUS EVERY 30 MIN PRN
Status: CANCELLED | OUTPATIENT
Start: 2021-12-15

## 2021-12-15 RX ORDER — ACETAMINOPHEN 325 MG/1
650 TABLET ORAL ONCE
Status: COMPLETED | OUTPATIENT
Start: 2021-12-15 | End: 2021-12-15

## 2021-12-15 RX ORDER — LORAZEPAM 2 MG/ML
0.5 INJECTION INTRAMUSCULAR EVERY 4 HOURS PRN
Status: CANCELLED | OUTPATIENT
Start: 2021-12-15

## 2021-12-15 RX ORDER — DIPHENHYDRAMINE HCL 50 MG
50 CAPSULE ORAL ONCE
Status: CANCELLED
Start: 2021-12-15

## 2021-12-15 RX ADMIN — SODIUM CHLORIDE 250 ML: 9 INJECTION, SOLUTION INTRAVENOUS at 09:38

## 2021-12-15 RX ADMIN — Medication 5 ML: at 15:12

## 2021-12-15 RX ADMIN — VINCRISTINE SULFATE 1 MG: 1 INJECTION, SOLUTION INTRAVENOUS at 10:14

## 2021-12-15 RX ADMIN — RITUXIMAB-ABBS 800 MG: 10 INJECTION, SOLUTION INTRAVENOUS at 11:24

## 2021-12-15 RX ADMIN — CYCLOPHOSPHAMIDE 895 MG: 1 INJECTION, POWDER, FOR SOLUTION INTRAVENOUS; ORAL at 10:51

## 2021-12-15 RX ADMIN — DOXORUBICIN HYDROCHLORIDE 55 MG: 2 INJECTION, SOLUTION INTRAVENOUS at 10:06

## 2021-12-15 RX ADMIN — ACETAMINOPHEN 650 MG: 325 TABLET, FILM COATED ORAL at 10:26

## 2021-12-15 RX ADMIN — PEGFILGRASTIM 6 MG: KIT SUBCUTANEOUS at 13:55

## 2021-12-15 RX ADMIN — DIPHENHYDRAMINE HYDROCHLORIDE 50 MG: 50 INJECTION, SOLUTION INTRAMUSCULAR; INTRAVENOUS at 10:27

## 2021-12-15 RX ADMIN — PALONOSETRON HYDROCHLORIDE 0.25 MG: 0.25 INJECTION INTRAVENOUS at 09:39

## 2021-12-15 RX ADMIN — FOSAPREPITANT 150 MG: 150 INJECTION, POWDER, LYOPHILIZED, FOR SOLUTION INTRAVENOUS at 09:42

## 2021-12-15 ASSESSMENT — MIFFLIN-ST. JEOR: SCORE: 1524.1

## 2021-12-15 ASSESSMENT — PAIN SCALES - GENERAL: PAINLEVEL: NO PAIN (0)

## 2021-12-15 NOTE — PROGRESS NOTES
Infusion Nursing Note:  Lucille Rojas presents today for C1D1 RCHOP.    Patient seen by provider today: No   present during visit today: Not Applicable.    Note: Patient reports no new concerns since exam with Alexandr Ambriz NP yesterday. Patient arrived via wheelchair with  who is also in a wheelchair. Patient/daughter state she can be up with assist of 1 to the bathroom. Due to  also being in a wheelchair, patient's daughter stayed to help patient ambulate to the bathroom. Patient states she has started allopurinol as ordered but has not yet taken prednisone. Prednisone filled from hospital pharmacy and patient took first dose while in infusion, prior to starting rituxan. Verbalizes understanding of at home prednisone dosing.    First time getting chemotherapy today. Oriented to infusion center. Chemotherapy teaching, side effects, and schedule reviewed with patient. General and individual chemotherapy side effects reviewed with patient including fatigue, immunosuppression, alopecia, nausea/vomiting, constipation/diarrhea, peripheral neuropathy and hypersensitivity reaction. Patient/daughter report that they previously received printed info on all chemo drugs from clinic. Instructed to call care coordinator, triage (or MD on call if after hours/weekends) with chills/temp >=100.5, questions/concerns. Pt stated understanding of plan.     ONPRO  Was placed on patient's: back of left arm.    Was placed at 1:55 PM    ONPRO injector device Lot number:     Patient education included: what patient can expect after application, what colored lights mean on the device, when to remove device, when and where to call with questions or issues, all patients questions answered and that Neulasta administration will occur at 4:55pm on 12/16/21. Patient/family watched onpro instructional video.    Patient tolerated administration well.    Intravenous Access:  Implanted Port.    Treatment Conditions:  Lab  Results   Component Value Date    HGB 8.4 (L) 12/14/2021    WBC 6.5 12/14/2021    ANEU 5.1 11/18/2016    ANEUTAUTO 4.3 12/14/2021     12/14/2021      Lab Results   Component Value Date     12/14/2021    POTASSIUM 3.9 12/14/2021    CR 1.06 (H) 12/14/2021    IGOR 8.5 12/14/2021    BILITOTAL 0.2 12/14/2021    ALBUMIN 2.9 (L) 12/14/2021    ALT 13 12/14/2021    AST 9 12/14/2021     Results reviewed, labs MET treatment parameters, ok to proceed with treatment.  ECHO/MUGA completed 9/23/21  EF 60-65%. Reviewed with Alexandr Ambriz NP, ok to proceed with chemo today with last echo being done 9/23/21.      Post Infusion Assessment:  Patient tolerated infusion without incident.  Patient tolerated injection without incident.  Blood return noted pre and post infusion.  Blood return noted during administration every 2cc during adriamycin push per protocol.  Site patent and intact, free from redness, edema or discomfort.  No evidence of extravasations.  Access discontinued per protocol.       Discharge Plan:   Prescription refills given for prednisone. Pharmacy staff reviewed take home meds with patient/family.  Discharge instructions reviewed with: Patient and Family.  Patient and/or family verbalized understanding of discharge instructions and all questions answered.  Copy of AVS reviewed with patient and/or family.  Patient will return 12/23/21 for next appointment.  Patient discharged in stable condition accompanied by: self.  Departure Mode: Ambulatory.  Face to Face time: 25 minutes.      Marian Fleming RN

## 2021-12-15 NOTE — PATIENT INSTRUCTIONS
Your On-body Neulasta Injector was applied to your left arm at 1:55pm.  At approximately 4:55pm on 12/16/21, your On-body Injector will beep to let you know your dose delivery will begin in 2 minutes.  Your medication will be delivered over the next 45 minutes.  You can remove your Injector when the black indicator line is on empty (about 6pm).  Please make sure your Injector has a solid green light or has turned off prior to removing the device.  Please contact your provider at [phone] with questions or concerns.

## 2021-12-15 NOTE — PROGRESS NOTES
Take Home Medication Teaching    Patient was educated on the following oral medications: allopurinol, prochlorperazine and lorazepam on December 15, 2021. Teaching provided to patient included indication, dose, administration, adverse effects and side effect management. Written materials were provided and patient was given the opportunity to ask questions. Patient's family member verbalized understanding of the information presented.     Rachel Moniz, Pharmacist Intern  December 15, 2021    Kathleen Beach PharmD  December 20, 2021

## 2021-12-16 ENCOUNTER — OFFICE VISIT (OUTPATIENT)
Dept: INTERNAL MEDICINE | Facility: CLINIC | Age: 81
End: 2021-12-16
Payer: MEDICARE

## 2021-12-16 VITALS
TEMPERATURE: 98.4 F | OXYGEN SATURATION: 92 % | BODY MASS INDEX: 49.22 KG/M2 | WEIGHT: 252 LBS | HEART RATE: 73 BPM | SYSTOLIC BLOOD PRESSURE: 116 MMHG | RESPIRATION RATE: 17 BRPM | DIASTOLIC BLOOD PRESSURE: 56 MMHG

## 2021-12-16 DIAGNOSIS — D50.9 IRON DEFICIENCY ANEMIA, UNSPECIFIED IRON DEFICIENCY ANEMIA TYPE: Primary | ICD-10-CM

## 2021-12-16 DIAGNOSIS — N18.30 TYPE 2 DIABETES MELLITUS WITH STAGE 3 CHRONIC KIDNEY DISEASE, WITHOUT LONG-TERM CURRENT USE OF INSULIN, UNSPECIFIED WHETHER STAGE 3A OR 3B CKD (H): ICD-10-CM

## 2021-12-16 DIAGNOSIS — E11.22 TYPE 2 DIABETES MELLITUS WITH STAGE 3 CHRONIC KIDNEY DISEASE, WITHOUT LONG-TERM CURRENT USE OF INSULIN, UNSPECIFIED WHETHER STAGE 3A OR 3B CKD (H): ICD-10-CM

## 2021-12-16 DIAGNOSIS — C85.12 B-CELL LYMPHOMA OF INTRATHORACIC LYMPH NODES, UNSPECIFIED B-CELL LYMPHOMA TYPE (H): ICD-10-CM

## 2021-12-16 PROCEDURE — 99214 OFFICE O/P EST MOD 30 MIN: CPT | Performed by: INTERNAL MEDICINE

## 2021-12-16 NOTE — PROGRESS NOTES
Assessment & Plan     Iron deficiency anemia, unspecified iron deficiency anemia type  I reviewed with the patient and the patient's family the issues in regards to the patient's low hemoglobin level and iron studies as well as positive occult stools. I discussed the prior upper endoscopy and the anemia in the setting of this as well as the underlying issues that may be related due to her B-cell lymphoma. I discussed the options of further GI assessment to determine cause and source of her occult stools. At the present time after prolonged discussion we have opted to observe things clinically and see how she progresses. She is going to be getting her hemoglobin rechecked as part of her oncology evaluation in a couple weeks and we will reassess at that time. She has been advised that if her hemoglobin continues to drift downward or she becomes symptomatic that she may need further GI assessment. She is aware of this and will follow up accordingly. I have advised that she should continue with her PPI therapy. Both she and the daughter had all their questions answered and are in agreement with the plan.    B-cell lymphoma of intrathoracic lymph nodes, unspecified B-cell lymphoma type (H)  Continuing to follow-up with oncology as directed.    Type 2 diabetes mellitus with stage 3 chronic kidney disease, without long-term current use of insulin, unspecified whether stage 3a or 3b CKD (H)  Stable and continuing with current medical management       See Patient Instructions    Return in about 1 month (around 1/16/2022) for Lab Work appointment, f/u with routine sub-specialist.     25 minutes spent with this patient, face to face, discussing treatment options for listed problems above as well as side effects of appropriate medications.  Counseling time extended beyond 50% of the clinic visit.  Medication dosing, treatment plan and follow-up were discussed. Also reviewed need for primary care testing for patient.        Fausto Flynn MD  [facility] JANIS Adkins is a 81 year old who presents for the following health issues  accompanied by her daughter.    HPI     Follow up on 11/22/21 FIT testing      Patient is here today with her daughter to primarily discuss her low hemoglobin. I reviewed her low hemoglobins as well as her recent iron studies in the setting of her prior upper endoscopy as well as follow-up with her oncologist.    We discussed the cause and source of the slightly low hemoglobin in the setting of her positive occult stools. I reviewed with them her hemoglobins dating back several months. She does not significantly feel any different from when she was last seen.    She did follow-up with her oncologist 2 days ago who did make note also of the mild anemia and made comment on transfusion if less than eight and to be kept informed if the patient becomes symptomatic.      Review of Systems   CONSTITUTIONAL: NEGATIVE for fever, chills, change in weight  EYES: NEGATIVE for vision changes or irritation  ENT/MOUTH: NEGATIVE for ear, mouth and throat problems  RESP: NEGATIVE for significant cough or SOB  CV: NEGATIVE for chest pain, palpitations or peripheral edema  NEURO: NEGATIVE for weakness, dizziness or paresthesias  HEME: NEGATIVE for bleeding problems  PSYCHIATRIC: NEGATIVE for changes in mood or affect      Objective    /56   Pulse 73   Temp 98.4  F (36.9  C) (Temporal)   Resp 17   Wt 114.3 kg (252 lb)   SpO2 92%   BMI 49.22 kg/m    Body mass index is 49.22 kg/m .  Physical Exam   GENERAL:  alert and no distress, in wheelchair  EYES: Eyes grossly normal to inspection, PERRL and conjunctivae and sclerae normal  HENT: ear canals and TM's normal, nose and mouth without ulcers or lesions  NECK: no adenopathy, no asymmetry, masses, or scars and thyroid normal to palpation  RESP: lungs clear to auscultation - no rales, rhonchi or wheezes  CV: regular rate  and rhythm, normal S1 S2, no S3 or S4, no click or rub, no peripheral edema and peripheral pulses strong  ABDOMEN: obese  PSYCH: mentation appears normal, affect normal/bright    Lab Results   Component Value Date    A1C 5.0 11/22/2021    A1C 5.8 04/07/2021    A1C 5.6 06/04/2020    A1C 5.7 06/12/2018    A1C 6.2 09/06/2017    A1C 5.6 12/01/2016

## 2021-12-19 ENCOUNTER — NURSE TRIAGE (OUTPATIENT)
Dept: NURSING | Facility: CLINIC | Age: 81
End: 2021-12-19
Payer: MEDICARE

## 2021-12-19 NOTE — TELEPHONE ENCOUNTER
In Sept in ER for constipation and lethargy - found to have a heart problem and that she has cancer. Hgb  Was 4.7  Gave blood transfusions. Highest Hgb is up to 8.    F/U appt showed some blood in her stool sample    Now patient is constipated. Very small amount of hard, black stool    Taking stool softeners- taking once per day (Docusate and Sennoside)  Miralax powder at night    Last BM was Friday    No abdominal pain or bloating  Stools are black  Unable to check temp    Patient does have a follow-up with oncologist this week and will have her Hgb checked again.    Per protocol, recommendations are for patient to see provider within weeks.  Care advice given. Advised patient to call back if they develop any new or worsening symptoms. Patient verbalizes understanding and agrees with plan of care.    Angela Sinclair RN  12/19/21 5:03 PM  [facility] Nurse Advisor      Reason for Disposition    Constipation in a cancer patient who is currently (or recently) receiving chemotherapy or radiation therapy, or cancer patient who has metastatic or end-stage cancer and is receiving palliative care    [1] Uses laxative (e.g., PEG / Miralax. Milk of magnesia) or enema AND [2] > once a month    Additional Information    Negative: [1] Abdomen pain is main symptom AND [2] male    Negative: [1] Abdomen pain is main symptom AND [2] adult female    Negative: Rectal bleeding or blood in stool is main symptom    Negative: Rectal pain or itching is main symptom    Negative: Sounds like a life-threatening emergency to the triager    Negative: [1] Abdomen pain is main symptom AND [2] male    Negative: [1] Abdomen pain is main symptom AND [2] adult female    Negative: Rectal bleeding or blood in stool is main symptom    Negative: Rectal pain or itching is main symptom    Negative: Patient sounds very sick or weak to the triager    Negative: [1] Neutropenia known or suspected (e.g., recent cancer chemotherapy) AND [2] fever > 100.4 F  (38.0 C)    Negative: [1] Vomiting AND [2] abdomen looks much more swollen than usual    Negative: [1] Vomiting AND [2] contains bile (green color)    Negative: [1] Constant abdominal pain AND [2] present > 2 hours    Negative: [1] Sudden onset rectal pain (straining, rectal pressure or fullness) AND [2] NOT better after SITZ bath, suppository or enema    Negative: [1] Abdominal pain (i.e., mild or intermittent) AND [2] fever    Negative: [1] Abdominal pain (i.e., mild or intermittent) AND [2] abdomen looks much more swollen than usual    Negative: Last bowel movement (BM) > 4 days ago    Negative: Leaking stool    Negative: [1] Constipation present > 1 week AND [2] no improvement after using CARE ADVICE    Negative: Unable to have a bowel movement (BM) without laxative or enema (Exception: this the doctor's current treatment plan for constipation)    Negative: Taking new prescription medication that may be causing constipation    Negative: Constipation is a chronic symptom (recurrent or ongoing AND present > 4 weeks)    Negative: MILD-MODERATE constipation    Protocols used: CONSTIPATION-A-AH, CANCER - CONSTIPATION-A-AH    COVID 19 Nurse Triage Plan/Patient Instructions    Please be aware that novel coronavirus (COVID-19) may be circulating in the community. If you develop symptoms such as fever, cough, or SOB or if you have concerns about the presence of another infection including coronavirus (COVID-19), please contact your health care provider or visit [email or web address].     Disposition/Instructions    Home care recommended. Follow home care protocol based instructions.    Thank you for taking steps to prevent the spread of this virus.  o Limit your contact with others.  o Wear a simple mask to cover your cough.  o Wash your hands well and often.    Resources     [facility]: About COVID-19: [email or web address]    CDC: What to Do If You're Sick:  www.cdc.gov/coronavirus/2019-ncov/about/steps-when-sick.html    CDC: Ending Home Isolation: www.cdc.gov/coronavirus/2019-ncov/hcp/disposition-in-home-patients.html     CDC: Caring for Someone: www.cdc.gov/coronavirus/2019-ncov/if-you-are-sick/care-for-someone.html     [facility]: Interim Guidance for Hospital Discharge to Home: [email or web address]/[email or web address]/hcp/hospdischarge.pdf    [facility] clinical trials (COVID-19 research studies): [email or web address]-[email or web address]-trials     Below are the COVID-19 hotlines at the [place] [facility] ([facility]). Interpreters are available.   o For health questions: Call 130-028-7454 or 1-479.413.1274 (7 a.m. to 7 p.m.)  o For questions about schools and childcare: Call 804-656-0124 or 1-787.501.1674 (7 a.m. to 7 p.m.)

## 2021-12-20 ENCOUNTER — TELEPHONE (OUTPATIENT)
Dept: ONCOLOGY | Facility: CLINIC | Age: 81
End: 2021-12-20
Payer: MEDICARE

## 2021-12-20 NOTE — TELEPHONE ENCOUNTER
Spoke to DDS Damien [phone]. Patient needs dental work. Dental work can be done 2 weeks after her chemotherapy.

## 2021-12-21 ENCOUNTER — APPOINTMENT (OUTPATIENT)
Dept: GENERAL RADIOLOGY | Facility: CLINIC | Age: 81
DRG: 811 | End: 2021-12-21
Attending: EMERGENCY MEDICINE
Payer: MEDICARE

## 2021-12-21 ENCOUNTER — HOSPITAL ENCOUNTER (INPATIENT)
Facility: CLINIC | Age: 81
LOS: 6 days | Discharge: NURSING, REHAB OR LONG-TERM CARE | DRG: 811 | End: 2021-12-27
Attending: EMERGENCY MEDICINE | Admitting: STUDENT IN AN ORGANIZED HEALTH CARE EDUCATION/TRAINING PROGRAM
Payer: MEDICARE

## 2021-12-21 ENCOUNTER — TELEPHONE (OUTPATIENT)
Dept: ONCOLOGY | Facility: CLINIC | Age: 81
End: 2021-12-21
Payer: MEDICARE

## 2021-12-21 ENCOUNTER — PATIENT OUTREACH (OUTPATIENT)
Dept: CARE COORDINATION | Facility: CLINIC | Age: 81
End: 2021-12-21

## 2021-12-21 ENCOUNTER — APPOINTMENT (OUTPATIENT)
Dept: CT IMAGING | Facility: CLINIC | Age: 81
DRG: 811 | End: 2021-12-21
Attending: EMERGENCY MEDICINE
Payer: MEDICARE

## 2021-12-21 DIAGNOSIS — C85.99 NON-HODGKIN LYMPHOMA OF SOLID ORGAN EXCLUDING SPLEEN, UNSPECIFIED NON-HODGKIN LYMPHOMA TYPE (H): ICD-10-CM

## 2021-12-21 DIAGNOSIS — R53.81 PHYSICAL DECONDITIONING: Primary | ICD-10-CM

## 2021-12-21 DIAGNOSIS — E66.01 SEVERE OBESITY (BMI 35.0-39.9) WITH COMORBIDITY (H): ICD-10-CM

## 2021-12-21 DIAGNOSIS — C85.12 B-CELL LYMPHOMA OF INTRATHORACIC LYMPH NODES, UNSPECIFIED B-CELL LYMPHOMA TYPE (H): ICD-10-CM

## 2021-12-21 DIAGNOSIS — D62 ACUTE ON CHRONIC BLOOD LOSS ANEMIA: ICD-10-CM

## 2021-12-21 DIAGNOSIS — R09.02 HYPOXIA: ICD-10-CM

## 2021-12-21 DIAGNOSIS — M62.81 GENERALIZED MUSCLE WEAKNESS: ICD-10-CM

## 2021-12-21 DIAGNOSIS — Z51.89 ENCOUNTER FOR OTHER SPECIFIED AFTERCARE: ICD-10-CM

## 2021-12-21 DIAGNOSIS — G47.33 OSA (OBSTRUCTIVE SLEEP APNEA): ICD-10-CM

## 2021-12-21 DIAGNOSIS — D64.9 SYMPTOMATIC ANEMIA: ICD-10-CM

## 2021-12-21 LAB
ABO/RH(D): NORMAL
ALBUMIN SERPL-MCNC: 3 G/DL (ref 3.4–5)
ALBUMIN UR-MCNC: NEGATIVE MG/DL
ALP SERPL-CCNC: 111 U/L (ref 40–150)
ALT SERPL W P-5'-P-CCNC: 14 U/L (ref 0–50)
ANION GAP SERPL CALCULATED.3IONS-SCNC: 1 MMOL/L (ref 3–14)
ANTIBODY SCREEN: NEGATIVE
APPEARANCE UR: CLEAR
AST SERPL W P-5'-P-CCNC: 8 U/L (ref 0–45)
ATRIAL RATE - MUSE: 77 BPM
BASE EXCESS BLDV CALC-SCNC: 9.9 MMOL/L (ref -7.7–1.9)
BASOPHILS # BLD AUTO: 0 10E3/UL (ref 0–0.2)
BASOPHILS NFR BLD AUTO: 1 %
BILIRUB SERPL-MCNC: 0.2 MG/DL (ref 0.2–1.3)
BILIRUB UR QL STRIP: NEGATIVE
BUN SERPL-MCNC: 22 MG/DL (ref 7–30)
CALCIUM SERPL-MCNC: 9 MG/DL (ref 8.5–10.1)
CHLORIDE BLD-SCNC: 99 MMOL/L (ref 94–109)
CO2 SERPL-SCNC: 38 MMOL/L (ref 20–32)
COLOR UR AUTO: ABNORMAL
CREAT SERPL-MCNC: 0.9 MG/DL (ref 0.52–1.04)
DIASTOLIC BLOOD PRESSURE - MUSE: NORMAL MMHG
EOSINOPHIL # BLD AUTO: 0.3 10E3/UL (ref 0–0.7)
EOSINOPHIL NFR BLD AUTO: 6 %
ERYTHROCYTE [DISTWIDTH] IN BLOOD BY AUTOMATED COUNT: 14.8 % (ref 10–15)
FLUAV RNA SPEC QL NAA+PROBE: NEGATIVE
FLUBV RNA RESP QL NAA+PROBE: NEGATIVE
GFR SERPL CREATININE-BSD FRML MDRD: 63 ML/MIN/1.73M2
GLUCOSE BLD-MCNC: 103 MG/DL (ref 70–99)
GLUCOSE UR STRIP-MCNC: NEGATIVE MG/DL
HCO3 BLDV-SCNC: 37 MMOL/L (ref 21–28)
HCT VFR BLD AUTO: 27.4 % (ref 35–47)
HGB BLD-MCNC: 7.7 G/DL (ref 11.7–15.7)
HGB BLD-MCNC: 7.7 G/DL (ref 11.7–15.7)
HGB UR QL STRIP: NEGATIVE
HOLD SPECIMEN: NORMAL
IMM GRANULOCYTES # BLD: 0 10E3/UL
IMM GRANULOCYTES NFR BLD: 0 %
INR PPP: 1.09 (ref 0.85–1.15)
INTERPRETATION ECG - MUSE: NORMAL
KETONES UR STRIP-MCNC: NEGATIVE MG/DL
LACTATE SERPL-SCNC: 0.4 MMOL/L (ref 0.7–2)
LDH SERPL L TO P-CCNC: 99 U/L (ref 81–234)
LEUKOCYTE ESTERASE UR QL STRIP: NEGATIVE
LYMPHOCYTES # BLD AUTO: 0.8 10E3/UL (ref 0.8–5.3)
LYMPHOCYTES NFR BLD AUTO: 18 %
MCH RBC QN AUTO: 25 PG (ref 26.5–33)
MCHC RBC AUTO-ENTMCNC: 28.1 G/DL (ref 31.5–36.5)
MCV RBC AUTO: 89 FL (ref 78–100)
MONOCYTES # BLD AUTO: 0.4 10E3/UL (ref 0–1.3)
MONOCYTES NFR BLD AUTO: 8 %
NEUTROPHILS # BLD AUTO: 3.1 10E3/UL (ref 1.6–8.3)
NEUTROPHILS NFR BLD AUTO: 67 %
NITRATE UR QL: NEGATIVE
NRBC # BLD AUTO: 0 10E3/UL
NRBC BLD AUTO-RTO: 0 /100
NT-PROBNP SERPL-MCNC: 239 PG/ML (ref 0–1800)
O2/TOTAL GAS SETTING VFR VENT: 2 %
P AXIS - MUSE: 85 DEGREES
PCO2 BLDV: 64 MM HG (ref 40–50)
PH BLDV: 7.37 [PH] (ref 7.32–7.43)
PH UR STRIP: 7.5 [PH] (ref 5–7)
PLATELET # BLD AUTO: 247 10E3/UL (ref 150–450)
PO2 BLDV: 38 MM HG (ref 25–47)
POTASSIUM BLD-SCNC: 3.8 MMOL/L (ref 3.4–5.3)
PR INTERVAL - MUSE: 192 MS
PROT SERPL-MCNC: 6.6 G/DL (ref 6.8–8.8)
QRS DURATION - MUSE: 106 MS
QT - MUSE: 392 MS
QTC - MUSE: 443 MS
R AXIS - MUSE: -45 DEGREES
RBC # BLD AUTO: 3.08 10E6/UL (ref 3.8–5.2)
RBC URINE: <1 /HPF
SARS-COV-2 RNA RESP QL NAA+PROBE: NEGATIVE
SODIUM SERPL-SCNC: 138 MMOL/L (ref 133–144)
SP GR UR STRIP: 1.01 (ref 1–1.03)
SPECIMEN EXPIRATION DATE: NORMAL
SQUAMOUS EPITHELIAL: 2 /HPF
SYSTOLIC BLOOD PRESSURE - MUSE: NORMAL MMHG
T AXIS - MUSE: 56 DEGREES
TROPONIN I SERPL HS-MCNC: 8 NG/L
UROBILINOGEN UR STRIP-MCNC: NORMAL MG/DL
VENTRICULAR RATE- MUSE: 77 BPM
WBC # BLD AUTO: 4.6 10E3/UL (ref 4–11)
WBC URINE: 1 /HPF

## 2021-12-21 PROCEDURE — 86901 BLOOD TYPING SEROLOGIC RH(D): CPT | Performed by: EMERGENCY MEDICINE

## 2021-12-21 PROCEDURE — 250N000009 HC RX 250: Performed by: STUDENT IN AN ORGANIZED HEALTH CARE EDUCATION/TRAINING PROGRAM

## 2021-12-21 PROCEDURE — 999N000157 HC STATISTIC RCP TIME EA 10 MIN

## 2021-12-21 PROCEDURE — 87636 SARSCOV2 & INF A&B AMP PRB: CPT | Performed by: EMERGENCY MEDICINE

## 2021-12-21 PROCEDURE — 99223 1ST HOSP IP/OBS HIGH 75: CPT | Mod: AI | Performed by: STUDENT IN AN ORGANIZED HEALTH CARE EDUCATION/TRAINING PROGRAM

## 2021-12-21 PROCEDURE — 250N000011 HC RX IP 250 OP 636: Performed by: STUDENT IN AN ORGANIZED HEALTH CARE EDUCATION/TRAINING PROGRAM

## 2021-12-21 PROCEDURE — 87040 BLOOD CULTURE FOR BACTERIA: CPT | Performed by: EMERGENCY MEDICINE

## 2021-12-21 PROCEDURE — 81003 URINALYSIS AUTO W/O SCOPE: CPT | Performed by: EMERGENCY MEDICINE

## 2021-12-21 PROCEDURE — 83010 ASSAY OF HAPTOGLOBIN QUANT: CPT | Performed by: STUDENT IN AN ORGANIZED HEALTH CARE EDUCATION/TRAINING PROGRAM

## 2021-12-21 PROCEDURE — 82803 BLOOD GASES ANY COMBINATION: CPT | Performed by: STUDENT IN AN ORGANIZED HEALTH CARE EDUCATION/TRAINING PROGRAM

## 2021-12-21 PROCEDURE — 99291 CRITICAL CARE FIRST HOUR: CPT | Mod: 25

## 2021-12-21 PROCEDURE — 83880 ASSAY OF NATRIURETIC PEPTIDE: CPT | Performed by: EMERGENCY MEDICINE

## 2021-12-21 PROCEDURE — 94640 AIRWAY INHALATION TREATMENT: CPT

## 2021-12-21 PROCEDURE — 84484 ASSAY OF TROPONIN QUANT: CPT | Performed by: EMERGENCY MEDICINE

## 2021-12-21 PROCEDURE — C9113 INJ PANTOPRAZOLE SODIUM, VIA: HCPCS | Performed by: STUDENT IN AN ORGANIZED HEALTH CARE EDUCATION/TRAINING PROGRAM

## 2021-12-21 PROCEDURE — 36415 COLL VENOUS BLD VENIPUNCTURE: CPT | Performed by: EMERGENCY MEDICINE

## 2021-12-21 PROCEDURE — 85025 COMPLETE CBC W/AUTO DIFF WBC: CPT | Performed by: EMERGENCY MEDICINE

## 2021-12-21 PROCEDURE — 250N000013 HC RX MED GY IP 250 OP 250 PS 637: Performed by: STUDENT IN AN ORGANIZED HEALTH CARE EDUCATION/TRAINING PROGRAM

## 2021-12-21 PROCEDURE — 93005 ELECTROCARDIOGRAM TRACING: CPT

## 2021-12-21 PROCEDURE — 86850 RBC ANTIBODY SCREEN: CPT | Performed by: EMERGENCY MEDICINE

## 2021-12-21 PROCEDURE — 120N000001 HC R&B MED SURG/OB

## 2021-12-21 PROCEDURE — 250N000011 HC RX IP 250 OP 636: Performed by: EMERGENCY MEDICINE

## 2021-12-21 PROCEDURE — 83615 LACTATE (LD) (LDH) ENZYME: CPT | Performed by: STUDENT IN AN ORGANIZED HEALTH CARE EDUCATION/TRAINING PROGRAM

## 2021-12-21 PROCEDURE — 85018 HEMOGLOBIN: CPT | Performed by: STUDENT IN AN ORGANIZED HEALTH CARE EDUCATION/TRAINING PROGRAM

## 2021-12-21 PROCEDURE — C9803 HOPD COVID-19 SPEC COLLECT: HCPCS

## 2021-12-21 PROCEDURE — 250N000009 HC RX 250: Performed by: EMERGENCY MEDICINE

## 2021-12-21 PROCEDURE — 85610 PROTHROMBIN TIME: CPT | Performed by: EMERGENCY MEDICINE

## 2021-12-21 PROCEDURE — 36415 COLL VENOUS BLD VENIPUNCTURE: CPT | Performed by: STUDENT IN AN ORGANIZED HEALTH CARE EDUCATION/TRAINING PROGRAM

## 2021-12-21 PROCEDURE — 71275 CT ANGIOGRAPHY CHEST: CPT | Mod: MG

## 2021-12-21 PROCEDURE — 80053 COMPREHEN METABOLIC PANEL: CPT | Performed by: EMERGENCY MEDICINE

## 2021-12-21 PROCEDURE — 71045 X-RAY EXAM CHEST 1 VIEW: CPT

## 2021-12-21 PROCEDURE — 83605 ASSAY OF LACTIC ACID: CPT | Performed by: EMERGENCY MEDICINE

## 2021-12-21 PROCEDURE — 5A09357 ASSISTANCE WITH RESPIRATORY VENTILATION, LESS THAN 24 CONSECUTIVE HOURS, CONTINUOUS POSITIVE AIRWAY PRESSURE: ICD-10-PCS | Performed by: STUDENT IN AN ORGANIZED HEALTH CARE EDUCATION/TRAINING PROGRAM

## 2021-12-21 RX ORDER — CITALOPRAM HYDROBROMIDE 20 MG/1
20 TABLET ORAL DAILY
Status: DISCONTINUED | OUTPATIENT
Start: 2021-12-21 | End: 2021-12-27 | Stop reason: HOSPADM

## 2021-12-21 RX ORDER — LIDOCAINE 40 MG/G
CREAM TOPICAL
Status: DISCONTINUED | OUTPATIENT
Start: 2021-12-21 | End: 2021-12-27 | Stop reason: HOSPADM

## 2021-12-21 RX ORDER — ACETAMINOPHEN 325 MG/1
650 TABLET ORAL EVERY 4 HOURS PRN
Status: DISCONTINUED | OUTPATIENT
Start: 2021-12-21 | End: 2021-12-27 | Stop reason: HOSPADM

## 2021-12-21 RX ORDER — AMOXICILLIN 250 MG
1 CAPSULE ORAL EVERY MORNING
COMMUNITY
End: 2022-08-22

## 2021-12-21 RX ORDER — LIDOCAINE 40 MG/G
CREAM TOPICAL
Status: CANCELLED | OUTPATIENT
Start: 2021-12-21

## 2021-12-21 RX ORDER — HEPARIN SODIUM (PORCINE) LOCK FLUSH IV SOLN 100 UNIT/ML 100 UNIT/ML
5-10 SOLUTION INTRAVENOUS
Status: DISCONTINUED | OUTPATIENT
Start: 2021-12-21 | End: 2021-12-27 | Stop reason: HOSPADM

## 2021-12-21 RX ORDER — POLYETHYLENE GLYCOL 3350 17 G/17G
1 POWDER, FOR SOLUTION ORAL EVERY EVENING
COMMUNITY
End: 2022-08-22

## 2021-12-21 RX ORDER — AMOXICILLIN 250 MG
1 CAPSULE ORAL EVERY MORNING
Status: DISCONTINUED | OUTPATIENT
Start: 2021-12-22 | End: 2021-12-22

## 2021-12-21 RX ORDER — PREDNISONE 50 MG/1
100 TABLET ORAL DAILY
COMMUNITY
Start: 2021-12-15 | End: 2022-01-04

## 2021-12-21 RX ORDER — IOPAMIDOL 755 MG/ML
126 INJECTION, SOLUTION INTRAVASCULAR ONCE
Status: COMPLETED | OUTPATIENT
Start: 2021-12-21 | End: 2021-12-21

## 2021-12-21 RX ORDER — GUAIFENESIN/DEXTROMETHORPHAN 100-10MG/5
10 SYRUP ORAL EVERY 4 HOURS PRN
Status: DISCONTINUED | OUTPATIENT
Start: 2021-12-21 | End: 2021-12-27 | Stop reason: HOSPADM

## 2021-12-21 RX ORDER — ACETAMINOPHEN 650 MG/1
650 SUPPOSITORY RECTAL EVERY 4 HOURS PRN
Status: DISCONTINUED | OUTPATIENT
Start: 2021-12-21 | End: 2021-12-27 | Stop reason: HOSPADM

## 2021-12-21 RX ORDER — SIMVASTATIN 10 MG
10 TABLET ORAL AT BEDTIME
Status: DISCONTINUED | OUTPATIENT
Start: 2021-12-21 | End: 2021-12-27 | Stop reason: HOSPADM

## 2021-12-21 RX ORDER — IPRATROPIUM BROMIDE AND ALBUTEROL SULFATE 2.5; .5 MG/3ML; MG/3ML
3 SOLUTION RESPIRATORY (INHALATION)
Status: DISCONTINUED | OUTPATIENT
Start: 2021-12-21 | End: 2021-12-27 | Stop reason: HOSPADM

## 2021-12-21 RX ORDER — ONDANSETRON 4 MG/1
4 TABLET, ORALLY DISINTEGRATING ORAL EVERY 6 HOURS PRN
Status: DISCONTINUED | OUTPATIENT
Start: 2021-12-21 | End: 2021-12-27 | Stop reason: HOSPADM

## 2021-12-21 RX ORDER — LORAZEPAM 0.5 MG/1
0.5 TABLET ORAL EVERY 4 HOURS PRN
Status: DISCONTINUED | OUTPATIENT
Start: 2021-12-21 | End: 2021-12-27 | Stop reason: HOSPADM

## 2021-12-21 RX ORDER — ALLOPURINOL 300 MG/1
300 TABLET ORAL DAILY
Status: DISCONTINUED | OUTPATIENT
Start: 2021-12-21 | End: 2021-12-27 | Stop reason: HOSPADM

## 2021-12-21 RX ORDER — LEVOTHYROXINE SODIUM 100 UG/1
200 TABLET ORAL DAILY
Status: DISCONTINUED | OUTPATIENT
Start: 2021-12-21 | End: 2021-12-27 | Stop reason: HOSPADM

## 2021-12-21 RX ORDER — ONDANSETRON 2 MG/ML
4 INJECTION INTRAMUSCULAR; INTRAVENOUS EVERY 6 HOURS PRN
Status: DISCONTINUED | OUTPATIENT
Start: 2021-12-21 | End: 2021-12-27 | Stop reason: HOSPADM

## 2021-12-21 RX ORDER — FUROSEMIDE 20 MG
20 TABLET ORAL DAILY
Status: DISCONTINUED | OUTPATIENT
Start: 2021-12-21 | End: 2021-12-27 | Stop reason: HOSPADM

## 2021-12-21 RX ORDER — HEPARIN SODIUM,PORCINE 10 UNIT/ML
5-10 VIAL (ML) INTRAVENOUS
Status: DISCONTINUED | OUTPATIENT
Start: 2021-12-21 | End: 2021-12-27 | Stop reason: HOSPADM

## 2021-12-21 RX ORDER — HEPARIN SODIUM,PORCINE 10 UNIT/ML
5-10 VIAL (ML) INTRAVENOUS EVERY 24 HOURS
Status: DISCONTINUED | OUTPATIENT
Start: 2021-12-21 | End: 2021-12-27 | Stop reason: HOSPADM

## 2021-12-21 RX ORDER — POLYETHYLENE GLYCOL 3350 17 G/17G
17 POWDER, FOR SOLUTION ORAL EVERY EVENING
Status: DISCONTINUED | OUTPATIENT
Start: 2021-12-21 | End: 2021-12-22

## 2021-12-21 RX ADMIN — IOPAMIDOL 126 ML: 755 INJECTION, SOLUTION INTRAVENOUS at 13:50

## 2021-12-21 RX ADMIN — LEVOTHYROXINE SODIUM 200 MCG: 100 TABLET ORAL at 20:55

## 2021-12-21 RX ADMIN — CITALOPRAM HYDROBROMIDE 20 MG: 20 TABLET ORAL at 20:57

## 2021-12-21 RX ADMIN — IPRATROPIUM BROMIDE AND ALBUTEROL SULFATE 3 ML: .5; 3 SOLUTION RESPIRATORY (INHALATION) at 19:53

## 2021-12-21 RX ADMIN — FUROSEMIDE 20 MG: 20 TABLET ORAL at 20:55

## 2021-12-21 RX ADMIN — ALLOPURINOL 300 MG: 300 TABLET ORAL at 20:56

## 2021-12-21 RX ADMIN — Medication 10 ML: at 22:33

## 2021-12-21 RX ADMIN — PANTOPRAZOLE SODIUM 40 MG: 40 INJECTION, POWDER, FOR SOLUTION INTRAVENOUS at 20:58

## 2021-12-21 RX ADMIN — SODIUM CHLORIDE 78 ML: 900 INJECTION INTRAVENOUS at 13:50

## 2021-12-21 RX ADMIN — SIMVASTATIN 10 MG: 10 TABLET, FILM COATED ORAL at 22:29

## 2021-12-21 ASSESSMENT — ENCOUNTER SYMPTOMS
VOMITING: 0
BLOOD IN STOOL: 1
CONSTIPATION: 1
COUGH: 1
FEVER: 0
FATIGUE: 1

## 2021-12-21 ASSESSMENT — ACTIVITIES OF DAILY LIVING (ADL)
ADLS_ACUITY_SCORE: 13
ADLS_ACUITY_SCORE: 13
ADLS_ACUITY_SCORE: 15
ADLS_ACUITY_SCORE: 13
ADLS_ACUITY_SCORE: 17
ADLS_ACUITY_SCORE: 12
ADLS_ACUITY_SCORE: 15
ADLS_ACUITY_SCORE: 13

## 2021-12-21 NOTE — PROGRESS NOTES
RECEIVING UNIT ED HANDOFF REVIEW    ED Nurse Handoff Report was reviewed by: Jaimie Finley RN on December 21, 2021 at 5:46 PM

## 2021-12-21 NOTE — PHARMACY-ADMISSION MEDICATION HISTORY
Pharmacy Medication History  Admission medication history interview status for the 12/21/2021  admission is complete. See EPIC admission navigator for prior to admission medications     Location of Interview: Patient room  Medication history sources: Patient and Patient's family/friend (daughter), surescripts, infusion center clinic notes in [facility]    Significant changes made to the medication list:  Deleted tylenol, aspirin, protonix  Added prednisone, senna-s, miralax, chemo regimen, neulasta    In the past week, patient estimated taking medication this percent of the time: 50-90% due to pt doesn't recall taking prednisone. states she was told not to take aspirin or tylenol, doesn't take protonix to her recollection either.    Additional medication history information:   none    Medication reconciliation completed by provider prior to medication history? No    Time spent in this activity: 20 minutes    Prior to Admission medications    Medication Sig Last Dose Taking? Auth Provider   allopurinol (ZYLOPRIM) 300 MG tablet Take 1 tablet (300 mg) by mouth daily for 14 days 12/20/2021 at Unknown time Yes John Srinivasan MD   carboxymethylcellulose PF (REFRESH PLUS) 0.5 % ophthalmic solution Place 2 drops into both eyes 2 times daily 12/20/2021 at Unknown time Yes Reported, Patient   citalopram (CELEXA) 20 MG tablet Take 1 tablet (20 mg) by mouth daily 12/20/2021 at am Yes Fausto Flynn MD   furosemide (LASIX) 20 MG tablet Take 1 tablet (20 mg) by mouth daily 12/20/2021 at am Yes Fausto Flynn MD   levothyroxine (SYNTHROID/LEVOTHROID) 200 MCG tablet TAKE ONE TABLET BY MOUTH ONE TIME DAILY  12/20/2021 at am Yes Fausto Flynn MD   Multiple Vitamins-Minerals (PRESERVISION AREDS) CAPS Take 1 tablet by mouth 2 times daily  12/20/2021 at pm Yes Reported, Patient   pegfilgrastim (NEULASTA ONPRO KIT) 6 MG/0.6ML injection Inject 6 mg Subcutaneous once 12/16/2021 at Unknown time Yes Unknown, Entered By History    polyethylene glycol (MIRALAX) 17 g packet Take 1 packet by mouth every evening 12/20/2021 at pm Yes Unknown, Entered By History   predniSONE (DELTASONE) 50 MG tablet Take 100 mg by mouth daily X 5 days with chemo regimen Past Week at Unknown time Yes Unknown, Entered By History   senna-docusate (SENOKOT-S/PERICOLACE) 8.6-50 MG tablet Take 1 tablet by mouth every morning 12/20/2021 at am Yes Unknown, Entered By History   simvastatin (ZOCOR) 10 MG tablet Take 1 tablet (10 mg) by mouth At Bedtime 12/20/2021 at pm Yes Fausto Flynn MD   UNKNOWN TO PATIENT Inject into the vein once Last chemotherapy on 12/15 (tylenol/benadryl/emend/aloxi,NS bolus,rituximab, cyclophosphamide, doxorubicin, vincristine) 12/15/2021 Yes Unknown, Entered By History   ACE/ARB/ARNI NOT PRESCRIBED (INTENTIONAL) Please choose reason not prescribed, below   Fausto Flynn MD   aspirin 81 MG tablet Take 1 tablet by mouth daily.  Patient not taking: Reported on 12/21/2021 Not Taking at Unknown time  Fausto Flynn MD   CONTOUR NEXT TEST test strip USE TO TEST BLOOD GLUCOSE ONCE DAILY   Reported, Patient   LORazepam (ATIVAN) 0.5 MG tablet Take 1 tablet (0.5 mg) by mouth every 4 hours as needed (Anxiety, Nausea/Vomiting or Sleep) prn  John Srinivasan MD   Microlet Lancets MISC USE TO TEST BLOOD GLUCOSE ONCE DAILY   Reported, Patient   nystatin (MYCOSTATIN) 637367 UNIT/GM external powder Apply topically 2 times daily Under breasts and skin folds BID & BID PRN for redness prn  Fausto Flynn MD   order for DME Equipment being ordered: wheeled walker with hand breaks   Fausto Flynn MD   pantoprazole (PROTONIX) 40 MG EC tablet Take 1 tablet (40 mg) by mouth every morning (before breakfast)  Patient not taking: Reported on 12/21/2021 Not Taking at Unknown time  Fausto Flynn MD   prochlorperazine (COMPAZINE) 10 MG tablet Take 1 tablet (10 mg) by mouth every 6 hours as needed (Nausea/Vomiting) prn  John Srinivasan MD       The information  provided in this note is only as accurate as the sources available at the time of update(s)

## 2021-12-21 NOTE — ED PROVIDER NOTES
"  History   Chief Complaint:  Anemia       HPI   Lucille Rojas is a 81 year old female with history of hypertension, hyperlipidemia, type II diabetes, CKD, morbid obesity, and b-cell lymphoma of the pancreas who presents with anemia. The patient reports that she passed a stool that was \"totally black\" last Friday (four days ago) and she has not had a bowel movement since. She has been tired and has needed to stay in bed over the past few days. A cough has also been present since Friday. Temperature was not checked prior to arrival, but the patient says that she has not felt warm. Oxygen saturation has been lower than baseline (in lower 90s) for the last couple of days. She is intermittently on 2 liters at home.  Patient denies vomiting. A GI bleed last occurred in September. The source of this bleed was never identified.     Review of Systems   Constitutional: Positive for fatigue. Negative for fever.   Respiratory: Positive for cough.    Gastrointestinal: Positive for blood in stool and constipation. Negative for vomiting.   All other systems reviewed and are negative.        Allergies:  Penicillins    Medications:  Allopurinol  Aspirin  Celexa  Lasix  Levothyroxine  Ativan  Nystatin  Pantoprazole  Compazine  Simvastatin    Past Medical History:     Cancer  Depressive disorder  Heart disease  Blood transfusion  Hypertension  Hyperlipidemia  Hypothyroidism  Macular degeneration  Sleep apnea  Type II diabetes   CKD  Morbid obesity  GERD  Severe anemia   B-cell lymphoma  Pancreatic mass  Nonrheumatic aortic valve stenosis with insufficiency       Past Surgical History:    Appendectomy  Hernia repair  Tonsillectomy     Family History:    No known family history     Social History:  Denies history of smoking   Accompanied by a friend    Physical Exam     Patient Vitals for the past 24 hrs:   BP Temp Temp src Pulse Resp SpO2 Height   12/21/21 1300 -- -- -- -- -- 100 % --   12/21/21 1245 -- -- -- -- -- 100 % -- " "  12/21/21 1215 -- -- -- -- -- 100 % --   12/21/21 1109 128/55 98.5  F (36.9  C) Oral 78 20 100 % 1.727 m (5' 8\")       Physical Exam  General: Patient is alert and generalized weakness and shortness of breath  HEENT: Head atraumatic    Eyes: pupils equal and reactive. Conjunctiva clear   Nares: patent   Oropharynx: no lesions, uvula midline, no palatal draping, normal voice, no trismus  Neck: Supple without lymphadenopathy, no meningismus  Chest: Heart regular rate and rhythm.   Lungs: Dry bronchospastic cough, occasional end expiratory wheeze, no rales, conversational dyspnea  Abdomen: Soft, non tender, distended abdomen with no reproducible fluid wave or tenderness to palpation normal bowel sounds  Back: No costovertebral angle tenderness, no midline C, T or L spine tenderness  Neuro: Grossly nonfocal, normal speech, strength equal bilaterally, CN 2-12 intact  Extremities: No deformities, equal radial and DP pulses. No clubbing, cyanosis.  Minimal lower extremity edema  Skin: Warm and dry with no rash.       Emergency Department Course   ECG  ECG obtained at 1156, ECG read at 1202  Normal sinus rhythm  Left axis deviation  Pulmonary disease pattern  Abnormal ECG   Rate 77 bpm. CA interval 192 ms. QRS duration 106 ms. QT/QTc 392/443 ms. P-R-T axes 85 -45 56.     Imaging:  XR Chest Port 1 View   Final Result   IMPRESSION: Right-sided port catheter. No acute findings. The lungs   are clear and there are no pleural effusions. Stable cardiomegaly and   central vascular prominence. No overt pulmonary edema.      AYDE FREEMAN MD            SYSTEM ID:  [id number]      CT Chest (PE) Abdomen Pelvis w Contrast    (Results Pending)     Report per radiology    Laboratory:    Symptomatic Influenza A/B & SARS-CoV2 (COVID19) Virus PCR Multiplex: negative      CBC: WBC 4.6, HGB 7.7 (L),      INR: 1.09    CMP: CO2 38 (H), Anion gap 1 (L), glucose 103 (H), protein total 6.6 (L), albumin 3.0 (L) o/w WNL (Creatinine 0.90) "     Lactic acid (result time 1156) 0.4 (L)     Troponin (Collected 1144): 8    BNP: 239    Blood cultures pending x2    Emergency Department Course:    Reviewed:  I reviewed nursing notes, vitals, past medical history and Care Everywhere    Assessments:  1100 I obtained history and examined the patient as noted above.   1220 I rechecked the patient and explained findings.   1350 I rechecked the patient and explained the findings    Consults:  1:25 PM - Discussed the patient with Dr. Parks, who will admit the patient for further evaluation and treatment.       Disposition:  Care of the patient was transferred to my colleague Dr. Blair pending admission to the hospital under the care of Dr. Parks.     Impression & Plan         Medical Decision Making:  Patient is an 81-year-old female with history of B-cell lymphoma and pancreatic head mass who presents the emergency department with fatigue, generalized weakness, hypoxia, cough and shortness of breath.  Work-up in the emergency department was undertaken as noted above.  She also states that she has had black stools but her last one was 4 days ago.  She has had no stool since that time and describes a distended abdomen.  Her abdomen is distended but there is no reproducible tenderness to palpation.  Patient is found to be anemic here and she has a slowly decreasing hemoglobin.  Her hemoglobin of 7.7 may very well be the cause of all of her symptoms.  She was found to be hypoxic here to the 80s on room air.  She does have home O2 as needed for sleep but has never needed this much support previously.  Covid and influenza screens are negative.  Chest x-ray without acute infiltrate or pneumothorax.  Given her cancer history, hypoxia, shortness of breath she will be sent for CT scan to rule out PE.  Given her abdominal distention she will have a CT of her abdomen pelvis as well to look for acute pathology although I do not suspect surgical emergency based on the lack  of reproducible tenderness to palpation.  She will be admitted to the hospitalist service for likely transfusion and continued respiratory support.  Dr. Blair my colleague in the emergency department will follow up the results of her CT scan and intervene if there is anything acute.  Patient is afebrile and hemodynamically stable at this time.  She is comfortable on 4 L nasal cannula oxygen.  Patient is agreeable with the plan for admission.  All questions and concerns addressed.            Diagnosis:    ICD-10-CM    1. Symptomatic anemia  D64.9    2. Hypoxia  R09.02    3. Generalized muscle weakness  M62.81      Scribe Disclosure:  Sanjeev COLLAZO, am serving as a scribe at 11:01 AM on 12/21/2021 to document services personally performed by Brittany Franks MD based on my observations and the provider's statements to me.              Brittany Franks MD  12/21/21 1400

## 2021-12-21 NOTE — PROGRESS NOTES
[facility]  Patient is currently open to home care services with [facility]. The patient is currently receiving RN and PT services.  and home health team have been notified of patient admission. [facility] liaison will continue to follow patient during stay. If appropriate provide orders to resume home care at time of discharge.

## 2021-12-21 NOTE — TELEPHONE ENCOUNTER
"Pt calling in to report symptoms of constipation, cold like symptoms, and fatigue. She states that these symptoms started on 12/18. She feels like \"I could stay in bed all day\". She also endorses a cough that starts in her chest, but is not able to produce any sputum as it is \"deep in her chest\".    Pt's last BM was last Friday 12/17 and states that they are \"totally black\". She notes that she feel similar to a previous encounter when she had to go to the ED and her Hgb was 4.7. She also notes that her abdomen is hard and full, but is able to pass a small amount of gas.    She also states that her oxygen level was 90% on 2L of home oxygen this morning.    Denies fevers, chills, night sweats, unexplained weight changes, headaches, dizziness, vision or hearing changes, new lumps or bumps, chest pain, shortness of breath, abdominal pain, nausea, vomiting, changes to bladder habits, swelling of extremities, bleeding issues, or rash.     Advised pt to seek care in the ED due to hard abdomen, black tarry stools, symptoms of anemia, and low oxygen levels.    Pt in agreement with plan. Pt states she will seek care at [facility].    Writer called ahead to Charge RN in [facility] ED to give report. Charge RN is aware of pt coming to the ED.    Will route to care team as an update.       "

## 2021-12-21 NOTE — ED NOTES
"[facility]  ED Nurse Handoff Report    ED Chief complaint: Anemia      ED Diagnosis:   Final diagnoses:   Symptomatic anemia   Hypoxia   Generalized muscle weakness       Code Status: Full Code    Allergies:   Allergies   Allergen Reactions     Penicillins        Patient Story: Constipation, blood in stool, hemoglobin 4.9. Recently had chemotherapy. Onset of cough, no fever.  Focused Assessment: Sick obese female, cancer mets    Treatments and/or interventions provided: see epic  Patient's response to treatments and/or interventions: see epic    To be done/followed up on inpatient unit:  continue plan of care    Does this patient have any cognitive concerns?: na    Activity level - Baseline/Home:  Wheelchair  Activity Level - Current:   Wheelchair    Patient's Preferred language: English   Needed?: No    Isolation: None  Infection: Not Applicable  Patient tested for COVID 19 prior to admission: YES  Bariatric?: No    Vital Signs:   Vitals:    12/21/21 1109 12/21/21 1215 12/21/21 1245 12/21/21 1300   BP: 128/55      Pulse: 78      Resp: 20      Temp: 98.5  F (36.9  C)      TempSrc: Oral      SpO2: 100% 100% 100% 100%   Height: 1.727 m (5' 8\")          Cardiac Rhythm:     Was the PSS-3 completed:   Yes  What interventions are required if any?               Family Comments: daughter at bedside  OBS brochure/video discussed/provided to patient/family: N/A              Name of person given brochure if not patient: na              Relationship to patient: na    For the majority of the shift this patient's behavior was Green.   Behavioral interventions performed were na.    ED NURSE PHONE NUMBER: [phone]       "

## 2021-12-21 NOTE — PROGRESS NOTES
Clinic Care Coordination Contact  The Community Health Worker planned to call for a Care Coordination outreach to follow up on goals and action steps.     Did not contact patient.    Per Chart Review, patient is currently at [facility] ED.    CHW will route to  NOMAN.    ZANE Demarco  Clinic Care Coordination  [facility]: [place], [place], [place], and [place] [facility]  Phone: [phone]

## 2021-12-21 NOTE — H&P
[facility]    History and Physical - Hospitalist Service       Date of Admission:  12/21/2021    Assessment & Plan      Lucille Rojas is a 81 year old female with past medical history significant for recently diagnosed Non-hodgkin lymphoma of the pancreas- currently undergoing chemotherapy, Prior GI bleed and iron deficiency with symptomatic anemia admitted on 12/21/2021 with hypoxia, weakness and one episode of black stool.     Acute on chronic hypoxic/hypercapnic respiratory failure - resolved  Possible viral URI  Hx of obstructive sleep apnea and obesity hypoventilation syndrome on chronic supplemental O2   The patient presents to the Hospital with generalized weakness, cough, rhinorrhea and congestion for the past few days. She reports that her O2 sats at home has been lower than normal (low 90s) on her baseline amount of O2. She was initially found to be hypoxic to the 80s in the ED. She is now back on her baseline 2L of supplemental O2 and is satting normally   She has some wheezing on examination, but no prior hx of COPD or asthma   * CXR showed no acute findings   * COVID and influenza testing are negative   * CTPE obtained and showed no evidence of pulmonary embolism and no other abnormality within the lung parenchyma   * Overall no clear etiology for her worsening respiratory symptoms and cough. Based on her symptoms she may have a viral upper respiratory infection with some reactive airway component. Her worsening anemia may have contributed to a degree as well. Her O2 sats seemed to be improved now. Will monitor overnight   - Continue supplemental O2 to maintain sats around 90-93% - wean as able   - BiPAP at night   - Will order duonebs due to wheezing  - Acetaminophen and robitussin PRN     Acute on Chronic anemia   Hx of iron deficiency anemia, prior GI bleed   Pt admitted back in September with a hgb of 4.7. She was found to be severely iron deficient.   S/p EGD on 9/21/21  revealed a few gastric erosions and a few tiny apthous ulcers in the duodenum. She was also diagnosed with lymphoma at this time. She received 4 units of blood during this admission and has been getting intermittent iron infusions. Hemoglobin today is 7.7 (was 8.4 on 12/14 prior to first chemotherapy but was up to 10.2 on 11/10). Iron studies on 12/14 improves with iron of 37 and iron sat of 13%. The patient notes one episode of black stool a few days ago.   *Suspect that her down trending hemoglobin is multifactorial in the setting of recent chemotherapy and possible on-going GI losses   - Will consult GI for consideration of repeat EGD   - Start IV PPI   - Serial hemoglobin   - Transfuse for hgb 7 or less or worsening symptoms     Non-hodgkin lymphoma involving the pancreas   Pt follows with Dr. Srinivasan of [facility]. She is receiving chemotherapy with mini-R CHOP. She receive her first cycle of treatment on 12/15.   - Continue PTA allopurinol   - Will place consult to Oncology     Diastolic heart failure   Moderate to severe Aortic valve stenos  Echocardiogram from September showed normal LV systolic function (EF of 60-65%) with diastolic doppler findings suggesting increased left ventricular filling pressures. She had moderate to severe valvular aortic stenosis with an aortic valve area of 1.0cm, and pressure gradient of 37.5mmHg. CHF does not appear to be acutely decompensated at this time. She has no evidence of volume overload and BNP is wnl at 239.   - Continue PTA lasix 20mg PO daily   - I/Os and daily weights   - She does not appear to be on BB or ACEi.     CKD stage III  Creatinine is 0.9, stable   - Continue to monitor     Hypothyroidism   - Continue PTA levothyroxine     Depression/Anxiety   - continue PTA citalopram and lorazepam (would use lorazepam very sparingly)     Macular degeneration.  Vision loss, secondary to above.    Obesity  Body mass index is 38.32 kg/m .    Diet: Regular Diet   DVT  Prophylaxis: Pneumatic Compression Devices  Espitia Catheter: Not present  Central Lines: None  Code Status: Full Code     Disposition Plan   Expected Discharge: 2-3 days  Anticipated discharge location:  Awaiting care coordination huddle         The patient's care was discussed with the Patient and Patient's Family.    Gretchen Parks  [facility]  Securely message with the Vocera Web Console (learn more here)  Text page via [employer] Paging/Directory        ______________________________________________________________________    Chief Complaint   Weakness, black stools and cough    History is obtained from the patient    History of Present Illness   Lucille Rojas is a 81 year old female who presents to the ED with multiple complains.     She just underwent her first round of chemotherapy for non-hodgkin lymphoma of the pancreas last week. She was initially feeling well after chemo, but for the past few days has been very weak and fatigued. She also noted a large black stool on Friday. She has been constipated since then, but has been having small, hard black stools intermittently since then. She was hospitalized with severe anemia from a likely upper GI bleed a few months ago and was worried that she might be bleeding again. She has additionally noted some cough, congestion, and rhinorrhea for the past 4 days. She has not had fevers but has been chilled. She notes some lower O2 sats at home on her baseline 2L of supplemental O2 (low 90s upper 80s) and has felt a little more short of breath and wheezing. The combination of symptoms prompted her to come to the ED.     Review of Systems    The 10 point Review of Systems is negative other than noted in the HPI or here.     Past Medical History    I have reviewed this patient's medical history and updated it with pertinent information if needed.   Past Medical History:   Diagnosis Date     Cancer (H) 10/2021     Depressive disorder      Heart  disease 10/2021     History of blood transfusion 20/2021     HTN (hypertension) 5/9/2013     Hyperlipidemia LDL goal <130 5/9/2013     Hypothyroidism 5/9/2013     Macular degeneration 9/18/2013     Sleep apnea     CPAP at night, O2 during naps     Type 2 diabetes, HbA1C goal < 8% (H) 5/9/2013       Past Surgical History   I have reviewed this patient's surgical history and updated it with pertinent information if needed.  Past Surgical History:   Procedure Laterality Date     APPENDECTOMY       COLONOSCOPY       COLONOSCOPY N/A 10/27/2014    Procedure: COMBINED COLONOSCOPY, SINGLE OR MULTIPLE BIOPSY/POLYPECTOMY BY BIOPSY;  Surgeon: Jose Alfredo Morejon MD;  Location:  GI     ESOPHAGOSCOPY, GASTROSCOPY, DUODENOSCOPY (EGD), COMBINED N/A 9/21/2021    Procedure: ESOPHAGOGASTRODUODENOSCOPY, WITH FINE NEEDLE ASPIRATION BIOPSY, WITH ENDOSCOPIC ULTRASOUND GUIDANCE;  Surgeon: Vera Benitez MD;  Location:  GI     ESOPHAGOSCOPY, GASTROSCOPY, DUODENOSCOPY (EGD), COMBINED N/A 9/21/2021    Procedure: Esophagogastroduodenoscopy, With Biopsy;  Surgeon: Vera Benitez MD;  Location:  GI     ESOPHAGOSCOPY, GASTROSCOPY, DUODENOSCOPY (EGD), COMBINED N/A 10/5/2021    Procedure: ESOPHAGOGASTRODUODENOSCOPY, WITH FINE NEEDLE ASPIRATION BIOPSY, WITH ENDOSCOPIC ULTRASOUND GUIDANCE;  Surgeon: Dixon Olivier MD;  Location:  GI     HERNIA REPAIR      inguinal x 2     IR CHEST PORT PLACEMENT > 5 YRS OF AGE  12/13/2021     TONSILLECTOMY         Social History   I have reviewed this patient's social history and updated it with pertinent information if needed.  Social History     Tobacco Use     Smoking status: Never Smoker     Smokeless tobacco: Never Used   Substance Use Topics     Alcohol use: Yes     Alcohol/week: 0.0 standard drinks     Comment: rarely     Drug use: No       Family History   Reviewed and non-contributory     Prior to Admission Medications   Prior to Admission Medications    Prescriptions Last Dose Informant Patient Reported? Taking?   ACE/ARB/ARNI NOT PRESCRIBED (INTENTIONAL)  Self No No   Sig: Please choose reason not prescribed, below   CONTOUR NEXT TEST test strip   Yes No   Sig: USE TO TEST BLOOD GLUCOSE ONCE DAILY   LORazepam (ATIVAN) 0.5 MG tablet prn Self No No   Sig: Take 1 tablet (0.5 mg) by mouth every 4 hours as needed (Anxiety, Nausea/Vomiting or Sleep)   Microlet Lancets MISC   Yes No   Sig: USE TO TEST BLOOD GLUCOSE ONCE DAILY   Multiple Vitamins-Minerals (PRESERVISION AREDS) CAPS 12/20/2021 at pm Self Yes Yes   Sig: Take 1 tablet by mouth 2 times daily    UNKNOWN TO PATIENT 12/15/2021 Self Yes Yes   Sig: Inject into the vein once Last chemotherapy on 12/15 (tylenol/benadryl/emend/aloxi,NS bolus,rituximab, cyclophosphamide, doxorubicin, vincristine)   allopurinol (ZYLOPRIM) 300 MG tablet 12/20/2021 at Unknown time Self No Yes   Sig: Take 1 tablet (300 mg) by mouth daily for 14 days   aspirin 81 MG tablet Not Taking at Unknown time Self No No   Sig: Take 1 tablet by mouth daily.   Patient not taking: Reported on 12/21/2021   carboxymethylcellulose PF (REFRESH PLUS) 0.5 % ophthalmic solution 12/20/2021 at Unknown time Self Yes Yes   Sig: Place 2 drops into both eyes 2 times daily   citalopram (CELEXA) 20 MG tablet 12/20/2021 at am Self No Yes   Sig: Take 1 tablet (20 mg) by mouth daily   furosemide (LASIX) 20 MG tablet 12/20/2021 at am Self No Yes   Sig: Take 1 tablet (20 mg) by mouth daily   levothyroxine (SYNTHROID/LEVOTHROID) 200 MCG tablet 12/20/2021 at am Self No Yes   Sig: TAKE ONE TABLET BY MOUTH ONE TIME DAILY    nystatin (MYCOSTATIN) 305568 UNIT/GM external powder prn Self No No   Sig: Apply topically 2 times daily Under breasts and skin folds BID & BID PRN for redness   order for DME   No No   Sig: Equipment being ordered: wheeled walker with hand breaks   pantoprazole (PROTONIX) 40 MG EC tablet Not Taking at Unknown time Self No No   Sig: Take 1 tablet (40 mg)  by mouth every morning (before breakfast)   Patient not taking: Reported on 12/21/2021   pegfilgrastim (NEULASTA ONPRO KIT) 6 MG/0.6ML injection 12/16/2021 at Unknown time Self Yes Yes   Sig: Inject 6 mg Subcutaneous once   polyethylene glycol (MIRALAX) 17 g packet 12/20/2021 at pm Self Yes Yes   Sig: Take 1 packet by mouth every evening   predniSONE (DELTASONE) 50 MG tablet Past Week at Unknown time Self Yes Yes   Sig: Take 100 mg by mouth daily X 5 days with chemo regimen   prochlorperazine (COMPAZINE) 10 MG tablet prn Self No No   Sig: Take 1 tablet (10 mg) by mouth every 6 hours as needed (Nausea/Vomiting)   senna-docusate (SENOKOT-S/PERICOLACE) 8.6-50 MG tablet 12/20/2021 at am Self Yes Yes   Sig: Take 1 tablet by mouth every morning   simvastatin (ZOCOR) 10 MG tablet 12/20/2021 at pm Self No Yes   Sig: Take 1 tablet (10 mg) by mouth At Bedtime      Facility-Administered Medications: None     Allergies   Allergies   Allergen Reactions     Penicillins        Physical Exam   Vital Signs: Temp: 98.5  F (36.9  C) Temp src: Oral BP: 128/55 Pulse: 78   Resp: 20 SpO2: 100 % O2 Device: None (Room air) Oxygen Delivery: 4 LPM  Weight: 0 lbs 0 oz    Constitutional: Awake, alert, cooperative, no apparent distress.  Eyes: Conjunctiva and pupils examined and normal.  Respiratory: Wheezing bilaterally, infrequent cough, congestion  Cardiovascular: Regular rate and rhythm, normal S1 and S2, and no murmur noted.  GI: Soft, non-distended, non-tender, normal bowel sounds.  Skin: No rashes, no cyanosis, no edema.  Musculoskeletal: No joint swelling, erythema or tenderness.  Neurologic: Cranial nerves 2-12 intact, normal strength and sensation.  Psychiatric: Alert, oriented to person, place and time, no obvious anxiety or depression.      Data   Data reviewed today: I reviewed all medications, new labs and imaging results over the last 24 hours.    Recent Labs   Lab 12/21/21  1144   WBC 4.6   HGB 7.7*   MCV 89      INR 1.09       POTASSIUM 3.8   CHLORIDE 99   CO2 38*   BUN 22   CR 0.90   ANIONGAP 1*   IGOR 9.0   *   ALBUMIN 3.0*   PROTTOTAL 6.6*   BILITOTAL 0.2   ALKPHOS 111   ALT 14   AST 8     Recent Results (from the past 24 hour(s))   XR Chest Port 1 View    Narrative    XR CHEST PORT 1 VIEW 12/21/2021 12:30 PM    HISTORY: cough    COMPARISON: Chest x-ray 9/23/2021, CT 11/10/2021      Impression    IMPRESSION: Right-sided port catheter. No acute findings. The lungs  are clear and there are no pleural effusions. Stable cardiomegaly and  central vascular prominence. No overt pulmonary edema.    AYDE FREEMAN MD         SYSTEM ID:  [id number]   CT Chest (PE) Abdomen Pelvis w Contrast    Narrative    CT CHEST PE, ABDOMEN & PELVIS WITH CONTRAST December 21, 2021 2:16  PM    CLINICAL HISTORY: Hypoxia. Anemia. History of non-Hodgkin's lymphoma.    TECHNIQUE: CT angiogram chest and routine CT abdomen pelvis with IV  contrast. Arterial phase through the chest and venous phase through  the abdomen and pelvis. 2D and 3D MIP reconstructions were preformed  by the CT technologist. Dose reduction techniques were used.   CONTRAST: 126  mL Isovue 370.    COMPARISON: CT of the chest, abdomen, and pelvis performed 11/10/2021.    FINDINGS:  ANGIOGRAM CHEST: Pulmonary arteries are normal caliber and negative  for pulmonary emboli. Thoracic aorta is negative for dissection. No CT  evidence of right heart strain. Moderate atherosclerotic calcification  of the thoracic aorta.    LUNGS AND PLEURA: No pleural effusions. No lung masses or  consolidations are identified. No pneumothorax.    MEDIASTINUM/AXILLAE: No enlarged lymph nodes are identified in the  chest. No pericardial effusion. Right Port-A-Cath, with tip near the  SVC/RA junction.    CORONARY ARTERY CALCIFICATION: Mild.    HEPATOBILIARY: A 1 cm enhancing focus near the posterior tip of the  right hepatic lobe posteriorly is nonspecific, but may represent a  small flash filling  hemangioma. No other hepatic masses are seen.  Unremarkable gallbladder.    PANCREAS: Normal.    SPLEEN: Normal.    ADRENAL GLANDS: Mild nodular thickening of both adrenal glands.    KIDNEYS/BLADDER: Unremarkable. No hydronephrosis.    BOWEL: No bowel obstruction. Colonic diverticulosis. No convincing  evidence for colitis or diverticulitis. Prior appendectomy.    PELVIC ORGANS: Prior hysterectomy.    LYMPH NODES: Soft tissue mass consistent with the patient's known  lymphoma is again noted to encase the celiac trunk and SMA, and has  decreased in size since the previous exam, measuring 3.8 x 3.3 cm  (previously 5.4 x 3.6 cm). This soft tissue mass is again noted to  abut the pancreatic head, portal vein, splenic vein, and superior  mesenteric vein. A borderline-enlarged periportal lymph node (series  12 image 50) measures 1 cm short axis, and is unchanged.    VASCULATURE: Moderate atherosclerotic aortoiliac calcification.    ADDITIONAL FINDINGS: None.    MUSCULOSKELETAL: Degenerative changes are noted throughout the  thoracolumbar spine.      Impression    IMPRESSION:  1.  No evidence for pulmonary embolism or thoracic aortic dissection.  2.  Soft tissue mass encasing the celiac trunk and SMA has decreased  in size, and is again noted to abut the pancreatic head.  3.  No evidence for pulmonary embolism. No cause for hypoxia and  anemia is identified.    JOHNY ELKINS MD         SYSTEM ID:  [id number]

## 2021-12-21 NOTE — ED TRIAGE NOTES
Constipation, blood in stool, hemoglobin 4.9. Recently had chemotherapy. Onset of cough, no fever.

## 2021-12-22 LAB
ANION GAP SERPL CALCULATED.3IONS-SCNC: 3 MMOL/L (ref 3–14)
BUN SERPL-MCNC: 21 MG/DL (ref 7–30)
CALCIUM SERPL-MCNC: 8.7 MG/DL (ref 8.5–10.1)
CHLORIDE BLD-SCNC: 101 MMOL/L (ref 94–109)
CO2 SERPL-SCNC: 36 MMOL/L (ref 20–32)
CREAT SERPL-MCNC: 0.92 MG/DL (ref 0.52–1.04)
ERYTHROCYTE [DISTWIDTH] IN BLOOD BY AUTOMATED COUNT: 14.6 % (ref 10–15)
GFR SERPL CREATININE-BSD FRML MDRD: 61 ML/MIN/1.73M2
GLUCOSE BLD-MCNC: 98 MG/DL (ref 70–99)
HAPTOGLOB SERPL-MCNC: 192 MG/DL (ref 32–197)
HCT VFR BLD AUTO: 26.9 % (ref 35–47)
HGB BLD-MCNC: 7.6 G/DL (ref 11.7–15.7)
MCH RBC QN AUTO: 25.3 PG (ref 26.5–33)
MCHC RBC AUTO-ENTMCNC: 28.3 G/DL (ref 31.5–36.5)
MCV RBC AUTO: 90 FL (ref 78–100)
PLATELET # BLD AUTO: 243 10E3/UL (ref 150–450)
POTASSIUM BLD-SCNC: 3.9 MMOL/L (ref 3.4–5.3)
RBC # BLD AUTO: 3 10E6/UL (ref 3.8–5.2)
SODIUM SERPL-SCNC: 140 MMOL/L (ref 133–144)
WBC # BLD AUTO: 5.3 10E3/UL (ref 4–11)

## 2021-12-22 PROCEDURE — 999N000099 HC STATISTIC MODERATE SEDATION < 10 MIN: Performed by: INTERNAL MEDICINE

## 2021-12-22 PROCEDURE — 88305 TISSUE EXAM BY PATHOLOGIST: CPT | Mod: TC | Performed by: INTERNAL MEDICINE

## 2021-12-22 PROCEDURE — 250N000011 HC RX IP 250 OP 636: Performed by: INTERNAL MEDICINE

## 2021-12-22 PROCEDURE — 80048 BASIC METABOLIC PNL TOTAL CA: CPT | Performed by: STUDENT IN AN ORGANIZED HEALTH CARE EDUCATION/TRAINING PROGRAM

## 2021-12-22 PROCEDURE — 999N000157 HC STATISTIC RCP TIME EA 10 MIN

## 2021-12-22 PROCEDURE — 94640 AIRWAY INHALATION TREATMENT: CPT | Mod: 76

## 2021-12-22 PROCEDURE — 43239 EGD BIOPSY SINGLE/MULTIPLE: CPT | Performed by: INTERNAL MEDICINE

## 2021-12-22 PROCEDURE — 250N000011 HC RX IP 250 OP 636: Performed by: STUDENT IN AN ORGANIZED HEALTH CARE EDUCATION/TRAINING PROGRAM

## 2021-12-22 PROCEDURE — 120N000001 HC R&B MED SURG/OB

## 2021-12-22 PROCEDURE — 250N000013 HC RX MED GY IP 250 OP 250 PS 637: Performed by: STUDENT IN AN ORGANIZED HEALTH CARE EDUCATION/TRAINING PROGRAM

## 2021-12-22 PROCEDURE — C9113 INJ PANTOPRAZOLE SODIUM, VIA: HCPCS | Performed by: STUDENT IN AN ORGANIZED HEALTH CARE EDUCATION/TRAINING PROGRAM

## 2021-12-22 PROCEDURE — 94660 CPAP INITIATION&MGMT: CPT

## 2021-12-22 PROCEDURE — 94640 AIRWAY INHALATION TREATMENT: CPT

## 2021-12-22 PROCEDURE — 0DB68ZX EXCISION OF STOMACH, VIA NATURAL OR ARTIFICIAL OPENING ENDOSCOPIC, DIAGNOSTIC: ICD-10-PCS | Performed by: INTERNAL MEDICINE

## 2021-12-22 PROCEDURE — 250N000009 HC RX 250: Performed by: INTERNAL MEDICINE

## 2021-12-22 PROCEDURE — 85027 COMPLETE CBC AUTOMATED: CPT | Performed by: STUDENT IN AN ORGANIZED HEALTH CARE EDUCATION/TRAINING PROGRAM

## 2021-12-22 PROCEDURE — 99222 1ST HOSP IP/OBS MODERATE 55: CPT | Performed by: INTERNAL MEDICINE

## 2021-12-22 PROCEDURE — 250N000009 HC RX 250: Performed by: STUDENT IN AN ORGANIZED HEALTH CARE EDUCATION/TRAINING PROGRAM

## 2021-12-22 PROCEDURE — 99232 SBSQ HOSP IP/OBS MODERATE 35: CPT | Performed by: INTERNAL MEDICINE

## 2021-12-22 PROCEDURE — 250N000013 HC RX MED GY IP 250 OP 250 PS 637: Performed by: INTERNAL MEDICINE

## 2021-12-22 RX ORDER — POLYETHYLENE GLYCOL 3350 17 G/17G
17 POWDER, FOR SOLUTION ORAL DAILY
Status: DISCONTINUED | OUTPATIENT
Start: 2021-12-22 | End: 2021-12-27 | Stop reason: HOSPADM

## 2021-12-22 RX ORDER — MAGNESIUM CARB/ALUMINUM HYDROX 105-160MG
296 TABLET,CHEWABLE ORAL
Status: DISCONTINUED | OUTPATIENT
Start: 2021-12-22 | End: 2021-12-27 | Stop reason: HOSPADM

## 2021-12-22 RX ORDER — AMOXICILLIN 250 MG
1 CAPSULE ORAL 2 TIMES DAILY
Status: DISCONTINUED | OUTPATIENT
Start: 2021-12-22 | End: 2021-12-27 | Stop reason: HOSPADM

## 2021-12-22 RX ORDER — PANTOPRAZOLE SODIUM 40 MG/1
40 TABLET, DELAYED RELEASE ORAL
Status: DISCONTINUED | OUTPATIENT
Start: 2021-12-22 | End: 2021-12-27 | Stop reason: HOSPADM

## 2021-12-22 RX ORDER — NALOXONE HYDROCHLORIDE 0.4 MG/ML
0.2 INJECTION, SOLUTION INTRAMUSCULAR; INTRAVENOUS; SUBCUTANEOUS
Status: DISCONTINUED | OUTPATIENT
Start: 2021-12-22 | End: 2021-12-27 | Stop reason: HOSPADM

## 2021-12-22 RX ORDER — NALOXONE HYDROCHLORIDE 0.4 MG/ML
0.4 INJECTION, SOLUTION INTRAMUSCULAR; INTRAVENOUS; SUBCUTANEOUS
Status: DISCONTINUED | OUTPATIENT
Start: 2021-12-22 | End: 2021-12-27 | Stop reason: HOSPADM

## 2021-12-22 RX ORDER — FENTANYL CITRATE 50 UG/ML
INJECTION, SOLUTION INTRAMUSCULAR; INTRAVENOUS PRN
Status: COMPLETED | OUTPATIENT
Start: 2021-12-22 | End: 2021-12-22

## 2021-12-22 RX ORDER — BISACODYL 10 MG
10 SUPPOSITORY, RECTAL RECTAL DAILY PRN
Status: ACTIVE | OUTPATIENT
Start: 2021-12-22 | End: 2021-12-27

## 2021-12-22 RX ORDER — SUCRALFATE ORAL 1 G/10ML
1 SUSPENSION ORAL 4 TIMES DAILY
Status: DISCONTINUED | OUTPATIENT
Start: 2021-12-22 | End: 2021-12-27 | Stop reason: HOSPADM

## 2021-12-22 RX ORDER — FLUMAZENIL 0.1 MG/ML
0.2 INJECTION, SOLUTION INTRAVENOUS
Status: ACTIVE | OUTPATIENT
Start: 2021-12-22 | End: 2021-12-23

## 2021-12-22 RX ADMIN — CITALOPRAM HYDROBROMIDE 20 MG: 20 TABLET ORAL at 09:45

## 2021-12-22 RX ADMIN — MIDAZOLAM 2 MG: 1 INJECTION INTRAMUSCULAR; INTRAVENOUS at 11:49

## 2021-12-22 RX ADMIN — LEVOTHYROXINE SODIUM 200 MCG: 100 TABLET ORAL at 09:45

## 2021-12-22 RX ADMIN — IPRATROPIUM BROMIDE AND ALBUTEROL SULFATE 3 ML: .5; 3 SOLUTION RESPIRATORY (INHALATION) at 15:28

## 2021-12-22 RX ADMIN — SUCRALFATE ORAL 1 G: 1 SUSPENSION ORAL at 20:42

## 2021-12-22 RX ADMIN — TOPICAL ANESTHETIC 1 EACH: 200 SPRAY DENTAL; PERIODONTAL at 11:49

## 2021-12-22 RX ADMIN — SENNOSIDES AND DOCUSATE SODIUM 1 TABLET: 50; 8.6 TABLET ORAL at 13:26

## 2021-12-22 RX ADMIN — MICONAZOLE NITRATE: 2 POWDER TOPICAL at 00:16

## 2021-12-22 RX ADMIN — ALLOPURINOL 300 MG: 300 TABLET ORAL at 09:45

## 2021-12-22 RX ADMIN — IPRATROPIUM BROMIDE AND ALBUTEROL SULFATE 3 ML: .5; 3 SOLUTION RESPIRATORY (INHALATION) at 07:42

## 2021-12-22 RX ADMIN — Medication 5 ML: at 13:47

## 2021-12-22 RX ADMIN — MICONAZOLE NITRATE: 2 POWDER TOPICAL at 09:51

## 2021-12-22 RX ADMIN — SUCRALFATE ORAL 1 G: 1 SUSPENSION ORAL at 13:26

## 2021-12-22 RX ADMIN — SUCRALFATE ORAL 1 G: 1 SUSPENSION ORAL at 17:30

## 2021-12-22 RX ADMIN — Medication 5 ML: at 06:24

## 2021-12-22 RX ADMIN — PANTOPRAZOLE SODIUM 40 MG: 40 TABLET, DELAYED RELEASE ORAL at 17:29

## 2021-12-22 RX ADMIN — FENTANYL CITRATE 50 MCG: 50 INJECTION, SOLUTION INTRAMUSCULAR; INTRAVENOUS at 11:49

## 2021-12-22 RX ADMIN — IPRATROPIUM BROMIDE AND ALBUTEROL SULFATE 3 ML: .5; 3 SOLUTION RESPIRATORY (INHALATION) at 19:24

## 2021-12-22 RX ADMIN — PANTOPRAZOLE SODIUM 40 MG: 40 INJECTION, POWDER, FOR SOLUTION INTRAVENOUS at 09:46

## 2021-12-22 RX ADMIN — POLYETHYLENE GLYCOL 3350 17 G: 17 POWDER, FOR SOLUTION ORAL at 13:26

## 2021-12-22 RX ADMIN — IPRATROPIUM BROMIDE AND ALBUTEROL SULFATE 3 ML: .5; 3 SOLUTION RESPIRATORY (INHALATION) at 12:05

## 2021-12-22 RX ADMIN — SENNOSIDES AND DOCUSATE SODIUM 1 TABLET: 50; 8.6 TABLET ORAL at 09:45

## 2021-12-22 RX ADMIN — SIMVASTATIN 10 MG: 10 TABLET, FILM COATED ORAL at 20:42

## 2021-12-22 RX ADMIN — MICONAZOLE NITRATE: 2 POWDER TOPICAL at 20:43

## 2021-12-22 RX ADMIN — SENNOSIDES AND DOCUSATE SODIUM 1 TABLET: 50; 8.6 TABLET ORAL at 20:42

## 2021-12-22 RX ADMIN — Medication 5 ML: at 20:42

## 2021-12-22 RX ADMIN — Medication 5 ML: at 09:54

## 2021-12-22 RX ADMIN — FUROSEMIDE 20 MG: 20 TABLET ORAL at 09:45

## 2021-12-22 ASSESSMENT — ACTIVITIES OF DAILY LIVING (ADL)
ADLS_ACUITY_SCORE: 19
ADLS_ACUITY_SCORE: 17
ADLS_ACUITY_SCORE: 19
ADLS_ACUITY_SCORE: 17
ADLS_ACUITY_SCORE: 19

## 2021-12-22 ASSESSMENT — MIFFLIN-ST. JEOR: SCORE: 1625.7

## 2021-12-22 NOTE — PLAN OF CARE
1189-2131:  A&Ox4.  VSS; 2L NC while awake, CPAP while asleep, TAVARES.  Denies pain.  Assist x 1 with GB and walker.  Port HL.  NPO since midnight; patient verbalized understanding.  Continent of bowel/bladder; some dribbling noted, no BM this shift.  Discharge pending progress.

## 2021-12-22 NOTE — CONSULTS
"BRIEF NUTRITION ASSESSMENT      REASON FOR ASSESSMENT:  Lucille Rojas is a 81 year old female seen by Registered Dietitian for Admission Nutrition Risk Screen:  Have you recently lost weight without trying? \"unsure\"  Have you been eating poorly because of a decreased appetite? \"no\"      CURRENT DIET AND INTAKE:  Diet:  Regular              Visited with pt this afternoon - thinks she might be going home tomorrow  \"You guys owe me 3 meals - missed 3 meals with being NPO yesterday and today  Pt reports good appetite - had pizza, fruit and juice for lunch  \"They didn't send my cookie and sherbet!\"  She notes that she has been eating well at home  Confirms wt loss over the past 3 months - \"my  and I needed to lose some pounds\"  States she was trying to eat healthier foods and smaller portions    ANTHROPOMETRICS:  Height: 5' 8\"  Weight:(12/22) 112.2 kg /  247 lbs 6.4 oz  Body mass index is 37.62 kg/m .   Weight Status: Obesity Grade II BMI 35-39.9  IBW:  63.6 kg  %IBW: 176%  Weight History: Pt is down ~12 kg (9%) past 3 months  Wt Readings from Last 10 Encounters:   12/22/21 112.2 kg (247 lb 6.4 oz)   12/16/21 114.3 kg (252 lb)   12/15/21 114.8 kg (253 lb)   11/23/21 113.9 kg (251 lb)   11/22/21 113.9 kg (251 lb)   11/11/21 122.5 kg (270 lb)   11/10/21 122.5 kg (270 lb)   11/03/21 117.1 kg (258 lb 1.6 oz)   11/02/21 122.9 kg (271 lb)   10/27/21 117.5 kg (259 lb)         LABS:  Labs noted    MALNUTRITION:  Patient does not meet two of the following criteria necessary for diagnosing malnutrition.     % Weight Loss:  Weight loss does not meet criteria for malnutrition  - 9% wt loss, pt states from eating healthier and smaller portions  % Intake:  No decreased intake noted  Subcutaneous Fat Loss:  None observed  Muscle Loss:  None observed  Fluid Retention:  None noted    NUTRITION INTERVENTION:  Nutrition Diagnosis:  No nutrition diagnosis at this time.    Implementation:  Nutrition Education ---> Reviewed current " diet order    FOLLOW UP/MONITORING:   Will re-evaluate in 7 - 10 days, or sooner, if re-consulted.

## 2021-12-22 NOTE — PLAN OF CARE
Admitted from ED at approximately 1815. A&Ox4. VSS on 2L NC. Denies pain. R CVC, blood return noted from both lumens, heparin locked. NPO at midnight for procedure on 12/22. Continent of B&B, BM x2, brown in color. A2/GB/W, pt does complain of some dizziness with ambulation.

## 2021-12-22 NOTE — CONSULTS
[facility] Consultation      Lucille Rojas MRN# [id number]   YOB: 1940 Age: 81 year old   Date of Admission: 12/21/2021  Requesting physician:   Reason for consult:            Assessment and Plan:   81 year old female with non-Hodgkin's lymphoma involving the pancreas admitted for weakness, anemia, and melena.    1. Non-Hodgkin's lymphoma involving pancreas  --S/P 1 cycle of mini-R-CHOP under care of Dr. John Srinivasan.  --12/21/2021 CT scans show a decrease in the lymphoma/mass abutting pancreas  --Due for cycle 2 on 1/5/2022. Has appointment with Alexandr SAPP) on 1/4/2022.    2. Anemia of neoplastic disease and iron deficiency  3. Melena  --Hgb stable in 7-range since admission.  --12/14/2021 Ferritin 151, iron 37, TIBC 281, BETINA low at 13.  --S/p EGD 12/21 with results pending.  --Continue to monitor CBC.    Thank you for the consult. Will follow with you.    Nalini Dupree MD  Hematology/Oncology  [facility]               Chief Complaint:   Anemia           History of Present Illness:   This patient is a 81 year old female with non-Hodgkin's lymphoma involving the pancreas.  She is under the care of Dr. John Srinivasan.  EUS revealed pancreatic head mass.  FNA revealed CD10-positive B-cell lymphoma. Flow cytometry revealed CD10-positive lambda monotypic B-cells. PET scan on 11/18/2021 revealed 6 cm hypermetabolic pancreatic head mass and borderline hypermetabolic right axillary lymph node. She started cycle 1 mini-R-CHOP on 12/15/2021.    She is now admitted for increased generalized weakness, fatigue, constipation past 4 days, and melena.  12/21/2021 Hgb was decreased to 7.7, MCV 89, WBC and platelets normal. 12/22 Hgb is stable at 7.6. 12/21 CT chest/abdomen/pelvis showed no evidence for PE. Scan showed soft tissue mass encasing celiac trunk and SMA has decreased in size, abuts pancreatic head.    Patient underwent EGD today with results  "pending.    She reports current fatigue, no pain.         Physical Exam:   Vitals were reviewed  Blood pressure (!) 141/49, pulse 89, temperature 98.6  F (37  C), temperature source Oral, resp. rate 18, height 1.727 m (5' 8\"), weight 112.2 kg (247 lb 6.4 oz), SpO2 99 %.  Temperatures:  Current - Temp: 98.6  F (37  C); Max - Temp  Av.2  F (36.8  C)  Min: 98  F (36.7  C)  Max: 98.6  F (37  C)  Respiration range: Resp  Av.5  Min: 14  Max: 42  Pulse range: Pulse  Av.9  Min: 66  Max: 89  Blood pressure range: Systolic (24hrs), Av , Min:98 , Max:176   ; Diastolic (24hrs), Av, Min:34, Max:84    Pulse oximetry range: SpO2  Av.6 %  Min: 83 %  Max: 100 %    Intake/Output Summary (Last 24 hours) at 2021 1356  Last data filed at 2021 1258  Gross per 24 hour   Intake 120 ml   Output 550 ml   Net -430 ml       GENERAL: No acute distress.  SKIN: No rashes or jaundice.  HEENT: Normocephalic, atraumatic. Eyes anicteric.  LYMPH: No palpable lymphadenopathy in the cervical, supraclavicular regions.  LUNGS: No audible cough or wheezing.  ABDOMEN: Soft, nontender, nondistended.  EXTREMITIES: No clubbing, cyanosis; trace BLE edema.  MENTAL: Alert and oriented to person, place, and time.  NEURO: No gross focal motor deficits.              Past Medical History:   I have reviewed this patient's past medical history  Past Medical History:   Diagnosis Date     Cancer (H) 10/2021     Depressive disorder      Heart disease 10/2021     History of blood transfusion      HTN (hypertension) 2013     Hyperlipidemia LDL goal <130 2013     Hypothyroidism 2013     Macular degeneration 2013     Sleep apnea     CPAP at night, O2 during naps     Type 2 diabetes, HbA1C goal < 8% (H) 2013             Past Surgical History:   I have reviewed this patient's past surgical history  Past Surgical History:   Procedure Laterality Date     APPENDECTOMY       COLONOSCOPY       COLONOSCOPY N/A " 10/27/2014    Procedure: COMBINED COLONOSCOPY, SINGLE OR MULTIPLE BIOPSY/POLYPECTOMY BY BIOPSY;  Surgeon: Jose Alfredo Morejon MD;  Location:  GI     ESOPHAGOSCOPY, GASTROSCOPY, DUODENOSCOPY (EGD), COMBINED N/A 9/21/2021    Procedure: ESOPHAGOGASTRODUODENOSCOPY, WITH FINE NEEDLE ASPIRATION BIOPSY, WITH ENDOSCOPIC ULTRASOUND GUIDANCE;  Surgeon: Vera Benitez MD;  Location:  GI     ESOPHAGOSCOPY, GASTROSCOPY, DUODENOSCOPY (EGD), COMBINED N/A 9/21/2021    Procedure: Esophagogastroduodenoscopy, With Biopsy;  Surgeon: Vera Benitez MD;  Location:  GI     ESOPHAGOSCOPY, GASTROSCOPY, DUODENOSCOPY (EGD), COMBINED N/A 10/5/2021    Procedure: ESOPHAGOGASTRODUODENOSCOPY, WITH FINE NEEDLE ASPIRATION BIOPSY, WITH ENDOSCOPIC ULTRASOUND GUIDANCE;  Surgeon: Dixon Olivier MD;  Location:  GI     HERNIA REPAIR      inguinal x 2     IR CHEST PORT PLACEMENT > 5 YRS OF AGE  12/13/2021     TONSILLECTOMY                 Social History:   I have reviewed this patient's social history  Social History     Tobacco Use     Smoking status: Never Smoker     Smokeless tobacco: Never Used   Substance Use Topics     Alcohol use: Yes     Alcohol/week: 0.0 standard drinks     Comment: rarely             Family History:   I have reviewed this patient's family history  No family history on file.          Allergies:     Allergies   Allergen Reactions     Penicillins              Medications:   I have reviewed this patient's current medications  Medications Prior to Admission   Medication Sig Dispense Refill Last Dose     allopurinol (ZYLOPRIM) 300 MG tablet Take 1 tablet (300 mg) by mouth daily for 14 days 14 tablet 7 12/20/2021 at Unknown time     carboxymethylcellulose PF (REFRESH PLUS) 0.5 % ophthalmic solution Place 2 drops into both eyes 2 times daily   12/20/2021 at Unknown time     citalopram (CELEXA) 20 MG tablet Take 1 tablet (20 mg) by mouth daily 90 tablet 1 12/20/2021 at am     furosemide  (LASIX) 20 MG tablet Take 1 tablet (20 mg) by mouth daily 30 tablet 0 12/20/2021 at am     levothyroxine (SYNTHROID/LEVOTHROID) 200 MCG tablet TAKE ONE TABLET BY MOUTH ONE TIME DAILY  90 tablet 3 12/20/2021 at am     Multiple Vitamins-Minerals (PRESERVISION AREDS) CAPS Take 1 tablet by mouth 2 times daily    12/20/2021 at pm     pegfilgrastim (NEULASTA ONPRO KIT) 6 MG/0.6ML injection Inject 6 mg Subcutaneous once   12/16/2021 at Unknown time     polyethylene glycol (MIRALAX) 17 g packet Take 1 packet by mouth every evening   12/20/2021 at pm     predniSONE (DELTASONE) 50 MG tablet Take 100 mg by mouth daily X 5 days with chemo regimen   Past Week at Unknown time     senna-docusate (SENOKOT-S/PERICOLACE) 8.6-50 MG tablet Take 1 tablet by mouth every morning   12/20/2021 at am     simvastatin (ZOCOR) 10 MG tablet Take 1 tablet (10 mg) by mouth At Bedtime 90 tablet 3 12/20/2021 at pm     UNKNOWN TO PATIENT Inject into the vein once Last chemotherapy on 12/15 (tylenol/benadryl/emend/aloxi,NS bolus,rituximab, cyclophosphamide, doxorubicin, vincristine)   12/15/2021     ACE/ARB/ARNI NOT PRESCRIBED (INTENTIONAL) Please choose reason not prescribed, below        aspirin 81 MG tablet Take 1 tablet by mouth daily. (Patient not taking: Reported on 12/21/2021) 30 tablet 0 Not Taking at Unknown time     CONTOUR NEXT TEST test strip USE TO TEST BLOOD GLUCOSE ONCE DAILY        LORazepam (ATIVAN) 0.5 MG tablet Take 1 tablet (0.5 mg) by mouth every 4 hours as needed (Anxiety, Nausea/Vomiting or Sleep) 30 tablet 5 prn     Microlet Lancets MISC USE TO TEST BLOOD GLUCOSE ONCE DAILY        nystatin (MYCOSTATIN) 232027 UNIT/GM external powder Apply topically 2 times daily Under breasts and skin folds BID & BID PRN for redness 60 g 1 prn     order for DME Equipment being ordered: wheeled walker with hand breaks 1 Device 0      pantoprazole (PROTONIX) 40 MG EC tablet Take 1 tablet (40 mg) by mouth every morning (before breakfast) (Patient  not taking: Reported on 12/21/2021) 90 tablet 3 Not Taking at Unknown time     prochlorperazine (COMPAZINE) 10 MG tablet Take 1 tablet (10 mg) by mouth every 6 hours as needed (Nausea/Vomiting) 30 tablet 7 prn     Current Facility-Administered Medications Ordered in Epic   Medication Dose Route Frequency Last Rate Last Admin     acetaminophen (TYLENOL) tablet 650 mg  650 mg Oral Q4H PRN        Or     acetaminophen (TYLENOL) Suppository 650 mg  650 mg Rectal Q4H PRN         allopurinol (ZYLOPRIM) tablet 300 mg  300 mg Oral Daily   300 mg at 12/22/21 0945     bisacodyl (DULCOLAX) Suppository 10 mg  10 mg Rectal Daily PRN         citalopram (celeXA) tablet 20 mg  20 mg Oral Daily   20 mg at 12/22/21 0945     flumazenil (ROMAZICON) injection 0.2 mg  0.2 mg Intravenous q1 min prn         furosemide (LASIX) tablet 20 mg  20 mg Oral Daily   20 mg at 12/22/21 0945     guaiFENesin-dextromethorphan (ROBITUSSIN DM) 100-10 MG/5ML syrup 10 mL  10 mL Oral Q4H PRN         heparin 100 UNIT/ML injection 5-10 mL  5-10 mL Intracatheter Q28 Days         heparin lock flush 10 UNIT/ML injection 5-10 mL  5-10 mL Intracatheter Q1H PRN   5 mL at 12/22/21 1347     heparin lock flush 10 UNIT/ML injection 5-10 mL  5-10 mL Intracatheter Q24H         ipratropium - albuterol 0.5 mg/2.5 mg/3 mL (DUONEB) neb solution 3 mL  3 mL Nebulization 4x daily   3 mL at 12/22/21 0742     levothyroxine (SYNTHROID/LEVOTHROID) tablet 200 mcg  200 mcg Oral Daily   200 mcg at 12/22/21 0945     lidocaine (LMX4) cream   Topical Q1H PRN         lidocaine 1 % 0.1-1 mL  0.1-1 mL Other Q1H PRN         LORazepam (ATIVAN) tablet 0.5 mg  0.5 mg Oral Q4H PRN         magnesium citrate solution 296 mL  296 mL Oral Once PRN         melatonin tablet 1 mg  1 mg Oral At Bedtime PRN         miconazole (MICATIN) 2 % powder   Topical BID   Given at 12/22/21 0951     naloxone (NARCAN) injection 0.2 mg  0.2 mg Intravenous Q2 Min PRN         naloxone (NARCAN) injection 0.2 mg  0.2 mg  Intramuscular Q2 Min PRN         naloxone (NARCAN) injection 0.4 mg  0.4 mg Intravenous Q2 Min PRN         naloxone (NARCAN) injection 0.4 mg  0.4 mg Intramuscular Q2 Min PRN         ondansetron (ZOFRAN-ODT) ODT tab 4 mg  4 mg Oral Q6H PRN        Or     ondansetron (ZOFRAN) injection 4 mg  4 mg Intravenous Q6H PRN         pantoprazole (PROTONIX) EC tablet 40 mg  40 mg Oral BID AC         polyethylene glycol (MIRALAX) Packet 17 g  17 g Oral Daily   17 g at 12/22/21 1326     senna-docusate (SENOKOT-S/PERICOLACE) 8.6-50 MG per tablet 1 tablet  1 tablet Oral BID   1 tablet at 12/22/21 1326     simvastatin (ZOCOR) tablet 10 mg  10 mg Oral At Bedtime   10 mg at 12/21/21 2229     sodium chloride (PF) 0.9% PF flush 10-20 mL  10-20 mL Intracatheter q1 min prn         sodium chloride (PF) 0.9% PF flush 10-20 mL  10-20 mL Intracatheter Q1H PRN   20 mL at 12/21/21 2232     sodium chloride (PF) 0.9% PF flush 10-20 mL  10-20 mL Intracatheter Q28 Days         sodium chloride (PF) 0.9% PF flush 3 mL  3 mL Intracatheter Q8H   3 mL at 12/21/21 2058     sodium chloride (PF) 0.9% PF flush 3 mL  3 mL Intracatheter q1 min prn         sucralfate (CARAFATE) suspension 1 g  1 g Oral 4x Daily   1 g at 12/22/21 1326     No current [facility]-ordered outpatient medications on file.             Review of Systems:     The 14 point Review of Systems is negative other than noted in the HPI.            Data:   All laboratory data reviewed  Results for orders placed or performed during the hospital encounter of 12/21/21 (from the past 24 hour(s))   CT Chest (PE) Abdomen Pelvis w Contrast    Narrative    CT CHEST PE, ABDOMEN & PELVIS WITH CONTRAST December 21, 2021 2:16  PM    CLINICAL HISTORY: Hypoxia. Anemia. History of non-Hodgkin's lymphoma.    TECHNIQUE: CT angiogram chest and routine CT abdomen pelvis with IV  contrast. Arterial phase through the chest and venous phase through  the abdomen and pelvis. 2D and 3D MIP reconstructions were preformed  by  the CT technologist. Dose reduction techniques were used.   CONTRAST: 126  mL Isovue 370.    COMPARISON: CT of the chest, abdomen, and pelvis performed 11/10/2021.    FINDINGS:  ANGIOGRAM CHEST: Pulmonary arteries are normal caliber and negative  for pulmonary emboli. Thoracic aorta is negative for dissection. No CT  evidence of right heart strain. Moderate atherosclerotic calcification  of the thoracic aorta.    LUNGS AND PLEURA: No pleural effusions. No lung masses or  consolidations are identified. No pneumothorax.    MEDIASTINUM/AXILLAE: No enlarged lymph nodes are identified in the  chest. No pericardial effusion. Right Port-A-Cath, with tip near the  SVC/RA junction.    CORONARY ARTERY CALCIFICATION: Mild.    HEPATOBILIARY: A 1 cm enhancing focus near the posterior tip of the  right hepatic lobe posteriorly is nonspecific, but may represent a  small flash filling hemangioma. No other hepatic masses are seen.  Unremarkable gallbladder.    PANCREAS: Normal.    SPLEEN: Normal.    ADRENAL GLANDS: Mild nodular thickening of both adrenal glands.    KIDNEYS/BLADDER: Unremarkable. No hydronephrosis.    BOWEL: No bowel obstruction. Colonic diverticulosis. No convincing  evidence for colitis or diverticulitis. Prior appendectomy.    PELVIC ORGANS: Prior hysterectomy.    LYMPH NODES: Soft tissue mass consistent with the patient's known  lymphoma is again noted to encase the celiac trunk and SMA, and has  decreased in size since the previous exam, measuring 3.8 x 3.3 cm  (previously 5.4 x 3.6 cm). This soft tissue mass is again noted to  abut the pancreatic head, portal vein, splenic vein, and superior  mesenteric vein. A borderline-enlarged periportal lymph node (series  12 image 50) measures 1 cm short axis, and is unchanged.    VASCULATURE: Moderate atherosclerotic aortoiliac calcification.    ADDITIONAL FINDINGS: None.    MUSCULOSKELETAL: Degenerative changes are noted throughout the  thoracolumbar spine.       Impression    IMPRESSION:  1.  No evidence for pulmonary embolism or thoracic aortic dissection.  2.  Soft tissue mass encasing the celiac trunk and SMA has decreased  in size, and is again noted to abut the pancreatic head.  3.  No evidence for pulmonary embolism. No cause for hypoxia and  anemia is identified.    JOHNY ELKINS MD         SYSTEM ID:  [id number]   Blood gas venous   Result Value Ref Range    pH Venous 7.37 7.32 - 7.43    pCO2 Venous 64 (H) 40 - 50 mm Hg    pO2 Venous 38 25 - 47 mm Hg    Bicarbonate Venous 37 (H) 21 - 28 mmol/L    Base Excess/Deficit (+/-) 9.9 (H) -7.7 - 1.9 mmol/L    FIO2 2    Lactate Dehydrogenase   Result Value Ref Range    Lactate Dehydrogenase 99 81 - 234 U/L   Gastroenterology IP Consult: possible recurrent GI bleed; Consultant may enter orders: Yes; Patient to be seen: Routine - within 24 hours; Requested Clinic/Group: [facility]; Requesting provider? Hospitalist (if different from attending physician)    Narrative    Vera Benitez MD     12/22/2021 12:04 PM  [facility]  [facility]  Gastroenterology Consultation    Lucille Rojas  [id number] [facility] [id number]  81 year old female    Admission Date/Time: 12/21/2021  Primary Care Provider: Fausto Flynn    We were asked to see the patient in consultation by Dr. Parks   for evaluation of GI bleeding.    HPI:  Lucille Rojas is a 81 year old female with PMH including   recently diagnosed non-Hodgkin lymphoma of the pancreas (on   chemotherapy), type 2 diabetes, hypertension, congestive heart   failure, aortic valve stenosis, and obesity hypoventilation   syndrome on home O2 who was admitted 12/21/21 with hypoxia,   weakness, and one episode of black stool. Patient reports black   stools that started over the past several days. She has also been   having some difficulty moving her bowels. She had her first round   of chemotherapy last week and initially felt well  afterwards, but   now feels weak and fatigue. No nausea, vomiting, abdominal pain.    Patient presented to ED and found to have hypoxia with O2 sats in   the 80s initially. Hemoglobin 7.7 from 8.4 one week ago and 10.2   last month. CT scan of chest, abdomen, pelvis showed no acute   findings.    Patient previously admitted 9/2021 with severe iron deficiency   anemia with hemoglobin 4.7. She was also diagnosed with lymphoma   at that time. EGD/EUS was performed and showed some gastric   erosions and tiny duodenal ulcers (bx negative for H.pylori).   This was not felt to explain anemia. Colonoscopy was planned but   ultimately deferred due to cardiac and mental status changes. EUS   was repeated 10/5 for further workup of lymphoma and she was   noted to have normal stomach/duodenum at that time.    Patient has been taking pantoprazole 40 mg daily. She is on ASA   81 mg daily but denies NSAIDs. She was prescribed prednisone 100   mg x 5 days following chemo.      ROS: A comprehensive ten point review of systems was negative   aside from those in mentioned in the HPI.      MEDICATIONS: No current facility-administered medications on file   prior to encounter.  allopurinol (ZYLOPRIM) 300 MG tablet, Take 1 tablet (300 mg) by   mouth daily for 14 days  carboxymethylcellulose PF (REFRESH PLUS) 0.5 % ophthalmic   solution, Place 2 drops into both eyes 2 times daily  citalopram (CELEXA) 20 MG tablet, Take 1 tablet (20 mg) by mouth   daily  furosemide (LASIX) 20 MG tablet, Take 1 tablet (20 mg) by mouth   daily  levothyroxine (SYNTHROID/LEVOTHROID) 200 MCG tablet, TAKE ONE   TABLET BY MOUTH ONE TIME DAILY   Multiple Vitamins-Minerals (PRESERVISION AREDS) CAPS, Take 1   tablet by mouth 2 times daily   pegfilgrastim (NEULASTA ONPRO KIT) 6 MG/0.6ML injection, Inject 6   mg Subcutaneous once  polyethylene glycol (MIRALAX) 17 g packet, Take 1 packet by mouth   every evening  predniSONE (DELTASONE) 50 MG tablet, Take 100 mg by  mouth daily X   5 days with chemo regimen  senna-docusate (SENOKOT-S/PERICOLACE) 8.6-50 MG tablet, Take 1   tablet by mouth every morning  simvastatin (ZOCOR) 10 MG tablet, Take 1 tablet (10 mg) by mouth   At Bedtime  UNKNOWN TO PATIENT, Inject into the vein once Last chemotherapy   on 12/15 (tylenol/benadryl/emend/aloxi,NS bolus,rituximab,   cyclophosphamide, doxorubicin, vincristine)  ACE/ARB/ARNI NOT PRESCRIBED (INTENTIONAL), Please choose reason   not prescribed, below  aspirin 81 MG tablet, Take 1 tablet by mouth daily. (Patient not   taking: Reported on 12/21/2021)  CONTOUR NEXT TEST test strip, USE TO TEST BLOOD GLUCOSE ONCE   DAILY  LORazepam (ATIVAN) 0.5 MG tablet, Take 1 tablet (0.5 mg) by mouth   every 4 hours as needed (Anxiety, Nausea/Vomiting or Sleep)  Microlet Lancets MISC, USE TO TEST BLOOD GLUCOSE ONCE DAILY  nystatin (MYCOSTATIN) 119915 UNIT/GM external powder, Apply   topically 2 times daily Under breasts and skin folds BID & BID   PRN for redness  order for DME, Equipment being ordered: wheeled walker with hand   breaks  pantoprazole (PROTONIX) 40 MG EC tablet, Take 1 tablet (40 mg) by   mouth every morning (before breakfast) (Patient not taking:   Reported on 12/21/2021)  prochlorperazine (COMPAZINE) 10 MG tablet, Take 1 tablet (10 mg)   by mouth every 6 hours as needed (Nausea/Vomiting)      ALLERGIES:   Allergies   Allergen Reactions     Penicillins        Past Medical History:   Diagnosis Date     Cancer (H) 10/2021     Depressive disorder      Heart disease 10/2021     History of blood transfusion 20/2021     HTN (hypertension) 5/9/2013     Hyperlipidemia LDL goal <130 5/9/2013     Hypothyroidism 5/9/2013     Macular degeneration 9/18/2013     Sleep apnea     CPAP at night, O2 during naps     Type 2 diabetes, HbA1C goal < 8% (H) 5/9/2013       Past Surgical History:   Procedure Laterality Date     APPENDECTOMY       COLONOSCOPY       COLONOSCOPY N/A 10/27/2014    Procedure: COMBINED  "COLONOSCOPY, SINGLE OR MULTIPLE   BIOPSY/POLYPECTOMY BY BIOPSY;  Surgeon: Jose Alfredo Morejon MD;  Location:  GI     ESOPHAGOSCOPY, GASTROSCOPY, DUODENOSCOPY (EGD), COMBINED N/A   9/21/2021    Procedure: ESOPHAGOGASTRODUODENOSCOPY, WITH FINE NEEDLE   ASPIRATION BIOPSY, WITH ENDOSCOPIC ULTRASOUND GUIDANCE;  Surgeon:   Vera Benitez MD;  Location:  GI     ESOPHAGOSCOPY, GASTROSCOPY, DUODENOSCOPY (EGD), COMBINED N/A   9/21/2021    Procedure: Esophagogastroduodenoscopy, With Biopsy;  Surgeon:   Vera Benitez MD;  Location:  GI     ESOPHAGOSCOPY, GASTROSCOPY, DUODENOSCOPY (EGD), COMBINED N/A   10/5/2021    Procedure: ESOPHAGOGASTRODUODENOSCOPY, WITH FINE NEEDLE   ASPIRATION BIOPSY, WITH ENDOSCOPIC ULTRASOUND GUIDANCE;  Surgeon:   Dixon Olivier MD;  Location:  GI     HERNIA REPAIR      inguinal x 2     IR CHEST PORT PLACEMENT > 5 YRS OF AGE  12/13/2021     TONSILLECTOMY         SOCIAL HISTORY:  Social History     Tobacco Use     Smoking status: Never Smoker     Smokeless tobacco: Never Used   Substance Use Topics     Alcohol use: Yes     Alcohol/week: 0.0 standard drinks     Comment: rarely     Drug use: No       FAMILY HISTORY:  No family history on file.    PHYSICAL EXAM:   /48 (BP Location: Right arm)   Pulse 80   Temp 98  F   (36.7  C) (Oral)   Resp 18   Ht 1.727 m (5' 8\")   Wt 112.2 kg   (247 lb 6.4 oz)   SpO2 94%   BMI 37.62 kg/m      Constitutional: Chronically ill appearing. Obese. No acute   distress.  Head: Normocephalic, atraumatic.    Neck: Neck supple. No adenopathy.  Eyes: No scleral icterus.  ENT: Hearing adequate. Pharynx normal without erythema or   exudate.  Cardiovascular: RRR, normal S1/S2, no murmurs.  Respiratory: Nonlabored. Bilateral wheezing. Nasal cannula.  Abdomen: Soft, nondistended, nontender, no guarding/rebound.  Neuro: CN II-XII grossly intact. No focal deficits.  Extremities: No edema.  Skin: No suspicious lesions, rashes, or " jaundice.  Psychiatric: Mentation appears normal and affect normal.      ADDITIONAL COMMENTS:   I reviewed the patient's new clinical lab test results.   Recent Labs   Lab Test 12/22/21  0616 12/21/21  1233 12/21/21  1144 12/14/21  0900   WBC 5.3  --  4.6 6.5   HGB 7.6* 7.7* 7.7* 8.4*   MCV 90  --  89 90     --  247 286   INR  --   --  1.09  --      Recent Labs   Lab Test 12/22/21  0616 12/21/21  1144 12/14/21  0900    138 142   POTASSIUM 3.9 3.8 3.9   CHLORIDE 101 99 108   CO2 36* 38* 31   BUN 21 22 22   CR 0.92 0.90 1.06*   ANIONGAP 3 1* 3   IGOR 8.7 9.0 8.5   GLC 98 103* 137*     Recent Labs   Lab Test 12/21/21  1306 12/21/21  1144 12/14/21  0900 11/10/21  1121 09/23/21  1149 09/21/21  0324 09/20/21  1727 08/27/20  1305 08/01/16  0713 09/11/15  1542   ALBUMIN  --  3.0* 2.9* 3.5  --    < > 3.4 3.6   < > 3.5   BILITOTAL  --  0.2 0.2 0.3  --    < > 0.4 0.2   < > 0.2   DBIL  --   --   --   --   --   --   --  <0.1  --   --    ALT  --  14 13 22  --    < > 16 15   < > 23   AST  --  8 9 15  --    < > 10 13   < > 12   ALKPHOS  --  111 120 115  --    < > 101 112   < > 110   PROTEIN Negative  --   --   --  50 *  --   --   --   --   --    LIPASE  --   --   --  129  --   --  124  --   --   --    AMYLASE  --   --   --   --   --   --   --   --   --  64    < > = values in this interval not displayed.       IMAGING/ENDOSCOPY    EUS 9/21/21 (Ruthin)   - Normal esophagus.    - Non-bleeding erosive gastropathy. Biopsied.    - Few tiny aphthous ulcers in duodenal bulb    - A single tiny duodenal polyp. Removed with biopsy forceps    - There was no sign of significant pathology in the common bile   duct.    - A mass was identified in the uncinate process of the pancreas.   This was staged T4 N0 M0 by endosonographic criteria. The staging   applies if malignancy is confirmed. Fine needle aspiration   performed. Appearance not typical for adenocarcinoma.    - EGD findings not significant enough to account for degree of  "  anemia.    EUS 10/5/21 (Alondraer)   - Pancreas head mass, atypical appearance for adenocarcinoma. ?   AIP vs lymphoma. FNA as above, specimen sent both in formalin and   RPMI.    - The examined esophagus, stomach, and duodenum were   endoscopically normal.    CT CAP 12/21/21   - No evidence for pulmonary embolism or thoracic aortic   dissection.   - Soft tissue mass encasing the celiac trunk and SMA has   decreased in size, and is again noted to abut the pancreatic   head.   - No evidence for pulmonary embolism. No cause for hypoxia and   anemia is identified.       CONSULTATION ASSESSMENT AND PLAN:    Lucille Rojas is an 81-year-old female with PMH as mentioned   above who was admitted 12/21/21 with recurrent anemia. She   reports a few black stools earlier this week.    1) Acute on chronic anemia: History of significant LAVERNE during   previous admission 9/2021. Noted to have hemoglobin 7.7 from   recent baseline 10 last month. Noting some black stools. No acute   findings on CT scan. No recent colonoscopy (deferred last   admission due to worsening cardiac/mental status). No H.pylori on   recent biopsy. Anemia may be multifactorial from GI losses,   chemotherapy, known malignancy. Plan EGD today to re-evaluate the   upper GI tract for source of anemia.         - Monitor hemoglobin and stool output.         - PPI.         - EGD today.         - Further recommendations pending EGD results.    I discussed the patient plan with Dr. Benitez. Thank you for   asking us to participate in the care of this patient.    Kim Moe PA-C  [facility] ([facility])       Staff addendum: 81 yof with pancreatic lymphoma, anemia, iron   deficiency admitted for melena.Denies abd pain, nausea or emesis    /69   Pulse 89   Temp 98  F (36.7  C) (Oral)   Resp 16     Ht 1.727 m (5' 8\")   Wt 112.2 kg (247 lb 6.4 oz)   SpO2 95%     BMI 37.62 kg/m    Gen: awake alert NAD  Abd: S obese NT ND    EGD showed " widely patent Schatzki ring, multiple gastric erosions   (likely cause of melena)    A/P: 81 yof with chronic anemia admitted with melena, hgb stable.   No sign of active bleeding. Suspect bleeding from erosions. Also   complains of constipation.   -Regular diet  -BID PPI  -Carafate x 2 weeks  -No NSAIDS  -[facility] will follow up path  -Consider IV iron infusion  -Consider colonoscopy if anemia worsens or does not improve  -Suspect she will be ready for discharge tomorrow from GI   standpoint  -Daily Miralax and increase dose of Senna. PRN suppository/Mag   citrate    Vera Benitez MD  [facility]  Phone [phone] until 5 pm  Office [phone] for after hours on call provider       CBC with platelets   Result Value Ref Range    WBC Count 5.3 4.0 - 11.0 10e3/uL    RBC Count 3.00 (L) 3.80 - 5.20 10e6/uL    Hemoglobin 7.6 (L) 11.7 - 15.7 g/dL    Hematocrit 26.9 (L) 35.0 - 47.0 %    MCV 90 78 - 100 fL    MCH 25.3 (L) 26.5 - 33.0 pg    MCHC 28.3 (L) 31.5 - 36.5 g/dL    RDW 14.6 10.0 - 15.0 %    Platelet Count 243 150 - 450 10e3/uL   Basic metabolic panel   Result Value Ref Range    Sodium 140 133 - 144 mmol/L    Potassium 3.9 3.4 - 5.3 mmol/L    Chloride 101 94 - 109 mmol/L    Carbon Dioxide (CO2) 36 (H) 20 - 32 mmol/L    Anion Gap 3 3 - 14 mmol/L    Urea Nitrogen 21 7 - 30 mg/dL    Creatinine 0.92 0.52 - 1.04 mg/dL    Calcium 8.7 8.5 - 10.1 mg/dL    Glucose 98 70 - 99 mg/dL    GFR Estimate 61 >60 mL/min/1.73m2

## 2021-12-22 NOTE — CONSULTS
[facility]  [facility]  Gastroenterology Consultation    Lucille Rojas  [id number]  [facility] [address]  81 year old female    Admission Date/Time: 12/21/2021  Primary Care Provider: Fausto Flynn    We were asked to see the patient in consultation by Dr. Parks for evaluation of GI bleeding.    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 Patient reports black stools that started over the past several days. She has also been having some difficulty moving her bowels. She had her first round of chemotherapy last week and initially felt well afterwards, but now feels weak and fatigue. No nausea, vomiting, abdominal pain.    Patient presented to ED and found to have hypoxia with O2 sats in the 80s initially. Hemoglobin 7.7 from 8.4 one week ago and 10.2 last month. CT scan of chest, abdomen, pelvis showed no acute findings.    Patient previously admitted 9/2021 with severe iron deficiency anemia with hemoglobin 4.7. She was also diagnosed with lymphoma at that time. EGD/EUS was performed and showed some gastric erosions and tiny duodenal ulcers (bx negative for H.pylori). This was not felt to explain anemia. Colonoscopy was planned but ultimately deferred due to cardiac and mental status changes. EUS was repeated 10/5 for further workup of lymphoma and she was noted to have normal stomach/duodenum at that time.    Patient has been taking pantoprazole 40 mg daily. She is on ASA 81 mg daily but denies NSAIDs. She was prescribed prednisone 100 mg x 5 days following chemo.      ROS: A comprehensive ten point review of systems was negative aside from those in mentioned in  the HPI.      MEDICATIONS: No current facility-administered medications on file prior to encounter.  allopurinol (ZYLOPRIM) 300 MG tablet, Take 1 tablet (300 mg) by mouth daily for 14 days  carboxymethylcellulose PF (REFRESH PLUS) 0.5 % ophthalmic solution, Place 2 drops into both eyes 2 times daily  citalopram (CELEXA) 20 MG tablet, Take 1 tablet (20 mg) by mouth daily  furosemide (LASIX) 20 MG tablet, Take 1 tablet (20 mg) by mouth daily  levothyroxine (SYNTHROID/LEVOTHROID) 200 MCG tablet, TAKE ONE TABLET BY MOUTH ONE TIME DAILY   Multiple Vitamins-Minerals (PRESERVISION AREDS) CAPS, Take 1 tablet by mouth 2 times daily   pegfilgrastim (NEULASTA ONPRO KIT) 6 MG/0.6ML injection, Inject 6 mg Subcutaneous once  polyethylene glycol (MIRALAX) 17 g packet, Take 1 packet by mouth every evening  predniSONE (DELTASONE) 50 MG tablet, Take 100 mg by mouth daily X 5 days with chemo regimen  senna-docusate (SENOKOT-S/PERICOLACE) 8.6-50 MG tablet, Take 1 tablet by mouth every morning  simvastatin (ZOCOR) 10 MG tablet, Take 1 tablet (10 mg) by mouth At Bedtime  UNKNOWN TO PATIENT, Inject into the vein once Last chemotherapy on 12/15 (tylenol/benadryl/emend/aloxi,NS bolus,rituximab, cyclophosphamide, doxorubicin, vincristine)  ACE/ARB/ARNI NOT PRESCRIBED (INTENTIONAL), Please choose reason not prescribed, below  aspirin 81 MG tablet, Take 1 tablet by mouth daily. (Patient not taking: Reported on 12/21/2021)  CONTOUR NEXT TEST test strip, USE TO TEST BLOOD GLUCOSE ONCE DAILY  LORazepam (ATIVAN) 0.5 MG tablet, Take 1 tablet (0.5 mg) by mouth every 4 hours as needed (Anxiety, Nausea/Vomiting or Sleep)  Microlet Lancets MISC, USE TO TEST BLOOD GLUCOSE ONCE DAILY  nystatin (MYCOSTATIN) 443414 UNIT/GM external powder, Apply topically 2 times daily Under breasts and skin folds BID & BID PRN for redness  order for DME, Equipment being ordered: wheeled walker with hand breaks  pantoprazole (PROTONIX) 40 MG EC tablet, Take 1 tablet (40  mg) by mouth every morning (before breakfast) (Patient not taking: Reported on 12/21/2021)  prochlorperazine (COMPAZINE) 10 MG tablet, Take 1 tablet (10 mg) by mouth every 6 hours as needed (Nausea/Vomiting)      ALLERGIES:   Allergies   Allergen Reactions     Penicillins        Past Medical History:   Diagnosis Date     Cancer (H) 10/2021     Depressive disorder      Heart disease 10/2021     History of blood transfusion 20/2021     HTN (hypertension) 5/9/2013     Hyperlipidemia LDL goal <130 5/9/2013     Hypothyroidism 5/9/2013     Macular degeneration 9/18/2013     Sleep apnea     CPAP at night, O2 during naps     Type 2 diabetes, HbA1C goal < 8% (H) 5/9/2013       Past Surgical History:   Procedure Laterality Date     APPENDECTOMY       COLONOSCOPY       COLONOSCOPY N/A 10/27/2014    Procedure: COMBINED COLONOSCOPY, SINGLE OR MULTIPLE BIOPSY/POLYPECTOMY BY BIOPSY;  Surgeon: Jose Alfredo Morejon MD;  Location:  GI     ESOPHAGOSCOPY, GASTROSCOPY, DUODENOSCOPY (EGD), COMBINED N/A 9/21/2021    Procedure: ESOPHAGOGASTRODUODENOSCOPY, WITH FINE NEEDLE ASPIRATION BIOPSY, WITH ENDOSCOPIC ULTRASOUND GUIDANCE;  Surgeon: Vera Benitez MD;  Location:  GI     ESOPHAGOSCOPY, GASTROSCOPY, DUODENOSCOPY (EGD), COMBINED N/A 9/21/2021    Procedure: Esophagogastroduodenoscopy, With Biopsy;  Surgeon: Vera Benitez MD;  Location:  GI     ESOPHAGOSCOPY, GASTROSCOPY, DUODENOSCOPY (EGD), COMBINED N/A 10/5/2021    Procedure: ESOPHAGOGASTRODUODENOSCOPY, WITH FINE NEEDLE ASPIRATION BIOPSY, WITH ENDOSCOPIC ULTRASOUND GUIDANCE;  Surgeon: Dixon Olivier MD;  Location:  GI     HERNIA REPAIR      inguinal x 2     IR CHEST PORT PLACEMENT > 5 YRS OF AGE  12/13/2021     TONSILLECTOMY         SOCIAL HISTORY:  Social History     Tobacco Use     Smoking status: Never Smoker     Smokeless tobacco: Never Used   Substance Use Topics     Alcohol use: Yes     Alcohol/week: 0.0 standard drinks     Comment:  "rarely     Drug use: No       FAMILY HISTORY:  No family history on file.    PHYSICAL EXAM:   /48 (BP Location: Right arm)   Pulse 80   Temp 98  F (36.7  C) (Oral)   Resp 18   Ht 1.727 m (5' 8\")   Wt 112.2 kg (247 lb 6.4 oz)   SpO2 94%   BMI 37.62 kg/m      Constitutional: Chronically ill appearing. Obese. No acute distress.  Head: Normocephalic, atraumatic.    Neck: Neck supple. No adenopathy.  Eyes: No scleral icterus.  ENT: Hearing adequate. Pharynx normal without erythema or exudate.  Cardiovascular: RRR, normal S1/S2, no murmurs.  Respiratory: Nonlabored. Bilateral wheezing. Nasal cannula.  Abdomen: Soft, nondistended, nontender, no guarding/rebound.  Neuro: CN II-XII grossly intact. No focal deficits.  Extremities: No edema.  Skin: No suspicious lesions, rashes, or jaundice.  Psychiatric: Mentation appears normal and affect normal.      ADDITIONAL COMMENTS:   I reviewed the patient's new clinical lab test results.   Recent Labs   Lab Test 12/22/21  0616 12/21/21  1233 12/21/21  1144 12/14/21  0900   WBC 5.3  --  4.6 6.5   HGB 7.6* 7.7* 7.7* 8.4*   MCV 90  --  89 90     --  247 286   INR  --   --  1.09  --      Recent Labs   Lab Test 12/22/21  0616 12/21/21  1144 12/14/21  0900    138 142   POTASSIUM 3.9 3.8 3.9   CHLORIDE 101 99 108   CO2 36* 38* 31   BUN 21 22 22   CR 0.92 0.90 1.06*   ANIONGAP 3 1* 3   IGOR 8.7 9.0 8.5   GLC 98 103* 137*     Recent Labs   Lab Test 12/21/21  1306 12/21/21  1144 12/14/21  0900 11/10/21  1121 09/23/21  1149 09/21/21  0324 09/20/21  1727 08/27/20  1305 08/01/16  0713 09/11/15  1542   ALBUMIN  --  3.0* 2.9* 3.5  --    < > 3.4 3.6   < > 3.5   BILITOTAL  --  0.2 0.2 0.3  --    < > 0.4 0.2   < > 0.2   DBIL  --   --   --   --   --   --   --  <0.1  --   --    ALT  --  14 13 22  --    < > 16 15   < > 23   AST  --  8 9 15  --    < > 10 13   < > 12   ALKPHOS  --  111 120 115  --    < > 101 112   < > 110   PROTEIN Negative  --   --   --  50 *  --   --   --   --   " --    LIPASE  --   --   --  129  --   --  124  --   --   --    AMYLASE  --   --   --   --   --   --   --   --   --  64    < > = values in this interval not displayed.       IMAGING/ENDOSCOPY    EUS 9/21/21 (Eli)   - Normal esophagus.    - Non-bleeding erosive gastropathy. Biopsied.    - Few tiny aphthous ulcers in duodenal bulb    - A single tiny duodenal polyp. Removed with biopsy forceps    - There was no sign of significant pathology in the common bile duct.    - A mass was identified in the uncinate process of the pancreas. This was staged T4 N0 M0 by endosonographic criteria. The staging applies if malignancy is confirmed. Fine needle aspiration performed. Appearance not typical for adenocarcinoma.    - EGD findings not significant enough to account for degree of anemia.    EUS 10/5/21 ([facility])   - Pancreas head mass, atypical appearance for adenocarcinoma. ? AIP vs lymphoma. FNA as above, specimen sent both in formalin and RPMI.    - The examined esophagus, stomach, and duodenum were endoscopically normal.    CT CAP 12/21/21   - No evidence for pulmonary embolism or thoracic aortic dissection.   - Soft tissue mass encasing the celiac trunk and SMA has decreased in size, and is again noted to abut the pancreatic head.   - No evidence for pulmonary embolism. No cause for hypoxia and anemia is identified.       CONSULTATION ASSESSMENT AND PLAN:    Lucille Rojas is an 81-year-old female with PMH as mentioned above who was admitted 12/21/21 with recurrent anemia. She reports a few black stools earlier this week.    1) Acute on chronic anemia: History of significant LAVERNE during previous admission 9/2021. Noted to have hemoglobin 7.7 from recent baseline 10 last month. Noting some black stools. No acute findings on CT scan. No recent colonoscopy (deferred last admission due to worsening cardiac/mental status). No H.pylori on recent biopsy. Anemia may be multifactorial from GI losses, chemotherapy, known  "malignancy. Plan EGD today to re-evaluate the upper GI tract for source of anemia.         - Monitor hemoglobin and stool output.         - PPI.         - EGD today.         - Further recommendations pending EGD results.    I discussed the patient plan with Dr. Benitez. Thank you for asking us to participate in the care of this patient.    Kim Moe PA-C  [facility] ([facility])       Staff addendum: 81 yof with pancreatic lymphoma, anemia, iron deficiency admitted for melena.Denies abd pain, nausea or emesis    /69   Pulse 89   Temp 98  F (36.7  C) (Oral)   Resp 16   Ht 1.727 m (5' 8\")   Wt 112.2 kg (247 lb 6.4 oz)   SpO2 95%   BMI 37.62 kg/m    Gen: awake alert NAD  Abd: S obese NT ND    EGD showed widely patent Schatzki ring, multiple gastric erosions (likely cause of melena)    A/P: 81 yof with chronic anemia admitted with melena, hgb stable. No sign of active bleeding. Suspect bleeding from erosions. Also complains of constipation.   -Regular diet  -BID PPI  -Carafate x 2 weeks  -No NSAIDS  -[facility] will follow up path  -Consider IV iron infusion  -Consider colonoscopy if anemia worsens or does not improve  -Suspect she will be ready for discharge tomorrow from GI standpoint  -Daily Miralax and increase dose of Senna. PRN suppository/Mag citrate    Vera Benitez MD  [facility]  Phone [phone] until 5 pm  Office [phone] for after hours on call provider      "

## 2021-12-22 NOTE — PROGRESS NOTES
Transfer Note    Patient transferred to [id number] via cart accompanied by transport aid.  Report reviewed via shedread. Discussed plan of care with patient.  Reviewed belongings checklist - checklist and belongings sent with patient: Yes

## 2021-12-22 NOTE — PLAN OF CARE
Patient remains  alert and oriented. Denies pain. Blood pressure was noted to be low while at EGD. Blood pressure was t was 141/49 upon return. Advanced to regular diet. Good appetite. On 2 LPM of oxygen. Denies being short of breath at rest. IS provided. Need encouragement to use it. Up in chair with assist of one. Reported double vision with relatively recent onset. Left a note to MD.

## 2021-12-22 NOTE — PROGRESS NOTES
[facility]    Medicine Progress Note - Hospitalist Service        Date of Admission:  12/21/2021 11:00 AM    Assessment & Plan:   Lucille Rojas is a 81 year old female with past medical history significant for recently diagnosed Non-hodgkin lymphoma of the pancreas- currently undergoing chemotherapy, Prior GI bleed and iron deficiency with symptomatic anemia admitted on 12/21/2021 with hypoxia, weakness and one episode of black stool.      Acute on chronic hypoxic/hypercapnic respiratory failure - resolved  Possible viral URI  Hx of obstructive sleep apnea and obesity hypoventilation syndrome on chronic supplemental O2   The patient presents to the Hospital with generalized weakness, cough, rhinorrhea and congestion for the past few days. She reports that her O2 sats at home has been lower than normal (low 90s) on her baseline amount of O2. She was initially found to be hypoxic to the 80s in the ED. She is now back on her baseline 2L of supplemental O2 and is satting normally   * CXR showed no acute findings   * COVID and influenza testing are negative   * CTPE obtained and showed no evidence of pulmonary embolism and no other abnormality within the lung parenchyma   * Overall no clear etiology for her worsening respiratory symptoms and cough. Based on her symptoms she may have a viral upper respiratory infection with some reactive airway component. Her worsening anemia may have contributed to a degree as well. Her O2 sats seemed to be improved now. Will monitor overnight   -Continue supplemental O2 to maintain sats around 90-93% - wean as able   -BiPAP at night   -duonebs for wheezing     Acute on Chronic anemia   Hx of iron deficiency anemia, prior GI bleed   Pt admitted back in September with a hgb of 4.7. She was found to be severely iron deficient.   -EGD on 9/21/21 revealed a few gastric erosions and a few tiny apthous ulcers in the duodenum. She was also diagnosed with lymphoma at this  time. She received 4 units of blood during this admission and has been getting intermittent iron infusions. Hemoglobin at presentation is 7.7 (was 8.4 on 12/14 prior to first chemotherapy but was up to 10.2 on 11/10). Iron studies on 12/14 improves with iron of 37 and iron sat of 13%. The patient notes one episode of black stool a few days ago.   -[facility] consulted, patient underwent EGD today which showed multiple erosions, no active bleeding.  -Continue PPI twice daily  -Carafate x2 weeks  -No NSAID use  -Recheck hemoglobin tomorrow morning, if anemia worsens consider colonoscopy per GI.     Non-hodgkin lymphoma involving the pancreas   Pt follows with Dr. Srinivasan of [facility]. She is receiving chemotherapy with mini-R CHOP. She receive her first cycle of treatment on 12/15.   -Continue PTA allopurinol      Diastolic heart failure   Moderate to severe Aortic valve stenos  Echocardiogram from September showed normal LV systolic function (EF of 60-65%) with diastolic doppler findings suggesting increased left ventricular filling pressures. She had moderate to severe valvular aortic stenosis with an aortic valve area of 1.0cm, and pressure gradient of 37.5mmHg. CHF does not appear to be acutely decompensated at this time. She has no evidence of volume overload and BNP is wnl at 239.   -Continue PTA lasix 20mg PO daily   -I/Os and daily weights   -She does not appear to be on BB or ACEi.      CKD stage III  Creatinine is 0.9, stable   -Continue to monitor      Hypothyroidism   -Continue PTA levothyroxine     Depression/Anxiety   -continue PTA citalopram and lorazepam (would use lorazepam very sparingly)      Macular degeneration.  Vision loss, secondary to above.  Obesity  Body mass index is 38.32 kg/m .       Diet: NPO per Anesthesia Guidelines for Procedure/Surgery Except for: Meds     DVT Prophylaxis: Pneumatic Compression Devices   Espitia Catheter: Not present  Code Status: Full Code     Disposition Plan   "  Expected Discharge: 12/24/2021     Anticipated discharge location:  Awaiting care coordination huddle  Delays:          Entered: Getachew Cutler MD 12/22/2021, 10:17 AM        Clinically Significant Risk Factors Present on Admission              # Platelet Defect: home medication list includes an antiplatelet medication        The patient's care was discussed with the Bedside Nurse and Patient.    Getachew Cutler MD  Hospitalist Service  [facility]  Text Page 7AM-6PM  Securely message with the Vocera Web Console (learn more here)  Text page via [employer] Paging/Directory    ______________________________________________________________________    Interval History   Oxygen requirement stable.  Endorses mild cough.  Afebrile.  No chest pain.  Feels weak overall.  Hemoglobin stable at 7.6 this morning.    Data reviewed today: I reviewed all medications, new labs and imaging results over the last 24 hours. I personally reviewed no images or EKG's today.    Physical Exam   Vital signs:  Temp: 98  F (36.7  C) Temp src: Oral BP: 131/48 Pulse: 80   Resp: 18 SpO2: 94 % O2 Device: Nasal cannula Oxygen Delivery: 2 LPM Height: 172.7 cm (5' 8\") Weight: 112.2 kg (247 lb 6.4 oz)  Estimated body mass index is 37.62 kg/m  as calculated from the following:    Height as of this encounter: 1.727 m (5' 8\").    Weight as of this encounter: 112.2 kg (247 lb 6.4 oz).      Wt Readings from Last 2 Encounters:   12/22/21 112.2 kg (247 lb 6.4 oz)   12/16/21 114.3 kg (252 lb)       Gen: AAOX3, NAD, comfortable  HEENT: Supple neck, moist oral mucosa, no pallor  Resp: Coarse breath sounds bilaterally, normal effort of breathing  CVS: RRR, no murmur  Abd/GI: Soft, non-tender. BS- normoactive.   Skin: Warm, dry no rashes  MSK:  no pedal edema  Neuro- CN- intact. No focal deficits.      Data   Recent Labs   Lab 12/22/21  0616 12/21/21  1233 12/21/21  1144   WBC 5.3  --  4.6   HGB 7.6* 7.7* 7.7*   MCV 90  --  89     " --  247   INR  --   --  1.09     --  138   POTASSIUM 3.9  --  3.8   CHLORIDE 101  --  99   CO2 36*  --  38*   BUN 21  --  22   CR 0.92  --  0.90   ANIONGAP 3  --  1*   IGOR 8.7  --  9.0   GLC 98  --  103*   ALBUMIN  --   --  3.0*   PROTTOTAL  --   --  6.6*   BILITOTAL  --   --  0.2   ALKPHOS  --   --  111   ALT  --   --  14   AST  --   --  8       Recent Results (from the past 24 hour(s))   XR Chest Port 1 View    Narrative    XR CHEST PORT 1 VIEW 12/21/2021 12:30 PM    HISTORY: cough    COMPARISON: Chest x-ray 9/23/2021, CT 11/10/2021      Impression    IMPRESSION: Right-sided port catheter. No acute findings. The lungs  are clear and there are no pleural effusions. Stable cardiomegaly and  central vascular prominence. No overt pulmonary edema.    AYDE FREEMAN MD         SYSTEM ID:  [id number]   CT Chest (PE) Abdomen Pelvis w Contrast    Narrative    CT CHEST PE, ABDOMEN & PELVIS WITH CONTRAST December 21, 2021 2:16  PM    CLINICAL HISTORY: Hypoxia. Anemia. History of non-Hodgkin's lymphoma.    TECHNIQUE: CT angiogram chest and routine CT abdomen pelvis with IV  contrast. Arterial phase through the chest and venous phase through  the abdomen and pelvis. 2D and 3D MIP reconstructions were preformed  by the CT technologist. Dose reduction techniques were used.   CONTRAST: 126  mL Isovue 370.    COMPARISON: CT of the chest, abdomen, and pelvis performed 11/10/2021.    FINDINGS:  ANGIOGRAM CHEST: Pulmonary arteries are normal caliber and negative  for pulmonary emboli. Thoracic aorta is negative for dissection. No CT  evidence of right heart strain. Moderate atherosclerotic calcification  of the thoracic aorta.    LUNGS AND PLEURA: No pleural effusions. No lung masses or  consolidations are identified. No pneumothorax.    MEDIASTINUM/AXILLAE: No enlarged lymph nodes are identified in the  chest. No pericardial effusion. Right Port-A-Cath, with tip near the  SVC/RA junction.    CORONARY ARTERY CALCIFICATION:  Mild.    HEPATOBILIARY: A 1 cm enhancing focus near the posterior tip of the  right hepatic lobe posteriorly is nonspecific, but may represent a  small flash filling hemangioma. No other hepatic masses are seen.  Unremarkable gallbladder.    PANCREAS: Normal.    SPLEEN: Normal.    ADRENAL GLANDS: Mild nodular thickening of both adrenal glands.    KIDNEYS/BLADDER: Unremarkable. No hydronephrosis.    BOWEL: No bowel obstruction. Colonic diverticulosis. No convincing  evidence for colitis or diverticulitis. Prior appendectomy.    PELVIC ORGANS: Prior hysterectomy.    LYMPH NODES: Soft tissue mass consistent with the patient's known  lymphoma is again noted to encase the celiac trunk and SMA, and has  decreased in size since the previous exam, measuring 3.8 x 3.3 cm  (previously 5.4 x 3.6 cm). This soft tissue mass is again noted to  abut the pancreatic head, portal vein, splenic vein, and superior  mesenteric vein. A borderline-enlarged periportal lymph node (series  12 image 50) measures 1 cm short axis, and is unchanged.    VASCULATURE: Moderate atherosclerotic aortoiliac calcification.    ADDITIONAL FINDINGS: None.    MUSCULOSKELETAL: Degenerative changes are noted throughout the  thoracolumbar spine.      Impression    IMPRESSION:  1.  No evidence for pulmonary embolism or thoracic aortic dissection.  2.  Soft tissue mass encasing the celiac trunk and SMA has decreased  in size, and is again noted to abut the pancreatic head.  3.  No evidence for pulmonary embolism. No cause for hypoxia and  anemia is identified.    JOHNY ELKINS MD         SYSTEM ID:  [id number]     Medications       allopurinol  300 mg Oral Daily     citalopram  20 mg Oral Daily     furosemide  20 mg Oral Daily     heparin  5-10 mL Intracatheter Q28 Days     heparin lock flush  5-10 mL Intracatheter Q24H     ipratropium - albuterol 0.5 mg/2.5 mg/3 mL  3 mL Nebulization 4x daily     levothyroxine  200 mcg Oral Daily     miconazole   Topical BID      pantoprazole (PROTONIX) IV  40 mg Intravenous BID     polyethylene glycol  17 g Oral QPM     senna-docusate  1 tablet Oral QAM     simvastatin  10 mg Oral At Bedtime     sodium chloride (PF)  10-20 mL Intracatheter Q28 Days     sodium chloride (PF)  3 mL Intracatheter Q8H

## 2021-12-23 LAB
ERYTHROCYTE [DISTWIDTH] IN BLOOD BY AUTOMATED COUNT: 15 % (ref 10–15)
FERRITIN SERPL-MCNC: 24 NG/ML (ref 8–252)
HCT VFR BLD AUTO: 26.2 % (ref 35–47)
HGB BLD-MCNC: 7.3 G/DL (ref 11.7–15.7)
HGB BLD-MCNC: 7.9 G/DL (ref 11.7–15.7)
IRON SATN MFR SERPL: 5 % (ref 15–46)
IRON SERPL-MCNC: 14 UG/DL (ref 35–180)
MCH RBC QN AUTO: 25.1 PG (ref 26.5–33)
MCHC RBC AUTO-ENTMCNC: 27.9 G/DL (ref 31.5–36.5)
MCV RBC AUTO: 90 FL (ref 78–100)
PATH REPORT.COMMENTS IMP SPEC: NORMAL
PATH REPORT.COMMENTS IMP SPEC: NORMAL
PATH REPORT.FINAL DX SPEC: NORMAL
PATH REPORT.GROSS SPEC: NORMAL
PATH REPORT.MICROSCOPIC SPEC OTHER STN: NORMAL
PHOTO IMAGE: NORMAL
PLATELET # BLD AUTO: 255 10E3/UL (ref 150–450)
RBC # BLD AUTO: 2.91 10E6/UL (ref 3.8–5.2)
TIBC SERPL-MCNC: 264 UG/DL (ref 240–430)
WBC # BLD AUTO: 5.5 10E3/UL (ref 4–11)

## 2021-12-23 PROCEDURE — 250N000013 HC RX MED GY IP 250 OP 250 PS 637: Performed by: STUDENT IN AN ORGANIZED HEALTH CARE EDUCATION/TRAINING PROGRAM

## 2021-12-23 PROCEDURE — 94660 CPAP INITIATION&MGMT: CPT

## 2021-12-23 PROCEDURE — 250N000009 HC RX 250: Performed by: STUDENT IN AN ORGANIZED HEALTH CARE EDUCATION/TRAINING PROGRAM

## 2021-12-23 PROCEDURE — 99232 SBSQ HOSP IP/OBS MODERATE 35: CPT | Performed by: INTERNAL MEDICINE

## 2021-12-23 PROCEDURE — 85018 HEMOGLOBIN: CPT | Performed by: INTERNAL MEDICINE

## 2021-12-23 PROCEDURE — 250N000011 HC RX IP 250 OP 636: Performed by: STUDENT IN AN ORGANIZED HEALTH CARE EDUCATION/TRAINING PROGRAM

## 2021-12-23 PROCEDURE — 120N000001 HC R&B MED SURG/OB

## 2021-12-23 PROCEDURE — 94640 AIRWAY INHALATION TREATMENT: CPT

## 2021-12-23 PROCEDURE — 250N000013 HC RX MED GY IP 250 OP 250 PS 637: Performed by: INTERNAL MEDICINE

## 2021-12-23 PROCEDURE — 999N000157 HC STATISTIC RCP TIME EA 10 MIN

## 2021-12-23 PROCEDURE — 83550 IRON BINDING TEST: CPT | Performed by: INTERNAL MEDICINE

## 2021-12-23 PROCEDURE — 88305 TISSUE EXAM BY PATHOLOGIST: CPT | Mod: 26 | Performed by: PATHOLOGY

## 2021-12-23 PROCEDURE — 258N000003 HC RX IP 258 OP 636: Performed by: INTERNAL MEDICINE

## 2021-12-23 PROCEDURE — 82728 ASSAY OF FERRITIN: CPT | Performed by: INTERNAL MEDICINE

## 2021-12-23 PROCEDURE — 94640 AIRWAY INHALATION TREATMENT: CPT | Mod: 76

## 2021-12-23 PROCEDURE — 250N000011 HC RX IP 250 OP 636: Performed by: INTERNAL MEDICINE

## 2021-12-23 PROCEDURE — 85014 HEMATOCRIT: CPT | Performed by: INTERNAL MEDICINE

## 2021-12-23 RX ADMIN — MICONAZOLE NITRATE: 2 POWDER TOPICAL at 20:36

## 2021-12-23 RX ADMIN — ALLOPURINOL 300 MG: 300 TABLET ORAL at 08:15

## 2021-12-23 RX ADMIN — SENNOSIDES AND DOCUSATE SODIUM 1 TABLET: 50; 8.6 TABLET ORAL at 08:15

## 2021-12-23 RX ADMIN — Medication 5 ML: at 20:36

## 2021-12-23 RX ADMIN — MICONAZOLE NITRATE: 2 POWDER TOPICAL at 08:16

## 2021-12-23 RX ADMIN — SUCRALFATE ORAL 1 G: 1 SUSPENSION ORAL at 08:15

## 2021-12-23 RX ADMIN — POLYETHYLENE GLYCOL 3350 17 G: 17 POWDER, FOR SOLUTION ORAL at 08:15

## 2021-12-23 RX ADMIN — CITALOPRAM HYDROBROMIDE 20 MG: 20 TABLET ORAL at 08:15

## 2021-12-23 RX ADMIN — SUCRALFATE ORAL 1 G: 1 SUSPENSION ORAL at 11:25

## 2021-12-23 RX ADMIN — IPRATROPIUM BROMIDE AND ALBUTEROL SULFATE 3 ML: .5; 3 SOLUTION RESPIRATORY (INHALATION) at 19:38

## 2021-12-23 RX ADMIN — LEVOTHYROXINE SODIUM 200 MCG: 100 TABLET ORAL at 08:15

## 2021-12-23 RX ADMIN — IPRATROPIUM BROMIDE AND ALBUTEROL SULFATE 3 ML: .5; 3 SOLUTION RESPIRATORY (INHALATION) at 08:42

## 2021-12-23 RX ADMIN — PANTOPRAZOLE SODIUM 40 MG: 40 TABLET, DELAYED RELEASE ORAL at 08:15

## 2021-12-23 RX ADMIN — PANTOPRAZOLE SODIUM 40 MG: 40 TABLET, DELAYED RELEASE ORAL at 16:41

## 2021-12-23 RX ADMIN — FUROSEMIDE 20 MG: 20 TABLET ORAL at 08:15

## 2021-12-23 RX ADMIN — IRON SUCROSE 300 MG: 20 INJECTION, SOLUTION INTRAVENOUS at 13:07

## 2021-12-23 RX ADMIN — IPRATROPIUM BROMIDE AND ALBUTEROL SULFATE 3 ML: .5; 3 SOLUTION RESPIRATORY (INHALATION) at 15:57

## 2021-12-23 RX ADMIN — SENNOSIDES AND DOCUSATE SODIUM 1 TABLET: 50; 8.6 TABLET ORAL at 20:36

## 2021-12-23 RX ADMIN — SIMVASTATIN 10 MG: 10 TABLET, FILM COATED ORAL at 20:36

## 2021-12-23 RX ADMIN — SUCRALFATE ORAL 1 G: 1 SUSPENSION ORAL at 20:36

## 2021-12-23 RX ADMIN — SUCRALFATE ORAL 1 G: 1 SUSPENSION ORAL at 16:41

## 2021-12-23 RX ADMIN — Medication 5 ML: at 13:07

## 2021-12-23 RX ADMIN — Medication 5 ML: at 11:17

## 2021-12-23 ASSESSMENT — ACTIVITIES OF DAILY LIVING (ADL)
ADLS_ACUITY_SCORE: 19
ADLS_ACUITY_SCORE: 20
ADLS_ACUITY_SCORE: 24
ADLS_ACUITY_SCORE: 20
ADLS_ACUITY_SCORE: 19
ADLS_ACUITY_SCORE: 20
ADLS_ACUITY_SCORE: 20
ADLS_ACUITY_SCORE: 19
ADLS_ACUITY_SCORE: 20
ADLS_ACUITY_SCORE: 24
ADLS_ACUITY_SCORE: 20

## 2021-12-23 NOTE — CONSULTS
Care Management Initial Consult    General Information  Assessment completed with: Patient (daughter Nancy Rajput by phone),    Type of CM/SW Visit: CM Role Introduction    Primary Care Provider verified and updated as needed: Yes   Readmission within the last 30 days: no previous admission in last 30 days   Reason for Consult: discharge planning  Advance Care Planning:            Communication Assessment  Patient's communication style: spoken language (English or Bilingual)    Hearing Difficulty or Deaf: yes   Wear Glasses or Blind: yes    Cognitive  Cognitive/Neuro/Behavioral: WDL                      Living Environment:   People in home: spouse  [place]  Current living Arrangements: house      Able to return to prior arrangements: other (see comments) (asking for PT consults )       Family/Social Support:  Care provided by: self  Provides care for: no one, unable/limited ability to care for self  Marital Status:     [place]       Description of Support System: Supportive,Involved    Support Assessment: Adequate family and caregiver support,Adequate social supports    Current Resources:   Patient receiving home care services: Yes  Skilled Home Care Services: Skilled Nursing,Physicial Therapy  Community Resources: Other (see comment) (previously getting VEEP and housekeeping based on income)  Equipment currently used at home: walker, standard,other (see comments) (uses wheelchair at clinics)  Supplies currently used at home: Diabetic Supplies,Nutritional Supplements,Oxygen Tubing/Supplies    Employment/Financial:  Employment Status: retired        Financial Concerns: No concerns identified           Lifestyle & Psychosocial Needs:  Social Determinants of Health     Tobacco Use: Low Risk      Smoking Tobacco Use: Never Smoker     Smokeless Tobacco Use: Never Used   Alcohol Use: Not on file   Financial Resource Strain: Not on file   Food Insecurity: Not on file   Transportation Needs: Not on file  "  Physical Activity: Not on file   Stress: Not on file   Social Connections: Not on file   Intimate Partner Violence: Not on file   Depression: Not at risk     PHQ-2 Score: 2   Housing Stability: Not on file       Functional Status:  Prior to admission patient needed assistance:   Independent at home with a walker.  Daughter Nancy Rajput provides transportation to/from appointments for oncology.           Additional Information:  Writer met with the patient at the bedside. Patient is A+Ox4 up with A1 and walker 2LPM baseline.  Writer explained role and scope of safe discharge planning.    Per review of notes possible discharge to home 12/24 with resumption of homecare RN/PT and specialty follow up with [facility] 1/4/22.  Patient informed this writer \"I am scared to discharge.\" Patient stated she has had several stays, including a TCU and feels that she is in a \"repetative stage with an unclear path.\"  Patient was at [facility] earlier this fall, when writer asked if she thought she would need TCU for discharge she was not sure.  Writer will ask MD for PT assessment for discharge recommendations.  Patient stated she is at home with her spouse they both have walkers and health issues.  Family is working but supportive and bring food over and transportation to/from appointments. Patient has a life alert wrist watch and was previously receiving housekeeping but was discontinued when she was at TCU.  Writer offered resources for senior linkage line and cleaning for a reason while the patient is undergoing chemotherapy.   Writer got approval to call the patient's daughter Nancy Rajput to go over the discharge planning.  Per Nancy Rajput she would be able to pick her mother up tomorrow and bring her portable O2 tank.  Nancy would also like PT to assess for discharge planning.  Will ask MD to enter stat consult for discharge planning.  Will continue to monitor for discharge planning.     Jess Gaffney RN        "

## 2021-12-23 NOTE — PROGRESS NOTES
[facility]    Hematology/Oncology Follow-up Note      Today's Date: 12/23/21  Date of Admission:  12/21/2021  Reason for Consultation: NHL of the pancreas      ASSESSMENT/PLAN:  Lucille Rojas is an 81 year old female with non-Hodgkin's lymphoma involving the pancreas admitted for weakness, anemia, and melena.     1. Non-Hodgkin's lymphoma involving pancreas  --S/P 1 cycle of mini-R-CHOP under care of Dr. John Srinivasan.  --12/21/2021 CT scans show a decrease in the lymphoma/mass abutting pancreas.  --Continued on allopurinol.  --Due for cycle 2 on 1/5/2022. Has appointment with Alexandr Ambriz (NP) on 1/4/2022.     2. Anemia of neoplastic disease and iron deficiency  --12/14/2021 Ferritin 151, iron 37, TIBC 281, BETINA low at 13.  --Iron studies have returned with low iron saturation of 5% this admission. Patient has been started on IV venofer and planned for 2 doses. Agree with venofer.    3. Melena  --Hgb stable in 7-range since admission.  --S/p EGD 12/21 showing reactive gastropathy, negative for dysplasia, and negative for H. Pylori.  --GI has signed off. Probably colonoscopy outpatient if patient has continued evidence of anemia/repeat bleed.    4. Chronic hypoxic/hypercapneic respiratory failure.  -Patient is maintained on continuous 2L supplemental oxygen.  -She continues with Bipap use at night.      Thank you for the consultation. Patient is planned for discharge tomorrow 12/24/21. Agree with discharge if hemoglobin remains stable and hemodynamically stable. Hematology to sign off at this time. Patient is scheduled for follow up in our clinic on 1/4/22. Please do not hesitate to contact our team with any further questions or concerns.     Pura Rivera, DO  Hematology/Oncology  [facility]           INTERIM HISTORY:  HD stable, afebrile. Patient reports two formed BMs, no evidence of BRBPR or melena. Hemoglobin is 7.3. Iron studies have returned with low iron  saturation and patient has been started on IV iron and is tolerating well. She feels overall improved.      MEDICATIONS:  Current Facility-Administered Medications   Medication     acetaminophen (TYLENOL) tablet 650 mg    Or     acetaminophen (TYLENOL) Suppository 650 mg     allopurinol (ZYLOPRIM) tablet 300 mg     bisacodyl (DULCOLAX) Suppository 10 mg     citalopram (celeXA) tablet 20 mg     furosemide (LASIX) tablet 20 mg     guaiFENesin-dextromethorphan (ROBITUSSIN DM) 100-10 MG/5ML syrup 10 mL     heparin 100 UNIT/ML injection 5-10 mL     heparin lock flush 10 UNIT/ML injection 5-10 mL     heparin lock flush 10 UNIT/ML injection 5-10 mL     ipratropium - albuterol 0.5 mg/2.5 mg/3 mL (DUONEB) neb solution 3 mL     iron sucrose (VENOFER) 300 mg in sodium chloride 0.9 % 250 mL intermittent infusion     levothyroxine (SYNTHROID/LEVOTHROID) tablet 200 mcg     lidocaine (LMX4) cream     lidocaine 1 % 0.1-1 mL     LORazepam (ATIVAN) tablet 0.5 mg     magnesium citrate solution 296 mL     melatonin tablet 1 mg     miconazole (MICATIN) 2 % powder     naloxone (NARCAN) injection 0.2 mg     naloxone (NARCAN) injection 0.2 mg     naloxone (NARCAN) injection 0.4 mg     naloxone (NARCAN) injection 0.4 mg     ondansetron (ZOFRAN-ODT) ODT tab 4 mg    Or     ondansetron (ZOFRAN) injection 4 mg     pantoprazole (PROTONIX) EC tablet 40 mg     polyethylene glycol (MIRALAX) Packet 17 g     senna-docusate (SENOKOT-S/PERICOLACE) 8.6-50 MG per tablet 1 tablet     simvastatin (ZOCOR) tablet 10 mg     sodium chloride (PF) 0.9% PF flush 10-20 mL     sodium chloride (PF) 0.9% PF flush 10-20 mL     sodium chloride (PF) 0.9% PF flush 10-20 mL     sodium chloride (PF) 0.9% PF flush 3 mL     sucralfate (CARAFATE) suspension 1 g           ALLERGIES:  Allergies   Allergen Reactions     Penicillins          PHYSICAL EXAM:  Vital signs:  Temp: 98.6  F (37  C) Temp src: Oral BP: (!) 140/48 Pulse: 107   Resp: 18 SpO2: 94 % O2 Device: Nasal cannula  "Oxygen Delivery: 2 LPM Height: 172.7 cm (5' 8\") Weight: 112.2 kg (247 lb 6.4 oz)  Estimated body mass index is 37.62 kg/m  as calculated from the following:    Height as of this encounter: 1.727 m (5' 8\").    Weight as of this encounter: 112.2 kg (247 lb 6.4 oz).    ECO-1.5  GENERAL/CONSTITUTIONAL: No acute distress.  EYES: Pupils are equal, round, and react to light and accommodation. Extraocular movements intact.  No scleral icterus.  ENT/MOUTH: Neck supple. Oropharynx clear, no mucositis.  LYMPH: No anterior cervical, posterior cervical, supraclavicular, axillary or inguinal adenopathy.   RESPIRATORY: Clear to auscultation bilaterally. No crackles or wheezing.   CARDIOVASCULAR: Regular rate and rhythm without murmurs, gallops, or rubs.  GASTROINTESTINAL: No hepatosplenomegaly, masses, or tenderness. The patient has normal bowel sounds. No guarding.  No distention.  MUSCULOSKELETAL: Warm and well-perfused, no cyanosis, clubbing, or edema.  NEUROLOGIC: Cranial nerves II-XII are intact. Alert, oriented, answers questions appropriately.  INTEGUMENTARY: No rashes or jaundice.      LABS:  CBC RESULTS:   Recent Labs   Lab Test 21  1317 21  0542   WBC  --  5.5   RBC  --  2.91*   HGB 7.9* 7.3*   HCT  --  26.2*   MCV  --  90   MCH  --  25.1*   MCHC  --  27.9*   RDW  --  15.0   PLT  --  255       Recent Labs   Lab Test 21  0616 21  1144    138   POTASSIUM 3.9 3.8   CHLORIDE 101 99   CO2 36* 38*   ANIONGAP 3 1*   GLC 98 103*   BUN    CR 0.92 0.90   IGOR 8.7 9.0         PATHOLOGY:  Final Diagnosis   Stomach, random, biopsy:  - Reactive gastropathy.  - Negative for dysplasia or malignancy.  - Negative for Helicobacter pylori.         IMAGING:  EGD 21:  Impression:         - Benign-appearing esophageal stenosis.                             - Erosive gastropathy with no stigmata of recent                             bleeding. Biopsied. Likely cause of melena                             " - No gross lesions in the entire examined duodenum.           Pura Rivera DO  Hematology/Oncology  [facility]

## 2021-12-23 NOTE — PROGRESS NOTES
"[facility] Progress Note     Interval History:  Hgb in 7 range. Last stool non-bloody per pt. No GI symptoms.     Physical Exam:    BP (!) 140/44   Pulse 73   Temp 97.6  F (36.4  C) (Axillary)   Resp 16   Ht 1.727 m (5' 8\")   Wt 112.2 kg (247 lb 6.4 oz)   SpO2 97%   BMI 37.62 kg/m    Temp (24hrs), Av.4  F (36.9  C), Min:97.6  F (36.4  C), Max:99.1  F (37.3  C)    Patient Vitals for the past 72 hrs:   Weight   21 0232 112.2 kg (247 lb 6.4 oz)       Intake/Output Summary (Last 24 hours) at 2021 1044  Last data filed at 2021 0700  Gross per 24 hour   Intake --   Output 975 ml   Net -975 ml       Constitutional: No acute distress  Cardiovascular: RRR, normal S1/S2   Respiratory: Effort normal, CTA bilaterally  Abdomen: Soft, large, BS+    Laboratory Data  Recent Labs   Lab Test 21  0542 21  0616 21  1233 21  1144   WBC 5.5 5.3  --  4.6   HGB 7.3* 7.6* 7.7* 7.7*   MCV 90 90  --  89    243  --  247   INR  --   --   --  1.09     Recent Labs   Lab Test 21  0616 21  1144 21  0900    138 142   POTASSIUM 3.9 3.8 3.9   CHLORIDE 101 99 108   CO2 36* 38* 31   BUN    CR 0.92 0.90 1.06*   ANIONGAP 3 1* 3   IGOR 8.7 9.0 8.5     Recent Labs   Lab Test 21  1306 21  1144 21  0900 11/10/21  1121 21  1149 21  0324 21  1727 20  1305 16  0713 09/11/15  1542   ALBUMIN  --  3.0* 2.9* 3.5  --    < > 3.4 3.6   < > 3.5   BILITOTAL  --  0.2 0.2 0.3  --    < > 0.4 0.2   < > 0.2   DBIL  --   --   --   --   --   --   --  <0.1  --   --    ALT  --  14 13 22  --    < > 16 15   < > 23   AST  --  8 9 15  --    < > 10 13   < > 12   ALKPHOS  --  111 120 115  --    < > 101 112   < > 110   PROTEIN Negative  --   --   --  50 *  --   --   --   --   --    LIPASE  --   --   --  129  --   --  124  --   --   --    AMYLASE  --   --   --   --   --   --   --   --   --  64    < > = values in this interval not displayed. "       Imaging  EUS 9/21/21 (Eli)   - Normal esophagus.    - Non-bleeding erosive gastropathy. Biopsied.    - Few tiny aphthous ulcers in duodenal bulb    - A single tiny duodenal polyp. Removed with biopsy forceps    - There was no sign of significant pathology in the common bile duct.    - A mass was identified in the uncinate process of the pancreas. This was staged T4 N0 M0 by endosonographic criteria. The staging applies if malignancy is confirmed. Fine needle aspiration performed. Appearance not typical for adenocarcinoma.    - EGD findings not significant enough to account for degree of anemia.     EUS 10/5/21 (Soy)   - Pancreas head mass, atypical appearance for adenocarcinoma. ? AIP vs lymphoma. FNA as above, specimen sent both in formalin and RPMI.    - The examined esophagus, stomach, and duodenum were endoscopically normal.     CT CAP 12/21/21   - No evidence for pulmonary embolism or thoracic aortic dissection.   - Soft tissue mass encasing the celiac trunk and SMA has decreased in size, and is again noted to abut the pancreatic head.   - No evidence for pulmonary embolism. No cause for hypoxia and anemia is identified.       Endoscopic Workup  EGD 12/22/21  Findings:        One widely patent benign-appearing, intrinsic mild stenosis was found in        the distal esophagus. This stenosis measured 1.5 cm (inner diameter) x        less than one cm (in length). The stenosis was traversed.        Multiple localized small erosions with no stigmata of recent bleeding        were found in the gastric antrum. Biopsies were taken with a cold        forceps for histology.        No gross lesions were noted in the entire examined duodenum.                                                                                     Impression:               - Benign-appearing esophageal stenosis.                             - Erosive gastropathy with no stigmata of recent                             bleeding.  Biopsied. Likelycause of melena                             - No gross lesions in the entire examined duodenum.   Recommendation:           - [facility] patient to [facility] for [facility] care.                             - Resume regular diet today.                             - Await pathology results.                             - Use a proton pump inhibitor PO BID for 2 months.                             - Use sucralfate suspension 1 gram PO QID for 2                             weeks.                             - Consider colonoscopy if no improvement in anemia                             - See note in Epic for further recs     Final Diagnosis   Stomach, random, biopsy:  - Reactive gastropathy.  - Negative for dysplasia or malignancy.  - Negative for Helicobacter pylori.         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      1) Acute on chronic anemia: History of significant LAVERNE during previous admission 9/2021. Noted to have hemoglobin 7.7 from recent baseline 10 last month. Noting some black stools. No acute findings on CT scan. No recent colonoscopy (deferred last admission due to worsening cardiac status). No H.pylori on recent biopsy. EGD 12/22/21 showed erosive gastropathy with no stigmata of recent bleeding, likely the cause of melena. Biopsies showed reactive gastropathy, neg for dysplasia/malignacy or H pylori.          - Monitor hemoglobin and stools          - PPI BID 2 months, daily thereafter          - Carafate 1g PO QID for 2 weeks        - Consider colonoscopy in the future if no improvement in anemia with treatment of erosive gastropathy         - Reviewed with Dr. Benitez. No further GI  recommendations at this time. We will sign off. Please call with any concerns or questions.    Patience Burris CNP  [facility]  Cell: [phone]  Office: [phone]

## 2021-12-23 NOTE — PROVIDER NOTIFICATION
MD Notification    Notified Person: MD    Notified Person Name: Omayra    Notification Date/Time: 12/23/21 16:29    Notification Interaction: FYVALE page to provider for PT for discharge planning    Purpose of Notification: Page for consult by PT     Orders Received: no call back needed unless further clarification    Comments:

## 2021-12-23 NOTE — PROGRESS NOTES
[facility]    Medicine Progress Note - Hospitalist Service        Date of Admission:  12/21/2021 11:00 AM    Assessment & Plan:   Lucille Rojas is a 81 year old female with past medical history significant for recently diagnosed Non-hodgkin lymphoma of the pancreas- currently undergoing chemotherapy, Prior GI bleed and iron deficiency with symptomatic anemia admitted on 12/21/2021 with hypoxia, weakness and one episode of black stool.      Acute on chronic hypoxic/hypercapnic respiratory failure - resolved  Possible viral URI  Hx of obstructive sleep apnea and obesity hypoventilation syndrome on chronic supplemental O2   The patient presents to the Hospital with generalized weakness, cough, rhinorrhea and congestion for the past few days. She reports that her O2 sats at home has been lower than normal (low 90s) on her baseline amount of O2. She was initially found to be hypoxic to the 80s in the ED. She is now back on her baseline 2L of supplemental O2 and is satting normally   * CXR showed no acute findings   * COVID and influenza testing are negative   * CTPE obtained and showed no evidence of pulmonary embolism and no other abnormality within the lung parenchyma   * Overall no clear etiology for her worsening respiratory symptoms and cough. Based on her symptoms she may have a viral upper respiratory infection with some reactive airway component.   -Respiratory status much improved, now weaned off oxygen  -BiPAP at night   -duonebs for wheezing     Acute on Chronic anemia   Hx of iron deficiency anemia, prior GI bleed   Pt admitted back in September with a hgb of 4.7. She was found to be severely iron deficient.   -EGD on 9/21/21 revealed a few gastric erosions and a few tiny apthous ulcers in the duodenum. She was also diagnosed with lymphoma at this time. She received 4 units of blood during this admission and has been getting intermittent iron infusions. Hemoglobin at presentation is 7.7  (was 8.4 on 12/14 prior to first chemotherapy but was up to 10.2 on 11/10). Iron studies on 12/14 improves with iron of 37 and iron sat of 13%. The patient notes one episode of black stool a few days ago.   -[facility] consulted, patient underwent EGD on 12/22 which showed multiple erosions, no active bleeding.  -Continue PPI twice daily  -Carafate x2 weeks  -No NSAID use  -Hemoglobin slightly lower but stable at 7.2.  -Iron stores are quite low, will order Venofer 300 mg IV daily x2 doses  -Monitor stool output and hemoglobin for the rest of the day today.  If hemoglobin stable anticipate discharge home tomorrow  -We will need outpatient follow-up with [facility]  -Consider colonoscopy as outpatient if no improvement in anemia.     Non-hodgkin lymphoma involving the pancreas   Pt follows with Dr. Srinivasan of [facility]. She is receiving chemotherapy with mini-R CHOP. She receive her first cycle of treatment on 12/15.   -Continue PTA allopurinol      Diastolic heart failure   Moderate to severe Aortic valve stenos  Echocardiogram from September showed normal LV systolic function (EF of 60-65%) with diastolic doppler findings suggesting increased left ventricular filling pressures. She had moderate to severe valvular aortic stenosis with an aortic valve area of 1.0cm, and pressure gradient of 37.5mmHg. CHF does not appear to be acutely decompensated at this time. She has no evidence of volume overload and BNP is wnl at 239.   -Continue PTA lasix 20mg PO daily   -I/Os and daily weights   -She does not appear to be on BB or ACEi.      CKD stage III  Creatinine is 0.9, stable   -Continue to monitor      Hypothyroidism   -Continue PTA levothyroxine     Depression/Anxiety   -continue PTA citalopram and lorazepam (would use lorazepam very sparingly)      Macular degeneration.  Vision loss, secondary to above.  Obesity  Body mass index is 38.32 kg/m .       Diet: Regular Diet Adult     DVT Prophylaxis: Pneumatic Compression  "Devices   Espitia Catheter: Not present  Code Status: Full Code     Disposition Plan    Expected Discharge: 12/24/2021     Anticipated discharge location:  Awaiting care coordination huddle  Delays:          Entered: Getachew Cutler MD 12/23/2021, 8:09 AM        Clinically Significant Risk Factors Present on Admission                  The patient's care was discussed with the Bedside Nurse and Patient.  And daughter Radha on the phone.    Getachew Cutler MD  Hospitalist Service  [facility]  Text Page 7AM-6PM  Securely message with the Vocera Web Console (learn more here)  Text page via registracija vozila Paging/Directory    ______________________________________________________________________    Interval History   Off oxygen.  No hematochezia or melena.  Hemoglobin slightly lower at 7.3.  Respiratory symptoms improving.  Afebrile.    Data reviewed today: I reviewed all medications, new labs and imaging results over the last 24 hours. I personally reviewed no images or EKG's today.    Physical Exam   Vital signs:  Temp: 97.6  F (36.4  C) Temp src: Axillary BP: (!) 140/44 Pulse: 73   Resp: 16 SpO2: 97 % O2 Device: BiPAP/CPAP Oxygen Delivery: 4 LPM Height: 172.7 cm (5' 8\") Weight: 112.2 kg (247 lb 6.4 oz)  Estimated body mass index is 37.62 kg/m  as calculated from the following:    Height as of this encounter: 1.727 m (5' 8\").    Weight as of this encounter: 112.2 kg (247 lb 6.4 oz).      Wt Readings from Last 2 Encounters:   12/22/21 112.2 kg (247 lb 6.4 oz)   12/16/21 114.3 kg (252 lb)       Gen: AAOX3, NAD, comfortable  Resp: Coarse breath sounds bilaterally, normal effort of breathing  CVS: RRR, no murmur  Abd/GI: Soft, non-tender. BS- normoactive.   Skin: Warm, dry no rashes  MSK:  no pedal edema  Neuro- CN- intact. No focal deficits.      Data   Recent Labs   Lab 12/23/21  0542 12/22/21  0616 12/21/21  1233 12/21/21  1144   WBC 5.5 5.3  --  4.6   HGB 7.3* 7.6* 7.7* 7.7*   MCV 90 90  --  89 "    243  --  247   INR  --   --   --  1.09   NA  --  140  --  138   POTASSIUM  --  3.9  --  3.8   CHLORIDE  --  101  --  99   CO2  --  36*  --  38*   BUN  --  21  --  22   CR  --  0.92  --  0.90   ANIONGAP  --  3  --  1*   IGOR  --  8.7  --  9.0   GLC  --  98  --  103*   ALBUMIN  --   --   --  3.0*   PROTTOTAL  --   --   --  6.6*   BILITOTAL  --   --   --  0.2   ALKPHOS  --   --   --  111   ALT  --   --   --  14   AST  --   --   --  8       No results found for this or any previous visit (from the past 24 hour(s)).  Medications       allopurinol  300 mg Oral Daily     citalopram  20 mg Oral Daily     furosemide  20 mg Oral Daily     heparin  5-10 mL Intracatheter Q28 Days     heparin lock flush  5-10 mL Intracatheter Q24H     ipratropium - albuterol 0.5 mg/2.5 mg/3 mL  3 mL Nebulization 4x daily     levothyroxine  200 mcg Oral Daily     miconazole   Topical BID     pantoprazole  40 mg Oral BID AC     polyethylene glycol  17 g Oral Daily     senna-docusate  1 tablet Oral BID     simvastatin  10 mg Oral At Bedtime     sodium chloride (PF)  10-20 mL Intracatheter Q28 Days     sucralfate  1 g Oral 4x Daily

## 2021-12-23 NOTE — PLAN OF CARE
Summary: Presented to ED with hypoxia, weakness, black stool x1. Recently diagnosed with non-hodgkin lymphoma of pancreas.   Primary Diagnosis: hypoxia, weakness, black stool  Orientation: A&Ox4  Aggression Stop Light: Green  Mobility: A2/GB/W  Pain Management: denies pain  Diet: Regular,  Bowel/Bladder: continent  Abnormal Lab/Assessments: hemoglobin 7.6   Drain/Device/Wound: R Port, heparin locked  Consults: Gastroenterology  D/C Day/Goals/Place: pending GI bleed work up    Shift Note:     A&Ox4. VSS on 2L 02 via NC. Bipap overnight. Denies pain. LS clear, denies SOB. BS active. No s/sx GI bleeding noted. Regular diet, good appetite. Up with A1. Port HL, dressing with some bloody drainage, dressing changed this shift. Discharge pending. Hgb this am 7.3.

## 2021-12-23 NOTE — PLAN OF CARE
A&Ox4. VSS on 2L 02 via NC. Denies pain. LS clear, denies SOB. BS active, 1 brown stool this shift. No s/sx bleeding noted. Regular diet, good appetite. Up with A1. Port HL. Discharge pending hgb in AM.

## 2021-12-24 ENCOUNTER — APPOINTMENT (OUTPATIENT)
Dept: PHYSICAL THERAPY | Facility: CLINIC | Age: 81
DRG: 811 | End: 2021-12-24
Attending: INTERNAL MEDICINE
Payer: MEDICARE

## 2021-12-24 LAB — HGB BLD-MCNC: 7.3 G/DL (ref 11.7–15.7)

## 2021-12-24 PROCEDURE — 250N000013 HC RX MED GY IP 250 OP 250 PS 637: Performed by: STUDENT IN AN ORGANIZED HEALTH CARE EDUCATION/TRAINING PROGRAM

## 2021-12-24 PROCEDURE — 258N000003 HC RX IP 258 OP 636: Performed by: INTERNAL MEDICINE

## 2021-12-24 PROCEDURE — 250N000011 HC RX IP 250 OP 636: Performed by: STUDENT IN AN ORGANIZED HEALTH CARE EDUCATION/TRAINING PROGRAM

## 2021-12-24 PROCEDURE — 999N000157 HC STATISTIC RCP TIME EA 10 MIN

## 2021-12-24 PROCEDURE — 99207 PR CDG-CUT & PASTE-POTENTIAL IMPACT ON LEVEL: CPT | Performed by: INTERNAL MEDICINE

## 2021-12-24 PROCEDURE — 94640 AIRWAY INHALATION TREATMENT: CPT

## 2021-12-24 PROCEDURE — 94640 AIRWAY INHALATION TREATMENT: CPT | Mod: 76

## 2021-12-24 PROCEDURE — 250N000013 HC RX MED GY IP 250 OP 250 PS 637: Performed by: INTERNAL MEDICINE

## 2021-12-24 PROCEDURE — 250N000009 HC RX 250: Performed by: STUDENT IN AN ORGANIZED HEALTH CARE EDUCATION/TRAINING PROGRAM

## 2021-12-24 PROCEDURE — 97530 THERAPEUTIC ACTIVITIES: CPT | Mod: GP

## 2021-12-24 PROCEDURE — 120N000001 HC R&B MED SURG/OB

## 2021-12-24 PROCEDURE — 99233 SBSQ HOSP IP/OBS HIGH 50: CPT | Performed by: INTERNAL MEDICINE

## 2021-12-24 PROCEDURE — 94660 CPAP INITIATION&MGMT: CPT

## 2021-12-24 PROCEDURE — 97116 GAIT TRAINING THERAPY: CPT | Mod: GP

## 2021-12-24 PROCEDURE — 85018 HEMOGLOBIN: CPT | Performed by: INTERNAL MEDICINE

## 2021-12-24 PROCEDURE — 97161 PT EVAL LOW COMPLEX 20 MIN: CPT | Mod: GP

## 2021-12-24 PROCEDURE — 250N000011 HC RX IP 250 OP 636: Performed by: INTERNAL MEDICINE

## 2021-12-24 RX ADMIN — SUCRALFATE ORAL 1 G: 1 SUSPENSION ORAL at 20:24

## 2021-12-24 RX ADMIN — Medication 5 ML: at 18:45

## 2021-12-24 RX ADMIN — IPRATROPIUM BROMIDE AND ALBUTEROL SULFATE 3 ML: .5; 3 SOLUTION RESPIRATORY (INHALATION) at 08:13

## 2021-12-24 RX ADMIN — PANTOPRAZOLE SODIUM 40 MG: 40 TABLET, DELAYED RELEASE ORAL at 16:54

## 2021-12-24 RX ADMIN — CITALOPRAM HYDROBROMIDE 20 MG: 20 TABLET ORAL at 09:30

## 2021-12-24 RX ADMIN — LEVOTHYROXINE SODIUM 200 MCG: 100 TABLET ORAL at 09:30

## 2021-12-24 RX ADMIN — PANTOPRAZOLE SODIUM 40 MG: 40 TABLET, DELAYED RELEASE ORAL at 09:30

## 2021-12-24 RX ADMIN — SUCRALFATE ORAL 1 G: 1 SUSPENSION ORAL at 16:54

## 2021-12-24 RX ADMIN — SENNOSIDES AND DOCUSATE SODIUM 1 TABLET: 50; 8.6 TABLET ORAL at 20:24

## 2021-12-24 RX ADMIN — IPRATROPIUM BROMIDE AND ALBUTEROL SULFATE 3 ML: .5; 3 SOLUTION RESPIRATORY (INHALATION) at 11:37

## 2021-12-24 RX ADMIN — SENNOSIDES AND DOCUSATE SODIUM 1 TABLET: 50; 8.6 TABLET ORAL at 09:30

## 2021-12-24 RX ADMIN — IRON SUCROSE 300 MG: 20 INJECTION, SOLUTION INTRAVENOUS at 16:47

## 2021-12-24 RX ADMIN — FUROSEMIDE 20 MG: 20 TABLET ORAL at 09:30

## 2021-12-24 RX ADMIN — POLYETHYLENE GLYCOL 3350 17 G: 17 POWDER, FOR SOLUTION ORAL at 09:30

## 2021-12-24 RX ADMIN — MICONAZOLE NITRATE: 2 POWDER TOPICAL at 20:25

## 2021-12-24 RX ADMIN — IPRATROPIUM BROMIDE AND ALBUTEROL SULFATE 3 ML: .5; 3 SOLUTION RESPIRATORY (INHALATION) at 15:21

## 2021-12-24 RX ADMIN — IPRATROPIUM BROMIDE AND ALBUTEROL SULFATE 3 ML: .5; 3 SOLUTION RESPIRATORY (INHALATION) at 18:13

## 2021-12-24 RX ADMIN — SIMVASTATIN 10 MG: 10 TABLET, FILM COATED ORAL at 22:38

## 2021-12-24 RX ADMIN — ALLOPURINOL 300 MG: 300 TABLET ORAL at 09:30

## 2021-12-24 RX ADMIN — SUCRALFATE ORAL 1 G: 1 SUSPENSION ORAL at 09:30

## 2021-12-24 ASSESSMENT — ACTIVITIES OF DAILY LIVING (ADL)
ADLS_ACUITY_SCORE: 24
ADLS_ACUITY_SCORE: 23
ADLS_ACUITY_SCORE: 23
ADLS_ACUITY_SCORE: 24
ADLS_ACUITY_SCORE: 23
ADLS_ACUITY_SCORE: 24
ADLS_ACUITY_SCORE: 24
ADLS_ACUITY_SCORE: 22
ADLS_ACUITY_SCORE: 23
ADLS_ACUITY_SCORE: 24
ADLS_ACUITY_SCORE: 22
ADLS_ACUITY_SCORE: 24
ADLS_ACUITY_SCORE: 23
ADLS_ACUITY_SCORE: 24
ADLS_ACUITY_SCORE: 23
ADLS_ACUITY_SCORE: 22
ADLS_ACUITY_SCORE: 23

## 2021-12-24 ASSESSMENT — MIFFLIN-ST. JEOR: SCORE: 1629.78

## 2021-12-24 NOTE — PROGRESS NOTES
12/24/21 1300   Quick Adds   Type of Visit Initial PT Evaluation       Present no   Living Environment   People in home spouse  (Cooper)   Current Living Arrangements house  (3 bedroom rambler)   Home Accessibility stairs to enter home   Number of Stairs, Main Entrance 4   Stair Railings, Main Entrance railings safe and in good condition   Transportation Anticipated family or friend will provide   Living Environment Comments Patient lives with her  in a rambler house.  Her bedroom and bathroom are located on the main floor.  There are several steps to enter the house with bilateral hand rails but patient can only reach one rail at a time.     Self-Care   Usual Activity Tolerance moderate   Current Activity Tolerance fair   Equipment Currently Used at Home walker, rolling   Activity/Exercise/Self-Care Comment Patient uses a sock aid to don socks.  Patient reports she ambulates with a rolling walker at baseline.  Patient's daughter, Nancy Rajput, provides transportation to/from medical appointments.   Disability/Function   Walking or Climbing Stairs Difficulty yes   Walking or Climbing Stairs ambulation difficulty, requires equipment   Dressing/Bathing Difficulty yes   Dressing/Bathing bathing difficulty, requires equipment   Toileting issues no   Doing Errands Independently Difficulty (such as shopping) yes   Errands Management Family assists with transportation.   Fall history within last six months yes   Number of times patient has fallen within last six months 2   General Information   Onset of Illness/Injury or Date of Surgery 12/21/21   Referring Physician Getachew Cutler MD   Patient/Family Therapy Goals Statement (PT) Patient would like to discharge to TCU   Pertinent History of Current Problem (include personal factors and/or comorbidities that impact the POC) 81 year old female with past medical history significant for recently diagnosed Non-hodgkin lymphoma of the  pancreas- currently undergoing chemotherapy, Prior GI bleed and iron deficiency with symptomatic anemia admitted on 12/21/2021 with hypoxia, weakness and one episode of black stool.   Existing Precautions/Restrictions fall;oxygen therapy device and L/min  (2 L O2 via NC)   Cognition   Orientation Status (Cognition) oriented x 4   Affect/Mental Status (Cognition) WFL   Follows Commands (Cognition) WFL   Pain Assessment   Patient Currently in Pain No   Integumentary/Edema   Integumentary/Edema Comments Age-related skin changes   Posture    Posture Forward head position;Protracted shoulders   Range of Motion (ROM)   ROM Quick Adds ROM WFL   Strength   Strength Comments Patient demonstrates 4+/5 bilateral lower extremity strength   Bed Mobility   Comment (Bed Mobility) Not assessed during evaluation as patient starting and ending session in recliner chair.   Transfers   Transfer Safety Comments Patient performs x5 sit<>stands from recliner chair, x1 from bench, and x1 from toilet during session.  Patient performing sit<>stand transfers with FWW and SBA, cued for safe hand placement pushing from sitting surface rather than pulling on walker.  Patient able to maintain static standing balance with SBA and bilateral upper extremity support on FWW.   Gait/Stairs (Locomotion)   Distance in Feet (Required for LE Total Joints) 2 x 150'   Comment (Gait/Stairs) Patient ambulates 2 x 150' with FWW and SBA.  Patient demonstrates step-through gait pattern with decreased step length and slowed gait speed but no overt loss of balance noted.  Patient with mild shortness of breath during gait but able to walk and talk, SpO2 95% on 2 L O2 via NC.  Patient performs x10 step ups with bilateral upper extremity support on bed rail and CGA.  Patient demonstrating appropriate stability during step-ups, again with mild shortness of breath which improves with pursed lip breathing.     Balance   Balance Comments Impaired dynamic balance   Sensory  Examination   Sensory Perception patient reports no sensory changes   Clinical Impression   Criteria for Skilled Therapeutic Intervention yes, treatment indicated   PT Diagnosis (PT) Impaired gait   Influenced by the following impairments Impaired dynamic balance, decreased activity tolerance, fear of falling   Functional limitations due to impairments Impaired independence with bed mobility, transfers, gait, and stair negotiation   Clinical Presentation Stable/Uncomplicated   Clinical Presentation Rationale Clinical judgement, PMH, social support   Clinical Decision Making (Complexity) low complexity   Therapy Frequency (PT) 5x/week   Predicted Duration of Therapy Intervention (days/wks) 7 days   Planned Therapy Interventions (PT) balance training;bed mobility training;gait training;home exercise program;patient/family education;stair training;strengthening;transfer training;progressive activity/exercise   Anticipated Equipment Needs at Discharge (PT) walker, rolling   Risk & Benefits of therapy have been explained evaluation/treatment results reviewed;care plan/treatment goals reviewed;risks/benefits reviewed;participants voiced agreement with care plan;participants included;patient   PT Discharge Planning    PT Discharge Recommendation (DC Rec) Transitional Care Facility;home with assist;home with home care physical therapy   PT Rationale for DC Rec Patient appears to be functioning below her mobility baseline.  During evaluation, patient was able to perform sit<>stand transfer and ambulate a total of 300' with FWW and SBA.  Patient required CGA for step-ups with bilateral upper extremity support on rails.  Given this, recommend 24/7 assist x1 for all ADLs and mobility to maintain safety.  Patient expresses strong interest in discharging to TCU setting to progress strength, balance, and endurance prior to return home and doesn't feel her spouse is able to provide the necessary assistance.  Patient would benefit  from HH PT if discharging home to progress independence with mobility tasks.  Patient is not a community ambulator so leaving the home poses significant and taxing effort.   Total Evaluation Time   Total Evaluation Time (Minutes) 10

## 2021-12-24 NOTE — PROVIDER NOTIFICATION
MD Notification    Notified Person: MD    Notified Person Name: Michelle    Notification Date/Time: 12/24/21 12:04     Notification Interaction: FYI page for discharge planning update    Purpose of Notification: asking for update on discharge as patient's family was requesting an update    Orders Received: awaiting a call back.     Comments: MD to round shortly attempting to call PT for STAT consult for discharge planning and to update daughter Nancy Rajput.

## 2021-12-24 NOTE — PLAN OF CARE
Patient AxOx4, VSS on 2 L O2 baseline.  Cpap at night.  Lungs diminished with expiratory wheezes and TAVARES.  Up with assist of 1, GB, walker to bathroom.  Urge Incontinent at times.  No BM this shift. Right chest port heparin locked.  Hgb 7.3.  Pt reports feeling weak- plan is for PT evaluation and probably TCU at discharge.  Folds under breasts, pannus, and abdomen reddened-miconazole available, not applied this morning as she was eating breakfast.  Denies pain.  VSS.

## 2021-12-24 NOTE — PROGRESS NOTES
[facility]    Medicine Progress Note - Hospitalist Service       Date of Admission:  12/21/2021    Assessment & Plan           Lucille Rojas is a 81 year old female with past medical history significant for recently diagnosed Non-hodgkin lymphoma of the pancreas- currently undergoing chemotherapy, Prior GI bleed and iron deficiency with symptomatic anemia admitted on 12/21/2021 with hypoxia, weakness and one episode of black stool.      Acute on chronic hypoxic/hypercapnic respiratory failure - resolved  Possible viral URI  Hx of obstructive sleep apnea and obesity hypoventilation syndrome on chronic supplemental O2   The patient presents to the Hospital with generalized weakness, cough, rhinorrhea and congestion for the past few days. She reports that her O2 sats at home has been lower than normal (low 90s) on her baseline amount of O2. She was initially found to be hypoxic to the 80s in the ED. She is now back on her baseline 2L of supplemental O2 and is satting normally   * CXR showed no acute findings   * COVID and influenza testing are negative   * CTPE obtained and showed no evidence of pulmonary embolism and no other abnormality within the lung parenchyma   * Overall no clear etiology for her worsening respiratory symptoms and cough. Based on her symptoms she may have a viral upper respiratory infection with some reactive airway component.   -Respiratory status much improved, now weaned off oxygen  -BiPAP at night   -duonebs for wheezing     Acute on Chronic anemia   Hx of iron deficiency anemia, prior GI bleed   Pt admitted back in September with a hgb of 4.7. She was found to be severely iron deficient.   -EGD on 9/21/21 revealed a few gastric erosions and a few tiny apthous ulcers in the duodenum. She was also diagnosed with lymphoma at this time. She received 4 units of blood during this admission and has been getting intermittent iron infusions. Hemoglobin at presentation is  7.7 (was 8.4 on 12/14 prior to first chemotherapy but was up to 10.2 on 11/10). Iron studies on 12/14 improves with iron of 37 and iron sat of 13%. The patient notes one episode of black stool a few days ago.   -[facility] consulted, patient underwent EGD on 12/22 which showed multiple erosions, no active bleeding.  -Continue PPI twice daily  -Carafate x2 weeks  -No NSAID use  -Hemoglobin slightly lower but stable at 7.2.  -Iron stores are quite low, will order Venofer 300 mg IV daily x2 doses  -Monitor stool output and hemoglobin for the rest of the day today.  If hemoglobin stable anticipate discharge home tomorrow  -We will need outpatient follow-up with [facility]  -Consider colonoscopy as outpatient if no improvement in anemia.     Non-hodgkin lymphoma involving the pancreas   Pt follows with Dr. Srinivasan of [facility]. She is receiving chemotherapy with mini-R CHOP. She receive her first cycle of treatment on 12/15.   -Continue PTA allopurinol      Diastolic heart failure   Moderate to severe Aortic valve stenos  Echocardiogram from September showed normal LV systolic function (EF of 60-65%) with diastolic doppler findings suggesting increased left ventricular filling pressures. She had moderate to severe valvular aortic stenosis with an aortic valve area of 1.0cm, and pressure gradient of 37.5mmHg. CHF does not appear to be acutely decompensated at this time. She has no evidence of volume overload and BNP is wnl at 239.   -Continue PTA lasix 20mg PO daily   -I/Os and daily weights   -She does not appear to be on BB or ACEi.      CKD stage III  Creatinine is 0.9, stable   -Continue to monitor      Hypothyroidism   -Continue PTA levothyroxine     Depression/Anxiety   -continue PTA citalopram and lorazepam (would use lorazepam very sparingly)      Macular degeneration.  Vision loss, secondary to above.  Obesity  Body mass index is 38.32 kg/m .     Deconditioning: Extensive discussion with patient, care  "coordination, RN.  Will have PT reevaluate.  Patient likely needs TCU at discharge       Diet: Regular Diet Adult    DVT Prophylaxis: Pneumatic Compression Devices  Espitia Catheter: Not present  Central Lines: None  Code Status: Full Code      Disposition Plan   Expected Discharge: 12/24/2021     Anticipated discharge location: home with help/services    Delays:         The patient's care was discussed with the Bedside Nurse, Patient and CC.    Raphael Martinez MD, MD  Hospitalist Service  [employer]  Securely message with the Vocera Web Console (learn more here)  Text page via [employer] Paging/Directory        Clinically Significant Risk Factors Present on Admission             # Obesity: last Body mass index is 37.75 kg/m .      ______________________________________________________________________    Interval History   Patient says continues to feel weak and occasionally dizzy on standing.  Is requiring assist of 1 as per her and is very reluctant to go home.   Denies any chest pain/shortness of breath  4 point ROS done and negative unless mentioned     Data reviewed today: I reviewed all medications, new labs and imaging results over the last 24 hours. I personally reviewed no images or EKG's today.    Physical Exam   /42 (BP Location: Left arm)   Pulse 80   Temp 98.9  F (37.2  C) (Oral)   Resp 18   Ht 1.727 m (5' 8\")   Wt 112.6 kg (248 lb 4.8 oz)   SpO2 93%   BMI 37.75 kg/m    Gen- pleasant   HEENT- NAD, ANDREW  Neck- supple, no JVD elevation, no thyromegaly  CVS- I+II+ no m/r/g  RS- CTAB  Abdo- soft, no tenderness . No g/r/r   Ext- no edema     Data    BMPRecent Labs   Lab 12/22/21  0616 12/21/21  1144    138   POTASSIUM 3.9 3.8   CHLORIDE 101 99   IGOR 8.7 9.0   CO2 36* 38*   BUN 21 22   CR 0.92 0.90   GLC 98 103*     CBC  Recent Labs   Lab 12/24/21  0632 12/23/21  1317 12/23/21  0542 12/22/21  0616 12/21/21  1233 12/21/21  1144   WBC  --   --  5.5 5.3  --  4.6   RBC  --   " --  2.91* 3.00*  --  3.08*   HGB 7.3*   < > 7.3* 7.6*   < > 7.7*   HCT  --   --  26.2* 26.9*  --  27.4*   MCV  --   --  90 90  --  89   MCH  --   --  25.1* 25.3*  --  25.0*   MCHC  --   --  27.9* 28.3*  --  28.1*   RDW  --   --  15.0 14.6  --  14.8   PLT  --   --  255 243  --  247    < > = values in this interval not displayed.     INR  Recent Labs   Lab 12/21/21  1144   INR 1.09     LFTs  Recent Labs   Lab 12/21/21  1144   ALKPHOS 111   AST 8   ALT 14   BILITOTAL 0.2   PROTTOTAL 6.6*   ALBUMIN 3.0*      PANCNo lab results found in last 7 days.    No results found for this or any previous visit (from the past 24 hour(s)).

## 2021-12-24 NOTE — CONSULTS
Care Management Follow Up    Length of Stay (days): 3    Expected Discharge Date: 12/25/2021     Concerns to be Addressed: discharge planning     Patient plan of care discussed at interdisciplinary rounds: Yes    Anticipated Discharge Disposition: Home,Home Care,Skilled Nursing Facilty     Anticipated Discharge Services: House[employer] (referral for senior linkage and cleaning for a reason)  Anticipated Discharge DME: Oxygen (daughter available to bring)    Patient/family educated on Medicare website which has current facility and service quality ratings: yes  Education Provided on the Discharge Plan:    Patient/Family in Agreement with the Plan: yes    Referrals Placed by CM/SW: Internal Clinic Care Coordination,Homecare,Specialty Providers,Communication hand-offs to next level of Care Providers  Private pay costs discussed: Not applicable    Additional Information:  Writer did not meet with the patient today.  Per discussion with discharging MD and Patient's daughter by phone Nancy Rajput patient is wanting to discharge to home as she does not feel she will manage well independently.  Writer informed both Hospitalist and Daughter that PT will see her around 13:30 today.  If TCU is recommended we will submit a referral to [facility].  We may need more options based on capacity etc.  Writer informed Nancy Rajput that our Social Workers will connect with her once we hear from TCU to discuss discharge to TCU and transportation.  Patient is following with [facility] so it is likely that any chemo or appointments would need to be rescheduled until the patient is out of the TCU.  SW aware of the above and will await therapy recommendations.       Jess Gaffney RN

## 2021-12-24 NOTE — PROGRESS NOTES
Care Management Follow Up    Length of Stay (days): 3    Expected Discharge Date: 12/24/2021     Concerns to be Addressed: discharge planning     Patient plan of care discussed at interdisciplinary rounds: Yes    Anticipated Discharge Disposition: Home,Home Care,Skilled Nursing Facilty     Anticipated Discharge Services:   Anticipated Discharge DME: Oxygen (daughter available to bring)    Patient/family educated on Medicare website which has current facility and service quality ratings: yes  Education Provided on the Discharge Plan:    Patient/Family in Agreement with the Plan: yes    Referrals Placed by CM/SW: Post Acute Facilities  Private pay costs discussed: Not applicable    Additional Information:  SW reviewed chart. Discussed with RN Jess ALONSO. Referral sent to [facility] TCU per family request.       MACHO Willett, LGSW  [phone]  [facility]

## 2021-12-24 NOTE — PLAN OF CARE
A&Ox4. VSS on baseline 2L 02 via NC. Denies pain. LS clear. Infrequent dry cough, TAVARES. IS at bedside with encouragement. 1 brown stool today. Continent of bowel and bladder. Hgb recheck 7.9, iron infusion given this afternoon. Port HL. Plan for PT eval tomorrow with possible discharge home pending assessment and labs.

## 2021-12-25 LAB — HGB BLD-MCNC: 7.2 G/DL (ref 11.7–15.7)

## 2021-12-25 PROCEDURE — 94640 AIRWAY INHALATION TREATMENT: CPT

## 2021-12-25 PROCEDURE — 250N000013 HC RX MED GY IP 250 OP 250 PS 637: Performed by: STUDENT IN AN ORGANIZED HEALTH CARE EDUCATION/TRAINING PROGRAM

## 2021-12-25 PROCEDURE — 250N000013 HC RX MED GY IP 250 OP 250 PS 637: Performed by: INTERNAL MEDICINE

## 2021-12-25 PROCEDURE — 99232 SBSQ HOSP IP/OBS MODERATE 35: CPT | Performed by: INTERNAL MEDICINE

## 2021-12-25 PROCEDURE — 999N000157 HC STATISTIC RCP TIME EA 10 MIN

## 2021-12-25 PROCEDURE — 250N000011 HC RX IP 250 OP 636: Performed by: STUDENT IN AN ORGANIZED HEALTH CARE EDUCATION/TRAINING PROGRAM

## 2021-12-25 PROCEDURE — 85018 HEMOGLOBIN: CPT | Performed by: INTERNAL MEDICINE

## 2021-12-25 PROCEDURE — 250N000009 HC RX 250: Performed by: STUDENT IN AN ORGANIZED HEALTH CARE EDUCATION/TRAINING PROGRAM

## 2021-12-25 PROCEDURE — 94640 AIRWAY INHALATION TREATMENT: CPT | Mod: 76

## 2021-12-25 PROCEDURE — 120N000001 HC R&B MED SURG/OB

## 2021-12-25 RX ADMIN — IPRATROPIUM BROMIDE AND ALBUTEROL SULFATE 3 ML: .5; 3 SOLUTION RESPIRATORY (INHALATION) at 11:36

## 2021-12-25 RX ADMIN — IPRATROPIUM BROMIDE AND ALBUTEROL SULFATE 3 ML: .5; 3 SOLUTION RESPIRATORY (INHALATION) at 07:40

## 2021-12-25 RX ADMIN — Medication 5 ML: at 20:47

## 2021-12-25 RX ADMIN — IPRATROPIUM BROMIDE AND ALBUTEROL SULFATE 3 ML: .5; 3 SOLUTION RESPIRATORY (INHALATION) at 20:40

## 2021-12-25 RX ADMIN — ALLOPURINOL 300 MG: 300 TABLET ORAL at 09:37

## 2021-12-25 RX ADMIN — MICONAZOLE NITRATE: 2 POWDER TOPICAL at 20:47

## 2021-12-25 RX ADMIN — PANTOPRAZOLE SODIUM 40 MG: 40 TABLET, DELAYED RELEASE ORAL at 06:26

## 2021-12-25 RX ADMIN — SUCRALFATE ORAL 1 G: 1 SUSPENSION ORAL at 09:36

## 2021-12-25 RX ADMIN — PANTOPRAZOLE SODIUM 40 MG: 40 TABLET, DELAYED RELEASE ORAL at 16:31

## 2021-12-25 RX ADMIN — SUCRALFATE ORAL 1 G: 1 SUSPENSION ORAL at 20:47

## 2021-12-25 RX ADMIN — SUCRALFATE ORAL 1 G: 1 SUSPENSION ORAL at 16:31

## 2021-12-25 RX ADMIN — MICONAZOLE NITRATE: 2 POWDER TOPICAL at 11:48

## 2021-12-25 RX ADMIN — SENNOSIDES AND DOCUSATE SODIUM 1 TABLET: 50; 8.6 TABLET ORAL at 20:47

## 2021-12-25 RX ADMIN — SIMVASTATIN 10 MG: 10 TABLET, FILM COATED ORAL at 20:47

## 2021-12-25 RX ADMIN — SENNOSIDES AND DOCUSATE SODIUM 1 TABLET: 50; 8.6 TABLET ORAL at 09:37

## 2021-12-25 RX ADMIN — POLYETHYLENE GLYCOL 3350 17 G: 17 POWDER, FOR SOLUTION ORAL at 09:36

## 2021-12-25 RX ADMIN — LEVOTHYROXINE SODIUM 200 MCG: 100 TABLET ORAL at 09:36

## 2021-12-25 RX ADMIN — FUROSEMIDE 20 MG: 20 TABLET ORAL at 09:36

## 2021-12-25 RX ADMIN — SUCRALFATE ORAL 1 G: 1 SUSPENSION ORAL at 13:03

## 2021-12-25 RX ADMIN — CITALOPRAM HYDROBROMIDE 20 MG: 20 TABLET ORAL at 09:37

## 2021-12-25 RX ADMIN — IPRATROPIUM BROMIDE AND ALBUTEROL SULFATE 3 ML: .5; 3 SOLUTION RESPIRATORY (INHALATION) at 16:15

## 2021-12-25 RX ADMIN — Medication 5 ML: at 06:25

## 2021-12-25 ASSESSMENT — ACTIVITIES OF DAILY LIVING (ADL)
ADLS_ACUITY_SCORE: 21
ADLS_ACUITY_SCORE: 20
ADLS_ACUITY_SCORE: 21
ADLS_ACUITY_SCORE: 20
ADLS_ACUITY_SCORE: 20
ADLS_ACUITY_SCORE: 22
ADLS_ACUITY_SCORE: 21
ADLS_ACUITY_SCORE: 22
ADLS_ACUITY_SCORE: 20
ADLS_ACUITY_SCORE: 22
ADLS_ACUITY_SCORE: 22
ADLS_ACUITY_SCORE: 20
ADLS_ACUITY_SCORE: 22
ADLS_ACUITY_SCORE: 22
ADLS_ACUITY_SCORE: 21
ADLS_ACUITY_SCORE: 21
ADLS_ACUITY_SCORE: 22
ADLS_ACUITY_SCORE: 22
ADLS_ACUITY_SCORE: 21
ADLS_ACUITY_SCORE: 22
ADLS_ACUITY_SCORE: 22
ADLS_ACUITY_SCORE: 20
ADLS_ACUITY_SCORE: 21
ADLS_ACUITY_SCORE: 22

## 2021-12-25 NOTE — PLAN OF CARE
A&Ox4, forgetful. VSS on 2L NC, CPAP at HS. Denies pain.. Cont of B/B. Up SBA GB/W. Port HL. Shower today, redness to panus and under duncan breasts applied antifungal powder per orders.  Discharge pending TCU placement.

## 2021-12-25 NOTE — PLAN OF CARE
0234-2531: AxOx4, forgetful.  VSS on 2 L O2 baseline. Denies pain. Cpap at night.  LS: diminished.  Up with assist of 1, GB, walker to bathroom. No BM this shift. Right chest port heparin locked.

## 2021-12-25 NOTE — PROGRESS NOTES
[facility]    Medicine Progress Note - Hospitalist Service       Date of Admission:  12/21/2021    Assessment & Plan           Lucille Rojas is a 81 year old female with past medical history significant for recently diagnosed Non-hodgkin lymphoma of the pancreas- currently undergoing chemotherapy, Prior GI bleed and iron deficiency with symptomatic anemia admitted on 12/21/2021 with hypoxia, weakness and one episode of black stool.      Acute on chronic hypoxic/hypercapnic respiratory failure - resolved  Possible viral URI  Hx of obstructive sleep apnea and obesity hypoventilation syndrome on chronic supplemental O2   The patient presents to the Hospital with generalized weakness, cough, rhinorrhea and congestion for the past few days. She reports that her O2 sats at home has been lower than normal (low 90s) on her baseline amount of O2. She was initially found to be hypoxic to the 80s in the ED. She is now back on her baseline 2L of supplemental O2 and is satting normally   * CXR showed no acute findings   * COVID and influenza testing are negative   * CTPE obtained and showed no evidence of pulmonary embolism and no other abnormality within the lung parenchyma   * Overall no clear etiology for her worsening respiratory symptoms and cough. Based on her symptoms she may have a viral upper respiratory infection with some reactive airway component.   -BiPAP at night   -duonebs for wheezing     Acute on Chronic anemia   Hx of iron deficiency anemia, prior GI bleed   Pt admitted back in September with a hgb of 4.7. She was found to be severely iron deficient.   -EGD on 9/21/21 revealed a few gastric erosions and a few tiny apthous ulcers in the duodenum. She was also diagnosed with lymphoma at this time. She received 4 units of blood during this admission and has been getting intermittent iron infusions. Hemoglobin at presentation is 7.7 (was 8.4 on 12/14 prior to first chemotherapy but was  up to 10.2 on 11/10). Iron studies on 12/14 improves with iron of 37 and iron sat of 13%. The patient notes one episode of black stool a few days ago.   -[facility] consulted, patient underwent EGD on 12/22 which showed multiple erosions, no active bleeding.  -Continue PPI twice daily  -Carafate x2 weeks  -No NSAID use  -Hemoglobin slightly lower but stable at 7.2.  -Iron stores are quite low, s/p Venofer 300 mg IV daily x2 doses  -We will need outpatient follow-up with [facility]  -Consider colonoscopy as outpatient if no improvement in anemia.     Non-hodgkin lymphoma involving the pancreas   Pt follows with Dr. Srinivasan of [facility]. She is receiving chemotherapy with mini-R CHOP. She receive her first cycle of treatment on 12/15.   -Continue PTA allopurinol      Diastolic heart failure   Moderate to severe Aortic valve stenos  Echocardiogram from September showed normal LV systolic function (EF of 60-65%) with diastolic doppler findings suggesting increased left ventricular filling pressures. She had moderate to severe valvular aortic stenosis with an aortic valve area of 1.0cm, and pressure gradient of 37.5mmHg. CHF does not appear to be acutely decompensated at this time. She has no evidence of volume overload and BNP is wnl at 239.   -Continue PTA lasix 20mg PO daily   -I/Os and daily weights   -She does not appear to be on BB or ACEi.      CKD stage III  Creatinine is 0.9, stable   -Continue to monitor      Hypothyroidism   -Continue PTA levothyroxine     Depression/Anxiety   -continue PTA citalopram and lorazepam (would use lorazepam very sparingly)      Macular degeneration.  Vision loss, secondary to above.  Obesity  Body mass index is 38.32 kg/m .     Deconditioning: Extensive discussion with patient, care coordination, RN.  A/W TCU        Diet: Regular Diet Adult    DVT Prophylaxis: Pneumatic Compression Devices  Espitia Catheter: Not present  Central Lines: None  Code Status: Full Code      Disposition  "Plan   Expected Discharge:      Anticipated discharge location: home with help/services    Delays:         The patient's care was discussed with the Bedside Nurse, Patient and CC.    Raphael Martinez MD, MD  Hospitalist Service  [facility]  Securely message with the Vocera Web Console (learn more here)  Text page via [employer] Paging/Directory        Clinically Significant Risk Factors Present on Admission             # Obesity: last Body mass index is 37.75 kg/m .      ______________________________________________________________________    Interval History   No new concerns today.  Denies any worsening shortness of breath/chest pain/fever  4 point ROS done and negative unless mentioned     Data reviewed today: I reviewed all medications, new labs and imaging results over the last 24 hours. I personally reviewed no images or EKG's today.    Physical Exam   /43 (BP Location: Left arm)   Pulse 80   Temp 97.3  F (36.3  C) (Oral)   Resp 18   Ht 1.727 m (5' 8\")   Wt 112.6 kg (248 lb 4.8 oz)   SpO2 93%   BMI 37.75 kg/m    Gen- pleasant   HEENT- NAD, ANDREW  Neck- supple, no JVD elevation, no thyromegaly  CVS- I+II+ no m/r/g  RS- CTAB  Abdo- soft, no tenderness . No g/r/r   Ext- no edema     Data    BMP  Recent Labs   Lab 12/22/21  0616 12/21/21  1144    138   POTASSIUM 3.9 3.8   CHLORIDE 101 99   IGOR 8.7 9.0   CO2 36* 38*   BUN 21 22   CR 0.92 0.90   GLC 98 103*     CBC  Recent Labs   Lab 12/25/21  0624 12/23/21  1317 12/23/21  0542 12/22/21  0616 12/21/21  1233 12/21/21  1144   WBC  --   --  5.5 5.3  --  4.6   RBC  --   --  2.91* 3.00*  --  3.08*   HGB 7.2*   < > 7.3* 7.6*   < > 7.7*   HCT  --   --  26.2* 26.9*  --  27.4*   MCV  --   --  90 90  --  89   MCH  --   --  25.1* 25.3*  --  25.0*   MCHC  --   --  27.9* 28.3*  --  28.1*   RDW  --   --  15.0 14.6  --  14.8   PLT  --   --  255 243  --  247    < > = values in this interval not displayed.     INR  Recent Labs   Lab " 12/21/21  1144   INR 1.09     LFTs  Recent Labs   Lab 12/21/21  1144   ALKPHOS 111   AST 8   ALT 14   BILITOTAL 0.2   PROTTOTAL 6.6*   ALBUMIN 3.0*      PANCNo lab results found in last 7 days.    No results found for this or any previous visit (from the past 24 hour(s)).

## 2021-12-25 NOTE — PLAN OF CARE
A&Ox4, forgetful. VSS on 2L NC, CPAP at HS. Denies pain. Infrequent dry cough. TAVARES. Cont of B/B. Up A1 GB/W. Port HL. Discharge pending.

## 2021-12-26 LAB
ANION GAP SERPL CALCULATED.3IONS-SCNC: 2 MMOL/L (ref 3–14)
BACTERIA BLD CULT: NO GROWTH
BACTERIA BLD CULT: NO GROWTH
BASE EXCESS BLDV CALC-SCNC: 10.3 MMOL/L (ref -7.7–1.9)
BUN SERPL-MCNC: 19 MG/DL (ref 7–30)
CALCIUM SERPL-MCNC: 8.6 MG/DL (ref 8.5–10.1)
CHLORIDE BLD-SCNC: 105 MMOL/L (ref 94–109)
CO2 SERPL-SCNC: 36 MMOL/L (ref 20–32)
CREAT SERPL-MCNC: 0.99 MG/DL (ref 0.52–1.04)
ERYTHROCYTE [DISTWIDTH] IN BLOOD BY AUTOMATED COUNT: 15.7 % (ref 10–15)
GFR SERPL CREATININE-BSD FRML MDRD: 56 ML/MIN/1.73M2
GLUCOSE BLD-MCNC: 97 MG/DL (ref 70–99)
HCO3 BLDV-SCNC: 37 MMOL/L (ref 21–28)
HCT VFR BLD AUTO: 27.8 % (ref 35–47)
HGB BLD-MCNC: 7.7 G/DL (ref 11.7–15.7)
MCH RBC QN AUTO: 25.4 PG (ref 26.5–33)
MCHC RBC AUTO-ENTMCNC: 27.7 G/DL (ref 31.5–36.5)
MCV RBC AUTO: 92 FL (ref 78–100)
O2/TOTAL GAS SETTING VFR VENT: 21 %
PCO2 BLDV: 63 MM HG (ref 40–50)
PH BLDV: 7.38 [PH] (ref 7.32–7.43)
PLATELET # BLD AUTO: 267 10E3/UL (ref 150–450)
PO2 BLDV: 28 MM HG (ref 25–47)
POTASSIUM BLD-SCNC: 4.1 MMOL/L (ref 3.4–5.3)
RBC # BLD AUTO: 3.03 10E6/UL (ref 3.8–5.2)
SODIUM SERPL-SCNC: 143 MMOL/L (ref 133–144)
WBC # BLD AUTO: 3.9 10E3/UL (ref 4–11)

## 2021-12-26 PROCEDURE — 82803 BLOOD GASES ANY COMBINATION: CPT | Performed by: INTERNAL MEDICINE

## 2021-12-26 PROCEDURE — 94640 AIRWAY INHALATION TREATMENT: CPT | Mod: 76

## 2021-12-26 PROCEDURE — 250N000013 HC RX MED GY IP 250 OP 250 PS 637: Performed by: INTERNAL MEDICINE

## 2021-12-26 PROCEDURE — 85014 HEMATOCRIT: CPT | Performed by: INTERNAL MEDICINE

## 2021-12-26 PROCEDURE — 99232 SBSQ HOSP IP/OBS MODERATE 35: CPT | Performed by: INTERNAL MEDICINE

## 2021-12-26 PROCEDURE — 120N000001 HC R&B MED SURG/OB

## 2021-12-26 PROCEDURE — 250N000009 HC RX 250: Performed by: STUDENT IN AN ORGANIZED HEALTH CARE EDUCATION/TRAINING PROGRAM

## 2021-12-26 PROCEDURE — 80048 BASIC METABOLIC PNL TOTAL CA: CPT | Performed by: INTERNAL MEDICINE

## 2021-12-26 PROCEDURE — 250N000011 HC RX IP 250 OP 636: Performed by: STUDENT IN AN ORGANIZED HEALTH CARE EDUCATION/TRAINING PROGRAM

## 2021-12-26 PROCEDURE — 999N000157 HC STATISTIC RCP TIME EA 10 MIN

## 2021-12-26 PROCEDURE — 250N000013 HC RX MED GY IP 250 OP 250 PS 637: Performed by: STUDENT IN AN ORGANIZED HEALTH CARE EDUCATION/TRAINING PROGRAM

## 2021-12-26 PROCEDURE — 94640 AIRWAY INHALATION TREATMENT: CPT

## 2021-12-26 RX ADMIN — SENNOSIDES AND DOCUSATE SODIUM 1 TABLET: 50; 8.6 TABLET ORAL at 20:35

## 2021-12-26 RX ADMIN — LEVOTHYROXINE SODIUM 200 MCG: 100 TABLET ORAL at 08:33

## 2021-12-26 RX ADMIN — PANTOPRAZOLE SODIUM 40 MG: 40 TABLET, DELAYED RELEASE ORAL at 06:35

## 2021-12-26 RX ADMIN — SUCRALFATE ORAL 1 G: 1 SUSPENSION ORAL at 20:34

## 2021-12-26 RX ADMIN — IPRATROPIUM BROMIDE AND ALBUTEROL SULFATE 3 ML: .5; 3 SOLUTION RESPIRATORY (INHALATION) at 19:14

## 2021-12-26 RX ADMIN — SUCRALFATE ORAL 1 G: 1 SUSPENSION ORAL at 08:33

## 2021-12-26 RX ADMIN — ALLOPURINOL 300 MG: 300 TABLET ORAL at 08:33

## 2021-12-26 RX ADMIN — IPRATROPIUM BROMIDE AND ALBUTEROL SULFATE 3 ML: .5; 3 SOLUTION RESPIRATORY (INHALATION) at 15:53

## 2021-12-26 RX ADMIN — PANTOPRAZOLE SODIUM 40 MG: 40 TABLET, DELAYED RELEASE ORAL at 17:01

## 2021-12-26 RX ADMIN — SUCRALFATE ORAL 1 G: 1 SUSPENSION ORAL at 11:36

## 2021-12-26 RX ADMIN — Medication 5 ML: at 09:41

## 2021-12-26 RX ADMIN — FUROSEMIDE 20 MG: 20 TABLET ORAL at 08:33

## 2021-12-26 RX ADMIN — CITALOPRAM HYDROBROMIDE 20 MG: 20 TABLET ORAL at 08:33

## 2021-12-26 RX ADMIN — POLYETHYLENE GLYCOL 3350 17 G: 17 POWDER, FOR SOLUTION ORAL at 08:33

## 2021-12-26 RX ADMIN — Medication 5 ML: at 20:35

## 2021-12-26 RX ADMIN — SUCRALFATE ORAL 1 G: 1 SUSPENSION ORAL at 17:01

## 2021-12-26 RX ADMIN — SENNOSIDES AND DOCUSATE SODIUM 1 TABLET: 50; 8.6 TABLET ORAL at 08:33

## 2021-12-26 RX ADMIN — IPRATROPIUM BROMIDE AND ALBUTEROL SULFATE 3 ML: .5; 3 SOLUTION RESPIRATORY (INHALATION) at 10:20

## 2021-12-26 RX ADMIN — SIMVASTATIN 10 MG: 10 TABLET, FILM COATED ORAL at 20:35

## 2021-12-26 RX ADMIN — Medication 5 ML: at 11:38

## 2021-12-26 RX ADMIN — MICONAZOLE NITRATE: 2 POWDER TOPICAL at 20:35

## 2021-12-26 RX ADMIN — MICONAZOLE NITRATE: 2 POWDER TOPICAL at 08:33

## 2021-12-26 ASSESSMENT — ACTIVITIES OF DAILY LIVING (ADL)
ADLS_ACUITY_SCORE: 17
ADLS_ACUITY_SCORE: 17
ADLS_ACUITY_SCORE: 20
ADLS_ACUITY_SCORE: 20
ADLS_ACUITY_SCORE: 19
ADLS_ACUITY_SCORE: 20
ADLS_ACUITY_SCORE: 19
ADLS_ACUITY_SCORE: 20
ADLS_ACUITY_SCORE: 20
ADLS_ACUITY_SCORE: 17
ADLS_ACUITY_SCORE: 15
ADLS_ACUITY_SCORE: 20
ADLS_ACUITY_SCORE: 15
ADLS_ACUITY_SCORE: 20
ADLS_ACUITY_SCORE: 17
ADLS_ACUITY_SCORE: 19
ADLS_ACUITY_SCORE: 19
ADLS_ACUITY_SCORE: 20
ADLS_ACUITY_SCORE: 19
ADLS_ACUITY_SCORE: 20
ADLS_ACUITY_SCORE: 17

## 2021-12-26 ASSESSMENT — MIFFLIN-ST. JEOR: SCORE: 1649.29

## 2021-12-26 NOTE — PLAN OF CARE
A&Ox4. Forgetful. VSS on 2L NC, CPAP at HS. Denies pain. Cont B/B. Up SBA GB/W. Regular diet. Redness under panus and breasts, powder applied. Discharge pending TCU placement.

## 2021-12-26 NOTE — PROGRESS NOTES
[facility]    Medicine Progress Note - Hospitalist Service       Date of Admission:  12/21/2021    Assessment & Plan           Lucille Rojas is a 81 year old female with past medical history significant for recently diagnosed Non-hodgkin lymphoma of the pancreas- currently undergoing chemotherapy, Prior GI bleed and iron deficiency with symptomatic anemia admitted on 12/21/2021 with hypoxia, weakness and one episode of black stool.      Acute on chronic hypoxic/hypercapnic respiratory failure - resolved  Possible viral URI  Hx of obstructive sleep apnea and obesity hypoventilation syndrome on chronic supplemental O2   The patient presents to the Hospital with generalized weakness, cough, rhinorrhea and congestion for the past few days. She reports that her O2 sats at home has been lower than normal (low 90s) on her baseline amount of O2. She was initially found to be hypoxic to the 80s in the ED. She is now back on her baseline 2L of supplemental O2 and is satting normally   * CXR showed no acute findings   * COVID and influenza testing are negative   * CTPE obtained and showed no evidence of pulmonary embolism and no other abnormality within the lung parenchyma   * Overall no clear etiology for her worsening respiratory symptoms and cough. Based on her symptoms she may have a viral upper respiratory infection with some reactive airway component.   -BiPAP at night   -duonebs for wheezing     Acute on Chronic anemia   Hx of iron deficiency anemia, prior GI bleed   Pt admitted back in September with a hgb of 4.7. She was found to be severely iron deficient.   -EGD on 9/21/21 revealed a few gastric erosions and a few tiny apthous ulcers in the duodenum. She was also diagnosed with lymphoma at this time. She received 4 units of blood during this admission and has been getting intermittent iron infusions. Hemoglobin at presentation is 7.7 (was 8.4 on 12/14 prior to first chemotherapy but was  up to 10.2 on 11/10). Iron studies on 12/14 improves with iron of 37 and iron sat of 13%. The patient notes one episode of black stool a few days ago.   -[facility] consulted, patient underwent EGD on 12/22 which showed multiple erosions, no active bleeding.  -Continue PPI twice daily  -Carafate x2 weeks  -No NSAID use  -Hemoglobin slightly lower but stable at 7.2.  -Iron stores are quite low, s/p Venofer 300 mg IV daily x2 doses  -We will need outpatient follow-up with [facility]  -Consider colonoscopy as outpatient if no improvement in anemia.     Non-hodgkin lymphoma involving the pancreas   Pt follows with Dr. Srinivasan of [facility]. She is receiving chemotherapy with mini-R CHOP. She receive her first cycle of treatment on 12/15.   -Continue PTA allopurinol      Diastolic heart failure   Moderate to severe Aortic valve stenos  Echocardiogram from September showed normal LV systolic function (EF of 60-65%) with diastolic doppler findings suggesting increased left ventricular filling pressures. She had moderate to severe valvular aortic stenosis with an aortic valve area of 1.0cm, and pressure gradient of 37.5mmHg. CHF does not appear to be acutely decompensated at this time. She has no evidence of volume overload and BNP is wnl at 239.   -Continue PTA lasix 20mg PO daily   -I/Os and daily weights   -She does not appear to be on BB or ACEi.      CKD stage III  Creatinine is 0.9, stable   -Continue to monitor      Hypothyroidism   -Continue PTA levothyroxine     Depression/Anxiety   -continue PTA citalopram and lorazepam (would use lorazepam very sparingly)      Macular degeneration.  Vision loss, secondary to above.  Obesity  Body mass index is 38.32 kg/m .     Deconditioning:   A/W TCU placement       Diet: Regular Diet Adult    DVT Prophylaxis: Pneumatic Compression Devices  Espitia Catheter: Not present  Central Lines: None  Code Status: Full Code      Disposition Plan   Expected Discharge: 12/27/2021    "  Anticipated discharge location: home with help/services    Delays:         The patient's care was discussed with the Bedside Nurse, Patient and CC.    Raphael Martinez MD, MD  Hospitalist Service  [facility]  Securely message with the Vocera Web Console (learn more here)  Text page via [employer] Paging/Directory        Clinically Significant Risk Factors Present on Admission                    ______________________________________________________________________    Interval History   No new concerns today.  Denies any worsening shortness of breath/chest pain/fever  4 point ROS done and negative unless mentioned     Data reviewed today: I reviewed all medications, new labs and imaging results over the last 24 hours. I personally reviewed no images or EKG's today.    Physical Exam   /55 (BP Location: Left arm)   Pulse 101   Temp 97.9  F (36.6  C) (Oral)   Resp 18   Ht 1.727 m (5' 8\")   Wt 114.6 kg (252 lb 9.6 oz)   SpO2 93%   BMI 38.41 kg/m    Gen- pleasant   HEENT- NAD, ANDREW  Neck- supple, no JVD elevation, no thyromegaly  CVS- I+II+ no m/r/g  RS- CTAB  Abdo- soft, no tenderness . No g/r/r   Ext- no edema     Data    BMP  Recent Labs   Lab 12/26/21  0950 12/22/21  0616 12/21/21  1144    140 138   POTASSIUM 4.1 3.9 3.8   CHLORIDE 105 101 99   IGOR 8.6 8.7 9.0   CO2 36* 36* 38*   BUN 19 21 22   CR 0.99 0.92 0.90   GLC 97 98 103*     CBC  Recent Labs   Lab 12/26/21  0950 12/23/21  1317 12/23/21  0542 12/22/21  0616 12/21/21  1233 12/21/21  1144   WBC 3.9*  --  5.5 5.3  --  4.6   RBC 3.03*  --  2.91* 3.00*  --  3.08*   HGB 7.7*   < > 7.3* 7.6*   < > 7.7*   HCT 27.8*  --  26.2* 26.9*  --  27.4*   MCV 92  --  90 90  --  89   MCH 25.4*  --  25.1* 25.3*  --  25.0*   MCHC 27.7*  --  27.9* 28.3*  --  28.1*   RDW 15.7*  --  15.0 14.6  --  14.8     --  255 243  --  247    < > = values in this interval not displayed.     INR  Recent Labs   Lab 12/21/21  1144   INR 1.09 "     LFTs  Recent Labs   Lab 12/21/21  1144   ALKPHOS 111   AST 8   ALT 14   BILITOTAL 0.2   PROTTOTAL 6.6*   ALBUMIN 3.0*

## 2021-12-27 VITALS
RESPIRATION RATE: 16 BRPM | DIASTOLIC BLOOD PRESSURE: 42 MMHG | OXYGEN SATURATION: 93 % | BODY MASS INDEX: 38.25 KG/M2 | TEMPERATURE: 98.4 F | WEIGHT: 252.4 LBS | HEIGHT: 68 IN | SYSTOLIC BLOOD PRESSURE: 128 MMHG | HEART RATE: 84 BPM

## 2021-12-27 PROCEDURE — 250N000013 HC RX MED GY IP 250 OP 250 PS 637: Performed by: INTERNAL MEDICINE

## 2021-12-27 PROCEDURE — 999N000157 HC STATISTIC RCP TIME EA 10 MIN

## 2021-12-27 PROCEDURE — 99239 HOSP IP/OBS DSCHRG MGMT >30: CPT | Performed by: INTERNAL MEDICINE

## 2021-12-27 PROCEDURE — 250N000009 HC RX 250: Performed by: STUDENT IN AN ORGANIZED HEALTH CARE EDUCATION/TRAINING PROGRAM

## 2021-12-27 PROCEDURE — 94640 AIRWAY INHALATION TREATMENT: CPT

## 2021-12-27 PROCEDURE — 250N000013 HC RX MED GY IP 250 OP 250 PS 637: Performed by: STUDENT IN AN ORGANIZED HEALTH CARE EDUCATION/TRAINING PROGRAM

## 2021-12-27 PROCEDURE — 250N000011 HC RX IP 250 OP 636: Performed by: STUDENT IN AN ORGANIZED HEALTH CARE EDUCATION/TRAINING PROGRAM

## 2021-12-27 RX ORDER — SUCRALFATE ORAL 1 G/10ML
1 SUSPENSION ORAL 4 TIMES DAILY
DISCHARGE
Start: 2021-12-27 | End: 2022-01-05

## 2021-12-27 RX ORDER — GUAIFENESIN/DEXTROMETHORPHAN 100-10MG/5
10 SYRUP ORAL EVERY 4 HOURS PRN
DISCHARGE
Start: 2021-12-27 | End: 2022-08-22

## 2021-12-27 RX ORDER — LORAZEPAM 0.5 MG/1
0.5 TABLET ORAL EVERY 4 HOURS PRN
Qty: 30 TABLET | Refills: 0 | Status: SHIPPED | DISCHARGE
Start: 2021-12-27 | End: 2022-01-04

## 2021-12-27 RX ORDER — ALLOPURINOL 300 MG/1
300 TABLET ORAL DAILY
Qty: 14 TABLET | Refills: 7 | DISCHARGE
Start: 2021-12-27 | End: 2022-01-25

## 2021-12-27 RX ORDER — BISACODYL 10 MG
10 SUPPOSITORY, RECTAL RECTAL DAILY PRN
DISCHARGE
Start: 2021-12-27 | End: 2022-08-22

## 2021-12-27 RX ORDER — ACETAMINOPHEN 325 MG/1
650 TABLET ORAL EVERY 4 HOURS PRN
DISCHARGE
Start: 2021-12-27

## 2021-12-27 RX ORDER — IPRATROPIUM BROMIDE AND ALBUTEROL SULFATE 2.5; .5 MG/3ML; MG/3ML
3 SOLUTION RESPIRATORY (INHALATION) EVERY 6 HOURS PRN
Qty: 90 ML | DISCHARGE
Start: 2021-12-27 | End: 2022-01-04

## 2021-12-27 RX ADMIN — LEVOTHYROXINE SODIUM 200 MCG: 100 TABLET ORAL at 08:24

## 2021-12-27 RX ADMIN — POLYETHYLENE GLYCOL 3350 17 G: 17 POWDER, FOR SOLUTION ORAL at 08:23

## 2021-12-27 RX ADMIN — MICONAZOLE NITRATE: 2 POWDER TOPICAL at 08:25

## 2021-12-27 RX ADMIN — IPRATROPIUM BROMIDE AND ALBUTEROL SULFATE 3 ML: .5; 3 SOLUTION RESPIRATORY (INHALATION) at 07:34

## 2021-12-27 RX ADMIN — ALLOPURINOL 300 MG: 300 TABLET ORAL at 08:25

## 2021-12-27 RX ADMIN — HEPARIN SODIUM (PORCINE) LOCK FLUSH IV SOLN 100 UNIT/ML 5 ML: 100 SOLUTION at 11:35

## 2021-12-27 RX ADMIN — FUROSEMIDE 20 MG: 20 TABLET ORAL at 08:25

## 2021-12-27 RX ADMIN — SUCRALFATE ORAL 1 G: 1 SUSPENSION ORAL at 11:34

## 2021-12-27 RX ADMIN — PANTOPRAZOLE SODIUM 40 MG: 40 TABLET, DELAYED RELEASE ORAL at 07:28

## 2021-12-27 RX ADMIN — CITALOPRAM HYDROBROMIDE 20 MG: 20 TABLET ORAL at 08:25

## 2021-12-27 RX ADMIN — SUCRALFATE ORAL 1 G: 1 SUSPENSION ORAL at 08:25

## 2021-12-27 RX ADMIN — SENNOSIDES AND DOCUSATE SODIUM 1 TABLET: 50; 8.6 TABLET ORAL at 08:25

## 2021-12-27 RX ADMIN — IPRATROPIUM BROMIDE AND ALBUTEROL SULFATE 3 ML: .5; 3 SOLUTION RESPIRATORY (INHALATION) at 11:34

## 2021-12-27 ASSESSMENT — ACTIVITIES OF DAILY LIVING (ADL)
ADLS_ACUITY_SCORE: 17

## 2021-12-27 ASSESSMENT — MIFFLIN-ST. JEOR
SCORE: 1648.38
SCORE: 1656.09

## 2021-12-27 NOTE — PLAN OF CARE
A&Ox4. Forgetdul. VSS on 2L NC, CPAP at HS. Denies pain. Cont B/B occasional dribbling. Up SBA GB/W. Buttocks crease red excoriated applied mepilex for protection.  Regular diet. Redness under panus and breasts, powder applied. Discharge pending TCU placement.

## 2021-12-27 NOTE — DISCHARGE SUMMARY
[facility]  Hospitalist Discharge Summary      Date of Admission:  12/21/2021  Date of Discharge:  12/27/2021  Discharging Provider: Raphael Martinez MD, MD      Discharge Diagnoses      Acute on chronic hypoxic/hypercapnic respiratory failure   Possible viral URI  Hx of obstructive sleep apnea and obesity hypoventilation syndrome on chronic supplemental O2      Acute on Chronic anemia   Hx of iron deficiency anemia, prior GI bleed   patient underwent EGD on 12/22 which showed multiple erosions, no active bleeding.    Non-hodgkin lymphoma involving the pancreas      Diastolic heart failure   Moderate to severe Aortic valve stenos     CKD stage III     Hypothyroidism     Depression/Anxiety     Macular degeneration.  Vision loss, secondary to above.  Obesity  Body mass index is 38.32 kg/m .     Deconditioning:    Follow-ups Needed After Discharge   Follow-up Appointments     Follow Up and recommended labs and tests      Follow up with skilled nursing physician.  The following labs/tests are   recommended: CBC, BMP weekly.  Follow up with -We will need outpatient follow-up with [facility]  -Consider colonoscopy as outpatient if no improvement in anemia. in 2-3   weeks.      Follow up with Dr. Srinivasan of [facility] in 2-3 weeks .         {Additional follow-up instructions/to-do's for PCP    : refer to Opthalmology    Unresulted Labs Ordered in the Past 30 Days of this Admission     No orders found from 11/21/2021 to 12/22/2021.          Discharge Disposition   Discharged to rehabilitation facility  Condition at discharge: Stable      Hospital Course   Lucille Rojas is a 81 year old female with past medical history significant for recently diagnosed Non-hodgkin lymphoma of the pancreas- currently undergoing chemotherapy, Prior GI bleed and iron deficiency with symptomatic anemia admitted on 12/21/2021 with hypoxia, weakness and one episode of black stool.      Acute on chronic  hypoxic/hypercapnic respiratory failure - resolved  Possible viral URI  Hx of obstructive sleep apnea and obesity hypoventilation syndrome on chronic supplemental O2   The patient presents to the Hospital with generalized weakness, cough, rhinorrhea and congestion for the past few days. She reports that her O2 sats at home has been lower than normal (low 90s) on her baseline amount of O2. She was initially found to be hypoxic to the 80s in the ED. She is now back on her baseline 2L of supplemental O2 and is satting normally   * CXR showed no acute findings   * COVID and influenza testing are negative   * CTPE obtained and showed no evidence of pulmonary embolism and no other abnormality within the lung parenchyma   * Overall no clear etiology for her worsening respiratory symptoms and cough. Based on her symptoms she may have a viral upper respiratory infection with some reactive airway component.   -BiPAP at night   -duonebs for wheezing     Acute on Chronic anemia   Hx of iron deficiency anemia, prior GI bleed   Pt admitted back in September with a hgb of 4.7. She was found to be severely iron deficient.   -EGD on 9/21/21 revealed a few gastric erosions and a few tiny apthous ulcers in the duodenum. She was also diagnosed with lymphoma at this time. She received 4 units of blood during this admission and has been getting intermittent iron infusions. Hemoglobin at presentation is 7.7 (was 8.4 on 12/14 prior to first chemotherapy but was up to 10.2 on 11/10). Iron studies on 12/14 improves with iron of 37 and iron sat of 13%. The patient notes one episode of black stool a few days ago.   -[facility] consulted, patient underwent EGD on 12/22 which showed multiple erosions, no active bleeding.  -Continue PPI twice daily  -Carafate x2 weeks  -No NSAID use  -Hemoglobin slightly lower but stable at 7.2.  -Iron stores are quite low, s/p Venofer 300 mg IV daily x2 doses  -We will need outpatient follow-up with [facility]  -Consider colonoscopy as outpatient if no improvement in anemia.     Non-hodgkin lymphoma involving the pancreas   Pt follows with Dr. Srinivasan of [facility]. She is receiving chemotherapy with mini-R CHOP. She receive her first cycle of treatment on 12/15.   -Continue PTA allopurinol      Diastolic heart failure   Moderate to severe Aortic valve stenos  Echocardiogram from September showed normal LV systolic function (EF of 60-65%) with diastolic doppler findings suggesting increased left ventricular filling pressures. She had moderate to severe valvular aortic stenosis with an aortic valve area of 1.0cm, and pressure gradient of 37.5mmHg. CHF does not appear to be acutely decompensated at this time. She has no evidence of volume overload and BNP is wnl at 239.   -Continue PTA lasix 20mg PO daily   -I/Os and daily weights   -She does not appear to be on BB or ACEi.      CKD stage III  Creatinine is 0.9, stable   -Continue to monitor      Hypothyroidism   -Continue PTA levothyroxine     Depression/Anxiety   -continue PTA citalopram and lorazepam (would use lorazepam very sparingly)      Macular degeneration.  Vision loss, secondary to above.  Obesity  Body mass index is 38.32 kg/m .     Deconditioning:   A/W TCU placement  Consultations This Hospital Stay   CARE MANAGEMENT / SOCIAL WORK IP CONSULT  GASTROENTEROLOGY IP CONSULT  HEMATOLOGY & ONCOLOGY IP CONSULT  PHYSICAL THERAPY ADULT IP CONSULT  PHYSICAL THERAPY ADULT IP CONSULT  OCCUPATIONAL THERAPY ADULT IP CONSULT    Code Status   Full Code    Time Spent on this Encounter   IRaphael MD, MD, personally saw the patient today and spent greater than 30 minutes discharging this patient.       Raphael Martinez MD, MD  [facility] [address] [facility]  [address] [place] [address] [address]VE, [address]  [facility] [address]  Phone: [phone]  ______________________________________________________________________    Physical Exam   Vital Signs: Temp: 98.4  F (36.9  C)  Temp src: Axillary BP: 128/42 Pulse: 84   Resp: 16 SpO2: 93 % O2 Device: Nasal cannula Oxygen Delivery: 2 LPM  Weight: 252 lbs 6.4 oz  Gen- pleasant   HEENT- NAD, ANDREW  Neck- supple, no JVD elevation, no thyromegaly  CVS- I+II+ no m/r/g  RS- CTAB  Abdo- soft, no tenderness . No g/r/r   Ext- no edema      Primary Care Physician   Fausto Flynn    Discharge Orders      Anti-Embolism Stockings    Bilateral below knee length.On in the morning, off at night     General info for SNF    Length of Stay Estimate: Short Term Care: Estimated # of Days <30  Condition at Discharge: Stable  Level of care:skilled   Rehabilitation Potential: Fair  Admission H&P remains valid and up-to-date: Yes  Recent Chemotherapy: N/A  Use Nursing Home Standing Orders: Yes     Mantoux instructions    Give two-step Mantoux (PPD) Per Facility Policy Yes     Follow Up and recommended labs and tests    Follow up with halfway physician.  The following labs/tests are recommended: CBC, BMP weekly.  Follow up with -We will need outpatient follow-up with [facility]  -Consider colonoscopy as outpatient if no improvement in anemia. in 2-3 weeks.      Follow up with Dr. Srinivasan of [facility] in 2-3 weeks .     Reason for your hospital stay    Lucille Rojas is a 81 year old female with past medical history significant for recently diagnosed Non-hodgkin lymphoma of the pancreas- currently undergoing chemotherapy, Prior GI bleed and iron deficiency with symptomatic anemia admitted on 12/21/2021 with hypoxia, weakness and one episode of black stool.      Acute on chronic hypoxic/hypercapnic respiratory failure - resolved  Possible viral URI  Hx of obstructive sleep apnea and obesity hypoventilation syndrome on chronic supplemental O2   The patient presents to the Hospital with generalized weakness, cough, rhinorrhea and congestion for the past few days. She reports that her O2 sats at home has been lower than normal (low 90s) on her baseline  amount of O2. She was initially found to be hypoxic to the 80s in the ED. She is now back on her baseline 2L of supplemental O2 and is satting normally   * CXR showed no acute findings   * COVID and influenza testing are negative   * CTPE obtained and showed no evidence of pulmonary embolism and no other abnormality within the lung parenchyma   * Overall no clear etiology for her worsening respiratory symptoms and cough. Based on her symptoms she may have a viral upper respiratory infection with some reactive airway component.   -BiPAP at night   -duonebs for wheezing     Acute on Chronic anemia   Hx of iron deficiency anemia, prior GI bleed   Pt admitted back in September with a hgb of 4.7. She was found to be severely iron deficient.   -EGD on 9/21/21 revealed a few gastric erosions and a few tiny apthous ulcers in the duodenum. She was also diagnosed with lymphoma at this time. She received 4 units of blood during this admission and has been getting intermittent iron infusions. Hemoglobin at presentation is 7.7 (was 8.4 on 12/14 prior to first chemotherapy but was up to 10.2 on 11/10). Iron studies on 12/14 improves with iron of 37 and iron sat of 13%. The patient notes one episode of black stool a few days ago.   -[facility] consulted, patient underwent EGD on 12/22 which showed multiple erosions, no active bleeding.  -Continue PPI twice daily  -Carafate x2 weeks  -No NSAID use  -Hemoglobin slightly lower but stable at 7.2.  -Iron stores are quite low, s/p Venofer 300 mg IV daily x2 doses  -We will need outpatient follow-up with [facility]  -Consider colonoscopy as outpatient if no improvement in anemia.     Non-hodgkin lymphoma involving the pancreas   Pt follows with Dr. Srinivasan of [facility]. She is receiving chemotherapy with mini-R CHOP. She receive her first cycle of treatment on 12/15.   -Continue PTA allopurinol      Diastolic heart failure   Moderate to severe Aortic valve stenos  Echocardiogram from  September showed normal LV systolic function (EF of 60-65%) with diastolic doppler findings suggesting increased left ventricular filling pressures. She had moderate to severe valvular aortic stenosis with an aortic valve area of 1.0cm, and pressure gradient of 37.5mmHg. CHF does not appear to be acutely decompensated at this time. She has no evidence of volume overload and BNP is wnl at 239.   -Continue PTA lasix 20mg PO daily   -I/Os and daily weights   -She does not appear to be on BB or ACEi.      CKD stage III  Creatinine is 0.9, stable   -Continue to monitor      Hypothyroidism   -Continue PTA levothyroxine     Depression/Anxiety   -continue PTA citalopram and lorazepam (would use lorazepam very sparingly)      Macular degeneration.  Vision loss, secondary to above.  Obesity  Body mass index is 38.32 kg/m .     Glucose monitor nursing POCT    Before meals and at bedtime     Intake and output    Every shift     Daily weights    Call Provider for weight gain of more than 2 pounds per day or 5 pounds per week.     Additional Discharge Instructions     Activity - Up with assistive device     Physical Therapy Adult Consult    Evaluate and treat as clinically indicated.    Reason:  for pt     Occupational Therapy Adult Consult    Evaluate and treat as clinically indicated.    Reason:  For OT     Fall precautions     Oxygen Adult/Peds    In hospital. To be weaned in NH     Diet    Follow this diet upon discharge: Orders Placed This Encounter      Regular Diet Adult       Significant Results and Procedures   Results for orders placed or performed during the hospital encounter of 12/21/21   XR Chest Port 1 View    Narrative    XR CHEST PORT 1 VIEW 12/21/2021 12:30 PM    HISTORY: cough    COMPARISON: Chest x-ray 9/23/2021, CT 11/10/2021      Impression    IMPRESSION: Right-sided port catheter. No acute findings. The lungs  are clear and there are no pleural effusions. Stable cardiomegaly and  central vascular  prominence. No overt pulmonary edema.    AYDE FREEMAN MD         SYSTEM ID:  [id number]   CT Chest (PE) Abdomen Pelvis w Contrast    Narrative    CT CHEST PE, ABDOMEN & PELVIS WITH CONTRAST December 21, 2021 2:16  PM    CLINICAL HISTORY: Hypoxia. Anemia. History of non-Hodgkin's lymphoma.    TECHNIQUE: CT angiogram chest and routine CT abdomen pelvis with IV  contrast. Arterial phase through the chest and venous phase through  the abdomen and pelvis. 2D and 3D MIP reconstructions were preformed  by the CT technologist. Dose reduction techniques were used.   CONTRAST: 126  mL Isovue 370.    COMPARISON: CT of the chest, abdomen, and pelvis performed 11/10/2021.    FINDINGS:  ANGIOGRAM CHEST: Pulmonary arteries are normal caliber and negative  for pulmonary emboli. Thoracic aorta is negative for dissection. No CT  evidence of right heart strain. Moderate atherosclerotic calcification  of the thoracic aorta.    LUNGS AND PLEURA: No pleural effusions. No lung masses or  consolidations are identified. No pneumothorax.    MEDIASTINUM/AXILLAE: No enlarged lymph nodes are identified in the  chest. No pericardial effusion. Right Port-A-Cath, with tip near the  SVC/RA junction.    CORONARY ARTERY CALCIFICATION: Mild.    HEPATOBILIARY: A 1 cm enhancing focus near the posterior tip of the  right hepatic lobe posteriorly is nonspecific, but may represent a  small flash filling hemangioma. No other hepatic masses are seen.  Unremarkable gallbladder.    PANCREAS: Normal.    SPLEEN: Normal.    ADRENAL GLANDS: Mild nodular thickening of both adrenal glands.    KIDNEYS/BLADDER: Unremarkable. No hydronephrosis.    BOWEL: No bowel obstruction. Colonic diverticulosis. No convincing  evidence for colitis or diverticulitis. Prior appendectomy.    PELVIC ORGANS: Prior hysterectomy.    LYMPH NODES: Soft tissue mass consistent with the patient's known  lymphoma is again noted to encase the celiac trunk and SMA, and has  decreased in size  since the previous exam, measuring 3.8 x 3.3 cm  (previously 5.4 x 3.6 cm). This soft tissue mass is again noted to  abut the pancreatic head, portal vein, splenic vein, and superior  mesenteric vein. A borderline-enlarged periportal lymph node (series  12 image 50) measures 1 cm short axis, and is unchanged.    VASCULATURE: Moderate atherosclerotic aortoiliac calcification.    ADDITIONAL FINDINGS: None.    MUSCULOSKELETAL: Degenerative changes are noted throughout the  thoracolumbar spine.      Impression    IMPRESSION:  1.  No evidence for pulmonary embolism or thoracic aortic dissection.  2.  Soft tissue mass encasing the celiac trunk and SMA has decreased  in size, and is again noted to abut the pancreatic head.  3.  No evidence for pulmonary embolism. No cause for hypoxia and  anemia is identified.    JOHNY ELKINS MD         SYSTEM ID:  [id number]       Discharge Medications   Current Discharge Medication List      START taking these medications    Details   acetaminophen (TYLENOL) 325 MG tablet Take 2 tablets (650 mg) by mouth every 4 hours as needed for mild pain    Associated Diagnoses: Physical deconditioning      bisacodyl (DULCOLAX) 10 MG suppository Place 1 suppository (10 mg) rectally daily as needed for constipation    Associated Diagnoses: Physical deconditioning      guaiFENesin-dextromethorphan (ROBITUSSIN DM) 100-10 MG/5ML syrup Take 10 mLs by mouth every 4 hours as needed for cough    Associated Diagnoses: Physical deconditioning; SHAVON (obstructive sleep apnea)      ipratropium - albuterol 0.5 mg/2.5 mg/3 mL (DUONEB) 0.5-2.5 (3) MG/3ML neb solution Take 1 vial (3 mLs) by nebulization every 6 hours as needed for shortness of breath / dyspnea or wheezing  Qty: 90 mL    Associated Diagnoses: SHAVON (obstructive sleep apnea)      miconazole (MICATIN) 2 % external powder Apply topically 2 times daily    Associated Diagnoses: Encounter for other specified aftercare      sucralfate (CARAFATE) 1 GM/10ML  suspension Take 10 mLs (1 g) by mouth 4 times daily    Associated Diagnoses: Acute on chronic blood loss anemia         CONTINUE these medications which have CHANGED    Details   allopurinol (ZYLOPRIM) 300 MG tablet Take 1 tablet (300 mg) by mouth daily  Qty: 14 tablet, Refills: 7    Associated Diagnoses: Non-Hodgkin lymphoma of solid organ excluding spleen, unspecified non-Hodgkin lymphoma type (H); B-cell lymphoma of intrathoracic lymph nodes, unspecified B-cell lymphoma type (H)      LORazepam (ATIVAN) 0.5 MG tablet Take 1 tablet (0.5 mg) by mouth every 4 hours as needed (Anxiety, Nausea/Vomiting or Sleep)  Qty: 30 tablet, Refills: 0    Associated Diagnoses: Non-Hodgkin lymphoma of solid organ excluding spleen, unspecified non-Hodgkin lymphoma type (H); B-cell lymphoma of intrathoracic lymph nodes, unspecified B-cell lymphoma type (H)         CONTINUE these medications which have NOT CHANGED    Details   carboxymethylcellulose PF (REFRESH PLUS) 0.5 % ophthalmic solution Place 2 drops into both eyes 2 times daily      citalopram (CELEXA) 20 MG tablet Take 1 tablet (20 mg) by mouth daily  Qty: 90 tablet, Refills: 1    Associated Diagnoses: MDD (major depressive disorder), recurrent episode, mild (H)      furosemide (LASIX) 20 MG tablet Take 1 tablet (20 mg) by mouth daily  Qty: 30 tablet, Refills: 0    Associated Diagnoses: Diastolic heart failure, unspecified HF chronicity (H)      levothyroxine (SYNTHROID/LEVOTHROID) 200 MCG tablet TAKE ONE TABLET BY MOUTH ONE TIME DAILY   Qty: 90 tablet, Refills: 3    Associated Diagnoses: Acquired hypothyroidism      Multiple Vitamins-Minerals (PRESERVISION AREDS) CAPS Take 1 tablet by mouth 2 times daily       pegfilgrastim (NEULASTA ONPRO KIT) 6 MG/0.6ML injection Inject 6 mg Subcutaneous once      polyethylene glycol (MIRALAX) 17 g packet Take 1 packet by mouth every evening      predniSONE (DELTASONE) 50 MG tablet Take 100 mg by mouth daily X 5 days with chemo regimen       senna-docusate (SENOKOT-S/PERICOLACE) 8.6-50 MG tablet Take 1 tablet by mouth every morning      simvastatin (ZOCOR) 10 MG tablet Take 1 tablet (10 mg) by mouth At Bedtime  Qty: 90 tablet, Refills: 3    Associated Diagnoses: Hyperlipidemia LDL goal <100; Type 2 diabetes mellitus with stage 3 chronic kidney disease, without long-term current use of insulin, unspecified whether stage 3a or 3b CKD (H)      UNKNOWN TO PATIENT Inject into the vein once Last chemotherapy on 12/15 (tylenol/benadryl/emend/aloxi,NS bolus,rituximab, cyclophosphamide, doxorubicin, vincristine)      ACE/ARB/ARNI NOT PRESCRIBED (INTENTIONAL) Please choose reason not prescribed, below    Associated Diagnoses: Stage 3 chronic kidney disease, unspecified whether stage 3a or 3b CKD (H)      aspirin 81 MG tablet Take 1 tablet by mouth daily.  Qty: 30 tablet, Refills: 0    Associated Diagnoses: Type 2 diabetes, HbA1C goal < 8% (H)      CONTOUR NEXT TEST test strip USE TO TEST BLOOD GLUCOSE ONCE DAILY      Microlet Lancets MISC USE TO TEST BLOOD GLUCOSE ONCE DAILY      nystatin (MYCOSTATIN) 072930 UNIT/GM external powder Apply topically 2 times daily Under breasts and skin folds BID & BID PRN for redness  Qty: 60 g, Refills: 1    Associated Diagnoses: B-cell lymphoma of intrathoracic lymph nodes, unspecified B-cell lymphoma type (H)      order for DME Equipment being ordered: wheeled walker with hand breaks  Qty: 1 Device, Refills: 0    Associated Diagnoses: Type 2 diabetes mellitus with stage 3 chronic kidney disease, without long-term current use of insulin (H); Morbid obesity due to excess calories (H)      pantoprazole (PROTONIX) 40 MG EC tablet Take 1 tablet (40 mg) by mouth every morning (before breakfast)  Qty: 90 tablet, Refills: 3    Associated Diagnoses: Apnea; Morbid obesity due to excess calories (H)      prochlorperazine (COMPAZINE) 10 MG tablet Take 1 tablet (10 mg) by mouth every 6 hours as needed (Nausea/Vomiting)  Qty: 30 tablet,  Refills: 7    Associated Diagnoses: Non-Hodgkin lymphoma of solid organ excluding spleen, unspecified non-Hodgkin lymphoma type (H); B-cell lymphoma of intrathoracic lymph nodes, unspecified B-cell lymphoma type (H)           Allergies   Allergies   Allergen Reactions     Penicillins

## 2021-12-27 NOTE — PROGRESS NOTES
Care Management Discharge Note    Discharge Date: 12/27/2021       Discharge Disposition: [facility] TCU    Discharge Services: [employer] (referral for senior linkage and cleaning for a reason)    Discharge DME: Oxygen (daughter available to bring)    Discharge Transportation:  Wheelchair Transport    Private pay costs discussed: transportation costs    PAS Confirmation Code:  [id number]  Patient/family educated on Medicare website which has current facility and service quality ratings: yes    Education Provided on the Discharge Plan:  Yes  Persons Notified of Discharge Plans: Patient, spouse and Daughter Nancy Rajput  Patient/Family in Agreement with the Plan: yes    Handoff Referral Completed: No    Additional Information:  SW received call from [facility] and they are able to accept patient to their facility today. SW spoke with physician who stated patient is ready for discharge. SW spoke with patient and spouse and they would like for transport to be arranged. SW placed call to  Transport to arrange for a wheelchair ride at 12:00 today. Updated patient, spouse and daughter. Patient requesting resources for cancer support. SW provided cleaning services information and [employer] resources. Faxed orders and scripts.     MACHO Ledezma, LGSW  [phone]  [facility]

## 2021-12-27 NOTE — PLAN OF CARE
A&Ox4. Forgetful. VSS on 2L NC, CPAP at HS. Infrequent cough, TAVARES. Denies pain. Regular diet. Up SBA GB/W. Cont B/B, occasional dribbling. Mepilex to coccyx. Redness under pannus and breasts, powder applied. Discharge pending placement.

## 2021-12-28 ENCOUNTER — PATIENT OUTREACH (OUTPATIENT)
Dept: ONCOLOGY | Facility: CLINIC | Age: 81
End: 2021-12-28
Payer: MEDICARE

## 2021-12-28 NOTE — PROGRESS NOTES
Geer received a call from Nancy Rajput  [facility] [phone] home  [phone]. With questions regarding her mom and if the plan of care will change since recent hospitalization.    Writer will have Dr. Srinivasan advise and return call to patient.    Harriet Guillermo RN

## 2021-12-29 ENCOUNTER — PATIENT OUTREACH (OUTPATIENT)
Dept: CARE COORDINATION | Facility: CLINIC | Age: 81
End: 2021-12-29
Payer: MEDICARE

## 2021-12-29 NOTE — LETTER
[facility]   To:  [facility]          Please give to facility    From:   Ginna TINEO Care Coordinator [facility]     Patient Name:  Lucille Rojas   YOB: 1940     Admit date: 12/28/2021      *Information Needed:  Please contact me when the patient will discharge (or if they will move to long term care)- include the discharge date, disposition, and main diagnosis   - If the patient is discharged with home care services, please provide the name of the agency    Also- Please inform me if a care conference is being held.   Phone, Fax or Email with information       Thank You,   MIQUEL Parker  Clinic Care Coordinator  [phone]  [email or web address]

## 2021-12-29 NOTE — PROGRESS NOTES
Clinic Care Coordination Contact  Care Coordination Transition Communication         Clinical Data: Patient was hospitalized at [facility] from 12/21/2021 to 12/27/2021 with diagnosis of   Acute on chronic hypoxic/hypercapnic respiratory failure   Possible viral URI  Hx of obstructive sleep apnea and obesity hypoventilation syndrome on chronic supplemental O2      Acute on Chronic anemia   Hx of iron deficiency anemia, prior GI bleed   patient underwent EGD on 12/22 which showed multiple erosions, no active bleeding.     Non-hodgkin lymphoma involving the pancreas      Diastolic heart failure   Moderate to severe Aortic valve stenos     CKD stage III     Hypothyroidism     Depression/Anxiety      Macular degeneration.  Vision loss, secondary to above.  Obesity  Body mass index is 38.32 kg/m .     Deconditioning:.     Transition to Facility:               Facility Name: [facility]             phone number/fax: P: [phone] F: [phone]    Plan: RN/SW Care Coordinator will await notification from facility staff informing RN/SW Care Coordinator of patient's discharge plans/needs. RN/SW Care Coordinator will review chart and outreach to facility staff every 4 weeks and as needed.     MIQUEL Parker  Clinic Care Coordinator  [facility]  [phone]  [email or web address]

## 2021-12-30 ENCOUNTER — TRANSITIONAL CARE UNIT VISIT (OUTPATIENT)
Dept: GERIATRICS | Facility: CLINIC | Age: 81
End: 2021-12-30
Payer: MEDICARE

## 2021-12-30 VITALS
WEIGHT: 255.8 LBS | BODY MASS INDEX: 50.22 KG/M2 | SYSTOLIC BLOOD PRESSURE: 158 MMHG | TEMPERATURE: 97.8 F | OXYGEN SATURATION: 91 % | HEIGHT: 60 IN | RESPIRATION RATE: 18 BRPM | DIASTOLIC BLOOD PRESSURE: 77 MMHG | HEART RATE: 77 BPM

## 2021-12-30 DIAGNOSIS — J96.22 ACUTE ON CHRONIC RESPIRATORY FAILURE WITH HYPOXIA AND HYPERCAPNIA (H): Primary | ICD-10-CM

## 2021-12-30 DIAGNOSIS — D62 ACUTE ON CHRONIC BLOOD LOSS ANEMIA: ICD-10-CM

## 2021-12-30 DIAGNOSIS — J96.21 ACUTE ON CHRONIC RESPIRATORY FAILURE WITH HYPOXIA AND HYPERCAPNIA (H): Primary | ICD-10-CM

## 2021-12-30 PROCEDURE — 99306 1ST NF CARE HIGH MDM 50: CPT | Performed by: INTERNAL MEDICINE

## 2021-12-30 ASSESSMENT — MIFFLIN-ST. JEOR: SCORE: 1536.8

## 2021-12-30 NOTE — LETTER
"    12/30/2021        RE: Lucille Rojas  [id number]  [facility] [address]        Lucille Rojas is a 81 year old female seen December 30, 2021 at [facility]CU where she was admitted after [facility] hospitalization 12/21-27 for acute on chronic hypoxic /hypercapnic respiratory failure and possible viral URI.   She presented with weakness, cough and congestion. COVID and influenza tests negative.  No evidence of PE or other abnormality on chest CT.    Pt also seen by [facility] for ongoing anemia and black stool.  EGD done 12/22 showed multiple erosions but no active bleeding.  Placed on PPI bid and sucralfate.   She received 2 doses of IV Venofer.   She improved and transferred to TCU for ongoing recovery and Rehab.      Seen in her room up to chair, on O2 by nasal canula   \"I feel okay,\" interested in doing some activity   No pain, has some wordfinding difficulty and STML   Talkative, reviews all her doctor appointments, etc.      Pt had a September 2021 hospitalization and subsequent TCU stay where she presented with shortness of breath, black stools and found to have a hgb 4.7   She was transfused 4 units pRBCs over the course of several days.   EGD at that time showed a few gastric erosions and tiny aphthous ulcers in the duodenum.   She was given IV Venofer on 2 occasions.    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 Seen by Dr Srinivasan as outpatient and treatment started with mini-R CHOP.   She had a port placed 12/13 and first cycle given 12/15   Patient also had decompensated HFpEF, BNP >4000 and severe aortic stenosis with valve area 1.0 cm2.  She was seen by Cardiology in September, determined to not be a candidate for AVR at that time.  She was diuresed with IV furosemide and had thoracentesis for right pleural effusion, removing 450 ccs transudative fluid.  She has since seen Dr Lucas in clinic, and may be a candidate for TAVR once she completes her " oncologic treatment.      She is on home BiPAP for obesity hypoventilation syndrome past 15 years, states she sleeps with her BiPAP up to 8 hours/night.       Past Medical History:   Diagnosis Date     HTN (hypertension) 5/9/2013     Hyperlipidemia LDL goal <130 5/9/2013     Hypothyroidism 5/9/2013     Macular degeneration 9/18/2013     Sleep apnea     CPAP at night, O2 during naps     Type 2 diabetes, HbA1C goal < 8% (H) 5/9/2013   Pancreatic B cell lymphoma  Obesity hypoventilation syndrome  Severe aortic stenosis  Depression    Past Surgical History:   Procedure Laterality Date     APPENDECTOMY       COLONOSCOPY       COLONOSCOPY N/A 10/27/2014    Procedure: COMBINED COLONOSCOPY, SINGLE OR MULTIPLE BIOPSY/POLYPECTOMY BY BIOPSY;  Surgeon: Jose Alfredo Morejon MD;  Location:  GI     ESOPHAGOSCOPY, GASTROSCOPY, DUODENOSCOPY (EGD), COMBINED N/A 9/21/2021    Procedure: ESOPHAGOGASTRODUODENOSCOPY, WITH FINE NEEDLE ASPIRATION BIOPSY, WITH ENDOSCOPIC ULTRASOUND GUIDANCE;  Surgeon: Vera Benitez MD;  Location:  GI     ESOPHAGOSCOPY, GASTROSCOPY, DUODENOSCOPY (EGD), COMBINED N/A 9/21/2021    Procedure: Esophagogastroduodenoscopy, With Biopsy;  Surgeon: Vera Benitez MD;  Location:  GI     ESOPHAGOSCOPY, GASTROSCOPY, DUODENOSCOPY (EGD), COMBINED N/A 10/5/2021    Procedure: ESOPHAGOGASTRODUODENOSCOPY, WITH FINE NEEDLE ASPIRATION BIOPSY, WITH ENDOSCOPIC ULTRASOUND GUIDANCE;  Surgeon: Dixon Olivier MD;  Location:  GI     ESOPHAGOSCOPY, GASTROSCOPY, DUODENOSCOPY (EGD), COMBINED N/A 12/22/2021    Procedure: ESOPHAGOGASTRODUODENOSCOPY, WITH BIOPSY;  Surgeon: Vera Benitez MD;  Location:  GI     HERNIA REPAIR      inguinal x 2     IR CHEST PORT PLACEMENT > 5 YRS OF AGE  12/13/2021     TONSILLECTOMY       Social History     Tobacco Use     Smoking status: Never Smoker     Smokeless tobacco: Never Used   Substance Use Topics     Alcohol use: Yes     Alcohol/week: 0.0  "standard drinks     Comment: rarely      SH:  Lives with her  Cooper, two-level house in [place].   They have 2 daughters and 2 sons.     FH:   Father had prostate cancer,  age 83  Daughter had ovarian cancer and thyroid cancer, tx'd and no recurrence  Son has \"cancer of the blood\"     Review Of Systems  Skin: negative   Eyes: impaired vision  Ears/Nose/Throat: hearing loss  Respiratory: as above  Cardiovascular: dyspnea on exertion and exercise intolerance  Gastrointestinal: negative  Genitourinary: negative  Musculoskeletal: ambulatory with FWW, has a yellow tag; ambulatory in hallways with SBA  Notes left knee pain, \"clicking\"   Tylenol helps  Tinetti   TUG 18s   CGA for dressing and hygiene.  Ambulates 130' with FWW and CGA  Max assist to don shoes  Reports she fell out of bed 6 months ago and was unable to get up, had to call paramedics  Neurologic: cognitive impairment, SLUMS   ACL 4.4     Psychiatric: depression, follows with Psychology  Hematologic/Lymphatic/Immunologic: negative  Endocrine: diabetes, hypothyroidism    Wt Readings from Last 5 Encounters:   21 116 kg (255 lb 12.8 oz)   21 114.5 kg (252 lb 6.4 oz)   21 114.3 kg (252 lb)   12/15/21 114.8 kg (253 lb)   21 113.9 kg (251 lb)       GENERAL APPEARANCE: alert and no distress  BP (!) 158/77   Pulse 77   Temp 97.8  F (36.6  C)   Resp 18   Ht 1.524 m (5')   Wt 116 kg (255 lb 12.8 oz)   SpO2 91%   BMI 49.96 kg/m     HEENT: normocephalic, no lesion or abnormalities  NECK: no adenopathy, no asymmetry, masses, or scars and thyroid normal to palpation  RESP: decreased BS in RLL>L, on O2 by NC at 2 L/min  CV: regular rate and rhythm, normal S1 S2@70, slow III/VI ZAY  ABDOMEN: obese, soft, nontender, no HSM or masses and bowel sounds normal  MS: extremities normal, 1+ LE edema without skin changes.  SKIN: no suspicious lesions or rashes  NEURO: Normal strength and tone, sensory exam grossly normal, and " speech normal  PSYCH: affect okay  LYMPHATICS: No cervical,  or supraclavicular nodes     Last Comprehensive Metabolic Panel:  Sodium   Date Value Ref Range Status   12/26/2021 143 133 - 144 mmol/L Final     Potassium   Date Value Ref Range Status   12/26/2021 4.1 3.4 - 5.3 mmol/L Final     Carbon Dioxide (CO2)   Date Value Ref Range Status   12/26/2021 36 (H) 20 - 32 mmol/L Final     Glucose   Date Value Ref Range Status   12/26/2021 97 70 - 99 mg/dL Final     Urea Nitrogen   Date Value Ref Range Status   12/26/2021 19 7 - 30 mg/dL Final     Creatinine   Date Value Ref Range Status   12/26/2021 0.99 0.52 - 1.04 mg/dL Final     GFR Estimate   Date Value Ref Range Status   12/26/2021 56 (L) >60 mL/min/1.73m2 Final     Lab Results   Component Value Date    AST 8 12/21/2021      ALBUMIN 3.0 12/21/2021      ALKPHOS 111 12/21/2021     Lab Results   Component Value Date    WBC 3.9 12/26/2021      HGB 7.7 12/26/2021      MCV 92 12/26/2021       12/26/2021     Calcium   Date Value Ref Range Status   12/26/2021 8.6 8.5 - 10.1 mg/dL Final      EXAM: CT CHEST/ABDOMEN/PELVIS W CONTRAST       DATE/TIME: 9/20/2021 7:15 PM   LUNGS AND PLEURA: Moderate size right pleural effusion and subsegmental atelectasis right lower lobe. The left lung is clear.  PANCREAS: Soft tissue mass arising from the uncinate process of the pancreatic head measuring 5.5 x 3.1 x 7.6 cm. This extends superiorly and encases the celiac trunk, the superior mesenteric artery and the superior mesenteric vein. The splenic and   portal veins are patent.  VASCULATURE: Atherosclerotic disease abdominal aorta and iliac arteries.  PELVIC ORGANS: Hysterectomy.  MUSCULOSKELETAL: Postop changes anterior abdominal wall. Edematous changes subcutaneous fat anterior abdominal wall. Hypertrophic changes thoracolumbar spine.                                                              IMPRESSION:  1.  Large pancreatic head mass compatible with a primary pancreatic  neoplasm. This encases the celiac trunk and superior mesenteric artery and vein. GI consultation recommended.  2.  Moderate size right pleural effusion and right basilar atelectasis.      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   She is on daily ASA and simvastatin 10 mg/day   May need to hold ASA if hgb drops again       (G47.33) SHAVON (obstructive sleep apnea)  Comment: on BiPAP, follows with Sleep Clinic   Plan: home settings with O2 at 3 L/min      (E03.9) Hypothyroidism, unspecified type  Comment:   TSH   Date Value Ref Range Status   11/22/2021 2.68 0.40 - 4.00 mU/L Final   06/04/2020 0.93 0.30 - 5.00 uIU/mL Final      Plan: levothyroxine 200 mcg/day     (F32.9) Major depressive disorder with current active episode, unspecified depression episode severity, unspecified whether recurrent  Comment: follows with Psychology as outpatient     Plan: citalopram 20 mg/day      (R53.81) Physical deconditioning  Comment: after prolonged illness   Plan: PHYSICAL THERAPY / OCCUPATIONAL THERAPY for strengthening, transfers, balance, gait, ADLs.   Discharge goal is return home with her , [facility]       Lisandra Brown MD            Sincerely,        Lisandra Brown MD

## 2021-12-30 NOTE — PROGRESS NOTES
"Lucille Rojas is a 81 year old female seen December 30, 2021 at [facility] TCU where she was admitted after [facility] hospitalization 12/21-27 for acute on chronic hypoxic /hypercapnic respiratory failure and possible viral URI.   She presented with weakness, cough and congestion. COVID and influenza tests negative.  No evidence of PE or other abnormality on chest CT.    Pt also seen by [facility] for ongoing anemia and black stool.  EGD done 12/22 showed multiple erosions but no active bleeding.  Placed on PPI bid and sucralfate.   She received 2 doses of IV Venofer.   She improved and transferred to TCU for ongoing recovery and Rehab.      Seen in her room up to chair, on O2 by nasal canula   \"I feel okay,\" interested in doing some activity   No pain, has some wordfinding difficulty and STML   Talkative, reviews all her doctor appointments, etc.      Pt had a September 2021 hospitalization and subsequent TCU stay where she presented with shortness of breath, black stools and found to have a hgb 4.7   She was transfused 4 units pRBCs over the course of several days.   EGD at that time showed a few gastric erosions and tiny aphthous ulcers in the duodenum.   She was given IV Venofer on 2 occasions.    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 Seen by Dr Srinivasan as outpatient and treatment started with mini-R CHOP.   She had a port placed 12/13 and first cycle given 12/15   Patient also had decompensated HFpEF, BNP >4000 and severe aortic stenosis with valve area 1.0 cm2.  She was seen by Cardiology in September, determined to not be a candidate for AVR at that time.  She was diuresed with IV furosemide and had thoracentesis for right pleural effusion, removing 450 ccs transudative fluid.  She has since seen Dr Lucas in clinic, and may be a candidate for TAVR once she completes her oncologic treatment.      She is on home BiPAP for obesity hypoventilation syndrome past 15 years, " states she sleeps with her BiPAP up to 8 hours/night.       Past Medical History:   Diagnosis Date     HTN (hypertension) 5/9/2013     Hyperlipidemia LDL goal <130 5/9/2013     Hypothyroidism 5/9/2013     Macular degeneration 9/18/2013     Sleep apnea     CPAP at night, O2 during naps     Type 2 diabetes, HbA1C goal < 8% (H) 5/9/2013   Pancreatic B cell lymphoma  Obesity hypoventilation syndrome  Severe aortic stenosis  Depression    Past Surgical History:   Procedure Laterality Date     APPENDECTOMY       COLONOSCOPY       COLONOSCOPY N/A 10/27/2014    Procedure: COMBINED COLONOSCOPY, SINGLE OR MULTIPLE BIOPSY/POLYPECTOMY BY BIOPSY;  Surgeon: Jose Alfredo Morejon MD;  Location:  GI     ESOPHAGOSCOPY, GASTROSCOPY, DUODENOSCOPY (EGD), COMBINED N/A 9/21/2021    Procedure: ESOPHAGOGASTRODUODENOSCOPY, WITH FINE NEEDLE ASPIRATION BIOPSY, WITH ENDOSCOPIC ULTRASOUND GUIDANCE;  Surgeon: Vera Benitez MD;  Location:  GI     ESOPHAGOSCOPY, GASTROSCOPY, DUODENOSCOPY (EGD), COMBINED N/A 9/21/2021    Procedure: Esophagogastroduodenoscopy, With Biopsy;  Surgeon: Vera Benitez MD;  Location:  GI     ESOPHAGOSCOPY, GASTROSCOPY, DUODENOSCOPY (EGD), COMBINED N/A 10/5/2021    Procedure: ESOPHAGOGASTRODUODENOSCOPY, WITH FINE NEEDLE ASPIRATION BIOPSY, WITH ENDOSCOPIC ULTRASOUND GUIDANCE;  Surgeon: Dixon Olivier MD;  Location:  GI     ESOPHAGOSCOPY, GASTROSCOPY, DUODENOSCOPY (EGD), COMBINED N/A 12/22/2021    Procedure: ESOPHAGOGASTRODUODENOSCOPY, WITH BIOPSY;  Surgeon: Vera Benitez MD;  Location:  GI     HERNIA REPAIR      inguinal x 2     IR CHEST PORT PLACEMENT > 5 YRS OF AGE  12/13/2021     TONSILLECTOMY       Social History     Tobacco Use     Smoking status: Never Smoker     Smokeless tobacco: Never Used   Substance Use Topics     Alcohol use: Yes     Alcohol/week: 0.0 standard drinks     Comment: rarely      SH:  Lives with her  Cooper, two-level house in  [place].   They have 2 daughters and 2 sons.     FH:   Father had prostate cancer,  age 83  Daughter had ovarian cancer and thyroid cancer, tx'd and no recurrence  Son has \"cancer of the blood\"     Review Of Systems  Skin: negative   Eyes: impaired vision  Ears/Nose/Throat: hearing loss  Respiratory: as above  Cardiovascular: dyspnea on exertion and exercise intolerance  Gastrointestinal: negative  Genitourinary: negative  Musculoskeletal: ambulatory with FWW, has a yellow tag; ambulatory in hallways with SBA  Notes left knee pain, \"clicking\"   Tylenol helps  Tinetti   TUG 18s   CGA for dressing and hygiene.  Ambulates 130' with FWW and CGA  Max assist to don shoes  Reports she fell out of bed 6 months ago and was unable to get up, had to call paramedics  Neurologic: cognitive impairment, SLUMS   ACL 4.4     Psychiatric: depression, follows with Psychology  Hematologic/Lymphatic/Immunologic: negative  Endocrine: diabetes, hypothyroidism    Wt Readings from Last 5 Encounters:   21 116 kg (255 lb 12.8 oz)   21 114.5 kg (252 lb 6.4 oz)   21 114.3 kg (252 lb)   12/15/21 114.8 kg (253 lb)   21 113.9 kg (251 lb)       GENERAL APPEARANCE: alert and no distress  BP (!) 158/77   Pulse 77   Temp 97.8  F (36.6  C)   Resp 18   Ht 1.524 m (5')   Wt 116 kg (255 lb 12.8 oz)   SpO2 91%   BMI 49.96 kg/m     HEENT: normocephalic, no lesion or abnormalities  NECK: no adenopathy, no asymmetry, masses, or scars and thyroid normal to palpation  RESP: decreased BS in RLL>L, on O2 by NC at 2 L/min  CV: regular rate and rhythm, normal S1 S2@70, slow III/VI ZAY  ABDOMEN: obese, soft, nontender, no HSM or masses and bowel sounds normal  MS: extremities normal, 1+ LE edema without skin changes.  SKIN: no suspicious lesions or rashes  NEURO: Normal strength and tone, sensory exam grossly normal, and speech normal  PSYCH: affect okay  LYMPHATICS: No cervical,  or supraclavicular nodes     Last " Comprehensive Metabolic Panel:  Sodium   Date Value Ref Range Status   12/26/2021 143 133 - 144 mmol/L Final     Potassium   Date Value Ref Range Status   12/26/2021 4.1 3.4 - 5.3 mmol/L Final     Carbon Dioxide (CO2)   Date Value Ref Range Status   12/26/2021 36 (H) 20 - 32 mmol/L Final     Glucose   Date Value Ref Range Status   12/26/2021 97 70 - 99 mg/dL Final     Urea Nitrogen   Date Value Ref Range Status   12/26/2021 19 7 - 30 mg/dL Final     Creatinine   Date Value Ref Range Status   12/26/2021 0.99 0.52 - 1.04 mg/dL Final     GFR Estimate   Date Value Ref Range Status   12/26/2021 56 (L) >60 mL/min/1.73m2 Final     Lab Results   Component Value Date    AST 8 12/21/2021      ALBUMIN 3.0 12/21/2021      ALKPHOS 111 12/21/2021     Lab Results   Component Value Date    WBC 3.9 12/26/2021      HGB 7.7 12/26/2021      MCV 92 12/26/2021       12/26/2021     Calcium   Date Value Ref Range Status   12/26/2021 8.6 8.5 - 10.1 mg/dL Final      EXAM: CT CHEST/ABDOMEN/PELVIS W CONTRAST       DATE/TIME: 9/20/2021 7:15 PM   LUNGS AND PLEURA: Moderate size right pleural effusion and subsegmental atelectasis right lower lobe. The left lung is clear.  PANCREAS: Soft tissue mass arising from the uncinate process of the pancreatic head measuring 5.5 x 3.1 x 7.6 cm. This extends superiorly and encases the celiac trunk, the superior mesenteric artery and the superior mesenteric vein. The splenic and   portal veins are patent.  VASCULATURE: Atherosclerotic disease abdominal aorta and iliac arteries.  PELVIC ORGANS: Hysterectomy.  MUSCULOSKELETAL: Postop changes anterior abdominal wall. Edematous changes subcutaneous fat anterior abdominal wall. Hypertrophic changes thoracolumbar spine.                                                              IMPRESSION:  1.  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 This encases the celiac trunk and superior mesenteric artery and vein. GI consultation  recommended.  2.  Moderate size right pleural effusion and right basilar atelectasis.      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   She is on daily ASA and simvastatin 10 mg/day   May need to hold ASA if hgb drops again       (G47.33) SHAVON (obstructive sleep apnea)  Comment: on BiPAP, follows with Sleep Clinic   Plan: home settings with O2 at 3 L/min      (E03.9) Hypothyroidism, unspecified type  Comment:   TSH   Date Value Ref Range Status   11/22/2021 2.68 0.40 - 4.00 mU/L Final   06/04/2020 0.93 0.30 - 5.00 uIU/mL Final      Plan: levothyroxine 200 mcg/day     (F32.9) Major depressive disorder with current active episode, unspecified depression episode severity, unspecified whether recurrent  Comment: follows with Psychology as outpatient     Plan: citalopram 20 mg/day     (R53.81) Physical deconditioning  Comment: after prolonged illness   Plan: PHYSICAL THERAPY /  OCCUPATIONAL THERAPY for strengthening, transfers, balance, gait, ADLs.   Discharge goal is return home with her , [facility]       Lisandra Brown MD

## 2022-01-02 ASSESSMENT — MIFFLIN-ST. JEOR: SCORE: 1520.47

## 2022-01-03 ENCOUNTER — DOCUMENTATION ONLY (OUTPATIENT)
Dept: PHARMACY | Facility: CLINIC | Age: 82
End: 2022-01-03

## 2022-01-03 ENCOUNTER — DISCHARGE SUMMARY NURSING HOME (OUTPATIENT)
Dept: GERIATRICS | Facility: CLINIC | Age: 82
End: 2022-01-03
Payer: MEDICARE

## 2022-01-03 VITALS
SYSTOLIC BLOOD PRESSURE: 138 MMHG | HEIGHT: 60 IN | OXYGEN SATURATION: 98 % | RESPIRATION RATE: 18 BRPM | DIASTOLIC BLOOD PRESSURE: 60 MMHG | BODY MASS INDEX: 49.52 KG/M2 | WEIGHT: 252.2 LBS | TEMPERATURE: 97.5 F | HEART RATE: 66 BPM

## 2022-01-03 DIAGNOSIS — I12.9 TYPE 2 DM WITH CKD STAGE 3 AND HYPERTENSION (H): ICD-10-CM

## 2022-01-03 DIAGNOSIS — I50.30 DIASTOLIC HEART FAILURE, UNSPECIFIED HF CHRONICITY (H): ICD-10-CM

## 2022-01-03 DIAGNOSIS — E11.22 TYPE 2 DM WITH CKD STAGE 3 AND HYPERTENSION (H): ICD-10-CM

## 2022-01-03 DIAGNOSIS — N18.30 TYPE 2 DM WITH CKD STAGE 3 AND HYPERTENSION (H): ICD-10-CM

## 2022-01-03 DIAGNOSIS — C85.12 B-CELL LYMPHOMA OF INTRATHORACIC LYMPH NODES, UNSPECIFIED B-CELL LYMPHOMA TYPE (H): ICD-10-CM

## 2022-01-03 DIAGNOSIS — E03.9 HYPOTHYROIDISM, UNSPECIFIED TYPE: ICD-10-CM

## 2022-01-03 DIAGNOSIS — D62 ACUTE ON CHRONIC BLOOD LOSS ANEMIA: ICD-10-CM

## 2022-01-03 DIAGNOSIS — G47.33 OSA (OBSTRUCTIVE SLEEP APNEA): ICD-10-CM

## 2022-01-03 DIAGNOSIS — J96.22 ACUTE ON CHRONIC RESPIRATORY FAILURE WITH HYPOXIA AND HYPERCAPNIA (H): Primary | ICD-10-CM

## 2022-01-03 DIAGNOSIS — I35.2 NONRHEUMATIC AORTIC (VALVE) STENOSIS WITH INSUFFICIENCY: ICD-10-CM

## 2022-01-03 DIAGNOSIS — J96.21 ACUTE ON CHRONIC RESPIRATORY FAILURE WITH HYPOXIA AND HYPERCAPNIA (H): Primary | ICD-10-CM

## 2022-01-03 PROCEDURE — 99316 NF DSCHRG MGMT 30 MIN+: CPT | Performed by: NURSE PRACTITIONER

## 2022-01-03 NOTE — PROGRESS NOTES
[facility] GERIATRIC SERVICES DISCHARGE SUMMARY  PATIENT'S NAME: Lucille Rojas  YOB: 1940  MEDICAL RECORD NUMBER:  [id number]  Place of Service where encounter took place:  [facility] ([facility]) [[id number]]    PRIMARY CARE PROVIDER AND CLINIC RESPONSIBLE AFTER TRANSFER:   Fausto Flynn MD, [address] [place] [address] / [facility] [address]    [facility] Provider     Transferring providers: ALIE Ledesma CNP, Lisandra Brown MD  Recent Hospitalization/ED:  [facility] stay 12/21/21 to 12/27/21.  Date of SNF Admission: December 27, 2021  Date of SNF (anticipated) Discharge: January 05, 2022  Discharged to: previous independent home  Cognitive Scores: BIMS Score 15/15  Physical Function: Modified independence for lower body dressing with sock aide. Supervision for toileting tasks. Up/down 4 stairs with side rails. Ambulates up/down 4 stairs with side rails. Modified independence for functional transfers.    DME: Walker    CODE STATUS/ADVANCE DIRECTIVES DISCUSSION:  Full Code   ALLERGIES: Penicillins    NURSING FACILITY COURSE   Medication Changes/Rationale:     Discontinue DuoNeb and Lorazepam due to non-use 1/3/22    Summary of nursing facility stay:   This is an 81-year-old female, with a past medical history significant for chronic hypoxic and hypercapnic respiratory failure on chronic supplemental Oxygen, aortic stenosis, diastolic heart failure with EF 60-65% on 9/23/21, recent diagnosis of pancreatic B cell lymphoma, iron deficiency, SHAVON on BiPAP, hypothyroidism and depression, who was admitted to [facility] 12/21/21 through 12/27/21 for weakness with a cough, rhinorrhea and congestion. Chest x-ray and CTPE with no acute findings. COVID and influenza negative. Thought to be viral URI with reactive airway component. Labs revealed Hemoglobin 7.7 with recent black stool. Gastroenterology was consulted and an EGD on 12/22/21  revealed multiple erosions with no active bleeding. Received IV Venofer. Sucralfate ordered x 2 weeks. A TCU stay was recommended for ongoing physical rehabilitation.     Acute on Chronic Respiratory Failure with Hypoxia and Hypercapnia on Chronic Oxygen Supplementation. Thought to be a viral upper respiratory infection with a reactive airway component. Was on supplemental Oxygen 2 L at baseline. Oxygen needs have returned to baseline. Will discontinue DuoNeb due to non-use. .    Acute on Chronic Iron Deficiency with Recent Blood Loss Anemia. Admitted to the hospital in September 2021 with Hemoglobin 4.7 and severe iron deficiency requiring pRBCs. An EGD on 12/22/21 revealed multiple erosions with no active bleeding. Received IV Venofer during most recent hospitalization. Started on Sucralfate x 2 weeks. Continue Pantoprazole as ordered. Follow-up with [facility]. Consider colonoscopy if no improvement in anemia in 2-3 weeks.    Pancreatic B-Cell Hodgkin's Lymphoma. Recently diagnosed in September 2021. Started on mini R-CHOP on 12/15/21. Has follow-up appointment tomorrow with Oncology. Continue Allopurinol as ordered. Discontinued Lorazepam due to non-use. Has Prochlorperazine.     Aortic Valve Stenosis and Diastolic Heart Failure with EF 60-65% on 9/23/21. Weights during TCU stay 254 lbs on 12/27/21-> 249.2 lbs on 1/3/22. Continue Furosemide as ordered.    Type 2 Diabetes Mellitus. Last A1C 5.0 on 11/22/21. Diet-controlled.     Obstructive Sleep Apnea on BiPAP. Followed by Sleep Clinic. Continue at home settings.    Hypothyroidism. Last TSH 2.68 on 11/22/21. Continue Levothyroxine as ordered.    Depression. Continue Citalopram as ordered.    Constipation. Continue Miralax and Senna-S as ordered.    Discharge Medications:  Current Outpatient Medications   Medication Sig Dispense Refill     acetaminophen (TYLENOL) 325 MG tablet Take 2 tablets (650 mg) by mouth every 4 hours as needed for mild pain       allopurinol  (ZYLOPRIM) 300 MG tablet Take 1 tablet (300 mg) by mouth daily 14 tablet 7     aspirin 81 MG tablet Take 1 tablet by mouth daily. 30 tablet 0     bisacodyl (DULCOLAX) 10 MG suppository Place 1 suppository (10 mg) rectally daily as needed for constipation       carboxymethylcellulose PF (REFRESH PLUS) 0.5 % ophthalmic solution Place 2 drops into both eyes 2 times daily       citalopram (CELEXA) 20 MG tablet Take 1 tablet (20 mg) by mouth daily 90 tablet 1     furosemide (LASIX) 20 MG tablet Take 1 tablet (20 mg) by mouth daily 30 tablet 0     guaiFENesin-dextromethorphan (ROBITUSSIN DM) 100-10 MG/5ML syrup Take 10 mLs by mouth every 4 hours as needed for cough       levothyroxine (SYNTHROID/LEVOTHROID) 200 MCG tablet TAKE ONE TABLET BY MOUTH ONE TIME DAILY  90 tablet 3     miconazole (MICATIN) 2 % external powder Apply topically 2 times daily       Multiple Vitamins-Minerals (PRESERVISION AREDS) CAPS Take 1 tablet by mouth 2 times daily        nystatin (MYCOSTATIN) 399229 UNIT/GM external powder Apply topically 2 times daily Under breasts and skin folds BID & BID PRN for redness 60 g 1     pantoprazole (PROTONIX) 40 MG EC tablet Take 1 tablet (40 mg) by mouth every morning (before breakfast) 90 tablet 3     polyethylene glycol (MIRALAX) 17 g packet Take 1 packet by mouth every evening       prochlorperazine (COMPAZINE) 10 MG tablet Take 1 tablet (10 mg) by mouth every 6 hours as needed (Nausea/Vomiting) 30 tablet 7     senna-docusate (SENOKOT-S/PERICOLACE) 8.6-50 MG tablet Take 1 tablet by mouth every morning       simvastatin (ZOCOR) 10 MG tablet Take 1 tablet (10 mg) by mouth At Bedtime 90 tablet 3     sucralfate (CARAFATE) 1 GM/10ML suspension Take 10 mLs (1 g) by mouth 4 times daily       ACE/ARB/ARNI NOT PRESCRIBED (INTENTIONAL) Please choose reason not prescribed, below (Patient not taking: Reported on 1/3/2022)       CONTOUR NEXT TEST test strip USE TO TEST BLOOD GLUCOSE ONCE DAILY       Microlet Lancets MISC  USE TO TEST BLOOD GLUCOSE ONCE DAILY       order for DME Equipment being ordered: wheeled walker with hand breaks 1 Device 0      Controlled medications:   not applicable/none     Past Medical History:   Past Medical History:   Diagnosis Date     Cancer (H) 10/2021     Depressive disorder      Heart disease 10/2021     History of blood transfusion 20/2021     HTN (hypertension) 5/9/2013     Hyperlipidemia LDL goal <130 5/9/2013     Hypothyroidism 5/9/2013     Macular degeneration 9/18/2013     Sleep apnea     CPAP at night, O2 during naps     Type 2 diabetes, HbA1C goal < 8% (H) 5/9/2013     Physical Exam:   Vitals: /60   Pulse 66   Temp 97.5  F (36.4  C)   Resp 18   Ht 1.524 m (5')   Wt 114.4 kg (252 lb 3.2 oz)   SpO2 98%   BMI 49.25 kg/m    BMI= Body mass index is 49.25 kg/m .  GENERAL APPEARANCE:  Alert, in no distress  ENT:  Mouth and posterior oropharynx normal, moist mucous membranes  EYES:  EOM, conjunctivae, lids, pupils and irises normal  RESP:  respiratory effort and palpation of chest normal, lungs clear to auscultation , no respiratory distress  CV:  Palpation and auscultation of heart done , regular rate and rhythm, no murmur, rub, or gallop  ABDOMEN:  normal bowel sounds, soft, nontender, no hepatosplenomegaly or other masses  M/S:   Trace edema in BLE  SKIN:  Inspection of skin and subcutaneous tissue baseline, Palpation of skin and subcutaneous tissue baseline  NEURO:   Cranial nerves 2-12 are normal tested and grossly at patient's baseline  PSYCH:  oriented to person     SNF labs: Labs done in SNF are in [facility]. Please refer to them using [facility]/Care Everywhere.    DISCHARGE PLAN:    Follow up labs: No labs orders/due    Medical Follow Up:      Follow up with primary care provider in 1 weeks    Current [place] scheduled appointments:  Next 5 appointments (look out 90 days)    Jan 04, 2022  9:00 AM  Return Visit with MD TUAN [facility] [place] ORDONEZ  [facility] ) [address], [address]  [facility] [place] [address] [phone]         Future Appointments   Date Time Provider Department Center   1/4/2022  8:30 AM  FAST TRACK LAB [facility]   1/4/2022  9:00 AM John Srinivasan MD [facility]   1/5/2022  8:30 AM  CANCER INFUSION NURSE [facility]   1/25/2022  8:30 AM  FAST TRACK LAB [facility]   1/26/2022  8:30 AM  CANCER INFUSION NURSE [facility]         Discharge Services: Home Care:  Physical Therapy, Home Health Aide and From:  [facility]    TOTAL DISCHARGE TIME:   Greater than 30 minutes  Electronically signed by:  ALIE Ledesma CNP

## 2022-01-03 NOTE — LETTER
1/3/2022        RE: Lucille Rojas  [address]  [facility] [address]        [facility] GERIATRIC [facility] DISCHARGE SUMMARY  PATIENT'S NAME: Lucille Rojas  YOB: 1940  MEDICAL RECORD NUMBER:  [id number]  Place of Service where encounter took place:  [facility] ([facility]) [[id number]]    PRIMARY CARE PROVIDER AND CLINIC RESPONSIBLE AFTER TRANSFER:   Fausto Flynn MD, [address] [facility] [address]    [facility] Provider     Transferring providers: ALIE Ledesma CNP, Lisandra Brown MD  Recent Hospitalization/ED:  Hospital  [facility] stay 12/21/21 to 12/27/21.  Date of SNF Admission: December 27, 2021  Date of SNF (anticipated) Discharge: January 05, 2022  Discharged to: previous independent home  Cognitive Scores: BIMS Score 15/15  Physical Function: Modified independence for lower body dressing with sock aide. Supervision for toileting tasks. Up/down 4 stairs with side rails. Ambulates up/down 4 stairs with side rails. Modified independence for functional transfers.    DME: Walker    CODE STATUS/ADVANCE DIRECTIVES DISCUSSION:  Full Code   ALLERGIES: Penicillins    NURSING FACILITY COURSE   Medication Changes/Rationale:     Discontinue DuoNeb and Lorazepam due to non-use 1/3/22    Summary of nursing facility stay:   This is an 81-year-old female, with a past medical history significant for chronic hypoxic and hypercapnic respiratory failure on chronic supplemental Oxygen, aortic stenosis, diastolic heart failure with EF 60-65% on 9/23/21, recent diagnosis of pancreatic B cell lymphoma, iron deficiency, SHAVON on BiPAP, hypothyroidism and depression, who was admitted to [facility] 12/21/21 through 12/27/21 for weakness with a cough, rhinorrhea and congestion. Chest x-ray and CTPE with no acute findings. COVID and influenza negative. Thought to be viral URI with reactive airway component. Labs revealed  Hemoglobin 7.7 with recent black stool. Gastroenterology was consulted and an EGD on 12/22/21 revealed multiple erosions with no active bleeding. Received IV Venofer. Sucralfate ordered x 2 weeks. A TCU stay was recommended for ongoing physical rehabilitation.     Acute on Chronic Respiratory Failure with Hypoxia and Hypercapnia on Chronic Oxygen Supplementation. Thought to be a viral upper respiratory infection with a reactive airway component. Was on supplemental Oxygen 2 L at baseline. Oxygen needs have returned to baseline. Will discontinue DuoNeb due to non-use. .    Acute on Chronic Iron Deficiency with Recent Blood Loss Anemia. Admitted to the hospital in September 2021 with Hemoglobin 4.7 and severe iron deficiency requiring pRBCs. An EGD on 12/22/21 revealed multiple erosions with no active bleeding. Received IV Venofer during most recent hospitalization. Started on Sucralfate x 2 weeks. Continue Pantoprazole as ordered. Follow-up with [facility]. Consider colonoscopy if no improvement in anemia in 2-3 weeks.    Pancreatic B-Cell Hodgkin's Lymphoma. Recently diagnosed in September 2021. Started on mini R-CHOP on 12/15/21. Has follow-up appointment tomorrow with Oncology. Continue Allopurinol as ordered. Discontinued Lorazepam due to non-use. Has Prochlorperazine.     Aortic Valve Stenosis and Diastolic Heart Failure with EF 60-65% on 9/23/21. Weights during TCU stay 254 lbs on 12/27/21-> 249.2 lbs on 1/3/22. Continue Furosemide as ordered.    Type 2 Diabetes Mellitus. Last A1C 5.0 on 11/22/21. Diet-controlled.     Obstructive Sleep Apnea on BiPAP. Followed by Sleep Clinic. Continue at home settings.    Hypothyroidism. Last TSH 2.68 on 11/22/21. Continue Levothyroxine as ordered.    Depression. Continue Citalopram as ordered.    Constipation. Continue Miralax and Senna-S as ordered.    Discharge Medications:  Current Outpatient Medications   Medication Sig Dispense Refill     acetaminophen (TYLENOL) 325 MG tablet  Take 2 tablets (650 mg) by mouth every 4 hours as needed for mild pain       allopurinol (ZYLOPRIM) 300 MG tablet Take 1 tablet (300 mg) by mouth daily 14 tablet 7     aspirin 81 MG tablet Take 1 tablet by mouth daily. 30 tablet 0     bisacodyl (DULCOLAX) 10 MG suppository Place 1 suppository (10 mg) rectally daily as needed for constipation       carboxymethylcellulose PF (REFRESH PLUS) 0.5 % ophthalmic solution Place 2 drops into both eyes 2 times daily       citalopram (CELEXA) 20 MG tablet Take 1 tablet (20 mg) by mouth daily 90 tablet 1     furosemide (LASIX) 20 MG tablet Take 1 tablet (20 mg) by mouth daily 30 tablet 0     guaiFENesin-dextromethorphan (ROBITUSSIN DM) 100-10 MG/5ML syrup Take 10 mLs by mouth every 4 hours as needed for cough       levothyroxine (SYNTHROID/LEVOTHROID) 200 MCG tablet TAKE ONE TABLET BY MOUTH ONE TIME DAILY  90 tablet 3     miconazole (MICATIN) 2 % external powder Apply topically 2 times daily       Multiple Vitamins-Minerals (PRESERVISION AREDS) CAPS Take 1 tablet by mouth 2 times daily        nystatin (MYCOSTATIN) 084266 UNIT/GM external powder Apply topically 2 times daily Under breasts and skin folds BID & BID PRN for redness 60 g 1     pantoprazole (PROTONIX) 40 MG EC tablet Take 1 tablet (40 mg) by mouth every morning (before breakfast) 90 tablet 3     polyethylene glycol (MIRALAX) 17 g packet Take 1 packet by mouth every evening       prochlorperazine (COMPAZINE) 10 MG tablet Take 1 tablet (10 mg) by mouth every 6 hours as needed (Nausea/Vomiting) 30 tablet 7     senna-docusate (SENOKOT-S/PERICOLACE) 8.6-50 MG tablet Take 1 tablet by mouth every morning       simvastatin (ZOCOR) 10 MG tablet Take 1 tablet (10 mg) by mouth At Bedtime 90 tablet 3     sucralfate (CARAFATE) 1 GM/10ML suspension Take 10 mLs (1 g) by mouth 4 times daily       ACE/ARB/ARNI NOT PRESCRIBED (INTENTIONAL) Please choose reason not prescribed, below (Patient not taking: Reported on 1/3/2022)        CONTOUR NEXT TEST test strip USE TO TEST BLOOD GLUCOSE ONCE DAILY       Microlet Lancets MISC USE TO TEST BLOOD GLUCOSE ONCE DAILY       order for DME Equipment being ordered: wheeled walker with hand breaks 1 Device 0      Controlled medications:   not applicable/none     Past Medical History:   Past Medical History:   Diagnosis Date     Cancer (H) 10/2021     Depressive disorder      Heart disease 10/2021     History of blood transfusion 20/2021     HTN (hypertension) 5/9/2013     Hyperlipidemia LDL goal <130 5/9/2013     Hypothyroidism 5/9/2013     Macular degeneration 9/18/2013     Sleep apnea     CPAP at night, O2 during naps     Type 2 diabetes, HbA1C goal < 8% (H) 5/9/2013     Physical Exam:   Vitals: /60   Pulse 66   Temp 97.5  F (36.4  C)   Resp 18   Ht 1.524 m (5')   Wt 114.4 kg (252 lb 3.2 oz)   SpO2 98%   BMI 49.25 kg/m    BMI= Body mass index is 49.25 kg/m .  GENERAL APPEARANCE:  Alert, in no distress  ENT:  Mouth and posterior oropharynx normal, moist mucous membranes  EYES:  EOM, conjunctivae, lids, pupils and irises normal  RESP:  respiratory effort and palpation of chest normal, lungs clear to auscultation , no respiratory distress  CV:  Palpation and auscultation of heart done , regular rate and rhythm, no murmur, rub, or gallop  ABDOMEN:  normal bowel sounds, soft, nontender, no hepatosplenomegaly or other masses  M/S:   Trace edema in BLE  SKIN:  Inspection of skin and subcutaneous tissue baseline, Palpation of skin and subcutaneous tissue baseline  NEURO:   Cranial nerves 2-12 are normal tested and grossly at patient's baseline  PSYCH:  oriented to person     SNF labs: Labs done in SNF are in [place] [facility]. Please refer to them using [facility]/Care Everywhere.    DISCHARGE PLAN:    Follow up labs: No labs orders/due    Medical Follow Up:      Follow up with primary care provider in 1 weeks    Current [place] scheduled appointments:  Next 5 appointments (look out 90 days)    Jan 04, 2022   9:00 AM  Return Visit with John Srinivasan MD  [facility] ([facility] ) [address], [address]  [facility] [address]  [phone]         Future Appointments   Date Time Provider Department Center   1/4/2022  8:30 AM  FAST TRACK LAB [facility]   1/4/2022  9:00 AM John Srinivasan MD [facility]   1/5/2022  8:30 AM  CANCER INFUSION NURSE [facility]   1/25/2022  8:30 AM  FAST TRACK LAB [facility]   1/26/2022  8:30 AM  CANCER INFUSION NURSE [facility]         Discharge Services: Home Care:  Physical Therapy, Home Health Aide and From:  [facility]    TOTAL DISCHARGE TIME:   Greater than 30 minutes  Electronically signed by:  ALIE Ledesma CNP                   Sincerely,        ALIE Ledesma CNP

## 2022-01-04 ENCOUNTER — ONCOLOGY VISIT (OUTPATIENT)
Dept: ONCOLOGY | Facility: CLINIC | Age: 82
End: 2022-01-04
Attending: INTERNAL MEDICINE
Payer: MEDICARE

## 2022-01-04 ENCOUNTER — LAB (OUTPATIENT)
Dept: INFUSION THERAPY | Facility: CLINIC | Age: 82
End: 2022-01-04
Attending: INTERNAL MEDICINE
Payer: MEDICARE

## 2022-01-04 VITALS
HEART RATE: 74 BPM | RESPIRATION RATE: 18 BRPM | OXYGEN SATURATION: 98 % | SYSTOLIC BLOOD PRESSURE: 130 MMHG | DIASTOLIC BLOOD PRESSURE: 68 MMHG

## 2022-01-04 DIAGNOSIS — C85.12 B-CELL LYMPHOMA OF INTRATHORACIC LYMPH NODES, UNSPECIFIED B-CELL LYMPHOMA TYPE (H): ICD-10-CM

## 2022-01-04 DIAGNOSIS — M62.81 GENERALIZED MUSCLE WEAKNESS: ICD-10-CM

## 2022-01-04 DIAGNOSIS — C85.12 B-CELL LYMPHOMA OF INTRATHORACIC LYMPH NODES, UNSPECIFIED B-CELL LYMPHOMA TYPE (H): Primary | ICD-10-CM

## 2022-01-04 DIAGNOSIS — Z51.89 ENCOUNTER FOR OTHER SPECIFIED AFTERCARE: Primary | ICD-10-CM

## 2022-01-04 DIAGNOSIS — C85.99 NON-HODGKIN LYMPHOMA OF SOLID ORGAN EXCLUDING SPLEEN, UNSPECIFIED NON-HODGKIN LYMPHOMA TYPE (H): ICD-10-CM

## 2022-01-04 LAB
ALBUMIN SERPL-MCNC: 3.1 G/DL (ref 3.4–5)
ALP SERPL-CCNC: 103 U/L (ref 40–150)
ALT SERPL W P-5'-P-CCNC: 29 U/L (ref 0–50)
ANION GAP SERPL CALCULATED.3IONS-SCNC: 3 MMOL/L (ref 3–14)
AST SERPL W P-5'-P-CCNC: 11 U/L (ref 0–45)
BASOPHILS # BLD AUTO: 0.1 10E3/UL (ref 0–0.2)
BASOPHILS NFR BLD AUTO: 1 %
BILIRUB SERPL-MCNC: 0.2 MG/DL (ref 0.2–1.3)
BUN SERPL-MCNC: 33 MG/DL (ref 7–30)
CALCIUM SERPL-MCNC: 8.7 MG/DL (ref 8.5–10.1)
CHLORIDE BLD-SCNC: 103 MMOL/L (ref 94–109)
CO2 SERPL-SCNC: 33 MMOL/L (ref 20–32)
CREAT SERPL-MCNC: 1.03 MG/DL (ref 0.52–1.04)
EOSINOPHIL # BLD AUTO: 0.2 10E3/UL (ref 0–0.7)
EOSINOPHIL NFR BLD AUTO: 2 %
ERYTHROCYTE [DISTWIDTH] IN BLOOD BY AUTOMATED COUNT: 18.3 % (ref 10–15)
GFR SERPL CREATININE-BSD FRML MDRD: 54 ML/MIN/1.73M2
GLUCOSE BLD-MCNC: 130 MG/DL (ref 70–99)
HCT VFR BLD AUTO: 32.5 % (ref 35–47)
HGB BLD-MCNC: 9.2 G/DL (ref 11.7–15.7)
IMM GRANULOCYTES # BLD: 0.3 10E3/UL
IMM GRANULOCYTES NFR BLD: 3 %
LYMPHOCYTES # BLD AUTO: 1.2 10E3/UL (ref 0.8–5.3)
LYMPHOCYTES NFR BLD AUTO: 14 %
MCH RBC QN AUTO: 25.8 PG (ref 26.5–33)
MCHC RBC AUTO-ENTMCNC: 28.3 G/DL (ref 31.5–36.5)
MCV RBC AUTO: 91 FL (ref 78–100)
MONOCYTES # BLD AUTO: 1.3 10E3/UL (ref 0–1.3)
MONOCYTES NFR BLD AUTO: 15 %
NEUTROPHILS # BLD AUTO: 5.8 10E3/UL (ref 1.6–8.3)
NEUTROPHILS NFR BLD AUTO: 65 %
NRBC # BLD AUTO: 0 10E3/UL
NRBC BLD AUTO-RTO: 0 /100
PLATELET # BLD AUTO: 337 10E3/UL (ref 150–450)
POTASSIUM BLD-SCNC: 3.7 MMOL/L (ref 3.4–5.3)
PROT SERPL-MCNC: 6.4 G/DL (ref 6.8–8.8)
RBC # BLD AUTO: 3.56 10E6/UL (ref 3.8–5.2)
SODIUM SERPL-SCNC: 139 MMOL/L (ref 133–144)
WBC # BLD AUTO: 8.8 10E3/UL (ref 4–11)

## 2022-01-04 PROCEDURE — 82040 ASSAY OF SERUM ALBUMIN: CPT | Performed by: INTERNAL MEDICINE

## 2022-01-04 PROCEDURE — 80053 COMPREHEN METABOLIC PANEL: CPT | Performed by: INTERNAL MEDICINE

## 2022-01-04 PROCEDURE — 99215 OFFICE O/P EST HI 40 MIN: CPT | Performed by: INTERNAL MEDICINE

## 2022-01-04 PROCEDURE — 85014 HEMATOCRIT: CPT | Performed by: INTERNAL MEDICINE

## 2022-01-04 PROCEDURE — 250N000011 HC RX IP 250 OP 636: Performed by: INTERNAL MEDICINE

## 2022-01-04 PROCEDURE — 36591 DRAW BLOOD OFF VENOUS DEVICE: CPT

## 2022-01-04 RX ORDER — HEPARIN SODIUM,PORCINE 10 UNIT/ML
5 VIAL (ML) INTRAVENOUS
Status: CANCELLED | OUTPATIENT
Start: 2022-01-05

## 2022-01-04 RX ORDER — MEPERIDINE HYDROCHLORIDE 25 MG/ML
25 INJECTION INTRAMUSCULAR; INTRAVENOUS; SUBCUTANEOUS EVERY 30 MIN PRN
Status: CANCELLED | OUTPATIENT
Start: 2022-01-05

## 2022-01-04 RX ORDER — DIPHENHYDRAMINE HYDROCHLORIDE 50 MG/ML
50 INJECTION INTRAMUSCULAR; INTRAVENOUS
Status: CANCELLED
Start: 2022-01-05

## 2022-01-04 RX ORDER — HEPARIN SODIUM,PORCINE 10 UNIT/ML
5 VIAL (ML) INTRAVENOUS
Status: CANCELLED | OUTPATIENT
Start: 2022-01-04

## 2022-01-04 RX ORDER — NALOXONE HYDROCHLORIDE 0.4 MG/ML
0.2 INJECTION, SOLUTION INTRAMUSCULAR; INTRAVENOUS; SUBCUTANEOUS
Status: CANCELLED | OUTPATIENT
Start: 2022-01-05

## 2022-01-04 RX ORDER — ACETAMINOPHEN 325 MG/1
650 TABLET ORAL ONCE
Status: CANCELLED | OUTPATIENT
Start: 2022-01-05

## 2022-01-04 RX ORDER — HEPARIN SODIUM (PORCINE) LOCK FLUSH IV SOLN 100 UNIT/ML 100 UNIT/ML
5 SOLUTION INTRAVENOUS
Status: DISCONTINUED | OUTPATIENT
Start: 2022-01-04 | End: 2022-01-04 | Stop reason: HOSPADM

## 2022-01-04 RX ORDER — ALBUTEROL SULFATE 90 UG/1
1-2 AEROSOL, METERED RESPIRATORY (INHALATION)
Status: CANCELLED
Start: 2022-01-05

## 2022-01-04 RX ORDER — DOXORUBICIN HYDROCHLORIDE 2 MG/ML
25 INJECTION, SOLUTION INTRAVENOUS ONCE
Status: CANCELLED | OUTPATIENT
Start: 2022-01-05

## 2022-01-04 RX ORDER — ALBUTEROL SULFATE 0.83 MG/ML
2.5 SOLUTION RESPIRATORY (INHALATION)
Status: CANCELLED | OUTPATIENT
Start: 2022-01-05

## 2022-01-04 RX ORDER — PALONOSETRON 0.05 MG/ML
0.25 INJECTION, SOLUTION INTRAVENOUS ONCE
Status: CANCELLED | OUTPATIENT
Start: 2022-01-05

## 2022-01-04 RX ORDER — MEPERIDINE HYDROCHLORIDE 25 MG/ML
25 INJECTION INTRAMUSCULAR; INTRAVENOUS; SUBCUTANEOUS
Status: CANCELLED
Start: 2022-01-05

## 2022-01-04 RX ORDER — DIPHENHYDRAMINE HCL 25 MG
50 CAPSULE ORAL ONCE
Status: CANCELLED
Start: 2022-01-05

## 2022-01-04 RX ORDER — LORAZEPAM 2 MG/ML
0.5 INJECTION INTRAMUSCULAR EVERY 4 HOURS PRN
Status: CANCELLED | OUTPATIENT
Start: 2022-01-05

## 2022-01-04 RX ORDER — HEPARIN SODIUM (PORCINE) LOCK FLUSH IV SOLN 100 UNIT/ML 100 UNIT/ML
5 SOLUTION INTRAVENOUS
Status: CANCELLED | OUTPATIENT
Start: 2022-01-05

## 2022-01-04 RX ORDER — HEPARIN SODIUM (PORCINE) LOCK FLUSH IV SOLN 100 UNIT/ML 100 UNIT/ML
5 SOLUTION INTRAVENOUS
Status: CANCELLED | OUTPATIENT
Start: 2022-01-04

## 2022-01-04 RX ORDER — METHYLPREDNISOLONE SODIUM SUCCINATE 125 MG/2ML
125 INJECTION, POWDER, LYOPHILIZED, FOR SOLUTION INTRAMUSCULAR; INTRAVENOUS
Status: CANCELLED
Start: 2022-01-05

## 2022-01-04 RX ORDER — EPINEPHRINE 1 MG/ML
0.3 INJECTION, SOLUTION INTRAMUSCULAR; SUBCUTANEOUS EVERY 5 MIN PRN
Status: CANCELLED | OUTPATIENT
Start: 2022-01-05

## 2022-01-04 RX ADMIN — Medication 5 ML: at 08:36

## 2022-01-04 ASSESSMENT — PAIN SCALES - GENERAL: PAINLEVEL: NO PAIN (0)

## 2022-01-04 NOTE — LETTER
1/4/2022         RE: Lucille Rojas  [address]  [place] [address]        Dear Colleague,    Thank you for referring your patient, Lucille Rojas, to the [facility]. Please see a copy of my visit note below.    ONCOLOGY HISTORY: Ms. 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  1. On 09/20/2021:   -Hemoglobin of 4.7 with MCV of 67. Normal WBC and platelets.  -Iron of 9 and saturation index of 2%.   -CT chest, abdomen and pelvis reveals large pancreatic head mass.  This encases the celiac trunk, superior mesenteric artery and superior mesenteric vein.  There is moderate-size right pleural effusion. No lymphadenopathy.  2. Thoracocentesis on 09/22/2021. Cytology is negative for malignancy.  3. EUS on 09/21/2021 revealed is a mass in the uncinate process of the pancreas measuring 33 mm.  There was evidence of invasion into superior mesenteric artery and the celiac trunk. No enlarged lymph nodes seen. FNA revealed atypical cells.    4. EGD and EUS repeated on 10/05/2021.  -EGD is normal.  -EUS revealed pancreatic head mass.  FNA revealed CD10-positive B-cell lymphoma. Flow cytometry revealed CD10-positive lambda monotypic B-cells.  5. PET scan on 11/18/2021 revealed 6 cm hypermetabolic pancreatic head mass and borderline hypermetabolic right axillary lymph node.   -Further repeat biopsy not done because of her overall poor health.  6. mini R-CHOP on 12/15/2021.  -CT scan on 12/21/21 reveals improvement in disease.    SUBJECTIVE:  Ms. 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  She has stage IV disease.  The patient is started on mini R-CHOP on 12/15/2021.  The patient was in hospital for a few days at the end of December because of respiratory distress and anemia.     The patient is currently in a nursing home.  She will be going home tomorrow.  She is improving.  She is  getting stronger.  She walks a few steps with help of a walker.  As per the family, overall patient's condition is better.     No headache.  No dizziness.  No chest pain.  No worsening.  No shortness of breath.  No nausea or vomiting.  Appetite has been good. No severe abdominal pain.  No bleeding.  No fever or chills.       All other review of systems is negative.     PHYSICAL EXAMINATION:    GENERAL:  She is alert and oriented x 3.  Not in respiratory distress.  ECOG PS of 2.   FACE:  No swelling.  EYES:  No icterus.   THROAT:  No ulcer or thrush.  NECK:  Supple. No lymphadenopathy.  LUNGS:  Decreased air entry at the bases.  HEART:  Regular.  ABDOMEN:  Soft. Nontender.  Difficult palpation because of her weight.  No mass.  EXTREMITIES:  Mild ankle edema.  SKIN:  No petechiae.     LABORATORY:  CBC and CMP reviewed.     ASSESSMENT:    1.  An 81-year-old female with stage IV non-Hodgkin B-cell lymphoma.  2.  Anemia.  3.  Fatigue.     PLAN:     1.  The patient's overall condition is stable.  The patient says that in last few days, she has been improving.  Overall, she tolerated the last cycle of chemotherapy well.  She did not have any significant side effects like nausea or vomiting.  No neutropenic fever.  2.  Discussed regarding chemotherapy.  The patient wants to continue chemotherapy.  She will get cycle 2 of mini R-CHOP tomorrow.  3.  She had CT chest, abdomen, and pelvis on 12/21/2021.  It revealed improvement in her pancreatic mass  4.  The patient is at high risk for complication.  She will be seen next week by our nurse practitioner with labs.  The patient advised to call us with any questions or concerns.     TOTAL TIME SPENT:  45 minutes.  Time spent in today's visit, review of chart/investigations today, and documentation.    This office note has been dictated.          Again, thank you for allowing me to participate in the care of your patient.        Sincerely,        John Srinivasan MD

## 2022-01-04 NOTE — PATIENT INSTRUCTIONS
1.  Continue chemotherapy.  2.  See nurse practitioner next week with labs.  3.  See me with next cycle of chemotherapy.

## 2022-01-04 NOTE — PROGRESS NOTES
Nursing Note:  Lucille Rojas presents today for port labs.    Patient seen by provider today: yes: Dr. Srinivasan after labs   present during visit today: Not Applicable.    Note: no new concerns.    Intravenous Access:  Implanted Port.    Discharge Plan:   Patient was sent to lobby for provider appointment.    Bryanna Rivera RN

## 2022-01-05 ENCOUNTER — INFUSION THERAPY VISIT (OUTPATIENT)
Dept: INFUSION THERAPY | Facility: CLINIC | Age: 82
End: 2022-01-05
Attending: INTERNAL MEDICINE
Payer: MEDICARE

## 2022-01-05 VITALS
DIASTOLIC BLOOD PRESSURE: 79 MMHG | OXYGEN SATURATION: 90 % | HEART RATE: 100 BPM | BODY MASS INDEX: 49.49 KG/M2 | TEMPERATURE: 97.8 F | WEIGHT: 253.4 LBS | SYSTOLIC BLOOD PRESSURE: 133 MMHG | RESPIRATION RATE: 18 BRPM

## 2022-01-05 DIAGNOSIS — D62 ACUTE ON CHRONIC BLOOD LOSS ANEMIA: ICD-10-CM

## 2022-01-05 DIAGNOSIS — C85.12 B-CELL LYMPHOMA OF INTRATHORACIC LYMPH NODES, UNSPECIFIED B-CELL LYMPHOMA TYPE (H): ICD-10-CM

## 2022-01-05 DIAGNOSIS — C85.99 NON-HODGKIN LYMPHOMA OF SOLID ORGAN EXCLUDING SPLEEN, UNSPECIFIED NON-HODGKIN LYMPHOMA TYPE (H): ICD-10-CM

## 2022-01-05 DIAGNOSIS — Z51.89 ENCOUNTER FOR OTHER SPECIFIED AFTERCARE: Primary | ICD-10-CM

## 2022-01-05 PROCEDURE — 258N000003 HC RX IP 258 OP 636: Performed by: INTERNAL MEDICINE

## 2022-01-05 PROCEDURE — 96367 TX/PROPH/DG ADDL SEQ IV INF: CPT

## 2022-01-05 PROCEDURE — 250N000013 HC RX MED GY IP 250 OP 250 PS 637: Performed by: INTERNAL MEDICINE

## 2022-01-05 PROCEDURE — 250N000011 HC RX IP 250 OP 636: Performed by: INTERNAL MEDICINE

## 2022-01-05 PROCEDURE — 96377 APPLICATON ON-BODY INJECTOR: CPT | Mod: XS

## 2022-01-05 PROCEDURE — 96372 THER/PROPH/DIAG INJ SC/IM: CPT | Performed by: INTERNAL MEDICINE

## 2022-01-05 PROCEDURE — 96415 CHEMO IV INFUSION ADDL HR: CPT

## 2022-01-05 PROCEDURE — 96413 CHEMO IV INFUSION 1 HR: CPT

## 2022-01-05 PROCEDURE — 96411 CHEMO IV PUSH ADDL DRUG: CPT

## 2022-01-05 PROCEDURE — 96375 TX/PRO/DX INJ NEW DRUG ADDON: CPT

## 2022-01-05 PROCEDURE — 96417 CHEMO IV INFUS EACH ADDL SEQ: CPT

## 2022-01-05 RX ORDER — DIPHENHYDRAMINE HCL 25 MG
50 CAPSULE ORAL ONCE
Status: COMPLETED | OUTPATIENT
Start: 2022-01-05 | End: 2022-01-05

## 2022-01-05 RX ORDER — PREDNISONE 50 MG/1
40 TABLET ORAL DAILY
Qty: 10 TABLET | Refills: 0 | Status: SHIPPED | OUTPATIENT
Start: 2022-01-05 | End: 2022-01-10

## 2022-01-05 RX ORDER — HEPARIN SODIUM (PORCINE) LOCK FLUSH IV SOLN 100 UNIT/ML 100 UNIT/ML
5 SOLUTION INTRAVENOUS
Status: DISCONTINUED | OUTPATIENT
Start: 2022-01-05 | End: 2022-01-05 | Stop reason: HOSPADM

## 2022-01-05 RX ORDER — DOXORUBICIN HYDROCHLORIDE 2 MG/ML
25 INJECTION, SOLUTION INTRAVENOUS ONCE
Status: COMPLETED | OUTPATIENT
Start: 2022-01-05 | End: 2022-01-05

## 2022-01-05 RX ORDER — PALONOSETRON 0.05 MG/ML
0.25 INJECTION, SOLUTION INTRAVENOUS ONCE
Status: COMPLETED | OUTPATIENT
Start: 2022-01-05 | End: 2022-01-05

## 2022-01-05 RX ORDER — ACETAMINOPHEN 325 MG/1
650 TABLET ORAL ONCE
Status: COMPLETED | OUTPATIENT
Start: 2022-01-05 | End: 2022-01-05

## 2022-01-05 RX ADMIN — PALONOSETRON HYDROCHLORIDE 0.25 MG: 0.25 INJECTION INTRAVENOUS at 09:07

## 2022-01-05 RX ADMIN — ACETAMINOPHEN 650 MG: 325 TABLET, FILM COATED ORAL at 09:07

## 2022-01-05 RX ADMIN — FOSAPREPITANT 150 MG: 150 INJECTION, POWDER, LYOPHILIZED, FOR SOLUTION INTRAVENOUS at 09:07

## 2022-01-05 RX ADMIN — VINCRISTINE SULFATE 1 MG: 1 INJECTION, SOLUTION INTRAVENOUS at 10:10

## 2022-01-05 RX ADMIN — CYCLOPHOSPHAMIDE 895 MG: 1 INJECTION, POWDER, FOR SOLUTION INTRAVENOUS; ORAL at 10:19

## 2022-01-05 RX ADMIN — PEGFILGRASTIM 6 MG: KIT SUBCUTANEOUS at 12:49

## 2022-01-05 RX ADMIN — DOXORUBICIN HYDROCHLORIDE 55 MG: 2 INJECTION, SOLUTION INTRAVENOUS at 10:01

## 2022-01-05 RX ADMIN — SODIUM CHLORIDE 250 ML: 9 INJECTION, SOLUTION INTRAVENOUS at 09:07

## 2022-01-05 RX ADMIN — DIPHENHYDRAMINE HYDROCHLORIDE 50 MG: 25 CAPSULE ORAL at 09:07

## 2022-01-05 RX ADMIN — RITUXIMAB-ABBS 800 MG: 10 INJECTION, SOLUTION INTRAVENOUS at 10:53

## 2022-01-05 RX ADMIN — HEPARIN 5 ML: 100 SYRINGE at 14:32

## 2022-01-05 ASSESSMENT — PAIN SCALES - GENERAL: PAINLEVEL: NO PAIN (0)

## 2022-01-05 NOTE — PATIENT INSTRUCTIONS
Your On-body Neulasta Injector was applied to your left arm at 1255 PM.  At approximately 3:55 PM on 1/6/22, your On-body Injector will beep to let you know your dose delivery will begin in 2 minutes.  Your medication will be delivered over the next 45 minutes.  You can remove your Injector at 5 PM on 1/6/22.  Please make sure your Injector has a solid green light or has turned off prior to removing the device.  Please contact your provider at [phone] with questions or concerns.

## 2022-01-05 NOTE — PROGRESS NOTES
Infusion Nursing Note:  Lucille Rojas presents today for C2D1 [facility].    Patient seen by provider today: No   present during visit today: Not Applicable.    Note: no new concerns today.      Intravenous Access:  Implanted Port.    Treatment Conditions:  Lab Results   Component Value Date    HGB 9.2 (L) 01/04/2022    WBC 8.8 01/04/2022    ANEU 5.1 11/18/2016    ANEUTAUTO 5.8 01/04/2022     01/04/2022      Lab Results   Component Value Date     01/04/2022    POTASSIUM 3.7 01/04/2022    CR 1.03 01/04/2022    IGOR 8.7 01/04/2022    BILITOTAL 0.2 01/04/2022    ALBUMIN 3.1 (L) 01/04/2022    ALT 29 01/04/2022    AST 11 01/04/2022     Results reviewed, labs MET treatment parameters, ok to proceed with treatment.    ONPRO  Was placed on patient's: back of left arm.    Was placed at 1255 PM    ONPRO injector device Lot number: [id number]    Patient education included: what patient can expect after application, what colored lights mean on the device, when to remove device, when and where to call with questions or issues, all patients questions answered and that Neulasta administration will occur at 355PM on 1/6/22.    Patient tolerated administration well.      Post Infusion Assessment:  Patient tolerated infusion without incident.  Blood return noted pre and post infusion.  Site patent and intact, free from redness, edema or discomfort.  No evidence of extravasations.  Access discontinued per protocol.       Discharge Plan:   Discharge instructions reviewed with: Patient and Family.  Patient and/or family verbalized understanding of discharge instructions and all questions answered.  Copy of AVS reviewed with patient and/or family.  Patient will return 1/26 for next appointment.  Patient discharged in stable condition accompanied by: self,  and daughter.  Departure Mode: Wheelchair.      Bryanna Rivera RN

## 2022-01-06 ENCOUNTER — TELEPHONE (OUTPATIENT)
Dept: INTERNAL MEDICINE | Facility: CLINIC | Age: 82
End: 2022-01-06
Payer: MEDICARE

## 2022-01-06 DIAGNOSIS — D62 ACUTE ON CHRONIC BLOOD LOSS ANEMIA: ICD-10-CM

## 2022-01-06 RX ORDER — SUCRALFATE ORAL 1 G/10ML
1 SUSPENSION ORAL 4 TIMES DAILY
Qty: 420 ML | Refills: 1 | Status: SHIPPED | OUTPATIENT
Start: 2022-01-06 | End: 2022-01-25

## 2022-01-06 RX ORDER — SUCRALFATE ORAL 1 G/10ML
1 SUSPENSION ORAL 4 TIMES DAILY
Qty: 10 ML | Refills: 1 | Status: SHIPPED | OUTPATIENT
Start: 2022-01-06 | End: 2022-01-06

## 2022-01-06 RX ORDER — SUCRALFATE 1 G/1
1 TABLET ORAL 4 TIMES DAILY
Qty: 56 TABLET | Refills: 0 | Status: SHIPPED | OUTPATIENT
Start: 2022-01-06 | End: 2022-01-25

## 2022-01-06 NOTE — TELEPHONE ENCOUNTER
This medicine was not prescribed by me but was prescribed on discharge.  Please see discharge summary recommendations of Carafate for 2 weeks.  Suggest filling for that amount

## 2022-01-06 NOTE — TELEPHONE ENCOUNTER
Pharmacist from [facility] pharmacy calling regarding quantity on the sucralfate rx that was sent over today.    rx states sucralfate 1 gm/10 ml solution take 10 ml by mouth 4 times daily with quantity of 10 ml sent to pharmacy which is 1 dose.    Does pcp want pt on this medication?  Please send new rx with appropriate quantity.    Order pended.    Liz MULLER RN  EP Triage

## 2022-01-06 NOTE — TELEPHONE ENCOUNTER
Prescription approved per [facility] Refill Protocol.  Viviana Lance RN  [facility]

## 2022-01-06 NOTE — TELEPHONE ENCOUNTER
Pharmacy called back, liquid not covered tablets are covered by insurance.     Medication pended as tabs with same dose for 2 week supply    Roger Muller RN

## 2022-01-07 ENCOUNTER — TELEPHONE (OUTPATIENT)
Dept: INTERNAL MEDICINE | Facility: CLINIC | Age: 82
End: 2022-01-07
Payer: MEDICARE

## 2022-01-07 NOTE — TELEPHONE ENCOUNTER
Abimbola - RN with [facility] - callling to [facility] PCP pt is refusing to take Allopurinol at home.     Also requesting PT/OT orders for Eval & Treatment, HHA and Skilled Nursing visits 1x week/8 weeks     Last OV with PCP 12/16/21, Patient discharged from TCU 1/5/22.    Can contact Abimbola ANN at [phone], OK to leave detailed message on confidential phone.

## 2022-01-11 ENCOUNTER — TELEPHONE (OUTPATIENT)
Dept: INTERNAL MEDICINE | Facility: CLINIC | Age: 82
End: 2022-01-11
Payer: MEDICARE

## 2022-01-11 NOTE — TELEPHONE ENCOUNTER
Detailed message left with verbal orders on Abimbola's ( [facility]) confidential voicemail. No need to place orders since verbals were given.     Annalee Elizabeth RN

## 2022-01-11 NOTE — TELEPHONE ENCOUNTER
Winter RN with [facility], called requestin. Orders for nursing home care to begin  2. Pt concerned about past hospitalizations d/t constipation and wants something else ordered PRN along with what she has ordered daily.   3. Wants a probiotic ordered as well.     Winter number: [phone], confidential    Livia SHIN RN  [facility]

## 2022-01-11 NOTE — TELEPHONE ENCOUNTER
Okay to approve nursing care orders to begin.  Patient can obtain probiotic over-the-counter as well as additional stool softeners are available also over-the-counter.

## 2022-01-11 NOTE — TELEPHONE ENCOUNTER
Called Winter with  [facility] and notified her of the message below from Dr. Flynn.     Annalee Elizabeth RN

## 2022-01-12 DIAGNOSIS — Z53.9 DIAGNOSIS NOT YET DEFINED: Primary | ICD-10-CM

## 2022-01-12 PROCEDURE — G0180 MD CERTIFICATION HHA PATIENT: HCPCS | Performed by: INTERNAL MEDICINE

## 2022-01-13 ENCOUNTER — MEDICAL CORRESPONDENCE (OUTPATIENT)
Dept: HEALTH INFORMATION MANAGEMENT | Facility: CLINIC | Age: 82
End: 2022-01-13
Payer: MEDICARE

## 2022-01-14 ENCOUNTER — INFUSION THERAPY VISIT (OUTPATIENT)
Dept: INFUSION THERAPY | Facility: CLINIC | Age: 82
End: 2022-01-14
Attending: INTERNAL MEDICINE
Payer: MEDICARE

## 2022-01-14 ENCOUNTER — ONCOLOGY VISIT (OUTPATIENT)
Dept: ONCOLOGY | Facility: CLINIC | Age: 82
End: 2022-01-14
Attending: INTERNAL MEDICINE
Payer: MEDICARE

## 2022-01-14 VITALS
HEART RATE: 72 BPM | DIASTOLIC BLOOD PRESSURE: 71 MMHG | SYSTOLIC BLOOD PRESSURE: 115 MMHG | HEIGHT: 60 IN | BODY MASS INDEX: 48.73 KG/M2 | OXYGEN SATURATION: 96 % | RESPIRATION RATE: 16 BRPM | TEMPERATURE: 97.9 F | WEIGHT: 248.2 LBS

## 2022-01-14 DIAGNOSIS — C85.99 NON-HODGKIN LYMPHOMA OF SOLID ORGAN EXCLUDING SPLEEN, UNSPECIFIED NON-HODGKIN LYMPHOMA TYPE (H): ICD-10-CM

## 2022-01-14 DIAGNOSIS — C85.12 B-CELL LYMPHOMA OF INTRATHORACIC LYMPH NODES, UNSPECIFIED B-CELL LYMPHOMA TYPE (H): Primary | ICD-10-CM

## 2022-01-14 LAB
ALBUMIN SERPL-MCNC: 2.9 G/DL (ref 3.4–5)
ALP SERPL-CCNC: 118 U/L (ref 40–150)
ALT SERPL W P-5'-P-CCNC: 22 U/L (ref 0–50)
ANION GAP SERPL CALCULATED.3IONS-SCNC: 4 MMOL/L (ref 3–14)
AST SERPL W P-5'-P-CCNC: 12 U/L (ref 0–45)
BASOPHILS # BLD MANUAL: 0.1 10E3/UL (ref 0–0.2)
BASOPHILS NFR BLD MANUAL: 2 %
BILIRUB SERPL-MCNC: 0.2 MG/DL (ref 0.2–1.3)
BUN SERPL-MCNC: 22 MG/DL (ref 7–30)
CALCIUM SERPL-MCNC: 8.2 MG/DL (ref 8.5–10.1)
CHLORIDE BLD-SCNC: 102 MMOL/L (ref 94–109)
CO2 SERPL-SCNC: 34 MMOL/L (ref 20–32)
CREAT SERPL-MCNC: 1 MG/DL (ref 0.52–1.04)
EOSINOPHIL # BLD MANUAL: 0.1 10E3/UL (ref 0–0.7)
EOSINOPHIL NFR BLD MANUAL: 2 %
ERYTHROCYTE [DISTWIDTH] IN BLOOD BY AUTOMATED COUNT: 17.9 % (ref 10–15)
GFR SERPL CREATININE-BSD FRML MDRD: 56 ML/MIN/1.73M2
GLUCOSE BLD-MCNC: 117 MG/DL (ref 70–99)
HCT VFR BLD AUTO: 28.3 % (ref 35–47)
HGB BLD-MCNC: 8.2 G/DL (ref 11.7–15.7)
LYMPHOCYTES # BLD MANUAL: 1.3 10E3/UL (ref 0.8–5.3)
LYMPHOCYTES NFR BLD MANUAL: 23 %
MCH RBC QN AUTO: 25.9 PG (ref 26.5–33)
MCHC RBC AUTO-ENTMCNC: 29 G/DL (ref 31.5–36.5)
MCV RBC AUTO: 90 FL (ref 78–100)
METAMYELOCYTES # BLD MANUAL: 0.2 10E3/UL
METAMYELOCYTES NFR BLD MANUAL: 4 %
MONOCYTES # BLD MANUAL: 0.6 10E3/UL (ref 0–1.3)
MONOCYTES NFR BLD MANUAL: 11 %
NEUTROPHILS # BLD MANUAL: 3.3 10E3/UL (ref 1.6–8.3)
NEUTROPHILS NFR BLD MANUAL: 58 %
PLAT MORPH BLD: ABNORMAL
PLATELET # BLD AUTO: 221 10E3/UL (ref 150–450)
POTASSIUM BLD-SCNC: 3.5 MMOL/L (ref 3.4–5.3)
PROT SERPL-MCNC: 6.1 G/DL (ref 6.8–8.8)
RBC # BLD AUTO: 3.16 10E6/UL (ref 3.8–5.2)
RBC MORPH BLD: ABNORMAL
SODIUM SERPL-SCNC: 140 MMOL/L (ref 133–144)
WBC # BLD AUTO: 5.7 10E3/UL (ref 4–11)

## 2022-01-14 PROCEDURE — G0463 HOSPITAL OUTPT CLINIC VISIT: HCPCS

## 2022-01-14 PROCEDURE — 36591 DRAW BLOOD OFF VENOUS DEVICE: CPT

## 2022-01-14 PROCEDURE — 80053 COMPREHEN METABOLIC PANEL: CPT | Performed by: NURSE PRACTITIONER

## 2022-01-14 PROCEDURE — 250N000011 HC RX IP 250 OP 636: Performed by: INTERNAL MEDICINE

## 2022-01-14 PROCEDURE — 85014 HEMATOCRIT: CPT | Performed by: NURSE PRACTITIONER

## 2022-01-14 PROCEDURE — 99214 OFFICE O/P EST MOD 30 MIN: CPT | Performed by: NURSE PRACTITIONER

## 2022-01-14 PROCEDURE — 82040 ASSAY OF SERUM ALBUMIN: CPT | Performed by: NURSE PRACTITIONER

## 2022-01-14 RX ORDER — HEPARIN SODIUM,PORCINE 10 UNIT/ML
5 VIAL (ML) INTRAVENOUS
Status: CANCELLED | OUTPATIENT
Start: 2022-01-14

## 2022-01-14 RX ORDER — FERROUS SULFATE 325(65) MG
325 TABLET ORAL
Qty: 90 TABLET | Refills: 0 | Status: SHIPPED | OUTPATIENT
Start: 2022-01-14 | End: 2022-04-14

## 2022-01-14 RX ORDER — HEPARIN SODIUM (PORCINE) LOCK FLUSH IV SOLN 100 UNIT/ML 100 UNIT/ML
5 SOLUTION INTRAVENOUS
Status: DISCONTINUED | OUTPATIENT
Start: 2022-01-14 | End: 2022-01-14 | Stop reason: HOSPADM

## 2022-01-14 RX ORDER — HEPARIN SODIUM (PORCINE) LOCK FLUSH IV SOLN 100 UNIT/ML 100 UNIT/ML
5 SOLUTION INTRAVENOUS
Status: CANCELLED | OUTPATIENT
Start: 2022-01-14

## 2022-01-14 RX ADMIN — Medication 5 ML: at 08:48

## 2022-01-14 ASSESSMENT — PAIN SCALES - GENERAL: PAINLEVEL: NO PAIN (0)

## 2022-01-14 ASSESSMENT — MIFFLIN-ST. JEOR: SCORE: 1502.33

## 2022-01-14 NOTE — PROGRESS NOTES
Oncology Rooming Note    January 14, 2022 9:15 AM   Lucille Rojas is a 81 year old female who presents for:    Chief Complaint   Patient presents with     Oncology Clinic Visit     Non-Hodgkin's lymphoma (H)     Initial Vitals: /71   Pulse 72   Temp 97.9  F (36.6  C) (Oral)   Resp 16   Ht 1.524 m (5')   Wt 112.6 kg (248 lb 3.2 oz)   SpO2 96%   BMI 48.47 kg/m   Estimated body mass index is 48.47 kg/m  as calculated from the following:    Height as of this encounter: 1.524 m (5').    Weight as of this encounter: 112.6 kg (248 lb 3.2 oz). Body surface area is 2.18 meters squared.  No Pain (0) Comment: Data Unavailable   No LMP recorded. Patient has had a hysterectomy.  Allergies reviewed: Yes  Medications reviewed: Yes    Medications: Medication refills not needed today.  Pharmacy name entered into [facility]: [id number] - [place], [place] - [address]    Clinical concerns: None       Nancy Robledo MA

## 2022-01-14 NOTE — LETTER
1/14/2022         RE: Lucille Rojas  [id number]  [place] [id number]        Dear Colleague,    Thank you for referring your patient, Lucille Rojas, to the [facility]. Please see a copy of my visit note below.    Oncology Rooming Note    January 14, 2022 9:15 AM   Lucille Rojas is a 81 year old female who presents for:    Chief Complaint   Patient presents with     Oncology Clinic Visit     Non-Hodgkin's lymphoma (H)     Initial Vitals: /71   Pulse 72   Temp 97.9  F (36.6  C) (Oral)   Resp 16   Ht 1.524 m (5')   Wt 112.6 kg (248 lb 3.2 oz)   SpO2 96%   BMI 48.47 kg/m   Estimated body mass index is 48.47 kg/m  as calculated from the following:    Height as of this encounter: 1.524 m (5').    Weight as of this encounter: 112.6 kg (248 lb 3.2 oz). Body surface area is 2.18 meters squared.  No Pain (0) Comment: Data Unavailable   No LMP recorded. Patient has had a hysterectomy.  Allergies reviewed: Yes  Medications reviewed: Yes    Medications: Medication refills not needed today.  Pharmacy name entered into [facility]: [id number] [place] [place] [facility] [address]    Clinical concerns: None       Nancy Robledo MA                Oncology/Hematology Visit Note  Jan 14, 2022    Reason for Visit: follow up of non-Hodgkin's lymphoma involving the pancreas  EUS revealed pancreatic head mass.  FNA revealed CD10-positive B-cell lymphoma. Flow cytometry revealed CD10-positive lambda monotypic B-cells.  PET scan on 11/18/2021 revealed 6 cm hypermetabolic pancreatic head mass and borderline hypermetabolic right axillary lymph node.     Patient met with Dr. Srinivasan who recommended starting treatment with mini R-CHOP    01/05-cycle 2 given      patient comes to the clinic for routine follow-up after cycle 2 of the treatment      Interval History:  Patient reports overall she tolerated cycle 2 of the chemo well.  Patient denies fever chills sweats cough  shortness of breath chest pain nausea vomiting diarrhea abdominal pain or bleeding.  Patient denies any significant side effects from the chemo  Reports good appetite.  She takes frequent walks at home using cane      Review of Systems:  14 point ROS of systems including Constitutional, Eyes, Respiratory, Cardiovascular, Gastroenterology, Genitourinary, Integumentary, Muscularskeletal, Psychiatric were all negative except for pertinent positives noted in my HPI.    Physical Examination:  General: The patient is a pleasant female in no acute distress.  /71   Pulse 72   Temp 97.9  F (36.6  C) (Oral)   Resp 16   Ht 1.524 m (5')   Wt 112.6 kg (248 lb 3.2 oz)   SpO2 96%   BMI 48.47 kg/m    HEENT: EOMI, PERRL. Sclerae are anicteric. Oral mucosa is pink and moist with no lesions or thrush.   Lymph: Neck is supple with no lymphadenopathy in the cervical or supraclavicular areas.   Heart: Regular rate and rhythm.   Lungs: Clear to auscultation bilaterally.   GI: Bowel sounds present, soft, nontender with no palpable hepatosplenomegaly or masses.   Extremities: No lower extremity edema noted bilaterally.   Skin: No rashes, petechiae, or bruising noted on exposed skin.    Laboratory Data:  CBC CMP results reviewed    Assessment and Plan:  This is a 81-year-old female with    Lymphoma  non-Hodgkin's lymphoma involving the pancreas  Patient of Dr. Srinivasan  Patient to start mini R-CHOP tomorrow  -Plan is for 6 cycles of treatment  01/06-cycle 2  given  Regimen and side effects discussed in detail with patient and family  12/21-CT scan reveals improvement in pancreatic mass  -Patient reports overall she has been tolerating cycle 2 of the chemo well  -Reports feeling well  -Patient is scheduled with cycle 3 chemotherapy and follow-up appt with Dr. Srinivasan on 01/25        Anemia  Patient is asymptomatic  S/p EGD on 09/21/21 revealed a few gastric erosions and a few tiny aphthous ulcers in the duodenum, but not severe  enough  Iron deficiency anemia  -status post Venofer  -Start oral iron-side effects of oral iron discussed with patient, monitor closely for constipation and GI symptoms.  Continue with stool softeners          Call our clinic with any changes in health condition or questions    ALIE Carreon CNP  [facility]     Chart documentation with Dragon Voice recognition Software. Although reviewed after completion, some words and grammatical errors may remain.            Again, thank you for allowing me to participate in the care of your patient.        Sincerely,        ALIE Carreon CNP

## 2022-01-14 NOTE — PROGRESS NOTES
Nursing Note:  Lucille Rojas presents today for port labs.    Patient seen by provider today: Yes: will see Alexandr Ambriz NP   present during visit today: Not Applicable.    Note: N/A.    Intravenous Access:  Implanted Port.    Discharge Plan:   Patient was sent to [facility] for clinic appointment.    Cinda Gomez RN

## 2022-01-14 NOTE — PROGRESS NOTES
Oncology/Hematology Visit Note  Jan 14, 2022    Reason for Visit: follow up of non-Hodgkin's lymphoma involving the pancreas  EUS revealed pancreatic head mass.  FNA revealed CD10-positive B-cell lymphoma. Flow cytometry revealed CD10-positive lambda monotypic B-cells.  PET scan on 11/18/2021 revealed 6 cm hypermetabolic pancreatic head mass and borderline hypermetabolic right axillary lymph node.     Patient met with Dr. Srinivasan who recommended starting treatment with mini R-CHOP    01/05-cycle 2 given      patient comes to the clinic for routine follow-up after cycle 2 of the treatment      Interval History:  Patient reports overall she tolerated cycle 2 of the chemo well.  Patient denies fever chills sweats cough shortness of breath chest pain nausea vomiting diarrhea abdominal pain or bleeding.  Patient denies any significant side effects from the chemo  Reports good appetite.  She takes frequent walks at home using cane      Review of Systems:  14 point ROS of systems including Constitutional, Eyes, Respiratory, Cardiovascular, Gastroenterology, Genitourinary, Integumentary, Muscularskeletal, Psychiatric were all negative except for pertinent positives noted in my HPI.    Physical Examination:  General: The patient is a pleasant female in no acute distress.  /71   Pulse 72   Temp 97.9  F (36.6  C) (Oral)   Resp 16   Ht 1.524 m (5')   Wt 112.6 kg (248 lb 3.2 oz)   SpO2 96%   BMI 48.47 kg/m    HEENT: EOMI, PERRL. Sclerae are anicteric. Oral mucosa is pink and moist with no lesions or thrush.   Lymph: Neck is supple with no lymphadenopathy in the cervical or supraclavicular areas.   Heart: Regular rate and rhythm.   Lungs: Clear to auscultation bilaterally.   GI: Bowel sounds present, soft, nontender with no palpable hepatosplenomegaly or masses.   Extremities: No lower extremity edema noted bilaterally.   Skin: No rashes, petechiae, or bruising noted on exposed skin.    Laboratory Data:  CBC CMP  results reviewed    Assessment and Plan:  This is a 81-year-old female with    Lymphoma  non-Hodgkin's lymphoma involving the pancreas  Patient of Dr. Srinivasan  Patient to start mini R-CHOP tomorrow  -Plan is for 6 cycles of treatment  01/06-cycle 2  given  Regimen and side effects discussed in detail with patient and family  12/21-CT scan reveals improvement in pancreatic mass  -Patient reports overall she has been tolerating cycle 2 of the chemo well  -Reports feeling well  -Patient is scheduled with cycle 3 chemotherapy and follow-up appt with Dr. Srinivasan on 01/25        Anemia  Patient is asymptomatic  S/p EGD on 09/21/21 revealed a few gastric erosions and a few tiny aphthous ulcers in the duodenum, but not severe enough  Iron deficiency anemia  -status post Venofer  -Start oral iron-side effects of oral iron discussed with patient, monitor closely for constipation and GI symptoms.  Continue with stool softeners          Call our clinic with any changes in health condition or questions    ALIE Carreon [facility]     Chart documentation with Dragon Voice recognition Software. Although reviewed after completion, some words and grammatical errors may remain.

## 2022-01-15 NOTE — PROGRESS NOTES
ONCOLOGY HISTORY: Ms. 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  1. On 09/20/2021:   -Hemoglobin of 4.7 with MCV of 67. Normal WBC and platelets.  -Iron of 9 and saturation index of 2%.   -CT chest, abdomen and pelvis reveals large pancreatic head mass.  This encases the celiac trunk, superior mesenteric artery and superior mesenteric vein.  There is moderate-size right pleural effusion. No lymphadenopathy.  2. Thoracocentesis on 09/22/2021. Cytology is negative for malignancy.  3. EUS on 09/21/2021 revealed is a mass in the uncinate process of the pancreas measuring 33 mm.  There was evidence of invasion into superior mesenteric artery and the celiac trunk. No enlarged lymph nodes seen. FNA revealed atypical cells.    4. EGD and EUS repeated on 10/05/2021.  -EGD is normal.  -EUS revealed pancreatic head mass.  FNA revealed CD10-positive B-cell lymphoma. Flow cytometry revealed CD10-positive lambda monotypic B-cells.  5. PET scan on 11/18/2021 revealed 6 cm hypermetabolic pancreatic head mass and borderline hypermetabolic right axillary lymph node.   -Further repeat biopsy not done because of her overall poor health.  6. mini R-CHOP on 12/15/2021.  -CT scan on 12/21/21 reveals improvement in disease.    SUBJECTIVE:  Ms. 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  She has stage IV disease.  The patient is started on mini R-CHOP on 12/15/2021.  The patient was in hospital for a few days at the end of December because of respiratory distress and anemia.     The patient is currently in a nursing home.  She will be going home tomorrow.  She is improving.  She is getting stronger.  She walks a few steps with help of a walker.  As per the family, overall patient's condition is better.     No headache.  No dizziness.  No chest pain.  No worsening.  No shortness of breath.  No nausea or vomiting.  Appetite has been good. No  severe abdominal pain.  No bleeding.  No fever or chills.       All other review of systems is negative.     PHYSICAL EXAMINATION:    GENERAL:  She is alert and oriented x 3.  Not in respiratory distress.  ECOG PS of 2.   FACE:  No swelling.  EYES:  No icterus.   THROAT:  No ulcer or thrush.  NECK:  Supple. No lymphadenopathy.  LUNGS:  Decreased air entry at the bases.  HEART:  Regular.  ABDOMEN:  Soft. Nontender.  Difficult palpation because of her weight.  No mass.  EXTREMITIES:  Mild ankle edema.  SKIN:  No petechiae.     LABORATORY:  CBC and CMP reviewed.     ASSESSMENT:    1.  An 81-year-old female with stage IV non-Hodgkin B-cell lymphoma.  2.  Anemia.  3.  Fatigue.     PLAN:     1.  The patient's overall condition is stable.  The patient says that in last few days, she has been improving.  Overall, she tolerated the last cycle of chemotherapy well.  She did not have any significant side effects like nausea or vomiting.  No neutropenic fever.  2.  Discussed regarding chemotherapy.  The patient wants to continue chemotherapy.  She will get cycle 2 of mini R-CHOP tomorrow.  3.  She had CT chest, abdomen, and pelvis on 12/21/2021.  It revealed improvement in her pancreatic mass  4.  The patient is at high risk for complication.  She will be seen next week by our nurse practitioner with labs.  The patient advised to call us with any questions or concerns.     TOTAL TIME SPENT:  45 minutes.  Time spent in today's visit, review of chart/investigations today, and documentation.

## 2022-01-17 ENCOUNTER — MEDICAL CORRESPONDENCE (OUTPATIENT)
Dept: HEALTH INFORMATION MANAGEMENT | Facility: CLINIC | Age: 82
End: 2022-01-17
Payer: MEDICARE

## 2022-01-18 ENCOUNTER — TELEPHONE (OUTPATIENT)
Dept: INTERNAL MEDICINE | Facility: CLINIC | Age: 82
End: 2022-01-18
Payer: MEDICARE

## 2022-01-18 NOTE — TELEPHONE ENCOUNTER
Home care called for orders:     Continue PT - 1x/3 weeks then every other week for 4 weeks     Verbal given on home care orders     Appointments in Next Year    Jan 25, 2022  8:30 AM  Lab Central with  FAST TRACK LAB  [facility] [phone]   Jan 25, 2022  9:20 AM  Return Visit with John Srinivasan MD  [facility] [phone]   Jan 26, 2022  8:30 AM  Infusion Visit with  CANCER INFUSION NURSE,  INFUSION CHAIR 16  [facility] [phone]   Feb 15, 2022  9:30 AM  Lab Central with  FAST TRACK LAB  [facility] [phone]   Feb 16, 2022  8:30 AM  Infusion Visit with  CANCER INFUSION NURSE,  INFUSION CHAIR 8  [facility] [phone]        Anish Ca RN  [facility]

## 2022-01-19 ENCOUNTER — MEDICAL CORRESPONDENCE (OUTPATIENT)
Dept: HEALTH INFORMATION MANAGEMENT | Facility: CLINIC | Age: 82
End: 2022-01-19
Payer: MEDICARE

## 2022-01-20 ENCOUNTER — MEDICAL CORRESPONDENCE (OUTPATIENT)
Dept: HEALTH INFORMATION MANAGEMENT | Facility: CLINIC | Age: 82
End: 2022-01-20
Payer: MEDICARE

## 2022-01-23 RX ORDER — EPINEPHRINE 1 MG/ML
0.3 INJECTION, SOLUTION, CONCENTRATE INTRAVENOUS EVERY 5 MIN PRN
Status: CANCELLED | OUTPATIENT
Start: 2022-01-26

## 2022-01-23 RX ORDER — MEPERIDINE HYDROCHLORIDE 25 MG/ML
25 INJECTION INTRAMUSCULAR; INTRAVENOUS; SUBCUTANEOUS EVERY 30 MIN PRN
Status: CANCELLED | OUTPATIENT
Start: 2022-01-26

## 2022-01-23 RX ORDER — HEPARIN SODIUM,PORCINE 10 UNIT/ML
5 VIAL (ML) INTRAVENOUS
Status: CANCELLED | OUTPATIENT
Start: 2022-01-26

## 2022-01-23 RX ORDER — ALBUTEROL SULFATE 0.83 MG/ML
2.5 SOLUTION RESPIRATORY (INHALATION)
Status: CANCELLED | OUTPATIENT
Start: 2022-01-26

## 2022-01-23 RX ORDER — DIPHENHYDRAMINE HCL 50 MG
50 CAPSULE ORAL ONCE
Status: CANCELLED
Start: 2022-01-26

## 2022-01-23 RX ORDER — ACETAMINOPHEN 325 MG/1
650 TABLET ORAL ONCE
Status: CANCELLED | OUTPATIENT
Start: 2022-01-26

## 2022-01-23 RX ORDER — DOXORUBICIN HYDROCHLORIDE 2 MG/ML
25 INJECTION, SOLUTION INTRAVENOUS ONCE
Status: CANCELLED | OUTPATIENT
Start: 2022-01-26

## 2022-01-23 RX ORDER — LORAZEPAM 2 MG/ML
0.5 INJECTION INTRAMUSCULAR EVERY 4 HOURS PRN
Status: CANCELLED | OUTPATIENT
Start: 2022-01-26

## 2022-01-23 RX ORDER — PALONOSETRON 0.05 MG/ML
0.25 INJECTION, SOLUTION INTRAVENOUS ONCE
Status: CANCELLED | OUTPATIENT
Start: 2022-01-26

## 2022-01-23 RX ORDER — ALBUTEROL SULFATE 90 UG/1
1-2 AEROSOL, METERED RESPIRATORY (INHALATION)
Status: CANCELLED
Start: 2022-01-26

## 2022-01-23 RX ORDER — NALOXONE HYDROCHLORIDE 0.4 MG/ML
0.2 INJECTION, SOLUTION INTRAMUSCULAR; INTRAVENOUS; SUBCUTANEOUS
Status: CANCELLED | OUTPATIENT
Start: 2022-01-26

## 2022-01-23 RX ORDER — HEPARIN SODIUM (PORCINE) LOCK FLUSH IV SOLN 100 UNIT/ML 100 UNIT/ML
5 SOLUTION INTRAVENOUS
Status: CANCELLED | OUTPATIENT
Start: 2022-01-26

## 2022-01-23 RX ORDER — MEPERIDINE HYDROCHLORIDE 25 MG/ML
25 INJECTION INTRAMUSCULAR; INTRAVENOUS; SUBCUTANEOUS
Status: CANCELLED
Start: 2022-01-26

## 2022-01-23 RX ORDER — DIPHENHYDRAMINE HYDROCHLORIDE 50 MG/ML
50 INJECTION INTRAMUSCULAR; INTRAVENOUS
Status: CANCELLED
Start: 2022-01-26

## 2022-01-24 ENCOUNTER — PATIENT OUTREACH (OUTPATIENT)
Dept: NURSING | Facility: CLINIC | Age: 82
End: 2022-01-24
Payer: MEDICARE

## 2022-01-24 NOTE — PROGRESS NOTES
Clinic Care Coordination Contact    Community Health Worker Follow Up    Care Gaps:     Health Maintenance Due   Topic Date Due     ZOSTER IMMUNIZATION (1 of 2) Never done     DTAP/TDAP/TD IMMUNIZATION (2 - Td or Tdap) 01/01/2019     DIABETIC FOOT EXAM  06/21/2019     EYE EXAM  06/01/2021       Did not discuss    Goals:   Goals Addressed as of 1/24/2022 at 2:18 PM                    Today    11/16/21       Patient Stated       1. Psychosocial (pt-stated)   90%  80%    Added 7/17/20 by Philip Beltran RN      Goal Statement: I will explore additional options for support in the next 3 months.   Date Goal set: 7/17/20 // revised on 2/9/21  Update 11/17/2021  Barriers: limited mobility, poor eyesight, limited knowledge of available resources  Strengths: motivated to find additional sources of help  Date to Achieve By: 1/31/21 // extended to 8/30/21  Update achieve date: 2/17/2022  Patient expressed understanding of goal: Yes    Action steps to achieve this goal:  1. I will inquire about financial resources through Financial Resource referral with help from Care Coordination.  2. I will explore community options for help with cleaning/de-cluttering my home.  3. I will continue leaning on support from my children/family for the things I am unable to do.  4. I will consider in-home options for additional support.  5. I will continue working with Care Coordination to address barriers to achieving my goal.        Discussed:    Patient stated she has weekly home care visits from a nurse, PT, OT, and HHA.    Patient stated she needs a new cane.  Patient stated she will talked with home care PT.    Patient stated her blood was down to 4.7 when she went to the hospital in September. Patient stated she has lost about 15 lbs since then.    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    Patient stated one foot bothers her, the doctors are watching it at this time.    Patient stated she has had two  chemo treatments, and another one this week.  Her daughter from [place] will be driving her.  Her daughter and her  will sit with her while she is having her treatment.  Patient stated her last treatment, she feel asleep.  Patient stated so far she is tolerating the chemo. Patient stated it is nice to have her daughter there.  Her daughter asks questions, and tries to explain things to her.  Her daughter is an ovarian cancer survivor.  Patient stated her daughter has been cancer free for 5 years.    Patient stated she still orders food from [employer].  Patient stated her children are doing some grocery shopping for her and her .    Patient stated someone from [employer] brings her communion on Mondays.  This has recently started.      Patient stated she has talked with the person who helps clean her house. Patient stated she will call this person in February to make arrangements for this person to  help with chores.      Intervention and Education during outreach: CHW asked patient if she has any resource needs, patient declined. CHW encouraged patient to contact CC if there are any other changes or resources needed.  Patient acknowledged understanding.        Plan: CHW will outreach in one month.    ZANE Demarco  Clinic Care Coordination  [facility] Clinics: [facility], [place], [place], and [facility]  Phone: [phone]

## 2022-01-25 ENCOUNTER — ONCOLOGY VISIT (OUTPATIENT)
Dept: ONCOLOGY | Facility: CLINIC | Age: 82
End: 2022-01-25
Attending: INTERNAL MEDICINE
Payer: MEDICARE

## 2022-01-25 ENCOUNTER — LAB (OUTPATIENT)
Dept: INFUSION THERAPY | Facility: CLINIC | Age: 82
End: 2022-01-25
Attending: INTERNAL MEDICINE
Payer: MEDICARE

## 2022-01-25 VITALS — SYSTOLIC BLOOD PRESSURE: 136 MMHG | OXYGEN SATURATION: 96 % | HEART RATE: 65 BPM | DIASTOLIC BLOOD PRESSURE: 55 MMHG

## 2022-01-25 DIAGNOSIS — C85.12 B-CELL LYMPHOMA OF INTRATHORACIC LYMPH NODES, UNSPECIFIED B-CELL LYMPHOMA TYPE (H): ICD-10-CM

## 2022-01-25 DIAGNOSIS — Z51.89 ENCOUNTER FOR OTHER SPECIFIED AFTERCARE: ICD-10-CM

## 2022-01-25 DIAGNOSIS — C85.99 NON-HODGKIN LYMPHOMA OF SOLID ORGAN EXCLUDING SPLEEN, UNSPECIFIED NON-HODGKIN LYMPHOMA TYPE (H): ICD-10-CM

## 2022-01-25 DIAGNOSIS — C85.12 B-CELL LYMPHOMA OF INTRATHORACIC LYMPH NODES, UNSPECIFIED B-CELL LYMPHOMA TYPE (H): Primary | ICD-10-CM

## 2022-01-25 DIAGNOSIS — Z51.89 ENCOUNTER FOR OTHER SPECIFIED AFTERCARE: Primary | ICD-10-CM

## 2022-01-25 DIAGNOSIS — E66.01 MORBID OBESITY DUE TO EXCESS CALORIES (H): ICD-10-CM

## 2022-01-25 DIAGNOSIS — R06.81 APNEA: ICD-10-CM

## 2022-01-25 LAB
ALBUMIN SERPL-MCNC: 3.1 G/DL (ref 3.4–5)
ALP SERPL-CCNC: 127 U/L (ref 40–150)
ALT SERPL W P-5'-P-CCNC: 19 U/L (ref 0–50)
ANION GAP SERPL CALCULATED.3IONS-SCNC: 3 MMOL/L (ref 3–14)
AST SERPL W P-5'-P-CCNC: 15 U/L (ref 0–45)
BASOPHILS # BLD AUTO: 0 10E3/UL (ref 0–0.2)
BASOPHILS NFR BLD AUTO: 1 %
BILIRUB SERPL-MCNC: 0.2 MG/DL (ref 0.2–1.3)
BUN SERPL-MCNC: 23 MG/DL (ref 7–30)
CALCIUM SERPL-MCNC: 8.9 MG/DL (ref 8.5–10.1)
CHLORIDE BLD-SCNC: 104 MMOL/L (ref 94–109)
CO2 SERPL-SCNC: 34 MMOL/L (ref 20–32)
CREAT SERPL-MCNC: 0.92 MG/DL (ref 0.52–1.04)
EOSINOPHIL # BLD AUTO: 0.1 10E3/UL (ref 0–0.7)
EOSINOPHIL NFR BLD AUTO: 2 %
ERYTHROCYTE [DISTWIDTH] IN BLOOD BY AUTOMATED COUNT: 19.5 % (ref 10–15)
GFR SERPL CREATININE-BSD FRML MDRD: 61 ML/MIN/1.73M2
GLUCOSE BLD-MCNC: 163 MG/DL (ref 70–99)
HCT VFR BLD AUTO: 31.8 % (ref 35–47)
HGB BLD-MCNC: 9.1 G/DL (ref 11.7–15.7)
IMM GRANULOCYTES # BLD: 0.1 10E3/UL
IMM GRANULOCYTES NFR BLD: 1 %
LYMPHOCYTES # BLD AUTO: 0.9 10E3/UL (ref 0.8–5.3)
LYMPHOCYTES NFR BLD AUTO: 17 %
MCH RBC QN AUTO: 25.7 PG (ref 26.5–33)
MCHC RBC AUTO-ENTMCNC: 28.6 G/DL (ref 31.5–36.5)
MCV RBC AUTO: 90 FL (ref 78–100)
MONOCYTES # BLD AUTO: 0.7 10E3/UL (ref 0–1.3)
MONOCYTES NFR BLD AUTO: 14 %
NEUTROPHILS # BLD AUTO: 3.3 10E3/UL (ref 1.6–8.3)
NEUTROPHILS NFR BLD AUTO: 65 %
NRBC # BLD AUTO: 0 10E3/UL
NRBC BLD AUTO-RTO: 0 /100
PLATELET # BLD AUTO: 270 10E3/UL (ref 150–450)
POTASSIUM BLD-SCNC: 3.7 MMOL/L (ref 3.4–5.3)
PROT SERPL-MCNC: 6.4 G/DL (ref 6.8–8.8)
RBC # BLD AUTO: 3.54 10E6/UL (ref 3.8–5.2)
SODIUM SERPL-SCNC: 141 MMOL/L (ref 133–144)
WBC # BLD AUTO: 5.1 10E3/UL (ref 4–11)

## 2022-01-25 PROCEDURE — 82040 ASSAY OF SERUM ALBUMIN: CPT | Performed by: INTERNAL MEDICINE

## 2022-01-25 PROCEDURE — 85025 COMPLETE CBC W/AUTO DIFF WBC: CPT | Performed by: INTERNAL MEDICINE

## 2022-01-25 PROCEDURE — 80053 COMPREHEN METABOLIC PANEL: CPT | Performed by: INTERNAL MEDICINE

## 2022-01-25 PROCEDURE — 250N000011 HC RX IP 250 OP 636: Performed by: INTERNAL MEDICINE

## 2022-01-25 PROCEDURE — 99215 OFFICE O/P EST HI 40 MIN: CPT | Performed by: INTERNAL MEDICINE

## 2022-01-25 PROCEDURE — 36591 DRAW BLOOD OFF VENOUS DEVICE: CPT

## 2022-01-25 PROCEDURE — G0463 HOSPITAL OUTPT CLINIC VISIT: HCPCS

## 2022-01-25 RX ORDER — HEPARIN SODIUM (PORCINE) LOCK FLUSH IV SOLN 100 UNIT/ML 100 UNIT/ML
5 SOLUTION INTRAVENOUS
Status: CANCELLED | OUTPATIENT
Start: 2022-01-25

## 2022-01-25 RX ORDER — PANTOPRAZOLE SODIUM 40 MG/1
40 TABLET, DELAYED RELEASE ORAL
Qty: 90 TABLET | Refills: 3 | Status: SHIPPED | OUTPATIENT
Start: 2022-01-25 | End: 2023-02-03

## 2022-01-25 RX ORDER — HEPARIN SODIUM,PORCINE 10 UNIT/ML
5 VIAL (ML) INTRAVENOUS
Status: CANCELLED | OUTPATIENT
Start: 2022-01-25

## 2022-01-25 RX ORDER — LORAZEPAM 0.5 MG/1
0.5 TABLET ORAL EVERY 8 HOURS PRN
Qty: 30 TABLET | Refills: 1 | Status: SHIPPED | OUTPATIENT
Start: 2022-01-25

## 2022-01-25 RX ORDER — PREDNISONE 50 MG/1
40 TABLET ORAL DAILY
Qty: 10 TABLET | Refills: 0 | Status: SHIPPED | OUTPATIENT
Start: 2022-01-25 | End: 2022-01-25

## 2022-01-25 RX ORDER — PREDNISONE 50 MG/1
40 TABLET ORAL DAILY
Qty: 10 TABLET | Refills: 3 | Status: SHIPPED | OUTPATIENT
Start: 2022-01-25 | End: 2022-04-12

## 2022-01-25 RX ORDER — HEPARIN SODIUM (PORCINE) LOCK FLUSH IV SOLN 100 UNIT/ML 100 UNIT/ML
5 SOLUTION INTRAVENOUS
Status: DISCONTINUED | OUTPATIENT
Start: 2022-01-25 | End: 2022-01-25 | Stop reason: HOSPADM

## 2022-01-25 RX ADMIN — Medication 5 ML: at 08:47

## 2022-01-25 ASSESSMENT — PAIN SCALES - GENERAL: PAINLEVEL: NO PAIN (0)

## 2022-01-25 NOTE — LETTER
1/25/2022         RE: Lucille Rojas  [address]  [facility] [address]        Dear Colleague,    Thank you for referring your patient, Lucille Rojas, to the [facility]. Please see a copy of my visit note below.    ONCOLOGY HISTORY: Ms. 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  1. On 09/20/2021:   -Hemoglobin of 4.7 with MCV of 67. Normal WBC and platelets.  -Iron of 9 and saturation index of 2%.   -CT chest, abdomen and pelvis reveals large pancreatic head mass.  This encases the celiac trunk, superior mesenteric artery and superior mesenteric vein.  There is moderate-size right pleural effusion. No lymphadenopathy.  2. Thoracocentesis on 09/22/2021. Cytology is negative for malignancy.  3. EUS on 09/21/2021 revealed is a mass in the uncinate process of the pancreas measuring 33 mm.  There was evidence of invasion into superior mesenteric artery and the celiac trunk. No enlarged lymph nodes seen. FNA revealed atypical cells.    4. EGD and EUS repeated on 10/05/2021.  -EGD is normal.  -EUS revealed pancreatic head mass.  FNA revealed CD10-positive B-cell lymphoma. Flow cytometry revealed CD10-positive lambda monotypic B-cells.  5. PET scan on 11/18/2021 revealed 6 cm hypermetabolic pancreatic head mass and borderline hypermetabolic right axillary lymph node.   -Further repeat biopsy not done because of her overall poor health.  6. mini R-CHOP on 12/15/2021.  -CT scan on 12/21/21 reveals improvement in disease.    SUBJECTIVE:  Ms. Rojas is an 81-year-old female with non-Hodgkin's B-cell lymphoma involving the pancreas.  She is on mini R-CHOP.  She received 2 cycles.  Overall, she is tolerating it well.     She has fatigue.  No worsening.  No headache.  Occasional dizziness.  No chest pain.  No shortness of breath at rest.  Gets shortness of breath on minimal exertion.  No nausea or vomiting.  Appetite is fairly good.  No urinary  complaint.  No diarrhea.  No bleeding.     She is on Protonix.  She used to take sucralfate.  She has ran out of it.  No need to resume it.     All other review of systems negative.     PHYSICAL EXAMINATION:    GENERAL:  She is alert and oriented x 3.  Not in respiratory distress.  ECOG PS of 2.   FACE:  No swelling.  EYES:  No icterus.   THROAT:  No ulcer or thrush.  NECK:  Supple. No lymphadenopathy.  LUNGS:  Decreased air entry at the bases.  HEART:  Regular.  ABDOMEN:  Soft. Nontender.  Difficult palpation because of her weight.  No mass.  EXTREMITIES:  Mild ankle edema.  SKIN:  No petechiae.     LABORATORY:  CBC and CMP were reviewed.     ASSESSMENT:    1.  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  2.  Fatigue.  3.  Normocytic anemia.  4.  Multiple other medical problems including diabetes mellitus and hypertension.     PLAN:    1.  The patient overall is clinically stable.  She does have a poor performance status.  Because of that, she is on mini R-CHOP.     I discussed regarding chemotherapy.  So far, she has tolerated her first 2 cycles fairly well.  She wants to continue on chemotherapy.  She will get cycle 3 of mini R-CHOP tomorrow.  Labs are good for chemotherapy.      She is at high risk for COVID19 and complications. Will plan for evushled next week if she is doing well from chemotherapy.    2.  Discussed regarding scan.  She had a CT scan done on 12/21/21, which had revealed improvement in disease.  We will plan on getting CT scan after cycle 4.  After cycle 6, we will get a PET scan.  She is agreeable for it.    3.  The patient has multiple medical problems.  Because of that, she has fatigue and other symptoms.  Her symptoms have not gotten worse since she started chemotherapy.  If it starts getting worse, we will have to consider stopping chemotherapy.    4.  Family had multiple questions, which were all answered.  She will see our nurse practitioner  next week.  I will see her with next cycle.  Family advised to call us with any questions or concerns.     TOTAL FACE-TO-FACE TIME SPENT:  45 minutes, more than 50% of the time spent in counseling and coordination of care.    This office note has been dictated.          Again, thank you for allowing me to participate in the care of your patient.        Sincerely,        John Srinivasan MD

## 2022-01-25 NOTE — PROGRESS NOTES
Nursing Note:  Lucille Rojas presents today for port labs for tx 1/26.    Patient seen by provider today: Yes: Dr. Srinivasan   present during visit today: Not Applicable.    Note: N/A.    Intravenous Access:  Labs drawn without difficulty.  Implanted Port.    Discharge Plan:   Patient was sent to [facility] for [facility] appointment.    Ericka Pennington RN

## 2022-01-26 ENCOUNTER — INFUSION THERAPY VISIT (OUTPATIENT)
Dept: INFUSION THERAPY | Facility: CLINIC | Age: 82
End: 2022-01-26
Attending: INTERNAL MEDICINE
Payer: MEDICARE

## 2022-01-26 VITALS
DIASTOLIC BLOOD PRESSURE: 52 MMHG | HEART RATE: 73 BPM | BODY MASS INDEX: 48.63 KG/M2 | SYSTOLIC BLOOD PRESSURE: 124 MMHG | TEMPERATURE: 97.7 F | RESPIRATION RATE: 20 BRPM | WEIGHT: 249 LBS

## 2022-01-26 DIAGNOSIS — C85.12 B-CELL LYMPHOMA OF INTRATHORACIC LYMPH NODES, UNSPECIFIED B-CELL LYMPHOMA TYPE (H): ICD-10-CM

## 2022-01-26 DIAGNOSIS — C85.99 NON-HODGKIN LYMPHOMA OF SOLID ORGAN EXCLUDING SPLEEN, UNSPECIFIED NON-HODGKIN LYMPHOMA TYPE (H): ICD-10-CM

## 2022-01-26 DIAGNOSIS — Z51.89 ENCOUNTER FOR OTHER SPECIFIED AFTERCARE: Primary | ICD-10-CM

## 2022-01-26 PROCEDURE — 250N000013 HC RX MED GY IP 250 OP 250 PS 637: Performed by: INTERNAL MEDICINE

## 2022-01-26 PROCEDURE — 250N000011 HC RX IP 250 OP 636: Mod: JW | Performed by: INTERNAL MEDICINE

## 2022-01-26 PROCEDURE — 96367 TX/PROPH/DG ADDL SEQ IV INF: CPT

## 2022-01-26 PROCEDURE — 96413 CHEMO IV INFUSION 1 HR: CPT

## 2022-01-26 PROCEDURE — 96366 THER/PROPH/DIAG IV INF ADDON: CPT

## 2022-01-26 PROCEDURE — 96372 THER/PROPH/DIAG INJ SC/IM: CPT | Performed by: INTERNAL MEDICINE

## 2022-01-26 PROCEDURE — 96377 APPLICATON ON-BODY INJECTOR: CPT | Mod: XS | Performed by: INTERNAL MEDICINE

## 2022-01-26 PROCEDURE — 258N000003 HC RX IP 258 OP 636: Performed by: INTERNAL MEDICINE

## 2022-01-26 PROCEDURE — 96411 CHEMO IV PUSH ADDL DRUG: CPT

## 2022-01-26 RX ORDER — DOXORUBICIN HYDROCHLORIDE 2 MG/ML
25 INJECTION, SOLUTION INTRAVENOUS ONCE
Status: COMPLETED | OUTPATIENT
Start: 2022-01-26 | End: 2022-01-26

## 2022-01-26 RX ORDER — ACETAMINOPHEN 325 MG/1
650 TABLET ORAL ONCE
Status: COMPLETED | OUTPATIENT
Start: 2022-01-26 | End: 2022-01-26

## 2022-01-26 RX ORDER — DIPHENHYDRAMINE HCL 25 MG
50 CAPSULE ORAL ONCE
Status: COMPLETED | OUTPATIENT
Start: 2022-01-26 | End: 2022-01-26

## 2022-01-26 RX ORDER — PALONOSETRON 0.05 MG/ML
0.25 INJECTION, SOLUTION INTRAVENOUS ONCE
Status: COMPLETED | OUTPATIENT
Start: 2022-01-26 | End: 2022-01-26

## 2022-01-26 RX ORDER — HEPARIN SODIUM (PORCINE) LOCK FLUSH IV SOLN 100 UNIT/ML 100 UNIT/ML
5 SOLUTION INTRAVENOUS
Status: DISCONTINUED | OUTPATIENT
Start: 2022-01-26 | End: 2022-01-26 | Stop reason: HOSPADM

## 2022-01-26 RX ADMIN — DOXORUBICIN HYDROCHLORIDE 55 MG: 2 INJECTION, SOLUTION INTRAVENOUS at 10:55

## 2022-01-26 RX ADMIN — DIPHENHYDRAMINE HYDROCHLORIDE 50 MG: 25 CAPSULE ORAL at 08:55

## 2022-01-26 RX ADMIN — CYCLOPHOSPHAMIDE 895 MG: 1 INJECTION, POWDER, FOR SOLUTION INTRAVENOUS; ORAL at 11:13

## 2022-01-26 RX ADMIN — FOSAPREPITANT 150 MG: 150 INJECTION, POWDER, LYOPHILIZED, FOR SOLUTION INTRAVENOUS at 08:55

## 2022-01-26 RX ADMIN — PEGFILGRASTIM 6 MG: KIT SUBCUTANEOUS at 11:47

## 2022-01-26 RX ADMIN — PALONOSETRON 0.25 MG: 0.05 INJECTION, SOLUTION INTRAVENOUS at 09:20

## 2022-01-26 RX ADMIN — ACETAMINOPHEN 650 MG: 325 TABLET, FILM COATED ORAL at 08:55

## 2022-01-26 RX ADMIN — RITUXIMAB-ABBS 800 MG: 10 INJECTION, SOLUTION INTRAVENOUS at 09:21

## 2022-01-26 RX ADMIN — SODIUM CHLORIDE 250 ML: 9 INJECTION, SOLUTION INTRAVENOUS at 08:55

## 2022-01-26 RX ADMIN — Medication 5 ML: at 11:59

## 2022-01-26 RX ADMIN — VINCRISTINE SULFATE 1 MG: 1 INJECTION, SOLUTION INTRAVENOUS at 11:02

## 2022-01-26 NOTE — PROGRESS NOTES
Infusion Nursing Note:  Lucilleedelmira Rojas presents today for HOP plus On Pro.    Patient seen by provider today: No   present during visit today: Not Applicable.    Note: pt taking oral prednisone as prescribed.    ONPRO  Was placed on patient's: back of left arm.    Was placed at 12 PM    Podpal used: Yes    ONPRO injector device Lot number: [id number]    Patient education included: what patient can expect after application, what colored lights mean on the device, when to remove device, when and where to call with questions or issues, all patients questions answered and that Neulasta administration will occur at 1500.    Patient tolerated administration well.      Intravenous Access:  Implanted Port.    Treatment Conditions:  Lab Results   Component Value Date    HGB 9.1 (L) 01/25/2022    WBC 5.1 01/25/2022    ANEU 3.3 01/14/2022    ANEUTAUTO 3.3 01/25/2022     01/25/2022      Lab Results   Component Value Date     01/25/2022    POTASSIUM 3.7 01/25/2022    CR 0.92 01/25/2022    IGOR 8.9 01/25/2022    BILITOTAL 0.2 01/25/2022    ALBUMIN 3.1 (L) 01/25/2022    ALT 19 01/25/2022    AST 15 01/25/2022     Results reviewed, labs MET treatment parameters, ok to proceed with treatment.      Post Infusion Assessment:  Patient tolerated infusion without incident.  Site patent and intact, free from redness, edema or discomfort.  No evidence of extravasations.  Access discontinued per protocol.       Discharge Plan:   Patient and/or family verbalized understanding of discharge instructions and all questions answered.  AVS to patient via [employer].  Patient will return 2/15 for next appointment.   Patient discharged in stable condition accompanied by:  and daughter.  Departure Mode: Wheelchair.      Margaret Cortez RN

## 2022-01-26 NOTE — PATIENT INSTRUCTIONS
Your On-body Neulasta Injector was applied to your 12 noon at 1/26.  At approximately pm on 1/27, your On-body Injector will beep to let you know your dose delivery will begin in 2 minutes.  Your medication will be delivered over the next 45 minutes.  You can remove your Injector at 4pm.  Please make sure your Injector has a solid green light or has turned off prior to removing the device.  Please contact your provider at [phone] with questions or concerns.

## 2022-01-29 NOTE — PROGRESS NOTES
ONCOLOGY HISTORY: Ms. 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  1. On 09/20/2021:   -Hemoglobin of 4.7 with MCV of 67. Normal WBC and platelets.  -Iron of 9 and saturation index of 2%.   -CT chest, abdomen and pelvis reveals large pancreatic head mass.  This encases the celiac trunk, superior mesenteric artery and superior mesenteric vein.  There is moderate-size right pleural effusion. No lymphadenopathy.  2. Thoracocentesis on 09/22/2021. Cytology is negative for malignancy.  3. EUS on 09/21/2021 revealed is a mass in the uncinate process of the pancreas measuring 33 mm.  There was evidence of invasion into superior mesenteric artery and the celiac trunk. No enlarged lymph nodes seen. FNA revealed atypical cells.    4. EGD and EUS repeated on 10/05/2021.  -EGD is normal.  -EUS revealed pancreatic head mass.  FNA revealed CD10-positive B-cell lymphoma. Flow cytometry revealed CD10-positive lambda monotypic B-cells.  5. PET scan on 11/18/2021 revealed 6 cm hypermetabolic pancreatic head mass and borderline hypermetabolic right axillary lymph node.   -Further repeat biopsy not done because of her overall poor health.  6. mini R-CHOP on 12/15/2021.  -CT scan on 12/21/21 reveals improvement in disease.    SUBJECTIVE:  Ms. Rojas is an 81-year-old female with non-Hodgkin's B-cell lymphoma involving the pancreas.  She is on mini R-CHOP.  She received 2 cycles.  Overall, she is tolerating it well.     She has fatigue.  No worsening.  No headache.  Occasional dizziness.  No chest pain.  No shortness of breath at rest.  Gets shortness of breath on minimal exertion.  No nausea or vomiting.  Appetite is fairly good.  No urinary complaint.  No diarrhea.  No bleeding.     She is on Protonix.  She used to take sucralfate.  She has ran out of it.  No need to resume it.     All other review of systems negative.     PHYSICAL EXAMINATION:    GENERAL:  She is alert and oriented x 3.  Not in  respiratory distress.  ECOG PS of 2.   FACE:  No swelling.  EYES:  No icterus.   THROAT:  No ulcer or thrush.  NECK:  Supple. No lymphadenopathy.  LUNGS:  Decreased air entry at the bases.  HEART:  Regular.  ABDOMEN:  Soft. Nontender.  Difficult palpation because of her weight.  No mass.  EXTREMITIES:  Mild ankle edema.  SKIN:  No petechiae.     LABORATORY:  CBC and CMP were reviewed.     ASSESSMENT:    1.  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  2.  Fatigue.  3.  Normocytic anemia.  4.  Multiple other medical problems including diabetes mellitus and hypertension.     PLAN:    1.  The patient overall is clinically stable.  She does have a poor performance status.  Because of that, she is on mini R-CHOP.     I discussed regarding chemotherapy.  So far, she has tolerated her first 2 cycles fairly well.  She wants to continue on chemotherapy.  She will get cycle 3 of mini R-CHOP tomorrow.  Labs are good for chemotherapy.      She is at high risk for COVID19 and complications. Will plan for evushled next week if she is doing well from chemotherapy.    2.  Discussed regarding scan.  She had a CT scan done on 12/21/21, which had revealed improvement in disease.  We will plan on getting CT scan after cycle 4.  After cycle 6, we will get a PET scan.  She is agreeable for it.    3.  The patient has multiple medical problems.  Because of that, she has fatigue and other symptoms.  Her symptoms have not gotten worse since she started chemotherapy.  If it starts getting worse, we will have to consider stopping chemotherapy.    4.  Family had multiple questions, which were all answered.  She will see our nurse practitioner next week.  I will see her with next cycle.  Family advised to call us with any questions or concerns.     TOTAL FACE-TO-FACE TIME SPENT:  45 minutes, more than 50% of the time spent in counseling and coordination of care.

## 2022-01-31 ENCOUNTER — PATIENT OUTREACH (OUTPATIENT)
Dept: CARE COORDINATION | Facility: CLINIC | Age: 82
End: 2022-01-31
Payer: MEDICARE

## 2022-01-31 NOTE — PROGRESS NOTES
Clinic Care Coordination Contact    Care Coordination Clinician Chart Review  Situation: Patient chart reviewed by care coordinator.       Background: Care Coordination initial assessment and enrollment to Care Coordination was 6/29/2021.   Patient centered goals were developed with participation from patient.  NOMAN ALONSO handed patient off to CHW for continued outreach every 30 days.        Assessment: Per chart review, patient outreach completed by CC CHW on 1/24/2022.  Patient is actively working to accomplish goal.  Patient's goal remains appropriate and relevant at this time.   Patient is due for updated Plan of Care.  Annual assessment will be due 6/29/2022.      Goals        Patient Stated       1. Psychosocial (pt-stated)       Goal Statement: I will explore additional options for support in the next 3 months.   Date Goal set: 7/17/20 // revised on 2/9/21  Update 11/17/2021  Barriers: limited mobility, poor eyesight, limited knowledge of available resources  Strengths: motivated to find additional sources of help  Date to Achieve By: 1/31/21 // extended to 8/30/21  Update achieve date: 2/17/2022  Patient expressed understanding of goal: Yes    Action steps to achieve this goal:  1. I will inquire about financial resources through Financial Resource referral with help from Care Coordination.  2. I will explore community options for help with cleaning/de-cluttering my home.  3. I will continue leaning on support from my children/family for the things I am unable to do.  4. I will consider in-home options for additional support.  5. I will continue working with Care Coordination to address barriers to achieving my goal.                Plan/Recommendations: The patient will continue working with Care Coordination to achieve goal as above.  CHW will involve NOMAN ALONSO as needed or if patient is ready to move to maintenance.  NOMAN ALONSO will continue to monitor progress to goals and CHW outreaches every 6 weeks.   Plan of Care  updated and mailed to patient: Yes, via my chart.       MIQUEL Parker  Clinic Care Coordinator  [facility] and [facility]  [phone]  [email or web address]

## 2022-01-31 NOTE — LETTER
[facility] CARE COORDINATION  [facility]  [address] [place] [address]  [phone]    January 31, 2022        Lucille Rojas  [address] [facility] [address]          Dear Lucille,     Connor is an updated Patient Centered Plan of Care for your continued enrollment in Care Coordination. Please let us know if you have additional questions, concerns, or goals that we can assist with.    Sincerely,    Ginna Oscar, SNOWW  Clinic Care Coordinator  [facility] and [facility]  [phone]  [email or web address]  Patient Centered Plan of Care  About Me:        Patient Name:  Lucille Rojas    YOB: 1940  Age:         81 year old   [place] MRN:    [id number] Telephone Information:  Home Phone [phone]   Mobile [phone]       Address:  [address]  [facility] [address] Email address:  [email or web address]      Emergency Contact(s)    Name Relationship Lgl Grd Work Phone Home Phone Mobile Phone   1. EMIL ROJAS Spouse No  [phone]   2. HARRIET ROJAS Daughter No  [phone]   3. JOSEFA ROJAS Son No  [phone]   4. MONICA BUCKNER * Daughter   [phone]           Primary language:  English     needed? No   [place] [employer]:  [phone] op. 1  Other communication barriers:Glasses    Preferred Method of Communication:  Mail  Current living arrangement: I live in a private home with spouse    Mobility Status/ Medical Equipment: Independent w/Device        Health Maintenance  Health Maintenance Reviewed: Due/Overdue       My Access Plan  Medical Emergency 911   Primary Clinic Line [facility] - [phone]   24 Hour Appointment Line [phone] or  [phone]) (toll-free)   24 Hour  Nurse Line [phone] (toll-free)   Preferred Urgent Care [facility], [phone]     Preferred Hospital [facility]  [phone]     Preferred Pharmacy [id number] - [place], [place] - [address] [facility] Crisis Line The National Suicide Prevention Lifeline at 1-503.111.5509 or 911             My Care Team Members  Patient Care Team       Relationship Specialty Notifications Start End    Fausto Flynn MD PCP - General Internal Medicine  10/6/14     Phone: [phone] Fax: [phone]         [address] [place] [address]    Fausto Flynn MD Assigned PCP   10/12/14     Phone: [phone] Fax: [phone]         [address] [place] [address]    Yonny Roman MD MD INTERNAL MEDICINE - ENDOCRINOLOGY, DIABETES & METABOLISM  12/3/19     Phone: [phone] Fax: [phone]         [facility] CLINIC [facility] [address] [place] [address]    Rosanne Valadez MA Community Health Worker Primary Care - CC  1/22/20     Phone: [phone]         Ginna Oscar, [facility] Lead Care Coordinator  Admissions 6/29/21     John Srinivasan MD Assigned Cancer Care Provider   11/7/21     Phone: [phone] Fax: [phone]         [facility] [place] [address] [place] [address]    Mary Jo Rodriguez MD Assigned Heart and Vascular Provider   11/21/21     Phone: [phone] Pager: [phone] Fax: [phone] [address] [place] [address]    Michael Nolasco DPM Assigned Musculoskeletal Provider   11/28/21     Phone: [phone] Fax: [phone] [address] [facility] [address]    [facility]    12/27/21     TCU    Phone: [phone] Fax: [phone] [address] [facility] [address]            My Care Plans  Self Management and Treatment Plan  Goals and (Comments)  Goals         General     1. Psychosocial (pt-stated)      Notes - Note edited  11/17/2021  8:59 AM by Ginna Oscar LSW     Goal Statement: I will explore additional options for support in the next 3 months.   Date Goal set: 7/17/20 // revised on 2/9/21  Update 11/17/2021  Barriers: limited mobility, poor eyesight, limited knowledge of available resources  Strengths: motivated to find additional sources of help  Date to Achieve By: 1/31/21 // extended to 8/30/21  Update achieve date: 2/17/2022  Patient expressed understanding of goal: Yes    Action steps to achieve this goal:  1. I will inquire about financial resources through Financial Resource referral with help from Care Coordination.  2. I will explore community options for help with cleaning/de-cluttering my home.  3. I will continue leaning on support from my children/family for the things I am unable to do.  4. I will consider in-home options for additional support.  5. I will continue working with Care Coordination to address barriers to achieving my goal.             Action Plans on File:            Depression          Advance Care Plans/Directives Type:   No data recorded    My Medical and Care Information  Problem List   Patient Active Problem List   Diagnosis     Hyperlipidemia LDL goal <100     Macular degeneration     Apnea     Morbid obesity due to excess calories (H)     CKD (chronic kidney disease) stage 3, GFR 30-59 ml/min (H)     Acquired hypothyroidism     Benign essential hypertension     Gastroesophageal reflux disease without esophagitis     Type 2 diabetes mellitus with stage 3 chronic kidney disease, without long-term current use of insulin (H)     Anemia, unspecified type     MDD (major depressive disorder), recurrent episode, mild (H)     Severe anemia     Severe obesity (BMI 35.0-39.9) with comorbidity (H)     Peripheral vision loss, unspecified laterality     Type 2 DM with CKD stage 3 and hypertension (H)     Acute on chronic blood loss  anemia     B-cell lymphoma of intrathoracic lymph nodes, unspecified B-cell lymphoma type (H)     SHAVON (obstructive sleep apnea)     Pancreatic mass     Nonrheumatic aortic (valve) stenosis with insufficiency     Diastolic heart failure, unspecified HF chronicity (H)     Hypothyroidism, unspecified type     Major depressive disorder with current active episode, unspecified depression episode severity, unspecified whether recurrent     Physical deconditioning     Severe obesity (BMI >= 40) (H)     Callus of foot     Non-Hodgkin's lymphoma (H)     Encounter for other specified aftercare     Hypoxia     Generalized muscle weakness     Symptomatic anemia      Current Medications and Allergies:  See printed Medication Report.    Care Coordination Start Date: No linked episodes   Frequency of Care Coordination: monthly     Form Last Updated: 01/31/2022

## 2022-02-01 ENCOUNTER — MEDICAL CORRESPONDENCE (OUTPATIENT)
Dept: HEALTH INFORMATION MANAGEMENT | Facility: CLINIC | Age: 82
End: 2022-02-01
Payer: MEDICARE

## 2022-02-01 DIAGNOSIS — C85.99 NON-HODGKIN LYMPHOMA OF SOLID ORGAN EXCLUDING SPLEEN, UNSPECIFIED NON-HODGKIN LYMPHOMA TYPE (H): Primary | ICD-10-CM

## 2022-02-02 ENCOUNTER — LAB (OUTPATIENT)
Dept: INFUSION THERAPY | Facility: CLINIC | Age: 82
End: 2022-02-02
Attending: INTERNAL MEDICINE
Payer: MEDICARE

## 2022-02-02 ENCOUNTER — ONCOLOGY VISIT (OUTPATIENT)
Dept: ONCOLOGY | Facility: CLINIC | Age: 82
End: 2022-02-02
Attending: INTERNAL MEDICINE
Payer: MEDICARE

## 2022-02-02 VITALS
HEART RATE: 72 BPM | TEMPERATURE: 97.7 F | DIASTOLIC BLOOD PRESSURE: 56 MMHG | SYSTOLIC BLOOD PRESSURE: 131 MMHG | RESPIRATION RATE: 16 BRPM

## 2022-02-02 VITALS
WEIGHT: 250 LBS | RESPIRATION RATE: 16 BRPM | BODY MASS INDEX: 48.82 KG/M2 | OXYGEN SATURATION: 94 % | HEART RATE: 70 BPM | SYSTOLIC BLOOD PRESSURE: 138 MMHG | DIASTOLIC BLOOD PRESSURE: 92 MMHG

## 2022-02-02 DIAGNOSIS — C85.99 NON-HODGKIN LYMPHOMA OF SOLID ORGAN EXCLUDING SPLEEN, UNSPECIFIED NON-HODGKIN LYMPHOMA TYPE (H): Primary | ICD-10-CM

## 2022-02-02 DIAGNOSIS — I50.30 DIASTOLIC HEART FAILURE, UNSPECIFIED HF CHRONICITY (H): ICD-10-CM

## 2022-02-02 DIAGNOSIS — C85.12 B-CELL LYMPHOMA OF INTRATHORACIC LYMPH NODES, UNSPECIFIED B-CELL LYMPHOMA TYPE (H): ICD-10-CM

## 2022-02-02 LAB
ABO/RH(D): NORMAL
ALBUMIN SERPL-MCNC: 3.1 G/DL (ref 3.4–5)
ALP SERPL-CCNC: 108 U/L (ref 40–150)
ALT SERPL W P-5'-P-CCNC: 19 U/L (ref 0–50)
ANION GAP SERPL CALCULATED.3IONS-SCNC: 3 MMOL/L (ref 3–14)
ANTIBODY SCREEN: NEGATIVE
AST SERPL W P-5'-P-CCNC: 10 U/L (ref 0–45)
BASOPHILS # BLD MANUAL: 0.1 10E3/UL (ref 0–0.2)
BASOPHILS NFR BLD MANUAL: 1 %
BILIRUB SERPL-MCNC: 0.2 MG/DL (ref 0.2–1.3)
BLD PROD TYP BPU: NORMAL
BLOOD COMPONENT TYPE: NORMAL
BUN SERPL-MCNC: 34 MG/DL (ref 7–30)
CALCIUM SERPL-MCNC: 8.4 MG/DL (ref 8.5–10.1)
CHLORIDE BLD-SCNC: 105 MMOL/L (ref 94–109)
CO2 SERPL-SCNC: 32 MMOL/L (ref 20–32)
CODING SYSTEM: NORMAL
CREAT SERPL-MCNC: 0.98 MG/DL (ref 0.52–1.04)
CROSSMATCH: NORMAL
EOSINOPHIL # BLD MANUAL: 0.4 10E3/UL (ref 0–0.7)
EOSINOPHIL NFR BLD MANUAL: 7 %
ERYTHROCYTE [DISTWIDTH] IN BLOOD BY AUTOMATED COUNT: 20.1 % (ref 10–15)
GFR SERPL CREATININE-BSD FRML MDRD: 57 ML/MIN/1.73M2
GLUCOSE BLD-MCNC: 158 MG/DL (ref 70–99)
HCT VFR BLD AUTO: 25.3 % (ref 35–47)
HGB BLD-MCNC: 7.4 G/DL (ref 11.7–15.7)
IRON SATN MFR SERPL: 14 % (ref 15–46)
IRON SERPL-MCNC: 37 UG/DL (ref 35–180)
ISSUE DATE AND TIME: NORMAL
LYMPHOCYTES # BLD MANUAL: 1 10E3/UL (ref 0.8–5.3)
LYMPHOCYTES NFR BLD MANUAL: 18 %
MCH RBC QN AUTO: 26.2 PG (ref 26.5–33)
MCHC RBC AUTO-ENTMCNC: 29.2 G/DL (ref 31.5–36.5)
MCV RBC AUTO: 90 FL (ref 78–100)
METAMYELOCYTES # BLD MANUAL: 0.1 10E3/UL
METAMYELOCYTES NFR BLD MANUAL: 1 %
MONOCYTES # BLD MANUAL: 0.3 10E3/UL (ref 0–1.3)
MONOCYTES NFR BLD MANUAL: 5 %
NEUTROPHILS # BLD MANUAL: 3.9 10E3/UL (ref 1.6–8.3)
NEUTROPHILS NFR BLD MANUAL: 68 %
PLAT MORPH BLD: ABNORMAL
PLATELET # BLD AUTO: 230 10E3/UL (ref 150–450)
POTASSIUM BLD-SCNC: 4 MMOL/L (ref 3.4–5.3)
PROT SERPL-MCNC: 5.9 G/DL (ref 6.8–8.8)
RBC # BLD AUTO: 2.82 10E6/UL (ref 3.8–5.2)
RBC MORPH BLD: ABNORMAL
SODIUM SERPL-SCNC: 140 MMOL/L (ref 133–144)
SPECIMEN EXPIRATION DATE: NORMAL
TIBC SERPL-MCNC: 256 UG/DL (ref 240–430)
UNIT ABO/RH: NORMAL
UNIT NUMBER: NORMAL
UNIT STATUS: NORMAL
UNIT TYPE ISBT: 6200
WBC # BLD AUTO: 5.8 10E3/UL (ref 4–11)

## 2022-02-02 PROCEDURE — 250N000011 HC RX IP 250 OP 636: Performed by: NURSE PRACTITIONER

## 2022-02-02 PROCEDURE — 86923 COMPATIBILITY TEST ELECTRIC: CPT | Performed by: NURSE PRACTITIONER

## 2022-02-02 PROCEDURE — 36591 DRAW BLOOD OFF VENOUS DEVICE: CPT

## 2022-02-02 PROCEDURE — 86850 RBC ANTIBODY SCREEN: CPT | Performed by: NURSE PRACTITIONER

## 2022-02-02 PROCEDURE — G0463 HOSPITAL OUTPT CLINIC VISIT: HCPCS | Mod: 25

## 2022-02-02 PROCEDURE — 85027 COMPLETE CBC AUTOMATED: CPT | Performed by: NURSE PRACTITIONER

## 2022-02-02 PROCEDURE — P9016 RBC LEUKOCYTES REDUCED: HCPCS | Performed by: NURSE PRACTITIONER

## 2022-02-02 PROCEDURE — 82374 ASSAY BLOOD CARBON DIOXIDE: CPT | Performed by: NURSE PRACTITIONER

## 2022-02-02 PROCEDURE — 250N000011 HC RX IP 250 OP 636: Performed by: INTERNAL MEDICINE

## 2022-02-02 PROCEDURE — 99214 OFFICE O/P EST MOD 30 MIN: CPT | Performed by: NURSE PRACTITIONER

## 2022-02-02 PROCEDURE — 36430 TRANSFUSION BLD/BLD COMPNT: CPT

## 2022-02-02 PROCEDURE — M0220 HC INJECTION TIXAGEVIMAB & CILGAVIMAB (EVUSHELD): HCPCS | Performed by: NURSE PRACTITIONER

## 2022-02-02 PROCEDURE — 86901 BLOOD TYPING SEROLOGIC RH(D): CPT | Performed by: NURSE PRACTITIONER

## 2022-02-02 PROCEDURE — 96372 THER/PROPH/DIAG INJ SC/IM: CPT | Performed by: NURSE PRACTITIONER

## 2022-02-02 PROCEDURE — 82435 ASSAY OF BLOOD CHLORIDE: CPT | Performed by: NURSE PRACTITIONER

## 2022-02-02 PROCEDURE — 83550 IRON BINDING TEST: CPT | Performed by: NURSE PRACTITIONER

## 2022-02-02 RX ORDER — HEPARIN SODIUM (PORCINE) LOCK FLUSH IV SOLN 100 UNIT/ML 100 UNIT/ML
5 SOLUTION INTRAVENOUS
Status: DISCONTINUED | OUTPATIENT
Start: 2022-02-02 | End: 2022-02-02 | Stop reason: HOSPADM

## 2022-02-02 RX ORDER — HEPARIN SODIUM,PORCINE 10 UNIT/ML
5 VIAL (ML) INTRAVENOUS
Status: CANCELLED | OUTPATIENT
Start: 2022-02-02

## 2022-02-02 RX ORDER — HEPARIN SODIUM (PORCINE) LOCK FLUSH IV SOLN 100 UNIT/ML 100 UNIT/ML
5 SOLUTION INTRAVENOUS
Status: CANCELLED | OUTPATIENT
Start: 2022-02-02

## 2022-02-02 RX ORDER — LORATADINE 10 MG/1
10 TABLET ORAL DAILY
COMMUNITY
End: 2023-02-14

## 2022-02-02 RX ADMIN — Medication 5 ML: at 08:35

## 2022-02-02 RX ADMIN — Medication: at 14:30

## 2022-02-02 RX ADMIN — Medication 5 ML: at 14:35

## 2022-02-02 ASSESSMENT — PAIN SCALES - GENERAL: PAINLEVEL: SEVERE PAIN (7)

## 2022-02-02 NOTE — PROGRESS NOTES
Infusion Nursing Note:  Lucille Rojas presents today for port labs.    Patient seen by provider today: Yes: Alexandr Ambriz NP   present during visit today: Not Applicable.    Note: N/A.    Intravenous Access:  Labs drawn without difficulty.  Implanted Port.    Treatment Conditions:  Not Applicable. Labs pending at time of discharge.      Post Infusion Assessment:  Site patent and intact, free from redness, edema or discomfort.  No evidence of extravasations.  Access discontinued per protocol.       Discharge Plan:   Patient discharged in stable condition accompanied by: daughter.  Departure Mode: Wheelchair to lobby for clinic exam.    Marian Fleming RN

## 2022-02-02 NOTE — PROGRESS NOTES
Oncology/Hematology Visit Note  Feb 2, 2022    Reason for Visit: follow up of non-Hodgkin's lymphoma involving the pancreas  EUS revealed pancreatic head mass.  FNA revealed CD10-positive B-cell lymphoma. Flow cytometry revealed CD10-positive lambda monotypic B-cells.  PET scan on 11/18/2021 revealed 6 cm hypermetabolic pancreatic head mass and borderline hypermetabolic right axillary lymph node.     Patient met with Dr. Srinivasan who recommended starting treatment with mini R-CHOP  -01/26-cycle 3 R-CHOP given followed by Neulasta        Interval History:  Denies fever chills sweats cough shortness of breath chest pain nausea vomiting diarrhea abdominal pain or bleeding patient reports she would like to get a blood transfusion today she does not like her hemoglobin to be in the 7 range      Review of Systems:  14 point ROS of systems including Constitutional, Eyes, Respiratory, Cardiovascular, Gastroenterology, Genitourinary, Integumentary, Muscularskeletal, Psychiatric were all negative except for pertinent positives noted in my HPI.    Physical Examination:  General: The patient is a pleasant female in no acute distress.  BP (!) 138/92   Pulse 70   Resp 16   Wt 113.4 kg (250 lb)   SpO2 94%   BMI 48.82 kg/m    HEENT: EOMI, PERRL. Sclerae are anicteric. Oral mucosa is pink and moist with no lesions or thrush.   Lymph: Neck is supple with no lymphadenopathy in the cervical or supraclavicular areas.   Heart: Regular rate and rhythm.   Lungs: Clear to auscultation bilaterally.   GI: Bowel sounds present, soft, nontender with no palpable hepatosplenomegaly or masses.   Extremities: No lower extremity edema noted bilaterally.   Skin: No rashes, petechiae, or bruising noted on exposed skin.    Laboratory Data:  CBC CMP results reviewed    Assessment and Plan:  This is a 81-year-old female with    Lymphoma  non-Hodgkin's lymphoma involving the pancreas  Patient of Dr. Srinivasan  Patient to start mini R-CHOP tomorrow  -Plan is  for 6 cycles of treatment  -01/26-cycle 3 given followed by Neulasta on pro  -overall patient tolerating treatment well  -Schedule with me next week with labs  Schedule with Dr. Srinivasan in 2 weeks with next cycle of R-CHOP  Plan for CT scan after cycle 4 and PET scan after cycle 6    Anemia  Patient is asymptomatic  S/p EGD on 09/21/21 revealed a few gastric erosions and a few tiny aphthous ulcers in the duodenum, but not severe enough  Iron deficiency anemia  -status post Venofer  -Start oral iron-side effects of oral iron discussed with patient, monitor closely for constipation and GI symptoms.  Continue with stool softeners  -Check iron studies today  Give 1 unit PRBC today  Recheck CBC next week      Talked to patient and evaluated by me. We discussed the risks and benefits of receiving EVUSHELD, as well as alternative treatments. The Emergency Use Administration (EUA) was provided to the patient for review and an opportunity for questions was provided. Patient wishes to proceed with EVUSHELD.      Call our clinic with any changes in health condition or questions    ALIE Carreon [facility]     Chart documentation with Dragon Voice recognition Software. Although reviewed after completion, some words and grammatical errors may remain.

## 2022-02-02 NOTE — PROGRESS NOTES
Oncology Rooming Note    February 2, 2022 9:35 AM   Lucille Rojas is a 81 year old female who presents for:    Chief Complaint   Patient presents with     Oncology Clinic Visit     Initial Vitals: There were no vitals taken for this visit. Estimated body mass index is 48.63 kg/m  as calculated from the following:    Height as of 1/14/22: 1.524 m (5').    Weight as of 1/26/22: 112.9 kg (249 lb). There is no height or weight on file to calculate BSA.  Data Unavailable Comment: Data Unavailable   No LMP recorded. Patient has had a hysterectomy.  Allergies reviewed: Yes  Medications reviewed: Yes    Medications: Medication refills not needed today.  Pharmacy name entered into [employer]: [facility][id number] - [facility] [address] [facility]    Clinical concerns:  NP was notified.      Tianna Beckman, CHELO

## 2022-02-02 NOTE — PROGRESS NOTES
Infusion Nursing Note:  Lucilleedelmira Rojas presents today for 1 unit PRBC's.    Patient seen by provider today: Yes: Alexandr Ambriz NP   present during visit today: Not Applicable.    Note: N/A.    Intravenous Access:  Implanted Port.    Treatment Conditions:  Blood transfusion consent signed 2/2/22.    Post Infusion Assessment:  Patient tolerated infusion without incident.  Site patent and intact, free from redness, edema or discomfort.  No evidence of extravasations.  Access discontinued per protocol.     EVUSHELD Administration Note:  Lucille Rojas presents today for EVUSHELD.  Patient seen by provider today: Yes: Alexandr Ambriz NP   present during visit today: Not Applicable.    Confirmed patient received the Emergency Use Authorization Fact Sheet for Patients, Parents and Caregivers prior to receiving medication. Confirmed patient had conversation with provider about medication and no further questions at this time.     Post Injection Assessment:  Patient tolerated injection without incident.  Patient observed for 60 minutes post injection per protocol.   Patient was observed in the room for a minimum of 10 minutes after injection per standard, then remained in the buidling for a total 60 minute observation period.      [facility] Plan:   [facility]  Patient will return 2/15/22 for next appointment.   Patient discharged in stable condition accompanied by:  and daughter.  Departure Mode: Wheelchair.      Misa Mcgraw RN

## 2022-02-02 NOTE — LETTER
2/2/2022         RE: Lucille Rojas  [address] [facility] [address]        Dear Colleague,    Thank you for referring your patient, Lucille Rojas, to the [facility]. Please see a copy of my visit note below.    Oncology Rooming Note    February 2, 2022 9:35 AM   Lucille Rojas is a 81 year old female who presents for:    Chief Complaint   Patient presents with     Oncology Clinic Visit     Initial Vitals: There were no vitals taken for this visit. Estimated body mass index is 48.63 kg/m  as calculated from the following:    Height as of 1/14/22: 1.524 m (5').    Weight as of 1/26/22: 112.9 kg (249 lb). There is no height or weight on file to calculate BSA.  Data Unavailable Comment: Data Unavailable   No LMP recorded. Patient has had a hysterectomy.  Allergies reviewed: Yes  Medications reviewed: Yes    Medications: Medication refills not needed today.  Pharmacy name entered into [employer]: [facility] #[id number] - [place], [place] - [address]    Clinical concerns:  NP was notified.      Tianna Beckman, [facility]              Oncology/Hematology Visit Note  Feb 2, 2022    Reason for Visit: follow up of non-Hodgkin's lymphoma involving the pancreas  EUS revealed pancreatic head mass.  FNA revealed CD10-positive B-cell lymphoma. Flow cytometry revealed CD10-positive lambda monotypic B-cells.  PET scan on 11/18/2021 revealed 6 cm hypermetabolic pancreatic head mass and borderline hypermetabolic right axillary lymph node.     Patient met with Dr. Srinivasan who recommended starting treatment with mini R-CHOP  -01/26-cycle 3 R-CHOP given followed by Neulasta        Interval History:  Denies fever chills sweats cough shortness of breath chest pain nausea vomiting diarrhea abdominal pain or bleeding patient reports she would like to get a blood transfusion today she does not like her hemoglobin to be in the 7 range      Review of Systems:  14 point ROS of systems including  Constitutional, Eyes, Respiratory, Cardiovascular, Gastroenterology, Genitourinary, Integumentary, Muscularskeletal, Psychiatric were all negative except for pertinent positives noted in my HPI.    Physical Examination:  General: The patient is a pleasant female in no acute distress.  BP (!) 138/92   Pulse 70   Resp 16   Wt 113.4 kg (250 lb)   SpO2 94%   BMI 48.82 kg/m    HEENT: EOMI, PERRL. Sclerae are anicteric. Oral mucosa is pink and moist with no lesions or thrush.   Lymph: Neck is supple with no lymphadenopathy in the cervical or supraclavicular areas.   Heart: Regular rate and rhythm.   Lungs: Clear to auscultation bilaterally.   GI: Bowel sounds present, soft, nontender with no palpable hepatosplenomegaly or masses.   Extremities: No lower extremity edema noted bilaterally.   Skin: No rashes, petechiae, or bruising noted on exposed skin.    Laboratory Data:  CBC CMP results reviewed    Assessment and Plan:  This is a 81-year-old female with    Lymphoma  non-Hodgkin's lymphoma involving the pancreas  Patient of Dr. Srinivasan  Patient to start mini R-CHOP tomorrow  -Plan is for 6 cycles of treatment  -01/26-cycle 3 given followed by Neulasta on pro  -overall patient tolerating treatment well  -Schedule with me next week with labs  Schedule with Dr. Srinivasan in 2 weeks with next cycle of R-CHOP  Plan for CT scan after cycle 4 and PET scan after cycle 6    Anemia  Patient is asymptomatic  S/p EGD on 09/21/21 revealed a few gastric erosions and a few tiny aphthous ulcers in the duodenum, but not severe enough  Iron deficiency anemia  -status post Venofer  -Start oral iron-side effects of oral iron discussed with patient, monitor closely for constipation and GI symptoms.  Continue with stool softeners  -Check iron studies today  Give 1 unit PRBC today  Recheck CBC next week      Talked to patient and evaluated by me. We discussed the risks and benefits of receiving EVUSHELD, as well as alternative treatments. The  Emergency Use Administration (EUA) was provided to the patient for review and an opportunity for questions was provided. Patient wishes to proceed with EVUSHELD.      Call our clinic with any changes in health condition or questions    ALIE Carreon CNP  [facility]     Chart documentation with Dragon Voice recognition Software. Although reviewed after completion, some words and grammatical errors may remain.            Again, thank you for allowing me to participate in the care of your patient.        Sincerely,        ALIE Carreon CNP

## 2022-02-03 RX ORDER — FUROSEMIDE 20 MG
20 TABLET ORAL DAILY
Qty: 90 TABLET | Refills: 0 | Status: SHIPPED | OUTPATIENT
Start: 2022-02-03 | End: 2022-03-31

## 2022-02-03 NOTE — TELEPHONE ENCOUNTER
Routing refill request to provider for review/approval because:  Last Rx sent for #30, first Rx from PCP   Unclear if pt to continue on lasix long term routing to PCP to advise on further     Nazanin EPPERSON, Triage RN  [facility]

## 2022-02-08 ENCOUNTER — MEDICAL CORRESPONDENCE (OUTPATIENT)
Dept: HEALTH INFORMATION MANAGEMENT | Facility: CLINIC | Age: 82
End: 2022-02-08
Payer: MEDICARE

## 2022-02-09 DIAGNOSIS — C85.12 B-CELL LYMPHOMA OF INTRATHORACIC LYMPH NODES, UNSPECIFIED B-CELL LYMPHOMA TYPE (H): Primary | ICD-10-CM

## 2022-02-10 ENCOUNTER — ONCOLOGY VISIT (OUTPATIENT)
Dept: ONCOLOGY | Facility: CLINIC | Age: 82
End: 2022-02-10
Attending: NURSE PRACTITIONER
Payer: MEDICARE

## 2022-02-10 ENCOUNTER — LAB (OUTPATIENT)
Dept: INFUSION THERAPY | Facility: CLINIC | Age: 82
End: 2022-02-10
Attending: INTERNAL MEDICINE
Payer: MEDICARE

## 2022-02-10 VITALS
DIASTOLIC BLOOD PRESSURE: 67 MMHG | SYSTOLIC BLOOD PRESSURE: 131 MMHG | TEMPERATURE: 98.7 F | OXYGEN SATURATION: 94 % | HEART RATE: 69 BPM | RESPIRATION RATE: 16 BRPM

## 2022-02-10 DIAGNOSIS — C85.99 NON-HODGKIN LYMPHOMA OF SOLID ORGAN EXCLUDING SPLEEN, UNSPECIFIED NON-HODGKIN LYMPHOMA TYPE (H): ICD-10-CM

## 2022-02-10 DIAGNOSIS — C85.12 B-CELL LYMPHOMA OF INTRATHORACIC LYMPH NODES, UNSPECIFIED B-CELL LYMPHOMA TYPE (H): Primary | ICD-10-CM

## 2022-02-10 LAB
ALBUMIN SERPL-MCNC: 3 G/DL (ref 3.4–5)
ALP SERPL-CCNC: 123 U/L (ref 40–150)
ALT SERPL W P-5'-P-CCNC: 12 U/L (ref 0–50)
ANION GAP SERPL CALCULATED.3IONS-SCNC: 4 MMOL/L (ref 3–14)
AST SERPL W P-5'-P-CCNC: 9 U/L (ref 0–45)
BASOPHILS # BLD AUTO: 0.1 10E3/UL (ref 0–0.2)
BASOPHILS NFR BLD AUTO: 1 %
BILIRUB SERPL-MCNC: 0.2 MG/DL (ref 0.2–1.3)
BUN SERPL-MCNC: 27 MG/DL (ref 7–30)
CALCIUM SERPL-MCNC: 8.5 MG/DL (ref 8.5–10.1)
CHLORIDE BLD-SCNC: 105 MMOL/L (ref 94–109)
CO2 SERPL-SCNC: 31 MMOL/L (ref 20–32)
CREAT SERPL-MCNC: 1.01 MG/DL (ref 0.52–1.04)
EOSINOPHIL # BLD AUTO: 0.2 10E3/UL (ref 0–0.7)
EOSINOPHIL NFR BLD AUTO: 2 %
ERYTHROCYTE [DISTWIDTH] IN BLOOD BY AUTOMATED COUNT: 20.2 % (ref 10–15)
GFR SERPL CREATININE-BSD FRML MDRD: 55 ML/MIN/1.73M2
GLUCOSE BLD-MCNC: 157 MG/DL (ref 70–99)
HCT VFR BLD AUTO: 31.1 % (ref 35–47)
HGB BLD-MCNC: 9 G/DL (ref 11.7–15.7)
IMM GRANULOCYTES # BLD: 0.3 10E3/UL
IMM GRANULOCYTES NFR BLD: 4 %
LYMPHOCYTES # BLD AUTO: 0.8 10E3/UL (ref 0.8–5.3)
LYMPHOCYTES NFR BLD AUTO: 10 %
MCH RBC QN AUTO: 26.2 PG (ref 26.5–33)
MCHC RBC AUTO-ENTMCNC: 28.9 G/DL (ref 31.5–36.5)
MCV RBC AUTO: 90 FL (ref 78–100)
MONOCYTES # BLD AUTO: 0.6 10E3/UL (ref 0–1.3)
MONOCYTES NFR BLD AUTO: 8 %
NEUTROPHILS # BLD AUTO: 5.8 10E3/UL (ref 1.6–8.3)
NEUTROPHILS NFR BLD AUTO: 75 %
NRBC # BLD AUTO: 0 10E3/UL
NRBC BLD AUTO-RTO: 0 /100
PLATELET # BLD AUTO: 261 10E3/UL (ref 150–450)
POTASSIUM BLD-SCNC: 3.7 MMOL/L (ref 3.4–5.3)
PROT SERPL-MCNC: 6.3 G/DL (ref 6.8–8.8)
RBC # BLD AUTO: 3.44 10E6/UL (ref 3.8–5.2)
SODIUM SERPL-SCNC: 140 MMOL/L (ref 133–144)
WBC # BLD AUTO: 7.8 10E3/UL (ref 4–11)

## 2022-02-10 PROCEDURE — 85025 COMPLETE CBC W/AUTO DIFF WBC: CPT | Performed by: NURSE PRACTITIONER

## 2022-02-10 PROCEDURE — 99214 OFFICE O/P EST MOD 30 MIN: CPT | Performed by: NURSE PRACTITIONER

## 2022-02-10 PROCEDURE — 80053 COMPREHEN METABOLIC PANEL: CPT | Performed by: NURSE PRACTITIONER

## 2022-02-10 PROCEDURE — 36591 DRAW BLOOD OFF VENOUS DEVICE: CPT

## 2022-02-10 PROCEDURE — 250N000011 HC RX IP 250 OP 636: Performed by: INTERNAL MEDICINE

## 2022-02-10 PROCEDURE — 82040 ASSAY OF SERUM ALBUMIN: CPT | Performed by: NURSE PRACTITIONER

## 2022-02-10 PROCEDURE — G0463 HOSPITAL OUTPT CLINIC VISIT: HCPCS

## 2022-02-10 RX ORDER — HEPARIN SODIUM,PORCINE 10 UNIT/ML
5 VIAL (ML) INTRAVENOUS
Status: CANCELLED | OUTPATIENT
Start: 2022-02-10

## 2022-02-10 RX ORDER — HEPARIN SODIUM (PORCINE) LOCK FLUSH IV SOLN 100 UNIT/ML 100 UNIT/ML
5 SOLUTION INTRAVENOUS
Status: DISCONTINUED | OUTPATIENT
Start: 2022-02-10 | End: 2022-02-10 | Stop reason: HOSPADM

## 2022-02-10 RX ORDER — HEPARIN SODIUM (PORCINE) LOCK FLUSH IV SOLN 100 UNIT/ML 100 UNIT/ML
5 SOLUTION INTRAVENOUS
Status: CANCELLED | OUTPATIENT
Start: 2022-02-10

## 2022-02-10 RX ADMIN — Medication 5 ML: at 09:32

## 2022-02-10 ASSESSMENT — PAIN SCALES - GENERAL: PAINLEVEL: NO PAIN (0)

## 2022-02-10 NOTE — PROGRESS NOTES
Oncology/Hematology Visit Note  Feb 10, 2022    Reason for Visit: follow up of non-Hodgkin's lymphoma involving the pancreas  EUS revealed pancreatic head mass.  FNA revealed CD10-positive B-cell lymphoma. Flow cytometry revealed CD10-positive lambda monotypic B-cells.  PET scan on 11/18/2021 revealed 6 cm hypermetabolic pancreatic head mass and borderline hypermetabolic right axillary lymph node.     Patient met with Dr. Srinivasan who recommended starting treatment with mini R-CHOP  -01/26-cycle 3 R-CHOP given followed by Neulasta        Interval History:  Overall feeling well.  Denies fever chills sweats cough shortness of breath chest pain nausea vomiting diarrhea abdominal pain or bleeding    Review of Systems:  14 point ROS of systems including Constitutional, Eyes, Respiratory, Cardiovascular, Gastroenterology, Genitourinary, Integumentary, Muscularskeletal, Psychiatric were all negative except for pertinent positives noted in my HPI.    Physical Examination:  General: The patient is a pleasant female in no acute distress.  HEENT: EOMI, PERRL. Sclerae are anicteric. Oral mucosa is pink and moist with no lesions or thrush.   Lymph: Neck is supple with no lymphadenopathy in the cervical or supraclavicular areas.   Heart: Regular rate and rhythm.   Lungs: Clear to auscultation bilaterally.   GI: Bowel sounds present, soft, nontender with no palpable hepatosplenomegaly or masses.   Extremities: No lower extremity edema noted bilaterally.   Skin: No rashes, petechiae, or bruising noted on exposed skin.    Laboratory Data:  CBC CMP results reviewed    Assessment and Plan:  This is a 81-year-old female with    Lymphoma  non-Hodgkin's lymphoma involving the pancreas  Patient of Dr. Srinivasan  Patient is getting treatment with mini R-CHOP  -Plan is for 6 cycles of treatment  -01/26-cycle 3 given followed by Neulasta on pro  -overall patient tolerating treatment well  -Patient is scheduled next week for cycle 4 and follow-up  with Dr. Srinivasan    Anemia  Patient is asymptomatic  S/p EGD on 09/21/21 revealed a few gastric erosions and a few tiny aphthous ulcers in the duodenum, but not severe enough  Iron deficiency anemia  -status post Venofer  -Currently taking oral iron  Status post blood transfusion last week  Hemoglobin stable at 9 today      Health maintenance  Patient is status post Evusheld      Call our clinic with any changes in health condition or questions    ALIE Carreon [facility]     Chart documentation with Dragon Voice recognition Software. Although reviewed after completion, some words and grammatical errors may remain.

## 2022-02-10 NOTE — LETTER
2/10/2022         RE: Lucille Rojas  [address] [facility] [address]        Dear Colleague,    Thank you for referring your patient, Lucille Rojas, to the [facility]. Please see a copy of my visit note below.    Oncology Rooming Note    February 10, 2022 9:50 AM   Lucille Rojas is a 81 year old female who presents for:    Chief Complaint   Patient presents with     Oncology Clinic Visit     Initial Vitals: /67   Pulse 69   Resp 16   SpO2 94%  Estimated body mass index is 48.82 kg/m  as calculated from the following:    Height as of 1/14/22: 1.524 m (5').    Weight as of 2/2/22: 113.4 kg (250 lb). There is no height or weight on file to calculate BSA.  No Pain (0) Comment: Data Unavailable   No LMP recorded. Patient has had a hysterectomy.  Allergies reviewed: Yes  Medications reviewed: Yes    Medications: Medication refills not needed today.  [facility]arm[facility] into [employer]: [id number] [place] [place] [facility] [address] [facility]    Clinical concerns: no      Odalis Biswas, [facility]              Oncology/Hematology Visit Note  Feb 10, 2022    Reason for Visit: follow up of non-Hodgkin's lymphoma involving the pancreas  EUS revealed pancreatic head mass.  FNA revealed CD10-positive B-cell lymphoma. Flow cytometry revealed CD10-positive lambda monotypic B-cells.  PET scan on 11/18/2021 revealed 6 cm hypermetabolic pancreatic head mass and borderline hypermetabolic right axillary lymph node.     Patient met with Dr. Srinivasan who recommended starting treatment with mini R-CHOP  -01/26-cycle 3 R-CHOP given followed by Neulasta        Interval History:  Overall feeling well.  Denies fever chills sweats cough shortness of breath chest pain nausea vomiting diarrhea abdominal pain or bleeding    Review of Systems:  14 point ROS of systems including Constitutional, Eyes, Respiratory, Cardiovascular, Gastroenterology, Genitourinary, Integumentary,  Muscularskeletal, Psychiatric were all negative except for pertinent positives noted in my HPI.    Physical Examination:  General: The patient is a pleasant female in no acute distress.  HEENT: EOMI, PERRL. Sclerae are anicteric. Oral mucosa is pink and moist with no lesions or thrush.   Lymph: Neck is supple with no lymphadenopathy in the cervical or supraclavicular areas.   Heart: Regular rate and rhythm.   Lungs: Clear to auscultation bilaterally.   GI: Bowel sounds present, soft, nontender with no palpable hepatosplenomegaly or masses.   Extremities: No lower extremity edema noted bilaterally.   Skin: No rashes, petechiae, or bruising noted on exposed skin.    Laboratory Data:  CBC CMP results reviewed    Assessment and Plan:  This is a 81-year-old female with    Lymphoma  non-Hodgkin's lymphoma involving the pancreas  Patient of Dr. Srinivasan  Patient is getting treatment with mini R-CHOP  -Plan is for 6 cycles of treatment  -01/26-cycle 3 given followed by Neulasta on pro  -overall patient tolerating treatment well  -Patient is scheduled next week for cycle 4 and follow-up with Dr. Srinivasan    Anemia  Patient is asymptomatic  S/p EGD on 09/21/21 revealed a few gastric erosions and a few tiny aphthous ulcers in the duodenum, but not severe enough  Iron deficiency anemia  -status post Venofer  -Currently taking oral iron  Status post blood transfusion last week  Hemoglobin stable at 9 today      Health maintenance  Patient is status post Evusheld      Call our clinic with any changes in health condition or questions    ALIE Carreon CNP  [facility]     Chart documentation with Dragon Voice recognition Software. Although reviewed after completion, some words and grammatical errors may remain.            Again, thank you for allowing me to participate in the care of your patient.        Sincerely,        ALIE Carreon CNP

## 2022-02-10 NOTE — PROGRESS NOTES
Nursing Note:  Lucille Rojas presents today for port labs.    Patient seen by provider today: Yes: Alexandr Ambriz CNP   present during visit today: Not Applicable.    Note: no new concerns.    Intravenous Access:  Labs drawn without difficulty.  Implanted Port.    Discharge Plan:   Patient was sent to [facility] for provider appointment.    Bryanna Rivera RN

## 2022-02-10 NOTE — PROGRESS NOTES
Oncology Rooming Note    February 10, 2022 9:50 AM   Lucille Rojas is a 81 year old female who presents for:    Chief Complaint   Patient presents with     Oncology Clinic Visit     Initial Vitals: /67   Pulse 69   Resp 16   SpO2 94%  Estimated body mass index is 48.82 kg/m  as calculated from the following:    Height as of 1/14/22: 1.524 m (5').    Weight as of 2/2/22: 113.4 kg (250 lb). There is no height or weight on file to calculate BSA.  No Pain (0) Comment: Data Unavailable   No LMP recorded. Patient has had a hysterectomy.  Allergies reviewed: Yes  Medications reviewed: Yes    Medications: Medication refills not needed today.  Pharmacy name entered into [facility]: [facility] #[id number] - [place], [place] - [address] [facility]    Clinical concerns: no      Odalis Biswas, [facility]

## 2022-02-15 ENCOUNTER — ONCOLOGY VISIT (OUTPATIENT)
Dept: ONCOLOGY | Facility: CLINIC | Age: 82
End: 2022-02-15
Attending: NURSE PRACTITIONER
Payer: MEDICARE

## 2022-02-15 ENCOUNTER — LAB (OUTPATIENT)
Dept: INFUSION THERAPY | Facility: CLINIC | Age: 82
End: 2022-02-15
Attending: INTERNAL MEDICINE
Payer: MEDICARE

## 2022-02-15 VITALS
BODY MASS INDEX: 48.04 KG/M2 | SYSTOLIC BLOOD PRESSURE: 136 MMHG | OXYGEN SATURATION: 93 % | DIASTOLIC BLOOD PRESSURE: 75 MMHG | HEART RATE: 66 BPM | WEIGHT: 246 LBS

## 2022-02-15 DIAGNOSIS — Z51.89 ENCOUNTER FOR OTHER SPECIFIED AFTERCARE: Primary | ICD-10-CM

## 2022-02-15 DIAGNOSIS — C85.99 NON-HODGKIN LYMPHOMA OF SOLID ORGAN EXCLUDING SPLEEN, UNSPECIFIED NON-HODGKIN LYMPHOMA TYPE (H): ICD-10-CM

## 2022-02-15 DIAGNOSIS — C85.12 B-CELL LYMPHOMA OF INTRATHORACIC LYMPH NODES, UNSPECIFIED B-CELL LYMPHOMA TYPE (H): ICD-10-CM

## 2022-02-15 DIAGNOSIS — C85.12 B-CELL LYMPHOMA OF INTRATHORACIC LYMPH NODES, UNSPECIFIED B-CELL LYMPHOMA TYPE (H): Primary | ICD-10-CM

## 2022-02-15 LAB
ALBUMIN SERPL-MCNC: 3.2 G/DL (ref 3.4–5)
ALP SERPL-CCNC: 109 U/L (ref 40–150)
ALT SERPL W P-5'-P-CCNC: 12 U/L (ref 0–50)
ANION GAP SERPL CALCULATED.3IONS-SCNC: 3 MMOL/L (ref 3–14)
AST SERPL W P-5'-P-CCNC: 12 U/L (ref 0–45)
BASOPHILS # BLD AUTO: 0.1 10E3/UL (ref 0–0.2)
BASOPHILS NFR BLD AUTO: 1 %
BILIRUB SERPL-MCNC: 0.2 MG/DL (ref 0.2–1.3)
BUN SERPL-MCNC: 30 MG/DL (ref 7–30)
CALCIUM SERPL-MCNC: 9 MG/DL (ref 8.5–10.1)
CHLORIDE BLD-SCNC: 106 MMOL/L (ref 94–109)
CO2 SERPL-SCNC: 33 MMOL/L (ref 20–32)
CREAT SERPL-MCNC: 0.99 MG/DL (ref 0.52–1.04)
EOSINOPHIL # BLD AUTO: 0.2 10E3/UL (ref 0–0.7)
EOSINOPHIL NFR BLD AUTO: 3 %
ERYTHROCYTE [DISTWIDTH] IN BLOOD BY AUTOMATED COUNT: 20.3 % (ref 10–15)
GFR SERPL CREATININE-BSD FRML MDRD: 56 ML/MIN/1.73M2
GLUCOSE BLD-MCNC: 133 MG/DL (ref 70–99)
HCT VFR BLD AUTO: 31.1 % (ref 35–47)
HGB BLD-MCNC: 8.9 G/DL (ref 11.7–15.7)
IMM GRANULOCYTES # BLD: 0.1 10E3/UL
IMM GRANULOCYTES NFR BLD: 2 %
LYMPHOCYTES # BLD AUTO: 0.7 10E3/UL (ref 0.8–5.3)
LYMPHOCYTES NFR BLD AUTO: 12 %
MCH RBC QN AUTO: 26.7 PG (ref 26.5–33)
MCHC RBC AUTO-ENTMCNC: 28.6 G/DL (ref 31.5–36.5)
MCV RBC AUTO: 93 FL (ref 78–100)
MONOCYTES # BLD AUTO: 0.8 10E3/UL (ref 0–1.3)
MONOCYTES NFR BLD AUTO: 13 %
NEUTROPHILS # BLD AUTO: 4.2 10E3/UL (ref 1.6–8.3)
NEUTROPHILS NFR BLD AUTO: 69 %
NRBC # BLD AUTO: 0 10E3/UL
NRBC BLD AUTO-RTO: 0 /100
PLATELET # BLD AUTO: 285 10E3/UL (ref 150–450)
POTASSIUM BLD-SCNC: 3.7 MMOL/L (ref 3.4–5.3)
PROT SERPL-MCNC: 6.4 G/DL (ref 6.8–8.8)
RBC # BLD AUTO: 3.33 10E6/UL (ref 3.8–5.2)
SODIUM SERPL-SCNC: 142 MMOL/L (ref 133–144)
WBC # BLD AUTO: 6.1 10E3/UL (ref 4–11)

## 2022-02-15 PROCEDURE — 80053 COMPREHEN METABOLIC PANEL: CPT | Performed by: INTERNAL MEDICINE

## 2022-02-15 PROCEDURE — 82040 ASSAY OF SERUM ALBUMIN: CPT | Performed by: INTERNAL MEDICINE

## 2022-02-15 PROCEDURE — 99215 OFFICE O/P EST HI 40 MIN: CPT | Performed by: INTERNAL MEDICINE

## 2022-02-15 PROCEDURE — G0463 HOSPITAL OUTPT CLINIC VISIT: HCPCS

## 2022-02-15 PROCEDURE — 85004 AUTOMATED DIFF WBC COUNT: CPT | Performed by: INTERNAL MEDICINE

## 2022-02-15 PROCEDURE — 250N000011 HC RX IP 250 OP 636: Performed by: INTERNAL MEDICINE

## 2022-02-15 RX ORDER — NALOXONE HYDROCHLORIDE 0.4 MG/ML
0.2 INJECTION, SOLUTION INTRAMUSCULAR; INTRAVENOUS; SUBCUTANEOUS
Status: CANCELLED | OUTPATIENT
Start: 2022-02-16

## 2022-02-15 RX ORDER — ALBUTEROL SULFATE 90 UG/1
1-2 AEROSOL, METERED RESPIRATORY (INHALATION)
Status: CANCELLED
Start: 2022-02-16

## 2022-02-15 RX ORDER — DIPHENHYDRAMINE HYDROCHLORIDE 50 MG/ML
50 INJECTION INTRAMUSCULAR; INTRAVENOUS
Status: CANCELLED
Start: 2022-02-16

## 2022-02-15 RX ORDER — HEPARIN SODIUM (PORCINE) LOCK FLUSH IV SOLN 100 UNIT/ML 100 UNIT/ML
5 SOLUTION INTRAVENOUS
Status: DISCONTINUED | OUTPATIENT
Start: 2022-02-15 | End: 2022-02-15 | Stop reason: HOSPADM

## 2022-02-15 RX ORDER — HEPARIN SODIUM (PORCINE) LOCK FLUSH IV SOLN 100 UNIT/ML 100 UNIT/ML
5 SOLUTION INTRAVENOUS
Status: CANCELLED | OUTPATIENT
Start: 2022-02-16

## 2022-02-15 RX ORDER — EPINEPHRINE 1 MG/ML
0.3 INJECTION, SOLUTION INTRAMUSCULAR; SUBCUTANEOUS EVERY 5 MIN PRN
Status: CANCELLED | OUTPATIENT
Start: 2022-02-16

## 2022-02-15 RX ORDER — MEPERIDINE HYDROCHLORIDE 25 MG/ML
25 INJECTION INTRAMUSCULAR; INTRAVENOUS; SUBCUTANEOUS EVERY 30 MIN PRN
Status: CANCELLED | OUTPATIENT
Start: 2022-02-16

## 2022-02-15 RX ORDER — DOXORUBICIN HYDROCHLORIDE 2 MG/ML
25 INJECTION, SOLUTION INTRAVENOUS ONCE
Status: CANCELLED | OUTPATIENT
Start: 2022-02-16

## 2022-02-15 RX ORDER — HEPARIN SODIUM,PORCINE 10 UNIT/ML
5 VIAL (ML) INTRAVENOUS
Status: CANCELLED | OUTPATIENT
Start: 2022-02-16

## 2022-02-15 RX ORDER — PALONOSETRON 0.05 MG/ML
0.25 INJECTION, SOLUTION INTRAVENOUS ONCE
Status: CANCELLED | OUTPATIENT
Start: 2022-02-16

## 2022-02-15 RX ORDER — HEPARIN SODIUM (PORCINE) LOCK FLUSH IV SOLN 100 UNIT/ML 100 UNIT/ML
5 SOLUTION INTRAVENOUS
Status: CANCELLED | OUTPATIENT
Start: 2022-02-15

## 2022-02-15 RX ORDER — METHYLPREDNISOLONE SODIUM SUCCINATE 125 MG/2ML
125 INJECTION, POWDER, LYOPHILIZED, FOR SOLUTION INTRAMUSCULAR; INTRAVENOUS
Status: CANCELLED
Start: 2022-02-16

## 2022-02-15 RX ORDER — MEPERIDINE HYDROCHLORIDE 25 MG/ML
25 INJECTION INTRAMUSCULAR; INTRAVENOUS; SUBCUTANEOUS
Status: CANCELLED
Start: 2022-02-16

## 2022-02-15 RX ORDER — LORAZEPAM 2 MG/ML
0.5 INJECTION INTRAMUSCULAR EVERY 4 HOURS PRN
Status: CANCELLED | OUTPATIENT
Start: 2022-02-16

## 2022-02-15 RX ORDER — HEPARIN SODIUM,PORCINE 10 UNIT/ML
5 VIAL (ML) INTRAVENOUS
Status: CANCELLED | OUTPATIENT
Start: 2022-02-15

## 2022-02-15 RX ORDER — ACETAMINOPHEN 325 MG/1
650 TABLET ORAL ONCE
Status: CANCELLED | OUTPATIENT
Start: 2022-02-16

## 2022-02-15 RX ORDER — DIPHENHYDRAMINE HCL 25 MG
50 CAPSULE ORAL ONCE
Status: CANCELLED
Start: 2022-02-16

## 2022-02-15 RX ORDER — ALBUTEROL SULFATE 0.83 MG/ML
2.5 SOLUTION RESPIRATORY (INHALATION)
Status: CANCELLED | OUTPATIENT
Start: 2022-02-16

## 2022-02-15 RX ADMIN — Medication 5 ML: at 09:32

## 2022-02-15 ASSESSMENT — PAIN SCALES - GENERAL: PAINLEVEL: NO PAIN (0)

## 2022-02-15 NOTE — PROGRESS NOTES
Nursing Note:  Lucille Rojas presents today for port labs for tx 2/16.    Patient seen by provider today: Yes: Craig   present during visit today: Not Applicable.    Note: N/A.    Intravenous Access:  Labs drawn without difficulty.  Implanted Port.    Discharge Plan:   Patient was sent to [facility] for provider appointment.    Dereck Thompson RN

## 2022-02-15 NOTE — PATIENT INSTRUCTIONS
1.  Continue chemotherapy.  2.  See nurse practitioner next week with labs.  3. CT scan in 2 weeks.  4.  See me with next cycle of chemotherapy.    Please call to schedule

## 2022-02-15 NOTE — PROGRESS NOTES
Oncology Rooming Note    February 15, 2022 10:10 AM   Lucille Rojas is a 81 year old female who presents for:    Chief Complaint   Patient presents with     Oncology Clinic Visit     Initial Vitals: /75   Pulse 66   Wt 111.6 kg (246 lb)   SpO2 93%   BMI 48.04 kg/m   Estimated body mass index is 48.04 kg/m  as calculated from the following:    Height as of 1/14/22: 1.524 m (5').    Weight as of this encounter: 111.6 kg (246 lb). Body surface area is 2.17 meters squared.  No Pain (0) Comment: Data Unavailable   No LMP recorded. Patient has had a hysterectomy.  Allergies reviewed: Yes  Medications reviewed: Yes    Medications: MEDICATION REFILLS NEEDED TODAY. Provider was notified.  Pharmacy name entered into [facility]: [id number] [place] [place] [facility] [address] [facility]    Clinical concerns:  doctor was notified.    Refills on prednisone    Tianna Beckman, [facility]

## 2022-02-15 NOTE — LETTER
2/15/2022         RE: Lucille Rojas  [address] [facility] [address]        Dear Colleague,    Thank you for referring your patient, Lucille Rojas, to the [facility]. Please see a copy of my visit note below.    Oncology Rooming Note    February 15, 2022 10:10 AM   Lucille Rojas is a 81 year old female who presents for:    Chief Complaint   Patient presents with     Oncology Clinic Visit     Initial Vitals: /75   Pulse 66   Wt 111.6 kg (246 lb)   SpO2 93%   BMI 48.04 kg/m   Estimated body mass index is 48.04 kg/m  as calculated from the following:    Height as of 1/14/22: 1.524 m (5').    Weight as of this encounter: 111.6 kg (246 lb). Body surface area is 2.17 meters squared.  No Pain (0) Comment: Data Unavailable   No LMP recorded. Patient has had a hysterectomy.  Allergies reviewed: Yes  Medications reviewed: Yes    Medications: MEDICATION REFILLS NEEDED TODAY. Provider was notified.  Pharmacy name entered into [employer]: [facility][id number] - [facility] [address] [facility]    Clinical concerns:  doctor was notified.    Refills on prednisone    Tianna Beckman, [facility]              ONCOLOGY HISTORY: Ms. 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  1. On 09/20/2021:   -Hemoglobin of 4.7 with MCV of 67. Normal WBC and platelets.  -Iron of 9 and saturation index of 2%.   -CT chest, abdomen and pelvis reveals large pancreatic head mass.  This encases the celiac trunk, superior mesenteric artery and superior mesenteric vein.  There is moderate-size right pleural effusion. No lymphadenopathy.  2. Thoracocentesis on 09/22/2021. Cytology is negative for malignancy.  3. EUS on 09/21/2021 revealed is a mass in the uncinate process of the pancreas measuring 33 mm.  There was evidence of invasion into superior mesenteric artery and the celiac trunk. No enlarged lymph nodes seen. FNA revealed atypical cells.    4. EGD and  EUS repeated on 10/05/2021.  -EGD is normal.  -EUS revealed pancreatic head mass.  FNA revealed CD10-positive B-cell lymphoma. Flow cytometry revealed CD10-positive lambda monotypic B-cells.  5. PET scan on 11/18/2021 revealed 6 cm hypermetabolic pancreatic head mass and borderline hypermetabolic right axillary lymph node.   -Further repeat biopsy not done because of her overall poor health.  6. mini R-CHOP on 12/15/2021.  -CT scan on 12/21/21 reveals improvement in disease.     SUBJECTIVE:  Ms. Rojas is an 81-year-old female with non-Hodgkin's B-cell lymphoma involving the pancreas.  She is on mini R-CHOP.  She received 3 cycles.  Overall, she is tolerating chemotherapy well.     She has weakness.  She needs help with activities of daily living.  No worsening of weakness since she started chemotherapy. No headache.  Mild dizziness.  No chest pain.  No shortness of breath at rest.  Gets shortness of breath on minimal exertion.  No nausea or vomiting.  Appetite is fairly good.  No urinary complaint.  No diarrhea.  No bleeding.  No fever or chills.  No recent infection.    As per the family, patient overall doing well since seeing has been on chemotherapy.  They have not noticed any deterioration in her condition.     All other review of systems negative.     PHYSICAL EXAMINATION:    GENERAL:  She is alert and oriented x 3.  Not in respiratory distress.  ECOG PS of 2.   FACE:  No swelling.  EYES:  No icterus.   THROAT:  No ulcer or thrush.  NECK:  Supple. No lymphadenopathy.  LUNGS:  Decreased air entry at the bases.  HEART:  Regular.  ABDOMEN:  Soft. Nontender.  Difficult palpation because of her weight.  No mass.  EXTREMITIES:  Mild ankle edema.  SKIN:  No petechiae.     LABORATORY:  CBC and CMP were reviewed.     ASSESSMENT:    1.  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  2.  Fatigue.  3.  Normocytic anemia.  Stable.  4.  Multiple other medical problems including  diabetes mellitus and hypertension.     PLAN:    1.  Discussed with the patient regarding lymphoma.  Clinically she is stable. No deterioration of her condition since she started chemotherapy.  In fact symptomatically she is little better.    For lymphoma, she will be continued on mini R-CHOP.  She has been tolerating it well.  She will get cycle 4 starting tomorrow.    2.  Discussed regarding the scans.  In about 2 weeks time we will get CT chest, abdomen and pelvis.  After cycle 6 we will plan on getting a PET scan.    3.  Labs are reviewed.  She is anemic which is stable.  She will transfused for hemoglobin below 8.    4.  Discussed with her regarding her weakness.  This is due to lymphoma, chemotherapy and other medical problems.  I do not think this is going to improve much.  I will be pretty happy as long as it does not get worse.    5.  Patient and family had few questions, which were all answered.  She will see our nurse practitioner next week.  I will see her with next cycle.  Family advised to call us with any questions or concerns.     TOTAL VISIT TIME of :  45 minutes.  Time is spent in today's visit, review of chart/investigations today and documentation.      Again, thank you for allowing me to participate in the care of your patient.        Sincerely,        John Srinivasan MD

## 2022-02-15 NOTE — PROGRESS NOTES
ONCOLOGY HISTORY: Ms. 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  1. On 09/20/2021:   -Hemoglobin of 4.7 with MCV of 67. Normal WBC and platelets.  -Iron of 9 and saturation index of 2%.   -CT chest, abdomen and pelvis reveals large pancreatic head mass.  This encases the celiac trunk, superior mesenteric artery and superior mesenteric vein.  There is moderate-size right pleural effusion. No lymphadenopathy.  2. Thoracocentesis on 09/22/2021. Cytology is negative for malignancy.  3. EUS on 09/21/2021 revealed is a mass in the uncinate process of the pancreas measuring 33 mm.  There was evidence of invasion into superior mesenteric artery and the celiac trunk. No enlarged lymph nodes seen. FNA revealed atypical cells.    4. EGD and EUS repeated on 10/05/2021.  -EGD is normal.  -EUS revealed pancreatic head mass.  FNA revealed CD10-positive B-cell lymphoma. Flow cytometry revealed CD10-positive lambda monotypic B-cells.  5. PET scan on 11/18/2021 revealed 6 cm hypermetabolic pancreatic head mass and borderline hypermetabolic right axillary lymph node.   -Further repeat biopsy not done because of her overall poor health.  6. mini R-CHOP on 12/15/2021.  -CT scan on 12/21/21 reveals improvement in disease.     SUBJECTIVE:  Ms. Rojas is an 81-year-old female with non-Hodgkin's B-cell lymphoma involving the pancreas.  She is on mini R-CHOP.  She received 3 cycles.  Overall, she is tolerating chemotherapy well.     She has weakness.  She needs help with activities of daily living.  No worsening of weakness since she started chemotherapy. No headache.  Mild dizziness.  No chest pain.  No shortness of breath at rest.  Gets shortness of breath on minimal exertion.  No nausea or vomiting.  Appetite is fairly good.  No urinary complaint.  No diarrhea.  No bleeding.  No fever or chills.  No recent infection.    As per the family, patient overall doing well since seeing has been on  chemotherapy.  They have not noticed any deterioration in her condition.     All other review of systems negative.     PHYSICAL EXAMINATION:    GENERAL:  She is alert and oriented x 3.  Not in respiratory distress.  ECOG PS of 2.   FACE:  No swelling.  EYES:  No icterus.   THROAT:  No ulcer or thrush.  NECK:  Supple. No lymphadenopathy.  LUNGS:  Decreased air entry at the bases.  HEART:  Regular.  ABDOMEN:  Soft. Nontender.  Difficult palpation because of her weight.  No mass.  EXTREMITIES:  Mild ankle edema.  SKIN:  No petechiae.     LABORATORY:  CBC and CMP were reviewed.     ASSESSMENT:    1.  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  2.  Fatigue.  3.  Normocytic anemia.  Stable.  4.  Multiple other medical problems including diabetes mellitus and hypertension.     PLAN:    1.  Discussed with the patient regarding lymphoma.  Clinically she is stable. No deterioration of her condition since she started chemotherapy.  In fact symptomatically she is little better.    For lymphoma, she will be continued on mini R-CHOP.  She has been tolerating it well.  She will get cycle 4 starting tomorrow.    2.  Discussed regarding the scans.  In about 2 weeks time we will get CT chest, abdomen and pelvis.  After cycle 6 we will plan on getting a PET scan.    3.  Labs are reviewed.  She is anemic which is stable.  She will transfused for hemoglobin below 8.    4.  Discussed with her regarding her weakness.  This is due to lymphoma, chemotherapy and other medical problems.  I do not think this is going to improve much.  I will be pretty happy as long as it does not get worse.    5.  Patient and family had few questions, which were all answered.  She will see our nurse practitioner next week.  I will see her with next cycle.  Family advised to call us with any questions or concerns.     TOTAL VISIT TIME of :  45 minutes.  Time is spent in today's visit, review of chart/investigations  today and documentation.

## 2022-02-16 ENCOUNTER — INFUSION THERAPY VISIT (OUTPATIENT)
Dept: INFUSION THERAPY | Facility: CLINIC | Age: 82
End: 2022-02-16
Attending: INTERNAL MEDICINE
Payer: MEDICARE

## 2022-02-16 VITALS
HEART RATE: 70 BPM | RESPIRATION RATE: 18 BRPM | SYSTOLIC BLOOD PRESSURE: 141 MMHG | OXYGEN SATURATION: 92 % | DIASTOLIC BLOOD PRESSURE: 65 MMHG | TEMPERATURE: 97.5 F

## 2022-02-16 DIAGNOSIS — Z51.89 ENCOUNTER FOR OTHER SPECIFIED AFTERCARE: Primary | ICD-10-CM

## 2022-02-16 DIAGNOSIS — C85.12 B-CELL LYMPHOMA OF INTRATHORACIC LYMPH NODES, UNSPECIFIED B-CELL LYMPHOMA TYPE (H): ICD-10-CM

## 2022-02-16 DIAGNOSIS — C85.99 NON-HODGKIN LYMPHOMA OF SOLID ORGAN EXCLUDING SPLEEN, UNSPECIFIED NON-HODGKIN LYMPHOMA TYPE (H): ICD-10-CM

## 2022-02-16 PROCEDURE — 96413 CHEMO IV INFUSION 1 HR: CPT

## 2022-02-16 PROCEDURE — 250N000013 HC RX MED GY IP 250 OP 250 PS 637: Performed by: INTERNAL MEDICINE

## 2022-02-16 PROCEDURE — 96377 APPLICATON ON-BODY INJECTOR: CPT | Mod: XS

## 2022-02-16 PROCEDURE — 96415 CHEMO IV INFUSION ADDL HR: CPT

## 2022-02-16 PROCEDURE — 258N000003 HC RX IP 258 OP 636: Performed by: INTERNAL MEDICINE

## 2022-02-16 PROCEDURE — 96372 THER/PROPH/DIAG INJ SC/IM: CPT | Performed by: INTERNAL MEDICINE

## 2022-02-16 PROCEDURE — 250N000011 HC RX IP 250 OP 636: Performed by: INTERNAL MEDICINE

## 2022-02-16 PROCEDURE — 96417 CHEMO IV INFUS EACH ADDL SEQ: CPT

## 2022-02-16 PROCEDURE — 96411 CHEMO IV PUSH ADDL DRUG: CPT

## 2022-02-16 PROCEDURE — 96367 TX/PROPH/DG ADDL SEQ IV INF: CPT

## 2022-02-16 PROCEDURE — 96375 TX/PRO/DX INJ NEW DRUG ADDON: CPT

## 2022-02-16 RX ORDER — DOXORUBICIN HYDROCHLORIDE 2 MG/ML
25 INJECTION, SOLUTION INTRAVENOUS ONCE
Status: COMPLETED | OUTPATIENT
Start: 2022-02-16 | End: 2022-02-16

## 2022-02-16 RX ORDER — HEPARIN SODIUM (PORCINE) LOCK FLUSH IV SOLN 100 UNIT/ML 100 UNIT/ML
5 SOLUTION INTRAVENOUS
Status: DISCONTINUED | OUTPATIENT
Start: 2022-02-16 | End: 2022-02-16 | Stop reason: HOSPADM

## 2022-02-16 RX ORDER — PREDNISONE 50 MG/1
40 TABLET ORAL DAILY
Qty: 10 TABLET | Refills: 0 | Status: SHIPPED | OUTPATIENT
Start: 2022-02-16 | End: 2022-02-21

## 2022-02-16 RX ORDER — ACETAMINOPHEN 325 MG/1
650 TABLET ORAL ONCE
Status: COMPLETED | OUTPATIENT
Start: 2022-02-16 | End: 2022-02-16

## 2022-02-16 RX ORDER — PALONOSETRON 0.05 MG/ML
0.25 INJECTION, SOLUTION INTRAVENOUS ONCE
Status: COMPLETED | OUTPATIENT
Start: 2022-02-16 | End: 2022-02-16

## 2022-02-16 RX ORDER — DIPHENHYDRAMINE HCL 25 MG
50 CAPSULE ORAL ONCE
Status: COMPLETED | OUTPATIENT
Start: 2022-02-16 | End: 2022-02-16

## 2022-02-16 RX ADMIN — VINCRISTINE SULFATE 1 MG: 1 INJECTION, SOLUTION INTRAVENOUS at 11:15

## 2022-02-16 RX ADMIN — ACETAMINOPHEN 650 MG: 325 TABLET, FILM COATED ORAL at 09:00

## 2022-02-16 RX ADMIN — FOSAPREPITANT 150 MG: 150 INJECTION, POWDER, LYOPHILIZED, FOR SOLUTION INTRAVENOUS at 08:55

## 2022-02-16 RX ADMIN — CYCLOPHOSPHAMIDE 895 MG: 1 INJECTION, POWDER, FOR SOLUTION INTRAVENOUS; ORAL at 11:23

## 2022-02-16 RX ADMIN — DIPHENHYDRAMINE HYDROCHLORIDE 50 MG: 25 CAPSULE ORAL at 09:00

## 2022-02-16 RX ADMIN — DOXORUBICIN HYDROCHLORIDE 55 MG: 2 INJECTION, SOLUTION INTRAVENOUS at 11:05

## 2022-02-16 RX ADMIN — Medication 5 ML: at 11:58

## 2022-02-16 RX ADMIN — PEGFILGRASTIM 6 MG: KIT SUBCUTANEOUS at 11:25

## 2022-02-16 RX ADMIN — RITUXIMAB-ABBS 800 MG: 10 INJECTION, SOLUTION INTRAVENOUS at 09:26

## 2022-02-16 RX ADMIN — SODIUM CHLORIDE 250 ML: 9 INJECTION, SOLUTION INTRAVENOUS at 08:54

## 2022-02-16 RX ADMIN — PALONOSETRON HYDROCHLORIDE 0.25 MG: 0.25 INJECTION, SOLUTION INTRAVENOUS at 08:59

## 2022-02-16 NOTE — PATIENT INSTRUCTIONS
Your On-body Neulasta Injector was applied to your left arm at 1130 AM.  At approximately 2:30PM on 2/17, your On-body Injector will beep to let you know your dose delivery will begin in 2 minutes.  Your medication will be delivered over the next 45 minutes.  You can remove your Injector at 3:30 PM on 2/17.  Please make sure your Injector has a solid green light or has turned off prior to removing the device.  Please contact your provider at [phone] with questions or concerns.

## 2022-02-16 NOTE — PROGRESS NOTES
Infusion Nursing Note:  Lucille Rojas presents today for C4 D1 R-CHOP.    Patient seen by provider today: No   present during visit today: Not Applicable.    Note: no new concerns, seen by Dr. Srinivasan 2/15.      Intravenous Access:  Implanted Port.    Treatment Conditions:  Lab Results   Component Value Date    HGB 8.9 (L) 02/15/2022    WBC 6.1 02/15/2022    ANEU 3.9 02/02/2022    ANEUTAUTO 4.2 02/15/2022     02/15/2022      Results reviewed, labs MET treatment parameters, ok to proceed with treatment.      Post Infusion Assessment:  Patient tolerated infusion without incident.  Blood return noted pre and post infusion.  Site patent and intact, free from redness, edema or discomfort.  No evidence of extravasations.  Access discontinued per protocol.       Discharge Plan:   Discharge instructions reviewed with: Patient and Family.  Patient and/or family verbalized understanding of discharge instructions and all questions answered.  AVS to patient via [employer].  Patient will return 3/8 for next appointment.   Patient discharged in stable condition accompanied by: self.  Departure Mode: Ambulatory.    ONPRO  Was placed on patient's: back of left arm.    Was placed at 1130 AM    Podpal used: Yes    ONPRO injector device Lot number: [id number]    Patient education included: what patient can expect after application, what colored lights mean on the device, when to remove device, when and where to call with questions or issues, all patients questions answered and that Neulasta administration will occur at 2:30 PM on 2/17/22.    Patient tolerated administration well    .  Bryanna Rivera RN

## 2022-02-18 DIAGNOSIS — C85.12 B-CELL LYMPHOMA OF INTRATHORACIC LYMPH NODES, UNSPECIFIED B-CELL LYMPHOMA TYPE (H): Primary | ICD-10-CM

## 2022-02-21 ENCOUNTER — HOSPITAL ENCOUNTER (OUTPATIENT)
Facility: CLINIC | Age: 82
Discharge: HOME | End: 2022-02-21
Attending: INTERNAL MEDICINE | Admitting: INTERNAL MEDICINE
Payer: MEDICARE

## 2022-02-21 ENCOUNTER — LAB (OUTPATIENT)
Dept: INFUSION THERAPY | Facility: CLINIC | Age: 82
End: 2022-02-21
Attending: INTERNAL MEDICINE
Payer: MEDICARE

## 2022-02-21 ENCOUNTER — ONCOLOGY VISIT (OUTPATIENT)
Dept: ONCOLOGY | Facility: CLINIC | Age: 82
End: 2022-02-21
Attending: NURSE PRACTITIONER
Payer: MEDICARE

## 2022-02-21 VITALS
HEIGHT: 60 IN | BODY MASS INDEX: 49.08 KG/M2 | SYSTOLIC BLOOD PRESSURE: 138 MMHG | WEIGHT: 250 LBS | HEART RATE: 67 BPM | OXYGEN SATURATION: 99 % | TEMPERATURE: 98.3 F | RESPIRATION RATE: 16 BRPM | DIASTOLIC BLOOD PRESSURE: 49 MMHG

## 2022-02-21 VITALS
OXYGEN SATURATION: 93 % | TEMPERATURE: 98.7 F | BODY MASS INDEX: 48.82 KG/M2 | RESPIRATION RATE: 16 BRPM | SYSTOLIC BLOOD PRESSURE: 140 MMHG | DIASTOLIC BLOOD PRESSURE: 56 MMHG | HEART RATE: 67 BPM | WEIGHT: 250 LBS

## 2022-02-21 DIAGNOSIS — C85.12 B-CELL LYMPHOMA OF INTRATHORACIC LYMPH NODES, UNSPECIFIED B-CELL LYMPHOMA TYPE (H): Primary | ICD-10-CM

## 2022-02-21 DIAGNOSIS — C85.99 NON-HODGKIN LYMPHOMA OF SOLID ORGAN EXCLUDING SPLEEN, UNSPECIFIED NON-HODGKIN LYMPHOMA TYPE (H): ICD-10-CM

## 2022-02-21 LAB
ABO/RH(D): NORMAL
ALBUMIN SERPL-MCNC: 3.1 G/DL (ref 3.4–5)
ALP SERPL-CCNC: 120 U/L (ref 40–150)
ALT SERPL W P-5'-P-CCNC: 17 U/L (ref 0–50)
ANION GAP SERPL CALCULATED.3IONS-SCNC: <1 MMOL/L (ref 3–14)
ANTIBODY SCREEN: NEGATIVE
AST SERPL W P-5'-P-CCNC: 11 U/L (ref 0–45)
BASOPHILS # BLD MANUAL: 0 10E3/UL (ref 0–0.2)
BASOPHILS NFR BLD MANUAL: 0 %
BILIRUB SERPL-MCNC: 0.3 MG/DL (ref 0.2–1.3)
BLD PROD TYP BPU: NORMAL
BLOOD COMPONENT TYPE: NORMAL
BUN SERPL-MCNC: 37 MG/DL (ref 7–30)
CALCIUM SERPL-MCNC: 8.4 MG/DL (ref 8.5–10.1)
CHLORIDE BLD-SCNC: 105 MMOL/L (ref 94–109)
CO2 SERPL-SCNC: 35 MMOL/L (ref 20–32)
CODING SYSTEM: NORMAL
CREAT SERPL-MCNC: 0.98 MG/DL (ref 0.52–1.04)
CROSSMATCH: NORMAL
DACRYOCYTES BLD QL SMEAR: SLIGHT
EOSINOPHIL # BLD MANUAL: 0 10E3/UL (ref 0–0.7)
EOSINOPHIL NFR BLD MANUAL: 0 %
ERYTHROCYTE [DISTWIDTH] IN BLOOD BY AUTOMATED COUNT: 21.1 % (ref 10–15)
GFR SERPL CREATININE-BSD FRML MDRD: 57 ML/MIN/1.73M2
GLUCOSE BLD-MCNC: 106 MG/DL (ref 70–99)
HCT VFR BLD AUTO: 26.5 % (ref 35–47)
HGB BLD-MCNC: 7.7 G/DL (ref 11.7–15.7)
ISSUE DATE AND TIME: NORMAL
LACTATE SERPL-SCNC: 1.4 MMOL/L (ref 0.7–2)
LYMPHOCYTES # BLD MANUAL: 2.6 10E3/UL (ref 0.8–5.3)
LYMPHOCYTES NFR BLD MANUAL: 7 %
MCH RBC QN AUTO: 27.4 PG (ref 26.5–33)
MCHC RBC AUTO-ENTMCNC: 29.1 G/DL (ref 31.5–36.5)
MCV RBC AUTO: 94 FL (ref 78–100)
METAMYELOCYTES # BLD MANUAL: 0.4 10E3/UL
METAMYELOCYTES NFR BLD MANUAL: 1 %
MONOCYTES # BLD MANUAL: 0.4 10E3/UL (ref 0–1.3)
MONOCYTES NFR BLD MANUAL: 1 %
NEUTROPHILS # BLD MANUAL: 33.9 10E3/UL (ref 1.6–8.3)
NEUTROPHILS NFR BLD MANUAL: 91 %
PLAT MORPH BLD: ABNORMAL
PLATELET # BLD AUTO: 215 10E3/UL (ref 150–450)
POTASSIUM BLD-SCNC: 3.6 MMOL/L (ref 3.4–5.3)
PROT SERPL-MCNC: 5.7 G/DL (ref 6.8–8.8)
RBC # BLD AUTO: 2.81 10E6/UL (ref 3.8–5.2)
RBC MORPH BLD: ABNORMAL
SODIUM SERPL-SCNC: 140 MMOL/L (ref 133–144)
SPECIMEN EXPIRATION DATE: NORMAL
STOMATOCYTES BLD QL SMEAR: SLIGHT
UNIT ABO/RH: NORMAL
UNIT NUMBER: NORMAL
UNIT STATUS: NORMAL
UNIT TYPE ISBT: 6200
WBC # BLD AUTO: 37.3 10E3/UL (ref 4–11)

## 2022-02-21 PROCEDURE — 85027 COMPLETE CBC AUTOMATED: CPT | Performed by: NURSE PRACTITIONER

## 2022-02-21 PROCEDURE — 36591 DRAW BLOOD OFF VENOUS DEVICE: CPT

## 2022-02-21 PROCEDURE — 36430 TRANSFUSION BLD/BLD COMPNT: CPT

## 2022-02-21 PROCEDURE — 86850 RBC ANTIBODY SCREEN: CPT | Performed by: NURSE PRACTITIONER

## 2022-02-21 PROCEDURE — 86923 COMPATIBILITY TEST ELECTRIC: CPT | Performed by: NURSE PRACTITIONER

## 2022-02-21 PROCEDURE — G0463 HOSPITAL OUTPT CLINIC VISIT: HCPCS

## 2022-02-21 PROCEDURE — 999N000087 HC STATISTIC IV OP-OVERFLOW

## 2022-02-21 PROCEDURE — 83605 ASSAY OF LACTIC ACID: CPT | Performed by: INTERNAL MEDICINE

## 2022-02-21 PROCEDURE — 86901 BLOOD TYPING SEROLOGIC RH(D): CPT | Performed by: NURSE PRACTITIONER

## 2022-02-21 PROCEDURE — 99214 OFFICE O/P EST MOD 30 MIN: CPT | Performed by: NURSE PRACTITIONER

## 2022-02-21 PROCEDURE — 36591 DRAW BLOOD OFF VENOUS DEVICE: CPT | Performed by: INTERNAL MEDICINE

## 2022-02-21 PROCEDURE — P9016 RBC LEUKOCYTES REDUCED: HCPCS | Performed by: NURSE PRACTITIONER

## 2022-02-21 PROCEDURE — 80053 COMPREHEN METABOLIC PANEL: CPT | Performed by: NURSE PRACTITIONER

## 2022-02-21 PROCEDURE — 250N000011 HC RX IP 250 OP 636: Performed by: NURSE PRACTITIONER

## 2022-02-21 PROCEDURE — 250N000011 HC RX IP 250 OP 636: Performed by: INTERNAL MEDICINE

## 2022-02-21 RX ORDER — HEPARIN SODIUM,PORCINE 10 UNIT/ML
5 VIAL (ML) INTRAVENOUS
Status: DISCONTINUED | OUTPATIENT
Start: 2022-02-21 | End: 2022-02-21 | Stop reason: HOSPADM

## 2022-02-21 RX ORDER — HEPARIN SODIUM,PORCINE 10 UNIT/ML
5 VIAL (ML) INTRAVENOUS
Status: CANCELLED | OUTPATIENT
Start: 2022-02-21

## 2022-02-21 RX ORDER — HEPARIN SODIUM (PORCINE) LOCK FLUSH IV SOLN 100 UNIT/ML 100 UNIT/ML
5 SOLUTION INTRAVENOUS
Status: CANCELLED | OUTPATIENT
Start: 2022-02-21

## 2022-02-21 RX ORDER — HEPARIN SODIUM (PORCINE) LOCK FLUSH IV SOLN 100 UNIT/ML 100 UNIT/ML
5 SOLUTION INTRAVENOUS
Status: DISCONTINUED | OUTPATIENT
Start: 2022-02-21 | End: 2022-02-21 | Stop reason: HOSPADM

## 2022-02-21 RX ADMIN — Medication 5 ML: at 07:47

## 2022-02-21 RX ADMIN — HEPARIN SODIUM (PORCINE) LOCK FLUSH IV SOLN 100 UNIT/ML 5 ML: 100 SOLUTION at 14:06

## 2022-02-21 ASSESSMENT — PAIN SCALES - GENERAL: PAINLEVEL: NO PAIN (0)

## 2022-02-21 NOTE — PROGRESS NOTES
Nursing Note:  Lucille Rojas presents today for port labs.    Patient seen by provider today: Yes: Alexandr   present during visit today: Not Applicable.    Note: N/A.    Intravenous Access:  Labs drawn without difficulty.  Implanted Port.    Discharge Plan:   Patient was sent to delia Strange NP appointment.    Linsey Gibson RN

## 2022-02-21 NOTE — PROGRESS NOTES
Oncology Rooming Note    February 21, 2022 9:01 AM   Lucille Rojas is a 81 year old female who presents for:    Chief Complaint   Patient presents with     Oncology Clinic Visit     Initial Vitals: There were no vitals taken for this visit. Estimated body mass index is 48.04 kg/m  as calculated from the following:    Height as of 1/14/22: 1.524 m (5').    Weight as of 2/15/22: 111.6 kg (246 lb). There is no height or weight on file to calculate BSA.  Data Unavailable Comment: Data Unavailable   No LMP recorded. Patient has had a hysterectomy.  Allergies reviewed: Yes  Medications reviewed: Yes    Medications: Medication refills not needed today.  Pharmacy name entered into [facility]:    [id number] - [place], [place] - [address] [facility] [place] - [address] [facility]    Clinical concerns: no      Odalis Biswas, [facility]

## 2022-02-21 NOTE — PROGRESS NOTES
Writer received a request to add patient on for a unit of PRBC's.    Type and Screen added on , consent completed on 2/8, Orders in blood product plan.    Care suites will add patient on today for 1 unit of PRBC's. Patient and daughter Atiya Cruz are aware.    Harriet Guillermo RN

## 2022-02-21 NOTE — PROGRESS NOTES
Oncology/Hematology Visit Note  Feb 21, 2022    Reason for Visit: follow up of non-Hodgkin's lymphoma involving the pancreas  EUS revealed pancreatic head mass.  FNA revealed CD10-positive B-cell lymphoma. Flow cytometry revealed CD10-positive lambda monotypic B-cells.  PET scan on 11/18/2021 revealed 6 cm hypermetabolic pancreatic head mass and borderline hypermetabolic right axillary lymph node.     Patient met with Dr. Srinivasan who recommended starting treatment with mini R-CHOP  -01/26-cycle 3 R-CHOP given followed by Neulasta        Interval History:  Patient reports so far she has been tolerating chemotherapy well.  Denies fever chills sweats cough shortness of breath chest pain nausea vomiting diarrhea abdominal pain or bleeding.  Denies edema      Review of Systems:  14 point ROS of systems including Constitutional, Eyes, Respiratory, Cardiovascular, Gastroenterology, Genitourinary, Integumentary, Muscularskeletal, Psychiatric were all negative except for pertinent positives noted in my HPI.    Physical Examination:  General: The patient is a pleasant female in no acute distress.  HEENT: EOMI, PERRL. Sclerae are anicteric. Oral mucosa is pink and moist with no lesions or thrush.   Lymph: Neck is supple with no lymphadenopathy in the cervical or supraclavicular areas.   Heart: Regular rate and rhythm.   Lungs: Clear to auscultation bilaterally.   GI: Bowel sounds present, soft, nontender with no palpable hepatosplenomegaly or masses.   Extremities: No lower extremity edema noted bilaterally.   Skin: No rashes, petechiae, or bruising noted on exposed skin.    Laboratory Data:  CBC CMP results reviewed    Assessment and Plan:  This is a 81-year-old female with    Lymphoma  non-Hodgkin's lymphoma involving the pancreas  Patient of Dr. Srinivasan  Patient is getting treatment with mini R-CHOP  -Plan is for 6 cycles of treatment  02/16/2021-cycle 4 given followed by Neulasta  Labs reviewed abnormalities discussed  We will  give 1 unit PRBC today  03/01-CT scan chest abdomen pelvis scheduled  Schedule with Dr. Srinivasan with cycle 5 on 03/09    Anemia  Patient is asymptomatic  S/p EGD on 09/21/21 revealed a few gastric erosions and a few tiny aphthous ulcers in the duodenum, but not severe enough  Iron deficiency anemia  -status post Venofer  -Currently taking oral iron  Give 1 unit PRBC today      Leukocytosis  Secondary to Neulasta give last week   Patient is afebrile and asymptomatic  Check temperature frequently in the event of fever chills sweats or any signs symptoms of infection patient is advised to call clinic or go to ER        Call our clinic with any changes in health condition or questions    ALIE Carreon [facility]     Chart documentation with Dragon Voice recognition Software. Although reviewed after completion, some words and grammatical errors may remain.

## 2022-02-21 NOTE — PROGRESS NOTES
Care Suites Discharge Nursing Note    Patient Information  Name: Lucille Rojas  Age: 81 year old    Discharge Education:  Discharge instructions reviewed: N/A  Additional education/resources provided: NA  Patient/patient representative verbalizes understanding: N/A  Patient discharging on new medications: No  Medication education completed: N/A    Discharge Plans:   Discharge location: home  Discharge ride contacted: Yes  Approximate discharge time: 1420    Discharge Criteria:  Discharge criteria met and vital signs stable: Yes    Patient Belongs:  Patient belongings returned to patient: Yes    Marisela Blackmon RN

## 2022-02-21 NOTE — LETTER
2/21/2022         RE: Lucille Rojas  [id number] [facility] [address]        Dear Colleague,    Thank you for referring your patient, Lucille Rojas, to the [facility]. Please see a copy of my visit note below.    Oncology Rooming Note    February 21, 2022 9:01 AM   Lucille Rojas is a 81 year old female who presents for:    Chief Complaint   Patient presents with     Oncology Clinic Visit     Initial Vitals: There were no vitals taken for this visit. Estimated body mass index is 48.04 kg/m  as calculated from the following:    Height as of 1/14/22: 1.524 m (5').    Weight as of 2/15/22: 111.6 kg (246 lb). There is no height or weight on file to calculate BSA.  Data Unavailable Comment: Data Unavailable   No LMP recorded. Patient has had a hysterectomy.  Allergies reviewed: Yes  Medications reviewed: Yes    Medications: Medication refills not needed today.  Pharmacy name entered into [email or web address]:    [facility][id number] - [place], [place] - [address] [facility] [place], [place] - [address] [facility]    Clinical concerns: no      Odalis Biswas [facility]              Writer received a request to add patient on for a unit of PRBC's.    Type and Screen added on , consent completed on 2/8, Orders in blood product plan.    Care suites will add patient on today for 1 unit of PRBC's. Patient and daughter Atiya Cruz are aware.    Harriet Guillermo RN      Oncology/Hematology Visit Note  Feb 21, 2022    Reason for Visit: follow up of non-Hodgkin's lymphoma involving the pancreas  EUS revealed pancreatic head mass.  FNA revealed CD10-positive B-cell lymphoma. Flow cytometry revealed CD10-positive lambda monotypic B-cells.  PET scan on 11/18/2021 revealed 6 cm hypermetabolic pancreatic head mass and borderline hypermetabolic right axillary lymph node.     Patient met with Dr. Srinivasan who recommended starting treatment with mini R-CHOP  -01/26-cycle 3 R-CHOP  given followed by Neulasta        Interval History:  Patient reports so far she has been tolerating chemotherapy well.  Denies fever chills sweats cough shortness of breath chest pain nausea vomiting diarrhea abdominal pain or bleeding.  Denies edema      Review of Systems:  14 point ROS of systems including Constitutional, Eyes, Respiratory, Cardiovascular, Gastroenterology, Genitourinary, Integumentary, Muscularskeletal, Psychiatric were all negative except for pertinent positives noted in my HPI.    Physical Examination:  General: The patient is a pleasant female in no acute distress.  HEENT: EOMI, PERRL. Sclerae are anicteric. Oral mucosa is pink and moist with no lesions or thrush.   Lymph: Neck is supple with no lymphadenopathy in the cervical or supraclavicular areas.   Heart: Regular rate and rhythm.   Lungs: Clear to auscultation bilaterally.   GI: Bowel sounds present, soft, nontender with no palpable hepatosplenomegaly or masses.   Extremities: No lower extremity edema noted bilaterally.   Skin: No rashes, petechiae, or bruising noted on exposed skin.    Laboratory Data:  CBC CMP results reviewed    Assessment and Plan:  This is a 81-year-old female with    Lymphoma  non-Hodgkin's lymphoma involving the pancreas  Patient of Dr. Srinivasan  Patient is getting treatment with mini R-CHOP  -Plan is for 6 cycles of treatment  02/16/2021-cycle 4 given followed by Neulasta  Labs reviewed abnormalities discussed  We will give 1 unit PRBC today  03/01-CT scan chest abdomen pelvis scheduled  Schedule with Dr. Srinivasan with cycle 5 on 03/09    Anemia  Patient is asymptomatic  S/p EGD on 09/21/21 revealed a few gastric erosions and a few tiny aphthous ulcers in the duodenum, but not severe enough  Iron deficiency anemia  -status post Venofer  -Currently taking oral iron  Give 1 unit PRBC today      Leukocytosis  Secondary to Neulasta give last week   Patient is afebrile and asymptomatic  Check temperature frequently in the  event of fever chills sweats or any signs symptoms of infection patient is advised to call clinic or go to ER        Call our clinic with any changes in health condition or questions    ALIE Carreon CNP  [facility]     Chart documentation with Dragon Voice recognition Software. Although reviewed after completion, some words and grammatical errors may remain.            Again, thank you for allowing me to participate in the care of your patient.        Sincerely,        ALIE Carreon CNP

## 2022-02-22 ENCOUNTER — PATIENT OUTREACH (OUTPATIENT)
Dept: CARE COORDINATION | Facility: CLINIC | Age: 82
End: 2022-02-22
Payer: MEDICARE

## 2022-02-22 NOTE — PROGRESS NOTES
Clinic Care Coordination Contact  [facility]/Voicemail       Clinical Data: Care Coordinator Outreach  Outreach attempted x 1.  Left message on patient's voicemail with call back information and requested return call.  Plan:. Care Coordinator will try to reach patient again in 1 month.  CHW will outreach in 3-5 days.      MIQUEL Parker  Clinic Care Coordinator  [facility]  [phone]  [email or web address]

## 2022-02-24 ENCOUNTER — TELEPHONE (OUTPATIENT)
Dept: MEDSURG UNIT | Facility: CLINIC | Age: 82
End: 2022-02-24
Payer: MEDICARE

## 2022-02-25 ENCOUNTER — TELEPHONE (OUTPATIENT)
Dept: INTERNAL MEDICINE | Facility: CLINIC | Age: 82
End: 2022-02-25
Payer: MEDICARE

## 2022-02-25 NOTE — TELEPHONE ENCOUNTER
MARISSA Delaney calling in from [facility] requesting verbal orders for       Verbal order given    Annalee Elizabeth RN

## 2022-02-28 ENCOUNTER — MEDICAL CORRESPONDENCE (OUTPATIENT)
Dept: HEALTH INFORMATION MANAGEMENT | Facility: CLINIC | Age: 82
End: 2022-02-28
Payer: MEDICARE

## 2022-03-01 ENCOUNTER — HOSPITAL ENCOUNTER (OUTPATIENT)
Dept: CT IMAGING | Facility: CLINIC | Age: 82
End: 2022-03-01
Attending: INTERNAL MEDICINE
Payer: MEDICARE

## 2022-03-01 ENCOUNTER — HOSPITAL ENCOUNTER (OUTPATIENT)
Facility: CLINIC | Age: 82
Discharge: HOME | End: 2022-03-01
Admitting: RADIOLOGY
Payer: MEDICARE

## 2022-03-01 DIAGNOSIS — C85.99 NON-HODGKIN LYMPHOMA OF SOLID ORGAN EXCLUDING SPLEEN, UNSPECIFIED NON-HODGKIN LYMPHOMA TYPE (H): ICD-10-CM

## 2022-03-01 DIAGNOSIS — C85.12 B-CELL LYMPHOMA OF INTRATHORACIC LYMPH NODES, UNSPECIFIED B-CELL LYMPHOMA TYPE (H): ICD-10-CM

## 2022-03-01 DIAGNOSIS — C85.99 NON-HODGKIN LYMPHOMA OF SOLID ORGAN EXCLUDING SPLEEN, UNSPECIFIED NON-HODGKIN LYMPHOMA TYPE (H): Primary | ICD-10-CM

## 2022-03-01 PROCEDURE — 74177 CT ABD & PELVIS W/CONTRAST: CPT

## 2022-03-01 PROCEDURE — 250N000011 HC RX IP 250 OP 636: Performed by: INTERNAL MEDICINE

## 2022-03-01 PROCEDURE — 250N000009 HC RX 250: Performed by: INTERNAL MEDICINE

## 2022-03-01 PROCEDURE — 999N000154 HC STATISTIC RADIOLOGY XRAY, US, CT, MAR, NM

## 2022-03-01 RX ORDER — IOPAMIDOL 755 MG/ML
122 INJECTION, SOLUTION INTRAVASCULAR ONCE
Status: COMPLETED | OUTPATIENT
Start: 2022-03-01 | End: 2022-03-01

## 2022-03-01 RX ORDER — HEPARIN SODIUM,PORCINE 10 UNIT/ML
5 VIAL (ML) INTRAVENOUS
Status: CANCELLED | OUTPATIENT
Start: 2022-03-01

## 2022-03-01 RX ORDER — HEPARIN SODIUM (PORCINE) LOCK FLUSH IV SOLN 100 UNIT/ML 100 UNIT/ML
5 SOLUTION INTRAVENOUS
Status: DISCONTINUED | OUTPATIENT
Start: 2022-03-01 | End: 2022-03-01 | Stop reason: HOSPADM

## 2022-03-01 RX ORDER — HEPARIN SODIUM (PORCINE) LOCK FLUSH IV SOLN 100 UNIT/ML 100 UNIT/ML
5 SOLUTION INTRAVENOUS
Status: CANCELLED | OUTPATIENT
Start: 2022-03-01

## 2022-03-01 RX ORDER — HEPARIN SODIUM,PORCINE 10 UNIT/ML
5 VIAL (ML) INTRAVENOUS
Status: DISCONTINUED | OUTPATIENT
Start: 2022-03-01 | End: 2022-03-01 | Stop reason: HOSPADM

## 2022-03-01 RX ADMIN — SODIUM CHLORIDE 75 ML: 9 INJECTION, SOLUTION INTRAVENOUS at 11:22

## 2022-03-01 RX ADMIN — HEPARIN SODIUM (PORCINE) LOCK FLUSH IV SOLN 100 UNIT/ML 5 ML: 100 SOLUTION at 11:06

## 2022-03-01 RX ADMIN — IOPAMIDOL 122 ML: 755 INJECTION, SOLUTION INTRAVENOUS at 11:20

## 2022-03-02 NOTE — RESULT ENCOUNTER NOTE
Dear Ms. Rojas,    CT scan reveals improvement in lymphoma.    Please, call me with any questions.    John Srinivasan MD

## 2022-03-04 ENCOUNTER — TELEPHONE (OUTPATIENT)
Dept: INTERNAL MEDICINE | Facility: CLINIC | Age: 82
End: 2022-03-04
Payer: MEDICARE

## 2022-03-04 NOTE — TELEPHONE ENCOUNTER
Called Jaimie and left voicemail stating home care orders have been approved.     Natalie Dyer RN

## 2022-03-04 NOTE — TELEPHONE ENCOUNTER
Ebony from [facility] called requesting approval of home care orders.    SN 1 x a wk for 5 wk, 1 visit every other week for 4 wks.  HHA 2 x a wk for 4 wks and 1 x a wk for 4 wks.     Please advice on approval of order.    Ok to leave a message with providers response at [phone].

## 2022-03-07 RX ORDER — PREDNISONE 50 MG/1
40 TABLET ORAL DAILY
Qty: 10 TABLET | Refills: 0 | Status: SHIPPED | OUTPATIENT
Start: 2022-03-07 | End: 2022-03-12

## 2022-03-08 ENCOUNTER — LAB (OUTPATIENT)
Dept: INFUSION THERAPY | Facility: CLINIC | Age: 82
End: 2022-03-08
Attending: INTERNAL MEDICINE
Payer: MEDICARE

## 2022-03-08 ENCOUNTER — ONCOLOGY VISIT (OUTPATIENT)
Dept: ONCOLOGY | Facility: CLINIC | Age: 82
End: 2022-03-08
Attending: INTERNAL MEDICINE
Payer: MEDICARE

## 2022-03-08 ENCOUNTER — TELEPHONE (OUTPATIENT)
Dept: INTERNAL MEDICINE | Facility: CLINIC | Age: 82
End: 2022-03-08
Payer: MEDICARE

## 2022-03-08 VITALS
BODY MASS INDEX: 48.04 KG/M2 | WEIGHT: 246 LBS | SYSTOLIC BLOOD PRESSURE: 122 MMHG | DIASTOLIC BLOOD PRESSURE: 70 MMHG | HEART RATE: 76 BPM | RESPIRATION RATE: 16 BRPM | OXYGEN SATURATION: 94 %

## 2022-03-08 DIAGNOSIS — C85.99 NON-HODGKIN LYMPHOMA OF SOLID ORGAN EXCLUDING SPLEEN, UNSPECIFIED NON-HODGKIN LYMPHOMA TYPE (H): Primary | ICD-10-CM

## 2022-03-08 DIAGNOSIS — Z51.89 ENCOUNTER FOR OTHER SPECIFIED AFTERCARE: Primary | ICD-10-CM

## 2022-03-08 DIAGNOSIS — C85.12 B-CELL LYMPHOMA OF INTRATHORACIC LYMPH NODES, UNSPECIFIED B-CELL LYMPHOMA TYPE (H): ICD-10-CM

## 2022-03-08 DIAGNOSIS — C85.99 NON-HODGKIN LYMPHOMA OF SOLID ORGAN EXCLUDING SPLEEN, UNSPECIFIED NON-HODGKIN LYMPHOMA TYPE (H): ICD-10-CM

## 2022-03-08 LAB
ALBUMIN SERPL-MCNC: 3.3 G/DL (ref 3.4–5)
ALP SERPL-CCNC: 106 U/L (ref 40–150)
ALT SERPL W P-5'-P-CCNC: 14 U/L (ref 0–50)
ANION GAP SERPL CALCULATED.3IONS-SCNC: 5 MMOL/L (ref 3–14)
AST SERPL W P-5'-P-CCNC: 17 U/L (ref 0–45)
BASOPHILS # BLD AUTO: 0.1 10E3/UL (ref 0–0.2)
BASOPHILS NFR BLD AUTO: 1 %
BILIRUB SERPL-MCNC: 0.2 MG/DL (ref 0.2–1.3)
BUN SERPL-MCNC: 28 MG/DL (ref 7–30)
CALCIUM SERPL-MCNC: 8.8 MG/DL (ref 8.5–10.1)
CHLORIDE BLD-SCNC: 103 MMOL/L (ref 94–109)
CO2 SERPL-SCNC: 32 MMOL/L (ref 20–32)
CREAT SERPL-MCNC: 1.17 MG/DL (ref 0.52–1.04)
EOSINOPHIL # BLD AUTO: 0.1 10E3/UL (ref 0–0.7)
EOSINOPHIL NFR BLD AUTO: 2 %
ERYTHROCYTE [DISTWIDTH] IN BLOOD BY AUTOMATED COUNT: 18.8 % (ref 10–15)
GFR SERPL CREATININE-BSD FRML MDRD: 46 ML/MIN/1.73M2
GLUCOSE BLD-MCNC: 174 MG/DL (ref 70–99)
HCT VFR BLD AUTO: 34.1 % (ref 35–47)
HGB BLD-MCNC: 10 G/DL (ref 11.7–15.7)
IMM GRANULOCYTES # BLD: 0.1 10E3/UL
IMM GRANULOCYTES NFR BLD: 1 %
LYMPHOCYTES # BLD AUTO: 0.7 10E3/UL (ref 0.8–5.3)
LYMPHOCYTES NFR BLD AUTO: 12 %
MCH RBC QN AUTO: 27.5 PG (ref 26.5–33)
MCHC RBC AUTO-ENTMCNC: 29.3 G/DL (ref 31.5–36.5)
MCV RBC AUTO: 94 FL (ref 78–100)
MONOCYTES # BLD AUTO: 0.7 10E3/UL (ref 0–1.3)
MONOCYTES NFR BLD AUTO: 11 %
NEUTROPHILS # BLD AUTO: 4.6 10E3/UL (ref 1.6–8.3)
NEUTROPHILS NFR BLD AUTO: 73 %
NRBC # BLD AUTO: 0 10E3/UL
NRBC BLD AUTO-RTO: 0 /100
PLATELET # BLD AUTO: 245 10E3/UL (ref 150–450)
POTASSIUM BLD-SCNC: 3.6 MMOL/L (ref 3.4–5.3)
PROT SERPL-MCNC: 6.3 G/DL (ref 6.8–8.8)
RBC # BLD AUTO: 3.63 10E6/UL (ref 3.8–5.2)
SODIUM SERPL-SCNC: 140 MMOL/L (ref 133–144)
WBC # BLD AUTO: 6.2 10E3/UL (ref 4–11)

## 2022-03-08 PROCEDURE — 82040 ASSAY OF SERUM ALBUMIN: CPT | Performed by: INTERNAL MEDICINE

## 2022-03-08 PROCEDURE — 36591 DRAW BLOOD OFF VENOUS DEVICE: CPT

## 2022-03-08 PROCEDURE — 80053 COMPREHEN METABOLIC PANEL: CPT | Performed by: INTERNAL MEDICINE

## 2022-03-08 PROCEDURE — 250N000011 HC RX IP 250 OP 636: Performed by: INTERNAL MEDICINE

## 2022-03-08 PROCEDURE — 85025 COMPLETE CBC W/AUTO DIFF WBC: CPT | Performed by: INTERNAL MEDICINE

## 2022-03-08 PROCEDURE — 99214 OFFICE O/P EST MOD 30 MIN: CPT | Performed by: INTERNAL MEDICINE

## 2022-03-08 PROCEDURE — G0463 HOSPITAL OUTPT CLINIC VISIT: HCPCS

## 2022-03-08 RX ORDER — HEPARIN SODIUM (PORCINE) LOCK FLUSH IV SOLN 100 UNIT/ML 100 UNIT/ML
5 SOLUTION INTRAVENOUS
Status: CANCELLED | OUTPATIENT
Start: 2022-03-08

## 2022-03-08 RX ORDER — LORAZEPAM 2 MG/ML
0.5 INJECTION INTRAMUSCULAR EVERY 4 HOURS PRN
Status: CANCELLED | OUTPATIENT
Start: 2022-03-08

## 2022-03-08 RX ORDER — DOXORUBICIN HYDROCHLORIDE 2 MG/ML
25 INJECTION, SOLUTION INTRAVENOUS ONCE
Status: CANCELLED | OUTPATIENT
Start: 2022-03-08

## 2022-03-08 RX ORDER — HEPARIN SODIUM,PORCINE 10 UNIT/ML
5 VIAL (ML) INTRAVENOUS
Status: CANCELLED | OUTPATIENT
Start: 2022-03-08

## 2022-03-08 RX ORDER — MEPERIDINE HYDROCHLORIDE 25 MG/ML
25 INJECTION INTRAMUSCULAR; INTRAVENOUS; SUBCUTANEOUS
Status: CANCELLED
Start: 2022-03-08

## 2022-03-08 RX ORDER — EPINEPHRINE 1 MG/ML
0.3 INJECTION, SOLUTION INTRAMUSCULAR; SUBCUTANEOUS EVERY 5 MIN PRN
Status: CANCELLED | OUTPATIENT
Start: 2022-03-08

## 2022-03-08 RX ORDER — DIPHENHYDRAMINE HCL 25 MG
50 CAPSULE ORAL ONCE
Status: CANCELLED
Start: 2022-03-08

## 2022-03-08 RX ORDER — HEPARIN SODIUM (PORCINE) LOCK FLUSH IV SOLN 100 UNIT/ML 100 UNIT/ML
5 SOLUTION INTRAVENOUS
Status: DISCONTINUED | OUTPATIENT
Start: 2022-03-08 | End: 2022-03-08 | Stop reason: HOSPADM

## 2022-03-08 RX ORDER — ALBUTEROL SULFATE 0.83 MG/ML
2.5 SOLUTION RESPIRATORY (INHALATION)
Status: CANCELLED | OUTPATIENT
Start: 2022-03-08

## 2022-03-08 RX ORDER — PALONOSETRON 0.05 MG/ML
0.25 INJECTION, SOLUTION INTRAVENOUS ONCE
Status: CANCELLED | OUTPATIENT
Start: 2022-03-08

## 2022-03-08 RX ORDER — MEPERIDINE HYDROCHLORIDE 25 MG/ML
25 INJECTION INTRAMUSCULAR; INTRAVENOUS; SUBCUTANEOUS EVERY 30 MIN PRN
Status: CANCELLED | OUTPATIENT
Start: 2022-03-08

## 2022-03-08 RX ORDER — METHYLPREDNISOLONE SODIUM SUCCINATE 125 MG/2ML
125 INJECTION, POWDER, LYOPHILIZED, FOR SOLUTION INTRAMUSCULAR; INTRAVENOUS
Status: CANCELLED
Start: 2022-03-08

## 2022-03-08 RX ORDER — DIPHENHYDRAMINE HYDROCHLORIDE 50 MG/ML
50 INJECTION INTRAMUSCULAR; INTRAVENOUS
Status: CANCELLED
Start: 2022-03-08

## 2022-03-08 RX ORDER — NALOXONE HYDROCHLORIDE 0.4 MG/ML
0.2 INJECTION, SOLUTION INTRAMUSCULAR; INTRAVENOUS; SUBCUTANEOUS
Status: CANCELLED | OUTPATIENT
Start: 2022-03-08

## 2022-03-08 RX ORDER — ALBUTEROL SULFATE 90 UG/1
1-2 AEROSOL, METERED RESPIRATORY (INHALATION)
Status: CANCELLED
Start: 2022-03-08

## 2022-03-08 RX ORDER — ACETAMINOPHEN 325 MG/1
650 TABLET ORAL ONCE
Status: CANCELLED | OUTPATIENT
Start: 2022-03-08

## 2022-03-08 RX ADMIN — Medication 5 ML: at 09:57

## 2022-03-08 ASSESSMENT — PAIN SCALES - GENERAL: PAINLEVEL: NO PAIN (0)

## 2022-03-08 NOTE — PROGRESS NOTES
Nursing Note:  Lucille Rojas presents today for port labs for tx 3/9.    Patient seen by provider today: Yes: Dr. Srinivasan   present during visit today: Not Applicable.    Note: N/A.    Intravenous Access:  Labs drawn without difficulty.  Implanted Port.    Discharge Plan:   Patient was sent to [facility] for clinic appointment.    Ericka Pennington RN

## 2022-03-08 NOTE — TELEPHONE ENCOUNTER
Huma PT called from [facility] requesting one additional PT visit every 2 weeks for 6 weeks for transfers, walking, and home exercise. Verbal Okay given. Order to be faxed to clinic for provider's signature.

## 2022-03-08 NOTE — PROGRESS NOTES
Oncology Rooming Note    March 8, 2022 11:09 AM   Lucille Rojas is a 81 year old female who presents for:    Chief Complaint   Patient presents with     Oncology Clinic Visit     Initial Vitals: /70   Pulse 76   Resp 16   Wt 111.6 kg (246 lb)   SpO2 94%   BMI 48.04 kg/m   Estimated body mass index is 48.04 kg/m  as calculated from the following:    Height as of 2/21/22: 1.524 m (5').    Weight as of this encounter: 111.6 kg (246 lb). Body surface area is 2.17 meters squared.  No Pain (0) Comment: Data Unavailable   No LMP recorded. Patient has had a hysterectomy.  Allergies reviewed: Yes  Medications reviewed: Yes    Medications: MEDICATION REFILLS NEEDED TODAY. Provider was notified.  Pharmacy name entered into [facility]:    [facility][id number] - [place], [place] - [address] [facility] [place] - [address] [place] [address]    Clinical concerns:  doctor was notified.     Refills on prednisone        Tianna Beckman, CMA

## 2022-03-08 NOTE — LETTER
3/8/2022         RE: Lucille Rojas  [address] [facility] [address]        Dear Colleague,    Thank you for referring your patient, Lucille Rojas, to the [facility]. Please see a copy of my visit note below.    Oncology Rooming Note    March 8, 2022 11:09 AM   Lucille Rojas is a 81 year old female who presents for:    Chief Complaint   Patient presents with     Oncology Clinic Visit     Initial Vitals: /70   Pulse 76   Resp 16   Wt 111.6 kg (246 lb)   SpO2 94%   BMI 48.04 kg/m   Estimated body mass index is 48.04 kg/m  as calculated from the following:    Height as of 2/21/22: 1.524 m (5').    Weight as of this encounter: 111.6 kg (246 lb). Body surface area is 2.17 meters squared.  No Pain (0) Comment: Data Unavailable   No LMP recorded. Patient has had a hysterectomy.  Allergies reviewed: Yes  Medications reviewed: Yes    Medications: MEDICATION REFILLS NEEDED TODAY. Provider was notified.  Pharmacy name entered into [employer]:    [id number] - [place], [place] - [address] [facility] [place], [place] - [address]    Clinical concerns:  doctor was notified.     Refills on prednisone        Tianna Beckman, [facility]              ONCOLOGY HISTORY: Ms. 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  1. On 09/20/2021:   -Hemoglobin of 4.7 with MCV of 67. Normal WBC and platelets.  -Iron of 9 and saturation index of 2%.   -CT chest, abdomen and pelvis reveals large pancreatic head mass.  This encases the celiac trunk, superior mesenteric artery and superior mesenteric vein.  There is moderate-size right pleural effusion. No lymphadenopathy.  2. Thoracocentesis on 09/22/2021. Cytology is negative for malignancy.  3. EUS on 09/21/2021 revealed is a mass in the uncinate process of the pancreas measuring 33 mm.  There was evidence of invasion into superior mesenteric artery and the celiac  trunk. No enlarged lymph nodes seen. FNA revealed atypical cells.    4. EGD and EUS repeated on 10/05/2021.  -EGD is normal.  -EUS revealed pancreatic head mass.  FNA revealed CD10-positive B-cell lymphoma. Flow cytometry revealed CD10-positive lambda monotypic B-cells.  5. PET scan on 11/18/2021 revealed 6 cm hypermetabolic pancreatic head mass and borderline hypermetabolic right axillary lymph node.   -Further repeat biopsy not done because of her overall poor health.  6. mini R-CHOP on 12/15/2021.  -CT scan on 12/21/21 reveals improvement in disease.     SUBJECTIVE:  Ms. Rojas is an 81-year-old female with non-Hodgkin's B-cell lymphoma involving the pancreas on mini R-CHOP.  She completed 4 cycles.  Overall, she is tolerating chemotherapy well. CT scan on 03/01/2022 reveals improvement in disease.     She is weak and needs help with activities of daily living.  No worsening of weakness since chemotherapy started. No headache. Mild dizziness when she stands. No chest pain.  No shortness of breath at rest.  Gets short of breath on minimal exertion.  No nausea or vomiting.  Appetite is fairly good.  No urinary complaint.  No diarrhea.  No bleeding.  No recent infection. As per the family, patient overall is stable. All other review of system is negative.     PHYSICAL EXAMINATION:    GENERAL:  She is alert and oriented x 3.  Not in respiratory distress.  ECOG PS of 2.   Rest of the system not examined.     LABORATORY:  CBC and CMP were reviewed.     ASSESSMENT:    1.  An 81-year-old female with stage IV non-Hodgkin's B-cell lymphoma involving the pancreas responding to mini-R-CHOP..  2.  Weakness. Stable.  3.  Normocytic anemia. Stable.  4.  Multiple other medical problems including diabetes mellitus and hypertension.     PLAN:  1.  Patient is clinically doing well from lymphoma.  She does not have any new symptoms.  CT chest, abdomen and pelvis done on 03/01/2022 was  reviewed.  Explained to her it reveals  improvement in lymphoma.  She was happy to know that.    2.  Patient is on mini R-CHOP for lymphoma.  Overall she has been tolerating it well.  She will continue on it.  Plan is to give it for 6 cycles.  After sixth cycle, we will get a PET scan.  Hopefully she is in complete remission.  If there is any residual disease, we can consider 2 more cycles of mini R-CHOP or we can also consider radiation.    3.  Labs were reviewed with her.  She has mild anemia which is stable.  Her creatinine is mildly elevated.  Overall the labs are remaining stable.  We will continue to monitor it.    4.  I will see her in 3 weeks time with next cycle of chemotherapy.  In between she will see our nurse practitioner.  Advised her to call us with any questions or concerns.    TOTAL VISIT TIME of :  30 minutes.  Time is spent in today's visit, review of chart/investigations today and documentations today.      Again, thank you for allowing me to participate in the care of your patient.        Sincerely,        John Srinivasan MD

## 2022-03-09 ENCOUNTER — INFUSION THERAPY VISIT (OUTPATIENT)
Dept: INFUSION THERAPY | Facility: CLINIC | Age: 82
End: 2022-03-09
Attending: INTERNAL MEDICINE
Payer: MEDICARE

## 2022-03-09 VITALS
OXYGEN SATURATION: 94 % | SYSTOLIC BLOOD PRESSURE: 120 MMHG | DIASTOLIC BLOOD PRESSURE: 56 MMHG | TEMPERATURE: 97.5 F | RESPIRATION RATE: 20 BRPM | HEART RATE: 83 BPM

## 2022-03-09 DIAGNOSIS — C85.99 NON-HODGKIN LYMPHOMA OF SOLID ORGAN EXCLUDING SPLEEN, UNSPECIFIED NON-HODGKIN LYMPHOMA TYPE (H): ICD-10-CM

## 2022-03-09 DIAGNOSIS — C85.12 B-CELL LYMPHOMA OF INTRATHORACIC LYMPH NODES, UNSPECIFIED B-CELL LYMPHOMA TYPE (H): Primary | ICD-10-CM

## 2022-03-09 DIAGNOSIS — Z51.89 ENCOUNTER FOR OTHER SPECIFIED AFTERCARE: ICD-10-CM

## 2022-03-09 PROCEDURE — 96413 CHEMO IV INFUSION 1 HR: CPT

## 2022-03-09 PROCEDURE — 96372 THER/PROPH/DIAG INJ SC/IM: CPT | Performed by: INTERNAL MEDICINE

## 2022-03-09 PROCEDURE — 96417 CHEMO IV INFUS EACH ADDL SEQ: CPT

## 2022-03-09 PROCEDURE — 96411 CHEMO IV PUSH ADDL DRUG: CPT

## 2022-03-09 PROCEDURE — 96367 TX/PROPH/DG ADDL SEQ IV INF: CPT

## 2022-03-09 PROCEDURE — 96375 TX/PRO/DX INJ NEW DRUG ADDON: CPT

## 2022-03-09 PROCEDURE — 96377 APPLICATON ON-BODY INJECTOR: CPT | Mod: XS

## 2022-03-09 PROCEDURE — 250N000013 HC RX MED GY IP 250 OP 250 PS 637: Performed by: INTERNAL MEDICINE

## 2022-03-09 PROCEDURE — 250N000011 HC RX IP 250 OP 636: Performed by: INTERNAL MEDICINE

## 2022-03-09 PROCEDURE — 96415 CHEMO IV INFUSION ADDL HR: CPT

## 2022-03-09 PROCEDURE — 258N000003 HC RX IP 258 OP 636: Performed by: INTERNAL MEDICINE

## 2022-03-09 RX ORDER — DOXORUBICIN HYDROCHLORIDE 2 MG/ML
25 INJECTION, SOLUTION INTRAVENOUS ONCE
Status: COMPLETED | OUTPATIENT
Start: 2022-03-09 | End: 2022-03-09

## 2022-03-09 RX ORDER — ACETAMINOPHEN 325 MG/1
650 TABLET ORAL ONCE
Status: COMPLETED | OUTPATIENT
Start: 2022-03-09 | End: 2022-03-09

## 2022-03-09 RX ORDER — DIPHENHYDRAMINE HCL 25 MG
50 CAPSULE ORAL ONCE
Status: COMPLETED | OUTPATIENT
Start: 2022-03-09 | End: 2022-03-09

## 2022-03-09 RX ORDER — HEPARIN SODIUM (PORCINE) LOCK FLUSH IV SOLN 100 UNIT/ML 100 UNIT/ML
5 SOLUTION INTRAVENOUS
Status: DISCONTINUED | OUTPATIENT
Start: 2022-03-09 | End: 2022-03-09 | Stop reason: HOSPADM

## 2022-03-09 RX ORDER — PALONOSETRON 0.05 MG/ML
0.25 INJECTION, SOLUTION INTRAVENOUS ONCE
Status: COMPLETED | OUTPATIENT
Start: 2022-03-09 | End: 2022-03-09

## 2022-03-09 RX ADMIN — Medication 5 ML: at 11:37

## 2022-03-09 RX ADMIN — DIPHENHYDRAMINE HYDROCHLORIDE 50 MG: 25 CAPSULE ORAL at 08:44

## 2022-03-09 RX ADMIN — FOSAPREPITANT 150 MG: 150 INJECTION, POWDER, LYOPHILIZED, FOR SOLUTION INTRAVENOUS at 08:49

## 2022-03-09 RX ADMIN — CYCLOPHOSPHAMIDE 895 MG: 1 INJECTION, POWDER, FOR SOLUTION INTRAVENOUS; ORAL at 09:27

## 2022-03-09 RX ADMIN — PALONOSETRON HYDROCHLORIDE 0.25 MG: 0.25 INJECTION, SOLUTION INTRAVENOUS at 08:47

## 2022-03-09 RX ADMIN — SODIUM CHLORIDE 250 ML: 9 INJECTION, SOLUTION INTRAVENOUS at 08:44

## 2022-03-09 RX ADMIN — VINCRISTINE SULFATE 1 MG: 1 INJECTION, SOLUTION INTRAVENOUS at 09:22

## 2022-03-09 RX ADMIN — RITUXIMAB-ABBS 800 MG: 10 INJECTION, SOLUTION INTRAVENOUS at 10:02

## 2022-03-09 RX ADMIN — PEGFILGRASTIM 6 MG: KIT SUBCUTANEOUS at 10:05

## 2022-03-09 RX ADMIN — ACETAMINOPHEN 650 MG: 325 TABLET, FILM COATED ORAL at 08:44

## 2022-03-09 RX ADMIN — DOXORUBICIN HYDROCHLORIDE 55 MG: 2 INJECTION, SOLUTION INTRAVENOUS at 09:14

## 2022-03-09 ASSESSMENT — PAIN SCALES - GENERAL: PAINLEVEL: NO PAIN (0)

## 2022-03-09 NOTE — PROGRESS NOTES
Infusion Nursing Note:  Lucille Rojas presents today for Cycle 5 Day 1 Rituxan, Adriamycin, Vincristine, Cytoxan, OnPro.    Patient seen by provider today: No   present during visit today: Not Applicable.    Note: Pt seen by Dr. Srinivasan yesterday afternoon. Reports feeling well today and has no concerns or issues today.      Intravenous Access:  Implanted Port.    Treatment Conditions:  Lab Results   Component Value Date    HGB 10.0 (L) 03/08/2022    WBC 6.2 03/08/2022    ANEU 33.9 (H) 02/21/2022    ANEUTAUTO 4.6 03/08/2022     03/08/2022      Lab Results   Component Value Date     03/08/2022    POTASSIUM 3.6 03/08/2022    CR 1.17 (H) 03/08/2022    IGOR 8.8 03/08/2022    BILITOTAL 0.2 03/08/2022    ALBUMIN 3.3 (L) 03/08/2022    ALT 14 03/08/2022    AST 17 03/08/2022     Results reviewed, labs MET treatment parameters, ok to proceed with treatment.      Post Infusion Assessment:  Patient tolerated infusion without incident.  Blood return noted pre and post infusion.  Blood return noted during Adriamycin administration every 2 cc.  Site patent and intact, free from redness, edema or discomfort.  No evidence of extravasations.  Access discontinued per protocol.     ONPRO  Was placed on patient's: back of left arm.    Was placed at 10:05 AM    Podpal used: Yes    ONPRO injector device Lot number: [id number]    Patient education included: what patient can expect after application, what colored lights mean on the device, when to remove device, when and where to call with questions or issues, all patients questions answered and that Neulasta administration will occur at 1:05pm on 3/10/22.    Patient tolerated administration well.        Discharge Plan:   Prescription refills given for Prednisone.  Discharge instructions reviewed with: Patient and Family.  Patient verbalized understanding of discharge instructions and all questions answered.  AVS to patient via [employer].  Patient will return 3/29/22 for  next appointment.   Patient discharged in stable condition accompanied by: self.  Departure Mode: Ambulatory.      Marya Song RN

## 2022-03-09 NOTE — PROGRESS NOTES
ONCOLOGY HISTORY: Ms. 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  1. On 09/20/2021:   -Hemoglobin of 4.7 with MCV of 67. Normal WBC and platelets.  -Iron of 9 and saturation index of 2%.   -CT chest, abdomen and pelvis reveals large pancreatic head mass.  This encases the celiac trunk, superior mesenteric artery and superior mesenteric vein.  There is moderate-size right pleural effusion. No lymphadenopathy.  2. Thoracocentesis on 09/22/2021. Cytology is negative for malignancy.  3. EUS on 09/21/2021 revealed is a mass in the uncinate process of the pancreas measuring 33 mm.  There was evidence of invasion into superior mesenteric artery and the celiac trunk. No enlarged lymph nodes seen. FNA revealed atypical cells.    4. EGD and EUS repeated on 10/05/2021.  -EGD is normal.  -EUS revealed pancreatic head mass.  FNA revealed CD10-positive B-cell lymphoma. Flow cytometry revealed CD10-positive lambda monotypic B-cells.  5. PET scan on 11/18/2021 revealed 6 cm hypermetabolic pancreatic head mass and borderline hypermetabolic right axillary lymph node.   -Further repeat biopsy not done because of her overall poor health.  6. mini R-CHOP on 12/15/2021.  -CT scan on 12/21/21 reveals improvement in disease.     SUBJECTIVE:  Ms. Rojas is an 81-year-old female with non-Hodgkin's B-cell lymphoma involving the pancreas on mini R-CHOP.  She completed 4 cycles.  Overall, she is tolerating chemotherapy well. CT scan on 03/01/2022 reveals improvement in disease.     She is weak and needs help with activities of daily living.  No worsening of weakness since chemotherapy started. No headache. Mild dizziness when she stands. No chest pain.  No shortness of breath at rest.  Gets short of breath on minimal exertion.  No nausea or vomiting.  Appetite is fairly good.  No urinary complaint.  No diarrhea.  No bleeding.  No recent infection. As per the family, patient overall is stable. All other  review of system is negative.     PHYSICAL EXAMINATION:    GENERAL:  She is alert and oriented x 3.  Not in respiratory distress.  ECOG PS of 2.   Rest of the system not examined.     LABORATORY:  CBC and CMP were reviewed.     ASSESSMENT:    1.  An 81-year-old female with stage IV non-Hodgkin's B-cell lymphoma involving the pancreas responding to mini-R-CHOP..  2.  Weakness. Stable.  3.  Normocytic anemia. Stable.  4.  Multiple other medical problems including diabetes mellitus and hypertension.     PLAN:  1.  Patient is clinically doing well from lymphoma.  She does not have any new symptoms.  CT chest, abdomen and pelvis done on 03/01/2022 was  reviewed.  Explained to her it reveals improvement in lymphoma.  She was happy to know that.    2.  Patient is on mini R-CHOP for lymphoma.  Overall she has been tolerating it well.  She will continue on it.  Plan is to give it for 6 cycles.  After sixth cycle, we will get a PET scan.  Hopefully she is in complete remission.  If there is any residual disease, we can consider 2 more cycles of mini R-CHOP or we can also consider radiation.    3.  Labs were reviewed with her.  She has mild anemia which is stable.  Her creatinine is mildly elevated.  Overall the labs are remaining stable.  We will continue to monitor it.    4.  I will see her in 3 weeks time with next cycle of chemotherapy.  In between she will see our nurse practitioner.  Advised her to call us with any questions or concerns.    TOTAL VISIT TIME of :  30 minutes.  Time is spent in today's visit, review of chart/investigations today and documentations today.

## 2022-03-12 ENCOUNTER — NURSE TRIAGE (OUTPATIENT)
Dept: NURSING | Facility: CLINIC | Age: 82
End: 2022-03-12
Payer: MEDICARE

## 2022-03-12 NOTE — TELEPHONE ENCOUNTER
Nancy calling reporting patient received Evusheld 2/1/22.  Stating patient had COVID 19 booster scheduled for today 3/12/22. Nancy is requesting to know if patient should keep appointment?    No consent to communicate on file. Patient provided verbal consent to speak with daughter Nancy.     Reviewed per COVID 19 triage guidelines on vaccination   If you had antibody therapy for COVID 19 the vaccine should be postponed at least 90 days.    Papo Cruz will cancel appointment for today and discuss with provider at office visit this week.     Transferred to COVID 19 scheduling line to cancel appointment.      Margaret Bhakta RN  [facility]    COVID 19 Nurse Triage Plan/Patient Instructions    Please be aware that novel coronavirus (COVID-19) may be circulating in the community. If you develop symptoms such as fever, cough, or SOB or if you have concerns about the presence of another infection including coronavirus (COVID-19), please contact your health care provider or visit [email or web address].     Disposition/Instructions    Home care recommended. Follow home care protocol based instructions.    Thank you for taking steps to prevent the spread of this virus.  o Limit your contact with others.  o Wear a simple mask to cover your cough.  o Wash your hands well and often.    Resources    [facility]: About COVID-19: [email or web address]    CDC: What to Do If You're Sick: www.cdc.gov/coronavirus/2019-ncov/about/steps-when-sick.html    CDC: Ending Home Isolation: www.cdc.gov/coronavirus/2019-ncov/hcp/disposition-in-home-patients.html     CDC: Caring for Someone: www.cdc.gov/coronavirus/2019-ncov/if-you-are-sick/care-for-someone.html     [facility]: Interim Guidance for Hospital Discharge to Home: www.[email or web address].[email or web address]/diseases/coronavirus/hcp/hospdischarge.pdf    [facility] clinical trials (COVID-19 research studies): [email or web address]      Below are the COVID-19 hotlines at the [place] Department of Health ([facility]). Interpreters are available.   o For health questions: Call 385-633-1842 or 1-853.337.6247 (7 a.m. to 7 p.m.)  o For questions about schools and childcare: Call 176-181-6064 or 1-225.458.4660 (7 a.m. to 7 p.m.)                     Reason for Disposition    COVID-19 vaccine, Frequently Asked Questions (FAQs)    Protocols used: CORONAVIRUS (COVID-19) VACCINE QUESTIONS AND DICDXROOS-F-YP 1.18.2022

## 2022-03-15 ENCOUNTER — LAB (OUTPATIENT)
Dept: INFUSION THERAPY | Facility: CLINIC | Age: 82
End: 2022-03-15
Attending: INTERNAL MEDICINE
Payer: MEDICARE

## 2022-03-15 ENCOUNTER — ONCOLOGY VISIT (OUTPATIENT)
Dept: ONCOLOGY | Facility: CLINIC | Age: 82
End: 2022-03-15
Attending: NURSE PRACTITIONER
Payer: MEDICARE

## 2022-03-15 VITALS
BODY MASS INDEX: 48.63 KG/M2 | HEART RATE: 85 BPM | DIASTOLIC BLOOD PRESSURE: 48 MMHG | WEIGHT: 249 LBS | OXYGEN SATURATION: 96 % | SYSTOLIC BLOOD PRESSURE: 125 MMHG | RESPIRATION RATE: 16 BRPM

## 2022-03-15 DIAGNOSIS — C85.99 NON-HODGKIN LYMPHOMA OF SOLID ORGAN EXCLUDING SPLEEN, UNSPECIFIED NON-HODGKIN LYMPHOMA TYPE (H): Primary | ICD-10-CM

## 2022-03-15 DIAGNOSIS — F33.0 MDD (MAJOR DEPRESSIVE DISORDER), RECURRENT EPISODE, MILD (H): ICD-10-CM

## 2022-03-15 DIAGNOSIS — Z53.9 DIAGNOSIS NOT YET DEFINED: Primary | ICD-10-CM

## 2022-03-15 LAB
ALBUMIN SERPL-MCNC: 3.4 G/DL (ref 3.4–5)
ALP SERPL-CCNC: 153 U/L (ref 40–150)
ALT SERPL W P-5'-P-CCNC: 16 U/L (ref 0–50)
ANION GAP SERPL CALCULATED.3IONS-SCNC: 5 MMOL/L (ref 3–14)
AST SERPL W P-5'-P-CCNC: 11 U/L (ref 0–45)
BASOPHILS # BLD MANUAL: 0 10E3/UL (ref 0–0.2)
BASOPHILS NFR BLD MANUAL: 0 %
BILIRUB SERPL-MCNC: 0.2 MG/DL (ref 0.2–1.3)
BUN SERPL-MCNC: 43 MG/DL (ref 7–30)
CALCIUM SERPL-MCNC: 9 MG/DL (ref 8.5–10.1)
CHLORIDE BLD-SCNC: 106 MMOL/L (ref 94–109)
CO2 SERPL-SCNC: 32 MMOL/L (ref 20–32)
CREAT SERPL-MCNC: 1.31 MG/DL (ref 0.52–1.04)
EOSINOPHIL # BLD MANUAL: 0.7 10E3/UL (ref 0–0.7)
EOSINOPHIL NFR BLD MANUAL: 4 %
ERYTHROCYTE [DISTWIDTH] IN BLOOD BY AUTOMATED COUNT: 18.6 % (ref 10–15)
GFR SERPL CREATININE-BSD FRML MDRD: 40 ML/MIN/1.73M2
GLUCOSE BLD-MCNC: 153 MG/DL (ref 70–99)
HCT VFR BLD AUTO: 29.6 % (ref 35–47)
HGB BLD-MCNC: 9 G/DL (ref 11.7–15.7)
LYMPHOCYTES # BLD MANUAL: 2.1 10E3/UL (ref 0.8–5.3)
LYMPHOCYTES NFR BLD MANUAL: 12 %
MCH RBC QN AUTO: 28.1 PG (ref 26.5–33)
MCHC RBC AUTO-ENTMCNC: 30.4 G/DL (ref 31.5–36.5)
MCV RBC AUTO: 93 FL (ref 78–100)
MONOCYTES # BLD MANUAL: 0.5 10E3/UL (ref 0–1.3)
MONOCYTES NFR BLD MANUAL: 3 %
NEUTROPHILS # BLD MANUAL: 14.2 10E3/UL (ref 1.6–8.3)
NEUTROPHILS NFR BLD MANUAL: 81 %
PLAT MORPH BLD: ABNORMAL
PLATELET # BLD AUTO: 244 10E3/UL (ref 150–450)
POTASSIUM BLD-SCNC: 3.8 MMOL/L (ref 3.4–5.3)
PROT SERPL-MCNC: 6.2 G/DL (ref 6.8–8.8)
RBC # BLD AUTO: 3.2 10E6/UL (ref 3.8–5.2)
RBC MORPH BLD: ABNORMAL
SODIUM SERPL-SCNC: 143 MMOL/L (ref 133–144)
WBC # BLD AUTO: 17.5 10E3/UL (ref 4–11)

## 2022-03-15 PROCEDURE — 99214 OFFICE O/P EST MOD 30 MIN: CPT | Performed by: NURSE PRACTITIONER

## 2022-03-15 PROCEDURE — G0179 MD RECERTIFICATION HHA PT: HCPCS | Performed by: INTERNAL MEDICINE

## 2022-03-15 PROCEDURE — 80053 COMPREHEN METABOLIC PANEL: CPT | Performed by: NURSE PRACTITIONER

## 2022-03-15 PROCEDURE — 36591 DRAW BLOOD OFF VENOUS DEVICE: CPT

## 2022-03-15 PROCEDURE — 250N000011 HC RX IP 250 OP 636: Performed by: INTERNAL MEDICINE

## 2022-03-15 PROCEDURE — G0463 HOSPITAL OUTPT CLINIC VISIT: HCPCS

## 2022-03-15 PROCEDURE — 85014 HEMATOCRIT: CPT | Performed by: NURSE PRACTITIONER

## 2022-03-15 RX ORDER — HEPARIN SODIUM (PORCINE) LOCK FLUSH IV SOLN 100 UNIT/ML 100 UNIT/ML
5 SOLUTION INTRAVENOUS
Status: DISCONTINUED | OUTPATIENT
Start: 2022-03-15 | End: 2022-03-15 | Stop reason: HOSPADM

## 2022-03-15 RX ORDER — HEPARIN SODIUM,PORCINE 10 UNIT/ML
5 VIAL (ML) INTRAVENOUS
Status: CANCELLED | OUTPATIENT
Start: 2022-03-15

## 2022-03-15 RX ORDER — HEPARIN SODIUM (PORCINE) LOCK FLUSH IV SOLN 100 UNIT/ML 100 UNIT/ML
5 SOLUTION INTRAVENOUS
Status: CANCELLED | OUTPATIENT
Start: 2022-03-15

## 2022-03-15 RX ADMIN — Medication 5 ML: at 14:42

## 2022-03-15 ASSESSMENT — PAIN SCALES - GENERAL: PAINLEVEL: NO PAIN (0)

## 2022-03-15 NOTE — PROGRESS NOTES
Nursing Note:  Lucille Rojas presents today for port labs.    Patient seen by provider today: Yes: Alexandr   present during visit today: Not Applicable.    Note: N/A.    Intravenous Access:  Implanted Port.    Discharge Plan:   Patient was sent to [facility] for NP appointment.    Jyoti Beach RN

## 2022-03-15 NOTE — PROGRESS NOTES
Oncology Rooming Note    March 15, 2022 3:12 PM   Lucille Rojas is a 81 year old female who presents for:    Chief Complaint   Patient presents with     Oncology Clinic Visit     Initial Vitals: /48   Pulse 85   Resp 16   Wt 112.9 kg (249 lb)   SpO2 96%   BMI 48.63 kg/m   Estimated body mass index is 48.63 kg/m  as calculated from the following:    Height as of 2/21/22: 1.524 m (5').    Weight as of this encounter: 112.9 kg (249 lb). Body surface area is 2.19 meters squared.  No Pain (0) Comment: Data Unavailable   No LMP recorded. Patient has had a hysterectomy.  Allergies reviewed: Yes  Medications reviewed: Yes    Medications: Medication refills not needed today.  Pharmacy name entered into [facility]:    [id number] - [place], [place] - [address] [facility] [place], [place] - [address]    Clinical concerns: not feeling great. Very cold last night and this morning.      Odalis Biswas, [facility]

## 2022-03-15 NOTE — PROGRESS NOTES
Oncology/Hematology Visit Note  Mar 15, 2022    Reason for Visit: follow up of non-Hodgkin's lymphoma involving the pancreas  EUS revealed pancreatic head mass.  FNA revealed CD10-positive B-cell lymphoma. Flow cytometry revealed CD10-positive lambda monotypic B-cells.  PET scan on 11/18/2021 revealed 6 cm hypermetabolic pancreatic head mass and borderline hypermetabolic right axillary lymph node.     Patient met with Dr. Srinivasan who recommended starting treatment with mini R-CHOP-plan for 6 cycles  03/09-cycle 5 given followed by Neulasta    Patient comes here today for routine follow-up after chemo    Interval History:  Patient denies fever chills or sweats denies nausea vomiting diarrhea abdominal pain or bleeding reports she has not been drinking a lot of fluids today.  Denies dysphagia    Review of Systems:  14 point ROS of systems including Constitutional, Eyes, Respiratory, Cardiovascular, Gastroenterology, Genitourinary, Integumentary, Muscularskeletal, Psychiatric were all negative except for pertinent positives noted in my HPI.    Physical Examination:  General: The patient is a pleasant female in no acute distress.  HEENT: EOMI, PERRL. Sclerae are anicteric. Oral mucosa is pink and moist with no lesions or thrush.   Lymph: Neck is supple with no lymphadenopathy in the cervical or supraclavicular areas.   Heart: Regular rate and rhythm.   Lungs: Clear to auscultation bilaterally.   GI: Bowel sounds present, soft, nontender with no palpable hepatosplenomegaly or masses.   Extremities: No lower extremity edema noted bilaterally.   Skin: No rashes, petechiae, or bruising noted on exposed skin.    Laboratory Data:  CBC CMP results reviewed    Assessment and Plan:  This is a 81-year-old female with    Lymphoma  non-Hodgkin's lymphoma involving the pancreas  Patient of Dr. Srinivasan  Patient is getting treatment with mini R-CHOP  -Plan is for 6 cycles of treatment  03/09-cycle 5 given followed by Neulasta  Labs  reviewed abnormalities discussed  Elevated creatinine reviewed   Schedule for labs and follow-up with me next week  Patient is scheduled for cycle 6 mini R-CHOP and follow-up with Dr. Srinivasan on 03/30    Anemia  Patient is asymptomatic  S/p EGD on 09/21/21 revealed a few gastric erosions and a few tiny aphthous ulcers in the duodenum, but not severe enough  Iron deficiency anemia  -status post Venofer  -Currently taking oral iron  Give 1 unit PRBC today      Leukocytosis  Secondary to Neulasta given last week   Patient is afebrile and asymptomatic  Check temperature frequently in the event of fever chills sweats or any signs symptoms of infection patient is advised to call clinic or go to ER    Elevated creatinine  Patient denies urinary symptoms  Advised patient to increase fluid intake.  Advised daughter to call us if patient has hard time drinking fluids then we will schedule her for NS bolus hydration  Recheck creatinine next week    Call our clinic with any changes in health condition or questions    ALIE Carreon [facility]     Chart documentation with Dragon Voice recognition Software. Although reviewed after completion, some words and grammatical errors may remain.

## 2022-03-15 NOTE — LETTER
3/15/2022         RE: Lucille Rojas  [address] [facility] [address]        Dear Colleague,    Thank you for referring your patient, Lucille Rojas, to the [facility] [place] [facility]. Please see a copy of my visit note below.    Oncology Rooming Note    March 15, 2022 3:12 PM   Lucille Rojas is a 81 year old female who presents for:    Chief Complaint   Patient presents with     Oncology Clinic Visit     Initial Vitals: /48   Pulse 85   Resp 16   Wt 112.9 kg (249 lb)   SpO2 96%   BMI 48.63 kg/m   Estimated body mass index is 48.63 kg/m  as calculated from the following:    Height as of 2/21/22: 1.524 m (5').    Weight as of this encounter: 112.9 kg (249 lb). Body surface area is 2.19 meters squared.  No Pain (0) Comment: Data Unavailable   No LMP recorded. Patient has had a hysterectomy.  Allergies reviewed: Yes  Medications reviewed: Yes    Medications: Medication refills not needed today.  Pharmacy name entered into [employer]:    [id number] - [place], [place] - [address] [facility] [place] - [place], [place] - [address]    Clinical concerns: not feeling great. Very cold last night and this morning.      Odalis Biswas, [facility]              Oncology/Hematology Visit Note  Mar 15, 2022    Reason for Visit: follow up of non-Hodgkin's lymphoma involving the pancreas  EUS revealed pancreatic head mass.  FNA revealed CD10-positive B-cell lymphoma. Flow cytometry revealed CD10-positive lambda monotypic B-cells.  PET scan on 11/18/2021 revealed 6 cm hypermetabolic pancreatic head mass and borderline hypermetabolic right axillary lymph node.     Patient met with Dr. Srinivasan who recommended starting treatment with mini R-CHOP-plan for 6 cycles  03/09-cycle 5 given followed by Margareth    Patient comes here today for routine follow-up after chemo    Interval History:  Patient denies fever chills or sweats denies nausea vomiting diarrhea abdominal  pain or bleeding reports she has not been drinking a lot of fluids today.  Denies dysphagia    Review of Systems:  14 point ROS of systems including Constitutional, Eyes, Respiratory, Cardiovascular, Gastroenterology, Genitourinary, Integumentary, Muscularskeletal, Psychiatric were all negative except for pertinent positives noted in my HPI.    Physical Examination:  General: The patient is a pleasant female in no acute distress.  HEENT: EOMI, PERRL. Sclerae are anicteric. Oral mucosa is pink and moist with no lesions or thrush.   Lymph: Neck is supple with no lymphadenopathy in the cervical or supraclavicular areas.   Heart: Regular rate and rhythm.   Lungs: Clear to auscultation bilaterally.   GI: Bowel sounds present, soft, nontender with no palpable hepatosplenomegaly or masses.   Extremities: No lower extremity edema noted bilaterally.   Skin: No rashes, petechiae, or bruising noted on exposed skin.    Laboratory Data:  CBC CMP results reviewed    Assessment and Plan:  This is a 81-year-old female with    Lymphoma  non-Hodgkin's lymphoma involving the pancreas  Patient of Dr. Srinivasan  Patient is getting treatment with mini R-CHOP  -Plan is for 6 cycles of treatment  03/09-cycle 5 given followed by Neulasta  Labs reviewed abnormalities discussed  Elevated creatinine reviewed   Schedule for labs and follow-up with me next week  Patient is scheduled for cycle 6 mini R-CHOP and follow-up with Dr. Srinivasan on 03/30    Anemia  Patient is asymptomatic  S/p EGD on 09/21/21 revealed a few gastric erosions and a few tiny aphthous ulcers in the duodenum, but not severe enough  Iron deficiency anemia  -status post Venofer  -Currently taking oral iron  Give 1 unit PRBC today      Leukocytosis  Secondary to Neulasta given last week   Patient is afebrile and asymptomatic  Check temperature frequently in the event of fever chills sweats or any signs symptoms of infection patient is advised to call clinic or go to ER    Elevated  creatinine  Patient denies urinary symptoms  Advised patient to increase fluid intake  Recheck creatinine next week    Call our clinic with any changes in health condition or questions    ALIE Carreon CNP  [facility]     Chart documentation with Dragon Voice recognition Software. Although reviewed after completion, some words and grammatical errors may remain.            Again, thank you for allowing me to participate in the care of your patient.        Sincerely,        ALIE Carreon CNP

## 2022-03-16 RX ORDER — CITALOPRAM HYDROBROMIDE 20 MG/1
20 TABLET ORAL DAILY
Qty: 90 TABLET | Refills: 0 | Status: SHIPPED | OUTPATIENT
Start: 2022-03-16 | End: 2022-03-31

## 2022-03-16 NOTE — TELEPHONE ENCOUNTER
Routing refill request to provider for review/approval because:  PHQ-9 score:    PHQ 11/2/2021   PHQ-9 Total Score 19   Q9: Thoughts of better off dead/self-harm past 2 weeks Several days   F/U: Thoughts of suicide or self-harm No   F/U: Safety concerns No                 Roger Muller RN  [facility] Triage Nurse

## 2022-03-21 DIAGNOSIS — C85.99 NON-HODGKIN LYMPHOMA OF SOLID ORGAN EXCLUDING SPLEEN, UNSPECIFIED NON-HODGKIN LYMPHOMA TYPE (H): Primary | ICD-10-CM

## 2022-03-22 ENCOUNTER — ONCOLOGY VISIT (OUTPATIENT)
Dept: ONCOLOGY | Facility: CLINIC | Age: 82
End: 2022-03-22
Attending: NURSE PRACTITIONER
Payer: MEDICARE

## 2022-03-22 ENCOUNTER — LAB (OUTPATIENT)
Dept: INFUSION THERAPY | Facility: CLINIC | Age: 82
End: 2022-03-22
Attending: NURSE PRACTITIONER
Payer: MEDICARE

## 2022-03-22 VITALS
HEART RATE: 74 BPM | RESPIRATION RATE: 16 BRPM | WEIGHT: 249 LBS | SYSTOLIC BLOOD PRESSURE: 146 MMHG | OXYGEN SATURATION: 100 % | BODY MASS INDEX: 48.63 KG/M2 | DIASTOLIC BLOOD PRESSURE: 80 MMHG

## 2022-03-22 DIAGNOSIS — C85.99 NON-HODGKIN LYMPHOMA OF SOLID ORGAN EXCLUDING SPLEEN, UNSPECIFIED NON-HODGKIN LYMPHOMA TYPE (H): Primary | ICD-10-CM

## 2022-03-22 LAB
ALBUMIN SERPL-MCNC: 2.9 G/DL (ref 3.4–5)
ALP SERPL-CCNC: 131 U/L (ref 40–150)
ALT SERPL W P-5'-P-CCNC: 11 U/L (ref 0–50)
ANION GAP SERPL CALCULATED.3IONS-SCNC: 5 MMOL/L (ref 3–14)
AST SERPL W P-5'-P-CCNC: 12 U/L (ref 0–45)
BASOPHILS # BLD MANUAL: 0 10E3/UL (ref 0–0.2)
BASOPHILS NFR BLD MANUAL: 0 %
BILIRUB SERPL-MCNC: 0.1 MG/DL (ref 0.2–1.3)
BUN SERPL-MCNC: 19 MG/DL (ref 7–30)
CALCIUM SERPL-MCNC: 8.4 MG/DL (ref 8.5–10.1)
CHLORIDE BLD-SCNC: 107 MMOL/L (ref 94–109)
CO2 SERPL-SCNC: 30 MMOL/L (ref 20–32)
CREAT SERPL-MCNC: 1 MG/DL (ref 0.52–1.04)
EOSINOPHIL # BLD MANUAL: 0.2 10E3/UL (ref 0–0.7)
EOSINOPHIL NFR BLD MANUAL: 2 %
ERYTHROCYTE [DISTWIDTH] IN BLOOD BY AUTOMATED COUNT: 18.4 % (ref 10–15)
GFR SERPL CREATININE-BSD FRML MDRD: 56 ML/MIN/1.73M2
GLUCOSE BLD-MCNC: 173 MG/DL (ref 70–99)
HCT VFR BLD AUTO: 28.5 % (ref 35–47)
HGB BLD-MCNC: 8.2 G/DL (ref 11.7–15.7)
LYMPHOCYTES # BLD MANUAL: 0.9 10E3/UL (ref 0.8–5.3)
LYMPHOCYTES NFR BLD MANUAL: 7 %
MCH RBC QN AUTO: 27.6 PG (ref 26.5–33)
MCHC RBC AUTO-ENTMCNC: 28.8 G/DL (ref 31.5–36.5)
MCV RBC AUTO: 96 FL (ref 78–100)
METAMYELOCYTES # BLD MANUAL: 0.5 10E3/UL
METAMYELOCYTES NFR BLD MANUAL: 4 %
MONOCYTES # BLD MANUAL: 0.7 10E3/UL (ref 0–1.3)
MONOCYTES NFR BLD MANUAL: 6 %
NEUTROPHILS # BLD MANUAL: 10 10E3/UL (ref 1.6–8.3)
NEUTROPHILS NFR BLD MANUAL: 81 %
PLAT MORPH BLD: ABNORMAL
PLATELET # BLD AUTO: 242 10E3/UL (ref 150–450)
POTASSIUM BLD-SCNC: 3.7 MMOL/L (ref 3.4–5.3)
PROT SERPL-MCNC: 5.9 G/DL (ref 6.8–8.8)
RBC # BLD AUTO: 2.97 10E6/UL (ref 3.8–5.2)
RBC MORPH BLD: ABNORMAL
SODIUM SERPL-SCNC: 142 MMOL/L (ref 133–144)
WBC # BLD AUTO: 12.3 10E3/UL (ref 4–11)

## 2022-03-22 PROCEDURE — 250N000011 HC RX IP 250 OP 636: Performed by: INTERNAL MEDICINE

## 2022-03-22 PROCEDURE — 99214 OFFICE O/P EST MOD 30 MIN: CPT | Performed by: NURSE PRACTITIONER

## 2022-03-22 PROCEDURE — 82040 ASSAY OF SERUM ALBUMIN: CPT | Performed by: NURSE PRACTITIONER

## 2022-03-22 PROCEDURE — 80053 COMPREHEN METABOLIC PANEL: CPT | Performed by: NURSE PRACTITIONER

## 2022-03-22 PROCEDURE — G0463 HOSPITAL OUTPT CLINIC VISIT: HCPCS

## 2022-03-22 PROCEDURE — 36591 DRAW BLOOD OFF VENOUS DEVICE: CPT

## 2022-03-22 PROCEDURE — 85027 COMPLETE CBC AUTOMATED: CPT | Performed by: NURSE PRACTITIONER

## 2022-03-22 RX ORDER — HEPARIN SODIUM (PORCINE) LOCK FLUSH IV SOLN 100 UNIT/ML 100 UNIT/ML
5 SOLUTION INTRAVENOUS
Status: DISCONTINUED | OUTPATIENT
Start: 2022-03-22 | End: 2022-03-22 | Stop reason: HOSPADM

## 2022-03-22 RX ORDER — HEPARIN SODIUM (PORCINE) LOCK FLUSH IV SOLN 100 UNIT/ML 100 UNIT/ML
5 SOLUTION INTRAVENOUS
Status: CANCELLED | OUTPATIENT
Start: 2022-03-22

## 2022-03-22 RX ORDER — HEPARIN SODIUM,PORCINE 10 UNIT/ML
5 VIAL (ML) INTRAVENOUS
Status: CANCELLED | OUTPATIENT
Start: 2022-03-22

## 2022-03-22 RX ADMIN — Medication 5 ML: at 07:34

## 2022-03-22 ASSESSMENT — PAIN SCALES - GENERAL: PAINLEVEL: NO PAIN (0)

## 2022-03-22 NOTE — PROGRESS NOTES
Oncology Rooming Note    March 22, 2022 9:02 AM   Lucille Rojas is a 81 year old female who presents for:    Chief Complaint   Patient presents with     Oncology Clinic Visit     Initial Vitals: There were no vitals taken for this visit. Estimated body mass index is 48.63 kg/m  as calculated from the following:    Height as of 2/21/22: 1.524 m (5').    Weight as of 3/15/22: 112.9 kg (249 lb). There is no height or weight on file to calculate BSA.  Data Unavailable Comment: Data Unavailable   No LMP recorded. Patient has had a hysterectomy.  Allergies reviewed: Yes  Medications reviewed: Yes    Medications: Medication refills not needed today.  Pharmacy name entered into [facility]:    [id number] - [place], [place] - [address] [facility] [place] - [address] [facility]    Clinical concerns:  NP was notified.      Tianna Beckman, [facility]

## 2022-03-22 NOTE — PROGRESS NOTES
Oncology/Hematology Visit Note  Mar 22, 2022    Reason for Visit: follow up of non-Hodgkin's lymphoma involving the pancreas  EUS revealed pancreatic head mass.  FNA revealed CD10-positive B-cell lymphoma. Flow cytometry revealed CD10-positive lambda monotypic B-cells.  PET scan on 11/18/2021 revealed 6 cm hypermetabolic pancreatic head mass and borderline hypermetabolic right axillary lymph node.     Patient met with Dr. Srinivasan who recommended starting treatment with mini R-CHOP-plan for 6 cycles  03/09-cycle 5 given followed by Margareth    Patient comes here today for routine follow-up after chemo    Interval History:  Patient reports last night he had some pain in the right side of the arm and leg lasted for couple minutes the pain has resolved  Patient denies any pain right now  Denies fever chills sweats cough shortness of breath chest pain nausea vomiting diarrhea abdominal pain bleeding  Denies edema    Review of Systems:  14 point ROS of systems including Constitutional, Eyes, Respiratory, Cardiovascular, Gastroenterology, Genitourinary, Integumentary, Muscularskeletal, Psychiatric were all negative except for pertinent positives noted in my HPI.    Physical Examination:  General: The patient is a pleasant female in no acute distress.  HEENT: EOMI, PERRL. Sclerae are anicteric. Oral mucosa is pink and moist with no lesions or thrush.   Lymph: Neck is supple with no lymphadenopathy in the cervical or supraclavicular areas.   Heart: Regular rate and rhythm.   Lungs: Clear to auscultation bilaterally.   GI: Bowel sounds present, soft, nontender with no palpable hepatosplenomegaly or masses.   Extremities: No lower extremity edema noted bilaterally.   Skin: No rashes, petechiae, or bruising noted on exposed skin.    Laboratory Data:  CBC CMP results reviewed    Assessment and Plan:  This is a 81-year-old female with    Lymphoma  non-Hodgkin's lymphoma involving the pancreas  Patient of Dr. Srinivasan  Patient is  getting treatment with mini R-CHOP  -03/09-cycle 5 given followed by Margareth  Overall patient has been tolerating treatment well  Patient has follow-up appointment with Dr. Srinivasan next week with cycle 6      Anemia  Patient is asymptomatic  S/p EGD on 09/21/21 revealed a few gastric erosions and a few tiny aphthous ulcers in the duodenum, but not severe enough  Iron deficiency anemia  -status post Venofer  -Currently taking oral iron  Overall stable hemoglobin      Leukocytosis  Secondary to Neulasta given  On 03/09  Patient is afebrile and asymptomatic  Check temperature frequently in the event of fever chills sweats or any signs symptoms of infection patient is advised to call clinic or go to ER    Elevated blood sugar  Follow-up with primary care        Call our clinic with any changes in health condition or questions    ALIE Carreon [facility]     Chart documentation with Dragon Voice recognition Software. Although reviewed after completion, some words and grammatical errors may remain.

## 2022-03-22 NOTE — PROGRESS NOTES
Infusion Nursing Note:  Lucille Rojas presents today for port labs.    Patient seen by provider today: Yes: Alexandr Ambriz NP   present during visit today: Not Applicable.    Note: N/A.    Intravenous Access:  Labs drawn without difficulty.  Implanted Port.    Treatment Conditions:  Not Applicable. Labs pending at time of discharge.      Post Infusion Assessment:  Site patent and intact, free from redness, edema or discomfort.  No evidence of extravasations.  Access discontinued per protocol.       Discharge Plan:   Patient discharged in stable condition accompanied by:  and daughter.  Departure Mode: Wheelchair to lobby for clinic exam.    Marian Fleming RN

## 2022-03-22 NOTE — LETTER
3/22/2022         RE: Lucille Rojas  [address] [facility] [address]        Dear Colleague,    Thank you for referring your patient, Lucille Rojas, to the [facility]. Please see a copy of my visit note below.    Oncology Rooming Note    March 22, 2022 9:02 AM   Lucille Rojas is a 81 year old female who presents for:    Chief Complaint   Patient presents with     Oncology Clinic Visit     Initial Vitals: There were no vitals taken for this visit. Estimated body mass index is 48.63 kg/m  as calculated from the following:    Height as of 2/21/22: 1.524 m (5').    Weight as of 3/15/22: 112.9 kg (249 lb). There is no height or weight on file to calculate BSA.  Data Unavailable Comment: Data Unavailable   No LMP recorded. Patient has had a hysterectomy.  Allergies reviewed: Yes  Medications reviewed: Yes    Medications: Medication refills not needed today.  Pharmacy name entered into [employer]:    [facility][id number] - [place], [place] - [address] [facility] [place] - [place], [place] - [address] [facility]    Clinical concerns:  NP was notified.      Tianna Bekcman, [facility]              Oncology/Hematology Visit Note  Mar 22, 2022    Reason for Visit: follow up of non-Hodgkin's lymphoma involving the pancreas  EUS revealed pancreatic head mass.  FNA revealed CD10-positive B-cell lymphoma. Flow cytometry revealed CD10-positive lambda monotypic B-cells.  PET scan on 11/18/2021 revealed 6 cm hypermetabolic pancreatic head mass and borderline hypermetabolic right axillary lymph node.     Patient met with Dr. Srinivasan who recommended starting treatment with mini R-CHOP-plan for 6 cycles  03/09-cycle 5 given followed by Margareth    Patient comes here today for routine follow-up after chemo    Interval History:  Patient reports last night he had some pain in the right side of the arm and leg lasted for couple minutes the pain has resolved  Patient denies any pain right  now  Denies fever chills sweats cough shortness of breath chest pain nausea vomiting diarrhea abdominal pain bleeding  Denies edema    Review of Systems:  14 point ROS of systems including Constitutional, Eyes, Respiratory, Cardiovascular, Gastroenterology, Genitourinary, Integumentary, Muscularskeletal, Psychiatric were all negative except for pertinent positives noted in my HPI.    Physical Examination:  General: The patient is a pleasant female in no acute distress.  HEENT: EOMI, PERRL. Sclerae are anicteric. Oral mucosa is pink and moist with no lesions or thrush.   Lymph: Neck is supple with no lymphadenopathy in the cervical or supraclavicular areas.   Heart: Regular rate and rhythm.   Lungs: Clear to auscultation bilaterally.   GI: Bowel sounds present, soft, nontender with no palpable hepatosplenomegaly or masses.   Extremities: No lower extremity edema noted bilaterally.   Skin: No rashes, petechiae, or bruising noted on exposed skin.    Laboratory Data:  CBC CMP results reviewed    Assessment and Plan:  This is a 81-year-old female with    Lymphoma  non-Hodgkin's lymphoma involving the pancreas  Patient of Dr. Srinivasan  Patient is getting treatment with mini R-CHOP  -03/09-cycle 5 given followed by Neulasta  Overall patient has been tolerating treatment well  Patient has follow-up appointment with Dr. Srinivasan next week with cycle 6      Anemia  Patient is asymptomatic  S/p EGD on 09/21/21 revealed a few gastric erosions and a few tiny aphthous ulcers in the duodenum, but not severe enough  Iron deficiency anemia  -status post Venofer  -Currently taking oral iron  Overall stable hemoglobin      Leukocytosis  Secondary to Neulasta given  On 03/09  Patient is afebrile and asymptomatic  Check temperature frequently in the event of fever chills sweats or any signs symptoms of infection patient is advised to call clinic or go to ER    Elevated blood sugar  Follow-up with primary care        Call our clinic with any  changes in health condition or questions    ALIE Carreon CNP  [facility]     Chart documentation with Dragon Voice recognition Software. Although reviewed after completion, some words and grammatical errors may remain.            Again, thank you for allowing me to participate in the care of your patient.        Sincerely,        ALIE Carreon CNP

## 2022-03-28 ENCOUNTER — MEDICAL CORRESPONDENCE (OUTPATIENT)
Dept: HEALTH INFORMATION MANAGEMENT | Facility: CLINIC | Age: 82
End: 2022-03-28
Payer: MEDICARE

## 2022-03-28 DIAGNOSIS — Z53.9 ERRONEOUS ENCOUNTER--DISREGARD: Primary | ICD-10-CM

## 2022-03-28 NOTE — TELEPHONE ENCOUNTER
This encounter was opened in error. Please disregard.   Ear Wedge Repair Text: A wedge excision was completed by carrying down an excision through the full thickness of the ear and cartilage with an inward facing Burow's triangle. The wound was then closed in a layered fashion.

## 2022-03-30 ENCOUNTER — LAB (OUTPATIENT)
Dept: INFUSION THERAPY | Facility: CLINIC | Age: 82
End: 2022-03-30
Attending: INTERNAL MEDICINE
Payer: MEDICARE

## 2022-03-30 ENCOUNTER — ONCOLOGY VISIT (OUTPATIENT)
Dept: ONCOLOGY | Facility: CLINIC | Age: 82
End: 2022-03-30
Attending: INTERNAL MEDICINE
Payer: MEDICARE

## 2022-03-30 VITALS
HEART RATE: 75 BPM | SYSTOLIC BLOOD PRESSURE: 124 MMHG | DIASTOLIC BLOOD PRESSURE: 68 MMHG | TEMPERATURE: 97.5 F | RESPIRATION RATE: 16 BRPM | BODY MASS INDEX: 48.04 KG/M2 | WEIGHT: 246 LBS | OXYGEN SATURATION: 90 %

## 2022-03-30 DIAGNOSIS — C85.12 B-CELL LYMPHOMA OF INTRATHORACIC LYMPH NODES, UNSPECIFIED B-CELL LYMPHOMA TYPE (H): ICD-10-CM

## 2022-03-30 DIAGNOSIS — E03.9 ACQUIRED HYPOTHYROIDISM: ICD-10-CM

## 2022-03-30 DIAGNOSIS — Z51.89 ENCOUNTER FOR OTHER SPECIFIED AFTERCARE: Primary | ICD-10-CM

## 2022-03-30 DIAGNOSIS — C85.99 NON-HODGKIN LYMPHOMA OF SOLID ORGAN EXCLUDING SPLEEN, UNSPECIFIED NON-HODGKIN LYMPHOMA TYPE (H): ICD-10-CM

## 2022-03-30 DIAGNOSIS — C85.12 B-CELL LYMPHOMA OF INTRATHORACIC LYMPH NODES, UNSPECIFIED B-CELL LYMPHOMA TYPE (H): Primary | ICD-10-CM

## 2022-03-30 LAB
ALBUMIN SERPL-MCNC: 3.2 G/DL (ref 3.4–5)
ALP SERPL-CCNC: 124 U/L (ref 40–150)
ALT SERPL W P-5'-P-CCNC: 14 U/L (ref 0–50)
ANION GAP SERPL CALCULATED.3IONS-SCNC: 4 MMOL/L (ref 3–14)
AST SERPL W P-5'-P-CCNC: 12 U/L (ref 0–45)
BASOPHILS # BLD AUTO: 0.1 10E3/UL (ref 0–0.2)
BASOPHILS NFR BLD AUTO: 1 %
BILIRUB SERPL-MCNC: 0.3 MG/DL (ref 0.2–1.3)
BUN SERPL-MCNC: 24 MG/DL (ref 7–30)
CALCIUM SERPL-MCNC: 9.1 MG/DL (ref 8.5–10.1)
CHLORIDE BLD-SCNC: 104 MMOL/L (ref 94–109)
CO2 SERPL-SCNC: 31 MMOL/L (ref 20–32)
CREAT SERPL-MCNC: 1.34 MG/DL (ref 0.52–1.04)
EOSINOPHIL # BLD AUTO: 0.1 10E3/UL (ref 0–0.7)
EOSINOPHIL NFR BLD AUTO: 1 %
ERYTHROCYTE [DISTWIDTH] IN BLOOD BY AUTOMATED COUNT: 18 % (ref 10–15)
GFR SERPL CREATININE-BSD FRML MDRD: 39 ML/MIN/1.73M2
GLUCOSE BLD-MCNC: 155 MG/DL (ref 70–99)
HCT VFR BLD AUTO: 30.2 % (ref 35–47)
HGB BLD-MCNC: 9 G/DL (ref 11.7–15.7)
IMM GRANULOCYTES # BLD: 0.1 10E3/UL
IMM GRANULOCYTES NFR BLD: 1 %
LYMPHOCYTES # BLD AUTO: 0.6 10E3/UL (ref 0.8–5.3)
LYMPHOCYTES NFR BLD AUTO: 6 %
MCH RBC QN AUTO: 27.3 PG (ref 26.5–33)
MCHC RBC AUTO-ENTMCNC: 29.8 G/DL (ref 31.5–36.5)
MCV RBC AUTO: 92 FL (ref 78–100)
MONOCYTES # BLD AUTO: 0.3 10E3/UL (ref 0–1.3)
MONOCYTES NFR BLD AUTO: 3 %
NEUTROPHILS # BLD AUTO: 7.5 10E3/UL (ref 1.6–8.3)
NEUTROPHILS NFR BLD AUTO: 88 %
NRBC # BLD AUTO: 0 10E3/UL
NRBC BLD AUTO-RTO: 0 /100
PLATELET # BLD AUTO: 262 10E3/UL (ref 150–450)
POTASSIUM BLD-SCNC: 3.8 MMOL/L (ref 3.4–5.3)
PROT SERPL-MCNC: 6.7 G/DL (ref 6.8–8.8)
RBC # BLD AUTO: 3.3 10E6/UL (ref 3.8–5.2)
SODIUM SERPL-SCNC: 139 MMOL/L (ref 133–144)
WBC # BLD AUTO: 8.6 10E3/UL (ref 4–11)

## 2022-03-30 PROCEDURE — 85025 COMPLETE CBC W/AUTO DIFF WBC: CPT | Performed by: INTERNAL MEDICINE

## 2022-03-30 PROCEDURE — 96413 CHEMO IV INFUSION 1 HR: CPT

## 2022-03-30 PROCEDURE — 80053 COMPREHEN METABOLIC PANEL: CPT | Performed by: INTERNAL MEDICINE

## 2022-03-30 PROCEDURE — 250N000011 HC RX IP 250 OP 636: Performed by: INTERNAL MEDICINE

## 2022-03-30 PROCEDURE — 96411 CHEMO IV PUSH ADDL DRUG: CPT

## 2022-03-30 PROCEDURE — 250N000013 HC RX MED GY IP 250 OP 250 PS 637: Performed by: INTERNAL MEDICINE

## 2022-03-30 PROCEDURE — 96372 THER/PROPH/DIAG INJ SC/IM: CPT | Performed by: INTERNAL MEDICINE

## 2022-03-30 PROCEDURE — 96375 TX/PRO/DX INJ NEW DRUG ADDON: CPT

## 2022-03-30 PROCEDURE — 96417 CHEMO IV INFUS EACH ADDL SEQ: CPT

## 2022-03-30 PROCEDURE — 96367 TX/PROPH/DG ADDL SEQ IV INF: CPT

## 2022-03-30 PROCEDURE — 258N000003 HC RX IP 258 OP 636: Performed by: INTERNAL MEDICINE

## 2022-03-30 PROCEDURE — 96377 APPLICATON ON-BODY INJECTOR: CPT | Mod: XS

## 2022-03-30 PROCEDURE — 36591 DRAW BLOOD OFF VENOUS DEVICE: CPT | Performed by: INTERNAL MEDICINE

## 2022-03-30 PROCEDURE — 99215 OFFICE O/P EST HI 40 MIN: CPT | Performed by: INTERNAL MEDICINE

## 2022-03-30 PROCEDURE — G0463 HOSPITAL OUTPT CLINIC VISIT: HCPCS | Mod: 25

## 2022-03-30 PROCEDURE — 84443 ASSAY THYROID STIM HORMONE: CPT | Performed by: INTERNAL MEDICINE

## 2022-03-30 PROCEDURE — M0220 HC INJECTION TIXAGEVIMAB & CILGAVIMAB (EVUSHELD): HCPCS | Performed by: INTERNAL MEDICINE

## 2022-03-30 RX ORDER — HEPARIN SODIUM (PORCINE) LOCK FLUSH IV SOLN 100 UNIT/ML 100 UNIT/ML
5 SOLUTION INTRAVENOUS
Status: CANCELLED | OUTPATIENT
Start: 2022-03-30

## 2022-03-30 RX ORDER — HEPARIN SODIUM,PORCINE 10 UNIT/ML
5 VIAL (ML) INTRAVENOUS
Status: CANCELLED | OUTPATIENT
Start: 2022-03-30

## 2022-03-30 RX ORDER — EPINEPHRINE 1 MG/ML
0.3 INJECTION, SOLUTION INTRAMUSCULAR; SUBCUTANEOUS EVERY 5 MIN PRN
Status: CANCELLED | OUTPATIENT
Start: 2022-03-30

## 2022-03-30 RX ORDER — DIPHENHYDRAMINE HYDROCHLORIDE 50 MG/ML
50 INJECTION INTRAMUSCULAR; INTRAVENOUS
Status: CANCELLED
Start: 2022-03-30

## 2022-03-30 RX ORDER — PALONOSETRON 0.05 MG/ML
0.25 INJECTION, SOLUTION INTRAVENOUS ONCE
Status: COMPLETED | OUTPATIENT
Start: 2022-03-30 | End: 2022-03-30

## 2022-03-30 RX ORDER — HEPARIN SODIUM (PORCINE) LOCK FLUSH IV SOLN 100 UNIT/ML 100 UNIT/ML
5 SOLUTION INTRAVENOUS
Status: DISCONTINUED | OUTPATIENT
Start: 2022-03-30 | End: 2022-03-30 | Stop reason: HOSPADM

## 2022-03-30 RX ORDER — METHYLPREDNISOLONE SODIUM SUCCINATE 125 MG/2ML
125 INJECTION, POWDER, LYOPHILIZED, FOR SOLUTION INTRAMUSCULAR; INTRAVENOUS
Status: CANCELLED
Start: 2022-03-30

## 2022-03-30 RX ORDER — ALBUTEROL SULFATE 90 UG/1
1-2 AEROSOL, METERED RESPIRATORY (INHALATION)
Status: CANCELLED
Start: 2022-03-30

## 2022-03-30 RX ORDER — ACETAMINOPHEN 325 MG/1
650 TABLET ORAL ONCE
Status: CANCELLED | OUTPATIENT
Start: 2022-03-30

## 2022-03-30 RX ORDER — PREDNISONE 50 MG/1
40 TABLET ORAL DAILY
Qty: 10 TABLET | Refills: 0 | Status: SHIPPED | OUTPATIENT
Start: 2022-03-30 | End: 2022-04-04

## 2022-03-30 RX ORDER — PALONOSETRON 0.05 MG/ML
0.25 INJECTION, SOLUTION INTRAVENOUS ONCE
Status: CANCELLED | OUTPATIENT
Start: 2022-03-30

## 2022-03-30 RX ORDER — ALBUTEROL SULFATE 0.83 MG/ML
2.5 SOLUTION RESPIRATORY (INHALATION)
Status: CANCELLED | OUTPATIENT
Start: 2022-03-30

## 2022-03-30 RX ORDER — DOXORUBICIN HYDROCHLORIDE 2 MG/ML
25 INJECTION, SOLUTION INTRAVENOUS ONCE
Status: CANCELLED | OUTPATIENT
Start: 2022-03-30

## 2022-03-30 RX ORDER — DIPHENHYDRAMINE HCL 25 MG
50 CAPSULE ORAL ONCE
Status: CANCELLED
Start: 2022-03-30

## 2022-03-30 RX ORDER — DIPHENHYDRAMINE HCL 25 MG
50 CAPSULE ORAL ONCE
Status: COMPLETED | OUTPATIENT
Start: 2022-03-30 | End: 2022-03-30

## 2022-03-30 RX ORDER — MEPERIDINE HYDROCHLORIDE 25 MG/ML
25 INJECTION INTRAMUSCULAR; INTRAVENOUS; SUBCUTANEOUS
Status: CANCELLED
Start: 2022-03-30

## 2022-03-30 RX ORDER — LORAZEPAM 2 MG/ML
0.5 INJECTION INTRAMUSCULAR EVERY 4 HOURS PRN
Status: CANCELLED | OUTPATIENT
Start: 2022-03-30

## 2022-03-30 RX ORDER — DOXORUBICIN HYDROCHLORIDE 2 MG/ML
25 INJECTION, SOLUTION INTRAVENOUS ONCE
Status: COMPLETED | OUTPATIENT
Start: 2022-03-30 | End: 2022-03-30

## 2022-03-30 RX ORDER — NALOXONE HYDROCHLORIDE 0.4 MG/ML
0.2 INJECTION, SOLUTION INTRAMUSCULAR; INTRAVENOUS; SUBCUTANEOUS
Status: CANCELLED | OUTPATIENT
Start: 2022-03-30

## 2022-03-30 RX ORDER — MEPERIDINE HYDROCHLORIDE 25 MG/ML
25 INJECTION INTRAMUSCULAR; INTRAVENOUS; SUBCUTANEOUS EVERY 30 MIN PRN
Status: CANCELLED | OUTPATIENT
Start: 2022-03-30

## 2022-03-30 RX ORDER — ACETAMINOPHEN 325 MG/1
650 TABLET ORAL ONCE
Status: COMPLETED | OUTPATIENT
Start: 2022-03-30 | End: 2022-03-30

## 2022-03-30 RX ADMIN — Medication 5 ML: at 11:56

## 2022-03-30 RX ADMIN — RITUXIMAB-ABBS 800 MG: 10 INJECTION, SOLUTION INTRAVENOUS at 10:25

## 2022-03-30 RX ADMIN — DIPHENHYDRAMINE HYDROCHLORIDE 50 MG: 25 CAPSULE ORAL at 08:55

## 2022-03-30 RX ADMIN — DOXORUBICIN HYDROCHLORIDE 55 MG: 2 INJECTION, SOLUTION INTRAVENOUS at 09:32

## 2022-03-30 RX ADMIN — Medication 3 ML: at 08:56

## 2022-03-30 RX ADMIN — FOSAPREPITANT 150 MG: 150 INJECTION, POWDER, LYOPHILIZED, FOR SOLUTION INTRAVENOUS at 09:08

## 2022-03-30 RX ADMIN — PALONOSETRON HYDROCHLORIDE 0.25 MG: 0.25 INJECTION, SOLUTION INTRAVENOUS at 09:05

## 2022-03-30 RX ADMIN — CYCLOPHOSPHAMIDE 895 MG: 1 INJECTION, POWDER, FOR SOLUTION INTRAVENOUS; ORAL at 09:49

## 2022-03-30 RX ADMIN — ACETAMINOPHEN 650 MG: 325 TABLET, FILM COATED ORAL at 08:55

## 2022-03-30 RX ADMIN — SODIUM CHLORIDE 250 ML: 9 INJECTION, SOLUTION INTRAVENOUS at 08:54

## 2022-03-30 RX ADMIN — VINCRISTINE SULFATE 1 MG: 1 INJECTION, SOLUTION INTRAVENOUS at 09:44

## 2022-03-30 RX ADMIN — PEGFILGRASTIM 6 MG: KIT SUBCUTANEOUS at 10:28

## 2022-03-30 ASSESSMENT — PAIN SCALES - GENERAL: PAINLEVEL: NO PAIN (0)

## 2022-03-30 NOTE — LETTER
3/30/2022         RE: Lucille Rojas  [address] [facility] [address]        Dear Colleague,    Thank you for referring your patient, Lucille Rojas, to the [facility]. Please see a copy of my visit note below.    Oncology Rooming Note    March 30, 2022 8:16 AM   Lucille Rojas is a 81 year old female who presents for:    Chief Complaint   Patient presents with     Oncology Clinic Visit     Initial Vitals: There were no vitals taken for this visit. Estimated body mass index is 48.63 kg/m  as calculated from the following:    Height as of 2/21/22: 1.524 m (5').    Weight as of 3/22/22: 112.9 kg (249 lb). There is no height or weight on file to calculate BSA.  Data Unavailable Comment: Data Unavailable   No LMP recorded. Patient has had a hysterectomy.  Allergies reviewed: Yes  Medications reviewed: Yes    Medications: Medication refills not needed today.  Pharmacy name entered into [employer]:    [facility][id number] - [place], [place] - [address] [facility] [place] - [place], [place] - [address] [facility]    Clinical concerns: no      Odalis Biswas CMA              Talked to patient and evaluated by me. We discussed the risks and benefits of receiving EVUSHELD, as well as alternative treatments. The Emergency Use Administration (EUA) was provided to the patient for review and an opportunity for questions was provided. Patient wishes to proceed with EVUSHELD.      ONCOLOGY HISTORY: Ms. 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  1. On 09/20/2021:   -Hemoglobin of 4.7 with MCV of 67. Normal WBC and platelets.  -Iron of 9 and saturation index of 2%.   -CT chest, abdomen and pelvis reveals large pancreatic head mass.  This encases the celiac trunk, superior mesenteric artery and superior mesenteric vein.  There is moderate-size right pleural effusion. No lymphadenopathy.  2. Thoracocentesis on 09/22/2021. Cytology  is negative for malignancy.  3. EUS on 09/21/2021 revealed is a mass in the uncinate process of the pancreas measuring 33 mm.  There was evidence of invasion into superior mesenteric artery and the celiac trunk. No enlarged lymph nodes seen. FNA revealed atypical cells.    4. EGD and EUS repeated on 10/05/2021.  -EGD is normal.  -EUS revealed pancreatic head mass.  FNA revealed CD10-positive B-cell lymphoma. Flow cytometry revealed CD10-positive lambda monotypic B-cells.  5. PET scan on 11/18/2021 revealed 6 cm hypermetabolic pancreatic head mass and borderline hypermetabolic right axillary lymph node.   -Further repeat biopsy not done because of her overall poor health.  6. mini R-CHOP on 12/15/2021.  -CT scan on 12/21/21 reveals improvement in disease.    SUBJECTIVE:  Ms. Rojas is an 81-year-old female with stage IV non-Hodgkin lymphoma on mini R-CHOP.  She has completed 5 cycles.  She is tolerating it well and disease has been responding.     Overall, condition is stable.  She has fatigue.  No worsening.  No headache.  No dizziness.  No chest pain.  No shortness of breath at rest. Gets short of breath on minimal exertion.  No nausea or vomiting.  Appetite is fairly good.  No urinary or bowel complaint.  No bleeding.  No fever or chills. There is no improvement in her weakness.  She was brought to the clinic in a wheelchair.       All other review of systems is negative.     PHYSICAL EXAMINATION:    GENERAL:  She is alert and oriented x 3.  Not in respiratory distress.  ECOG PS of 2.   FACE:  No swelling.  EYES:  No icterus.   NECK:  Supple. No lymphadenopathy.  LUNGS:  Decreased air entry at the bases.  HEART:  Regular.  ABDOMEN:  Soft. Nontender.  Difficult palpation because of her weight.  No mass.  EXTREMITIES:  Mild ankle edema.  SKIN:  No petechiae.     LABORATORY DATA:  CBC, CMP, and blood sugar reviewed.     ASSESSMENT:    1.  An 81-year-old female with stage IV non-Hodgkin's B-cell lymphoma involving  pancreas.  2.  Generalized weakness, stable.  3.  Normocytic anemia secondary to chemotherapy, chronic kidney disease and anemia of chronic disease.  4.  Chronic kidney disease.  5.  Low protein and albumin.  6.  Diabetes mellitus, not under good control.     PLAN:     1.  Discussed regarding non-Hodgkin's lymphoma.  The patient is clinically stable.     Discussed regarding chemotherapy.  She will get cycle 6 of mini R-CHOP.  She has been tolerating it well.  Side effects of chemotherapy reviewed. We will monitor her closely for toxicity of mini R-CHOP. I explained to the patient that this is the last planned cycle.     2.  We will get a PET scan in about 2 weeks.  If she is in complete remission, we will monitor her periodically with scan.  If there is any hypermetabolic activity on the PET scan, then we will discuss regarding additional two cycles of chemotherapy and also radiation.  Further discussion after the PET scan.    3.  Labs were reviewed.  Her normocytic anemia is overall stable.  No transfusion needed.  Her creatinine is a little higher than before.  I advised her to increase her fluid intake.    4.  The patient is at high risk of COVID-19 infection.  She received Evusheld on 02/02/2022.  She only received 150 mg.  I explained to the patient that new recommendation is for a total of 300 mg.  I would recommend giving another 150 mg today. Benefits and side effects reviewed.  She is agreeable for taking it.  It will be given today.    5.  I will see her after the PET scan.  In between, she will see our nurse practitioner.  The patient is at high risk of complications because of multiple comorbidities.  We will monitor her closely.     Total visit time of 45 minutes.  Time spent in today's visit, review of chart/investigations today, and documentation.       This office note has been dictated.          Again, thank you for allowing me to participate in the care of your patient.         Sincerely,        John Srinivasan MD

## 2022-03-30 NOTE — PROGRESS NOTES
Oncology Rooming Note    March 30, 2022 8:16 AM   Lucille Rojas is a 81 year old female who presents for:    Chief Complaint   Patient presents with     Oncology Clinic Visit     Initial Vitals: There were no vitals taken for this visit. Estimated body mass index is 48.63 kg/m  as calculated from the following:    Height as of 2/21/22: 1.524 m (5').    Weight as of 3/22/22: 112.9 kg (249 lb). There is no height or weight on file to calculate BSA.  Data Unavailable Comment: Data Unavailable   No LMP recorded. Patient has had a hysterectomy.  Allergies reviewed: Yes  Medications reviewed: Yes    Medications: Medication refills not needed today.  Pharmacy name entered into [facility]:    [facility] #[id number] - [place], [place] - [address]. [facility] [place] - [address] [facility]    Clinical concerns: no      Odalis Biswas, [facility]

## 2022-03-30 NOTE — PATIENT INSTRUCTIONS
1. Continue chemotherapy.  2. See Alexandr Ambriz next week with CBC.  3. PET scan in 2 weeks.  4. See me after PET scan.   5. Ki today.

## 2022-03-30 NOTE — PROGRESS NOTES
Infusion Nursing Note:  Lucille Rojas presents today for Cycle 6 Day 1 Rituxan, Adriamycin, Vincristine, Cytoxan, OnPro and Evushield Dose #2.    Patient seen by provider today: Yes: Dr. Srinivasan   present during visit today: Not Applicable.    Note: N/A.      Intravenous Access:  Implanted Port.    Treatment Conditions:  Lab Results   Component Value Date    HGB 9.0 (L) 03/30/2022    WBC 8.6 03/30/2022    ANEU 10.0 (H) 03/22/2022    ANEUTAUTO 7.5 03/30/2022     03/30/2022      Lab Results   Component Value Date     03/30/2022    POTASSIUM 3.8 03/30/2022    CR 1.34 (H) 03/30/2022    IGOR 9.1 03/30/2022    BILITOTAL 0.3 03/30/2022    ALBUMIN 3.2 (L) 03/30/2022    ALT 14 03/30/2022    AST 12 03/30/2022     Results reviewed, labs MET treatment parameters, ok to proceed with treatment.    Post Infusion Assessment:  Patient tolerated infusion without incident.  Patient tolerated 1 Evushield injection in each bilateral gluteal muscle without incident.  Blood return noted pre and post infusion.  Blood return noted during Adriamycin administration every 2 cc.  Site patent and intact, free from redness, edema or discomfort.  No evidence of extravasations.  Access discontinued per protocol.     ONPRO  Was placed on patient's: back of left arm.    Was placed at 1030 AM    Podpal used: Yes    ONPRO injector device Lot number: [id number]    Patient education included: what patient can expect after application, what colored lights mean on the device, when to remove device, when and where to call with questions or issues, all patients questions answered and that Neulasta administration will occur at 1:30pm on 3/31/22.    Patient tolerated administration well.      Discharge Plan:   Prescription refills given for Prednisone.  Discharge instructions reviewed with: Patient and Family.  Patient and family verbalized understanding of discharge instructions and all questions answered.  Copy of AVS reviewed with  patient and/or family.  Patient will return as scheduled for next appointment.  Patient discharged in stable condition accompanied by: self,  and daughter.  Departure Mode: Wheelchair.      Marya Song RN

## 2022-03-30 NOTE — PROGRESS NOTES
Nursing Note:  Lucille Rojas presents today for port labs.    Patient seen by provider today: Yes: Craig   present during visit today: Not Applicable.    Note: N/A.    Intravenous Access:  Implanted Port.    Discharge Plan:   Patient was sent to lobby for next appointment.    Margaret Cortez RN

## 2022-03-30 NOTE — PATIENT INSTRUCTIONS
Your On-body Neulasta Injector was applied to your upper left arm at 10:30am.  At approximately 1:30pm on 3/31, your On-body Injector will beep to let you know your dose delivery will begin in 2 minutes.  Your medication will be delivered over the next 45 minutes.  You can remove your Injector at 2:30am on 3/31.  Please make sure your Injector has a solid green light or has turned off prior to removing the device.  Please contact your provider at [phone] with questions or concerns.

## 2022-03-31 DIAGNOSIS — C85.12 B-CELL LYMPHOMA OF INTRATHORACIC LYMPH NODES, UNSPECIFIED B-CELL LYMPHOMA TYPE (H): ICD-10-CM

## 2022-03-31 DIAGNOSIS — E78.5 HYPERLIPIDEMIA LDL GOAL <100: ICD-10-CM

## 2022-03-31 DIAGNOSIS — E11.22 TYPE 2 DIABETES MELLITUS WITH STAGE 3 CHRONIC KIDNEY DISEASE, WITHOUT LONG-TERM CURRENT USE OF INSULIN, UNSPECIFIED WHETHER STAGE 3A OR 3B CKD (H): ICD-10-CM

## 2022-03-31 DIAGNOSIS — N18.30 TYPE 2 DIABETES MELLITUS WITH STAGE 3 CHRONIC KIDNEY DISEASE, WITHOUT LONG-TERM CURRENT USE OF INSULIN, UNSPECIFIED WHETHER STAGE 3A OR 3B CKD (H): ICD-10-CM

## 2022-03-31 DIAGNOSIS — E03.9 ACQUIRED HYPOTHYROIDISM: Chronic | ICD-10-CM

## 2022-03-31 DIAGNOSIS — I50.30 DIASTOLIC HEART FAILURE, UNSPECIFIED HF CHRONICITY (H): ICD-10-CM

## 2022-03-31 DIAGNOSIS — F33.0 MDD (MAJOR DEPRESSIVE DISORDER), RECURRENT EPISODE, MILD (H): ICD-10-CM

## 2022-03-31 RX ORDER — LEVOTHYROXINE SODIUM 200 UG/1
200 TABLET ORAL DAILY
Qty: 90 TABLET | Refills: 1 | Status: SHIPPED | OUTPATIENT
Start: 2022-03-31 | End: 2022-08-01

## 2022-03-31 RX ORDER — SIMVASTATIN 10 MG
10 TABLET ORAL AT BEDTIME
Qty: 90 TABLET | Refills: 1 | Status: SHIPPED | OUTPATIENT
Start: 2022-03-31 | End: 2022-11-14

## 2022-03-31 RX ORDER — FUROSEMIDE 20 MG
20 TABLET ORAL DAILY
Qty: 90 TABLET | Refills: 1 | Status: SHIPPED | OUTPATIENT
Start: 2022-03-31 | End: 2022-08-18

## 2022-03-31 RX ORDER — NYSTATIN 100000 [USP'U]/G
POWDER TOPICAL 2 TIMES DAILY
Qty: 60 G | Refills: 1 | Status: SHIPPED | OUTPATIENT
Start: 2022-03-31

## 2022-03-31 RX ORDER — CITALOPRAM HYDROBROMIDE 20 MG/1
20 TABLET ORAL DAILY
Qty: 90 TABLET | Refills: 1 | Status: SHIPPED | OUTPATIENT
Start: 2022-03-31 | End: 2022-10-17

## 2022-03-31 NOTE — TELEPHONE ENCOUNTER
Fax received from [facility] pharmacy. Patient transferring pharmacy, needs updated prescriptions

## 2022-03-31 NOTE — TELEPHONE ENCOUNTER
Routing refill request to provider for review/approval because:  Failed protocol due to:   PHQ-9 score:    PHQ 11/2/2021   PHQ-9 Total Score 19   Q9: Thoughts of better off dead/self-harm past 2 weeks Several days   F/U: Thoughts of suicide or self-harm No   F/U: Safety concerns No     Creatinine   Date Value Ref Range Status   03/30/2022 1.34 (H) 0.52 - 1.04 mg/dL Final   08/27/2020 0.98 0.52 - 1.04 mg/dL Final     Natalie Dyer RN

## 2022-03-31 NOTE — TELEPHONE ENCOUNTER
Prescription approved per [employer] Refill Protocol.  Natalie Dyer, RN  [facility] Triage Nurse

## 2022-04-01 ENCOUNTER — TELEPHONE (OUTPATIENT)
Dept: INTERNAL MEDICINE | Facility: CLINIC | Age: 82
End: 2022-04-01
Payer: MEDICARE

## 2022-04-01 DIAGNOSIS — E03.9 ACQUIRED HYPOTHYROIDISM: Primary | ICD-10-CM

## 2022-04-01 LAB — TSH SERPL DL<=0.005 MIU/L-ACNC: 2.72 MU/L (ref 0.4–4)

## 2022-04-01 NOTE — TELEPHONE ENCOUNTER
Lauren calling clinic to discuss patient's medications as they are transferring meds to home care delivery to ensure that Lucille is taking all of her medications. Review meds with daughter.     Pt has not been synthroid, daughter is unsure of how long she has not been taking medication. Refill of synthroid set in yesterday to new pharmacy.     Last TSH 11/22/21- normal. Would you like pt to repeat labs? restart medication then recheck lab?    Call back Nancy Rajput [phone]       TSH   Date Value Ref Range Status   11/22/2021 2.68 0.40 - 4.00 mU/L Final   06/04/2020 0.93 0.30 - 5.00 uIU/mL Final

## 2022-04-01 NOTE — TELEPHONE ENCOUNTER
I have added TSH level to recent blood draw.  Suggest returning to prior dose of levothyroxine pending lab results.

## 2022-04-04 ENCOUNTER — TELEPHONE (OUTPATIENT)
Dept: INTERNAL MEDICINE | Facility: CLINIC | Age: 82
End: 2022-04-04
Payer: MEDICARE

## 2022-04-04 NOTE — TELEPHONE ENCOUNTER
Mary from [facility], PT calling to get verbal approval to discharge patient early from PT as patient has met their goals.    Callback: [phone]- ok to leave detailed VM.     Xiomy Masters RN  [facility]

## 2022-04-05 DIAGNOSIS — C85.12 B-CELL LYMPHOMA OF INTRATHORACIC LYMPH NODES, UNSPECIFIED B-CELL LYMPHOMA TYPE (H): Primary | ICD-10-CM

## 2022-04-06 ENCOUNTER — MEDICAL CORRESPONDENCE (OUTPATIENT)
Dept: HEALTH INFORMATION MANAGEMENT | Facility: CLINIC | Age: 82
End: 2022-04-06

## 2022-04-06 ENCOUNTER — LAB (OUTPATIENT)
Dept: INFUSION THERAPY | Facility: CLINIC | Age: 82
End: 2022-04-06
Attending: INTERNAL MEDICINE
Payer: MEDICARE

## 2022-04-06 ENCOUNTER — ONCOLOGY VISIT (OUTPATIENT)
Dept: ONCOLOGY | Facility: CLINIC | Age: 82
End: 2022-04-06
Attending: INTERNAL MEDICINE
Payer: MEDICARE

## 2022-04-06 VITALS
SYSTOLIC BLOOD PRESSURE: 158 MMHG | OXYGEN SATURATION: 92 % | RESPIRATION RATE: 16 BRPM | HEART RATE: 64 BPM | WEIGHT: 245.2 LBS | DIASTOLIC BLOOD PRESSURE: 67 MMHG | BODY MASS INDEX: 47.89 KG/M2

## 2022-04-06 DIAGNOSIS — C85.12 B-CELL LYMPHOMA OF INTRATHORACIC LYMPH NODES, UNSPECIFIED B-CELL LYMPHOMA TYPE (H): Primary | ICD-10-CM

## 2022-04-06 DIAGNOSIS — C85.99 NON-HODGKIN LYMPHOMA OF SOLID ORGAN EXCLUDING SPLEEN, UNSPECIFIED NON-HODGKIN LYMPHOMA TYPE (H): ICD-10-CM

## 2022-04-06 LAB
ALBUMIN SERPL-MCNC: 3.1 G/DL (ref 3.4–5)
ALP SERPL-CCNC: 123 U/L (ref 40–150)
ALT SERPL W P-5'-P-CCNC: 24 U/L (ref 0–50)
ANION GAP SERPL CALCULATED.3IONS-SCNC: 4 MMOL/L (ref 3–14)
AST SERPL W P-5'-P-CCNC: 14 U/L (ref 0–45)
BASOPHILS # BLD MANUAL: 0.1 10E3/UL (ref 0–0.2)
BASOPHILS NFR BLD MANUAL: 1 %
BILIRUB SERPL-MCNC: 0.2 MG/DL (ref 0.2–1.3)
BUN SERPL-MCNC: 36 MG/DL (ref 7–30)
CALCIUM SERPL-MCNC: 8.7 MG/DL (ref 8.5–10.1)
CHLORIDE BLD-SCNC: 105 MMOL/L (ref 94–109)
CO2 SERPL-SCNC: 32 MMOL/L (ref 20–32)
CREAT SERPL-MCNC: 0.97 MG/DL (ref 0.52–1.04)
EOSINOPHIL # BLD MANUAL: 0.6 10E3/UL (ref 0–0.7)
EOSINOPHIL NFR BLD MANUAL: 8 %
ERYTHROCYTE [DISTWIDTH] IN BLOOD BY AUTOMATED COUNT: 17.1 % (ref 10–15)
GFR SERPL CREATININE-BSD FRML MDRD: 58 ML/MIN/1.73M2
GLUCOSE BLD-MCNC: 128 MG/DL (ref 70–99)
HCT VFR BLD AUTO: 28.6 % (ref 35–47)
HGB BLD-MCNC: 8.3 G/DL (ref 11.7–15.7)
LYMPHOCYTES # BLD MANUAL: 1.1 10E3/UL (ref 0.8–5.3)
LYMPHOCYTES NFR BLD MANUAL: 14 %
MCH RBC QN AUTO: 27.2 PG (ref 26.5–33)
MCHC RBC AUTO-ENTMCNC: 29 G/DL (ref 31.5–36.5)
MCV RBC AUTO: 94 FL (ref 78–100)
METAMYELOCYTES # BLD MANUAL: 0.1 10E3/UL
METAMYELOCYTES NFR BLD MANUAL: 1 %
MONOCYTES # BLD MANUAL: 0.1 10E3/UL (ref 0–1.3)
MONOCYTES NFR BLD MANUAL: 1 %
NEUTROPHILS # BLD MANUAL: 5.9 10E3/UL (ref 1.6–8.3)
NEUTROPHILS NFR BLD MANUAL: 75 %
PLAT MORPH BLD: ABNORMAL
PLATELET # BLD AUTO: 225 10E3/UL (ref 150–450)
POTASSIUM BLD-SCNC: 3.6 MMOL/L (ref 3.4–5.3)
PROT SERPL-MCNC: 5.9 G/DL (ref 6.8–8.8)
RBC # BLD AUTO: 3.05 10E6/UL (ref 3.8–5.2)
RBC MORPH BLD: ABNORMAL
SODIUM SERPL-SCNC: 141 MMOL/L (ref 133–144)
WBC # BLD AUTO: 7.8 10E3/UL (ref 4–11)

## 2022-04-06 PROCEDURE — 250N000011 HC RX IP 250 OP 636: Performed by: INTERNAL MEDICINE

## 2022-04-06 PROCEDURE — 36591 DRAW BLOOD OFF VENOUS DEVICE: CPT

## 2022-04-06 PROCEDURE — 99214 OFFICE O/P EST MOD 30 MIN: CPT | Performed by: NURSE PRACTITIONER

## 2022-04-06 PROCEDURE — G0463 HOSPITAL OUTPT CLINIC VISIT: HCPCS

## 2022-04-06 PROCEDURE — 80053 COMPREHEN METABOLIC PANEL: CPT | Performed by: NURSE PRACTITIONER

## 2022-04-06 PROCEDURE — 85027 COMPLETE CBC AUTOMATED: CPT | Performed by: NURSE PRACTITIONER

## 2022-04-06 RX ORDER — HEPARIN SODIUM (PORCINE) LOCK FLUSH IV SOLN 100 UNIT/ML 100 UNIT/ML
5 SOLUTION INTRAVENOUS
Status: DISCONTINUED | OUTPATIENT
Start: 2022-04-06 | End: 2022-04-06 | Stop reason: HOSPADM

## 2022-04-06 RX ORDER — HEPARIN SODIUM (PORCINE) LOCK FLUSH IV SOLN 100 UNIT/ML 100 UNIT/ML
5 SOLUTION INTRAVENOUS
Status: CANCELLED | OUTPATIENT
Start: 2022-04-06

## 2022-04-06 RX ORDER — HEPARIN SODIUM,PORCINE 10 UNIT/ML
5 VIAL (ML) INTRAVENOUS
Status: CANCELLED | OUTPATIENT
Start: 2022-04-06

## 2022-04-06 RX ORDER — HEPARIN SODIUM,PORCINE 10 UNIT/ML
5 VIAL (ML) INTRAVENOUS
Status: DISCONTINUED | OUTPATIENT
Start: 2022-04-06 | End: 2022-04-06 | Stop reason: HOSPADM

## 2022-04-06 RX ADMIN — Medication 5 ML: at 10:06

## 2022-04-06 ASSESSMENT — PAIN SCALES - GENERAL: PAINLEVEL: NO PAIN (0)

## 2022-04-06 NOTE — LETTER
4/6/2022         RE: Lucille Rojas  [address] [facility] [address]        Dear Colleague,    Thank you for referring your patient, Lucille Rojas, to the [facility]. Please see a copy of my visit note below.    Oncology Rooming Note    April 6, 2022 10:56 AM   Lucille Rojas is a 81 year old female who presents for:    Chief Complaint   Patient presents with     Oncology Clinic Visit     Initial Vitals: There were no vitals taken for this visit. Estimated body mass index is 48.04 kg/m  as calculated from the following:    Height as of 2/21/22: 1.524 m (5').    Weight as of 3/30/22: 111.6 kg (246 lb). There is no height or weight on file to calculate BSA.  Data Unavailable Comment: Data Unavailable   No LMP recorded. Patient has had a hysterectomy.  Allergies reviewed: Yes  Medications reviewed: Yes    Medications: Medication refills not needed today.  Pharmacy name entered into [employer]:    [id number] - [facility] [address] [facility] [place], [place] - [address] [address]  [facility]    Clinical concerns:  NP was notified.      Tianna Beckman, [facility]              Oncology/Hematology Visit Note  Apr 6, 2022    Reason for Visit: follow up of non-Hodgkin's lymphoma involving the pancreas  EUS revealed pancreatic head mass.  FNA revealed CD10-positive B-cell lymphoma. Flow cytometry revealed CD10-positive lambda monotypic B-cells.  PET scan on 11/18/2021 revealed 6 cm hypermetabolic pancreatic head mass and borderline hypermetabolic right axillary lymph node.     Patient met with Dr. Srinivasan who recommended starting treatment with mini R-CHOP-plan for 6 cycles  03/30/2022-status post  6 cycles of mini R-CHOP followed by Neulasta    Patient comes to the clinic for routine follow-up post chemotherapy      Interval History:  Patient reports overall she tolerated treatment well.   Denies fever chills sweats cough shortness of breath chest pain nausea vomiting diarrhea abdominal pain or bleeding    Review of Systems:  14 point ROS of systems including Constitutional, Eyes, Respiratory, Cardiovascular, Gastroenterology, Genitourinary, Integumentary, Muscularskeletal, Psychiatric were all negative except for pertinent positives noted in my HPI.    Physical Examination:  General: The patient is a pleasant female in no acute distress.  HEENT: EOMI, PERRL. Sclerae are anicteric. Oral mucosa is pink and moist with no lesions or thrush.   Lymph: Neck is supple with no lymphadenopathy in the cervical or supraclavicular areas.   Heart: Regular rate and rhythm.   Lungs: Clear to auscultation bilaterally.   GI: Bowel sounds present, soft, nontender with no palpable hepatosplenomegaly or masses.   Extremities: No lower extremity edema noted bilaterally.   Skin: No rashes, petechiae, or bruising noted on exposed skin.    Laboratory Data:  CBC CMP results reviewed    Assessment and Plan:  This is a 81-year-old female with    Lymphoma  non-Hodgkin's lymphoma involving the pancreas  Patient of Dr. Srinivasan  03/30/2022-status post  6 cycles of mini R-CHOP followed by Neulasta  Patient reports overall feeling well  Labs reviewed abnormalities discussed overall stable counts today  -Patient is scheduled for PET scan next week and follow-up with Dr. Srinivasan to review the PET scan results  -Continue per Dr. Srinivasan's recommendations      Anemia  Patient is asymptomatic  S/p EGD on 09/21/21 revealed a few gastric erosions and a few tiny aphthous ulcers in the duodenum, but not severe enough  Iron deficiency anemia  -status post Venofer  -Currently taking oral iron  Overall stable hemoglobin  Transfuse for hemoglobin less than 7 or symptomatic      Elevated blood sugar  Follow-up with primary care        Call our clinic with any changes in health condition or questions    ALIE Carreon [facility]  [place] [place]     Chart documentation with Dragon Voice recognition Software. Although reviewed after completion, some words and grammatical errors may remain.            Again, thank you for allowing me to participate in the care of your patient.        Sincerely,        ALIE Carreon CNP

## 2022-04-06 NOTE — PROGRESS NOTES
Nursing Note:  Lucille Rojas presents today for labs.    Patient seen by provider today: Yes: Katina   present during visit today: Not Applicable.    Note: N/A.    Intravenous Access:  Implanted Port.    Discharge Plan:   Patient was sent to lobby for next appointment.    Margaret Cortez RN

## 2022-04-06 NOTE — PROGRESS NOTES
Oncology/Hematology Visit Note  Apr 6, 2022    Reason for Visit: follow up of non-Hodgkin's lymphoma involving the pancreas  EUS revealed pancreatic head mass.  FNA revealed CD10-positive B-cell lymphoma. Flow cytometry revealed CD10-positive lambda monotypic B-cells.  PET scan on 11/18/2021 revealed 6 cm hypermetabolic pancreatic head mass and borderline hypermetabolic right axillary lymph node.     Patient met with Dr. Srinivasan who recommended starting treatment with mini R-CHOP-plan for 6 cycles  03/30/2022-status post  6 cycles of mini R-CHOP followed by Neulasta    Patient comes to the clinic for routine follow-up post chemotherapy      Interval History:  Patient reports overall she tolerated treatment well.  Denies fever chills sweats cough shortness of breath chest pain nausea vomiting diarrhea abdominal pain or bleeding    Review of Systems:  14 point ROS of systems including Constitutional, Eyes, Respiratory, Cardiovascular, Gastroenterology, Genitourinary, Integumentary, Muscularskeletal, Psychiatric were all negative except for pertinent positives noted in my HPI.    Physical Examination:  General: The patient is a pleasant female in no acute distress.  HEENT: EOMI, PERRL. Sclerae are anicteric. Oral mucosa is pink and moist with no lesions or thrush.   Lymph: Neck is supple with no lymphadenopathy in the cervical or supraclavicular areas.   Heart: Regular rate and rhythm.   Lungs: Clear to auscultation bilaterally.   GI: Bowel sounds present, soft, nontender with no palpable hepatosplenomegaly or masses.   Extremities: No lower extremity edema noted bilaterally.   Skin: No rashes, petechiae, or bruising noted on exposed skin.    Laboratory Data:  CBC CMP results reviewed    Assessment and Plan:  This is a 81-year-old female with    Lymphoma  non-Hodgkin's lymphoma involving the pancreas  Patient of Dr. Srinivasan  03/30/2022-status post  6 cycles of mini R-CHOP followed by Neulasta  Patient reports overall  feeling well  Labs reviewed abnormalities discussed overall stable counts today  -Patient is scheduled for PET scan next week and follow-up with Dr. Srinivasan to review the PET scan results  -Continue per Dr. Srinivasan's recommendations      Anemia  Patient is asymptomatic  S/p EGD on 09/21/21 revealed a few gastric erosions and a few tiny aphthous ulcers in the duodenum, but not severe enough  Iron deficiency anemia  -status post Venofer  -Currently taking oral iron  Overall stable hemoglobin  Transfuse for hemoglobin less than 7 or symptomatic      Elevated blood sugar  Follow-up with primary care        Call our clinic with any changes in health condition or questions    ALIE Carreon [facility]   [facility] [place] [place]     Chart documentation with Dragon Voice recognition Software. Although reviewed after completion, some words and grammatical errors may remain.

## 2022-04-06 NOTE — PROGRESS NOTES
Oncology Rooming Note    April 6, 2022 10:56 AM   Lucille Rojas is a 81 year old female who presents for:    Chief Complaint   Patient presents with     Oncology Clinic Visit     Initial Vitals: There were no vitals taken for this visit. Estimated body mass index is 48.04 kg/m  as calculated from the following:    Height as of 2/21/22: 1.524 m (5').    Weight as of 3/30/22: 111.6 kg (246 lb). There is no height or weight on file to calculate BSA.  Data Unavailable Comment: Data Unavailable   No LMP recorded. Patient has had a hysterectomy.  Allergies reviewed: Yes  Medications reviewed: Yes    Medications: Medication refills not needed today.  Pharmacy name entered into [employer]:    [facility][id number] - [place], [place] - [address]. [facility] [place], [place] - [address] [facility]-  [facility]    Clinical concerns:  NP was notified.      Tianna Beckman, CHELO

## 2022-04-09 NOTE — PROGRESS NOTES
ONCOLOGY HISTORY: Ms. 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  1. On 09/20/2021:   -Hemoglobin of 4.7 with MCV of 67. Normal WBC and platelets.  -Iron of 9 and saturation index of 2%.   -CT chest, abdomen and pelvis reveals large pancreatic head mass.  This encases the celiac trunk, superior mesenteric artery and superior mesenteric vein.  There is moderate-size right pleural effusion. No lymphadenopathy.  2. Thoracocentesis on 09/22/2021. Cytology is negative for malignancy.  3. EUS on 09/21/2021 revealed is a mass in the uncinate process of the pancreas measuring 33 mm.  There was evidence of invasion into superior mesenteric artery and the celiac trunk. No enlarged lymph nodes seen. FNA revealed atypical cells.    4. EGD and EUS repeated on 10/05/2021.  -EGD is normal.  -EUS revealed pancreatic head mass.  FNA revealed CD10-positive B-cell lymphoma. Flow cytometry revealed CD10-positive lambda monotypic B-cells.  5. PET scan on 11/18/2021 revealed 6 cm hypermetabolic pancreatic head mass and borderline hypermetabolic right axillary lymph node.   -Further repeat biopsy not done because of her overall poor health.  6. mini R-CHOP on 12/15/2021.  -CT scan on 12/21/21 reveals improvement in disease.    SUBJECTIVE:  Ms. Rojas is an 81-year-old female with stage IV non-Hodgkin lymphoma on mini R-CHOP.  She has completed 5 cycles.  She is tolerating it well and disease has been responding.     Overall, condition is stable.  She has fatigue.  No worsening.  No headache.  No dizziness.  No chest pain.  No shortness of breath at rest. Gets short of breath on minimal exertion.  No nausea or vomiting.  Appetite is fairly good.  No urinary or bowel complaint.  No bleeding.  No fever or chills. There is no improvement in her weakness.  She was brought to the clinic in a wheelchair.       All other review of systems is negative.     PHYSICAL EXAMINATION:    GENERAL:  She is alert and  oriented x 3.  Not in respiratory distress.  ECOG PS of 2.   FACE:  No swelling.  EYES:  No icterus.   NECK:  Supple. No lymphadenopathy.  LUNGS:  Decreased air entry at the bases.  HEART:  Regular.  ABDOMEN:  Soft. Nontender.  Difficult palpation because of her weight.  No mass.  EXTREMITIES:  Mild ankle edema.  SKIN:  No petechiae.     LABORATORY DATA:  CBC, CMP, and blood sugar reviewed.     ASSESSMENT:    1.  An 81-year-old female with stage IV non-Hodgkin's B-cell lymphoma involving pancreas.  2.  Generalized weakness, stable.  3.  Normocytic anemia secondary to chemotherapy, chronic kidney disease and anemia of chronic disease.  4.  Chronic kidney disease.  5.  Low protein and albumin.  6.  Diabetes mellitus, not under good control.     PLAN:     1.  Discussed regarding non-Hodgkin's lymphoma.  The patient is clinically stable.     Discussed regarding chemotherapy.  She will get cycle 6 of mini R-CHOP.  She has been tolerating it well.  Side effects of chemotherapy reviewed. We will monitor her closely for toxicity of mini R-CHOP. I explained to the patient that this is the last planned cycle.     2.  We will get a PET scan in about 2 weeks.  If she is in complete remission, we will monitor her periodically with scan.  If there is any hypermetabolic activity on the PET scan, then we will discuss regarding additional two cycles of chemotherapy and also radiation.  Further discussion after the PET scan.    3.  Labs were reviewed.  Her normocytic anemia is overall stable.  No transfusion needed.  Her creatinine is a little higher than before.  I advised her to increase her fluid intake.    4.  The patient is at high risk of COVID-19 infection.  She received Evusheld on 02/02/2022.  She only received 150 mg.  I explained to the patient that new recommendation is for a total of 300 mg.  I would recommend giving another 150 mg today. Benefits and side effects reviewed.  She is agreeable for taking it.  It will be  given today.    5.  I will see her after the PET scan.  In between, she will see our nurse practitioner.  The patient is at high risk of complications because of multiple comorbidities.  We will monitor her closely.     Total visit time of 45 minutes.  Time spent in today's visit, review of chart/investigations today, and documentation.

## 2022-04-11 ENCOUNTER — HOSPITAL ENCOUNTER (OUTPATIENT)
Dept: PET IMAGING | Facility: CLINIC | Age: 82
Discharge: HOME | End: 2022-04-11
Attending: INTERNAL MEDICINE | Admitting: INTERNAL MEDICINE
Payer: MEDICARE

## 2022-04-11 DIAGNOSIS — C85.12 B-CELL LYMPHOMA OF INTRATHORACIC LYMPH NODES, UNSPECIFIED B-CELL LYMPHOMA TYPE (H): ICD-10-CM

## 2022-04-11 DIAGNOSIS — C85.99 NON-HODGKIN LYMPHOMA OF SOLID ORGAN EXCLUDING SPLEEN, UNSPECIFIED NON-HODGKIN LYMPHOMA TYPE (H): ICD-10-CM

## 2022-04-11 PROCEDURE — G1004 CDSM NDSC: HCPCS | Mod: PI

## 2022-04-11 PROCEDURE — 343N000001 HC RX 343: Performed by: INTERNAL MEDICINE

## 2022-04-11 PROCEDURE — G1004 CDSM NDSC: HCPCS | Mod: GC | Performed by: RADIOLOGY

## 2022-04-11 PROCEDURE — A9552 F18 FDG: HCPCS | Performed by: INTERNAL MEDICINE

## 2022-04-11 PROCEDURE — 78816 PET IMAGE W/CT FULL BODY: CPT | Mod: 26 | Performed by: RADIOLOGY

## 2022-04-11 RX ADMIN — FLUDEOXYGLUCOSE F-18 14.9 MCI.: 500 INJECTION, SOLUTION INTRAVENOUS at 12:46

## 2022-04-12 ENCOUNTER — ONCOLOGY VISIT (OUTPATIENT)
Dept: ONCOLOGY | Facility: CLINIC | Age: 82
End: 2022-04-12
Attending: INTERNAL MEDICINE
Payer: MEDICARE

## 2022-04-12 VITALS — SYSTOLIC BLOOD PRESSURE: 134 MMHG | HEART RATE: 82 BPM | DIASTOLIC BLOOD PRESSURE: 47 MMHG | OXYGEN SATURATION: 91 %

## 2022-04-12 DIAGNOSIS — E06.9 THYROIDITIS: ICD-10-CM

## 2022-04-12 DIAGNOSIS — C85.12 B-CELL LYMPHOMA OF INTRATHORACIC LYMPH NODES, UNSPECIFIED B-CELL LYMPHOMA TYPE (H): Primary | ICD-10-CM

## 2022-04-12 PROCEDURE — 99214 OFFICE O/P EST MOD 30 MIN: CPT | Performed by: INTERNAL MEDICINE

## 2022-04-12 PROCEDURE — G0463 HOSPITAL OUTPT CLINIC VISIT: HCPCS

## 2022-04-12 ASSESSMENT — PAIN SCALES - GENERAL: PAINLEVEL: NO PAIN (0)

## 2022-04-12 NOTE — LETTER
4/12/2022         RE: Lucille Rojas  [address] [facility] [address]        Dear Colleague,    Thank you for referring your patient, Lucille Rojas, to the [facility]. Please see a copy of my visit note below.    Oncology Rooming Note    April 12, 2022 2:45 PM   Lucille Rojas is a 81 year old female who presents for:    Chief Complaint   Patient presents with     Oncology Clinic Visit     Initial Vitals: There were no vitals taken for this visit. Estimated body mass index is 47.89 kg/m  as calculated from the following:    Height as of 2/21/22: 1.524 m (5').    Weight as of 4/6/22: 111.2 kg (245 lb 3.2 oz). There is no height or weight on file to calculate BSA.  Data Unavailable Comment: Data Unavailable   No LMP recorded. Patient has had a hysterectomy.  Allergies reviewed: Yes  Medications reviewed: Yes    Medications: Medication refills not needed today.  Pharmacy name entered into [email or web address]:    [id number] - [place], [place] - [address] [facility] [place], [place] - [address] [address]  [facility]    Clinical concerns: no      Odalis Biswas [facility] [address]              ONCOLOGY HISTORY: Ms. 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  1. On 09/20/2021:   -Hemoglobin of 4.7 with MCV of 67. Normal WBC and platelets.  -Iron of 9 and saturation index of 2%.   -CT chest, abdomen and pelvis reveals large pancreatic head mass.  This encases the celiac trunk, superior mesenteric artery and superior mesenteric vein.  There is moderate-size right pleural effusion. No lymphadenopathy.  2. Thoracocentesis on 09/22/2021. Cytology is negative for malignancy.  3. EUS on 09/21/2021 revealed is a mass in the uncinate process of the pancreas measuring 33 mm.  There was evidence of invasion into superior mesenteric artery and the celiac trunk. No enlarged lymph nodes  seen. FNA revealed atypical cells.    4. EGD and EUS repeated on 10/05/2021.  -EGD is normal.  -EUS revealed pancreatic head mass.  FNA revealed CD10-positive B-cell lymphoma. Flow cytometry revealed CD10-positive lambda monotypic B-cells.  5. PET scan on 11/18/2021 revealed 6 cm hypermetabolic pancreatic head mass and borderline hypermetabolic right axillary lymph node.   -Further repeat biopsy not done because of her overall poor health.  6. mini R-CHOP x 6 cycles between 12/15/2021 and 03/30/2022.  -PET scan on 04/11/21 reveals complete response.     SUBJECTIVE:  Ms. Rojas is an 81-year-old female with non-Hodgkin's B-cell lymphoma involving the pancreas. She completed 6 cycles of mini R-CHOP.  Overall, she is tolerated chemotherapy well.     PET scan on 04/11/2022 reveals complete response.  Metabolic activity in the pancreatic head mass is resolved.  No new suspicious FDG uptake.  There is diffuse uptake in the thyroid gland likely representing thyroiditis.    Patient overall doing good.  She has fatigue.  No headache or dizziness no chest pain or shortness of breath at rest.  No nausea vomiting appetite fairly good    All other review system negative.     PHYSICAL EXAMINATION:    GENERAL:  She is alert and oriented x 3.  Not in respiratory distress.  ECOG PS of 2.   Rest of the system not examined.     ASSESSMENT:    1.  An 81-year-old female with stage IV non-Hodgkin's B-cell lymphoma involving the pancreas status post 6 cycles of mini-R-CHOP.  Patient is in complete remission.  2.  Fatigue.  3.  Normocytic anemia. Stable.  4.  ? Thyroiditis  5.  Multiple other medical problems including diabetes mellitus and hypertension.     PLAN:  1.  PET scan was personally reviewed.  Explained to the patient and family that there is no evidence of abnormal hypermetabolic activity.  She is in complete remission.  She was happy to know that.    We will continue to monitor her.  She will see our nurse practitioner in a  month with labs.  I will see her in 3 months.    2.  On PET scan there is evidence of thyroiditis.  No clinical symptoms of thyroiditis. TSH on 03/30/2022 was normal.  In a month again we will check TSH and free T4.  Nothing needs to be done at this time.    3.  Family had multiple questions which were all answered.  Advised them to call us with any questions or concerns.     TOTAL VISIT TIME of :  30 minutes.  Time is spent in today's visit, review of chart/investigations today and documentations today.      Again, thank you for allowing me to participate in the care of your patient.        Sincerely,        John Srinivasan MD

## 2022-04-12 NOTE — PROGRESS NOTES
Oncology Rooming Note    April 12, 2022 2:45 PM   Lucille Rojas is a 81 year old female who presents for:    Chief Complaint   Patient presents with     Oncology Clinic Visit     Initial Vitals: There were no vitals taken for this visit. Estimated body mass index is 47.89 kg/m  as calculated from the following:    Height as of 2/21/22: 1.524 m (5').    Weight as of 4/6/22: 111.2 kg (245 lb 3.2 oz). There is no height or weight on file to calculate BSA.  Data Unavailable Comment: Data Unavailable   No LMP recorded. Patient has had a hysterectomy.  Allergies reviewed: Yes  Medications reviewed: Yes    Medications: Medication refills not needed today.  Pharmacy name entered into [facility]:    [facility] #[id number] - [place], [place] - [address]. [facility] [place], [place] - [address] [place] [address]  [facility]    Clinical concerns: no      Odalis Biswas, CMA

## 2022-04-12 NOTE — PROGRESS NOTES
ONCOLOGY HISTORY: Ms. 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  1. On 09/20/2021:   -Hemoglobin of 4.7 with MCV of 67. Normal WBC and platelets.  -Iron of 9 and saturation index of 2%.   -CT chest, abdomen and pelvis reveals large pancreatic head mass.  This encases the celiac trunk, superior mesenteric artery and superior mesenteric vein.  There is moderate-size right pleural effusion. No lymphadenopathy.  2. Thoracocentesis on 09/22/2021. Cytology is negative for malignancy.  3. EUS on 09/21/2021 revealed is a mass in the uncinate process of the pancreas measuring 33 mm.  There was evidence of invasion into superior mesenteric artery and the celiac trunk. No enlarged lymph nodes seen. FNA revealed atypical cells.    4. EGD and EUS repeated on 10/05/2021.  -EGD is normal.  -EUS revealed pancreatic head mass.  FNA revealed CD10-positive B-cell lymphoma. Flow cytometry revealed CD10-positive lambda monotypic B-cells.  5. PET scan on 11/18/2021 revealed 6 cm hypermetabolic pancreatic head mass and borderline hypermetabolic right axillary lymph node.   -Further repeat biopsy not done because of her overall poor health.  6. mini R-CHOP x 6 cycles between 12/15/2021 and 03/30/2022.  -PET scan on 04/11/21 reveals complete response.     SUBJECTIVE:  Ms. Rojas is an 81-year-old female with non-Hodgkin's B-cell lymphoma involving the pancreas. She completed 6 cycles of mini R-CHOP.  Overall, she is tolerated chemotherapy well.     PET scan on 04/11/2022 reveals complete response.  Metabolic activity in the pancreatic head mass is resolved.  No new suspicious FDG uptake.  There is diffuse uptake in the thyroid gland likely representing thyroiditis.    Patient overall doing good.  She has fatigue.  No headache or dizziness no chest pain or shortness of breath at rest.  No nausea vomiting appetite fairly good    All other review system negative.     PHYSICAL EXAMINATION:    GENERAL:   She is alert and oriented x 3.  Not in respiratory distress.  ECOG PS of 2.   Rest of the system not examined.     ASSESSMENT:    1.  An 81-year-old female with stage IV non-Hodgkin's B-cell lymphoma involving the pancreas status post 6 cycles of mini-R-CHOP.  Patient is in complete remission.  2.  Fatigue.  3.  Normocytic anemia. Stable.  4.  ? Thyroiditis  5.  Multiple other medical problems including diabetes mellitus and hypertension.     PLAN:  1.  PET scan was personally reviewed.  Explained to the patient and family that there is no evidence of abnormal hypermetabolic activity.  She is in complete remission.  She was happy to know that.    We will continue to monitor her.  She will see our nurse practitioner in a month with labs.  I will see her in 3 months.    2.  On PET scan there is evidence of thyroiditis.  No clinical symptoms of thyroiditis. TSH on 03/30/2022 was normal.  In a month again we will check TSH and free T4.  Nothing needs to be done at this time.    3.  Family had multiple questions which were all answered.  Advised them to call us with any questions or concerns.     TOTAL VISIT TIME of :  30 minutes.  Time is spent in today's visit, review of chart/investigations today and documentations today.

## 2022-04-12 NOTE — PATIENT INSTRUCTIONS
-See Alexandr Ambriz in 1 month with labs.  -Port-flush every 4-6 weeks.  -See me in 3 months with labs.

## 2022-04-25 ENCOUNTER — MEDICAL CORRESPONDENCE (OUTPATIENT)
Dept: HEALTH INFORMATION MANAGEMENT | Facility: CLINIC | Age: 82
End: 2022-04-25
Payer: MEDICARE

## 2022-04-28 ENCOUNTER — OFFICE VISIT (OUTPATIENT)
Dept: CARDIOLOGY | Facility: CLINIC | Age: 82
End: 2022-04-28
Payer: MEDICARE

## 2022-04-28 VITALS
BODY MASS INDEX: 47.67 KG/M2 | WEIGHT: 242.8 LBS | OXYGEN SATURATION: 95 % | DIASTOLIC BLOOD PRESSURE: 65 MMHG | SYSTOLIC BLOOD PRESSURE: 120 MMHG | HEART RATE: 73 BPM | HEIGHT: 60 IN

## 2022-04-28 DIAGNOSIS — I35.0 NONRHEUMATIC AORTIC VALVE STENOSIS: Primary | ICD-10-CM

## 2022-04-28 PROCEDURE — 99214 OFFICE O/P EST MOD 30 MIN: CPT | Performed by: INTERNAL MEDICINE

## 2022-04-28 NOTE — PROGRESS NOTES
[facility] Progress note    Assessment:  1. Moderate to severe aortic stenosis 9/2021, due for updated echo  2. Heart failure with preserved ejection fraction.  NYHA III.  3.  B-cell lymphoma, pancreatic head mass s/p chemo  4. Chronic oxygen for COPD   5. h/o severe upper GI bleed in 2021.   6. Hypertension, controlled  7. Dyslipidemia  8. Chronic edema well-controlled withon furosemide  9. Probable thyroiditis  10. Severe morbid obesity BMI of 47.4  11. Obstructive sleep apnea  12. DM2    Plan:  1. Echocardiogram  2. CT TAVR protocol  3. We will continue with evaluation for possible transcatheter aortic valve replacement.  She is not likely to be a surgical aortic valve replacement candidate given her underlying lung disease, severe obesity and wheelchair-bound status amongst other comorbidities.  4. Will review echo and CT findings and likely proceed with coronary angiogram with possible percutaneous intervention.  If aortic stenosis is in the moderate range we will continue to follow in clinic.      HPI:     Lucille Rojas is a very nice 81 year old woman here in valve clinic for follow-up of aortic stenosis.  She was last seen in November 2021 and at that time was undergoing evaluation for possible treatment for her lymphoma.  She also has history of hypertension, dyslipidemia and is status post recent chemotherapy for with non-Hodgkin's B-cell lymphoma involving the pancreas. She recently was seen in oncology clinic and has completed chemo and that note was reviewed.     Since completing chemotherapy she has been fatigued, but her strength is gradually improving.  She has chronic dyspnea which is stable.  She occasionally gets very mild chest pain though she attributes that to her lung disease.  She also reports occasional lightheadedness.    We reviewed her previous cardiovascular testing.      CURRENT MEDICATIONS:  Current Outpatient Medications   Medication Sig Dispense Refill     acetaminophen  (TYLENOL) 325 MG tablet Take 2 tablets (650 mg) by mouth every 4 hours as needed for mild pain       aspirin 81 MG tablet Take 1 tablet by mouth daily. 30 tablet 0     bisacodyl (DULCOLAX) 10 MG suppository Place 1 suppository (10 mg) rectally daily as needed for constipation       carboxymethylcellulose PF (REFRESH PLUS) 0.5 % ophthalmic solution Place 2 drops into both eyes 2 times daily       citalopram (CELEXA) 20 MG tablet Take 1 tablet (20 mg) by mouth daily 90 tablet 1     CONTOUR NEXT TEST test strip USE TO TEST BLOOD GLUCOSE ONCE DAILY       furosemide (LASIX) 20 MG tablet Take 1 tablet (20 mg) by mouth daily 90 tablet 1     guaiFENesin-dextromethorphan (ROBITUSSIN DM) 100-10 MG/5ML syrup Take 10 mLs by mouth every 4 hours as needed for cough       levothyroxine (SYNTHROID/LEVOTHROID) 200 MCG tablet Take 1 tablet (200 mcg) by mouth daily 90 tablet 1     loratadine (CLARITIN) 10 MG tablet Take 10 mg by mouth daily       LORazepam (ATIVAN) 0.5 MG tablet Take 1 tablet (0.5 mg) by mouth every 8 hours as needed for anxiety or nausea 30 tablet 1     miconazole (MICATIN) 2 % external powder Apply topically 2 times daily       Microlet Lancets MISC USE TO TEST BLOOD GLUCOSE ONCE DAILY       nystatin (MYCOSTATIN) 685337 UNIT/GM external powder Apply topically 2 times daily Under breasts and skin folds BID & BID PRN for redness 60 g 1     order for DME Equipment being ordered: wheeled walker with hand breaks 1 Device 0     pantoprazole (PROTONIX) 40 MG EC tablet Take 1 tablet (40 mg) by mouth every morning (before breakfast) 90 tablet 3     polyethylene glycol (MIRALAX) 17 g packet Take 1 packet by mouth every evening       senna-docusate (SENOKOT-S/PERICOLACE) 8.6-50 MG tablet Take 1 tablet by mouth every morning       simvastatin (ZOCOR) 10 MG tablet Take 1 tablet (10 mg) by mouth At Bedtime 90 tablet 1     ACE/ARB/ARNI NOT PRESCRIBED (INTENTIONAL) Please choose reason not prescribed, below (Patient not taking:  Reported on 4/28/2022)         ALLERGIES     Allergies   Allergen Reactions     Penicillins        PAST MEDICAL, SURGICAL, FAMILY, SOCIAL HISTORY:  History was reviewed and updated as needed, see medical record.    REVIEW OF SYSTEMS:  Skin:  Negative     Eyes:  Positive for glasses  ENT:  Positive for postnasal drainage;hearing loss  Respiratory:  Positive for shortness of breath;dyspnea on exertion;sleep apnea  Cardiovascular:    Positive for;dizziness  Gastroenterology: Positive for reflux  Genitourinary:  Positive for nocturia  Musculoskeletal:  Positive for arthritis  Neurologic:  Positive for numbness or tingling of hands;numbness or tingling of feet  Psychiatric:  Positive for anxiety;depression  Heme/Lymph/Imm:  Positive for allergies  Endocrine:  Positive for thyroid disorder;diabetes    PHYSICAL EXAM:      BP: 120/65 Pulse: 73     SpO2: 95 %      Vital Signs with Ranges  Pulse:  [73] 73  BP: (120)/(65) 120/65  SpO2:  [95 %] 95 %  242 lbs 12.8 oz    Constitutional: awake, alert, no distress  Eyes: sclera nonicteric  ENT: trachea midline  Respiratory: Lungs are clear.  She is wearing oxygen.  There are no crackles or wheezes.  Cardiovascular: Regular but distant III/VI crescendo decrescendo systolic murmur appreciated best at the right upper sternal border.  Systolic murmur also appreciated at the apex  GI: Obese abdomen, nontender, bowel sounds present  Skin and integument: dry, no rash no peripheral edema.  Alopecia noted  Musculoskeletal: In a wheelchair obese but muscle bulk appears decreased.  Neuropsychiatric: Normal affect    Recent Lab Results:  Echo 9/20/21 : Interpretation Summary Left ventricular systolic function is normal. The visual ejection fraction is60-65%. No regional wall motion abnormalities noted.The right ventricular systolic function is normal.Moderate to severe valvular aortic stenosis. The calculated aortic valve areais 1.0 cm^2. The mean AoV pressure gradient is 37.5 mmHg.No prior  study.    LIPID RESULTS:  Lab Results   Component Value Date    CHOL 159 11/22/2021    CHOL 148 06/04/2020    HDL 51 11/22/2021    HDL 47 (L) 06/04/2020    LDL 81 11/22/2021    LDL 77 06/04/2020    TRIG 133 11/22/2021    TRIG 140 06/04/2020    CHOLHDLRATIO 3.4 05/09/2013       LIVER ENZYME RESULTS:  Lab Results   Component Value Date    AST 14 04/06/2022    AST 13 08/27/2020    ALT 24 04/06/2022    ALT 15 08/27/2020       CBC RESULTS:  Lab Results   Component Value Date    WBC 7.8 04/06/2022    WBC 7.6 12/08/2016    RBC 3.05 (L) 04/06/2022    RBC 3.62 (L) 12/08/2016    HGB 8.3 (L) 04/06/2022    HGB 10.6 (L) 04/07/2021    HCT 28.6 (L) 04/06/2022    HCT 34.3 (L) 12/08/2016    MCV 94 04/06/2022    MCV 95 12/08/2016    MCH 27.2 04/06/2022    MCH 27.6 12/08/2016    MCHC 29.0 (L) 04/06/2022    MCHC 29.2 (L) 12/08/2016    RDW 17.1 (H) 04/06/2022    RDW 14.6 12/08/2016     04/06/2022     12/08/2016       BMP RESULTS:  Lab Results   Component Value Date     04/06/2022     08/27/2020    POTASSIUM 3.6 04/06/2022    POTASSIUM 4.1 08/27/2020    CHLORIDE 105 04/06/2022    CHLORIDE 109 08/27/2020    CO2 32 04/06/2022    CO2 31 08/27/2020    ANIONGAP 4 04/06/2022    ANIONGAP 4 08/27/2020     (H) 04/06/2022     (H) 08/27/2020    BUN 36 (H) 04/06/2022    BUN 19 08/27/2020    CR 0.97 04/06/2022    CR 0.98 08/27/2020    GFRESTIMATED 58 (L) 04/06/2022    GFRESTIMATED 54 (L) 08/27/2020    GFRESTBLACK 62 08/27/2020    IGOR 8.7 04/06/2022    IGOR 9.6 08/27/2020        A1C RESULTS:  Lab Results   Component Value Date    A1C 5.0 11/22/2021    A1C 5.8 (H) 04/07/2021       INR RESULTS:  Lab Results   Component Value Date    INR 1.09 12/21/2021

## 2022-04-28 NOTE — LETTER
4/28/2022    Fausto Flynn MD  [address] [facility] [address]    RE: Lucille Rojas       Dear Colleague,     I had the pleasure of seeing Lucille Rojas in the [facility].  [facility] Progress note    Assessment:  1. Moderate to severe aortic stenosis 9/2021, due for updated echo  2. Heart failure with preserved ejection fraction.  NYHA III.  3.  B-cell lymphoma, pancreatic head mass s/p chemo  4. Chronic oxygen for COPD   5. h/o severe upper GI bleed in 2021.   6. Hypertension, controlled  7. Dyslipidemia  8. Chronic edema well-controlled withon furosemide  9. Probable thyroiditis  10. Severe morbid obesity BMI of 47.4  11. Obstructive sleep apnea  12. DM2    Plan:  1. Echocardiogram  2. CT TAVR protocol  3. We will continue with evaluation for possible transcatheter aortic valve replacement.  She is not likely to be a surgical aortic valve replacement candidate given her underlying lung disease, severe obesity and wheelchair-bound status amongst other comorbidities.  4. Will review echo and CT findings and likely proceed with coronary angiogram with possible percutaneous intervention.  If aortic stenosis is in the moderate range we will continue to follow in clinic.      HPI:     Lucille Rojas is a very nice 81 year old woman here in valve clinic for follow-up of aortic stenosis.  She was last seen in November 2021 and at that time was undergoing evaluation for possible treatment for her lymphoma.  She also has history of hypertension, dyslipidemia and is status post recent chemotherapy for with non-Hodgkin's B-cell lymphoma involving the pancreas. She recently was seen in oncology clinic and has completed chemo and that note was reviewed.     Since completing chemotherapy she has been fatigued, but her strength is gradually improving.  She has chronic dyspnea which is stable.  She occasionally gets very mild chest pain though she attributes that to her lung disease.   She also reports occasional lightheadedness.    We reviewed her previous cardiovascular testing.      CURRENT MEDICATIONS:  Current Outpatient Medications   Medication Sig Dispense Refill     acetaminophen (TYLENOL) 325 MG tablet Take 2 tablets (650 mg) by mouth every 4 hours as needed for mild pain       aspirin 81 MG tablet Take 1 tablet by mouth daily. 30 tablet 0     bisacodyl (DULCOLAX) 10 MG suppository Place 1 suppository (10 mg) rectally daily as needed for constipation       carboxymethylcellulose PF (REFRESH PLUS) 0.5 % ophthalmic solution Place 2 drops into both eyes 2 times daily       citalopram (CELEXA) 20 MG tablet Take 1 tablet (20 mg) by mouth daily 90 tablet 1     CONTOUR NEXT TEST test strip USE TO TEST BLOOD GLUCOSE ONCE DAILY       furosemide (LASIX) 20 MG tablet Take 1 tablet (20 mg) by mouth daily 90 tablet 1     guaiFENesin-dextromethorphan (ROBITUSSIN DM) 100-10 MG/5ML syrup Take 10 mLs by mouth every 4 hours as needed for cough       levothyroxine (SYNTHROID/LEVOTHROID) 200 MCG tablet Take 1 tablet (200 mcg) by mouth daily 90 tablet 1     loratadine (CLARITIN) 10 MG tablet Take 10 mg by mouth daily       LORazepam (ATIVAN) 0.5 MG tablet Take 1 tablet (0.5 mg) by mouth every 8 hours as needed for anxiety or nausea 30 tablet 1     miconazole (MICATIN) 2 % external powder Apply topically 2 times daily       Microlet Lancets MISC USE TO TEST BLOOD GLUCOSE ONCE DAILY       nystatin (MYCOSTATIN) 580402 UNIT/GM external powder Apply topically 2 times daily Under breasts and skin folds BID & BID PRN for redness 60 g 1     order for DME Equipment being ordered: wheeled walker with hand breaks 1 Device 0     pantoprazole (PROTONIX) 40 MG EC tablet Take 1 tablet (40 mg) by mouth every morning (before breakfast) 90 tablet 3     polyethylene glycol (MIRALAX) 17 g packet Take 1 packet by mouth every evening       senna-docusate (SENOKOT-S/PERICOLACE) 8.6-50 MG tablet Take 1 tablet by mouth every morning        simvastatin (ZOCOR) 10 MG tablet Take 1 tablet (10 mg) by mouth At Bedtime 90 tablet 1     ACE/ARB/ARNI NOT PRESCRIBED (INTENTIONAL) Please choose reason not prescribed, below (Patient not taking: Reported on 4/28/2022)         ALLERGIES     Allergies   Allergen Reactions     Penicillins        PAST MEDICAL, SURGICAL, FAMILY, SOCIAL HISTORY:  History was reviewed and updated as needed, see medical record.    REVIEW OF SYSTEMS:  Skin:  Negative     Eyes:  Positive for glasses  ENT:  Positive for postnasal drainage;hearing loss  Respiratory:  Positive for shortness of breath;dyspnea on exertion;sleep apnea  Cardiovascular:    Positive for;dizziness  Gastroenterology: Positive for reflux  Genitourinary:  Positive for nocturia  Musculoskeletal:  Positive for arthritis  Neurologic:  Positive for numbness or tingling of hands;numbness or tingling of feet  Psychiatric:  Positive for anxiety;depression  Heme/Lymph/Imm:  Positive for allergies  Endocrine:  Positive for thyroid disorder;diabetes    PHYSICAL EXAM:      BP: 120/65 Pulse: 73     SpO2: 95 %      Vital Signs with Ranges  Pulse:  [73] 73  BP: (120)/(65) 120/65  SpO2:  [95 %] 95 %  242 lbs 12.8 oz    Constitutional: awake, alert, no distress  Eyes: sclera nonicteric  ENT: trachea midline  Respiratory: Lungs are clear.  She is wearing oxygen.  There are no crackles or wheezes.  Cardiovascular: Regular but distant III/VI crescendo decrescendo systolic murmur appreciated best at the right upper sternal border.  Systolic murmur also appreciated at the apex  GI: Obese abdomen, nontender, bowel sounds present  Skin and integument: dry, no rash no peripheral edema.  Alopecia noted  Musculoskeletal: In a wheelchair obese but muscle bulk appears decreased.  Neuropsychiatric: Normal affect    Recent Lab Results:  Echo 9/20/21 : Interpretation Summary Left ventricular systolic function is normal. The visual ejection fraction is60-65%. No regional wall motion abnormalities  noted.The right ventricular systolic function is normal.Moderate to severe valvular aortic stenosis. The calculated aortic valve areais 1.0 cm^2. The mean AoV pressure gradient is 37.5 mmHg.No prior study.    LIPID RESULTS:  Lab Results   Component Value Date    CHOL 159 11/22/2021    CHOL 148 06/04/2020    HDL 51 11/22/2021    HDL 47 (L) 06/04/2020    LDL 81 11/22/2021    LDL 77 06/04/2020    TRIG 133 11/22/2021    TRIG 140 06/04/2020    CHOLHDLRATIO 3.4 05/09/2013       LIVER ENZYME RESULTS:  Lab Results   Component Value Date    AST 14 04/06/2022    AST 13 08/27/2020    ALT 24 04/06/2022    ALT 15 08/27/2020       CBC RESULTS:  Lab Results   Component Value Date    WBC 7.8 04/06/2022    WBC 7.6 12/08/2016    RBC 3.05 (L) 04/06/2022    RBC 3.62 (L) 12/08/2016    HGB 8.3 (L) 04/06/2022    HGB 10.6 (L) 04/07/2021    HCT 28.6 (L) 04/06/2022    HCT 34.3 (L) 12/08/2016    MCV 94 04/06/2022    MCV 95 12/08/2016    MCH 27.2 04/06/2022    MCH 27.6 12/08/2016    MCHC 29.0 (L) 04/06/2022    MCHC 29.2 (L) 12/08/2016    RDW 17.1 (H) 04/06/2022    RDW 14.6 12/08/2016     04/06/2022     12/08/2016       BMP RESULTS:  Lab Results   Component Value Date     04/06/2022     08/27/2020    POTASSIUM 3.6 04/06/2022    POTASSIUM 4.1 08/27/2020    CHLORIDE 105 04/06/2022    CHLORIDE 109 08/27/2020    CO2 32 04/06/2022    CO2 31 08/27/2020    ANIONGAP 4 04/06/2022    ANIONGAP 4 08/27/2020     (H) 04/06/2022     (H) 08/27/2020    BUN 36 (H) 04/06/2022    BUN 19 08/27/2020    CR 0.97 04/06/2022    CR 0.98 08/27/2020    GFRESTIMATED 58 (L) 04/06/2022    GFRESTIMATED 54 (L) 08/27/2020    GFRESTBLACK 62 08/27/2020    IGOR 8.7 04/06/2022    IGOR 9.6 08/27/2020        A1C RESULTS:  Lab Results   Component Value Date    A1C 5.0 11/22/2021    A1C 5.8 (H) 04/07/2021       INR RESULTS:  Lab Results   Component Value Date    INR 1.09 12/21/2021       Thank you for allowing me to participate in the care of your  patient.      Sincerely,     Mary Jo Rodriguez MD     [facility]  cc:   Referred Self,

## 2022-04-28 NOTE — PATIENT INSTRUCTIONS
Next steps:  - We will schedule echocardiogram (ultrasound of your heart) and TAVR CT   - Dr. Rodriguez will review these results before we schedule the coronary angiogram    Please call me with any questions: [phone]    Nazanin ANN  Valve coordinator  [facility]

## 2022-04-28 NOTE — NURSING NOTE
"TAVR Coordinator visit:  Provided additional education regarding TAVR procedure, after being present for discussion with physician. Explained the work-up process and next steps for patient-will schedule echo and TAVR CT. Patient would like port accessed for these if possible, will follow-up with CT department on this. Patient provided our direct contact number and instructed to call with any questions.     Completed frailty testing and KCCQ.   5 meter walk: Unable to perform today  Frailty score: 4/5    KCCQ Results:   1a. 6  1b. 6  1c. 6  2. 3  3. 3  4. 3  5. 5  6. 3  7. 2  8a. 6  8b. 6  8c. 6    Preliminary STS Risk Score: 4%  NYHA Class: III    Patient is due for dental cleaning, knows she \"needs more work done\" but unsure of those details. Encouraged patient to schedule dental cleaning in upcoming month.    Nazanin Mohamud RN  Structural Heart Coordinator  [facility]    "

## 2022-05-03 ENCOUNTER — PATIENT OUTREACH (OUTPATIENT)
Dept: NURSING | Facility: CLINIC | Age: 82
End: 2022-05-03
Payer: MEDICARE

## 2022-05-03 NOTE — PROGRESS NOTES
Clinic Care Coordination Contact    Community Health Worker Follow Up    Spoke with patient's , Cooper.    Care Gaps:     Health Maintenance Due   Topic Date Due     ZOSTER IMMUNIZATION (1 of 2) Never done     DTAP/TDAP/TD IMMUNIZATION (1 - Tdap) 01/01/2009     Pneumococcal Vaccine: 65+ Years (3 - PPSV23 or PCV20) 01/01/2018     DIABETIC FOOT EXAM  06/21/2019     EYE EXAM  06/01/2021     COVID-19 Vaccine (4 - Booster for Pfizer series) 02/02/2022     PHQ-9  05/02/2022       Did not discuss    Goals: On Maintenance    Discussed:    Patient's  stated his wife is taking a nap.  She gets tired, and naps daily.  His wife gets tired from the chemo treatments.    Patient's  stated his wife's cancer is gone.     Patient's  requested a call back next week.    Intervention and Education during outreach: CHW encouraged patient to contact CC if there are any other changes or resources needed.  Patient acknowledged understanding.      Plan: CHW will outreach in one week.    ZANE Demarco  Clinic Care Coordination  [facility] Clin[facility]: [place], [place], [place], and [place] [facility]  Phone: [phone]

## 2022-05-12 ENCOUNTER — ONCOLOGY VISIT (OUTPATIENT)
Dept: ONCOLOGY | Facility: CLINIC | Age: 82
End: 2022-05-12
Attending: INTERNAL MEDICINE
Payer: MEDICARE

## 2022-05-12 ENCOUNTER — LAB (OUTPATIENT)
Dept: INFUSION THERAPY | Facility: CLINIC | Age: 82
End: 2022-05-12
Attending: NURSE PRACTITIONER
Payer: MEDICARE

## 2022-05-12 VITALS
HEART RATE: 75 BPM | OXYGEN SATURATION: 96 % | RESPIRATION RATE: 16 BRPM | DIASTOLIC BLOOD PRESSURE: 64 MMHG | SYSTOLIC BLOOD PRESSURE: 122 MMHG

## 2022-05-12 DIAGNOSIS — C85.12 B-CELL LYMPHOMA OF INTRATHORACIC LYMPH NODES, UNSPECIFIED B-CELL LYMPHOMA TYPE (H): Primary | ICD-10-CM

## 2022-05-12 DIAGNOSIS — E06.9 THYROIDITIS: ICD-10-CM

## 2022-05-12 DIAGNOSIS — C85.99 NON-HODGKIN LYMPHOMA OF SOLID ORGAN EXCLUDING SPLEEN, UNSPECIFIED NON-HODGKIN LYMPHOMA TYPE (H): ICD-10-CM

## 2022-05-12 LAB
ALBUMIN SERPL-MCNC: 2.9 G/DL (ref 3.4–5)
ALP SERPL-CCNC: 101 U/L (ref 40–150)
ALT SERPL W P-5'-P-CCNC: 13 U/L (ref 0–50)
ANION GAP SERPL CALCULATED.3IONS-SCNC: 4 MMOL/L (ref 3–14)
AST SERPL W P-5'-P-CCNC: 16 U/L (ref 0–45)
BASOPHILS # BLD AUTO: 0.1 10E3/UL (ref 0–0.2)
BASOPHILS NFR BLD AUTO: 1 %
BILIRUB SERPL-MCNC: 0.4 MG/DL (ref 0.2–1.3)
BUN SERPL-MCNC: 29 MG/DL (ref 7–30)
CALCIUM SERPL-MCNC: 8.5 MG/DL (ref 8.5–10.1)
CHLORIDE BLD-SCNC: 108 MMOL/L (ref 94–109)
CO2 SERPL-SCNC: 32 MMOL/L (ref 20–32)
CREAT SERPL-MCNC: 1.05 MG/DL (ref 0.52–1.04)
EOSINOPHIL # BLD AUTO: 0.9 10E3/UL (ref 0–0.7)
EOSINOPHIL NFR BLD AUTO: 17 %
ERYTHROCYTE [DISTWIDTH] IN BLOOD BY AUTOMATED COUNT: 16.1 % (ref 10–15)
GFR SERPL CREATININE-BSD FRML MDRD: 52 ML/MIN/1.73M2
GLUCOSE BLD-MCNC: 100 MG/DL (ref 70–99)
HCT VFR BLD AUTO: 29.6 % (ref 35–47)
HGB BLD-MCNC: 8.5 G/DL (ref 11.7–15.7)
IMM GRANULOCYTES # BLD: 0 10E3/UL
IMM GRANULOCYTES NFR BLD: 0 %
LDH SERPL L TO P-CCNC: 131 U/L (ref 81–234)
LYMPHOCYTES # BLD AUTO: 1.1 10E3/UL (ref 0.8–5.3)
LYMPHOCYTES NFR BLD AUTO: 19 %
MCH RBC QN AUTO: 26.2 PG (ref 26.5–33)
MCHC RBC AUTO-ENTMCNC: 28.7 G/DL (ref 31.5–36.5)
MCV RBC AUTO: 91 FL (ref 78–100)
MONOCYTES # BLD AUTO: 0.8 10E3/UL (ref 0–1.3)
MONOCYTES NFR BLD AUTO: 15 %
NEUTROPHILS # BLD AUTO: 2.7 10E3/UL (ref 1.6–8.3)
NEUTROPHILS NFR BLD AUTO: 48 %
NRBC # BLD AUTO: 0 10E3/UL
NRBC BLD AUTO-RTO: 0 /100
PLATELET # BLD AUTO: 238 10E3/UL (ref 150–450)
POTASSIUM BLD-SCNC: 3.6 MMOL/L (ref 3.4–5.3)
PROT SERPL-MCNC: 6 G/DL (ref 6.8–8.8)
RBC # BLD AUTO: 3.25 10E6/UL (ref 3.8–5.2)
SODIUM SERPL-SCNC: 144 MMOL/L (ref 133–144)
TSH SERPL DL<=0.005 MIU/L-ACNC: 0.45 MU/L (ref 0.4–4)
WBC # BLD AUTO: 5.6 10E3/UL (ref 4–11)

## 2022-05-12 PROCEDURE — 85004 AUTOMATED DIFF WBC COUNT: CPT | Performed by: INTERNAL MEDICINE

## 2022-05-12 PROCEDURE — 83615 LACTATE (LD) (LDH) ENZYME: CPT | Performed by: INTERNAL MEDICINE

## 2022-05-12 PROCEDURE — 250N000011 HC RX IP 250 OP 636: Performed by: INTERNAL MEDICINE

## 2022-05-12 PROCEDURE — 84443 ASSAY THYROID STIM HORMONE: CPT | Performed by: INTERNAL MEDICINE

## 2022-05-12 PROCEDURE — G0463 HOSPITAL OUTPT CLINIC VISIT: HCPCS

## 2022-05-12 PROCEDURE — 36591 DRAW BLOOD OFF VENOUS DEVICE: CPT

## 2022-05-12 PROCEDURE — 99214 OFFICE O/P EST MOD 30 MIN: CPT | Performed by: NURSE PRACTITIONER

## 2022-05-12 PROCEDURE — 80053 COMPREHEN METABOLIC PANEL: CPT | Performed by: INTERNAL MEDICINE

## 2022-05-12 RX ORDER — HEPARIN SODIUM (PORCINE) LOCK FLUSH IV SOLN 100 UNIT/ML 100 UNIT/ML
5 SOLUTION INTRAVENOUS
Status: DISCONTINUED | OUTPATIENT
Start: 2022-05-12 | End: 2022-05-12 | Stop reason: HOSPADM

## 2022-05-12 RX ORDER — HEPARIN SODIUM,PORCINE 10 UNIT/ML
5 VIAL (ML) INTRAVENOUS
Status: CANCELLED | OUTPATIENT
Start: 2022-05-12

## 2022-05-12 RX ORDER — HEPARIN SODIUM (PORCINE) LOCK FLUSH IV SOLN 100 UNIT/ML 100 UNIT/ML
5 SOLUTION INTRAVENOUS
Status: CANCELLED | OUTPATIENT
Start: 2022-05-12

## 2022-05-12 RX ADMIN — Medication 5 ML: at 08:05

## 2022-05-12 ASSESSMENT — PAIN SCALES - GENERAL: PAINLEVEL: NO PAIN (0)

## 2022-05-12 NOTE — PROGRESS NOTES
Oncology Rooming Note    May 12, 2022 8:47 AM   Lucille Rojas is a 82 year old female who presents for:    Chief Complaint   Patient presents with     Oncology Clinic Visit     Initial Vitals: /64   Pulse 75   Resp 16   SpO2 96%  Estimated body mass index is 47.42 kg/m  as calculated from the following:    Height as of 4/28/22: 1.524 m (5').    Weight as of 4/28/22: 110.1 kg (242 lb 12.8 oz). There is no height or weight on file to calculate BSA.  No Pain (0) Comment: Data Unavailable   No LMP recorded. Patient has had a hysterectomy.  Allergies reviewed: Yes  Medications reviewed: Yes    Medications: Medication refills not needed today.  Pharmacy name entered into [facility]:    [id number] - [place], [place] - [address] [facility] [place], [place] - [address] [place] [address]  [facility]    Clinical concerns:  NP was notified.      Tianna Beckman CMA

## 2022-05-12 NOTE — PROGRESS NOTES
Oncology/Hematology Visit Note  May 12, 2022    Reason for Visit: follow up of non-Hodgkin's lymphoma involving the pancreas  EUS revealed pancreatic head mass.  FNA revealed CD10-positive B-cell lymphoma. Flow cytometry revealed CD10-positive lambda monotypic B-cells.  PET scan on 11/18/2021 revealed 6 cm hypermetabolic pancreatic head mass and borderline hypermetabolic right axillary lymph node.     Patient met with Dr. Srinivasan who recommended starting treatment with mini R-CHOP-plan for 6 cycles  03/30/2022-status post  6 cycles of mini R-CHOP followed by Neulasta        Interval History:  Patient denies fever chills sweats cough shortness of breath chest pain nausea vomiting diarrhea abdominal pain or bleeding    Review of Systems:  14 point ROS of systems including Constitutional, Eyes, Respiratory, Cardiovascular, Gastroenterology, Genitourinary, Integumentary, Muscularskeletal, Psychiatric were all negative except for pertinent positives noted in my HPI.    Physical Examination:  General: The patient is a pleasant female in no acute distress.  HEENT: EOMI, PERRL. Sclerae are anicteric. Oral mucosa is pink and moist with no lesions or thrush.   Lymph: Neck is supple with no lymphadenopathy in the cervical or supraclavicular areas.   Heart: Regular rate and rhythm.   Lungs: Clear to auscultation bilaterally.   GI: Bowel sounds present, soft, nontender with no palpable hepatosplenomegaly or masses.   Extremities: No lower extremity edema noted bilaterally.   Skin: No rashes, petechiae, or bruising noted on exposed skin.    Laboratory Data:  CBC CMP results reviewed    Assessment and Plan:  This is a 81-year-old female with    Lymphoma  non-Hodgkin's lymphoma involving the pancreas  Patient of Dr. Srinivasan  03/30/2022-status post  6 cycles of mini R-CHOP followed by Neulasta  Patient reports overall feeling well  04/11/2022-PET scan reveals complete remission  Labs reviewed and marked as discussed  Schedule for labs  in 6 weeks with port flush  Patient has follow-up appointment with Dr. Srinivasan in July    Anemia  Patient is asymptomatic  S/p EGD on 09/21/21 revealed a few gastric erosions and a few tiny aphthous ulcers in the duodenum, but not severe enough  Iron deficiency anemia  -status post Venofer  -Currently taking oral iron  Overall stable hemoglobin  Transfuse for hemoglobin less than 7 or symptomatic      Elevated blood sugar  Follow-up with primary care        Call our clinic with any changes in health condition or questions    ALIE Carreon [facility]     Chart documentation with Dragon Voice recognition Software. Although reviewed after completion, some words and grammatical errors may remain.

## 2022-05-12 NOTE — PROGRESS NOTES
Nursing Note:  Lucille Rojas presents today for Port labs.    Patient seen by provider today: Yes: Alexandr Ambriz NP   present during visit today: Not Applicable.    Note: N/A.    Intravenous Access:  Implanted Port.    Discharge Plan:   Patient was sent to [facility] for provider appointment.    Neeta Becerra RN

## 2022-05-12 NOTE — LETTER
5/12/2022         RE: Lucille Rojas  [address] [facility] [address]        Dear Colleague,    Thank you for referring your patient, Lucille Rojas, to the [facility]. Please see a copy of my visit note below.    Oncology Rooming Note    May 12, 2022 8:47 AM   Lucille Rojas is a 82 year old female who presents for:    Chief Complaint   Patient presents with     Oncology Clinic Visit     Initial Vitals: /64   Pulse 75   Resp 16   SpO2 96%  Estimated body mass index is 47.42 kg/m  as calculated from the following:    Height as of 4/28/22: 1.524 m (5').    Weight as of 4/28/22: 110.1 kg (242 lb 12.8 oz). There is no height or weight on file to calculate BSA.  No Pain (0) Comment: Data Unavailable   No LMP recorded. Patient has had a hysterectomy.  Allergies reviewed: Yes  Medications reviewed: Yes    Medications: Medication refills not needed today.  Pharmacy name entered into [employer]:    [id number] - [place], [place] - [address] [facility] [place], [place] - [address] [address]  [facility]    Clinical concerns:  NP was notified.      Tianna Beckman, [facility]              Oncology/Hematology Visit Note  May 12, 2022    Reason for Visit: follow up of non-Hodgkin's lymphoma involving the pancreas  EUS revealed pancreatic head mass.  FNA revealed CD10-positive B-cell lymphoma. Flow cytometry revealed CD10-positive lambda monotypic B-cells.  PET scan on 11/18/2021 revealed 6 cm hypermetabolic pancreatic head mass and borderline hypermetabolic right axillary lymph node.     Patient met with Dr. Srinivasan who recommended starting treatment with mini R-CHOP-plan for 6 cycles  03/30/2022-status post  6 cycles of mini R-CHOP followed by Neulasta        Interval History:  Patient denies fever chills sweats cough shortness of breath chest pain nausea vomiting diarrhea abdominal pain or  bleeding    Review of Systems:  14 point ROS of systems including Constitutional, Eyes, Respiratory, Cardiovascular, Gastroenterology, Genitourinary, Integumentary, Muscularskeletal, Psychiatric were all negative except for pertinent positives noted in my HPI.    Physical Examination:  General: The patient is a pleasant female in no acute distress.  HEENT: EOMI, PERRL. Sclerae are anicteric. Oral mucosa is pink and moist with no lesions or thrush.   Lymph: Neck is supple with no lymphadenopathy in the cervical or supraclavicular areas.   Heart: Regular rate and rhythm.   Lungs: Clear to auscultation bilaterally.   GI: Bowel sounds present, soft, nontender with no palpable hepatosplenomegaly or masses.   Extremities: No lower extremity edema noted bilaterally.   Skin: No rashes, petechiae, or bruising noted on exposed skin.    Laboratory Data:  CBC CMP results reviewed    Assessment and Plan:  This is a 81-year-old female with    Lymphoma  non-Hodgkin's lymphoma involving the pancreas  Patient of Dr. Srinivasan  03/30/2022-status post  6 cycles of mini R-CHOP followed by Neulasta  Patient reports overall feeling well  04/11/2022-PET scan reveals complete remission  Labs reviewed and marked as discussed  Schedule for labs in 6 weeks with port flush      Anemia  Patient is asymptomatic  S/p EGD on 09/21/21 revealed a few gastric erosions and a few tiny aphthous ulcers in the duodenum, but not severe enough  Iron deficiency anemia  -status post Venofer  -Currently taking oral iron  Overall stable hemoglobin  Transfuse for hemoglobin less than 7 or symptomatic      Elevated blood sugar  Follow-up with primary care        Call our clinic with any changes in health condition or questions    ALIE Carreon [facility] [place]     Chart documentation with Dragon Voice recognition Software. Although reviewed after completion, some words and grammatical errors may remain.            Again, thank you for  allowing me to participate in the care of your patient.        Sincerely,        ALIE Carreon CNP

## 2022-05-16 ENCOUNTER — PATIENT OUTREACH (OUTPATIENT)
Dept: NURSING | Facility: CLINIC | Age: 82
End: 2022-05-16
Payer: MEDICARE

## 2022-05-16 NOTE — PROGRESS NOTES
Clinic Care Coordination Contact  Community Health Worker Follow Up  The Community Health Worker called for a Care Coordination outreach to follow up on goals and action steps. Spoke to Lucille.    Care Gaps:       Health Maintenance Due   Topic Date Due     ZOSTER IMMUNIZATION (1 of 2) Never done     Pneumococcal Vaccine: 65+ Years (3 - PPSV23 or PCV20) 01/01/2018     DTAP/TDAP/TD IMMUNIZATION (2 - Td or Tdap) 01/01/2019     DIABETIC FOOT EXAM  06/21/2019     EYE EXAM  06/01/2021     COVID-19 Vaccine (4 - Booster for Pfizer series) 02/02/2022     PHQ-9  05/02/2022     A1C  05/22/2022       Did Not Address    Goals: On Maintenance    Discussed:    Patient stated she is doing okay.    Patient stated Home Care ended last week.    Patient stated her Chemo treatments are going fine.  The cancer is gone.      Patient stated next month she will have a heart valve procedure.  She met with the Cardiologist.    Patient stated her blood count has not been high- 8 to 9.  She was told that if her blood goes much lower, she will need a blood transfuion.    Patient stated she is keeping up with all of her appointments.    Patient stated her daughter is helping with driving to the appointments and getting her medications.  Her other daughter helps with grocery shopping.    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  She pays $22.00 per hour for 4 hours a month, $88.00.    Patient stated a friend wanted to visit.  She did not visit.  She is not vaccinated or wears a mask.    Patient stated she mailed out encouragement cards to some friends and family members    Patient thanked CHW for calling, and she would like to be called back next month.        Intervention and Education during outreach: CHW encouraged patient to contact CC if there are any other changes or resources needed.  Patient acknowledged understanding.        Plan: CHW will outreach in one month.    ZANE Demarco  Clinic Care  Coordination  [facility]: [facility] [place]  Phone: [phone]

## 2022-05-31 ENCOUNTER — PATIENT OUTREACH (OUTPATIENT)
Dept: CARE COORDINATION | Facility: CLINIC | Age: 82
End: 2022-05-31
Payer: MEDICARE

## 2022-05-31 NOTE — PROGRESS NOTES
Clinic Care Coordination Contact  Chart review    Situation: Patient chart reviewed by care coordinator.       Background: Care Coordination initial assessment and enrollment took place 12/3/2019.   Upon initial assessment patient-centered goals were discussed and developed with participation from patient.  [facility] handed patient follow up and monitoring of goal progression off to CHW for continued outreach every 30 days.        Assessment: Per chart review, patient outreach completed by CC CHW on 5/16/2022.  Patient is now on Maintenance.       Plan/Recommendations: [facility] Clinical Assessments to be completed annually or as needed.       Annual Assessment due before next Outreach: No  Scheduled: Not Applicable    TORIE Hurt  Clinic Care Coordinator  [facility]  [facility] [place]  [phone]  [email or web address]

## 2022-06-04 ENCOUNTER — HEALTH MAINTENANCE LETTER (OUTPATIENT)
Age: 82
End: 2022-06-04

## 2022-06-13 ENCOUNTER — TELEPHONE (OUTPATIENT)
Dept: CARDIOLOGY | Facility: CLINIC | Age: 82
End: 2022-06-13
Payer: MEDICARE

## 2022-06-13 NOTE — TELEPHONE ENCOUNTER
Called and touched base with patient and patient's daughter, verifying appointments tomorrow and the importance of drinking extra fluids today and tomorrow AM. Patient and patient's daughter confirmed appointments and were made aware that we would reach back out once results are available and reviewed with Dr. Rodriguez.

## 2022-06-14 ENCOUNTER — HOSPITAL ENCOUNTER (OUTPATIENT)
Dept: CARDIOLOGY | Facility: CLINIC | Age: 82
Discharge: HOME | End: 2022-06-14
Attending: INTERNAL MEDICINE
Payer: MEDICARE

## 2022-06-14 VITALS — DIASTOLIC BLOOD PRESSURE: 83 MMHG | SYSTOLIC BLOOD PRESSURE: 164 MMHG | HEART RATE: 71 BPM

## 2022-06-14 DIAGNOSIS — I35.0 NONRHEUMATIC AORTIC VALVE STENOSIS: ICD-10-CM

## 2022-06-14 LAB
CREAT BLD-MCNC: 1 MG/DL (ref 0.5–1)
GFR SERPL CREATININE-BSD FRML MDRD: 55 ML/MIN/1.73M2
LVEF ECHO: NORMAL

## 2022-06-14 PROCEDURE — G1004 CDSM NDSC: HCPCS

## 2022-06-14 PROCEDURE — 74174 CTA ABD&PLVS W/CONTRAST: CPT | Mod: 26 | Performed by: INTERNAL MEDICINE

## 2022-06-14 PROCEDURE — 93306 TTE W/DOPPLER COMPLETE: CPT | Mod: 26 | Performed by: INTERNAL MEDICINE

## 2022-06-14 PROCEDURE — 71275 CT ANGIOGRAPHY CHEST: CPT | Mod: 26 | Performed by: INTERNAL MEDICINE

## 2022-06-14 PROCEDURE — 82565 ASSAY OF CREATININE: CPT

## 2022-06-14 PROCEDURE — 250N000011 HC RX IP 250 OP 636: Performed by: INTERNAL MEDICINE

## 2022-06-14 PROCEDURE — 999N000208 ECHOCARDIOGRAM COMPLETE

## 2022-06-14 PROCEDURE — G1004 CDSM NDSC: HCPCS | Performed by: INTERNAL MEDICINE

## 2022-06-14 PROCEDURE — 255N000002 HC RX 255 OP 636: Performed by: INTERNAL MEDICINE

## 2022-06-14 PROCEDURE — 71275 CT ANGIOGRAPHY CHEST: CPT | Mod: MG

## 2022-06-14 RX ORDER — IOPAMIDOL 755 MG/ML
500 INJECTION, SOLUTION INTRAVASCULAR ONCE
Status: COMPLETED | OUTPATIENT
Start: 2022-06-14 | End: 2022-06-14

## 2022-06-14 RX ORDER — METHYLPREDNISOLONE SODIUM SUCCINATE 125 MG/2ML
125 INJECTION, POWDER, LYOPHILIZED, FOR SOLUTION INTRAMUSCULAR; INTRAVENOUS
Status: DISCONTINUED | OUTPATIENT
Start: 2022-06-14 | End: 2022-06-15 | Stop reason: HOSPADM

## 2022-06-14 RX ORDER — ONDANSETRON 2 MG/ML
4 INJECTION INTRAMUSCULAR; INTRAVENOUS
Status: DISCONTINUED | OUTPATIENT
Start: 2022-06-14 | End: 2022-06-15 | Stop reason: HOSPADM

## 2022-06-14 RX ORDER — ACYCLOVIR 200 MG/1
0-1 CAPSULE ORAL
Status: DISCONTINUED | OUTPATIENT
Start: 2022-06-14 | End: 2022-06-15 | Stop reason: HOSPADM

## 2022-06-14 RX ORDER — DIPHENHYDRAMINE HCL 25 MG
25 CAPSULE ORAL
Status: DISCONTINUED | OUTPATIENT
Start: 2022-06-14 | End: 2022-06-15 | Stop reason: HOSPADM

## 2022-06-14 RX ORDER — DIPHENHYDRAMINE HYDROCHLORIDE 50 MG/ML
25-50 INJECTION INTRAMUSCULAR; INTRAVENOUS
Status: DISCONTINUED | OUTPATIENT
Start: 2022-06-14 | End: 2022-06-15 | Stop reason: HOSPADM

## 2022-06-14 RX ADMIN — HUMAN ALBUMIN MICROSPHERES AND PERFLUTREN 9 ML: 10; .22 INJECTION, SOLUTION INTRAVENOUS at 13:27

## 2022-06-14 RX ADMIN — IOPAMIDOL 110 ML: 755 INJECTION, SOLUTION INTRAVENOUS at 11:38

## 2022-06-21 ENCOUNTER — TELEPHONE (OUTPATIENT)
Dept: CARDIOLOGY | Facility: CLINIC | Age: 82
End: 2022-06-21
Payer: MEDICARE

## 2022-06-21 DIAGNOSIS — R93.1 ABNORMAL FINDINGS DIAGNOSTIC IMAGING OF HEART AND CORONARY CIRCULATION: Primary | ICD-10-CM

## 2022-06-21 NOTE — TELEPHONE ENCOUNTER
Called patient and patient's daughter and reviewed Dr. Rodriguez's response and recommendation for patient to proceed with coronary angiogram and possible PCI as next step in TAVR workup. Reviewed that Dr. Rodriguez is fine if this happens after patient's oncology appointment on 07/13/22.     Orders placed for H&P office visit and for coronary angiogram.

## 2022-06-21 NOTE — TELEPHONE ENCOUNTER
----- Message from Mary Jo Rodriguez MD sent at 6/20/2022  1:05 PM CDT -----  Aortic stenosis with valve area in the severe range.  Please arrange for coronary angiogram with possible PCI: this could be done after her oncology follow up on 7/13/22.    Mary Jo Rodriguez MD on 6/20/2022 at 1:05 PM

## 2022-06-23 ENCOUNTER — LAB (OUTPATIENT)
Dept: INFUSION THERAPY | Facility: CLINIC | Age: 82
End: 2022-06-23
Attending: NURSE PRACTITIONER
Payer: MEDICARE

## 2022-06-23 ENCOUNTER — ONCOLOGY VISIT (OUTPATIENT)
Dept: ONCOLOGY | Facility: CLINIC | Age: 82
End: 2022-06-23
Attending: NURSE PRACTITIONER
Payer: MEDICARE

## 2022-06-23 VITALS
OXYGEN SATURATION: 100 % | BODY MASS INDEX: 46.48 KG/M2 | WEIGHT: 238 LBS | HEART RATE: 82 BPM | DIASTOLIC BLOOD PRESSURE: 66 MMHG | SYSTOLIC BLOOD PRESSURE: 102 MMHG | RESPIRATION RATE: 16 BRPM

## 2022-06-23 DIAGNOSIS — C85.12 B-CELL LYMPHOMA OF INTRATHORACIC LYMPH NODES, UNSPECIFIED B-CELL LYMPHOMA TYPE (H): Primary | ICD-10-CM

## 2022-06-23 DIAGNOSIS — C85.99 NON-HODGKIN LYMPHOMA OF SOLID ORGAN EXCLUDING SPLEEN, UNSPECIFIED NON-HODGKIN LYMPHOMA TYPE (H): ICD-10-CM

## 2022-06-23 LAB
ALBUMIN SERPL-MCNC: 3.3 G/DL (ref 3.4–5)
ALP SERPL-CCNC: 108 U/L (ref 40–150)
ALT SERPL W P-5'-P-CCNC: 13 U/L (ref 0–50)
ANION GAP SERPL CALCULATED.3IONS-SCNC: 7 MMOL/L (ref 3–14)
AST SERPL W P-5'-P-CCNC: 18 U/L (ref 0–45)
BASOPHILS # BLD AUTO: 0 10E3/UL (ref 0–0.2)
BASOPHILS NFR BLD AUTO: 1 %
BILIRUB SERPL-MCNC: 0.2 MG/DL (ref 0.2–1.3)
BUN SERPL-MCNC: 33 MG/DL (ref 7–30)
CALCIUM SERPL-MCNC: 8.6 MG/DL (ref 8.5–10.1)
CHLORIDE BLD-SCNC: 107 MMOL/L (ref 94–109)
CO2 SERPL-SCNC: 30 MMOL/L (ref 20–32)
CREAT SERPL-MCNC: 1.05 MG/DL (ref 0.52–1.04)
EOSINOPHIL # BLD AUTO: 0.4 10E3/UL (ref 0–0.7)
EOSINOPHIL NFR BLD AUTO: 6 %
ERYTHROCYTE [DISTWIDTH] IN BLOOD BY AUTOMATED COUNT: 16.1 % (ref 10–15)
GFR SERPL CREATININE-BSD FRML MDRD: 52 ML/MIN/1.73M2
GLUCOSE BLD-MCNC: 144 MG/DL (ref 70–99)
HCT VFR BLD AUTO: 32.8 % (ref 35–47)
HGB BLD-MCNC: 9.2 G/DL (ref 11.7–15.7)
IMM GRANULOCYTES # BLD: 0 10E3/UL
IMM GRANULOCYTES NFR BLD: 0 %
LYMPHOCYTES # BLD AUTO: 1.1 10E3/UL (ref 0.8–5.3)
LYMPHOCYTES NFR BLD AUTO: 16 %
MCH RBC QN AUTO: 25.6 PG (ref 26.5–33)
MCHC RBC AUTO-ENTMCNC: 28 G/DL (ref 31.5–36.5)
MCV RBC AUTO: 91 FL (ref 78–100)
MONOCYTES # BLD AUTO: 0.9 10E3/UL (ref 0–1.3)
MONOCYTES NFR BLD AUTO: 13 %
NEUTROPHILS # BLD AUTO: 4.2 10E3/UL (ref 1.6–8.3)
NEUTROPHILS NFR BLD AUTO: 64 %
NRBC # BLD AUTO: 0 10E3/UL
NRBC BLD AUTO-RTO: 0 /100
PLATELET # BLD AUTO: 291 10E3/UL (ref 150–450)
POTASSIUM BLD-SCNC: 3.8 MMOL/L (ref 3.4–5.3)
PROT SERPL-MCNC: 6.5 G/DL (ref 6.8–8.8)
RBC # BLD AUTO: 3.59 10E6/UL (ref 3.8–5.2)
SODIUM SERPL-SCNC: 144 MMOL/L (ref 133–144)
WBC # BLD AUTO: 6.7 10E3/UL (ref 4–11)

## 2022-06-23 PROCEDURE — 99214 OFFICE O/P EST MOD 30 MIN: CPT | Performed by: NURSE PRACTITIONER

## 2022-06-23 PROCEDURE — G0463 HOSPITAL OUTPT CLINIC VISIT: HCPCS

## 2022-06-23 PROCEDURE — 250N000011 HC RX IP 250 OP 636: Performed by: INTERNAL MEDICINE

## 2022-06-23 PROCEDURE — 80053 COMPREHEN METABOLIC PANEL: CPT | Performed by: NURSE PRACTITIONER

## 2022-06-23 PROCEDURE — 85025 COMPLETE CBC W/AUTO DIFF WBC: CPT | Performed by: NURSE PRACTITIONER

## 2022-06-23 PROCEDURE — 36591 DRAW BLOOD OFF VENOUS DEVICE: CPT

## 2022-06-23 RX ORDER — HEPARIN SODIUM (PORCINE) LOCK FLUSH IV SOLN 100 UNIT/ML 100 UNIT/ML
5 SOLUTION INTRAVENOUS
Status: CANCELLED | OUTPATIENT
Start: 2022-06-23

## 2022-06-23 RX ORDER — HEPARIN SODIUM,PORCINE 10 UNIT/ML
5 VIAL (ML) INTRAVENOUS
Status: CANCELLED | OUTPATIENT
Start: 2022-06-23

## 2022-06-23 RX ORDER — HEPARIN SODIUM (PORCINE) LOCK FLUSH IV SOLN 100 UNIT/ML 100 UNIT/ML
5 SOLUTION INTRAVENOUS
Status: DISCONTINUED | OUTPATIENT
Start: 2022-06-23 | End: 2022-06-23 | Stop reason: HOSPADM

## 2022-06-23 RX ADMIN — Medication 5 ML: at 08:39

## 2022-06-23 ASSESSMENT — PAIN SCALES - GENERAL: PAINLEVEL: NO PAIN (0)

## 2022-06-23 NOTE — PROGRESS NOTES
Oncology Rooming Note    June 23, 2022 9:32 AM   Lucille Rojas is a 82 year old female who presents for:    Chief Complaint   Patient presents with     Oncology Clinic Visit     Initial Vitals: There were no vitals taken for this visit. Estimated body mass index is 47.42 kg/m  as calculated from the following:    Height as of 4/28/22: 1.524 m (5').    Weight as of 4/28/22: 110.1 kg (242 lb 12.8 oz). There is no height or weight on file to calculate BSA.  Data Unavailable Comment: Data Unavailable   No LMP recorded. Patient has had a hysterectomy.  Allergies reviewed: Yes  Medications reviewed: Yes    Medications: Medication refills not needed today.  Pharmacy name entered into [facility]:    [id number] - [place], [place] - [address] [facility] [place], [place] - [address] [place] [address]  [facility]    Clinical concerns: no      Odalis Biswas, CMA

## 2022-06-23 NOTE — PROGRESS NOTES
Oncology/Hematology Visit Note  Jun 23, 2022    Reason for Visit: follow up of non-Hodgkin's lymphoma involving the pancreas  EUS revealed pancreatic head mass.  FNA revealed CD10-positive B-cell lymphoma. Flow cytometry revealed CD10-positive lambda monotypic B-cells.  PET scan on 11/18/2021 revealed 6 cm hypermetabolic pancreatic head mass and borderline hypermetabolic right axillary lymph node.     Patient of Dr. Srinivasan  03/30/2022-status post  6 cycles of mini R-CHOP    Patient comes for routine follow-up and labs    Interval History:  Denies fever chills sweats cough shortness of breath chest pain nausea vomiting diarrhea pain bleeding patient reports she is able to take short walks.      Review of Systems:  14 point ROS of systems including Constitutional, Eyes, Respiratory, Cardiovascular, Gastroenterology, Genitourinary, Integumentary, Muscularskeletal, Psychiatric were all negative except for pertinent positives noted in my HPI.    Physical Examination:  General: The patient is a pleasant female in no acute distress.  HEENT: EOMI, PERRL. Sclerae are anicteric. Oral mucosa is pink and moist with no lesions or thrush.   Lymph: Neck is supple with no lymphadenopathy in the cervical or supraclavicular areas.   Heart: Regular rate and rhythm.   Lungs: Clear to auscultation bilaterally.   GI: Bowel sounds present, soft, nontender with no palpable hepatosplenomegaly or masses.   Extremities: No lower extremity edema noted bilaterally.   Skin: No rashes, petechiae, or bruising noted on exposed skin.    Laboratory Data:  CBC CMP results reviewed    Assessment and Plan:  This is a 82-year-old female with    Lymphoma  non-Hodgkin's lymphoma involving the pancreas  Patient of Dr. Srinivasan  03/30/2022-status post  6 cycles of mini R-CHOP followed by Neulasta  Patient reports overall feeling well  04/11/2022-PET scan reveals complete remission  Labs reviewed abnormalities discussed  Schedule patient with Dr. Srinivasan in 6 weeks  with labs      Anemia  Patient is asymptomatic  S/p EGD on 09/21/21 revealed a few gastric erosions and a few tiny aphthous ulcers in the duodenum, but not severe enough  Iron deficiency anemia  -status post Venofer  -Currently taking oral iron  Overall stable hemoglobin  Transfuse for hemoglobin less than 7 or symptomatic      Elevated blood sugar  Follow-up with primary care        Call our clinic with any changes in health condition or questions    ALIE Carreon [facility] [place]     Chart documentation with Dragon Voice recognition Software. Although reviewed after completion, some words and grammatical errors may remain.

## 2022-06-23 NOTE — PROGRESS NOTES
Nursing Note:  Lucille Rojas presents today for port labs/flush.    Patient seen by provider today: Yes: Alexandr Ambriz CNP   present during visit today: Not Applicable.    Note: N/A.    Intravenous Access:  Implanted Port.    Discharge Plan:   Patient was sent to [facility] for provider appointment.    Bryanna Rivera RN

## 2022-06-27 ENCOUNTER — TELEPHONE (OUTPATIENT)
Dept: PODIATRY | Facility: CLINIC | Age: 82
End: 2022-06-27

## 2022-06-27 NOTE — TELEPHONE ENCOUNTER
[facility] Call Center    Phone Message    May a detailed message be left on voicemail: yes     Reason for Call: Other: pt is still having issues with her toe and remembers Dr. Nolasco saying he does not know if he would be able to do anything else for her other than refer her to a specialist but she is unsure who that was      Action Taken: Message routed to:  Other: ox POD    Travel Screening: Not Applicable     Please call back and discuss if pt should come in and see Gaurav or go see someone else adi Nolasco recommends

## 2022-06-27 NOTE — TELEPHONE ENCOUNTER
Returned patient's call.     Per chart review was unable to find what patient was referring to.     Phone call to patient. She states she hadn't followed up because she was admitted to the hospital in December. She has been undergoing chemo.   She states Dr. Nolasco told her he couldn't anything further for her that insurance would cover. Discussed that she had an ulcer when he saw her last. She states she thinks insurance would cover it. Let her know writer is not certain but if there is an ulcer, it is usually covered.   She had writer speak to  who described a crusty area about 1/16th of an inch thick, and the diameter of an M & M that is tender to the touch. He denies redness or drainage.   Appointment recommended and scheduled for 6/29/22 at 1:15 pm at the [place] location.   She verbalized understanding.     LACI Rodriguez RN

## 2022-06-29 ENCOUNTER — OFFICE VISIT (OUTPATIENT)
Dept: PODIATRY | Facility: CLINIC | Age: 82
End: 2022-06-29
Payer: MEDICARE

## 2022-06-29 VITALS
DIASTOLIC BLOOD PRESSURE: 68 MMHG | SYSTOLIC BLOOD PRESSURE: 110 MMHG | BODY MASS INDEX: 46.72 KG/M2 | WEIGHT: 238 LBS | HEIGHT: 60 IN

## 2022-06-29 DIAGNOSIS — N18.30 TYPE 2 DM WITH CKD STAGE 3 AND HYPERTENSION (H): ICD-10-CM

## 2022-06-29 DIAGNOSIS — E11.22 TYPE 2 DM WITH CKD STAGE 3 AND HYPERTENSION (H): ICD-10-CM

## 2022-06-29 DIAGNOSIS — L97.501: ICD-10-CM

## 2022-06-29 DIAGNOSIS — M21.42 FLAT FEET: ICD-10-CM

## 2022-06-29 DIAGNOSIS — L84 PRE-ULCERATIVE CORN OR CALLOUS: ICD-10-CM

## 2022-06-29 DIAGNOSIS — I12.9 TYPE 2 DM WITH CKD STAGE 3 AND HYPERTENSION (H): ICD-10-CM

## 2022-06-29 DIAGNOSIS — M21.41 FLAT FEET: ICD-10-CM

## 2022-06-29 DIAGNOSIS — M79.674 GREAT TOE PAIN, RIGHT: Primary | ICD-10-CM

## 2022-06-29 PROCEDURE — 99213 OFFICE O/P EST LOW 20 MIN: CPT | Mod: 25 | Performed by: PODIATRIST

## 2022-06-29 PROCEDURE — 97597 DBRDMT OPN WND 1ST 20 CM/<: CPT | Performed by: PODIATRIST

## 2022-06-29 NOTE — PROGRESS NOTES
ASSESSMENT:  Encounter Diagnoses   Name Primary?     Great toe pain, right Yes     Pre-ulcerative corn or callous      Flat feet      Skin ulcer of great toe, unspecified laterality, limited to breakdown of skin (H)      MEDICAL DECISION MAKING:  Using a #15 blade, excisional debridement was performed on the right great toe.  The hyperkeratotic eschar was excised and the nucleated portion causing the ulceration was excised.    An order will be placed for new diabetic shoes and orthoses.    She inquired about nail debridement.  I explained the [facility] podiatry does not offer that service.  We gave her an updated list of alternatives for nail care.    Follow-up on an as-needed basis.    Disclaimer: This note consists of symbols derived from keyboarding, dictation and/or voice recognition software. As a result, there may be errors in the script that have gone undetected. Please consider this when interpreting information found in this chart.    Michael Nolasco DPM, FACFAS, [facility] Department of Podiatry/Foot & Ankle Surgery      ____________________________________________________________________    HPI:       Aimee presents today with foot pain related to a very thick hyperkeratotic lesion, site of previous ulceration, plantar right great toe.  She rates her pain a 10 out of 10 at worst.  She has pain daily with weightbearing.  I last evaluated her 11/23/2021.  Orthoses and diabetic shoes were last prescribed in June 2020.    Type 2 diabetes  Reported numbness in her feet    Past Medical History:   Diagnosis Date     Cancer (H) 10/2021     Depressive disorder      Heart disease 10/2021     History of blood transfusion 20/2021     HTN (hypertension) 5/9/2013     Hyperlipidemia LDL goal <130 5/9/2013     Hypothyroidism 5/9/2013     Macular degeneration 9/18/2013     Sleep apnea     CPAP at night, O2 during naps     Type 2 diabetes, HbA1C goal < 8% (H) 5/9/2013   *  *  Past Surgical History:   Procedure  Laterality Date     APPENDECTOMY       COLONOSCOPY       COLONOSCOPY N/A 10/27/2014    Procedure: COMBINED COLONOSCOPY, SINGLE OR MULTIPLE BIOPSY/POLYPECTOMY BY BIOPSY;  Surgeon: Jose Alfredo Morejon MD;  Location:  GI     ESOPHAGOSCOPY, GASTROSCOPY, DUODENOSCOPY (EGD), COMBINED N/A 9/21/2021    Procedure: ESOPHAGOGASTRODUODENOSCOPY, WITH FINE NEEDLE ASPIRATION BIOPSY, WITH ENDOSCOPIC ULTRASOUND GUIDANCE;  Surgeon: Vera Benitez MD;  Location:  GI     ESOPHAGOSCOPY, GASTROSCOPY, DUODENOSCOPY (EGD), COMBINED N/A 9/21/2021    Procedure: Esophagogastroduodenoscopy, With Biopsy;  Surgeon: Vera Benitez MD;  Location:  GI     ESOPHAGOSCOPY, GASTROSCOPY, DUODENOSCOPY (EGD), COMBINED N/A 10/5/2021    Procedure: ESOPHAGOGASTRODUODENOSCOPY, WITH FINE NEEDLE ASPIRATION BIOPSY, WITH ENDOSCOPIC ULTRASOUND GUIDANCE;  Surgeon: Dixon Olivier MD;  Location:  GI     ESOPHAGOSCOPY, GASTROSCOPY, DUODENOSCOPY (EGD), COMBINED N/A 12/22/2021    Procedure: ESOPHAGOGASTRODUODENOSCOPY, WITH BIOPSY;  Surgeon: Vera Benitez MD;  Location:  GI     HERNIA REPAIR      inguinal x 2     IR CHEST PORT PLACEMENT > 5 YRS OF AGE  12/13/2021     TONSILLECTOMY     *    EXAM:    Vitals: /68   Ht 1.524 m (5')   Wt 108 kg (238 lb)   Breastfeeding No   BMI 46.48 kg/m    BMI: Body mass index is 46.48 kg/m .    Vascular:  Pedal pulses are faintly palpable for both the DP and PT arteries.  CFT < 3 sec.  No edema.       Neuro: Light touch sensation is intact to the L4, L5, S1 distributions  No evidence of weakness, spasticity, or contracture in the lower extremities.      Derm: There is a thick, nucleated, painful hyperkeratotic lesion on the plantar aspect of the right hallux.  Post debridement, there is a 0.4 cm in diameter ulceration.  Depth to deeper skin.  No clinical signs of infection.     Musculoskeletal:    Lower extremity muscle strength is normal.  Flatfoot structure

## 2022-06-29 NOTE — PATIENT INSTRUCTIONS
Thank you for choosing [facility] Podiatry / Foot & Ankle Surgery!    DR. GORMAN'S CLINIC LOCATIONS:     [facility] TRIAGE LINE: [phone]   [address] APPOINTMENTS: [phone]   [place] [address] RADIOLOGY: [phone]    SET UP SURGERY: [phone]    BILLING QUESTIONS: [phone]   [place] [facility] FAX: [phone]   [address]    [place] [address]      [facility] LOCATIONS  [facility]  [address]  [place] [address]  Phone: [phone]  Fax: [phone] [facility]  [address]  [place] [address]  Phone: [phone]   Fax: [phone]   [facility]  [address]  [place] [address]  Phone: [phone]  Fax: [phone] [facility]  [address]  [place] [address]  Phone: [phone]   Fax: [phone]   [facility]   [address]  [place] [address]  Phone: [phone]  Fax: [phone] * Please call any location listed to make an appointment for a casting/fitting. Your referral was sent to their central office and they will all have the order on file.         ROUTINE FOOT CARE (NAIL TRIMMING / CALLUSES)    Go to [email or web address] (American Foot [employer] Nurses Association) & search near you.    FYI: Some providers accept insurance while others are out of pocket.  Please contact them for more updated details.    [facility]   [phone]   [employer] (out of pocket)  [phone]  [email or web address]   [employer]  [phone]  [place] + 15 mile radius [employer]  [phone]  [email or web address]   [facility]  [phone]  [address], [facility]  [phone]  [facility]  [phone]  [facility] and  Ankle  [phone]  [place] - Dr. Aceves on Tuesdays   [facility]  [phone]  [facility]  [phone]  [facility]iat[facility]  [phone] [facility] (in home)  Atiya Hunt RN Foot Specialist  [phone]   [facility] Clin[facility]   [phone]   [facility]  [phone] [address] [facility]  Dr. Lee Huffman  [phone]  [place], [place]      CALLUS TREATMENT RECOMMENDATIONS    Please realize that most calluses come from your foot structure and the stress on the skin. They cannot be frozen, burned, or surgically removed. They will come back.    1) Use a foot cream to moisturize the callus and any dry skin on your feet.    2) Try to file the callus down with a pumice stone.  Any trimming with a sharp tool is at your own risk. If you injure yourself or are concerned about an infection, please return to clinic.    3) Try wearing shoes that have a rigid/ stiffer sole.  If they don't bend much when you walk, you won't but as much pressure on calluses that are in the ball-of-foot area.    4) Consider purchasing a cushioned insert and cutting a hole below the callus.    5) You can return periodically for trimming of the callus, but please check with your insurance to know if you have coverage or if you will have to pay out-of-pocket.

## 2022-06-29 NOTE — LETTER
6/29/2022         RE: Lucille Rojas  [id number]  [facility] [address]        Dear Colleague,    Thank you for referring your patient, Lucille Rojas, to the [facility]. Please see a copy of my visit note below.    ASSESSMENT:  Encounter Diagnoses   Name Primary?     Great toe pain, right Yes     Pre-ulcerative corn or callous      Flat feet      Skin ulcer of great toe, unspecified laterality, limited to breakdown of skin (H)      MEDICAL DECISION MAKING:  Using a #15 blade, excisional debridement was performed on the right great toe.  The hyperkeratotic eschar was excised and the nucleated portion causing the ulceration was excised.    An order will be placed for new diabetic shoes and orthoses.    She inquired about nail debridement.  I explained the [facility] does not offer that service.  We gave her an updated list of alternatives for nail care.    Follow-up on an as-needed basis.    Disclaimer: This note consists of symbols derived from keyboarding, dictation and/or voice recognition software. As a result, there may be errors in the script that have gone undetected. Please consider this when interpreting information found in this chart.    Michael Nolasco DPM, FACFAS, [facility] Department of Podiatry/Foot & Ankle Surgery      ____________________________________________________________________    HPI:       Aimee presents today with foot pain related to a very thick hyperkeratotic lesion, site of previous ulceration, plantar right great toe.  She rates her pain a 10 out of 10 at worst.  She has pain daily with weightbearing.  I last evaluated her 11/23/2021.  Orthoses and diabetic shoes were last prescribed in June 2020.    Type 2 diabetes  Reported numbness in her feet    Past Medical History:   Diagnosis Date     Cancer (H) 10/2021     Depressive disorder      Heart disease 10/2021     History of blood transfusion 20/2021     HTN  (hypertension) 5/9/2013     Hyperlipidemia LDL goal <130 5/9/2013     Hypothyroidism 5/9/2013     Macular degeneration 9/18/2013     Sleep apnea     CPAP at night, O2 during naps     Type 2 diabetes, HbA1C goal < 8% (H) 5/9/2013   *  *  Past Surgical History:   Procedure Laterality Date     APPENDECTOMY       COLONOSCOPY       COLONOSCOPY N/A 10/27/2014    Procedure: COMBINED COLONOSCOPY, SINGLE OR MULTIPLE BIOPSY/POLYPECTOMY BY BIOPSY;  Surgeon: Jose Alfredo Morejon MD;  Location:  GI     ESOPHAGOSCOPY, GASTROSCOPY, DUODENOSCOPY (EGD), COMBINED N/A 9/21/2021    Procedure: ESOPHAGOGASTRODUODENOSCOPY, WITH FINE NEEDLE ASPIRATION BIOPSY, WITH ENDOSCOPIC ULTRASOUND GUIDANCE;  Surgeon: Vera Benitez MD;  Location:  GI     ESOPHAGOSCOPY, GASTROSCOPY, DUODENOSCOPY (EGD), COMBINED N/A 9/21/2021    Procedure: Esophagogastroduodenoscopy, With Biopsy;  Surgeon: Vera Benitez MD;  Location:  GI     ESOPHAGOSCOPY, GASTROSCOPY, DUODENOSCOPY (EGD), COMBINED N/A 10/5/2021    Procedure: ESOPHAGOGASTRODUODENOSCOPY, WITH FINE NEEDLE ASPIRATION BIOPSY, WITH ENDOSCOPIC ULTRASOUND GUIDANCE;  Surgeon: Dixon Olivier MD;  Location:  GI     ESOPHAGOSCOPY, GASTROSCOPY, DUODENOSCOPY (EGD), COMBINED N/A 12/22/2021    Procedure: ESOPHAGOGASTRODUODENOSCOPY, WITH BIOPSY;  Surgeon: Vera Benitez MD;  Location:  GI     HERNIA REPAIR      inguinal x 2     IR CHEST PORT PLACEMENT > 5 YRS OF AGE  12/13/2021     TONSILLECTOMY     *    EXAM:    Vitals: /68   Ht 1.524 m (5')   Wt 108 kg (238 lb)   Breastfeeding No   BMI 46.48 kg/m    BMI: Body mass index is 46.48 kg/m .    Vascular:  Pedal pulses are faintly palpable for both the DP and PT arteries.  CFT < 3 sec.  No edema.       Neuro: Light touch sensation is intact to the L4, L5, S1 distributions  No evidence of weakness, spasticity, or contracture in the lower extremities.      Derm: There is a thick, nucleated, painful  hyperkeratotic lesion on the plantar aspect of the right hallux.  Post debridement, there is a 0.4 cm in diameter ulceration.  Depth to deeper skin.  No clinical signs of infection.     Musculoskeletal:    Lower extremity muscle strength is normal.  Flatfoot structure        Again, thank you for allowing me to participate in the care of your patient.        Sincerely,        Michael Nolasco DPM

## 2022-07-08 ENCOUNTER — OFFICE VISIT (OUTPATIENT)
Dept: CARDIOLOGY | Facility: CLINIC | Age: 82
End: 2022-07-08

## 2022-07-08 ENCOUNTER — LAB (OUTPATIENT)
Dept: LAB | Facility: CLINIC | Age: 82
End: 2022-07-08
Payer: MEDICARE

## 2022-07-08 VITALS
WEIGHT: 239 LBS | OXYGEN SATURATION: 97 % | HEIGHT: 60 IN | DIASTOLIC BLOOD PRESSURE: 78 MMHG | HEART RATE: 76 BPM | BODY MASS INDEX: 46.92 KG/M2 | SYSTOLIC BLOOD PRESSURE: 129 MMHG

## 2022-07-08 DIAGNOSIS — R93.1 ABNORMAL FINDINGS DIAGNOSTIC IMAGING OF HEART AND CORONARY CIRCULATION: ICD-10-CM

## 2022-07-08 LAB
ANION GAP SERPL CALCULATED.3IONS-SCNC: 3 MMOL/L (ref 3–14)
BUN SERPL-MCNC: 38 MG/DL (ref 7–30)
CALCIUM SERPL-MCNC: 8.7 MG/DL (ref 8.5–10.1)
CHLORIDE BLD-SCNC: 107 MMOL/L (ref 94–109)
CO2 SERPL-SCNC: 31 MMOL/L (ref 20–32)
CREAT SERPL-MCNC: 1.17 MG/DL (ref 0.52–1.04)
ERYTHROCYTE [DISTWIDTH] IN BLOOD BY AUTOMATED COUNT: 15.8 % (ref 10–15)
GFR SERPL CREATININE-BSD FRML MDRD: 46 ML/MIN/1.73M2
GLUCOSE BLD-MCNC: 93 MG/DL (ref 70–99)
HCT VFR BLD AUTO: 34.5 % (ref 35–47)
HGB BLD-MCNC: 9.7 G/DL (ref 11.7–15.7)
MCH RBC QN AUTO: 26.2 PG (ref 26.5–33)
MCHC RBC AUTO-ENTMCNC: 28.1 G/DL (ref 31.5–36.5)
MCV RBC AUTO: 93 FL (ref 78–100)
PLATELET # BLD AUTO: 273 10E3/UL (ref 150–450)
POTASSIUM BLD-SCNC: 3.8 MMOL/L (ref 3.4–5.3)
RBC # BLD AUTO: 3.7 10E6/UL (ref 3.8–5.2)
SODIUM SERPL-SCNC: 141 MMOL/L (ref 133–144)
WBC # BLD AUTO: 9.1 10E3/UL (ref 4–11)

## 2022-07-08 PROCEDURE — 36415 COLL VENOUS BLD VENIPUNCTURE: CPT | Performed by: INTERNAL MEDICINE

## 2022-07-08 PROCEDURE — 85027 COMPLETE CBC AUTOMATED: CPT | Performed by: INTERNAL MEDICINE

## 2022-07-08 PROCEDURE — 80048 BASIC METABOLIC PNL TOTAL CA: CPT | Performed by: INTERNAL MEDICINE

## 2022-07-08 PROCEDURE — 99215 OFFICE O/P EST HI 40 MIN: CPT | Performed by: PHYSICIAN ASSISTANT

## 2022-07-08 NOTE — LETTER
7/8/2022    Fausto Flynn MD  [address] [facility] [address]    RE: Lucille Rojas       Dear Colleague,     I had the pleasure of seeing Lucille Rojas in the [facility].  Primary Cardiologist: Dr. Rodriguez     Reason for Visit: H&P for preTAVR coronary angiogram    History of Present Illness:   Lucille is a very pleasant 82-year-old female who was recently seen in the structural clinic for further evaluation of moderate to severe aortic stenosis.  Her past medical history is notable for hypertension, HFpEF (NYHA class III), COPD (oxygen dependent), history of severe upper GI bleed in 2021, dyslipidemia, severe morbid obesity, SHAVON (compliant with CPAP), status post recent chemotherapy for with non-Hodgkin's B-cell lymphoma involving the pancreas, chronic anemia, and type 2 diabetes mellitus.    Her recent echocardiogram dated 6/14/2022 showed aortic valve area of 0.9 cm , mean gradient of 32 mmHg, dimensionless index of 0.27, preserved LVEF with mild concentric LVH.  Compared to her prior echo cardiogram in 9/2021 her aortic valve stenosis was noted to have progressed from moderately severe to severe.  These results were reviewed by Dr. Rodriguez who recommended proceeding for plans for TAVR.  She was arranged for pre-TAVR coronary angiogram next week and was added to my schedule today for history and physical evaluation prior to the procedure.    Lucille presents to clinic today with her sister.  She has another sister who is on the speaker phone.  She tells me that she continues to feel quite tired and has dyspnea on exertion.  She denies orthopnea, peripheral edema that is getting worse, abdominal distention, PND, or bleeding issues.  She has chronic anemia.  She has no palpitations or recent history of syncope.  She has no history of contrast dye allergy or issues with anesthesia.  She does have a large pannus and has been dealing with yeast infections on her skin.  She  denies any open sores or wounds on her skin currently.    Assessment and Plan:  Lucille is a very pleasant 82-year-old female with past medical history notable for severe aortic stenosis, hypertension, HFpEF (NYHA class III), COPD (oxygen dependent), history of severe upper GI bleed in 2021, dyslipidemia, severe morbid obesity, SHAVON (compliant with CPAP), status post recent chemotherapy for with non-Hodgkin's B-cell lymphoma involving the pancreas, chronic anemia, and type 2 diabetes mellitus.    Risks and benefits of left heart catheterization and coronary angiogram were discussed with the patient in detail. 0.1-0.3% (for diagnostic angio) and 1-2% (for PCI)  risk of stroke, MI, death, emergent bypass for diagnostic angio, risk of contrast induced allergic reaction, renal dysfunction, vascular complications were discussed. She has history of GI bleed but this has been stable with no recurrent issues.  She does have chronic anemia with baseline hemoglobin around 9.  She has no scheduled surgeries or procedures in the next year. Patient understands and wishes to proceed with it.    She will hold furosemide on the day of the procedure.  She will continue take baby aspirin.  She will be n.p.o. starting midnight prior to the procedure.  I will see her back for post angiogram evaluation.  I have asked that she schedules BMP prior to this visit.  Her CBC and BMP today are stable.    During physical exam she was noted to have erythematous area at the right groin site with spots of raw skin mild breakdown.  The left groin site appears to be okay.  I have reviewed with structural team and we made sure that she has angiogram with radial approach.  I have encouraged her to use powder for her right groin site and to try to keep the skin dry.  She tells me this has been an ongoing issue for her and that she can work on this.  If the skin changes do not improve she will contact her PCP for further guidance.    50 minutes spent on  the date of the encounter with chart review, patient visit, care coordination, and documentation.      This note was completed in part using Dragon voice recognition software. Although reviewed after completion, some word and grammatical errors may occur.    Orders this Visit:  Orders Placed This Encounter   Procedures     Basic metabolic panel     No orders of the defined types were placed in this encounter.    There are no discontinued medications.      Encounter Diagnosis   Name Primary?     Abnormal findings diagnostic imaging of heart and coronary circulation        CURRENT MEDICATIONS:  Current Outpatient Medications   Medication Sig Dispense Refill     acetaminophen (TYLENOL) 325 MG tablet Take 2 tablets (650 mg) by mouth every 4 hours as needed for mild pain       aspirin 81 MG tablet Take 1 tablet by mouth daily. 30 tablet 0     bisacodyl (DULCOLAX) 10 MG suppository Place 1 suppository (10 mg) rectally daily as needed for constipation       carboxymethylcellulose PF (REFRESH PLUS) 0.5 % ophthalmic solution Place 2 drops into both eyes 2 times daily       citalopram (CELEXA) 20 MG tablet Take 1 tablet (20 mg) by mouth daily 90 tablet 1     CONTOUR NEXT TEST test strip USE TO TEST BLOOD GLUCOSE ONCE DAILY       furosemide (LASIX) 20 MG tablet Take 1 tablet (20 mg) by mouth daily 90 tablet 1     guaiFENesin-dextromethorphan (ROBITUSSIN DM) 100-10 MG/5ML syrup Take 10 mLs by mouth every 4 hours as needed for cough       levothyroxine (SYNTHROID/LEVOTHROID) 200 MCG tablet Take 1 tablet (200 mcg) by mouth daily 90 tablet 1     loratadine (CLARITIN) 10 MG tablet Take 10 mg by mouth daily       LORazepam (ATIVAN) 0.5 MG tablet Take 1 tablet (0.5 mg) by mouth every 8 hours as needed for anxiety or nausea 30 tablet 1     miconazole (MICATIN) 2 % external powder Apply topically 2 times daily       Microlet Lancets MISC USE TO TEST BLOOD GLUCOSE ONCE DAILY       nystatin (MYCOSTATIN) 471597 UNIT/GM external powder  Apply topically 2 times daily Under breasts and skin folds BID & BID PRN for redness 60 g 1     order for DME Equipment being ordered: wheeled walker with hand breaks 1 Device 0     pantoprazole (PROTONIX) 40 MG EC tablet Take 1 tablet (40 mg) by mouth every morning (before breakfast) 90 tablet 3     polyethylene glycol (MIRALAX) 17 g packet Take 1 packet by mouth every evening       senna-docusate (SENOKOT-S/PERICOLACE) 8.6-50 MG tablet Take 1 tablet by mouth every morning       simvastatin (ZOCOR) 10 MG tablet Take 1 tablet (10 mg) by mouth At Bedtime 90 tablet 1       ALLERGIES     Allergies   Allergen Reactions     Penicillins        PAST MEDICAL HISTORY:  Past Medical History:   Diagnosis Date     Cancer (H) 10/2021     Depressive disorder      Heart disease 10/2021     History of blood transfusion 20/2021     HTN (hypertension) 5/9/2013     Hyperlipidemia LDL goal <130 5/9/2013     Hypothyroidism 5/9/2013     Macular degeneration 9/18/2013     Sleep apnea     CPAP at night, O2 during naps     Type 2 diabetes, HbA1C goal < 8% (H) 5/9/2013       PAST SURGICAL HISTORY:  Past Surgical History:   Procedure Laterality Date     APPENDECTOMY       COLONOSCOPY       COLONOSCOPY N/A 10/27/2014    Procedure: COMBINED COLONOSCOPY, SINGLE OR MULTIPLE BIOPSY/POLYPECTOMY BY BIOPSY;  Surgeon: Jose Alfredo Morejon MD;  Location:  GI     ESOPHAGOSCOPY, GASTROSCOPY, DUODENOSCOPY (EGD), COMBINED N/A 9/21/2021    Procedure: ESOPHAGOGASTRODUODENOSCOPY, WITH FINE NEEDLE ASPIRATION BIOPSY, WITH ENDOSCOPIC ULTRASOUND GUIDANCE;  Surgeon: Vera Benitez MD;  Location:  GI     ESOPHAGOSCOPY, GASTROSCOPY, DUODENOSCOPY (EGD), COMBINED N/A 9/21/2021    Procedure: Esophagogastroduodenoscopy, With Biopsy;  Surgeon: Vera Benitez MD;  Location:  GI     ESOPHAGOSCOPY, GASTROSCOPY, DUODENOSCOPY (EGD), COMBINED N/A 10/5/2021    Procedure: ESOPHAGOGASTRODUODENOSCOPY, WITH FINE NEEDLE ASPIRATION BIOPSY, WITH  ENDOSCOPIC ULTRASOUND GUIDANCE;  Surgeon: Dixon Olivier MD;  Location:  GI     ESOPHAGOSCOPY, GASTROSCOPY, DUODENOSCOPY (EGD), COMBINED N/A 12/22/2021    Procedure: ESOPHAGOGASTRODUODENOSCOPY, WITH BIOPSY;  Surgeon: Vera Benitez MD;  Location:  GI     HERNIA REPAIR      inguinal x 2     IR CHEST PORT PLACEMENT > 5 YRS OF AGE  12/13/2021     TONSILLECTOMY         FAMILY HISTORY:  History reviewed. No pertinent family history.    SOCIAL HISTORY:  Social History     Socioeconomic History     Marital status:      Spouse name: None     Number of children: None     Years of education: None     Highest education level: None   Tobacco Use     Smoking status: Never Smoker     Smokeless tobacco: Never Used   Substance and Sexual Activity     Alcohol use: Not Currently     Drug use: No     Sexual activity: Never       Review of Systems:  Skin:  not assessed     Eyes:  Positive for glasses  ENT:  Positive for postnasal drainage;hearing loss  Respiratory:  Positive for shortness of breath;dyspnea on exertion;sleep apnea  Cardiovascular:    Positive for;dizziness;chest pain;edema  Gastroenterology: Positive for reflux  Genitourinary:  Positive for nocturia  Musculoskeletal:  Positive for arthritis  Neurologic:  Positive for numbness or tingling of hands;numbness or tingling of feet  Psychiatric:  Positive for anxiety;depression  Heme/Lymph/Imm:  Positive for allergies  Endocrine:  Positive for thyroid disorder;diabetes    Physical Exam:  Vitals: /78   Pulse 76   Ht 1.524 m (5')   Wt 108.4 kg (239 lb)   SpO2 97%   BMI 46.68 kg/m       GEN:  NAD  NECK: No JVD  C/V:  Regular rate and rhythm, no murmur, rub or gallop.  RESP: Clear to auscultation bilaterally without wheezing, rales, or rhonchi.  GI: Abdomen soft, nontender, nondistended.   EXTREM: No pitting LE edema.   NEURO: Alert and oriented, cooperative. No obvious focal deficits.   PSYCH: Normal affect.  SKIN: Warm and dry.        Recent Lab Results:  LIPID RESULTS:  Lab Results   Component Value Date    CHOL 159 11/22/2021    CHOL 148 06/04/2020    HDL 51 11/22/2021    HDL 47 (L) 06/04/2020    LDL 81 11/22/2021    LDL 77 06/04/2020    TRIG 133 11/22/2021    TRIG 140 06/04/2020    CHOLHDLRATIO 3.4 05/09/2013       LIVER ENZYME RESULTS:  Lab Results   Component Value Date    AST 18 06/23/2022    AST 13 08/27/2020    ALT 13 06/23/2022    ALT 15 08/27/2020       CBC RESULTS:  Lab Results   Component Value Date    WBC 9.1 07/08/2022    WBC 7.6 12/08/2016    RBC 3.70 (L) 07/08/2022    RBC 3.62 (L) 12/08/2016    HGB 9.7 (L) 07/08/2022    HGB 10.6 (L) 04/07/2021    HCT 34.5 (L) 07/08/2022    HCT 34.3 (L) 12/08/2016    MCV 93 07/08/2022    MCV 95 12/08/2016    MCH 26.2 (L) 07/08/2022    MCH 27.6 12/08/2016    MCHC 28.1 (L) 07/08/2022    MCHC 29.2 (L) 12/08/2016    RDW 15.8 (H) 07/08/2022    RDW 14.6 12/08/2016     07/08/2022     12/08/2016       BMP RESULTS:  Lab Results   Component Value Date     07/08/2022     08/27/2020    POTASSIUM 3.8 07/08/2022    POTASSIUM 4.1 08/27/2020    CHLORIDE 107 07/08/2022    CHLORIDE 109 08/27/2020    CO2 31 07/08/2022    CO2 31 08/27/2020    ANIONGAP 3 07/08/2022    ANIONGAP 4 08/27/2020    GLC 93 07/08/2022     (H) 08/27/2020    BUN 38 (H) 07/08/2022    BUN 19 08/27/2020    CR 1.17 (H) 07/08/2022    CR 0.98 08/27/2020    GFRESTIMATED 46 (L) 07/08/2022    GFRESTIMATED 55 (L) 06/14/2022    GFRESTIMATED 54 (L) 08/27/2020    GFRESTBLACK 62 08/27/2020    IGOR 8.7 07/08/2022    IGOR 9.6 08/27/2020        A1C RESULTS:  Lab Results   Component Value Date    A1C 5.0 11/22/2021    A1C 5.8 (H) 04/07/2021       INR RESULTS:  Lab Results   Component Value Date    INR 1.09 12/21/2021           Jennifer Ramirez PA-C  July 8, 2022     Thank you for allowing me to participate in the care of your patient.      Sincerely,     Jennifer Ramirez PA-C     [facility]  [facility]  cc:   Mary Jo Rodriguez MD  [address] [id number]

## 2022-07-08 NOTE — PATIENT INSTRUCTIONS
Today's Plan:   1) Nothing to eat or drink starting midnight prior to the procedure.   2) Do not take furosemide (lasix) on the day of the procedure.   3) Follow up with me on 7/26 after your procedure (we will add labs to be done prior to this visit)    If you have questions or concerns please call my nurse team at [phone].     Scheduling phone number: [phone].  Reminder: Please bring in all current medications, over the counter supplements and vitamin bottles to your next appointment.    It was a pleasure seeing you today!     Jennifer Ramirez PA-C  7/8/2022

## 2022-07-08 NOTE — PROGRESS NOTES
Primary Cardiologist: Dr. Rodriguez     Reason for Visit: H&P for preTAVR coronary angiogram    History of Present Illness:   Lucille is a very pleasant 82-year-old female who was recently seen in the structural clinic for further evaluation of moderate to severe aortic stenosis.  Her past medical history is notable for hypertension, HFpEF (NYHA class III), COPD (oxygen dependent), history of severe upper GI bleed in 2021, dyslipidemia, severe morbid obesity, SHAVON (compliant with CPAP), status post recent chemotherapy for with non-Hodgkin's B-cell lymphoma involving the pancreas, chronic anemia, and type 2 diabetes mellitus.    Her recent echocardiogram dated 6/14/2022 showed aortic valve area of 0.9 cm , mean gradient of 32 mmHg, dimensionless index of 0.27, preserved LVEF with mild concentric LVH.  Compared to her prior echo cardiogram in 9/2021 her aortic valve stenosis was noted to have progressed from moderately severe to severe.  These results were reviewed by Dr. Rodriguez who recommended proceeding for plans for TAVR.  She was arranged for pre-TAVR coronary angiogram next week and was added to my schedule today for history and physical evaluation prior to the procedure.    Lucille presents to clinic today with her sister.  She has another sister who is on the speaker phone.  She tells me that she continues to feel quite tired and has dyspnea on exertion.  She denies orthopnea, peripheral edema that is getting worse, abdominal distention, PND, or bleeding issues.  She has chronic anemia.  She has no palpitations or recent history of syncope.  She has no history of contrast dye allergy or issues with anesthesia.  She does have a large pannus and has been dealing with yeast infections on her skin.  She denies any open sores or wounds on her skin currently.    Assessment and Plan:  Lucille is a very pleasant 82-year-old female with past medical history notable for severe aortic stenosis, hypertension, HFpEF  (NYHA class III), COPD (oxygen dependent), history of severe upper GI bleed in 2021, dyslipidemia, severe morbid obesity, SHAVON (compliant with CPAP), status post recent chemotherapy for with non-Hodgkin's B-cell lymphoma involving the pancreas, chronic anemia, and type 2 diabetes mellitus.    Risks and benefits of left heart catheterization and coronary angiogram were discussed with the patient in detail. 0.1-0.3% (for diagnostic angio) and 1-2% (for PCI)  risk of stroke, MI, death, emergent bypass for diagnostic angio, risk of contrast induced allergic reaction, renal dysfunction, vascular complications were discussed. She has history of GI bleed but this has been stable with no recurrent issues.  She does have chronic anemia with baseline hemoglobin around 9.  She has no scheduled surgeries or procedures in the next year. Patient understands and wishes to proceed with it.    She will hold furosemide on the day of the procedure.  She will continue take baby aspirin.  She will be n.p.o. starting midnight prior to the procedure.  I will see her back for post angiogram evaluation.  I have asked that she schedules BMP prior to this visit.  Her CBC and BMP today are stable.    During physical exam she was noted to have erythematous area at the right groin site with spots of raw skin mild breakdown.  The left groin site appears to be okay.  I have reviewed with structural team and we made sure that she has angiogram with radial approach.  I have encouraged her to use powder for her right groin site and to try to keep the skin dry.  She tells me this has been an ongoing issue for her and that she can work on this.  If the skin changes do not improve she will contact her PCP for further guidance.    50 minutes spent on the date of the encounter with chart review, patient visit, care coordination, and documentation.      This note was completed in part using Dragon voice recognition software. Although reviewed after  completion, some word and grammatical errors may occur.    Orders this Visit:  Orders Placed This Encounter   Procedures     Basic metabolic panel     No orders of the defined types were placed in this encounter.    There are no discontinued medications.      Encounter Diagnosis   Name Primary?     Abnormal findings diagnostic imaging of heart and coronary circulation        CURRENT MEDICATIONS:  Current Outpatient Medications   Medication Sig Dispense Refill     acetaminophen (TYLENOL) 325 MG tablet Take 2 tablets (650 mg) by mouth every 4 hours as needed for mild pain       aspirin 81 MG tablet Take 1 tablet by mouth daily. 30 tablet 0     bisacodyl (DULCOLAX) 10 MG suppository Place 1 suppository (10 mg) rectally daily as needed for constipation       carboxymethylcellulose PF (REFRESH PLUS) 0.5 % ophthalmic solution Place 2 drops into both eyes 2 times daily       citalopram (CELEXA) 20 MG tablet Take 1 tablet (20 mg) by mouth daily 90 tablet 1     CONTOUR NEXT TEST test strip USE TO TEST BLOOD GLUCOSE ONCE DAILY       furosemide (LASIX) 20 MG tablet Take 1 tablet (20 mg) by mouth daily 90 tablet 1     guaiFENesin-dextromethorphan (ROBITUSSIN DM) 100-10 MG/5ML syrup Take 10 mLs by mouth every 4 hours as needed for cough       levothyroxine (SYNTHROID/LEVOTHROID) 200 MCG tablet Take 1 tablet (200 mcg) by mouth daily 90 tablet 1     loratadine (CLARITIN) 10 MG tablet Take 10 mg by mouth daily       LORazepam (ATIVAN) 0.5 MG tablet Take 1 tablet (0.5 mg) by mouth every 8 hours as needed for anxiety or nausea 30 tablet 1     miconazole (MICATIN) 2 % external powder Apply topically 2 times daily       Microlet Lancets MISC USE TO TEST BLOOD GLUCOSE ONCE DAILY       nystatin (MYCOSTATIN) 736548 UNIT/GM external powder Apply topically 2 times daily Under breasts and skin folds BID & BID PRN for redness 60 g 1     order for DME Equipment being ordered: wheeled walker with hand breaks 1 Device 0     pantoprazole  (PROTONIX) 40 MG EC tablet Take 1 tablet (40 mg) by mouth every morning (before breakfast) 90 tablet 3     polyethylene glycol (MIRALAX) 17 g packet Take 1 packet by mouth every evening       senna-docusate (SENOKOT-S/PERICOLACE) 8.6-50 MG tablet Take 1 tablet by mouth every morning       simvastatin (ZOCOR) 10 MG tablet Take 1 tablet (10 mg) by mouth At Bedtime 90 tablet 1       ALLERGIES     Allergies   Allergen Reactions     Penicillins        PAST MEDICAL HISTORY:  Past Medical History:   Diagnosis Date     Cancer (H) 10/2021     Depressive disorder      Heart disease 10/2021     History of blood transfusion 20/2021     HTN (hypertension) 5/9/2013     Hyperlipidemia LDL goal <130 5/9/2013     Hypothyroidism 5/9/2013     Macular degeneration 9/18/2013     Sleep apnea     CPAP at night, O2 during naps     Type 2 diabetes, HbA1C goal < 8% (H) 5/9/2013       PAST SURGICAL HISTORY:  Past Surgical History:   Procedure Laterality Date     APPENDECTOMY       COLONOSCOPY       COLONOSCOPY N/A 10/27/2014    Procedure: COMBINED COLONOSCOPY, SINGLE OR MULTIPLE BIOPSY/POLYPECTOMY BY BIOPSY;  Surgeon: Jose Alfredo Morejon MD;  Location:  GI     ESOPHAGOSCOPY, GASTROSCOPY, DUODENOSCOPY (EGD), COMBINED N/A 9/21/2021    Procedure: ESOPHAGOGASTRODUODENOSCOPY, WITH FINE NEEDLE ASPIRATION BIOPSY, WITH ENDOSCOPIC ULTRASOUND GUIDANCE;  Surgeon: Vera Benitez MD;  Location:  GI     ESOPHAGOSCOPY, GASTROSCOPY, DUODENOSCOPY (EGD), COMBINED N/A 9/21/2021    Procedure: Esophagogastroduodenoscopy, With Biopsy;  Surgeon: Vera Benitez MD;  Location:  GI     ESOPHAGOSCOPY, GASTROSCOPY, DUODENOSCOPY (EGD), COMBINED N/A 10/5/2021    Procedure: ESOPHAGOGASTRODUODENOSCOPY, WITH FINE NEEDLE ASPIRATION BIOPSY, WITH ENDOSCOPIC ULTRASOUND GUIDANCE;  Surgeon: Dixon Olivier MD;  Location:  GI     ESOPHAGOSCOPY, GASTROSCOPY, DUODENOSCOPY (EGD), COMBINED N/A 12/22/2021    Procedure:  ESOPHAGOGASTRODUODENOSCOPY, WITH BIOPSY;  Surgeon: Vera Benitez MD;  Location:  GI     HERNIA REPAIR      inguinal x 2     IR CHEST PORT PLACEMENT > 5 YRS OF AGE  12/13/2021     TONSILLECTOMY         FAMILY HISTORY:  History reviewed. No pertinent family history.    SOCIAL HISTORY:  Social History     Socioeconomic History     Marital status:      Spouse name: None     Number of children: None     Years of education: None     Highest education level: None   Tobacco Use     Smoking status: Never Smoker     Smokeless tobacco: Never Used   Substance and Sexual Activity     Alcohol use: Not Currently     Drug use: No     Sexual activity: Never       Review of Systems:  Skin:  not assessed     Eyes:  Positive for glasses  ENT:  Positive for postnasal drainage;hearing loss  Respiratory:  Positive for shortness of breath;dyspnea on exertion;sleep apnea  Cardiovascular:    Positive for;dizziness;chest pain;edema  Gastroenterology: Positive for reflux  Genitourinary:  Positive for nocturia  Musculoskeletal:  Positive for arthritis  Neurologic:  Positive for numbness or tingling of hands;numbness or tingling of feet  Psychiatric:  Positive for anxiety;depression  Heme/Lymph/Imm:  Positive for allergies  Endocrine:  Positive for thyroid disorder;diabetes    Physical Exam:  Vitals: /78   Pulse 76   Ht 1.524 m (5')   Wt 108.4 kg (239 lb)   SpO2 97%   BMI 46.68 kg/m       GEN:  NAD  NECK: No JVD  C/V:  Regular rate and rhythm, no murmur, rub or gallop.  RESP: Clear to auscultation bilaterally without wheezing, rales, or rhonchi.  GI: Abdomen soft, nontender, nondistended.   EXTREM: No pitting LE edema.   NEURO: Alert and oriented, cooperative. No obvious focal deficits.   PSYCH: Normal affect.  SKIN: Warm and dry.       Recent Lab Results:  LIPID RESULTS:  Lab Results   Component Value Date    CHOL 159 11/22/2021    CHOL 148 06/04/2020    HDL 51 11/22/2021    HDL 47 (L) 06/04/2020    LDL 81  11/22/2021    LDL 77 06/04/2020    TRIG 133 11/22/2021    TRIG 140 06/04/2020    CHOLHDLRATIO 3.4 05/09/2013       LIVER ENZYME RESULTS:  Lab Results   Component Value Date    AST 18 06/23/2022    AST 13 08/27/2020    ALT 13 06/23/2022    ALT 15 08/27/2020       CBC RESULTS:  Lab Results   Component Value Date    WBC 9.1 07/08/2022    WBC 7.6 12/08/2016    RBC 3.70 (L) 07/08/2022    RBC 3.62 (L) 12/08/2016    HGB 9.7 (L) 07/08/2022    HGB 10.6 (L) 04/07/2021    HCT 34.5 (L) 07/08/2022    HCT 34.3 (L) 12/08/2016    MCV 93 07/08/2022    MCV 95 12/08/2016    MCH 26.2 (L) 07/08/2022    MCH 27.6 12/08/2016    MCHC 28.1 (L) 07/08/2022    MCHC 29.2 (L) 12/08/2016    RDW 15.8 (H) 07/08/2022    RDW 14.6 12/08/2016     07/08/2022     12/08/2016       BMP RESULTS:  Lab Results   Component Value Date     07/08/2022     08/27/2020    POTASSIUM 3.8 07/08/2022    POTASSIUM 4.1 08/27/2020    CHLORIDE 107 07/08/2022    CHLORIDE 109 08/27/2020    CO2 31 07/08/2022    CO2 31 08/27/2020    ANIONGAP 3 07/08/2022    ANIONGAP 4 08/27/2020    GLC 93 07/08/2022     (H) 08/27/2020    BUN 38 (H) 07/08/2022    BUN 19 08/27/2020    CR 1.17 (H) 07/08/2022    CR 0.98 08/27/2020    GFRESTIMATED 46 (L) 07/08/2022    GFRESTIMATED 55 (L) 06/14/2022    GFRESTIMATED 54 (L) 08/27/2020    GFRESTBLACK 62 08/27/2020    IGOR 8.7 07/08/2022    IGOR 9.6 08/27/2020        A1C RESULTS:  Lab Results   Component Value Date    A1C 5.0 11/22/2021    A1C 5.8 (H) 04/07/2021       INR RESULTS:  Lab Results   Component Value Date    INR 1.09 12/21/2021           Jennifer Ramirez PA-C  July 8, 2022

## 2022-07-11 ENCOUNTER — TELEPHONE (OUTPATIENT)
Dept: CARDIOLOGY | Facility: CLINIC | Age: 82
End: 2022-07-11

## 2022-07-11 NOTE — TELEPHONE ENCOUNTER
Called and reviewed pre-procedural instructions with patient and patient's daughter.  Encouraged them both to call with additional questions.       [facility] - ANGIOGRAM INSTRUCTIONS:  - Lucille Rojas is scheduled for a coronary angiogram at [facility] on 07/15/2022. Check in time is at 0730, scheduled as 0930 case.  - Advised patient not eat or drink after midnight on day of procedure.   - Patient advised to take morning medications with a sip of water on the day of the procedure, noting the followin. Aspirin: Patient is currently taking 81mg of aspirin, patient instructed to take 4 tabs (324mg) of aspirin the morning of the procedure.  2. Diabetic Medications: Patient does not take Metformin or other diabetic medications.  3. Anticoagulants: Patient does not take an anticoagulant.  4. Diuretics: Patient advised to hold furosemide (Lasix) the morning of the procedure.   5. All vitamins and supplements to be held the morning of the procedure.  - Verified patient does not have an allergy to contrast dye.  - Verified patient has someone available to drive them home from the hospital and can stay with them for 24 hours after the procedure. They understand that one visitor may accompany them into the hospital on the day of angiogram, with both patient and visitor to wear a mask.  - COVID testing: To be done by patient 1-2 days prior to angiogram. Patient to bring in photo proof of negative test result.  - Patient was instructed to take their temperature the morning of procedure and if temperature is >100 degrees F patient should not come in and call [phone].  - Angiogram orders have been signed & held.    Mackenzie Noland RN  [facility]

## 2022-07-12 DIAGNOSIS — R93.1 ABNORMAL FINDINGS DIAGNOSTIC IMAGING OF HEART AND CORONARY CIRCULATION: Primary | ICD-10-CM

## 2022-07-12 RX ORDER — LIDOCAINE 40 MG/G
CREAM TOPICAL
Status: CANCELLED | OUTPATIENT
Start: 2022-07-12

## 2022-07-12 RX ORDER — POTASSIUM CHLORIDE 1500 MG/1
20 TABLET, EXTENDED RELEASE ORAL
Status: CANCELLED | OUTPATIENT
Start: 2022-07-12

## 2022-07-12 RX ORDER — ASPIRIN 325 MG
325 TABLET ORAL ONCE
Status: CANCELLED | OUTPATIENT
Start: 2022-07-12 | End: 2022-07-12

## 2022-07-12 RX ORDER — SODIUM CHLORIDE 9 MG/ML
INJECTION, SOLUTION INTRAVENOUS CONTINUOUS
Status: CANCELLED | OUTPATIENT
Start: 2022-07-12

## 2022-07-12 RX ORDER — ASPIRIN 81 MG/1
243 TABLET, CHEWABLE ORAL ONCE
Status: CANCELLED | OUTPATIENT
Start: 2022-07-12

## 2022-07-15 ENCOUNTER — HOSPITAL ENCOUNTER (OUTPATIENT)
Facility: CLINIC | Age: 82
Discharge: HOME | End: 2022-07-15
Admitting: INTERNAL MEDICINE
Payer: MEDICARE

## 2022-07-15 ENCOUNTER — PATIENT OUTREACH (OUTPATIENT)
Dept: CARE COORDINATION | Facility: CLINIC | Age: 82
End: 2022-07-15

## 2022-07-15 VITALS
BODY MASS INDEX: 47.39 KG/M2 | TEMPERATURE: 98.2 F | SYSTOLIC BLOOD PRESSURE: 129 MMHG | HEIGHT: 60 IN | WEIGHT: 241.4 LBS | RESPIRATION RATE: 16 BRPM | OXYGEN SATURATION: 94 % | HEART RATE: 70 BPM | DIASTOLIC BLOOD PRESSURE: 71 MMHG

## 2022-07-15 DIAGNOSIS — C85.99 NON-HODGKIN LYMPHOMA OF SOLID ORGAN EXCLUDING SPLEEN, UNSPECIFIED NON-HODGKIN LYMPHOMA TYPE (H): ICD-10-CM

## 2022-07-15 DIAGNOSIS — R93.1 ABNORMAL FINDINGS DIAGNOSTIC IMAGING OF HEART AND CORONARY CIRCULATION: Primary | ICD-10-CM

## 2022-07-15 PROBLEM — Z98.890 STATUS POST CORONARY ANGIOGRAM: Status: ACTIVE | Noted: 2022-07-15

## 2022-07-15 LAB
ACT BLD: 250 SECONDS (ref 74–150)
ANION GAP SERPL CALCULATED.3IONS-SCNC: 4 MMOL/L (ref 3–14)
APTT PPP: 75 SECONDS (ref 22–38)
BUN SERPL-MCNC: 32 MG/DL (ref 7–30)
CALCIUM SERPL-MCNC: 8.9 MG/DL (ref 8.5–10.1)
CHLORIDE BLD-SCNC: 107 MMOL/L (ref 94–109)
CO2 SERPL-SCNC: 30 MMOL/L (ref 20–32)
CREAT SERPL-MCNC: 1.02 MG/DL (ref 0.52–1.04)
ERYTHROCYTE [DISTWIDTH] IN BLOOD BY AUTOMATED COUNT: 15.9 % (ref 10–15)
GFR SERPL CREATININE-BSD FRML MDRD: 54 ML/MIN/1.73M2
GLUCOSE BLD-MCNC: 109 MG/DL (ref 70–99)
HCT VFR BLD AUTO: 31 % (ref 35–47)
HGB BLD-MCNC: 8.9 G/DL (ref 11.7–15.7)
INR PPP: 1.09 (ref 0.85–1.15)
MCH RBC QN AUTO: 26.2 PG (ref 26.5–33)
MCHC RBC AUTO-ENTMCNC: 28.7 G/DL (ref 31.5–36.5)
MCV RBC AUTO: 91 FL (ref 78–100)
PLATELET # BLD AUTO: 268 10E3/UL (ref 150–450)
POTASSIUM BLD-SCNC: 3.9 MMOL/L (ref 3.4–5.3)
RBC # BLD AUTO: 3.4 10E6/UL (ref 3.8–5.2)
SODIUM SERPL-SCNC: 141 MMOL/L (ref 133–144)
WBC # BLD AUTO: 5.4 10E3/UL (ref 4–11)

## 2022-07-15 PROCEDURE — 36591 DRAW BLOOD OFF VENOUS DEVICE: CPT | Performed by: INTERNAL MEDICINE

## 2022-07-15 PROCEDURE — 93454 CORONARY ARTERY ANGIO S&I: CPT | Mod: 26 | Performed by: INTERNAL MEDICINE

## 2022-07-15 PROCEDURE — 85027 COMPLETE CBC AUTOMATED: CPT | Performed by: INTERNAL MEDICINE

## 2022-07-15 PROCEDURE — 36591 DRAW BLOOD OFF VENOUS DEVICE: CPT

## 2022-07-15 PROCEDURE — 999N000054 HC STATISTIC EKG NON-CHARGEABLE

## 2022-07-15 PROCEDURE — 99152 MOD SED SAME PHYS/QHP 5/>YRS: CPT | Performed by: INTERNAL MEDICINE

## 2022-07-15 PROCEDURE — C1769 GUIDE WIRE: HCPCS | Performed by: INTERNAL MEDICINE

## 2022-07-15 PROCEDURE — 250N000013 HC RX MED GY IP 250 OP 250 PS 637: Performed by: INTERNAL MEDICINE

## 2022-07-15 PROCEDURE — 250N000009 HC RX 250: Performed by: INTERNAL MEDICINE

## 2022-07-15 PROCEDURE — 250N000011 HC RX IP 250 OP 636: Performed by: INTERNAL MEDICINE

## 2022-07-15 PROCEDURE — 85610 PROTHROMBIN TIME: CPT | Performed by: INTERNAL MEDICINE

## 2022-07-15 PROCEDURE — 999N000184 HC STATISTIC TELEMETRY

## 2022-07-15 PROCEDURE — C1894 INTRO/SHEATH, NON-LASER: HCPCS | Performed by: INTERNAL MEDICINE

## 2022-07-15 PROCEDURE — 85347 COAGULATION TIME ACTIVATED: CPT

## 2022-07-15 PROCEDURE — 82310 ASSAY OF CALCIUM: CPT | Performed by: INTERNAL MEDICINE

## 2022-07-15 PROCEDURE — 93005 ELECTROCARDIOGRAM TRACING: CPT

## 2022-07-15 PROCEDURE — 93454 CORONARY ARTERY ANGIO S&I: CPT | Performed by: INTERNAL MEDICINE

## 2022-07-15 PROCEDURE — 93571 IV DOP VEL&/PRESS C FLO 1ST: CPT | Mod: 26 | Performed by: INTERNAL MEDICINE

## 2022-07-15 PROCEDURE — C1887 CATHETER, GUIDING: HCPCS | Performed by: INTERNAL MEDICINE

## 2022-07-15 PROCEDURE — 99153 MOD SED SAME PHYS/QHP EA: CPT | Performed by: INTERNAL MEDICINE

## 2022-07-15 PROCEDURE — 85730 THROMBOPLASTIN TIME PARTIAL: CPT | Mod: GZ | Performed by: INTERNAL MEDICINE

## 2022-07-15 PROCEDURE — 272N000001 HC OR GENERAL SUPPLY STERILE: Performed by: INTERNAL MEDICINE

## 2022-07-15 PROCEDURE — 93010 ELECTROCARDIOGRAM REPORT: CPT | Performed by: INTERNAL MEDICINE

## 2022-07-15 PROCEDURE — 258N000003 HC RX IP 258 OP 636: Performed by: INTERNAL MEDICINE

## 2022-07-15 PROCEDURE — 999N000071 HC STATISTIC HEART CATH LAB OR EP LAB

## 2022-07-15 PROCEDURE — 93571 IV DOP VEL&/PRESS C FLO 1ST: CPT | Performed by: INTERNAL MEDICINE

## 2022-07-15 RX ORDER — ATROPINE SULFATE 0.1 MG/ML
0.5 INJECTION INTRAVENOUS
Status: DISCONTINUED | OUTPATIENT
Start: 2022-07-15 | End: 2022-07-15

## 2022-07-15 RX ORDER — ACETAMINOPHEN 325 MG/1
650 TABLET ORAL EVERY 4 HOURS PRN
Status: DISCONTINUED | OUTPATIENT
Start: 2022-07-15 | End: 2022-07-15 | Stop reason: HOSPADM

## 2022-07-15 RX ORDER — HEPARIN SODIUM 1000 [USP'U]/ML
INJECTION, SOLUTION INTRAVENOUS; SUBCUTANEOUS
Status: DISCONTINUED | OUTPATIENT
Start: 2022-07-15 | End: 2022-07-15 | Stop reason: HOSPADM

## 2022-07-15 RX ORDER — POTASSIUM CHLORIDE 1500 MG/1
20 TABLET, EXTENDED RELEASE ORAL
Status: COMPLETED | OUTPATIENT
Start: 2022-07-15 | End: 2022-07-15

## 2022-07-15 RX ORDER — LIDOCAINE 40 MG/G
CREAM TOPICAL
Status: DISCONTINUED | OUTPATIENT
Start: 2022-07-15 | End: 2022-07-15 | Stop reason: HOSPADM

## 2022-07-15 RX ORDER — VERAPAMIL HYDROCHLORIDE 2.5 MG/ML
INJECTION, SOLUTION INTRAVENOUS
Status: DISCONTINUED | OUTPATIENT
Start: 2022-07-15 | End: 2022-07-15 | Stop reason: HOSPADM

## 2022-07-15 RX ORDER — OXYCODONE HYDROCHLORIDE 5 MG/1
10 TABLET ORAL EVERY 4 HOURS PRN
Status: DISCONTINUED | OUTPATIENT
Start: 2022-07-15 | End: 2022-07-15

## 2022-07-15 RX ORDER — ASPIRIN 325 MG
325 TABLET ORAL ONCE
Status: COMPLETED | OUTPATIENT
Start: 2022-07-15 | End: 2022-07-15

## 2022-07-15 RX ORDER — HEPARIN SODIUM (PORCINE) LOCK FLUSH IV SOLN 100 UNIT/ML 100 UNIT/ML
5 SOLUTION INTRAVENOUS
Status: DISCONTINUED | OUTPATIENT
Start: 2022-07-15 | End: 2022-07-15 | Stop reason: HOSPADM

## 2022-07-15 RX ORDER — FLUMAZENIL 0.1 MG/ML
0.2 INJECTION, SOLUTION INTRAVENOUS
Status: DISCONTINUED | OUTPATIENT
Start: 2022-07-15 | End: 2022-07-15

## 2022-07-15 RX ORDER — NALOXONE HYDROCHLORIDE 0.4 MG/ML
0.2 INJECTION, SOLUTION INTRAMUSCULAR; INTRAVENOUS; SUBCUTANEOUS
Status: DISCONTINUED | OUTPATIENT
Start: 2022-07-15 | End: 2022-07-15

## 2022-07-15 RX ORDER — OXYCODONE HYDROCHLORIDE 5 MG/1
5 TABLET ORAL EVERY 4 HOURS PRN
Status: DISCONTINUED | OUTPATIENT
Start: 2022-07-15 | End: 2022-07-15

## 2022-07-15 RX ORDER — NALOXONE HYDROCHLORIDE 0.4 MG/ML
0.4 INJECTION, SOLUTION INTRAMUSCULAR; INTRAVENOUS; SUBCUTANEOUS
Status: DISCONTINUED | OUTPATIENT
Start: 2022-07-15 | End: 2022-07-15

## 2022-07-15 RX ORDER — HEPARIN SODIUM,PORCINE 10 UNIT/ML
5 VIAL (ML) INTRAVENOUS
Status: CANCELLED | OUTPATIENT
Start: 2022-07-15

## 2022-07-15 RX ORDER — IOPAMIDOL 755 MG/ML
INJECTION, SOLUTION INTRAVASCULAR
Status: DISCONTINUED | OUTPATIENT
Start: 2022-07-15 | End: 2022-07-15 | Stop reason: HOSPADM

## 2022-07-15 RX ORDER — HEPARIN SODIUM (PORCINE) LOCK FLUSH IV SOLN 100 UNIT/ML 100 UNIT/ML
5 SOLUTION INTRAVENOUS
Status: CANCELLED | OUTPATIENT
Start: 2022-07-15

## 2022-07-15 RX ORDER — ASPIRIN 81 MG/1
243 TABLET, CHEWABLE ORAL ONCE
Status: COMPLETED | OUTPATIENT
Start: 2022-07-15 | End: 2022-07-15

## 2022-07-15 RX ORDER — FENTANYL CITRATE 50 UG/ML
25 INJECTION, SOLUTION INTRAMUSCULAR; INTRAVENOUS
Status: DISCONTINUED | OUTPATIENT
Start: 2022-07-15 | End: 2022-07-15

## 2022-07-15 RX ORDER — SODIUM CHLORIDE 9 MG/ML
INJECTION, SOLUTION INTRAVENOUS CONTINUOUS
Status: DISCONTINUED | OUTPATIENT
Start: 2022-07-15 | End: 2022-07-15 | Stop reason: HOSPADM

## 2022-07-15 RX ORDER — NITROGLYCERIN 5 MG/ML
VIAL (ML) INTRAVENOUS
Status: DISCONTINUED | OUTPATIENT
Start: 2022-07-15 | End: 2022-07-15 | Stop reason: HOSPADM

## 2022-07-15 RX ORDER — FENTANYL CITRATE 50 UG/ML
INJECTION, SOLUTION INTRAMUSCULAR; INTRAVENOUS
Status: DISCONTINUED | OUTPATIENT
Start: 2022-07-15 | End: 2022-07-15 | Stop reason: HOSPADM

## 2022-07-15 RX ORDER — SODIUM CHLORIDE 9 MG/ML
INJECTION, SOLUTION INTRAVENOUS CONTINUOUS
Status: DISCONTINUED | OUTPATIENT
Start: 2022-07-15 | End: 2022-07-15

## 2022-07-15 RX ADMIN — ASPIRIN 325 MG ORAL TABLET 325 MG: 325 PILL ORAL at 09:13

## 2022-07-15 RX ADMIN — POTASSIUM CHLORIDE 20 MEQ: 1500 TABLET, EXTENDED RELEASE ORAL at 09:13

## 2022-07-15 RX ADMIN — SODIUM CHLORIDE: 9 INJECTION, SOLUTION INTRAVENOUS at 08:33

## 2022-07-15 RX ADMIN — Medication 5 ML: at 14:31

## 2022-07-15 NOTE — PROGRESS NOTES
1700 Called to bedside to assess right radial puncture site. Very small semi firm area noted proximal to puncture site - ? new vs previous hematoma area. Manual pressure held x 3 minutes until area softer. Dr Macdonald notified per Simona Negron RN. TR band replaced for 30 -45 min.   Armboard intact. Right arm elevated on pillows. Pt denies pain. Pt instructed on activity restrictions with right wrist/arm. Verbal understanding received from pt. Pt's family at bedside. Detailed update given.   1715 Report received from Simona Negron RN.  1730 Air released from TR band.  1745 TR band removed. Very tiny semi firm area remains immediately proximal to puncture site. Remaining area soft & flat.  1800 Right forearm area unchanged. Area remains soft & flat.  1825 Assisted to bathroom with help of 2. Pt unsteady on feet - baseline. No change in puncture site assessment with activity.  1835 Right arm puncture site unchanged. Armboard intact. Pt & family instructed to remove armboard after 24 hrs. Verbal understanding received.  1855 Pt discharged per w/c to private vehicle. All personal belongings taken w/ pt.

## 2022-07-15 NOTE — PROGRESS NOTES
Care Suites Post Procedure Note    Patient Information  Name: Lucille Rojas  Age: 82 year old    Post Procedure  Time patient returned to Care Suites: 1135  Concerns/abnormal assessment: none  If abnormal assessment, provider notified: N/A  Plan/Other: TR band to right radial site with 17 ml air.  Denies any pain or N&T, hand warm and soft above and below band.      Marya Hendricks RN

## 2022-07-15 NOTE — PROGRESS NOTES
Band aid to site.  Will cont to monitor.    AVS reviewed with pt and family.  All state understanding.  Tolerated food and fluids.  Cont to deny pain.    1445- up to bathroom via WC.  Site firm under band aid.  No blood noted on band aid.  Pressure held to site x10 min.  Site is soft now, no drainage on band aid.  Will cont to monitor.      1525- site slightly firm after 10 min.  Pressure held again.  Site soft now, no bruising or bleeding noted on band aid.  Will text paula MILLER with update.    1535-TR band placed per Dr Rodriguez  Telephone order.  Will keep band on at low/band pressure for 30 min and monitor 1 hour post removal for any new bleeding.    Pt sitting up in WC per request.    1615-TR band removed.  New band aid to site.  Site flat, soft.  Will cont to monitor.    Report to Simona SANCHEZ RN.

## 2022-07-15 NOTE — PRE-PROCEDURE
GENERAL PRE-PROCEDURE:   Procedure:  COR ANGIO POSSBLE PCI POSSIBLE RHC  Date/Time:  7/15/2022 10:14 AM    Written consent obtained?: Yes    Risks and benefits: Risks, benefits and alternatives were discussed    Consent given by:  Patient  Patient states understanding of procedure being performed: Yes    Patient's understanding of procedure matches consent: Yes    Procedure consent matches procedure scheduled: Yes    Expected level of sedation:  Moderate  Appropriately NPO:  Yes  ASA Class:  4  Mallampati  :  Grade 1- soft palate, uvula, tonsillar pillars, and posterior pharyngeal wall visible  Lungs:  Lungs clear with good breath sounds bilaterally  Heart:  Systolic murmur  History & Physical reviewed:  History and physical reviewed and no updates needed  Statement of review:  I have reviewed the lab findings, diagnostic data, medications, and the plan for sedation

## 2022-07-15 NOTE — PROGRESS NOTES
Clinic Care Coordination Contact    Situation: Patient chart reviewed by care coordinator.    Background: Pt is currently enrolled in Clinic Care Coordination. She was initially enrolled 12/3/2019. Pt is due for a clinical chart review today.     Assessment: Pt was due for a CHW outreach on 7/14/22. Pt is now hospitalized at [facility] for a procedure in the Care Suites.    Plan/Recommendations: SW will complete a chart review in 2 weeks to give time for pt to return home and for CHW to complete outreach.     TORIE Phillips  [facility] Care Coordination   [facility]  Social Work Care Coordinator  [phone]

## 2022-07-15 NOTE — PROGRESS NOTES
Care Suites Admission Nursing Note    Patient Information  Name: Lucille Rojas  Age: 82 year old  Reason for admission: [facility]  Care Suites arrival time: 0745    Visitor Information  Name: Cooper-spouse, M. Daughter (caregiver)  Informed of visitor restrictions: Yes  1 visitor allowed per patient   Visitor must screen negative for COVID symptoms   Visitor must wear a mask  Waiting rooms closed to visitors    Patient Admission/Assessment   Pre-procedure assessment complete: Yes  If abnormal assessment/labs, provider notified: N/A  NPO: Yes  Medications held per instructions/orders: Yes  Consent: deferred  If applicable, pregnancy test status: deferred  Patient oriented to room: Yes  Education/questions answered: Yes  Plan/other: will give pt aspirin and potasium today.  Will notify MD at time of consent of irritated skin at groin site.  No open areas, only rash and red area of skin.  Reports no pain at site today.  Uses oxygen at home, pt on 2L/NC.  Discharge Planning  Discharge name/phone number: Daughter M. -here today with pt.  Overnight post sedation caregiver: Stefan  Discharge location: home    Marya Hendricks RN

## 2022-07-15 NOTE — PROGRESS NOTES
Spoke with Dr Rodriguez about pts. what feels like a small hematoma at right radial site. She advised to put the TR band on for another hour. If hematoma continues to form pt will need to spend the night for observation.

## 2022-07-15 NOTE — DISCHARGE INSTRUCTIONS
Cardiac Angiogram Discharge Instructions - Radial    After you go home:    Have an adult stay with you until tomorrow.  Drink extra fluids for 2 days.  You may resume your normal diet.  No smoking       For 24 hours - due to the sedation you received:  Relax and take it easy.  Do NOT make any important or legal decisions.  Do NOT drive or operate machines at home or at work.  Do NOT drink alcohol.    Care of Wrist Puncture Site:    For the first 24 hrs - check the puncture site every 1-2 hours while awake.  It is normal to have soreness at the puncture site and mild tingling in your hand for up to 3 days.  Remove the bandaid after 24 hours. If there is minor oozing, apply another bandaid and remove it after 12 hours.  You may shower tomorrow.  Do NOT take a bath, or use a hot tub or pool for at least 3 days. Do NOT scrub the site. Do not use lotion or powder near the puncture site.           Activity:        For 2 days:   do not use your hand or arm to support your weight (such as rising from a chair)   do not bend your wrist (such as lifting a garage door).  do not lift more than 5 pounds or exercise your arm (such as tennis, golf or bowling).  Do NOT do any heavy activity such as exercise, lifting, or straining.     Bleeding:    If you start bleeding from the site in your wrist, sit down and press firmly on/above the site for 10 minutes.   Once bleeding stops, keep arm still for 2 hours.   Call [facility] as soon as you can.       Call 911 right away if you have heavy bleeding or bleeding that does not stop.      Medicines:         If you have stopped any medicines, check with your provider about when to restart them.    Follow Up Appointments:    Follow up with [facility] Nurse Practitioner at [facility] of patient preference in 7-10 days.    Call the clinic if:    You have a large or growing hard lump around the site.  The site is red, swollen, hot or tender.  Blood or fluid is draining from the site.  You  have chills or a fever greater than 101 F (38 C).  Your arm feels numb, cool or changes color.  You have hives, a rash or unusual itching.  Any questions or concerns.          [facility]:    [phone] UM (7 days a week)

## 2022-07-21 LAB
ATRIAL RATE - MUSE: 77 BPM
DIASTOLIC BLOOD PRESSURE - MUSE: NORMAL MMHG
INTERPRETATION ECG - MUSE: NORMAL
P AXIS - MUSE: 56 DEGREES
PR INTERVAL - MUSE: 182 MS
QRS DURATION - MUSE: 102 MS
QT - MUSE: 386 MS
QTC - MUSE: 436 MS
R AXIS - MUSE: -50 DEGREES
SYSTOLIC BLOOD PRESSURE - MUSE: NORMAL MMHG
T AXIS - MUSE: 57 DEGREES
VENTRICULAR RATE- MUSE: 77 BPM

## 2022-07-22 ENCOUNTER — TELEPHONE (OUTPATIENT)
Dept: CARDIOLOGY | Facility: CLINIC | Age: 82
End: 2022-07-22

## 2022-07-22 DIAGNOSIS — R06.02 SHORTNESS OF BREATH: ICD-10-CM

## 2022-07-22 DIAGNOSIS — I35.0 NONRHEUMATIC AORTIC VALVE STENOSIS: Primary | ICD-10-CM

## 2022-07-22 NOTE — TELEPHONE ENCOUNTER
Patient was presented yesterday morning at the multidisciplinary valve conference. Plan is to proceed with TAVR procedure.    Valve: Coreas  Approach: TF   [place]: TBD  Sedation: MAC    Above information was called out to the patient's daughter, Nancy Rajput, who will update the patient as well. Informed Nancy Rajput that we will schedule her for Dr. Rodriguez's next available TAVR day on 08/23/2022 at this time. Additionally informed Nancy Rajput our , [facility], will be giving her a call within the next few business days to get patient scheduled for pre-op appointment, labs, covid test, and a visit with one of CV surgeons.       Mackenzie Noland RN  Structural Heart Coordinator  [facility]

## 2022-07-26 ENCOUNTER — LAB (OUTPATIENT)
Dept: LAB | Facility: CLINIC | Age: 82
End: 2022-07-26
Payer: MEDICARE

## 2022-07-26 ENCOUNTER — OFFICE VISIT (OUTPATIENT)
Dept: CARDIOLOGY | Facility: CLINIC | Age: 82
End: 2022-07-26
Payer: MEDICARE

## 2022-07-26 VITALS
DIASTOLIC BLOOD PRESSURE: 72 MMHG | OXYGEN SATURATION: 93 % | HEIGHT: 60 IN | SYSTOLIC BLOOD PRESSURE: 121 MMHG | WEIGHT: 240 LBS | HEART RATE: 74 BPM | BODY MASS INDEX: 47.12 KG/M2

## 2022-07-26 DIAGNOSIS — I50.30 HEART FAILURE WITH PRESERVED EJECTION FRACTION, NYHA CLASS II (H): ICD-10-CM

## 2022-07-26 DIAGNOSIS — R06.02 SHORTNESS OF BREATH: ICD-10-CM

## 2022-07-26 DIAGNOSIS — I10 BENIGN ESSENTIAL HYPERTENSION: ICD-10-CM

## 2022-07-26 DIAGNOSIS — G47.33 OSA (OBSTRUCTIVE SLEEP APNEA): ICD-10-CM

## 2022-07-26 DIAGNOSIS — I25.10 CORONARY ARTERY DISEASE INVOLVING NATIVE CORONARY ARTERY OF NATIVE HEART WITHOUT ANGINA PECTORIS: ICD-10-CM

## 2022-07-26 DIAGNOSIS — I35.0 NONRHEUMATIC AORTIC VALVE STENOSIS: Primary | ICD-10-CM

## 2022-07-26 DIAGNOSIS — R93.1 ABNORMAL FINDINGS DIAGNOSTIC IMAGING OF HEART AND CORONARY CIRCULATION: ICD-10-CM

## 2022-07-26 LAB
ANION GAP SERPL CALCULATED.3IONS-SCNC: 4 MMOL/L (ref 3–14)
BUN SERPL-MCNC: 39 MG/DL (ref 7–30)
CALCIUM SERPL-MCNC: 9 MG/DL (ref 8.5–10.1)
CHLORIDE BLD-SCNC: 105 MMOL/L (ref 94–109)
CO2 SERPL-SCNC: 34 MMOL/L (ref 20–32)
CREAT SERPL-MCNC: 0.98 MG/DL (ref 0.52–1.04)
GFR SERPL CREATININE-BSD FRML MDRD: 57 ML/MIN/1.73M2
GLUCOSE BLD-MCNC: 113 MG/DL (ref 70–99)
POTASSIUM BLD-SCNC: 4.5 MMOL/L (ref 3.4–5.3)
SODIUM SERPL-SCNC: 143 MMOL/L (ref 133–144)

## 2022-07-26 PROCEDURE — 80048 BASIC METABOLIC PNL TOTAL CA: CPT | Performed by: PHYSICIAN ASSISTANT

## 2022-07-26 PROCEDURE — 36415 COLL VENOUS BLD VENIPUNCTURE: CPT | Performed by: PHYSICIAN ASSISTANT

## 2022-07-26 PROCEDURE — 99215 OFFICE O/P EST HI 40 MIN: CPT | Performed by: PHYSICIAN ASSISTANT

## 2022-07-26 NOTE — PATIENT INSTRUCTIONS
Today's Plan:   1) Continue to use skin powder and appropriate cream to help with your skin redness.   2) Make sure you are taking iron tablets everyday.   3) we will let you know if lab from today is abnormal.    If you have questions or concerns please call my nurse team at [phone].     Scheduling phone number: [phone].  Reminder: Please bring in all current medications, over the counter supplements and vitamin bottles to your next appointment.    It was a pleasure seeing you today!

## 2022-07-26 NOTE — PROGRESS NOTES
Primary Cardiologist: Dr. Rodriguez    Reason for Visit: Post Angiogram Follow up    History of Present Illness:   Lucille is a very pleasant 82-year-old female with past medical history notable for severe aortic stenosis, hypertension, HFpEF (NYHA class III), COPD (oxygen dependent), history of severe upper GI bleed in 2021, dyslipidemia, severe morbid obesity, SHAVON (compliant with CPAP), status post recent chemotherapy for with non-Hodgkin's B-cell lymphoma involving the pancreas, chronic anemia, and type 2 diabetes mellitus.    She returns to clinic today with her spouse as well as daughter, stating she is doing well.  Her angiogram that was completed prior to planned TAVR went well.  She did have lesions noted in the circumflex as well as OM1.  Her IFR circumflex was not flow-limiting but her OM1 lesion was.  Overall it was felt to be stable this no PCI was performed.    Lucille reports feeling okay.  She denies any pain at the radial site.  She does have some tingling sensation to her middle and ring finger at times but this has been ongoing and predates her angiogram.  No bruising or bleeding or drainage at the radial arthrotomy site.  She is planned for TAVR possibly next month.  She tells me she has been applying nystatin powder under her abdominal fold to minimize redness and blister formation.      Assessment and Plan:  Lucille is a very pleasant 82-year-old female with past medical history notable for severe aortic stenosis, hypertension, HFpEF (NYHA class III), COPD (oxygen-dependent), history of severe upper GI bleed in 2021, dyslipidemia, severe morbid obesity, SHAVON (compliant with CPAP), status post recent chemotherapy for with non-Hodgkin's B-cell lymphoma involving the pancreas, chronic anemia, and type 2 diabetes mellitus.    She appears to be doing well from a cardiac standpoint at this time.  Her blood pressure and pulse are within normal limits.  Her radial site does not have any concerning  findings.  She is planned for TAVR procedure next month.  Her BMP is pending at this time.  We will notify her if there are any abnormal findings.    45 minutes spent on the date of the encounter with chart review, patient visit, care coordination, and documentation.      This note was completed in part using Dragon voice recognition software. Although reviewed after completion, some word and grammatical errors may occur.    Orders this Visit:  No orders of the defined types were placed in this encounter.    No orders of the defined types were placed in this encounter.    There are no discontinued medications.      No diagnosis found.    CURRENT MEDICATIONS:  Current Outpatient Medications   Medication Sig Dispense Refill     acetaminophen (TYLENOL) 325 MG tablet Take 2 tablets (650 mg) by mouth every 4 hours as needed for mild pain       aspirin 81 MG tablet Take 1 tablet by mouth daily. 30 tablet 0     bisacodyl (DULCOLAX) 10 MG suppository Place 1 suppository (10 mg) rectally daily as needed for constipation       carboxymethylcellulose PF (REFRESH PLUS) 0.5 % ophthalmic solution Place 2 drops into both eyes 2 times daily       citalopram (CELEXA) 20 MG tablet Take 1 tablet (20 mg) by mouth daily 90 tablet 1     CONTOUR NEXT TEST test strip USE TO TEST BLOOD GLUCOSE ONCE DAILY       furosemide (LASIX) 20 MG tablet Take 1 tablet (20 mg) by mouth daily 90 tablet 1     guaiFENesin-dextromethorphan (ROBITUSSIN DM) 100-10 MG/5ML syrup Take 10 mLs by mouth every 4 hours as needed for cough       levothyroxine (SYNTHROID/LEVOTHROID) 200 MCG tablet Take 1 tablet (200 mcg) by mouth daily 90 tablet 1     loratadine (CLARITIN) 10 MG tablet Take 10 mg by mouth daily       LORazepam (ATIVAN) 0.5 MG tablet Take 1 tablet (0.5 mg) by mouth every 8 hours as needed for anxiety or nausea 30 tablet 1     miconazole (MICATIN) 2 % external powder Apply topically 2 times daily       Microlet Lancets MISC USE TO TEST BLOOD GLUCOSE ONCE  DAILY       nystatin (MYCOSTATIN) 234565 UNIT/GM external powder Apply topically 2 times daily Under breasts and skin folds BID & BID PRN for redness 60 g 1     order for DME Equipment being ordered: wheeled walker with hand breaks 1 Device 0     pantoprazole (PROTONIX) 40 MG EC tablet Take 1 tablet (40 mg) by mouth every morning (before breakfast) 90 tablet 3     polyethylene glycol (MIRALAX) 17 g packet Take 1 packet by mouth every evening       senna-docusate (SENOKOT-S/PERICOLACE) 8.6-50 MG tablet Take 1 tablet by mouth every morning       simvastatin (ZOCOR) 10 MG tablet Take 1 tablet (10 mg) by mouth At Bedtime 90 tablet 1       ALLERGIES     Allergies   Allergen Reactions     Penicillins        PAST MEDICAL HISTORY:  Past Medical History:   Diagnosis Date     Cancer (H) 10/2021     Depressive disorder      Heart disease 10/2021     History of blood transfusion 20/2021     HTN (hypertension) 5/9/2013     Hyperlipidemia LDL goal <130 5/9/2013     Hypothyroidism 5/9/2013     Macular degeneration 9/18/2013     Sleep apnea     CPAP at night, O2 during naps     Type 2 diabetes, HbA1C goal < 8% (H) 5/9/2013       PAST SURGICAL HISTORY:  Past Surgical History:   Procedure Laterality Date     APPENDECTOMY       COLONOSCOPY       COLONOSCOPY N/A 10/27/2014    Procedure: COMBINED COLONOSCOPY, SINGLE OR MULTIPLE BIOPSY/POLYPECTOMY BY BIOPSY;  Surgeon: Jose Alfredo Morejon MD;  Location:  GI     CV CORONARY ANGIOGRAM N/A 7/15/2022    Procedure: Coronary Angiogram;  Surgeon: Mary Jo Rodriguez MD;  Location: [facility] CARDIAC CATH LAB     CV PCI N/A 7/15/2022    Procedure: Percutaneous Coronary Intervention;  Surgeon: Mary Jo Rodriguez MD;  Location: [facility] CARDIAC CATH LAB     ESOPHAGOSCOPY, GASTROSCOPY, DUODENOSCOPY (EGD), COMBINED N/A 9/21/2021    Procedure: ESOPHAGOGASTRODUODENOSCOPY, WITH FINE NEEDLE ASPIRATION BIOPSY, WITH ENDOSCOPIC ULTRASOUND GUIDANCE;  Surgeon: Vera Benitez MD;  Location:  GI      ESOPHAGOSCOPY, GASTROSCOPY, DUODENOSCOPY (EGD), COMBINED N/A 9/21/2021    Procedure: Esophagogastroduodenoscopy, With Biopsy;  Surgeon: Vera Benitez MD;  Location:  GI     ESOPHAGOSCOPY, GASTROSCOPY, DUODENOSCOPY (EGD), COMBINED N/A 10/5/2021    Procedure: ESOPHAGOGASTRODUODENOSCOPY, WITH FINE NEEDLE ASPIRATION BIOPSY, WITH ENDOSCOPIC ULTRASOUND GUIDANCE;  Surgeon: Dixon Olivier MD;  Location:  GI     ESOPHAGOSCOPY, GASTROSCOPY, DUODENOSCOPY (EGD), COMBINED N/A 12/22/2021    Procedure: ESOPHAGOGASTRODUODENOSCOPY, WITH BIOPSY;  Surgeon: Vera Benitez MD;  Location:  GI     HERNIA REPAIR      inguinal x 2     IR CHEST PORT PLACEMENT > 5 YRS OF AGE  12/13/2021     TONSILLECTOMY         FAMILY HISTORY:  History reviewed. No pertinent family history.    SOCIAL HISTORY:  Social History     Socioeconomic History     Marital status:      Spouse name: None     Number of children: None     Years of education: None     Highest education level: None   Tobacco Use     Smoking status: Never Smoker     Smokeless tobacco: Never Used   Substance and Sexual Activity     Alcohol use: Not Currently     Drug use: No     Sexual activity: Never       Review of Systems:  Skin:  not assessed     Eyes:  Positive for glasses  ENT:  Positive for postnasal drainage;hearing loss  Respiratory:  Positive for shortness of breath;dyspnea on exertion;sleep apnea  Cardiovascular:    Positive for;dizziness;edema  Gastroenterology: Positive for reflux  Genitourinary:  Positive for nocturia  Musculoskeletal:  Positive for arthritis  Neurologic:  Positive for numbness or tingling of hands;numbness or tingling of feet  Psychiatric:  Positive for anxiety;depression  Heme/Lymph/Imm:  Positive for allergies  Endocrine:  Positive for thyroid disorder;diabetes    Physical Exam:  Vitals: /72   Pulse 74   Ht 1.524 m (5')   Wt 108.9 kg (240 lb)   SpO2 93%   BMI 46.87 kg/m       GEN:  NAD. In  wheelchair.   NECK: Unable to assess JVP.   C/V:  Regular rate and rhythm; 3/6 ZAY at RUSB.  RESP: Clear to auscultation bilaterally.  GI: Abdomen soft, nontender, nondistended.   EXTREM: Trace pitting LE edema.   NEURO: Alert and oriented, cooperative. No obvious focal deficits.   PSYCH: Normal affect.  SKIN: Warm and dry.       Recent Lab Results:  LIPID RESULTS:  Lab Results   Component Value Date    CHOL 159 11/22/2021    CHOL 148 06/04/2020    HDL 51 11/22/2021    HDL 47 (L) 06/04/2020    LDL 81 11/22/2021    LDL 77 06/04/2020    TRIG 133 11/22/2021    TRIG 140 06/04/2020    CHOLHDLRATIO 3.4 05/09/2013       LIVER ENZYME RESULTS:  Lab Results   Component Value Date    AST 18 06/23/2022    AST 13 08/27/2020    ALT 13 06/23/2022    ALT 15 08/27/2020       CBC RESULTS:  Lab Results   Component Value Date    WBC 5.4 07/15/2022    WBC 7.6 12/08/2016    RBC 3.40 (L) 07/15/2022    RBC 3.62 (L) 12/08/2016    HGB 8.9 (L) 07/15/2022    HGB 10.6 (L) 04/07/2021    HCT 31.0 (L) 07/15/2022    HCT 34.3 (L) 12/08/2016    MCV 91 07/15/2022    MCV 95 12/08/2016    MCH 26.2 (L) 07/15/2022    MCH 27.6 12/08/2016    MCHC 28.7 (L) 07/15/2022    MCHC 29.2 (L) 12/08/2016    RDW 15.9 (H) 07/15/2022    RDW 14.6 12/08/2016     07/15/2022     12/08/2016       BMP RESULTS:  Lab Results   Component Value Date     07/15/2022     08/27/2020    POTASSIUM 3.9 07/15/2022    POTASSIUM 4.1 08/27/2020    CHLORIDE 107 07/15/2022    CHLORIDE 109 08/27/2020    CO2 30 07/15/2022    CO2 31 08/27/2020    ANIONGAP 4 07/15/2022    ANIONGAP 4 08/27/2020     (H) 07/15/2022     (H) 08/27/2020    BUN 32 (H) 07/15/2022    BUN 19 08/27/2020    CR 1.02 07/15/2022    CR 0.98 08/27/2020    GFRESTIMATED 54 (L) 07/15/2022    GFRESTIMATED 55 (L) 06/14/2022    GFRESTIMATED 54 (L) 08/27/2020    GFRESTBLACK 62 08/27/2020    IGOR 8.9 07/15/2022    IGOR 9.6 08/27/2020        A1C RESULTS:  Lab Results   Component Value Date    A1C 5.0  11/22/2021    A1C 5.8 (H) 04/07/2021       INR RESULTS:  Lab Results   Component Value Date    INR 1.09 07/15/2022    INR 1.09 12/21/2021           Jennifer Ramirez PA-C  July 26, 2022

## 2022-07-26 NOTE — LETTER
7/26/2022    Fausto Flynn MD  [address] [facility] [address]    RE: Lucilleedelmira Rojas       Dear Colleague,     I had the pleasure of seeing Lucille Rojas in the [facility].  Primary Cardiologist: Dr. Rodriguez    Reason for Visit: Post Angiogram Follow up    History of Present Illness:   Lucille is a very pleasant 82-year-old female with past medical history notable for severe aortic stenosis, hypertension, HFpEF (NYHA class III), COPD (oxygen dependent), history of severe upper GI bleed in 2021, dyslipidemia, severe morbid obesity, SHAVON (compliant with CPAP), status post recent chemotherapy for with non-Hodgkin's B-cell lymphoma involving the pancreas, chronic anemia, and type 2 diabetes mellitus.    She returns to clinic today with her spouse as well as daughter, stating she is doing well.  Her angiogram that was completed prior to planned TAVR went well.  She did have lesions noted in the circumflex as well as OM1.  Her IFR circumflex was not flow-limiting but her OM1 lesion was.  Overall it was felt to be stable this no PCI was performed.    Lucille reports feeling okay.  She denies any pain at the radial site.  She does have some tingling sensation to her middle and ring finger at times but this has been ongoing and predates her angiogram.  No bruising or bleeding or drainage at the radial arthrotomy site.  She is planned for TAVR possibly next month.  She tells me she has been applying nystatin powder under her abdominal fold to minimize redness and blister formation.      Assessment and Plan:  Lucille is a very pleasant 82-year-old female with past medical history notable for severe aortic stenosis, hypertension, HFpEF (NYHA class III), COPD (oxygen-dependent), history of severe upper GI bleed in 2021, dyslipidemia, severe morbid obesity, SHAVON (compliant with CPAP), status post recent chemotherapy for with non-Hodgkin's B-cell lymphoma involving the pancreas, chronic  anemia, and type 2 diabetes mellitus.    She appears to be doing well from a cardiac standpoint at this time.  Her blood pressure and pulse are within normal limits.  Her radial site does not have any concerning findings.  She is planned for TAVR procedure next month.  Her BMP is pending at this time.  We will notify her if there are any abnormal findings.    45 minutes spent on the date of the encounter with chart review, patient visit, care coordination, and documentation.      This note was completed in part using Dragon voice recognition software. Although reviewed after completion, some word and grammatical errors may occur.    Orders this Visit:  No orders of the defined types were placed in this encounter.    No orders of the defined types were placed in this encounter.    There are no discontinued medications.      No diagnosis found.    CURRENT MEDICATIONS:  Current Outpatient Medications   Medication Sig Dispense Refill     acetaminophen (TYLENOL) 325 MG tablet Take 2 tablets (650 mg) by mouth every 4 hours as needed for mild pain       aspirin 81 MG tablet Take 1 tablet by mouth daily. 30 tablet 0     bisacodyl (DULCOLAX) 10 MG suppository Place 1 suppository (10 mg) rectally daily as needed for constipation       carboxymethylcellulose PF (REFRESH PLUS) 0.5 % ophthalmic solution Place 2 drops into both eyes 2 times daily       citalopram (CELEXA) 20 MG tablet Take 1 tablet (20 mg) by mouth daily 90 tablet 1     CONTOUR NEXT TEST test strip USE TO TEST BLOOD GLUCOSE ONCE DAILY       furosemide (LASIX) 20 MG tablet Take 1 tablet (20 mg) by mouth daily 90 tablet 1     guaiFENesin-dextromethorphan (ROBITUSSIN DM) 100-10 MG/5ML syrup Take 10 mLs by mouth every 4 hours as needed for cough       levothyroxine (SYNTHROID/LEVOTHROID) 200 MCG tablet Take 1 tablet (200 mcg) by mouth daily 90 tablet 1     loratadine (CLARITIN) 10 MG tablet Take 10 mg by mouth daily       LORazepam (ATIVAN) 0.5 MG tablet Take 1  tablet (0.5 mg) by mouth every 8 hours as needed for anxiety or nausea 30 tablet 1     miconazole (MICATIN) 2 % external powder Apply topically 2 times daily       Microlet Lancets MISC USE TO TEST BLOOD GLUCOSE ONCE DAILY       nystatin (MYCOSTATIN) 562146 UNIT/GM external powder Apply topically 2 times daily Under breasts and skin folds BID & BID PRN for redness 60 g 1     order for DME Equipment being ordered: wheeled walker with hand breaks 1 Device 0     pantoprazole (PROTONIX) 40 MG EC tablet Take 1 tablet (40 mg) by mouth every morning (before breakfast) 90 tablet 3     polyethylene glycol (MIRALAX) 17 g packet Take 1 packet by mouth every evening       senna-docusate (SENOKOT-S/PERICOLACE) 8.6-50 MG tablet Take 1 tablet by mouth every morning       simvastatin (ZOCOR) 10 MG tablet Take 1 tablet (10 mg) by mouth At Bedtime 90 tablet 1       ALLERGIES     Allergies   Allergen Reactions     Penicillins        PAST MEDICAL HISTORY:  Past Medical History:   Diagnosis Date     Cancer (H) 10/2021     Depressive disorder      Heart disease 10/2021     History of blood transfusion 20/2021     HTN (hypertension) 5/9/2013     Hyperlipidemia LDL goal <130 5/9/2013     Hypothyroidism 5/9/2013     Macular degeneration 9/18/2013     Sleep apnea     CPAP at night, O2 during naps     Type 2 diabetes, HbA1C goal < 8% (H) 5/9/2013       PAST SURGICAL HISTORY:  Past Surgical History:   Procedure Laterality Date     APPENDECTOMY       COLONOSCOPY       COLONOSCOPY N/A 10/27/2014    Procedure: COMBINED COLONOSCOPY, SINGLE OR MULTIPLE BIOPSY/POLYPECTOMY BY BIOPSY;  Surgeon: Jose Alfredo Morejon MD;  Location:  GI     CV CORONARY ANGIOGRAM N/A 7/15/2022    Procedure: Coronary Angiogram;  Surgeon: Mary Jo Rodriguez MD;  Location: [facility] CARDIAC CATH LAB     CV PCI N/A 7/15/2022    Procedure: Percutaneous Coronary Intervention;  Surgeon: Mary Jo Rodriguez MD;  Location: [facility] CARDIAC CATH LAB     ESOPHAGOSCOPY, GASTROSCOPY,  DUODENOSCOPY (EGD), COMBINED N/A 9/21/2021    Procedure: ESOPHAGOGASTRODUODENOSCOPY, WITH FINE NEEDLE ASPIRATION BIOPSY, WITH ENDOSCOPIC ULTRASOUND GUIDANCE;  Surgeon: Vera Benitez MD;  Location:  GI     ESOPHAGOSCOPY, GASTROSCOPY, DUODENOSCOPY (EGD), COMBINED N/A 9/21/2021    Procedure: Esophagogastroduodenoscopy, With Biopsy;  Surgeon: Vera Benitez MD;  Location:  GI     ESOPHAGOSCOPY, GASTROSCOPY, DUODENOSCOPY (EGD), COMBINED N/A 10/5/2021    Procedure: ESOPHAGOGASTRODUODENOSCOPY, WITH FINE NEEDLE ASPIRATION BIOPSY, WITH ENDOSCOPIC ULTRASOUND GUIDANCE;  Surgeon: Dixon Olivier MD;  Location:  GI     ESOPHAGOSCOPY, GASTROSCOPY, DUODENOSCOPY (EGD), COMBINED N/A 12/22/2021    Procedure: ESOPHAGOGASTRODUODENOSCOPY, WITH BIOPSY;  Surgeon: Vera Benitez MD;  Location:  GI     HERNIA REPAIR      inguinal x 2     IR CHEST PORT PLACEMENT > 5 YRS OF AGE  12/13/2021     TONSILLECTOMY         FAMILY HISTORY:  History reviewed. No pertinent family history.    SOCIAL HISTORY:  Social History     Socioeconomic History     Marital status:      Spouse name: None     Number of children: None     Years of education: None     Highest education level: None   Tobacco Use     Smoking status: Never Smoker     Smokeless tobacco: Never Used   Substance and Sexual Activity     Alcohol use: Not Currently     Drug use: No     Sexual activity: Never       Review of Systems:  Skin:  not assessed     Eyes:  Positive for glasses  ENT:  Positive for postnasal drainage;hearing loss  Respiratory:  Positive for shortness of breath;dyspnea on exertion;sleep apnea  Cardiovascular:    Positive for;dizziness;edema  Gastroenterology: Positive for reflux  Genitourinary:  Positive for nocturia  Musculoskeletal:  Positive for arthritis  Neurologic:  Positive for numbness or tingling of hands;numbness or tingling of feet  Psychiatric:  Positive for anxiety;depression  Heme/Lymph/Imm:  Positive  for allergies  Endocrine:  Positive for thyroid disorder;diabetes    Physical Exam:  Vitals: /72   Pulse 74   Ht 1.524 m (5')   Wt 108.9 kg (240 lb)   SpO2 93%   BMI 46.87 kg/m       GEN:  NAD. In wheelchair.   NECK: Unable to assess JVP.   C/V:  Regular rate and rhythm; 3/6 ZAY at RUSB.  RESP: Clear to auscultation bilaterally.  GI: Abdomen soft, nontender, nondistended.   EXTREM: Trace pitting LE edema.   NEURO: Alert and oriented, cooperative. No obvious focal deficits.   PSYCH: Normal affect.  SKIN: Warm and dry.       Recent Lab Results:  LIPID RESULTS:  Lab Results   Component Value Date    CHOL 159 11/22/2021    CHOL 148 06/04/2020    HDL 51 11/22/2021    HDL 47 (L) 06/04/2020    LDL 81 11/22/2021    LDL 77 06/04/2020    TRIG 133 11/22/2021    TRIG 140 06/04/2020    CHOLHDLRATIO 3.4 05/09/2013       LIVER ENZYME RESULTS:  Lab Results   Component Value Date    AST 18 06/23/2022    AST 13 08/27/2020    ALT 13 06/23/2022    ALT 15 08/27/2020       CBC RESULTS:  Lab Results   Component Value Date    WBC 5.4 07/15/2022    WBC 7.6 12/08/2016    RBC 3.40 (L) 07/15/2022    RBC 3.62 (L) 12/08/2016    HGB 8.9 (L) 07/15/2022    HGB 10.6 (L) 04/07/2021    HCT 31.0 (L) 07/15/2022    HCT 34.3 (L) 12/08/2016    MCV 91 07/15/2022    MCV 95 12/08/2016    MCH 26.2 (L) 07/15/2022    MCH 27.6 12/08/2016    MCHC 28.7 (L) 07/15/2022    MCHC 29.2 (L) 12/08/2016    RDW 15.9 (H) 07/15/2022    RDW 14.6 12/08/2016     07/15/2022     12/08/2016       BMP RESULTS:  Lab Results   Component Value Date     07/15/2022     08/27/2020    POTASSIUM 3.9 07/15/2022    POTASSIUM 4.1 08/27/2020    CHLORIDE 107 07/15/2022    CHLORIDE 109 08/27/2020    CO2 30 07/15/2022    CO2 31 08/27/2020    ANIONGAP 4 07/15/2022    ANIONGAP 4 08/27/2020     (H) 07/15/2022     (H) 08/27/2020    BUN 32 (H) 07/15/2022    BUN 19 08/27/2020    CR 1.02 07/15/2022    CR 0.98 08/27/2020    GFRESTIMATED 54 (L) 07/15/2022     GFRESTIMATED 55 (L) 06/14/2022    GFRESTIMATED 54 (L) 08/27/2020    GFRESTBLACK 62 08/27/2020    IGOR 8.9 07/15/2022    IGOR 9.6 08/27/2020        A1C RESULTS:  Lab Results   Component Value Date    A1C 5.0 11/22/2021    A1C 5.8 (H) 04/07/2021       INR RESULTS:  Lab Results   Component Value Date    INR 1.09 07/15/2022    INR 1.09 12/21/2021           Jennifer Ramirez PA-C  July 26, 2022       Thank you for allowing me to participate in the care of your patient.      Sincerely,     Jennifer Ramirez PA-C     [facility]  cc:   Mary Jo Rodriguez MD  [address] [id number]

## 2022-07-29 ENCOUNTER — PATIENT OUTREACH (OUTPATIENT)
Dept: CARE COORDINATION | Facility: CLINIC | Age: 82
End: 2022-07-29

## 2022-07-29 DIAGNOSIS — E03.9 ACQUIRED HYPOTHYROIDISM: Chronic | ICD-10-CM

## 2022-07-29 NOTE — PROGRESS NOTES
Clinic Care Coordination Contact    Community Health Worker Follow Up    Care Gaps:     Health Maintenance Due   Topic Date Due     ZOSTER IMMUNIZATION (1 of 2) Never done     DTAP/TDAP/TD IMMUNIZATION (2 - Td or Tdap) 01/01/2019     EYE EXAM  06/01/2021     COVID-19 Vaccine (4 - Booster for Pfizer series) 02/02/2022     PHQ-9  05/02/2022     A1C  05/22/2022       Did not address    Goals: On Maintenance    Discussed:    Patient stated she has surgery in August.      Patient stated she may need meals delivered after the surgery.    Patient stated her personality changes after her chemo treatments.    Patient stated she needs to have her toe nails trimmed.    Patient stated she hired someone to help clean her house.  She pays $20.00 per hour.  The person comes every two weeks for two hours at a time.    Patient requested to be called at the end of August.      Intervention and Education during outreach: CHW encouraged patient to contact CC if there are any other changes or resources needed.  Patient acknowledged understanding.        CHW Plan: will follow up in one month.  CHW discuss any resource needs or new goals.  If not, move patient to graduate with CC SW review.    ZANE Demarco  Clinic Care Coordination  [facility]: [facility], [facility], [facility], and [facility]  Phone: [phone]

## 2022-08-01 DIAGNOSIS — C85.12 B-CELL LYMPHOMA OF INTRATHORACIC LYMPH NODES, UNSPECIFIED B-CELL LYMPHOMA TYPE (H): Primary | ICD-10-CM

## 2022-08-01 RX ORDER — LEVOTHYROXINE SODIUM 200 UG/1
TABLET ORAL
Qty: 90 TABLET | Refills: 0 | Status: SHIPPED | OUTPATIENT
Start: 2022-08-01 | End: 2022-08-17 | Stop reason: DRUGHIGH

## 2022-08-02 ENCOUNTER — LAB (OUTPATIENT)
Dept: INFUSION THERAPY | Facility: CLINIC | Age: 82
End: 2022-08-02
Attending: NURSE PRACTITIONER
Payer: MEDICARE

## 2022-08-02 ENCOUNTER — PATIENT OUTREACH (OUTPATIENT)
Dept: CARE COORDINATION | Facility: CLINIC | Age: 82
End: 2022-08-02

## 2022-08-02 ENCOUNTER — ONCOLOGY VISIT (OUTPATIENT)
Dept: ONCOLOGY | Facility: CLINIC | Age: 82
End: 2022-08-02
Attending: NURSE PRACTITIONER
Payer: MEDICARE

## 2022-08-02 VITALS
BODY MASS INDEX: 46.99 KG/M2 | HEART RATE: 71 BPM | SYSTOLIC BLOOD PRESSURE: 129 MMHG | RESPIRATION RATE: 16 BRPM | DIASTOLIC BLOOD PRESSURE: 68 MMHG | TEMPERATURE: 97.8 F | WEIGHT: 240.6 LBS | OXYGEN SATURATION: 96 %

## 2022-08-02 DIAGNOSIS — C85.12 B-CELL LYMPHOMA OF INTRATHORACIC LYMPH NODES, UNSPECIFIED B-CELL LYMPHOMA TYPE (H): Primary | ICD-10-CM

## 2022-08-02 DIAGNOSIS — E06.9 THYROIDITIS: ICD-10-CM

## 2022-08-02 DIAGNOSIS — C85.99 NON-HODGKIN LYMPHOMA OF SOLID ORGAN EXCLUDING SPLEEN, UNSPECIFIED NON-HODGKIN LYMPHOMA TYPE (H): ICD-10-CM

## 2022-08-02 LAB
ALBUMIN SERPL-MCNC: 3.3 G/DL (ref 3.4–5)
ALP SERPL-CCNC: 109 U/L (ref 40–150)
ALT SERPL W P-5'-P-CCNC: 15 U/L (ref 0–50)
ANION GAP SERPL CALCULATED.3IONS-SCNC: 6 MMOL/L (ref 3–14)
AST SERPL W P-5'-P-CCNC: 14 U/L (ref 0–45)
BASOPHILS # BLD AUTO: 0 10E3/UL (ref 0–0.2)
BASOPHILS NFR BLD AUTO: 0 %
BILIRUB SERPL-MCNC: 0.2 MG/DL (ref 0.2–1.3)
BUN SERPL-MCNC: 28 MG/DL (ref 7–30)
CALCIUM SERPL-MCNC: 8.5 MG/DL (ref 8.5–10.1)
CHLORIDE BLD-SCNC: 107 MMOL/L (ref 94–109)
CO2 SERPL-SCNC: 31 MMOL/L (ref 20–32)
CREAT SERPL-MCNC: 0.99 MG/DL (ref 0.52–1.04)
EOSINOPHIL # BLD AUTO: 0.3 10E3/UL (ref 0–0.7)
EOSINOPHIL NFR BLD AUTO: 4 %
ERYTHROCYTE [DISTWIDTH] IN BLOOD BY AUTOMATED COUNT: 15.1 % (ref 10–15)
GFR SERPL CREATININE-BSD FRML MDRD: 56 ML/MIN/1.73M2
GLUCOSE BLD-MCNC: 120 MG/DL (ref 70–99)
HCT VFR BLD AUTO: 31.8 % (ref 35–47)
HGB BLD-MCNC: 9.1 G/DL (ref 11.7–15.7)
IMM GRANULOCYTES # BLD: 0 10E3/UL
IMM GRANULOCYTES NFR BLD: 0 %
LDH SERPL L TO P-CCNC: 105 U/L (ref 81–234)
LYMPHOCYTES # BLD AUTO: 0.9 10E3/UL (ref 0.8–5.3)
LYMPHOCYTES NFR BLD AUTO: 12 %
MCH RBC QN AUTO: 26.1 PG (ref 26.5–33)
MCHC RBC AUTO-ENTMCNC: 28.6 G/DL (ref 31.5–36.5)
MCV RBC AUTO: 91 FL (ref 78–100)
MONOCYTES # BLD AUTO: 0.8 10E3/UL (ref 0–1.3)
MONOCYTES NFR BLD AUTO: 11 %
NEUTROPHILS # BLD AUTO: 5.6 10E3/UL (ref 1.6–8.3)
NEUTROPHILS NFR BLD AUTO: 73 %
NRBC # BLD AUTO: 0 10E3/UL
NRBC BLD AUTO-RTO: 0 /100
PLATELET # BLD AUTO: 251 10E3/UL (ref 150–450)
POTASSIUM BLD-SCNC: 3.5 MMOL/L (ref 3.4–5.3)
PROT SERPL-MCNC: 6.4 G/DL (ref 6.8–8.8)
RBC # BLD AUTO: 3.48 10E6/UL (ref 3.8–5.2)
SODIUM SERPL-SCNC: 144 MMOL/L (ref 133–144)
T4 FREE SERPL-MCNC: 1.54 NG/DL (ref 0.76–1.46)
TSH SERPL DL<=0.005 MIU/L-ACNC: 0.12 MU/L (ref 0.4–4)
WBC # BLD AUTO: 7.7 10E3/UL (ref 4–11)

## 2022-08-02 PROCEDURE — 99214 OFFICE O/P EST MOD 30 MIN: CPT | Performed by: INTERNAL MEDICINE

## 2022-08-02 PROCEDURE — 84443 ASSAY THYROID STIM HORMONE: CPT | Performed by: NURSE PRACTITIONER

## 2022-08-02 PROCEDURE — 36591 DRAW BLOOD OFF VENOUS DEVICE: CPT

## 2022-08-02 PROCEDURE — G0463 HOSPITAL OUTPT CLINIC VISIT: HCPCS

## 2022-08-02 PROCEDURE — 83615 LACTATE (LD) (LDH) ENZYME: CPT | Performed by: NURSE PRACTITIONER

## 2022-08-02 PROCEDURE — 85025 COMPLETE CBC W/AUTO DIFF WBC: CPT | Performed by: NURSE PRACTITIONER

## 2022-08-02 PROCEDURE — 84439 ASSAY OF FREE THYROXINE: CPT | Performed by: NURSE PRACTITIONER

## 2022-08-02 PROCEDURE — 80053 COMPREHEN METABOLIC PANEL: CPT | Performed by: NURSE PRACTITIONER

## 2022-08-02 PROCEDURE — 250N000011 HC RX IP 250 OP 636: Performed by: INTERNAL MEDICINE

## 2022-08-02 RX ORDER — HEPARIN SODIUM (PORCINE) LOCK FLUSH IV SOLN 100 UNIT/ML 100 UNIT/ML
5 SOLUTION INTRAVENOUS
Status: DISCONTINUED | OUTPATIENT
Start: 2022-08-02 | End: 2022-08-02 | Stop reason: HOSPADM

## 2022-08-02 RX ORDER — HEPARIN SODIUM (PORCINE) LOCK FLUSH IV SOLN 100 UNIT/ML 100 UNIT/ML
5 SOLUTION INTRAVENOUS
Status: CANCELLED | OUTPATIENT
Start: 2022-08-02

## 2022-08-02 RX ORDER — HEPARIN SODIUM,PORCINE 10 UNIT/ML
5 VIAL (ML) INTRAVENOUS
Status: CANCELLED | OUTPATIENT
Start: 2022-08-02

## 2022-08-02 RX ADMIN — Medication 5 ML: at 09:49

## 2022-08-02 ASSESSMENT — PAIN SCALES - GENERAL: PAINLEVEL: NO PAIN (0)

## 2022-08-02 NOTE — PROGRESS NOTES
ONCOLOGY HISTORY: Ms. 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  1. On 09/20/2021:   -Hemoglobin of 4.7 with MCV of 67. Normal WBC and platelets.  -Iron of 9 and saturation index of 2%.   -CT chest, abdomen and pelvis reveals large pancreatic head mass.  This encases the celiac trunk, superior mesenteric artery and superior mesenteric vein.  There is moderate-size right pleural effusion. No lymphadenopathy.  2. Thoracocentesis on 09/22/2021. Cytology is negative for malignancy.  3. EUS on 09/21/2021 revealed is a mass in the uncinate process of the pancreas measuring 33 mm.  There was evidence of invasion into superior mesenteric artery and the celiac trunk. No enlarged lymph nodes seen. FNA revealed atypical cells.    4. EGD and EUS repeated on 10/05/2021.  -EGD is normal.  -EUS revealed pancreatic head mass.  FNA revealed CD10-positive B-cell lymphoma. Flow cytometry revealed CD10-positive lambda monotypic B-cells.  5. PET scan on 11/18/2021 revealed 6 cm hypermetabolic pancreatic head mass and borderline hypermetabolic right axillary lymph node.   -Further repeat biopsy not done because of her overall poor health.  6. mini R-CHOP x 6 cycles between 12/15/2021 and 03/30/2022.  -PET scan on 04/11/21 reveals complete response.     SUBJECTIVE:  Ms. Rojas is an 82-year-old female with non-Hodgkin's B-cell lymphoma involving the pancreas s/p 6 cycles of mini R-CHOP.  PET scan on 04/11/2022 revealed complete response.      Patient has fatigue.  She also has insomnia.  Patient said she does not get good night sleep.  No headache.  Occasional dizziness.  No chest pain.  Mild shortness of breath.  No abdominal pain.  No nausea or vomiting.  Appetite is fairly good.  No bleeding from any site.  No fever or night sweats.    Patient is going to have TAVR procedure done in the next few weeks.       All other review system negative.     PHYSICAL EXAMINATION:    GENERAL:  She is alert  and oriented x 3.  Not in respiratory distress.  ECOG PS of 2.   FACE:  No swelling.  EYES:  No icterus.   NECK:  Supple. No lymphadenopathy.  LUNGS:  Decreased air entry at the bases.  HEART:  Regular.  ABDOMEN:  Soft. Nontender.  Difficult palpation because of her weight.  No mass.  EXTREMITIES:  Mild ankle edema.  SKIN:  No petechiae.     LABS: CBC, LDH and CMP reviewed.    ASSESSMENT:    1.  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 Patient is in complete remission.  2.  Fatigue.  3.  Insomnia.  4.  Normocytic anemia. Stable.  5.  ? Thyroiditis  6.  Multiple other medical problems including diabetes mellitus and hypertension.     PLAN:  1.  Discussed regarding lymphoma.  No clinical suspicion of recurrence.  For follow-up, will get labs and CT chest, abdomen and pelvis in mid November.  She is agreeable for it.    2.  Labs were reviewed with her.  She has chronic anemia which is stable.  Anemia is due to her multiple medical problem and also from recent chemotherapy.  I do not think her anemia is going to improve much.    3.  Patient has fatigue.  This is due to multiple medical problem, her age, anemia and insomnia.  Patient is going to get TAVR procedure.  After that her shortness of breath and fatigue might improve somewhat.    4.  Previous PET scan was suspicious for thyroiditis.  We will get TSH and free T4.    5.  Patient has insomnia.  I told her that is also contributing to her fatigue.    6.  She had few questions which were all answered.  I will see her in November after CT scan.

## 2022-08-02 NOTE — PROGRESS NOTES
Care Coordination Clinician Chart Review     Situation: Patient chart reviewed by care coordinator.?     Background: Initial assessment and enrollment to Care Coordination was 12/3/19.?? Patient centered goals were developed with participation from patient.? Lead CC handed patient off to CHW for continued outreach every 30 days.??     Assessment: Per chart review, patient outreach completed by CC CHW on 7/29/22.? Patient is actively working to accomplish goal(s).? Patient's goal(s) remain(s) appropriate at this time.? Patient is not due for updated Plan of Care.? Annual assessment will be due 12/3/22.      Goals    None     ??     Plan/Recommendations: The patient will continue working with Care Coordination to achieve above goal(s).? CHW will involve Lead CC as needed or if patient is ready to move to maintenance.? Lead CC will continue to monitor CHW s monthly outreaches and progress to goal(s) every 6 weeks.?     Plan of Care updated and sent to patient: No  TORIE Phillips  [facility] Care Coordination   [facility]  Social Work Care Coordinator  [phone]

## 2022-08-02 NOTE — LETTER
8/2/2022         RE: Lucille Rojas  [id number]  [place] [address]        Dear Colleague,    Thank you for referring your patient, Lucille Rojas, to the [facility]. Please see a copy of my visit note below.    ONCOLOGY HISTORY: Ms. 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  1. On 09/20/2021:   -Hemoglobin of 4.7 with MCV of 67. Normal WBC and platelets.  -Iron of 9 and saturation index of 2%.   -CT chest, abdomen and pelvis reveals large pancreatic head mass.  This encases the celiac trunk, superior mesenteric artery and superior mesenteric vein.  There is moderate-size right pleural effusion. No lymphadenopathy.  2. Thoracocentesis on 09/22/2021. Cytology is negative for malignancy.  3. EUS on 09/21/2021 revealed is a mass in the uncinate process of the pancreas measuring 33 mm.  There was evidence of invasion into superior mesenteric artery and the celiac trunk. No enlarged lymph nodes seen. FNA revealed atypical cells.    4. EGD and EUS repeated on 10/05/2021.  -EGD is normal.  -EUS revealed pancreatic head mass.  FNA revealed CD10-positive B-cell lymphoma. Flow cytometry revealed CD10-positive lambda monotypic B-cells.  5. PET scan on 11/18/2021 revealed 6 cm hypermetabolic pancreatic head mass and borderline hypermetabolic right axillary lymph node.   -Further repeat biopsy not done because of her overall poor health.  6. mini R-CHOP x 6 cycles between 12/15/2021 and 03/30/2022.  -PET scan on 04/11/21 reveals complete response.     SUBJECTIVE:  Ms. Rojas is an 82-year-old female with non-Hodgkin's B-cell lymphoma involving the pancreas s/p 6 cycles of mini R-CHOP.  PET scan on 04/11/2022 revealed complete response.      Patient has fatigue.  She also has insomnia.  Patient said she does not get good night sleep.  No headache.  Occasional dizziness.  No chest pain.  Mild shortness of breath.  No abdominal pain.  No  nausea or vomiting.  Appetite is fairly good.  No bleeding from any site.  No fever or night sweats.    Patient is going to have TAVR procedure done in the next few weeks.       All other review system negative.     PHYSICAL EXAMINATION:    GENERAL:  She is alert and oriented x 3.  Not in respiratory distress.  ECOG PS of 2.   FACE:  No swelling.  EYES:  No icterus.   NECK:  Supple. No lymphadenopathy.  LUNGS:  Decreased air entry at the bases.  HEART:  Regular.  ABDOMEN:  Soft. Nontender.  Difficult palpation because of her weight.  No mass.  EXTREMITIES:  Mild ankle edema.  SKIN:  No petechiae.     LABS: CBC, LDH and CMP reviewed.    ASSESSMENT:    1.  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 Patient is in complete remission.  2.  Fatigue.  3.  Insomnia.  4.  Normocytic anemia. Stable.  5.  ? Thyroiditis  6.  Multiple other medical problems including diabetes mellitus and hypertension.     PLAN:  1.  Discussed regarding lymphoma.  No clinical suspicion of recurrence.  For follow-up, will get labs and CT chest, abdomen and pelvis in mid November.  She is agreeable for it.    2.  Labs were reviewed with her.  She has chronic anemia which is stable.  Anemia is due to her multiple medical problem and also from recent chemotherapy.  I do not think her anemia is going to improve much.    3.  Patient has fatigue.  This is due to multiple medical problem, her age, anemia and insomnia.  Patient is going to get TAVR procedure.  After that her shortness of breath and fatigue might improve somewhat.    4.  Previous PET scan was suspicious for thyroiditis.  We will get TSH and free T4.    5.  Patient has insomnia.  I told her that is also contributing to her fatigue.    6.  She had few questions which were all answered.  I will see her in November after CT scan.    Oncology Rooming Note    August 2, 2022 10:05 AM   Lucille Rojas is a 82  year old female who presents for:    Chief Complaint   Patient presents with     Oncology Clinic Visit     Initial Vitals: Resp 16   Wt 109.1 kg (240 lb 9.6 oz)   BMI 46.99 kg/m   Estimated body mass index is 46.99 kg/m  as calculated from the following:    Height as of 7/26/22: 1.524 m (5').    Weight as of this encounter: 109.1 kg (240 lb 9.6 oz). Body surface area is 2.15 meters squared.  No Pain (0) Comment: Data Unavailable   No LMP recorded. Patient has had a hysterectomy.  Allergies reviewed: Yes  Medications reviewed: Yes    Medications: Medication refills not needed today.  Pharmacy name entered into [facility]:    [id number] [place], [place] - [address] [facility] [address] [place] [id number] [address]    Clinical concerns: no      Teetee Skinner                Again, thank you for allowing me to participate in the care of your patient.        Sincerely,        John Srinivasan MD

## 2022-08-02 NOTE — PROGRESS NOTES
Oncology Rooming Note    August 2, 2022 10:05 AM   Lucille Rojas is a 82 year old female who presents for:    Chief Complaint   Patient presents with     Oncology Clinic Visit     Initial Vitals: Resp 16   Wt 109.1 kg (240 lb 9.6 oz)   BMI 46.99 kg/m   Estimated body mass index is 46.99 kg/m  as calculated from the following:    Height as of 7/26/22: 1.524 m (5').    Weight as of this encounter: 109.1 kg (240 lb 9.6 oz). Body surface area is 2.15 meters squared.  No Pain (0) Comment: Data Unavailable   No LMP recorded. Patient has had a hysterectomy.  Allergies reviewed: Yes  Medications reviewed: Yes    Medications: Medication refills not needed today.  Pharmacy name entered into [facility]:    [id number] - [place], [place] - [address] [facility] [place], [place] - [address] [place] [id number] [address]    Clinical concerns: no      Teetee Skinner

## 2022-08-02 NOTE — PROGRESS NOTES
Infusion Nursing Note:  Lucille Rojas presents today for port labs.    Patient seen by provider today: Yes: Dr. Srinivasan   present during visit today: Not Applicable.    Note: N/A.    Intravenous Access:  Labs drawn without difficulty.  Implanted Port.    Treatment Conditions:  Not Applicable. Labs pending at time of discharge.      Post Infusion Assessment:  Site patent and intact, free from redness, edema or discomfort.  No evidence of extravasations.  Access discontinued per protocol.       Discharge Plan:   Patient discharged in stable condition accompanied by: self.  Departure Mode: Ambulatory to [facility] for clinic visit.    Marian Fleming RN

## 2022-08-02 NOTE — PROGRESS NOTES
Oncology Distress Screening Follow-up  Clinical Social Work  [facility]    Identified Concern and Score From Distress Screenin. How concerned are you about your ability to eat?  0         2. How concerned are you about unintended weight loss or your current weight?  0         3. How concerned are you about feeling depressed or very sad?  4         4. How concerned are you about feeling anxious or very scared?  7         5. Do you struggle with the loss of meaning and jose alejandro in your life?  Somewhat         6. How concerned are you about work and home life issues that may be affected by your cancer?  5         7. How concerned are you about knowing what resources are available to help you?  0         8. Do you currently have what you would describe as Rastafari or spiritual struggles?             Not at all                   You can also ask to be contacted by one of our Oncology Supportive Care professionals.      9. If you want to be contacted by one of our professionals, I can send a message to them right now.  Oncology Psychologist;Oncology Social Worker           Date of Distress Screenin/2    Intervention: Lucille scored positively on distress screening today during  visit with Dr. Srinivasan.  She requested contact from Oncology SW so this SW reaching out in response to her request.     SW called and spoke to Lucille who was unable to talk and requested a callback after 1400.    1415: SW called Lucille back and spoke to Cooper who stated that patient is sleeping now.  Asked that SW call her back in one hour.     1615: Spoke to Aimee who states that she is seeking more opportunities to safely get out of the house and socialize.  She has been feeling pretty isolated lately.  Doesn't go anywhere outside of medical appointments.  States her kids do visit her often, but they are also busy.  She quesitons the safety in being out with the current state of COVID, but is also more and more ready to be out in  the world.  She loves to craft, and thinks there may be opportunities out there to craft with others.  She does have a person who visits her at home from her Spiritism every Monday morning.    NOMAN had to end conversation early due to it being the end of the day but Aimee and NOMAN agreed to a follow-up call to talk further on Monday, 8/15 in the afternoon.      SW team will continue to follow for support.     Education Provided:  Role of Oncology social work    Follow-up Required:   NOMAN will contact Aimee on 8/15 in the afternoon for continued support and brainstorming of socialization activities for Aimee and Cooper.   Provided Aimee with oncology social work contact information and availability and she will reach out if further psychosocial support needs arise.     MACHO Aceves, [facility]  Direct Phone: [phone]

## 2022-08-04 ENCOUNTER — TELEPHONE (OUTPATIENT)
Dept: PODIATRY | Facility: CLINIC | Age: 82
End: 2022-08-04

## 2022-08-04 ENCOUNTER — TELEPHONE (OUTPATIENT)
Dept: ONCOLOGY | Facility: CLINIC | Age: 82
End: 2022-08-04

## 2022-08-04 NOTE — TELEPHONE ENCOUNTER
Phone call to daughter, Nancy Rajput. She was informed that we do not have a consent to communicate on file to speak to her. She states patient completed one at at the Cancer center. She does have access to patient's Mychart but is not clear on who patient is supposed to see.   Will contact patient for verbal permission to speak to her and get back with her.     Phone call to patient. She gave verbal permission to speak with Nancy Rajput.   She wants to know where she can get her toenails trimmed. Otherwise, she is not due to get new diabetic shoes until January she states. She also wants to be seen again for her callus as it is bothering her.   Will contact her daughter to coordinate care.     LACI Rodriguez RN

## 2022-08-04 NOTE — TELEPHONE ENCOUNTER
Phone call to daughter, Nancy Rajput. Discussed that patient has a callus that she wants looked at again as it is bothering her. Recommended she contact patient's insurance regarding callus care to see if it is covered. Discussed that it may be out of pocket. She can also call the providers listed in the AVS for routine nail trimming and callus care.   Dr. Nolasco can still do callus care but it may not be covered by insurance and can be costly out of pocket. If she is having a wound or ulcer related to the callus, Dr. Nolasco can see her for that as well.   She was also informed that an order was placed for diabetic shoes/inserts that should be good for one year.   She verbalized understanding and will contact patient's insurance.   Asked that she have patient sign a new consent to communicate the next time she is in the office. She verbalized understanding.     LACI Rodriguez RN

## 2022-08-04 NOTE — TELEPHONE ENCOUNTER
[facility] Call Center    Phone Message    May a detailed message be left on voicemail: yes     Reason for Call: Other: Pt daughter is calling regarding the wound on her moms foot/ dr Nolasco made a suggestion about who to see but I don't see it in the notes and there isn't a referral if he can recall who was referring to so she can make an appt for her mom      Action Taken: Other: ortho - [facility]     Travel Screening: Not Applicable                                                                       No abnormalities noted

## 2022-08-06 NOTE — RESULT ENCOUNTER NOTE
Dear Ms. Rojas,    Thyroid blood test is abnormal. TSH is low and free T4 is high. Please, contact your primary doctor to adjust the dose of your thyroid medication.    Please, call me with any questions.    John Srinivasan MD

## 2022-08-09 ENCOUNTER — PATIENT OUTREACH (OUTPATIENT)
Dept: ONCOLOGY | Facility: CLINIC | Age: 82
End: 2022-08-09

## 2022-08-09 NOTE — PROGRESS NOTES
Thyroid blood test is abnormal. TSH is low and free T4 is high. Please, contact your primary doctor to adjust the dose of your thyroid medication.      Relayed message to daughter Nancy. She will make appt with PCP to adjust thyroid medication.    Nazanin Allen, RN, BSN  Specialty Care Coordinator  [facility]  [phone]

## 2022-08-16 ENCOUNTER — TELEPHONE (OUTPATIENT)
Dept: CARDIOLOGY | Facility: CLINIC | Age: 82
End: 2022-08-16

## 2022-08-16 DIAGNOSIS — I50.30 DIASTOLIC HEART FAILURE, UNSPECIFIED HF CHRONICITY (H): ICD-10-CM

## 2022-08-16 NOTE — TELEPHONE ENCOUNTER
Called patient to review her thoughts regarding bailout and confirm upcoming appointments for TAVR H&P and labs.    Patient verbalized she would like us to do everything we can to keep her alive including cracking and opening her chest if needed. Patient stated she would like to be a full bailout.    Reviewed appointment dates and times with patient who confirmed them all and additionally informed patient we will review all of the details regarding day of TAVR expectations when she comes in.    Patient aware to call with any additional questions or concerns prior to appointment on 08/18/2022.

## 2022-08-17 ENCOUNTER — OFFICE VISIT (OUTPATIENT)
Dept: INTERNAL MEDICINE | Facility: CLINIC | Age: 82
End: 2022-08-17
Payer: MEDICARE

## 2022-08-17 VITALS
DIASTOLIC BLOOD PRESSURE: 62 MMHG | HEART RATE: 72 BPM | TEMPERATURE: 96.9 F | OXYGEN SATURATION: 93 % | BODY MASS INDEX: 46.99 KG/M2 | WEIGHT: 240.6 LBS | RESPIRATION RATE: 16 BRPM | SYSTOLIC BLOOD PRESSURE: 130 MMHG

## 2022-08-17 DIAGNOSIS — N18.30 TYPE 2 DM WITH CKD STAGE 3 AND HYPERTENSION (H): ICD-10-CM

## 2022-08-17 DIAGNOSIS — C85.12 B-CELL LYMPHOMA OF INTRATHORACIC LYMPH NODES, UNSPECIFIED B-CELL LYMPHOMA TYPE (H): ICD-10-CM

## 2022-08-17 DIAGNOSIS — E66.01 MORBID OBESITY DUE TO EXCESS CALORIES (H): ICD-10-CM

## 2022-08-17 DIAGNOSIS — F33.0 MDD (MAJOR DEPRESSIVE DISORDER), RECURRENT EPISODE, MILD (H): ICD-10-CM

## 2022-08-17 DIAGNOSIS — E03.9 HYPOTHYROIDISM, UNSPECIFIED TYPE: Primary | ICD-10-CM

## 2022-08-17 DIAGNOSIS — E11.22 TYPE 2 DM WITH CKD STAGE 3 AND HYPERTENSION (H): ICD-10-CM

## 2022-08-17 DIAGNOSIS — I12.9 TYPE 2 DM WITH CKD STAGE 3 AND HYPERTENSION (H): ICD-10-CM

## 2022-08-17 LAB — HBA1C MFR BLD: 5.2 % (ref 0–5.6)

## 2022-08-17 PROCEDURE — 36415 COLL VENOUS BLD VENIPUNCTURE: CPT | Performed by: INTERNAL MEDICINE

## 2022-08-17 PROCEDURE — 99214 OFFICE O/P EST MOD 30 MIN: CPT | Performed by: INTERNAL MEDICINE

## 2022-08-17 PROCEDURE — 83036 HEMOGLOBIN GLYCOSYLATED A1C: CPT | Performed by: INTERNAL MEDICINE

## 2022-08-17 RX ORDER — LEVOTHYROXINE SODIUM 175 UG/1
175 TABLET ORAL DAILY
Qty: 90 TABLET | Refills: 1 | Status: ON HOLD | OUTPATIENT
Start: 2022-08-17 | End: 2022-08-25

## 2022-08-17 NOTE — PROGRESS NOTES
{PROVIDER CHARTING PREFERENCE:942786}    Subjective   Lucille is a 82 year old{ACCOMPANIED BY STATEMENT (Optional):926719}, presenting for the following health issues:  No chief complaint on file.      History of Present Illness       Hypothyroidism:     Since last visit, patient describes the following symptoms::  Anxiety, Depression, Fatigue and Weight loss    Weight loss::  Less than 5 lbs.    She eats 2-3 servings of fruits and vegetables daily.She consumes 2 sweetened beverage(s) daily.She exercises with enough effort to increase her heart rate 9 or less minutes per day.  She exercises with enough effort to increase her heart rate 3 or less days per week.   She is taking medications regularly.       {SUPERLIST (Optional):720246}  {additonal problems for provider to add (Optional):153982}    Review of Systems   {ROS COMP (Optional):046756}      Objective    There were no vitals taken for this visit.  There is no height or weight on file to calculate BMI.  Physical Exam   {Exam List (Optional):820224}    {Diagnostic Test Results (Optional):808921}    {AMBULATORY ATTESTATION (Optional):965953}            .  ..

## 2022-08-17 NOTE — PROGRESS NOTES
Assessment & Plan     Hypothyroidism, unspecified type  Suggest slight dose reduction in thyroid medication.  Reduce levothyroxine to 175 mcg tablet and recheck thyroid function test in 8 weeks.  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  Recheck A1c level today.  - Adult Eye  Referral; Future  - HEMOGLOBIN A1C; Future    B-cell lymphoma of intrathoracic lymph nodes, unspecified B-cell lymphoma type (H)  Following up with oncology as directed.  Noted baseline anemia.    Advised patient that she should consider updating her COVID-vaccine, tetanus booster.     BMI:   Estimated body mass index is 46.99 kg/m  as calculated from the following:    Height as of 7/26/22: 1.524 m (5').    Weight as of this encounter: 109.1 kg (240 lb 9.6 oz).   Weight management plan: Discussed healthy diet and exercise guidelines    See Patient Instructions    Return in about 1 week (around 8/24/2022) for f/u [facility] or primary Cardiologist.    Fausto Flynn MD  [facility]    Lissy Adkins is a 82 year old accompanied by her spouse, presenting for the following health issues:  Recheck Medication    Patient is here today for follow-up.  She states that she is due to get her blood sugar level checked in for a medication check.  She also recently has had some lab work done by different provider demonstrating that her thyroid function tests indicate a slightly hyperthyroid state.  She has been compliant with her current medication.    She denies any major complaints from her baseline..  She states she is due to see her cardiologist in the next day or so.  It appears that she is scheduled for a TAVR procedure.  I advised the patient that it is unclear to me whether or not she needs a preoperative exam for this but this  should be discussed with her cardiologist.    She has been followed in the oncology clinic by Dr. Srinivasan for her B-cell lymphoma.  She has nonspecific fatigue.  She recently had lab work done accordingly.    History of Present Illness       Hypothyroidism:     Since last visit, patient describes the following symptoms::  Anxiety, Depression, Fatigue and Weight loss    Weight loss::  Less than 5 lbs.    She eats 2-3 servings of fruits and vegetables daily.She consumes 2 sweetened beverage(s) daily.She exercises with enough effort to increase her heart rate 9 or less minutes per day.  She exercises with enough effort to increase her heart rate 3 or less days per week.   She is taking medications regularly.       Review of Systems   CONSTITUTIONAL: NEGATIVE for fever, chills, mild change in weight, + fatigue.  EYES: NEGATIVE for vision changes or irritation  ENT/MOUTH: NEGATIVE for ear, mouth and throat problems  RESP: NEGATIVE for significant cough or SOB  CV: NEGATIVE for chest pain, palpitations or peripheral edema  : NEGATIVE for frequency, dysuria, or hematuria  MUSCULOSKELETAL: NEGATIVE for significant arthralgias or myalgia  NEURO: NEGATIVE for weakness, dizziness or paresthesias  HEME: NEGATIVE for bleeding problems  PSYCHIATRIC: NEGATIVE for changes in mood or affect      Objective    /62   Pulse 72   Temp 96.9  F (36.1  C) (Temporal)   Resp 16   Wt 109.1 kg (240 lb 9.6 oz)   SpO2 93%   BMI 46.99 kg/m    Body mass index is 46.99 kg/m .     Physical Exam   GENERAL: alert and no distress  EYES: Eyes grossly normal to inspection, PERRL and conjunctivae and sclerae normal  HENT: ear canals and TM's normal, nose and mouth without ulcers or lesions  NECK: no adenopathy, no asymmetry, masses, or scars and thyroid normal to palpation  RESP: lungs clear to auscultation - no rales, rhonchi or wheezes  CV: regular rate and rhythm, normal S1 S2, no S3 or S4, no click or rub  ABDOMEN: obese  MS: no gross  musculoskeletal defects noted  NEURO: No focal changes  PSYCH: mentation appears normal, affect flat        TSH   Date Value Ref Range Status   08/02/2022 0.12 (L) 0.40 - 4.00 mU/L Final   06/04/2020 0.93 0.30 - 5.00 uIU/mL Final     Lab Results   Component Value Date    A1C 5.0 11/22/2021    A1C 5.8 04/07/2021    A1C 5.6 06/04/2020    A1C 5.7 06/12/2018    A1C 6.2 09/06/2017    A1C 5.6 12/01/2016     Component      Latest Ref Rng & Units 8/2/2022 8/2/2022           9:48 AM  9:48 AM   WBC      4.0 - 11.0 10e3/uL 7.7    RBC Count      3.80 - 5.20 10e6/uL 3.48 (L)    Hemoglobin      11.7 - 15.7 g/dL 9.1 (L)    Hematocrit      35.0 - 47.0 % 31.8 (L)    MCV      78 - 100 fL 91    MCH      26.5 - 33.0 pg 26.1 (L)    MCHC      31.5 - 36.5 g/dL 28.6 (L)    RDW      10.0 - 15.0 % 15.1 (H)    Platelet Count      150 - 450 10e3/uL 251    % Neutrophils      % 73    % Lymphocytes      % 12    % Monocytes      % 11    % Eosinophils      % 4    % Basophils      % 0    % Immature Granulocytes      % 0    NRBCs per 100 WBC      <1 /100 0    Absolute Neutrophils      1.6 - 8.3 10e3/uL 5.6    Absolute Lymphocytes      0.8 - 5.3 10e3/uL 0.9    Absolute Monocytes      0.0 - 1.3 10e3/uL 0.8    Absolute Eosinophils      0.0 - 0.7 10e3/uL 0.3    Absolute Basophils      0.0 - 0.2 10e3/uL 0.0    Absolute Immature Granulocytes      <=0.4 10e3/uL 0.0    Absolute NRBCs      10e3/uL 0.0    Sodium      133 - 144 mmol/L  144   Potassium      3.4 - 5.3 mmol/L  3.5   Chloride      94 - 109 mmol/L  107   Carbon Dioxide      20 - 32 mmol/L  31   Anion Gap      3 - 14 mmol/L  6   Urea Nitrogen      7 - 30 mg/dL  28   Creatinine      0.52 - 1.04 mg/dL  0.99   Calcium      8.5 - 10.1 mg/dL  8.5   Glucose      70 - 99 mg/dL  120 (H)   Alkaline Phosphatase      40 - 150 U/L  109   AST      0 - 45 U/L  14   ALT      0 - 50 U/L  15   Protein Total      6.8 - 8.8 g/dL  6.4 (L)   Albumin      3.4 - 5.0 g/dL  3.3 (L)   Bilirubin Total      0.2 - 1.3 mg/dL  0.2    GFR Estimate      >60 mL/min/1.73m2  56 (L)       .  ..

## 2022-08-17 NOTE — PATIENT INSTRUCTIONS
TAVR PRE-PROCEDURE INSTRUCTIONS:    - Your TAVR is scheduled Tuesday 8/23/2022, 2nd case. Please check-in at 8:00AM at the Registration Desk in the [facility] at [facility].    - Use the Hibiclens soap I have provided you the night before and morning of the procedure. Wash from chin to toes, please avoid your face and eyes.    - Nothing to eat or drink the morning of your TAVR.     Medication instructions:  - You make take your morning medications with sips of water.   - Hold your lasix (water pill) the morning of your TAVR.  - Please hold all vitamins and supplements the morning of your procedure.  - You need to take 325mg of aspirin the morning of your TAVR.    - COVID-19 restrictions: You will need a COVID test prior to procedure, this is scheduled on 8/19/22. Please do your best to quarantine/isolate between this test and your procedure day. On the day of the procedure, you may have one visitor in the pre-operative area. Patient and visitor must wear face masks. Two visitors may see you when you get to your room in the Heart Center (2nd floor of [facility]).    - You will stay overnight on Tuesday night. We will monitor you overnight for signs of bleeding, stroke, as well as need for pacemaker. On Wednesday morning, you will have an echo of your new valve and work with cardiac rehab.     It was nice to see you today! Please call with any other questions, concerns, or any changes in your health: [phone].    Pura Adam RN  Structural Heart Coordinator  [facility]

## 2022-08-18 ENCOUNTER — PATIENT OUTREACH (OUTPATIENT)
Dept: CARE COORDINATION | Facility: CLINIC | Age: 82
End: 2022-08-18

## 2022-08-18 ENCOUNTER — LAB (OUTPATIENT)
Dept: LAB | Facility: CLINIC | Age: 82
End: 2022-08-18
Payer: MEDICARE

## 2022-08-18 ENCOUNTER — OFFICE VISIT (OUTPATIENT)
Dept: CARDIOLOGY | Facility: CLINIC | Age: 82
End: 2022-08-18
Payer: MEDICARE

## 2022-08-18 VITALS
BODY MASS INDEX: 46.92 KG/M2 | HEIGHT: 60 IN | WEIGHT: 239 LBS | OXYGEN SATURATION: 94 % | HEART RATE: 78 BPM | SYSTOLIC BLOOD PRESSURE: 109 MMHG | DIASTOLIC BLOOD PRESSURE: 66 MMHG

## 2022-08-18 DIAGNOSIS — I35.0 SEVERE AORTIC STENOSIS BY PRIOR ECHOCARDIOGRAM: Primary | ICD-10-CM

## 2022-08-18 DIAGNOSIS — R06.02 SHORTNESS OF BREATH: ICD-10-CM

## 2022-08-18 DIAGNOSIS — I35.0 NONRHEUMATIC AORTIC VALVE STENOSIS: ICD-10-CM

## 2022-08-18 LAB
ABO/RH(D): NORMAL
ANION GAP SERPL CALCULATED.3IONS-SCNC: 1 MMOL/L (ref 3–14)
ANTIBODY SCREEN: NEGATIVE
BUN SERPL-MCNC: 18 MG/DL (ref 7–30)
CALCIUM SERPL-MCNC: 8.8 MG/DL (ref 8.5–10.1)
CHLORIDE BLD-SCNC: 106 MMOL/L (ref 94–109)
CO2 SERPL-SCNC: 32 MMOL/L (ref 20–32)
CREAT SERPL-MCNC: 0.89 MG/DL (ref 0.52–1.04)
ERYTHROCYTE [DISTWIDTH] IN BLOOD BY AUTOMATED COUNT: 13.8 % (ref 10–15)
GFR SERPL CREATININE-BSD FRML MDRD: 64 ML/MIN/1.73M2
GLUCOSE BLD-MCNC: 99 MG/DL (ref 70–99)
HCT VFR BLD AUTO: 33.8 % (ref 35–47)
HGB BLD-MCNC: 10.1 G/DL (ref 11.7–15.7)
INR PPP: 1.07 (ref 0.85–1.15)
MCH RBC QN AUTO: 26.5 PG (ref 26.5–33)
MCHC RBC AUTO-ENTMCNC: 29.9 G/DL (ref 31.5–36.5)
MCV RBC AUTO: 89 FL (ref 78–100)
NT-PROBNP SERPL-MCNC: 373 PG/ML (ref 0–1800)
PLATELET # BLD AUTO: 253 10E3/UL (ref 150–450)
POTASSIUM BLD-SCNC: 4.4 MMOL/L (ref 3.4–5.3)
RBC # BLD AUTO: 3.81 10E6/UL (ref 3.8–5.2)
SODIUM SERPL-SCNC: 139 MMOL/L (ref 133–144)
SPECIMEN EXPIRATION DATE: NORMAL
WBC # BLD AUTO: 9.6 10E3/UL (ref 4–11)

## 2022-08-18 PROCEDURE — 99215 OFFICE O/P EST HI 40 MIN: CPT | Performed by: NURSE PRACTITIONER

## 2022-08-18 PROCEDURE — 93000 ELECTROCARDIOGRAM COMPLETE: CPT | Performed by: NURSE PRACTITIONER

## 2022-08-18 PROCEDURE — 83880 ASSAY OF NATRIURETIC PEPTIDE: CPT

## 2022-08-18 PROCEDURE — 85018 HEMOGLOBIN: CPT

## 2022-08-18 PROCEDURE — 80048 BASIC METABOLIC PNL TOTAL CA: CPT

## 2022-08-18 PROCEDURE — 86850 RBC ANTIBODY SCREEN: CPT

## 2022-08-18 PROCEDURE — 36415 COLL VENOUS BLD VENIPUNCTURE: CPT

## 2022-08-18 PROCEDURE — 85048 AUTOMATED LEUKOCYTE COUNT: CPT

## 2022-08-18 PROCEDURE — 85610 PROTHROMBIN TIME: CPT

## 2022-08-18 RX ORDER — FUROSEMIDE 20 MG
TABLET ORAL
Qty: 30 TABLET | Refills: 10 | Status: SHIPPED | OUTPATIENT
Start: 2022-08-18 | End: 2023-07-12

## 2022-08-18 NOTE — LETTER
8/18/2022    Fausto Flynn MD  [address] [facility] [address]    RE: Lucille Rojas       Dear Colleague,     I had the pleasure of seeing Lucille Rojas in the [facility].      STRUCTURAL HEART CLINIC  H&P    Referring Provider: Dr. Rodriguez  Primary Cardiologist: Dr. Lucas     History of Present Illness  Lucille Rojas who presents for pre-operative H&P in preparation for transcatheter aortic valve replacement on 8/23/2022 at [facility].     Patient with a history of severe aortic stenosis, characterized with an NASIR 0.9 cm2, mean PG 32 mmHg, peak V 3.89 m/s and EF 65-70% . She reports symptoms of fatigue and dyspnea which is chronic. TAVR guided CT showed favorable anatomy for transfemoral approach with aortic valve calcium score of 1278. Pre-TAVR coronary angiogram showed severe disease of OM1 that is flow-limiting by iFR (0.82), being medically managed. She also has moderate stenosis of mLAD (iFR 0.9) and moderate to severe stenosis of pLCx.     Patient was evaluated in structural heart clinic where she was deemed an appropriate TAVR candidate. She was counseled for the above procedure.      Her history is otherwise significant for hypertension, chronic diastolic heart failure, COPD, history of severe upper GI bleed in 2021, hyperlipidemia, morbid obesity, obstructive sleep apnea, B-cell lymphoma s/p chemotherapy, chronic anemia, and type 2 diabetes.     Today the patient reports ongoing fatigue and chronic dyspnea, which is stable.  She otherwise denies chest pain, syncope or near syncope, or palpitations.  She has no PND or orthopnea.  She has no infectious symptoms.      Her blood pressure is well controlled.  Her most recent labs from 8/2/2022 show creatinine of 0.99, GFR 56, hemoglobin 9.1, and platelets of 251.     Assessment and Plan     Lucille Rojas presents for pre-operative H&P in preparation for a transcatheter aortic valve replacement on 8/23/2022 at  [facility].       1. Severe aortic valve stenosis: Patient reports progressive fatigue over the past year.  Her echo shows severe aortic stenosis. She has been evaluated by the our structural heart team and has been deemed an appropriate candidate for transcatheter aortic valve replacement. We discussed the procedure in detail, including pre and post-procedure care, restrictions and follow-up. She understands risks including 5-10% risk of heart block leading to permanent pacemaker placement, 3% risk of bleeding, infection, vascular complication, 1-3% risk of stroke and very low risk (<1%) of serious complications such as cardiac perforation, aortic root rupture, dissection or death.     All questions answered  Type and screen orders complete  COVID test ordered  Supplies for scrubbing provided  No known contrast dye allergy  No trouble with anesthesia in the past  Labs today WNL, no s/s of infection    2. Chronic diastolic heart failure, NYHA class II, Stage B: Secondary to valvular heart disease. No acute volume overload on exam, on lasix 20 mg daily, recommended treatment is TAVR.    3. CAD: Pre-TAVR coronary angiogram showed flow-limiting by iFR (0.82) OM1 disease that is being medically managed. She has also has moderate stenosis of mLAD (iFR 0.9) and moderate to severe stenosis of pLCx. She denies angina.    4. Hyperlipidemia: on simvastatin 10 mg daily.     5. Hypertension: Currently well-controlled, not on anti-hypertensives.     6. Type II DM: not on insulin.     7. SHAVON: compliant with CPAP.    8. COPD: on home oxygen.     9. 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    10. Chronic anemia: stable with hgb 9.0, labs pending today.     11. Hx of GI bleed    12. Underlying LBBB    Medication Recommendations:  Diuretic: hold lasix morning of procedure.  Antiplatelet: Continue ASA 81 mg perioperatively, take 324 mg ASA morning of procedure.  Supplements: Hold morning of  procedure    Patient is optimized and is acceptable candidate for the proposed procedure.  No further diagnostic evaluation is needed. Pre-procedure instructions provided in written format.     Linsey Benitez, DNP, APRN, CNP  Structural Heart Nurse Practitioner  [facility]   Pager: [phone]    Current Medications:  Current Outpatient Medications   Medication Sig Dispense Refill     acetaminophen (TYLENOL) 325 MG tablet Take 2 tablets (650 mg) by mouth every 4 hours as needed for mild pain       aspirin 81 MG tablet Take 1 tablet by mouth daily. 30 tablet 0     carboxymethylcellulose PF (REFRESH PLUS) 0.5 % ophthalmic solution Place 2 drops into both eyes 2 times daily       citalopram (CELEXA) 20 MG tablet Take 1 tablet (20 mg) by mouth daily 90 tablet 1     CONTOUR NEXT TEST test strip USE TO TEST BLOOD GLUCOSE ONCE DAILY       furosemide (LASIX) 20 MG tablet TAKE 1 TABLET BY MOUTH DAILY 30 tablet 10     guaiFENesin-dextromethorphan (ROBITUSSIN DM) 100-10 MG/5ML syrup Take 10 mLs by mouth every 4 hours as needed for cough       levothyroxine (SYNTHROID/LEVOTHROID) 175 MCG tablet Take 1 tablet (175 mcg) by mouth daily 90 tablet 1     loratadine (CLARITIN) 10 MG tablet Take 10 mg by mouth daily       LORazepam (ATIVAN) 0.5 MG tablet Take 1 tablet (0.5 mg) by mouth every 8 hours as needed for anxiety or nausea 30 tablet 1     miconazole (MICATIN) 2 % external powder Apply topically 2 times daily       Microlet Lancets MISC USE TO TEST BLOOD GLUCOSE ONCE DAILY       nystatin (MYCOSTATIN) 433201 UNIT/GM external powder Apply topically 2 times daily Under breasts and skin folds BID & BID PRN for redness 60 g 1     order for DME Equipment being ordered: wheeled walker with hand breaks 1 Device 0     pantoprazole (PROTONIX) 40 MG EC tablet Take 1 tablet (40 mg) by mouth every morning (before breakfast) 90 tablet 3     polyethylene glycol (MIRALAX) 17 g packet Take 1 packet by mouth every evening        senna-docusate (SENOKOT-S/PERICOLACE) 8.6-50 MG tablet Take 1 tablet by mouth every morning       simvastatin (ZOCOR) 10 MG tablet Take 1 tablet (10 mg) by mouth At Bedtime 90 tablet 1     bisacodyl (DULCOLAX) 10 MG suppository Place 1 suppository (10 mg) rectally daily as needed for constipation (Patient not taking: Reported on 8/18/2022)         Allergies:     Allergies   Allergen Reactions     Penicillins        Past Medical History:  Past Medical History:   Diagnosis Date     Cancer (H) 10/2021     Depressive disorder      Heart disease 10/2021     History of blood transfusion 20/2021     HTN (hypertension) 5/9/2013     Hyperlipidemia LDL goal <130 5/9/2013     Hypothyroidism 5/9/2013     Macular degeneration 9/18/2013     Sleep apnea     CPAP at night, O2 during naps     Type 2 diabetes, HbA1C goal < 8% (H) 5/9/2013       Past Surgical History:  Past Surgical History:   Procedure Laterality Date     APPENDECTOMY       COLONOSCOPY       COLONOSCOPY N/A 10/27/2014    Procedure: COMBINED COLONOSCOPY, SINGLE OR MULTIPLE BIOPSY/POLYPECTOMY BY BIOPSY;  Surgeon: Jose Alfredo Morejon MD;  Location: [facility]     CV CORONARY ANGIOGRAM N/A 7/15/2022    Procedure: Coronary Angiogram;  Surgeon: Mary Jo Rodriguez MD;  Location: [facility] CARDIAC CATH LAB     CV PCI N/A 7/15/2022    Procedure: Percutaneous Coronary Intervention;  Surgeon: Mary Jo Rodriguez MD;  Location: [facility] CARDIAC CATH LAB     ESOPHAGOSCOPY, GASTROSCOPY, DUODENOSCOPY (EGD), COMBINED N/A 9/21/2021    Procedure: ESOPHAGOGASTRODUODENOSCOPY, WITH FINE NEEDLE ASPIRATION BIOPSY, WITH ENDOSCOPIC ULTRASOUND GUIDANCE;  Surgeon: Vera Benitez MD;  Location:  GI     ESOPHAGOSCOPY, GASTROSCOPY, DUODENOSCOPY (EGD), COMBINED N/A 9/21/2021    Procedure: Esophagogastroduodenoscopy, With Biopsy;  Surgeon: Vera Benitez MD;  Location:  GI     ESOPHAGOSCOPY, GASTROSCOPY, DUODENOSCOPY (EGD), COMBINED N/A 10/5/2021    Procedure:  ESOPHAGOGASTRODUODENOSCOPY, WITH FINE NEEDLE ASPIRATION BIOPSY, WITH ENDOSCOPIC ULTRASOUND GUIDANCE;  Surgeon: Dixon Olivier MD;  Location:  GI     ESOPHAGOSCOPY, GASTROSCOPY, DUODENOSCOPY (EGD), COMBINED N/A 12/22/2021    Procedure: ESOPHAGOGASTRODUODENOSCOPY, WITH BIOPSY;  Surgeon: Vera Benitez MD;  Location:  GI     HERNIA REPAIR      inguinal x 2     IR CHEST PORT PLACEMENT > 5 YRS OF AGE  12/13/2021     TONSILLECTOMY         Family History:  No family history on file.    Social History:  Social History     Socioeconomic History     Marital status:      Spouse name: None     Number of children: None     Years of education: None     Highest education level: None   Tobacco Use     Smoking status: Never Smoker     Smokeless tobacco: Never Used   Substance and Sexual Activity     Alcohol use: Not Currently     Drug use: No     Sexual activity: Never       Review of Systems:  Constitutional: No fever, chills, or sweats. No weight gain/loss   ENT: No visual disturbance, ear ache, epistaxis, sore throat  Allergies/Immunologic: Negative.   Respiratory: No cough, hemoptysia  Cardiovascular: As per HPI  GI: No nausea, vomiting, hematemesis, melena, or hematochezia  : No urinary frequency, dysuria, or hematuria  Integument: Negative  Psychiatric: Negative  Neuro: Negative  Endocrinology: Negative   Musculoskeletal: Negative      Physical Exam:  Vitals: /66   Pulse 78   Ht 1.524 m (5')   Wt 108.4 kg (239 lb)   SpO2 94%   BMI 46.68 kg/m     General: NAD  HEENT:  Dentition intact.    Neck: No jugular venous distension.   Heart: RRR with grade II ZAY at RUSB  Lungs: CTA.    Abdomen: Soft, nontender, nondistended.   Extremities: No clubbing, cyanosis, or edema.  The pulses are +4/4 at the radial, brachial, femoral, popliteal, DP, and PT sites bilaterally.  No bruits are noted.  Neurologic: Alert and oriented to person/place/time, normal speech, gait and affect  Skin: No  petechiae, purpura or rash.      Diagnostic Studies:  ECG: sinus rhythm with LBBB   Personally reviewed and interpreted by me.    Coronary Angiogram: 7/15/2022  Conclusion    1.  Moderate stenosis of the midLAD.  2.  Moderate to severe stenosis of the proximal LCx. Severe stenosis of OM1.  3.  iFR of the proximal LCx, 0.90, which is not flow restrictive.  4.  iFR of the OM1 is 0.82, which is flow restrictive.              Plan      Follow bedrest per protocol    Continued medical management and lifestyle modifications for cardiovascular risk factor optimizations.    Follow up visit with Nurse Practitioner in 1-2 weeks.    Arterial sheath removed from radial artery with TR band placement.    Discharge today per protocol      Medical management of branch vessel disease of the OM.   Proceed with evaluation for aortic valve replacement.         Echocardiogram: 6/14/2022  Interpretation Summary     Technically difficult study.  There is mild concentric left ventricular hypertrophy.  Left ventricular systolic function is normal.  The visual ejection fraction is 65-70%.  No regional wall motion abnormalities noted.  The right ventricle is normal in size and function.  Severe valvular aortic stenosis.  Peak transaortic velocity 3.8-3.9 m/s, mean systolic gradient 32 mmHg, valve  area 0.9 cmÂ , dimensionless index 0.27.     Compared to previous study dated 9/23/2021, aortic valve stenosis has  progressed from moderately severe to severe.      Laboratory Studies:  Personally reviewed and interpreted by me.    LIPID RESULTS:  Lab Results   Component Value Date    CHOL 159 11/22/2021    CHOL 148 06/04/2020    HDL 51 11/22/2021    HDL 47 (L) 06/04/2020    LDL 81 11/22/2021    LDL 77 06/04/2020    TRIG 133 11/22/2021    TRIG 140 06/04/2020    CHOLHDLRATIO 3.4 05/09/2013       LIVER ENZYME RESULTS:  Lab Results   Component Value Date    AST 14 08/02/2022    AST 13 08/27/2020    ALT 15 08/02/2022    ALT 15 08/27/2020       CBC  RESULTS:  Lab Results   Component Value Date    WBC 7.7 08/02/2022    WBC 7.6 12/08/2016    RBC 3.48 (L) 08/02/2022    RBC 3.62 (L) 12/08/2016    HGB 9.1 (L) 08/02/2022    HGB 10.6 (L) 04/07/2021    HCT 31.8 (L) 08/02/2022    HCT 34.3 (L) 12/08/2016    MCV 91 08/02/2022    MCV 95 12/08/2016    MCH 26.1 (L) 08/02/2022    MCH 27.6 12/08/2016    MCHC 28.6 (L) 08/02/2022    MCHC 29.2 (L) 12/08/2016    RDW 15.1 (H) 08/02/2022    RDW 14.6 12/08/2016     08/02/2022     12/08/2016       BMP RESULTS:  Lab Results   Component Value Date     08/02/2022     08/27/2020    POTASSIUM 3.5 08/02/2022    POTASSIUM 4.1 08/27/2020    CHLORIDE 107 08/02/2022    CHLORIDE 109 08/27/2020    CO2 31 08/02/2022    CO2 31 08/27/2020    ANIONGAP 6 08/02/2022    ANIONGAP 4 08/27/2020     (H) 08/02/2022     (H) 08/27/2020    BUN 28 08/02/2022    BUN 19 08/27/2020    CR 0.99 08/02/2022    CR 0.98 08/27/2020    GFRESTIMATED 56 (L) 08/02/2022    GFRESTIMATED 55 (L) 06/14/2022    GFRESTIMATED 54 (L) 08/27/2020    GFRESTBLACK 62 08/27/2020    IGOR 8.5 08/02/2022    IGOR 9.6 08/27/2020        A1C RESULTS:  Lab Results   Component Value Date    A1C 5.2 08/17/2022    A1C 5.8 (H) 04/07/2021       INR RESULTS:  Lab Results   Component Value Date    INR 1.09 07/15/2022    INR 1.09 12/21/2021       Thank you for allowing me to participate in the care of your patient.      Sincerely,     Linsey Benitez, CNP     [facility]  cc:   No referring provider defined for this encounter.

## 2022-08-18 NOTE — PROGRESS NOTES
Clinic Care Coordination - Chart Review Only    Situation/Background: Patient chart reviewed by care coordinator related to [facility] conversion.    Assessment: Patient continues to be followed by Clinic Care Coordination.    Plan: Patient's chart updated to align with [facility] program for ongoing patient management.    Vera Jha, [facility] Care Coordination   [facility]  Social Work Care Coordinator  [phone]

## 2022-08-18 NOTE — TELEPHONE ENCOUNTER
Prescription approved per [facility] Refill Protocol.  Roger Muller RN  [facility] Triage Nurse

## 2022-08-18 NOTE — PROGRESS NOTES
STRUCTURAL HEART CLINIC  H&P    Referring Provider: Dr. Rodriguez  Primary Cardiologist: Dr. Lucas     History of Present Illness  Lucille Rojas who presents for pre-operative H&P in preparation for transcatheter aortic valve replacement on 8/23/2022 at [facility].     Patient with a history of severe aortic stenosis, characterized with an NASIR 0.9 cm2, mean PG 32 mmHg, peak V 3.89 m/s and EF 65-70% . She reports symptoms of fatigue and dyspnea which is chronic. TAVR guided CT showed favorable anatomy for transfemoral approach with aortic valve calcium score of 1278. Pre-TAVR coronary angiogram showed severe disease of OM1 that is flow-limiting by iFR (0.82), being medically managed. She also has moderate stenosis of mLAD (iFR 0.9) and moderate to severe stenosis of pLCx.     Patient was evaluated in structural heart clinic where she was deemed an appropriate TAVR candidate. She was counseled for the above procedure.      Her history is otherwise significant for hypertension, chronic diastolic heart failure, COPD, history of severe upper GI bleed in 2021, hyperlipidemia, morbid obesity, obstructive sleep apnea, B-cell lymphoma s/p chemotherapy, chronic anemia, and type 2 diabetes.     Today the patient reports ongoing fatigue and chronic dyspnea, which is stable.  She otherwise denies chest pain, syncope or near syncope, or palpitations.  She has no PND or orthopnea.  She has no infectious symptoms.      Her blood pressure is well controlled.  Her most recent labs from 8/2/2022 show creatinine of 0.99, GFR 56, hemoglobin 9.1, and platelets of 251.     Assessment and Plan     Lucille Rojas presents for pre-operative H&P in preparation for a transcatheter aortic valve replacement on 8/23/2022 at [facility].       1. Severe aortic valve stenosis: Patient reports progressive fatigue over the past year.  Her echo shows severe aortic stenosis. She has been evaluated by the our structural  heart team and has been deemed an appropriate candidate for transcatheter aortic valve replacement. We discussed the procedure in detail, including pre and post-procedure care, restrictions and follow-up. She understands risks including 5-10% risk of heart block leading to permanent pacemaker placement, 3% risk of bleeding, infection, vascular complication, 1-3% risk of stroke and very low risk (<1%) of serious complications such as cardiac perforation, aortic root rupture, dissection or death.     All questions answered  Type and screen orders complete  COVID test ordered  Supplies for scrubbing provided  No known contrast dye allergy  No trouble with anesthesia in the past  Labs today WNL, no s/s of infection    2. Chronic diastolic heart failure, NYHA class II, Stage B: Secondary to valvular heart disease. No acute volume overload on exam, on lasix 20 mg daily, recommended treatment is TAVR.    3. CAD: Pre-TAVR coronary angiogram showed flow-limiting by iFR (0.82) OM1 disease that is being medically managed. She has also has moderate stenosis of mLAD (iFR 0.9) and moderate to severe stenosis of pLCx. She denies angina.    4. Hyperlipidemia: on simvastatin 10 mg daily.     5. Hypertension: Currently well-controlled, not on anti-hypertensives.     6. Type II DM: not on insulin.     7. SHAVON: compliant with CPAP.    8. COPD: on home oxygen.     9. 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    10. Chronic anemia: stable with hgb 9.0, labs pending today.     11. Hx of GI bleed    12. Underlying LBBB    Medication Recommendations:  Diuretic: hold lasix morning of procedure.  Antiplatelet: Continue ASA 81 mg perioperatively, take 324 mg ASA morning of procedure.  Supplements: Hold morning of procedure    Patient is optimized and is acceptable candidate for the proposed procedure.  No further diagnostic evaluation is needed. Pre-procedure instructions provided in written format.     Linsey Benitez  DNP, APRN, CNP  Structural Heart Nurse Practitioner  [facility]   Pager: [phone]    Current Medications:  Current Outpatient Medications   Medication Sig Dispense Refill     acetaminophen (TYLENOL) 325 MG tablet Take 2 tablets (650 mg) by mouth every 4 hours as needed for mild pain       aspirin 81 MG tablet Take 1 tablet by mouth daily. 30 tablet 0     carboxymethylcellulose PF (REFRESH PLUS) 0.5 % ophthalmic solution Place 2 drops into both eyes 2 times daily       citalopram (CELEXA) 20 MG tablet Take 1 tablet (20 mg) by mouth daily 90 tablet 1     CONTOUR NEXT TEST test strip USE TO TEST BLOOD GLUCOSE ONCE DAILY       furosemide (LASIX) 20 MG tablet TAKE 1 TABLET BY MOUTH DAILY 30 tablet 10     guaiFENesin-dextromethorphan (ROBITUSSIN DM) 100-10 MG/5ML syrup Take 10 mLs by mouth every 4 hours as needed for cough       levothyroxine (SYNTHROID/LEVOTHROID) 175 MCG tablet Take 1 tablet (175 mcg) by mouth daily 90 tablet 1     loratadine (CLARITIN) 10 MG tablet Take 10 mg by mouth daily       LORazepam (ATIVAN) 0.5 MG tablet Take 1 tablet (0.5 mg) by mouth every 8 hours as needed for anxiety or nausea 30 tablet 1     miconazole (MICATIN) 2 % external powder Apply topically 2 times daily       Microlet Lancets MISC USE TO TEST BLOOD GLUCOSE ONCE DAILY       nystatin (MYCOSTATIN) 705828 UNIT/GM external powder Apply topically 2 times daily Under breasts and skin folds BID & BID PRN for redness 60 g 1     order for DME Equipment being ordered: wheeled walker with hand breaks 1 Device 0     pantoprazole (PROTONIX) 40 MG EC tablet Take 1 tablet (40 mg) by mouth every morning (before breakfast) 90 tablet 3     polyethylene glycol (MIRALAX) 17 g packet Take 1 packet by mouth every evening       senna-docusate (SENOKOT-S/PERICOLACE) 8.6-50 MG tablet Take 1 tablet by mouth every morning       simvastatin (ZOCOR) 10 MG tablet Take 1 tablet (10 mg) by mouth At Bedtime 90 tablet 1     bisacodyl (DULCOLAX) 10 MG  suppository Place 1 suppository (10 mg) rectally daily as needed for constipation (Patient not taking: Reported on 8/18/2022)         Allergies:     Allergies   Allergen Reactions     Penicillins        Past Medical History:  Past Medical History:   Diagnosis Date     Cancer (H) 10/2021     Depressive disorder      Heart disease 10/2021     History of blood transfusion 20/2021     HTN (hypertension) 5/9/2013     Hyperlipidemia LDL goal <130 5/9/2013     Hypothyroidism 5/9/2013     Macular degeneration 9/18/2013     Sleep apnea     CPAP at night, O2 during naps     Type 2 diabetes, HbA1C goal < 8% (H) 5/9/2013       Past Surgical History:  Past Surgical History:   Procedure Laterality Date     APPENDECTOMY       COLONOSCOPY       COLONOSCOPY N/A 10/27/2014    Procedure: COMBINED COLONOSCOPY, SINGLE OR MULTIPLE BIOPSY/POLYPECTOMY BY BIOPSY;  Surgeon: Jose Alfredo Morejon MD;  Location: [facility]     CV CORONARY ANGIOGRAM N/A 7/15/2022    Procedure: Coronary Angiogram;  Surgeon: Mary Jo Rodriguez MD;  Location: [facility] CARDIAC CATH LAB     CV PCI N/A 7/15/2022    Procedure: Percutaneous Coronary Intervention;  Surgeon: Mary Jo Rodriguez MD;  Location: [facility] CARDIAC CATH LAB     ESOPHAGOSCOPY, GASTROSCOPY, DUODENOSCOPY (EGD), COMBINED N/A 9/21/2021    Procedure: ESOPHAGOGASTRODUODENOSCOPY, WITH FINE NEEDLE ASPIRATION BIOPSY, WITH ENDOSCOPIC ULTRASOUND GUIDANCE;  Surgeon: Vera Benitez MD;  Location: [facility]     ESOPHAGOSCOPY, GASTROSCOPY, DUODENOSCOPY (EGD), COMBINED N/A 9/21/2021    Procedure: Esophagogastroduodenoscopy, With Biopsy;  Surgeon: Vera Benitez MD;  Location:  GI     ESOPHAGOSCOPY, GASTROSCOPY, DUODENOSCOPY (EGD), COMBINED N/A 10/5/2021    Procedure: ESOPHAGOGASTRODUODENOSCOPY, WITH FINE NEEDLE ASPIRATION BIOPSY, WITH ENDOSCOPIC ULTRASOUND GUIDANCE;  Surgeon: Dixon Olivier MD;  Location:  GI     ESOPHAGOSCOPY, GASTROSCOPY, DUODENOSCOPY (EGD), COMBINED N/A  12/22/2021    Procedure: ESOPHAGOGASTRODUODENOSCOPY, WITH BIOPSY;  Surgeon: Vera Benitez MD;  Location: [facility]     HERNIA REPAIR      inguinal x 2     IR CHEST PORT PLACEMENT > 5 YRS OF AGE  12/13/2021     TONSILLECTOMY         Family History:  No family history on file.    Social History:  Social History     Socioeconomic History     Marital status:      Spouse name: None     Number of children: None     Years of education: None     Highest education level: None   Tobacco Use     Smoking status: Never Smoker     Smokeless tobacco: Never Used   Substance and Sexual Activity     Alcohol use: Not Currently     Drug use: No     Sexual activity: Never       Review of Systems:  Constitutional: No fever, chills, or sweats. No weight gain/loss   ENT: No visual disturbance, ear ache, epistaxis, sore throat  Allergies/Immunologic: Negative.   Respiratory: No cough, hemoptysia  Cardiovascular: As per HPI  GI: No nausea, vomiting, hematemesis, melena, or hematochezia  : No urinary frequency, dysuria, or hematuria  Integument: Negative  Psychiatric: Negative  Neuro: Negative  Endocrinology: Negative   Musculoskeletal: Negative      Physical Exam:  Vitals: /66   Pulse 78   Ht 1.524 m (5')   Wt 108.4 kg (239 lb)   SpO2 94%   BMI 46.68 kg/m     General: NAD  HEENT:  Dentition intact.    Neck: No jugular venous distension.   Heart: RRR with grade II ZAY at RUSB  Lungs: CTA.    Abdomen: Soft, nontender, nondistended.   Extremities: No clubbing, cyanosis, or edema.  The pulses are +4/4 at the radial, brachial, femoral, popliteal, DP, and PT sites bilaterally.  No bruits are noted.  Neurologic: Alert and oriented to person/place/time, normal speech, gait and affect  Skin: No petechiae, purpura or rash.      Diagnostic Studies:  ECG: sinus rhythm with LBBB   Personally reviewed and interpreted by me.    Coronary Angiogram: 7/15/2022  Conclusion    1.  Moderate stenosis of the midLAD.  2.  Moderate to  severe stenosis of the proximal LCx. Severe stenosis of OM1.  3.  iFR of the proximal LCx, 0.90, which is not flow restrictive.  4.  iFR of the OM1 is 0.82, which is flow restrictive.              Plan      Follow bedrest per protocol    Continued medical management and lifestyle modifications for cardiovascular risk factor optimizations.    Follow up visit with Nurse Practitioner in 1-2 weeks.    Arterial sheath removed from radial artery with TR band placement.    Discharge today per protocol      Medical management of branch vessel disease of the OM.   Proceed with evaluation for aortic valve replacement.         Echocardiogram: 6/14/2022  Interpretation Summary     Technically difficult study.  There is mild concentric left ventricular hypertrophy.  Left ventricular systolic function is normal.  The visual ejection fraction is 65-70%.  No regional wall motion abnormalities noted.  The right ventricle is normal in size and function.  Severe valvular aortic stenosis.  Peak transaortic velocity 3.8-3.9 m/s, mean systolic gradient 32 mmHg, valve  area 0.9 cmÂ , dimensionless index 0.27.     Compared to previous study dated 9/23/2021, aortic valve stenosis has  progressed from moderately severe to severe.      Laboratory Studies:  Personally reviewed and interpreted by me.    LIPID RESULTS:  Lab Results   Component Value Date    CHOL 159 11/22/2021    CHOL 148 06/04/2020    HDL 51 11/22/2021    HDL 47 (L) 06/04/2020    LDL 81 11/22/2021    LDL 77 06/04/2020    TRIG 133 11/22/2021    TRIG 140 06/04/2020    CHOLHDLRATIO 3.4 05/09/2013       LIVER ENZYME RESULTS:  Lab Results   Component Value Date    AST 14 08/02/2022    AST 13 08/27/2020    ALT 15 08/02/2022    ALT 15 08/27/2020       CBC RESULTS:  Lab Results   Component Value Date    WBC 7.7 08/02/2022    WBC 7.6 12/08/2016    RBC 3.48 (L) 08/02/2022    RBC 3.62 (L) 12/08/2016    HGB 9.1 (L) 08/02/2022    HGB 10.6 (L) 04/07/2021    HCT 31.8 (L) 08/02/2022    HCT  34.3 (L) 12/08/2016    MCV 91 08/02/2022    MCV 95 12/08/2016    MCH 26.1 (L) 08/02/2022    MCH 27.6 12/08/2016    MCHC 28.6 (L) 08/02/2022    MCHC 29.2 (L) 12/08/2016    RDW 15.1 (H) 08/02/2022    RDW 14.6 12/08/2016     08/02/2022     12/08/2016       BMP RESULTS:  Lab Results   Component Value Date     08/02/2022     08/27/2020    POTASSIUM 3.5 08/02/2022    POTASSIUM 4.1 08/27/2020    CHLORIDE 107 08/02/2022    CHLORIDE 109 08/27/2020    CO2 31 08/02/2022    CO2 31 08/27/2020    ANIONGAP 6 08/02/2022    ANIONGAP 4 08/27/2020     (H) 08/02/2022     (H) 08/27/2020    BUN 28 08/02/2022    BUN 19 08/27/2020    CR 0.99 08/02/2022    CR 0.98 08/27/2020    GFRESTIMATED 56 (L) 08/02/2022    GFRESTIMATED 55 (L) 06/14/2022    GFRESTIMATED 54 (L) 08/27/2020    GFRESTBLACK 62 08/27/2020    IGOR 8.5 08/02/2022    IGOR 9.6 08/27/2020        A1C RESULTS:  Lab Results   Component Value Date    A1C 5.2 08/17/2022    A1C 5.8 (H) 04/07/2021       INR RESULTS:  Lab Results   Component Value Date    INR 1.09 07/15/2022    INR 1.09 12/21/2021

## 2022-08-19 ENCOUNTER — LAB (OUTPATIENT)
Dept: URGENT CARE | Facility: URGENT CARE | Age: 82
End: 2022-08-19
Attending: INTERNAL MEDICINE
Payer: MEDICARE

## 2022-08-19 DIAGNOSIS — I35.0 NONRHEUMATIC AORTIC VALVE STENOSIS: ICD-10-CM

## 2022-08-19 DIAGNOSIS — I35.0 SEVERE AORTIC STENOSIS: Primary | ICD-10-CM

## 2022-08-19 PROCEDURE — U0003 INFECTIOUS AGENT DETECTION BY NUCLEIC ACID (DNA OR RNA); SEVERE ACUTE RESPIRATORY SYNDROME CORONAVIRUS 2 (SARS-COV-2) (CORONAVIRUS DISEASE [COVID-19]), AMPLIFIED PROBE TECHNIQUE, MAKING USE OF HIGH THROUGHPUT TECHNOLOGIES AS DESCRIBED BY CMS-2020-01-R: HCPCS

## 2022-08-19 PROCEDURE — U0005 INFEC AGEN DETEC AMPLI PROBE: HCPCS

## 2022-08-19 RX ORDER — ASPIRIN 81 MG/1
81 TABLET ORAL DAILY
Status: CANCELLED | OUTPATIENT
Start: 2022-08-19

## 2022-08-19 RX ORDER — CLINDAMYCIN PHOSPHATE 900 MG/50ML
900 INJECTION, SOLUTION INTRAVENOUS
Status: CANCELLED | OUTPATIENT
Start: 2022-08-19

## 2022-08-19 RX ORDER — LIDOCAINE 40 MG/G
CREAM TOPICAL
Status: CANCELLED | OUTPATIENT
Start: 2022-08-19

## 2022-08-19 RX ORDER — SODIUM CHLORIDE 9 MG/ML
INJECTION, SOLUTION INTRAVENOUS CONTINUOUS
Status: CANCELLED | OUTPATIENT
Start: 2022-08-19

## 2022-08-20 LAB — SARS-COV-2 RNA RESP QL NAA+PROBE: NEGATIVE

## 2022-08-22 DIAGNOSIS — E03.9 ACQUIRED HYPOTHYROIDISM: Chronic | ICD-10-CM

## 2022-08-22 DIAGNOSIS — E03.9 HYPOTHYROIDISM, UNSPECIFIED TYPE: ICD-10-CM

## 2022-08-22 NOTE — PROGRESS NOTES
PTA medications updated by Medication Scribe prior to surgery via phone call with patient (last doses completed by Nurse)     Medication history sources: Patient, Patient's family/friend (SPOUSE), Surescripts and H&P  In the past week, patient estimated taking medication this percent of the time: Greater than 90%  Adherence assessment: N/A Not Observed    Significant changes made to the medication list:  None      Additional medication history information:   Patient was advised to bring: REFRESH EYE DROPS    Medication reconciliation completed by provider prior to medication history? No    Time spent in this activity: 35 MINUTES    The information provided in this note is only as accurate as the sources available at the time of update(s)      Prior to Admission medications    Medication Sig Last Dose Taking? Auth Provider Long Term End Date   acetaminophen (TYLENOL) 325 MG tablet Take 2 tablets (650 mg) by mouth every 4 hours as needed for mild pain  at PRN Yes Raphael Martinez MD     aspirin 81 MG tablet Take 1 tablet by mouth daily.  at AM Yes Fausto Flynn MD Yes    carboxymethylcellulose PF (REFRESH PLUS) 0.5 % ophthalmic solution Place 2 drops into both eyes 2 times daily  at AM Yes Reported, Patient     citalopram (CELEXA) 20 MG tablet Take 1 tablet (20 mg) by mouth daily  at AM Yes Fausto Flynn MD Yes    furosemide (LASIX) 20 MG tablet TAKE 1 TABLET BY MOUTH DAILY  at AM Yes Fausto Flynn MD Yes    levothyroxine (SYNTHROID/LEVOTHROID) 175 MCG tablet Take 1 tablet (175 mcg) by mouth daily  at AM Yes Fausto Flynn MD Yes    loratadine (CLARITIN) 10 MG tablet Take 10 mg by mouth daily  at PM Yes Reported, Patient     LORazepam (ATIVAN) 0.5 MG tablet Take 1 tablet (0.5 mg) by mouth every 8 hours as needed for anxiety or nausea  at PRN Yes John Srinivasan MD     nystatin (MYCOSTATIN) 595536 UNIT/GM external powder Apply topically 2 times daily Under breasts and skin folds BID & BID PRN for redness  at  PRN Yes Fausto Flynn MD     pantoprazole (PROTONIX) 40 MG EC tablet Take 1 tablet (40 mg) by mouth every morning (before breakfast)  at AM Yes John Srinivasan MD     simvastatin (ZOCOR) 10 MG tablet Take 1 tablet (10 mg) by mouth At Bedtime  at PM Yes Fausto Flynn MD Yes    CONTOUR NEXT TEST test strip USE TO TEST BLOOD GLUCOSE ONCE DAILY   Reported, Patient     Microlet Lancets MISC USE TO TEST BLOOD GLUCOSE ONCE DAILY   Reported, Patient     order for DME Equipment being ordered: wheeled walker with hand breaks   Fausto Flynn MD

## 2022-08-23 ENCOUNTER — ANESTHESIA (OUTPATIENT)
Dept: CARDIOLOGY | Facility: CLINIC | Age: 82
DRG: 267 | End: 2022-08-23
Payer: MEDICARE

## 2022-08-23 ENCOUNTER — HOSPITAL ENCOUNTER (OUTPATIENT)
Dept: CARDIOLOGY | Facility: CLINIC | Age: 82
Discharge: HOME | DRG: 267 | End: 2022-08-23
Attending: INTERNAL MEDICINE
Payer: MEDICARE

## 2022-08-23 ENCOUNTER — HOSPITAL ENCOUNTER (INPATIENT)
Facility: CLINIC | Age: 82
LOS: 2 days | Discharge: HOME | DRG: 267 | End: 2022-08-25
Attending: INTERNAL MEDICINE | Admitting: INTERNAL MEDICINE
Payer: MEDICARE

## 2022-08-23 ENCOUNTER — ANESTHESIA EVENT (OUTPATIENT)
Dept: CARDIOLOGY | Facility: CLINIC | Age: 82
DRG: 267 | End: 2022-08-23
Payer: MEDICARE

## 2022-08-23 DIAGNOSIS — I35.0 NONRHEUMATIC AORTIC VALVE STENOSIS: ICD-10-CM

## 2022-08-23 DIAGNOSIS — Z95.2 S/P TAVR (TRANSCATHETER AORTIC VALVE REPLACEMENT): Primary | ICD-10-CM

## 2022-08-23 DIAGNOSIS — I35.0 AORTIC STENOSIS, SEVERE: ICD-10-CM

## 2022-08-23 DIAGNOSIS — I35.0 SEVERE AORTIC STENOSIS: ICD-10-CM

## 2022-08-23 LAB
ACT BLD: 143 SECONDS (ref 74–150)
ACT BLD: 203 SECONDS (ref 74–150)
ACT BLD: 313 SECONDS (ref 74–150)
BLD PROD TYP BPU: NORMAL
BLD PROD TYP BPU: NORMAL
BLOOD COMPONENT TYPE: NORMAL
BLOOD COMPONENT TYPE: NORMAL
CODING SYSTEM: NORMAL
CODING SYSTEM: NORMAL
CROSSMATCH: NORMAL
CROSSMATCH: NORMAL
GLUCOSE BLD-MCNC: 132 MG/DL (ref 70–99)
GLUCOSE BLDC GLUCOMTR-MCNC: 104 MG/DL (ref 70–99)
GLUCOSE BLDC GLUCOMTR-MCNC: 163 MG/DL (ref 70–99)
ISSUE DATE AND TIME: NORMAL
ISSUE DATE AND TIME: NORMAL
LVEF ECHO: NORMAL
POTASSIUM BLD-SCNC: 4 MMOL/L (ref 3.4–5.3)
UNIT ABO/RH: NORMAL
UNIT ABO/RH: NORMAL
UNIT NUMBER: NORMAL
UNIT NUMBER: NORMAL
UNIT STATUS: NORMAL
UNIT STATUS: NORMAL
UNIT TYPE ISBT: 6200
UNIT TYPE ISBT: 6200

## 2022-08-23 PROCEDURE — 278N000051 HC OR IMPLANT GENERAL: Performed by: INTERNAL MEDICINE

## 2022-08-23 PROCEDURE — 93321 DOPPLER ECHO F-UP/LMTD STD: CPT | Mod: 26 | Performed by: INTERNAL MEDICINE

## 2022-08-23 PROCEDURE — 85347 COAGULATION TIME ACTIVATED: CPT

## 2022-08-23 PROCEDURE — 250N000013 HC RX MED GY IP 250 OP 250 PS 637: Performed by: INTERNAL MEDICINE

## 2022-08-23 PROCEDURE — 99223 1ST HOSP IP/OBS HIGH 75: CPT | Mod: AI | Performed by: PHYSICIAN ASSISTANT

## 2022-08-23 PROCEDURE — 370N000017 HC ANESTHESIA TECHNICAL FEE, PER MIN: Performed by: INTERNAL MEDICINE

## 2022-08-23 PROCEDURE — 93321 DOPPLER ECHO F-UP/LMTD STD: CPT

## 2022-08-23 PROCEDURE — 33361 REPLACE AORTIC VALVE PERQ: CPT | Performed by: INTERNAL MEDICINE

## 2022-08-23 PROCEDURE — 250N000009 HC RX 250: Performed by: REGISTERED NURSE

## 2022-08-23 PROCEDURE — 93308 TTE F-UP OR LMTD: CPT | Mod: 26 | Performed by: INTERNAL MEDICINE

## 2022-08-23 PROCEDURE — C1760 CLOSURE DEV, VASC: HCPCS | Performed by: INTERNAL MEDICINE

## 2022-08-23 PROCEDURE — C1769 GUIDE WIRE: HCPCS | Performed by: INTERNAL MEDICINE

## 2022-08-23 PROCEDURE — 36591 DRAW BLOOD OFF VENOUS DEVICE: CPT | Performed by: ANESTHESIOLOGY

## 2022-08-23 PROCEDURE — 94660 CPAP INITIATION&MGMT: CPT

## 2022-08-23 PROCEDURE — 250N000011 HC RX IP 250 OP 636: Performed by: REGISTERED NURSE

## 2022-08-23 PROCEDURE — 250N000013 HC RX MED GY IP 250 OP 250 PS 637: Performed by: NURSE PRACTITIONER

## 2022-08-23 PROCEDURE — 33361 REPLACE AORTIC VALVE PERQ: CPT | Mod: 62 | Performed by: THORACIC SURGERY (CARDIOTHORACIC VASCULAR SURGERY)

## 2022-08-23 PROCEDURE — C1730 CATH, EP, 19 OR FEW ELECT: HCPCS | Performed by: INTERNAL MEDICINE

## 2022-08-23 PROCEDURE — 93325 DOPPLER ECHO COLOR FLOW MAPG: CPT

## 2022-08-23 PROCEDURE — 250N000011 HC RX IP 250 OP 636: Performed by: INTERNAL MEDICINE

## 2022-08-23 PROCEDURE — 258N000003 HC RX IP 258 OP 636: Performed by: INTERNAL MEDICINE

## 2022-08-23 PROCEDURE — 258N000003 HC RX IP 258 OP 636: Performed by: REGISTERED NURSE

## 2022-08-23 PROCEDURE — 84132 ASSAY OF SERUM POTASSIUM: CPT | Performed by: ANESTHESIOLOGY

## 2022-08-23 PROCEDURE — 250N000013 HC RX MED GY IP 250 OP 250 PS 637: Performed by: PHYSICIAN ASSISTANT

## 2022-08-23 PROCEDURE — 210N000002 HC R&B HEART CARE

## 2022-08-23 PROCEDURE — 250N000009 HC RX 250: Performed by: INTERNAL MEDICINE

## 2022-08-23 PROCEDURE — 93325 DOPPLER ECHO COLOR FLOW MAPG: CPT | Mod: 26 | Performed by: INTERNAL MEDICINE

## 2022-08-23 PROCEDURE — C1894 INTRO/SHEATH, NON-LASER: HCPCS | Performed by: INTERNAL MEDICINE

## 2022-08-23 PROCEDURE — 272N000001 HC OR GENERAL SUPPLY STERILE: Performed by: INTERNAL MEDICINE

## 2022-08-23 PROCEDURE — 02RF38Z REPLACEMENT OF AORTIC VALVE WITH ZOOPLASTIC TISSUE, PERCUTANEOUS APPROACH: ICD-10-PCS | Performed by: THORACIC SURGERY (CARDIOTHORACIC VASCULAR SURGERY)

## 2022-08-23 PROCEDURE — 258N000003 HC RX IP 258 OP 636: Performed by: ANESTHESIOLOGY

## 2022-08-23 PROCEDURE — 999N000157 HC STATISTIC RCP TIME EA 10 MIN

## 2022-08-23 PROCEDURE — 93005 ELECTROCARDIOGRAM TRACING: CPT

## 2022-08-23 PROCEDURE — 82947 ASSAY GLUCOSE BLOOD QUANT: CPT | Performed by: ANESTHESIOLOGY

## 2022-08-23 PROCEDURE — 33361 REPLACE AORTIC VALVE PERQ: CPT | Mod: 62 | Performed by: INTERNAL MEDICINE

## 2022-08-23 PROCEDURE — 93010 ELECTROCARDIOGRAM REPORT: CPT | Performed by: INTERNAL MEDICINE

## 2022-08-23 DEVICE — VALVE AORTIC SAPEIN 3 UTLRA TAVR 23MM 9750TFX23A: Type: IMPLANTABLE DEVICE | Status: FUNCTIONAL

## 2022-08-23 RX ORDER — ALBUTEROL SULFATE 0.83 MG/ML
2.5 SOLUTION RESPIRATORY (INHALATION) EVERY 4 HOURS PRN
Status: DISCONTINUED | OUTPATIENT
Start: 2022-08-23 | End: 2022-08-23 | Stop reason: HOSPADM

## 2022-08-23 RX ORDER — SODIUM CHLORIDE, SODIUM LACTATE, POTASSIUM CHLORIDE, CALCIUM CHLORIDE 600; 310; 30; 20 MG/100ML; MG/100ML; MG/100ML; MG/100ML
INJECTION, SOLUTION INTRAVENOUS CONTINUOUS
Status: DISCONTINUED | OUTPATIENT
Start: 2022-08-23 | End: 2022-08-23 | Stop reason: HOSPADM

## 2022-08-23 RX ORDER — HEPARIN SODIUM 1000 [USP'U]/ML
INJECTION, SOLUTION INTRAVENOUS; SUBCUTANEOUS PRN
Status: DISCONTINUED | OUTPATIENT
Start: 2022-08-23 | End: 2022-08-23

## 2022-08-23 RX ORDER — LORATADINE 10 MG/1
10 TABLET ORAL DAILY
Status: DISCONTINUED | OUTPATIENT
Start: 2022-08-23 | End: 2022-08-25 | Stop reason: HOSPADM

## 2022-08-23 RX ORDER — ONDANSETRON 4 MG/1
4 TABLET, ORALLY DISINTEGRATING ORAL EVERY 30 MIN PRN
Status: DISCONTINUED | OUTPATIENT
Start: 2022-08-23 | End: 2022-08-23 | Stop reason: HOSPADM

## 2022-08-23 RX ORDER — FUROSEMIDE 20 MG
20 TABLET ORAL DAILY
Status: DISCONTINUED | OUTPATIENT
Start: 2022-08-24 | End: 2022-08-25 | Stop reason: HOSPADM

## 2022-08-23 RX ORDER — NYSTATIN 100000 [USP'U]/G
POWDER TOPICAL 2 TIMES DAILY
Status: DISCONTINUED | OUTPATIENT
Start: 2022-08-23 | End: 2022-08-23 | Stop reason: CLARIF

## 2022-08-23 RX ORDER — MEPERIDINE HYDROCHLORIDE 25 MG/ML
12.5 INJECTION INTRAMUSCULAR; INTRAVENOUS; SUBCUTANEOUS EVERY 5 MIN PRN
Status: DISCONTINUED | OUTPATIENT
Start: 2022-08-23 | End: 2022-08-23 | Stop reason: HOSPADM

## 2022-08-23 RX ORDER — HYDRALAZINE HYDROCHLORIDE 20 MG/ML
10 INJECTION INTRAMUSCULAR; INTRAVENOUS
Status: DISCONTINUED | OUTPATIENT
Start: 2022-08-23 | End: 2022-08-25 | Stop reason: HOSPADM

## 2022-08-23 RX ORDER — NITROGLYCERIN 0.4 MG/1
0.4 TABLET SUBLINGUAL EVERY 5 MIN PRN
Status: DISCONTINUED | OUTPATIENT
Start: 2022-08-23 | End: 2022-08-25 | Stop reason: HOSPADM

## 2022-08-23 RX ORDER — PROTAMINE SULFATE 10 MG/ML
INJECTION, SOLUTION INTRAVENOUS PRN
Status: DISCONTINUED | OUTPATIENT
Start: 2022-08-23 | End: 2022-08-23

## 2022-08-23 RX ORDER — ONDANSETRON 2 MG/ML
4 INJECTION INTRAMUSCULAR; INTRAVENOUS EVERY 30 MIN PRN
Status: DISCONTINUED | OUTPATIENT
Start: 2022-08-23 | End: 2022-08-23 | Stop reason: HOSPADM

## 2022-08-23 RX ORDER — SODIUM CHLORIDE 9 MG/ML
INJECTION, SOLUTION INTRAVENOUS CONTINUOUS
Status: DISCONTINUED | OUTPATIENT
Start: 2022-08-23 | End: 2022-08-23 | Stop reason: HOSPADM

## 2022-08-23 RX ORDER — HYDROMORPHONE HYDROCHLORIDE 1 MG/ML
0.4 INJECTION, SOLUTION INTRAMUSCULAR; INTRAVENOUS; SUBCUTANEOUS EVERY 5 MIN PRN
Status: DISCONTINUED | OUTPATIENT
Start: 2022-08-23 | End: 2022-08-23 | Stop reason: HOSPADM

## 2022-08-23 RX ORDER — SODIUM CHLORIDE 9 MG/ML
INJECTION, SOLUTION INTRAVENOUS CONTINUOUS PRN
Status: DISCONTINUED | OUTPATIENT
Start: 2022-08-23 | End: 2022-08-23

## 2022-08-23 RX ORDER — SIMVASTATIN 10 MG
10 TABLET ORAL AT BEDTIME
Status: DISCONTINUED | OUTPATIENT
Start: 2022-08-23 | End: 2022-08-25 | Stop reason: HOSPADM

## 2022-08-23 RX ORDER — DEXMEDETOMIDINE HYDROCHLORIDE 4 UG/ML
INJECTION, SOLUTION INTRAVENOUS CONTINUOUS PRN
Status: DISCONTINUED | OUTPATIENT
Start: 2022-08-23 | End: 2022-08-23

## 2022-08-23 RX ORDER — CITALOPRAM HYDROBROMIDE 20 MG/1
20 TABLET ORAL DAILY
Status: DISCONTINUED | OUTPATIENT
Start: 2022-08-23 | End: 2022-08-25 | Stop reason: HOSPADM

## 2022-08-23 RX ORDER — HYDRALAZINE HYDROCHLORIDE 20 MG/ML
2.5-5 INJECTION INTRAMUSCULAR; INTRAVENOUS EVERY 10 MIN PRN
Status: DISCONTINUED | OUTPATIENT
Start: 2022-08-23 | End: 2022-08-23 | Stop reason: HOSPADM

## 2022-08-23 RX ORDER — ASPIRIN 81 MG/1
81 TABLET ORAL DAILY
Status: DISCONTINUED | OUTPATIENT
Start: 2022-08-23 | End: 2022-08-23 | Stop reason: HOSPADM

## 2022-08-23 RX ORDER — ASPIRIN 81 MG/1
81 TABLET ORAL DAILY
Status: DISCONTINUED | OUTPATIENT
Start: 2022-08-24 | End: 2022-08-25 | Stop reason: HOSPADM

## 2022-08-23 RX ORDER — ONDANSETRON 2 MG/ML
INJECTION INTRAMUSCULAR; INTRAVENOUS PRN
Status: DISCONTINUED | OUTPATIENT
Start: 2022-08-23 | End: 2022-08-23

## 2022-08-23 RX ORDER — PANTOPRAZOLE SODIUM 40 MG/1
40 TABLET, DELAYED RELEASE ORAL
Status: DISCONTINUED | OUTPATIENT
Start: 2022-08-24 | End: 2022-08-25 | Stop reason: HOSPADM

## 2022-08-23 RX ORDER — ACETAMINOPHEN 325 MG/1
650 TABLET ORAL EVERY 4 HOURS PRN
Status: DISCONTINUED | OUTPATIENT
Start: 2022-08-23 | End: 2022-08-25 | Stop reason: HOSPADM

## 2022-08-23 RX ORDER — CLINDAMYCIN PHOSPHATE 900 MG/50ML
900 INJECTION, SOLUTION INTRAVENOUS
Status: COMPLETED | OUTPATIENT
Start: 2022-08-23 | End: 2022-08-23

## 2022-08-23 RX ORDER — OXYCODONE HYDROCHLORIDE 5 MG/1
5 TABLET ORAL EVERY 4 HOURS PRN
Status: DISCONTINUED | OUTPATIENT
Start: 2022-08-23 | End: 2022-08-23 | Stop reason: HOSPADM

## 2022-08-23 RX ORDER — FENTANYL CITRATE 50 UG/ML
50 INJECTION, SOLUTION INTRAMUSCULAR; INTRAVENOUS EVERY 5 MIN PRN
Status: DISCONTINUED | OUTPATIENT
Start: 2022-08-23 | End: 2022-08-23 | Stop reason: HOSPADM

## 2022-08-23 RX ORDER — LIDOCAINE 40 MG/G
CREAM TOPICAL
Status: DISCONTINUED | OUTPATIENT
Start: 2022-08-23 | End: 2022-08-23 | Stop reason: HOSPADM

## 2022-08-23 RX ORDER — LABETALOL 20 MG/4 ML (5 MG/ML) INTRAVENOUS SYRINGE
10
Status: DISCONTINUED | OUTPATIENT
Start: 2022-08-23 | End: 2022-08-23 | Stop reason: HOSPADM

## 2022-08-23 RX ADMIN — SODIUM CHLORIDE: 9 INJECTION, SOLUTION INTRAVENOUS at 10:43

## 2022-08-23 RX ADMIN — SODIUM CHLORIDE, POTASSIUM CHLORIDE, SODIUM LACTATE AND CALCIUM CHLORIDE: 600; 310; 30; 20 INJECTION, SOLUTION INTRAVENOUS at 10:27

## 2022-08-23 RX ADMIN — MIDAZOLAM 1 MG: 1 INJECTION INTRAMUSCULAR; INTRAVENOUS at 11:06

## 2022-08-23 RX ADMIN — SIMVASTATIN 10 MG: 10 TABLET, FILM COATED ORAL at 21:29

## 2022-08-23 RX ADMIN — SODIUM CHLORIDE: 9 INJECTION, SOLUTION INTRAVENOUS at 09:27

## 2022-08-23 RX ADMIN — CLINDAMYCIN PHOSPHATE 900 MG: 900 INJECTION, SOLUTION INTRAVENOUS at 09:28

## 2022-08-23 RX ADMIN — HEPARIN SODIUM 17000 UNITS: 1000 INJECTION INTRAVENOUS; SUBCUTANEOUS at 11:38

## 2022-08-23 RX ADMIN — ONDANSETRON 4 MG: 2 INJECTION INTRAMUSCULAR; INTRAVENOUS at 12:08

## 2022-08-23 RX ADMIN — MICONAZOLE NITRATE: 2 POWDER TOPICAL at 21:30

## 2022-08-23 RX ADMIN — Medication 1 MCG/KG/HR: at 10:30

## 2022-08-23 RX ADMIN — Medication 0.7 MCG/KG/HR: at 12:00

## 2022-08-23 RX ADMIN — PROTAMINE SULFATE 120 MG: 10 INJECTION, SOLUTION INTRAVENOUS at 12:08

## 2022-08-23 RX ADMIN — LORATADINE 10 MG: 10 TABLET ORAL at 21:29

## 2022-08-23 ASSESSMENT — ACTIVITIES OF DAILY LIVING (ADL)
ADLS_ACUITY_SCORE: 29
ADLS_ACUITY_SCORE: 34
ADLS_ACUITY_SCORE: 29
ADLS_ACUITY_SCORE: 34
ADLS_ACUITY_SCORE: 29
EQUIPMENT_CURRENTLY_USED_AT_HOME: CANE, QUAD;SHOWER CHAIR
WALKING_OR_CLIMBING_STAIRS: STAIR CLIMBING DIFFICULTY, REQUIRES EQUIPMENT
DOING_ERRANDS_INDEPENDENTLY_DIFFICULTY: YES
TOILETING_ISSUES: NO
ADLS_ACUITY_SCORE: 37
ADLS_ACUITY_SCORE: 34
ADLS_ACUITY_SCORE: 34
DIFFICULTY_EATING/SWALLOWING: NO
WALKING_OR_CLIMBING_STAIRS_DIFFICULTY: YES
DRESSING/BATHING_DIFFICULTY: NO

## 2022-08-23 ASSESSMENT — COPD QUESTIONNAIRES
CAT_SEVERITY: SEVERE
COPD: 1

## 2022-08-23 NOTE — H&P
[facility]    History and Physical  Hospitalist       Date of Admission:  8/23/2022    Assessment & Plan   Lucille Rojas is a 82 year old female with pmhx SHAVON, GI bleed (2021), non-Hodgkin's lymphoma, chronic hypoxic respiratory failure 2/2 COPD, DM2, HTN, CAD, chronic HFpEF, and severe aortic valve stenosis who was admitted overnight following 23 mm Coreas LAUREN 3 ultra bioprosthetic TAVR (8/23/2022).    Symptomatic severe aortic valve stenosis s/p 23 mm Coreas LAUREN 3 ultra bioprosthetic TAVR (8/23/2022).  Chronic HFpEF.  CAD.  HTN.  HLD.  Pre-TAVR cor angio showing Known flow-limiting disease of OM1, medically managed as well as moderate stenosis in the LAD and moderate-severe stenosis P LCx.  - Monitor tele, I&O, daily weights.  - Postop management per cardiology.  - Continue PTA ASA 81mg, Lasix 20mg, simvastatin.  - Obtain postop echo in AM.  - Cardiac rehab.    Chronic hypoxic respiratory failure 2/2 chart review diagnosis COPD.  SHAVON.  - Cont home O2 2L PRN for SaO2 <90 and BiPAP.  - Not on COPD meds at home.    Suspected candida intertrigo.  Raw-appearing skin under breasts and panus bilaterally.  - Will have WOC RN eval given groin access sites affected.    CKD 3.  Baseline Cr 0.9-1.0.  - Monitor in AM.    Non-Hodgkin's B-cell lymphoma involving the pancreas s/p 6 cycles of mini R-CHOP.  Follows with [place] oncology Dr. Srinivasan. PET scan on 04/11/2022 revealed complete response.    Hypothyroidism.  - Continue PTA levothyroxine.    Depression.  - Cont PTA Celexa.    Chart review diagnosis DM2.  A1c 5.2 (8/17/2022).  - Will not monitor blood sugars or have moderate carbohydrate diet given no longer meeting criteria for prediabetes.    Hx GI bleed.  - Cont PTA PPI.    Asymptomatic COVID-19. Negative PCR 8/19.    Clinically Significant Risk Factors Present on Admission                    # Severe Obesity: Estimated body mass index is 45.7 kg/m  as calculated from the following:     Height as of this encounter: 1.524 m (5').    Weight as of this encounter: 106.1 kg (234 lb).      DVT Prophylaxis: Pneumatic Compression Devices  Code Status: Full Code    This patient was discussed with Dr. Cutler of the Hospitalist Service who agrees with current plans as outlined above.    Disposition: Expected discharge 8/24 if uncomplicated post op course.  JoAnna K. Barthell, GERI, PAMONALISA    Primary Care Physician   Fausto Flynn    Chief Complaint   TAVR    History is obtained from the patient and chart review.    History of Present Illness   Lucille Rojas is a 82 year old female with pmhx SHAVON, GI bleed (2021), non-Hodgkin's lymphoma, chronic hypoxic respiratory failure 2/2 COPD, DM2, HTN, CAD, chronic HFpEF, and severe aortic valve stenosis who was admitted overnight following 23 mm Coreas LAUREN 3 ultra bioprosthetic TAVR (8/23/2022). Procedure performed under MAC anesthesia and was uncomplicated.    Previously seen in PACU, but too somnolent to converse. Later, evaluated at bedside with  and son present. Denies pain, difficulty breathing beyond her baseline, and chest pain. She uses supplemental O2 as needed PRN when O2 falls below <90% and BiPAP with oxygen at night.    PAST MEDICAL HISTORY  Past Medical History:   Diagnosis Date     Cancer (H) 10/2021     Depressive disorder      Heart disease 10/2021     History of blood transfusion 20/2021     HTN (hypertension) 5/9/2013     Hyperlipidemia LDL goal <130 5/9/2013     Hypothyroidism 5/9/2013     Macular degeneration 9/18/2013     Sleep apnea     CPAP at night, O2 during naps     Type 2 diabetes, HbA1C goal < 8% (H) 5/9/2013       PAST SURGICAL HISTORY  Past Surgical History:   Procedure Laterality Date     APPENDECTOMY       COLONOSCOPY       COLONOSCOPY N/A 10/27/2014    Procedure: COMBINED COLONOSCOPY, SINGLE OR MULTIPLE BIOPSY/POLYPECTOMY BY BIOPSY;  Surgeon: Jose Alfredo Morejon MD;  Location:  GI     CV CORONARY ANGIOGRAM N/A  7/15/2022    Procedure: Coronary Angiogram;  Surgeon: Mary Jo Rodriguez MD;  Location: [facility] CARDIAC CATH LAB     CV PCI N/A 7/15/2022    Procedure: Percutaneous Coronary Intervention;  Surgeon: Mary Jo Rodriguez MD;  Location: [facility] CARDIAC CATH LAB     ESOPHAGOSCOPY, GASTROSCOPY, DUODENOSCOPY (EGD), COMBINED N/A 9/21/2021    Procedure: ESOPHAGOGASTRODUODENOSCOPY, WITH FINE NEEDLE ASPIRATION BIOPSY, WITH ENDOSCOPIC ULTRASOUND GUIDANCE;  Surgeon: eVra Benitez MD;  Location:  GI     ESOPHAGOSCOPY, GASTROSCOPY, DUODENOSCOPY (EGD), COMBINED N/A 9/21/2021    Procedure: Esophagogastroduodenoscopy, With Biopsy;  Surgeon: Vera Benitez MD;  Location:  GI     ESOPHAGOSCOPY, GASTROSCOPY, DUODENOSCOPY (EGD), COMBINED N/A 10/5/2021    Procedure: ESOPHAGOGASTRODUODENOSCOPY, WITH FINE NEEDLE ASPIRATION BIOPSY, WITH ENDOSCOPIC ULTRASOUND GUIDANCE;  Surgeon: Dixon Olivier MD;  Location:  GI     ESOPHAGOSCOPY, GASTROSCOPY, DUODENOSCOPY (EGD), COMBINED N/A 12/22/2021    Procedure: ESOPHAGOGASTRODUODENOSCOPY, WITH BIOPSY;  Surgeon: Vera Benitez MD;  Location:  GI     HERNIA REPAIR      inguinal x 2     IR CHEST PORT PLACEMENT > 5 YRS OF AGE  12/13/2021     TONSILLECTOMY         HOME MEDICATIONS  Prior to Admission medications    Medication Sig Last Dose Taking? Auth Provider Long Term End Date   acetaminophen (TYLENOL) 325 MG tablet Take 2 tablets (650 mg) by mouth every 4 hours as needed for mild pain 8/22/2022 at PRN Yes Raphael Martinez MD     aspirin 81 MG tablet Take 1 tablet by mouth daily. 8/23/2022 at 0630 Yes Fausto Flynn MD Yes    carboxymethylcellulose PF (REFRESH PLUS) 0.5 % ophthalmic solution Place 2 drops into both eyes 2 times daily  at AM Yes Reported, Patient     citalopram (CELEXA) 20 MG tablet Take 1 tablet (20 mg) by mouth daily 8/22/2022 at AM Yes Fausto Flynn MD Yes    furosemide (LASIX) 20 MG tablet TAKE 1 TABLET BY MOUTH DAILY 8/22/2022  "at AM Yes Fausto Flynn MD Yes    levothyroxine (SYNTHROID/LEVOTHROID) 175 MCG tablet Take 1 tablet (175 mcg) by mouth daily 8/22/2022 at AM Yes Fausto Flynn MD Yes    loratadine (CLARITIN) 10 MG tablet Take 10 mg by mouth daily 8/22/2022 at PM Yes Reported, Patient     LORazepam (ATIVAN) 0.5 MG tablet Take 1 tablet (0.5 mg) by mouth every 8 hours as needed for anxiety or nausea 8/22/2022 at PRN Yes John Srinivasan MD     nystatin (MYCOSTATIN) 631881 UNIT/GM external powder Apply topically 2 times daily Under breasts and skin folds BID & BID PRN for redness Past Month at PRN Yes Fausto Flynn MD     pantoprazole (PROTONIX) 40 MG EC tablet Take 1 tablet (40 mg) by mouth every morning (before breakfast) 8/22/2022 at AM Yes John Srinivasan MD     simvastatin (ZOCOR) 10 MG tablet Take 1 tablet (10 mg) by mouth At Bedtime 8/22/2022 at PM Yes Fausto Flynn MD Yes    CONTOUR NEXT TEST test strip USE TO TEST BLOOD GLUCOSE ONCE DAILY   Reported, Patient     Microlet Lancets MISC USE TO TEST BLOOD GLUCOSE ONCE DAILY   Reported, Patient     order for DME Equipment being ordered: wheeled walker with hand breaks   Fausto Flynn MD         ALLERGIES  Allergies   Allergen Reactions     Penicillins        SOCIAL HISTORY   reports that she has never smoked. She has never used smokeless tobacco. She reports previous alcohol use. She reports that she does not use drugs.    FAMILY HISTORY  Father: \"his heart blew up on him\"  Mother: old age  Children with various different cancers    REVIEW OF SYSTEMS  A 10 point ROS was negative other than the symptoms noted above in the HPI.    Physical Exam   Nursing Notes Reviewed.  /53   Pulse 59   Temp 97.8  F (36.6  C)   Resp 14   Ht 1.524 m (5')   Wt 106.1 kg (234 lb)   SpO2 99%   BMI 45.70 kg/m     General:  Appears stated age in no acute distress laying flat in bed.  Skin:  Warm, dry. Moist, erythematous skin under breasts and panus with some maceration.  HEENT: "  Normocephalic, atraumatic; EOMs grossly intact.  Neck:  Supple.  Chest:  Breath sounds CTA and no increased work of breathing on 2.5L O2 via NC.  Cardiovascular:  RRR, no rub or murmur. No peripheral edema. Bilateral groin with slight oozing blood.  Abdomen:  Soft, morbidly obese, non-tender, non-distended.  Musculoskeletal:  Moves all four extremities.  Neurological:  CN 2-12 grossly intact.    Data   Data reviewed today:  I reviewed all medications, new labs and imaging results over the last 24 hours.  Recent Labs   Lab 08/23/22  1133 08/23/22  1021 08/18/22  1604   WBC  --   --  9.6   HGB  --   --  10.1*   MCV  --   --  89   PLT  --   --  253   INR  --   --  1.07   NA  --   --  139   POTASSIUM 4.0  --  4.4   CHLORIDE  --   --  106   CO2  --   --  32   BUN  --   --  18   CR  --   --  0.89   ANIONGAP  --   --  1*   IGOR  --   --  8.8   * 104* 99       Imaging:  Recent Results (from the past 24 hour(s))   Cardiac Catheterization    Narrative      Successful transfemoral transcatheter aortic valve replacement with a   23mm Coreas Nathan 3 Ultra valve.

## 2022-08-23 NOTE — OP NOTE
Procedure Date: 08/23/2022    PREOPERATIVE DIAGNOSES:    1.  Symptomatic severe aortic valve stenosis.  2.  Chronic diastolic heart failure.  3.  Coronary artery disease.  4.  Hypertension.  5.  Hyperlipidemia.  6.  Diabetes mellitus.  7.  Obstructive sleep apnea.  8.  Non-Hodgkin's lymphoma.  9.  History of gastrointestinal bleed.  10.  Chronic obstructive pulmonary disease on home oxygen.  11.  Morbid obesity    POSTOPERATIVE DIAGNOSES:    1.  Symptomatic severe aortic valve stenosis.  2.  Chronic diastolic heart failure.  3.  Coronary artery disease.  4.  Hypertension.  5.  Hyperlipidemia.  6.  Diabetes mellitus.  7.  Obstructive sleep apnea.  8.  Non-Hodgkin's lymphoma.  9.  History of gastrointestinal bleed.  10.  Chronic obstructive pulmonary disease on home oxygen.  11.  Morbid obesity    OPERATION:  Right transfemoral implantation of a 23 mm Coreas LAUREN 3 Ultra bioprosthesis.    INTERVENTIONAL CARDIOLOGIST:   Mary Jo Rodriguez MD and Cirilo Flores MD     CARDIAC SURGEON:  Mick Linton MD     ANESTHESIA:  Monitored anesthesia care with local.    INDICATIONS FOR PROCEDURE:  The patient is an 82-year-old female with extensive medical history.  She has had chronic fatigue and dyspnea, which has worsened in the past several months.  On further evaluation, she was found to have severe aortic valve stenosis.  Her additional medical history is as noted.  She is felt to be at increased risk for surgical valve replacement and was referred for TAVR.  Following discussion of risks and benefits, she has elected to proceed.    DESCRIPTION OF PROCEDURE:  The patient was brought to the cath lab and placed supine on the table.  Monitoring lines were placed and MAC was delivered.  The patient was then sterilely prepped and draped in the usual fashion and timeout performed to confirm patient's identity, procedure to be performed, and the administration of preoperative antibiotics.    Combination of ultrasound and fluoroscopy  was utilized to obtain bilateral common femoral arterial access and left common femoral venous access.  This was exchanged for sheath access.  A temporary pacemaker was advanced from the venous access into the right ventricle.  A pigtail catheter was advanced from the left sided arterial access into the aortic root.  The large bore sheath was then placed on the right over a stiff wire and the patient was systemically heparinized.    The native valve was then crossed with an AL1 catheter and a straight wire.  This was exchanged for a J-wire followed by a pigtail followed by a Safari.  The valve delivery system was brought to the field and advanced over the wire into position within the aortic root.  Under rapid pacing, the valve was deployed with an excellent fluoroscopic result.  Upon cessation of pacing, the patient's native rhythm and hemodynamics recovered promptly.    The valve delivery system was recaptured and withdrawn.  Aortography and echocardiography revealed excellent valve position without any perivalvular insufficiency.    The temporary pacemaker was removed.  Protamine was administered to reverse the patient's heparinization.  The left sided pigtail was withdrawn to the level of the iliac bifurcation.  The large bore access was then removed over an access wire and the MANTA device used to close the arteriotomy.  This appeared hemostatic and completion iliofemoral angiography revealed no extravasation or stenosis.  The left sided arterial access was managed with an Angio-Seal.  The left-sided venous access was managed with manual pressure.  At the end of procedure, all counts were correct.  The patient was taken to recovery in stable condition.    Mick Linton MD        D: 2022   T: 2022   MT: DURAN    Name:     GLORIA LARA  MRN:      [id number]        Account:        [id number]   :      1940           Procedure Date: 2022     Document: [id number]

## 2022-08-23 NOTE — ANESTHESIA PREPROCEDURE EVALUATION
Anesthesia Pre-Procedure Evaluation    Patient: Lucille Rojas   MRN: [id number] : 1940        Procedure : Procedure(s):  Transcatheter Aortic Valve Replacement-Femoral Approach          Past Medical History:   Diagnosis Date     Cancer (H) 10/2021     Depressive disorder      Heart disease 10/2021     History of blood transfusion      HTN (hypertension) 2013     Hyperlipidemia LDL goal <130 2013     Hypothyroidism 2013     Macular degeneration 2013     Sleep apnea     CPAP at night, O2 during naps     Type 2 diabetes, HbA1C goal < 8% (H) 2013      Past Surgical History:   Procedure Laterality Date     APPENDECTOMY       COLONOSCOPY       COLONOSCOPY N/A 10/27/2014    Procedure: COMBINED COLONOSCOPY, SINGLE OR MULTIPLE BIOPSY/POLYPECTOMY BY BIOPSY;  Surgeon: Jose Alfredo Moreojn MD;  Location: [facility]     CV CORONARY ANGIOGRAM N/A 7/15/2022    Procedure: Coronary Angiogram;  Surgeon: aMry Jo Rodriguez MD;  Location: [facility] CARDIAC CATH LAB     CV PCI N/A 7/15/2022    Procedure: Percutaneous Coronary Intervention;  Surgeon: Mary Jo Rodriguez MD;  Location: [facility] CARDIAC CATH LAB     ESOPHAGOSCOPY, GASTROSCOPY, DUODENOSCOPY (EGD), COMBINED N/A 2021    Procedure: ESOPHAGOGASTRODUODENOSCOPY, WITH FINE NEEDLE ASPIRATION BIOPSY, WITH ENDOSCOPIC ULTRASOUND GUIDANCE;  Surgeon: Vera Benitez MD;  Location: [facility]     ESOPHAGOSCOPY, GASTROSCOPY, DUODENOSCOPY (EGD), COMBINED N/A 2021    Procedure: Esophagogastroduodenoscopy, With Biopsy;  Surgeon: Vera Benitez MD;  Location:  GI     ESOPHAGOSCOPY, GASTROSCOPY, DUODENOSCOPY (EGD), COMBINED N/A 10/5/2021    Procedure: ESOPHAGOGASTRODUODENOSCOPY, WITH FINE NEEDLE ASPIRATION BIOPSY, WITH ENDOSCOPIC ULTRASOUND GUIDANCE;  Surgeon: Dixon Olivier MD;  Location: [facility]     ESOPHAGOSCOPY, GASTROSCOPY, DUODENOSCOPY (EGD), COMBINED N/A 2021    Procedure: ESOPHAGOGASTRODUODENOSCOPY, WITH  It was a pleasure to see you! As discussed:    Knee Pain   Continue care with Dr. Letha Jha. Also try OTC lidocaine patch (Salon Pas or Asper Creme)    Pituitary Mass   Complete the labs ordered  See Dr. Harinder Paige as scheduled. Preventing Falls: Care Instructions  Your Care Instruction  Getting around your home safely can be a challenge if you have injuries or health problems that make it easy for you to fall. Loose rugs and furniture in walkways are among the dangers for many older people who have problems walking or who have poor eyesight. People who have conditions such as arthritis, osteoporosis, or dementia also have to be careful not to fall. You can make your home safer with a few simple measures. Follow-up care is a key part of your treatment and safety. Be sure to make and go to all appointments, and call your doctor if you are having problems. It's also a good idea to know your test results and keep a list of the medicines you take. How can you care for yourself at home? Taking care of yourself  · You may get dizzy if you do not drink enough water. To prevent dehydration, drink plenty of fluids, enough so that your urine is light yellow or clear like water. Choose water and other caffeine-free clear liquids. If you have kidney, heart, or liver disease and have to limit fluids, talk with your doctor before you increase the amount of fluids you drink. · Exercise regularly to improve your strength, muscle tone, and balance. Walk if you can. Swimming may be a good choice if you cannot walk easily. · Have your vision and hearing checked each year or any time you notice a change. If you have trouble seeing and hearing, you might not be able to avoid objects and could lose your balance. · Know the side effects of the medicines you take. Ask your doctor or pharmacist whether the medicines you take can affect your balance. Sleeping pills or sedatives can affect your balance.   · Limit the amount of BIOPSY;  Surgeon: Vera Benitez MD;  Location:  GI     HERNIA REPAIR      inguinal x 2     IR CHEST PORT PLACEMENT > 5 YRS OF AGE  12/13/2021     TONSILLECTOMY        Allergies   Allergen Reactions     Penicillins       Social History     Tobacco Use     Smoking status: Never Smoker     Smokeless tobacco: Never Used   Substance Use Topics     Alcohol use: Not Currently      Wt Readings from Last 1 Encounters:   08/23/22 106.1 kg (234 lb)        Anesthesia Evaluation   Pt has had prior anesthetic.     No history of anesthetic complications       ROS/MED HX  ENT/Pulmonary:     (+) sleep apnea, uses CPAP, severe,  COPD, O2 dependent,     Neurologic:    (-) no CVA   Cardiovascular:     (+) Dyslipidemia hypertension-----valvular problems/murmurs type: AS  (-) CAD   METS/Exercise Tolerance:     Hematologic:       Musculoskeletal:       GI/Hepatic:     (+) GERD,     Renal/Genitourinary:     (+) renal disease, type: CRI,     Endo:     (+) type II DM, thyroid problem, Obesity,     Psychiatric/Substance Use:       Infectious Disease:       Malignancy:   (+) Malignancy, History of Lymphoma/Leukemia.    Other:            Physical Exam    Airway        Mallampati: II   TM distance: > 3 FB   Neck ROM: full   Mouth opening: > 3 cm    Respiratory Devices and Support         Dental  no notable dental history         Cardiovascular   cardiovascular exam normal          Pulmonary   pulmonary exam normal                OUTSIDE LABS:  CBC:   Lab Results   Component Value Date    WBC 9.6 08/18/2022    WBC 7.7 08/02/2022    HGB 10.1 (L) 08/18/2022    HGB 9.1 (L) 08/02/2022    HCT 33.8 (L) 08/18/2022    HCT 31.8 (L) 08/02/2022     08/18/2022     08/02/2022     BMP:   Lab Results   Component Value Date     08/18/2022     08/02/2022    POTASSIUM 4.4 08/18/2022    POTASSIUM 3.5 08/02/2022    CHLORIDE 106 08/18/2022    CHLORIDE 107 08/02/2022    CO2 32 08/18/2022    CO2 31 08/02/2022    BUN 18 08/18/2022  alcohol you drink. Alcohol can impair your balance and other senses. · Ask your doctor whether calluses or corns on your feet need to be removed. If you wear loose-fitting shoes because of calluses or corns, you can lose your balance and fall. · Talk to your doctor if you have numbness in your feet. Preventing falls at home  · Remove raised doorway thresholds, throw rugs, and clutter. Repair loose carpet or raised areas in the floor. · Move furniture and electrical cords to keep them out of walking paths. · Use nonskid floor wax, and wipe up spills right away, especially on ceramic tile floors. · If you use a walker or cane, put rubber tips on it. If you use crutches, clean the bottoms of them regularly with an abrasive pad, such as steel wool. · Keep your house well lit, especially [place], and outside walkways. Use night-lights in areas such as hallways and bathrooms. Add extra light switches or use remote switches (such as switches that go on or off when you clap your hands) to make it easier to turn lights on if you have to get up during the night. · Install sturdy handrails on stairways. · Move items in your cabinets so that the things you use a lot are on the lower shelves (about waist level). · Keep a cordless phone and a flashlight with new batteries by your bed. If possible, put a phone in each of the main rooms of your house, or carry a cell phone in case you fall and cannot reach a phone. Or, you can wear a device around your neck or wrist. You push a button that sends a signal for help. · Wear low-heeled shoes that fit well and give your feet good support. Use footwear with nonskid soles. Check the heels and soles of your shoes for wear. Repair or replace worn heels or soles. · Do not wear socks without shoes on wood floors. · Walk on the grass when the sidewalks are slippery.  If you live in an area that gets snow and ice in the winter, sprinkle salt on slippery steps and    BUN 28 08/02/2022    CR 0.89 08/18/2022    CR 0.99 08/02/2022    GLC 99 08/18/2022     (H) 08/02/2022     COAGS:   Lab Results   Component Value Date    PTT 75 (H) 07/15/2022    INR 1.07 08/18/2022     POC:   Lab Results   Component Value Date    BGM 94 10/27/2014     HEPATIC:   Lab Results   Component Value Date    ALBUMIN 3.3 (L) 08/02/2022    PROTTOTAL 6.4 (L) 08/02/2022    ALT 15 08/02/2022    AST 14 08/02/2022    ALKPHOS 109 08/02/2022    BILITOTAL 0.2 08/02/2022    ARLEY 38 09/23/2021     OTHER:   Lab Results   Component Value Date    PH 7.29 (L) 09/23/2021    LACT 1.4 02/21/2022    A1C 5.2 08/17/2022    IGOR 8.8 08/18/2022    LIPASE 129 11/10/2021    AMYLASE 64 09/11/2015    TSH 0.12 (L) 08/02/2022    T4 1.54 (H) 08/02/2022       Anesthesia Plan    ASA Status:  4   NPO Status:  NPO Appropriate    Anesthesia Type: MAC.     - Reason for MAC: straight local not clinically adequate         Techniques and Equipment:       - Drips/Meds: Dexmed. infusion     Consents    Anesthesia Plan(s) and associated risks, benefits, and realistic alternatives discussed. Questions answered and patient/representative(s) expressed understanding.    - Discussed:     - Discussed with:  Patient         Postoperative Care    Pain management: Multi-modal analgesia.   PONV prophylaxis: Ondansetron (or other 5HT-3)     Comments:                Shea Lopez MD, MD   sidewalks. Preventing falls in the bath  · Install grab bars and nonskid mats inside and outside your shower or tub and near the toilet and sinks. · Use shower chairs and bath benches. · Use a hand-held shower head that will allow you to sit while showering. · Get into a tub or shower by putting the weaker leg in first. Get out of a tub or shower with your strong side first.  · Repair loose toilet seats and consider installing a raised toilet seat to make getting on and off the toilet easier. · Keep your bathroom door unlocked while you are in the shower. Where can you learn more? Go to http://nikolay-kendra.info/. Enter [id number] [id number] [phone] in the search box to learn more about \"Preventing Falls: Care Instructions. \"  Current as of: May 12, 2017  Content Version: 11.4  © 8477-9767 [employer], [employer]. Care instructions adapted under license by [employer] (which disclaims liability or warranty for this information). If you have questions about a medical condition or this instruction, always ask your healthcare professional. [place] [address] any warranty or liability for your use of this information.

## 2022-08-23 NOTE — OR NURSING
Dr Lopez here for a sign out from the PACU. Equal but weak strength in all 4 extremities. Oriented x 4.   Groin sites dry. Pulses good with the doppler.  Dr Rodriguez here to check the patient post procedure.

## 2022-08-23 NOTE — Clinical Note
Prepped per 's instructions. Valve secured onto the delivery system balloon and correct orientation confirmed (blue to blue)   03-Jan-2017 16:50

## 2022-08-23 NOTE — PLAN OF CARE
Goal Outcome Evaluation:    Pt denies chest pain or SOB, Bilateral groin sites CD&I without hematoma, Tele SR 1st degree block, Off bedrest and up in the chair, Pt has rash under bilateral breasts and under her panus, miconazole powder and WOC consult ordered. Pt is alert and oriented x4.

## 2022-08-23 NOTE — ANESTHESIA CARE TRANSFER NOTE
Patient: Lucille Rojas    Procedure: Procedure(s):  Transcatheter Aortic Valve Replacement-Femoral Approach       Diagnosis: valvular disease  Diagnosis Additional Information: No value filed.    Anesthesia Type:   MAC     Note:    Oropharynx: oropharynx clear of all foreign objects  Level of Consciousness: drowsy  Oxygen Supplementation: face mask  Level of Supplemental Oxygen (L/min / FiO2): 6  Independent Airway: airway patency satisfactory and stable  Dentition: dentition unchanged  Vital Signs Stable: post-procedure vital signs reviewed and stable  Report to RN Given: handoff report given  Patient transferred to: PACU  Comments: Patient comfortable  Handoff Report: Identifed the Patient, Identified the Reponsible Provider, Reviewed the pertinent medical history, Discussed the surgical course, Reviewed Intra-OP anesthesia mangement and issues during anesthesia, Set expectations for post-procedure period and Allowed opportunity for questions and acknowledgement of understanding      Vitals:  Vitals Value Taken Time   /58 08/23/22 1240   Temp     Pulse 66 08/23/22 1241   Resp 23 08/23/22 1241   SpO2 99 % 08/23/22 1241   Vitals shown include unvalidated device data.    Electronically Signed By: ALIE Enamorado CRNA  August 23, 2022  12:43 PM

## 2022-08-23 NOTE — ANESTHESIA POSTPROCEDURE EVALUATION
Patient: Lucille Rojas    Procedure: Procedure(s):  Transcatheter Aortic Valve Replacement-Femoral Approach       Anesthesia Type:  MAC    Note:     Postop Pain Control: Uneventful            Sign Out: Well controlled pain   PONV: No   Neuro/Psych: Uneventful            Sign Out: Acceptable/Baseline neuro status   Airway/Respiratory: Uneventful            Sign Out: Acceptable/Baseline resp. status   CV/Hemodynamics: Uneventful            Sign Out: Acceptable CV status; No obvious hypovolemia; No obvious fluid overload   Other NRE: NONE   DID A NON-ROUTINE EVENT OCCUR? No           Last vitals:  Vitals Value Taken Time   /52 08/23/22 1410   Temp 36.3  C (97.4  F) 08/23/22 1410   Pulse 60 08/23/22 1416   Resp 25 08/23/22 1416   SpO2 94 % 08/23/22 1416   Vitals shown include unvalidated device data.    Electronically Signed By: Shea Lopez MD, MD  August 23, 2022  2:51 PM

## 2022-08-23 NOTE — PROGRESS NOTES
CV Surgery Note    Pt here today for TAVR. Previously evaluated by Dr Matute, at which time she was undecided regarding bailout.    I discussed the very low incidence of surgical bailout, and the attendant mortality likely in excess of 60%. I explained the morbidity among survivors to include, but not be limited to, prolonged ventilation, stroke, dialysis, prolonged recovery, other organ failure, and loss of independent living situation. She expressed understanding and had all of her questions answered. She states that she DOES desire emergency open heart surgery if needed, as well as the associated aggressive supportive measures.      Mick Linton MD

## 2022-08-24 ENCOUNTER — APPOINTMENT (OUTPATIENT)
Dept: CARDIOLOGY | Facility: CLINIC | Age: 82
DRG: 267 | End: 2022-08-24
Attending: NURSE PRACTITIONER
Payer: MEDICARE

## 2022-08-24 ENCOUNTER — APPOINTMENT (OUTPATIENT)
Dept: PHYSICAL THERAPY | Facility: CLINIC | Age: 82
DRG: 267 | End: 2022-08-24
Attending: NURSE PRACTITIONER
Payer: MEDICARE

## 2022-08-24 ENCOUNTER — PATIENT OUTREACH (OUTPATIENT)
Dept: CARE COORDINATION | Facility: CLINIC | Age: 82
End: 2022-08-24

## 2022-08-24 ENCOUNTER — APPOINTMENT (OUTPATIENT)
Dept: PHYSICAL THERAPY | Facility: CLINIC | Age: 82
DRG: 267 | End: 2022-08-24
Attending: INTERNAL MEDICINE
Payer: MEDICARE

## 2022-08-24 LAB
ANION GAP SERPL CALCULATED.3IONS-SCNC: 3 MMOL/L (ref 3–14)
BUN SERPL-MCNC: 27 MG/DL (ref 7–30)
CALCIUM SERPL-MCNC: 8.6 MG/DL (ref 8.5–10.1)
CHLORIDE BLD-SCNC: 104 MMOL/L (ref 94–109)
CO2 SERPL-SCNC: 33 MMOL/L (ref 20–32)
CREAT SERPL-MCNC: 1.09 MG/DL (ref 0.52–1.04)
ERYTHROCYTE [DISTWIDTH] IN BLOOD BY AUTOMATED COUNT: 13.6 % (ref 10–15)
GFR SERPL CREATININE-BSD FRML MDRD: 50 ML/MIN/1.73M2
GLUCOSE BLD-MCNC: 105 MG/DL (ref 70–99)
GLUCOSE BLDC GLUCOMTR-MCNC: 129 MG/DL (ref 70–99)
HCT VFR BLD AUTO: 32.9 % (ref 35–47)
HGB BLD-MCNC: 9.6 G/DL (ref 11.7–15.7)
LACTATE SERPL-SCNC: 0.8 MMOL/L (ref 0.7–2)
LVEF ECHO: NORMAL
MAGNESIUM SERPL-MCNC: 2.5 MG/DL (ref 1.6–2.3)
MCH RBC QN AUTO: 26.1 PG (ref 26.5–33)
MCHC RBC AUTO-ENTMCNC: 29.2 G/DL (ref 31.5–36.5)
MCV RBC AUTO: 89 FL (ref 78–100)
PHOSPHATE SERPL-MCNC: 4.1 MG/DL (ref 2.5–4.5)
PLATELET # BLD AUTO: 244 10E3/UL (ref 150–450)
POTASSIUM BLD-SCNC: 4.4 MMOL/L (ref 3.4–5.3)
RBC # BLD AUTO: 3.68 10E6/UL (ref 3.8–5.2)
SODIUM SERPL-SCNC: 140 MMOL/L (ref 133–144)
WBC # BLD AUTO: 8.2 10E3/UL (ref 4–11)

## 2022-08-24 PROCEDURE — 97161 PT EVAL LOW COMPLEX 20 MIN: CPT | Mod: GP

## 2022-08-24 PROCEDURE — 93325 DOPPLER ECHO COLOR FLOW MAPG: CPT | Mod: 26 | Performed by: INTERNAL MEDICINE

## 2022-08-24 PROCEDURE — 250N000013 HC RX MED GY IP 250 OP 250 PS 637: Performed by: PHYSICIAN ASSISTANT

## 2022-08-24 PROCEDURE — 999N000208 ECHOCARDIOGRAM LIMITED

## 2022-08-24 PROCEDURE — 83735 ASSAY OF MAGNESIUM: CPT | Performed by: NURSE PRACTITIONER

## 2022-08-24 PROCEDURE — 210N000002 HC R&B HEART CARE

## 2022-08-24 PROCEDURE — 255N000002 HC RX 255 OP 636: Performed by: INTERNAL MEDICINE

## 2022-08-24 PROCEDURE — 93005 ELECTROCARDIOGRAM TRACING: CPT

## 2022-08-24 PROCEDURE — 85027 COMPLETE CBC AUTOMATED: CPT | Performed by: NURSE PRACTITIONER

## 2022-08-24 PROCEDURE — 93308 TTE F-UP OR LMTD: CPT | Mod: 26 | Performed by: INTERNAL MEDICINE

## 2022-08-24 PROCEDURE — 93321 DOPPLER ECHO F-UP/LMTD STD: CPT | Mod: 26 | Performed by: INTERNAL MEDICINE

## 2022-08-24 PROCEDURE — 97530 THERAPEUTIC ACTIVITIES: CPT | Mod: GP

## 2022-08-24 PROCEDURE — 99222 1ST HOSP IP/OBS MODERATE 55: CPT | Mod: 25 | Performed by: INTERNAL MEDICINE

## 2022-08-24 PROCEDURE — 84100 ASSAY OF PHOSPHORUS: CPT | Performed by: NURSE PRACTITIONER

## 2022-08-24 PROCEDURE — 97110 THERAPEUTIC EXERCISES: CPT | Mod: GP

## 2022-08-24 PROCEDURE — 99207 PR MOONLIGHTING INDICATOR: CPT | Performed by: HOSPITALIST

## 2022-08-24 PROCEDURE — 999N000157 HC STATISTIC RCP TIME EA 10 MIN

## 2022-08-24 PROCEDURE — 36415 COLL VENOUS BLD VENIPUNCTURE: CPT | Performed by: NURSE PRACTITIONER

## 2022-08-24 PROCEDURE — 80048 BASIC METABOLIC PNL TOTAL CA: CPT | Performed by: NURSE PRACTITIONER

## 2022-08-24 PROCEDURE — 93010 ELECTROCARDIOGRAM REPORT: CPT | Performed by: INTERNAL MEDICINE

## 2022-08-24 PROCEDURE — G0463 HOSPITAL OUTPT CLINIC VISIT: HCPCS

## 2022-08-24 PROCEDURE — 250N000013 HC RX MED GY IP 250 OP 250 PS 637: Performed by: NURSE PRACTITIONER

## 2022-08-24 PROCEDURE — 93321 DOPPLER ECHO F-UP/LMTD STD: CPT

## 2022-08-24 PROCEDURE — 36415 COLL VENOUS BLD VENIPUNCTURE: CPT | Performed by: INTERNAL MEDICINE

## 2022-08-24 PROCEDURE — 99232 SBSQ HOSP IP/OBS MODERATE 35: CPT | Performed by: HOSPITALIST

## 2022-08-24 PROCEDURE — 94660 CPAP INITIATION&MGMT: CPT

## 2022-08-24 PROCEDURE — 83605 ASSAY OF LACTIC ACID: CPT | Performed by: INTERNAL MEDICINE

## 2022-08-24 RX ADMIN — ASPIRIN 81 MG: 81 TABLET, COATED ORAL at 08:25

## 2022-08-24 RX ADMIN — HUMAN ALBUMIN MICROSPHERES AND PERFLUTREN 9 ML: 10; .22 INJECTION, SOLUTION INTRAVENOUS at 14:22

## 2022-08-24 RX ADMIN — SIMVASTATIN 10 MG: 10 TABLET, FILM COATED ORAL at 21:15

## 2022-08-24 RX ADMIN — MICONAZOLE NITRATE: 2 POWDER TOPICAL at 08:26

## 2022-08-24 RX ADMIN — LEVOTHYROXINE SODIUM 175 MCG: 100 TABLET ORAL at 06:50

## 2022-08-24 RX ADMIN — PANTOPRAZOLE SODIUM 40 MG: 40 TABLET, DELAYED RELEASE ORAL at 06:50

## 2022-08-24 RX ADMIN — FUROSEMIDE 20 MG: 20 TABLET ORAL at 08:25

## 2022-08-24 RX ADMIN — LORATADINE 10 MG: 10 TABLET ORAL at 21:15

## 2022-08-24 RX ADMIN — CITALOPRAM HYDROBROMIDE 20 MG: 20 TABLET ORAL at 08:25

## 2022-08-24 RX ADMIN — MICONAZOLE NITRATE: 2 POWDER TOPICAL at 21:16

## 2022-08-24 RX ADMIN — ACETAMINOPHEN 650 MG: 325 TABLET ORAL at 01:19

## 2022-08-24 ASSESSMENT — ACTIVITIES OF DAILY LIVING (ADL)
ADLS_ACUITY_SCORE: 40
ADLS_ACUITY_SCORE: 36
ADLS_ACUITY_SCORE: 34
ADLS_ACUITY_SCORE: 40
ADLS_ACUITY_SCORE: 40
ADLS_ACUITY_SCORE: 34
ADLS_ACUITY_SCORE: 35
ADLS_ACUITY_SCORE: 34
ADLS_ACUITY_SCORE: 40
ADLS_ACUITY_SCORE: 34

## 2022-08-24 NOTE — CONSULTS
[facility] Nurse Inpatient Assessment     Consulted for: groin, buttock folds    Patient History (according to provider note(s):      Lucille Rojas is a 82 year old female with pmhx SHAVON, GI bleed (2021), non-Hodgkin's lymphoma, chronic hypoxic respiratory failure 2/2 COPD, DM2, HTN, CAD, chronic HFpEF, and severe aortic valve stenosis who was admitted overnight following 23 mm Coreas LAUREN 3 ultra bioprosthetic TAVR (8/23/2022).    Areas Assessed:      Areas visualized during today's visit: groin, pannus, buttocks    Skin Injury Location: groin/pannus folds    Last photo: 8-24-22      Skin injury due to: moisture, friction, possible start of fungal rash  Skin history and plan of care: pt obese with very deep skin folds, very pendulous abdomen and suprapubic area, difficult to visualize or access fully.  Pt states her  helps her get in and out of the shower at home and helps dry the areas.  Pt also has small bilateral groin access sites from procedure 8/23, these were covered with Tegaderm that was very loose and moist so these dressing were removed at assessment 8/24  Affected area: bilateral groin and pannus folds and central pannus crease     Skin assessment: mild denudement and superficial linear skin splitting along the base of deeper folds; skin overall pink and moist, scattered scaly rashy areas ie central superior vertical pannus fold     Measurements (length x width x depth, in cm) diffuse areas of erythema     Color: pink     Temperature  normal      Drainage: scant      Color: serous sweaty     Odor: minimal  Pain: pt c/o mild pain and itching to area  Pain interventions prior to dressing change: N/A  Treatment goal: Infection control/prevention, Decrease moisture and Maintain (prevention of deterioration)  STATUS: initial assessment  Supplies ordered: at bedside      Skin Injury Location: superior gluteal cleft    Last photo: 8-24-22      Skin injury due to:  moisture, friction, possible rash  Skin history and plan of care: deep gluteal folds that rub together and trap moisture  Affected area:      Skin assessment: pink, moist, appears mostly intact to just slightly denuded in areas     Measurements (length x width x depth, in cm) 2-3cm erythema bilaterally from cleft     Color: pink     Temperature  normal      Drainage: scant sweaty serous     Odor: none  Pain: denies     Pain interventions prior to dressing change: N/A  Treatment goal: Decrease moisture and Maintain (prevention of deterioration)  STATUS: initial assessment  Supplies ordered: at bedside         Treatment Plan:     Perineal/ buttock/ groin/ pannus cares: BID and prn:  1.  Cleanse with Osmany perineal lotion and soft dry wipes.  Ensure getting to base of all folds.   2.  Apply antifungal powder to folds and rub in well, using a dry gauze or cloth to spread it evenly and thinly.  Ensure applying to upper gluteal cleft and inner abd folds, and any other rashy areas as well.   3.  Apply InterDry along the main deep folds of anterior groin/pannus.  Place into base of folds in a flat, non-bunching layer.  Use wide enough pieces so that the fabric sticks out from folds a couple of inches when pt sits up.  Ok to rinse, dry, and reuse for several days, though it will eventually diminish the silver qualities (antibacterial/antifungal).     If groin access sites are a concern or are draining, can clean these spots with soap and water, dry well, swab periwound with skin prep and let dry, then cover with 4x4 foam dressings (ie Mepilex or Optifoam).  Change every 1-2 days as needed until healed over.      Orders: Written    RECOMMEND PRIMARY TEAM ORDER: None, at this time  Education provided: plan of care, Moisture management, Hygiene and Off-loading pressure  Discussed plan of care with: Patient, Family and Nurse  WOC nurse follow-up plan: weekly  Notify WOC if wound(s) deteriorate.  Nursing to notify the Provider(s)  and re-consult the [facility] Nurse if new skin concern.    DATA:     Current support surface: Standard  Atmos Air mattress  Containment of urine/stool: pull-on brief; pt can ambulate   BMI: Body mass index is 46.13 kg/m .   Active diet order: Orders Placed This Encounter      Regular Diet Adult     Output: I/O last 3 completed shifts:  In: 1100 [P.O.:100; I.V.:1000]  Out: 750 [Urine:750]     Labs: Recent Labs   Lab 08/24/22  0622 08/18/22  1604   HGB 9.6* 10.1*   INR  --  1.07   WBC 8.2 9.6     Pressure injury risk assessment:   Sensory Perception: 4-->no impairment  Moisture: 2-->very moist  Activity: 2-->chairfast  Mobility: 2-->very limited  Nutrition: 3-->adequate  Friction and Shear: 2-->potential problem  Gabriel Score: 15    Vickie Callahan RN   Dept. Pager: [phone]  Dept. Office Number: [phone]

## 2022-08-24 NOTE — PROGRESS NOTES
[facility]    Medicine Progress Note - Hospitalist Service    Date of Admission:  8/23/2022    Assessment & Plan            Lucille Rojas is a 82 year old female with pmhx SHAVON, GI bleed (2021), non-Hodgkin's lymphoma, chronic hypoxic respiratory failure 2/2 COPD, DM2, HTN, CAD, chronic HFpEF, and severe aortic valve stenosis who was admitted overnight following 23 mm Coreas LAUREN 3 ultra bioprosthetic TAVR (8/23/2022).    Symptomatic severe aortic valve stenosis s/p 23 mm Coreas LAUREN 3 ultra bioprosthetic TAVR (8/23/2022).  Chronic HFpEF.  CAD.  HTN.  HLD.  Pre-TAVR cor angio showing Known flow-limiting disease of OM1, medically managed as well as moderate stenosis in the LAD and moderate-severe stenosis P LCx.  - Monitor tele, I&O, daily weights.  - Postop management per cardiology; today awaiting Echo prior to discharge  - Continue PTA ASA 81mg, Lasix 20mg, simvastatin.  - Obtain postop echo in AM--awaiting results of this for discharge  - Cardiac rehab.    Chronic hypoxic respiratory failure 2/2 chart review diagnosis COPD.  SHAVON.  - Cont home O2 2L PRN for SaO2 <90 and BiPAP.  - Not on COPD meds at home.    Suspected candida intertrigo.  Raw-appearing skin under breasts and panus bilaterally.  - Will have [facility] RN eval given groin access sites affected.    CKD 3.  Baseline Cr 0.9-1.0.  - Monitor in AM.    Non-Hodgkin's B-cell lymphoma involving the pancreas s/p 6 cycles of mini R-CHOP.  Follows with [place] oncology Dr. Srinivasan. PET scan on 04/11/2022 revealed complete response.    Hypothyroidism.  - Continue PTA levothyroxine.    Depression.  - Cont PTA Celexa.    Chart review diagnosis DM2.  A1c 5.2 (8/17/2022).  - Will not monitor blood sugars or have moderate carbohydrate diet given no longer meeting criteria for prediabetes.    Hx GI bleed.  - Cont PTA PPI.    Asymptomatic COVID-19. Negative PCR 8/19.     Diet: Regular Diet Adult    DVT Prophylaxis: Pneumatic Compression  Devices  Espitia Catheter: Not present  Central Lines: PRESENT     Cardiac Monitoring: ACTIVE order. Indication: Transcatheter structural interventions (24 hours)  Code Status: Full Code      Disposition Plan      Expected Discharge Date: 08/25/2022      Destination: home with family     will discharge patient home today pending setting up of home PT and stable post operative echocardiogram     The patient's care was discussed with the Bedside Nurse, Patient and Cardiology Consultant.    Rubin Hayes MD PhD  Hospitalist Service  [facility]  Securely message with the Vocera Web Console (learn more here)  Text page via [employer] Paging/Directory         Clinically Significant Risk Factors Present on Admission               # Severe Obesity: Estimated body mass index is 46.13 kg/m  as calculated from the following:    Height as of this encounter: 1.524 m (5').    Weight as of this encounter: 107.1 kg (236 lb 3.2 oz).        ______________________________________________________________________    Interval History   Patient notes she did well overnight. No issues with groin access sites. No fevers, chills, chest pain, SOB, abdominal pain, nausea, vomiting, diarrhea.    Data reviewed today: I reviewed all medications, new labs and imaging results over the last 24 hours. I personally reviewed the EKG tracing showing no changes from pre-TAVR.    Physical Exam   Vital Signs: Temp: 98.3  F (36.8  C) Temp src: Oral BP: 129/48 Pulse: 74   Resp: 16 SpO2: 96 % O2 Device: Nasal cannula Oxygen Delivery: 2 LPM  Weight: 236 lbs 3.2 oz     General Appearance: Well appearing, no distress  Eyes: ANGELIQUE, EOMI  HEENT: NC, AT. MMM.   Respiratory: CTAB, normal work of breathing  Cardiovascular: Regular Rate and Rhythm, normal S1, S2. No murmurs, rubs, gallops  GI: Soft, non-tender, non-distended  Lymph/Hematologic: No asymetric swelling, edema. No bruising.  Genitourinary: Deferred  Skin: No rashes, lesions,  wounds.  Musculoskeletal: Warm, well perfused  Neurologic: AOx4, CNII-XII intact.  Psychiatric: Euthymic. Mood appropriate.     Data   Recent Labs   Lab 08/24/22  0622 08/23/22  2114 08/23/22  1133 08/23/22  1021 08/18/22  1604   WBC 8.2  --   --   --  9.6   HGB 9.6*  --   --   --  10.1*   MCV 89  --   --   --  89     --   --   --  253   INR  --   --   --   --  1.07     --   --   --  139   POTASSIUM 4.4  --  4.0  --  4.4   CHLORIDE 104  --   --   --  106   CO2 33*  --   --   --  32   BUN 27  --   --   --  18   CR 1.09*  --   --   --  0.89   ANIONGAP 3  --   --   --  1*   IGOR 8.6  --   --   --  8.8   * 163* 132*   < > 99    < > = values in this interval not displayed.     No results found for this or any previous visit (from the past 24 hour(s)).  Medications       aspirin  81 mg Oral Daily     citalopram  20 mg Oral Daily     furosemide  20 mg Oral Daily     levothyroxine  175 mcg Oral Daily     loratadine  10 mg Oral Daily     miconazole   Topical BID     pantoprazole  40 mg Oral QAM AC     simvastatin  10 mg Oral At Bedtime

## 2022-08-24 NOTE — PLAN OF CARE
Goal Outcome Evaluation:    Plan of Care Reviewed With: patient     Overall Patient Progress: improving    VSS, tele SR, denies pain.  Left and right groin sites cdi, no bruit/hematoma, CMS intact.  Up with assist of one, gait belt, and walker.  Redness in groin sites, antifungal powder and InterDry applied.  WOC assessed and put in recommendations. Physical therapy recommended home health physical therapy.  Plan is to discharge home tomorrow.  Cont to monitor.

## 2022-08-24 NOTE — PLAN OF CARE
Goal Outcome Evaluation:    Neuro- x4  Most Recent Vitals- /63   Pulse 80   Temp 98  F (36.7  C) (Oral)   Resp 20   Ht 1.524 m (5')   Wt 106.1 kg (234 lb)   SpO2 96%   BMI 45.70 kg/m    Tele/Cardiac- SR  Resp- 2L baseline oxygen and cpap w/ sleep  Activity- 1A gb to bsc  Pain- lower back pain, receive tylenol and heat applied w/ relief  Skin- bilateral groin sites, rash and excoriation under breasts and abd folds   GI/- external catheter in place  Diet: Regular Diet Adult    Plan- woc consult, cardiac rehab and possible discharge.

## 2022-08-24 NOTE — PROGRESS NOTES
08/24/22 0900   Quick Adds   Type of Visit Initial PT Evaluation   Living Environment   People in Home spouse   Current Living Arrangements house   Home Accessibility stairs to enter home   Number of Stairs, Main Entrance 3   Stair Railings, Main Entrance railings safe and in good condition;railings on both sides of stairs   Transportation Anticipated family or friend will provide   Self-Care   Usual Activity Tolerance fair   Current Activity Tolerance fair   Regular Exercise No   Equipment Currently Used at Home cane, straight;walker, rolling;shower chair   Fall history within last six months no   General Information   Onset of Illness/Injury or Date of Surgery 08/23/22   Referring Physician Dr. Rodriguez   Patient/Family Therapy Goals Statement (PT) To go home   Pertinent History of Current Problem (include personal factors and/or comorbidities that impact the POC) Pt is an 82 year old female s/p TAVR   Existing Precautions/Restrictions fall   Cognition   Affect/Mental Status (Cognition) WFL   Pain Assessment   Patient Currently in Pain No   Range of Motion (ROM)   Range of Motion ROM is WFL   Strength (Manual Muscle Testing)   Strength (Manual Muscle Testing) strength is WFL   Bed Mobility   Comment, (Bed Mobility) NT, up in chair   Transfers   Comment, (Transfers) CGA   Gait/Stairs (Locomotion)   Comment, (Gait/Stairs) 10' FWW CGA, decreased jessica   Balance   Balance Comments Good in sitting, fair in standing   Clinical Impression   Criteria for Skilled Therapeutic Intervention Yes, treatment indicated   PT Diagnosis (PT) Impaired ambulation   Influenced by the following impairments Decreased endurance   Functional limitations due to impairments Difficulty with transfers, ambulation, stair management   Clinical Presentation (PT Evaluation Complexity) Stable/Uncomplicated   Clinical Presentation Rationale VSS, pain controlled   Clinical Decision Making (Complexity) low complexity   Planned Therapy  Interventions (PT) patient/family education;gait training;bed mobility training;stair training;transfer training;progressive activity/exercise;risk factor education   Risk & Benefits of therapy have been explained evaluation/treatment results reviewed;care plan/treatment goals reviewed;risks/benefits reviewed;current/potential barriers reviewed;participants voiced agreement with care plan;participants included;patient   PT Discharge Planning   PT Discharge Recommendation (DC Rec) home with home care physical therapy   PT Rationale for DC Rec At baseline, pt resides in a house with 3 steps to enter and all needs met on main floor. Pt reports limited mobility prior to admit. This date, pt demonstrates ability to ambulate household distances with assist of 1. Anticipate that patient is near baseline and safe to discharge home with home health PT to further increase mobility. Pt will require assist of one, assistive device, and considerable effort to come and go from house, therefore, home health PT is recommended.   Plan of Care Review   Plan of Care Reviewed With patient   Total Evaluation Time   Total Evaluation Time (Minutes) 10   Physical Therapy Goals   PT Frequency 2x/day   PT Predicted Duration/Target Date for Goal Attainment 08/26/22   PT Goals Bed Mobility;Transfers;Gait;Stairs;Cardiac Phase 1   PT: Bed Mobility Supervision/stand-by assist;Supine to/from sit   PT: Transfers Supervision/stand-by assist;Sit to/from stand;Bed to/from chair;Assistive device   PT: Gait Supervision/stand-by assist;100 feet;Rolling walker   PT: Stairs 3 stairs;Supervision/stand-by assist;Rail on both sides   PT: Understanding of cardiac education to maximize quality of life, condition management, and health outcomes Patient;Verbalize   PT: Perform aerobic activity with stable cardiovascular response intermittent;10 minutes;ambulation   PT: Functional/aerobic ambulation tolerance with stable cardiovascular response in order to return  to home and community environment Supervision/SBA;100 feet;Rolling walker   PT: Navigation of stairs simulating home set up with stable cardiovascular response in order to return to home and community environment Supervision/SBA;3 stairs;Rail on both sides

## 2022-08-24 NOTE — PROGRESS NOTES
Discussed patient with nursing and PT who thought patient could use one more night in the hospital. Will defer discharge for now and wait until tomorrow. Post TAVR TTE still pending.    Rubin Hayes MD PhD

## 2022-08-24 NOTE — PROGRESS NOTES
Pt was on CPAP overnight without any complications. Rt will continue to follow.  Maren Burk, RT  8/24/2022

## 2022-08-24 NOTE — PROGRESS NOTES
[facility]  Structural Heart Progress Note     Date of Service: 08/24/22     \A [facility]ology of [place] [facility]\""  Lucille Rojas is a 82 year old female with severe aortic stenosis who was admitted on 8/23/2022 for elective transcatheter aortic valve replacement. Prior to procedure patient noted to have NASIR 0.9 cm2, MG 32 mmHg, and peak velocity 3.8 m/s. LVEF 65-70%. Other notable past medical history is significant for hypertension, chronic diastolic heart failure, coronary artery disease being medically managed, COPD, history of severe upper GI bleed in 2021, hyperlipidemia, morbid obesity, obstructive sleep apnea, B-cell lymphoma s/p chemotherapy, chronic anemia, and type 2 diabetes.     Assessment:   1) Severe aortic stenosis s/p TAVR with 23 mm Coreas Nathan 3 valve  - via RCFA, no vascular access complications  - No conduction abnormalities, EKG demonstrates sinus rhythm  - Neuros intact  - Cr 1.09, Hgb 9.0  - Post-procedure echocardiogram pending   - Cardiac rehab   - Aspirin 81 mg daily for antiplatelet montherapy    2) Chronic diastolic heart failure, NYHA class II  - LVEDP 14 mmHg, NTpBNP 373  - Euvolemic on exam, on PTA lasix 20 mg daily     3) Hypertension   - Well-controlled, no PTA anti-hypertensives     4) Hyperlipidemia  - PTA simvastatin 10 mg daily     5) CAD involving OM1, medically managed   - Pre-TAVR coronary angiogram showed flow-limiting by iFR (0.82) OM1 disease, being medically managed  - There is also moderate stenosis of mLAD (iFR 0.9) and moderate to severe stenosis of pLCx  - Appropriately on aspirin 81 mg daily and simvastatin 10 mg daily   - No angina     6) Non-hodgkin's B-cell lymphoma involving pancreas s/p R-CHOP  - Follows with Dr. Srinivasan in oncology     7) COPD  - PTA inhalers/nebs    8) SHAVON  -compliant with CPAP     9) Chronic anemia  - Hgb stable, 9.6 (10)    10) Type II diabetes   - A1C 5.2    11) Hx of GI bleed in 2021  - No recurrence, stable on aspirin therapy      Plan:  1. Antiplatelet monotherapy with aspirin 81 mg daily.   2. Outpatient cardiac rehab.   3. Lifelong antibiotic prophylaxis prior to any dental procedure.   4. Follow-up with structural clinic in 1-week.   5. Pending echo findings and cardiac rehab, OK to discharge from a cardiology standpoint.        Linsey Benitez DNP, APRN, CNP  Page: [phone]    Interval History: No acute events overnight. Bilateral groin sites intact without bruit or bleeding. No conduction abnormalities overnight. Remains hemodynamically stable. Denies CP, SOB, PND, or orthopnea.     Physical Exam   Temp: 98.3  F (36.8  C) Temp src: Oral BP: 128/50 Pulse: 79   Resp: 22 SpO2: 98 % O2 Device: Nasal cannula Oxygen Delivery: 2 LPM  Vitals:    08/23/22 0839 08/24/22 0701   Weight: 106.1 kg (234 lb) 107.1 kg (236 lb 3.2 oz)     Vital Signs with Ranges  Temp:  [97.4  F (36.3  C)-98.3  F (36.8  C)] 98.3  F (36.8  C)  Pulse:  [59-93] 79  Resp:  [14-24] 22  BP: (103-166)/(46-79) 128/50  SpO2:  [90 %-100 %] 98 %  I/O last 3 completed shifts:  In: 1100 [P.O.:100; I.V.:1000]  Out: 750 [Urine:750]    Constitutional: awake  Eyes: pupils equal, round and reactive to light  Respiratory: no increased work of breathing  Cardiovascular: normal apical pulses , normal S1 and S2 and no murmur noted  GI: soft  Skin: no bruising or bleeding at vascular access sites, excoriated groin bilaterally  Musculoskeletal: no lower extremity pitting edema present  Neurologic: alert and oriented  Neuropsychiatric: General: normal, calm and normal eye contact  Level of consciousness: alert / normal  Affect: normal and pleasant    Medications       aspirin  81 mg Oral Daily     citalopram  20 mg Oral Daily     furosemide  20 mg Oral Daily     levothyroxine  175 mcg Oral Daily     loratadine  10 mg Oral Daily     miconazole   Topical BID     pantoprazole  40 mg Oral QAM AC     simvastatin  10 mg Oral At Bedtime       Data   Recent Results (from the past 24 hour(s))    Echocardiogram Complete   Result Value    LVEF  55-60%    [facility]    [id number]  [id number]  [id number]  [id number]^CARLINE^NATHANIEL     [facility]  Echocardiography Laboratory  [address] [place] [id number]     Name: GLORIA LARA  MRN: [id number]  : 1940  Study Date: 2022 09:27 AM  Age: 82 yrs  Gender: Female  Patient Location: [facility]  Reason For Study: TAVR  Ordering Physician: NATHANIEL CROWLEY  Referring Physician: Fausto Flynn  Performed By: Catalina Flores     BSA: 2.0 m2  Height: 60 in  Weight: 239 lb  BP: 166/67 mmHg  ______________________________________________________________________________  Procedure  Complete Portable Echo Adult.  ______________________________________________________________________________  Interpretation Summary     Transtraoracic echocardiogram for Transfemoral transcatheter aortic valve  replacement with 23 mm Coreas Nathan 3 valve for severe aortic stenosis.     Pre-procedure:  1. There is severe aortic stenosis with a mean gradient of 35 mmHg. There is  no aortic regurgitation.  2. The left ventricle is normal in size. Left ventricular systolic function is  normal. The EF is estimated at 55-60%.  3. No pericardial effusion.     Post-procedure:  1. There is a well seated, normally functioning bioprosthetic valve in the  aortic position with a mean gradient of 7 mmHg. No paravalvular leak noted.  2. The left ventricle is normal in size. Left ventricular systolic function is  normal. The EF is estimated at 55-60%  3. No pericardial effusion.  ______________________________________________________________________________  Left Ventricle  The left ventricle is normal in size. Left ventricular systolic function is  normal. The visual ejection fraction is 55-60%.     Mitral Valve  There is mild mitral annular calcification. The mitral valve leaflets appear  normal. There is no evidence of stenosis, fluttering, or prolapse.     Tricuspid  Valve  There is trace tricuspid regurgitation.     Aortic Valve  Severe valvular aortic stenosis.     Vessels  IVC diameter <2.1 cm collapsing >50% with sniff suggests a normal RA pressure  of 3 mmHg.  ______________________________________________________________________________  Doppler Measurements & Calculations  Ao V2 max: 179.0 cm/sec  Ao max P.0 mmHg  Ao V2 mean: 126.2 cm/sec  Ao mean P.2 mmHg  Ao V2 VTI: 45.5 cm  TR max anish: 310.7 cm/sec  TR max P.6 mmHg     ______________________________________________________________________________  Report approved by: Aminata Cohen 2022 03:35 PM         Cardiac Catheterization    Narrative      Successful transfemoral transcatheter aortic valve replacement with a   23mm Coreas Nathan 3 Ultra valve.

## 2022-08-24 NOTE — DISCHARGE INSTRUCTIONS
TAVR Discharge Instructions  You have just had your aortic valve replaced with a new biological tissue valve. You should feel better after your surgery, but complete recovery may take several weeks. Please follow these instructions carefully and please call your valve coordinator with any questions or concerns.      CONTACT INFORMATION:   [facility]:  [phone] (7 days a week)  Scheduling and general questions - [facility] ([phone])  Valve Coordinators - Mackenzie RN ([phone]), Nya RN ([phone]), and Pura RN ([phone])    AFTER YOU GO HOME:  Drink extra fluids for 2 days.  You may resume your normal diet.  Do not smoke.  Do not drink alcohol.  Relax and take it easy.  Do not make any important or legal decisions.    FOR 1 WEEK AFTER TAVR:  Do not drive or operate machines at home or at work. No driving until you return for your 1 week follow-up appointment. You and the provider will discuss this at the appointment.   Refrain from sexual intercourse for 1 week.  You may shower the day after your procedure. Do NOT take a bath, or use a hot tub or pool for at least 1 week. Do NOT scrub the site. Do not use lotion or powder near the puncture site.  Do not lift more than 10 pounds.   Do not do any heavy activity such as exercise, lifting, or straining.  No housework, yard work, or any activities that make you sweat.    CARE OF WRIST AND GROIN SITES:  For 2 days, when you cough, sneeze, laugh or move your bowels, hold your hand over the puncture site and press firmly on/above the site.  Remove the bandage after 24 hours. If there is minor oozing, apply another bandage and remove it after 12 hours.  It is normal to have bruising or pea size lump at the site.  If you have a wrist site puncture: Do not use your hand or arm to support your weight (such as rising from a chair) or bend your wrist (such as lifting a garage door) for 1 week.  No stooping or squatting for 1 week.      BLEEDING:  If you start bleeding from the site in your wrist:  Sit down and press firmly on/above the site with your fingers for 10 minutes.   Once bleeding stops, keep your arm still for 2 hours.   Call [facility] or valve coordinator as soon as you can.  If you start bleeding from the site in your groin:  Lie down flat and press firmly on/above the site for 10 minutes.  Once bleeding stops lay flat for 2 hours.   Call [facility] or valve coordinator as soon as you can.  Call 911 right away if you have heavy bleeding or bleeding that does not stop.    MEDICINES:  You may be started on an anti-platelet medication, such as Plavix or aspirin. Please call the [facility] with any questions regarding this medication.  If you have stopped any medicines, check with your provider about when to restart them.  You will require lifetime prophylactic antibiotics for dental cleanings and dental work after your TAVR, please inquire with the Heart Clinic prior to your scheduled cleanings.     FOLLOW-UP APPOINTMENTS:  Follow-up with a Structural Heart advanced practice provider (NP or PA) at [facility] in 7-10 days after your procedure.  You will also follow-up at [facility] 1 month after your procedure which will include a visit with an advanced practice provider an echocardiogram (echo), an electrocardiogram (ECG), and lab work. This follow-up should be scheduled for you prior to you leaving the hospital.  Cardiac Rehab will contact you for follow up care. You can enroll at a site convenient to you.  We will have you see your primary cardiologist about 6 months after your TAVR.  We will see you 1 year after your TAVR with a visit with an advanced practice provider (NP or PA) an echocardiogram (echo), electrocardiogram (ECG), and lab work.     CALL THE CLINIC IF:   You have increased pain or a large or growing hard lump  around the site.  The site is red, swollen, hot, or tender.  Blood or fluid is draining from the site.  You have chills or a fever greater than 101 F (38 C).  Your arm or leg feels numb, cool, or changes color.  You have hives, a rash, or unusual itching.  New pain in the back or belly that you cannot control with Tylenol.  Any questions or concerns.    OTHER INSTRUCTIONS:  You will receive a card with your new valve information in the mail, directly from the . Retain this card with your health care records.    DENTAL WORK:  You must have antibiotics before all dental work to prevent endocarditis, or an infection on your new heart valve. Please call the valve coordinators to get a prescription for this. Please do not schedule any dental cleanings for at least 3-6 months after your TAVR.

## 2022-08-24 NOTE — PROGRESS NOTES
Clinic Care Coordination Contact  The Community Health Worker planned to call for a Care Coordination outreach.    Did not call patient.    Per Chart Review, patient is currently admitted at [facility] in the CCU as of 8/23/22.    CHW will route to CC .    ZANE Demarco  Clinic Care Coordination  [facility] Clinics: [facility], [facility], [place], and [facility]  Phone: [phone]

## 2022-08-25 ENCOUNTER — PATIENT OUTREACH (OUTPATIENT)
Dept: CARE COORDINATION | Facility: CLINIC | Age: 82
End: 2022-08-25

## 2022-08-25 ENCOUNTER — APPOINTMENT (OUTPATIENT)
Dept: PHYSICAL THERAPY | Facility: CLINIC | Age: 82
DRG: 267 | End: 2022-08-25
Attending: INTERNAL MEDICINE
Payer: MEDICARE

## 2022-08-25 VITALS
BODY MASS INDEX: 46.84 KG/M2 | SYSTOLIC BLOOD PRESSURE: 121 MMHG | TEMPERATURE: 98.6 F | DIASTOLIC BLOOD PRESSURE: 42 MMHG | OXYGEN SATURATION: 93 % | WEIGHT: 238.6 LBS | HEIGHT: 60 IN | HEART RATE: 84 BPM | RESPIRATION RATE: 18 BRPM

## 2022-08-25 LAB
ANION GAP SERPL CALCULATED.3IONS-SCNC: 2 MMOL/L (ref 3–14)
BUN SERPL-MCNC: 28 MG/DL (ref 7–30)
CALCIUM SERPL-MCNC: 8.5 MG/DL (ref 8.5–10.1)
CHLORIDE BLD-SCNC: 104 MMOL/L (ref 94–109)
CO2 SERPL-SCNC: 33 MMOL/L (ref 20–32)
CREAT SERPL-MCNC: 0.98 MG/DL (ref 0.52–1.04)
ERYTHROCYTE [DISTWIDTH] IN BLOOD BY AUTOMATED COUNT: 13.4 % (ref 10–15)
GFR SERPL CREATININE-BSD FRML MDRD: 57 ML/MIN/1.73M2
GLUCOSE BLD-MCNC: 111 MG/DL (ref 70–99)
HCT VFR BLD AUTO: 30.5 % (ref 35–47)
HGB BLD-MCNC: 9 G/DL (ref 11.7–15.7)
MAGNESIUM SERPL-MCNC: 2.2 MG/DL (ref 1.6–2.3)
MCH RBC QN AUTO: 26.2 PG (ref 26.5–33)
MCHC RBC AUTO-ENTMCNC: 29.5 G/DL (ref 31.5–36.5)
MCV RBC AUTO: 89 FL (ref 78–100)
PHOSPHATE SERPL-MCNC: 3.6 MG/DL (ref 2.5–4.5)
PLATELET # BLD AUTO: 221 10E3/UL (ref 150–450)
POTASSIUM BLD-SCNC: 4.3 MMOL/L (ref 3.4–5.3)
RBC # BLD AUTO: 3.43 10E6/UL (ref 3.8–5.2)
SODIUM SERPL-SCNC: 139 MMOL/L (ref 133–144)
WBC # BLD AUTO: 7.9 10E3/UL (ref 4–11)

## 2022-08-25 PROCEDURE — 85027 COMPLETE CBC AUTOMATED: CPT | Performed by: NURSE PRACTITIONER

## 2022-08-25 PROCEDURE — 93005 ELECTROCARDIOGRAM TRACING: CPT

## 2022-08-25 PROCEDURE — 80048 BASIC METABOLIC PNL TOTAL CA: CPT | Performed by: NURSE PRACTITIONER

## 2022-08-25 PROCEDURE — 999N000157 HC STATISTIC RCP TIME EA 10 MIN

## 2022-08-25 PROCEDURE — 99239 HOSP IP/OBS DSCHRG MGMT >30: CPT | Performed by: PHYSICIAN ASSISTANT

## 2022-08-25 PROCEDURE — 250N000013 HC RX MED GY IP 250 OP 250 PS 637: Performed by: PHYSICIAN ASSISTANT

## 2022-08-25 PROCEDURE — 99232 SBSQ HOSP IP/OBS MODERATE 35: CPT | Mod: 25 | Performed by: INTERNAL MEDICINE

## 2022-08-25 PROCEDURE — 97530 THERAPEUTIC ACTIVITIES: CPT | Mod: GP

## 2022-08-25 PROCEDURE — 250N000011 HC RX IP 250 OP 636: Performed by: NURSE PRACTITIONER

## 2022-08-25 PROCEDURE — 83735 ASSAY OF MAGNESIUM: CPT | Performed by: NURSE PRACTITIONER

## 2022-08-25 PROCEDURE — 36415 COLL VENOUS BLD VENIPUNCTURE: CPT | Performed by: NURSE PRACTITIONER

## 2022-08-25 PROCEDURE — 250N000013 HC RX MED GY IP 250 OP 250 PS 637: Performed by: NURSE PRACTITIONER

## 2022-08-25 PROCEDURE — 94660 CPAP INITIATION&MGMT: CPT

## 2022-08-25 PROCEDURE — 97110 THERAPEUTIC EXERCISES: CPT | Mod: GP

## 2022-08-25 PROCEDURE — 86923 COMPATIBILITY TEST ELECTRIC: CPT | Performed by: INTERNAL MEDICINE

## 2022-08-25 PROCEDURE — 84100 ASSAY OF PHOSPHORUS: CPT | Performed by: NURSE PRACTITIONER

## 2022-08-25 PROCEDURE — 93010 ELECTROCARDIOGRAM REPORT: CPT | Performed by: INTERNAL MEDICINE

## 2022-08-25 RX ORDER — LEVOTHYROXINE SODIUM 175 UG/1
175 TABLET ORAL DAILY
Qty: 90 TABLET | Refills: 1 | Status: SHIPPED | OUTPATIENT
Start: 2022-08-25 | End: 2022-11-23 | Stop reason: DRUGHIGH

## 2022-08-25 RX ORDER — METOPROLOL SUCCINATE 25 MG/1
25 TABLET, EXTENDED RELEASE ORAL DAILY
Qty: 30 TABLET | Refills: 0 | Status: SHIPPED | OUTPATIENT
Start: 2022-08-26 | End: 2022-10-06

## 2022-08-25 RX ORDER — LEVOTHYROXINE SODIUM 200 UG/1
TABLET ORAL
Qty: 30 TABLET | Refills: 10 | OUTPATIENT
Start: 2022-08-25

## 2022-08-25 RX ORDER — METOPROLOL SUCCINATE 25 MG/1
25 TABLET, EXTENDED RELEASE ORAL DAILY
Status: DISCONTINUED | OUTPATIENT
Start: 2022-08-25 | End: 2022-08-25 | Stop reason: HOSPADM

## 2022-08-25 RX ORDER — FUROSEMIDE 10 MG/ML
20 INJECTION INTRAMUSCULAR; INTRAVENOUS ONCE
Status: COMPLETED | OUTPATIENT
Start: 2022-08-25 | End: 2022-08-25

## 2022-08-25 RX ADMIN — CITALOPRAM HYDROBROMIDE 20 MG: 20 TABLET ORAL at 10:04

## 2022-08-25 RX ADMIN — ASPIRIN 81 MG: 81 TABLET, COATED ORAL at 10:05

## 2022-08-25 RX ADMIN — FUROSEMIDE 20 MG: 20 TABLET ORAL at 10:04

## 2022-08-25 RX ADMIN — MICONAZOLE NITRATE: 2 POWDER TOPICAL at 10:08

## 2022-08-25 RX ADMIN — METOPROLOL SUCCINATE 25 MG: 25 TABLET, EXTENDED RELEASE ORAL at 10:05

## 2022-08-25 RX ADMIN — FUROSEMIDE 20 MG: 10 INJECTION, SOLUTION INTRAMUSCULAR; INTRAVENOUS at 10:04

## 2022-08-25 RX ADMIN — PANTOPRAZOLE SODIUM 40 MG: 40 TABLET, DELAYED RELEASE ORAL at 10:05

## 2022-08-25 RX ADMIN — LEVOTHYROXINE SODIUM 175 MCG: 100 TABLET ORAL at 10:05

## 2022-08-25 ASSESSMENT — ACTIVITIES OF DAILY LIVING (ADL)
ADLS_ACUITY_SCORE: 35
ADLS_ACUITY_SCORE: 36
WEAR_GLASSES_OR_BLIND: YES
ADLS_ACUITY_SCORE: 36
ADLS_ACUITY_SCORE: 35
ADLS_ACUITY_SCORE: 36
CHANGE_IN_FUNCTIONAL_STATUS_SINCE_ONSET_OF_CURRENT_ILLNESS/INJURY: NO
DOING_ERRANDS_INDEPENDENTLY_DIFFICULTY: YES
ADLS_ACUITY_SCORE: 36
CONCENTRATING,_REMEMBERING_OR_MAKING_DECISIONS_DIFFICULTY: NO
ADLS_ACUITY_SCORE: 35
ADLS_ACUITY_SCORE: 36
VISION_MANAGEMENT: GLASSES
TOILETING_ASSISTANCE: TOILETING DIFFICULTY, REQUIRES EQUIPMENT
ADLS_ACUITY_SCORE: 35

## 2022-08-25 NOTE — PLAN OF CARE
Goal Outcome Evaluation:    Plan of Care Reviewed With: patient, spouse, daughter      A+Ox4. SBA and walker. VSS- uses O2 on and off at home and here. Last RA while here was 93%. Denies pain. NSR. Educated pt on wound care, Rx powder and intra-dry given. Educated on metoprolol use and side effects, as well as post TAVR - groin site pre cautions, signs of bleeding and infection. CMS intact. Groin sites soft no hematoma. Rx given to pt.  and daughter here to discharge home. AVS given and appt gone over.

## 2022-08-25 NOTE — CONSULTS
Care Management Discharge Note    Discharge Date: 08/25/2022       Discharge Disposition:  Home    Discharge Services:  Home Care-Referral sent to [facility] (PT/OT/RN/HHA)    Discharge DME:  Walker    Discharge Transportation: Daughter Atiya Cruz is transporting patient and in room.    Private pay costs discussed: Not applicable    PAS Confirmation Code:  n/a  Patient/family educated on Medicare website which has current facility and service quality ratings: yes     Education Provided on the Discharge Plan:  yes  Persons Notified of Discharge Plans: patient, bedside RN Marya, spouse, daughter Atiya Cruz  Patient/Family in Agreement with the Plan:  yes    Handoff Referral Completed: Yes, Outpatient Care Coordination  Additional Information:  Writer was informed that patient was medically cleared to discharge home today. Patient had a TAVR on 8/23.  She will be returning home where she lives with her spouse Cooper. She was seen by PT and it was recommended she could return home with home PT. Home Care Referral had been sent to [facility] via e-mail. Spoke with Juan Luis Gardner this morning and patient had been accepted with a RN start of care on 8/30/22. She will be getting PT/OT/RN/HHA. Patient's daughter Atiya Cruz and spouse Cooper were present in the patient's room. Writer informed patient and her family regarding the Home Care Services that were ordered and the start of care for RN. Patient's land phone will be the primary contact for Home Care and patient's daughter Atiya Cruz will be the back-up. [facility] informed of this via e-mail. Patient has a good family support system at home if patient has needs. No further discharge needs. Patient will be discharging shortly to home.        Odalis Trejo RN  Care Coordinator  [phone]

## 2022-08-25 NOTE — DISCHARGE SUMMARY
[facility]    Discharge Summary  Hospitalist    Date of Admission:  8/23/2022  Date of Discharge:  8/25/2022  Discharging Provider: Shanon Camargo PA-C, GERI    Discharge Diagnoses    Aortic stenosis s/p TAVR 8/23/2022  HFpEF  Intertrigo, suspected candida   CAD  HTN  HLD  Chronic hypoxic respiratory failure secondary to COPD  SHAVON  Non-hodgkin's B cell lymphoma  Hypothyroidism   Depression  Hx DM    History of Present Illness   Lucille Rojas is an 82 year old female with pmhx SHAVON, GI bleed (2021), non-Hodgkin's lymphoma, chronic hypoxic respiratory failure 2/2 COPD, DM2, HTN, CAD, chronic HFpEF, and severe aortic valve stenosis who was admitted overnight following 23 mm Coreas LAUREN 3 ultra bioprosthetic TAVR (8/23/2022).    Hospital Course   Lucille Rojas was admitted on 8/23/2022.  The following problems were addressed during her hospitalization:    Symptomatic severe aortic valve stenosis s/p 23 mm Coreas LAUREN 3 ultra bioprosthetic TAVR (8/23/2022).  Chronic HFpEF.  CAD.  HTN.  HLD.  Pre-TAVR cor angio showing Known flow-limiting disease of OM1, medically managed as well as moderate stenosis in the LAD and moderate-severe stenosis P LCx.  ECHO following TAVR shows increased gradient  - Cardiology managing, plan to start metoprolol XL 25mg daily and follow up in one week   --given one dose IV lasix 8/25 due to crackles on exam, discharge on PTA po dose per Cardiology   - Continue PTA ASA 81mg, Lasix 20mg, simvastatin.  - Cardiac rehab- recommend discharge with home care services. Will discharge with RN, PT, OT, HHA     Chronic hypoxic respiratory failure 2/2 chart review diagnosis COPD.  SHAVON.  - Cont home O2 2L PRN for SaO2 <90 and BiPAP.  - Not on COPD meds at home.     Suspected candida intertrigo.  Raw-appearing skin under breasts and panus bilaterally.  - seen by WOC 8/24 with local wound care instructions placed     CKD 3.  Baseline Cr 0.9-1.0.  - Monitor in  AM.     Non-Hodgkin's B-cell lymphoma involving the pancreas s/p 6 cycles of mini R-CHOP.  Follows with [place] [facility] Dr. Srinivasan. PET scan on 04/11/2022 revealed complete response.     Hypothyroidism.  - Continue PTA levothyroxine.     Depression.  - Cont PTA Celexa.     Chart review diagnosis DM2.  A1c 5.2 (8/17/2022).  - Will not monitor blood sugars or have moderate carbohydrate diet given no longer meeting criteria for prediabetes.     Hx GI bleed.  - Cont PTA PPI.     Asymptomatic COVID-19. Negative PCR 8/19.    Patient was discussed with Dr. Arce who agrees with the above plan     Shanon Camargo, GERI, GERI    Significant Results and Procedures   See below       Code Status   Full Code       Primary Care Physician   Fausto Flynn    Physical Exam   Temp: 98.6  F (37  C) Temp src: Oral BP: 134/68 (Post CR) Pulse: 101   Resp: 18 SpO2: 93 % O2 Device: Nasal cannula Oxygen Delivery: 2 LPM  Vitals:    08/23/22 0839 08/24/22 0701 08/25/22 0800   Weight: 106.1 kg (234 lb) 107.1 kg (236 lb 3.2 oz) 108.2 kg (238 lb 9.6 oz)     Vital Signs with Ranges  Temp:  [98.6  F (37  C)] 98.6  F (37  C)  Pulse:  [] 101  Resp:  [18] 18  BP: (134-150)/(57-68) 134/68  SpO2:  [93 %-99 %] 93 %  I/O last 3 completed shifts:  In: 780 [P.O.:780]  Out: 2125 [Urine:2125]    Constitutional: Alert and oriented, sitting up in chair. Appears comfortable and is appropriately conversant   ENT: normal cephalic, moist mucous membranes  Respiratory: Lungs clear to auscultation bilaterally, no increased work of breathing or  wheezing  Cardiovascular: Regular rate and rhythm  Extremities:  Trace bilateral LE edema   GI:  active bowel sounds, abdomen soft, non-tender  Skin/Integumen:  Intertrigo present in groin, abdominal folds  MSK:  Moves all four extremities  Neuro:  Speech is clear. Face symmetric. Follows commands     Discharge Disposition   Discharged to home with home health services   Condition at discharge:  Stable    Consultations This Hospital Stay   CARDIAC REHAB IP CONSULT  HOSPITALIST IP CONSULT  WOUND OSTOMY CONTINENCE NURSE  IP CONSULT  CARE MANAGEMENT / SOCIAL WORK IP CONSULT    Time Spent on this Encounter   I, Shanon Camargo PA-C, personally saw the patient today and spent greater than 30 minutes discharging this patient.    Discharge Orders      CARDIAC REHAB REFERRAL      Home Care Referral      Reason for your hospital stay    You were here for TAVR procedure     Follow-up and recommended labs and tests     Follow up with Cardiology in one week as scheduled    Follow up with primary care provider if skin irritation is not improving.home nurse will help with communication     Activity    Your activity upon discharge: activity as tolerated     Wound care and dressings    Perineal/ buttock/ groin/ pannus/under breast cares: twice daily and as needed  1.  Cleanse with Osmany perineal lotion and soft dry wipes.  Ensure getting to base of all folds.   2.  Apply antifungal powder to folds and rub in well, using a dry gauze or cloth to spread it evenly and thinly.  Ensure applying to upper gluteal cleft and inner abd folds, and any other rashy areas as well.   3.  Apply InterDry along the main deep folds of anterior groin/pannus.  Place into base of folds in a flat, non-bunching layer.  Use wide enough pieces so that the fabric sticks out from folds a couple of inches when pt sits up.  Ok to rinse, dry, and reuse for several days, though it will eventually diminish the silver qualities (antibacterial/antifungal).      If groin access sites are a concern or are draining, can clean these spots with soap and water, dry well, swab periwound with skin prep and let dry, then cover with 4x4 foam dressings (ie Mepilex or Optifoam).  Change every 1-2 days as needed until healed over.     Diet    Follow this diet upon discharge: Orders Placed This Encounter      2gram sodium diet     Discharge Medications   Current  Discharge Medication List      START taking these medications    Details   metoprolol succinate ER (TOPROL XL) 25 MG 24 hr tablet Take 1 tablet (25 mg) by mouth daily  Qty: 30 tablet, Refills: 0    Comments: Refills per PCP/Cardiology  Associated Diagnoses: Aortic stenosis, severe         CONTINUE these medications which have NOT CHANGED    Details   acetaminophen (TYLENOL) 325 MG tablet Take 2 tablets (650 mg) by mouth every 4 hours as needed for mild pain    Associated Diagnoses: Physical deconditioning      aspirin 81 MG tablet Take 1 tablet by mouth daily.  Qty: 30 tablet, Refills: 0    Associated Diagnoses: Type 2 diabetes, HbA1C goal < 8% (H)      carboxymethylcellulose PF (REFRESH PLUS) 0.5 % ophthalmic solution Place 2 drops into both eyes 2 times daily      citalopram (CELEXA) 20 MG tablet Take 1 tablet (20 mg) by mouth daily  Qty: 90 tablet, Refills: 1    Associated Diagnoses: MDD (major depressive disorder), recurrent episode, mild (H)      furosemide (LASIX) 20 MG tablet TAKE 1 TABLET BY MOUTH DAILY  Qty: 30 tablet, Refills: 10    Associated Diagnoses: Diastolic heart failure, unspecified HF chronicity (H)      loratadine (CLARITIN) 10 MG tablet Take 10 mg by mouth daily      LORazepam (ATIVAN) 0.5 MG tablet Take 1 tablet (0.5 mg) by mouth every 8 hours as needed for anxiety or nausea  Qty: 30 tablet, Refills: 1    Associated Diagnoses: Non-Hodgkin lymphoma of solid organ excluding spleen, unspecified non-Hodgkin lymphoma type (H)      nystatin (MYCOSTATIN) 201706 UNIT/GM external powder Apply topically 2 times daily Under breasts and skin folds BID & BID PRN for redness  Qty: 60 g, Refills: 1    Associated Diagnoses: B-cell lymphoma of intrathoracic lymph nodes, unspecified B-cell lymphoma type (H)      pantoprazole (PROTONIX) 40 MG EC tablet Take 1 tablet (40 mg) by mouth every morning (before breakfast)  Qty: 90 tablet, Refills: 3    Associated Diagnoses: Apnea; Morbid obesity due to excess calories  (H)      simvastatin (ZOCOR) 10 MG tablet Take 1 tablet (10 mg) by mouth At Bedtime  Qty: 90 tablet, Refills: 1    Associated Diagnoses: Hyperlipidemia LDL goal <100; Type 2 diabetes mellitus with stage 3 chronic kidney disease, without long-term current use of insulin, unspecified whether stage 3a or 3b CKD (H)      CONTOUR NEXT TEST test strip USE TO TEST BLOOD GLUCOSE ONCE DAILY      levothyroxine (SYNTHROID/LEVOTHROID) 175 MCG tablet Take 1 tablet (175 mcg) by mouth daily  Qty: 90 tablet, Refills: 1    Associated Diagnoses: Hypothyroidism, unspecified type      Microlet Lancets MISC USE TO TEST BLOOD GLUCOSE ONCE DAILY      order for DME Equipment being ordered: wheeled walker with hand breaks  Qty: 1 Device, Refills: 0    Associated Diagnoses: Type 2 diabetes mellitus with stage 3 chronic kidney disease, without long-term current use of insulin (H); Morbid obesity due to excess calories (H)           Allergies   Allergies   Allergen Reactions     Penicillins      Data   Most Recent 3 CBC's:Recent Labs   Lab Test 08/25/22  0607 08/24/22  0622 08/18/22  1604   WBC 7.9 8.2 9.6   HGB 9.0* 9.6* 10.1*   MCV 89 89 89    244 253      Most Recent 3 BMP's:  Recent Labs   Lab Test 08/25/22  0607 08/24/22  1704 08/24/22  0622 08/23/22  2114 08/23/22  1133 08/23/22  1021 08/18/22  1604     --  140  --   --   --  139   POTASSIUM 4.3  --  4.4  --  4.0  --  4.4   CHLORIDE 104  --  104  --   --   --  106   CO2 33*  --  33*  --   --   --  32   BUN 28  --  27  --   --   --  18   CR 0.98  --  1.09*  --   --   --  0.89   ANIONGAP 2*  --  3  --   --   --  1*   IGOR 8.5  --  8.6  --   --   --  8.8   * 129* 105*   < > 132*   < > 99    < > = values in this interval not displayed.     Most Recent 2 LFT's:  Recent Labs   Lab Test 08/02/22  0948 06/23/22  0832   AST 14 18   ALT 15 13   ALKPHOS 109 108   BILITOTAL 0.2 0.2     Most Recent INR's and Anticoagulation Dosing History:  Anticoagulation Dose History     Recent  Dosing and Labs Latest Ref Rng & Units 2021 7/15/2022 2022    INR 0.85 - 1.15 1.09 1.09 1.07        Most Recent 3 Troponin's:  Recent Labs   Lab Test 11/10/21  1121 21  0609 21  1802 21  1727 16  1040   TROPI  --   --   --   --  <0.015  The 99th percentile for upper reference range is 0.045 ug/L.  Troponin values in   the range of 0.045 - 0.120 ug/L may be associated with risks of adverse   clinical events.     TROPONIN <0.015 <0.015 <0.015   < >  --     < > = values in this interval not displayed.     Most Recent Cholesterol Panel:  Recent Labs   Lab Test 21  1132   CHOL 159   LDL 81   HDL 51   TRIG 133     Most Recent 6 Bacteria Isolates From Any Culture (See EPIC Reports for Culture Details):No lab results found.  Most Recent TSH, T4 and A1c Labs:  Recent Labs   Lab Test 22  1030 22  0948   TSH  --  0.12*   T4  --  1.54*   A1C 5.2  --      Results for orders placed or performed during the hospital encounter of 22   Echo Limited     Value    LVEF  65-70%    Narrative    [id number]  [id number]  [id number]  [id number]^ROSIE^JANICE^DORYS     [facility]  Echocardiography Laboratory  [address] [place] [id number]     Name: GLORIA LARA  MRN: [id number]  : 1940  Study Date: 2022 02:08 PM  Age: 82 yrs  Gender: Female  Patient Location: [facility]  Reason For Study: Aortic Valve Replacement  Ordering Physician: JANICE VELEZ  Referring Physician: NATHANIEL CROWLEY  Performed By: Hilary Spencer     BSA: 2.0 m2  Height: 60 in  Weight: 234 lb  HR: 79  BP: 104/58 mmHg  ______________________________________________________________________________  Procedure  Limited Portable Echo Adult. Optison (NDC #[id number]) given intravenously.  ______________________________________________________________________________  Interpretation Summary     There is a 23 mmEdwards Nathan 3 Ultra TAVR valve in the aortic postion.  The mean gradient  across the TAVR valve has significanltly increased from 7  mmHg at time of implantation yesterday to 26.1 mmHg today.  The visual ejection fraction is 65-70%.  Left ventricular systolic function is normal.  The study was technically difficult.  ______________________________________________________________________________  Left Ventricle  The left ventricle is borderline dilated. (The upper limit of normal is 5.6cm  for left ventricular size in end-diastole.). There is normal left ventricular  wall thickness. The visual ejection fraction is 65-70%. Left ventricular  systolic function is normal.     Right Ventricle  The right ventricle is normal in size and function.     Atria  Normal left atrial size. Right atrial size is normal. There is no color  Doppler evidence of an atrial shunt.     Mitral Valve  There is mild mitral annular calcification. There is trace mitral  regurgitation.     Aortic Valve  No aortic regurgitation is present. The peak AoV pressure gradient is 45.0  mmHg. The mean AoV pressure gradient is 26.1 mmHg. The mean gradient across  the TAVR valve has significanltly increased from 7 mmHg at time of  implantation yesterday to 26.1 mmHg today. There is a 23 mmEdwards Nathan 3  Ultra TAVR valve in the aortic postion.     Vessels  The ascending aorta is Borderline dilated. IVC diameter <2.1 cm collapsing  >50% with sniff suggests a normal RA pressure of 3 mmHg. The inferior vena  cava was normal in size with preserved respiratory variability.     Pericardium  There is no pericardial effusion.     Rhythm  Sinus rhythm was noted.     ______________________________________________________________________________  MMode/2D Measurements & Calculations  IVSd: 0.69 cm  LVIDd: 5.7 cm  LVIDs: 3.5 cm  LVPWd: 0.66 cm  FS: 38.6 %  LV mass(C)d: 139.6 grams  LV mass(C)dI: 69.9 grams/m2  asc Aorta Diam: 3.5 cm  LVOT diam: 2.1 cm  LVOT area: 3.6 cm2  RWT: 0.23     Doppler Measurements & Calculations  Ao V2 max: 335.3  cm/sec  Ao max P.0 mmHg  Ao V2 mean: 238.4 cm/sec  Ao mean P.1 mmHg  Ao V2 VTI: 74.7 cm  NASIR(I,D): 1.8 cm2  NASIR(V,D): 1.6 cm2  Ao acc time: 0.10 sec  LV V1 max P.2 mmHg  LV V1 max: 151.5 cm/sec  LV V1 VTI: 37.4 cm  SV(LVOT): 133.5 ml  SI(LVOT): 66.9 ml/m2  AV Andre Ratio (DI): 0.45  NASIR Index (cm2/m2): 0.90     ______________________________________________________________________________  Report approved by: Aminata Uriostegui 2022 03:32 PM         Cardiac Catheterization    Narrative      Successful transfemoral transcatheter aortic valve replacement with a   23mm Coreas Nathan 3 Ultra valve.

## 2022-08-25 NOTE — PLAN OF CARE
Goal Outcome Evaluation:    Neuro- x4  Most Recent Vitals- BP (!) 150/58   Pulse 85   Temp 98.6  F (37  C) (Oral)   Resp 18   Ht 1.524 m (5')   Wt 107.1 kg (236 lb 3.2 oz)   SpO2 98%   BMI 46.13 kg/m    Tele/Cardiac- SR  Resp- 2L baseline oxygen and cpap w/ sleep  Activity- sba gb walker   Pain- denies   Skin- bilateral groin sites, rash and excoriation under breasts and abd folds   GI/- external catheter in place  Diet: Regular Diet Adult    Plan- possible discharge home w/ home PT

## 2022-08-25 NOTE — PROGRESS NOTES
[facility]  Cardiology Progress Note    Date of Service (when I saw the patient): 08/25/2022     Assessment & Plan   Lucille Rojas is a 82 year old female who was admitted on 8/23/2022.     1.  : Severe Aortic Stenosis  - s/p TAVR using a 23 mm Coreas S3 ultra valve   - Post op day 1 echocardiogram showed a significant increase in mean gradient from 7 mmHg post procedure to 26.1 mmHg. No aortic regurgitation. EF normal at 65-70%, normal RV size and function.   - Aspirin monotherapy.   - Groin sites stable.   - Cardiac rehab     2.  : Chronic diastolic heart failure  - Pre admission   - Fine crackles to bilateral bases  - net neg 1.1 L, Wt. Up 4 lbs since admission   - PTA lasix 20 mg daily.     3.  : CAD  - Pre TAVR coronany angiogram showed flow limiting OM1 stenosis ( IFR 0.82), medical management. Moderate LAD disease, mod/severe proximal circumflex disease.   - Aspirin, Low dose Toprol, statin.    4. Hypertension  5. Hyperlipidemia  - PTA statin   6.SHAVON - On CPAP  7. COPD  8. Type 2 diabetes  9. Non-Hodgkin's Lymphoma - s/p chemotherapy    Plan  1. Post TAVR echo showed elevated gradient 26.1 mmHg from 7 mmHg. Reviewed with structural team who recommends 1 month echo or CT for reassessment. Patient will be contacted by structural coordinator regarding modality.   2. Fine crackles heard on exam, up 4 lbs since admission. Continue PTA lasix 20 mg daily. Give extra 20 mg IV lasix now.   3. Continue Aspirin, metoprolol and statin.   4. Cardiac rehab today. Consider home PT/OT   5. Follow up with Structural [facility] in 1 week as scheduled  6. Anticipate discharge today         I spent > 30 minutes face-to-face and/or coordinating care. Over 50% of our time on the unit was spent counseling the patient and/or coordinating care     ALIE Preston CNP  Text Page  (M-F, 7:30 am - 4:00 pm)    Interval History   Up in chair without cardiac complaint, denies chest pain, mild exertional  shortness of breath, no palpitations, PND, orthopnea, presyncope and trace ankle edema. Fine bilateral basilar crackles, 1x dose IV lasix today. Cardiac rehab. Anticipate discharge today with home PT/OT.     Physical Exam   Temp: 98.6  F (37  C) Temp src: Oral BP: (!) 150/58 Pulse: 85   Resp: 18 SpO2: 98 % O2 Device: Nasal cannula Oxygen Delivery: 2 LPM  Vitals:    08/23/22 0839 08/24/22 0701 08/25/22 0800   Weight: 106.1 kg (234 lb) 107.1 kg (236 lb 3.2 oz) 108.2 kg (238 lb 9.6 oz)     Vital Signs with Ranges  Temp:  [98.3  F (36.8  C)-98.6  F (37  C)] 98.6  F (37  C)  Pulse:  [74-91] 85  Resp:  [9-18] 18  BP: (125-150)/(48-70) 150/58  SpO2:  [95 %-99 %] 98 %  I/O last 3 completed shifts:  In: 920 [P.O.:920]  Out: 2025 [Urine:2025]    GEN:  In general, this is a obese elderly female in no acute distress.  Patient ambulatory with walker   HEENT:  Pupils equal, round. Sclerae nonicteric. Clear oropharynx. Mucous membranes moist.  NECK: Supple, no masses appreciated. Trachea midline.  Difficult to assess JVD with patient.   C/V:  Regular rate and rhythm, systolic murmur, no rub or gallop. No S3 or RV heave.   RESP: Respirations are unlabored. Fine crackles to bases bilaterally   GI: Obese Abdomen soft, nontender, nondistended. No HSM appreciated.   EXTREM: Tace bilateral LE edema. No cyanosis or clubbing.  NEURO: Alert and oriented, cooperative.  No obvious focal deficits.   PSYCH: Normal affect.  SKIN: Warm and dry. No rashes or petechiae appreciated. Rash/ excoriation to bilateral groin/under breasts.       Medications       aspirin  81 mg Oral Daily     citalopram  20 mg Oral Daily     furosemide  20 mg Intravenous Once     furosemide  20 mg Oral Daily     levothyroxine  175 mcg Oral Daily     loratadine  10 mg Oral Daily     metoprolol succinate ER  25 mg Oral Daily     miconazole   Topical BID     pantoprazole  40 mg Oral QAM AC     simvastatin  10 mg Oral At Bedtime       Data   Reviewed       Catalina Tellez,  ALIE CNP 8/25/2022

## 2022-08-25 NOTE — PLAN OF CARE
Physical Therapy Discharge Summary    Reason for therapy discharge:    Discharged to home with home therapy.    Progress towards therapy goal(s). See goals on Care Plan in [facility] electronic health record for goal details.  Goals partially met.  Barriers to achieving goals:   discharge from facility.    Therapy recommendation(s):    Continued therapy is recommended.  Rationale/Recommendations:  To further increase independence and mobility.

## 2022-08-25 NOTE — PROGRESS NOTES
Clinic Care Coordination Contact  Ambulatory Care Coordination to Inpatient Care Management   Hand-In Communication    Date:  August 25, 2022  Name: Lucille Rojas is enrolled in Ambulatory Care Coordination program and I am the Lead Care Coordinator.  CC Contact Information: Epic InGrapeshotsket + phone  Payor Source: Payor: MEDICARE / Plan: MEDICARE / Product Type: Medicare /   Current services in place:     Please see the CC Snaphot and Care Management Flowsheets for specific  details of this Lucille Rojas care plan.   Additional details/specific concerns r/t this admission:    No additional concerns at this time .    I will follow this admission in Epic. Please feel free to contact me with questions or for further collaboration in discharge planning.    TORIE Phillpis  [facility] Care Coordination   [facility]  Social Work Care Coordinator  [phone]

## 2022-08-26 ENCOUNTER — MEDICAL CORRESPONDENCE (OUTPATIENT)
Dept: HEALTH INFORMATION MANAGEMENT | Facility: CLINIC | Age: 82
End: 2022-08-26

## 2022-08-26 ENCOUNTER — TELEPHONE (OUTPATIENT)
Dept: INTERNAL MEDICINE | Facility: CLINIC | Age: 82
End: 2022-08-26

## 2022-08-26 ENCOUNTER — PATIENT OUTREACH (OUTPATIENT)
Dept: ONCOLOGY | Facility: CLINIC | Age: 82
End: 2022-08-26

## 2022-08-26 DIAGNOSIS — R94.39 ABNORMAL RESULT OF OTHER CARDIOVASCULAR FUNCTION STUDY: ICD-10-CM

## 2022-08-26 DIAGNOSIS — Z95.2 S/P TAVR (TRANSCATHETER AORTIC VALVE REPLACEMENT): Primary | ICD-10-CM

## 2022-08-26 NOTE — TELEPHONE ENCOUNTER
Relayed approval for delayed start of cares to Soumya, RN with [facility]. Soumya confirms patient is stable at home.

## 2022-08-26 NOTE — PROGRESS NOTES
received a  requesting a return call regarding having port removed.     Writer called and spoke with patient's daughter, Nancy Rajput, and stated that I would have Dr. baker advise and place order for port removal if advised when he returns to office on 9/6.    Harriet Guillermo RN

## 2022-08-26 NOTE — TELEPHONE ENCOUNTER
Discharged from hospital 8/25 with Home Care Referral.    Soumya calling to request delay of cares until 8/28/22 due to staffing.     Needs response before 4:30 today if able to approve the request.

## 2022-08-27 LAB
ATRIAL RATE - MUSE: 62 BPM
ATRIAL RATE - MUSE: 72 BPM
DIASTOLIC BLOOD PRESSURE - MUSE: NORMAL MMHG
DIASTOLIC BLOOD PRESSURE - MUSE: NORMAL MMHG
INTERPRETATION ECG - MUSE: NORMAL
INTERPRETATION ECG - MUSE: NORMAL
P AXIS - MUSE: 41 DEGREES
P AXIS - MUSE: 54 DEGREES
PR INTERVAL - MUSE: 186 MS
PR INTERVAL - MUSE: 212 MS
QRS DURATION - MUSE: 104 MS
QRS DURATION - MUSE: 108 MS
QT - MUSE: 386 MS
QT - MUSE: 442 MS
QTC - MUSE: 422 MS
QTC - MUSE: 448 MS
R AXIS - MUSE: -36 DEGREES
R AXIS - MUSE: -45 DEGREES
SYSTOLIC BLOOD PRESSURE - MUSE: NORMAL MMHG
SYSTOLIC BLOOD PRESSURE - MUSE: NORMAL MMHG
T AXIS - MUSE: 52 DEGREES
T AXIS - MUSE: 6 DEGREES
VENTRICULAR RATE- MUSE: 62 BPM
VENTRICULAR RATE- MUSE: 72 BPM

## 2022-08-29 ENCOUNTER — PATIENT OUTREACH (OUTPATIENT)
Dept: CARE COORDINATION | Facility: CLINIC | Age: 82
End: 2022-08-29

## 2022-08-29 ENCOUNTER — TELEPHONE (OUTPATIENT)
Dept: INTERNAL MEDICINE | Facility: CLINIC | Age: 82
End: 2022-08-29

## 2022-08-29 NOTE — TELEPHONE ENCOUNTER
MARISSA Etienne [facility] Calling for verbal orders:    Skilled Nursin x a week for 4 weeks, 1 x a week every other week for 4 weeks and 3 PRN visit.  Home Health Aide: 2x a week for 4 weeks   PT & OT eval and treat    Writer gave verbal approval for homecare    Callback: [phone]- ok to leave detailed VM.     Xiomy Masters RN  [facility]

## 2022-08-29 NOTE — PROGRESS NOTES
Social Work Progress Note      Data/Intervention:  Patient Name:  Lucille Rojas  /Age:  1940 (82 year old)    Reason for Follow-Up:  Planned follow up for supportive check-in following positive distress screening on .    Intervention: SW reached out to Lucille via her cell phone x2 today.  No voicemail has been set up.      Per chart review she is in the process of establishing with [facility].  SW will contact clinic RN and discuss whether it would be appropriate to add a Home Care SW to her visits in the coming weeks to further assess her psychosocial support needs.      Plan:  Previously provided patient/family with writer's contact information and availability.     Please call if needs or concerns arise.     MACHO Aceves, [facility] CenterSW  Direct Phone: [phone]

## 2022-08-30 LAB
ATRIAL RATE - MUSE: 82 BPM
DIASTOLIC BLOOD PRESSURE - MUSE: NORMAL MMHG
INTERPRETATION ECG - MUSE: NORMAL
P AXIS - MUSE: 62 DEGREES
PR INTERVAL - MUSE: 188 MS
QRS DURATION - MUSE: 106 MS
QT - MUSE: 374 MS
QTC - MUSE: 436 MS
R AXIS - MUSE: -37 DEGREES
SYSTOLIC BLOOD PRESSURE - MUSE: NORMAL MMHG
T AXIS - MUSE: 54 DEGREES
VENTRICULAR RATE- MUSE: 82 BPM

## 2022-09-01 ENCOUNTER — OFFICE VISIT (OUTPATIENT)
Dept: CARDIOLOGY | Facility: CLINIC | Age: 82
End: 2022-09-01
Payer: MEDICARE

## 2022-09-01 VITALS
OXYGEN SATURATION: 95 % | WEIGHT: 240 LBS | DIASTOLIC BLOOD PRESSURE: 76 MMHG | HEIGHT: 60 IN | SYSTOLIC BLOOD PRESSURE: 122 MMHG | BODY MASS INDEX: 47.12 KG/M2

## 2022-09-01 DIAGNOSIS — Z95.2 S/P TAVR (TRANSCATHETER AORTIC VALVE REPLACEMENT): Primary | ICD-10-CM

## 2022-09-01 PROCEDURE — 99214 OFFICE O/P EST MOD 30 MIN: CPT | Performed by: NURSE PRACTITIONER

## 2022-09-01 NOTE — LETTER
9/1/2022    Fausto Flynn MD  [address] [facility] [address]    RE: Lucille Rojas       Dear Colleague,     I had the pleasure of seeing Lucille Rojas in the [facility].  Cardiology Clinic Progress Note  Lucille Rojas MRN# [id number]   YOB: 1940 Age: 82 year old     Primary cardiologist: Dr. Lucas     Reason for visit: 1-week TAVR follow-up     History of presenting illness:    Lucille Rojas is a pleasant 82 year old patient with past medical history significant for severe aortic stenosis (NASIR 0.9 cm2, PG 32 mmHg, velocity 3.9 m/s) s/p TAVR with 23 mm ES3 valve on 8/23/22, hypertension, chronic diastolic heart failure, CAD, hyperlipidemia, obstructive sleep apnea, morbid obesity, B-cell lymphoma s/p chemotherapy, chronic anemia, and type 2 diabetes. She is here for her 1-week follow-up.    Her procedure was uncomplicated with no vascular access issues or conduction abnormalities.  Postprocedure echocardiogram demonstrated well-seated bioprosthetic valve with an increased mean gradient of 26 mmHg, and hyperdynamic LV function with EF of 65 to 70%.  She is on aspirin 81 mg daily for antiplatelet therapy she was discharged home on 8/25/2022.    Today the patient reports doing well from a cardiovascular standpoint. She is accompanied by both her  and daughter. She feels she has more energy and is less fatigued.  She is doing light household activities without any symptoms.  She otherwise denies chest pain, syncope or near syncope, or palpitations.  Her groin sites have healed well bilaterally.      Her blood pressure is well controlled at 122/76.    I reviewed her most recent BMP from 8/25/2022 that shows creatinine of 0.98, GFR 57, normal electrolytes.  Her CBC from that time shows hemoglobin of 9 and platelets of 221.         Assessment and Plan:     ASSESSMENT:    1. Severe aortic stenosis s/p TAVR with 23 mm ES3.  Postprocedure echocardiogram showed  mean gradient of 27 mmHg in the setting of hyperdynamic LV function.  Tolerating activity without any symptoms.  On aspirin 81 mg daily for antiplatelet therapy.  2. Chronic diastolic heart failure, NYHA class II.  Appears euvolemic on exam, on Lasix 20 mg daily.  3. Hypertension.  Well-controlled on current regimen.  4. CAD. Pre-TAVR angiogram showed flow-limiting OM1 stenosis, moderate LAD disease, and moderate to severe proximal circumflex disease.  Being medically managed on aspirin, beta-blocker, and statin. No angina.   5. Hyperlipidemia. On statin therapy.   6. SHAVON on CPAP  7. Type 2 diabetes  8. Non-Hodgkin's lymphoma s/p chemotherapy      PLAN:     1. Continue with home cardiac rehab.  2. Continue aspirin 81 mg daily for lifelong antiplatelet therapy.  3. We discussed the need for antibiotic prophylaxis prior to any dental procedure.  4. CTA to assess for HALT.   5. Follow-up with me in 1 month with repeat echo, labs, and EKG.            Linsey Benitez, TARA, APRN, CNP  Page: [phone] (8a-5p M-F)    Orders this Visit:  No orders of the defined types were placed in this encounter.    No orders of the defined types were placed in this encounter.    There are no discontinued medications.    Today's clinic visit entailed:  Ordering of each unique test  Prescription drug management  30 minutes spent on the date of the encounter doing chart review, review of test results, patient visit, documentation and discussion with family   Provider  Link to [facility] Help Grid     The level of medical decision making during this visit was of moderate complexity.           Review of Systems:     Review of Systems:  Skin:        Eyes:       ENT:       Respiratory:  Negative shortness of breath  Cardiovascular:  palpitations;chest pain;Negative;fatigue;lightheadedness;edema Positive for;edema  Gastroenterology: Negative heartburn  Genitourinary:  not assessed    Musculoskeletal:  not assessed    Neurologic:  not assessed     Psychiatric:  not assessed    Heme/Lymph/Imm:  not assessed    Endocrine:  not assessed              Physical Exam:   Vitals: /76   Ht 1.524 m (5')   Wt 108.9 kg (240 lb)   SpO2 95%   BMI 46.87 kg/m    Constitutional:  well developed        Skin:  warm and dry to the touch        Head:  normocephalic        Eyes:  pupils equal and round        ENT:           Neck:  JVP normal        Chest:  clear to auscultation        Cardiac: regular rhythm;normal S1 and S2       grade 1;systolic murmur          Abdomen:  abdomen soft        Vascular: pulses full and equal                                      Extremities and Back:  no edema        Neurological:  no gross motor deficits;affect appropriate             Medications:     Current Outpatient Medications   Medication Sig Dispense Refill     acetaminophen (TYLENOL) 325 MG tablet Take 2 tablets (650 mg) by mouth every 4 hours as needed for mild pain       aspirin 81 MG tablet Take 1 tablet by mouth daily. 30 tablet 0     carboxymethylcellulose PF (REFRESH PLUS) 0.5 % ophthalmic solution Place 2 drops into both eyes 2 times daily       citalopram (CELEXA) 20 MG tablet Take 1 tablet (20 mg) by mouth daily 90 tablet 1     CONTOUR NEXT TEST test strip USE TO TEST BLOOD GLUCOSE ONCE DAILY       furosemide (LASIX) 20 MG tablet TAKE 1 TABLET BY MOUTH DAILY 30 tablet 10     levothyroxine (SYNTHROID/LEVOTHROID) 175 MCG tablet Take 1 tablet (175 mcg) by mouth daily 90 tablet 1     loratadine (CLARITIN) 10 MG tablet Take 10 mg by mouth daily       LORazepam (ATIVAN) 0.5 MG tablet Take 1 tablet (0.5 mg) by mouth every 8 hours as needed for anxiety or nausea 30 tablet 1     metoprolol succinate ER (TOPROL XL) 25 MG 24 hr tablet Take 1 tablet (25 mg) by mouth daily 30 tablet 0     miconazole (MICATIN) 2 % external powder Apply topically as needed for itching or other (for skin folds) 90 g 0     Microlet Lancets MISC USE TO TEST BLOOD GLUCOSE ONCE DAILY       nystatin  (MYCOSTATIN) 021959 UNIT/GM external powder Apply topically 2 times daily Under breasts and skin folds BID & BID PRN for redness 60 g 1     order for DME Equipment being ordered: wheeled walker with hand breaks 1 Device 0     pantoprazole (PROTONIX) 40 MG EC tablet Take 1 tablet (40 mg) by mouth every morning (before breakfast) 90 tablet 3     simvastatin (ZOCOR) 10 MG tablet Take 1 tablet (10 mg) by mouth At Bedtime 90 tablet 1       History reviewed. No pertinent family history.    Social History     Socioeconomic History     Marital status:      Spouse name: Not on file     Number of children: Not on file     Years of education: Not on file     Highest education level: Not on file   Occupational History     Not on file   Tobacco Use     Smoking status: Never Smoker     Smokeless tobacco: Never Used   Substance and Sexual Activity     Alcohol use: Not Currently     Drug use: No     Sexual activity: Never   Other Topics Concern     Parent/sibling w/ CABG, MI or angioplasty before 65F 55M? Not Asked   Social History Narrative     Not on file     Social Determinants of Health     Financial Resource Strain: Not on file   Food Insecurity: Not on file   Transportation Needs: Not on file   Physical Activity: Not on file   Stress: Not on file   Social Connections: Not on file   Intimate Partner Violence: Not on file   Housing Stability: Not on file            Past Medical History:     Past Medical History:   Diagnosis Date     Cancer (H) 10/2021     Depressive disorder      Heart disease 10/2021     History of blood transfusion 20/2021     HTN (hypertension) 5/9/2013     Hyperlipidemia LDL goal <130 5/9/2013     Hypothyroidism 5/9/2013     Macular degeneration 9/18/2013     Sleep apnea     CPAP at night, O2 during naps     Type 2 diabetes, HbA1C goal < 8% (H) 5/9/2013              Past Surgical History:     Past Surgical History:   Procedure Laterality Date     APPENDECTOMY       COLONOSCOPY       COLONOSCOPY N/A  10/27/2014    Procedure: COMBINED COLONOSCOPY, SINGLE OR MULTIPLE BIOPSY/POLYPECTOMY BY BIOPSY;  Surgeon: Jose Alfredo Morejon MD;  Location:  GI     CV CORONARY ANGIOGRAM N/A 7/15/2022    Procedure: Coronary Angiogram;  Surgeon: Mary Jo Rodriguez MD;  Location:  HEART CARDIAC CATH LAB     CV PCI N/A 7/15/2022    Procedure: Percutaneous Coronary Intervention;  Surgeon: Mary Jo Rodriguez MD;  Location:  HEART CARDIAC CATH LAB     CV TRANSCATHETER AORTIC VALVE REPLACEMENT-FEMORAL APPROACH N/A 8/23/2022    Procedure: Transcatheter Aortic Valve Replacement-Femoral Approach;  Surgeon: Mary Jo Rodriguez MD;  Location:  HEART CARDIAC CATH LAB     ESOPHAGOSCOPY, GASTROSCOPY, DUODENOSCOPY (EGD), COMBINED N/A 9/21/2021    Procedure: ESOPHAGOGASTRODUODENOSCOPY, WITH FINE NEEDLE ASPIRATION BIOPSY, WITH ENDOSCOPIC ULTRASOUND GUIDANCE;  Surgeon: Vera Benitez MD;  Location:  GI     ESOPHAGOSCOPY, GASTROSCOPY, DUODENOSCOPY (EGD), COMBINED N/A 9/21/2021    Procedure: Esophagogastroduodenoscopy, With Biopsy;  Surgeon: Vera Benitez MD;  Location:  GI     ESOPHAGOSCOPY, GASTROSCOPY, DUODENOSCOPY (EGD), COMBINED N/A 10/5/2021    Procedure: ESOPHAGOGASTRODUODENOSCOPY, WITH FINE NEEDLE ASPIRATION BIOPSY, WITH ENDOSCOPIC ULTRASOUND GUIDANCE;  Surgeon: Dixon Olivier MD;  Location:  GI     ESOPHAGOSCOPY, GASTROSCOPY, DUODENOSCOPY (EGD), COMBINED N/A 12/22/2021    Procedure: ESOPHAGOGASTRODUODENOSCOPY, WITH BIOPSY;  Surgeon: Vera Benitez MD;  Location:  GI     HERNIA REPAIR      inguinal x 2     IR CHEST PORT PLACEMENT > 5 YRS OF AGE  12/13/2021     TONSILLECTOMY                Allergies:   Penicillins       Data:   All laboratory data reviewed:    Recent Labs   Lab Test 08/18/22  1604 08/02/22  0948 05/12/22  0804 03/30/22  0809 02/02/22  0836 12/23/21  1117 12/14/21  0900 11/22/21  1132 09/20/21  1727 06/04/20  0000 06/12/18  0834   LDL  --   --   --   --   --   --   --   81  --  77 51   HDL  --   --   --   --   --   --   --  51  --  47* 41*   NHDL  --   --   --   --   --   --   --  108  --  101 73   CHOL  --   --   --   --   --   --   --  159  --  148 114   TRIG  --   --   --   --   --   --   --  133  --  140 109   TSH  --  0.12* 0.45 2.72  --   --   --  2.68   < > 0.93 2.00   NTBNP 373  --   --   --   --   --   --   --   --   --   --    IRON  --   --   --   --  37 14*   < >  --    < >  --   --    FEB  --   --   --   --  256 264   < >  --    < >  --   --    IRONSAT  --   --   --   --  14* 5*   < >  --    < >  --   --    KHADAR  --   --   --   --   --  24  --   --    < >  --   --     < > = values in this interval not displayed.       Lab Results   Component Value Date    WBC 7.9 08/25/2022    WBC 7.6 12/08/2016    RBC 3.43 (L) 08/25/2022    RBC 3.62 (L) 12/08/2016    HGB 9.0 (L) 08/25/2022    HGB 10.6 (L) 04/07/2021    HCT 30.5 (L) 08/25/2022    HCT 34.3 (L) 12/08/2016    MCV 89 08/25/2022    MCV 95 12/08/2016    MCH 26.2 (L) 08/25/2022    MCH 27.6 12/08/2016    MCHC 29.5 (L) 08/25/2022    MCHC 29.2 (L) 12/08/2016    RDW 13.4 08/25/2022    RDW 14.6 12/08/2016     08/25/2022     12/08/2016       Lab Results   Component Value Date     08/25/2022     08/27/2020    POTASSIUM 4.3 08/25/2022    POTASSIUM 4.1 08/27/2020    CHLORIDE 104 08/25/2022    CHLORIDE 109 08/27/2020    CO2 33 (H) 08/25/2022    CO2 31 08/27/2020    ANIONGAP 2 (L) 08/25/2022    ANIONGAP 4 08/27/2020     (H) 08/25/2022     (H) 08/27/2020    BUN 28 08/25/2022    BUN 19 08/27/2020    CR 0.98 08/25/2022    CR 0.98 08/27/2020    GFRESTIMATED 57 (L) 08/25/2022    GFRESTIMATED 55 (L) 06/14/2022    GFRESTIMATED 54 (L) 08/27/2020    GFRESTBLACK 62 08/27/2020    IGOR 8.5 08/25/2022    IGOR 9.6 08/27/2020      Lab Results   Component Value Date    AST 14 08/02/2022    AST 13 08/27/2020    ALT 15 08/02/2022    ALT 15 08/27/2020       Lab Results   Component Value Date    A1C 5.2 08/17/2022    A1C  5.8 (H) 04/07/2021       Lab Results   Component Value Date    INR 1.07 08/18/2022    INR 1.09 07/15/2022     Thank you for allowing me to participate in the care of your patient.    Sincerely,   Linsey Benitez, [facility]  cc: No referring provider defined for this encounter.

## 2022-09-01 NOTE — PROGRESS NOTES
Cardiology Clinic Progress Note  Lucille Rojas MRN# [id number]   YOB: 1940 Age: 82 year old     Primary cardiologist: Dr. Lucas     Reason for visit: 1-week TAVR follow-up     History of presenting illness:    Lucille Rojas is a pleasant 82 year old patient with past medical history significant for severe aortic stenosis (NASIR 0.9 cm2, PG 32 mmHg, velocity 3.9 m/s) s/p TAVR with 23 mm ES3 valve on 8/23/22, hypertension, chronic diastolic heart failure, CAD, hyperlipidemia, obstructive sleep apnea, morbid obesity, B-cell lymphoma s/p chemotherapy, chronic anemia, and type 2 diabetes. She is here for her 1-week follow-up.    Her procedure was uncomplicated with no vascular access issues or conduction abnormalities.  Postprocedure echocardiogram demonstrated well-seated bioprosthetic valve with an increased mean gradient of 26 mmHg, and hyperdynamic LV function with EF of 65 to 70%.  She is on aspirin 81 mg daily for antiplatelet therapy she was discharged home on 8/25/2022.    Today the patient reports doing well from a cardiovascular standpoint. She is accompanied by both her  and daughter. She feels she has more energy and is less fatigued.  She is doing light household activities without any symptoms.  She otherwise denies chest pain, syncope or near syncope, or palpitations.  Her groin sites have healed well bilaterally.      Her blood pressure is well controlled at 122/76.    I reviewed her most recent BMP from 8/25/2022 that shows creatinine of 0.98, GFR 57, normal electrolytes.  Her CBC from that time shows hemoglobin of 9 and platelets of 221.         Assessment and Plan:     ASSESSMENT:    1. Severe aortic stenosis s/p TAVR with 23 mm ES3.  Postprocedure echocardiogram showed mean gradient of 27 mmHg in the setting of hyperdynamic LV function.  Tolerating activity without any symptoms.  On aspirin 81 mg daily for antiplatelet therapy.  2. Chronic diastolic heart failure, NYHA class  II.  Appears euvolemic on exam, on Lasix 20 mg daily.  3. Hypertension.  Well-controlled on current regimen.  4. CAD. Pre-TAVR angiogram showed flow-limiting OM1 stenosis, moderate LAD disease, and moderate to severe proximal circumflex disease.  Being medically managed on aspirin, beta-blocker, and statin. No angina.   5. Hyperlipidemia. On statin therapy.   6. SHAVON on CPAP  7. Type 2 diabetes  8. Non-Hodgkin's lymphoma s/p chemotherapy      PLAN:     1. Continue with home cardiac rehab.  2. Continue aspirin 81 mg daily for lifelong antiplatelet therapy.  3. We discussed the need for antibiotic prophylaxis prior to any dental procedure.  4. CTA to assess for HALT.   5. Follow-up with me in 1 month with repeat echo, labs, and EKG.            Linsey Benitez, TARA, APRN, CNP  Page: [phone] (8a-5p M-F)    Orders this Visit:  No orders of the defined types were placed in this encounter.    No orders of the defined types were placed in this encounter.    There are no discontinued medications.    Today's clinic visit entailed:  Ordering of each unique test  Prescription drug management  30 minutes spent on the date of the encounter doing chart review, review of test results, patient visit, documentation and discussion with family   Provider  Link to [facility] Help Grid     The level of medical decision making during this visit was of moderate complexity.           Review of Systems:     Review of Systems:  Skin:        Eyes:       ENT:       Respiratory:  Negative shortness of breath  Cardiovascular:  palpitations;chest pain;Negative;fatigue;lightheadedness;edema Positive for;edema  Gastroenterology: Negative heartburn  Genitourinary:  not assessed    Musculoskeletal:  not assessed    Neurologic:  not assessed    Psychiatric:  not assessed    Heme/Lymph/Imm:  not assessed    Endocrine:  not assessed              Physical Exam:   Vitals: /76   Ht 1.524 m (5')   Wt 108.9 kg (240 lb)   SpO2 95%   BMI 46.87 kg/m     Constitutional:  well developed        Skin:  warm and dry to the touch        Head:  normocephalic        Eyes:  pupils equal and round        ENT:           Neck:  JVP normal        Chest:  clear to auscultation        Cardiac: regular rhythm;normal S1 and S2       grade 1;systolic murmur          Abdomen:  abdomen soft        Vascular: pulses full and equal                                      Extremities and Back:  no edema        Neurological:  no gross motor deficits;affect appropriate             Medications:     Current Outpatient Medications   Medication Sig Dispense Refill     acetaminophen (TYLENOL) 325 MG tablet Take 2 tablets (650 mg) by mouth every 4 hours as needed for mild pain       aspirin 81 MG tablet Take 1 tablet by mouth daily. 30 tablet 0     carboxymethylcellulose PF (REFRESH PLUS) 0.5 % ophthalmic solution Place 2 drops into both eyes 2 times daily       citalopram (CELEXA) 20 MG tablet Take 1 tablet (20 mg) by mouth daily 90 tablet 1     CONTOUR NEXT TEST test strip USE TO TEST BLOOD GLUCOSE ONCE DAILY       furosemide (LASIX) 20 MG tablet TAKE 1 TABLET BY MOUTH DAILY 30 tablet 10     levothyroxine (SYNTHROID/LEVOTHROID) 175 MCG tablet Take 1 tablet (175 mcg) by mouth daily 90 tablet 1     loratadine (CLARITIN) 10 MG tablet Take 10 mg by mouth daily       LORazepam (ATIVAN) 0.5 MG tablet Take 1 tablet (0.5 mg) by mouth every 8 hours as needed for anxiety or nausea 30 tablet 1     metoprolol succinate ER (TOPROL XL) 25 MG 24 hr tablet Take 1 tablet (25 mg) by mouth daily 30 tablet 0     miconazole (MICATIN) 2 % external powder Apply topically as needed for itching or other (for skin folds) 90 g 0     Microlet Lancets MISC USE TO TEST BLOOD GLUCOSE ONCE DAILY       nystatin (MYCOSTATIN) 072663 UNIT/GM external powder Apply topically 2 times daily Under breasts and skin folds BID & BID PRN for redness 60 g 1     order for DME Equipment being ordered: wheeled walker with hand breaks 1 Device  0     pantoprazole (PROTONIX) 40 MG EC tablet Take 1 tablet (40 mg) by mouth every morning (before breakfast) 90 tablet 3     simvastatin (ZOCOR) 10 MG tablet Take 1 tablet (10 mg) by mouth At Bedtime 90 tablet 1       History reviewed. No pertinent family history.    Social History     Socioeconomic History     Marital status:      Spouse name: Not on file     Number of children: Not on file     Years of education: Not on file     Highest education level: Not on file   Occupational History     Not on file   Tobacco Use     Smoking status: Never Smoker     Smokeless tobacco: Never Used   Substance and Sexual Activity     Alcohol use: Not Currently     Drug use: No     Sexual activity: Never   Other Topics Concern     Parent/sibling w/ CABG, MI or angioplasty before 65F 55M? Not Asked   Social History Narrative     Not on file     Social Determinants of Health     Financial Resource Strain: Not on file   Food Insecurity: Not on file   Transportation Needs: Not on file   Physical Activity: Not on file   Stress: Not on file   Social Connections: Not on file   Intimate Partner Violence: Not on file   Housing Stability: Not on file            Past Medical History:     Past Medical History:   Diagnosis Date     Cancer (H) 10/2021     Depressive disorder      Heart disease 10/2021     History of blood transfusion 20/2021     HTN (hypertension) 5/9/2013     Hyperlipidemia LDL goal <130 5/9/2013     Hypothyroidism 5/9/2013     Macular degeneration 9/18/2013     Sleep apnea     CPAP at night, O2 during naps     Type 2 diabetes, HbA1C goal < 8% (H) 5/9/2013              Past Surgical History:     Past Surgical History:   Procedure Laterality Date     APPENDECTOMY       COLONOSCOPY       COLONOSCOPY N/A 10/27/2014    Procedure: COMBINED COLONOSCOPY, SINGLE OR MULTIPLE BIOPSY/POLYPECTOMY BY BIOPSY;  Surgeon: Jose Alfredo Morejon MD;  Location:  GI     CV CORONARY ANGIOGRAM N/A 7/15/2022    Procedure: Coronary  Angiogram;  Surgeon: Mary Jo Rodriguez MD;  Location:  HEART CARDIAC CATH LAB     CV PCI N/A 7/15/2022    Procedure: Percutaneous Coronary Intervention;  Surgeon: Mary Jo Rodriguez MD;  Location: [facility] CARDIAC CATH LAB     CV TRANSCATHETER AORTIC VALVE REPLACEMENT-FEMORAL APPROACH N/A 8/23/2022    Procedure: Transcatheter Aortic Valve Replacement-Femoral Approach;  Surgeon: Mary Jo Rodriguez MD;  Location:  HEART CARDIAC CATH LAB     ESOPHAGOSCOPY, GASTROSCOPY, DUODENOSCOPY (EGD), COMBINED N/A 9/21/2021    Procedure: ESOPHAGOGASTRODUODENOSCOPY, WITH FINE NEEDLE ASPIRATION BIOPSY, WITH ENDOSCOPIC ULTRASOUND GUIDANCE;  Surgeon: Vera Benitez MD;  Location:  GI     ESOPHAGOSCOPY, GASTROSCOPY, DUODENOSCOPY (EGD), COMBINED N/A 9/21/2021    Procedure: Esophagogastroduodenoscopy, With Biopsy;  Surgeon: Vera Benitez MD;  Location:  GI     ESOPHAGOSCOPY, GASTROSCOPY, DUODENOSCOPY (EGD), COMBINED N/A 10/5/2021    Procedure: ESOPHAGOGASTRODUODENOSCOPY, WITH FINE NEEDLE ASPIRATION BIOPSY, WITH ENDOSCOPIC ULTRASOUND GUIDANCE;  Surgeon: Dixon Olivier MD;  Location:  GI     ESOPHAGOSCOPY, GASTROSCOPY, DUODENOSCOPY (EGD), COMBINED N/A 12/22/2021    Procedure: ESOPHAGOGASTRODUODENOSCOPY, WITH BIOPSY;  Surgeon: Vera Benitez MD;  Location:  GI     HERNIA REPAIR      inguinal x 2     IR CHEST PORT PLACEMENT > 5 YRS OF AGE  12/13/2021     TONSILLECTOMY                Allergies:   Penicillins       Data:   All laboratory data reviewed:    Recent Labs   Lab Test 08/18/22  1604 08/02/22  0948 05/12/22  0804 03/30/22  0809 02/02/22  0836 12/23/21  1117 12/14/21  0900 11/22/21  1132 09/20/21  1727 06/04/20  0000 06/12/18  0834   LDL  --   --   --   --   --   --   --  81  --  77 51   HDL  --   --   --   --   --   --   --  51  --  47* 41*   NHDL  --   --   --   --   --   --   --  108  --  101 73   CHOL  --   --   --   --   --   --   --  159  --  148 114   TRIG  --   --   --   --    --   --   --  133  --  140 109   TSH  --  0.12* 0.45 2.72  --   --   --  2.68   < > 0.93 2.00   NTBNP 373  --   --   --   --   --   --   --   --   --   --    IRON  --   --   --   --  37 14*   < >  --    < >  --   --    FEB  --   --   --   --  256 264   < >  --    < >  --   --    IRONSAT  --   --   --   --  14* 5*   < >  --    < >  --   --    KHADAR  --   --   --   --   --  24  --   --    < >  --   --     < > = values in this interval not displayed.       Lab Results   Component Value Date    WBC 7.9 08/25/2022    WBC 7.6 12/08/2016    RBC 3.43 (L) 08/25/2022    RBC 3.62 (L) 12/08/2016    HGB 9.0 (L) 08/25/2022    HGB 10.6 (L) 04/07/2021    HCT 30.5 (L) 08/25/2022    HCT 34.3 (L) 12/08/2016    MCV 89 08/25/2022    MCV 95 12/08/2016    MCH 26.2 (L) 08/25/2022    MCH 27.6 12/08/2016    MCHC 29.5 (L) 08/25/2022    MCHC 29.2 (L) 12/08/2016    RDW 13.4 08/25/2022    RDW 14.6 12/08/2016     08/25/2022     12/08/2016       Lab Results   Component Value Date     08/25/2022     08/27/2020    POTASSIUM 4.3 08/25/2022    POTASSIUM 4.1 08/27/2020    CHLORIDE 104 08/25/2022    CHLORIDE 109 08/27/2020    CO2 33 (H) 08/25/2022    CO2 31 08/27/2020    ANIONGAP 2 (L) 08/25/2022    ANIONGAP 4 08/27/2020     (H) 08/25/2022     (H) 08/27/2020    BUN 28 08/25/2022    BUN 19 08/27/2020    CR 0.98 08/25/2022    CR 0.98 08/27/2020    GFRESTIMATED 57 (L) 08/25/2022    GFRESTIMATED 55 (L) 06/14/2022    GFRESTIMATED 54 (L) 08/27/2020    GFRESTBLACK 62 08/27/2020    IGOR 8.5 08/25/2022    IGOR 9.6 08/27/2020      Lab Results   Component Value Date    AST 14 08/02/2022    AST 13 08/27/2020    ALT 15 08/02/2022    ALT 15 08/27/2020       Lab Results   Component Value Date    A1C 5.2 08/17/2022    A1C 5.8 (H) 04/07/2021       Lab Results   Component Value Date    INR 1.07 08/18/2022    INR 1.09 07/15/2022

## 2022-09-01 NOTE — PATIENT INSTRUCTIONS
Today's Plan:     1) Continue home rehab.     2) Follow-up with me in 1 month with with labs.     Mackenzie RN ([phone]), Nya RN ([phone]), and Pura RN ([phone])    Scheduling phone number: [phone]    Reminder: Please bring in all current medications, over the counter supplements and vitamin bottles to your next appointment.-    It was a pleasure seeing you today!     Linsey Benitez, JOSÉ LUIS  9/1/2022    -

## 2022-09-07 ENCOUNTER — MEDICAL CORRESPONDENCE (OUTPATIENT)
Dept: HEALTH INFORMATION MANAGEMENT | Facility: CLINIC | Age: 82
End: 2022-09-07

## 2022-09-07 ENCOUNTER — TELEPHONE (OUTPATIENT)
Dept: INTERNAL MEDICINE | Facility: CLINIC | Age: 82
End: 2022-09-07

## 2022-09-07 DIAGNOSIS — H91.90 HEARING LOSS, UNSPECIFIED HEARING LOSS TYPE, UNSPECIFIED LATERALITY: Primary | ICD-10-CM

## 2022-09-07 DIAGNOSIS — C85.12 B-CELL LYMPHOMA OF INTRATHORACIC LYMPH NODES, UNSPECIFIED B-CELL LYMPHOMA TYPE (H): Primary | ICD-10-CM

## 2022-09-07 DIAGNOSIS — Z53.9 DIAGNOSIS NOT YET DEFINED: Primary | ICD-10-CM

## 2022-09-07 PROCEDURE — G0180 MD CERTIFICATION HHA PATIENT: HCPCS | Performed by: INTERNAL MEDICINE

## 2022-09-07 NOTE — TELEPHONE ENCOUNTER
[facility] requesting verbal orders for OT visits.    Frequency: Every other week for 2 weeks  Reason: ADLs and energy conservation    Verbal order given.    Natalie Dyer RN

## 2022-09-07 NOTE — TELEPHONE ENCOUNTER
Nancy Rajput, Daughter no c2c on file, calling. Writer took information but did not give patient information.     Requesting ENT referral to evaluate hearing loss.   Patient requesting to see Dr Rafa Kelly at [facility] (Fax: [phone])        Xiomy Masters RN  [facility]

## 2022-09-08 NOTE — TELEPHONE ENCOUNTER
Referral pended    Patient wanting to see [place] [facility] at [facility]    Roger Muller RN

## 2022-09-12 ENCOUNTER — TRANSFERRED RECORDS (OUTPATIENT)
Dept: HEALTH INFORMATION MANAGEMENT | Facility: CLINIC | Age: 82
End: 2022-09-12

## 2022-09-12 NOTE — TELEPHONE ENCOUNTER
Marya from [facility] PT calling requesting ongoing visits  Call back [phone]    Requesting PT 1w3, 1eow 4    Verbal given for above requested homecare orders.  Homecare to send orders over for PCP signature.  Kayleigh Price, RN  [facility] RN Triage Team

## 2022-09-14 ENCOUNTER — PATIENT OUTREACH (OUTPATIENT)
Dept: CARE COORDINATION | Facility: CLINIC | Age: 82
End: 2022-09-14

## 2022-09-14 ENCOUNTER — MEDICAL CORRESPONDENCE (OUTPATIENT)
Dept: HEALTH INFORMATION MANAGEMENT | Facility: CLINIC | Age: 82
End: 2022-09-14

## 2022-09-14 NOTE — PROGRESS NOTES
Clinic Care Coordination Contact  [facility]/Voicemail       Clinical Data: Care Coordinator Outreach  Outreach attempted x 1.  Could not leave a message message on patient's voicemail with call back information and requested return call.  Voicemail not available.    Plan: Care Coordinator will try to reach patient again in 3-5 business days.    ZANE Demarco  Clinic Care Coordination  [facility] Clinics: [place] [place], [place], [facility], and [place] [facility]  Phone: [phone]

## 2022-09-14 NOTE — PROGRESS NOTES
Clinic Care Coordination Contact  The Community Health Worker called for a Care Coordination outreach.  Spoke to Lucille.    Patient requested a call later this afternoon.  She is not able to talk at the moment.    CHW will outreach later today.    Rosanne Valadez, TEEW  Clinic Care Coordination  [facility] Clinics: [place], [place], [place], and [place] [facility]  Phone: [phone]

## 2022-09-15 ENCOUNTER — TELEPHONE (OUTPATIENT)
Dept: INTERNAL MEDICINE | Facility: CLINIC | Age: 82
End: 2022-09-15

## 2022-09-15 NOTE — TELEPHONE ENCOUNTER
Jaimie from [facility] called requesting approval to continue HHA  Visit 1x per week for 4 wks.     Verbal approval given.

## 2022-09-19 ENCOUNTER — PATIENT OUTREACH (OUTPATIENT)
Dept: CARE COORDINATION | Facility: CLINIC | Age: 82
End: 2022-09-19

## 2022-09-19 NOTE — PROGRESS NOTES
Clinic Care Coordination Contact  Community Health Worker Follow Up    Care Gaps: Did Not Address    Health Maintenance Due   Topic Date Due     ZOSTER IMMUNIZATION (1 of 2) Never done     DTAP/TDAP/TD IMMUNIZATION (2 - Td or Tdap) 01/01/2019     EYE EXAM  06/01/2021     COVID-19 Vaccine (4 - Booster for Pfizer series) 12/28/2021     PHQ-9  05/02/2022     INFLUENZA VACCINE (1) 09/01/2022       Care Plan: On Maintenance    Discussed:    Patient stated she had someone to help clean her house twice a month.  This month left the company.  Patient would like resources for house cleaning.    Patient stated she has some items she would like to sell at [employer].   The hours are from 10am - 2pm.  Her family members are not able to drive her during that time.      Patient stated she needs transportation to go to the pharmacy.    Patient stated she uses [employer] and her daughter Nancy Rajput for transportation to appointments.    Patient stated she receives meals every other week from [employer].      Patient stated she receives food delivery from [employer].    Patient stated her , Cooper fell twice within the last two weeks.        Intervention and Education during outreach: CHW encouraged patient to contact CC if there are any other changes or resources needed.  Patient acknowledged understanding.       CHW will look for housecleaning and transportation resources.    CHW will outreach in one month.    ZANE Demarco  Clinic Care Coordination  [facility] Clinics: [place], [place], [place], and [facility]  Phone: [phone]

## 2022-09-23 ENCOUNTER — HOSPITAL ENCOUNTER (OUTPATIENT)
Dept: CARDIOLOGY | Facility: CLINIC | Age: 82
Discharge: HOME | End: 2022-09-23
Attending: INTERNAL MEDICINE
Payer: MEDICARE

## 2022-09-23 ENCOUNTER — TELEPHONE (OUTPATIENT)
Dept: INTERNAL MEDICINE | Facility: CLINIC | Age: 82
End: 2022-09-23

## 2022-09-23 ENCOUNTER — LAB (OUTPATIENT)
Dept: LAB | Facility: CLINIC | Age: 82
End: 2022-09-23
Attending: INTERNAL MEDICINE
Payer: MEDICARE

## 2022-09-23 ENCOUNTER — OFFICE VISIT (OUTPATIENT)
Dept: CARDIOLOGY | Facility: CLINIC | Age: 82
End: 2022-09-23
Attending: INTERNAL MEDICINE
Payer: MEDICARE

## 2022-09-23 ENCOUNTER — DOCUMENTATION ONLY (OUTPATIENT)
Dept: LAB | Facility: CLINIC | Age: 82
End: 2022-09-23

## 2022-09-23 VITALS
DIASTOLIC BLOOD PRESSURE: 72 MMHG | OXYGEN SATURATION: 96 % | HEIGHT: 60 IN | BODY MASS INDEX: 47.12 KG/M2 | WEIGHT: 240 LBS | HEART RATE: 62 BPM | SYSTOLIC BLOOD PRESSURE: 128 MMHG

## 2022-09-23 DIAGNOSIS — Z95.2 S/P TAVR (TRANSCATHETER AORTIC VALVE REPLACEMENT): Primary | ICD-10-CM

## 2022-09-23 DIAGNOSIS — E03.9 HYPOTHYROIDISM, UNSPECIFIED TYPE: ICD-10-CM

## 2022-09-23 DIAGNOSIS — R94.39 ABNORMAL RESULT OF OTHER CARDIOVASCULAR FUNCTION STUDY: ICD-10-CM

## 2022-09-23 DIAGNOSIS — Z95.2 S/P TAVR (TRANSCATHETER AORTIC VALVE REPLACEMENT): ICD-10-CM

## 2022-09-23 DIAGNOSIS — E03.9 ACQUIRED HYPOTHYROIDISM: Primary | ICD-10-CM

## 2022-09-23 LAB
ANION GAP SERPL CALCULATED.3IONS-SCNC: 4 MMOL/L (ref 3–14)
BUN SERPL-MCNC: 36 MG/DL (ref 7–30)
CALCIUM SERPL-MCNC: 8.9 MG/DL (ref 8.5–10.1)
CHLORIDE BLD-SCNC: 107 MMOL/L (ref 94–109)
CO2 SERPL-SCNC: 29 MMOL/L (ref 20–32)
CREAT SERPL-MCNC: 0.95 MG/DL (ref 0.52–1.04)
ERYTHROCYTE [DISTWIDTH] IN BLOOD BY AUTOMATED COUNT: 14.6 % (ref 10–15)
GFR SERPL CREATININE-BSD FRML MDRD: 59 ML/MIN/1.73M2
GLUCOSE BLD-MCNC: 103 MG/DL (ref 70–99)
HCT VFR BLD AUTO: 35.8 % (ref 35–47)
HGB BLD-MCNC: 10.7 G/DL (ref 11.7–15.7)
MCH RBC QN AUTO: 26 PG (ref 26.5–33)
MCHC RBC AUTO-ENTMCNC: 29.9 G/DL (ref 31.5–36.5)
MCV RBC AUTO: 87 FL (ref 78–100)
PLATELET # BLD AUTO: 217 10E3/UL (ref 150–450)
POTASSIUM BLD-SCNC: 4.3 MMOL/L (ref 3.4–5.3)
RBC # BLD AUTO: 4.12 10E6/UL (ref 3.8–5.2)
SODIUM SERPL-SCNC: 140 MMOL/L (ref 133–144)
T4 FREE SERPL-MCNC: 1.61 NG/DL (ref 0.76–1.46)
TSH SERPL DL<=0.005 MIU/L-ACNC: 0.13 MU/L (ref 0.4–4)
WBC # BLD AUTO: 6 10E3/UL (ref 4–11)

## 2022-09-23 PROCEDURE — 80048 BASIC METABOLIC PNL TOTAL CA: CPT

## 2022-09-23 PROCEDURE — 250N000011 HC RX IP 250 OP 636: Performed by: INTERNAL MEDICINE

## 2022-09-23 PROCEDURE — 85027 COMPLETE CBC AUTOMATED: CPT

## 2022-09-23 PROCEDURE — 75572 CT HRT W/3D IMAGE: CPT | Mod: 26 | Performed by: INTERNAL MEDICINE

## 2022-09-23 PROCEDURE — 36415 COLL VENOUS BLD VENIPUNCTURE: CPT

## 2022-09-23 PROCEDURE — G1010 CDSM STANSON: HCPCS | Performed by: INTERNAL MEDICINE

## 2022-09-23 PROCEDURE — 84439 ASSAY OF FREE THYROXINE: CPT

## 2022-09-23 PROCEDURE — 93000 ELECTROCARDIOGRAM COMPLETE: CPT | Performed by: INTERNAL MEDICINE

## 2022-09-23 PROCEDURE — G1010 CDSM STANSON: HCPCS

## 2022-09-23 PROCEDURE — 99214 OFFICE O/P EST MOD 30 MIN: CPT | Mod: 25 | Performed by: NURSE PRACTITIONER

## 2022-09-23 PROCEDURE — 84443 ASSAY THYROID STIM HORMONE: CPT

## 2022-09-23 RX ORDER — ACYCLOVIR 200 MG/1
0-1 CAPSULE ORAL
Status: DISCONTINUED | OUTPATIENT
Start: 2022-09-23 | End: 2022-09-24 | Stop reason: HOSPADM

## 2022-09-23 RX ORDER — DIPHENHYDRAMINE HCL 25 MG
25 CAPSULE ORAL
Status: DISCONTINUED | OUTPATIENT
Start: 2022-09-23 | End: 2022-09-24 | Stop reason: HOSPADM

## 2022-09-23 RX ORDER — DIPHENHYDRAMINE HYDROCHLORIDE 50 MG/ML
25-50 INJECTION INTRAMUSCULAR; INTRAVENOUS
Status: DISCONTINUED | OUTPATIENT
Start: 2022-09-23 | End: 2022-09-24 | Stop reason: HOSPADM

## 2022-09-23 RX ORDER — ONDANSETRON 2 MG/ML
4 INJECTION INTRAMUSCULAR; INTRAVENOUS
Status: DISCONTINUED | OUTPATIENT
Start: 2022-09-23 | End: 2022-09-24 | Stop reason: HOSPADM

## 2022-09-23 RX ORDER — METHYLPREDNISOLONE SODIUM SUCCINATE 125 MG/2ML
125 INJECTION, POWDER, LYOPHILIZED, FOR SOLUTION INTRAMUSCULAR; INTRAVENOUS
Status: DISCONTINUED | OUTPATIENT
Start: 2022-09-23 | End: 2022-09-24 | Stop reason: HOSPADM

## 2022-09-23 RX ORDER — IOPAMIDOL 755 MG/ML
500 INJECTION, SOLUTION INTRAVASCULAR ONCE
Status: COMPLETED | OUTPATIENT
Start: 2022-09-23 | End: 2022-09-23

## 2022-09-23 RX ADMIN — IOPAMIDOL 100 ML: 755 INJECTION, SOLUTION INTRAVENOUS at 10:14

## 2022-09-23 NOTE — PROGRESS NOTES
Lucille came in today for a blood draw. Unable to find a suitable vein. Sent to lab at [facility] for further assistance.     Susy Herrera CMA ([facility])

## 2022-09-23 NOTE — TELEPHONE ENCOUNTER
"Patient Contact    Attempt # 1    Was call answered?  No.  Left message on voicemail with information to call me back.    Upon call back, please relay message from the provider...    \"Your thyroid function tests indicate you need some medication adjustment so I would call the clinic and make a follow-up appointment to discuss these changes.\"    Natalie Dyer RN      "

## 2022-09-23 NOTE — PROGRESS NOTES
Cardiology Clinic Progress Note  Lucille Rojas MRN# [id number]   YOB: 1940 Age: 82 year old     Primary cardiologist: Dr. Lucas     Reason for visit: 1-month TAVR follow-up     History of presenting illness:    Lucille Rojas is a pleasant 82 year old patient with past medical history significant for severe aortic stenosis (NASIR 0.9 cm2, PG 32 mmHg, velocity 3.9 m/s) s/p TAVR with 23 mm ES3 valve on 8/23/22, hypertension, chronic diastolic heart failure, CAD, hyperlipidemia, obstructive sleep apnea, morbid obesity, B-cell lymphoma s/p chemotherapy, chronic anemia, and type 2 diabetes.    In regards to her TAVR procedure, there was no hemodynamic instability or conduction abnormality. Postprocedure echocardiogram demonstrated well-seated bioprosthetic valve with an increased mean gradient of 26 mmHg in the setting of hyperdynamic LV function. Subsequent CTA showed well-seated valve with no evidence of HALT.     Today the patient is here for her 1-month follow-up.  She is accompanied by her  and daughter.  She reports a significant improvement in her energy levels.  She can walk longer distances without feeling short of breath and is less fatigued throughout the day.  She is participating in home physical therapy once a week and is tolerating well.  She denies any chest discomfort, syncope or near syncope, or palpitations.      She was initially hypertensive today, but her blood pressure improved at recheck.    EKG in clinic shows sinus with first-degree AV block.     Labs show creatinine of 0.95, GFR 59, normal electrolytes, and hemoglobin of 10.7.         Assessment and Plan:     ASSESSMENT:    1. Severe aortic stenosis s/p TAVR with 23 mm ES3.  Postprocedure echocardiogram showed mean gradient of 26 mmHg in the setting of hyperdynamic LV function.  Subsequent CT with no evidence of HALT. Tolerating activity without any symptoms.  On aspirin 81 mg daily for antiplatelet therapy.  2. Chronic  diastolic heart failure, NYHA class II.  Appears euvolemic on exam, on Lasix 20 mg daily.  3. Hypertension.  Well-controlled on current regimen.  4. CAD. Pre-TAVR angiogram showed flow-limiting OM1 stenosis, moderate LAD disease, and moderate to severe proximal circumflex disease.  Being medically managed on aspirin, beta-blocker, and statin. No angina.   5. Hyperlipidemia. On statin therapy.   6. SHAVON. Compliant with CPAP.   7. Type 2 diabetes  8. Non-Hodgkin's lymphoma s/p chemotherapy      PLAN:     1. Continue with home cardiac rehab.  2. Continue aspirin 81 mg daily for lifelong antiplatelet therapy.  3. We again discussed the need for antibiotic prophylaxis prior to any dental procedure.  4. Follow-up with with Dr. Rodriguez in 6 months with repeat echo, sooner if needed.            Linsey Benitez DNP, APRN, CNP  Page: [phone] (8a-5p M-F)    Orders this Visit:  No orders of the defined types were placed in this encounter.    No orders of the defined types were placed in this encounter.    There are no discontinued medications.    Today's clinic visit entailed:  Review of the result(s) of each unique test - CT, EKG, labs  Prescription drug management  30 minutes spent on the date of the encounter doing chart review, review of test results, patient visit, documentation and discussion with family   Provider  Link to [facility] Help Grid     The level of medical decision making during this visit was of moderate complexity.           Review of Systems:     Review of Systems:  Skin:        Eyes:       ENT:       Respiratory:  Negative shortness of breath  Cardiovascular:  palpitations;chest pain;Negative;fatigue;lightheadedness;edema Positive for;edema  Gastroenterology: Negative heartburn  Genitourinary:  not assessed    Musculoskeletal:  not assessed    Neurologic:  not assessed    Psychiatric:  not assessed    Heme/Lymph/Imm:  not assessed    Endocrine:  not assessed              Physical Exam:   Vitals: /72    Pulse 62   Ht 1.524 m (5')   Wt 108.9 kg (240 lb)   SpO2 96%   BMI 46.87 kg/m    Constitutional:  cooperative morbidly obese      Skin:  warm and dry to the touch        Head:  normocephalic        Eyes:  pupils equal and round        ENT:           Neck:  JVP normal        Chest:  clear to auscultation;normal symmetry        Cardiac: regular rhythm;normal S1 and S2       systolic ejection murmur;grade 1          Abdomen:  abdomen soft obese      Vascular: pulses full and equal                                      Extremities and Back:  no edema        Neurological:  no gross motor deficits;affect appropriate             Medications:     Current Outpatient Medications   Medication Sig Dispense Refill     acetaminophen (TYLENOL) 325 MG tablet Take 2 tablets (650 mg) by mouth every 4 hours as needed for mild pain       aspirin 81 MG tablet Take 1 tablet by mouth daily. 30 tablet 0     carboxymethylcellulose PF (REFRESH PLUS) 0.5 % ophthalmic solution Place 2 drops into both eyes 2 times daily       citalopram (CELEXA) 20 MG tablet Take 1 tablet (20 mg) by mouth daily 90 tablet 1     CONTOUR NEXT TEST test strip USE TO TEST BLOOD GLUCOSE ONCE DAILY       furosemide (LASIX) 20 MG tablet TAKE 1 TABLET BY MOUTH DAILY 30 tablet 10     levothyroxine (SYNTHROID/LEVOTHROID) 175 MCG tablet Take 1 tablet (175 mcg) by mouth daily 90 tablet 1     loratadine (CLARITIN) 10 MG tablet Take 10 mg by mouth daily       LORazepam (ATIVAN) 0.5 MG tablet Take 1 tablet (0.5 mg) by mouth every 8 hours as needed for anxiety or nausea 30 tablet 1     metoprolol succinate ER (TOPROL XL) 25 MG 24 hr tablet Take 1 tablet (25 mg) by mouth daily 30 tablet 0     miconazole (MICATIN) 2 % external powder Apply topically as needed for itching or other (for skin folds) 90 g 0     Microlet Lancets MISC USE TO TEST BLOOD GLUCOSE ONCE DAILY       nystatin (MYCOSTATIN) 289869 UNIT/GM external powder Apply topically 2 times daily Under breasts and  skin folds BID & BID PRN for redness 60 g 1     order for DME Equipment being ordered: wheeled walker with hand breaks 1 Device 0     pantoprazole (PROTONIX) 40 MG EC tablet Take 1 tablet (40 mg) by mouth every morning (before breakfast) 90 tablet 3     simvastatin (ZOCOR) 10 MG tablet Take 1 tablet (10 mg) by mouth At Bedtime 90 tablet 1       No family history on file.    Social History     Socioeconomic History     Marital status:      Spouse name: Not on file     Number of children: Not on file     Years of education: Not on file     Highest education level: Not on file   Occupational History     Not on file   Tobacco Use     Smoking status: Never Smoker     Smokeless tobacco: Never Used   Substance and Sexual Activity     Alcohol use: Not Currently     Drug use: No     Sexual activity: Never   Other Topics Concern     Parent/sibling w/ CABG, MI or angioplasty before 65F 55M? Not Asked   Social History Narrative     Not on file     Social Determinants of Health     Financial Resource Strain: Not on file   Food Insecurity: Not on file   Transportation Needs: Not on file   Physical Activity: Not on file   Stress: Not on file   Social Connections: Not on file   Intimate Partner Violence: Not on file   Housing Stability: Not on file            Past Medical History:     Past Medical History:   Diagnosis Date     Cancer (H) 10/2021     Depressive disorder      Heart disease 10/2021     History of blood transfusion 20/2021     HTN (hypertension) 5/9/2013     Hyperlipidemia LDL goal <130 5/9/2013     Hypothyroidism 5/9/2013     Macular degeneration 9/18/2013     Sleep apnea     CPAP at night, O2 during naps     Type 2 diabetes, HbA1C goal < 8% (H) 5/9/2013              Past Surgical History:     Past Surgical History:   Procedure Laterality Date     APPENDECTOMY       COLONOSCOPY       COLONOSCOPY N/A 10/27/2014    Procedure: COMBINED COLONOSCOPY, SINGLE OR MULTIPLE BIOPSY/POLYPECTOMY BY BIOPSY;  Surgeon:  Jose Alfredo Morejon MD;  Location:  GI     CV CORONARY ANGIOGRAM N/A 7/15/2022    Procedure: Coronary Angiogram;  Surgeon: Mary Jo Rodriguez MD;  Location:  HEART CARDIAC CATH LAB     CV PCI N/A 7/15/2022    Procedure: Percutaneous Coronary Intervention;  Surgeon: Mary Jo Rodriguez MD;  Location: [facility] CARDIAC CATH LAB     CV TRANSCATHETER AORTIC VALVE REPLACEMENT-FEMORAL APPROACH N/A 8/23/2022    Procedure: Transcatheter Aortic Valve Replacement-Femoral Approach;  Surgeon: Mary Jo Rodriguez MD;  Location:  HEART CARDIAC CATH LAB     ESOPHAGOSCOPY, GASTROSCOPY, DUODENOSCOPY (EGD), COMBINED N/A 9/21/2021    Procedure: ESOPHAGOGASTRODUODENOSCOPY, WITH FINE NEEDLE ASPIRATION BIOPSY, WITH ENDOSCOPIC ULTRASOUND GUIDANCE;  Surgeon: Vera Benitez MD;  Location:  GI     ESOPHAGOSCOPY, GASTROSCOPY, DUODENOSCOPY (EGD), COMBINED N/A 9/21/2021    Procedure: Esophagogastroduodenoscopy, With Biopsy;  Surgeon: Vera Benitez MD;  Location:  GI     ESOPHAGOSCOPY, GASTROSCOPY, DUODENOSCOPY (EGD), COMBINED N/A 10/5/2021    Procedure: ESOPHAGOGASTRODUODENOSCOPY, WITH FINE NEEDLE ASPIRATION BIOPSY, WITH ENDOSCOPIC ULTRASOUND GUIDANCE;  Surgeon: Dixon Olivier MD;  Location:  GI     ESOPHAGOSCOPY, GASTROSCOPY, DUODENOSCOPY (EGD), COMBINED N/A 12/22/2021    Procedure: ESOPHAGOGASTRODUODENOSCOPY, WITH BIOPSY;  Surgeon: Vera Benitez MD;  Location:  GI     HERNIA REPAIR      inguinal x 2     IR CHEST PORT PLACEMENT > 5 YRS OF AGE  12/13/2021     TONSILLECTOMY                Allergies:   Penicillins       Data:   All laboratory data reviewed:    Recent Labs   Lab Test 09/23/22  1157 08/18/22  1604 08/02/22  0948 05/12/22  0804 03/30/22  0809 02/02/22  0836 12/23/21  1117 12/14/21  0900 11/22/21  1132 09/20/21  1727 06/04/20  0000 06/12/18  0834   LDL  --   --   --   --   --   --   --   --  81  --  77 51   HDL  --   --   --   --   --   --   --   --  51  --  47* 41*   NHDL   --   --   --   --   --   --   --   --  108  --  101 73   CHOL  --   --   --   --   --   --   --   --  159  --  148 114   TRIG  --   --   --   --   --   --   --   --  133  --  140 109   TSH 0.13*  --  0.12* 0.45   < >  --   --   --  2.68   < > 0.93 2.00   NTBNP  --  373  --   --   --   --   --   --   --   --   --   --    IRON  --   --   --   --   --  37 14*   < >  --    < >  --   --    FEB  --   --   --   --   --  256 264   < >  --    < >  --   --    IRONSAT  --   --   --   --   --  14* 5*   < >  --    < >  --   --    KHADAR  --   --   --   --   --   --  24  --   --    < >  --   --     < > = values in this interval not displayed.       Lab Results   Component Value Date    WBC 6.0 09/23/2022    WBC 7.6 12/08/2016    RBC 4.12 09/23/2022    RBC 3.62 (L) 12/08/2016    HGB 10.7 (L) 09/23/2022    HGB 10.6 (L) 04/07/2021    HCT 35.8 09/23/2022    HCT 34.3 (L) 12/08/2016    MCV 87 09/23/2022    MCV 95 12/08/2016    MCH 26.0 (L) 09/23/2022    MCH 27.6 12/08/2016    MCHC 29.9 (L) 09/23/2022    MCHC 29.2 (L) 12/08/2016    RDW 14.6 09/23/2022    RDW 14.6 12/08/2016     09/23/2022     12/08/2016       Lab Results   Component Value Date     09/23/2022     08/27/2020    POTASSIUM 4.3 09/23/2022    POTASSIUM 4.1 08/27/2020    CHLORIDE 107 09/23/2022    CHLORIDE 109 08/27/2020    CO2 29 09/23/2022    CO2 31 08/27/2020    ANIONGAP 4 09/23/2022    ANIONGAP 4 08/27/2020     (H) 09/23/2022     (H) 08/27/2020    BUN 36 (H) 09/23/2022    BUN 19 08/27/2020    CR 0.95 09/23/2022    CR 0.98 08/27/2020    GFRESTIMATED 59 (L) 09/23/2022    GFRESTIMATED 55 (L) 06/14/2022    GFRESTIMATED 54 (L) 08/27/2020    GFRESTBLACK 62 08/27/2020    IGOR 8.9 09/23/2022    IGOR 9.6 08/27/2020      Lab Results   Component Value Date    AST 14 08/02/2022    AST 13 08/27/2020    ALT 15 08/02/2022    ALT 15 08/27/2020       Lab Results   Component Value Date    A1C 5.2 08/17/2022    A1C 5.8 (H) 04/07/2021       Lab Results    Component Value Date    INR 1.07 08/18/2022    INR 1.09 07/15/2022

## 2022-09-23 NOTE — LETTER
9/23/2022    Fausto Flynn MD  [address] [facility] [address]    RE: Lucille Rojas       Dear Colleague,     I had the pleasure of seeing Lucille Rojas in the [facility].  Cardiology Clinic Progress Note  Lucille Rojas MRN# [id number]   YOB: 1940 Age: 82 year old     Primary cardiologist: Dr. Lucas     Reason for visit: 1-month TAVR follow-up     History of presenting illness:    Lucille Rojas is a pleasant 82 year old patient with past medical history significant for severe aortic stenosis (NASIR 0.9 cm2, PG 32 mmHg, velocity 3.9 m/s) s/p TAVR with 23 mm ES3 valve on 8/23/22, hypertension, chronic diastolic heart failure, CAD, hyperlipidemia, obstructive sleep apnea, morbid obesity, B-cell lymphoma s/p chemotherapy, chronic anemia, and type 2 diabetes.    In regards to her TAVR procedure, there was no hemodynamic instability or conduction abnormality. Postprocedure echocardiogram demonstrated well-seated bioprosthetic valve with an increased mean gradient of 26 mmHg in the setting of hyperdynamic LV function. Subsequent CTA showed well-seated valve with no evidence of HALT.     Today the patient is here for her 1-month follow-up.  She is accompanied by her  and daughter.  She reports a significant improvement in her energy levels.  She can walk longer distances without feeling short of breath and is less fatigued throughout the day.  She is participating in home physical therapy once a week and is tolerating well.  She denies any chest discomfort, syncope or near syncope, or palpitations.      She was initially hypertensive today, but her blood pressure improved at recheck.    EKG in clinic shows sinus with first-degree AV block.     Labs show creatinine of 0.95, GFR 59, normal electrolytes, and hemoglobin of 10.7.         Assessment and Plan:     ASSESSMENT:    1. Severe aortic stenosis s/p TAVR with 23 mm ES3.  Postprocedure echocardiogram showed mean  gradient of 26 mmHg in the setting of hyperdynamic LV function.  Subsequent CT with no evidence of HALT. Tolerating activity without any symptoms.  On aspirin 81 mg daily for antiplatelet therapy.  2. Chronic diastolic heart failure, NYHA class II.  Appears euvolemic on exam, on Lasix 20 mg daily.  3. Hypertension.  Well-controlled on current regimen.  4. CAD. Pre-TAVR angiogram showed flow-limiting OM1 stenosis, moderate LAD disease, and moderate to severe proximal circumflex disease.  Being medically managed on aspirin, beta-blocker, and statin. No angina.   5. Hyperlipidemia. On statin therapy.   6. SHAVON. Compliant with CPAP.   7. Type 2 diabetes  8. Non-Hodgkin's lymphoma s/p chemotherapy      PLAN:     1. Continue with home cardiac rehab.  2. Continue aspirin 81 mg daily for lifelong antiplatelet therapy.  3. We again discussed the need for antibiotic prophylaxis prior to any dental procedure.  4. Follow-up with with Dr. Rodriguez in 6 months with repeat echo, sooner if needed.            Linsey Benitez, TARA, APRN, CNP  Page: [phone] (8a-5p M-F)    Orders this Visit:  No orders of the defined types were placed in this encounter.    No orders of the defined types were placed in this encounter.    There are no discontinued medications.    Today's clinic visit entailed:  Review of the result(s) of each unique test - CT, EKG, labs  Prescription drug management  30 minutes spent on the date of the encounter doing chart review, review of test results, patient visit, documentation and discussion with family   Provider  Link to [facility] Help Grid     The level of medical decision making during this visit was of moderate complexity.           Review of Systems:     Review of Systems:  Skin:        Eyes:       ENT:       Respiratory:  Negative shortness of breath  Cardiovascular:  palpitations;chest pain;Negative;fatigue;lightheadedness;edema Positive for;edema  Gastroenterology: Negative heartburn  Genitourinary:  not  assessed    Musculoskeletal:  not assessed    Neurologic:  not assessed    Psychiatric:  not assessed    Heme/Lymph/Imm:  not assessed    Endocrine:  not assessed              Physical Exam:   Vitals: /72   Pulse 62   Ht 1.524 m (5')   Wt 108.9 kg (240 lb)   SpO2 96%   BMI 46.87 kg/m    Constitutional:  cooperative morbidly obese      Skin:  warm and dry to the touch        Head:  normocephalic        Eyes:  pupils equal and round        ENT:           Neck:  JVP normal        Chest:  clear to auscultation;normal symmetry        Cardiac: regular rhythm;normal S1 and S2       systolic ejection murmur;grade 1          Abdomen:  abdomen soft obese      Vascular: pulses full and equal                                      Extremities and Back:  no edema        Neurological:  no gross motor deficits;affect appropriate             Medications:     Current Outpatient Medications   Medication Sig Dispense Refill     acetaminophen (TYLENOL) 325 MG tablet Take 2 tablets (650 mg) by mouth every 4 hours as needed for mild pain       aspirin 81 MG tablet Take 1 tablet by mouth daily. 30 tablet 0     carboxymethylcellulose PF (REFRESH PLUS) 0.5 % ophthalmic solution Place 2 drops into both eyes 2 times daily       citalopram (CELEXA) 20 MG tablet Take 1 tablet (20 mg) by mouth daily 90 tablet 1     CONTOUR NEXT TEST test strip USE TO TEST BLOOD GLUCOSE ONCE DAILY       furosemide (LASIX) 20 MG tablet TAKE 1 TABLET BY MOUTH DAILY 30 tablet 10     levothyroxine (SYNTHROID/LEVOTHROID) 175 MCG tablet Take 1 tablet (175 mcg) by mouth daily 90 tablet 1     loratadine (CLARITIN) 10 MG tablet Take 10 mg by mouth daily       LORazepam (ATIVAN) 0.5 MG tablet Take 1 tablet (0.5 mg) by mouth every 8 hours as needed for anxiety or nausea 30 tablet 1     metoprolol succinate ER (TOPROL XL) 25 MG 24 hr tablet Take 1 tablet (25 mg) by mouth daily 30 tablet 0     miconazole (MICATIN) 2 % external powder Apply topically as needed for  itching or other (for skin folds) 90 g 0     Microlet Lancets MISC USE TO TEST BLOOD GLUCOSE ONCE DAILY       nystatin (MYCOSTATIN) 488101 UNIT/GM external powder Apply topically 2 times daily Under breasts and skin folds BID & BID PRN for redness 60 g 1     order for DME Equipment being ordered: wheeled walker with hand breaks 1 Device 0     pantoprazole (PROTONIX) 40 MG EC tablet Take 1 tablet (40 mg) by mouth every morning (before breakfast) 90 tablet 3     simvastatin (ZOCOR) 10 MG tablet Take 1 tablet (10 mg) by mouth At Bedtime 90 tablet 1       No family history on file.    Social History     Socioeconomic History     Marital status:      Spouse name: Not on file     Number of children: Not on file     Years of education: Not on file     Highest education level: Not on file   Occupational History     Not on file   Tobacco Use     Smoking status: Never Smoker     Smokeless tobacco: Never Used   Substance and Sexual Activity     Alcohol use: Not Currently     Drug use: No     Sexual activity: Never   Other Topics Concern     Parent/sibling w/ CABG, MI or angioplasty before 65F 55M? Not Asked   Social History Narrative     Not on file     Social Determinants of Health     Financial Resource Strain: Not on file   Food Insecurity: Not on file   Transportation Needs: Not on file   Physical Activity: Not on file   Stress: Not on file   Social Connections: Not on file   Intimate Partner Violence: Not on file   Housing Stability: Not on file            Past Medical History:     Past Medical History:   Diagnosis Date     Cancer (H) 10/2021     Depressive disorder      Heart disease 10/2021     History of blood transfusion 20/2021     HTN (hypertension) 5/9/2013     Hyperlipidemia LDL goal <130 5/9/2013     Hypothyroidism 5/9/2013     Macular degeneration 9/18/2013     Sleep apnea     CPAP at night, O2 during naps     Type 2 diabetes, HbA1C goal < 8% (H) 5/9/2013              Past Surgical History:     Past  Surgical History:   Procedure Laterality Date     APPENDECTOMY       COLONOSCOPY       COLONOSCOPY N/A 10/27/2014    Procedure: COMBINED COLONOSCOPY, SINGLE OR MULTIPLE BIOPSY/POLYPECTOMY BY BIOPSY;  Surgeon: Jose Alfredo Morejon MD;  Location:  GI     CV CORONARY ANGIOGRAM N/A 7/15/2022    Procedure: Coronary Angiogram;  Surgeon: Mary Jo Rodriguez MD;  Location:  HEART CARDIAC CATH LAB     CV PCI N/A 7/15/2022    Procedure: Percutaneous Coronary Intervention;  Surgeon: Mary Jo Rodriguez MD;  Location: [facility] CARDIAC CATH LAB     CV TRANSCATHETER AORTIC VALVE REPLACEMENT-FEMORAL APPROACH N/A 8/23/2022    Procedure: Transcatheter Aortic Valve Replacement-Femoral Approach;  Surgeon: Mary Jo Rodriguez MD;  Location: [facility] CARDIAC CATH LAB     ESOPHAGOSCOPY, GASTROSCOPY, DUODENOSCOPY (EGD), COMBINED N/A 9/21/2021    Procedure: ESOPHAGOGASTRODUODENOSCOPY, WITH FINE NEEDLE ASPIRATION BIOPSY, WITH ENDOSCOPIC ULTRASOUND GUIDANCE;  Surgeon: Vera Benitez MD;  Location:  GI     ESOPHAGOSCOPY, GASTROSCOPY, DUODENOSCOPY (EGD), COMBINED N/A 9/21/2021    Procedure: Esophagogastroduodenoscopy, With Biopsy;  Surgeon: Vera Benitez MD;  Location:  GI     ESOPHAGOSCOPY, GASTROSCOPY, DUODENOSCOPY (EGD), COMBINED N/A 10/5/2021    Procedure: ESOPHAGOGASTRODUODENOSCOPY, WITH FINE NEEDLE ASPIRATION BIOPSY, WITH ENDOSCOPIC ULTRASOUND GUIDANCE;  Surgeon: Dixon Olivier MD;  Location:  GI     ESOPHAGOSCOPY, GASTROSCOPY, DUODENOSCOPY (EGD), COMBINED N/A 12/22/2021    Procedure: ESOPHAGOGASTRODUODENOSCOPY, WITH BIOPSY;  Surgeon: Vera Benitez MD;  Location:  GI     HERNIA REPAIR      inguinal x 2     IR CHEST PORT PLACEMENT > 5 YRS OF AGE  12/13/2021     TONSILLECTOMY                Allergies:   Penicillins       Data:   All laboratory data reviewed:    Recent Labs   Lab Test 09/23/22  1157 08/18/22  1604 08/02/22  0948 05/12/22  0804 03/30/22  0809 02/02/22  0836  12/23/21  1117 12/14/21  0900 11/22/21  1132 09/20/21  1727 06/04/20  0000 06/12/18  0834   LDL  --   --   --   --   --   --   --   --  81  --  77 51   HDL  --   --   --   --   --   --   --   --  51  --  47* 41*   NHDL  --   --   --   --   --   --   --   --  108  --  101 73   CHOL  --   --   --   --   --   --   --   --  159  --  148 114   TRIG  --   --   --   --   --   --   --   --  133  --  140 109   TSH 0.13*  --  0.12* 0.45   < >  --   --   --  2.68   < > 0.93 2.00   NTBNP  --  373  --   --   --   --   --   --   --   --   --   --    IRON  --   --   --   --   --  37 14*   < >  --    < >  --   --    FEB  --   --   --   --   --  256 264   < >  --    < >  --   --    IRONSAT  --   --   --   --   --  14* 5*   < >  --    < >  --   --    KHADAR  --   --   --   --   --   --  24  --   --    < >  --   --     < > = values in this interval not displayed.       Lab Results   Component Value Date    WBC 6.0 09/23/2022    WBC 7.6 12/08/2016    RBC 4.12 09/23/2022    RBC 3.62 (L) 12/08/2016    HGB 10.7 (L) 09/23/2022    HGB 10.6 (L) 04/07/2021    HCT 35.8 09/23/2022    HCT 34.3 (L) 12/08/2016    MCV 87 09/23/2022    MCV 95 12/08/2016    MCH 26.0 (L) 09/23/2022    MCH 27.6 12/08/2016    MCHC 29.9 (L) 09/23/2022    MCHC 29.2 (L) 12/08/2016    RDW 14.6 09/23/2022    RDW 14.6 12/08/2016     09/23/2022     12/08/2016       Lab Results   Component Value Date     09/23/2022     08/27/2020    POTASSIUM 4.3 09/23/2022    POTASSIUM 4.1 08/27/2020    CHLORIDE 107 09/23/2022    CHLORIDE 109 08/27/2020    CO2 29 09/23/2022    CO2 31 08/27/2020    ANIONGAP 4 09/23/2022    ANIONGAP 4 08/27/2020     (H) 09/23/2022     (H) 08/27/2020    BUN 36 (H) 09/23/2022    BUN 19 08/27/2020    CR 0.95 09/23/2022    CR 0.98 08/27/2020    GFRESTIMATED 59 (L) 09/23/2022    GFRESTIMATED 55 (L) 06/14/2022    GFRESTIMATED 54 (L) 08/27/2020    GFRESTBLACK 62 08/27/2020    IGOR 8.9 09/23/2022    IGOR 9.6 08/27/2020      Lab Results    Component Value Date    AST 14 08/02/2022    AST 13 08/27/2020    ALT 15 08/02/2022    ALT 15 08/27/2020       Lab Results   Component Value Date    A1C 5.2 08/17/2022    A1C 5.8 (H) 04/07/2021       Lab Results   Component Value Date    INR 1.07 08/18/2022    INR 1.09 07/15/2022         KCCQ Results:   1a. 4  1b. 1  1c. 1  2. 5  3. 3  4. 3  5. 5  6. 3  7. 3  8a. 3  8b. 3  8c. 6      Mackenzie Noland, RN  Structural Heart Coordinator  [facility]    Thank you for allowing me to participate in the care of your patient.      Sincerely,     Linsey Benitez CNP     [facility]  cc:   Mary Jo Rodirguez MD  [address] [id number]

## 2022-09-23 NOTE — PROGRESS NOTES
KCCQ Results:   1a. 4  1b. 1  1c. 1  2. 5  3. 3  4. 3  5. 5  6. 3  7. 3  8a. 3  8b. 3  8c. 6      Mackenzie Noland RN  Structural Heart Coordinator  [facility]

## 2022-09-23 NOTE — TELEPHONE ENCOUNTER
----- Message from Fausto Flynn MD sent at 9/23/2022  1:47 PM CDT -----  Please inform patient needs f/u appt with me to discuss need to alter thyroid dose.  Can be done virtually

## 2022-09-23 NOTE — PATIENT INSTRUCTIONS
Today's Plan:     1) Follow-up with Dr. Rodriguez with repeat echocardiogram.     Mackenzie RN ([phone]), Holli RN ([phone]), and Pura ANN ([phone])    Scheduling phone number: [phone]    Reminder: Please bring in all current medications, over the counter supplements and vitamin bottles to your next appointment.-    It was a pleasure seeing you today!     Linsey Benitez, JOSÉ LUIS  9/23/2022    -

## 2022-09-26 ENCOUNTER — MEDICAL CORRESPONDENCE (OUTPATIENT)
Dept: HEALTH INFORMATION MANAGEMENT | Facility: CLINIC | Age: 82
End: 2022-09-26

## 2022-09-26 NOTE — TELEPHONE ENCOUNTER
On call back, please relay providers message, cancel 10/5 appt, and schedule a non-fasting lab-only appointment for early November to recheck thyroid.    Patient Contact    Attempt # 1    Was call answered?  No.  Left message to call clinic back.

## 2022-09-26 NOTE — TELEPHONE ENCOUNTER
Would suggest that she needs to be on the adjusted dose for about 8 weeks before we recheck her thyroid function panel.

## 2022-09-27 NOTE — TELEPHONE ENCOUNTER
Relayed providers message to patient.   10/5 appt cancelled  Labs to be collected at 11/14 lab appointment   Results to be discussed at 11/23 OV appt

## 2022-10-03 ENCOUNTER — OFFICE VISIT (OUTPATIENT)
Dept: FAMILY MEDICINE | Facility: CLINIC | Age: 82
End: 2022-10-03
Payer: MEDICARE

## 2022-10-03 VITALS
SYSTOLIC BLOOD PRESSURE: 115 MMHG | BODY MASS INDEX: 46.57 KG/M2 | DIASTOLIC BLOOD PRESSURE: 61 MMHG | TEMPERATURE: 97.7 F | OXYGEN SATURATION: 93 % | HEIGHT: 60 IN | WEIGHT: 237.2 LBS | HEART RATE: 70 BPM

## 2022-10-03 DIAGNOSIS — I12.9 TYPE 2 DM WITH CKD STAGE 3 AND HYPERTENSION (H): ICD-10-CM

## 2022-10-03 DIAGNOSIS — F33.0 MDD (MAJOR DEPRESSIVE DISORDER), RECURRENT EPISODE, MILD (H): ICD-10-CM

## 2022-10-03 DIAGNOSIS — E11.22 TYPE 2 DM WITH CKD STAGE 3 AND HYPERTENSION (H): ICD-10-CM

## 2022-10-03 DIAGNOSIS — N18.30 TYPE 2 DM WITH CKD STAGE 3 AND HYPERTENSION (H): ICD-10-CM

## 2022-10-03 DIAGNOSIS — Z01.818 PREOP GENERAL PHYSICAL EXAM: Primary | ICD-10-CM

## 2022-10-03 DIAGNOSIS — C85.12 B-CELL LYMPHOMA OF INTRATHORACIC LYMPH NODES, UNSPECIFIED B-CELL LYMPHOMA TYPE (H): ICD-10-CM

## 2022-10-03 PROCEDURE — 99214 OFFICE O/P EST MOD 30 MIN: CPT | Performed by: PHYSICIAN ASSISTANT

## 2022-10-03 ASSESSMENT — PATIENT HEALTH QUESTIONNAIRE - PHQ9
SUM OF ALL RESPONSES TO PHQ QUESTIONS 1-9: 6
10. IF YOU CHECKED OFF ANY PROBLEMS, HOW DIFFICULT HAVE THESE PROBLEMS MADE IT FOR YOU TO DO YOUR WORK, TAKE CARE OF THINGS AT HOME, OR GET ALONG WITH OTHER PEOPLE: SOMEWHAT DIFFICULT
SUM OF ALL RESPONSES TO PHQ QUESTIONS 1-9: 6

## 2022-10-03 ASSESSMENT — PAIN SCALES - GENERAL: PAINLEVEL: EXTREME PAIN (8)

## 2022-10-03 NOTE — PATIENT INSTRUCTIONS
aspirin: Discontinue aspirin 7-10 days prior to procedure to reduce bleeding risk. It should be resumed postoperatively.  Preparing for Your Surgery  Getting started  A nurse will call you to review your health history and instructions. They will give you an arrival time based on your scheduled surgery time. Please be ready to share:  Your doctor's clinic name and phone number  Your medical, surgical and anesthesia history  A list of allergies and sensitivities  A list of medicines, including herbal treatments and over-the-counter drugs  Whether the patient has a legal guardian (ask how to send us the papers in advance)  Please tell us if you're pregnant--or if there's any chance you might be pregnant. Some surgeries may injure a fetus (unborn baby), so they require a pregnancy test. Surgeries that are safe for a fetus don't always need a test, and you can choose whether to have one.   If you have a child who's having surgery, please ask for a copy of Preparing for Your Child's Surgery.    Preparing for surgery  Within 10 to 30 days of surgery: Have a pre-op exam (sometimes called an H&P, or History and Physical). This can be done at a clinic or pre-operative center.  If you're having a , you may not need this exam. Talk to your care team.  At your pre-op exam, talk to your care team about all medicines you take. If you need to stop any medicines before surgery, ask when to start taking them again.  We do this for your safety. Many medicines can make you bleed too much during surgery. Some change how well surgery (anesthesia) drugs work.  Call your insurance company to let them know you're having surgery. (If you don't have insurance, call [phone].)  Call your clinic if there's any change in your health. This includes signs of a cold or flu (sore throat, runny nose, cough, rash, fever). It also includes a scrape or scratch near the surgery site.  If you have questions on the day of surgery, call your  hospital or surgery center.  COVID testing  You may need to be tested for COVID-19 before having surgery. If so, we will give you instructions (or click here).  Eating and drinking guidelines  For your safety: Unless your surgeon tells you otherwise, follow the guidelines below.  Eat and drink as usual until 8 hours before surgery. After that, no food or milk.  Drink clear liquids until 2 hours before surgery. These are liquids you can see through, like water, Gatorade and Propel Water. You may also have black coffee and tea (no cream or milk).  Nothing by mouth within 2 hours of surgery. This includes gum, candy and breath mints.  If you drink alcohol: Stop drinking it the night before surgery.  If your care team tells you to take medicine on the morning of surgery, it's okay to take it with a sip of water.  Preventing infection  Shower or bathe the night before and morning of your surgery. Follow the instructions your clinic gave you. (If no instructions, use regular soap.)  Don't shave or clip hair near your surgery site. We'll remove the hair if needed.  Don't smoke or vape the morning of surgery. You may chew nicotine gum up to 2 hours before surgery. A nicotine patch is okay.  Note: Some surgeries require you to completely quit smoking and nicotine. Check with your surgeon.  Your care team will make every effort to keep you safe from infection. We will:  Clean our hands often with soap and water (or an alcohol-based hand rub).  Clean the skin at your surgery site with a special soap that kills germs.  Give you a special gown to keep you warm. (Cold raises the risk of infection.)  Wear special hair covers, masks, gowns and gloves during surgery.  Give antibiotic medicine, if prescribed. Not all surgeries need antibiotics.  What to bring on the day of surgery  Photo ID and insurance card  Copy of your health care directive, if you have one  Glasses and hearing aides (bring cases)  You can't wear contacts during  surgery  Inhaler and eye drops, if you use them (tell us about these when you arrive)  CPAP machine or breathing device, if you use them  A few personal items, if spending the night  If you have . . .  A pacemaker, ICD (cardiac defibrillator) or other implant: Bring the ID card.  An implanted stimulator: Bring the remote control.  A legal guardian: Bring a copy of the certified (court-stamped) guardianship papers.  Please remove any jewelry, including body piercings. Leave jewelry and other valuables at home.  If you're going home the day of surgery  You must have a responsible adult drive you home. They should stay with you overnight as well.  If you don't have someone to stay with you, and you aren't safe to go home alone, we may keep you overnight. Insurance often won't pay for this.  After surgery  If it's hard to control your pain or you need more pain medicine, please call your surgeon's office.  Questions?   If you have any questions for your care team, list them here: _________________________________________________________________________________________________________________________________________________________________________ ____________________________________ ____________________________________ ____________________________________  For informational purposes only. Not to replace the advice of your health care provider. Copyright   2003, 2019 [facility]. All rights reserved. Clinically reviewed by Bridgett Thompson MD. [employer] 388663 - REV 07/22.

## 2022-10-03 NOTE — PROGRESS NOTES
[address] [address]  Phone: [phone]  Primary Provider: Fausto Flynn  Pre-op Performing Provider: JULIEN ORNELAS      PREOPERATIVE EVALUATION:  Today's date: 10/3/2022    Lucille Rojas is a 82 year old female who presents for a preoperative evaluation.    Surgical Information:  Surgery/Procedure: REMOVAL OF central  PORT ON RIGHT chest  Surgery Location: [facility]  Surgeon:Ronny determined  Surgery Date: 10/07/2022  Time of Surgery: 8:30 Am  Where patient plans to recover: At home with family  Fax number for surgical facility: available in Epic    Type of Anesthesia Anticipated: to be determined    Assessment & Plan     The proposed surgical procedure is considered LOW risk.    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 Port removal lower risk than  recent valve replacement.    Diabetes:  - Patient is not on insulin therapy: regular NPO guidelines can be followed.   Obstructive Sleep Apnea:   Advised to bring Bipap with her to procedure  Anemia/Bleeding/Clotting:    - Anemia but good for patient on recent testing and does not require treatment prior to low blood loss surgery.       Medication Instructions:  Patient is to take all scheduled medications on the day of surgery EXCEPT for modifications listed below:   - aspirin: Discontinue aspirin 7-10 days prior to procedure to reduce bleeding risk. It should be  resumed postoperatively.    - Beta Blockers: Continue taking on the day of surgery.   - Diuretics: May continue due to heart failure.    RECOMMENDATION:  APPROVAL GIVEN to proceed with proposed procedure, without further diagnostic evaluation.    Review of prior external note(s) from - cardiology      29 minutes spent on the date of the encounter doing chart review, history and exam, documentation and further activities per the note        Subjective     HPI related to upcoming procedure: Last year had central port placed for chemo access for lymphoma tx, now  No longer necessary and ready for removal.        Patient hx MDD on citalopram, diet controlled DM and HTN,     Preop Questions 10/3/2022   1. Have you ever had a heart attack or stroke? No   2. Have you ever had surgery on your heart or blood vessels, such as a stent placement, a coronary artery bypass, or surgery on an artery in your head, neck, heart, or legs? YES - had recent TVAR, process included echos, CTA angiogram, cardiac cath, and EKG, all within past 2 months.  Generally feeling more active since replacement.    3. Do you have chest pain with activity? No   4. Do you have a history of  heart failure? No   5. Do you currently have a cold, bronchitis or symptoms of other infection? No   6. Do you have a cough, shortness of breath, or wheezing? No   7. Do you or anyone in your family have previous history of blood clots? No   8. Do you or does anyone in your family have a serious bleeding problem such as prolonged bleeding following surgeries or cuts? No   9. Have you ever had problems with anemia or been told to take iron pills? YES -hgb 10.7 on 9/23   10. Have you had any abnormal blood loss such as black, tarry or bloody stools, or abnormal vaginal bleeding? No   11. Have you ever had a blood transfusion? YES - last fall possible right around chemo   11a. Have you ever had a transfusion reaction? No   12. Are you willing to have a blood transfusion  if it is medically needed before, during, or after your surgery? Yes   13. Have you or any of your relatives ever had problems with anesthesia? No   14. Do you have sleep apnea, excessive snoring or daytime drowsiness? YES - hx SHAVON - uses Bipap and home 02 as needed    14a. Do you have a CPAP machine? Yes   15. Do you have any artifical heart valves or other implanted medical devices like a pacemaker, defibrillator, or continuous glucose monitor? YES - aortic valve as above   15a. What type of device do you have? Heart valve   15b. Name of the clinic that manages your device:  [facility]   16. Do you have artificial joints? No   17. Are you allergic to latex? No       Health Care Directive:  Patient has a Health Care Directive on file      Preoperative Review of :   reviewed - controlled substances reflected in medication list.          Review of Systems  Constitutional, neuro, ENT, endocrine, pulmonary, cardiac, gastrointestinal, genitourinary, musculoskeletal, integument and psychiatric systems are negative, except as otherwise noted.    Patient Active Problem List    Diagnosis Date Noted     Aortic stenosis, severe 08/23/2022     Priority: Medium     Status post coronary angiogram 07/15/2022     Priority: Medium     Hypoxia 12/21/2021     Priority: Medium     Generalized muscle weakness 12/21/2021     Priority: Medium     Symptomatic anemia 12/21/2021     Priority: Medium     Encounter for other specified aftercare 12/10/2021     Priority: Medium     Non-Hodgkin's lymphoma (H) 11/28/2021     Priority: Medium     Callus of foot 10/28/2021     Priority: Medium     Severe obesity (BMI >= 40) (H) 10/19/2021     Priority: Medium     Severe obesity (BMI 35.0-39.9) with comorbidity (H) 10/11/2021     Priority: Medium     Peripheral vision loss, unspecified laterality 10/11/2021     Priority: Medium     Type 2 DM with CKD stage 3 and hypertension (H) 10/11/2021     Priority: Medium     Acute on chronic blood  loss anemia 10/11/2021     Priority: Medium     B-cell lymphoma of intrathoracic lymph nodes, unspecified B-cell lymphoma type (H) 10/11/2021     Priority: Medium     SHAVON (obstructive sleep apnea) 10/11/2021     Priority: Medium     Pancreatic mass 10/11/2021     Priority: Medium     Nonrheumatic aortic (valve) stenosis with insufficiency 10/11/2021     Priority: Medium     Diastolic heart failure, unspecified HF chronicity (H) 10/11/2021     Priority: Medium     Hypothyroidism, unspecified type 10/11/2021     Priority: Medium     Major depressive disorder with current active episode, unspecified depression episode severity, unspecified whether recurrent 10/11/2021     Priority: Medium     Physical deconditioning 10/11/2021     Priority: Medium     Severe anemia 09/20/2021     Priority: Medium     MDD (major depressive disorder), recurrent episode, mild (H) 03/21/2018     Priority: Medium     Anemia, unspecified type 01/12/2017     Priority: Medium     Type 2 diabetes mellitus with stage 3 chronic kidney disease, without long-term current use of insulin (H) 12/08/2016     Priority: Medium     Gastroesophageal reflux disease without esophagitis 08/01/2016     Priority: Medium     Acquired hypothyroidism 09/24/2015     Priority: Medium     Benign essential hypertension 09/24/2015     Priority: Medium     CKD (chronic kidney disease) stage 3, GFR 30-59 ml/min (H) 11/20/2014     Priority: Medium     Apnea 10/21/2014     Priority: Medium     Morbid obesity due to excess calories (H) 10/21/2014     Priority: Medium     Macular degeneration 09/18/2013     Priority: Medium     Hyperlipidemia LDL goal <100 05/09/2013     Priority: Medium     Diverticular disease of colon 12/08/2008     Priority: Medium      Past Medical History:   Diagnosis Date     Cancer (H) 10/2021     Depressive disorder      Heart disease 10/2021     History of blood transfusion 20/2021     HTN (hypertension) 5/9/2013     Hyperlipidemia LDL goal <130  5/9/2013     Hypothyroidism 5/9/2013     Macular degeneration 9/18/2013     Sleep apnea     CPAP at night, O2 during naps     Type 2 diabetes, HbA1C goal < 8% (H) 5/9/2013     Past Surgical History:   Procedure Laterality Date     APPENDECTOMY       COLONOSCOPY       COLONOSCOPY N/A 10/27/2014    Procedure: COMBINED COLONOSCOPY, SINGLE OR MULTIPLE BIOPSY/POLYPECTOMY BY BIOPSY;  Surgeon: Jose Alfredo Morejon MD;  Location:  GI     CV CORONARY ANGIOGRAM N/A 7/15/2022    Procedure: Coronary Angiogram;  Surgeon: Mary Jo Rodriguez MD;  Location:  HEART CARDIAC CATH LAB     CV PCI N/A 7/15/2022    Procedure: Percutaneous Coronary Intervention;  Surgeon: Mary Jo Rodriguez MD;  Location: [facility] CARDIAC CATH LAB     CV TRANSCATHETER AORTIC VALVE REPLACEMENT-FEMORAL APPROACH N/A 8/23/2022    Procedure: Transcatheter Aortic Valve Replacement-Femoral Approach;  Surgeon: Mary Jo Rodriguez MD;  Location:  HEART CARDIAC CATH LAB     ESOPHAGOSCOPY, GASTROSCOPY, DUODENOSCOPY (EGD), COMBINED N/A 9/21/2021    Procedure: ESOPHAGOGASTRODUODENOSCOPY, WITH FINE NEEDLE ASPIRATION BIOPSY, WITH ENDOSCOPIC ULTRASOUND GUIDANCE;  Surgeon: Vera Benitez MD;  Location:  GI     ESOPHAGOSCOPY, GASTROSCOPY, DUODENOSCOPY (EGD), COMBINED N/A 9/21/2021    Procedure: Esophagogastroduodenoscopy, With Biopsy;  Surgeon: Vera Benitez MD;  Location:  GI     ESOPHAGOSCOPY, GASTROSCOPY, DUODENOSCOPY (EGD), COMBINED N/A 10/5/2021    Procedure: ESOPHAGOGASTRODUODENOSCOPY, WITH FINE NEEDLE ASPIRATION BIOPSY, WITH ENDOSCOPIC ULTRASOUND GUIDANCE;  Surgeon: Dixon Olivier MD;  Location:  GI     ESOPHAGOSCOPY, GASTROSCOPY, DUODENOSCOPY (EGD), COMBINED N/A 12/22/2021    Procedure: ESOPHAGOGASTRODUODENOSCOPY, WITH BIOPSY;  Surgeon: Vera Benitez MD;  Location:  GI     HERNIA REPAIR      inguinal x 2     IR CHEST PORT PLACEMENT > 5 YRS OF AGE  12/13/2021     TONSILLECTOMY       Current Outpatient  Medications   Medication Sig Dispense Refill     acetaminophen (TYLENOL) 325 MG tablet Take 2 tablets (650 mg) by mouth every 4 hours as needed for mild pain       aspirin 81 MG tablet Take 1 tablet by mouth daily. 30 tablet 0     carboxymethylcellulose PF (REFRESH PLUS) 0.5 % ophthalmic solution Place 2 drops into both eyes 2 times daily       citalopram (CELEXA) 20 MG tablet Take 1 tablet (20 mg) by mouth daily 90 tablet 1     CONTOUR NEXT TEST test strip USE TO TEST BLOOD GLUCOSE ONCE DAILY       furosemide (LASIX) 20 MG tablet TAKE 1 TABLET BY MOUTH DAILY 30 tablet 10     levothyroxine (SYNTHROID/LEVOTHROID) 175 MCG tablet Take 1 tablet (175 mcg) by mouth daily 90 tablet 1     loratadine (CLARITIN) 10 MG tablet Take 10 mg by mouth daily       LORazepam (ATIVAN) 0.5 MG tablet Take 1 tablet (0.5 mg) by mouth every 8 hours as needed for anxiety or nausea 30 tablet 1     metoprolol succinate ER (TOPROL XL) 25 MG 24 hr tablet Take 1 tablet (25 mg) by mouth daily 30 tablet 0     miconazole (MICATIN) 2 % external powder Apply topically as needed for itching or other (for skin folds) 90 g 0     Microlet Lancets MISC USE TO TEST BLOOD GLUCOSE ONCE DAILY       nystatin (MYCOSTATIN) 438086 UNIT/GM external powder Apply topically 2 times daily Under breasts and skin folds BID & BID PRN for redness 60 g 1     order for DME Equipment being ordered: wheeled walker with hand breaks 1 Device 0     pantoprazole (PROTONIX) 40 MG EC tablet Take 1 tablet (40 mg) by mouth every morning (before breakfast) 90 tablet 3     simvastatin (ZOCOR) 10 MG tablet Take 1 tablet (10 mg) by mouth At Bedtime 90 tablet 1       Allergies   Allergen Reactions     Penicillins         Social History     Tobacco Use     Smoking status: Never Smoker     Smokeless tobacco: Never Used   Substance Use Topics     Alcohol use: Not Currently       History   Drug Use No         Objective     /61 (BP Location: Left arm, Patient Position: Sitting, Cuff Size:  Adult Large)   Pulse 70   Temp 97.7  F (36.5  C) (Tympanic)   Ht 1.524 m (5')   Wt 107.6 kg (237 lb 3.2 oz)   SpO2 93%   BMI 46.32 kg/m      Physical Exam    GENERAL APPEARANCE: healthy, alert and no distress     EYES: EOMI, PERRL     HENT: ear canals and TM's normal and nose and mouth without ulcers or lesions     NECK: no adenopathy, no asymmetry, masses, or scars       RESP: lungs clear to auscultation - no rales, rhonchi or wheezes     CV: regular rates and rhythm, normal faint 1/6  early systolic wooshing murmur, no, click or rub     ABDOMEN:  soft, nontender, no HSM or masses and bowel sounds normal     MS: extremities normal- no gross deformities noted, no evidence of inflammation in joints, FROM in all extremities.     SKIN: no suspicious lesions or rashes     NEURO: Normal strength and tone, sensory exam grossly normal, mentation intact and speech normal     PSYCH: mentation appears normal. and affect normal/bright     LYMPHATICS: No cervical adenopathy    Recent Labs   Lab Test 09/23/22  1157 08/25/22  0607 08/23/22  1133 08/18/22  1604 08/17/22  1030 07/26/22  1234 07/15/22  0834 12/14/21  0900 11/22/21  1132   HGB 10.7* 9.0*   < > 10.1*  --    < > 8.9*   < >  --     221   < > 253  --    < > 268   < >  --    INR  --   --   --  1.07  --   --  1.09   < >  --     139   < > 139  --    < > 141   < >  --    POTASSIUM 4.3 4.3   < > 4.4  --    < > 3.9   < >  --    CR 0.95 0.98   < > 0.89  --    < > 1.02   < >  --    A1C  --   --   --   --  5.2  --   --   --  5.0    < > = values in this interval not displayed.        Diagnostics:  No labs were ordered during this visit.   No EKG this visit, completed in the last 90 days.    Revised Cardiac Risk Index (RCRI):  The patient has the following serious cardiovascular risks for perioperative complications:   - No serious cardiac risks = 0 points     RCRI Interpretation: 0 points: Class I (very low risk - 0.4% complication rate)           Signed  Electronically by: DONAVAN Jaime  Reviewed and agree with assessment and plan above. Anson Domínguez MD    Copy of this evaluation report is provided to requesting physician.

## 2022-10-06 ENCOUNTER — TELEPHONE (OUTPATIENT)
Dept: INTERVENTIONAL RADIOLOGY/VASCULAR | Facility: CLINIC | Age: 82
End: 2022-10-06

## 2022-10-06 DIAGNOSIS — I35.0 AORTIC STENOSIS, SEVERE: ICD-10-CM

## 2022-10-06 RX ORDER — METOPROLOL SUCCINATE 25 MG/1
25 TABLET, EXTENDED RELEASE ORAL DAILY
Qty: 90 TABLET | Refills: 3 | Status: SHIPPED | OUTPATIENT
Start: 2022-10-06 | End: 2023-08-18

## 2022-10-07 ENCOUNTER — APPOINTMENT (OUTPATIENT)
Dept: INTERVENTIONAL RADIOLOGY/VASCULAR | Facility: CLINIC | Age: 82
End: 2022-10-07
Attending: INTERNAL MEDICINE
Payer: MEDICARE

## 2022-10-07 ENCOUNTER — HOSPITAL ENCOUNTER (OUTPATIENT)
Facility: CLINIC | Age: 82
Discharge: HOME | End: 2022-10-07
Admitting: INTERNAL MEDICINE
Payer: MEDICARE

## 2022-10-07 VITALS
HEART RATE: 69 BPM | OXYGEN SATURATION: 97 % | RESPIRATION RATE: 18 BRPM | DIASTOLIC BLOOD PRESSURE: 53 MMHG | SYSTOLIC BLOOD PRESSURE: 133 MMHG

## 2022-10-07 PROCEDURE — 250N000011 HC RX IP 250 OP 636: Performed by: NURSE PRACTITIONER

## 2022-10-07 PROCEDURE — 99152 MOD SED SAME PHYS/QHP 5/>YRS: CPT

## 2022-10-07 PROCEDURE — 36590 REMOVAL TUNNELED CV CATH: CPT

## 2022-10-07 PROCEDURE — 999N000163 HC STATISTIC SIMPLE TUBE INSERTION/CHARGE, PORT, CATH, FISTULOGRAM

## 2022-10-07 PROCEDURE — 272N000602 HC WOUND GLUE CR1

## 2022-10-07 PROCEDURE — 250N000009 HC RX 250: Performed by: NURSE PRACTITIONER

## 2022-10-07 RX ORDER — NALOXONE HYDROCHLORIDE 0.4 MG/ML
0.2 INJECTION, SOLUTION INTRAMUSCULAR; INTRAVENOUS; SUBCUTANEOUS
Status: DISCONTINUED | OUTPATIENT
Start: 2022-10-07 | End: 2022-10-07 | Stop reason: HOSPADM

## 2022-10-07 RX ORDER — FENTANYL CITRATE 50 UG/ML
25-50 INJECTION, SOLUTION INTRAMUSCULAR; INTRAVENOUS EVERY 5 MIN PRN
Status: DISCONTINUED | OUTPATIENT
Start: 2022-10-07 | End: 2022-10-07 | Stop reason: HOSPADM

## 2022-10-07 RX ORDER — ACETAMINOPHEN 325 MG/1
650 TABLET ORAL
Status: DISCONTINUED | OUTPATIENT
Start: 2022-10-07 | End: 2022-10-07 | Stop reason: HOSPADM

## 2022-10-07 RX ORDER — FLUMAZENIL 0.1 MG/ML
0.2 INJECTION, SOLUTION INTRAVENOUS
Status: DISCONTINUED | OUTPATIENT
Start: 2022-10-07 | End: 2022-10-07 | Stop reason: HOSPADM

## 2022-10-07 RX ORDER — NALOXONE HYDROCHLORIDE 0.4 MG/ML
0.4 INJECTION, SOLUTION INTRAMUSCULAR; INTRAVENOUS; SUBCUTANEOUS
Status: DISCONTINUED | OUTPATIENT
Start: 2022-10-07 | End: 2022-10-07 | Stop reason: HOSPADM

## 2022-10-07 RX ADMIN — LIDOCAINE HYDROCHLORIDE 8 ML: 10 INJECTION, SOLUTION INFILTRATION; PERINEURAL at 10:25

## 2022-10-07 RX ADMIN — FENTANYL CITRATE 25 MCG: 50 INJECTION, SOLUTION INTRAMUSCULAR; INTRAVENOUS at 10:19

## 2022-10-07 RX ADMIN — MIDAZOLAM HYDROCHLORIDE 0.5 MG: 1 INJECTION, SOLUTION INTRAMUSCULAR; INTRAVENOUS at 10:19

## 2022-10-07 ASSESSMENT — ACTIVITIES OF DAILY LIVING (ADL)
ADLS_ACUITY_SCORE: 35

## 2022-10-07 NOTE — PROGRESS NOTES
Care Suites Admission Nursing Note    Patient Information  Name: Lucille Rojas  Age: 82 year old  Reason for admission: port removal  Care Suites arrival time: 0900    Visitor Information  Name: birgit Rajput  Informed of visitor restrictions: Yes  1 visitor allowed per patient   Visitor must screen negative for COVID symptoms   Visitor must wear a mask  Waiting rooms closed to visitors    Patient Admission/Assessment   Pre-procedure assessment complete: Yes  If abnormal assessment/labs, provider notified: N/A  NPO: Yes  Medications held per instructions/orders: Yes  Consent: obtained  If applicable, pregnancy test status: deferred  Patient oriented to room: Yes  Education/questions answered: Yes  Plan/other: scheduled for 1000    Discharge Planning  Discharge name/phone number: birgit Rajput [phone]  Overnight post sedation caregiver: birgit Rajput  Discharge location: [place]    Nazanin Grimes RN

## 2022-10-07 NOTE — DISCHARGE INSTRUCTIONS
Port Removal Discharge Instructions     After you go home:    Have an adult stay with you for the first 6 hours  You may resume your normal diet       For 24 hours - due to the sedation you received:  Relax and take it easy  Do NOT make any important or legal decisions  Do NOT drive or operate machines at home or at work  Do NOT drink alcohol    Care of Puncture Site:    Keep the dressings on your site clean & dry for 3 days. Change it only if it gets wet or dirty.  You may shower after the dressing comes off in 3 days  Do not remove the small white strips of tape, if present. Allow them to fall off on their own.   You may cover the wound with a bandaid after the dressing is removed if needed for comfort      Activity     Avoid heavy lifting (greater than 10 pounds) or the overuse of your shoulder for 3 days    Bleeding:    If you start bleeding from the incision site in your chest or have swelling in your neck, sit down and press on the site for 5-10 minutes.   If bleeding has not stopped after 10 minutes, call your provider.        Call 911 right away if you have heavy bleeding or bleeding that does not stop.      Medicines:    You may resume all medications  Resume your Platelet Inhibitors and Aspirin tomorrow at your regular dose  For minor pain, you may take Acetaminophen (Tylenol) or Ibuprofen (Advil)          Call the provider who ordered this procedure if:    You have swelling in your neck or over your port site  The incision area is red, swollen, hot or tender  You have chills or a fever greater than 101 F (38 C)  Any questions or concerns    Call  911 or go to the Emergency Room if you have:    Severe chest pain or trouble breathing  Bleeding that you cannot control    If you have questions call:          [facility] Radiology Dept @ [phone]    The provider who performed your procedure was _________________.

## 2022-10-07 NOTE — PRE-PROCEDURE
GENERAL PRE-PROCEDURE:   Procedure:  Port removal with moderate sedation  Date/Time:  10/7/2022 9:31 AM    Written consent obtained?: Yes    Risks and benefits: Risks, benefits and alternatives were discussed    Consent given by:  Patient  Patient states understanding of procedure being performed: Yes    Patient's understanding of procedure matches consent: Yes    Procedure consent matches procedure scheduled: Yes    Expected level of sedation:  Moderate  Appropriately NPO:  Yes  ASA Class:  2  Mallampati  :  Grade 3- soft palate visible, posterior pharyngeal wall not visible  Lungs:  Lungs clear with good breath sounds bilaterally  Heart:  Normal heart sounds and rate  History & Physical reviewed:  History and physical reviewed and no updates needed  Statement of review:  I have reviewed the lab findings, diagnostic data, medications, and the plan for sedation

## 2022-10-07 NOTE — IR NOTE
Interventional Radiology Intra-procedural Nursing Note    Patient Name: Lucille Rojas  Medical Record Number: [id number]  Today's Date: October 7, 2022    Procedure: Port removal with moderate sedation  Start time: 1024  End time: 1037  Report provided to: LILLIAN RN  Patient depart time and location: 1040  to CS10    Note: Patient entered Interventional Radiology Suite number 2 via cart. Patient awake, alert and orientated. Assisted supine position. Prepped and draped.   in room. Time out and procedure started. Vital signs stable. Telemetry reading SR.    Procedure well tolerated by patient without complications. Procedure end with debrief by Dr. Moore  Pressure held until hemostasis achieved. Derma bond  gauze and tegaderm dresssing applied to chest wall    Administered medication totals:  Lidocaine 1% 8 mL Intradermal  Versed 0.5 mg IVP  Fentanyl 25mcg IVP    Last dose of sedation administered at 1019.

## 2022-10-10 ENCOUNTER — HEALTH MAINTENANCE LETTER (OUTPATIENT)
Age: 82
End: 2022-10-10

## 2022-10-13 ENCOUNTER — PATIENT OUTREACH (OUTPATIENT)
Dept: CARE COORDINATION | Facility: CLINIC | Age: 82
End: 2022-10-13

## 2022-10-13 DIAGNOSIS — F33.0 MDD (MAJOR DEPRESSIVE DISORDER), RECURRENT EPISODE, MILD (H): ICD-10-CM

## 2022-10-13 NOTE — PROGRESS NOTES
Clinic Care Coordination Contact    Community Health Worker Follow Up    Care Gaps:     Health Maintenance Due   Topic Date Due     ZOSTER IMMUNIZATION (1 of 2) Never done     DTAP/TDAP/TD IMMUNIZATION (2 - Td or Tdap) 01/01/2019     EYE EXAM  06/01/2021     COVID-19 Vaccine (4 - Booster for Pfizer series) 12/28/2021     INFLUENZA VACCINE (1) 09/01/2022       Care Plan: On Maintenance.      Intervention and Education during outreach:     CHW provided the following transportation resources:  [employer] [phone]   Human services at [place] [phone]   [employer] [phone]   [employer] [phone]       CHW encouraged patient to contact CC if there are any other changes or resources needed.  Patient acknowledged understanding.        Discussed:    Patient stated she had an appointment today.  On her way back home, she stopped at [employer] in [place] to bring some of her items to sell in the [facility].  Patient stated she is excited about being able to bring her things there, and [employer] will sell the items for her for a small fee.     Patient requested a call next month.      CHW Plan: will follow up next month for new goal or graduate.    ZANE Demarco  Clinic Care Coordination  [facility]: [facility], [facility], [facility], [facility]  Phone: [phone]

## 2022-10-17 RX ORDER — CITALOPRAM HYDROBROMIDE 20 MG/1
TABLET ORAL
Qty: 30 TABLET | Refills: 10 | Status: SHIPPED | OUTPATIENT
Start: 2022-10-17 | End: 2023-09-11

## 2022-10-17 NOTE — TELEPHONE ENCOUNTER
Routing refill request to provider for review/approval because:  PHQ9 out of range  PHQ-9 score:    PHQ 10/3/2022   PHQ-9 Total Score 6   Q9: Thoughts of better off dead/self-harm past 2 weeks Not at all   F/U: Thoughts of suicide or self-harm -   F/U: Safety concerns -             Lea Tubbs RN

## 2022-10-19 ENCOUNTER — TELEPHONE (OUTPATIENT)
Dept: CARDIOLOGY | Facility: CLINIC | Age: 82
End: 2022-10-19

## 2022-10-19 ENCOUNTER — PATIENT OUTREACH (OUTPATIENT)
Dept: CARE COORDINATION | Facility: CLINIC | Age: 82
End: 2022-10-19

## 2022-10-19 NOTE — TELEPHONE ENCOUNTER
Received voicemail from Jaimie with [facility]. She states that she saw the patient today and the patient still did not have her metoprolol filled. [facility] and [facility] received the metoprolol rx sent on 10/6/22 but did not fill it for some reason. She states that this medication will be shipped overnight to patient. She notes that her BP was 130/78 today. Patient will plan to resume metoprolol tomorrow once received.

## 2022-10-19 NOTE — PROGRESS NOTES
Care Coordination Clinician Chart Review     Situation: Patient chart reviewed by care coordinator.?     Background: Initial assessment and enrollment to Care Coordination was 12/3/19.?? Care plan(s) with patient-centered goal(s) were developed with participation from patient.? Lead CC handed patient off to CHW for continued outreach every 30 days.??     Assessment: Per chart review, patient outreach completed by CC CHW on 10/13/22.? Patient is not actively working to accomplish goal(s).? Patient's goal(s) remain(s) appropriate at this time.? Patient is not due for updated Plan of Care.? Annual assessment will be due 12/3/22.     Plan/Recommendations: The patient will continue working with Care Coordination to achieve above goal(s).? CHW will involve Lead CC as needed or if patient is ready to move to maintenance.? Lead CC will continue to monitor CHW s monthly outreaches and progress to goal(s) every 6 weeks.    Plan of Care updated and sent to patient: TORIE Hooks  [facility] Care Coordination   [facility]  Social Work Care Coordinator  [phone]

## 2022-10-26 ENCOUNTER — TELEPHONE (OUTPATIENT)
Dept: INTERNAL MEDICINE | Facility: CLINIC | Age: 82
End: 2022-10-26

## 2022-10-26 NOTE — TELEPHONE ENCOUNTER
Marya from [facility] requesting the following order  [id number]  Pt 1w2 1 eow2 Additional visits requested   Homecare to send orders over for PCP signature.  Kayleigh Price, RN  [facility] RN Triage Team

## 2022-11-01 ENCOUNTER — TRANSFERRED RECORDS (OUTPATIENT)
Dept: MULTI SPECIALTY CLINIC | Facility: CLINIC | Age: 82
End: 2022-11-01

## 2022-11-01 LAB — RETINOPATHY: NORMAL

## 2022-11-02 DIAGNOSIS — Z53.9 DIAGNOSIS NOT YET DEFINED: Primary | ICD-10-CM

## 2022-11-02 PROCEDURE — G0179 MD RECERTIFICATION HHA PT: HCPCS | Performed by: INTERNAL MEDICINE

## 2022-11-10 ENCOUNTER — NURSE TRIAGE (OUTPATIENT)
Dept: NURSING | Facility: CLINIC | Age: 82
End: 2022-11-10

## 2022-11-10 NOTE — TELEPHONE ENCOUNTER
"Pt reports she was exposed to Covid 19 last Friday through her son however son was only around pt for five minutes and not symptomatic. Pt reports she tested negative for Covid at home two days ago and has not had any symptoms.     Care Advice reviewed with pt.    Pt verbalizes understanding and agrees to plan.     Reason for Disposition    [1] Does not meet COVID-19 EXPOSURE criteria BUT [2] caller still concerned about COVID-19 EXPOSURE    Protocols used: CORONAVIRUS (COVID-19) EXPOSURE-A-OH    COVID 19 Nurse Triage Plan/Patient Instructions    Please be aware that novel coronavirus (COVID-19) may be circulating in the community. If you develop symptoms such as fever, cough, or SOB or if you have concerns about the presence of another infection including coronavirus (COVID-19), please contact your health care provider or visit [email or web address].     Disposition/Instructions    Additional COVID19 information to add for patients.   How can I protect others?  If you have symptoms (fever, cough, body aches or trouble breathing): Stay home and away from others (self-isolate) until:    At least 10 days have passed since your symptoms started, And     You ve had no fever--and no medicine that reduces fever--for 1 full day (24 hours), And      Your other symptoms have resolved (gotten better).     If you don t have symptoms, but a test showed that you have COVID-19 (you tested positive):    Stay home and away from others (self-isolate). Follow the tips under \"How do I self-isolate?\" below for 10 days (20 days if you have a weak immune system).    You don't need to be retested for COVID-19 before going back to school or work. As long as you're fever-free and feeling better, you can go back to school, work and other activities after waiting the 10 or 20 days.     How do I self-isolate?    Stay in your own room, even for meals. Use your own bathroom if you can.     Stay away from others in your home. No hugging, " kissing or shaking hands. No visitors.    Don t go to work, school or anywhere else.     Clean  high touch  surfaces often (doorknobs, counters, handles, etc.). Use a household cleaning spray or wipes. You ll find a full list on the EPA website:  www.epa.gov/pesticide-registration/list-n-disinfectants-use-against-sars-cov-2.    Cover your mouth and nose with a mask, tissue or washcloth to avoid spreading germs.    Wash your hands and face often. Use soap and water.    Caregivers in these groups are at risk for severe illness due to COVID-19:  o People 65 years and older  o People who live in a nursing home or long-term care facility  o People with chronic disease (lung, heart, cancer, diabetes, kidney, liver, immunologic)  o People who have a weakened immune system, including those who:  - Are in cancer treatment  - Take medicine that weakens the immune system, such as corticosteroids  - Had a bone marrow or organ transplant  - Have an immune deficiency  - Have poorly controlled HIV or AIDS  - Are obese (body mass index of 40 or higher)  - Smoke regularly    Caregivers should wear gloves while washing dishes, handling laundry and cleaning bedrooms and bathrooms.    Use caution when washing and drying laundry: Don t shake dirty laundry, and use the warmest water setting that you can.    For more tips, go to www.cdc.gov/coronavirus/2019-ncov/downloads/10Things.pdf.    How can I take care of myself?  1. Get lots of rest. Drink extra fluids (unless a doctor has told you not to).     2. Take Tylenol (acetaminophen) for fever or pain. If you have liver or kidney problems, ask your family doctor if it s okay to take Tylenol.     Adults can take either:     650 mg (two 325 mg pills) every 4 to 6 hours, or     1,000 mg (two 500 mg pills) every 8 hours as needed.     Note: Don t take more than 3,000 mg in one day.   Acetaminophen is found in many medicines (both prescribed and over-the-counter medicines). Read all labels to  be sure you don t take too much.     For children, check the Tylenol bottle for the right dose. The dose is based on the child s age or weight.    3. If you have other health problems (like cancer, heart failure, an organ transplant or severe kidney disease): Call your specialty clinic if you don t feel better in the next 2 days.    4. Know when to call 911: Emergency warning signs include:    Trouble breathing or shortness of breath    Pain or pressure in the chest that doesn t go away    Feeling confused like you haven t felt before, or not being able to wake up    Bluish-colored lips or face    What are the symptoms of COVID-19?     The most common symptoms are cough, fever and trouble breathing.     Less common symptoms include body aches, chills, diarrhea (loose, watery poops), fatigue (feeling very tired), headache, runny nose, sore throat and loss of smell.    COVID-19 can cause severe coughing (bronchitis) and lung infection (pneumonia).    How does it spread?     The virus may spread when a person coughs or sneezes into the air. The virus can travel about 6 feet this way, and it can live on surfaces.      Common  (household disinfectants) will kill the virus.    Who is at risk?  Anyone can catch COVID-19 if they re around someone who has the virus.    How can others protect themselves?     Stay away from people who have COVID-19 (or symptoms of COVID-19).    Wash hands often with soap and water. Or, use hand  with at least 60% alcohol.    Avoid touching the eyes, nose or mouth.     Wear a face mask when you go out in public, when sick or when caring for a sick person.    Where can I get more information?     [facility]: About COVID-19: [email or web address]    CDC: What to Do If You re Sick: www.cdc.gov/coronavirus/2019-ncov/about/steps-when-sick.html    CDC: Ending Home Isolation: www.cdc.gov/coronavirus/2019-ncov/hcp/disposition-in-home-patients.html     CDC: Caring for  Someone: www.cdc.gov/coronavirus/2019-ncov/if-you-are-sick/care-for-someone.html     [facility]: Interim Guidance for Hospital Discharge to Home: www.[facility]./diseases/coronavirus/hcp/hospdischarge.pdf    [facility] clinical trials (COVID-19 research studies): clinicalaffairs.[facility]-clinical-trials     Below are the COVID-19 hotlines at the [place] Department of Health ([facility]). Interpreters are available.   o For health questions: Call 843-591-0492 or 1-727.340.2629 (7 a.m. to 7 p.m.)  o For questions about schools and childcare: Call 827-299-1337 or 1-785.911.9109 (7 a.m. to 7 p.m.)          Thank you for taking steps to prevent the spread of this virus.  o Limit your contact with others.  o Wear a simple mask to cover your cough.  o Wash your hands well and often.    Resources    [facility]: About COVID-19: [email or web address].[email or web address]    CDC: What to Do If You're Sick: www.cdc.gov/coronavirus/2019-ncov/about/steps-when-sick.html    CDC: Ending Home Isolation: www.cdc.gov/coronavirus/2019-ncov/hcp/disposition-in-home-patients.html     CDC: Caring for Someone: www.cdc.gov/coronavirus/2019-ncov/if-you-are-sick/care-for-someone.html     [facility]: Interim Guidance for Hospital Discharge to Home: www.[facility]./diseases/coronavirus/hcp/hospdischarge.pdf    [facility] clinical trials (COVID-19 research studies): clinicalaffairs.[facility]-clinical-trials     Below are the COVID-19 hotlines at the [place] Department [facility] ([facility]). Interpreters are available.   o For health questions: Call 441-676-8894 or 1-614.585.5800 (7 a.m. to 7 p.m.)  o For questions about schools and childcare: Call 817-740-1540 or 1-252.659.6275 (7 a.m. to 7 p.m.)

## 2022-11-13 DIAGNOSIS — N18.30 TYPE 2 DIABETES MELLITUS WITH STAGE 3 CHRONIC KIDNEY DISEASE, WITHOUT LONG-TERM CURRENT USE OF INSULIN, UNSPECIFIED WHETHER STAGE 3A OR 3B CKD (H): ICD-10-CM

## 2022-11-13 DIAGNOSIS — E11.22 TYPE 2 DIABETES MELLITUS WITH STAGE 3 CHRONIC KIDNEY DISEASE, WITHOUT LONG-TERM CURRENT USE OF INSULIN, UNSPECIFIED WHETHER STAGE 3A OR 3B CKD (H): ICD-10-CM

## 2022-11-13 DIAGNOSIS — E78.5 HYPERLIPIDEMIA LDL GOAL <100: ICD-10-CM

## 2022-11-14 ENCOUNTER — TELEPHONE (OUTPATIENT)
Dept: CARDIOLOGY | Facility: CLINIC | Age: 82
End: 2022-11-14

## 2022-11-14 DIAGNOSIS — I35.0 AORTIC STENOSIS, SEVERE: ICD-10-CM

## 2022-11-14 DIAGNOSIS — Z95.2 S/P TAVR (TRANSCATHETER AORTIC VALVE REPLACEMENT): Primary | ICD-10-CM

## 2022-11-14 RX ORDER — SIMVASTATIN 10 MG
TABLET ORAL
Qty: 90 TABLET | Refills: 1 | Status: SHIPPED | OUTPATIENT
Start: 2022-11-14 | End: 2023-05-11

## 2022-11-14 NOTE — TELEPHONE ENCOUNTER
[facility] Call [facility]    Phone Message    May a detailed message be left on voicemail: yes     Reason for Call: Other: Daughter called requesting to speak with a member of the patients care team in regards to upcoming dental work. Please call back to further discuss, thank you.    Action Taken: Message routed to:  Other: Cardiology    Travel Screening: Not Applicable     Thank you!  [facility]

## 2022-11-15 RX ORDER — AZITHROMYCIN 500 MG/1
TABLET, FILM COATED ORAL
Qty: 4 TABLET | Refills: 1 | Status: SHIPPED | OUTPATIENT
Start: 2022-11-15 | End: 2022-11-23

## 2022-11-15 NOTE — TELEPHONE ENCOUNTER
Called patient's daughter back and reviewed plan for antibiotics to be taken one time prior to patient's upcoming dental appointments. Sounds like patient will have a few appointments coming up so will send to have multiple doses filled along with refills so patient is able to get additional when needed for the future.      Verified that patient does have penicillin allergy so will send azithromycin to patient's preferred pharmacy at [facility] off of [address] in [place].

## 2022-11-17 ENCOUNTER — ANCILLARY PROCEDURE (OUTPATIENT)
Dept: CT IMAGING | Facility: CLINIC | Age: 82
End: 2022-11-17
Attending: INTERNAL MEDICINE
Payer: MEDICARE

## 2022-11-17 DIAGNOSIS — C85.12 B-CELL LYMPHOMA OF INTRATHORACIC LYMPH NODES, UNSPECIFIED B-CELL LYMPHOMA TYPE (H): ICD-10-CM

## 2022-11-17 LAB
CREAT BLD-MCNC: 1.1 MG/DL (ref 0.5–1)
GFR SERPL CREATININE-BSD FRML MDRD: 49 ML/MIN/1.73M2

## 2022-11-17 PROCEDURE — 82565 ASSAY OF CREATININE: CPT

## 2022-11-17 PROCEDURE — 74177 CT ABD & PELVIS W/CONTRAST: CPT | Mod: MG

## 2022-11-17 PROCEDURE — 255N000002 HC RX 255 OP 636: Performed by: INTERNAL MEDICINE

## 2022-11-17 PROCEDURE — G1010 CDSM STANSON: HCPCS

## 2022-11-17 RX ADMIN — IOHEXOL 100 ML: 350 INJECTION, SOLUTION INTRAVENOUS at 09:18

## 2022-11-20 NOTE — RESULT ENCOUNTER NOTE
Dear Ms. Rojas,    CT scan is same as before. No evidence of lymphoma. This is good.    Please, call me with any questions.    John Srinivasan MD

## 2022-11-23 ENCOUNTER — OFFICE VISIT (OUTPATIENT)
Dept: INTERNAL MEDICINE | Facility: CLINIC | Age: 82
End: 2022-11-23
Payer: MEDICARE

## 2022-11-23 ENCOUNTER — TELEPHONE (OUTPATIENT)
Dept: INTERNAL MEDICINE | Facility: CLINIC | Age: 82
End: 2022-11-23

## 2022-11-23 ENCOUNTER — MEDICAL CORRESPONDENCE (OUTPATIENT)
Dept: HEALTH INFORMATION MANAGEMENT | Facility: CLINIC | Age: 82
End: 2022-11-23

## 2022-11-23 VITALS
HEIGHT: 60 IN | SYSTOLIC BLOOD PRESSURE: 128 MMHG | BODY MASS INDEX: 47.12 KG/M2 | OXYGEN SATURATION: 94 % | TEMPERATURE: 98.1 F | WEIGHT: 240 LBS | DIASTOLIC BLOOD PRESSURE: 60 MMHG | HEART RATE: 68 BPM | RESPIRATION RATE: 18 BRPM

## 2022-11-23 DIAGNOSIS — I12.9 TYPE 2 DM WITH CKD STAGE 3 AND HYPERTENSION (H): ICD-10-CM

## 2022-11-23 DIAGNOSIS — E03.9 ACQUIRED HYPOTHYROIDISM: Chronic | ICD-10-CM

## 2022-11-23 DIAGNOSIS — Z00.00 ENCOUNTER FOR SUBSEQUENT ANNUAL WELLNESS VISIT IN MEDICARE PATIENT: Primary | ICD-10-CM

## 2022-11-23 DIAGNOSIS — C85.12 B-CELL LYMPHOMA OF INTRATHORACIC LYMPH NODES, UNSPECIFIED B-CELL LYMPHOMA TYPE (H): ICD-10-CM

## 2022-11-23 DIAGNOSIS — Z23 NEED FOR PROPHYLACTIC VACCINATION AND INOCULATION AGAINST INFLUENZA: ICD-10-CM

## 2022-11-23 DIAGNOSIS — E66.01 SEVERE OBESITY (BMI 35.0-39.9) WITH COMORBIDITY (H): ICD-10-CM

## 2022-11-23 DIAGNOSIS — N18.30 TYPE 2 DM WITH CKD STAGE 3 AND HYPERTENSION (H): ICD-10-CM

## 2022-11-23 DIAGNOSIS — I10 BENIGN ESSENTIAL HYPERTENSION: Chronic | ICD-10-CM

## 2022-11-23 DIAGNOSIS — Z23 HIGH PRIORITY FOR 2019-NCOV VACCINE: ICD-10-CM

## 2022-11-23 DIAGNOSIS — E11.22 TYPE 2 DM WITH CKD STAGE 3 AND HYPERTENSION (H): ICD-10-CM

## 2022-11-23 DIAGNOSIS — E78.5 HYPERLIPIDEMIA LDL GOAL <100: ICD-10-CM

## 2022-11-23 DIAGNOSIS — N18.30 STAGE 3 CHRONIC KIDNEY DISEASE, UNSPECIFIED WHETHER STAGE 3A OR 3B CKD (H): ICD-10-CM

## 2022-11-23 LAB
ALBUMIN SERPL BCG-MCNC: 4.1 G/DL (ref 3.5–5.2)
ALP SERPL-CCNC: 121 U/L (ref 35–104)
ALT SERPL W P-5'-P-CCNC: 9 U/L (ref 10–35)
ANION GAP SERPL CALCULATED.3IONS-SCNC: 9 MMOL/L (ref 7–15)
AST SERPL W P-5'-P-CCNC: 19 U/L (ref 10–35)
BILIRUB SERPL-MCNC: 0.3 MG/DL
BUN SERPL-MCNC: 35.2 MG/DL (ref 8–23)
CALCIUM SERPL-MCNC: 9.2 MG/DL (ref 8.8–10.2)
CHLORIDE SERPL-SCNC: 103 MMOL/L (ref 98–107)
CHOLEST SERPL-MCNC: 169 MG/DL
CREAT SERPL-MCNC: 1.22 MG/DL (ref 0.51–0.95)
CREAT UR-MCNC: 131 MG/DL
DEPRECATED HCO3 PLAS-SCNC: 32 MMOL/L (ref 22–29)
ERYTHROCYTE [DISTWIDTH] IN BLOOD BY AUTOMATED COUNT: 15.2 % (ref 10–15)
GFR SERPL CREATININE-BSD FRML MDRD: 44 ML/MIN/1.73M2
GLUCOSE SERPL-MCNC: 103 MG/DL (ref 70–99)
HCT VFR BLD AUTO: 42.2 % (ref 35–47)
HDLC SERPL-MCNC: 44 MG/DL
HGB BLD-MCNC: 12.7 G/DL (ref 11.7–15.7)
LDLC SERPL CALC-MCNC: 92 MG/DL
MCH RBC QN AUTO: 26.8 PG (ref 26.5–33)
MCHC RBC AUTO-ENTMCNC: 30.1 G/DL (ref 31.5–36.5)
MCV RBC AUTO: 89 FL (ref 78–100)
MICROALBUMIN UR-MCNC: <12 MG/L
MICROALBUMIN/CREAT UR: NORMAL MG/G{CREAT}
NONHDLC SERPL-MCNC: 125 MG/DL
PLATELET # BLD AUTO: 235 10E3/UL (ref 150–450)
POTASSIUM SERPL-SCNC: 5 MMOL/L (ref 3.4–5.3)
PROT SERPL-MCNC: 6.7 G/DL (ref 6.4–8.3)
RBC # BLD AUTO: 4.74 10E6/UL (ref 3.8–5.2)
SODIUM SERPL-SCNC: 144 MMOL/L (ref 136–145)
TRIGL SERPL-MCNC: 164 MG/DL
WBC # BLD AUTO: 7.2 10E3/UL (ref 4–11)

## 2022-11-23 PROCEDURE — 91312 COVID-19 VACCINE BIVALENT BOOSTER 12+ (PFIZER): CPT | Performed by: INTERNAL MEDICINE

## 2022-11-23 PROCEDURE — 99214 OFFICE O/P EST MOD 30 MIN: CPT | Mod: 25 | Performed by: INTERNAL MEDICINE

## 2022-11-23 PROCEDURE — 36415 COLL VENOUS BLD VENIPUNCTURE: CPT | Performed by: INTERNAL MEDICINE

## 2022-11-23 PROCEDURE — 0124A COVID-19 VACCINE BIVALENT BOOSTER 12+ (PFIZER): CPT | Performed by: INTERNAL MEDICINE

## 2022-11-23 PROCEDURE — 85027 COMPLETE CBC AUTOMATED: CPT | Performed by: INTERNAL MEDICINE

## 2022-11-23 PROCEDURE — 80053 COMPREHEN METABOLIC PANEL: CPT | Performed by: INTERNAL MEDICINE

## 2022-11-23 PROCEDURE — 82043 UR ALBUMIN QUANTITATIVE: CPT | Performed by: INTERNAL MEDICINE

## 2022-11-23 PROCEDURE — 80061 LIPID PANEL: CPT | Performed by: INTERNAL MEDICINE

## 2022-11-23 PROCEDURE — G0008 ADMIN INFLUENZA VIRUS VAC: HCPCS | Performed by: INTERNAL MEDICINE

## 2022-11-23 PROCEDURE — 90662 IIV NO PRSV INCREASED AG IM: CPT | Performed by: INTERNAL MEDICINE

## 2022-11-23 PROCEDURE — G0439 PPPS, SUBSEQ VISIT: HCPCS | Performed by: INTERNAL MEDICINE

## 2022-11-23 RX ORDER — LEVOTHYROXINE SODIUM 150 UG/1
TABLET ORAL
COMMUNITY
Start: 2022-10-18 | End: 2023-02-01

## 2022-11-23 ASSESSMENT — ENCOUNTER SYMPTOMS
CHILLS: 1
DIARRHEA: 0
FREQUENCY: 1
WEAKNESS: 1
WEAKNESS: 0
NAUSEA: 1
CONSTIPATION: 0
SHORTNESS OF BREATH: 0
HEMATURIA: 0
FEVER: 0
NAUSEA: 0
SORE THROAT: 0
DYSURIA: 0
SHORTNESS OF BREATH: 1
PARESTHESIAS: 0
HEARTBURN: 0
DIZZINESS: 0
CHILLS: 0
PALPITATIONS: 0
JOINT SWELLING: 1
ABDOMINAL PAIN: 0
HEMATOCHEZIA: 0
COUGH: 0
PARESTHESIAS: 1
DIARRHEA: 1
NERVOUS/ANXIOUS: 1
BREAST MASS: 0
HEADACHES: 0
EYE PAIN: 0
DIZZINESS: 1
MYALGIAS: 0
ARTHRALGIAS: 1

## 2022-11-23 ASSESSMENT — ACTIVITIES OF DAILY LIVING (ADL)
CURRENT_FUNCTION: TRANSPORTATION REQUIRES ASSISTANCE
CURRENT_FUNCTION: PREPARING MEALS REQUIRES ASSISTANCE
CURRENT_FUNCTION: MONEY MANAGEMENT REQUIRES ASSISTANCE

## 2022-11-23 ASSESSMENT — PATIENT HEALTH QUESTIONNAIRE - PHQ9
10. IF YOU CHECKED OFF ANY PROBLEMS, HOW DIFFICULT HAVE THESE PROBLEMS MADE IT FOR YOU TO DO YOUR WORK, TAKE CARE OF THINGS AT HOME, OR GET ALONG WITH OTHER PEOPLE: SOMEWHAT DIFFICULT
SUM OF ALL RESPONSES TO PHQ QUESTIONS 1-9: 9
SUM OF ALL RESPONSES TO PHQ QUESTIONS 1-9: 9

## 2022-11-23 NOTE — TELEPHONE ENCOUNTER
FYI-  PT Marya from [facility] Called to informe provider pt will discharging from home care PT with all goals met.

## 2022-11-23 NOTE — PROGRESS NOTES
"SUBJECTIVE:   Lucille is a 82 year old who presents for Preventive Visit.  Patient has been advised of split billing requirements and indicates understanding: Yes  Are you in the first 12 months of your Medicare coverage?  No    Healthy Habits:     In general, how would you rate your overall health?  Fair    Frequency of exercise:  2-3 days/week    Duration of exercise:  Less than 15 minutes    Do you usually eat at least 4 servings of fruit and vegetables a day, include whole grains    & fiber and avoid regularly eating high fat or \"junk\" foods?  No    Taking medications regularly:  Yes    Medication side effects:  None    Ability to successfully perform activities of daily living:  Transportation requires assistance, preparing meals requires assistance and money management requires assistance    Home Safety:  No safety concerns identified    Hearing Impairment:  Difficulty following a conversation in a noisy restaurant or crowded room, feel that people are mumbling or not speaking clearly, need to ask people to speak up or repeat themselves, difficulty understanding soft or whispered speech and difficulty understanding speech on the telephone    In the past 6 months, have you been bothered by leaking of urine? Yes    In general, how would you rate your overall mental or emotional health?  Fair      PHQ-2 Total Score: 3    Additional concerns today:  No      Have you ever done Advance Care Planning? (For example, a Health Directive, POLST, or a discussion with a medical provider or your loved ones about your wishes): Yes, advance care planning is on file.       Fall risk:  low    Cognitive Screening   1) Repeat 3 items (Leader, Season, Table)    2) Clock draw: NORMAL  3) 3 item recall: Recalls 2 objects   Results: NORMAL clock, 1-2 items recalled: COGNITIVE IMPAIRMENT LESS LIKELY    Mini-CogTM Copyright ALEAH Jones. Licensed by the author for use in [facility]; reprinted with permission ([email or web address]). " All rights reserved.          Reviewed and updated as needed this visit by clinical staff                  Reviewed and updated as needed this visit by Provider                 Social History     Tobacco Use     Smoking status: Never     Smokeless tobacco: Never   Substance Use Topics     Alcohol use: Not Currently     If you drink alcohol do you typically have >3 drinks per day or >7 drinks per week? No    Alcohol Use 11/22/2021   Prescreen: >3 drinks/day or >7 drinks/week? No   Prescreen: >3 drinks/day or >7 drinks/week? -       Current providers sharing in care for this patient include:   Patient Care Team:  Fausto Flynn MD as PCP - General (Internal Medicine)  Fausto Flynn MD as Assigned PCP  Yonny Roman MD as MD (INTERNAL MEDICINE - ENDOCRINOLOGY, DIABETES & METABOLISM)  Vera Jha LSW as Lead Care Coordinator  Rosanne Valadez MA as Community Health Worker (Primary Care - CC)  Michael Nolasco DPM as Assigned Musculoskeletal Provider  [facility]  Alexandr Ambriz APRN CNP as Assigned Cancer Care Provider  Linsey Benitez CNP as Assigned Heart and Vascular Provider  Rafa Kelly AuD as Audiologist (Audiology)    The following health maintenance items are reviewed in Epic and correct as of today:  Health Maintenance   Topic Date Due     ZOSTER IMMUNIZATION (1 of 2) Never done     DTAP/TDAP/TD IMMUNIZATION (2 - Td or Tdap) 01/01/2019     EYE EXAM  06/01/2021     COVID-19 Vaccine (4 - Booster for Pfizer series) 12/28/2021     INFLUENZA VACCINE (1) 09/01/2022     LIPID  11/22/2022     MICROALBUMIN  11/22/2022     FALL RISK ASSESSMENT  11/22/2022     MEDICARE ANNUAL WELLNESS VISIT  11/22/2022     A1C  02/17/2023     PHQ-9  04/03/2023     DIABETIC FOOT EXAM  06/29/2023     ANNUAL REVIEW OF HM ORDERS  08/17/2023     BMP  09/23/2023     HEMOGLOBIN  09/23/2023     ADVANCE CARE PLANNING  11/22/2026     DEXA  01/19/2032     DEPRESSION ACTION PLAN   Completed     Pneumococcal Vaccine: 65+ Years  Completed     URINALYSIS  Completed     IPV IMMUNIZATION  Aged Out     MENINGITIS IMMUNIZATION  Aged Out       Immunization History   Administered Date(s) Administered     COVID-19 Vaccine 12+ (Pfizer) 02/09/2021, 03/02/2021, 11/02/2021     Influenza (High Dose) 3 valent vaccine 10/29/2013, 11/20/2014, 09/24/2015, 10/20/2016, 11/19/2018     Influenza Vaccine 50-64 or 18-64 w/egg allergy (Flublok) 12/18/2019     Influenza Vaccine 65+ (Fluzone HD) 10/28/2020, 11/02/2021     Pneumo Conj 13-V (2010&after) 09/24/2015     Pneumococcal 23 valent 01/01/2013     Tdap (Adacel,Boostrix) 01/01/2009       Pertinent mammograms are reviewed under the imaging tab.    Review of Systems   Constitutional: Negative for chills and fever.   HENT: Positive for hearing loss. Negative for congestion, ear pain and sore throat.    Eyes: Positive for visual disturbance. Negative for pain.   Respiratory: Negative for cough and shortness of breath.    Cardiovascular: Positive for peripheral edema. Negative for chest pain and palpitations.   Gastrointestinal: Negative for abdominal pain, constipation, diarrhea, heartburn, hematochezia and nausea.   Breasts:  Negative for tenderness, breast mass and discharge.   Genitourinary: Positive for frequency and urgency. Negative for dysuria, genital sores, hematuria, pelvic pain, vaginal bleeding and vaginal discharge.   Musculoskeletal: Positive for arthralgias and joint swelling. Negative for myalgias.   Skin: Positive for rash.   Neurological: Negative for dizziness, weakness, headaches and paresthesias.   Psychiatric/Behavioral: Positive for mood changes. The patient is nervous/anxious.        OBJECTIVE:   /60   Pulse 68   Temp 98.1  F (36.7  C) (Tympanic)   Resp 18   Ht 1.524 m (5')   Wt 108.9 kg (240 lb)   SpO2 94%   BMI 46.87 kg/m   Estimated body mass index is 46.32 kg/m  as calculated from the following:    Height as of 10/3/22: 1.524 m  (5').    Weight as of 10/3/22: 107.6 kg (237 lb 3.2 oz).  Physical Exam  GENERAL:  alert and no distress  EYES: Eyes grossly normal to inspection, PERRL and conjunctivae and sclerae normal  HENT: ear canals and TM's normal, nose and mouth without ulcers or lesions  NECK: no adenopathy, no asymmetry, masses, or scars and thyroid normal to palpation  RESP: lungs clear to auscultation - no rales, rhonchi or wheezes  CV: regular rate and rhythm, normal S1 S2, no S3 or S4, noted  murmur, click or rub  ABDOMEN: obese  MS: no gross musculoskeletal defects noted, is slowed and purposeful with use of assist device, cane  NEURO: No focal changes  PSYCH: mentation appears normal, affect normal/bright    Lab Results   Component Value Date    A1C 5.2 08/17/2022    A1C 5.0 11/22/2021    A1C 5.8 04/07/2021    A1C 5.6 06/04/2020    A1C 5.7 06/12/2018    A1C 6.2 09/06/2017    A1C 5.6 12/01/2016     LDL Cholesterol Calculated   Date Value Ref Range Status   11/22/2021 81 <=100 mg/dL Final   06/04/2020 77 <100 mg/dL Final     Lab Results   Component Value Date    ALT 15 08/02/2022    ALT 15 08/27/2020     Creatinine   Date Value Ref Range Status   09/23/2022 0.95 0.52 - 1.04 mg/dL Final   08/27/2020 0.98 0.52 - 1.04 mg/dL Final     Creatinine POCT   Date Value Ref Range Status   11/17/2022 1.1 (H) 0.5 - 1.0 mg/dL Final     Hemoglobin   Date Value Ref Range Status   09/23/2022 10.7 (L) 11.7 - 15.7 g/dL Final   04/07/2021 10.6 (L) 11.7 - 15.7 g/dL Final   ]  TSH   Date Value Ref Range Status   09/23/2022 0.13 (L) 0.40 - 4.00 mU/L Final   06/04/2020 0.93 0.30 - 5.00 uIU/mL Final         ASSESSMENT / PLAN:   (Z00.00) Encounter for subsequent annual wellness visit in Medicare patient  (primary encounter diagnosis)  Comment: Update COVID and influenza vaccine today discussed shingles and tetanus booster  Plan: CBC with platelets, Comprehensive metabolic         panel            (E11.22,  I12.9,  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  Plan: Lipid panel reflex to direct LDL Non-fasting            (E03.9) Acquired hypothyroidism  Comment: Continue with current reduced dose of thyroid medicine, recheck thyroid function test as ordered after the first of this year  Plan: TSH with free T4 reflex            (N18.30) Stage 3 chronic kidney disease, unspecified whether stage 3a or 3b CKD (H)  Comment: Recheck creatinine accordingly.  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  Plan:     Patient has been advised of split billing requirements and indicates understanding: Yes      COUNSELING:  Reviewed preventive health counseling, as reflected in patient instructions       Regular exercise       Healthy diet/nutrition      She reports that she has never smoked. She has never used smokeless tobacco.      Appropriate preventive services were discussed with this patient, including applicable screening as appropriate for cardiovascular disease, diabetes, osteopenia/osteoporosis, and glaucoma.  As appropriate for age/gender, discussed screening for colorectal cancer, prostate cancer, breast cancer, and cervical cancer. Checklist reviewing preventive services available has been given to the patient.    Reviewed patients plan of care and provided an AVS.  The Basic Care Plan (routine screening as documented in Health Maintenance) for Lucille meets the Care Plan requirement. This Care Plan has been established and reviewed with the Patient.          Fausto Flynn MD  [facility]    Identified Health Risks:  Answers for HPI/ROS submitted by the patient on 11/23/2022  If you checked off any problems, how difficult have these problems made it for you to do your work, take care of things at home, or get along with other people?: Somewhat difficult  PHQ9 TOTAL SCORE: 9

## 2022-11-25 ENCOUNTER — PATIENT OUTREACH (OUTPATIENT)
Dept: CARE COORDINATION | Facility: CLINIC | Age: 82
End: 2022-11-25

## 2022-11-25 NOTE — PROGRESS NOTES
Clinic Care Coordination Contact    Community Health Worker Follow Up    Care Gaps:     Health Maintenance Due   Topic Date Due     ZOSTER IMMUNIZATION (1 of 2) Never done     DTAP/TDAP/TD IMMUNIZATION (2 - Td or Tdap) 01/01/2019     EYE EXAM  06/01/2021       Care Plan: On [place]    Discussed:    Patient stated she would like a new goal for housecleaning to help with bedding, dusting, cleaning, and other chores as needed.    Patient stated she would like a goal and resources for shoveling the driveway.    Patient stated this is the last month for [employer].  Patient may need this extended and or other food resources.  Patient stated she continues to receive food from [employer].      Intervention and Education during outreach:  CHW encouraged patient to contact CC if there are any other changes or resources needed.  Patient acknowledged understanding.     CHW and patient scheduled a phone visit to update new goals with CC SW on 11/29/22 at 10:00am.      CHW Plan: will outreach in one month.    ZANE Demarco  Clinic Care Coordination  [facility]: [facility] [place], and [place] [facility]  Phone: [phone]

## 2022-11-29 ENCOUNTER — TELEPHONE (OUTPATIENT)
Dept: CARDIOLOGY | Facility: CLINIC | Age: 82
End: 2022-11-29

## 2022-11-29 ENCOUNTER — PATIENT OUTREACH (OUTPATIENT)
Dept: NURSING | Facility: CLINIC | Age: 82
End: 2022-11-29
Payer: MEDICARE

## 2022-11-29 DIAGNOSIS — Z95.2 S/P TAVR (TRANSCATHETER AORTIC VALVE REPLACEMENT): Primary | ICD-10-CM

## 2022-11-29 RX ORDER — AZITHROMYCIN 500 MG/1
TABLET, FILM COATED ORAL
Qty: 4 TABLET | Refills: 1 | Status: SHIPPED | OUTPATIENT
Start: 2022-11-29 | End: 2023-12-04

## 2022-11-29 ASSESSMENT — ACTIVITIES OF DAILY LIVING (ADL): DEPENDENT_IADLS:: TRANSPORTATION

## 2022-11-29 NOTE — PROGRESS NOTES
Clinic Care Coordination Contact    Follow Up Progress Note      Assessment: [facility] completed annual assessment and created new goals. Pt stated that her and her  were doing well, but that it was hard for them to complete house work and yard work. [facility] suggested a [place] assessment to see if pt and her  qualify for services through the [facility] and [facility]. [facility] stated that [facility] would send information about this to pt. Pt is agreeable. [facility] stated if pt does not qualify for services, CC would assist with other resources.     Care Gaps:    Health Maintenance Due   Topic Date Due     ZOSTER IMMUNIZATION (1 of 2) Never done     DTAP/TDAP/TD IMMUNIZATION (2 - Td or Tdap) 01/01/2019     EYE EXAM  06/01/2021       Postponed to next outreach.      Care Plans  Care Plan: Social Support     Problem: Inadequate social support     Goal: I will access resources to obtain more in home support.     Start Date: 11/29/2022 Expected End Date: 8/29/2023    Note:     Barriers: resource availability  Strengths: pt is a good advocate for self.   Patient expressed understanding of goal: yes  Action steps to achieve this goal:  1. I will contact the [facility] about a [place] assessment.   2. I will contact resources given to me.   3. I will contact my CHW with any needs or questions.                             Intervention/Education provided during outreach: provided needed information and resources.      Outreach Frequency: 2 weeks    Plan:   CHW to follow up every 2 weeks at request of pt.   [facility] to chart review every 6 weeks.     TORIE Phillips  [facility] Care Coordination   [facility]  Social Work Care Coordinator  [phone]

## 2022-11-29 NOTE — LETTER
[facility]  Patient Centered Plan of Care  About Me:        Patient Name:  Lucille Rojas    YOB: 1940  Age:         82 year old   Nito MRN:    [id number] Telephone Information:  Home Phone [phone]   Mobile [phone]       Address:  [address]  [facility] [address] Email address:  [email or web address]      Emergency Contact(s)    Name Relationship Lgl Grd Work Phone Home Phone Mobile Phone   1. MONICA BUCKNER * Daughter   [phone]   2. EMIL ROJAS Spouse No  [phone]   3. HARRIET ROJAS Daughter No  [phone]   4. JOSEFA ROJAS Son No  [phone]           Primary language:  English     needed? No   [facility] [facility]:  [phone] op. 1  Other communication barriers:None    Preferred Method of Communication:  Mail  Current living arrangement: I live in a private home with spouse    Mobility Status/ Medical Equipment: Independent w/Device        Health Maintenance  Health Maintenance Reviewed: Due/Overdue   Health Maintenance Due   Topic Date Due     ZOSTER IMMUNIZATION (1 of 2) Never done     DTAP/TDAP/TD IMMUNIZATION (2 - Td or Tdap) 01/01/2019     EYE EXAM  06/01/2021             My Access Plan  Medical Emergency 911   Primary Clinic Line [facility] - [phone]   24 Hour Appointment Line [phone] or  [phone]) (toll-free)   24 Hour Nurse Line [phone] (toll-free)   Preferred Urgent Care [facility], [phone]     [facility]  [phone]     Preferred Pharmacy [id number] - [place], [place] - [address] [facility]     Behavioral Health Crisis Line The National Suicide Prevention Lifeline at 1-148.275.2785 or Text/Call 711             My Care Team Members  Patient Care Team       Relationship Specialty Notifications Start End    Fausto Flynn MD PCP -  General Internal Medicine  10/6/14     Phone: [phone] Fax: [phone]         [address] [place] [address]    Fausto Flynn MD Assigned PCP   10/12/14     Phone: [phone] Fax: [phone]         [address] [place] [address]    Yonny Roman MD MD INTERNAL MEDICINE - ENDOCRINOLOGY, DIABETES & METABOLISM  12/3/19     Phone: [phone] Fax: [phone]         [facility] [address] [facility] [address]    Vera Jha LSW Lead Care Coordinator  Admissions 12/3/19     Rosanne Valadez MA Community Health Worker Primary Care - CC Admissions 1/22/20     Phone: [phone]         Michael Nolasco DPM Assigned Musculoskeletal Provider   11/28/21     Phone: [phone] Fax: [phone]         [address] [facility] [address] [facility] [address]    [facility]    12/27/21     TCU    Phone: [phone] Fax: [phone] [address] [facility] [address]    Alexandr Ambriz APRN CNP Assigned Cancer Care Provider   5/22/22     Phone: [phone] Fax: [phone]         [address] [facility] [address]    Linsey Benitez CNP Assigned Heart and Vascular Provider   9/3/22     Phone: [phone] Pager: [phone] Fax: [phone] [address] [place] [place] [address]    Rafa Kelly AuD Audiologist Audiology  9/7/22     Phone: [phone] Fax: [phone] [address] [place] [address]            My Care Plans  Self Management and Treatment Plan  Care Plan  Care Plan: Social Support     Problem: Inadequate social support     Goal: I will access resources to obtain more in home support.     Start Date: 11/29/2022 Expected End Date: 8/29/2023    Note:     Barriers: resource availability  Strengths: pt is a good advocate for self.   Patient expressed understanding of goal: yes  Action steps to achieve this goal:  1. I will contact the county about a  [place].   2. I will contact resources given to me.   3. I will contact my CHW with any needs or questions.                              Action Plans on File:            Depression          Advance Care Plans/Directives Type:   No data recorded    My Medical and Care Information  Problem List   Patient Active Problem List   Diagnosis     Hyperlipidemia LDL goal <100     Macular degeneration     Apnea     Morbid obesity due to excess calories (H)     CKD (chronic kidney disease) stage 3, GFR 30-59 ml/min (H)     Acquired hypothyroidism     Benign essential hypertension     Gastroesophageal reflux disease without esophagitis     Type 2 diabetes mellitus with stage 3 chronic kidney disease, without long-term current use of insulin (H)     Anemia, unspecified type     MDD (major depressive disorder), recurrent episode, mild (H)     Severe anemia     Severe obesity (BMI 35.0-39.9) with comorbidity (H)     Peripheral vision loss, unspecified laterality     Type 2 DM with CKD stage 3 and hypertension (H)     Acute on chronic blood loss anemia     B-cell lymphoma of intrathoracic lymph nodes, unspecified B-cell lymphoma type (H)     SHAVON (obstructive sleep apnea)     Pancreatic mass     Nonrheumatic aortic (valve) stenosis with insufficiency     Diastolic heart failure, unspecified HF chronicity (H)     Hypothyroidism, unspecified type     Major depressive disorder with current active episode, unspecified depression episode severity, unspecified whether recurrent     Physical deconditioning     Severe obesity (BMI >= 40) (H)     Callus of foot     Non-Hodgkin's lymphoma (H)     Encounter for other specified aftercare     Hypoxia     Generalized muscle weakness     Symptomatic anemia     Diverticular disease of colon     Status post coronary angiogram     Aortic stenosis, severe      Current Medications and Allergies:  See printed Medication Report.    Care Coordination Start Date: 12/3/2019   Frequency of Care  Coordination: 2 weeks     Form Last Updated: 11/29/2022

## 2022-11-29 NOTE — LETTER
Anton Adkins,     Thank you for taking the time to talk with me on the phone. For chore services/outside work I would recommend contacting:  [employer]      [email or web address]      On the phone we spoke about a M[place]hoices assessment. To request this from the [facility], you need to call [phone]. This number will get you to the  and you will need to state that you would like to request a M[place]hoices assessment and they will transfer you to the proper team.     If you have any questions, feel free to give Rosanne a call at [phone]?.    Thank you!    Vera Jha [facility] Care Coordination   [place] [facility]  Social Work Care Coordinator  [phone]

## 2022-12-07 ENCOUNTER — ONCOLOGY VISIT (OUTPATIENT)
Dept: ONCOLOGY | Facility: CLINIC | Age: 82
End: 2022-12-07
Attending: INTERNAL MEDICINE
Payer: MEDICARE

## 2022-12-07 ENCOUNTER — LAB (OUTPATIENT)
Dept: INFUSION THERAPY | Facility: CLINIC | Age: 82
End: 2022-12-07
Attending: INTERNAL MEDICINE
Payer: MEDICARE

## 2022-12-07 VITALS
OXYGEN SATURATION: 98 % | BODY MASS INDEX: 46.6 KG/M2 | HEART RATE: 70 BPM | RESPIRATION RATE: 18 BRPM | WEIGHT: 238.6 LBS | SYSTOLIC BLOOD PRESSURE: 116 MMHG | DIASTOLIC BLOOD PRESSURE: 67 MMHG | TEMPERATURE: 97.6 F

## 2022-12-07 DIAGNOSIS — C85.12 B-CELL LYMPHOMA OF INTRATHORACIC LYMPH NODES, UNSPECIFIED B-CELL LYMPHOMA TYPE (H): Primary | ICD-10-CM

## 2022-12-07 DIAGNOSIS — C85.12 B-CELL LYMPHOMA OF INTRATHORACIC LYMPH NODES, UNSPECIFIED B-CELL LYMPHOMA TYPE (H): ICD-10-CM

## 2022-12-07 DIAGNOSIS — C85.99 NON-HODGKIN LYMPHOMA OF SOLID ORGAN EXCLUDING SPLEEN, UNSPECIFIED NON-HODGKIN LYMPHOMA TYPE (H): Primary | ICD-10-CM

## 2022-12-07 DIAGNOSIS — E03.9 ACQUIRED HYPOTHYROIDISM: Chronic | ICD-10-CM

## 2022-12-07 LAB
ERYTHROCYTE [DISTWIDTH] IN BLOOD BY AUTOMATED COUNT: 14.6 % (ref 10–15)
HCT VFR BLD AUTO: 38.5 % (ref 35–47)
HGB BLD-MCNC: 12.2 G/DL (ref 11.7–15.7)
LDH SERPL L TO P-CCNC: 187 U/L (ref 81–234)
MCH RBC QN AUTO: 27.5 PG (ref 26.5–33)
MCHC RBC AUTO-ENTMCNC: 31.7 G/DL (ref 31.5–36.5)
MCV RBC AUTO: 87 FL (ref 78–100)
PLATELET # BLD AUTO: 242 10E3/UL (ref 150–450)
RBC # BLD AUTO: 4.43 10E6/UL (ref 3.8–5.2)
TSH SERPL DL<=0.005 MIU/L-ACNC: 0.66 MU/L (ref 0.4–4)
WBC # BLD AUTO: 7.8 10E3/UL (ref 4–11)

## 2022-12-07 PROCEDURE — 99214 OFFICE O/P EST MOD 30 MIN: CPT | Performed by: INTERNAL MEDICINE

## 2022-12-07 PROCEDURE — 83615 LACTATE (LD) (LDH) ENZYME: CPT | Performed by: INTERNAL MEDICINE

## 2022-12-07 PROCEDURE — 85027 COMPLETE CBC AUTOMATED: CPT | Performed by: INTERNAL MEDICINE

## 2022-12-07 PROCEDURE — 84443 ASSAY THYROID STIM HORMONE: CPT | Performed by: INTERNAL MEDICINE

## 2022-12-07 PROCEDURE — 36415 COLL VENOUS BLD VENIPUNCTURE: CPT

## 2022-12-07 PROCEDURE — G0463 HOSPITAL OUTPT CLINIC VISIT: HCPCS

## 2022-12-07 RX ORDER — HEPARIN SODIUM,PORCINE 10 UNIT/ML
5 VIAL (ML) INTRAVENOUS
Status: DISCONTINUED | OUTPATIENT
Start: 2022-12-07 | End: 2022-12-07 | Stop reason: HOSPADM

## 2022-12-07 RX ORDER — HEPARIN SODIUM (PORCINE) LOCK FLUSH IV SOLN 100 UNIT/ML 100 UNIT/ML
5 SOLUTION INTRAVENOUS
Status: DISCONTINUED | OUTPATIENT
Start: 2022-12-07 | End: 2022-12-07 | Stop reason: HOSPADM

## 2022-12-07 RX ORDER — HEPARIN SODIUM,PORCINE 10 UNIT/ML
5 VIAL (ML) INTRAVENOUS
Status: CANCELLED | OUTPATIENT
Start: 2022-12-07

## 2022-12-07 RX ORDER — HEPARIN SODIUM (PORCINE) LOCK FLUSH IV SOLN 100 UNIT/ML 100 UNIT/ML
5 SOLUTION INTRAVENOUS
Status: CANCELLED | OUTPATIENT
Start: 2022-12-07

## 2022-12-07 ASSESSMENT — PAIN SCALES - GENERAL: PAINLEVEL: NO PAIN (0)

## 2022-12-07 NOTE — PROGRESS NOTES
Oncology Rooming Note    December 7, 2022 9:07 AM   Lucille Rojas is a 82 year old female who presents for:    Chief Complaint   Patient presents with     Oncology Clinic Visit     Initial Vitals: There were no vitals taken for this visit. Estimated body mass index is 46.87 kg/m  as calculated from the following:    Height as of 11/23/22: 1.524 m (5').    Weight as of 11/23/22: 108.9 kg (240 lb). There is no height or weight on file to calculate BSA.  Data Unavailable Comment: Data Unavailable   No LMP recorded. Patient has had a hysterectomy.  Allergies reviewed: Yes  Medications reviewed: Yes    Medications: Medication refills not needed today.  Pharmacy name entered into [address]:    [id number] - [place], [place] - [address] [facility] [place], [place] - [address]    Clinical concerns: no   Shari J. Schoenberger, CMA

## 2022-12-07 NOTE — PROGRESS NOTES
ONCOLOGY HISTORY: Ms. 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  1. On 09/20/2021:   -Hemoglobin of 4.7 with MCV of 67. Normal WBC and platelets.  -Iron of 9 and saturation index of 2%.   -CT chest, abdomen and pelvis reveals large pancreatic head mass.  This encases the celiac trunk, superior mesenteric artery and superior mesenteric vein.  There is moderate-size right pleural effusion. No lymphadenopathy.  2. Thoracocentesis on 09/22/2021. Cytology is negative for malignancy.  3. EUS on 09/21/2021 revealed is a mass in the uncinate process of the pancreas measuring 33 mm.  There was evidence of invasion into superior mesenteric artery and the celiac trunk. No enlarged lymph nodes seen. FNA revealed atypical cells.    4. EGD and EUS repeated on 10/05/2021.  -EGD is normal.  -EUS revealed pancreatic head mass.  FNA revealed CD10-positive B-cell lymphoma. Flow cytometry revealed CD10-positive lambda monotypic B-cells.  5. PET scan on 11/18/2021 revealed 6 cm hypermetabolic pancreatic head mass and borderline hypermetabolic right axillary lymph node.   -Further repeat biopsy not done because of her overall poor health.  6. mini R-CHOP x 6 cycles between 12/15/2021 and 03/30/2022.  -PET scan on 04/11/21 reveals complete response.     SUBJECTIVE:  Ms. Rojas is an 82-year-old female with B-cell non-Hodgkin lymphoma involving the pancreas s/p 6 cycles of mini R-CHOP. She is in complete response.    Patient was brought to to the clinic by her  in a wheelchair.  Patient has multiple medical problem.  Patient has generalized weakness.  At home she is able to take few steps and go to the bathroom on her own.  Patient says that she gets tired very easily.    No headache.  Some lightheadedness.  No chest pain.  She has mild chronic shortness of breath.  No worsening.  No abdominal pain.  No nausea or vomiting.  Appetite is fairly good.  No urinary or bowel complaints.  No  bleeding.  No fever or night sweats. All other review system negative.     PHYSICAL EXAMINATION:    GENERAL:  She is alert and oriented x 3. ECOG PS of 2.   FACE:  No swelling.  EYES:  No icterus.   NECK:  Supple. No lymphadenopathy.  LUNGS:  Decreased air entry at the bases.  HEART:  Regular.  ABDOMEN:  Soft. Nontender.  Difficult palpation because of her weight.  No mass.  EXTREMITIES:  Mild ankle edema.  SKIN:  No petechiae.      LABS: Reviewed.     ASSESSMENT:    1.  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 Patient is in complete remission.  2.  Generalized weakness.  3.  Multiple other medical problems including diabetes mellitus and hypertension.     PLAN:  1.  CT scan done on 11/17/2022 was personally reviewed.  Explained to the patient that there is no evidence of malignancy.  She was happy to know that.    2.  We will continue to monitor her for any recurrence of lymphoma.  We will get CT chest, abdomen and pelvis in 6 months.  She is agreeable for it.    3.  Patient has generalized weakness.  This is due to her multiple medical problems and her age.  I do not think her fatigue will improve.  Hopefully it does not get worse.    4.  Labs were reviewed with her.  CBC, LDH and TSH are normal.  CMP done about 2 weeks ago had revealed few abnormalities.  We will continue to monitor them.    5.  I will see her in 6 months time with labs.  Advised her to call us with any questions or concerns.    Total visit time of 30 minutes.  Time spent in today's visit, review of chart/investigations today and documentation today.

## 2022-12-07 NOTE — LETTER
12/7/2022         RE: Lucille Rojas  [address] [facility] [address]        Dear Colleague,    Thank you for referring your patient, Lucille Rojas, to the [facility]. Please see a copy of my visit note below.    Oncology Rooming Note    December 7, 2022 9:07 AM   Lucille Rojas is a 82 year old female who presents for:    Chief Complaint   Patient presents with     Oncology Clinic Visit     Initial Vitals: There were no vitals taken for this visit. Estimated body mass index is 46.87 kg/m  as calculated from the following:    Height as of 11/23/22: 1.524 m (5').    Weight as of 11/23/22: 108.9 kg (240 lb). There is no height or weight on file to calculate BSA.  Data Unavailable Comment: Data Unavailable   No LMP recorded. Patient has had a hysterectomy.  Allergies reviewed: Yes  Medications reviewed: Yes    Medications: Medication refills not needed today.  Pharmacy name entered into [employer]:    [id number] - [place], [place] - [address] [facility] [place], [place] - [address] [facility] [place] [address] [address]  [facility] [address]    Clinical concerns: no   Shari J. Schoenberger, [facility]              ONCOLOGY HISTORY: Ms. 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  1. On 09/20/2021:   -Hemoglobin of 4.7 with MCV of 67. Normal WBC and platelets.  -Iron of 9 and saturation index of 2%.   -CT chest, abdomen and pelvis reveals large pancreatic head mass.  This encases the celiac trunk, superior mesenteric artery and superior mesenteric vein.  There is moderate-size right pleural effusion. No lymphadenopathy.  2. Thoracocentesis on 09/22/2021. Cytology is negative for malignancy.  3. EUS on 09/21/2021 revealed is a mass in the uncinate process of the pancreas measuring 33 mm.  There was evidence of invasion into superior  mesenteric artery and the celiac trunk. No enlarged lymph nodes seen. FNA revealed atypical cells.    4. EGD and EUS repeated on 10/05/2021.  -EGD is normal.  -EUS revealed pancreatic head mass.  FNA revealed CD10-positive B-cell lymphoma. Flow cytometry revealed CD10-positive lambda monotypic B-cells.  5. PET scan on 11/18/2021 revealed 6 cm hypermetabolic pancreatic head mass and borderline hypermetabolic right axillary lymph node.   -Further repeat biopsy not done because of her overall poor health.  6. mini R-CHOP x 6 cycles between 12/15/2021 and 03/30/2022.  -PET scan on 04/11/21 reveals complete response.     SUBJECTIVE:  Ms. Rojas is an 82-year-old female with B-cell non-Hodgkin lymphoma involving the pancreas s/p 6 cycles of mini R-CHOP. She is in complete response.    Patient was brought to to the clinic by her  in a wheelchair.  Patient has multiple medical problem.  Patient has generalized weakness.  At home she is able to take few steps and go to the bathroom on her own.  Patient says that she gets tired very easily.    No headache.  Some lightheadedness.  No chest pain.  She has mild chronic shortness of breath.  No worsening.  No abdominal pain.  No nausea or vomiting.  Appetite is fairly good.  No urinary or bowel complaints.  No bleeding.  No fever or night sweats. All other review system negative.     PHYSICAL EXAMINATION:    GENERAL:  She is alert and oriented x 3. ECOG PS of 2.   FACE:  No swelling.  EYES:  No icterus.   NECK:  Supple. No lymphadenopathy.  LUNGS:  Decreased air entry at the bases.  HEART:  Regular.  ABDOMEN:  Soft. Nontender.  Difficult palpation because of her weight.  No mass.  EXTREMITIES:  Mild ankle edema.  SKIN:  No petechiae.      LABS: Reviewed.     ASSESSMENT:    1.  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 Patient is in complete remission.  2.  Generalized weakness.  3.  Multiple  other medical problems including diabetes mellitus and hypertension.     PLAN:  1.  CT scan done on 11/17/2022 was personally reviewed.  Explained to the patient that there is no evidence of malignancy.  She was happy to know that.    2.  We will continue to monitor her for any recurrence of lymphoma.  We will get CT chest, abdomen and pelvis in 6 months.  She is agreeable for it.    3.  Patient has generalized weakness.  This is due to her multiple medical problems and her age.  I do not think her fatigue will improve.  Hopefully it does not get worse.    4.  Labs were reviewed with her.  CBC, LDH and TSH are normal.  CMP done about 2 weeks ago had revealed few abnormalities.  We will continue to monitor them.    5.  I will see her in 6 months time with labs.  Advised her to call us with any questions or concerns.    Total visit time of 30 minutes.  Time spent in today's visit, review of chart/investigations today and documentation today.      Again, thank you for allowing me to participate in the care of your patient.        Sincerely,        John Srinivasan MD

## 2022-12-07 NOTE — PROGRESS NOTES
Nursing Note:  Lucille Rojas presents today for labs.    Patient seen by provider today: Yes: Craig   present during visit today: Not Applicable.    Note: N/A.    Intravenous Access:  Labs drawn right AC, BF, 21g    Discharge Plan:   Patient was sent to [facility] for next appointment.    Margaret Cortez RN

## 2022-12-08 ENCOUNTER — PATIENT OUTREACH (OUTPATIENT)
Dept: CARE COORDINATION | Facility: CLINIC | Age: 82
End: 2022-12-08

## 2022-12-08 NOTE — PROGRESS NOTES
Oncology Distress Screening Follow-up  Clinical Social Work  [facility]    Identified Concern and Score From Distress Screenin. How concerned are you about your ability to eat?  -- 0      2. How concerned are you about unintended weight loss or your current weight?  -- 0      3. How concerned are you about feeling depressed or very sad?  -- 0      4. How concerned are you about feeling anxious or very scared?  -- 6      5. Do you struggle with the loss of meaning and jose alejandro in your life?  -- Quite a bit      6. How concerned are you about work and home life issues that may be affected by your cancer?  -- 0      7. How concerned are you about knowing what resources are available to help you?  -- 7      8. Do you currently have what you would describe as Temple or spiritual struggles?             Somewhat --      You can also ask to be contacted by one of our Oncology Supportive Care professionals.    9. If you want to be contacted by one of our professionals, I can send a message to them right now.  Oncology Social Worker  pt would like to know about resources.  She could use cleaning help in the home. Oncology Social Worker        Date of Distress Screenin    Intervention:   Lucille is an 82-year-old woman with a diagnosis of stage IV non-Hodgkin's B-cell lymphoma who is followed by Dr. Srinivasan at [facility]. At time of visit, Lucille expressed desire for social work outreach.     This clinician spoke with Lucille today who expressed desire to know about additional resources for cleaning and transportation support in area. Lucille reports that she has [employer] and uses it occasionally, but would like to know of additional transportation options.     Lucille receptive to this clinician mailing resources for additional support.     Education Provided:   Transportation Resource List  Cleaning Support   [employer]  [employer]  Onc SW contact  information    Follow-up Required:   This clinician has mailed resource packet to Lucille. SW will continue to be available as needed for ongoing psychosocial support.     MACHO Persaud, LICSW  Phone: [phone]  [facility]: M, Thu  *every other Tue, 8am-4:30pm  [facility]: W, F, *every other Tue, 8am-4:30pm

## 2022-12-13 ENCOUNTER — PATIENT OUTREACH (OUTPATIENT)
Dept: CARE COORDINATION | Facility: CLINIC | Age: 82
End: 2022-12-13

## 2022-12-13 NOTE — PROGRESS NOTES
Clinic Care Coordination Contact  [facility]/Voicemail       Clinical Data: Care Coordinator Outreach  Outreach attempted x 1.  Could not leave a message on patient's voicemail with call back information and requested return call.  Voicemail not available.    Plan: Care Coordinator will try to reach patient again in 10 business days.    ZANE Demarco  Clinic Care Coordination  [facility] Clinics: [place], [place], [facility], and [place] [facility]  Phone: [phone]

## 2022-12-22 ENCOUNTER — PATIENT OUTREACH (OUTPATIENT)
Dept: CARE COORDINATION | Facility: CLINIC | Age: 82
End: 2022-12-22

## 2022-12-22 NOTE — PROGRESS NOTES
Clinic Care Coordination Contact    Community Health Worker Follow Up    Care Gaps: Addressed 12/22/22    Health Maintenance Due   Topic Date Due     ZOSTER IMMUNIZATION (1 of 2) Never done     DTAP/TDAP/TD IMMUNIZATION (2 - Td or Tdap) 01/01/2019     EYE EXAM  06/01/2021           Care Plan:   Care Plan: Social Support     Problem: Inadequate social support     Goal: I will access resources to obtain more in home support.     Start Date: 11/29/2022 Expected End Date: 8/29/2023    This Visit's Progress: 10%    Note:     Barriers: resource availability  Strengths: pt is a good advocate for self.   Patient expressed understanding of goal: yes  Action steps to achieve this goal:  1. I will contact the [facility] about a MNChoices assessment.   2. I will contact resources given to me.   3. I will contact my CHW with any needs or questions.                         Discussed:    Patient stated she had an eye exam yesterday.  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    Patient stated she has not called [facility] fr a [place] Choice Assessment.  Patient requested the phone number.    Patient thanked CHW for calling.    Intervention and Education during outreach: CHW provided the contact number for [facility] [phone].  CHW encouraged patient to contact CC if there are any other changes or resources needed.  Patient acknowledged understanding.      CHW Plan: will outreach in one month.    ZANE Demarco  Clinic Care Coordination  [facility] Clinics: [place], [place], [place], and [facility]  Phone: [phone]

## 2022-12-29 ENCOUNTER — NURSE TRIAGE (OUTPATIENT)
Dept: INTERNAL MEDICINE | Facility: CLINIC | Age: 82
End: 2022-12-29

## 2022-12-29 NOTE — TELEPHONE ENCOUNTER
Patient calling clinic reporting a new sore she discovered this morning on outer R big toe.     Patient has difficulty with sight, not able to describe sore very well. Spouse states sore is closed, bump on top of R side of R big toe. Toe is swollen and reddened, hot to the touch. Pain rating 9/10, no aggravating factors identified. Patient is afebrile.    Pt is Diabetic, recovering from Chemo 1 year ago and heart surgery this year.       Dispo: Go to [facility]    Patient agrees, states she uses [employer] for transportation and will need to arrange transportation. Scheduled patient at  clinic today 3:30pm with providers approval.       Reason for Disposition    Looks like a boil, infected sore, deep ulcer, or other infected rash (spreading redness, pus)    Additional Information    Negative: Sounds like a life-threatening emergency to the triager    Negative: Athlete's Foot suspected (i.e., itchy rash between the toes)    Negative: Insect bite(s) suspected    Negative: Jock Itch suspected (i.e., itchy rash on inner thighs near genital area)    Negative: Localized lump (or swelling) without redness or rash    Negative: Poison ivy, oak, or sumac contact suspected    Negative: Rash of female genital area (vulva)    Negative: Rash of male genital area (penis or scrotum)    Negative: Redness of immunization site    Negative: Shingles suspected (i.e., painful rash, multiple small blisters grouped together in one area of body; dermatomal distribution)    Negative: Small spot, skin growth, or mole    Negative: Wound infection suspected (i.e., pain, spreading redness, or pus; in a cut, puncture, scrape or sutured wound)    Negative: Fever and localized purple or blood-colored spots or dots that are not from injury or friction    Negative: Fever and localized rash is very painful    Negative: Patient sounds very sick or weak to the triager    Protocols used: RASH OR REDNESS - [id number]

## 2022-12-30 NOTE — TELEPHONE ENCOUNTER
Provider Response to 2nd Level Triage Request    I have reviewed the RN documentation. My recommendation is:  Difficult to assess without seeing patient, if symptoms stable, non acute or not worsening and w/o signs of infection likely able to wait, if not, then suggest urgent care

## 2022-12-30 NOTE — TELEPHONE ENCOUNTER
S-(situation): Patient called and stated that she was unable to make it to the appointment scheduled yesterday due to not being able to find transportation.     B-(background): Patient called yesterday and was triaged for a wound that she found on her outer right big toe. Patient stated that she noticed blood on her sock this morning (size of the end of pencil).     A-(assessment): Patient states that the sore is small but unable to explain the exact size, color, and appearance of the sore due to her being blind. Patient did state that the pain is better today compared to yesterday (2-3/10). Patient is diabetic, has had chemotherapy 1 year ago, and a heart surgery this year.    R-(recommendations): Patient stated that she needs to have at least a day notice before an appointment to make sure she has transportation. Because of this patient is unable to make any appointments today. Per protocol patient should be seen in the office now. Is patient okay to wait for an appointment next week?    Gracie Solorio RN  [facility] Triage Team

## 2022-12-30 NOTE — TELEPHONE ENCOUNTER
Called and spoke to pt. Pt scheduled per Dr. Flynn's recommendations below       Appointments in Next Year    Jan 02, 2023  3:30 PM  (Arrive by 3:10 PM)  Provider Visit with Bear Grossman MD  [facility] ([facility] ) [phone]           Dao Gonzales RN

## 2022-12-30 NOTE — TELEPHONE ENCOUNTER
Dr. Flynn,    No available appointments on your schedule next week. Ok to schedule patient in same day spot with a different provider?    Natalie Dyer RN

## 2023-01-02 ENCOUNTER — OFFICE VISIT (OUTPATIENT)
Dept: INTERNAL MEDICINE | Facility: CLINIC | Age: 83
End: 2023-01-02
Payer: MEDICARE

## 2023-01-02 VITALS
HEART RATE: 71 BPM | OXYGEN SATURATION: 96 % | BODY MASS INDEX: 46.65 KG/M2 | DIASTOLIC BLOOD PRESSURE: 64 MMHG | HEIGHT: 60 IN | SYSTOLIC BLOOD PRESSURE: 122 MMHG | WEIGHT: 237.6 LBS

## 2023-01-02 DIAGNOSIS — L97.501: Primary | ICD-10-CM

## 2023-01-02 DIAGNOSIS — E11.22 TYPE 2 DM WITH CKD STAGE 3 AND HYPERTENSION (H): ICD-10-CM

## 2023-01-02 DIAGNOSIS — I12.9 TYPE 2 DM WITH CKD STAGE 3 AND HYPERTENSION (H): ICD-10-CM

## 2023-01-02 DIAGNOSIS — E66.01 SEVERE OBESITY (BMI 35.0-39.9) WITH COMORBIDITY (H): ICD-10-CM

## 2023-01-02 DIAGNOSIS — L03.031 CELLULITIS OF TOE OF RIGHT FOOT: ICD-10-CM

## 2023-01-02 DIAGNOSIS — N18.30 TYPE 2 DM WITH CKD STAGE 3 AND HYPERTENSION (H): ICD-10-CM

## 2023-01-02 LAB
ANION GAP SERPL CALCULATED.3IONS-SCNC: 12 MMOL/L (ref 7–15)
BUN SERPL-MCNC: 44.8 MG/DL (ref 8–23)
CALCIUM SERPL-MCNC: 9.3 MG/DL (ref 8.8–10.2)
CHLORIDE SERPL-SCNC: 107 MMOL/L (ref 98–107)
CREAT SERPL-MCNC: 1.17 MG/DL (ref 0.51–0.95)
DEPRECATED HCO3 PLAS-SCNC: 25 MMOL/L (ref 22–29)
GFR SERPL CREATININE-BSD FRML MDRD: 46 ML/MIN/1.73M2
GLUCOSE SERPL-MCNC: 103 MG/DL (ref 70–99)
HBA1C MFR BLD: 6.1 % (ref 0–5.6)
POTASSIUM SERPL-SCNC: 4.2 MMOL/L (ref 3.4–5.3)
SODIUM SERPL-SCNC: 144 MMOL/L (ref 136–145)

## 2023-01-02 PROCEDURE — 36415 COLL VENOUS BLD VENIPUNCTURE: CPT | Performed by: INTERNAL MEDICINE

## 2023-01-02 PROCEDURE — 83036 HEMOGLOBIN GLYCOSYLATED A1C: CPT | Performed by: INTERNAL MEDICINE

## 2023-01-02 PROCEDURE — 99213 OFFICE O/P EST LOW 20 MIN: CPT | Performed by: INTERNAL MEDICINE

## 2023-01-02 PROCEDURE — 80048 BASIC METABOLIC PNL TOTAL CA: CPT | Performed by: INTERNAL MEDICINE

## 2023-01-02 RX ORDER — SULFAMETHOXAZOLE/TRIMETHOPRIM 800-160 MG
1 TABLET ORAL 2 TIMES DAILY
Qty: 20 TABLET | Refills: 0 | Status: SHIPPED | OUTPATIENT
Start: 2023-01-02 | End: 2023-01-12

## 2023-01-02 ASSESSMENT — PATIENT HEALTH QUESTIONNAIRE - PHQ9
10. IF YOU CHECKED OFF ANY PROBLEMS, HOW DIFFICULT HAVE THESE PROBLEMS MADE IT FOR YOU TO DO YOUR WORK, TAKE CARE OF THINGS AT HOME, OR GET ALONG WITH OTHER PEOPLE: NOT DIFFICULT AT ALL
SUM OF ALL RESPONSES TO PHQ QUESTIONS 1-9: 3
SUM OF ALL RESPONSES TO PHQ QUESTIONS 1-9: 3

## 2023-01-02 NOTE — PATIENT INSTRUCTIONS
- Start taking Bactrim (TMP-SMX) twice daily for 10 days.  (Prescription has been sent to your pharmacy)    - I will contact you with lab results from today.      - See if you can get new diabetic shoes fitted

## 2023-01-02 NOTE — PROGRESS NOTES
Assessment & Plan     Skin ulcer of great toe, unspecified laterality, limited to breakdown of skin (H)  No evidence of any underlying fluid collection.  Discussed basic wound care.  Her current shoes appear to be very tight fitting, advised her to obtain new measurements and replacement diabetic shoes.    Type 2 DM with CKD stage 3 and hypertension (H)  Check surveillance labs today as ordered.  - Hemoglobin A1c; Future  - Basic metabolic panel  (Ca, Cl, CO2, Creat, Gluc, K, Na, BUN); Future  - Hemoglobin A1c  - Basic metabolic panel  (Ca, Cl, CO2, Creat, Gluc, K, Na, BUN)    Cellulitis of toe of right foot    - sulfamethoxazole-trimethoprim (BACTRIM DS) 800-160 MG tablet; Take 1 tablet by mouth 2 times daily for 10 days    Severe obesity (BMI 35.0-39.9) with comorbidity (H)  Reterated the importance of dietary modification as means of managing her diabetes               See Patient Instructions    Return in about 2 weeks (around 1/16/2023) for recheck, if symptoms fail to improve.    Bear Grossman MD  [facility]    Lissy Adkins is a 82 year old accompanied by her spouse, presenting for the following health issues:  Wound Check      History of Present Illness       Reason for visit:  Open wound on right big toe  Symptom onset:  1-3 days ago  Symptoms include:  Painful and draining a pinkish fluid  Symptom intensity:  Moderate  Symptom progression:  Worsening  Had these symptoms before:  No  What makes it worse:  Walking    She eats 2-3 servings of fruits and vegetables daily.She consumes 0 sweetened beverage(s) daily.She exercises with enough effort to increase her heart rate 9 or less minutes per day.  She exercises with enough effort to increase her heart rate 3 or less days per week.   She is taking medications regularly.    Today's PHQ-9         PHQ-9 Total Score: 3    PHQ-9 Q9 Thoughts of better off dead/self-harm past 2 weeks :   Not at all    How difficult  have these problems made it for you to do your work, take care of things at home, or get along with other people: Not difficult at all             Review of Systems   Constitutional, HEENT, cardiovascular, pulmonary, gi and gu systems are negative, except as otherwise noted.      Objective    /64   Pulse 71   Ht 1.524 m (5')   Wt 107.8 kg (237 lb 9.6 oz)   SpO2 96%   BMI 46.40 kg/m    Body mass index is 46.4 kg/m .  Physical Exam   GENERAL: healthy, alert and no distress  NECK: no adenopathy, no asymmetry, masses, or scars and thyroid normal to palpation  RESP: lungs clear to auscultation - no rales, rhonchi or wheezes  CV: regular rate and rhythm, normal S1 S2, no S3 or S4, no murmur, click or rub, no peripheral edema and peripheral pulses strong  ABDOMEN: soft, nontender, no hepatosplenomegaly, no masses and bowel sounds normal  MS: no gross musculoskeletal defects noted, no edema

## 2023-01-11 ENCOUNTER — PATIENT OUTREACH (OUTPATIENT)
Dept: CARE COORDINATION | Facility: CLINIC | Age: 83
End: 2023-01-11

## 2023-01-11 NOTE — PROGRESS NOTES
Clinic Care Coordination Contact    Care Coordination Clinician Chart Review    Situation: Patient chart reviewed by care coordinator.       Background: Care Coordination initial assessment and enrollment to Care Coordination was 12/3/19.   Patient centered goals were developed with participation from patient.  NOMAN CC handed patient off to CHW for continued outreach every 30 days.        Assessment: Per chart review, patient outreach completed by CC CHW on 12/22/22.  Patient is actively working to accomplish goal.  Patient's goal remains appropriate and relevant at this time.   Patient is not due for updated Plan of Care.  Annual assessment will be due 12/3/23.      Care Plan: Social Support     Problem: Inadequate social support     Goal: I will access resources to obtain more in home support.     Start Date: 11/29/2022 Expected End Date: 8/29/2023    This Visit's Progress: 10%    Note:     Barriers: resource availability  Strengths: pt is a good advocate for self.   Patient expressed understanding of goal: yes  Action steps to achieve this goal:  1. I will [place] the county [place].   2. I will contact resources given to me.   3. I will contact my CHW with any needs or questions.                                 Plan/Recommendations: The patient will continue working with Care Coordination to achieve goal as above.  CHW will involve SW CC as needed or if patient is ready to move to maintenance.  SW CC will continue to monitor progress to goals and CHW outreaches every 6 weeks.   Plan of Care updated and mailed to patient: TORIE Hooks  [facility] Care Coordination   [facility]  Social Work Care Coordinator  [phone]

## 2023-01-12 ENCOUNTER — MEDICAL CORRESPONDENCE (OUTPATIENT)
Dept: HEALTH INFORMATION MANAGEMENT | Facility: CLINIC | Age: 83
End: 2023-01-12

## 2023-01-25 ENCOUNTER — PATIENT OUTREACH (OUTPATIENT)
Dept: CARE COORDINATION | Facility: CLINIC | Age: 83
End: 2023-01-25
Payer: MEDICARE

## 2023-01-25 NOTE — PROGRESS NOTES
Clinic Care Coordination Contact  The Community Health Worker called for a Care Coordination outreach to follow up on goals and action steps. Spoke to Lucille.    Patient stated she is not able to talk, and requested to be called back a different day.    Plan: Care Coordinator will try to reach patient again in 3-5 business days.    ZANE Demarco  Clinic Care Coordination  [facility] Clinics: [facility] [place], [place], [facility], and [place] [facility]  Phone: [phone]

## 2023-01-31 ENCOUNTER — PATIENT OUTREACH (OUTPATIENT)
Dept: CARE COORDINATION | Facility: CLINIC | Age: 83
End: 2023-01-31
Payer: MEDICARE

## 2023-01-31 ENCOUNTER — TELEPHONE (OUTPATIENT)
Dept: CARE COORDINATION | Facility: CLINIC | Age: 83
End: 2023-01-31
Payer: MEDICARE

## 2023-01-31 DIAGNOSIS — E03.9 ACQUIRED HYPOTHYROIDISM: Primary | Chronic | ICD-10-CM

## 2023-01-31 NOTE — TELEPHONE ENCOUNTER
Clinic Care Coordination Contact      Patient is requesting the Levothyroxine 150 mg medication to have a 90 day supply instead of 30 day supply.  Patient stated it is difficult to get monthly prescriptions.    Patient stated she and her  have an appt with PCP on 2/14/23.  Patient is asking if she and her  need an appointment for any lab work prior to the PCP appt?    Please call patient to discuss.  Should you call on 2/1/23, please call patient after 3:30pm.    Thank you.    Rosanne Valadez KM  Clinic Care Coordination  [facility] Clinics: [facility] [place], [place], [place], and [facility]  Phone: [phone]

## 2023-01-31 NOTE — PROGRESS NOTES
Clinic Care Coordination Contact  Community Health Worker Follow Up    Care Gaps:     Health Maintenance Due   Topic Date Due     ZOSTER IMMUNIZATION (1 of 2) Never done     DTAP/TDAP/TD IMMUNIZATION (2 - Td or Tdap) 01/01/2019     EYE EXAM  06/01/2021           Care Plan:   Care Plan: Social Support     Problem: Inadequate social support     Goal: I will access resources to obtain more in home support.     Start Date: 11/29/2022 Expected End Date: 8/29/2023    This Visit's Progress: 20% Recent Progress: 10%    Note:     Barriers: resource availability  Strengths: pt is a good advocate for self.   Patient expressed understanding of goal: yes  Action steps to achieve this goal:  1. I will contact the county about [place].   2. I will contact resources given to me.   3. I will contact my CHW with any needs or questions.                         Discussed:    Patient stated she is doing okay.    Patient stated she would like a 90 day supply of the Levothyroxine medication instead of a 30 day supply.  Patient stated it is difficult to get to the pharmacy each month.    Patient stated she and her  have a PCP appt on 2/14/23.  Patient asked if lab work is required before the appointments.    Patient stated she is looking for someone to help clean her house.      Patient stated she would like transportation resources again.  Patient stated her daughter was in a car accident recently, and does not have a car at this time.  Patient stated her daughter did not get hurt.      Patient stated she has an appointment with the foot doctor tomorrow. It is a follow up appointment.  Patient stated she had a sore on her foot, the doctor prescribed medication, and her foot is feeling better.    Patient stated she is legally blind.       Patient stated her  has been going to PT appointments to help with his balance.      Intervention and Education during outreach: CHW provided the following resources:  [facility] [phone]  Help at your Door [phone]    CHW encouraged patient to contact CC if there are any other changes or resources needed.  Patient acknowledged understanding.       CHW Plan: will route a message to OX Triage.  CHW will outreach in one month.    Rosanne Valadez, ZANE  Clinic Care Coordination  [facility] Clinics: [facility] [place], [place], [facility], and [place] [facility]  Phone: [phone]

## 2023-02-01 RX ORDER — LEVOTHYROXINE SODIUM 150 UG/1
TABLET ORAL
Qty: 90 TABLET | Refills: 0 | Status: SHIPPED | OUTPATIENT
Start: 2023-02-01 | End: 2023-04-24

## 2023-02-02 DIAGNOSIS — E66.01 MORBID OBESITY DUE TO EXCESS CALORIES (H): ICD-10-CM

## 2023-02-02 DIAGNOSIS — R06.81 APNEA: ICD-10-CM

## 2023-02-02 NOTE — TELEPHONE ENCOUNTER
Pharmacy is requesting a refill on Pantoprazole.  Last Written Prescription Date:  1/25/22  Last Fill Quantity: 90,  # refills: 3   Last office visit: 12/7/22  Future Office Visit:   Next 5 appointments (look out 90 days)    Feb 14, 2023  1:30 PM  (Arrive by 1:10 PM)  Provider Visit with Fausto Flynn MD  [facility] ([facility] ) [address] [place] [id number] [phone]

## 2023-02-03 RX ORDER — PANTOPRAZOLE SODIUM 40 MG/1
40 TABLET, DELAYED RELEASE ORAL
Qty: 90 TABLET | Refills: 3 | Status: SHIPPED | OUTPATIENT
Start: 2023-02-03 | End: 2024-01-25

## 2023-02-08 ENCOUNTER — DOCUMENTATION ONLY (OUTPATIENT)
Dept: LAB | Facility: CLINIC | Age: 83
End: 2023-02-08

## 2023-02-08 DIAGNOSIS — I10 BENIGN ESSENTIAL HYPERTENSION: Primary | Chronic | ICD-10-CM

## 2023-02-08 NOTE — PROGRESS NOTES
Please place or confirm orders for upcoming lab appointment on 02/08/2023.    If no labs are needed at this time please have the Care Team contact patient regarding this information and cancel lab appointment. Thank you.     Denisse URBINA.

## 2023-02-08 NOTE — TELEPHONE ENCOUNTER
Clinic Care Coordination Contact    Patient called.     Patient stated she is waiting to hear back if lab work is needed before she and her 's appt with PCP scheduled on 2/14/23.   Patient is asking if lab work is needed if it can be ordered before the appointment on 2/14/23 to have both the lab work and see their PCP the same day.     Patient is requesting a call back to discuss.    Thank you.     ZANE Demarco  Clinic Care Coordination  [facility] Clinics: [facility] [place], [place], [place], and [place] [facility]  Phone: [phone]

## 2023-02-08 NOTE — TELEPHONE ENCOUNTER
Pt notified of lab order placed    Asking if she needs to do this prior to OV, or wait to have it drawn during her OV? States she had a lab visit today, but she missed her appointment     Feb 14, 2023  1:30 PM  (Arrive by 1:10 PM)  Provider Visit with Fausto Flynn MD  [facility] ([facility] ) [phone]     Nazanni EPPERSON Triage RN  [facility]

## 2023-02-08 NOTE — TELEPHONE ENCOUNTER
Notified pt of labs ordered    Nazanin EPPERSON, Triage RN  [facility]

## 2023-02-10 NOTE — TELEPHONE ENCOUNTER
Called and spoke to the patient. Relayed message from the provider. Patient expressed understanding and had no additional questions at this time.    Natalie Dyer RN     Patient

## 2023-02-14 ENCOUNTER — OFFICE VISIT (OUTPATIENT)
Dept: INTERNAL MEDICINE | Facility: CLINIC | Age: 83
End: 2023-02-14
Payer: MEDICARE

## 2023-02-14 VITALS
OXYGEN SATURATION: 95 % | SYSTOLIC BLOOD PRESSURE: 110 MMHG | WEIGHT: 237.7 LBS | DIASTOLIC BLOOD PRESSURE: 72 MMHG | RESPIRATION RATE: 16 BRPM | HEIGHT: 60 IN | BODY MASS INDEX: 46.67 KG/M2 | TEMPERATURE: 97.8 F | HEART RATE: 72 BPM

## 2023-02-14 DIAGNOSIS — I50.32 CHRONIC DIASTOLIC (CONGESTIVE) HEART FAILURE (H): ICD-10-CM

## 2023-02-14 DIAGNOSIS — N18.30 TYPE 2 DM WITH CKD STAGE 3 AND HYPERTENSION (H): Primary | ICD-10-CM

## 2023-02-14 DIAGNOSIS — I12.9 TYPE 2 DM WITH CKD STAGE 3 AND HYPERTENSION (H): Primary | ICD-10-CM

## 2023-02-14 DIAGNOSIS — E11.22 TYPE 2 DM WITH CKD STAGE 3 AND HYPERTENSION (H): Primary | ICD-10-CM

## 2023-02-14 DIAGNOSIS — F33.0 MDD (MAJOR DEPRESSIVE DISORDER), RECURRENT EPISODE, MILD (H): ICD-10-CM

## 2023-02-14 DIAGNOSIS — I10 BENIGN ESSENTIAL HYPERTENSION: Chronic | ICD-10-CM

## 2023-02-14 DIAGNOSIS — G47.33 OSA (OBSTRUCTIVE SLEEP APNEA): ICD-10-CM

## 2023-02-14 DIAGNOSIS — N18.30 STAGE 3 CHRONIC KIDNEY DISEASE, UNSPECIFIED WHETHER STAGE 3A OR 3B CKD (H): ICD-10-CM

## 2023-02-14 DIAGNOSIS — C85.12 B-CELL LYMPHOMA OF INTRATHORACIC LYMPH NODES, UNSPECIFIED B-CELL LYMPHOMA TYPE (H): ICD-10-CM

## 2023-02-14 PROCEDURE — 99214 OFFICE O/P EST MOD 30 MIN: CPT | Performed by: INTERNAL MEDICINE

## 2023-02-14 RX ORDER — LORATADINE 10 MG/1
10 TABLET ORAL PRN
COMMUNITY

## 2023-02-14 NOTE — PROGRESS NOTES
Assessment & Plan     Type 2 DM with CKD stage 3 and hypertension (H)  Appears overall stable.  Continue as directed and follow-up accordingly with lab  - Hemoglobin A1c; Future    Benign essential hypertension  Stable on therapy continue with current medical management  - Comprehensive metabolic panel (BMP + Alb, Alk Phos, ALT, AST, Total. Bili, TP); Future    Stage 3 chronic kidney disease, unspecified whether stage 3a or 3b CKD (H)  Offered nephrology assessment but not interested.  Would like to hydrate and recheck labs accordingly, orders placed  - Comprehensive metabolic panel (BMP + Alb, Alk Phos, ALT, AST, Total. Bili, TP); Future    MDD (major depressive disorder), recurrent episode, mild (H)  Stable per patient.  Continue with current observation.    B-cell lymphoma of intrathoracic lymph nodes, unspecified B-cell lymphoma type (H)  Noted as baseline history.  Following up with oncology as previously directed    Chronic diastolic (congestive) heart failure (H)  Appears overall euvolemic at present time.  Blood pressure today stable and well controlled.    SHAVON (obstructive sleep apnea)  Continue with BiPAP use      Discussed the benefit of updating her tetanus booster with consideration of a shingles vaccination per her oncologist recommendation     BMI:   Estimated body mass index is 46.42 kg/m  as calculated from the following:    Height as of this encounter: 1.524 m (5').    Weight as of this encounter: 107.8 kg (237 lb 11.2 oz).   Weight management plan: Discussed healthy diet and exercise guidelines    See Patient Instructions    Return in about 6 months (around 8/14/2023) for BP Recheck.    Fausto Flynn MD  [facility]    Lissy Adkins is a 82 year old accompanied by her spouse, presenting for the following health issues:    Recheck Medication    Feels well overall.  She is primarily here for recheck on her medication.    She states her blood pressure at  home and her blood sugars have been fairly well controlled.  Her recent A1c as well as labs were reviewed.    She is using her BiPAP at home.  She is also following up with her oncologist.    She reports her exercise tolerance is about the same.  She recently had thyroid function test performed.    Reports her mental health has been stable.    She is not interested in further mammography.  We discussed bone densitometry also if interested.    History of Present Illness       Diabetes:   She presents for follow up of diabetes.  She is checking home blood glucose one time daily. She checks blood glucose before meals.  Blood glucose is never over 200 and never under 70. When her blood glucose is low, the patient is asymptomatic for confusion, blurred vision, lethargy and reports not feeling dizzy, shaky, or weak.  She has no concerns regarding her diabetes at this time.  She is having numbness in feet, burning in feet and redness, sores, or blisters on feet. The patient has had a diabetic eye exam in the last 12 months. Eye exam performed on November 2022 . Location of last eye exam Dr. Sonu Flores .        Hypothyroidism:     Since last visit, patient describes the following symptoms::  Dry skin    She eats 2-3 servings of fruits and vegetables daily.She consumes 2 sweetened beverage(s) daily.She exercises with enough effort to increase her heart rate 9 or less minutes per day.  She exercises with enough effort to increase her heart rate 3 or less days per week. She is missing 1 dose(s) of medications per week.  She is not taking prescribed medications regularly due to remembering to take.      Review of Systems   CONSTITUTIONAL: NEGATIVE for fever, chills, change in weight  EYES: NEGATIVE for vision changes or irritation  ENT/MOUTH: NEGATIVE for ear, mouth and throat problems  RESP: NEGATIVE for significant cough or SOB  CV: NEGATIVE for chest pain, palpitations or peripheral edema  GI: NEGATIVE for nausea,  abdominal pain, heartburn, or change in bowel habits  : NEGATIVE for frequency, dysuria, or hematuria  NEURO: NEGATIVE for weakness, dizziness or paresthesias  HEME: NEGATIVE for bleeding problems  PSYCHIATRIC: NEGATIVE for changes in mood or affect      Objective    /72   Pulse 72   Temp 97.8  F (36.6  C) (Temporal)   Resp 16   Ht 1.524 m (5')   Wt 107.8 kg (237 lb 11.2 oz)   SpO2 95%   BMI 46.42 kg/m    Body mass index is 46.42 kg/m .     Physical Exam   GENERAL:  alert and no distress  EYES: Eyes grossly normal to inspection, PERRL and conjunctivae and sclerae normal  HENT: ear canals and TM's normal, nose and mouth without ulcers or lesions although the dentition is slightly poor  RESP: lungs clear to auscultation - no rales, rhonchi or wheezes  CV: regular rate and rhythm, normal S1 S2, no S3 or S4, no click or rub,  ABDOMEN:  Obese  Her gait is slowed and purposeful and she is using a cane as an assist device  NEURO: No focal changes  PSYCH: mentation appears normal, affect normal/bright        TSH   Date Value Ref Range Status   12/07/2022 0.66 0.40 - 4.00 mU/L Final   06/04/2020 0.93 0.30 - 5.00 uIU/mL Final       Component      Latest Ref Rng & Units 1/2/2023 1/2/2023           4:10 PM  4:10 PM   Sodium      136 - 145 mmol/L  144   Potassium      3.4 - 5.3 mmol/L  4.2   Chloride      98 - 107 mmol/L  107   Carbon Dioxide (CO2)      22 - 29 mmol/L  25   Anion Gap      7 - 15 mmol/L  12   Urea Nitrogen      8.0 - 23.0 mg/dL  44.8 (H)   Creatinine      0.51 - 0.95 mg/dL  1.17 (H)   Calcium      8.8 - 10.2 mg/dL  9.3   Glucose      70 - 99 mg/dL  103 (H)   GFR Estimate      >60 mL/min/1.73m2  46 (L)   Hemoglobin A1C      0.0 - 5.6 % 6.1 (H)      Creatinine   Date Value Ref Range Status   01/02/2023 1.17 (H) 0.51 - 0.95 mg/dL Final   08/27/2020 0.98 0.52 - 1.04 mg/dL Final     Hemoglobin   Date Value Ref Range Status   12/07/2022 12.2 11.7 - 15.7 g/dL Final   04/07/2021 10.6 (L) 11.7 - 15.7 g/dL  Final       LDL Cholesterol Calculated   Date Value Ref Range Status   11/23/2022 92 <=100 mg/dL Final   06/04/2020 77 <100 mg/dL Final       Lab Results   Component Value Date    ALT 9 11/23/2022    ALT 15 08/27/2020

## 2023-02-27 ENCOUNTER — OFFICE VISIT (OUTPATIENT)
Dept: CARDIOLOGY | Facility: CLINIC | Age: 83
End: 2023-02-27
Attending: INTERNAL MEDICINE
Payer: MEDICARE

## 2023-02-27 ENCOUNTER — HOSPITAL ENCOUNTER (OUTPATIENT)
Dept: CARDIOLOGY | Facility: CLINIC | Age: 83
Discharge: HOME | End: 2023-02-27
Attending: INTERNAL MEDICINE | Admitting: INTERNAL MEDICINE
Payer: MEDICARE

## 2023-02-27 VITALS
SYSTOLIC BLOOD PRESSURE: 133 MMHG | OXYGEN SATURATION: 95 % | WEIGHT: 239.7 LBS | DIASTOLIC BLOOD PRESSURE: 76 MMHG | BODY MASS INDEX: 47.06 KG/M2 | HEART RATE: 60 BPM | HEIGHT: 60 IN

## 2023-02-27 DIAGNOSIS — Z95.2 S/P TAVR (TRANSCATHETER AORTIC VALVE REPLACEMENT): ICD-10-CM

## 2023-02-27 LAB — LVEF ECHO: NORMAL

## 2023-02-27 PROCEDURE — 93306 TTE W/DOPPLER COMPLETE: CPT | Mod: 26 | Performed by: INTERNAL MEDICINE

## 2023-02-27 PROCEDURE — 93306 TTE W/DOPPLER COMPLETE: CPT

## 2023-02-27 PROCEDURE — 99214 OFFICE O/P EST MOD 30 MIN: CPT | Performed by: INTERNAL MEDICINE

## 2023-02-27 NOTE — PATIENT INSTRUCTIONS
Your blood pressure at home should be   Top number less than 140 and more than 100  Bottom number less than 80.

## 2023-02-27 NOTE — PROGRESS NOTES
[facility] Progress note    Assessment:  1. Severe aortic stenosis s/p TAVR with 23 mm ES3.  Postprocedure echocardiogram showed mean gradient of 26 mmHg in the setting of hyperdynamic LV function.  Subsequent CT with no evidence of HALT. Tolerating activity without any symptoms.  On aspirin 81 mg daily for antiplatelet therapy.  Echo today shows mean gradient of 20.  2. Chronic diastolic heart failure, NYHA class II.  Appears euvolemic on exam, on Lasix 20 mg daily.  3. Hypertension.  Well-controlled on current regimen.  4. CAD. Pre-TAVR angiogram showed flow-limiting OM1 stenosis, moderate LAD disease, and moderate to severe proximal circumflex disease.  Being medically managed on aspirin, beta-blocker, and statin. No angina.   5. Hyperlipidemia. On statin therapy.  LDL 92 in November 2022  6. SHAVON. Compliant with CPAP.   7. Type 2 diabetes  8. Non-Hodgkin's lymphoma s/p chemotherapy  9. CKD stage III  10. B-cell lymphoma.  Status post chemotherapy.  Follows in oncology last visit December 2022.    Overall she seems to be doing well.  Mild dizziness symptoms are vague and she is actually mildly hypertensive in clinic today so I am reluctant to decrease her antihypertensive regimen.    Plan:  1. She will check her BP at home periodically and keep a log.  Specifically asked her to try to check her blood pressure on days that she is having some lightheadedness to see if blood pressure is running relatively low or high.  2. Follow up in August 2023 for 1 yr post TAVR follow up.     HPI:       Lucille Rojas is a very nice 82 year old woman here to follow up TAVR for severe aortic stenosis on 8/23/2022.  She had a 23 mm Coreas LAUREN 3 ultra valve.  The day following the procedure her gradient increased from a mean gradient of 7 to 26 mmHg.  She has a normal to hyperdynamic ejection fraction.  A CT showed no thrombus on the valve.  She followed up 1 month later in September 2022 with Linsey Benitez and was  feeling good at that time.  She and her  reported her dyspnea had dramatically improved after her valve replacement procedure.    A couple weeks ago she was feeling tired and weak and these symptoms are improving. She was sneezing more than usual and felt exhausted. These symptoms are improving and she is nearly back to baseline. She does not some intermittent lightheadedness and dizziness when she gets out of bed. She enjoys playing cribbage. Overall she is doing pretty well. She feels her symptoms of lightheadedness and fatigue are mild.  She is a fairly vague historian.    Echo today images and report were reviewed:  1. Left ventricular systolic function is normal. The visual ejection fraction  is 60-65%.  2. No regional wall motion abnormalities noted.  3. The right ventricle is normal in structure, function and size.  4. 23 mmEdwards Nathan 3 Ultra TAVR; not well visualized. Mild increased in  mean gradient 20 mmHg, peak velocity 2.8 m/sec (previous mean gradient 26  mmHg).    Encounter Diagnosis   Name Primary?     S/P TAVR (transcatheter aortic valve replacement)        CURRENT MEDICATIONS:  Current Outpatient Medications   Medication Sig Dispense Refill     acetaminophen (TYLENOL) 325 MG tablet Take 2 tablets (650 mg) by mouth every 4 hours as needed for mild pain       aspirin 81 MG tablet Take 1 tablet by mouth daily. 30 tablet 0     azithromycin (ZITHROMAX) 500 MG tablet Patient to take 1 tablet 30-60 minutes prior to dental appointments. 4 tablet 1     carboxymethylcellulose PF (REFRESH PLUS) 0.5 % ophthalmic solution Place 2 drops into both eyes 2 times daily       citalopram (CELEXA) 20 MG tablet TAKE 1 TABLET BY MOUTH DAILY 30 tablet 10     CONTOUR NEXT TEST test strip USE TO TEST BLOOD GLUCOSE ONCE DAILY       furosemide (LASIX) 20 MG tablet TAKE 1 TABLET BY MOUTH DAILY 30 tablet 10     levothyroxine (SYNTHROID/LEVOTHROID) 150 MCG tablet TAKE ONE TABLET BY MOUTH ONE TIME DAILY **dose change**  Strength: 150 mcg 90 tablet 0     loratadine (CLARITIN) 10 MG tablet Take 10 mg by mouth daily       LORazepam (ATIVAN) 0.5 MG tablet Take 1 tablet (0.5 mg) by mouth every 8 hours as needed for anxiety or nausea 30 tablet 1     metoprolol succinate ER (TOPROL XL) 25 MG 24 hr tablet Take 1 tablet (25 mg) by mouth daily 90 tablet 3     miconazole (MICATIN) 2 % external powder Apply topically as needed for itching or other (for skin folds) 90 g 0     Microlet Lancets MISC USE TO TEST BLOOD GLUCOSE ONCE DAILY       nystatin (MYCOSTATIN) 085994 UNIT/GM external powder Apply topically 2 times daily Under breasts and skin folds BID & BID PRN for redness 60 g 1     order for DME Equipment being ordered: wheeled walker with hand breaks 1 Device 0     pantoprazole (PROTONIX) 40 MG EC tablet Take 1 tablet (40 mg) by mouth every morning (before breakfast) 90 tablet 3     simvastatin (ZOCOR) 10 MG tablet TAKE 1 TABLET BY MOUTH AT BEDTIME 90 tablet 1       ALLERGIES     Allergies   Allergen Reactions     Penicillins        PAST MEDICAL, SURGICAL, FAMILY, SOCIAL HISTORY:  History was reviewed and updated as needed, see medical record.    REVIEW OF SYSTEMS:  Skin:  not assessed     Eyes:  not assessed glasses  ENT:  Positive for hearing loss  Respiratory:  Positive for dyspnea on exertion  Cardiovascular:  palpitations;chest pain;Negative;edema;fatigue Positive for;lightheadedness;dizziness  Gastroenterology: Negative heartburn;reflux  Genitourinary:  not assessed nocturia  Musculoskeletal:  not assessed arthritis  Neurologic:  Positive for numbness or tingling of hands;numbness or tingling of feet  Psychiatric:  not assessed anxiety;depression  Heme/Lymph/Imm:  not assessed allergies  Endocrine:  Positive for diabetes    PHYSICAL EXAM:      BP: (!) 140/76 Pulse: 60     SpO2: 95 %      Vital Signs with Ranges  Pulse:  [60] 60  BP: (140)/(76) 140/76  SpO2:  [95 %] 95 %  239 lbs 11.2 oz    Constitutional: awake, alert, no  distress  Eyes: sclera nonicteric  ENT: trachea midline  Respiratory: Clear to auscultation bilaterally  Cardiovascular: Distant heart tones regular systolic murmur right upper sternal border1/6   GI: nondistended, nontender, bowel sounds present  Skin: dry, no rash no edema.  Ankle adiposity  Musculoskeletal: Frail and obese grossly normal muscle tone  Neurologic: Uses a wheelchair for mobility in the clinic.  Did not observe gait.  Neuropsychiatric: Normal affect    Recent Lab Results:  LIPID RESULTS:  Lab Results   Component Value Date    CHOL 169 11/23/2022    CHOL 148 06/04/2020    HDL 44 (L) 11/23/2022    HDL 47 (L) 06/04/2020    LDL 92 11/23/2022    LDL 77 06/04/2020    TRIG 164 (H) 11/23/2022    TRIG 140 06/04/2020    CHOLHDLRATIO 3.4 05/09/2013       LIVER ENZYME RESULTS:  Lab Results   Component Value Date    AST 19 11/23/2022    AST 13 08/27/2020    ALT 9 (L) 11/23/2022    ALT 15 08/27/2020       CBC RESULTS:  Lab Results   Component Value Date    WBC 7.8 12/07/2022    WBC 7.6 12/08/2016    RBC 4.43 12/07/2022    RBC 3.62 (L) 12/08/2016    HGB 12.2 12/07/2022    HGB 10.6 (L) 04/07/2021    HCT 38.5 12/07/2022    HCT 34.3 (L) 12/08/2016    MCV 87 12/07/2022    MCV 95 12/08/2016    MCH 27.5 12/07/2022    MCH 27.6 12/08/2016    MCHC 31.7 12/07/2022    MCHC 29.2 (L) 12/08/2016    RDW 14.6 12/07/2022    RDW 14.6 12/08/2016     12/07/2022     12/08/2016       BMP RESULTS:  Lab Results   Component Value Date     01/02/2023     08/27/2020    POTASSIUM 4.2 01/02/2023    POTASSIUM 4.3 09/23/2022    POTASSIUM 4.1 08/27/2020    CHLORIDE 107 01/02/2023    CHLORIDE 107 09/23/2022    CHLORIDE 109 08/27/2020    CO2 25 01/02/2023    CO2 29 09/23/2022    CO2 31 08/27/2020    ANIONGAP 12 01/02/2023    ANIONGAP 4 09/23/2022    ANIONGAP 4 08/27/2020     (H) 01/02/2023     (H) 09/23/2022     (H) 08/27/2020    BUN 44.8 (H) 01/02/2023    BUN 36 (H) 09/23/2022    BUN 19 08/27/2020     CR 1.17 (H) 01/02/2023    CR 0.98 08/27/2020    GFRESTIMATED 46 (L) 01/02/2023    GFRESTIMATED 49 (L) 11/17/2022    GFRESTIMATED 54 (L) 08/27/2020    GFRESTBLACK 62 08/27/2020    IGOR 9.3 01/02/2023    IGOR 9.6 08/27/2020        A1C RESULTS:  Lab Results   Component Value Date    A1C 6.1 (H) 01/02/2023    A1C 5.8 (H) 04/07/2021       INR RESULTS:  Lab Results   Component Value Date    INR 1.07 08/18/2022    INR 1.09 07/15/2022

## 2023-02-27 NOTE — LETTER
2/27/2023    Fausto Flynn MD  [address] [facility] [id number]    RE: Lucille Rojas       Dear Colleague,     I had the pleasure of seeing Lucille Rojas in the [facility].  [facility] Progress note    Assessment:  1. Severe aortic stenosis s/p TAVR with 23 mm ES3.  Postprocedure echocardiogram showed mean gradient of 26 mmHg in the setting of hyperdynamic LV function.  Subsequent CT with no evidence of HALT. Tolerating activity without any symptoms.  On aspirin 81 mg daily for antiplatelet therapy.  Echo today shows mean gradient of 20.  2. Chronic diastolic heart failure, NYHA class II.  Appears euvolemic on exam, on Lasix 20 mg daily.  3. Hypertension.  Well-controlled on current regimen.  4. CAD. Pre-TAVR angiogram showed flow-limiting OM1 stenosis, moderate LAD disease, and moderate to severe proximal circumflex disease.  Being medically managed on aspirin, beta-blocker, and statin. No angina.   5. Hyperlipidemia. On statin therapy.  LDL 92 in November 2022  6. SHAVON. Compliant with CPAP.   7. Type 2 diabetes  8. Non-Hodgkin's lymphoma s/p chemotherapy  9. CKD stage III  10. B-cell lymphoma.  Status post chemotherapy.  Follows in oncology last visit December 2022.    Overall she seems to be doing well.  Mild dizziness symptoms are vague and she is actually mildly hypertensive in clinic today so I am reluctant to decrease her antihypertensive regimen.    Plan:  1. She will check her BP at home periodically and keep a log.  Specifically asked her to try to check her blood pressure on days that she is having some lightheadedness to see if blood pressure is running relatively low or high.  2. Follow up in August 2023 for 1 yr post TAVR follow up.     HPI:       Lucille Rojas is a very nice 82 year old woman here to follow up TAVR for severe aortic stenosis on 8/23/2022.  She had a 23 mm Coreas LAUREN 3 ultra valve.  The day following the procedure her gradient increased  Discussion of skin lesion. Discussed risks, benefits, alternatives, and possible complications of excision of the skin lesion with reconstruction utilizing local tissue rearrangement, full-thickness skin grafting, or local interpolated flaps. Risks include, but are not limited too: bleeding, infection, hematoma, recurrence, need for additional procedures, flap failure, cosmetic deformity. Patient understands risks and would like to proceed with surgery.  Alternatives include doing nothing.    from a mean gradient of 7 to 26 mmHg.  She has a normal to hyperdynamic ejection fraction.  A CT showed no thrombus on the valve.  She followed up 1 month later in September 2022 with Linsey Benitez and was feeling good at that time.  She and her  reported her dyspnea had dramatically improved after her valve replacement procedure.    A couple weeks ago she was feeling tired and weak and these symptoms are improving. She was sneezing more than usual and felt exhausted. These symptoms are improving and she is nearly back to baseline. She does not some intermittent lightheadedness and dizziness when she gets out of bed. She enjoys playing [employer] [employer]. Overall she is doing pretty well. She feels her symptoms of lightheadedness and fatigue are mild.  She is a fairly vague historian.    Echo today images and report were reviewed:  1. Left ventricular systolic function is normal. The visual ejection fraction  is 60-65%.  2. No regional wall motion abnormalities noted.  3. The right ventricle is normal in structure, function and size.  4. 23 mmEdwards Nathan 3 Ultra TAVR; not well visualized. Mild increased in  mean gradient 20 mmHg, peak velocity 2.8 m/sec (previous mean gradient 26  mmHg).    Encounter Diagnosis   Name Primary?     S/P TAVR (transcatheter aortic valve replacement)        CURRENT MEDICATIONS:  Current Outpatient Medications   Medication Sig Dispense Refill     acetaminophen (TYLENOL) 325 MG tablet Take 2 tablets (650 mg) by mouth every 4 hours as needed for mild pain       aspirin 81 MG tablet Take 1 tablet by mouth daily. 30 tablet 0     azithromycin (ZITHROMAX) 500 MG tablet Patient to take 1 tablet 30-60 minutes prior to dental appointments. 4 tablet 1     carboxymethylcellulose PF (REFRESH PLUS) 0.5 % ophthalmic solution Place 2 drops into both eyes 2 times daily       citalopram (CELEXA) 20 MG tablet TAKE 1 TABLET BY MOUTH DAILY 30 tablet 10     CONTOUR NEXT TEST test strip USE TO TEST BLOOD  GLUCOSE ONCE DAILY       furosemide (LASIX) 20 MG tablet TAKE 1 TABLET BY MOUTH DAILY 30 tablet 10     levothyroxine (SYNTHROID/LEVOTHROID) 150 MCG tablet TAKE ONE TABLET BY MOUTH ONE TIME DAILY **dose change** Strength: 150 mcg 90 tablet 0     loratadine (CLARITIN) 10 MG tablet Take 10 mg by mouth daily       LORazepam (ATIVAN) 0.5 MG tablet Take 1 tablet (0.5 mg) by mouth every 8 hours as needed for anxiety or nausea 30 tablet 1     metoprolol succinate ER (TOPROL XL) 25 MG 24 hr tablet Take 1 tablet (25 mg) by mouth daily 90 tablet 3     miconazole (MICATIN) 2 % external powder Apply topically as needed for itching or other (for skin folds) 90 g 0     Microlet Lancets MISC USE TO TEST BLOOD GLUCOSE ONCE DAILY       nystatin (MYCOSTATIN) 836897 UNIT/GM external powder Apply topically 2 times daily Under breasts and skin folds BID & BID PRN for redness 60 g 1     order for DME Equipment being ordered: wheeled walker with hand breaks 1 Device 0     pantoprazole (PROTONIX) 40 MG EC tablet Take 1 tablet (40 mg) by mouth every morning (before breakfast) 90 tablet 3     simvastatin (ZOCOR) 10 MG tablet TAKE 1 TABLET BY MOUTH AT BEDTIME 90 tablet 1       ALLERGIES     Allergies   Allergen Reactions     Penicillins        PAST MEDICAL, SURGICAL, FAMILY, SOCIAL HISTORY:  History was reviewed and updated as needed, see medical record.    REVIEW OF SYSTEMS:  Skin:  not assessed     Eyes:  not assessed glasses  ENT:  Positive for hearing loss  Respiratory:  Positive for dyspnea on exertion  Cardiovascular:  palpitations;chest pain;Negative;edema;fatigue Positive for;lightheadedness;dizziness  Gastroenterology: Negative heartburn;reflux  Genitourinary:  not assessed nocturia  Musculoskeletal:  not assessed arthritis  Neurologic:  Positive for numbness or tingling of hands;numbness or tingling of feet  Psychiatric:  not assessed anxiety;depression  Heme/Lymph/Imm:  not assessed allergies  Endocrine:  Positive for  diabetes    PHYSICAL EXAM:      BP: (!) 140/76 Pulse: 60     SpO2: 95 %      Vital Signs with Ranges  Pulse:  [60] 60  BP: (140)/(76) 140/76  SpO2:  [95 %] 95 %  239 lbs 11.2 oz    Constitutional: awake, alert, no distress  Eyes: sclera nonicteric  ENT: trachea midline  Respiratory: Clear to auscultation bilaterally  Cardiovascular: Distant heart tones regular systolic murmur right upper sternal border1/6   GI: nondistended, nontender, bowel sounds present  Skin: dry, no rash no edema.  Ankle adiposity  Musculoskeletal: Frail and obese grossly normal muscle tone  Neurologic: Uses a wheelchair for mobility in the clinic.  Did not observe gait.  Neuropsychiatric: Normal affect    Recent Lab Results:  LIPID RESULTS:  Lab Results   Component Value Date    CHOL 169 11/23/2022    CHOL 148 06/04/2020    HDL 44 (L) 11/23/2022    HDL 47 (L) 06/04/2020    LDL 92 11/23/2022    LDL 77 06/04/2020    TRIG 164 (H) 11/23/2022    TRIG 140 06/04/2020    CHOLHDLRATIO 3.4 05/09/2013       LIVER ENZYME RESULTS:  Lab Results   Component Value Date    AST 19 11/23/2022    AST 13 08/27/2020    ALT 9 (L) 11/23/2022    ALT 15 08/27/2020       CBC RESULTS:  Lab Results   Component Value Date    WBC 7.8 12/07/2022    WBC 7.6 12/08/2016    RBC 4.43 12/07/2022    RBC 3.62 (L) 12/08/2016    HGB 12.2 12/07/2022    HGB 10.6 (L) 04/07/2021    HCT 38.5 12/07/2022    HCT 34.3 (L) 12/08/2016    MCV 87 12/07/2022    MCV 95 12/08/2016    MCH 27.5 12/07/2022    MCH 27.6 12/08/2016    MCHC 31.7 12/07/2022    MCHC 29.2 (L) 12/08/2016    RDW 14.6 12/07/2022    RDW 14.6 12/08/2016     12/07/2022     12/08/2016       BMP RESULTS:  Lab Results   Component Value Date     01/02/2023     08/27/2020    POTASSIUM 4.2 01/02/2023    POTASSIUM 4.3 09/23/2022    POTASSIUM 4.1 08/27/2020    CHLORIDE 107 01/02/2023    CHLORIDE 107 09/23/2022    CHLORIDE 109 08/27/2020    CO2 25 01/02/2023    CO2 29 09/23/2022    CO2 31 08/27/2020    ANIONGAP 12  01/02/2023    ANIONGAP 4 09/23/2022    ANIONGAP 4 08/27/2020     (H) 01/02/2023     (H) 09/23/2022     (H) 08/27/2020    BUN 44.8 (H) 01/02/2023    BUN 36 (H) 09/23/2022    BUN 19 08/27/2020    CR 1.17 (H) 01/02/2023    CR 0.98 08/27/2020    GFRESTIMATED 46 (L) 01/02/2023    GFRESTIMATED 49 (L) 11/17/2022    GFRESTIMATED 54 (L) 08/27/2020    GFRESTBLACK 62 08/27/2020    IGOR 9.3 01/02/2023    IGOR 9.6 08/27/2020        A1C RESULTS:  Lab Results   Component Value Date    A1C 6.1 (H) 01/02/2023    A1C 5.8 (H) 04/07/2021       INR RESULTS:  Lab Results   Component Value Date    INR 1.07 08/18/2022    INR 1.09 07/15/2022     Thank you for allowing me to participate in the care of your patient.      Sincerely,     Mary Jo Rodriguez MD     [facility]  cc:   Mary Jo Rodriguez MD  [id number] [id number]

## 2023-03-01 ENCOUNTER — PATIENT OUTREACH (OUTPATIENT)
Dept: CARE COORDINATION | Facility: CLINIC | Age: 83
End: 2023-03-01
Payer: MEDICARE

## 2023-03-01 NOTE — LETTER
[facility] CARE COORDINATION    March 1, 2023        Lucille Rojas  [address]  [facility] [address]          Dear Lucille,     Connor is an updated Patient Centered Plan of Care for your continued enrollment in Care Coordination. Please let us know if you have additional questions, concerns, or goals that we can assist with.    Sincerely,    TORIE Lester   Care Coordination Team  [phone]          [facility]  Patient Centered Plan of Care  About Me:        Patient Name:  Lucille Rojas    YOB: 1940  Age:         82 year old   iNto MRN:    [id number] Telephone Information:  Home Phone [phone]   Mobile [phone]       Address:  [address]  [facility] [address] Email address:  [email or web address]      Emergency Contact(s)    Name Relationship Lgl Grd Work Phone Home Phone Mobile Phone   1. MONICA BUCKNER * Daughter   [phone]   2. EMIL ROJAS Spouse No  [phone]   3. HARRIET ROJAS Daughter No  [phone]   4. JOSEFA ROJAS Son No  [phone]           Primary language:  English     needed? No   [place] [employer]:  [phone] op. 1  Other communication barriers:None    Preferred Method of Communication:  Mail  Current living arrangement: I live in a private home with spouse    Mobility Status/ Medical Equipment: Independent w/Device        Health Maintenance  Health Maintenance Reviewed: Due/Overdue   Health Maintenance Due   Topic Date Due     HF ACTION PLAN  Never done     ZOSTER IMMUNIZATION (1 of 2) Never done     DTAP/TDAP/TD IMMUNIZATION (2 - Td or Tdap) 01/01/2019         My Access Plan  Medical Emergency 911   Primary Clinic Line [facility] - [phone]   24 Hour Appointment Line [phone] or  [phone]) (toll-free)   24 Hour Nurse Line [phone] (toll-free)   Preferred Urgent Care [facility]  [facility], [phone]     Preferred Hospital [facility]  [phone]     Preferred Pharmacy [id number] - [place], [place] - [address] [facility]     Behavioral Health Crisis Line The National Suicide Prevention Lifeline at 1-369.418.8815 or Text/Call 988             My Care Team Members  Patient Care Team       Relationship Specialty Notifications Start End    Fausto Flynn MD PCP - General Internal Medicine  10/6/14     Phone: [phone] Fax: [phone]         [address] [place] [address]    Fausto Flynn MD Assigned PCP   10/12/14     Phone: [phone] Fax: [phone]         [address] [place] [address]    Yonny Roman MD MD INTERNAL MEDICINE - ENDOCRINOLOGY, DIABETES & METABOLISM  12/3/19     Phone: [phone] Fax: [phone]         [facility] [address] [facility] [address]    Rosanne Valadez MA Community Health Worker Primary Care - CC Admissions 1/22/20     Phone: [phone]         [facility]    12/27/21     TCU    Phone: [phone] Fax: [phone]         [address] [facility] [address]    Linsey Benitez CNP Assigned Heart and Vascular Provider   9/3/22     Phone: [phone] Pager: [phone] Fax: [phone] [address] [place] [address]    Rafa Kelly AuD Audiologist Audiology  9/7/22     Phone: [phone] Fax: [phone] [address] [address]    John Srinivasan MD Assigned Cancer Care Provider   12/17/22     Phone: [phone] Fax: [phone]         [facility] [address] [address]    Puja Damian, KORTNEYW Lead Care Coordinator Primary Care - CC Admissions 2/2/23     Phone: [phone]         Michael Nolasco DPM Assigned Surgical Provider   2/4/23     Phone: [phone] Fax: [phone] [address] [facility]  [address] [facility] [address]            My Care Plans  Self Management and Treatment Plan  Care Plan  Care Plan: Social Support     Problem: Inadequate social support     Goal: I will access resources to obtain more in home support.     Start Date: 11/29/2022 Expected End Date: 8/29/2023    This Visit's Progress: 20% Recent Progress: 10%    Note:     Barriers: resource availability  Strengths: pt is a good advocate for self.   Patient expressed understanding of goal: yes  Action steps to achieve this goal:  1. I will contact the county about a [place].   2. I will contact resources given to me.   3. I will contact my CHW with any needs or questions.                              Action Plans on File:            Depression          Advance Care Plans/Directives Type:   Health Care Directive    My Medical and Care Information  Problem List   Patient Active Problem List   Diagnosis     Hyperlipidemia LDL goal <100     Macular degeneration     Apnea     Morbid obesity due to excess calories (H)     CKD (chronic kidney disease) stage 3, GFR 30-59 ml/min (H)     Acquired hypothyroidism     Benign essential hypertension     Gastroesophageal reflux disease without esophagitis     Type 2 diabetes mellitus with stage 3 chronic kidney disease, without long-term current use of insulin (H)     Anemia, unspecified type     MDD (major depressive disorder), recurrent episode, mild (H)     Severe anemia     Severe obesity (BMI 35.0-39.9) with comorbidity (H)     Peripheral vision loss, unspecified laterality     Type 2 DM with CKD stage 3 and hypertension (H)     Acute on chronic blood loss anemia     B-cell lymphoma of intrathoracic lymph nodes, unspecified B-cell lymphoma type (H)     SHAVON (obstructive sleep apnea)     Pancreatic mass     Nonrheumatic aortic (valve) stenosis with insufficiency     Diastolic heart failure, unspecified HF chronicity (H)     Hypothyroidism, unspecified type     Major depressive disorder with  current active episode, unspecified depression episode severity, unspecified whether recurrent     Physical deconditioning     Severe obesity (BMI >= 40) (H)     Callus of foot     Non-Hodgkin's lymphoma (H)     Encounter for other specified aftercare     Hypoxia     Generalized muscle weakness     Symptomatic anemia     Diverticular disease of colon     Status post coronary angiogram     Aortic stenosis, severe     Skin ulcer of great toe, unspecified laterality, limited to breakdown of skin (H)      Current Medications and Allergies:  See printed Medication Report.    Care Coordination Start Date: 12/3/2019   Frequency of Care Coordination: monthly     Form Last Updated: 03/01/2023

## 2023-03-01 NOTE — PROGRESS NOTES
Clinic Care Coordination Contact  Care Coordination Clinician Chart Review    Situation: Patient chart reviewed by Care Coordinator.       Background: Care Coordination Program started: 12/3/2019. Initial assessment completed and patient-centered care plan(s) were developed with participation from patient. Lead CC handed patient off to CHW for continued outreaches.       Assessment: Per chart review, patient outreach completed by CC CHW on 1/31/23  Patient is actively working to accomplish goal(s). Patient's goal(s) appropriate and relevant at this time. Patient is due for updated Plan of Care.  Assessments will be completed annually or as needed/with change of patient status.      Care Plan: Social Support     Problem: Inadequate social support     Goal: I will access resources to obtain more in home support.     Start Date: 11/29/2022 Expected End Date: 8/29/2023    This Visit's Progress: 20% Recent Progress: 10%    Note:     Barriers: resource availability  Strengths: pt is a good advocate for self.   Patient expressed understanding of goal: yes  Action steps to achieve this goal:  1. I will contact the [facility] about a MNChoices assessment.   2. I will contact resources given to me.   3. I will contact my CHW with any needs or questions.                                Plan/Recommendations: The patient will continue working with Care Coordination to achieve goal(s) as above. CHW will continue outreaches at minimum every 30 days and will involve Lead CC as needed or if patient is ready to move to Maintenance. Lead CC will continue to monitor CHW outreaches and patient's progress to goal(s) every 6 weeks.     Plan of Care updated and sent to patient: Yes, via TORIE Cabezas   Care Coordination Team  [phone]

## 2023-03-02 ENCOUNTER — PATIENT OUTREACH (OUTPATIENT)
Dept: CARE COORDINATION | Facility: CLINIC | Age: 83
End: 2023-03-02
Payer: MEDICARE

## 2023-03-02 NOTE — PROGRESS NOTES
"Clinic Care Coordination Contact  Community Health Worker Follow Up    Care Gaps:     Health Maintenance Due   Topic Date Due     HF ACTION PLAN  Never done     ZOSTER IMMUNIZATION (1 of 2) Never done     DTAP/TDAP/TD IMMUNIZATION (2 - Td or Tdap) 01/01/2019           Care Plan:   Care Plan: Social Support     Problem: Inadequate social support     Goal: I will access resources to obtain more in home support.     Start Date: 11/29/2022 Expected End Date: 8/29/2023    This Visit's Progress: 20% Recent Progress: 20%    Note:     Barriers: resource availability  Strengths: pt is a good advocate for self.   Patient expressed understanding of goal: yes  Action steps to achieve this goal:  1. I will contact the county about a [place].   2. I will contact resources given to me.   3. I will contact my CHW with any needs or questions.                         Discussed:    Patient stated 2 representatives from [employer] visited her and did an evaluation of her home on 2/27/23.    Patient stated the representatives talked about cleaning services and offered to change her telephone with big dial numbers.    Patient stated she has been working in one room at a time to clean out.  Patient stated she puts items in containers to arrange or throws items away.  Patient stated she is clearing out.  Patient stated she is able to do more than three weeks ago.    Patient stated she has \"sturdy\" built shoes to help her with walking.  Patient stated she uses two canes when walking.    Patient stated she received food from [employer].  Patient stated she plans to ask her son to do some grocery shopping for milk and other food items.    Patient stated her  is doing therapy.  He is getting better.     Intervention and Education during outreach:   CHW encouraged patient to contact CC if there are any other changes or resources needed.  Patient acknowledged understanding.      CHW Plan: will outreach in one " month.    Rosanne Valadez, KM  Clinic Care Coordination  [facility] Clinics: Day[facility] Navarro, Randi, Fernando, and [facility]  Phone: [phone]

## 2023-04-05 ENCOUNTER — PATIENT OUTREACH (OUTPATIENT)
Dept: CARE COORDINATION | Facility: CLINIC | Age: 83
End: 2023-04-05
Payer: MEDICARE

## 2023-04-05 NOTE — PROGRESS NOTES
Clinic Care Coordination Contact  Care Coordination Clinician Chart Review    Situation: Patient chart reviewed by Care Coordinator.       Background: Care Coordination Program started: 12/3/2019. Initial assessment completed and patient-centered care plan(s) were developed with participation from patient. Lead CC handed patient off to CHW for continued outreaches.       Assessment: Per chart review, patient outreach completed by CC CHW on 3/2/23.  Patient is actively working to accomplish goal(s). Patient's goal(s) appropriate and relevant at this time. Patient is not due for updated Plan of Care.  Assessments will be completed annually or as needed/with change of patient status.      Care Plan: Social Support     Problem: Inadequate social support     Goal: I will access resources to obtain more in home support.     Start Date: 11/29/2022 Expected End Date: 8/29/2023    This Visit's Progress: 20% Recent Progress: 20%    Note:     Barriers: resource availability  Strengths: pt is a good advocate for self.   Patient expressed understanding of goal: yes  Action steps to achieve this goal:  1. I will contact the [facility] about a MNChoices assessment.   2. I will contact resources given to me.   3. I will contact my CHW with any needs or questions.                                Plan/Recommendations: The patient will continue working with Care Coordination to achieve goal(s) as above. CHW will continue outreaches at minimum every 30 days and will involve Lead CC as needed or if patient is ready to move to Maintenance. Lead CC will continue to monitor CHW outreaches and patient's progress to goal(s) every 6 weeks.     Plan of Care updated and sent to patient: TORIE Alas   Care Coordination Team  [phone]

## 2023-04-06 ENCOUNTER — PATIENT OUTREACH (OUTPATIENT)
Dept: CARE COORDINATION | Facility: CLINIC | Age: 83
End: 2023-04-06
Payer: MEDICARE

## 2023-04-06 NOTE — PROGRESS NOTES
Clinic Care Coordination Contact  [facility]/Voicemail       Clinical Data: Care Coordinator Outreach  Outreach attempted x 1.  Left message on patient's voicemail with call back information and requested return call.    Plan: Care Coordinator will try to reach patient again in 10 business days.    ZANE Demarco  Clinic Care Coordination  [facility] Clinics: [facility] [place], [facility]i, Fernando, and [facility]  Phone: [phone]

## 2023-04-23 DIAGNOSIS — E03.9 ACQUIRED HYPOTHYROIDISM: Chronic | ICD-10-CM

## 2023-04-24 RX ORDER — LEVOTHYROXINE SODIUM 150 UG/1
TABLET ORAL
Qty: 90 TABLET | Refills: 0 | Status: SHIPPED | OUTPATIENT
Start: 2023-04-24 | End: 2023-07-21

## 2023-04-26 ENCOUNTER — PATIENT OUTREACH (OUTPATIENT)
Dept: CARE COORDINATION | Facility: CLINIC | Age: 83
End: 2023-04-26
Payer: MEDICARE

## 2023-04-26 NOTE — PROGRESS NOTES
Clinic Care Coordination Contact  [facility]/Voicemail       Clinical Data: Care Coordinator Outreach  Outreach attempted x 2.  Left message on patient's voicemail with call back information and requested return call.  Plan: Care Coordinator will send unable to contact letter with care coordinator contact information via [employer]. Care Coordinator will try to reach patient again in 1 month.    ZANE Beatty  [facility] Care Coordination  [facility]  [phone]

## 2023-05-25 ENCOUNTER — PATIENT OUTREACH (OUTPATIENT)
Dept: CARE COORDINATION | Facility: CLINIC | Age: 83
End: 2023-05-25
Payer: MEDICARE

## 2023-05-25 ASSESSMENT — ACTIVITIES OF DAILY LIVING (ADL): DEPENDENT_IADLS:: TRANSPORTATION

## 2023-05-25 NOTE — LETTER
[facility] CARE COORDINATION    May 25, 2023        Lucille Rojas  [address]  [place] [facility] [place] [facility] [place] [address]          Dear Lucille,     Connor is an updated Patient Centered Plan of Care for your continued enrollment in Care Coordination. Please let us know if you have additional questions, concerns, or goals that we can assist with.    Sincerely,    TORIE Lester   Care Coordination Team  [phone]         [facility]  Patient Centered Plan of Care  About Me:        Patient Name:  Lucille Rojas    YOB: 1940  Age:         83 year old   [place] MRN:    [id number] Telephone Information:  Home Phone [phone]   Mobile [phone]       Address:  [address]  [place] [facility] [place] [address] Email address:  [email or web address]      Emergency Contact(s)    Name Relationship Lgl Grd Work Phone Home Phone Mobile Phone   1. MONICA BUCKNER * Daughter   [phone]   2. EMIL ROJAS Spouse No  [phone]   3. HARRIET ROJAS Daughter No  [phone]   4. JOSEFA ROJAS Son No  [phone]           Primary language:  English     needed? No   [place] [employer]:  [phone] op. 1  Other communication barriers:None    Preferred Method of Communication:  Mail  Current living arrangement: I live in a private home with spouse    Mobility Status/ Medical Equipment: Independent w/Device        Health Maintenance  Health Maintenance Reviewed: Due/Overdue   Health Maintenance Due   Topic Date Due    HF ACTION PLAN  Never done    ZOSTER IMMUNIZATION (1 of 2) Never done    DTAP/TDAP/TD IMMUNIZATION (2 - Td or Tdap) 01/01/2019          My Access Plan  Medical Emergency 911   Primary Clinic Line [facility] - [phone]   24 Hour Appointment Line [phone] or  [phone]) (toll-free)   24 Hour Nurse Line [phone] (toll-free)   Preferred Urgent Care [facility]  [facility], [phone]       Preferred Hospital [facility]  [phone]       Preferred Pharmacy [id number] [place], [place] - [address] [facility]     Behavioral Health Crisis Line The National Suicide Prevention Lifeline at 1-689.189.3351 or Text/Call 988             My Care Team Members  Patient Care Team         Relationship Specialty Notifications Start End    Fausto Flynn MD PCP - General Internal Medicine  10/6/14     Phone: [phone] Fax: [phone]         [address] [place] [address]    Fausto Flynn MD Assigned PCP   10/12/14     Phone: [phone] Fax: [phone]         [address] [place] [address]    Yonny Roman MD MD INTERNAL MEDICINE - ENDOCRINOLOGY, DIABETES & METABOLISM  12/3/19     Phone: [phone] Fax: [phone]         ENDOCRINOLOGY CLINIC [facility] [address] [address] [facility] [address]    Rosanne Valadez MA Community Health Worker Primary Care - CC Admissions 1/22/20     Phone: [phone]         [facility]    12/27/21     TC    Phone: [phone] Fax: [phone]         [address] [facility] [address]    Rafa Kelly AuD Audiologist Audiology  9/7/22     Phone: [phone] Fax: [phone]         [facility] [address]    Puja Damian LSW Lead Care Coordinator Primary Care - CC Admissions 2/2/23     Phone: [phone]         Michael Nolasco DPM Assigned Surgical Provider   2/4/23     Phone: [phone] Fax: [phone]         [address] [facility] [address]    Mary Jo Rodriguez MD Assigned Heart and Vascular Provider   5/6/23     Phone: [phone] Pager: [phone] Fax: [phone] [address] [place] [address]    Alexandr Ambriz APRN CNP Assigned Cancer Care Provider   5/6/23     Phone: [phone] Fax: [phone] [address] [facility]  [address]              My Care Plans  Self Management and Treatment Plan  Care Plan  Care Plan: Social Support       Problem: Inadequate social support       Goal: I will access resources to obtain more in home support.       Start Date: 11/29/2022 Expected End Date: 8/29/2023    This Visit's Progress: 20% Recent Progress: 20%    Note:     Barriers: resource availability  Strengths: pt is a good advocate for self.   Patient expressed understanding of goal: yes  Action steps to achieve this goal:  1. I will contact the county about a M[place]ices assessment.   2. I will contact resources given to me.   3. I will contact my CHW with any needs or questions.                                    Action Plans on File:            Depression          Advance Care Plans/Directives Type:   No data recorded    My Medical and Care Information  Problem List   Patient Active Problem List   Diagnosis    Hyperlipidemia LDL goal <100    Macular degeneration    Apnea    Morbid obesity due to excess calories (H)    CKD (chronic kidney disease) stage 3, GFR 30-59 ml/min (H)    Acquired hypothyroidism    Benign essential hypertension    Gastroesophageal reflux disease without esophagitis    Type 2 diabetes mellitus with stage 3 chronic kidney disease, without long-term current use of insulin (H)    Anemia, unspecified type    MDD (major depressive disorder), recurrent episode, mild (H)    Severe anemia    Severe obesity (BMI 35.0-39.9) with comorbidity (H)    Peripheral vision loss, unspecified laterality    Type 2 DM with CKD stage 3 and hypertension (H)    Acute on chronic blood loss anemia    B-cell lymphoma of intrathoracic lymph nodes, unspecified B-cell lymphoma type (H)    SHAVON (obstructive sleep apnea)    Pancreatic mass    Nonrheumatic aortic (valve) stenosis with insufficiency    Diastolic heart failure, unspecified HF chronicity (H)    Hypothyroidism, unspecified type    Major depressive disorder with current active episode,  unspecified depression episode severity, unspecified whether recurrent    Physical deconditioning    Severe obesity (BMI >= 40) (H)    Callus of foot    Non-Hodgkin's lymphoma (H)    Encounter for other specified aftercare    Hypoxia    Generalized muscle weakness    Symptomatic anemia    Diverticular disease of colon    Status post coronary angiogram    Aortic stenosis, severe    Skin ulcer of great toe, unspecified laterality, limited to breakdown of skin (H)      Current Medications and Allergies:  See printed Medication Report.    Care Coordination Start Date: 12/3/2019   Frequency of Care Coordination: monthly       Form Last Updated: 05/25/2023

## 2023-05-25 NOTE — PROGRESS NOTES
Clinic Care Coordination Contact  Care Coordination Clinician Chart Review    Situation: Patient chart reviewed by Care Coordinator.       Background: Care Coordination Program started: 12/3/2019. Initial assessment completed and patient-centered care plan(s) were developed with participation from patient. Lead CC handed patient off to CHW for continued outreaches.       Assessment: Per chart review, unsuccessful patient outreach completed by CC CHW on 4/26/23. Unable to determine if Patient is actively working to accomplish goal(s). Patient's goal(s) appropriate and relevant at this time. Patient is due for updated Plan of Care.  Assessments will be completed annually or as needed/with change of patient status.      Care Plan: Social Support     Problem: Inadequate social support     Goal: I will access resources to obtain more in home support.     Start Date: 11/29/2022 Expected End Date: 8/29/2023    This Visit's Progress: 20% Recent Progress: 20%    Note:     Barriers: resource availability  Strengths: pt is a good advocate for self.   Patient expressed understanding of goal: yes  Action steps to achieve this goal:  1. I will contact the [facility] about a MNChoices assessment.   2. I will contact resources given to me.   3. I will contact my CHW with any needs or questions.                                Plan/Recommendations: The patient will continue working with Care Coordination to achieve goal(s) as above. CHW will continue outreaches at minimum every 30 days and will involve Lead CC as needed or if patient is ready to move to Maintenance. Lead CC will continue to monitor CHW outreaches and patient's progress to goal(s) every 6 weeks.     Plan of Care updated and sent to patient: TORIE Alas   Care Coordination Team  [phone]

## 2023-05-30 ENCOUNTER — TELEPHONE (OUTPATIENT)
Dept: MEDSURG UNIT | Facility: CLINIC | Age: 83
End: 2023-05-30
Payer: MEDICARE

## 2023-05-31 ENCOUNTER — LAB (OUTPATIENT)
Dept: LAB | Facility: CLINIC | Age: 83
End: 2023-05-31
Payer: MEDICARE

## 2023-05-31 ENCOUNTER — HOSPITAL ENCOUNTER (OUTPATIENT)
Dept: CT IMAGING | Facility: CLINIC | Age: 83
Discharge: HOME | End: 2023-05-31
Attending: INTERNAL MEDICINE | Admitting: INTERNAL MEDICINE
Payer: MEDICARE

## 2023-05-31 ENCOUNTER — HOSPITAL ENCOUNTER (OUTPATIENT)
Facility: CLINIC | Age: 83
Discharge: HOME | End: 2023-05-31
Admitting: INTERNAL MEDICINE
Payer: MEDICARE

## 2023-05-31 DIAGNOSIS — I10 BENIGN ESSENTIAL HYPERTENSION: Chronic | ICD-10-CM

## 2023-05-31 DIAGNOSIS — C85.12 B-CELL LYMPHOMA OF INTRATHORACIC LYMPH NODES, UNSPECIFIED B-CELL LYMPHOMA TYPE (H): ICD-10-CM

## 2023-05-31 DIAGNOSIS — E11.22 TYPE 2 DM WITH CKD STAGE 3 AND HYPERTENSION (H): ICD-10-CM

## 2023-05-31 DIAGNOSIS — N18.30 STAGE 3 CHRONIC KIDNEY DISEASE, UNSPECIFIED WHETHER STAGE 3A OR 3B CKD (H): ICD-10-CM

## 2023-05-31 DIAGNOSIS — N18.30 TYPE 2 DM WITH CKD STAGE 3 AND HYPERTENSION (H): ICD-10-CM

## 2023-05-31 DIAGNOSIS — T14.8XXA NON-HEALING NON-SURGICAL WOUND: Primary | ICD-10-CM

## 2023-05-31 DIAGNOSIS — I12.9 TYPE 2 DM WITH CKD STAGE 3 AND HYPERTENSION (H): ICD-10-CM

## 2023-05-31 LAB
ALBUMIN SERPL BCG-MCNC: 3.9 G/DL (ref 3.5–5.2)
ALP SERPL-CCNC: 112 U/L (ref 35–104)
ALT SERPL W P-5'-P-CCNC: 12 U/L (ref 10–35)
ANION GAP SERPL CALCULATED.3IONS-SCNC: 8 MMOL/L (ref 7–15)
AST SERPL W P-5'-P-CCNC: 16 U/L (ref 10–35)
BILIRUB SERPL-MCNC: 0.2 MG/DL
BUN SERPL-MCNC: 31.1 MG/DL (ref 8–23)
CALCIUM SERPL-MCNC: 9.4 MG/DL (ref 8.8–10.2)
CHLORIDE SERPL-SCNC: 105 MMOL/L (ref 98–107)
CREAT BLD-MCNC: 1.2 MG/DL (ref 0.5–1)
CREAT SERPL-MCNC: 1.11 MG/DL (ref 0.51–0.95)
CRP SERPL-MCNC: 13.21 MG/L
DEPRECATED HCO3 PLAS-SCNC: 30 MMOL/L (ref 22–29)
ERYTHROCYTE [DISTWIDTH] IN BLOOD BY AUTOMATED COUNT: 13.3 % (ref 10–15)
ERYTHROCYTE [SEDIMENTATION RATE] IN BLOOD BY WESTERGREN METHOD: 17 MM/HR (ref 0–30)
GFR SERPL CREATININE-BSD FRML MDRD: 44 ML/MIN/1.73M2
GFR SERPL CREATININE-BSD FRML MDRD: 48 ML/MIN/1.73M2
GLUCOSE SERPL-MCNC: 104 MG/DL (ref 70–99)
HBA1C MFR BLD: 5.6 %
HCT VFR BLD AUTO: 36.4 % (ref 35–47)
HGB BLD-MCNC: 11.5 G/DL (ref 11.7–15.7)
LDH SERPL L TO P-CCNC: 166 U/L (ref 0–250)
MCH RBC QN AUTO: 28.8 PG (ref 26.5–33)
MCHC RBC AUTO-ENTMCNC: 31.6 G/DL (ref 31.5–36.5)
MCV RBC AUTO: 91 FL (ref 78–100)
PLATELET # BLD AUTO: 229 10E3/UL (ref 150–450)
POTASSIUM SERPL-SCNC: 4.5 MMOL/L (ref 3.4–5.3)
PROT SERPL-MCNC: 6.6 G/DL (ref 6.4–8.3)
RBC # BLD AUTO: 4 10E6/UL (ref 3.8–5.2)
SODIUM SERPL-SCNC: 143 MMOL/L (ref 136–145)
WBC # BLD AUTO: 7 10E3/UL (ref 4–11)

## 2023-05-31 PROCEDURE — 83615 LACTATE (LD) (LDH) ENZYME: CPT

## 2023-05-31 PROCEDURE — 250N000011 HC RX IP 250 OP 636: Performed by: INTERNAL MEDICINE

## 2023-05-31 PROCEDURE — 85652 RBC SED RATE AUTOMATED: CPT

## 2023-05-31 PROCEDURE — 85027 COMPLETE CBC AUTOMATED: CPT

## 2023-05-31 PROCEDURE — 82565 ASSAY OF CREATININE: CPT | Mod: 91

## 2023-05-31 PROCEDURE — 82310 ASSAY OF CALCIUM: CPT

## 2023-05-31 PROCEDURE — 86140 C-REACTIVE PROTEIN: CPT

## 2023-05-31 PROCEDURE — 80053 COMPREHEN METABOLIC PANEL: CPT

## 2023-05-31 PROCEDURE — 36415 COLL VENOUS BLD VENIPUNCTURE: CPT

## 2023-05-31 PROCEDURE — G1010 CDSM STANSON: HCPCS

## 2023-05-31 PROCEDURE — 83036 HEMOGLOBIN GLYCOSYLATED A1C: CPT

## 2023-05-31 PROCEDURE — 250N000009 HC RX 250: Performed by: INTERNAL MEDICINE

## 2023-05-31 RX ORDER — IOPAMIDOL 755 MG/ML
117 INJECTION, SOLUTION INTRAVASCULAR ONCE
Status: COMPLETED | OUTPATIENT
Start: 2023-05-31 | End: 2023-05-31

## 2023-05-31 RX ADMIN — IOPAMIDOL 117 ML: 755 INJECTION, SOLUTION INTRAVENOUS at 11:17

## 2023-05-31 RX ADMIN — SODIUM CHLORIDE 73 ML: 9 INJECTION, SOLUTION INTRAVENOUS at 11:18

## 2023-05-31 NOTE — RESULT ENCOUNTER NOTE
Dear Ms. Rojas,    Blood tests are stable.    Please, call me with any questions.    John Srinivasan MD

## 2023-06-01 ENCOUNTER — PATIENT OUTREACH (OUTPATIENT)
Dept: CARE COORDINATION | Facility: CLINIC | Age: 83
End: 2023-06-01
Payer: MEDICARE

## 2023-06-01 NOTE — PROGRESS NOTES
Clinic Care Coordination Contact  [facility]/Voicemail    Patient called back.     Clinical Data: Care Coordinator Outreach  Outreach attempted x 1.  Left message on patient's voicemail with call back information and requested return call.    Plan: Care Coordinator will try to reach patient again in 10 business days.    ZANE Demarco  Clinic Care Coordination  [facility] Clinics: [facility] [place], Randi, Fernando, and [place] [facility]  Phone: [phone]

## 2023-06-02 NOTE — RESULT ENCOUNTER NOTE
Dear Ms. Rojas,    CT scan is good. No cancer seen.    Please, call me with any questions.    John Srinivasan MD

## 2023-06-06 ENCOUNTER — TELEPHONE (OUTPATIENT)
Dept: CARDIOLOGY | Facility: CLINIC | Age: 83
End: 2023-06-06

## 2023-06-06 ENCOUNTER — ONCOLOGY VISIT (OUTPATIENT)
Dept: ONCOLOGY | Facility: CLINIC | Age: 83
End: 2023-06-06
Attending: INTERNAL MEDICINE
Payer: MEDICARE

## 2023-06-06 VITALS
HEART RATE: 58 BPM | HEIGHT: 60 IN | WEIGHT: 241.2 LBS | DIASTOLIC BLOOD PRESSURE: 71 MMHG | SYSTOLIC BLOOD PRESSURE: 112 MMHG | BODY MASS INDEX: 47.36 KG/M2 | RESPIRATION RATE: 16 BRPM | OXYGEN SATURATION: 93 %

## 2023-06-06 DIAGNOSIS — C85.12 B-CELL LYMPHOMA OF INTRATHORACIC LYMPH NODES, UNSPECIFIED B-CELL LYMPHOMA TYPE (H): Primary | ICD-10-CM

## 2023-06-06 PROCEDURE — 99214 OFFICE O/P EST MOD 30 MIN: CPT | Performed by: INTERNAL MEDICINE

## 2023-06-06 PROCEDURE — G0463 HOSPITAL OUTPT CLINIC VISIT: HCPCS | Performed by: INTERNAL MEDICINE

## 2023-06-06 RX ORDER — MINOCYCLINE HYDROCHLORIDE 100 MG/1
CAPSULE ORAL
COMMUNITY
Start: 2023-05-31 | End: 2023-12-04

## 2023-06-06 ASSESSMENT — PAIN SCALES - GENERAL: PAINLEVEL: NO PAIN (0)

## 2023-06-06 NOTE — PROGRESS NOTES
Oncology Rooming Note    June 6, 2023 10:13 AM   Lucille Rojas is a 83 year old female who presents for:    Chief Complaint   Patient presents with     Oncology Clinic Visit     Initial Vitals: There were no vitals taken for this visit. Estimated body mass index is 46.81 kg/m  as calculated from the following:    Height as of 2/27/23: 1.524 m (5').    Weight as of 2/27/23: 108.7 kg (239 lb 11.2 oz). There is no height or weight on file to calculate BSA.  Data Unavailable Comment: Data Unavailable   No LMP recorded. Patient has had a hysterectomy.  Allergies reviewed: Yes  Medications reviewed: Yes    Medications: Medication refills not needed today.  Pharmacy name entered into Realm:    [id number] - [place], [place] - [address] [facility] [place], [place] - [address]  [address]    Clinical concerns:  doctor was notified.      Suly Dixon MA             Yes

## 2023-06-06 NOTE — LETTER
6/6/2023         RE: Lucille Rojas  [address] [facility] [address]        Dear Colleague,    Thank you for referring your patient, Lucille Rojas, to the [facility]. Please see a copy of my visit note below.    Oncology Rooming Note    June 6, 2023 10:13 AM   Lucille Rojas is a 83 year old female who presents for:    Chief Complaint   Patient presents with     Oncology Clinic Visit     Initial Vitals: There were no vitals taken for this visit. Estimated body mass index is 46.81 kg/m  as calculated from the following:    Height as of 2/27/23: 1.524 m (5').    Weight as of 2/27/23: 108.7 kg (239 lb 11.2 oz). There is no height or weight on file to calculate BSA.  Data Unavailable Comment: Data Unavailable   No LMP recorded. Patient has had a hysterectomy.  Allergies reviewed: Yes  Medications reviewed: Yes    Medications: Medication refills not needed today.  Pharmacy name entered into Modern Message:    [id number] - [place], [place] - [address]. [facility] - [place], [place] - [address]  [facility] [address]    Clinical concerns:  doctor was notified.      Suly Dixon MA              ONCOLOGY HISTORY: Ms. 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  1. On 09/20/2021:   -Hemoglobin of 4.7 with MCV of 67. Normal WBC and platelets.  -Iron of 9 and saturation index of 2%.   -CT chest, abdomen and pelvis reveals large pancreatic head mass.  This encases the celiac trunk, superior mesenteric artery and superior mesenteric vein.  There is moderate-size right pleural effusion. No lymphadenopathy.  2. Thoracocentesis on 09/22/2021. Cytology is negative for malignancy.  3. EUS on 09/21/2021 revealed is a mass in the uncinate process of the pancreas measuring 33 mm.  There was evidence of  invasion into superior mesenteric artery and the celiac trunk. No enlarged lymph nodes seen. FNA revealed atypical cells.    4. EGD and EUS repeated on 10/05/2021.  -EGD is normal.  -EUS revealed pancreatic head mass.  FNA revealed CD10-positive B-cell lymphoma. Flow cytometry revealed CD10-positive lambda monotypic B-cells.  5. PET scan on 11/18/2021 revealed 6 cm hypermetabolic pancreatic head mass and borderline hypermetabolic right axillary lymph node.   -Further repeat biopsy not done because of her overall poor health.  6. mini R-CHOP x 6 cycles between 12/15/2021 and 03/30/2022.  -PET scan on 04/11/21 reveals complete response.    SUBJECTIVE:  The patient is an 83-year-old female with B-cell non-Hodgkin lymphoma of the pancreas, status post 6 cycles of mini R-CHOP.  She is in complete remission.     CT chest, abdomen, and pelvis on 05/31/2023 does not reveal any evidence of malignancy.  There is stable minimal soft tissue thickening around the celiac axis.     Her overall condition is stable.  The patient had generalized weakness.  It is not getting worse.  No headache.  Occasional dizziness.  No chest pain.  No shortness of breath at rest.  Gets short of breath on minimal exertion.  No abdominal pain.  No nausea or vomiting.  Appetite is fairly good.  No fever or chills. She has some peripheral neuropathy, both from diabetes and chemotherapy.  Not getting worse.     PHYSICAL EXAMINATION:    GENERAL:  She is alert and oriented x 3 Not in distress.  VITAL SIGNS:  Reviewed.   Rest of systems not examined.     LABORATORY:  CBC, LDH and CMP were reviewed.     ASSESSMENT:    1.  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  She is in complete remission.  2.  Generalized weakness secondary to her age and multiple other medical problems.  3.  Chronic kidney disease.  4.  Mild normocytic anemia from chronic kidney disease and  anemia of chronic disease.  5.  Multiple other medical problems including hypertension and diabetes mellitus.     PLAN:    1.  CT scan was reviewed with the patient, her  and daughter.  I explained to them there is no evidence of malignancy.  The patient was happy to know that.       I explained to her that we will now do CT scan once a year.  She is agreeable for it.    2.  The patient has different symptoms including generalized weakness.  This is from her age and multiple other medical problems.  I reassured the patient that she does not have any evidence of malignancy.    3.  I will see her in 6 months' time with labs.  I advised her to call us with any questions or concerns.     Total visit time of 30 minutes.  Time spent in today's visit, review of chart/investigations today and documentation today.       This office note has been dictated.          Again, thank you for allowing me to participate in the care of your patient.        Sincerely,        John Srinivasan MD

## 2023-06-06 NOTE — TELEPHONE ENCOUNTER
[facility] Call Center    Phone Message    May a detailed message be left on voicemail: yes     Reason for Call: Other: Nancy called to schedule her mother's TAVR follow up and labs. Please reach out to Nancy to schedule. Thank you!     Action Taken: Other: Cardiology    Travel Screening: Not Applicable     Thank you!  [facility]

## 2023-06-07 DIAGNOSIS — Z95.2 S/P TAVR (TRANSCATHETER AORTIC VALVE REPLACEMENT): Primary | ICD-10-CM

## 2023-06-08 ENCOUNTER — MEDICAL CORRESPONDENCE (OUTPATIENT)
Dept: HEALTH INFORMATION MANAGEMENT | Facility: CLINIC | Age: 83
End: 2023-06-08
Payer: MEDICARE

## 2023-06-15 ENCOUNTER — MEDICAL CORRESPONDENCE (OUTPATIENT)
Dept: HEALTH INFORMATION MANAGEMENT | Facility: CLINIC | Age: 83
End: 2023-06-15
Payer: MEDICARE

## 2023-06-19 NOTE — PROGRESS NOTES
ONCOLOGY HISTORY: Ms. 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  1. On 09/20/2021:   -Hemoglobin of 4.7 with MCV of 67. Normal WBC and platelets.  -Iron of 9 and saturation index of 2%.   -CT chest, abdomen and pelvis reveals large pancreatic head mass.  This encases the celiac trunk, superior mesenteric artery and superior mesenteric vein.  There is moderate-size right pleural effusion. No lymphadenopathy.  2. Thoracocentesis on 09/22/2021. Cytology is negative for malignancy.  3. EUS on 09/21/2021 revealed is a mass in the uncinate process of the pancreas measuring 33 mm.  There was evidence of invasion into superior mesenteric artery and the celiac trunk. No enlarged lymph nodes seen. FNA revealed atypical cells.    4. EGD and EUS repeated on 10/05/2021.  -EGD is normal.  -EUS revealed pancreatic head mass.  FNA revealed CD10-positive B-cell lymphoma. Flow cytometry revealed CD10-positive lambda monotypic B-cells.  5. PET scan on 11/18/2021 revealed 6 cm hypermetabolic pancreatic head mass and borderline hypermetabolic right axillary lymph node.   -Further repeat biopsy not done because of her overall poor health.  6. mini R-CHOP x 6 cycles between 12/15/2021 and 03/30/2022.  -PET scan on 04/11/21 reveals complete response.    SUBJECTIVE:  The patient is an 83-year-old female with B-cell non-Hodgkin lymphoma of the pancreas, status post 6 cycles of mini R-CHOP.  She is in complete remission.     CT chest, abdomen, and pelvis on 05/31/2023 does not reveal any evidence of malignancy.  There is stable minimal soft tissue thickening around the celiac axis.     Her overall condition is stable.  The patient had generalized weakness.  It is not getting worse.  No headache.  Occasional dizziness.  No chest pain.  No shortness of breath at rest.  Gets short of breath on minimal exertion.  No abdominal pain.  No nausea or vomiting.  Appetite is fairly good.  No fever or chills. She  has some peripheral neuropathy, both from diabetes and chemotherapy.  Not getting worse.     PHYSICAL EXAMINATION:    GENERAL:  She is alert and oriented x 3 Not in distress.  VITAL SIGNS:  Reviewed.   Rest of systems not examined.     LABORATORY:  CBC, LDH and CMP were reviewed.     ASSESSMENT:    1.  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  She is in complete remission.  2.  Generalized weakness secondary to her age and multiple other medical problems.  3.  Chronic kidney disease.  4.  Mild normocytic anemia from chronic kidney disease and anemia of chronic disease.  5.  Multiple other medical problems including hypertension and diabetes mellitus.     PLAN:    1.  CT scan was reviewed with the patient, her  and daughter.  I explained to them there is no evidence of malignancy.  The patient was happy to know that.       I explained to her that we will now do CT scan once a year.  She is agreeable for it.    2.  The patient has different symptoms including generalized weakness.  This is from her age and multiple other medical problems.  I reassured the patient that she does not have any evidence of malignancy.    3.  I will see her in 6 months' time with labs.  I advised her to call us with any questions or concerns.     Total visit time of 30 minutes.  Time spent in today's visit, review of chart/investigations today and documentation today.

## 2023-07-06 ENCOUNTER — PATIENT OUTREACH (OUTPATIENT)
Dept: CARE COORDINATION | Facility: CLINIC | Age: 83
End: 2023-07-06
Payer: MEDICARE

## 2023-07-06 NOTE — PROGRESS NOTES
"Clinic Care Coordination Contact  Community Health Worker Follow Up    Care Gaps:     Health Maintenance Due   Topic Date Due     HF ACTION PLAN  Never done     ZOSTER IMMUNIZATION (1 of 2) Never done     DTAP/TDAP/TD IMMUNIZATION (2 - Td or Tdap) 01/01/2019     DIABETIC FOOT EXAM  06/29/2023     PHQ-9  07/02/2023       Care Gaps Last addressed on Foot Exam 7/6/23    Care Plan:   Care Plan: Social Support     Problem: Inadequate social support     Goal: I will access resources to obtain more in home support.     Start Date: 11/29/2022 Expected End Date: 8/29/2023    This Visit's Progress: 30% Recent Progress: 20%    Note:     Barriers: resource availability  Strengths: pt is a good advocate for self.   Patient expressed understanding of goal: yes  Action steps to achieve this goal:  1. [place] the county [place]   2. I will contact resources given to me.   3. I will contact my CHW with any needs or questions.                         Discussed:    Patient stated she is in remission.  Patient stated the doctors have not found any cancer at this time.    Patient stated she has been \"classified\" as being blind.  Patient can not read due to her vision.    Patient stated her pain from her foot and leg is gone.  Patient is able to walk better.    Patient stated one of her daughters does the financial stuff like paying the bills, and the other daughter drives to appointments if needed.      Patient stated she and her  are now going to [facility] once a week to play cards and see friends.  They use [employer] for transportation.    Patient stated she has been going thru boxes at home.    Patient stated she is looking for a cleaning person.  Patient does not want to pay much for this help.      Intervention and Education during outreach:   CHW provided the following resources:  [employer] [facility] [employer] [phone]  [place] [facility]- [phone] for a [place] Choice Assessment.  " Patient plans to ask her daughter to call.    CHW encouraged patient to contact CC if there are any other changes or resources needed.  Patient acknowledged understanding.      CHW Plan: will outreach in one month.    ZANE Demarco  Clinic Care Coordination  [facility]: [facility], [facility], [facility], and [facility]  Phone: [phone]

## 2023-07-11 DIAGNOSIS — I50.30 DIASTOLIC HEART FAILURE, UNSPECIFIED HF CHRONICITY (H): ICD-10-CM

## 2023-07-12 ENCOUNTER — PATIENT OUTREACH (OUTPATIENT)
Dept: CARE COORDINATION | Facility: CLINIC | Age: 83
End: 2023-07-12
Payer: MEDICARE

## 2023-07-12 RX ORDER — FUROSEMIDE 20 MG
TABLET ORAL
Qty: 30 TABLET | Refills: 10 | Status: SHIPPED | OUTPATIENT
Start: 2023-07-12 | End: 2024-07-15

## 2023-07-12 ASSESSMENT — ACTIVITIES OF DAILY LIVING (ADL): DEPENDENT_IADLS:: TRANSPORTATION

## 2023-07-12 NOTE — PROGRESS NOTES
Clinic Care Coordination Contact  Care Coordination Clinician Chart Review    Situation: Patient chart reviewed by Care Coordinator.       Background: Care Coordination Program started: 12/3/2019. Initial assessment completed and patient-centered care plan(s) were developed with participation from patient. Lead CC handed patient off to CHW for continued outreaches.       Assessment: Per chart review, patient outreach completed by CC CHW on 7/6/23.  Patient is actively working to accomplish goal(s). Patient's goal(s) appropriate and relevant at this time. Patient is not due for updated Plan of Care.  Assessments will be completed annually or as needed/with change of patient status.      Care Plan: Social Support     Problem: Inadequate social support     Goal: I will access resources to obtain more in home support.     Start Date: 11/29/2022 Expected End Date: 8/29/2023    This Visit's Progress: 30% Recent Progress: 20%    Note:     Barriers: resource availability  Strengths: pt is a good advocate for self.   Patient expressed understanding of goal: yes  Action steps to achieve this goal:  1. I will contact the [facility] about a [place].   2. I will contact resources given to me.   3. I will contact my CHW with any needs or questions.                                Plan/Recommendations: The patient will continue working with Care Coordination to achieve goal(s) as above. CHW will continue outreaches at minimum every 30 days and will involve Lead CC as needed or if patient is ready to move to Maintenance. Lead CC will continue to monitor CHW outreaches and patient's progress to goal(s) every 6 weeks.     Plan of Care updated and sent to patient: TORIE Alas   Care Coordination Team  [phone]

## 2023-07-21 DIAGNOSIS — E03.9 ACQUIRED HYPOTHYROIDISM: Chronic | ICD-10-CM

## 2023-07-21 RX ORDER — LEVOTHYROXINE SODIUM 150 UG/1
TABLET ORAL
Qty: 90 TABLET | Refills: 0 | Status: SHIPPED | OUTPATIENT
Start: 2023-07-21 | End: 2023-10-20

## 2023-08-08 ENCOUNTER — MEDICAL CORRESPONDENCE (OUTPATIENT)
Dept: HEALTH INFORMATION MANAGEMENT | Facility: CLINIC | Age: 83
End: 2023-08-08
Payer: MEDICARE

## 2023-08-17 ENCOUNTER — LAB (OUTPATIENT)
Dept: LAB | Facility: CLINIC | Age: 83
End: 2023-08-17
Attending: NURSE PRACTITIONER
Payer: MEDICARE

## 2023-08-17 ENCOUNTER — OFFICE VISIT (OUTPATIENT)
Dept: CARDIOLOGY | Facility: CLINIC | Age: 83
End: 2023-08-17
Attending: NURSE PRACTITIONER
Payer: MEDICARE

## 2023-08-17 ENCOUNTER — HOSPITAL ENCOUNTER (OUTPATIENT)
Dept: CARDIOLOGY | Facility: CLINIC | Age: 83
Discharge: HOME | End: 2023-08-17
Attending: NURSE PRACTITIONER | Admitting: NURSE PRACTITIONER
Payer: MEDICARE

## 2023-08-17 VITALS
BODY MASS INDEX: 47.49 KG/M2 | OXYGEN SATURATION: 92 % | DIASTOLIC BLOOD PRESSURE: 70 MMHG | WEIGHT: 241.9 LBS | HEART RATE: 61 BPM | HEIGHT: 60 IN | SYSTOLIC BLOOD PRESSURE: 135 MMHG

## 2023-08-17 DIAGNOSIS — E78.5 HYPERLIPIDEMIA LDL GOAL <100: ICD-10-CM

## 2023-08-17 DIAGNOSIS — Z95.2 S/P TAVR (TRANSCATHETER AORTIC VALVE REPLACEMENT): ICD-10-CM

## 2023-08-17 DIAGNOSIS — Z95.2 S/P TAVR (TRANSCATHETER AORTIC VALVE REPLACEMENT): Primary | ICD-10-CM

## 2023-08-17 LAB
ANION GAP SERPL CALCULATED.3IONS-SCNC: 9 MMOL/L (ref 7–15)
BUN SERPL-MCNC: 23.4 MG/DL (ref 8–23)
CALCIUM SERPL-MCNC: 9 MG/DL (ref 8.8–10.2)
CHLORIDE SERPL-SCNC: 105 MMOL/L (ref 98–107)
CREAT SERPL-MCNC: 1.07 MG/DL (ref 0.51–0.95)
DEPRECATED HCO3 PLAS-SCNC: 31 MMOL/L (ref 22–29)
ERYTHROCYTE [DISTWIDTH] IN BLOOD BY AUTOMATED COUNT: 13.3 % (ref 10–15)
GFR SERPL CREATININE-BSD FRML MDRD: 51 ML/MIN/1.73M2
GLUCOSE SERPL-MCNC: 101 MG/DL (ref 70–99)
HCT VFR BLD AUTO: 37.9 % (ref 35–47)
HGB BLD-MCNC: 11.5 G/DL (ref 11.7–15.7)
LVEF ECHO: NORMAL
MCH RBC QN AUTO: 27.9 PG (ref 26.5–33)
MCHC RBC AUTO-ENTMCNC: 30.3 G/DL (ref 31.5–36.5)
MCV RBC AUTO: 92 FL (ref 78–100)
PLATELET # BLD AUTO: 223 10E3/UL (ref 150–450)
POTASSIUM SERPL-SCNC: 4.5 MMOL/L (ref 3.4–5.3)
RBC # BLD AUTO: 4.12 10E6/UL (ref 3.8–5.2)
SODIUM SERPL-SCNC: 145 MMOL/L (ref 136–145)
WBC # BLD AUTO: 6.9 10E3/UL (ref 4–11)

## 2023-08-17 PROCEDURE — 93000 ELECTROCARDIOGRAM COMPLETE: CPT | Mod: 59 | Performed by: NURSE PRACTITIONER

## 2023-08-17 PROCEDURE — 36415 COLL VENOUS BLD VENIPUNCTURE: CPT | Performed by: INTERNAL MEDICINE

## 2023-08-17 PROCEDURE — 93306 TTE W/DOPPLER COMPLETE: CPT

## 2023-08-17 PROCEDURE — 85027 COMPLETE CBC AUTOMATED: CPT | Performed by: INTERNAL MEDICINE

## 2023-08-17 PROCEDURE — 80048 BASIC METABOLIC PNL TOTAL CA: CPT | Performed by: INTERNAL MEDICINE

## 2023-08-17 PROCEDURE — 93306 TTE W/DOPPLER COMPLETE: CPT | Mod: 26 | Performed by: INTERNAL MEDICINE

## 2023-08-17 PROCEDURE — 99214 OFFICE O/P EST MOD 30 MIN: CPT | Mod: 25 | Performed by: NURSE PRACTITIONER

## 2023-08-17 NOTE — LETTER
8/17/2023    Fausto Flynn MD  [address] [facility] [place] [address]    RE: Lucille Rojas       Dear Colleague,     I had the pleasure of seeing Lucille Rojas in the [facility].  Cardiology Clinic Progress Note  Lucille Rojas MRN# [id number]   YOB: 1940 Age: 83 year old     Primary cardiologist: Dr. Rodriguez     Reason for visit: s/p TAVR     History of presenting illness:    Lucille Rojas is a pleasant 83 year old patient with past medical history significant for hypertension, chronic diastolic heart failure, CAD, hyperlipidemia, obstructive sleep apnea, morbid obesity, B-cell lymphoma s/p chemotherapy, chronic anemia, type 2 diabetes, severe aortic stenosis s/p TAVR with 23 mm ES3 valve (8/23/22), who is here today for follow-up.    She has done well since her TAVR procedure.  She feels she is more active than she was a year ago.  She goes out to play [employer] a few days a week.  She is walking without any symptoms.  She denies any chest pain, shortness of breath, syncope or near syncope, or palpitations.  She has no PND or orthopnea.      Her blood pressure is well controlled.  Her weight is stable.  EKG shows sinus rhythm with LAFB.     Repeat echocardiogram today shows well-seated bioprosthetic aortic valve with a mean gradient of 15 mmHg, no AI, and preserved LVEF 60-65%.     Labs drawn prior to this visit show creatinine of 1.07, GFR 51, normal electrolytes. Hgb of 11.5.  Her most recent lipid panel from 11/2022 shows total cholesterol 169, LDL 92, HDL 44, triglycerides of 164.         Assessment and Plan:     ASSESSMENT:    Severe aortic stenosis s/p TAVR with 23 mm ES3.  Gradient remains within normal limits. She is tolerating activity without symptoms. On ASA 81 mg daily.   Chronic diastolic heart failure, NYHA class II.  Appears euvolemic on exam, on Lasix 20 mg daily.  Hypertension.  Well-controlled on current regimen.  CAD. Pre-TAVR angiogram  showed flow-limiting OM1 stenosis, moderate LAD disease, and moderate to severe proximal circumflex disease.  Being medically managed on aspirin, beta-blocker, and statin. No angina.   Hyperlipidemia. LDL 92, on simvastatin 10 mg daily.   SHAVON. Compliant with CPAP.   Type 2 diabetes  CKD stage III.  Baseline creatinine appears to be 1.0-1.2, stable.  Non-Hodgkin's lymphoma s/p chemotherapy.  Follows with Dr. Srinivasan from oncology.      PLAN:     Follow-up with with Dr. Rodriguez in 1 year with repeat echocardiogram and lipid panel.            Linsey Benitez, DNP, APRN, CNP  Page: [phone] (8a-5p M-F)    Orders this Visit:  Orders Placed This Encounter   Procedures    Follow-Up with Cardiology    EKG 12-lead complete w/read - Clinics (performed today)    Echocardiogram Complete     No orders of the defined types were placed in this encounter.    There are no discontinued medications.    Today's clinic visit entailed:  Review of the result(s) of each unique test - echo, labs, EKG   Prescription drug management  35 minutes spent on the date of the encounter doing chart review, review of test results, patient visit, documentation and discussion with family   Provider  Link to [facility] Help Grid     The level of medical decision making during this visit was of moderate complexity.           Review of Systems:     Review of Systems:  Skin:  not assessed     Eyes:  not assessed glasses  ENT:  Positive for hearing loss  Respiratory:  Positive for dyspnea on exertion;wheezing;sleep apnea;CPAP  Cardiovascular:  palpitations;chest pain;Negative;edema;syncope or near-syncope Positive for;lightheadedness;dizziness;fatigue  Gastroenterology: not assessed    Genitourinary:  not assessed    Musculoskeletal:  not assessed    Neurologic:  Positive for numbness or tingling of hands;numbness or tingling of feet  Psychiatric:  not assessed    Heme/Lymph/Imm:  Positive for allergies  Endocrine:  Positive for diabetes            Physical  Exam:   Vitals: /70 (BP Location: Left arm, Patient Position: Sitting)   Pulse 61   Ht 1.524 m (5')   Wt 109.7 kg (241 lb 14.4 oz)   SpO2 92%   BMI 47.24 kg/m    Constitutional:  cooperative obese      Skin:  warm and dry to the touch        Head:  normocephalic        Eyes:  pupils equal and round        ENT:  no pallor or cyanosis        Neck:  JVP normal        Chest:  normal breath sounds, clear to auscultation, normal A-P diameter, normal symmetry, normal respiratory excursion, no use of accessory muscles        Cardiac: regular rhythm;normal S1 and S2       systolic ejection murmur;grade 1          Abdomen:  abdomen soft obese      Vascular: pulses full and equal                                      Extremities and Back:           Neurological:  no gross motor deficits;affect appropriate             Medications:     Current Outpatient Medications   Medication Sig Dispense Refill    acetaminophen (TYLENOL) 325 MG tablet Take 2 tablets (650 mg) by mouth every 4 hours as needed for mild pain      aspirin 81 MG tablet Take 1 tablet by mouth daily. 30 tablet 0    azithromycin (ZITHROMAX) 500 MG tablet Patient to take 1 tablet 30-60 minutes prior to dental appointments. 4 tablet 1    carboxymethylcellulose PF (REFRESH PLUS) 0.5 % ophthalmic solution Place 2 drops into both eyes 2 times daily      citalopram (CELEXA) 20 MG tablet TAKE 1 TABLET BY MOUTH DAILY 30 tablet 10    CONTOUR NEXT TEST test strip USE TO TEST BLOOD GLUCOSE ONCE DAILY      furosemide (LASIX) 20 MG tablet TAKE 1 TABLET BY MOUTH DAILY 30 tablet 10    levothyroxine (SYNTHROID/LEVOTHROID) 150 MCG tablet TAKE ONE TABLET BY MOUTH ONE TIME DAILY **dose change** Strength: 150 mcg 90 tablet 0    loratadine (CLARITIN) 10 MG tablet Take 10 mg by mouth daily      LORazepam (ATIVAN) 0.5 MG tablet Take 1 tablet (0.5 mg) by mouth every 8 hours as needed for anxiety or nausea 30 tablet 1    metoprolol succinate ER (TOPROL XL) 25 MG 24 hr tablet Take 1  tablet (25 mg) by mouth daily 90 tablet 3    miconazole (MICATIN) 2 % external powder Apply topically as needed for itching or other (for skin folds) 90 g 0    Microlet Lancets MISC USE TO TEST BLOOD GLUCOSE ONCE DAILY      nystatin (MYCOSTATIN) 516351 UNIT/GM external powder Apply topically 2 times daily Under breasts and skin folds BID & BID PRN for redness 60 g 1    order for DME Equipment being ordered: wheeled walker with hand breaks 1 Device 0    pantoprazole (PROTONIX) 40 MG EC tablet Take 1 tablet (40 mg) by mouth every morning (before breakfast) 90 tablet 3    simvastatin (ZOCOR) 10 MG tablet TAKE 1 TABLET BY MOUTH DAILY AT BEDTIME 30 tablet 3    minocycline (MINOCIN) 100 MG capsule TAKE ONE CAPSULE BY MOUTH TWICE DAILY FOR 7 DAYS* (Patient not taking: Reported on 8/17/2023)         No family history on file.    Social History     Socioeconomic History    Marital status:      Spouse name: Not on file    Number of children: Not on file    Years of education: Not on file    Highest education level: Not on file   Occupational History    Not on file   Tobacco Use    Smoking status: Never    Smokeless tobacco: Never   Vaping Use    Vaping Use: Never used   Substance and Sexual Activity    Alcohol use: Not Currently    Drug use: No    Sexual activity: Never   Other Topics Concern    Parent/sibling w/ CABG, MI or angioplasty before 65F 55M? Not Asked   Social History Narrative    Not on file     Social Determinants of Health     Financial Resource Strain: Not on file   Food Insecurity: Not on file   Transportation Needs: Not on file   Physical Activity: Not on file   Stress: Not on file   Social Connections: Not on file   Intimate Partner Violence: Not on file   Housing Stability: Not on file            Past Medical History:     Past Medical History:   Diagnosis Date    Cancer (H) 10/2021    Depressive disorder     Heart disease 10/2021    History of blood transfusion 20/2021    HTN (hypertension) 5/9/2013     Hyperlipidemia LDL goal <130 5/9/2013    Hypothyroidism 5/9/2013    Macular degeneration 9/18/2013    Sleep apnea     CPAP at night, O2 during naps    Type 2 diabetes, HbA1C goal < 8% (H) 5/9/2013              Past Surgical History:     Past Surgical History:   Procedure Laterality Date    APPENDECTOMY      COLONOSCOPY      COLONOSCOPY N/A 10/27/2014    Procedure: COMBINED COLONOSCOPY, SINGLE OR MULTIPLE BIOPSY/POLYPECTOMY BY BIOPSY;  Surgeon: Jose Alfredo Morejon MD;  Location:  GI    CV CORONARY ANGIOGRAM N/A 7/15/2022    Procedure: Coronary Angiogram;  Surgeon: Mary Jo Rodriguez MD;  Location: [facility] CARDIAC CATH LAB    CV PCI N/A 7/15/2022    Procedure: Percutaneous Coronary Intervention;  Surgeon: Mary Jo Rodriguez MD;  Location:  HEART CARDIAC CATH LAB    CV TRANSCATHETER AORTIC VALVE REPLACEMENT-FEMORAL APPROACH N/A 8/23/2022    Procedure: Transcatheter Aortic Valve Replacement-Femoral Approach;  Surgeon: Mary Jo Rodriguez MD;  Location: [facility] CARDIAC CATH LAB    ESOPHAGOSCOPY, GASTROSCOPY, DUODENOSCOPY (EGD), COMBINED N/A 9/21/2021    Procedure: ESOPHAGOGASTRODUODENOSCOPY, WITH FINE NEEDLE ASPIRATION BIOPSY, WITH ENDOSCOPIC ULTRASOUND GUIDANCE;  Surgeon: Vera Benitez MD;  Location:  GI    ESOPHAGOSCOPY, GASTROSCOPY, DUODENOSCOPY (EGD), COMBINED N/A 9/21/2021    Procedure: Esophagogastroduodenoscopy, With Biopsy;  Surgeon: Vera Benitez MD;  Location:  GI    ESOPHAGOSCOPY, GASTROSCOPY, DUODENOSCOPY (EGD), COMBINED N/A 10/5/2021    Procedure: ESOPHAGOGASTRODUODENOSCOPY, WITH FINE NEEDLE ASPIRATION BIOPSY, WITH ENDOSCOPIC ULTRASOUND GUIDANCE;  Surgeon: Dixon Olivier MD;  Location:  GI    ESOPHAGOSCOPY, GASTROSCOPY, DUODENOSCOPY (EGD), COMBINED N/A 12/22/2021    Procedure: ESOPHAGOGASTRODUODENOSCOPY, WITH BIOPSY;  Surgeon: Vera Benitez MD;  Location:  GI    HERNIA REPAIR      inguinal x 2    IR CHEST PORT PLACEMENT > 5 YRS OF AGE   12/13/2021    IR PORT REMOVAL RIGHT  10/7/2022    TONSILLECTOMY                Allergies:   Penicillins       Data:   All laboratory data reviewed:    Recent Labs   Lab Test 12/07/22  0824 11/23/22  0911 09/23/22  1157 08/18/22  1604 08/02/22  0948 03/30/22  0809 02/02/22  0836 12/23/21  1117 12/14/21  0900 11/22/21  1132 09/20/21  1727 06/04/20  0000   LDL  --  92  --   --   --   --   --   --   --  81  --  77   HDL  --  44*  --   --   --   --   --   --   --  51  --  47*   NHDL  --  125  --   --   --   --   --   --   --  108  --  101   CHOL  --  169  --   --   --   --   --   --   --  159  --  148   TRIG  --  164*  --   --   --   --   --   --   --  133  --  140   TSH 0.66  --  0.13*  --  0.12*   < >  --   --   --  2.68   < > 0.93   NTBNP  --   --   --  373  --   --   --   --   --   --   --   --    IRON  --   --   --   --   --   --  37 14*   < >  --    < >  --    FEB  --   --   --   --   --   --  256 264   < >  --    < >  --    IRONSAT  --   --   --   --   --   --  14* 5*   < >  --    < >  --    KHADAR  --   --   --   --   --   --   --  24  --   --    < >  --     < > = values in this interval not displayed.       Lab Results   Component Value Date    WBC 6.9 08/17/2023    WBC 7.6 12/08/2016    RBC 4.12 08/17/2023    RBC 3.62 (L) 12/08/2016    HGB 11.5 (L) 08/17/2023    HGB 10.6 (L) 04/07/2021    HCT 37.9 08/17/2023    HCT 34.3 (L) 12/08/2016    MCV 92 08/17/2023    MCV 95 12/08/2016    MCH 27.9 08/17/2023    MCH 27.6 12/08/2016    MCHC 30.3 (L) 08/17/2023    MCHC 29.2 (L) 12/08/2016    RDW 13.3 08/17/2023    RDW 14.6 12/08/2016     08/17/2023     12/08/2016       Lab Results   Component Value Date     08/17/2023     08/27/2020    POTASSIUM 4.5 08/17/2023    POTASSIUM 4.3 09/23/2022    POTASSIUM 4.1 08/27/2020    CHLORIDE 105 08/17/2023    CHLORIDE 107 09/23/2022    CHLORIDE 109 08/27/2020    CO2 31 (H) 08/17/2023    CO2 29 09/23/2022    CO2 31 08/27/2020    ANIONGAP 9 08/17/2023    ANIONGAP 4  09/23/2022    ANIONGAP 4 08/27/2020     (H) 08/17/2023     (H) 09/23/2022     (H) 08/27/2020    BUN 23.4 (H) 08/17/2023    BUN 36 (H) 09/23/2022    BUN 19 08/27/2020    CR 1.07 (H) 08/17/2023    CR 0.98 08/27/2020    GFRESTIMATED 51 (L) 08/17/2023    GFRESTIMATED 44 (L) 05/31/2023    GFRESTIMATED 54 (L) 08/27/2020    GFRESTBLACK 62 08/27/2020    IGOR 9.0 08/17/2023    IGOR 9.6 08/27/2020      Lab Results   Component Value Date    AST 16 05/31/2023    AST 13 08/27/2020    ALT 12 05/31/2023    ALT 15 08/27/2020       Lab Results   Component Value Date    A1C 5.6 05/31/2023    A1C 5.8 (H) 04/07/2021       Lab Results   Component Value Date    INR 1.07 08/18/2022    INR 1.09 07/15/2022       KCCQ-12  4  6  6  1  2  4  5  5  4  5  2  6    Thank you for allowing me to participate in the care of your patient.      Sincerely,     Linsey Benitez, [facility]  cc:   Mary Jo Rodriguez MD  [id number] [id number]

## 2023-08-17 NOTE — PROGRESS NOTES
Cardiology Clinic Progress Note  Lucille Rojas MRN# [id number]   YOB: 1940 Age: 83 year old     Primary cardiologist: Dr. Rodriguez     Reason for visit: s/p TAVR     History of presenting illness:    Lucille Rojas is a pleasant 83 year old patient with past medical history significant for hypertension, chronic diastolic heart failure, CAD, hyperlipidemia, obstructive sleep apnea, morbid obesity, B-cell lymphoma s/p chemotherapy, chronic anemia, type 2 diabetes, severe aortic stenosis s/p TAVR with 23 mm ES3 valve (8/23/22), who is here today for follow-up.    She has done well since her TAVR procedure.  She feels she is more active than she was a year ago.  She goes out to play cards and bingo a few days a week.  She is walking without any symptoms.  She denies any chest pain, shortness of breath, syncope or near syncope, or palpitations.  She has no PND or orthopnea.      Her blood pressure is well controlled.  Her weight is stable.  EKG shows sinus rhythm with LAFB.     Repeat echocardiogram today shows well-seated bioprosthetic aortic valve with a mean gradient of 15 mmHg, no AI, and preserved LVEF 60-65%.     Labs drawn prior to this visit show creatinine of 1.07, GFR 51, normal electrolytes. Hgb of 11.5.  Her most recent lipid panel from 11/2022 shows total cholesterol 169, LDL 92, HDL 44, triglycerides of 164.         Assessment and Plan:     ASSESSMENT:    Severe aortic stenosis s/p TAVR with 23 mm ES3.  Gradient remains within normal limits. She is tolerating activity without symptoms. On ASA 81 mg daily.   Chronic diastolic heart failure, NYHA class II.  Appears euvolemic on exam, on Lasix 20 mg daily.  Hypertension.  Well-controlled on current regimen.  CAD. Pre-TAVR angiogram showed flow-limiting OM1 stenosis, moderate LAD disease, and moderate to severe proximal circumflex disease.  Being medically managed on aspirin, beta-blocker, and statin. No angina.   Hyperlipidemia. LDL 92, on  simvastatin 10 mg daily.   SHAVON. Compliant with CPAP.   Type 2 diabetes  CKD stage III.  Baseline creatinine appears to be 1.0-1.2, stable.  Non-Hodgkin's lymphoma s/p chemotherapy.  Follows with Dr. Srinivasan from oncology.      PLAN:     Follow-up with with Dr. Rodriguez in 1 year with repeat echocardiogram and lipid panel.            Linsey Benitez, DNP, APRN, CNP  Page: [phone] (8a-5p M-F)    Orders this Visit:  Orders Placed This Encounter   Procedures    Follow-Up with Cardiology    EKG 12-lead complete w/read - Clinics (performed today)    Echocardiogram Complete     No orders of the defined types were placed in this encounter.    There are no discontinued medications.    Today's clinic visit entailed:  Review of the result(s) of each unique test - echo, labs, EKG   Prescription drug management  35 minutes spent on the date of the encounter doing chart review, review of test results, patient visit, documentation and discussion with family   Provider  Link to [facility] Help Grid     The level of medical decision making during this visit was of moderate complexity.           Review of Systems:     Review of Systems:  Skin:  not assessed     Eyes:  not assessed glasses  ENT:  Positive for hearing loss  Respiratory:  Positive for dyspnea on exertion;wheezing;sleep apnea;CPAP  Cardiovascular:  palpitations;chest pain;Negative;edema;syncope or near-syncope Positive for;lightheadedness;dizziness;fatigue  Gastroenterology: not assessed    Genitourinary:  not assessed    Musculoskeletal:  not assessed    Neurologic:  Positive for numbness or tingling of hands;numbness or tingling of feet  Psychiatric:  not assessed    Heme/Lymph/Imm:  Positive for allergies  Endocrine:  Positive for diabetes            Physical Exam:   Vitals: /70 (BP Location: Left arm, Patient Position: Sitting)   Pulse 61   Ht 1.524 m (5')   Wt 109.7 kg (241 lb 14.4 oz)   SpO2 92%   BMI 47.24 kg/m    Constitutional:  cooperative obese       Skin:  warm and dry to the touch        Head:  normocephalic        Eyes:  pupils equal and round        ENT:  no pallor or cyanosis        Neck:  JVP normal        Chest:  normal breath sounds, clear to auscultation, normal A-P diameter, normal symmetry, normal respiratory excursion, no use of accessory muscles        Cardiac: regular rhythm;normal S1 and S2       systolic ejection murmur;grade 1          Abdomen:  abdomen soft obese      Vascular: pulses full and equal                                      Extremities and Back:           Neurological:  no gross motor deficits;affect appropriate             Medications:     Current Outpatient Medications   Medication Sig Dispense Refill    acetaminophen (TYLENOL) 325 MG tablet Take 2 tablets (650 mg) by mouth every 4 hours as needed for mild pain      aspirin 81 MG tablet Take 1 tablet by mouth daily. 30 tablet 0    azithromycin (ZITHROMAX) 500 MG tablet Patient to take 1 tablet 30-60 minutes prior to dental appointments. 4 tablet 1    carboxymethylcellulose PF (REFRESH PLUS) 0.5 % ophthalmic solution Place 2 drops into both eyes 2 times daily      citalopram (CELEXA) 20 MG tablet TAKE 1 TABLET BY MOUTH DAILY 30 tablet 10    CONTOUR NEXT TEST test strip USE TO TEST BLOOD GLUCOSE ONCE DAILY      furosemide (LASIX) 20 MG tablet TAKE 1 TABLET BY MOUTH DAILY 30 tablet 10    levothyroxine (SYNTHROID/LEVOTHROID) 150 MCG tablet TAKE ONE TABLET BY MOUTH ONE TIME DAILY **dose change** Strength: 150 mcg 90 tablet 0    loratadine (CLARITIN) 10 MG tablet Take 10 mg by mouth daily      LORazepam (ATIVAN) 0.5 MG tablet Take 1 tablet (0.5 mg) by mouth every 8 hours as needed for anxiety or nausea 30 tablet 1    metoprolol succinate ER (TOPROL XL) 25 MG 24 hr tablet Take 1 tablet (25 mg) by mouth daily 90 tablet 3    miconazole (MICATIN) 2 % external powder Apply topically as needed for itching or other (for skin folds) 90 g 0    Microlet Lancets MISC USE TO TEST BLOOD GLUCOSE  ONCE DAILY      nystatin (MYCOSTATIN) 396090 UNIT/GM external powder Apply topically 2 times daily Under breasts and skin folds BID & BID PRN for redness 60 g 1    order for DME Equipment being ordered: wheeled walker with hand breaks 1 Device 0    pantoprazole (PROTONIX) 40 MG EC tablet Take 1 tablet (40 mg) by mouth every morning (before breakfast) 90 tablet 3    simvastatin (ZOCOR) 10 MG tablet TAKE 1 TABLET BY MOUTH DAILY AT BEDTIME 30 tablet 3    minocycline (MINOCIN) 100 MG capsule TAKE ONE CAPSULE BY MOUTH TWICE DAILY FOR 7 DAYS* (Patient not taking: Reported on 8/17/2023)         No family history on file.    Social History     Socioeconomic History    Marital status:      Spouse name: Not on file    Number of children: Not on file    Years of education: Not on file    Highest education level: Not on file   Occupational History    Not on file   Tobacco Use    Smoking status: Never    Smokeless tobacco: Never   Vaping Use    Vaping Use: Never used   Substance and Sexual Activity    Alcohol use: Not Currently    Drug use: No    Sexual activity: Never   Other Topics Concern    Parent/sibling w/ CABG, MI or angioplasty before 65F 55M? Not Asked   Social History Narrative    Not on file     Social Determinants of Health     Financial Resource Strain: Not on file   Food Insecurity: Not on file   Transportation Needs: Not on file   Physical Activity: Not on file   Stress: Not on file   Social Connections: Not on file   Intimate Partner Violence: Not on file   Housing Stability: Not on file            Past Medical History:     Past Medical History:   Diagnosis Date    Cancer (H) 10/2021    Depressive disorder     Heart disease 10/2021    History of blood transfusion 20/2021    HTN (hypertension) 5/9/2013    Hyperlipidemia LDL goal <130 5/9/2013    Hypothyroidism 5/9/2013    Macular degeneration 9/18/2013    Sleep apnea     CPAP at night, O2 during naps    Type 2 diabetes, HbA1C goal < 8% (H) 5/9/2013               Past Surgical History:     Past Surgical History:   Procedure Laterality Date    APPENDECTOMY      COLONOSCOPY      COLONOSCOPY N/A 10/27/2014    Procedure: COMBINED COLONOSCOPY, SINGLE OR MULTIPLE BIOPSY/POLYPECTOMY BY BIOPSY;  Surgeon: Jose Alfredo Morejon MD;  Location:  GI    CV CORONARY ANGIOGRAM N/A 7/15/2022    Procedure: Coronary Angiogram;  Surgeon: Mary Jo Rodriguez MD;  Location:  HEART CARDIAC CATH LAB    CV PCI N/A 7/15/2022    Procedure: Percutaneous Coronary Intervention;  Surgeon: Mary Jo Rodriguez MD;  Location: [facility] CARDIAC CATH LAB    CV TRANSCATHETER AORTIC VALVE REPLACEMENT-FEMORAL APPROACH N/A 8/23/2022    Procedure: Transcatheter Aortic Valve Replacement-Femoral Approach;  Surgeon: Mary Jo Rodriguez MD;  Location: [facility] CARDIAC CATH LAB    ESOPHAGOSCOPY, GASTROSCOPY, DUODENOSCOPY (EGD), COMBINED N/A 9/21/2021    Procedure: ESOPHAGOGASTRODUODENOSCOPY, WITH FINE NEEDLE ASPIRATION BIOPSY, WITH ENDOSCOPIC ULTRASOUND GUIDANCE;  Surgeon: Vera Benitez MD;  Location:  GI    ESOPHAGOSCOPY, GASTROSCOPY, DUODENOSCOPY (EGD), COMBINED N/A 9/21/2021    Procedure: Esophagogastroduodenoscopy, With Biopsy;  Surgeon: Vera Benitez MD;  Location:  GI    ESOPHAGOSCOPY, GASTROSCOPY, DUODENOSCOPY (EGD), COMBINED N/A 10/5/2021    Procedure: ESOPHAGOGASTRODUODENOSCOPY, WITH FINE NEEDLE ASPIRATION BIOPSY, WITH ENDOSCOPIC ULTRASOUND GUIDANCE;  Surgeon: Dixon Olivier MD;  Location:  GI    ESOPHAGOSCOPY, GASTROSCOPY, DUODENOSCOPY (EGD), COMBINED N/A 12/22/2021    Procedure: ESOPHAGOGASTRODUODENOSCOPY, WITH BIOPSY;  Surgeon: Vera Benitez MD;  Location:  GI    HERNIA REPAIR      inguinal x 2    IR CHEST PORT PLACEMENT > 5 YRS OF AGE  12/13/2021    IR PORT REMOVAL RIGHT  10/7/2022    TONSILLECTOMY                Allergies:   Penicillins       Data:   All laboratory data reviewed:    Recent Labs   Lab Test 12/07/22  0824 11/23/22  0911  09/23/22  1157 08/18/22  1604 08/02/22  0948 03/30/22  0809 02/02/22  0836 12/23/21  1117 12/14/21  0900 11/22/21  1132 09/20/21  1727 06/04/20  0000   LDL  --  92  --   --   --   --   --   --   --  81  --  77   HDL  --  44*  --   --   --   --   --   --   --  51  --  47*   NHDL  --  125  --   --   --   --   --   --   --  108  --  101   CHOL  --  169  --   --   --   --   --   --   --  159  --  148   TRIG  --  164*  --   --   --   --   --   --   --  133  --  140   TSH 0.66  --  0.13*  --  0.12*   < >  --   --   --  2.68   < > 0.93   NTBNP  --   --   --  373  --   --   --   --   --   --   --   --    IRON  --   --   --   --   --   --  37 14*   < >  --    < >  --    FEB  --   --   --   --   --   --  256 264   < >  --    < >  --    IRONSAT  --   --   --   --   --   --  14* 5*   < >  --    < >  --    KHADAR  --   --   --   --   --   --   --  24  --   --    < >  --     < > = values in this interval not displayed.       Lab Results   Component Value Date    WBC 6.9 08/17/2023    WBC 7.6 12/08/2016    RBC 4.12 08/17/2023    RBC 3.62 (L) 12/08/2016    HGB 11.5 (L) 08/17/2023    HGB 10.6 (L) 04/07/2021    HCT 37.9 08/17/2023    HCT 34.3 (L) 12/08/2016    MCV 92 08/17/2023    MCV 95 12/08/2016    MCH 27.9 08/17/2023    MCH 27.6 12/08/2016    MCHC 30.3 (L) 08/17/2023    MCHC 29.2 (L) 12/08/2016    RDW 13.3 08/17/2023    RDW 14.6 12/08/2016     08/17/2023     12/08/2016       Lab Results   Component Value Date     08/17/2023     08/27/2020    POTASSIUM 4.5 08/17/2023    POTASSIUM 4.3 09/23/2022    POTASSIUM 4.1 08/27/2020    CHLORIDE 105 08/17/2023    CHLORIDE 107 09/23/2022    CHLORIDE 109 08/27/2020    CO2 31 (H) 08/17/2023    CO2 29 09/23/2022    CO2 31 08/27/2020    ANIONGAP 9 08/17/2023    ANIONGAP 4 09/23/2022    ANIONGAP 4 08/27/2020     (H) 08/17/2023     (H) 09/23/2022     (H) 08/27/2020    BUN 23.4 (H) 08/17/2023    BUN 36 (H) 09/23/2022    BUN 19 08/27/2020    CR 1.07 (H)  08/17/2023    CR 0.98 08/27/2020    GFRESTIMATED 51 (L) 08/17/2023    GFRESTIMATED 44 (L) 05/31/2023    GFRESTIMATED 54 (L) 08/27/2020    GFRESTBLACK 62 08/27/2020    IGOR 9.0 08/17/2023    IGOR 9.6 08/27/2020      Lab Results   Component Value Date    AST 16 05/31/2023    AST 13 08/27/2020    ALT 12 05/31/2023    ALT 15 08/27/2020       Lab Results   Component Value Date    A1C 5.6 05/31/2023    A1C 5.8 (H) 04/07/2021       Lab Results   Component Value Date    INR 1.07 08/18/2022    INR 1.09 07/15/2022       KCCQ-12  4  6  6  1  2  4  5  5  4  5  2  6

## 2023-08-17 NOTE — PATIENT INSTRUCTIONS
Today's Plan:     - Follow-up with Dr. Rodriguez in 1 year with echocardiogram.     Mackenzie RN ([phone]), Holli RN ([phone]), and Pura RN ([phone])    Scheduling phone number: [phone]    Reminder: Please bring in all current medications, over the counter supplements and vitamin bottles to your next appointment.-    It was a pleasure seeing you today!     Linsey Benitez, JOSÉ LUIS  8/17/2023    -

## 2023-08-18 DIAGNOSIS — I35.0 AORTIC STENOSIS, SEVERE: ICD-10-CM

## 2023-08-18 RX ORDER — METOPROLOL SUCCINATE 25 MG/1
25 TABLET, EXTENDED RELEASE ORAL DAILY
Qty: 90 TABLET | Refills: 4 | Status: SHIPPED | OUTPATIENT
Start: 2023-08-18 | End: 2024-08-27

## 2023-08-21 ENCOUNTER — TELEPHONE (OUTPATIENT)
Dept: CARE COORDINATION | Facility: CLINIC | Age: 83
End: 2023-08-21
Payer: MEDICARE

## 2023-08-21 ENCOUNTER — PATIENT OUTREACH (OUTPATIENT)
Dept: CARE COORDINATION | Facility: CLINIC | Age: 83
End: 2023-08-21
Payer: MEDICARE

## 2023-08-21 DIAGNOSIS — R53.81 PHYSICAL DECONDITIONING: Primary | ICD-10-CM

## 2023-08-21 NOTE — PROGRESS NOTES
"Clinic Care Coordination Contact  Community Health Worker Follow Up    Care Gaps:     Health Maintenance Due   Topic Date Due    HF ACTION PLAN  Never done    DTAP/TDAP/TD IMMUNIZATION (2 - Td or Tdap) 01/01/2019    DIABETIC FOOT EXAM  06/29/2023    PHQ-9  07/02/2023    ZOSTER IMMUNIZATION (2 of 2) 08/22/2023       Did Not Address.    Care Plan:   Care Plan: Social Support       Problem: Inadequate social support       Goal: I will access resources to obtain more in home support.       Start Date: 11/29/2022 Expected End Date: 11/29/2023    This Visit's Progress: 40% Recent Progress: 30%    Note:     Barriers: resource availability  Strengths: pt is a good advocate for self.   Patient expressed understanding of goal: yes  Action steps to achieve this goal:  1. I will contact the county about a [place].   2. I will contact resources given to me.   3. I will contact my CHW with any needs or questions.                               Discussed:  Patient stated she  is doing okay.  Patient stated she has some appointments for \"check-up's\".  Patient stated she would like an  order placed for her and her  for a cane.  Patient stated Medicare will  pay.  Patient stated she went to  [employer] a few times recently.  Patient's son provided transportation.  Patient stated she and her   go to [employer] a few times a week to socialize.  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  Patient stated a group from [employer] came to pull weeds for 1 1/2 hours the other day.  Patient stated she hired someone to mow the lawn.  Patient stated she is still  going  through boxes of  stuff.  Patient stated she has not  hired anyone to help with house cleaning.      Intervention and Education during outreach:   CHW provided Help At Your Door resource for cleaning [phone].  Patient stated she has the contact information,  but misplaced it.  [facility] [phone]    CHW encouraged patient to contact CC if there " are any other changes or resources needed. Patient acknowledged understanding.        CHW Plan: will route a message to [employer] Triage.  CHW will outreach in one month.    Rosanne Valadez, TEEW  Clinic Care Coordination  [facility] Clinics: [place], [place], [place], and [place] [facility]  Phone: [phone]

## 2023-08-21 NOTE — TELEPHONE ENCOUNTER
Clinic Care Coordination Contact    Patient is requesting an  order to be placed for a  cane for herself  and her   (MRN- [id number]).  Patient  stated Medicare will  pay if  an  order is  placed.    Please  call  patient  to  discuss.    Thank you.     Rosanne Valadez, KM  Clinic Care Coordination  [facility] Clinics: [place], [place], [place], and [facility]  Phone: [phone]

## 2023-08-23 ENCOUNTER — PATIENT OUTREACH (OUTPATIENT)
Dept: CARE COORDINATION | Facility: CLINIC | Age: 83
End: 2023-08-23
Payer: MEDICARE

## 2023-08-23 ASSESSMENT — ACTIVITIES OF DAILY LIVING (ADL): DEPENDENT_IADLS:: TRANSPORTATION

## 2023-08-23 NOTE — PROGRESS NOTES
Clinic Care Coordination Contact  Care Coordination Clinician Chart Review    Situation: Patient chart reviewed by Care Coordinator.       Background: Care Coordination Program started: 12/3/2019. Initial assessment completed and patient-centered care plan(s) were developed with participation from patient. Lead CC handed patient off to CHW for continued outreaches.       Assessment: Per chart review, patient outreach completed by CC CHW on 8/21/23.  Patient is actively working to accomplish goal(s). Patient's goal(s) appropriate and relevant at this time. Patient is due for updated Plan of Care.  Assessments will be completed annually or as needed/with change of patient status.      Care Plan: Social Support       Problem: Inadequate social support       Goal: I will access resources to obtain more in home support.       Start Date: 11/29/2022 Expected End Date: 11/29/2023    This Visit's Progress: 40% Recent Progress: 30%    Note:     Barriers: resource availability  Strengths: pt is a good advocate for self.   Patient expressed understanding of goal: yes  Action steps to achieve this goal:  1. I will contact the [facility] about a MNChoices assessment.   2. I will contact resources given to me.   3. I will contact my CHW with any needs or questions.                                      Plan/Recommendations: The patient will continue working with Care Coordination to achieve goal(s) as above. CHW will continue outreaches at minimum every 30 days and will involve Lead CC as needed or if patient is ready to move to Maintenance. Lead CC will continue to monitor CHW outreaches and patient's progress to goal(s) every 6 weeks.     Plan of Care updated and sent to patient: Yes, via TORIE Cabezas   Care Coordination Team  [phone]

## 2023-08-23 NOTE — LETTER
[facility] CARE COORDINATION    August 23, 2023        Lucille Rojas  [address]  [place] [address]          Dear Lucille,     Attached is an updated Patient Centered Plan of Care for your continued enrollment in Care Coordination. Please let us know if you have additional questions, concerns, or goals that we can assist with.    Sincerely,    TORIE Lester   Care Coordination Team  [phone]          [facility]  Patient Centered Plan of Care  About Me:        Patient Name:  Lucille Rojas    YOB: 1940  Age:         83 year old   [place] MRN:    [id number] Telephone Information:  Home Phone [phone]   Mobile [phone]       Address:  [address]  [place] [address] Email address:  [email or web address]      Emergency Contact(s)    Name Relationship Lgl Grd Work Phone Home Phone Mobile Phone   1. MONICA BUCKNER * Daughter   [phone]   2. EMIL ROJAS Spouse No  [phone]   3. HARRIET ROJAS Daughter No  [phone]   4. JOSEFA ROJAS Son No  [phone]           Primary language:  English     needed? No   [place] [employer] [facility]:  [phone] op. 1  Other communication barriers:None    Preferred Method of Communication:  Mail  Current living arrangement: I live in a private home with spouse    Mobility Status/ Medical Equipment: Independent w/Device        Health Maintenance  Health Maintenance Reviewed: Due/Overdue   Health Maintenance Due   Topic Date Due    HF ACTION PLAN  Never done    DTAP/TDAP/TD IMMUNIZATION (2 - Td or Tdap) 01/01/2019    DIABETIC FOOT EXAM  06/29/2023    PHQ-9  07/02/2023    ZOSTER IMMUNIZATION (2 of 2) 08/22/2023          My Access Plan  Medical Emergency 911   Primary Clinic Line [facility]* - [phone]   24 Hour Appointment Line [phone] or  [phone]) (toll-free)   24 Hour Nurse Line  [phone] (toll-free)   Preferred Urgent Care [facility], [phone]     Preferred Hospital [facility]  [phone]     Preferred Pharmacy [id number] - [place], [place] - [address] [facility] Crisis Line The National Suicide Prevention Lifeline at 1-584.311.5130 or Text/Call 988             My Care Team Members  Patient Care Team         Relationship Specialty Notifications Start End    Fausto Flynn MD PCP - General Internal Medicine  10/6/14     Phone: [phone] Fax: [phone]         [address] [place] [address]    Fausto Flynn MD Assigned PCP   10/12/14     Phone: [phone] Fax: [phone]         [address] [place] [address]    Yonny Roman MD MD INTERNAL MEDICINE - ENDOCRINOLOGY, DIABETES & METABOLISM  12/3/19     Phone: [phone] Fax: [phone]         [facility] [address] [facility] [address]    Rosanne Valadez MA Community Health Worker Primary Care - CC Admissions 1/22/20     Phone: [phone]         [facility]    12/27/21     TCU    Phone: [phone] Fax: [phone] [address] [facility] [address]    Rafa Kelly AuD Audiologist Audiology  9/7/22     Phone: [phone] Fax: [phone] [address] [facility] [address]    Puja Damian, KORTNEYW Lead Care Coordinator Primary Care - CC Admissions 2/2/23     Phone: [phone]         Michael Nolasco DPM Assigned Surgical Provider   2/4/23     Phone: [phone] Fax: [phone] [address] [facility] [address] [facility] [address]    Linsey Benitez, CNP Assigned Heart and Vascular Provider   5/13/23     Phone: [phone] Pager: [phone] Fax: [phone] [address] [place] [address]    Alexandr Ambriz APRN CNP Assigned Cancer Care Provider   7/29/23     Phone: [phone]  Fax: [phone] [address] [facility] [address]              My Care Plans  Self Management and Treatment Plan  Care Plan  Care Plan: Social Support       Problem: Inadequate social support       Goal: I will access resources to obtain more in home support.       Start Date: 11/29/2022 Expected End Date: 11/29/2023    This Visit's Progress: 40% Recent Progress: 30%    Note:     Barriers: resource availability  Strengths: pt is a good advocate for self.   Patient expressed understanding of goal: yes  Action steps to achieve this goal:  1. I will contact the county about a M[place] assessment.   2. I will contact resources given to me.   3. I will contact my CHW with any needs or questions.                                    Action Plans on File:            Depression          Advance Care Plans/Directives Type:   No data recorded    My Medical and Care Information  Problem List   Patient Active Problem List   Diagnosis    Hyperlipidemia LDL goal <100    Macular degeneration    Apnea    Morbid obesity due to excess calories (H)    CKD (chronic kidney disease) stage 3, GFR 30-59 ml/min (H)    Acquired hypothyroidism    Benign essential hypertension    Gastroesophageal reflux disease without esophagitis    Type 2 diabetes mellitus with stage 3 chronic kidney disease, without long-term current use of insulin (H)    Anemia, unspecified type    MDD (major depressive disorder), recurrent episode, mild (H)    Severe anemia    Severe obesity (BMI 35.0-39.9) with comorbidity (H)    Peripheral vision loss, unspecified laterality    Type 2 DM with CKD stage 3 and hypertension (H)    Acute on chronic blood loss anemia    B-cell lymphoma of intrathoracic lymph nodes, unspecified B-cell lymphoma type (H)    SHAVON (obstructive sleep apnea)    Pancreatic mass    Nonrheumatic aortic (valve) stenosis with insufficiency    Diastolic heart failure, unspecified HF chronicity (H)    Hypothyroidism, unspecified type    Major  depressive disorder with current active episode, unspecified depression episode severity, unspecified whether recurrent    Physical deconditioning    Severe obesity (BMI >= 40) (H)    Callus of foot    Non-Hodgkin's lymphoma (H)    Encounter for other specified aftercare    Hypoxia    Generalized muscle weakness    Symptomatic anemia    Diverticular disease of colon    Status post coronary angiogram    Aortic stenosis, severe    Skin ulcer of great toe, unspecified laterality, limited to breakdown of skin (H)      Current Medications and Allergies:  See printed Medication Report.    Care Coordination Start Date: 12/3/2019   Frequency of Care Coordination: monthly     Form Last Updated: 08/23/2023

## 2023-09-08 DIAGNOSIS — E11.22 TYPE 2 DIABETES MELLITUS WITH STAGE 3 CHRONIC KIDNEY DISEASE, WITHOUT LONG-TERM CURRENT USE OF INSULIN, UNSPECIFIED WHETHER STAGE 3A OR 3B CKD (H): ICD-10-CM

## 2023-09-08 DIAGNOSIS — F33.0 MDD (MAJOR DEPRESSIVE DISORDER), RECURRENT EPISODE, MILD (H): ICD-10-CM

## 2023-09-08 DIAGNOSIS — E78.5 HYPERLIPIDEMIA LDL GOAL <100: ICD-10-CM

## 2023-09-08 DIAGNOSIS — N18.30 TYPE 2 DIABETES MELLITUS WITH STAGE 3 CHRONIC KIDNEY DISEASE, WITHOUT LONG-TERM CURRENT USE OF INSULIN, UNSPECIFIED WHETHER STAGE 3A OR 3B CKD (H): ICD-10-CM

## 2023-09-11 RX ORDER — SIMVASTATIN 10 MG
TABLET ORAL
Qty: 90 TABLET | Refills: 1 | Status: SHIPPED | OUTPATIENT
Start: 2023-09-11 | End: 2024-03-08

## 2023-09-11 RX ORDER — CITALOPRAM HYDROBROMIDE 20 MG/1
TABLET ORAL
Qty: 30 TABLET | Refills: 10 | Status: SHIPPED | OUTPATIENT
Start: 2023-09-11 | End: 2024-09-05

## 2023-10-02 ENCOUNTER — PATIENT OUTREACH (OUTPATIENT)
Dept: CARE COORDINATION | Facility: CLINIC | Age: 83
End: 2023-10-02
Payer: MEDICARE

## 2023-10-02 ENCOUNTER — TELEPHONE (OUTPATIENT)
Dept: CARE COORDINATION | Facility: CLINIC | Age: 83
End: 2023-10-02
Payer: MEDICARE

## 2023-10-02 NOTE — TELEPHONE ENCOUNTER
"Clinic Care Coordination Contact    Patient stated once a month for the last three months patient has had an episode that lasts 4 - 5 days.  Patient stated she is \"terrible to live with\", and \"I don't like myself\".  Patient stated during this time period, patient is negative, angry, and is not nice to her  or family.  Patient stated \"like a breeze, it goes away\".  Patient is back to normal again.  Patient is unsure what is the cause of feeling this way?    Please call patient to discuss.    Patient stated she wanted her PCP to know.    Thank you.     Rosanne Valadez, [facility]  Clinic Care Coordination  [facility] Clinics: [facility] [place], and [facility]  Phone: [phone]   "

## 2023-10-02 NOTE — TELEPHONE ENCOUNTER
Difficult clinical assessment.  Would suggest that if symptoms persist or worsen the patient make a follow-up appointment to discuss potential treatment options and/or further evaluation.

## 2023-10-02 NOTE — TELEPHONE ENCOUNTER
Called and spoke with pt to relay provider message below.   She verbalized agreement and has no further questions.    While on the phone, pt asked to schedule her annual wellness visit. Assisted pt to schedule an appt.     Dec 04, 2023 10:00 AM  (Arrive by 9:40 AM)  Provider Visit with Fausto Flynn MD  [facility] ([facility] ) [phone]     Thank you,  Kemi Clements, RN

## 2023-10-02 NOTE — PROGRESS NOTES
"Clinic Care Coordination Contact  Community Health Worker Follow Up    Care Gaps:     Health Maintenance Due   Topic Date Due    HF ACTION PLAN  Never done    DTAP/TDAP/TD IMMUNIZATION (2 - Td or Tdap) 01/01/2019    DIABETIC FOOT EXAM  06/29/2023    PHQ-9  07/02/2023    INFLUENZA VACCINE (1) 09/01/2023    COVID-19 Vaccine (6 - 2023-24 season) 09/01/2023    ZOSTER IMMUNIZATION (2 of 2) 08/22/2023       Care Gaps Last addressed on 10/2/23- Medicare Annual Wellness Visit    Care Plan:   Care Plan: Social Support       Problem: Inadequate social support       Goal: I will access resources to obtain more in home support.       Start Date: 11/29/2022 Expected End Date: 11/29/2023    This Visit's Progress: 60% Recent Progress: 40%    Note:     Barriers: resource availability  Strengths: pt is a good advocate for self.   Patient expressed understanding of goal: yes  Action steps to achieve this goal:  1. I will contact the county about [place].   2. I will contact resources given to me.   3. I will contact my CHW with any needs or questions.                               Discussed:  Patient stated she is feeling \"pretty good\" today.  Patient stated if I asked her that a few weeks ago, she would have had a completely different answer.  Patient stated every month for the last three months, patient has been experiencing an episode that lasts 4 - 5 days per month.  Patient stated she is \"terrible to live with\", \"I don't like myself\", \"nobody loves me\".  Patient stated she is negative and not nice to her  and family.  Patient is unsure of the cause.  Patient stated just like the breeze, it goes away.  Patient is better and back herself.  Patient stated her vision is getting worse.  Patient stated she is still able to get around.  Patient stated she has pain at night.  Patient stated the pain will wake her up.  Patient stated her fingers at times feel like \"electricity is going through them\".  Patient stated " she found a soft spot on her head.  Patient stated she and her  planned to go to [facility] today.  [employer] would not wait for them.  The  left.  Patient stated they plan to go on Wednesday.  Patient stated [facility] [place].  Patient stated she has baby blanket, bibs, pot morales, aprons, and more.  Patient stated she and her  are still receiving communion at home.  Patient stated she called on the cleaning resources.  Patient stated she is still working on finding a cleaning person to help.  Patient request a message to be sent to her PCP.      Intervention and Education during outreach:   CHW encouraged patient to contact CC if there are any other changes or resources needed.  Patient acknowledged understanding.      CHW Plan: will route a message to PCP and OX Triage.  CHW will outreach in one month    ZANE Demarco  Clinic Care Coordination  [facility] Clinics: [place], [facility]  Phone: [phone]

## 2023-10-06 ENCOUNTER — PATIENT OUTREACH (OUTPATIENT)
Dept: CARE COORDINATION | Facility: CLINIC | Age: 83
End: 2023-10-06
Payer: MEDICARE

## 2023-10-06 ASSESSMENT — ACTIVITIES OF DAILY LIVING (ADL): DEPENDENT_IADLS:: TRANSPORTATION

## 2023-10-06 NOTE — PROGRESS NOTES
Clinic Care Coordination Contact  Care Coordination Clinician Chart Review    Situation: Patient chart reviewed by Care Coordinator.       Background: Care Coordination Program started: 12/3/2019. Initial assessment completed and patient-centered care plan(s) were developed with participation from patient. Lead CC handed patient off to CHW for continued outreaches.       Assessment: Per chart review, patient outreach completed by CC CHW on 10/2/23.  Patient is actively working to accomplish goal(s). Patient's goal(s) appropriate and relevant at this time. Patient is not due for updated Plan of Care.  Assessments will be completed annually or as needed/with change of patient status.      Care Plan: Social Support       Problem: Inadequate social support       Goal: I will access resources to obtain more in home support.       Start Date: 11/29/2022 Expected End Date: 11/29/2023    This Visit's Progress: 60% Recent Progress: 40%    Note:     Barriers: resource availability  Strengths: pt is a good advocate for self.   Patient expressed understanding of goal: yes  Action steps to achieve this goal:  1. I will contact the [facility] about a M[place]hoices assessment.   2. I will contact resources given to me.   3. I will contact my CHW with any needs or questions.                                      Plan/Recommendations: The patient will continue working with Care Coordination to achieve goal(s) as above. CHW will continue outreaches at minimum every 30 days and will involve Lead CC as needed or if patient is ready to move to Maintenance. Lead CC will continue to monitor CHW outreaches and patient's progress to goal(s) every 6 weeks.     Plan of Care updated and sent to patient: TORIE Alas   Care Coordination Team  [phone]

## 2023-10-20 DIAGNOSIS — E03.9 ACQUIRED HYPOTHYROIDISM: Chronic | ICD-10-CM

## 2023-10-20 RX ORDER — LEVOTHYROXINE SODIUM 150 UG/1
TABLET ORAL
Qty: 90 TABLET | Refills: 0 | Status: SHIPPED | OUTPATIENT
Start: 2023-10-20 | End: 2024-02-08

## 2023-11-15 ENCOUNTER — PATIENT OUTREACH (OUTPATIENT)
Dept: CARE COORDINATION | Facility: CLINIC | Age: 83
End: 2023-11-15
Payer: MEDICARE

## 2023-11-15 NOTE — PROGRESS NOTES
Clinic Care Coordination Contact  Community Health Worker Follow Up    Care Gaps:     Health Maintenance Due   Topic Date Due    HF ACTION PLAN  Never done    RSV VACCINE (Pregnancy & 60+) (1 - 1-dose 60+ series) Never done    DTAP/TDAP/TD IMMUNIZATION (2 - Td or Tdap) 01/01/2019    DIABETIC FOOT EXAM  06/29/2023    PHQ-9  07/02/2023    INFLUENZA VACCINE (1) 09/01/2023    COVID-19 Vaccine (6 - 2023-24 season) 09/01/2023    ZOSTER IMMUNIZATION (2 of 2) 08/22/2023    MEDICARE ANNUAL WELLNESS VISIT  11/23/2023    LIPID  11/23/2023    MICROALBUMIN  11/23/2023    FALL RISK ASSESSMENT  11/23/2023       Care Gaps Last addressed on 11/15/23- Influenza, Covid-19, and Zoster Vaccines, and Medicare Annual Wellness Visit    Care Plan:   Care Plan: Social Support       Problem: Inadequate social support       Goal: I will access resources to obtain more in home support.       Start Date: 11/29/2022 Expected End Date: 3/20/2024    This Visit's Progress: 70% Recent Progress: 60%    Note:     Barriers: resource availability  Strengths: pt is a good advocate for self.   Patient expressed understanding of goal: yes  Action steps to achieve this goal:  1. I will contact the county about a [place].   2. I will contact resources given to me.   3. I will contact my CHW with any needs or questions.                               Discussed:  Patient stated she called some of the house cleaning resources.  Patient stated she is not finding any openings at this time.  Patient stated she and her  go to [facility] twice a week to play cards.  Patient stated someone from the [facility] is helping her with vision tools.  Patient stated she met this person at [place].  Patient stated her daughter plans to visit today and her son will visit tomorrow.  Patient stated she and her  have an appointment in December with their PCP.  Patient stated at that time she will get her  vaccines.    Intervention and Education during outreach:     CHW encouraged patient to contact CC if there are any other changes or resources needed.  Patient acknowledged understanding.      CHW Plan: will outreach in one month    ZANE Demarco  Clinic Care Coordination  [facility] Clinics: [facility] [place], and [facility]  Phone: [phone]

## 2023-11-21 ENCOUNTER — PATIENT OUTREACH (OUTPATIENT)
Dept: CARE COORDINATION | Facility: CLINIC | Age: 83
End: 2023-11-21
Payer: MEDICARE

## 2023-11-21 ASSESSMENT — ACTIVITIES OF DAILY LIVING (ADL): DEPENDENT_IADLS:: TRANSPORTATION

## 2023-11-21 NOTE — LETTER
[facility] CARE COORDINATION    November 21, 2023        Lucille Rojas  [address]  [place] [address]          Dear Lucille,     Attached is an updated Patient Centered Plan of Care for your continued enrollment in Care Coordination. Please let us know if you have additional questions, concerns, or goals that we can assist with.    Sincerely,    TORIE Lester   Care Coordination Team  [phone]         [facility]  Patient Centered Plan of Care  About Me:        Patient Name:  Lucille Rojas    YOB: 1940  Age:         83 year old   [employer] MRN:    [id number] Telephone Information:  Home Phone [phone]   Mobile [phone]       Address:  [address]  [place] [address] Email address:  [email or web address]      Emergency Contact(s)    Name Relationship Lgl Grd Work Phone Home Phone Mobile Phone   1. MONICA BUCKNER * Daughter   [phone]   2. EMIL ROJAS Spouse No  [phone]   3. HARRIET ROJAS Daughter No  [phone]   4. JOSEFA ROJAS Son No  [phone]           Primary language:  English     needed? No   [employer]:  [phone] op. 1  Other communication barriers:None    Preferred Method of Communication:  Mail  Current living arrangement: I live in a private home with spouse    Mobility Status/ Medical Equipment: Independent w/Device        Health Maintenance  Health Maintenance Reviewed: Due/Overdue   Health Maintenance Due   Topic Date Due    HF ACTION PLAN  Never done    RSV VACCINE (Pregnancy & 60+) (1 - 1-dose 60+ series) Never done    DTAP/TDAP/TD IMMUNIZATION (2 - Td or Tdap) 01/01/2019    DIABETIC FOOT EXAM  06/29/2023    PHQ-9  07/02/2023    INFLUENZA VACCINE (1) 09/01/2023    COVID-19 Vaccine (6 - 2023-24 season) 09/01/2023    ZOSTER IMMUNIZATION (2 of 2) 08/22/2023    MEDICARE ANNUAL WELLNESS VISIT  11/23/2023    LIPID  11/23/2023     MICROALBUMIN  11/23/2023    FALL RISK ASSESSMENT  11/23/2023    A1C  11/30/2023    EYE EXAM  12/21/2023          My Access Plan  Medical Emergency 911   Primary Clinic Line [facility] - [phone]   24 Hour Appointment Line [phone] or  [phone] ([phone]) (toll-free)   24 Hour Nurse Line [phone] (toll-free)   Preferred Urgent Care [facility], [phone]     Preferred Hospital [facility]  [phone]     Preferred Pharmacy [id number] - [place], [place] - [address] [facility] Crisis Line The National Suicide Prevention Lifeline at 1-900.297.3383 or Text/Call 568           My Care Team Members  Patient Care Team         Relationship Specialty Notifications Start End    Fausto Flynn MD PCP - General Internal Medicine  10/6/14     Phone: [phone] Fax: [phone]         [address] [place] [address]    Fausto Flynn MD Assigned PCP   10/12/14     Phone: [phone] Fax: [phone]         [address] [place] [address]    Yonny Roman MD MD INTERNAL MEDICINE - ENDOCRINOLOGY, DIABETES & METABOLISM  12/3/19     Phone: [phone] Fax: [phone]         [facility] [address] [facility] [address]    Rosanne Valadez CHW Community Health Worker Primary Care - CC Admissions 1/22/20     Phone: [phone]         [facility]    12/27/21     TCU    Phone: [phone] Fax: [phone] [address] [facility] [address]    Rafa Kelly AuD Audiologist Audiology  9/7/22     Phone: [phone] Fax: [phone] [address] [place] [address]    Puja Damian LSW Lead Care Coordinator Primary Care - CC Admissions 2/2/23     Phone: [phone]         Michael Nolasco DPM Assigned Surgical Provider   2/4/23     Phone: [phone] Fax:  [phone]         [address] [facility] [address] [facility] [address]    Linsey Benitez CNP Assigned Heart and Vascular Provider   5/13/23     Phone: [phone] Pager: [phone] Fax: [phone] [address] [place] [place] [address]    John Srinivasan MD Assigned Cancer Care Provider   8/5/23     Phone: [phone] Fax: [phone]         [facility] [address] [place] [address]                My Care Plans  Self Management and Treatment Plan    Care Plan  Care Plan: Social Support       Problem: Inadequate social support       Goal: I will access resources to obtain more in home support.       Start Date: 11/29/2022 Expected End Date: 3/20/2024    This Visit's Progress: 70% Recent Progress: 60%    Note:     Barriers: resource availability  Strengths: pt is a good advocate for self.   Patient expressed understanding of goal: yes  Action steps to achieve this goal:  1. I will contact the [facility] about a MNChoices assessment.   2. I will contact resources given to me.   3. I will contact my CHW with any needs or questions.                                   Action Plans on File:            Depression          Advance Care Plans/Directives:   Advanced Care Plan/Directives on file:   No    Discussed with patient/caregiver(s): No data recorded           My Medical and Care Information  Problem List   Patient Active Problem List   Diagnosis    Hyperlipidemia LDL goal <100    Macular degeneration    Apnea    Morbid obesity due to excess calories (H)    CKD (chronic kidney disease) stage 3, GFR 30-59 ml/min (H)    Acquired hypothyroidism    Benign essential hypertension    Gastroesophageal reflux disease without esophagitis    Type 2 diabetes mellitus with stage 3 chronic kidney disease, without long-term current use of insulin (H)    Anemia, unspecified type    MDD (major depressive disorder), recurrent episode, mild (H24)    Severe anemia    Severe obesity (BMI 35.0-39.9) with  comorbidity (H)    Peripheral vision loss, unspecified laterality    Type 2 DM with CKD stage 3 and hypertension (H)    Acute on chronic blood loss anemia    B-cell lymphoma of intrathoracic lymph nodes, unspecified B-cell lymphoma type (H)    SHAVON (obstructive sleep apnea)    Pancreatic mass    Nonrheumatic aortic (valve) stenosis with insufficiency    Diastolic heart failure, unspecified HF chronicity (H)    Hypothyroidism, unspecified type    Major depressive disorder with current active episode, unspecified depression episode severity, unspecified whether recurrent    Physical deconditioning    Severe obesity (BMI >= 40) (H)    Callus of foot    Non-Hodgkin's lymphoma (H)    Encounter for other specified aftercare    Hypoxia    Generalized muscle weakness    Symptomatic anemia    Diverticular disease of colon    Status post coronary angiogram    Aortic stenosis, severe    Skin ulcer of great toe, unspecified laterality, limited to breakdown of skin (H)      Current Medications and Allergies:  See printed Medication Report.    Care Coordination Start Date: 12/3/2019   Frequency of Care Coordination: monthly, more frequently as needed     Form Last Updated: 11/21/2023

## 2023-11-21 NOTE — PROGRESS NOTES
Clinic Care Coordination Contact  Care Coordination Clinician Chart Review    Situation: Patient chart reviewed by Care Coordinator.       Background: Care Coordination Program started: 12/3/2019. Initial assessment completed and patient-centered care plan(s) were developed with participation from patient. Lead CC handed patient off to CHW for continued outreaches.       Assessment: Per chart review, patient outreach completed by CC CHW on 11/15/23.  Patient is actively working to accomplish goal(s). Patient's goal(s) appropriate and relevant at this time. Patient is not due for updated Plan of Care.  Assessments will be completed annually or as needed/with change of patient status.      Care Plan: Social Support       Problem: Inadequate social support       Goal: I will access resources to obtain more in home support.       Start Date: 11/29/2022 Expected End Date: 3/20/2024    This Visit's Progress: 70% Recent Progress: 60%    Note:     Barriers: resource availability  Strengths: pt is a good advocate for self.   Patient expressed understanding of goal: yes  Action steps to achieve this goal:  1. I will contact the [facility] about a MNChoices assessment.   2. I will contact resources given to me.   3. I will contact my CHW with any needs or questions.                                      Plan/Recommendations: The patient will continue working with Care Coordination to achieve goal(s) as above. CHW will continue outreaches at minimum every 30 days and will involve Lead CC as needed or if patient is ready to move to Maintenance. Lead CC will continue to monitor CHW outreaches and patient's progress to goal(s) every 6 weeks.     Plan of Care updated and sent to patient: Yes, via TORIE Cabezas   Care Coordination Team  [phone]

## 2023-11-30 NOTE — PROGRESS NOTES
"SUBJECTIVE:   Lucille is a 83 year old, presenting for the following:  Wellness Visit  Are you in the first 12 months of your Medicare coverage?  No    Healthy Habits:     In general, how would you rate your overall health?  Fair    Frequency of exercise:  1 day/week    Duration of exercise:  Less than 15 minutes    Do you usually eat at least 4 servings of fruit and vegetables a day, include whole grains    & fiber and avoid regularly eating high fat or \"junk\" foods?  No    Taking medications regularly:  No    Barriers to taking medications:  None    Medication side effects:  None    Ability to successfully perform activities of daily living:  Housework requires assistance, shopping requires assistance and transportation requires assistance    Home Safety:  No safety concerns identified    Hearing Impairment:  Need to ask people to speak up or repeat themselves    In the past 6 months, have you been bothered by leaking of urine? Yes    In general, how would you rate your overall mental or emotional health?  Fair    Additional concerns today:  Yes    Have you ever done Advance Care Planning? (For example, a Health Directive, POLST, or a discussion with a medical provider or your loved ones about your wishes): Yes, advance care planning is on file.       Fall risk:  mild    Cognitive Screening: unable to complete due to poor vision         Reviewed and updated as needed this visit by clinical staff                  Reviewed and updated as needed this visit by Provider                 Social History     Tobacco Use    Smoking status: Never    Smokeless tobacco: Never   Substance Use Topics    Alcohol use: Not Currently           11/23/2022     8:01 AM   Alcohol Use   Prescreen: >3 drinks/day or >7 drinks/week? Not Applicable     Do you have a current opioid prescription? No  Do you use any other controlled substances or medications that are not prescribed by a provider? None      Current Outpatient Medications "   Medication    acetaminophen (TYLENOL) 325 MG tablet    aspirin 81 MG tablet    azithromycin (ZITHROMAX) 500 MG tablet    carboxymethylcellulose PF (REFRESH PLUS) 0.5 % ophthalmic solution    citalopram (CELEXA) 20 MG tablet    CONTOUR NEXT TEST test strip    furosemide (LASIX) 20 MG tablet    levothyroxine (SYNTHROID/LEVOTHROID) 150 MCG tablet    loratadine (CLARITIN) 10 MG tablet    LORazepam (ATIVAN) 0.5 MG tablet    metoprolol succinate ER (TOPROL XL) 25 MG 24 hr tablet    miconazole (MICATIN) 2 % external powder    Microlet Lancets MISC    minocycline (MINOCIN) 100 MG capsule    nystatin (MYCOSTATIN) 447178 UNIT/GM external powder    order for DME    pantoprazole (PROTONIX) 40 MG EC tablet    simvastatin (ZOCOR) 10 MG tablet     No current facility-administered medications for this visit.     Other concerns:  1. Episodes of hallucinating seeing people and hearing noises like the phone ringing, occurring a few times a week. Episodes of anger and negativity towards family that last 4-5 days at a time.  Patient reports periodically seeing visual hallucinations primarily when she wakes up in the morning.  Her  believes that this is an ban that he has been praying for to help her with her health.  She reports also that her memory has not been as good as normal.  She denies any head trauma.  She denies any changes with her eyes.  She does not report any significant focal neurologic issues otherwise.  She denies headache.  2. Derm spot on head noted with patient but no focal changes noted    She did report an episode of upper abdominal fullness last week but apparently that has resolved and did not recur.  We discussed this in depth and have opted to observe and the patient was advised to report to me if symptoms persist or worsen or recur.  He has done well since her TAVR procedure.  She states that overall she is more active.  She has been out to play cards and bingo.  She uses a cane for ambulation.  She  has had follow-up with cardiology clinic and follow-up echocardiograms.    Current providers sharing in care for this patient include:   Patient Care Team:  Fausto Flynn MD as PCP - General (Internal Medicine)  Fausto Flynn MD as Assigned PCP  Yonny Roman MD as MD (INTERNAL MEDICINE - ENDOCRINOLOGY, DIABETES & METABOLISM)  Rosanne Valadez, CHW as Community Health Worker (Primary Care - CC)  [facility] Audiologist (Audiology)  Puja Damian LSW as Lead Care Coordinator (Primary Care - CC)  Michael Nolasco DPM as Assigned Surgical Provider  Linsey Benitez CNP as Assigned Heart and Vascular Provider  John Srinivasan MD as Assigned Cancer Care Provider    The following health maintenance items are reviewed in Epic and correct as of today:  Health Maintenance   Topic Date Due    HF ACTION PLAN  Never done    RSV VACCINE (Pregnancy & 60+) (1 - 1-dose 60+ series) Never done    DTAP/TDAP/TD IMMUNIZATION (2 - Td or Tdap) 01/01/2019    DIABETIC FOOT EXAM  06/29/2023    PHQ-9  07/02/2023    ANNUAL REVIEW OF HM ORDERS  08/17/2023    INFLUENZA VACCINE (1) 09/01/2023    COVID-19 Vaccine (6 - 2023-24 season) 09/01/2023    LIPID  11/23/2023    MICROALBUMIN  11/23/2023    FALL RISK ASSESSMENT  11/23/2023    ZOSTER IMMUNIZATION (2 of 2) 08/22/2023    MEDICARE ANNUAL WELLNESS VISIT  11/23/2023    A1C  11/30/2023    EYE EXAM  12/21/2023    BMP  02/17/2024    ALT  05/31/2024    CBC  08/17/2024    HEMOGLOBIN  08/17/2024    ADVANCE CARE PLANNING  11/23/2027    TSH W/FREE T4 REFLEX  Completed    DEPRESSION ACTION PLAN  Completed    Pneumococcal Vaccine: 65+ Years  Completed    URINALYSIS  Completed    IPV IMMUNIZATION  Aged Out    HPV IMMUNIZATION  Aged Out    MENINGITIS IMMUNIZATION  Aged Out    RSV MONOCLONAL ANTIBODY  Aged Out       Immunization History   Administered Date(s) Administered    COVID-19 Bivalent 12+ (Pfizer) 11/23/2022,  06/27/2023    COVID-19 MONOVALENT 12+ (Pfizer) 02/09/2021, 03/02/2021, 11/02/2021    Influenza (High Dose) 3 valent vaccine 10/29/2013, 11/20/2014, 09/24/2015, 10/20/2016, 11/19/2018    Influenza Vaccine 18-64 (Flublok) 12/18/2019    Influenza Vaccine 65+ (Fluzone HD) 10/28/2020, 11/02/2021, 11/23/2022    Pneumo Conj 13-V (2010&after) 09/24/2015    Pneumococcal 23 valent 01/01/2013    TDAP (Adacel,Boostrix) 01/01/2009    Zoster recombinant adjuvanted (SHINGRIX) 06/27/2023     '  Pertinent mammograms are reviewed under the imaging tab.    Review of Systems  CONSTITUTIONAL: NEGATIVE for fever, chills, change in weight  EYES: NEGATIVE for vision changes or irritation  ENT/MOUTH: NEGATIVE for ear, mouth and throat problems  RESP: NEGATIVE for significant cough or SOB  CV: NEGATIVE for chest pain, palpitations or peripheral edema  GI: NEGATIVE for nausea, abdominal pain, heartburn, or change in bowel habits  : NEGATIVE for frequency, dysuria, or hematuria  MUSCULOSKELETAL: NEGATIVE for significant arthralgias or myalgia  NEURO: NEGATIVE for weakness, dizziness or paresthesias  ENDOCRINE: NEGATIVE for temperature intolerance, skin/hair changes  HEME: NEGATIVE for bleeding problems  PSYCHIATRIC: NEGATIVE for changes in mood or affect    OBJECTIVE:   /68   Pulse 61   Temp 96.9  F (36.1  C) (Temporal)   Resp 17   Ht 1.524 m (5')   Wt 107.4 kg (236 lb 11.2 oz)   SpO2 93%   BMI 46.23 kg/m   Estimated body mass index is 47.24 kg/m  as calculated from the following:    Height as of 8/17/23: 1.524 m (5').    Weight as of 8/17/23: 109.7 kg (241 lb 14.4 oz).  Physical Exam  GENERAL: alert and no distress  EYES: Eyes grossly normal to inspection, PERRL and conjunctivae and sclerae normal  HENT: ear canals and TM's normal, nose and mouth without ulcers or lesions  RESP: lungs clear to auscultation - no rales, rhonchi or wheezes  CV: regular rate and rhythm, normal S1 S2, no S3 or S4, noted aortic murmur, no change in  click or rub  ABDOMEN: obese  MS: no gross musculoskeletal defects noted with cane use  NEURO: Gait is slowed and antalgic with the use of a wheeled walker, ? Baseline memory changes  PSYCH: mentation appears normal, affect normal/bright    Component      Latest Ref Rng 5/31/2023  10:18 AM 5/31/2023  10:49 AM 8/17/2023  12:11 PM   Sodium      136 - 145 mmol/L   145    Potassium      3.4 - 5.3 mmol/L   4.5    Chloride      98 - 107 mmol/L   105    Carbon Dioxide (CO2)      22 - 29 mmol/L   31 (H)    Anion Gap      7 - 15 mmol/L   9    Urea Nitrogen      8.0 - 23.0 mg/dL   23.4 (H)    Creatinine      0.51 - 0.95 mg/dL   1.07 (H)    Calcium      8.8 - 10.2 mg/dL   9.0    Glucose      70 - 99 mg/dL   101 (H)    GFR Estimate      >60 mL/min/1.73m2   51 (L)    WBC      4.0 - 11.0 10e3/uL 7.0   6.9    RBC Count      3.80 - 5.20 10e6/uL 4.00   4.12    Hemoglobin      11.7 - 15.7 g/dL 11.5 (L)   11.5 (L)    Hematocrit      35.0 - 47.0 % 36.4   37.9    MCV      78 - 100 fL 91   92    MCH      26.5 - 33.0 pg 28.8   27.9    MCHC      31.5 - 36.5 g/dL 31.6   30.3 (L)    RDW      10.0 - 15.0 % 13.3   13.3    Platelet Count      150 - 450 10e3/uL 229   223    Creatinine POCT      0.5 - 1.0 mg/dL  1.2 (H)     GFR, ESTIMATED POCT      >60 mL/min/1.73m2  44 (L)          ASSESSMENT / PLAN:   (Z00.00) Encounter for subsequent annual wellness visit in Medicare patient  (primary encounter diagnosis)  Comment: We will update normal vaccinations as recommended.  Plan: Fecal colorectal cancer screen FIT            (I10) Benign essential hypertension  Comment: Stable on medical therapy continue with medical evaluation.  Plan:     (E03.9) Acquired hypothyroidism  Comment: Labs ordered as fasting per routine  Plan: TSH with free T4 reflex            (E78.5) Hyperlipidemia LDL goal <100  Comment: We will check fasting lipid panel as ordered  Plan: Lipid Profile            (E11.22,  N18.30) Type 2 diabetes mellitus with stage 3 chronic kidney  disease, without long-term current use of insulin, unspecified whether stage 3a or 3b CKD (H)  Comment: Recheck A1c level and continue with medical management  Plan: Lipid Profile, Hemoglobin A1c, Hepatic function        panel, Albumin Random Urine Quantitative with         Creat Ratio            (N18.30) Stage 3 chronic kidney disease, unspecified whether stage 3a or 3b CKD (H)  Comment: Encouraged hydration.  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  Plan:     (Z95.2) S/P TAVR (transcatheter aortic valve replacement)  Comment: Noted as baseline.  Patient requesting antibiotic refill prior to dental procedure  Plan: azithromycin (ZITHROMAX) 500 MG tablet,         OFFICE/OUTPT VISIT,EST,LEVL IV            (R44.3) Hallucinations  Comment: Discussed with patient.  Question related to memory, dementia?.  Suggest may benefit from neurological assessment.  If no focal change consider psych eval.  Referral placed  Plan: Adult Neurology  Referral,         OFFICE/OUTPT VISIT,EST,LEVL IV, Vitamin B12            (Z78.0) Asymptomatic postmenopausal status  Comment: Ordered as routine screening.  Plan: DX Hip/Pelvis/Spine            Patient has been advised of split billing requirements and indicates understanding: Yes      COUNSELING:  Reviewed preventive health counseling, as reflected in patient instructions       Regular exercise       Healthy diet/nutrition      BMI:   Estimated body mass index is 47.24 kg/m  as calculated from the following:    Height as of 8/17/23: 1.524 m (5').    Weight as of 8/17/23:  109.7 kg (241 lb 14.4 oz).   Weight management plan: Discussed healthy diet and exercise guidelines      She reports that she has never smoked. She has never used smokeless tobacco.      Appropriate preventive services were discussed with this patient, including applicable screening as appropriate for fall prevention, nutrition, physical activity, Tobacco-use cessation, weight loss and cognition.  Checklist reviewing preventive services available has been given to the patient.    Reviewed patients plan of care and provided an AVS. The Basic Care Plan (routine screening as documented in Health Maintenance) for Lucille meets the Care Plan requirement. This Care Plan has been established and reviewed with the Patient.    Fausto Flynn MD  [facility]    Identified Health Risks:

## 2023-12-04 ENCOUNTER — OFFICE VISIT (OUTPATIENT)
Dept: INTERNAL MEDICINE | Facility: CLINIC | Age: 83
End: 2023-12-04
Payer: MEDICARE

## 2023-12-04 VITALS
BODY MASS INDEX: 46.47 KG/M2 | OXYGEN SATURATION: 93 % | DIASTOLIC BLOOD PRESSURE: 68 MMHG | RESPIRATION RATE: 17 BRPM | HEART RATE: 61 BPM | HEIGHT: 60 IN | SYSTOLIC BLOOD PRESSURE: 126 MMHG | WEIGHT: 236.7 LBS | TEMPERATURE: 96.9 F

## 2023-12-04 DIAGNOSIS — N18.30 TYPE 2 DIABETES MELLITUS WITH STAGE 3 CHRONIC KIDNEY DISEASE, WITHOUT LONG-TERM CURRENT USE OF INSULIN, UNSPECIFIED WHETHER STAGE 3A OR 3B CKD (H): ICD-10-CM

## 2023-12-04 DIAGNOSIS — Z78.0 ASYMPTOMATIC POSTMENOPAUSAL STATUS: ICD-10-CM

## 2023-12-04 DIAGNOSIS — Z95.2 S/P TAVR (TRANSCATHETER AORTIC VALVE REPLACEMENT): ICD-10-CM

## 2023-12-04 DIAGNOSIS — R44.3 HALLUCINATIONS: ICD-10-CM

## 2023-12-04 DIAGNOSIS — Z00.00 ENCOUNTER FOR SUBSEQUENT ANNUAL WELLNESS VISIT IN MEDICARE PATIENT: Primary | ICD-10-CM

## 2023-12-04 DIAGNOSIS — F33.0 MDD (MAJOR DEPRESSIVE DISORDER), RECURRENT EPISODE, MILD (H): ICD-10-CM

## 2023-12-04 DIAGNOSIS — I10 BENIGN ESSENTIAL HYPERTENSION: Chronic | ICD-10-CM

## 2023-12-04 DIAGNOSIS — E11.22 TYPE 2 DIABETES MELLITUS WITH STAGE 3 CHRONIC KIDNEY DISEASE, WITHOUT LONG-TERM CURRENT USE OF INSULIN, UNSPECIFIED WHETHER STAGE 3A OR 3B CKD (H): ICD-10-CM

## 2023-12-04 DIAGNOSIS — E03.9 ACQUIRED HYPOTHYROIDISM: Chronic | ICD-10-CM

## 2023-12-04 DIAGNOSIS — N18.30 STAGE 3 CHRONIC KIDNEY DISEASE, UNSPECIFIED WHETHER STAGE 3A OR 3B CKD (H): ICD-10-CM

## 2023-12-04 DIAGNOSIS — C85.12 B-CELL LYMPHOMA OF INTRATHORACIC LYMPH NODES, UNSPECIFIED B-CELL LYMPHOMA TYPE (H): ICD-10-CM

## 2023-12-04 DIAGNOSIS — E66.01 MORBID OBESITY DUE TO EXCESS CALORIES (H): ICD-10-CM

## 2023-12-04 DIAGNOSIS — E78.5 HYPERLIPIDEMIA LDL GOAL <100: ICD-10-CM

## 2023-12-04 LAB
ALBUMIN SERPL BCG-MCNC: 4.1 G/DL (ref 3.5–5.2)
ALP SERPL-CCNC: 110 U/L (ref 40–150)
ALT SERPL W P-5'-P-CCNC: 12 U/L (ref 0–50)
AST SERPL W P-5'-P-CCNC: 19 U/L (ref 0–45)
BILIRUB DIRECT SERPL-MCNC: <0.2 MG/DL (ref 0–0.3)
BILIRUB SERPL-MCNC: 0.2 MG/DL
CHOLEST SERPL-MCNC: 178 MG/DL
CREAT UR-MCNC: 173 MG/DL
HBA1C MFR BLD: 5.5 % (ref 0–5.6)
HDLC SERPL-MCNC: 46 MG/DL
LDLC SERPL CALC-MCNC: 93 MG/DL
MICROALBUMIN UR-MCNC: <12 MG/L
MICROALBUMIN/CREAT UR: NORMAL MG/G{CREAT}
NONHDLC SERPL-MCNC: 132 MG/DL
PROT SERPL-MCNC: 6.7 G/DL (ref 6.4–8.3)
TRIGL SERPL-MCNC: 193 MG/DL
TSH SERPL DL<=0.005 MIU/L-ACNC: 2.18 UIU/ML (ref 0.3–4.2)
VIT B12 SERPL-MCNC: 430 PG/ML (ref 232–1245)

## 2023-12-04 PROCEDURE — 90662 IIV NO PRSV INCREASED AG IM: CPT | Performed by: INTERNAL MEDICINE

## 2023-12-04 PROCEDURE — 82570 ASSAY OF URINE CREATININE: CPT | Performed by: INTERNAL MEDICINE

## 2023-12-04 PROCEDURE — 82274 ASSAY TEST FOR BLOOD FECAL: CPT | Performed by: INTERNAL MEDICINE

## 2023-12-04 PROCEDURE — G0008 ADMIN INFLUENZA VIRUS VAC: HCPCS | Mod: 59 | Performed by: INTERNAL MEDICINE

## 2023-12-04 PROCEDURE — 82043 UR ALBUMIN QUANTITATIVE: CPT | Performed by: INTERNAL MEDICINE

## 2023-12-04 PROCEDURE — 84443 ASSAY THYROID STIM HORMONE: CPT | Performed by: INTERNAL MEDICINE

## 2023-12-04 PROCEDURE — 99214 OFFICE O/P EST MOD 30 MIN: CPT | Mod: 25 | Performed by: INTERNAL MEDICINE

## 2023-12-04 PROCEDURE — 80076 HEPATIC FUNCTION PANEL: CPT | Performed by: INTERNAL MEDICINE

## 2023-12-04 PROCEDURE — 36415 COLL VENOUS BLD VENIPUNCTURE: CPT | Performed by: INTERNAL MEDICINE

## 2023-12-04 PROCEDURE — 82607 VITAMIN B-12: CPT | Performed by: INTERNAL MEDICINE

## 2023-12-04 PROCEDURE — 91320 SARSCV2 VAC 30MCG TRS-SUC IM: CPT | Performed by: INTERNAL MEDICINE

## 2023-12-04 PROCEDURE — 80061 LIPID PANEL: CPT | Performed by: INTERNAL MEDICINE

## 2023-12-04 PROCEDURE — G0439 PPPS, SUBSEQ VISIT: HCPCS | Performed by: INTERNAL MEDICINE

## 2023-12-04 PROCEDURE — 90480 ADMN SARSCOV2 VAC 1/ONLY CMP: CPT | Performed by: INTERNAL MEDICINE

## 2023-12-04 PROCEDURE — 83036 HEMOGLOBIN GLYCOSYLATED A1C: CPT | Performed by: INTERNAL MEDICINE

## 2023-12-04 RX ORDER — AZITHROMYCIN 500 MG/1
TABLET, FILM COATED ORAL
Qty: 4 TABLET | Refills: 1 | Status: SHIPPED | OUTPATIENT
Start: 2023-12-04

## 2023-12-04 ASSESSMENT — PATIENT HEALTH QUESTIONNAIRE - PHQ9: SUM OF ALL RESPONSES TO PHQ QUESTIONS 1-9: 22

## 2023-12-04 ASSESSMENT — ACTIVITIES OF DAILY LIVING (ADL)
CURRENT_FUNCTION: HOUSEWORK REQUIRES ASSISTANCE
CURRENT_FUNCTION: SHOPPING REQUIRES ASSISTANCE
CURRENT_FUNCTION: TRANSPORTATION REQUIRES ASSISTANCE

## 2023-12-04 NOTE — LETTER
December 11, 2023      Lucille Rojas  [id number]  [place] [facility] [place] [facility] [address]        Dear ,    Your total cholesterol as well as your LDL or bad cholesterol levels are normal.  Your triglyceride level is slightly elevated and your HDL or good cholesterol level is slightly low.  Working on your diet and exercise as able will help improve these levels and I would recheck them in 12 months for comparison.  In addition, your liver function tests have returned normal.     Also, your thyroid function test and vitamin B12 level have returned normal.     Your Hemoglobin A1C and blood sugar tests look good and I would continue with your medication without change.  These tests should be repeated in 6 months.    Resulted Orders   Hemoglobin A1c   Result Value Ref Range    Hemoglobin A1C 5.5 0.0 - 5.6 %      Comment:      Normal <5.7%   Prediabetes 5.7-6.4%    Diabetes 6.5% or higher     Note: Adopted from ADA consensus guidelines.   Hepatic function panel   Result Value Ref Range    Protein Total 6.7 6.4 - 8.3 g/dL    Albumin 4.1 3.5 - 5.2 g/dL    Bilirubin Total 0.2 <=1.2 mg/dL    Alkaline Phosphatase 110 40 - 150 U/L      Comment:      Reference intervals for this test were updated on 11/14/2023 to more accurately reflect our healthy population. There may be differences in the flagging of prior results with similar values performed with this method. Interpretation of those prior results can be made in the context of the updated reference intervals.    AST 19 0 - 45 U/L      Comment:      Reference intervals for this test were updated on 6/12/2023 to more accurately reflect our healthy population. There may be differences in the flagging of prior results with similar values performed with this method. Interpretation of those prior results can be made in the context of the updated reference intervals.    ALT 12 0 - 50 U/L      Comment:      Reference intervals for this test were updated on 6/12/2023 to more  accurately reflect our healthy population. There may be differences in the flagging of prior results with similar values performed with this method. Interpretation of those prior results can be made in the context of the updated reference intervals.      Bilirubin Direct <0.20 0.00 - 0.30 mg/dL   Albumin Random Urine Quantitative with Creat Ratio   Result Value Ref Range    Creatinine Urine mg/dL 173.0 mg/dL      Comment:      The reference ranges have not been established in urine creatinine. The results should be integrated into the clinical context for interpretation.    Albumin Urine mg/L <12.0 mg/L      Comment:      The reference ranges have not been established in urine albumin. The results should be integrated into the clinical context for interpretation.    Albumin Urine mg/g Cr        Comment:      Unable to calculate, urine albumin and/or urine creatinine is outside detectable limits.  Microalbuminuria is defined as an albumin:creatinine ratio of 17 to 299 for males and 25 to 299 for females. A ratio of albumin:creatinine of 300 or higher is indicative of overt proteinuria.  Due to biologic variability, positive results should be confirmed by a second, first-morning random or 24-hour timed urine specimen. If there is discrepancy, a third specimen is recommended. When 2 out of 3 results are in the microalbuminuria range, this is evidence for incipient nephropathy and warrants increased efforts at glucose control, blood pressure control, and institution of therapy with an angiotensin-converting-enzyme (ACE) inhibitor (if the patient can tolerate it).     TSH with free T4 reflex   Result Value Ref Range    TSH 2.18 0.30 - 4.20 uIU/mL   Vitamin B12   Result Value Ref Range    Vitamin B12 430 232 - 1,245 pg/mL       If you have any questions or concerns, please call the clinic at the number listed above.       Sincerely,      Fausto Flynn MD

## 2023-12-04 NOTE — PATIENT INSTRUCTIONS
Patient Education   Personalized Prevention Plan  You are due for the preventive services outlined below.  Your care team is available to assist you in scheduling these services.  If you have already completed any of these items, please share that information with your care team to update in your medical record.  Health Maintenance Due   Topic Date Due     Heart Failure Action Plan  Never done     RSV VACCINE (Pregnancy & 60+) (1 - 1-dose 60+ series) Never done     Diptheria Tetanus Pertussis (DTAP/TDAP/TD) Vaccine (2 - Td or Tdap) 01/01/2019     Diabetic Foot Exam  06/29/2023     Depression Assessment  07/02/2023     ANNUAL REVIEW OF HM ORDERS  08/17/2023     Flu Vaccine (1) 09/01/2023     COVID-19 Vaccine (6 - 2023-24 season) 09/01/2023     Cholesterol Lab  11/23/2023     Kidney Microalbumin Urine Test  11/23/2023     FALL RISK ASSESSMENT  11/23/2023     A1C Lab  11/30/2023     Zoster (Shingles) Vaccine (2 of 2) 08/22/2023     Eye Exam  12/21/2023

## 2023-12-08 ENCOUNTER — TELEPHONE (OUTPATIENT)
Dept: INTERNAL MEDICINE | Facility: CLINIC | Age: 83
End: 2023-12-08
Payer: MEDICARE

## 2023-12-08 NOTE — TELEPHONE ENCOUNTER
Patients daughter calling wanting to know about the referral for neurology. Writer advised I cannot give any information as she is not on C2C. Patients daughter hung up on writer.

## 2023-12-12 ENCOUNTER — TELEPHONE (OUTPATIENT)
Dept: INTERNAL MEDICINE | Facility: CLINIC | Age: 83
End: 2023-12-12
Payer: MEDICARE

## 2023-12-12 NOTE — TELEPHONE ENCOUNTER
"Pt calling in stating she is unable to get into neurologist for hallucinations. Pt states that the \"neurologist wouldn't schedule this testing and referral was from PCP instead of a specialist\". Pt would still like to be checked for this and is unsure what to do next.     Writer spoke with neurology department and they state they are not taking referrals for dementia at this time. They could see pt for hallucinations but are booking out to April 2024.     They advised referral sent to [facility] of neurology or   [facility].       Pt also requesting CTC form so that daughter Nancy can speak with staff regarding the pt. Advised pt to kendra lout form at next appt. Pt in agreement.     Mayuri Donnelly RN    "

## 2023-12-13 NOTE — TELEPHONE ENCOUNTER
Called and relayed to patient she asked I call her daughter colt at [phone]     Called and relayed to daughter she will call them    Roger Muller RN

## 2023-12-14 ENCOUNTER — ONCOLOGY VISIT (OUTPATIENT)
Dept: ONCOLOGY | Facility: CLINIC | Age: 83
End: 2023-12-14
Attending: INTERNAL MEDICINE
Payer: MEDICARE

## 2023-12-14 ENCOUNTER — LAB (OUTPATIENT)
Dept: INFUSION THERAPY | Facility: CLINIC | Age: 83
End: 2023-12-14
Attending: INTERNAL MEDICINE
Payer: MEDICARE

## 2023-12-14 VITALS
WEIGHT: 237 LBS | DIASTOLIC BLOOD PRESSURE: 54 MMHG | RESPIRATION RATE: 18 BRPM | OXYGEN SATURATION: 91 % | TEMPERATURE: 97.6 F | BODY MASS INDEX: 46.29 KG/M2 | HEART RATE: 59 BPM | SYSTOLIC BLOOD PRESSURE: 125 MMHG

## 2023-12-14 DIAGNOSIS — C85.12 B-CELL LYMPHOMA OF INTRATHORACIC LYMPH NODES, UNSPECIFIED B-CELL LYMPHOMA TYPE (H): ICD-10-CM

## 2023-12-14 DIAGNOSIS — C85.12 B-CELL LYMPHOMA OF INTRATHORACIC LYMPH NODES, UNSPECIFIED B-CELL LYMPHOMA TYPE (H): Primary | ICD-10-CM

## 2023-12-14 LAB
ALBUMIN SERPL BCG-MCNC: 3.9 G/DL (ref 3.5–5.2)
ALP SERPL-CCNC: 121 U/L (ref 40–150)
ALT SERPL W P-5'-P-CCNC: 12 U/L (ref 0–50)
ANION GAP SERPL CALCULATED.3IONS-SCNC: 10 MMOL/L (ref 7–15)
AST SERPL W P-5'-P-CCNC: 18 U/L (ref 0–45)
BILIRUB SERPL-MCNC: 0.3 MG/DL
BUN SERPL-MCNC: 28.1 MG/DL (ref 8–23)
CALCIUM SERPL-MCNC: 9.1 MG/DL (ref 8.8–10.2)
CHLORIDE SERPL-SCNC: 104 MMOL/L (ref 98–107)
CREAT SERPL-MCNC: 1.19 MG/DL (ref 0.51–0.95)
DEPRECATED HCO3 PLAS-SCNC: 30 MMOL/L (ref 22–29)
EGFRCR SERPLBLD CKD-EPI 2021: 45 ML/MIN/1.73M2
ERYTHROCYTE [DISTWIDTH] IN BLOOD BY AUTOMATED COUNT: 14 % (ref 10–15)
GLUCOSE SERPL-MCNC: 120 MG/DL (ref 70–99)
HCT VFR BLD AUTO: 39.7 % (ref 35–47)
HGB BLD-MCNC: 12.1 G/DL (ref 11.7–15.7)
LDH SERPL L TO P-CCNC: 185 U/L (ref 0–250)
MCH RBC QN AUTO: 28.1 PG (ref 26.5–33)
MCHC RBC AUTO-ENTMCNC: 30.5 G/DL (ref 31.5–36.5)
MCV RBC AUTO: 92 FL (ref 78–100)
PLATELET # BLD AUTO: 257 10E3/UL (ref 150–450)
POTASSIUM SERPL-SCNC: 4.1 MMOL/L (ref 3.4–5.3)
PROT SERPL-MCNC: 6.7 G/DL (ref 6.4–8.3)
RBC # BLD AUTO: 4.31 10E6/UL (ref 3.8–5.2)
SODIUM SERPL-SCNC: 144 MMOL/L (ref 135–145)
WBC # BLD AUTO: 8 10E3/UL (ref 4–11)

## 2023-12-14 PROCEDURE — 83615 LACTATE (LD) (LDH) ENZYME: CPT | Performed by: INTERNAL MEDICINE

## 2023-12-14 PROCEDURE — 85027 COMPLETE CBC AUTOMATED: CPT | Performed by: INTERNAL MEDICINE

## 2023-12-14 PROCEDURE — 99214 OFFICE O/P EST MOD 30 MIN: CPT | Performed by: INTERNAL MEDICINE

## 2023-12-14 PROCEDURE — 80053 COMPREHEN METABOLIC PANEL: CPT | Performed by: INTERNAL MEDICINE

## 2023-12-14 PROCEDURE — G0463 HOSPITAL OUTPT CLINIC VISIT: HCPCS | Performed by: INTERNAL MEDICINE

## 2023-12-14 PROCEDURE — 36415 COLL VENOUS BLD VENIPUNCTURE: CPT

## 2023-12-14 ASSESSMENT — PAIN SCALES - GENERAL: PAINLEVEL: EXTREME PAIN (8)

## 2023-12-14 NOTE — PROGRESS NOTES
ONCOLOGY HISTORY: Ms. 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  1. On 09/20/2021:   -Hemoglobin of 4.7 with MCV of 67. Normal WBC and platelets.  -Iron of 9 and saturation index of 2%.   -CT chest, abdomen and pelvis reveals large pancreatic head mass.  This encases the celiac trunk, superior mesenteric artery and superior mesenteric vein.  There is moderate-size right pleural effusion. No lymphadenopathy.  2. Thoracocentesis on 09/22/2021. Cytology is negative for malignancy.  3. EUS on 09/21/2021 revealed is a mass in the uncinate process of the pancreas measuring 33 mm.  There was evidence of invasion into superior mesenteric artery and the celiac trunk. No enlarged lymph nodes seen. FNA revealed atypical cells.    4. EGD and EUS repeated on 10/05/2021.  -EGD is normal.  -EUS revealed pancreatic head mass.  FNA revealed CD10-positive B-cell lymphoma. Flow cytometry revealed CD10-positive lambda monotypic B-cells.  5. PET scan on 11/18/2021 revealed 6 cm hypermetabolic pancreatic head mass and borderline hypermetabolic right axillary lymph node.   -Further repeat biopsy not done because of her overall poor health.  6. mini R-CHOP x 6 cycles between 12/15/2021 and 03/30/2022.  -PET scan on 04/11/21 reveals complete response.     SUBJECTIVE:  The patient is an 83-year-old female with B-cell non-Hodgkin lymphoma of the pancreas, status post 6 cycles of mini R-CHOP.  She is in complete remission.    Her overall condition is stable. She has generalized weakness. It is not getting worse. She is able to do activities of daily living slowly. No headache. Occasional dizziness. Vision is decreased. No chest pain.  No shortness of breath at rest.  Gets short of breath on minimal exertion.  No abdominal pain.  No nausea or vomiting.  Appetite is good.  No fever or chills. She has grade 1 peripheral neuropathy diabetes and chemotherapy. Mainly pain and cold sensation is hands.  Neuropathy is little better.      PHYSICAL EXAMINATION:    GENERAL:  She is alert and oriented x 3. ECOG PS of 2.   FACE:  No swelling.  EYES:  No icterus.   NECK:  Supple. No lymphadenopathy.  LUNGS:  Decreased air entry at the bases.  HEART:  Regular.  ABDOMEN:  Soft. Nontender.  Difficult palpation because of her weight.  No mass.  EXTREMITIES:  Mild ankle edema.  SKIN:  No petechiae.     LABORATORY:  CBC, LDH and CMP were reviewed.     ASSESSMENT:    1.  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  She is in complete remission.  2.  Generalized weakness secondary to her age and multiple other medical problems.  3.  Chronic kidney disease.  4.  Mild normocytic anemia from chronic kidney disease and anemia of chronic disease.  5.  Multiple other medical problems including hypertension and diabetes mellitus.  6.  Grade 1 peripheral neuropathy.     PLAN:    1. CT scan in 6 months.  2. See me in 6 months with CT scan and labs.     DISCUSSION:  1.  Patient is clinically stable.  She is doing fairly well for her age.  No clinical suspicion of recurrence of lymphoma.    For follow-up of lymphoma, will get CT chest, abdomen and pelvis in 6 months.  She is agreeable for it.    2.  Labs were reviewed with her.  CBC is normal.  CMP reveals minor abnormalities.  Will continue to monitor it.    3.  She has generalized weakness.  This is due to her age and multiple other medical problems.  She is able to do activities of daily living slowly.  Advised her to be careful and avoid any fall/trauma.    4.  She has grade 1 peripheral neuropathy.  It is mainly from diabetes and also from chemotherapy.  I do not think this is going to resolve.  No need for gabapentin or Lyrica as it is grade 1 neuropathy.    5.  She and her family had few questions which were all answered.  I will see her in 6 months time with CT scan and labs.     Total  visit time of 30 minutes.  Time spent in today's visit, review of chart/investigations today and documentation today.

## 2023-12-14 NOTE — PROGRESS NOTES
Medical Assistant Note:  Lucille Rojas presents today for blood draw.    Patient seen by provider today: Yes: samuel.   present during visit today: Not Applicable.    Concerns: No Concerns.    Procedure:  Lab draw site: lac, Needle type: bf, Gauge: 23.    Post Assessment:  Labs drawn without difficulty: Yes.    Discharge Plan:  Departure Mode: Ambulatory.    Face to Face Time: 5 min.    Tianna Beckman, CMA

## 2023-12-14 NOTE — PROGRESS NOTES
Oncology Rooming Note    December 14, 2023 10:22 AM   Lucille Rojsa is a 83 year old female who presents for:    Chief Complaint   Patient presents with    Oncology Clinic Visit     Initial Vitals: /54   Pulse 59   Temp 97.6  F (36.4  C) (Oral)   Resp 18   Wt 107.5 kg (237 lb)   SpO2 91%   BMI 46.29 kg/m   Estimated body mass index is 46.29 kg/m  as calculated from the following:    Height as of 12/4/23: 1.524 m (5').    Weight as of this encounter: 107.5 kg (237 lb). Body surface area is 2.13 meters squared.  Extreme Pain (8) Comment: Data Unavailable   No LMP recorded. Patient has had a hysterectomy.  Allergies reviewed: Yes  Medications reviewed: Yes    Medications: Medication refills not needed today.  Pharmacy name entered into [facility]:    [id number] - [place], [place] - [address] [facility] [address]  [address]    Clinical concerns: list given to Dr. Kumar Shari J. Schoenberger, [facility]

## 2023-12-14 NOTE — LETTER
12/14/2023         RE: Lucille Rojas  [address] [facility] [address]        Dear Colleague,    Thank you for referring your patient, Lucille Rojas, to the [facility] [place]. Please see a copy of my visit note below.    Oncology Rooming Note    December 14, 2023 10:22 AM   Lucille Rojas is a 83 year old female who presents for:    Chief Complaint   Patient presents with     Oncology Clinic Visit     Initial Vitals: /54   Pulse 59   Temp 97.6  F (36.4  C) (Oral)   Resp 18   Wt 107.5 kg (237 lb)   SpO2 91%   BMI 46.29 kg/m   Estimated body mass index is 46.29 kg/m  as calculated from the following:    Height as of 12/4/23: 1.524 m (5').    Weight as of this encounter: 107.5 kg (237 lb). Body surface area is 2.13 meters squared.  Extreme Pain (8) Comment: Data Unavailable   No LMP recorded. Patient has had a hysterectomy.  Allergies reviewed: Yes  Medications reviewed: Yes    Medications: Medication refills not needed today.  Pharmacy name entered into [employer]:    [id number] - [place], [place] - [address] [facility] [place], [place] - [address]  [address]    Clinical concerns: list given to Dr. Kumar Shari J. Schoenberger, [facility]              ONCOLOGY HISTORY: Ms. 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  1. On 09/20/2021:   -Hemoglobin of 4.7 with MCV of 67. Normal WBC and platelets.  -Iron of 9 and saturation index of 2%.   -CT chest, abdomen and pelvis reveals large pancreatic head mass.  This encases the celiac trunk, superior mesenteric artery and superior mesenteric vein.  There is moderate-size right pleural effusion. No lymphadenopathy.  2. Thoracocentesis on 09/22/2021. Cytology is negative for malignancy.  3. EUS on 09/21/2021 revealed is a mass in  the uncinate process of the pancreas measuring 33 mm.  There was evidence of invasion into superior mesenteric artery and the celiac trunk. No enlarged lymph nodes seen. FNA revealed atypical cells.    4. EGD and EUS repeated on 10/05/2021.  -EGD is normal.  -EUS revealed pancreatic head mass.  FNA revealed CD10-positive B-cell lymphoma. Flow cytometry revealed CD10-positive lambda monotypic B-cells.  5. PET scan on 11/18/2021 revealed 6 cm hypermetabolic pancreatic head mass and borderline hypermetabolic right axillary lymph node.   -Further repeat biopsy not done because of her overall poor health.  6. mini R-CHOP x 6 cycles between 12/15/2021 and 03/30/2022.  -PET scan on 04/11/21 reveals complete response.     SUBJECTIVE:  The patient is an 83-year-old female with B-cell non-Hodgkin lymphoma of the pancreas, status post 6 cycles of mini R-CHOP.  She is in complete remission.    Her overall condition is stable. She has generalized weakness. It is not getting worse. She is able to do activities of daily living slowly. No headache. Occasional dizziness. Vision is decreased. No chest pain.  No shortness of breath at rest.  Gets short of breath on minimal exertion.  No abdominal pain.  No nausea or vomiting.  Appetite is good.  No fever or chills. She has grade 1 peripheral neuropathy diabetes and chemotherapy. Mainly pain and cold sensation is hands. Neuropathy is little better.      PHYSICAL EXAMINATION:    GENERAL:  She is alert and oriented x 3. ECOG PS of 2.   FACE:  No swelling.  EYES:  No icterus.   NECK:  Supple. No lymphadenopathy.  LUNGS:  Decreased air entry at the bases.  HEART:  Regular.  ABDOMEN:  Soft. Nontender.  Difficult palpation because of her weight.  No mass.  EXTREMITIES:  Mild ankle edema.  SKIN:  No petechiae.     LABORATORY:  CBC, LDH and CMP were reviewed.     ASSESSMENT:    1.  An 83-year-old female with stage IV non-Hodgkin B-cell lymphoma involving pancreas diagnosed in 2021 status post  6 cycles of mini R-CHOP.  She is in complete remission.  2.  Generalized weakness secondary to her age and multiple other medical problems.  3.  Chronic kidney disease.  4.  Mild normocytic anemia from chronic kidney disease and anemia of chronic disease.  5.  Multiple other medical problems including hypertension and diabetes mellitus.  6.  Grade 1 peripheral neuropathy.     PLAN:    1. CT scan in 6 months.  2. See me in 6 months with CT scan and labs.     DISCUSSION:  1.  Patient is clinically stable.  She is doing fairly well for her age.  No clinical suspicion of recurrence of lymphoma.    For follow-up of lymphoma, will get CT chest, abdomen and pelvis in 6 months.  She is agreeable for it.    2.  Labs were reviewed with her.  CBC is normal.  CMP reveals minor abnormalities.  Will continue to monitor it.    3.  She has generalized weakness.  This is due to her age and multiple other medical problems.  She is able to do activities of daily living slowly.  Advised her to be careful and avoid any fall/trauma.    4.  She has grade 1 peripheral neuropathy.  It is mainly from diabetes and also from chemotherapy.  I do not think this is going to resolve.  No need for gabapentin or Lyrica as it is grade 1 neuropathy.    5.  She and her family had few questions which were all answered.  I will see her in 6 months time with CT scan and labs.     Total visit time of 30 minutes.  Time spent in today's visit, review of chart/investigations today and documentation today.         Again, thank you for allowing me to participate in the care of your patient.        Sincerely,        John Srinivasan MD

## 2023-12-19 ENCOUNTER — PATIENT OUTREACH (OUTPATIENT)
Dept: CARE COORDINATION | Facility: CLINIC | Age: 83
End: 2023-12-19
Payer: MEDICARE

## 2023-12-21 ASSESSMENT — ACTIVITIES OF DAILY LIVING (ADL): DEPENDENT_IADLS:: TRANSPORTATION

## 2023-12-21 NOTE — PROGRESS NOTES
Clinic Care Coordination Contact  Clinic Care Coordination Contact  OUTREACH    Referral Information:  Referral Source: Self-patient/Caregiver    Primary Diagnosis: Psychosocial    Chief Complaint   Patient presents with    Clinic Care Coordination - Follow-up     Annual Assessment     Annual Assessment   Universal Utilization: 44% admission or ED Risk  Clinic Utilization  Difficulty keeping appointments:: No  Compliance Concerns: No  No-Show Concerns: No  No PCP office visit in Past Year: No  Utilization      No Show Count (past year)  3             ED Visits  0             Hospital Admissions  1                    Current as of: 12/21/2023 10:28 AM                Clinical Concerns:  Current Medical Concerns:    Patient Active Problem List   Diagnosis    Hyperlipidemia LDL goal <100    Macular degeneration    Apnea    Morbid obesity due to excess calories (H)    CKD (chronic kidney disease) stage 3, GFR 30-59 ml/min (H)    Acquired hypothyroidism    Benign essential hypertension    Gastroesophageal reflux disease without esophagitis    Type 2 diabetes mellitus with stage 3 chronic kidney disease, without long-term current use of insulin (H)    Anemia, unspecified type    MDD (major depressive disorder), recurrent episode, mild (H24)    Severe anemia    Severe obesity (BMI 35.0-39.9) with comorbidity (H)    Peripheral vision loss, unspecified laterality    Type 2 DM with CKD stage 3 and hypertension (H)    Acute on chronic blood loss anemia    B-cell lymphoma of intrathoracic lymph nodes, unspecified B-cell lymphoma type (H)    SHAVON (obstructive sleep apnea)    Pancreatic mass    Nonrheumatic aortic (valve) stenosis with insufficiency    Diastolic heart failure, unspecified HF chronicity (H)    Hypothyroidism, unspecified type    Major depressive disorder with current active episode, unspecified depression episode severity, unspecified whether recurrent    Physical deconditioning    Severe obesity (BMI >= 40) (H)     Callus of foot    Non-Hodgkin's lymphoma (H)    Encounter for other specified aftercare    Hypoxia    Generalized muscle weakness    Symptomatic anemia    Diverticular disease of colon    Status post coronary angiogram    Aortic stenosis, severe    Skin ulcer of great toe, unspecified laterality, limited to breakdown of skin (H)     NOMAN CC spoke briefly with daughter Atiya on 12/ 20/23 (she answered mobile number listed for Patient.) Shared some resources with her including Help at your Door. Will also emal her resources that may be of interest.      Spoke with Lucille on 12/21/23 to do her annual assessment.  She stated that her bigest need is having a . NOMAN CC prvider her with the number of help at Your Door who do some light housekeeping,   Alsoo gave her the number to call to request a [place] Choices [place].     They will be having [employer] do there snow removal this year.  They access [employer] for food.           Current Behavioral Concerns: None noted.      Education Provided to patient: Reviewed CC role.  Emailed the following resources to daughter Atiya:  Here are sone resources that may be of interest.  [facility] Human Services and Public Health Department  [place] Assessment and Support Planning  [facility] [place] and Human Services - Main Office  [address] [place] [address], [place], [place], [address] [phone]    [facility]  [facility]  Contact  [email or web address]  Phone: [phone]    Senior Community Services  Services - Senior Community Services    Help at your Door  Stay happy and healthy in your home - Help at Your Door     Home Delivered Meals    Meals on Wheels - [phone]  https://meals-on-wheels.com/    Optage - 412-150-4143  https://www.optage.org/    Moms Meals - 9-246-078-1957  https://www.[employer]/      Senior Linkage Line -   [phone]  The Senior LinkAge Line  is a free statewide service of the [place] Board on Aging in partnership with  [place]'s Area Agencies on Aging. The Senior LinkAge Line provides help to older [place] and their families and caregivers, helping them connect to local services, find answers and get the help they need.          Pain  Pain (GOAL):: No  Health Maintenance Reviewed: Due/Overdue   Health Maintenance Due   Topic Date Due    HF ACTION PLAN  Never done    RSV VACCINE (Pregnancy & 60+) (1 - 1-dose 60+ series) Never done    DTAP/TDAP/TD IMMUNIZATION (2 - Td or Tdap) 01/01/2019    DIABETIC FOOT EXAM  06/29/2023    ZOSTER IMMUNIZATION (2 of 2) 08/22/2023    EYE EXAM  12/21/2023      Clinical Pathway: None    Medication Management:  Medication review status: Medications reviewed and no changes reported per patient.        With PCP on 12/4/23     Functional Status:  Dependent ADLs:: Ambulation-cane, Ambulation-walker  Dependent IADLs:: Transportation  Bed or wheelchair confined:: No  Mobility Status: Independent w/Device    Living Situation:  Current living arrangement:: I live in a private home with spouse  Type of residence:: Private home - [facility]    Lifestyle & Psychosocial Needs:    Social Determinants of Health     Food Insecurity: Not on file   Depression: At risk (12/4/2023)    PHQ-2     PHQ-2 Score: 6   Housing Stability: Not on file   Tobacco Use: Low Risk  (12/14/2023)    Patient History     Smoking Tobacco Use: Never     Smokeless Tobacco Use: Never     Passive Exposure: Not on file   Financial Resource Strain: Not on file   Alcohol Use: Not on file   Transportation Needs: Not on file   Physical Activity: Not on file   Interpersonal Safety: Not on file   Stress: Not on file   Social Connections: Not on file     Inadequate nutrition (GOAL):: No  Tube Feeding: No  Inadequate activity/exercise (GOAL):: No  Significant changes in sleep pattern (GOAL): No  Transportation means:: Family  Financial/Insurance concerns (GOAL):: No  Methodist or spiritual beliefs that impact treatment:: No  Mental health DX::  Yes  Mental health DX how managed:: Medication  Mental health management concern (GOAL):: No  Informal Support system:: Spouse, Children             Resources and Interventions:  Current Resources:      Community Resources: Transportation Services  Supplies Currently Used at Home: Diabetic Supplies  Equipment Currently Used at Home: cane, straight, walker, standard, glucometer  Employment Status: retired         Advance Care Plan/Directive  Advanced Care Plans/Directives on file:: No    Referrals Placed: Community Resources    The patient consented via Verbal consent to have contact information and resources sent via email in an unencrypted manner.     Care Plan:  Care Plan: Social Support       Problem: Inadequate social support       Goal: I will access resources to obtain more in home support.       Start Date: 11/29/2022 Expected End Date: 3/20/2024    This Visit's Progress: 80% Recent Progress: 70%    Note:     Barriers: resource availability  Strengths: pt is a good advocate for self.   Patient expressed understanding of goal: yes  Action steps to achieve this goal:  1. I will contact the [facility] about a [place]. Reviewed this process on 12/21/23.     2. I will contact resources given to me. Discussed Help at your Door on 12/21/23.  Emailed daughter Atiya resources on 12/20/23.    3. I will contact my CHW with any needs or questions.                                   Patient/Caregiver understanding: Yes    Outreach Frequency: monthly, more frequently as needed      Plan: Lucille will let CHW know if she needs more assistance contacting Help at Your Door or [facility] to request a [place] Cho[place],  CHW will outre ac again in one month.    TORIE Lester   Care Coordination Team  [phone]

## 2023-12-27 ENCOUNTER — TELEPHONE (OUTPATIENT)
Dept: INTERNAL MEDICINE | Facility: CLINIC | Age: 83
End: 2023-12-27
Payer: MEDICARE

## 2023-12-27 DIAGNOSIS — E66.01 MORBID OBESITY DUE TO EXCESS CALORIES (H): ICD-10-CM

## 2023-12-27 DIAGNOSIS — C85.12 B-CELL LYMPHOMA OF INTRATHORACIC LYMPH NODES, UNSPECIFIED B-CELL LYMPHOMA TYPE (H): Primary | ICD-10-CM

## 2023-12-27 NOTE — TELEPHONE ENCOUNTER
Daughter colt called in  (verbal for patient to talk to daughter) called as they are needing a new form for handi cap sticker and they are needing a DME order for a cane as well     Form in PCP folder    Once completed they would like it mailed to address on file

## 2024-01-25 DIAGNOSIS — E66.01 MORBID OBESITY DUE TO EXCESS CALORIES (H): ICD-10-CM

## 2024-01-25 DIAGNOSIS — R06.81 APNEA: ICD-10-CM

## 2024-01-25 RX ORDER — PANTOPRAZOLE SODIUM 40 MG/1
40 TABLET, DELAYED RELEASE ORAL
Qty: 90 TABLET | Refills: 3 | Status: SHIPPED | OUTPATIENT
Start: 2024-01-25

## 2024-01-29 ENCOUNTER — PATIENT OUTREACH (OUTPATIENT)
Dept: CARE COORDINATION | Facility: CLINIC | Age: 84
End: 2024-01-29
Payer: MEDICARE

## 2024-01-29 NOTE — PROGRESS NOTES
Clinic Care Coordination Contact  [facility]/Voicemail    Clinical Data: Care Coordinator Outreach    Outreach Documentation Number of Outreach Attempt   1/29/2024   3:18 PM 1       Left message on patient's voicemail with call back information and requested return call.    Plan: Care Coordinator will try to reach patient again in 10 business days.    ZANE Demarco  Clinic Care Coordination  [facility] Clinics: [place], [place], [facility], and [facility]  Phone: [phone]

## 2024-02-08 DIAGNOSIS — E03.9 ACQUIRED HYPOTHYROIDISM: Chronic | ICD-10-CM

## 2024-02-08 RX ORDER — LEVOTHYROXINE SODIUM 150 UG/1
TABLET ORAL
Qty: 90 TABLET | Refills: 2 | Status: SHIPPED | OUTPATIENT
Start: 2024-02-08

## 2024-02-08 NOTE — TELEPHONE ENCOUNTER
Prescription approved per [facility] Refill Protocol.  Natalie Dyer, RN  [facility] Triage Nurse

## 2024-02-12 ENCOUNTER — PATIENT OUTREACH (OUTPATIENT)
Dept: CARE COORDINATION | Facility: CLINIC | Age: 84
End: 2024-02-12
Payer: MEDICARE

## 2024-02-12 ASSESSMENT — ACTIVITIES OF DAILY LIVING (ADL): DEPENDENT_IADLS:: TRANSPORTATION

## 2024-02-12 NOTE — PROGRESS NOTES
Clinic Care Coordination Contact  Care Coordination Clinician Chart Review    Situation: Patient chart reviewed by Care Coordinator.       Background: Care Coordination Program started: 12/3/2019. Initial assessment completed and patient-centered care plan(s) were developed with participation from patient. Lead CC handed patient off to CHW for continued outreaches.       Assessment: Per chart review, unsuccessful  patient outreach completed by CC CHW on 1/28/24.  Unable to determine if Patient is actively working to accomplish goal(s). Patient's goal(s) appropriate and relevant at this time. Patient is due for updated Plan of Care.  Assessments will be completed annually or as needed/with change of patient status.      Care Plan: Social Support       Problem: Inadequate social support       Goal: I will access resources to obtain more in home support.       Start Date: 11/29/2022 Expected End Date: 3/31/2024    Recent Progress: 80%    Note:     Barriers: resource availability  Strengths: pt is a good advocate for self.   Patient expressed understanding of goal: yes  Action steps to achieve this goal:  1. I will contact the [facility] about a [place] assessment. Reviewed this process on 12/21/23.     2. I will contact resources given to me. Discussed Help [facility] your Door on 12/21/23.  Emailed birgit Rajput resources on 12/20/23.    3. I will contact my CHW with any needs or questions.                                      Plan/Recommendations: The patient will continue working with Care Coordination to achieve goal(s) as above. CHW will continue outreaches at minimum every 30 days and will involve Lead CC as needed or if patient is ready to move to Maintenance. Lead CC will continue to monitor CHW outreaches and patient's progress to goal(s) every 6 weeks.     Plan of Care updated and sent to patient: Yes, via TORIE Cabezas   Care Coordination Team  [phone]

## 2024-02-12 NOTE — LETTER
[facility] CARE COORDINATION    February 12, 2024        Lucille Rojas  [id number]  [facility] [address]          Dear Lucille,     Connor is an updated Patient Centered Plan of Care for your continued enrollment in Care Coordination. Please let us know if you have additional questions, concerns, or goals that we can assist with.    Sincerely,    TORIE Lester   Care Coordination Team  [phone]         [employer]  Patient Centered Plan of Care  About Me:        Patient Name:  Lucille Rojas    YOB: 1940  Age:         83 year old   [place] MRN:    [id number] Telephone Information:  Home Phone [phone]   Mobile [phone]       Address:  [address]  [facility] [address] Email address:  [email or web address]      Emergency Contact(s)    Name Relationship Lgl Grd Work Phone Home Phone Mobile Phone   1. MONICA BUCKNER * Daughter   [phone]   2. EMIL ROJAS Spouse No  [phone]   3. HARRIET ROJAS Daughter No  [phone]   4. JOSEFA ROJAS Son No  [phone]           Primary language:  English     needed? No   [place] [employer]:  [phone] op. 1  Other communication barriers:None    Preferred Method of Communication:  Mail  Current living arrangement: I live in a private home with spouse    Mobility Status/ Medical Equipment: Independent w/Device        Health Maintenance  Health Maintenance Due   Topic Date Due    HF ACTION PLAN  Never done    RSV VACCINE (Pregnancy & 60+) (1 - 1-dose 60+ series) Never done    DTAP/TDAP/TD IMMUNIZATION (2 - Td or Tdap) 01/01/2019    DIABETIC FOOT EXAM  06/29/2023    EYE EXAM  12/21/2023    ZOSTER IMMUNIZATION (2 of 2) 08/22/2023      Health Maintenance Reviewed: Due/Overdue       My Access Plan  Medical Emergency 911   Primary Clinic Line [facility]* - [phone]   24 Hour Appointment Line  [phone] or  [phone]) (toll-free)   24 Hour Nurse Line [phone] (toll-free)   Preferred Urgent Care [facility], [phone]     [facility]  [phone]     Preferred Pharmacy [id number] [place], [place] - [address] [facility] Crisis Line The National Suicide Prevention Lifeline at 1-119.156.6193 or Text/Call 988           My Care Team Members  Patient Care Team         Relationship Specialty Notifications Start End    Fausto Flynn MD PCP - General Internal Medicine  10/6/14     Phone: [phone] Fax: [phone]         [address] [place] [id number]    Fausto Flynn MD Assigned PCP   10/12/14     Phone: [phone] Fax: [phone]         [address] [place] [address]    Yonny Roman MD MD INTERNAL MEDICINE - ENDOCRINOLOGY, DIABETES & METABOLISM  12/3/19     Phone: [phone] Fax: [phone]         [facility] [address] [facility] [address]    Rosanne Valadez CHW Community Health Worker Primary Care - CC Admissions 1/22/20     Phone: [phone]         [facility]    12/27/21     TCU    Phone: [phone] Fax: [phone] [address] [facility] [address]    Rafa Kelly AuD Audiologist Audiology  9/7/22     Phone: [phone] Fax: [phone] [address] [place] [address]    Puja Damian, LSW Lead Care Coordinator Primary Care - CC Admissions 2/2/23     Phone: [phone]         Linsey Benitez CNP Assigned Heart and Vascular Provider   5/13/23     Phone: [phone] Pager: [phone] Fax: [phone] [address] [address]    John Srinivasan MD Assigned Cancer Care Provider   8/5/23     Phone: [phone] Fax: [phone]         [facility] [address] [place] [address]                 My Care Plans  Self Management and Treatment Plan    Care Plan  Care Plan: Social Support       Problem: Inadequate social support       Goal: I will access resources to obtain more in home support.       Start Date: 11/29/2022 Expected End Date: 3/31/2024    Recent Progress: 80%    Note:     Barriers: resource availability  Strengths: pt is a good advocate for self.   Patient expressed understanding of goal: yes  Action steps to achieve this goal:  1. I will contact the [facility] about a MNChoices assessment. Reviewed this process on 12/21/23.     2. I will contact resources given to me. Discussed Help at your Door on 12/21/23.  Emailed birgit Rajput resources on 12/20/23.    3. I will contact my CHW with any needs or questions.                                   Action Plans on File:            Depression          Advance Care Plans/Directives:   Advanced Care Plan/Directives on file:   No    Discussed with patient/caregiver(s): No data recorded           My Medical and Care Information  Problem List   Patient Active Problem List   Diagnosis    Hyperlipidemia LDL goal <100    Macular degeneration    Apnea    Morbid obesity due to excess calories (H)    CKD (chronic kidney disease) stage 3, GFR 30-59 ml/min (H)    Acquired hypothyroidism    Benign essential hypertension    Gastroesophageal reflux disease without esophagitis    Type 2 diabetes mellitus with stage 3 chronic kidney disease, without long-term current use of insulin (H)    Anemia, unspecified type    MDD (major depressive disorder), recurrent episode, mild (H24)    Severe anemia    Severe obesity (BMI 35.0-39.9) with comorbidity (H)    Peripheral vision loss, unspecified laterality    Type 2 DM with CKD stage 3 and hypertension (H)    Acute on chronic blood loss anemia    B-cell lymphoma of intrathoracic lymph nodes, unspecified B-cell lymphoma type (H)    SHAVON (obstructive sleep apnea)    Pancreatic mass    Nonrheumatic aortic (valve)  stenosis with insufficiency    Diastolic heart failure, unspecified HF chronicity (H)    Hypothyroidism, unspecified type    Major depressive disorder with current active episode, unspecified depression episode severity, unspecified whether recurrent    Physical deconditioning    Severe obesity (BMI >= 40) (H)    Callus of foot    Non-Hodgkin's lymphoma (H)    Encounter for other specified aftercare    Hypoxia    Generalized muscle weakness    Symptomatic anemia    Diverticular disease of colon    Status post coronary angiogram    Aortic stenosis, severe    Skin ulcer of great toe, unspecified laterality, limited to breakdown of skin (H)      Current Medications and Allergies:  See printed Medication Report.    Care Coordination Start Date: 12/3/2019   Frequency of Care Coordination: monthly, more frequently as needed     Form Last Updated: 02/12/2024

## 2024-02-25 ENCOUNTER — APPOINTMENT (OUTPATIENT)
Dept: CT IMAGING | Facility: CLINIC | Age: 84
End: 2024-02-25
Attending: EMERGENCY MEDICINE
Payer: MEDICARE

## 2024-02-25 ENCOUNTER — HOSPITAL ENCOUNTER (EMERGENCY)
Facility: CLINIC | Age: 84
Discharge: HOME | End: 2024-02-25
Attending: EMERGENCY MEDICINE | Admitting: EMERGENCY MEDICINE
Payer: MEDICARE

## 2024-02-25 VITALS
OXYGEN SATURATION: 95 % | DIASTOLIC BLOOD PRESSURE: 51 MMHG | WEIGHT: 240 LBS | SYSTOLIC BLOOD PRESSURE: 183 MMHG | HEIGHT: 60 IN | TEMPERATURE: 97.1 F | RESPIRATION RATE: 18 BRPM | BODY MASS INDEX: 47.12 KG/M2 | HEART RATE: 63 BPM

## 2024-02-25 DIAGNOSIS — R44.3 HALLUCINATIONS: ICD-10-CM
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
## 2024-02-25 DIAGNOSIS — S09.90XA CLOSED HEAD INJURY, INITIAL ENCOUNTER: ICD-10-CM

## 2024-02-25 LAB
ANION GAP SERPL CALCULATED.3IONS-SCNC: 10 MMOL/L (ref 7–15)
ATRIAL RATE - MUSE: 62 BPM
BASOPHILS # BLD AUTO: 0.1 10E3/UL (ref 0–0.2)
BASOPHILS NFR BLD AUTO: 1 %
BUN SERPL-MCNC: 29.4 MG/DL (ref 8–23)
CALCIUM SERPL-MCNC: 9.4 MG/DL (ref 8.8–10.2)
CHLORIDE SERPL-SCNC: 103 MMOL/L (ref 98–107)
CREAT SERPL-MCNC: 1.04 MG/DL (ref 0.51–0.95)
DEPRECATED HCO3 PLAS-SCNC: 30 MMOL/L (ref 22–29)
DIASTOLIC BLOOD PRESSURE - MUSE: NORMAL MMHG
EGFRCR SERPLBLD CKD-EPI 2021: 52 ML/MIN/1.73M2
EOSINOPHIL # BLD AUTO: 0.4 10E3/UL (ref 0–0.7)
EOSINOPHIL NFR BLD AUTO: 5 %
ERYTHROCYTE [DISTWIDTH] IN BLOOD BY AUTOMATED COUNT: 13.9 % (ref 10–15)
GLUCOSE SERPL-MCNC: 93 MG/DL (ref 70–99)
HCT VFR BLD AUTO: 41.9 % (ref 35–47)
HGB BLD-MCNC: 13.2 G/DL (ref 11.7–15.7)
HOLD SPECIMEN: NORMAL
IMM GRANULOCYTES # BLD: 0 10E3/UL
IMM GRANULOCYTES NFR BLD: 0 %
INTERPRETATION ECG - MUSE: NORMAL
LYMPHOCYTES # BLD AUTO: 1.8 10E3/UL (ref 0.8–5.3)
LYMPHOCYTES NFR BLD AUTO: 23 %
MCH RBC QN AUTO: 28.6 PG (ref 26.5–33)
MCHC RBC AUTO-ENTMCNC: 31.5 G/DL (ref 31.5–36.5)
MCV RBC AUTO: 91 FL (ref 78–100)
MONOCYTES # BLD AUTO: 0.7 10E3/UL (ref 0–1.3)
MONOCYTES NFR BLD AUTO: 9 %
NEUTROPHILS # BLD AUTO: 4.9 10E3/UL (ref 1.6–8.3)
NEUTROPHILS NFR BLD AUTO: 62 %
NRBC # BLD AUTO: 0 10E3/UL
NRBC BLD AUTO-RTO: 0 /100
P AXIS - MUSE: 58 DEGREES
PLATELET # BLD AUTO: 227 10E3/UL (ref 150–450)
POTASSIUM SERPL-SCNC: 4.4 MMOL/L (ref 3.4–5.3)
PR INTERVAL - MUSE: 200 MS
QRS DURATION - MUSE: 110 MS
QT - MUSE: 422 MS
QTC - MUSE: 428 MS
R AXIS - MUSE: -49 DEGREES
RBC # BLD AUTO: 4.61 10E6/UL (ref 3.8–5.2)
SODIUM SERPL-SCNC: 143 MMOL/L (ref 135–145)
SYSTOLIC BLOOD PRESSURE - MUSE: NORMAL MMHG
T AXIS - MUSE: 59 DEGREES
VENTRICULAR RATE- MUSE: 62 BPM
WBC # BLD AUTO: 7.8 10E3/UL (ref 4–11)

## 2024-02-25 PROCEDURE — 85025 COMPLETE CBC W/AUTO DIFF WBC: CPT | Performed by: EMERGENCY MEDICINE

## 2024-02-25 PROCEDURE — 80048 BASIC METABOLIC PNL TOTAL CA: CPT | Performed by: EMERGENCY MEDICINE

## 2024-02-25 PROCEDURE — 93005 ELECTROCARDIOGRAM TRACING: CPT

## 2024-02-25 PROCEDURE — 36415 COLL VENOUS BLD VENIPUNCTURE: CPT | Performed by: EMERGENCY MEDICINE

## 2024-02-25 PROCEDURE — 99285 EMERGENCY DEPT VISIT HI MDM: CPT | Mod: 25

## 2024-02-25 PROCEDURE — 70450 CT HEAD/BRAIN W/O DYE: CPT | Mod: ME

## 2024-02-25 ASSESSMENT — ACTIVITIES OF DAILY LIVING (ADL)
ADLS_ACUITY_SCORE: 38
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
## 2024-02-25 NOTE — ED PROVIDER NOTES
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 Patient reports she was sitting on a stool with wheels at home when she tried to grab something out of reach, resulting in a fall forward onto the ground. During the fall, she did hit her head and complains of right hip soreness here in the ED. She did not lose consciousness and denies any fevers. No recent medication changes or changes in diet/fluid intake. She adds that aside from her fall, she felt otherwise at baseline today, but does mention her visual hallucinations which have been going on for 4 months and is being followed by her PCP, not recently changed.  Uses a walker at baseline and lives at home with her .  She does not think she needs to be hospitalized.    Independent Historian:   None - Patient Only    Review of External Notes:   I reviewed the Oncology note from 12/14.  History notable for B-cell non-Hodgkin lymphoma of the pancreas, status post 6 cycles of mini R-CHOP.  She is in complete remission.     Medications:    81 mg Aspirin  Zithromax   Citalopram   Furosemide   Synthroid   Loratadine   Lorazepam   Metoprolol  Myocostatin   Protonix   Simvastatin     Past Medical History:    Cancer  Depression  Heart disease  Hypertension  Hyperlipidemia   Hypothyroidism   Macular degeneration  Type II diabetes  Sleep apnea     Past Surgical History:    Appendectomy   Colonoscopy (x2)  Coronary angiogram  Aortic valve transcatheter  EGD (x4)  Hernia repair (x2)  IR chest port  Tonsillectomy      Physical Exam   Patient Vitals for the past 24 hrs:   BP Temp Temp src Pulse Resp SpO2 Height Weight   02/25/24 1906 (!) 183/51 -- -- 63 18 95 % -- --   02/25/24 1532 -- 97.1  F (36.2  C) Oral -- -- 96 % -- --   02/25/24 1531 (!) 186/57 -- Oral 65 20 -- 1.524 m (5') 108.9 kg (240 lb)      Physical Exam  General: Chronically ill but  nontoxic-appearing woman recumbent in the rney in room 16,  and son later bedside  HENT: face nontender with full painless ROM mandible, no bony deformity, OP clear, no difficulty controlling secretions, skull nontender  Eyes: PERRL without proptosis  CV:  regular rhythm, cap refill normal in all extremities, thick legs bilaterally but no pitting edema  Resp: normal effort, speaks in full phrases, no stridor  GI: abdomen soft, protuberant, nontender, no guarding  MSK:  Cervical spine:  no midline tenderness, FROM  Thoracic spine: no midline tenderness, no CVAT  Lumbar spine: no midline tenderness  Chest wall: nontender without crepitus  Pelvis stable  Extremities: No focal tenderness including right hip, can lift both legs off bed  Skin:   No abrasion  No ecchymosis  No laceration  Neuro: awake, alert, diffusely weak but can lift both legs off bed,  equal, no nuchal rigidity responds appropriately to commands  Psych: cooperative, no evidence of hallucinations, patient is articulate      Emergency Department Course     ECG results from 02/25/24   EKG 12 lead     Value    Systolic Blood Pressure     Diastolic Blood Pressure     Ventricular Rate 62    Atrial Rate 62    WV Interval 200    QRS Duration 110        QTc 428    P Axis 58    R AXIS -49    T Axis 59    Interpretation ECG      Sinus rhythm  Left axis deviation  Incomplete left bundle branch block  Read by: Dr. Deep Urrutia MD       Imaging:  CT Head w/o Contrast   Final Result   IMPRESSION:   1.  No acute intracranial process.         Report per radiology    Laboratory:  Labs Ordered and Resulted from Time of ED Arrival to Time of ED Departure   BASIC METABOLIC PANEL - Abnormal       Result Value    Sodium 143      Potassium 4.4      Chloride 103      Carbon Dioxide (CO2) 30 (*)     Anion Gap 10      Urea Nitrogen 29.4 (*)     Creatinine 1.04 (*)     GFR Estimate 52 (*)     Calcium 9.4      Glucose 93     CBC WITH PLATELETS AND  DIFFERENTIAL    WBC Count 7.8      RBC Count 4.61      Hemoglobin 13.2      Hematocrit 41.9      MCV 91      MCH 28.6      MCHC 31.5      RDW 13.9      Platelet Count 227      % Neutrophils 62      % Lymphocytes 23      % Monocytes 9      % Eosinophils 5      % Basophils 1      % Immature Granulocytes 0      NRBCs per 100 WBC 0      Absolute Neutrophils 4.9      Absolute Lymphocytes 1.8      Absolute Monocytes 0.7      Absolute Eosinophils 0.4      Absolute Basophils 0.1      Absolute Immature Granulocytes 0.0      Absolute NRBCs 0.0        Emergency Department Course & Assessments:    Independent Interpretation (X-rays, CTs, rhythm strip):  I independently reviewed the patient's non-con CT from today's visit. I do not appreciate any large ICH    Assessments/Consultations/Discussion of Management or Tests:  ED Course as of 02/25/24 1931   Sun Feb 25, 2024 1625 I evaluated the patient.    1631 I attempted to call Lucille's , Cooper.    1631 I attempted to call the patient's daughter, Radha.    1858 Rechecked and updated.      Social Determinants of Health affecting care:   Access to care, transportation    Disposition:  The patient was discharged to home.     Impression & Plan    Medical Decision Making:  She excellently fell off of a wheeled stool at home while trying to reach for something that was just beyond her actual reach.  She hit her head, given her age, head CT was indicated to evaluate for intracranial hemorrhage or skull fracture and this is fortunately benign.  Basic laboratory studies are reassuring.  She is able to pass a road test here.  No evidence of hip fracture clinically, patient declined x-rays of this area.  She has some longstanding hallucinations with these are not manifest currently, and not acutely worsened, no indication for formal psychiatric evaluation or further medical workup nor hospitalization at this time.  I spoke with patient's  and son at bedside, they are  both very comfortable with her being discharged.  Return here for sudden worsening and otherwise close follow-up through clinic as advised.    Diagnosis:    ICD-10-CM    1. Closed head injury, initial encounter  S09.90XA       2. Fall, initial encounter  W19.XXXA       3. Hallucinations  R44.3     longstanding         Scribe Disclosure:  I, ROSY GOMEZ, am serving as a scribe at 4:14 PM on 2/25/2024 to document services personally performed by Deep Urrutia MD based on my observations and the provider's statements to me.     2/25/2024   Deep Urrutia MD Reitsema, Jeffrey Alan, MD  02/26/24 0039

## 2024-02-25 NOTE — ED NOTES
Bed: ED16  Expected date:   Expected time:   Means of arrival:   Comments:  Terri [age]F mental health eval

## 2024-02-26 ENCOUNTER — TELEPHONE (OUTPATIENT)
Dept: INTERNAL MEDICINE | Facility: CLINIC | Age: 84
End: 2024-02-26
Payer: MEDICARE

## 2024-02-26 LAB — HEMOCCULT STL QL IA: NEGATIVE

## 2024-02-26 NOTE — TELEPHONE ENCOUNTER
Called and spoke with pt to assist with scheduling.     She states that her symptoms are not worse since being in the hospital on 2/25, and that she is not having any pain.     She is wondering if she needs to be seen in-person for a hospital follow-up visit or not. She states she will be seen only if PCP feels that she should.     Routing to PCP to advise.  Thank you,  Kemi Clements RN

## 2024-02-26 NOTE — TELEPHONE ENCOUNTER
Patient will have to decide what she feels comfortable with.  If she is stable and not have any new or worsening symptoms and I think it is reasonable for patient's convenience to observe and not be seen.

## 2024-02-26 NOTE — TELEPHONE ENCOUNTER
Reason for Call:  Other appointment    Detailed comments: PT CALLING TODAY FOR A FOLLOW UP FROM HER ER TRIP YESTERDAY 2/25/24 PLEASE CALL AND ADVISE PT     Phone Number Patient can be reached at: Home number on file [phone] (home)    Best Time: ANYTIME    Can we leave a detailed message on this number? Not Applicable    Call taken on 2/26/2024 at 1:25 PM by Meet Harden

## 2024-02-28 ENCOUNTER — PATIENT OUTREACH (OUTPATIENT)
Dept: CARE COORDINATION | Facility: CLINIC | Age: 84
End: 2024-02-28
Payer: MEDICARE

## 2024-02-28 NOTE — TELEPHONE ENCOUNTER
Nancy, pt's daughter (NO consent to communicate) took a message for pt to return call to clinic. Pt was not at home at time of call.     Consent to communicate placed in outgoing mailbox.

## 2024-02-28 NOTE — PROGRESS NOTES
Clinic Care Coordination Contact    The Community Health Worker called for a Care Coordination outreach to follow up on goals and action steps.     Patient's daughter stated her mother was not available to talk.    CHW left a message with patient's daughter.    CHW mailed out a C2C form on 2/29/24    CHW will outreach in 10 business days.    ZANE Demarco  Clinic Care Coordination  [facility] Clinics: [facility], [facility], [facility], and [facility]  Phone: [phone]

## 2024-03-01 ENCOUNTER — PATIENT OUTREACH (OUTPATIENT)
Dept: CARE COORDINATION | Facility: CLINIC | Age: 84
End: 2024-03-01
Payer: MEDICARE

## 2024-03-01 ENCOUNTER — NURSE TRIAGE (OUTPATIENT)
Dept: CARE COORDINATION | Facility: CLINIC | Age: 84
End: 2024-03-01
Payer: MEDICARE

## 2024-03-01 NOTE — TELEPHONE ENCOUNTER
Appointments in Next Year      Mar 07, 2024  5:00 PM  Provider Visit with Fausto Flynn MD  [facility] ([facility] ) [phone]     May 28, 2024 11:20 AM  LAB with  LAB ONLY  [facility] ([facility] ) [phone]     May 28, 2024 11:40 AM  (Arrive by 11:10 AM)  CT CHEST/ABDOMEN/PELVIS W CONTRAST with SHCT1  [facility] ([facility] ) [phone]     Jun 04, 2024 11:00 AM  Return Patient with John Srinivasan MD  [facility] ([facility] ) [phone]          Called patient - she agreed to phone visit to discuss stools with PCP     Anish Ca RN  [facility]

## 2024-03-01 NOTE — TELEPHONE ENCOUNTER
Clinic Care Coordination Contact    Patient Called.    Patient stated she is constipated.  Patient would like to know what she should do or take.  Please call patient to discuss at [phone] (Home).    Thank you.     Rosanne Valadez, KM  Clinic Care Coordination  [facility] Clinics: [facility], [facility], [facility], and [facility]  Phone: [phone]

## 2024-03-01 NOTE — TELEPHONE ENCOUNTER
"TO PCP:     Called pt at preferred number. Daughter said to call her at: [phone]    Spoke with patient     For the past month or so had \"black stools\" - normal consistency. Not diarrhea or tar-like. Grunted quite a bit, stool was pretty dark, just got a tiny amount out - pt did mention she is taking iron, FIT test in December was negative     That was off/on. This week has been very constipated     STOOL PATTERN/FREQUENCY: 3 per day. Last normal BM almost a couple of weeks, has had some smaller once with straining since then   STRAINING: yes   RECTAL PAIN: no   COMPOSITION: very hard   BLOOD ON STOOLS? Taking iron, stools have been dark   CHANGES IN DIET/HYDRATION: no pretty much the same   CHRONIC CONSTIPATION: once in a while gets it, but normally no problem   MEDICATIONS: nothing changed, no narcotics   LAXATIVES: \"Have you been using any stool softeners, laxatives, or enemas?\" No   ACTIVITY: no a whole lot of activity   CAUSE: unknown   OTHER SYMPTOMS: \"Do you have any other symptoms?\" (e.g., abdomen pain, bloating, fever, vomiting) NO     Discussed home care instructions with patient for constipation and home care steps to take to treat constipation. See protocol tab. Also discussed concerning symptoms to watch for/call back for - see protocol tab for detailed     Did advise on home care but also routing to PCP to advise if any concern about dark/hard stools (stools have been black for past month per pt, she is also on iron)     Nazanin EPPERSON, Triage RN  [facility]           Reason for Disposition   MILD constipation    Additional Information   Negative: Abdomen pain is main symptom and male   Negative: Abdomen pain is main symptom and female   Negative: Rectal bleeding or blood in stool is main symptom   Negative: Vomiting bile (green color)   Negative: Patient sounds very sick or weak to the triager   Negative: Constant abdominal pain lasting > 2 hours   Negative: " Vomiting and abdomen looks much more swollen than usual   Negative: Rectal pain or fullness from fecal impaction (rectum full of stool) and NOT better after SITZ bath, suppository or enema   Negative: Abdomen is more swollen than usual   Negative: Last bowel movement (BM) > 4 days ago   Negative: Leaking stool   Negative: Intermittent mild abdominal pain and fever   Negative: Unable to have a bowel movement (BM) without manually removing stool (using finger to pull out stool or perform disimpaction)   Negative: Unable to have a bowel movement (BM) without using a laxative, suppository, or enema   Negative: Constipation persists > 1 week and no improvement after using CARE ADVICE   Negative: Weight loss greater than 10 pounds (5 kg) and not dieting   Negative: Pencil-like, narrow stools   Negative: Patient wants to be seen   Negative: Uses laxative (e.g., PEG / Miralax, Milk of Magnesia) or enema more than once a month   Negative: Constipation is a recurrent ongoing problem (i.e., < 3 BMs / week or straining > 25% of the time)   Negative: Minor bleeding from rectum (e.g., blood just on toilet paper, few drops, streaks on surface of normal formed BM) occurs more than twice    Protocols used: Constipation-A-[place]

## 2024-03-01 NOTE — PROGRESS NOTES
Clinic Care Coordination Contact    Patient Called.    Patient stated she has been constipated.    Patient stated she does not plan to change her HCD at this time.  Patient stated she will sign a C2C for her daughter Nancy Rajput.    CHW will outreach in one month.    ZANE Demarco  Clinic Care Coordination  [facility] Clinics: [facility], [facility], [facility], and [place] [facility]  Phone: [phone]

## 2024-03-07 ENCOUNTER — VIRTUAL VISIT (OUTPATIENT)
Dept: INTERNAL MEDICINE | Facility: CLINIC | Age: 84
End: 2024-03-07
Payer: MEDICARE

## 2024-03-07 DIAGNOSIS — K59.00 CONSTIPATION, UNSPECIFIED CONSTIPATION TYPE: Primary | ICD-10-CM

## 2024-03-07 DIAGNOSIS — E11.22 TYPE 2 DIABETES MELLITUS WITH STAGE 3 CHRONIC KIDNEY DISEASE, WITHOUT LONG-TERM CURRENT USE OF INSULIN, UNSPECIFIED WHETHER STAGE 3A OR 3B CKD (H): ICD-10-CM

## 2024-03-07 DIAGNOSIS — N18.30 TYPE 2 DIABETES MELLITUS WITH STAGE 3 CHRONIC KIDNEY DISEASE, WITHOUT LONG-TERM CURRENT USE OF INSULIN, UNSPECIFIED WHETHER STAGE 3A OR 3B CKD (H): ICD-10-CM

## 2024-03-07 DIAGNOSIS — E78.5 HYPERLIPIDEMIA LDL GOAL <100: ICD-10-CM

## 2024-03-07 PROBLEM — Z51.89 ENCOUNTER FOR OTHER SPECIFIED AFTERCARE: Status: RESOLVED | Noted: 2021-12-10 | Resolved: 2024-03-07

## 2024-03-07 PROCEDURE — 99442 PR PHYSICIAN TELEPHONE EVALUATION 11-20 MIN: CPT | Mod: 93 | Performed by: INTERNAL MEDICINE

## 2024-03-07 NOTE — PROGRESS NOTES
Lucille is a 83 year old who is being evaluated via a billable telephone visit.      What phone number would you like to be contacted at? [phone]  How would you like to obtain your AVS? Ryne  Originating Location (pt. Location): Home    Distant Location (provider location):  On-site    Assessment & Plan     Constipation, unspecified constipation type  Suggest patient increase dietary forms of fiber and fluid.  Continue with ClearLax as directed.  Would suggest FIT testing to make sure darker stools or not blood.  She will follow-up accordingly pending results.  - Fecal colorectal cancer screen (FIT); Future    Please note I contacted the lab and they are willing to send the patient her fit test in the mail due to patient's issues of mobility.      Subjective   Lucille is a 83 year old, presenting for the following health issues:    Constipation    HPI     Patient reports that she has primarily been constipated for about the last couple weeks.  She has been not drinking a lot of fluid.  She states that she her appetite is good and she has no nausea or vomiting.  She reports dark stools but no melena.  She denies any abdominal pain.  She was apparently given some stool laxative over-the-counter product by the name of ClearLax via her son which she has been taking for the last couple days.  He has had no fever, chills or obvious blood in her stool per history.  She has had this issue in the past.    Constipation    Duration: A couple of weeks   Description:       Frequency of bowel movements: Last bowel movement 5 days ago, very small amount        Consistency of stool: Soft , dark stools with rough edges   Intensity:  severe  Accompanying signs and symptoms:        Abdominal pain: no        Rectal pain: no        Blood in stool: no        Nausea/vomitting: no   History:        Similar problems in past: YES  Therapies tried and outcome: None        Review of Systems  CONSTITUTIONAL: NEGATIVE for fever,  chills, change in weight  EYES: NEGATIVE for vision changes or irritation  ENT/MOUTH: NEGATIVE for ear, mouth and throat problems  RESP: NEGATIVE for significant cough or SOB  CV: NEGATIVE for chest pain, palpitations or peripheral edema  : NEGATIVE for frequency, dysuria, or hematuria  MUSCULOSKELETAL: NEGATIVE for significant arthralgias or myalgia  NEURO: NEGATIVE for weakness, dizziness or paresthesias  HEME: NEGATIVE for bleeding problems  PSYCHIATRIC: NEGATIVE for changes in mood or affect    Current Outpatient Medications   Medication    acetaminophen (TYLENOL) 325 MG tablet    aspirin 81 MG tablet    azithromycin (ZITHROMAX) 500 MG tablet    carboxymethylcellulose PF (REFRESH PLUS) 0.5 % ophthalmic solution    citalopram (CELEXA) 20 MG tablet    CONTOUR NEXT TEST test strip    furosemide (LASIX) 20 MG tablet    levothyroxine (SYNTHROID/LEVOTHROID) 150 MCG tablet    loratadine (CLARITIN) 10 MG tablet    LORazepam (ATIVAN) 0.5 MG tablet    metoprolol succinate ER (TOPROL XL) 25 MG 24 hr tablet    miconazole (MICATIN) 2 % external powder    Microlet Lancets MISC    nystatin (MYCOSTATIN) 910776 UNIT/GM external powder    order for DME    pantoprazole (PROTONIX) 40 MG EC tablet    simvastatin (ZOCOR) 10 MG tablet     No current facility-administered medications for this visit.           Objective    Vitals - Patient Reported  Weight (Patient Reported): 104.3 kg (230 lb)    Vitals:  No vitals were obtained today due to virtual visit.    Physical Exam   General: Alert and no distress //Respiratory: No audible wheeze, cough, or shortness of breath // Psychiatric:  Appropriate affect, tone, and pace of words        Phone call duration: 18 minutes  Signed Electronically by: Fausto Flynn MD

## 2024-03-08 RX ORDER — SIMVASTATIN 10 MG
TABLET ORAL
Qty: 90 TABLET | Refills: 1 | Status: SHIPPED | OUTPATIENT
Start: 2024-03-08 | End: 2024-08-13

## 2024-03-11 NOTE — PROGRESS NOTES
Clinic Care Coordination Contact  [facility]: Post-Discharge Note  SITUATION                                                      Admission:    Admission Date: 08/23/22   Reason for Admission: Aortic stenosis s/p TAVR 8/23/2022  Discharge:   Discharge Date: 08/25/22  Discharge Diagnosis: Aortic stenosis s/p TAVR 8/23/2022    BACKGROUND                                                      Per hospital discharge summary and inpatient provider notes:  Pt discharged home with [facility]    ASSESSMENT           Discharge Assessment  How are you doing now that you are home?: Doing Well  How are your symptoms? (Red Flag symptoms escalate to triage hotline per guidelines): Improved  Do you feel your condition is stable enough to be safe at home until your provider visit?: Yes  Does the patient have their discharge instructions? : Yes  Does the patient have questions regarding their discharge instructions? : Yes (see comment) (was supposed to start a medication prior to having the procedure done)  Were you started on any new medications or were there changes to any of your previous medications? : No  Does the patient have all of their medications?: Yes  Do you have questions regarding any of your medications? : Yes (see comment)  Do you have all of your needed medical supplies or equipment (DME)?  (i.e. oxygen tank, CPAP, cane, etc.): Yes  Discharge follow-up appointment scheduled within 14 calendar days? : Yes  Discharge Follow Up Appointment Date: 09/01/22  Discharge Follow Up Appointment Scheduled with?: Specialty Care Provider    Post-op (CHW CTA Only)  If the patient had a surgery or procedure, do they have any questions for a nurse?: No    Post-op (Clinicians Only)  Did the patient have surgery or a procedure: No  Fever: No  Chills: No  Eating & Drinking: eating and drinking without complaints/concerns  PO Intake: regular diet  Bowel Function: normal  Urinary Status: voiding without  Statement Selected complaint/concerns    PLAN                                                      Outpatient Plan:  Pt discharged to home with [facility]     Future Appointments   Date Time Provider Department Center   9/1/2022  2:30 PM [facility]   9/8/2022  9:30 AM 3, Sh Cardiac Rehab [facility]   9/23/2022 10:00 AM SCICT1 SHCVCT CVIMG   9/23/2022 12:45 PM [facility] LAB [facility]   9/23/2022  1:50 PM [facility]   10/3/2022  9:30 AM SH FAST TRACK LAB [facility]   11/10/2022 11:00 AM EICCT1 SHCTIC [place] IMG   11/14/2022 10:00 [facility]   11/14/2022 10:40 AM John Srinivasan MD [facility]         For any urgent concerns, please contact our 24 hour nurse triage line: [phone] ([phone])         TORIE Telles

## 2024-03-21 ENCOUNTER — PATIENT OUTREACH (OUTPATIENT)
Dept: CARE COORDINATION | Facility: CLINIC | Age: 84
End: 2024-03-21
Payer: MEDICARE

## 2024-03-21 ASSESSMENT — ACTIVITIES OF DAILY LIVING (ADL): DEPENDENT_IADLS:: TRANSPORTATION

## 2024-03-21 NOTE — PROGRESS NOTES
Clinic Care Coordination Contact  Care Coordination Clinician Chart Review    Situation: Patient chart reviewed by Care Coordinator.       Background: Care Coordination Program started: 12/3/2019. Initial assessment completed and patient-centered care plan(s) were developed with participation from patient. Lead CC handed patient off to CHW for continued outreaches.       Assessment: Per chart review, unsuccessful  patient outreach completed by CC CHW on 2/28/24 and 3/1/24.  Unable to determine if Patient is actively working to accomplish goal(s). Patient's goal(s) appropriate and relevant at this time. Patient is not due for updated Plan of Care.  Assessments will be completed annually or as needed/with change of patient status.      Care Plan: Social Support       Problem: Inadequate social support       Goal: I will access resources to obtain more in home support.       Start Date: 11/29/2022 Expected End Date: 3/31/2024    Recent Progress: 80%    Note:     Barriers: resource availability  Strengths: pt is a good advocate for self.   Patient expressed understanding of goal: yes  Action steps to achieve this goal:  1. I will contact the [facility] about a MNChoices assessment. Reviewed this process on 12/21/23.     2. I will contact resources given to me. Discussed Help at your Door on 12/21/23.  Emailed daughter Atiya resources on 12/20/23.    3. I will contact my CHW with any needs or questions.                                      Plan/Recommendations: The patient will continue working with Care Coordination to achieve goal(s) as above. CHW will continue outreaches at minimum every 30 days and will involve Lead CC as needed or if patient is ready to move to Maintenance. Lead CC will continue to monitor CHW outreaches and patient's progress to goal(s) every 6 weeks.     Plan of Care updated and sent to patient: TORIE Alas   Care Coordination Team  [phone]

## 2024-03-25 DIAGNOSIS — E78.5 HYPERLIPIDEMIA LDL GOAL <100: ICD-10-CM

## 2024-03-25 DIAGNOSIS — E11.22 TYPE 2 DIABETES MELLITUS WITH STAGE 3 CHRONIC KIDNEY DISEASE, WITHOUT LONG-TERM CURRENT USE OF INSULIN, UNSPECIFIED WHETHER STAGE 3A OR 3B CKD (H): ICD-10-CM

## 2024-03-25 DIAGNOSIS — N18.30 TYPE 2 DIABETES MELLITUS WITH STAGE 3 CHRONIC KIDNEY DISEASE, WITHOUT LONG-TERM CURRENT USE OF INSULIN, UNSPECIFIED WHETHER STAGE 3A OR 3B CKD (H): ICD-10-CM

## 2024-03-25 RX ORDER — SIMVASTATIN 10 MG
TABLET ORAL
Qty: 30 TABLET | Refills: 10 | OUTPATIENT
Start: 2024-03-25

## 2024-03-26 RX ORDER — SIMVASTATIN 10 MG
TABLET ORAL
Qty: 30 TABLET | Refills: 10 | OUTPATIENT
Start: 2024-03-26

## 2024-03-28 DIAGNOSIS — N18.30 TYPE 2 DIABETES MELLITUS WITH STAGE 3 CHRONIC KIDNEY DISEASE, WITHOUT LONG-TERM CURRENT USE OF INSULIN, UNSPECIFIED WHETHER STAGE 3A OR 3B CKD (H): ICD-10-CM

## 2024-03-28 DIAGNOSIS — E11.22 TYPE 2 DIABETES MELLITUS WITH STAGE 3 CHRONIC KIDNEY DISEASE, WITHOUT LONG-TERM CURRENT USE OF INSULIN, UNSPECIFIED WHETHER STAGE 3A OR 3B CKD (H): ICD-10-CM

## 2024-03-28 DIAGNOSIS — E78.5 HYPERLIPIDEMIA LDL GOAL <100: ICD-10-CM

## 2024-03-28 RX ORDER — SIMVASTATIN 10 MG
TABLET ORAL
Qty: 30 TABLET | Refills: 10 | OUTPATIENT
Start: 2024-03-28

## 2024-04-01 ENCOUNTER — DOCUMENTATION ONLY (OUTPATIENT)
Dept: OTHER | Facility: CLINIC | Age: 84
End: 2024-04-01
Payer: MEDICARE

## 2024-04-02 ENCOUNTER — PATIENT OUTREACH (OUTPATIENT)
Dept: CARE COORDINATION | Facility: CLINIC | Age: 84
End: 2024-04-02
Payer: MEDICARE

## 2024-04-02 NOTE — PROGRESS NOTES
Clinic Care Coordination Contact  [facility]/Voicemail    Clinical Data: Care Coordinator Outreach    Outreach Documentation Number of Outreach Attempt   4/2/2024   4:00 PM 1       Unable to leave a message on patient's voicemail with call back information and requested return call.  Voicemail not available.    Plan:  Care Coordinator will try to reach patient again in 10 business days.    ZANE Demarco  Clinic Care Coordination  [facility]: [facility] [place], and [place] [facility]  Phone: [phone]

## 2024-04-03 PROCEDURE — 82274 ASSAY TEST FOR BLOOD FECAL: CPT | Mod: 93 | Performed by: INTERNAL MEDICINE

## 2024-04-08 LAB — HEMOCCULT STL QL IA: NEGATIVE

## 2024-04-09 ENCOUNTER — TRANSFERRED RECORDS (OUTPATIENT)
Dept: HEALTH INFORMATION MANAGEMENT | Facility: CLINIC | Age: 84
End: 2024-04-09
Payer: MEDICARE

## 2024-04-29 ENCOUNTER — TELEPHONE (OUTPATIENT)
Dept: CARE COORDINATION | Facility: CLINIC | Age: 84
End: 2024-04-29
Payer: MEDICARE

## 2024-04-29 ENCOUNTER — PATIENT OUTREACH (OUTPATIENT)
Dept: CARE COORDINATION | Facility: CLINIC | Age: 84
End: 2024-04-29
Payer: MEDICARE

## 2024-04-29 NOTE — TELEPHONE ENCOUNTER
Clinic Care Coordination Contact    Patient stated she would like the results from the Cologuard test.    Please call  [phone] to discuss further.    Thank you.     ZANE Demarco  Clinic Care Coordination  [facility] [place]  Phone: [phone]

## 2024-04-29 NOTE — PROGRESS NOTES
Clinic Care Coordination Contact  Community Health Worker Follow Up    Care Gaps:     Health Maintenance Due   Topic Date Due    HF ACTION PLAN  Never done    RSV VACCINE (Pregnancy & 60+) (1 - 1-dose 60+ series) Never done    DTAP/TDAP/TD IMMUNIZATION (2 - Td or Tdap) 01/01/2019    DIABETIC FOOT EXAM  06/29/2023    EYE EXAM  12/21/2023    COVID-19 Vaccine (7 - 2023-24 season) 01/29/2024    DEPRESSION 6 MO INDEX REPEAT PHQ-9  04/05/2024    ZOSTER IMMUNIZATION (2 of 2) 08/22/2023       Did Not Address    Care Plan:   Care Plan: Social Support       Problem: Inadequate social support       Goal: I will access resources to obtain more in home support.       Start Date: 11/29/2022 Expected End Date: 5/30/2024    This Visit's Progress: 90% Recent Progress: 80%    Note:     Barriers: resource availability  Strengths: pt is a good advocate for self.   Patient expressed understanding of goal: yes  Action steps to achieve this goal:  1. I will contact the [facility] about a MNChoices assessment. Reviewed this process on 12/21/23.     2. I will contact resources given to me. Discussed Help at your Door on 12/21/23.  Emailed daughter Atiya resources on 12/20/23.    3. I will contact my CHW with any needs or questions.                               Discussed:  Patient stated the land line phone is not working.  Patient stated someone brought her a phone today to use, so she can make calls.  Patient stated she would like to have a large screen button land line phone due to her vision impairment.  Patient stated she has not called on any of the cleaning resources.  Patient stated the hot water heater is leaking, and will need to be replaced.  Patient stated she and her  play cards twice a week at [facility] in [place].      Intervention and Education during outreach:     CHW encouraged patient to contact CC if there are any other changes or resources needed.  Patient acknowledged understanding.      CHW  Plan: will outreach in one month.    Rosanne Valadez, TEEW  Clinic Care Coordination  [facility]: [facility] [place], and [place] [facility]  Phone: [phone]

## 2024-04-30 NOTE — TELEPHONE ENCOUNTER
Patient Contact    Attempt # 1    Was call answered?  No.  Left message on voicemail with information to call me back.            Fecal colorectal cancer screen (FIT): Patient Communication     Append Comments   Seen    Lucille     Your stool test has returned negative for blood.     Dr. Flynn   Written by Fausto Flynn MD on 4/8/2024 11:26 AM CDT  Seen by camden Chanel on 4/8/2024  6:20 PM

## 2024-05-01 NOTE — TELEPHONE ENCOUNTER
Patient Contact    Attempt # 2    Was call answered?  No.  Unable to leave message. Phone rang and rang and then went to a busy tone.

## 2024-05-02 NOTE — TELEPHONE ENCOUNTER
Patient Contact    Attempt # 3    Was call answered?  Yes.  Pt verified name and date of birth. Relayed provider message. Pt verbalizes understanding and agrees to plan of care.

## 2024-05-08 ENCOUNTER — TRANSFERRED RECORDS (OUTPATIENT)
Dept: HEALTH INFORMATION MANAGEMENT | Facility: CLINIC | Age: 84
End: 2024-05-08
Payer: MEDICARE

## 2024-05-13 ENCOUNTER — PATIENT OUTREACH (OUTPATIENT)
Dept: CARE COORDINATION | Facility: CLINIC | Age: 84
End: 2024-05-13
Payer: MEDICARE

## 2024-05-13 NOTE — PROGRESS NOTES
Care Coordination Clinician Chart Review    Situation: Patient chart reviewed by Care Coordinator.       Background: Care Coordination Program started: 12/3/2019. Initial assessment completed and patient-centered care plan(s) were developed with participation from patient. Lead CC handed patient off to CHW for continued outreaches.       Assessment: Per chart review, patient outreach completed by CC CHW on 4/29/24.  Patient is actively working to accomplish goal(s). Patient's goal(s) appropriate and relevant at this time. Patient is due for updated Plan of Care.  Assessments will be completed annually or as needed/with change of patient status.      Care Plan: Social Support       Problem: Inadequate social support       Goal: I will access resources to obtain more in home support.       Start Date: 11/29/2022 Expected End Date: 5/30/2024    This Visit's Progress: 90% Recent Progress: 80%    Note:     Barriers: resource availability  Strengths: pt is a good advocate for self.   Patient expressed understanding of goal: yes  Action steps to achieve this goal:  1. I will contact the [facility] about a MNChoices assessment. Reviewed this process on 12/21/23.     2. I will contact resources given to me. Discussed Help at your Door on 12/21/23.  Emailed birgit Rajput resources on 12/20/23.    3. I will contact my CHW with any needs or questions.                                      Plan/Recommendations: The patient will continue working with Care Coordination to achieve goal(s) as above. CHW will continue outreaches at minimum every 30 days and will involve Lead CC as needed or if patient is ready to move to Maintenance. Lead CC will continue to monitor CHW outreaches and patient's progress to goal(s) every 6 weeks.     Plan of Care updated and sent to patient: Yes, via TORIE Reich / TORIE Hawthorne  [facility]   Care Coordination  [facility]  5/13/2024 9:12 AM

## 2024-05-13 NOTE — LETTER
I would like to provide you with the enclosed information for your records.  As part of care coordination, we are developing care plans to assist in accomplishing your health care goals.  When we speak next, please feel free to let me know if you want to add or change any information on your care plans.     As always, feel free to contact me if you have any questions or concerns.  I look forward to working with you in the effort to achieve your health care and wellness goals .     Sincerely,       TORIE Tsang / TORIE Davenport  [facility]   Care Coordination  [facility]  5/13/2024 9:11 AM                                [facility]  Patient Centered Plan of Care  About Me:        Patient Name:  Lucille Rojas    YOB: 1940  Age:         84 year old   [place] MRN:    [id number] Telephone Information:  Home Phone [phone]   Mobile [phone]       Address:  [address] [facility] [address] Email address:  [email or web address]      Emergency Contact(s)    Name Relationship Lgl Grd Work Phone Home Phone Mobile Phone   1. EMIL ROJAS Spouse No   [phone]   2. HARRIET ROJAS Daughter No  [phone]   3. JOSEFA ROJAS Son No  [phone]   4. MONICA BUCKNER * Daughter No  [phone]           Primary language:  English     needed? No   [place] [employer] [facility]:  [phone] op. 1  Other communication barriers:None    Preferred Method of Communication:  Mail  Current living arrangement: I live in a private home with spouse    Mobility Status/ Medical Equipment: Independent w/Device        Health Maintenance  Health Maintenance Reviewed: Due/Overdue (vaccines, immunizations, Diabetic foot exam,)      My Access Plan  Medical Emergency 911   Primary Clinic Line [facility]* - [phone]   24 Hour Appointment Line [phone] or  [phone]  [phone]) (toll-free)   24 Hour Nurse Line [phone] (toll-free)   Preferred Urgent Care [facility], [phone]     Preferred Hospital [facility]  [phone]     Preferred Pharmacy [id number] - [place], [place] - [address] [facility] Crisis Line The National Suicide Prevention Lifeline at 1-379.165.6804 or Text/Call 988           My Care Team Members  Patient Care Team         Relationship Specialty Notifications Start End    Fausto Flynn MD PCP - General Internal Medicine  10/6/14     Phone: [phone] Fax: [phone]         [address] [place] [id number]    Fausto Flynn MD Assigned PCP   10/12/14     Phone: [phone] Fax: [phone]         [address] [place] [address]    Yonny Roman MD MD INTERNAL MEDICINE - ENDOCRINOLOGY, DIABETES & METABOLISM  12/3/19     Phone: [phone] Fax: [phone]         [facility]OLOGY CLINIC [facility] [address] [facility] [address]    Rosanne Valadez CHW Community Health Worker Primary Care - CC Admissions 1/22/20     Phone: [phone]         [facility]    12/27/21     TCU    Phone: [phone] Fax: [phone] [address] [facility] [address]    Rafa Kelly AuD Audiologist Audiology  9/7/22     Phone: [phone] Fax: [phone] [address] [place] [address]    Puja Damian, KORTNEYW Lead Care Coordinator Primary Care - CC Admissions 2/2/23     Phone: [phone]         Linsey Benitez CNP Assigned Heart and Vascular Provider   5/13/23     Phone: [phone] Pager: [phone] Fax: [phone] [address] [place] [address]    John Srinivasan MD Assigned Cancer Care Provider   8/5/23     Phone: [phone] Fax: [phone]         [facility] [address] [place] [address]                My Care Plans  Self  Management and Treatment Plan    Care Plan  Care Plan: Social Support       Problem: Inadequate social support       Goal: I will access resources to obtain more in home support.       Start Date: 11/29/2022 Expected End Date: 5/30/2024    This Visit's Progress: 90% Recent Progress: 80%    Note:     Barriers: resource availability  Strengths: pt is a good advocate for self.   Patient expressed understanding of goal: yes  Action steps to achieve this goal:  1. I will contact the [facility] about a MNChoices assessment. Reviewed this process on 12/21/23.     2. I will contact resources given to me. Discussed Help at your Door on 12/21/23.  Emailed birgit Rajput resources on 12/20/23.    3. I will contact my CHW with any needs or questions.                                   Action Plans on File:            Depression          Advance Care Plans/Directives:   Advanced Care Plan/Directives on file:   No    Discussed with patient/caregiver(s): No data recorded           My Medical and Care Information  Problem List   Patient Active Problem List   Diagnosis    Hyperlipidemia LDL goal <100    Macular degeneration    Apnea    Morbid obesity due to excess calories (H)    CKD (chronic kidney disease) stage 3, GFR 30-59 ml/min (H)    Acquired hypothyroidism    Benign essential hypertension    Gastroesophageal reflux disease without esophagitis    Type 2 diabetes mellitus with stage 3 chronic kidney disease, without long-term current use of insulin (H)    Anemia, unspecified type    MDD (major depressive disorder), recurrent episode, mild (H24)    Severe anemia    Severe obesity (BMI 35.0-39.9) with comorbidity (H)    Peripheral vision loss, unspecified laterality    Type 2 DM with CKD stage 3 and hypertension (H)    Acute on chronic blood loss anemia    B-cell lymphoma of intrathoracic lymph nodes, unspecified B-cell lymphoma type (H)    SHAVON (obstructive sleep apnea)    Pancreatic mass    Nonrheumatic aortic (valve) stenosis with  insufficiency    Diastolic heart failure, unspecified HF chronicity (H)    Hypothyroidism, unspecified type    Major depressive disorder with current active episode, unspecified depression episode severity, unspecified whether recurrent    Physical deconditioning    Severe obesity (BMI >= 40) (H)    Callus of foot    Non-Hodgkin's lymphoma (H)    Hypoxia    Generalized muscle weakness    Symptomatic anemia    Diverticular disease of colon    Status post coronary angiogram    Aortic stenosis, severe    Skin ulcer of great toe, unspecified laterality, limited to breakdown of skin (H)      Current Medications and Allergies:  See printed Medication Report.    Care Coordination Start Date: 12/3/2019   Frequency of Care Coordination: monthly, more frequently as needed     Form Last Updated: 05/13/2024

## 2024-05-28 ENCOUNTER — HOSPITAL ENCOUNTER (OUTPATIENT)
Dept: CT IMAGING | Facility: CLINIC | Age: 84
Discharge: HOME | End: 2024-05-28
Attending: INTERNAL MEDICINE
Payer: MEDICARE

## 2024-05-28 ENCOUNTER — LAB (OUTPATIENT)
Dept: LAB | Facility: CLINIC | Age: 84
End: 2024-05-28
Attending: INTERNAL MEDICINE
Payer: MEDICARE

## 2024-05-28 DIAGNOSIS — C85.12 B-CELL LYMPHOMA OF INTRATHORACIC LYMPH NODES, UNSPECIFIED B-CELL LYMPHOMA TYPE (H): ICD-10-CM

## 2024-05-28 LAB
ALBUMIN SERPL BCG-MCNC: 3.7 G/DL (ref 3.5–5.2)
ALP SERPL-CCNC: 105 U/L (ref 40–150)
ALT SERPL W P-5'-P-CCNC: 9 U/L (ref 0–50)
ANION GAP SERPL CALCULATED.3IONS-SCNC: 10 MMOL/L (ref 7–15)
AST SERPL W P-5'-P-CCNC: 16 U/L (ref 0–45)
BASOPHILS # BLD AUTO: 0.1 10E3/UL (ref 0–0.2)
BASOPHILS NFR BLD AUTO: 1 %
BILIRUB SERPL-MCNC: 0.2 MG/DL
BUN SERPL-MCNC: 26 MG/DL (ref 8–23)
CALCIUM SERPL-MCNC: 9 MG/DL (ref 8.8–10.2)
CHLORIDE SERPL-SCNC: 103 MMOL/L (ref 98–107)
CREAT SERPL-MCNC: 1.2 MG/DL (ref 0.51–0.95)
DEPRECATED HCO3 PLAS-SCNC: 29 MMOL/L (ref 22–29)
EGFRCR SERPLBLD CKD-EPI 2021: 44 ML/MIN/1.73M2
EOSINOPHIL # BLD AUTO: 0.4 10E3/UL (ref 0–0.7)
EOSINOPHIL NFR BLD AUTO: 5 %
ERYTHROCYTE [DISTWIDTH] IN BLOOD BY AUTOMATED COUNT: 13.7 % (ref 10–15)
GLUCOSE SERPL-MCNC: 110 MG/DL (ref 70–99)
HCT VFR BLD AUTO: 37.4 % (ref 35–47)
HGB BLD-MCNC: 11.4 G/DL (ref 11.7–15.7)
IMM GRANULOCYTES # BLD: 0 10E3/UL
IMM GRANULOCYTES NFR BLD: 0 %
LYMPHOCYTES # BLD AUTO: 1.6 10E3/UL (ref 0.8–5.3)
LYMPHOCYTES NFR BLD AUTO: 21 %
MCH RBC QN AUTO: 28.1 PG (ref 26.5–33)
MCHC RBC AUTO-ENTMCNC: 30.5 G/DL (ref 31.5–36.5)
MCV RBC AUTO: 92 FL (ref 78–100)
MONOCYTES # BLD AUTO: 0.7 10E3/UL (ref 0–1.3)
MONOCYTES NFR BLD AUTO: 9 %
NEUTROPHILS # BLD AUTO: 4.8 10E3/UL (ref 1.6–8.3)
NEUTROPHILS NFR BLD AUTO: 64 %
NRBC # BLD AUTO: 0 10E3/UL
NRBC BLD AUTO-RTO: 0 /100
PLATELET # BLD AUTO: 206 10E3/UL (ref 150–450)
POTASSIUM SERPL-SCNC: 4.3 MMOL/L (ref 3.4–5.3)
PROT SERPL-MCNC: 6.4 G/DL (ref 6.4–8.3)
RBC # BLD AUTO: 4.06 10E6/UL (ref 3.8–5.2)
SODIUM SERPL-SCNC: 142 MMOL/L (ref 135–145)
WBC # BLD AUTO: 7.6 10E3/UL (ref 4–11)

## 2024-05-28 PROCEDURE — 36415 COLL VENOUS BLD VENIPUNCTURE: CPT

## 2024-05-28 PROCEDURE — 80053 COMPREHEN METABOLIC PANEL: CPT

## 2024-05-28 PROCEDURE — 250N000011 HC RX IP 250 OP 636: Performed by: INTERNAL MEDICINE

## 2024-05-28 PROCEDURE — 71260 CT THORAX DX C+: CPT | Mod: MG

## 2024-05-28 PROCEDURE — 85025 COMPLETE CBC W/AUTO DIFF WBC: CPT

## 2024-05-28 RX ORDER — IOPAMIDOL 755 MG/ML
67 INJECTION, SOLUTION INTRAVASCULAR ONCE
Status: COMPLETED | OUTPATIENT
Start: 2024-05-28 | End: 2024-05-28

## 2024-05-28 RX ADMIN — IOPAMIDOL 67 ML: 755 INJECTION, SOLUTION INTRAVENOUS at 10:33

## 2024-05-29 NOTE — RESULT ENCOUNTER NOTE
Dear Ms. Rojas,    CT scan is same as before. No suspicion of lymphoma.    Please, call me with any questions.    John Srinivasan MD

## 2024-06-04 ENCOUNTER — ONCOLOGY VISIT (OUTPATIENT)
Dept: ONCOLOGY | Facility: CLINIC | Age: 84
End: 2024-06-04
Attending: INTERNAL MEDICINE
Payer: MEDICARE

## 2024-06-04 VITALS
WEIGHT: 242 LBS | OXYGEN SATURATION: 94 % | SYSTOLIC BLOOD PRESSURE: 113 MMHG | RESPIRATION RATE: 16 BRPM | HEART RATE: 66 BPM | BODY MASS INDEX: 47.51 KG/M2 | DIASTOLIC BLOOD PRESSURE: 66 MMHG | HEIGHT: 60 IN

## 2024-06-04 DIAGNOSIS — C85.12 B-CELL LYMPHOMA OF INTRATHORACIC LYMPH NODES, UNSPECIFIED B-CELL LYMPHOMA TYPE (H): Primary | ICD-10-CM

## 2024-06-04 PROCEDURE — G0463 HOSPITAL OUTPT CLINIC VISIT: HCPCS | Performed by: INTERNAL MEDICINE

## 2024-06-04 PROCEDURE — 99214 OFFICE O/P EST MOD 30 MIN: CPT | Performed by: INTERNAL MEDICINE

## 2024-06-04 ASSESSMENT — PAIN SCALES - GENERAL: PAINLEVEL: NO PAIN (0)

## 2024-06-04 NOTE — PROGRESS NOTES
ONCOLOGY HISTORY: Ms. 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  1. On 09/20/2021:   -Hemoglobin of 4.7 with MCV of 67. Normal WBC and platelets.  -Iron of 9 and saturation index of 2%.   -CT chest, abdomen and pelvis reveals large pancreatic head mass.  This encases the celiac trunk, superior mesenteric artery and superior mesenteric vein.  There is moderate-size right pleural effusion. No lymphadenopathy.  2. Thoracocentesis on 09/22/2021. Cytology is negative for malignancy.  3. EUS on 09/21/2021 revealed is a mass in the uncinate process of the pancreas measuring 33 mm.  There was evidence of invasion into superior mesenteric artery and the celiac trunk. No enlarged lymph nodes seen. FNA revealed atypical cells.    4. EGD and EUS repeated on 10/05/2021.  -EGD is normal.  -EUS revealed pancreatic head mass.  FNA revealed CD10-positive B-cell lymphoma. Flow cytometry revealed CD10-positive lambda monotypic B-cells.  5. PET scan on 11/18/2021 revealed 6 cm hypermetabolic pancreatic head mass and borderline hypermetabolic right axillary lymph node.   -Further repeat biopsy not done because of her overall poor health.  6. mini R-CHOP x 6 cycles between 12/15/2021 and 03/30/2022.  -PET scan on 04/11/21 reveals complete response.     SUBJECTIVE:    Mrs. Rojas is a 84-year-old female with B-cell non-Hodgkin lymphoma of the pancreas, status post 6 cycles of mini R-CHOP completed in 03/0222.  She is in complete remission.    CT scan on 05/28/2024:                                                                 IMPRESSION:  No significant change compared to prior. No findings to suggest progressive lymphoma within the limitations of this exam.     Her overall condition is stable. She has generalized weakness. She is using walker. It is not getting worse. She is able to do activities of daily living slowly. Her vision is decreased. No headache. Occasional dizziness. She is more  forgetful. No chest pain.  No shortness of breath at rest.  Gets short of breath on minimal exertion.  No abdominal pain.  No nausea or vomiting.  Appetite is good. No fever or chills. She has grade 1 peripheral neuropathy from diabetes and chemotherapy. Mild pain in tips of fingers. No problem in feet. Neuropathy is stable.      PHYSICAL EXAMINATION:    She is alert and oriented x 3. ECOG PS of 2.   Vitals: Reviewed.  Rest of the system not examined.     LABORATORY:  CBC and CMP were reviewed.     ASSESSMENT:    1.   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  She is in complete remission.  2.  Generalized weakness secondary to her age and multiple other medical problems.  3.  Chronic kidney disease.  4.  Mild normocytic anemia from chronic kidney disease and anemia of chronic disease.  5.  Multiple other medical problems including hypertension and diabetes mellitus.  6.  Grade 1 peripheral neuropathy.     PLAN:    See me in 6 months with labs.      DISCUSSION:  1.  Patient's overall clinical status is stable.  She has generalized weakness.  No worsening of it.    CT scan was reviewed with her.  Explained to her that there is no evidence of lymphoma.  She was happy to know that.    2.  Discussed with her regarding non-Hodgkin's lymphoma.  She has been in remission for 2 years.  Risk of recurrence is low.  Will continue to monitor her.  Will consider doing another CT scan in 12 months.    3.  Labs were reviewed with her.  She has mild anemia due to renal disease and anemia of chronic disease.  Explained to her that anemia is mild and not causing her any problem.  Her creatinine is mildly elevated.  Will continue to monitor it.    4.  She has grade 1 peripheral neuropathy.  It is not getting worse.  Unlikely to completely resolve as she has diabetes.    5.  She and her  had few questions which were all answered.  I  will see her in 6 months time with labs.     Total visit time of 30 minutes.  Time spent in today's visit, review of chart/investigations today and documentation today.

## 2024-06-04 NOTE — PROGRESS NOTES
Oncology Rooming Note    June 4, 2024 10:34 AM   Lucille Rojas is a 84 year old female who presents for:    Chief Complaint   Patient presents with    Oncology Clinic Visit     Initial Vitals: Resp 16   Ht 1.524 m (5')   Wt 109.8 kg (242 lb)   BMI 47.26 kg/m   Estimated body mass index is 47.26 kg/m  as calculated from the following:    Height as of this encounter: 1.524 m (5').    Weight as of this encounter: 109.8 kg (242 lb). Body surface area is 2.16 meters squared.  No Pain (0) Comment: Data Unavailable   No LMP recorded. Patient has had a hysterectomy.  Allergies reviewed: Yes  Medications reviewed: Yes    Medications: Medication refills not needed today.  Pharmacy name entered into [facility]:    [id number] - [place], [place] - [address]. [facility] [place], [place] - [address]    Frailty Screening:   Is the patient here for a new oncology consult visit in cancer care? 2. No        Suly Dixon MA

## 2024-06-04 NOTE — LETTER
6/4/2024         RE: Lucille Rojas  [address] [place] [facility] [address]        Dear Colleague,    Thank you for referring your patient, Lucille Rojas, to the [facility]. Please see a copy of my visit note below.    Oncology Rooming Note    June 4, 2024 10:34 AM   Lucille Rojas is a 84 year old female who presents for:    Chief Complaint   Patient presents with     Oncology Clinic Visit     Initial Vitals: Resp 16   Ht 1.524 m (5')   Wt 109.8 kg (242 lb)   BMI 47.26 kg/m   Estimated body mass index is 47.26 kg/m  as calculated from the following:    Height as of this encounter: 1.524 m (5').    Weight as of this encounter: 109.8 kg (242 lb). Body surface area is 2.16 meters squared.  No Pain (0) Comment: Data Unavailable   No LMP recorded. Patient has had a hysterectomy.  Allergies reviewed: Yes  Medications reviewed: Yes    Medications: Medication refills not needed today.  Pharmacy name entered into "[employer]":    [id number] - [place], [place] - [address]. [facility] [place] - [place], [place] - [address]    Frailty Screening:   Is the patient here for a new oncology consult visit in cancer care? 2. No        Suly Dixon MA              ONCOLOGY HISTORY: Ms. 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  1. On 09/20/2021:   -Hemoglobin of 4.7 with MCV of 67. Normal WBC and platelets.  -Iron of 9 and saturation index of 2%.   -CT chest, abdomen and pelvis reveals large pancreatic head mass.  This encases the celiac trunk, superior mesenteric artery and superior mesenteric vein.  There is moderate-size right pleural effusion. No lymphadenopathy.  2. Thoracocentesis on 09/22/2021. Cytology is negative for malignancy.  3. EUS on 09/21/2021 revealed is a mass in the  uncinate process of the pancreas measuring 33 mm.  There was evidence of invasion into superior mesenteric artery and the celiac trunk. No enlarged lymph nodes seen. FNA revealed atypical cells.    4. EGD and EUS repeated on 10/05/2021.  -EGD is normal.  -EUS revealed pancreatic head mass.  FNA revealed CD10-positive B-cell lymphoma. Flow cytometry revealed CD10-positive lambda monotypic B-cells.  5. PET scan on 11/18/2021 revealed 6 cm hypermetabolic pancreatic head mass and borderline hypermetabolic right axillary lymph node.   -Further repeat biopsy not done because of her overall poor health.  6. mini R-CHOP x 6 cycles between 12/15/2021 and 03/30/2022.  -PET scan on 04/11/21 reveals complete response.     SUBJECTIVE:  The patient is an 83-year-old female with B-cell non-Hodgkin lymphoma of the pancreas, status post 6 cycles of mini R-CHOP.  She is in complete remission.     Her overall condition is stable. She has generalized weakness. It is not getting worse. She is able to do activities of daily living slowly. No headache. Occasional dizziness. Vision is decreased. No chest pain.  No shortness of breath at rest.  Gets short of breath on minimal exertion.  No abdominal pain.  No nausea or vomiting.  Appetite is good.  No fever or chills. She has grade 1 peripheral neuropathy diabetes and chemotherapy. Mainly pain and cold sensation is hands. Neuropathy is little better.      PHYSICAL EXAMINATION:    GENERAL:  She is alert and oriented x 3. ECOG PS of 2.   FACE:  No swelling.  EYES:  No icterus.   NECK:  Supple. No lymphadenopathy.  LUNGS:  Decreased air entry at the bases.  HEART:  Regular.  ABDOMEN:  Soft. Nontender.  Difficult palpation because of her weight.  No mass.  EXTREMITIES:  Mild ankle edema.  SKIN:  No petechiae.     LABORATORY:  CBC, LDH and CMP were reviewed.     ASSESSMENT:    1.  An 83-year-old female with stage IV non-Hodgkin B-cell lymphoma involving pancreas diagnosed in 2021 status post 6  cycles of mini R-CHOP.  She is in complete remission.  2.  Generalized weakness secondary to her age and multiple other medical problems.  3.  Chronic kidney disease.  4.  Mild normocytic anemia from chronic kidney disease and anemia of chronic disease.  5.  Multiple other medical problems including hypertension and diabetes mellitus.  6.  Grade 1 peripheral neuropathy.     PLAN:    1. CT scan in 6 months.  2. See me in 6 months with CT scan and labs.      DISCUSSION:  1.  Patient is clinically stable.  She is doing fairly well for her age.  No clinical suspicion of recurrence of lymphoma.     For follow-up of lymphoma, will get CT chest, abdomen and pelvis in 6 months.  She is agreeable for it.     2.  Labs were reviewed with her.  CBC is normal.  CMP reveals minor abnormalities.  Will continue to monitor it.     3.  She has generalized weakness.  This is due to her age and multiple other medical problems.  She is able to do activities of daily living slowly.  Advised her to be careful and avoid any fall/trauma.     4.  She has grade 1 peripheral neuropathy.  It is mainly from diabetes and also from chemotherapy.  I do not think this is going to resolve.  No need for gabapentin or Lyrica as it is grade 1 neuropathy.     5.  She and her family had few questions which were all answered.  I will see her in 6 months time with CT scan and labs.     Total visit time of 30 minutes.  Time spent in today's visit, review of chart/investigations today and documentation today.       ONCOLOGY HISTORY: Ms. 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  1. On 09/20/2021:   -Hemoglobin of 4.7 with MCV of 67. Normal WBC and platelets.  -Iron of 9 and saturation index of 2%.   -CT chest, abdomen and pelvis reveals large pancreatic head mass.  This encases the celiac trunk, superior mesenteric artery and superior mesenteric vein.  There is moderate-size right pleural effusion. No  lymphadenopathy.  2. Thoracocentesis on 09/22/2021. Cytology is negative for malignancy.  3. EUS on 09/21/2021 revealed is a mass in the uncinate process of the pancreas measuring 33 mm.  There was evidence of invasion into superior mesenteric artery and the celiac trunk. No enlarged lymph nodes seen. FNA revealed atypical cells.    4. EGD and EUS repeated on 10/05/2021.  -EGD is normal.  -EUS revealed pancreatic head mass.  FNA revealed CD10-positive B-cell lymphoma. Flow cytometry revealed CD10-positive lambda monotypic B-cells.  5. PET scan on 11/18/2021 revealed 6 cm hypermetabolic pancreatic head mass and borderline hypermetabolic right axillary lymph node.   -Further repeat biopsy not done because of her overall poor health.  6. mini R-CHOP x 6 cycles between 12/15/2021 and 03/30/2022.  -PET scan on 04/11/21 reveals complete response.     SUBJECTIVE:    Mrs. Rojas is a 84-year-old female with B-cell non-Hodgkin lymphoma of the pancreas, status post 6 cycles of mini R-CHOP completed in 03/0222.  She is in complete remission.    CT scan on 05/28/2024:                                                                 IMPRESSION:  No significant change compared to prior. No findings to suggest progressive lymphoma within the limitations of this exam.     Her overall condition is stable. She has generalized weakness. She is using walker. It is not getting worse. She is able to do activities of daily living slowly. Her vision is decreased. No headache. Occasional dizziness. She is more forgetful. No chest pain.  No shortness of breath at rest.  Gets short of breath on minimal exertion.  No abdominal pain.  No nausea or vomiting.  Appetite is good. No fever or chills. She has grade 1 peripheral neuropathy from diabetes and chemotherapy. Mild pain in tips of fingers. No problem in feet. Neuropathy is stable.      PHYSICAL EXAMINATION:    She is alert and oriented x 3. ECOG PS of 2.   Vitals: Reviewed.  Rest of the  system not examined.     LABORATORY:  CBC and CMP were reviewed.     ASSESSMENT:    1.   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  She is in complete remission.  2.  Generalized weakness secondary to her age and multiple other medical problems.  3.  Chronic kidney disease.  4.  Mild normocytic anemia from chronic kidney disease and anemia of chronic disease.  5.  Multiple other medical problems including hypertension and diabetes mellitus.  6.  Grade 1 peripheral neuropathy.     PLAN:    See me in 6 months with labs.      DISCUSSION:  1.  Patient's overall clinical status is stable.  She has generalized weakness.  No worsening of it.    CT scan was reviewed with her.  Explained to her that there is no evidence of lymphoma.  She was happy to know that.    2.  Discussed with her regarding non-Hodgkin's lymphoma.  She has been in remission for 2 years.  Risk of recurrence is low.  Will continue to monitor her.  Will consider doing another CT scan in 12 months.    3.  Labs were reviewed with her.  She has mild anemia due to renal disease and anemia of chronic disease.  Explained to her that anemia is mild and not causing her any problem.  Her creatinine is mildly elevated.  Will continue to monitor it.    4.  She has grade 1 peripheral neuropathy.  It is not getting worse.  Unlikely to completely resolve as she has diabetes.    5.  She and her  had few questions which were all answered.  I will see her in 6 months time with labs.     Total visit time of 30 minutes.  Time spent in today's visit, review of chart/investigations today and documentation today.         Again, thank you for allowing me to participate in the care of your patient.        Sincerely,        John Srinivasan MD

## 2024-06-07 ENCOUNTER — PATIENT OUTREACH (OUTPATIENT)
Dept: CARE COORDINATION | Facility: CLINIC | Age: 84
End: 2024-06-07
Payer: MEDICARE

## 2024-06-07 NOTE — PROGRESS NOTES
Clinic Care Coordination Contact  [facility]/Voicemail    Clinical Data: Care Coordinator Outreach    Outreach Documentation Number of Outreach Attempt   6/7/2024   1:35 PM 1       Left message on patient's voicemail with call back information and requested return call.    Plan: Care Coordinator will try to reach patient again in 10 business days.    ZANE Demarco  Clinic Care Coordination  [facility] Clinics: [facility] [place], [place], [facility], and [place] [facility]  Phone: [phone]

## 2024-06-21 ENCOUNTER — PATIENT OUTREACH (OUTPATIENT)
Dept: CARE COORDINATION | Facility: CLINIC | Age: 84
End: 2024-06-21
Payer: MEDICARE

## 2024-06-21 NOTE — PROGRESS NOTES
"Clinic Care Coordination Contact  Community Health Worker Follow Up    Care Gaps:     Health Maintenance Due   Topic Date Due    HF ACTION PLAN  Never done    RSV VACCINE (Pregnancy & 60+) (1 - 1-dose 60+ series) Never done    DTAP/TDAP/TD IMMUNIZATION (2 - Td or Tdap) 01/01/2019    DIABETIC FOOT EXAM  06/29/2023    EYE EXAM  12/21/2023    COVID-19 Vaccine (7 - 2023-24 season) 01/29/2024    DEPRESSION 6 MO INDEX REPEAT PHQ-9  04/05/2024    A1C  06/04/2024    PHQ-9  06/04/2024    ZOSTER IMMUNIZATION (2 of 2) 08/22/2023       Did Not Address    Care Plan:   Care Plan: Social Support       Problem: Inadequate social support       Goal: I will access resources to obtain more in home support.       Start Date: 11/29/2022 Expected End Date: 8/22/2024    This Visit's Progress: 90% Recent Progress: 90%    Note:     Barriers: resource availability  Strengths: pt is a good advocate for self.   Patient expressed understanding of goal: yes  Action steps to achieve this goal:  1. I will contact the [facility] about a MNChoices assessment. Reviewed this process on 12/21/23.     2. I will contact resources given to me. Discussed Help at your Door on 12/21/23.  Emailed daughter Atiya resources on 12/20/23.    3. I will contact my CHW with any needs or questions.                               Discussed:  Patient stated she made a rhubarb pie today.  Patient stated she if feeling \"pretty good\".  Patient stated she and her  play cards at [employer] in [place].  Patient stated she has had a few issues with [employer] recently.  Patient stated she has reported her concerns.  Patient stated one of her daughters has not returned her calls in over a month, going on two months.  Patient stated her other daughter, Radha has been very helpful.  Patient stated she made a dozen handmade cards to bring to friends at [employer].  Patient stated she would like to get a cane.  Patient stated she would prefer to have calls go directly to " her first, then her daughter to be listed as backup.      Intervention and Education during outreach:     CHW changed patient's phone number preference- home number is marked preferred.    CHW provided patient with the contact number for [facility] [phone]. PCP placed an order on 12/27/23.      CHW encouraged patient to contact CC if there are any other changes or resources needed.  Patient acknowledged understanding.     CHW Plan: will outreach in one month    ZANE Demarco  Clinic Care Coordination  [facility] Clin[facility]: [facility] [place], [place], [place], and [place] [facility]  Phone: [phone]      Please excuse Alyx as she was acutely ill in the emergency department.

## 2024-06-24 ENCOUNTER — PATIENT OUTREACH (OUTPATIENT)
Dept: CARE COORDINATION | Facility: CLINIC | Age: 84
End: 2024-06-24
Payer: MEDICARE

## 2024-06-24 ASSESSMENT — ACTIVITIES OF DAILY LIVING (ADL): DEPENDENT_IADLS:: TRANSPORTATION

## 2024-06-24 NOTE — PROGRESS NOTES
Clinic Care Coordination Contact  Care Coordination Clinician Chart Review    Situation: Patient chart reviewed by Care Coordinator.       Background: Care Coordination Program started: 12/3/2019. Initial assessment completed and patient-centered care plan(s) were developed with participation from patient. Lead CC handed patient off to CHW for continued outreaches.       Assessment: Per chart review, patient outreach completed by CC CHW on 6/21/24.  Patient is actively working to accomplish goal(s). Patient's goal(s) appropriate and relevant at this time. Patient is not due for updated Plan of Care.  Assessments will be completed annually or as needed/with change of patient status.      Care Plan: Social Support       Problem: Inadequate social support       Goal: I will access resources to obtain more in home support.       Start Date: 11/29/2022 Expected End Date: 8/22/2024    This Visit's Progress: 90% Recent Progress: 90%    Note:     Barriers: resource availability  Strengths: pt is a good advocate for self.   Patient expressed understanding of goal: yes  Action steps to achieve this goal:  1. I will contact the [facility] about a MNChoices assessment. Reviewed this process on 12/21/23.     2. I will contact resources given to me. Discussed Help at your Door on 12/21/23.  Emailed daughter Atiya resources on 12/20/23.    3. I will contact my CHW with any needs or questions.                                      Plan/Recommendations: The patient will continue working with Care Coordination to achieve goal(s) as above. CHW will continue outreaches at minimum every 30 days and will involve Lead CC as needed or if patient is ready to move to Maintenance. Lead CC will continue to monitor CHW outreaches and patient's progress to goal(s) every 6 weeks.     Plan of Care updated and sent to patient: TORIE Alas   Care Coordination Team  [phone]

## 2024-06-27 ENCOUNTER — HOSPITAL ENCOUNTER (EMERGENCY)
Facility: CLINIC | Age: 84
Discharge: HOME | End: 2024-06-27
Attending: EMERGENCY MEDICINE | Admitting: EMERGENCY MEDICINE
Payer: MEDICARE

## 2024-06-27 ENCOUNTER — APPOINTMENT (OUTPATIENT)
Dept: GENERAL RADIOLOGY | Facility: CLINIC | Age: 84
End: 2024-06-27
Attending: EMERGENCY MEDICINE
Payer: MEDICARE

## 2024-06-27 ENCOUNTER — APPOINTMENT (OUTPATIENT)
Dept: MRI IMAGING | Facility: CLINIC | Age: 84
End: 2024-06-27
Attending: EMERGENCY MEDICINE
Payer: MEDICARE

## 2024-06-27 VITALS
TEMPERATURE: 98 F | WEIGHT: 210 LBS | DIASTOLIC BLOOD PRESSURE: 59 MMHG | SYSTOLIC BLOOD PRESSURE: 133 MMHG | RESPIRATION RATE: 20 BRPM | HEIGHT: 60 IN | BODY MASS INDEX: 41.23 KG/M2 | HEART RATE: 74 BPM | OXYGEN SATURATION: 91 %

## 2024-06-27 DIAGNOSIS — M25.562 ACUTE PAIN OF LEFT KNEE: ICD-10-CM

## 2024-06-27 PROCEDURE — 73562 X-RAY EXAM OF KNEE 3: CPT | Mod: LT

## 2024-06-27 PROCEDURE — 73721 MRI JNT OF LWR EXTRE W/O DYE: CPT | Mod: LT,MG

## 2024-06-27 PROCEDURE — 99284 EMERGENCY DEPT VISIT MOD MDM: CPT | Mod: 25

## 2024-06-27 ASSESSMENT — COLUMBIA-SUICIDE SEVERITY RATING SCALE - C-SSRS
6. HAVE YOU EVER DONE ANYTHING, STARTED TO DO ANYTHING, OR PREPARED TO DO ANYTHING TO END YOUR LIFE?: NO
2. HAVE YOU ACTUALLY HAD ANY THOUGHTS OF KILLING YOURSELF IN THE PAST MONTH?: NO
1. IN THE PAST MONTH, HAVE YOU WISHED YOU WERE DEAD OR WISHED YOU COULD GO TO SLEEP AND NOT WAKE UP?: NO

## 2024-06-27 ASSESSMENT — ACTIVITIES OF DAILY LIVING (ADL)
ADLS_ACUITY_SCORE: 38
ADLS_ACUITY_SCORE: 36
ADLS_ACUITY_SCORE: 38
ADLS_ACUITY_SCORE: 36
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
## 2024-06-28 NOTE — DISCHARGE INSTRUCTIONS
You do not have a fracture or dislocation.  We recommend that you ice the knee and rest.  Please return the emergency department if you develop any new or concerning symptoms.

## 2024-06-28 NOTE — ED NOTES
Discharge to home with son, review of discharge paperwork, Primary follow-up and all medications have been competed.  Patient and/or family questions gave been answered.

## 2024-07-10 ENCOUNTER — APPOINTMENT (OUTPATIENT)
Dept: MRI IMAGING | Facility: CLINIC | Age: 84
End: 2024-07-10
Attending: PHYSICIAN ASSISTANT
Payer: MEDICARE

## 2024-07-10 ENCOUNTER — HOSPITAL ENCOUNTER (EMERGENCY)
Facility: CLINIC | Age: 84
Discharge: HOME | End: 2024-07-10
Attending: PHYSICIAN ASSISTANT | Admitting: PHYSICIAN ASSISTANT
Payer: MEDICARE

## 2024-07-10 ENCOUNTER — NURSE TRIAGE (OUTPATIENT)
Dept: INTERNAL MEDICINE | Facility: CLINIC | Age: 84
End: 2024-07-10
Payer: MEDICARE

## 2024-07-10 ENCOUNTER — APPOINTMENT (OUTPATIENT)
Dept: GENERAL RADIOLOGY | Facility: CLINIC | Age: 84
End: 2024-07-10
Attending: PHYSICIAN ASSISTANT
Payer: MEDICARE

## 2024-07-10 VITALS
HEART RATE: 71 BPM | RESPIRATION RATE: 18 BRPM | HEIGHT: 60 IN | SYSTOLIC BLOOD PRESSURE: 122 MMHG | TEMPERATURE: 98.6 F | OXYGEN SATURATION: 90 % | BODY MASS INDEX: 47.12 KG/M2 | DIASTOLIC BLOOD PRESSURE: 55 MMHG | WEIGHT: 240 LBS

## 2024-07-10 DIAGNOSIS — Z74.2: ICD-10-CM

## 2024-07-10 DIAGNOSIS — M79.674 PAIN OF TOE OF RIGHT FOOT: ICD-10-CM

## 2024-07-10 DIAGNOSIS — H53.419 VISUAL FIELD SCOTOMA, UNSPECIFIED LATERALITY: ICD-10-CM

## 2024-07-10 LAB
ANION GAP SERPL CALCULATED.3IONS-SCNC: 6 MMOL/L (ref 7–15)
ATRIAL RATE - MUSE: 70 BPM
BASOPHILS # BLD AUTO: 0.1 10E3/UL (ref 0–0.2)
BASOPHILS NFR BLD AUTO: 1 %
BUN SERPL-MCNC: 19.7 MG/DL (ref 8–23)
CALCIUM SERPL-MCNC: 8.8 MG/DL (ref 8.8–10.2)
CHLORIDE SERPL-SCNC: 105 MMOL/L (ref 98–107)
CREAT SERPL-MCNC: 1.04 MG/DL (ref 0.51–0.95)
DEPRECATED HCO3 PLAS-SCNC: 31 MMOL/L (ref 22–29)
DIASTOLIC BLOOD PRESSURE - MUSE: NORMAL MMHG
EGFRCR SERPLBLD CKD-EPI 2021: 52 ML/MIN/1.73M2
EOSINOPHIL # BLD AUTO: 0.4 10E3/UL (ref 0–0.7)
EOSINOPHIL NFR BLD AUTO: 5 %
ERYTHROCYTE [DISTWIDTH] IN BLOOD BY AUTOMATED COUNT: 13.3 % (ref 10–15)
GLUCOSE SERPL-MCNC: 86 MG/DL (ref 70–99)
HCT VFR BLD AUTO: 40.3 % (ref 35–47)
HGB BLD-MCNC: 12.4 G/DL (ref 11.7–15.7)
HOLD SPECIMEN: NORMAL
HOLD SPECIMEN: NORMAL
IMM GRANULOCYTES # BLD: 0 10E3/UL
IMM GRANULOCYTES NFR BLD: 0 %
INTERPRETATION ECG - MUSE: NORMAL
LYMPHOCYTES # BLD AUTO: 1.5 10E3/UL (ref 0.8–5.3)
LYMPHOCYTES NFR BLD AUTO: 19 %
MCH RBC QN AUTO: 28.1 PG (ref 26.5–33)
MCHC RBC AUTO-ENTMCNC: 30.8 G/DL (ref 31.5–36.5)
MCV RBC AUTO: 91 FL (ref 78–100)
MONOCYTES # BLD AUTO: 0.7 10E3/UL (ref 0–1.3)
MONOCYTES NFR BLD AUTO: 8 %
NEUTROPHILS # BLD AUTO: 5.4 10E3/UL (ref 1.6–8.3)
NEUTROPHILS NFR BLD AUTO: 67 %
NRBC # BLD AUTO: 0 10E3/UL
NRBC BLD AUTO-RTO: 0 /100
P AXIS - MUSE: 67 DEGREES
PLATELET # BLD AUTO: 210 10E3/UL (ref 150–450)
POTASSIUM SERPL-SCNC: 3.9 MMOL/L (ref 3.4–5.3)
PR INTERVAL - MUSE: 188 MS
QRS DURATION - MUSE: 112 MS
QT - MUSE: 398 MS
QTC - MUSE: 429 MS
R AXIS - MUSE: -61 DEGREES
RBC # BLD AUTO: 4.41 10E6/UL (ref 3.8–5.2)
SODIUM SERPL-SCNC: 142 MMOL/L (ref 135–145)
SYSTOLIC BLOOD PRESSURE - MUSE: NORMAL MMHG
T AXIS - MUSE: 71 DEGREES
VENTRICULAR RATE- MUSE: 70 BPM
WBC # BLD AUTO: 8.1 10E3/UL (ref 4–11)

## 2024-07-10 PROCEDURE — 99285 EMERGENCY DEPT VISIT HI MDM: CPT | Mod: 25

## 2024-07-10 PROCEDURE — 70553 MRI BRAIN STEM W/O & W/DYE: CPT | Mod: MG

## 2024-07-10 PROCEDURE — 73630 X-RAY EXAM OF FOOT: CPT | Mod: RT

## 2024-07-10 PROCEDURE — 255N000002 HC RX 255 OP 636: Performed by: PHYSICIAN ASSISTANT

## 2024-07-10 PROCEDURE — 36415 COLL VENOUS BLD VENIPUNCTURE: CPT | Performed by: PHYSICIAN ASSISTANT

## 2024-07-10 PROCEDURE — 85025 COMPLETE CBC W/AUTO DIFF WBC: CPT | Performed by: PHYSICIAN ASSISTANT

## 2024-07-10 PROCEDURE — A9585 GADOBUTROL INJECTION: HCPCS | Performed by: PHYSICIAN ASSISTANT

## 2024-07-10 PROCEDURE — 82374 ASSAY BLOOD CARBON DIOXIDE: CPT | Performed by: PHYSICIAN ASSISTANT

## 2024-07-10 PROCEDURE — 93005 ELECTROCARDIOGRAM TRACING: CPT

## 2024-07-10 PROCEDURE — 250N000009 HC RX 250: Performed by: PHYSICIAN ASSISTANT

## 2024-07-10 RX ORDER — TETRACAINE HYDROCHLORIDE 5 MG/ML
1-2 SOLUTION OPHTHALMIC ONCE
Status: COMPLETED | OUTPATIENT
Start: 2024-07-10 | End: 2024-07-10

## 2024-07-10 RX ORDER — GADOBUTROL 604.72 MG/ML
11 INJECTION INTRAVENOUS ONCE
Status: COMPLETED | OUTPATIENT
Start: 2024-07-10 | End: 2024-07-10

## 2024-07-10 RX ADMIN — GADOBUTROL 11 ML: 604.72 INJECTION INTRAVENOUS at 21:44

## 2024-07-10 RX ADMIN — TETRACAINE HYDROCHLORIDE 2 DROP: 5 SOLUTION OPHTHALMIC at 21:00

## 2024-07-10 ASSESSMENT — ACTIVITIES OF DAILY LIVING (ADL)
ADLS_ACUITY_SCORE: 38

## 2024-07-10 ASSESSMENT — COLUMBIA-SUICIDE SEVERITY RATING SCALE - C-SSRS
6. HAVE YOU EVER DONE ANYTHING, STARTED TO DO ANYTHING, OR PREPARED TO DO ANYTHING TO END YOUR LIFE?: NO
1. IN THE PAST MONTH, HAVE YOU WISHED YOU WERE DEAD OR WISHED YOU COULD GO TO SLEEP AND NOT WAKE UP?: NO
2. HAVE YOU ACTUALLY HAD ANY THOUGHTS OF KILLING YOURSELF IN THE PAST MONTH?: NO

## 2024-07-10 NOTE — ED TRIAGE NOTES
Patient is legally blind, lives at home with .  not helping patient enough. EMS reports patient ran out of all scheduled medications for more than one month, having toe pain today. Concerning living condition not safe for patient to return.

## 2024-07-10 NOTE — ED TRIAGE NOTES
Triage Assessment (Adult)       Row Name 07/10/24 8821          Triage Assessment    Airway WDL WDL        Respiratory WDL    Respiratory WDL WDL        Skin Circulation/Temperature WDL    Skin Circulation/Temperature WDL X        Cardiac WDL    Cardiac WDL WDL        Peripheral/Neurovascular WDL    Peripheral Neurovascular WDL WDL        Cognitive/Neuro/Behavioral WDL    Cognitive/Neuro/Behavioral WDL WDL

## 2024-07-10 NOTE — TELEPHONE ENCOUNTER
Nurse Triage SBAR    Is this a 2nd Level Triage? NO    Situation: Patient calling for an appointment to address leg and vision changes.    Background: Patient was trying to clean her mirror today when she noticed black specks all over.  She then realized the specks were from her eyes and not dirt on the mirror.      Patient is also stating they are having increased leg pain.  Patient states they have had pain in the foot that started with their toe.  Pain has traveled up their foot and into their leg.  Patient states their leg and foot is very swollen.      Patient is very difficult to get information out of regarding symptoms, patient skips to different topics often.    Assessment:   Vision changes, black specks  Swollen leg and foot      Protocol Recommended Disposition:   Call ADS/Go to ED/UCC Now (Or To Office with PCP Approval), Go To Office Now    Recommendation: Go to ED now.  Patient is unable to get a ride anywhere to be seen today.  Given patient has multiple concerns that may be serious, advised patient to get to the ED to be seen.  Patient lives with  and neither can drive.  Offered to call EMS for patient to be taken to the ED and patient accepted.  Called EMS for patient.    Isma CONN RN      Reason for Disposition   Many floaters in the eye (Exception: Floater(s) are a chronic symptom and this is unchanged from patient's baseline pattern.)   Thigh, calf, or ankle swelling in only one leg    Additional Information   Negative: Weakness of the face, arm or leg on one side of the body   Negative: Followed getting substance in the eye   Negative: Foreign body stuck in the eye   Negative: Followed an eye injury   Negative: Followed sun lamp or sun exposure (UV keratitis)   Negative: Yellow or green discharge (pus) in the eye   Negative: Pregnant   Negative: Complete loss of vision in one or both eyes   Negative: SEVERE eye pain   Negative: SEVERE headache   Negative: Double vision   Negative: Blurred  vision or visual changes and present now and sudden onset or new (e.g., minutes, hours, days)  (Exception: Seeing floaters / black specks OR previously diagnosed migraine headaches with same symptoms.)   Negative: Patient sounds very sick or weak to the triager   Negative: Flashes of light  (Exception: Brief from pressing on the eyeball.)   Negative: Looks like a broken bone or dislocated joint (e.g., crooked or deformed)   Negative: Sounds like a life-threatening emergency to the triager   Negative: Followed a hip injury   Negative: Followed a knee injury   Negative: Followed an ankle or foot injury   Negative: Back pain radiating (shooting) into leg(s)   Negative: Foot pain is main symptom   Negative: Ankle pain is main symptom   Negative: Knee pain is main symptom   Negative: Leg swelling is main symptom   Negative: Chest pain   Negative: Difficulty breathing   Negative: Entire foot is cool or blue in comparison to other side   Negative: Unable to walk   Negative: Fever and red area (or area very tender to touch)   Negative: Swollen joint and fever   Negative: Thigh or calf pain in only one leg and present > 1 hour    Protocols used: Vision Loss or Change-A-OH, Leg Pain-A-OH

## 2024-07-10 NOTE — ED PROVIDER NOTES
Emergency Department Note      History of Present Illness     Chief Complaint   Toe Pain and unable to care for self    HPI   Lucille Rojas is a 84 year old female with a history of type 2 diabetes mellitus, hypertension, diastolic heart failure and non-Hodgkin's lymphoma presenting to the ED for evaluation of toe pain. The patient reports that for the past month she has been having pain on the bottom of her right great toe that is worse with ambulation. She called EMS today because neither herself nor her  can drive. EMS expresses concern over the patient's living situation as the house showed evidence of hoarding, and the  was arguing with the patient. She adds that she ran out of all of her prescription medications, and is in the process of seeing her doctors to get the prescriptions refilled. The patient feels safe at home, though she does admit that her  and her argue often.       Secondarily the patient reports near the end of her visit and when her family member showed up that she has been seeing spots in her vision more than normal.  She describes these as black spots that come and go.  She reports she feels like she sees them in both eyes more on her left.  She does have legal blindness.  She denies any numbness tingling or weakness of her upper or lower extremities.  She reports she is felt slight dizziness at times.  Reports slight double vision at times.  She denies any eye pain fever or headache at this time.  She does not feel confused.  Per her family member she is at her baseline.    Independent Historian   EMS as detailed above.    Review of External Notes       Past Medical History     Medical History and Problem List   Macular degeneration  Apnea  CKD  GERD  Anemia  Major depressive disorder  Peripheral vision loss  B-cell lymphoma  Pancreatic mass  Non rheumatic aortic valve stenosis  Diastolic heart failure  Diverticular disease of colon  Skin ulcer of great toe    Cancer  Depressive disorder  Heart disease  Blood transfusion  Hypertension  Hyperlipidemia  Hypothyroidism  Macular degeneration  Sleep apnea  Type 2 diabetes mellitus     Medications   Tylenol  Aspirin  Zithromax  Celexa  Lasix  Synthroid  Claritin  Ativan  Toprol XL  Mycostatin  Protonix  Zocor     Surgical History   Appendectomy  Colonoscopy  Coronary angiogram  PCI  Transcatheter aortic valve  Esophagogastroduodenoscopy x4  Hernia repair  Chest port placement  Port removal right  Tonsillectomy     Physical Exam     Patient Vitals for the past 24 hrs:   BP Temp Temp src Pulse Resp SpO2 Height Weight   07/10/24 2155 122/55 -- -- 71 -- 90 % -- --   07/10/24 1814 (!) 141/59 98.6  F (37  C) Oral 72 18 92 % 1.524 m (5') 108.9 kg (240 lb)     Physical Exam  Vitals and nursing note reviewed.   Constitutional:       Appearance: She is not diaphoretic.   HENT:      Head: No raccoon eyes, Browning's sign, right periorbital erythema or left periorbital erythema.      Nose: Nose normal.      Mouth/Throat:      Lips: Pink.      Mouth: Mucous membranes are moist.   Eyes:      General: Lids are normal.         Right eye: No discharge or hordeolum.         Left eye: No discharge or hordeolum.      Extraocular Movements: Extraocular movements intact.      Right eye: Normal extraocular motion and no nystagmus.      Left eye: Normal extraocular motion and no nystagmus.      Conjunctiva/sclera: Conjunctivae normal.      Right eye: Right conjunctiva is not injected. No chemosis, exudate or hemorrhage.     Left eye: Left conjunctiva is not injected. No chemosis, exudate or hemorrhage.     Pupils: Pupils are equal, round, and reactive to light.      Comments: Patient reports no spots in her right when her left eye is covered.  Patient reports spots in her field of vision when her right eye is covered.    Unfortunately tonometers are not working.   Cardiovascular:      Rate and Rhythm: Normal rate and regular rhythm.      Heart sounds:  No murmur heard.     No friction rub. No gallop.   Pulmonary:      Effort: No respiratory distress.      Breath sounds: No stridor. No wheezing, rhonchi or rales.   Skin:     General: Skin is warm.      Capillary Refill: Capillary refill takes less than 2 seconds.      Findings: No abscess, bruising, ecchymosis, erythema or wound.      Comments: Noted callous of her big toe.   Neurological:      Mental Status: She is alert and oriented to person, place, and time. Mental status is at baseline.      GCS: GCS eye subscore is 4. GCS verbal subscore is 5. GCS motor subscore is 6.      Cranial Nerves: No cranial nerve deficit, dysarthria or facial asymmetry.      Sensory: Sensation is intact.      Motor: Motor function is intact. No weakness or pronator drift.      Comments: Myotomes L1-S1, C5-T1 intact            Diagnostics     Lab Results   Labs Ordered and Resulted from Time of ED Arrival to Time of ED Departure   BASIC METABOLIC PANEL - Abnormal       Result Value    Sodium 142      Potassium 3.9      Chloride 105      Carbon Dioxide (CO2) 31 (*)     Anion Gap 6 (*)     Urea Nitrogen 19.7      Creatinine 1.04 (*)     GFR Estimate 52 (*)     Calcium 8.8      Glucose 86     CBC WITH PLATELETS AND DIFFERENTIAL - Abnormal    WBC Count 8.1      RBC Count 4.41      Hemoglobin 12.4      Hematocrit 40.3      MCV 91      MCH 28.1      MCHC 30.8 (*)     RDW 13.3      Platelet Count 210      % Neutrophils 67      % Lymphocytes 19      % Monocytes 8      % Eosinophils 5      % Basophils 1      % Immature Granulocytes 0      NRBCs per 100 WBC 0      Absolute Neutrophils 5.4      Absolute Lymphocytes 1.5      Absolute Monocytes 0.7      Absolute Eosinophils 0.4      Absolute Basophils 0.1      Absolute Immature Granulocytes 0.0      Absolute NRBCs 0.0         Imaging   MR Brain w/o & w Contrast   Final Result   IMPRESSION:   1.  No acute intracranial process.   2.  Mild to moderate presumed chronic small vessel ischemic changes  and mild generalized volume loss.         Foot  XR, G/E 3 views, right   Final Result   IMPRESSION:       There is severe, diffuse osseous demineralization. No acute, displaced right foot fracture is identified. Pes planus. Scattered degenerative changes most pronounced in the midfoot were they appear moderate. Calcaneal spurs. Arterial calcifications.          EKG: Obtained 2012 on 7/10/2024 read at 2014  Ventricular rate 70 bpm  PA interval 188 MS  QRS duration 112 MS  QT-QTc 398-429 MS  P-RR-T axes 67, -61, 71    Normal sinus rhythm  Left axis deviation  Incomplete left bundle branch block  Minimal voltage criteria for LVH    No significant change was found when compared to 2/25/2024        Independent Interpretation   X-ray right foot shows no fracture or dislocation    ED Course      Medications Administered   Medications   tetracaine (PONTOCAINE) 0.5 % ophthalmic solution 1-2 drop (2 drops Both Eyes $Given 7/10/24 2100)   gadobutrol (GADAVIST) injection 11 mL (11 mLs Intravenous $Given 7/10/24 2144)   sodium chloride (PF) 0.9% PF flush 10 mL (10 mLs Intravenous $Given 7/10/24 2144)       Procedures   Procedures     Discussion of Management   None    ED Course   ED Course as of 07/11/24 0910   Wed Jul 10, 2024   1825 I obtained history and examined the patient as noted above.     1903 Tried calling  . No answer.   1912 Patient again we discussed that she feels safe at home she feels with the  who patient is not interested in any acute term nursing home placement she prefers to go back to her house.  She is interested in home health care.   1943 Patient was  about to discharge the patient when she started reporting vision changes started yesterday.  Noted spots in her vision.  She notes some new slight double vision.  She reports slight dizziness at times.  No headache.       Optional/Additional Documentation  None  Healthcare Access/Compliance and Transportation    Medical Decision Making /  Diagnosis     CMS Diagnoses: None    MIPS       None    MDM   Lucille Rojas is a 84 year old female with 2 different complaints.  Main complaint on arrival was right great toe pain.  There is no evidence of fracture or infection.  X-ray imaging obtained showing no acute fracture.  She does have a callus which I think is likely due to to rubbing against her shoe is likely causing her discomfort.  I recommend she follow-up with her primary care doctor or podiatrist for foot checks.  Second complaint the patient discussed with me near the end of her visit when her family arrived.  They reported she was reporting seeing spots in her vision black dots that come and go.  On exam it seems like it is more on her left eye.  Patient has legally blind status.  She denies any eye pain and visually her eyes are noninjected or exudative or there is clinical evidence of infection.  Given the lack of medications that she has been taking as she is currently out of her current medications which after her family arrived her son reports they are expecting the medications to arrive in a couple of days I evaluated her brain with MRI.  There is no evidence of acute CVA.  EKG obtained as a screening measure which is unchanged.  CBC and BMP show baseline function.  No concerning metabolic changes.  I did discuss with the patient thoroughly regarding concerns regarding her home environment.  Patient has reported multiple times she feels safe at home she feels safe with her .  And she does not want to going to a nursing home or is interested in any short-term placement.  This the patient will be discharged back to her home.  Her  and her son were at bedside at the end of the visit and took her back home.  She does have support of her children.  I have ordered some home care help as well and ordered primary care for close follow-up for her.  Return precautions were discussed including increasing pain new concerning signs or  symptoms such as unilateral weakness numbness headache eye pain.  I do recommend she follow-up closely with ophthalmology regarding the spots in her vision.    Disposition   The patient was discharged.     Diagnosis     ICD-10-CM    1. Pain of toe of right foot  M79.674 Home Care Referral     Primary Care Referral      2. Insufficient home care support  Z74.2 Primary Care Referral      3. Visual field scotoma, unspecified laterality  H53.419            Discharge Medications   Discharge Medication List as of 7/10/2024 10:14 PM        Scribe Disclosure:  I, Julianna Lubin, am serving as a scribe at 6:43 PM on 7/10/2024 to document services personally performed by Tai Grimes, GERI based on my observations and the provider's statements to me.        Tai Grimes PA-C  07/11/24 0915

## 2024-07-10 NOTE — ED NOTES
Bed: ED28  Expected date:   Expected time:   Means of arrival:   Comments:  540 84F toe pain, dementiA

## 2024-07-11 ENCOUNTER — TELEPHONE (OUTPATIENT)
Dept: INTERNAL MEDICINE | Facility: CLINIC | Age: 84
End: 2024-07-11
Payer: MEDICARE

## 2024-07-11 DIAGNOSIS — N18.30 TYPE 2 DIABETES MELLITUS WITH STAGE 3 CHRONIC KIDNEY DISEASE, WITHOUT LONG-TERM CURRENT USE OF INSULIN, UNSPECIFIED WHETHER STAGE 3A OR 3B CKD (H): ICD-10-CM

## 2024-07-11 DIAGNOSIS — E66.01 MORBID OBESITY DUE TO EXCESS CALORIES (H): ICD-10-CM

## 2024-07-11 DIAGNOSIS — I50.30 DIASTOLIC HEART FAILURE, UNSPECIFIED HF CHRONICITY (H): ICD-10-CM

## 2024-07-11 DIAGNOSIS — F33.0 MDD (MAJOR DEPRESSIVE DISORDER), RECURRENT EPISODE, MILD (H): ICD-10-CM

## 2024-07-11 DIAGNOSIS — E11.22 TYPE 2 DIABETES MELLITUS WITH STAGE 3 CHRONIC KIDNEY DISEASE, WITHOUT LONG-TERM CURRENT USE OF INSULIN, UNSPECIFIED WHETHER STAGE 3A OR 3B CKD (H): ICD-10-CM

## 2024-07-11 DIAGNOSIS — I10 BENIGN ESSENTIAL HYPERTENSION: ICD-10-CM

## 2024-07-11 DIAGNOSIS — C85.12 B-CELL LYMPHOMA OF INTRATHORACIC LYMPH NODES, UNSPECIFIED B-CELL LYMPHOMA TYPE (H): Primary | ICD-10-CM

## 2024-07-11 NOTE — TELEPHONE ENCOUNTER
Pt requesting provider review/recommendation: does she need to schedule a follow up appointment with PCP if she is scheduling with podiatry and eye doctor. She has fallen twice in the past 1 month.     Routing to provider; please advise. Soonest hospital follow up appointment at  is in two weeks 7/24. Please advise if ok to use same day/next day slot for scheduling.     Can we leave a detailed message on this number? YES  Phone number patient can be reached at: Home number on file [phone] (home)    Marybel Shelby RN  [facility] Triage        Transitions of Care Outreach  Chief Complaint   Patient presents with    Hospital F/U       Most Recent Admission Date: 7/10/2024   Most Recent Admission Diagnosis:      Most Recent Discharge Date: 7/10/2024   Most Recent Discharge Diagnosis: Pain of toe of right foot - M79.674  Insufficient home care support - Z74.2  Visual field scotoma, unspecified laterality - H53.419     Transitions of Care Assessment    Discharge Assessment  How are you doing now that you are home?: pt reporting feeling better  How are your symptoms? (Red Flag symptoms escalate to triage hotline per guidelines): Improved  Do you know how to contact your clinic care team if you have future questions or changes to your health status? : Yes  Does the patient have their discharge instructions? : Yes  Does the patient have questions regarding their discharge instructions? : No  Were you started on any new medications or were there changes to any of your previous medications? : No  Does the patient have all of their medications?: Yes  Do you have questions regarding any of your medications? : No  Do you have all of your needed medical supplies or equipment (DME)?  (i.e. oxygen tank, CPAP, cane, etc.): Yes    Follow up Plan     Discharge Follow-Up  Discharge follow up appointment scheduled in alignment with recommended follow up timeframe or Transitions of Risk Category? (Low = within 30 days;  Moderate= within 14 days; High= within 7 days): No  Discharge Follow Up Appointment Scheduled with?: Specialty Care Provider (podiatry/eye doctor)  Patient's follow up appointment not scheduled: Patient accepted scheduling support. Appt scheduled/requested per protocol.    Future Appointments   Date Time Provider Department Center   11/20/2024 10:00 AM  PIV LAB [facility]   11/20/2024 10:40 AM John Srinivasan MD [facility]       Outpatient Plan as outlined on AVS reviewed with patient.    For any urgent concerns, please contact our 24 hour nurse triage line: [phone] ([phone])       Marybel Shelby RN

## 2024-07-11 NOTE — TELEPHONE ENCOUNTER
Relayed provider message. Pt verbalizes understanding and agrees to plan of care. Appointment scheduled.     -please see pending care coordination referral and sign if approved.     Pt/spouse discussed difficulty with ADL's since hospital discharge 7/10/24. Care coordination reviewed; Pt/spouse requesting assistance since pt has had recent vision changes; pt not able to determine specific care coordination needs at this time. Pt/Pt's spouse verbalized changes to ADL's; pt no longer able to sew, or play cards with friends. Pt reporting needing additional time to complete ADL's due to recent vision changes.     Pt plans to schedule appointment at eye clinic as provider has instructed and will follow up with clinic as needed.     Routing to provider; please review and advise. Update plan of care as needed.

## 2024-07-11 NOTE — TELEPHONE ENCOUNTER
Reason for Call:  Appointment Request    Patient requesting this type of appt:  Hospital/ED Follow-Up     Requested provider: Fausto Flynn    Reason patient unable to be scheduled: Not within requested timeframe    When does patient want to be seen/preferred time: 1-2 days    Comments:     Could we send this information to you in [employer] or would you prefer to receive a phone call?:   Patient would prefer a phone call   Okay to leave a detailed message?: Yes at Home number on file [phone] (home)    Call taken on 7/11/2024 at 8:03 AM by Shaila CHUNG

## 2024-07-11 NOTE — TELEPHONE ENCOUNTER
Following up with me when next available appointment for hospital follow-up is available and continue to see podiatry and ophthalmology as directed

## 2024-07-12 DIAGNOSIS — I50.30 DIASTOLIC HEART FAILURE, UNSPECIFIED HF CHRONICITY (H): ICD-10-CM

## 2024-07-15 RX ORDER — FUROSEMIDE 20 MG
TABLET ORAL
Qty: 30 TABLET | Refills: 10 | Status: SHIPPED | OUTPATIENT
Start: 2024-07-15

## 2024-07-29 ENCOUNTER — TRANSCRIBE ORDERS (OUTPATIENT)
Dept: OTHER | Age: 84
End: 2024-07-29

## 2024-07-29 DIAGNOSIS — H35.3190: Primary | ICD-10-CM

## 2024-07-31 ENCOUNTER — PATIENT OUTREACH (OUTPATIENT)
Dept: CARE COORDINATION | Facility: CLINIC | Age: 84
End: 2024-07-31
Payer: MEDICARE

## 2024-07-31 NOTE — PROGRESS NOTES
Clinic Care Coordination Contact    The Community Health Worker called for a Care Coordination outreach to follow up on goals and action steps.     Spoke with patient's .    Patient's  stated his wife is not available to talk now.    Patient's  requested a call back at a different time.    CHW will outreach in 5 - 10 business days.    ZANE Demarco  Clinic Care Coordination  [facility]: [facility], [facility], [facility], and [facility]  Phone: [phone]

## 2024-08-04 ENCOUNTER — HEALTH MAINTENANCE LETTER (OUTPATIENT)
Age: 84
End: 2024-08-04

## 2024-08-06 ENCOUNTER — OFFICE VISIT (OUTPATIENT)
Dept: INTERNAL MEDICINE | Facility: CLINIC | Age: 84
End: 2024-08-06
Payer: MEDICARE

## 2024-08-06 VITALS
BODY MASS INDEX: 45.68 KG/M2 | SYSTOLIC BLOOD PRESSURE: 110 MMHG | HEART RATE: 57 BPM | OXYGEN SATURATION: 93 % | DIASTOLIC BLOOD PRESSURE: 58 MMHG | HEIGHT: 60 IN | TEMPERATURE: 97.6 F | RESPIRATION RATE: 14 BRPM | WEIGHT: 232.7 LBS

## 2024-08-06 DIAGNOSIS — Z95.2 S/P TAVR (TRANSCATHETER AORTIC VALVE REPLACEMENT): ICD-10-CM

## 2024-08-06 DIAGNOSIS — N18.30 TYPE 2 DIABETES MELLITUS WITH STAGE 3 CHRONIC KIDNEY DISEASE, WITHOUT LONG-TERM CURRENT USE OF INSULIN, UNSPECIFIED WHETHER STAGE 3A OR 3B CKD (H): Primary | ICD-10-CM

## 2024-08-06 DIAGNOSIS — E11.22 TYPE 2 DIABETES MELLITUS WITH STAGE 3 CHRONIC KIDNEY DISEASE, WITHOUT LONG-TERM CURRENT USE OF INSULIN, UNSPECIFIED WHETHER STAGE 3A OR 3B CKD (H): Primary | ICD-10-CM

## 2024-08-06 DIAGNOSIS — F33.0 MDD (MAJOR DEPRESSIVE DISORDER), RECURRENT EPISODE, MILD (H): ICD-10-CM

## 2024-08-06 DIAGNOSIS — E66.01 SEVERE OBESITY (BMI 35.0-39.9) WITH COMORBIDITY (H): ICD-10-CM

## 2024-08-06 DIAGNOSIS — E78.5 HYPERLIPIDEMIA LDL GOAL <100: ICD-10-CM

## 2024-08-06 DIAGNOSIS — C85.12 B-CELL LYMPHOMA OF INTRATHORACIC LYMPH NODES, UNSPECIFIED B-CELL LYMPHOMA TYPE (H): ICD-10-CM

## 2024-08-06 DIAGNOSIS — N18.30 STAGE 3 CHRONIC KIDNEY DISEASE, UNSPECIFIED WHETHER STAGE 3A OR 3B CKD (H): ICD-10-CM

## 2024-08-06 DIAGNOSIS — I10 BENIGN ESSENTIAL HYPERTENSION: ICD-10-CM

## 2024-08-06 LAB
CHOLEST SERPL-MCNC: 179 MG/DL
FASTING STATUS PATIENT QL REPORTED: YES
HBA1C MFR BLD: 5.8 % (ref 0–5.6)
HDLC SERPL-MCNC: 49 MG/DL
LDLC SERPL CALC-MCNC: 95 MG/DL
NONHDLC SERPL-MCNC: 130 MG/DL
TRIGL SERPL-MCNC: 174 MG/DL

## 2024-08-06 PROCEDURE — 83036 HEMOGLOBIN GLYCOSYLATED A1C: CPT | Performed by: INTERNAL MEDICINE

## 2024-08-06 PROCEDURE — 80061 LIPID PANEL: CPT | Performed by: INTERNAL MEDICINE

## 2024-08-06 PROCEDURE — 99214 OFFICE O/P EST MOD 30 MIN: CPT | Performed by: INTERNAL MEDICINE

## 2024-08-06 PROCEDURE — 36415 COLL VENOUS BLD VENIPUNCTURE: CPT | Performed by: INTERNAL MEDICINE

## 2024-08-06 PROCEDURE — G2211 COMPLEX E/M VISIT ADD ON: HCPCS | Performed by: INTERNAL MEDICINE

## 2024-08-06 ASSESSMENT — PATIENT HEALTH QUESTIONNAIRE - PHQ9: SUM OF ALL RESPONSES TO PHQ QUESTIONS 1-9: 21

## 2024-08-06 NOTE — PROGRESS NOTES
Assessment & Plan     Type 2 diabetes mellitus with stage 3 chronic kidney disease, without long-term current use of insulin, unspecified whether stage 3a or 3b CKD (H)  Check A1c level.  Will provide care coordination referral to a potential need for home health care assessment and further evaluation  - Hemoglobin A1c; Future  - Primary Care - Care Coordination Referral; Future    B-cell lymphoma of intrathoracic lymph nodes, unspecified B-cell lymphoma type (H)  Following up with oncology as directed.  - Primary Care - Care Coordination Referral; Future    Benign essential hypertension  Stable at present time without any distinct evidence of orthostasis.    MDD (major depressive disorder), recurrent episode, mild (H24)  Following with mental health provider as directed and continuing with current SSRI therapy.  - Primary Care - Care Coordination Referral; Future    Stage 3 chronic kidney disease, unspecified whether stage 3a or 3b CKD (H)  Creatinine   Date Value Ref Range Status   07/10/2024 1.04 (H) 0.51 - 0.95 mg/dL Final   08/27/2020 0.98 0.52 - 1.04 mg/dL Final   Stable for recent lab work encouraged aggressive hydration      S/P TAVR (transcatheter aortic valve replacement)  Baseline history stable.    Severe obesity (BMI 35.0-39.9) with comorbidity (H)  Urged ongoing weight loss and weight reduction.      Advised patient to consider updating routine vaccines accordingly this fall    The longitudinal plan of care for the diagnosis(es)/condition(s) as documented were addressed during this visit. Due to the added complexity in care, I will continue to support Lucille in the subsequent management and with ongoing continuity of care.    MED REC REQUIRED  Post Medication Reconciliation Status: discharge medications reconciled, continue medications without change  BMI  Estimated body mass index is 45.45 kg/m  as calculated from the following:    Height as of this encounter: 1.524 m (5').    Weight as of this  encounter: 105.6 kg (232 lb 11.2 oz).   Weight management plan: Discussed healthy diet and exercise guidelines    Depression Screening Follow Up      Follow Up Actions Taken  Crisis resource information provided in the After Visit Summary  Referred patient back to mental health provider    Discussed the following ways the patient can remain in a safe environment: Care coordination referral has been placed    See Patient Instructions    Lissy Adkins is a 84 year old, presenting for the following health issues:  ER F/U    HPI     Patient seen as follow-up of recent emergency room visit.    She states that she is having good days and bad days and today is her real good day.  She informs me that she has been following up with her mental health provider and she has also seen her eye physician.  We reviewed that her recent ER evaluation but I do not have those records available from her mental health provider or ophthalmologist.  I reviewed with her her most recent oncology follow-up visit, her labs and also her prior neurological assessment for which she was being seen for visual hallucinations.  She feels her visual hallucinations at times have improved.    She reports that her pain that she was noted in her foot in the emergency room is better.    She does report that she believes that he she and her  need some help at home and would like to discuss options available.  She feels that she is safe in her home environment.    She reports that she does not need any medication refills today.    ED/UC Followup:    Facility:  [facility]  Date of visit: 7/10/24  Reason for visit: Pain of toe of right foot, insufficient home care support   Current Status: Stable right toe pain. Ongoing vision changes    Lucille Rojas is a 84 year old female with 2 different complaints.  Main complaint on arrival was right great toe pain.  There is no evidence of fracture or infection.  X-ray imaging obtained showing no acute  fracture.  She does have a callus which I think is likely due to to rubbing against her shoe is likely causing her discomfort.  I recommend she follow-up with her primary care doctor or podiatrist for foot checks.  Second complaint the patient discussed with me near the end of her visit when her family arrived.  They reported she was reporting seeing spots in her vision black dots that come and go.  On exam it seems like it is more on her left eye.  Patient has legally blind status.  She denies any eye pain and visually her eyes are noninjected or exudative or there is clinical evidence of infection.  Given the lack of medications that she has been taking as she is currently out of her current medications which after her family arrived her son reports they are expecting the medications to arrive in a couple of days I evaluated her brain with MRI.  There is no evidence of acute CVA.  EKG obtained as a screening measure which is unchanged.  CBC and BMP show baseline function.  No concerning metabolic changes.  I did discuss with the patient thoroughly regarding concerns regarding her home environment.  Patient has reported multiple times she feels safe at home she feels safe with her .  And she does not want to going to a nursing home or is interested in any short-term placement.  This the patient will be discharged back to her home.  Her  and her son were at bedside at the end of the visit and took her back home.  She does have support of her children.  I have ordered some home care help as well and ordered primary care for close follow-up for her.  Return precautions were discussed including increasing pain new concerning signs or symptoms such as unilateral weakness numbness headache eye pain.  I do recommend she follow-up closely with ophthalmology regarding the spots in her vision.     Disposition   The patient was discharged.      Diagnosis       ICD-10-CM     1. Pain of toe of right foot  M79.674  Home Care Referral       Primary Care Referral       2. Insufficient home care support  Z74.2 Primary Care Referral       3. Visual field scotoma, unspecified laterality  H53.419         Review of Systems  CONSTITUTIONAL: NEGATIVE for fever, chills, change in weight  ENT/MOUTH: NEGATIVE for ear, mouth and throat problems  RESP: NEGATIVE for significant cough or SOB  CV: NEGATIVE for chest pain, palpitations or peripheral edema  GI: NEGATIVE for nausea, abdominal pain, heartburn, or change in bowel habits  : NEGATIVE for frequency, dysuria, or hematuria  HEME: NEGATIVE for bleeding problems        Objective    /58   Pulse 57   Temp 97.6  F (36.4  C) (Tympanic)   Resp 14   Ht 1.524 m (5')   Wt 105.6 kg (232 lb 11.2 oz)   SpO2 93%   BMI 45.45 kg/m    Body mass index is 45.45 kg/m .  Physical Exam   GENERAL: alert and no distress  EYES: Visual acuity decreased  HENT: ear canals and TM's normal, nose and mouth without ulcers or lesions  NECK: no adenopathy, no asymmetry, masses, or scars  RESP: lungs clear to auscultation - no rales, rhonchi or wheezes  CV: regular rate and rhythm, normal S1 S2, no S3 or S4, no murmur, click or rub  ABDOMEN:  obese  MS: no gross musculoskeletal defects noted, no edema  NEURO: No focal changes  PSYCH: mentation appears normal, affect normal/bright    Component      Latest Ref Rn 5/28/2024  8:23 AM 7/10/2024  7:59 PM 7/10/2024  8:00 PM   WBC      4.0 - 11.0 10e3/uL 7.6   8.1    RBC Count      3.80 - 5.20 10e6/uL 4.06   4.41    Hemoglobin      11.7 - 15.7 g/dL 11.4 (L)   12.4    Hematocrit      35.0 - 47.0 % 37.4   40.3    MCV      78 - 100 fL 92   91    MCH      26.5 - 33.0 pg 28.1   28.1    MCHC      31.5 - 36.5 g/dL 30.5 (L)   30.8 (L)    RDW      10.0 - 15.0 % 13.7   13.3    Platelet Count      150 - 450 10e3/uL 206   210    % Neutrophils      % 64   67    % Lymphocytes      % 21   19    % Monocytes      % 9   8    % Eosinophils      % 5   5    % Basophils      % 1   1     % Immature Granulocytes      % 0   0    NRBCs per 100 WBC      <1 /100 0   0    Absolute Neutrophils      1.6 - 8.3 10e3/uL 4.8   5.4    Absolute Lymphocytes      0.8 - 5.3 10e3/uL 1.6   1.5    Absolute Monocytes      0.0 - 1.3 10e3/uL 0.7   0.7    Absolute Eosinophils      0.0 - 0.7 10e3/uL 0.4   0.4    Absolute Basophils      0.0 - 0.2 10e3/uL 0.1   0.1    Absolute Immature Granulocytes      <=0.4 10e3/uL 0.0   0.0    Absolute NRBCs      10e3/uL 0.0   0.0    Sodium      135 - 145 mmol/L 142  142     Potassium      3.4 - 5.3 mmol/L 4.3  3.9     Carbon Dioxide (CO2)      22 - 29 mmol/L 29  31 (H)     Anion Gap      7 - 15 mmol/L 10  6 (L)     Urea Nitrogen      8.0 - 23.0 mg/dL 26.0 (H)  19.7     Creatinine      0.51 - 0.95 mg/dL 1.20 (H)  1.04 (H)     GFR Estimate      >60 mL/min/1.73m2 44 (L)  52 (L)     Calcium      8.8 - 10.2 mg/dL 9.0  8.8     Chloride      98 - 107 mmol/L 103  105     Glucose      70 - 99 mg/dL 110 (H)  86     Alkaline Phosphatase      40 - 150 U/L 105      AST      0 - 45 U/L 16      ALT      0 - 50 U/L 9      Protein Total      6.4 - 8.3 g/dL 6.4      Albumin      3.5 - 5.2 g/dL 3.7      Bilirubin Total      <=1.2 mg/dL 0.2             Lab Results   Component Value Date    A1C 5.5 12/04/2023    A1C 5.6 05/31/2023    A1C 6.1 01/02/2023    A1C 5.2 08/17/2022    A1C 5.0 11/22/2021    A1C 5.8 04/07/2021    A1C 5.6 06/04/2020    A1C 5.7 06/12/2018    A1C 6.2 09/06/2017    A1C 5.6 12/01/2016       Signed Electronically by: Fausto Flynn MD

## 2024-08-07 ENCOUNTER — TELEPHONE (OUTPATIENT)
Facility: CLINIC | Age: 84
End: 2024-08-07
Payer: MEDICARE

## 2024-08-07 NOTE — TELEPHONE ENCOUNTER
Linsey Benitez, CNP  [facility] Structural General  LDL remains elevated in the setting of known coronary disease that we are medically managing. Recommend transitioning to atorvastatin 40 mg daily with repeat lipid panel in 3-6 months.    Message left for patient to return call to clinic to discuss.  Katalina De La Rosa RN on 8/7/2024 at 2:24 PM

## 2024-08-09 ENCOUNTER — MYC MEDICAL ADVICE (OUTPATIENT)
Dept: CARDIOLOGY | Facility: CLINIC | Age: 84
End: 2024-08-09
Payer: MEDICARE

## 2024-08-09 DIAGNOSIS — E78.5 HYPERLIPIDEMIA LDL GOAL <100: Primary | ICD-10-CM

## 2024-08-09 NOTE — TELEPHONE ENCOUNTER
Attempted to call patient to review reccomendations below from Linsey Benitez CNP. Patient did not answer. Voicemail left with direct call back number. [employer] message sent.        Linsey Benitez CNP  [facility] Structural General  LDL remains elevated in the setting of known coronary disease that we are medically managing. Recommend transitioning to atorvastatin 40 mg daily with repeat lipid panel in 3-6 months.

## 2024-08-10 ENCOUNTER — TRANSFERRED RECORDS (OUTPATIENT)
Dept: MULTI SPECIALTY CLINIC | Facility: CLINIC | Age: 84
End: 2024-08-10

## 2024-08-10 LAB — RETINOPATHY: NORMAL

## 2024-08-12 ENCOUNTER — PATIENT OUTREACH (OUTPATIENT)
Dept: CARE COORDINATION | Facility: CLINIC | Age: 84
End: 2024-08-12
Payer: MEDICARE

## 2024-08-12 NOTE — TELEPHONE ENCOUNTER
Attempted to call patient to review reccomendations below from Linsey Benitez CNP. Patient did not answer. Phone kept ringing with no answer. Voicemail unable to be left. [employer] message replied to proxy.                  Linsey Benitez CNP  [facility] Structural [facility]  LDL remains elevated in the setting of known coronary disease that we are medically managing. Recommend transitioning to atorvastatin 40 mg daily with repeat lipid panel in 3-6 months.

## 2024-08-12 NOTE — PROGRESS NOTES
Clinic Care Coordination Contact  Care Coordination Clinician Chart Review    Situation: Patient chart reviewed by Care Coordinator.       Background: Care Coordination Program started: 12/3/2019. Initial assessment completed 12/3/19 and patient-centered care plan(s) were developed with participation from patient. Lead CC handed patient off to CHW for continued outreaches.       Assessment: Per chart review, patient outreach completed by CC CHW on 7/31/24. Patient is actively working to accomplish goal(s). Patient's goal(s) appropriate and relevant at this time.     Patient is due for updated Plan of Care.      Assessments will be completed annually or as needed/with change of patient status. Completed 12/21/23 (in encounter 12/19/23). Due 12/21/24.         Care Plan: Social Support       Problem: Inadequate social support       Goal: I will access resources to obtain more in home support.       Start Date: 11/29/2022 Expected End Date: 8/22/2024    This Visit's Progress: 90% Recent Progress: 90%    Note:     Barriers: resource availability  Strengths: pt is a good advocate for self.   Patient expressed understanding of goal: yes  Action steps to achieve this goal:  1. I will contact the [facility] about a [employer] assessment. Reviewed this process on 12/21/23.     2. I will contact resources given to me. Discussed Help at your Door on 12/21/23.  Emailed daughter Atiya resources on 12/20/23.    3. I will contact my CHW with any needs or questions.                                      Plan/Recommendations: The patient will continue working with Care Coordination to achieve goal(s) as above.     CHW will continue outreaches at minimum every 30 days and will involve Lead CC as needed or if patient is ready to move to Maintenance.     Lead CC will continue to monitor CHW outreaches and patient's progress to goal(s) every 6 weeks.     Plan of Care updated and sent to patient: Yes, via TORIE Campos   Social  Work Primary Care Clinic Care Coordinator   Covering for NOMAN Lester CC

## 2024-08-12 NOTE — LETTER
[facility]  Patient Centered Plan of Care  About Me:        Patient Name:  Lucille Rojas    YOB: 1940  Age:         84 year old   Nito MRN:    [id number] Telephone Information:  Home Phone [phone]   Mobile [phone]       Address:  [address]  [facility] [address] Email address:  [email or web address]      Emergency Contact(s)    Name Relationship Lgl Grd Work Phone Home Phone Mobile Phone   1. EMIL ROJAS Spouse No   [phone]   2. HARRIET ROJAS Daughter No  [phone]   3. MONICA BUCKNER * Daughter No  [phone]   4. JOSEFA ROJAS Son No  [phone]   5. JULIEN VO Son   [phone]           Primary language:  English     needed? No   [facility] [employer]:  [phone] op. 1  Other communication barriers:None  Preferred Method of Communication:  Mail  Current living arrangement: I live in a private home with spouse  Mobility Status/ Medical Equipment: Independent w/Device    Health Maintenance  Health Maintenance Reviewed: Due/Overdue   Health Maintenance Due   Topic Date Due    HF ACTION PLAN  Never done    RSV VACCINE (Pregnancy & 60+) (1 - 1-dose 60+ series) Never done    DTAP/TDAP/TD IMMUNIZATION (2 - Td or Tdap) 01/01/2019    DIABETIC FOOT EXAM  06/29/2023    EYE EXAM  12/21/2023    COVID-19 Vaccine (7 - 2023-24 season) 01/29/2024    ZOSTER IMMUNIZATION (2 of 2) 08/22/2023       My Access Plan  Medical Emergency 911   Primary Clinic Line [facility] - [phone]   24 Hour Appointment Line [phone] or  [phone]) (toll-free)   24 Hour Nurse Line [phone] (toll-free)   Preferred Urgent Care [facility], [phone]     Preferred [facility]  [phone]     Preferred Pharmacy [id number] - [place], [place] - [address] [facility]     Behavioral Health Crisis  Line The National Suicide Prevention Lifeline at 1-563.684.6661 or Text/Call 988           My Care Team Members  Patient Care Team         Relationship Specialty Notifications Start End    Fausto Flynn MD PCP - General Internal Medicine  10/6/14     Phone: [phone] Fax: [phone]         [address] [place] [id number]    Fausto Flynn MD Assigned PCP   10/12/14     Phone: [phone] Fax: [phone]         [address] [place] [id number]    Yonny Roman MD MD INTERNAL MEDICINE - ENDOCRINOLOGY, DIABETES & METABOLISM  12/3/19     Phone: [phone] Fax: [phone]         [facility] [address] [place] [address]    Rosanne Valadez CHW Community Health Worker Primary Care - CC Admissions 1/22/20     Phone: [phone]         [facility]    12/27/21     TCU    Phone: [phone] Fax: [phone] [address] [facility] [address]    Rafa Kelly AuD Audiologist Audiology  9/7/22     Phone: [phone] Fax: [phone] [address] [facility] [address]    Puja Damian, KORTNEYW Lead Care Coordinator Primary Care - CC Admissions 2/2/23     Phone: [phone]         Linsey Benitez CNP Assigned Heart and Vascular Provider   5/13/23     Phone: [phone] Pager: [phone] Fax: [phone] [address] [address]    John Srinivasan MD Assigned Cancer Care Provider   8/5/23     Phone: [phone] Fax: [phone]         [facility] [address] [place] [address]                My Care Plans  Self Management and Treatment Plan    Care Plan  Care Plan: Social Support       Problem: Inadequate social support       Goal: I will access resources to obtain more in home support.       Start Date: 11/29/2022 Expected End Date: 8/22/2024    This Visit's Progress: 90% Recent Progress: 90%    Note:     Barriers: resource  availability  Strengths: pt is a good advocate for self.   Patient expressed understanding of goal: yes  Action steps to achieve this goal:  1. I will contact the [facility] about a MNChoices assessment. Reviewed this process on 12/21/23.     2. I will contact resources given to me. Discussed Help at your Door on 12/21/23.  Emailed birgit Rajput resources on 12/20/23.    3. I will contact my CHW with any needs or questions.                                   Action Plans on File:            Depression          Advance Care Plans/Directives:   Advanced Care Plan/Directives on file:   No    Discussed with patient/caregiver(s): No data recorded       Honoring Choices    Advance Care Planning and Health Care Directives  When it comes to decisions about your health care, it s important that your voice is heard. You may not always be able to speak for yourself.    We encourage you to have discussions with your loved ones, cultural or spiritual leaders and health care providers about your goals, values, beliefs and choices.    We are a part of [employer] [place] , supporting and promoting the benefits of advance care planning conversations.    Our goals are to:  Help you make informed decisions about your healthcare choices and ensure that those choices are honored  Offer advance care planning discussions with trained staff  Ensure your choices are clearly defined, documented and available in your medical record  Translate your choices into medical orders as needed    Why is Advance Care Planning important?  Know what your health care choices are and decide what is right for you  An unexpected illness or injury could leave you unable to participate in important treatment decisions  When choices are left to others to decide that responsibility can be difficult and stressful  By discussing and outlining your choices, your voice is heard in the care you want to receive    How can I learn more?  We offer free classes at  multiple locations, days and times. Our trained facilitators will provide information and guide you through a Health Care Directive document. They can also review, notarize and add your document to your medical record.    Call [facility] at [phone] or toll free at [phone] for assistance.      My Medical and Care Information  Problem List   Patient Active Problem List   Diagnosis    Hyperlipidemia LDL goal <100    Macular degeneration    Apnea    Morbid obesity due to excess calories (H)    CKD (chronic kidney disease) stage 3, GFR 30-59 ml/min (H)    Acquired hypothyroidism    Benign essential hypertension    Gastroesophageal reflux disease without esophagitis    Type 2 diabetes mellitus with stage 3 chronic kidney disease, without long-term current use of insulin (H)    Anemia, unspecified type    MDD (major depressive disorder), recurrent episode, mild (H24)    Severe anemia    Severe obesity (BMI 35.0-39.9) with comorbidity (H)    Peripheral vision loss, unspecified laterality    Type 2 DM with CKD stage 3 and hypertension (H)    Acute on chronic blood loss anemia    B-cell lymphoma of intrathoracic lymph nodes, unspecified B-cell lymphoma type (H)    SHAVON (obstructive sleep apnea)    Pancreatic mass    Nonrheumatic aortic (valve) stenosis with insufficiency    Diastolic heart failure, unspecified HF chronicity (H)    Hypothyroidism, unspecified type    Major depressive disorder with current active episode, unspecified depression episode severity, unspecified whether recurrent    Physical deconditioning    Severe obesity (BMI >= 40) (H)    Callus of foot    Non-Hodgkin's lymphoma (H)    Hypoxia    Generalized muscle weakness    Symptomatic anemia    Diverticular disease of colon    Status post coronary angiogram    Aortic stenosis, severe    Skin ulcer of great toe, unspecified laterality, limited to breakdown of skin (H)      Current Medications and Allergies:    Current Outpatient  Medications   Medication Instructions    acetaminophen (TYLENOL) 650 mg, Oral, EVERY 4 HOURS PRN    aspirin (ASA) 81 mg, Oral, DAILY    azithromycin (ZITHROMAX) 500 MG tablet Patient to take 1 tablet 30-60 minutes prior to dental appointments.    carboxymethylcellulose PF (REFRESH PLUS) 0.5 % ophthalmic solution 2 drops, Both Eyes, 2 TIMES DAILY    citalopram (CELEXA) 20 MG tablet TAKE 1 TABLET BY MOUTH DAILY    CONTOUR NEXT TEST test strip USE TO TEST BLOOD GLUCOSE ONCE DAILY    furosemide (LASIX) 20 MG tablet TAKE 1 TABLET BY MOUTH DAILY    levothyroxine (SYNTHROID/LEVOTHROID) 150 MCG tablet TAKE ONE TABLET BY MOUTH ONE TIME DAILY    loratadine (CLARITIN) 10 mg, Oral, PRN    LORazepam (ATIVAN) 0.5 mg, Oral, EVERY 8 HOURS PRN    metoprolol succinate ER (TOPROL XL) 25 mg, Oral, DAILY    miconazole (MICATIN) 2 % external powder Topical, PRN    Microlet Lancets MISC USE TO TEST BLOOD GLUCOSE ONCE DAILY    nystatin (MYCOSTATIN) 319195 UNIT/GM external powder Topical, 2 TIMES DAILY, Under breasts and skin folds BID & BID PRN for redness     order for DME Equipment being ordered: wheeled walker with hand breaks    pantoprazole (PROTONIX) 40 mg, Oral, EVERY MORNING BEFORE BREAKFAST    simvastatin (ZOCOR) 10 MG tablet TAKE 1 TABLET BY MOUTH DAILY AT BEDTIME     Care Coordination Start Date: 12/3/2019   Frequency of Care Coordination: monthly, more frequently as needed     Form Last Updated: 08/12/2024

## 2024-08-13 ENCOUNTER — PATIENT OUTREACH (OUTPATIENT)
Dept: CARE COORDINATION | Facility: CLINIC | Age: 84
End: 2024-08-13
Payer: MEDICARE

## 2024-08-13 ENCOUNTER — TELEPHONE (OUTPATIENT)
Dept: CARE COORDINATION | Facility: CLINIC | Age: 84
End: 2024-08-13
Payer: MEDICARE

## 2024-08-13 ENCOUNTER — TELEPHONE (OUTPATIENT)
Dept: INTERNAL MEDICINE | Facility: CLINIC | Age: 84
End: 2024-08-13
Payer: MEDICARE

## 2024-08-13 DIAGNOSIS — N18.30 TYPE 2 DIABETES MELLITUS WITH STAGE 3 CHRONIC KIDNEY DISEASE, WITHOUT LONG-TERM CURRENT USE OF INSULIN, UNSPECIFIED WHETHER STAGE 3A OR 3B CKD (H): ICD-10-CM

## 2024-08-13 DIAGNOSIS — C85.99 NON-HODGKIN LYMPHOMA OF SOLID ORGAN EXCLUDING SPLEEN, UNSPECIFIED NON-HODGKIN LYMPHOMA TYPE (H): ICD-10-CM

## 2024-08-13 DIAGNOSIS — R53.81 PHYSICAL DECONDITIONING: Primary | ICD-10-CM

## 2024-08-13 DIAGNOSIS — N18.30 TYPE 2 DM WITH CKD STAGE 3 AND HYPERTENSION (H): ICD-10-CM

## 2024-08-13 DIAGNOSIS — E11.22 TYPE 2 DM WITH CKD STAGE 3 AND HYPERTENSION (H): ICD-10-CM

## 2024-08-13 DIAGNOSIS — E11.22 TYPE 2 DIABETES MELLITUS WITH STAGE 3 CHRONIC KIDNEY DISEASE, WITHOUT LONG-TERM CURRENT USE OF INSULIN, UNSPECIFIED WHETHER STAGE 3A OR 3B CKD (H): ICD-10-CM

## 2024-08-13 DIAGNOSIS — I12.9 TYPE 2 DM WITH CKD STAGE 3 AND HYPERTENSION (H): ICD-10-CM

## 2024-08-13 RX ORDER — ATORVASTATIN CALCIUM 40 MG/1
40 TABLET, FILM COATED ORAL DAILY
Qty: 30 TABLET | Refills: 11 | Status: SHIPPED | OUTPATIENT
Start: 2024-08-13

## 2024-08-13 NOTE — TELEPHONE ENCOUNTER
Spoke with patient who was very confused on the phone.  Attempted to also speak with patient's  who verbalized understanding of instructions.  Patient requested that daughter Radha also be called with this information.  RN did place call to Radha and explained the medication change and follow up lab work.   Callback number provided if they have further questions.   Katalina De La Rosa RN on 8/13/2024 at 10:12 AM

## 2024-08-13 NOTE — PROGRESS NOTES
"Clinic Care Coordination Contact  Community Health Worker Follow Up    Care Gaps:     Health Maintenance Due   Topic Date Due    HF ACTION PLAN  Never done    RSV VACCINE (Pregnancy & 60+) (1 - 1-dose 60+ series) Never done    DTAP/TDAP/TD IMMUNIZATION (2 - Td or Tdap) 01/01/2019    DIABETIC FOOT EXAM  06/29/2023    EYE EXAM  12/21/2023    COVID-19 Vaccine (7 - 2023-24 season) 01/29/2024    ZOSTER IMMUNIZATION (2 of 2) 08/22/2023       Did Not Address    Care Plan:   Care Plan: Social Support       Problem: Inadequate social support       Goal: I will access resources to obtain more in home support.       Start Date: 11/29/2022 Expected End Date: 8/22/2024    This Visit's Progress: 90% Recent Progress: 90%    Note:     Barriers: resource availability  Strengths: pt is a good advocate for self.   Patient expressed understanding of goal: yes  Action steps to achieve this goal:  1. I will contact the [facility] about a MNChoices assessment. Reviewed this process on 12/21/23.     2. I will contact resources given to me. Discussed Help at your Door on 12/21/23.  Emailed birgit Rajput resources on 12/20/23.    3. I will contact my CHW with any needs or questions.                               Discussed:  Patient stated ever since she had cancer, once a month, her mood drastically changes.  Patient stated she becomes \"grouchy\", \"hard to live with\", arguments with her , hard to live with.  Patient stated one morning she wakes up, she doesn't have that any longer.  Patient stated her daughter, Nancy Rajput stopped helping her.  Patient stated her daughter refuses.  Patient stated she is having difficulty walking.  Patient stated she is not as steady.  Patient stated she is declared \"legally blind\".  Patient stated it is more difficult to prepare meals.     Intervention and Education during outreach:     CHW encouraged patient to contact CC if there are any other changes or resources needed.  Patient acknowledged " understanding.      CHW Plan: will route a message to  Triage regarding Home Care.  CHW will send a referral to nuno Fields Food Navigator    CHW will outreach in one month    Rosanne Valadez CHW  Clinic Care Coordination  [facility] Clinics: [place] [place], [facility] [place], and [facility]  Phone: [phone]

## 2024-08-13 NOTE — TELEPHONE ENCOUNTER
Christian calling from [facility] states home care referral was received, asking if PCP would approve adding skilled nursing to the home care referral for pain management, medications etc.     Routing to PCP to please advise if okay to add skilled nursing to home care referral? Thank you!    No callback needed, Christian states she can access notes via tuan Pearson RN  [facility]

## 2024-08-13 NOTE — TELEPHONE ENCOUNTER
Clinic Care Coordination Contact    Patient stated she is more difficulty with balance & walking.  Patient is requesting PT & OT Home Care.  Can an order for HomeCare be placed?  Patient had an OV last week.  Please call patient to discuss if needed.    Thank you.    Rosanne Valadez, KM  Clinic Care Coordination  [facility] Clinics: [facility] [place], and [facility]  Phone: [phone]

## 2024-08-14 ENCOUNTER — PATIENT OUTREACH (OUTPATIENT)
Dept: CARE COORDINATION | Facility: CLINIC | Age: 84
End: 2024-08-14
Payer: MEDICARE

## 2024-08-14 NOTE — PROGRESS NOTES
Food Resource Navigator Contact    FRN - Initial Outreach    Reason for call: Type 2 Diabetes  Other: Cancer    Food Insecurity: Not on file     Housing Stability: Not on file     Financial Resource Strain: Not on file     Transportation Needs: Not on file       The patient was provided with the following food resources:  Discussed [employer] and [employer] Wheels. Lucille and  feel they have what they need at this time for food. Provided FRN contact information in case she should change her mind.     The patient was provided the following community resources:  None    I have discussed the following goals with the patient: Lucille to call FRN if she has any additional needs in the future.     Spent 27 minutes in consult with the patient.     Diamond German   [facility] Advancement Food Resource Navigator  [employer]   [phone]        Dolly German RD

## 2024-08-16 ENCOUNTER — MEDICAL CORRESPONDENCE (OUTPATIENT)
Dept: HEALTH INFORMATION MANAGEMENT | Facility: CLINIC | Age: 84
End: 2024-08-16

## 2024-08-22 ENCOUNTER — MEDICAL CORRESPONDENCE (OUTPATIENT)
Dept: HEALTH INFORMATION MANAGEMENT | Facility: CLINIC | Age: 84
End: 2024-08-22

## 2024-08-22 ENCOUNTER — TELEPHONE (OUTPATIENT)
Dept: INTERNAL MEDICINE | Facility: CLINIC | Age: 84
End: 2024-08-22
Payer: MEDICARE

## 2024-08-22 NOTE — TELEPHONE ENCOUNTER
Home Care is calling regarding an established patient with [facility].       Requesting orders from: Fausto Flynn  Provider is following patient: Yes  Is this a 60-day recertification request?  No    Orders Requested    Occupational Therapy  Request for continuation of care with no increase or decrease in frequency  Frequency:  1x/wk for 3 wks        Physical Therapy  Request for continuation of care with no increase or decrease in frequency  Frequency:  1x/wk for 3 wks  Every other for 4 weeks.         Verbal orders given.  Home Care will send orders for provider to sign.  Confirmed ok to leave a detailed message with call back.  Contact information confirmed and updated as needed.    Viviana Lance RN

## 2024-08-23 ENCOUNTER — TELEPHONE (OUTPATIENT)
Dept: INTERNAL MEDICINE | Facility: CLINIC | Age: 84
End: 2024-08-23
Payer: MEDICARE

## 2024-08-23 NOTE — TELEPHONE ENCOUNTER
Spoke to pt - calling for clarification around statin change. Provided details and pt verbalized understanding.    Mila Jimenez RN

## 2024-08-27 DIAGNOSIS — I35.0 AORTIC STENOSIS, SEVERE: ICD-10-CM

## 2024-08-27 RX ORDER — METOPROLOL SUCCINATE 25 MG/1
25 TABLET, EXTENDED RELEASE ORAL DAILY
Qty: 90 TABLET | Refills: 3 | Status: SHIPPED | OUTPATIENT
Start: 2024-08-27

## 2024-08-27 NOTE — TELEPHONE ENCOUNTER
Patient called along with Patience public health nurse with the [facility]. She visits patient every 6 months or so for environmental reasons.     Pt needs a refill for metoprolol. She had 5 pills left and using mail order. Requesting to be processed asap. If Dr. Flynn not able to refill, please route to cardiology as high priority.

## 2024-09-03 ENCOUNTER — TELEPHONE (OUTPATIENT)
Dept: INTERNAL MEDICINE | Facility: CLINIC | Age: 84
End: 2024-09-03
Payer: MEDICARE

## 2024-09-03 NOTE — TELEPHONE ENCOUNTER
Home Care is calling regarding an established patient with [facility].       Requesting orders from: Fausto Flynn  Provider is following patient: Yes  Is this a 60-day recertification request?  No    Orders Requested    Speech Therapy  Request for initial certification (first set of orders)   Frequency: Two visits over 6 weeks to address voice and word findings.     Information was gathered and will be sent to provider for review.  RN will contact Home Care with information after provider review.  Confirmed ok to leave a detailed message with call back.  Contact information confirmed and updated as needed.    Marybel Shelby RN    Pt has questions about statin; called pt and answered questions.

## 2024-09-03 NOTE — TELEPHONE ENCOUNTER
Home Care is calling regarding an established patient with [facility].       Requesting orders from: Fausto Flynn  Provider is following patient: Yes  Is this a 60-day recertification request?  No    Orders Requested    Skilled Nursing  Request for discontinuation of care   Goals have been met/progressing and patient is no longer homebound. Has resources in community available. Able to go to [facility] three times per week via [employer], independently.   Frequency:  see above      Hilaria Mora RN

## 2024-09-05 ENCOUNTER — MEDICAL CORRESPONDENCE (OUTPATIENT)
Dept: HEALTH INFORMATION MANAGEMENT | Facility: CLINIC | Age: 84
End: 2024-09-05

## 2024-09-05 DIAGNOSIS — F33.0 MDD (MAJOR DEPRESSIVE DISORDER), RECURRENT EPISODE, MILD (H): ICD-10-CM

## 2024-09-05 RX ORDER — CITALOPRAM HYDROBROMIDE 20 MG/1
20 TABLET ORAL DAILY
Qty: 30 TABLET | Refills: 10 | Status: SHIPPED | OUTPATIENT
Start: 2024-09-05

## 2024-09-13 ENCOUNTER — TELEPHONE (OUTPATIENT)
Dept: INTERNAL MEDICINE | Facility: CLINIC | Age: 84
End: 2024-09-13
Payer: MEDICARE

## 2024-09-13 NOTE — TELEPHONE ENCOUNTER
Home Care is calling regarding an established patient with [facility].       Requesting orders from: Fausto Flynn  Provider is following patient: Yes  Is this a 60-day recertification request?  No    Orders Requested    Skilled Nursing  Request for initial certification (first set of orders)   Frequency: 3 visits over the next 5 weeks, for Behavioral Health (depression)    Information was gathered and will be sent to provider for review.  RN will contact Home Care with information after provider review.  Confirmed ok to leave a detailed message with call back.  Contact information confirmed and updated as needed.    Mila Jimenez, RN

## 2024-09-17 ENCOUNTER — MEDICAL CORRESPONDENCE (OUTPATIENT)
Dept: HEALTH INFORMATION MANAGEMENT | Facility: CLINIC | Age: 84
End: 2024-09-17
Payer: MEDICARE

## 2024-09-24 ENCOUNTER — PATIENT OUTREACH (OUTPATIENT)
Dept: CARE COORDINATION | Facility: CLINIC | Age: 84
End: 2024-09-24
Payer: MEDICARE

## 2024-09-24 NOTE — PROGRESS NOTES
Clinic Care Coordination Contact  Community Health Worker Follow Up    Care Gaps:     Health Maintenance Due   Topic Date Due    HF ACTION PLAN  Never done    RSV VACCINE (1 - 1-dose 75+ series) Never done    DTAP/TDAP/TD IMMUNIZATION (2 - Td or Tdap) 01/01/2019    DIABETIC FOOT EXAM  06/29/2023    ZOSTER IMMUNIZATION (2 of 2) 08/22/2023    EYE EXAM  12/21/2023    INFLUENZA VACCINE (1) 09/01/2024    COVID-19 Vaccine (7 - 2024-25 season) 09/01/2024       Postponed to next CHW outreach.     Care Plan:   Care Plan: Social Support       Problem: Inadequate social support       Goal: I will access resources to obtain more in home support.       Start Date: 11/29/2022 Expected End Date: 8/22/2024    This Visit's Progress: 90% Recent Progress: 90%    Note:     Barriers: resource availability  Strengths: pt is a good advocate for self.   Patient expressed understanding of goal: yes  Action steps to achieve this goal:  1. I will contact the [facility] about a MNChoices assessment. Reviewed this process on 12/21/23.     2. I will contact resources given to me. Discussed Help at your Door on 12/21/23.  Emailed birgit Rajput resources on 12/20/23.    3. I will contact my CHW with any needs or questions.                                   Intervention and Education during outreach:   Patient states that nurses have been helping her at home with baths some days.  Patient states that she is not walking as well as she has in the past. Patient states that she uses two canes pretty often.  Patient states that she has been having a good day today and that she feels good after taking a shower.    CHW Plan: CHW will do next patient outreach in one month.    ZANE Lopez, B.A. [facility]  Clinic Care Coordination  [facility]:   [facility]  [phone]

## 2024-09-27 ENCOUNTER — PATIENT OUTREACH (OUTPATIENT)
Dept: CARE COORDINATION | Facility: CLINIC | Age: 84
End: 2024-09-27

## 2024-09-27 DIAGNOSIS — Z53.9 DIAGNOSIS NOT YET DEFINED: Primary | ICD-10-CM

## 2024-09-27 PROCEDURE — G0180 MD CERTIFICATION HHA PATIENT: HCPCS | Performed by: INTERNAL MEDICINE

## 2024-09-27 ASSESSMENT — ACTIVITIES OF DAILY LIVING (ADL): DEPENDENT_IADLS:: TRANSPORTATION

## 2024-09-27 NOTE — PROGRESS NOTES
Clinic Care Coordination Contact  Care Coordination Clinician Chart Review    Situation: Patient chart reviewed by Care Coordinator.       Background: Care Coordination Program started: 12/3/2019. Initial assessment completed and patient-centered care plan(s) were developed with participation from patient. Lead CC handed patient off to CHW for continued outreaches.       Assessment: Per chart review, patient outreach completed by CC CHW on 9/24/24.  Patient is actively working to accomplish goal(s). Patient's goal(s) appropriate and relevant at this time. Patient is not due for updated Plan of Care.  Assessments will be completed annually or as needed/with change of patient status.      Care Plan: Social Support       Problem: Inadequate social support       Goal: I will access resources to obtain more in home support.       Start Date: 11/29/2022 Expected End Date: 10/31/2024    Recent Progress: 90%    Note:     Barriers: resource availability  Strengths: pt is a good advocate for self.   Patient expressed understanding of goal: yes  Action steps to achieve this goal:  1. I will contact the [facility] about a MNChoices assessment. Reviewed this process on 12/21/23.     2. I will contact resources given to me. Discussed Help at your Door on 12/21/23.  Emailed birgit Rapjut resources on 12/20/23.    3. I will contact my CHW with any needs or questions.                                      Plan/Recommendations: The patient will continue working with Care Coordination to achieve goal(s) as above. CHW will continue outreaches at minimum every 30 days and will involve Lead CC as needed or if patient is ready to move to Maintenance. Lead CC will continue to monitor CHW outreaches and patient's progress to goal(s) every 6 weeks.     Plan of Care updated and sent to patient: TORIE Alas   Care Coordination Team  [phone]

## 2024-10-02 ENCOUNTER — ALLIED HEALTH/NURSE VISIT (OUTPATIENT)
Dept: INTERNAL MEDICINE | Facility: CLINIC | Age: 84
End: 2024-10-02
Payer: MEDICARE

## 2024-10-02 DIAGNOSIS — Z23 NEED FOR PROPHYLACTIC VACCINATION AND INOCULATION AGAINST INFLUENZA: Primary | ICD-10-CM

## 2024-10-02 PROCEDURE — 90662 IIV NO PRSV INCREASED AG IM: CPT

## 2024-10-02 PROCEDURE — 99207 PR NO CHARGE NURSE ONLY: CPT | Mod: 25

## 2024-10-02 PROCEDURE — G0008 ADMIN INFLUENZA VIRUS VAC: HCPCS

## 2024-10-14 ENCOUNTER — NURSE TRIAGE (OUTPATIENT)
Dept: INTERNAL MEDICINE | Facility: CLINIC | Age: 84
End: 2024-10-14
Payer: MEDICARE

## 2024-10-14 ENCOUNTER — TELEPHONE (OUTPATIENT)
Dept: INTERNAL MEDICINE | Facility: CLINIC | Age: 84
End: 2024-10-14
Payer: MEDICARE

## 2024-10-14 NOTE — TELEPHONE ENCOUNTER
Unable to reach patient at [phone].  Detailed VM left on [phone].     Suggest giving patient another called later today.

## 2024-10-14 NOTE — TELEPHONE ENCOUNTER
Patient with multiple health concerns suggest ER assessment if symptoms worse.  ? If patient needs to be tested for Covid if not already done so

## 2024-10-14 NOTE — TELEPHONE ENCOUNTER
"Nurse Triage SBAR    Is this a 2nd Level Triage? NO    Situation: Pt called the clinic stating her home care nurse told her she should get an xray due to her cough.     Background: Pts  had covid 9/26/24.     Pts sx started a couple of weeks ago.     Assessment: Pt reports a non- productive cough. Pt stated her chest hurts (even when NOT coughing) on both sides right where the bra would sit. Pt stated she doesn't have a lot of SOB while sitting there. Pt reports she always has a runny nose.     Pt denies fever, hemoptysis, wheezing.      Protocol Recommended Disposition:   Go to ED Now    Recommendation: Advised pt to go to ED now. Pt declined ED as she doesn't have a way to get to the ED. Writer offered to call 911 for pt. Pt declined.     Routing to PCP to review and advise next steps.     Please call pt back with PCPs recommendations.     Routed to provider    Does the patient meet one of the following criteria for ADS visit consideration? 16+ years old, with an [facility] PCP     TIP  Providers, please consider if this condition is appropriate for management at one of our Acute and Diagnostic Services sites.     If patient is a good candidate, please use dotphrase <dot>triageresponse and select Refer to ADS to document.     1. ONSET: \"When did the cough begin?\"       A couple weeks ago   2. SEVERITY: \"How bad is the cough today?\"       Same as yetesterday   3. SPUTUM: \"Describe the color of your sputum\" (none, dry cough; clear, white, yellow, green)      No  4. HEMOPTYSIS: \"Are you coughing up any blood?\" If so ask: \"How much?\" (flecks, streaks, tablespoons, etc.)      No  5. DIFFICULTY BREATHING: \"Are you having difficulty breathing?\" If Yes, ask: \"How bad is it?\" (e.g., mild, moderate, severe)     - MILD: No SOB at rest, mild SOB with walking, speaks normally in sentences, can lie down, no retractions, pulse < 100.     - MODERATE: SOB at rest, SOB with minimal exertion and prefers to sit, cannot lie down " "flat, speaks in phrases, mild retractions, audible wheezing, pulse 100-120.     - SEVERE: Very SOB at rest, speaks in single words, struggling to breathe, sitting hunched forward, retractions, pulse > 120       Not a lot of SOB while sitting there   6. FEVER: \"Do you have a fever?\" If Yes, ask: \"What is your temperature, how was it measured, and when did it start?\"      No  7. CARDIAC HISTORY: \"Do you have any history of heart disease?\" (e.g., heart attack, congestive heart failure)       New valve put in a couple years ago   8. LUNG HISTORY: \"Do you have any history of lung disease?\"  (e.g., pulmonary embolus, asthma, emphysema)      Sleep apnea  9. PE RISK FACTORS: \"Do you have a history of blood clots?\" (or: recent major surgery, recent prolonged travel, bedridden)      No  10. OTHER SYMPTOMS: \"Do you have any other symptoms?\" (e.g., runny nose, wheezing, chest pain)        Runny nose (always has this), chest pain   11. PREGNANCY: \"Is there any chance you are pregnant?\" \"When was your last menstrual period?\"        NA  12. TRAVEL: \"Have you traveled out of the country in the last month?\" (e.g., travel history, exposures)        No  Reason for Disposition   Chest pain present when not coughing    Additional Information   Negative: Bluish (or gray) lips or face   Negative: SEVERE difficulty breathing (e.g., struggling for each breath, speaks in single words)   Negative: Rapid onset of cough and has hives   Negative: Coughing started suddenly after medicine, an allergic food or bee sting   Negative: Difficulty breathing after exposure to flames, smoke, or fumes   Negative: Sounds like a life-threatening emergency to the triager   Negative: Previous asthma attacks and this feels like asthma attack   Negative: Dry cough (non-productive; no sputum or minimal clear sputum) and within 14 days of COVID-19 Exposure   Negative: MODERATE difficulty breathing (e.g., speaks in phrases, SOB even at rest, pulse 100-120) and " still present when not coughing    Protocols used: Cough-A-OH

## 2024-10-14 NOTE — TELEPHONE ENCOUNTER
Home Care is calling regarding an established patient with [facility].       Requesting orders from: Fausto Flynn  Provider is following patient: Yes  Is this a 60-day recertification request?  No    Orders Requested    Behavioral Health Skilled Nursing  Request for continuation of care with increase in frequency  Frequency: 1w9      HHA (Home Health Aide)  Request for initial certification (first set of orders)   Frequency: 1w9      Information was gathered and will be sent to provider for review.  RN will contact Home Care with information after provider review.  Confirmed ok to leave a detailed message with call back.  Contact information confirmed and updated as needed.    Lea Tubbs RN

## 2024-10-15 ENCOUNTER — HOSPITAL ENCOUNTER (EMERGENCY)
Facility: CLINIC | Age: 84
Discharge: HOME | End: 2024-10-15
Attending: EMERGENCY MEDICINE | Admitting: EMERGENCY MEDICINE
Payer: MEDICARE

## 2024-10-15 ENCOUNTER — APPOINTMENT (OUTPATIENT)
Dept: GENERAL RADIOLOGY | Facility: CLINIC | Age: 84
End: 2024-10-15
Attending: EMERGENCY MEDICINE
Payer: MEDICARE

## 2024-10-15 VITALS
OXYGEN SATURATION: 94 % | BODY MASS INDEX: 44.17 KG/M2 | DIASTOLIC BLOOD PRESSURE: 46 MMHG | HEART RATE: 53 BPM | SYSTOLIC BLOOD PRESSURE: 115 MMHG | WEIGHT: 225 LBS | RESPIRATION RATE: 18 BRPM | HEIGHT: 60 IN | TEMPERATURE: 97.9 F

## 2024-10-15 DIAGNOSIS — R53.1 GENERAL WEAKNESS: ICD-10-CM

## 2024-10-15 DIAGNOSIS — U07.1 COVID-19: ICD-10-CM

## 2024-10-15 LAB
ANION GAP SERPL CALCULATED.3IONS-SCNC: 6 MMOL/L (ref 7–15)
BASOPHILS # BLD AUTO: 0 10E3/UL (ref 0–0.2)
BASOPHILS NFR BLD AUTO: 1 %
BUN SERPL-MCNC: 18.2 MG/DL (ref 8–23)
CALCIUM SERPL-MCNC: 8.8 MG/DL (ref 8.8–10.4)
CHLORIDE SERPL-SCNC: 103 MMOL/L (ref 98–107)
CREAT SERPL-MCNC: 1.12 MG/DL (ref 0.51–0.95)
EGFRCR SERPLBLD CKD-EPI 2021: 48 ML/MIN/1.73M2
EOSINOPHIL # BLD AUTO: 0.3 10E3/UL (ref 0–0.7)
EOSINOPHIL NFR BLD AUTO: 6 %
ERYTHROCYTE [DISTWIDTH] IN BLOOD BY AUTOMATED COUNT: 14 % (ref 10–15)
FLUAV RNA SPEC QL NAA+PROBE: NEGATIVE
FLUBV RNA RESP QL NAA+PROBE: NEGATIVE
GLUCOSE SERPL-MCNC: 91 MG/DL (ref 70–99)
HCO3 SERPL-SCNC: 31 MMOL/L (ref 22–29)
HCT VFR BLD AUTO: 37.8 % (ref 35–47)
HGB BLD-MCNC: 11.7 G/DL (ref 11.7–15.7)
HOLD SPECIMEN: NORMAL
IMM GRANULOCYTES # BLD: 0 10E3/UL
IMM GRANULOCYTES NFR BLD: 0 %
LYMPHOCYTES # BLD AUTO: 1.3 10E3/UL (ref 0.8–5.3)
LYMPHOCYTES NFR BLD AUTO: 30 %
MCH RBC QN AUTO: 27.9 PG (ref 26.5–33)
MCHC RBC AUTO-ENTMCNC: 31 G/DL (ref 31.5–36.5)
MCV RBC AUTO: 90 FL (ref 78–100)
MONOCYTES # BLD AUTO: 0.8 10E3/UL (ref 0–1.3)
MONOCYTES NFR BLD AUTO: 19 %
NEUTROPHILS # BLD AUTO: 1.8 10E3/UL (ref 1.6–8.3)
NEUTROPHILS NFR BLD AUTO: 44 %
NRBC # BLD AUTO: 0 10E3/UL
NRBC BLD AUTO-RTO: 0 /100
PLATELET # BLD AUTO: 192 10E3/UL (ref 150–450)
POTASSIUM SERPL-SCNC: 4.6 MMOL/L (ref 3.4–5.3)
RBC # BLD AUTO: 4.2 10E6/UL (ref 3.8–5.2)
RSV RNA SPEC NAA+PROBE: NEGATIVE
SARS-COV-2 RNA RESP QL NAA+PROBE: POSITIVE
SODIUM SERPL-SCNC: 140 MMOL/L (ref 135–145)
WBC # BLD AUTO: 4.2 10E3/UL (ref 4–11)

## 2024-10-15 PROCEDURE — 82310 ASSAY OF CALCIUM: CPT | Performed by: EMERGENCY MEDICINE

## 2024-10-15 PROCEDURE — 87637 SARSCOV2&INF A&B&RSV AMP PRB: CPT | Performed by: EMERGENCY MEDICINE

## 2024-10-15 PROCEDURE — 80048 BASIC METABOLIC PNL TOTAL CA: CPT | Performed by: EMERGENCY MEDICINE

## 2024-10-15 PROCEDURE — 71046 X-RAY EXAM CHEST 2 VIEWS: CPT

## 2024-10-15 PROCEDURE — 85004 AUTOMATED DIFF WBC COUNT: CPT | Performed by: EMERGENCY MEDICINE

## 2024-10-15 PROCEDURE — 99284 EMERGENCY DEPT VISIT MOD MDM: CPT | Mod: 25

## 2024-10-15 PROCEDURE — 85014 HEMATOCRIT: CPT | Performed by: EMERGENCY MEDICINE

## 2024-10-15 PROCEDURE — 36415 COLL VENOUS BLD VENIPUNCTURE: CPT | Performed by: EMERGENCY MEDICINE

## 2024-10-15 ASSESSMENT — ACTIVITIES OF DAILY LIVING (ADL)
ADLS_ACUITY_SCORE: 38

## 2024-10-15 NOTE — ED TRIAGE NOTES
Pt BIB EMS from home (where she lives with her ), with a worsening cough and chest pain associated with the coughing over the last month. Her  had COVID over a month ago, and pt was negative at that time. Upon EMS assessment, SpO2 92-94% on RA, 96% on 2L O2 via NC. Lungs clear to auscultation.     Pt ambulates with a cane at baseline.     Hx pancreatic Ca for 4 years, DMII, .    Pt's daughter Radha available by phone [phone].

## 2024-10-15 NOTE — ED NOTES
Pt discharged with stretcher transportation to home. Family was unable to  pt at this time. Pt was unable to take med cab due to Covid positive diagnosis.

## 2024-10-15 NOTE — TELEPHONE ENCOUNTER
Pt's daughter calling clinic with ME[facility];     Following up on yesterdays call to clinic.     Pt had forgotten to hang up her phone after conversation. Due to clinic not being able to contact pt via phone, police were contacted and did a well fair check on pt. The police department called an ambulance, pt was brought to ER via ambulance.     Pt's daughter Radha stating she is appreciative of care.

## 2024-10-15 NOTE — DISCHARGE INSTRUCTIONS
Discharge Instructions  COVID-19    COVID-19 is a viral illness that spreads from person-to-person primarily by droplets when an infected person coughs or sneezes and the droplets are then breathed in by another person.    Symptoms of COVID-19  Many people have no symptoms or mild symptoms.  Symptoms usually appear within a few days after contact with a person with COVID-19.  A mild COVID-19 illness is like a cold and can have fever, cough, sneezing, sore throat, tiredness, headache, and muscle pain. Some patients also have stomach symptoms like nausea, vomiting, or diarrhea.  A moderate COVID-19 illness might include shortness of breath or pneumonia on a chest x-ray.  A severe COVID-19 illness causes significant breathing problems such as low oxygen levels or more serious pneumonia.  Some patients experience loss of taste or smell which is somewhat unique to COVID-19.    What should I do if I test positive?  If you test positive for COVID and have no symptoms, you should take precautions, as described below, for five days.  If you test positive for COVID and have symptoms, you should stay home and away from others. You can go back to normal activities when you are feeling better and have been without a fever (without using medications to treat the fever) for 24 hours. When you return to normal activities you should take precautions, as described below, for five days.  Precautions to prevent the spread of COVID-19  Clean Air. Viruses spread from person to person in the air. Bring fresh air into your home by opening doors or windows or using exhaust fans if possible. Use an air filter. Be outdoors when possible.  Practice good hygiene. Cover your mouth and nose with a tissue when you cough or sneeze. Wash your hands often with soap and water for at least 20 seconds or use an       alcohol-based hand  containing at least 60% alcohol. Avoid touching your face. Clean surfaces such as countertops, handrails,  and doorknobs.  Wear a facemask. Masks both prevent an infected person from spreading the virus and prevent a well person from getting the virus. Cloth masks are good, surgical/disposable masks are better, and respirators (N95) are the best.  Practice physical distancing. Avoid being close to someone with cough or other symptoms. Avoid crowded spaces.   What should I do for myself while I am sick?  Treat your symptoms. You can take Acetaminophen (Tylenol) to treat body aches and fever as needed for comfort. Ibuprofen (Advil or Motrin) can be used as well if you still have symptoms after taking Tylenol. Drink fluids. Rest.  Watch for worsening symptoms such as shortness of breath/difficulty breathing or very severe weakness.  Exercise/Sports in rare cases, COVID could affect your heart in a way that makes exercise or participation in sports dangerous.  If you have a mild COVID illness (fever, cough, sore throat, and similar symptoms but no difficulty breathing or abnormalities of the lung): After your COVID symptoms have resolved, you can return to exercise. If you develop difficulty breathing or chest discomfort with exercise, palpitations (a sensation of your heart racing or skipping), or lightheadedness/passing out, you should contact your doctor/clinic.  If you have more than a mild illness (meaning that you have problems with your breathing or lungs) or if you participate in competitive or strenuous activity or have a history of heart disease: Please see your primary doctor/provider prior to return to activity/competition.    COVID treatments such as antiviral medications are available. They are recommended for those patients who have a risk for developing more severe COVID illness. Age is the biggest risk factor. Risk is increased for adults greater than 50 years old and particularly for adults greater than 65 years. Importantly, the treatments must be started early in the illness (within five days). These  treatments may have been considered today during your visit. If you have other questions, contact your primary doctor/clinic.    You can learn more about COVID treatments from the [facility]:  [email or web address]            What should I do if I am exposed to COVID?  If you are exposed to COVID, you should monitor for symptoms and test if you develop symptoms. Practicing the precautions discussed above are a good idea, particularly if you plan to be around any person who is at higher risk of COVID complications such as older patients (>65) or people with significant medical problems.            Return to the Emergency Department if:  If you are developing worsening breathing, weakness, or feel worse you should seek medical attention.  If you are uncertain, contact your health care provider/clinic. If you need emergency medical attention, call 911.

## 2024-10-15 NOTE — ED NOTES
Bed: ED15  Expected date:   Expected time:   Means of arrival:   Comments:  Terri [age]F cough, covid exposure

## 2024-10-15 NOTE — TELEPHONE ENCOUNTER
Patient Contact    Attempt # 3    Was call answered?  No.  Left message on voicemail with information to call me back.    Called daughter Radha (C2C) - shared situation daughter reported that the last she saw her mom last week she did not mention any discomfort. Daughter said that she had taken a covid test after her spouse tested positive, it was , but appeared to be negative. Daughter also said that she isn't sure when the pt's home care nurse is due out next.     Triage nurse said that I'd huddle with PCP to decide on next steps. Daughter requested an update as well.    **followup -- huddled with PCP virtually who agreed that a wellness check would be appropriate.

## 2024-10-15 NOTE — ED PROVIDER NOTES
"  Emergency Department Note      History of Present Illness     Chief Complaint   Cough      HPI   Lucille Rojas is a 84 year old female presenting to us for generalized weakness and cough.  From what I can ascertain, the patient has been concerned that her  was hospitalized 3 weeks ago for COVID.  He has since been home and has been caring for her.  She felt fairly well through the weekend, able to attend a .  However, she feels that she has had a persistent cough over the past 3 weeks.  She had a negative COVID test at home.  She called her oncologist, letting them know that COVID was in her house and that she was feeling somewhat weak and with a cough and they advised her to come to the hospital for assessment.    At the time of my evaluation, the patient tells me that she is \"feeling pretty good.\"  She notes that she has been eating and drinking normally.  She denies fevers.  She denies overt shortness of breath.  She feels that she is able to care for herself, and states that she \"only came in because I was told that I should.\"    Collateral information from the daughter is at the patient appeared well over the weekend at a family .  They did not notice increasing cough or shortness of breath.  They confirm that the patient called the oncologist on her own and that this evaluation in the ER was recommended.    Independent Historian   Daughter as detailed above.    Review of External Notes   Yes-I reviewed the triage nurse call line from the patient and the advice for her to come to the emergency department.    Past Medical History     Medical History and Problem List   Past Medical History:   Diagnosis Date    Cancer (H) 10/2021    Depressive disorder     Heart disease 10/2021    History of blood transfusion     HTN (hypertension) 2013    Hyperlipidemia LDL goal <130 2013    Hypothyroidism 2013    Macular degeneration 2013    Sleep apnea     Type 2 diabetes, HbA1C " goal < 8% (H) 5/9/2013       Medications   acetaminophen (TYLENOL) 325 MG tablet  aspirin 81 MG tablet  atorvastatin (LIPITOR) 40 MG tablet  azithromycin (ZITHROMAX) 500 MG tablet  carboxymethylcellulose PF (REFRESH PLUS) 0.5 % ophthalmic solution  citalopram (CELEXA) 20 MG tablet  CONTOUR NEXT TEST test strip  furosemide (LASIX) 20 MG tablet  levothyroxine (SYNTHROID/LEVOTHROID) 150 MCG tablet  loratadine (CLARITIN) 10 MG tablet  LORazepam (ATIVAN) 0.5 MG tablet  metoprolol succinate ER (TOPROL XL) 25 MG 24 hr tablet  miconazole (MICATIN) 2 % external powder  Microlet Lancets MISC  nystatin (MYCOSTATIN) 933722 UNIT/GM external powder  order for DME  pantoprazole (PROTONIX) 40 MG EC tablet        Surgical History   Past Surgical History:   Procedure Laterality Date    APPENDECTOMY      COLONOSCOPY      COLONOSCOPY N/A 10/27/2014    Procedure: COMBINED COLONOSCOPY, SINGLE OR MULTIPLE BIOPSY/POLYPECTOMY BY BIOPSY;  Surgeon: Jose Alfredo Morejon MD;  Location:  GI    CV CORONARY ANGIOGRAM N/A 7/15/2022    Procedure: Coronary Angiogram;  Surgeon: Mary Jo Rodriguez MD;  Location: [facility] CARDIAC CATH LAB    CV PCI N/A 7/15/2022    Procedure: Percutaneous Coronary Intervention;  Surgeon: Mary Jo Rodriguez MD;  Location: [facility] CARDIAC CATH LAB    CV TRANSCATHETER AORTIC VALVE REPLACEMENT-FEMORAL APPROACH N/A 8/23/2022    Procedure: Transcatheter Aortic Valve Replacement-Femoral Approach;  Surgeon: Mary Jo Rodriguez MD;  Location: [facility] CARDIAC CATH LAB    ESOPHAGOSCOPY, GASTROSCOPY, DUODENOSCOPY (EGD), COMBINED N/A 9/21/2021    Procedure: ESOPHAGOGASTRODUODENOSCOPY, WITH FINE NEEDLE ASPIRATION BIOPSY, WITH ENDOSCOPIC ULTRASOUND GUIDANCE;  Surgeon: Vera Benitez MD;  Location:  GI    ESOPHAGOSCOPY, GASTROSCOPY, DUODENOSCOPY (EGD), COMBINED N/A 9/21/2021    Procedure: Esophagogastroduodenoscopy, With Biopsy;  Surgeon: Vera Benitez MD;  Location:  GI    ESOPHAGOSCOPY, GASTROSCOPY,  DUODENOSCOPY (EGD), COMBINED N/A 10/5/2021    Procedure: ESOPHAGOGASTRODUODENOSCOPY, WITH FINE NEEDLE ASPIRATION BIOPSY, WITH ENDOSCOPIC ULTRASOUND GUIDANCE;  Surgeon: Dixon Olivier MD;  Location:  GI    ESOPHAGOSCOPY, GASTROSCOPY, DUODENOSCOPY (EGD), COMBINED N/A 12/22/2021    Procedure: ESOPHAGOGASTRODUODENOSCOPY, WITH BIOPSY;  Surgeon: Vera Benitez MD;  Location:  GI    HERNIA REPAIR      inguinal x 2    IR CHEST PORT PLACEMENT > 5 YRS OF AGE  12/13/2021    IR PORT REMOVAL RIGHT  10/7/2022    TONSILLECTOMY         Physical Exam     Patient Vitals for the past 24 hrs:   BP Temp Temp src Pulse Resp SpO2 Height Weight   10/15/24 1230 (!) 147/68 -- -- 59 15 93 % -- --   10/15/24 1215 129/63 -- -- 58 16 92 % -- --   10/15/24 1131 137/63 -- -- 63 20 93 % -- --   10/15/24 1118 (!) 146/72 97.9  F (36.6  C) Oral 59 20 94 % 1.524 m (5') 102.1 kg (225 lb)     Physical Exam  General: Elderly woman resting comfortably in the bed providing an appropriate history showing no signs of acute distress.    Eye:  Pupils are equal, round, and reactive.  Extraocular movements intact.    ENT:  No rhinorrhea.  Moist mucus membranes.  Normal tongue and tonsil.    Cardiac:  Regular rate and rhythm.  No murmurs, gallops, or rubs.    Pulmonary:  Clear to auscultation bilaterally.  No wheezes, rales, or rhonchi.  No increased work of breathing noted.    Abdomen:  Positive bowel sounds.  Abdomen is soft and non-distended, without focal tenderness.    Musculoskeletal:  Normal movement of all extremities without evidence for deficit.    Skin:  Warm and dry without rashes.    Neurologic:  Non-focal exam without asymmetric weakness or numbness.     Psychiatric:  Normal affect with appropriate interaction with examiner.      Diagnostics     Lab Results   Labs Ordered and Resulted from Time of ED Arrival to Time of ED Departure   INFLUENZA A/B, RSV, & SARS-COV2 PCR - Abnormal       Result Value    Influenza A PCR  Negative      Influenza B PCR Negative      RSV PCR Negative      SARS CoV2 PCR Positive (*)    BASIC METABOLIC PANEL - Abnormal    Sodium 140      Potassium 4.6      Chloride 103      Carbon Dioxide (CO2) 31 (*)     Anion Gap 6 (*)     Urea Nitrogen 18.2      Creatinine 1.12 (*)     GFR Estimate 48 (*)     Calcium 8.8      Glucose 91     CBC WITH PLATELETS AND DIFFERENTIAL - Abnormal    WBC Count 4.2      RBC Count 4.20      Hemoglobin 11.7      Hematocrit 37.8      MCV 90      MCH 27.9      MCHC 31.0 (*)     RDW 14.0      Platelet Count 192      % Neutrophils 44      % Lymphocytes 30      % Monocytes 19      % Eosinophils 6      % Basophils 1      % Immature Granulocytes 0      NRBCs per 100 WBC 0      Absolute Neutrophils 1.8      Absolute Lymphocytes 1.3      Absolute Monocytes 0.8      Absolute Eosinophils 0.3      Absolute Basophils 0.0      Absolute Immature Granulocytes 0.0      Absolute NRBCs 0.0         Imaging   Chest XR,  PA & LAT   Preliminary Result   IMPRESSION: Increased opacification at the bilateral lung bases that   could represent acute nonspecific airspace disease and/or atelectasis.   Mildly prominent cardiac silhouette.          Independent Interpretation   CXR: No infiltrate or pulmonary edema.    ED Course      Medications Administered   Medications   sodium chloride (PF) 0.9% PF flush 3 mL (3 mLs Intracatheter $Given 10/15/24 1202)   sodium chloride (PF) 0.9% PF flush 3 mL (has no administration in time range)       Procedures   Procedures     Discussion of Management   None    ED Course        Additional Documentation  None    Medical Decision Making / Diagnosis     CMS Diagnoses: None    MIPS       None    MDM   Lucille Rojas is a 84 year old female presenting to us with several weeks of a cough.  The patient believes that she has been coughing for roughly 2 to 3 weeks and is concerned that it continues.  She has had exposure to COVID via her  at home.  On my exam, she appears  clinically well.  She has minimal nasal congestion.  She has minimal cough.  She has no increased work of breathing and is not hypoxic.  Blood work is reassuring, showing a normal white blood cell count and normal hemoglobin.  Chemistries and kidney function are normal.  Chest x-ray shows no evidence of pneumonia.  Her COVID test is indeed positive.  I explained to the patient that it is unclear how acute this COVID infection is.  I favor it being somewhat longer considering the chronicity of her cough and her lack of fever or other overt symptoms.    With this, I spoke with the patient and father proceed.  She is eager to be discharged home, feeling that she is able to care for herself there.  I reached out to her daughter who concurs that the patient seems to be thriving in her current situation.  I was very clear with both the daughter and the patient that there should be no hesitation to return to the ER if she is having increased work of breathing, fevers, worsening weakness, or other emergent concerns.    Disposition   The patient was discharged.     Diagnosis     ICD-10-CM    1. General weakness  R53.1       2. COVID-19  U07.1            Discharge Medications   New Prescriptions    No medications on file         Chad A. Trierweiler, MD Trierweiler, Chad A, MD  10/15/24 [id number]

## 2024-10-15 NOTE — TELEPHONE ENCOUNTER
Spoke to [place] [employer] dispatch - requested a wellness check on pt. Provided dispatcher with pt's name/address/phone number, as well as daughter's name and phone number to followup.    Expecting callback by EOD today.    Mila Jimenez RN

## 2024-10-18 DIAGNOSIS — E11.9 TYPE 2 DIABETES, HBA1C GOAL < 8% (H): ICD-10-CM

## 2024-10-18 DIAGNOSIS — Z76.0 MEDICATION REFILL: Primary | ICD-10-CM

## 2024-10-18 DIAGNOSIS — H35.30 MACULAR DEGENERATION OF BOTH EYES, UNSPECIFIED TYPE: ICD-10-CM

## 2024-10-18 NOTE — TELEPHONE ENCOUNTER
[facility] - Status Update    Who is Calling: Home Care - [facility]    Update: Just had home visit with pt, she was diagnosed qith covid early this week, she feels better, no fever, denied breathing issues, 02 at 95%, lungs sounded clear. She is out of her Aspirin 81 mgs, she been taken Preservision(Areds) 2 capsules a day, she has purchased this over the counter for vision, wonder if  Will prescribe this, so she is not having to buy it out of pocket, she is also out of this medication. Can we also confirm if she needs to check her blood sugars, she has not been checking them regulary, if not can you discontinue and they can update her care plan    Pharmacy [facility] on [address]  Does caller want a call/response back: Yes     Could we send this information to you in [facility] or would you prefer to receive a phone call?:   Patient would prefer a phone call   Okay to leave a detailed message?: Yes at Cell number on file:    Telephone Information:   Mobile [phone]- confidential work phone

## 2024-10-18 NOTE — TELEPHONE ENCOUNTER
Brandie calling to update PCP that patient was in ER on 10/15 and has covid so she is going to go see patient         Home Care is calling regarding an established patient with [facility].       Requesting orders from: Fausto Flynn  Provider is following patient: Yes  Is this a 60-day recertification request?  No    Orders Requested    Skilled Nursing  Request for continuation of care with no increase or decrease in frequency  Frequency:  1 prn           Confirmed ok to leave a detailed message with call back.  Contact information confirmed and updated as needed.    Roger Muller RN

## 2024-10-22 RX ORDER — ANTIOX #8/OM3/DHA/EPA/LUT/ZEAX 250-2.5 MG
2 CAPSULE ORAL DAILY
Qty: 180 CAPSULE | Refills: 3 | Status: SHIPPED | OUTPATIENT
Start: 2024-10-22

## 2024-10-22 RX ORDER — ASPIRIN 81 MG/1
81 TABLET, CHEWABLE ORAL DAILY
Qty: 90 TABLET | Refills: 3 | Status: SHIPPED | OUTPATIENT
Start: 2024-10-22

## 2024-10-22 RX ORDER — CARBOXYMETHYLCELLULOSE SODIUM 5 MG/ML
1 SOLUTION/ DROPS OPHTHALMIC 2 TIMES DAILY
Qty: 90 ML | Refills: 3 | Status: SHIPPED | OUTPATIENT
Start: 2024-10-22
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
## 2024-10-22 NOTE — TELEPHONE ENCOUNTER
Options have been refilled.  May fax labs and medication per request.  If changes in mental status or visual hallucinations worsen patient should be seen.

## 2024-10-24 ENCOUNTER — PATIENT OUTREACH (OUTPATIENT)
Dept: CARE COORDINATION | Facility: CLINIC | Age: 84
End: 2024-10-24
Payer: MEDICARE

## 2024-10-24 NOTE — TELEPHONE ENCOUNTER
Left detailed message on answering machine with instructions per provider.    Team-Please fax medication list per request below.     Thank you~    Catalina Mora RN

## 2024-10-24 NOTE — PROGRESS NOTES
Clinic Care Coordination Contact  Community Health Worker Follow Up    Care Gaps:     Health Maintenance Due   Topic Date Due    HF ACTION PLAN  Never done    RSV VACCINE (1 - 1-dose 75+ series) Never done    DTAP/TDAP/TD IMMUNIZATION (2 - Td or Tdap) 01/01/2019    DIABETIC FOOT EXAM  06/29/2023    ZOSTER IMMUNIZATION (2 of 2) 08/22/2023    EYE EXAM  12/21/2023    COVID-19 Vaccine (7 - 2024-25 season) 09/01/2024    DEPRESSION 12 MO INDEX REPEAT PHQ-9  10/05/2024       Did Not Address    Care Plan:   Care Plan: Social Support       Problem: Inadequate social support       Goal: I will access resources to obtain more in home support.       Start Date: 11/29/2022 Expected End Date: 10/31/2024    Recent Progress: 90%    Note:     Barriers: resource availability  Strengths: pt is a good advocate for self.   Patient expressed understanding of goal: yes  Action steps to achieve this goal:  1. I will contact the county about [place]. Reviewed this process on 12/21/23.     2. I will contact resources given to me. Discussed Help at your Door on 12/21/23.  Emailed birgit Rajput resources on 12/20/23.    3. I will contact my CHW with any needs or questions.                               Discussed:  Patient stated she is doing better since having Covid.  Patient stated home care will visit on 10/28/24.  Patient stated she uses two canes at night and at times during the day.  Patient stated [employer] will deliver food next week.  Patient stated she is going to ask her son to  a few groceries.  Patient stated she takes 12 - 15 medications.  Patient stated her  was in the hospital for 4 days with Covid.  Patient stated she sees things that are really are not there- pretty flowers and people.  Patient stated sometimes it is confusing.  Patient stated her eyesight is worse from 4 months ago.    Intervention and Education during outreach:     CHW confirmed upcoming appointments.    CHW encouraged patient to  contact CC if there are any other changes or resources needed.  Patient acknowledged understanding.      CHW Plan: will outreach in one month.    Rosanne Valadez, CHW  Clinic Care Coordination  [facility] Clinics: [facility] [place], and [facility]  Phone: [phone]

## 2024-10-28 ENCOUNTER — TELEPHONE (OUTPATIENT)
Dept: INTERNAL MEDICINE | Facility: CLINIC | Age: 84
End: 2024-10-28
Payer: MEDICARE

## 2024-10-28 DIAGNOSIS — B37.2 INTERTRIGINOUS CANDIDIASIS: Primary | ICD-10-CM

## 2024-10-28 DIAGNOSIS — C85.12 B-CELL LYMPHOMA OF INTRATHORACIC LYMPH NODES, UNSPECIFIED B-CELL LYMPHOMA TYPE (H): ICD-10-CM

## 2024-10-28 RX ORDER — NYSTATIN 100000 [USP'U]/G
POWDER TOPICAL 2 TIMES DAILY
Qty: 60 G | Refills: 1 | Status: SHIPPED | OUTPATIENT
Start: 2024-10-28

## 2024-10-28 NOTE — TELEPHONE ENCOUNTER
Following up on med request;     [facility] stating med order not file.     Reviewed refills with RN; [facility]ills [facility].     ASA, refresh eye drops, multiple vitamins       Routing to provider;     Order request: miconazole and nystatin refill.Pt's has rash under both breasts, no open areas.     Routing to refill pool; please review and sign if approved.     Schedule Request: appointment with PCP. Home care Rn reporting visual hallucinations continued, worsened. Hallucinations are occurring more frequently, daily, constant per pt.      Per chart check, pt was seen by neurology 5/8/24. Pt was instructed to follow up in three months, but has not scheduled appointment. Pt was instructed to call and schedule appointment and update MD regarding symptoms. Pt refusing at this time, home care stating pt appearing overwhelmed. Pt continues to request to be seen by PCP for hallucinations. Routing to provider; please review and advise.     Pt is seeing flowers in the trees and black shapes. Not able to assess how long episodes are lasting.     Please return to call to pt and home care RN.

## 2024-11-11 DIAGNOSIS — E11.9 TYPE 2 DIABETES, HBA1C GOAL < 8% (H): Primary | ICD-10-CM

## 2024-11-11 NOTE — TELEPHONE ENCOUNTER
"Home care called the clinic stating that the pt is needing a refill of blood glucose test strips.     Per their report, she states the pt has not been checking her blood sugar routinely. She is needing a refill as her current strips are .     Her blood sugar was 133 today after lunch.     Script pended, routing to PCP for review as med is \"patient reported\".     Thank you,  Kemi Clements RN    "

## 2024-11-12 ENCOUNTER — MEDICAL CORRESPONDENCE (OUTPATIENT)
Dept: HEALTH INFORMATION MANAGEMENT | Facility: CLINIC | Age: 84
End: 2024-11-12
Payer: MEDICARE

## 2024-11-13 ENCOUNTER — PATIENT OUTREACH (OUTPATIENT)
Dept: CARE COORDINATION | Facility: CLINIC | Age: 84
End: 2024-11-13
Payer: MEDICARE

## 2024-11-13 ASSESSMENT — ACTIVITIES OF DAILY LIVING (ADL): DEPENDENT_IADLS:: TRANSPORTATION

## 2024-11-13 NOTE — Clinical Note
[facility]  Patient Centered Plan of Care  About Me:        Patient Name:  Lucille Rojas    YOB: 1940  Age:         84 year old   Nito MRN:    [id number] Telephone Information:  Home Phone [phone]   Mobile [phone]       Address:  [address]  [facility] [address] Email address:  [email or web address]      Emergency Contact(s)  Name Relationship Lgl Grd Work Phone Home Phone Mobile Phone   1. EMIL ROJAS Spouse No   [phone]   2. HARRIET ROJAS Daughter No  [phone]   3. MONICA BUCKNER * Daughter No  [phone]   4. JOSEFA ROJAS Son No  [phone]   5. JULIEN VO Son   [phone]           Primary language:  English     needed? No   [place]:  [phone] op. 1  Other communication barriers:None    Preferred Method of Communication:  Mail  Current living arrangement: I live in a private home with spouse    Mobility Status/ Medical Equipment: Independent w/Device        Health Maintenance  Health Maintenance Reviewed: Due/Overdue ***      My Access Plan  Medical Emergency 911   Primary Clinic Line [facility]* - [phone]   24 Hour Appointment Line [phone] or  [phone]) (toll-free)   24 Hour Nurse Line [phone] (toll-free)   Preferred Urgent Care [facility], [phone]     Preferred Hospital [facility]  [phone]     Preferred Pharmacy [id number] - [place], [place] - [address] [facility] Crisis Line The National Suicide Prevention Lifeline at 1-827.866.6052 or Text/Call 938           My Care Team Members  Patient Care Team       Relationship Specialty Notifications Start End    Fausto Flynn MD PCP - General Internal Medicine  10/6/14     Phone: [phone] Fax: [phone]         [address] [address]     Fausto Flynn MD Assigned PCP   10/12/14     Phone: [phone] Fax: [phone]         [address] [place] [address]    Yonny Roman MD MD INTERNAL MEDICINE - ENDOCRINOLOGY, DIABETES & METABOLISM  12/3/19     Phone: [phone] Fax: [phone]         [facility] [address] [place] [address]    Rosanne Valadez CHW Community Health Worker Primary Care - CC Admissions 1/22/20     Phone: [phone]         [facility]    12/27/21     TCU    Phone: [phone] Fax: [phone] [address] [facility] [address]    Rafa Kelly AuD Audiologist Audiology  9/7/22     Phone: [phone] Fax: [phone] [address] [facility] [address]    Puja Damian, KORTNEYW Lead Care Coordinator Primary Care - CC Admissions 2/2/23     Phone: [phone]         Linsey Benitez CNP Assigned Heart and Vascular Provider   5/13/23     Phone: [phone] Pager: [phone] Fax: [phone] [address] [place] [address]    John Srinivasan MD Assigned Cancer Care Provider   8/5/23     Phone: [phone] Fax: [phone]         [facility] [address] [place] [address]    Dolly German RD Food Resource Navigator   8/14/24     Phone: [phone] Fax: [phone] [address] [facility] [address]              My Care Plans  Self Management and Treatment Plan    Care Plan  Care Plan: Social Support     Problem: Inadequate social support     Goal: I will access resources to obtain more in home support.     Start Date: 11/29/2022 Expected End Date: 12/31/2024    Recent Progress: 90%    Note:     Barriers: resource availability  Strengths: pt is a good advocate for self.   Patient expressed understanding of goal: yes  Action steps to achieve this goal:  1. I will contact the [facility] about a MNChoices assessment. Reviewed this process on 12/21/23.      2. I will contact resources given to me. Discussed Help at your Door on 12/21/23.  Emailed daughter Atiya resources on 12/20/23.    3. I will contact my CHW with any needs or questions.                             Action Plans on File:            Depression          Advance Care Plans/Directives:   Advanced Care Plan/Directives on file: No    Discussed with patient/caregiver(s): No data recorded             My Medical and Care Information  Problem List   Patient Active Problem List   Diagnosis    Hyperlipidemia LDL goal <100    Macular degeneration    Apnea    Morbid obesity due to excess calories (H)    CKD (chronic kidney disease) stage 3, GFR 30-59 ml/min (H)    Acquired hypothyroidism    Benign essential hypertension    Gastroesophageal reflux disease without esophagitis    Type 2 diabetes mellitus with stage 3 chronic kidney disease, without long-term current use of insulin (H)    Anemia, unspecified type    MDD (major depressive disorder), recurrent episode, mild (H)    Severe anemia    Severe obesity (BMI 35.0-39.9) with comorbidity (H)    Peripheral vision loss, unspecified laterality    Type 2 DM with CKD stage 3 and hypertension (H)    Acute on chronic blood loss anemia    B-cell lymphoma of intrathoracic lymph nodes, unspecified B-cell lymphoma type (H)    SHAVON (obstructive sleep apnea)    Pancreatic mass    Nonrheumatic aortic (valve) stenosis with insufficiency    Diastolic heart failure, unspecified HF chronicity (H)    Hypothyroidism, unspecified type    Major depressive disorder with current active episode, unspecified depression episode severity, unspecified whether recurrent    Physical deconditioning    Severe obesity (BMI >= 40) (H)    Callus of foot    Non-Hodgkin's lymphoma (H)    Hypoxia    Generalized muscle weakness    Symptomatic anemia    Diverticular disease of colon    Status post coronary angiogram    Aortic stenosis, severe    Skin ulcer of great toe, unspecified laterality, limited to  breakdown of skin (H)      Current Medications:  {cccareplanmeds:937975}    Care Coordination Start Date: 12/3/2019   Frequency of Care Coordination: monthly, more frequently as needed     Form Last Updated: 11/13/2024

## 2024-11-13 NOTE — PROGRESS NOTES
Clinic Care Coordination Contact  Care Coordination Clinician Chart Review    Situation: Patient chart reviewed by Care Coordinator.       Background: Care Coordination Program started: 12/3/2019. Initial assessment completed and patient-centered care plan(s) were developed with participation from patient. Lead CC handed patient off to CHW for continued outreaches.       Assessment: Per chart review, patient outreach completed by CC CHW on 10/24/24.  Patient is actively working to accomplish goal(s). Patient's goal(s) appropriate and relevant at this time. Patient is not due for updated Plan of Care.  Assessments will be completed annually or as needed/with change of patient status.      Care Plan: Social Support       Problem: Inadequate social support       Goal: I will access resources to obtain more in home support.       Start Date: 11/29/2022 Expected End Date: 12/31/2024    Recent Progress: 90%    Note:     Barriers: resource availability  Strengths: pt is a good advocate for self.   Patient expressed understanding of goal: yes  Action steps to achieve this goal:  1. I will contact the [facility] about a MNChoices assessment. Reviewed this process on 12/21/23.     2. I will contact resources given to me. Discussed Help at your Door on 12/21/23.  Emailed birgit Rajput resources on 12/20/23.    3. I will contact my CHW with any needs or questions.                                      Plan/Recommendations: The patient will continue working with Care Coordination to achieve goal(s) as above. CHW will continue outreaches at minimum every 30 days and will involve Lead CC as needed or if patient is ready to move to Maintenance. Lead CC will continue to monitor CHW outreaches and patient's progress to goal(s) every 6 weeks.     Plan of Care updated and sent to patient: TORIE Alas   Care Coordination Team  [phone]

## 2024-11-20 ENCOUNTER — ONCOLOGY VISIT (OUTPATIENT)
Dept: ONCOLOGY | Facility: CLINIC | Age: 84
End: 2024-11-20
Attending: INTERNAL MEDICINE
Payer: MEDICARE

## 2024-11-20 ENCOUNTER — LAB (OUTPATIENT)
Dept: INFUSION THERAPY | Facility: CLINIC | Age: 84
End: 2024-11-20
Attending: INTERNAL MEDICINE
Payer: MEDICARE

## 2024-11-20 VITALS
HEART RATE: 61 BPM | TEMPERATURE: 97.7 F | DIASTOLIC BLOOD PRESSURE: 71 MMHG | BODY MASS INDEX: 44.14 KG/M2 | SYSTOLIC BLOOD PRESSURE: 112 MMHG | RESPIRATION RATE: 16 BRPM | OXYGEN SATURATION: 93 % | WEIGHT: 226 LBS

## 2024-11-20 DIAGNOSIS — C85.12 B-CELL LYMPHOMA OF INTRATHORACIC LYMPH NODES, UNSPECIFIED B-CELL LYMPHOMA TYPE (H): ICD-10-CM

## 2024-11-20 DIAGNOSIS — C85.99 NON-HODGKIN LYMPHOMA OF SOLID ORGAN EXCLUDING SPLEEN, UNSPECIFIED NON-HODGKIN LYMPHOMA TYPE (H): Primary | ICD-10-CM

## 2024-11-20 LAB
ALBUMIN SERPL BCG-MCNC: 3.8 G/DL (ref 3.5–5.2)
ALP SERPL-CCNC: 121 U/L (ref 40–150)
ALT SERPL W P-5'-P-CCNC: 13 U/L (ref 0–50)
ANION GAP SERPL CALCULATED.3IONS-SCNC: 8 MMOL/L (ref 7–15)
AST SERPL W P-5'-P-CCNC: 19 U/L (ref 0–45)
BASOPHILS # BLD AUTO: 0 10E3/UL (ref 0–0.2)
BASOPHILS NFR BLD AUTO: 1 %
BILIRUB SERPL-MCNC: 0.3 MG/DL
BUN SERPL-MCNC: 25.1 MG/DL (ref 8–23)
CALCIUM SERPL-MCNC: 9.2 MG/DL (ref 8.8–10.4)
CHLORIDE SERPL-SCNC: 105 MMOL/L (ref 98–107)
CREAT SERPL-MCNC: 1.03 MG/DL (ref 0.51–0.95)
EGFRCR SERPLBLD CKD-EPI 2021: 53 ML/MIN/1.73M2
EOSINOPHIL # BLD AUTO: 0.3 10E3/UL (ref 0–0.7)
EOSINOPHIL NFR BLD AUTO: 5 %
ERYTHROCYTE [DISTWIDTH] IN BLOOD BY AUTOMATED COUNT: 13.7 % (ref 10–15)
GLUCOSE SERPL-MCNC: 104 MG/DL (ref 70–99)
HCO3 SERPL-SCNC: 31 MMOL/L (ref 22–29)
HCT VFR BLD AUTO: 36.5 % (ref 35–47)
HGB BLD-MCNC: 11.5 G/DL (ref 11.7–15.7)
IMM GRANULOCYTES # BLD: 0 10E3/UL
IMM GRANULOCYTES NFR BLD: 0 %
LDH SERPL L TO P-CCNC: 153 U/L (ref 0–250)
LYMPHOCYTES # BLD AUTO: 1.3 10E3/UL (ref 0.8–5.3)
LYMPHOCYTES NFR BLD AUTO: 21 %
MCH RBC QN AUTO: 28.1 PG (ref 26.5–33)
MCHC RBC AUTO-ENTMCNC: 31.5 G/DL (ref 31.5–36.5)
MCV RBC AUTO: 89 FL (ref 78–100)
MONOCYTES # BLD AUTO: 0.6 10E3/UL (ref 0–1.3)
MONOCYTES NFR BLD AUTO: 10 %
NEUTROPHILS # BLD AUTO: 3.7 10E3/UL (ref 1.6–8.3)
NEUTROPHILS NFR BLD AUTO: 62 %
NRBC # BLD AUTO: 0 10E3/UL
NRBC BLD AUTO-RTO: 0 /100
PLATELET # BLD AUTO: 280 10E3/UL (ref 150–450)
POTASSIUM SERPL-SCNC: 4.9 MMOL/L (ref 3.4–5.3)
PROT SERPL-MCNC: 6.9 G/DL (ref 6.4–8.3)
RBC # BLD AUTO: 4.09 10E6/UL (ref 3.8–5.2)
SODIUM SERPL-SCNC: 144 MMOL/L (ref 135–145)
WBC # BLD AUTO: 5.9 10E3/UL (ref 4–11)

## 2024-11-20 PROCEDURE — 85025 COMPLETE CBC W/AUTO DIFF WBC: CPT | Performed by: INTERNAL MEDICINE

## 2024-11-20 PROCEDURE — 36415 COLL VENOUS BLD VENIPUNCTURE: CPT

## 2024-11-20 PROCEDURE — G2211 COMPLEX E/M VISIT ADD ON: HCPCS | Performed by: INTERNAL MEDICINE

## 2024-11-20 PROCEDURE — 82040 ASSAY OF SERUM ALBUMIN: CPT | Performed by: INTERNAL MEDICINE

## 2024-11-20 PROCEDURE — 83615 LACTATE (LD) (LDH) ENZYME: CPT | Performed by: INTERNAL MEDICINE

## 2024-11-20 PROCEDURE — 99214 OFFICE O/P EST MOD 30 MIN: CPT | Performed by: INTERNAL MEDICINE

## 2024-11-20 PROCEDURE — 84132 ASSAY OF SERUM POTASSIUM: CPT | Performed by: INTERNAL MEDICINE

## 2024-11-20 PROCEDURE — 82947 ASSAY GLUCOSE BLOOD QUANT: CPT | Performed by: INTERNAL MEDICINE

## 2024-11-20 PROCEDURE — G0463 HOSPITAL OUTPT CLINIC VISIT: HCPCS | Performed by: INTERNAL MEDICINE

## 2024-11-20 ASSESSMENT — PAIN SCALES - GENERAL: PAINLEVEL_OUTOF10: NO PAIN (0)

## 2024-11-20 NOTE — LETTER
documentation.      Again, thank you for allowing me to participate in the care of your patient.        Sincerely,        John Srinivasan MD

## 2024-11-20 NOTE — PROGRESS NOTES
Medical Assistant Note:  Lucille Rojas presents today for lab draw.    Patient seen by provider today: Yes: Dr Srinivasan.   present during visit today: Not Applicable.    Concerns: No Concerns.    Procedure:  Lab draw site: LAC, Needle type: BF, Gauge: 21. Gauze and coban applied    Post Assessment:  Labs drawn without difficulty: Yes.    Discharge Plan:  Departure Mode: Ambulatory.    Face to Face Time: 5.    Ginna Marie CMA

## 2024-11-20 NOTE — PROGRESS NOTES
Oncology Rooming Note    November 20, 2024 9:58 AM   Lucille Rojas is a 84 year old female who presents for:    Chief Complaint   Patient presents with    Oncology Clinic Visit     Initial Vitals: /71   Pulse 61   Temp 97.7  F (36.5  C) (Oral)   Resp 16   Wt 102.5 kg (226 lb)   SpO2 93%   BMI 44.14 kg/m   Estimated body mass index is 44.14 kg/m  as calculated from the following:    Height as of 10/15/24: 1.524 m (5').    Weight as of this encounter: 102.5 kg (226 lb). Body surface area is 2.08 meters squared.  No Pain (0) Comment: Data Unavailable   No LMP recorded. Patient has had a hysterectomy.  Allergies reviewed: Yes  Medications reviewed: Yes    Medications: Medication refills not needed today.  Pharmacy name entered into [employer]:    [id number] - [place], [place] - [address] [facility] [address]    Frailty Screening:   Is the patient here for a new oncology consult visit in cancer care? 2. No      Clinical concerns: no       Shari J. Schoenberger, CHELO

## 2024-11-20 NOTE — PROGRESS NOTES
ONCOLOGY HISTORY: Ms. 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  1. On 09/20/2021:   -Hemoglobin of 4.7 with MCV of 67. Normal WBC and platelets.  -Iron of 9 and saturation index of 2%.   -CT chest, abdomen and pelvis reveals large pancreatic head mass.  This encases the celiac trunk, superior mesenteric artery and superior mesenteric vein.  There is moderate-size right pleural effusion. No lymphadenopathy.  2. Thoracocentesis on 09/22/2021. Cytology is negative for malignancy.  3. EUS on 09/21/2021 revealed is a mass in the uncinate process of the pancreas measuring 33 mm.  There was evidence of invasion into superior mesenteric artery and the celiac trunk. No enlarged lymph nodes seen. FNA revealed atypical cells.    4. EGD and EUS repeated on 10/05/2021.  -EGD is normal.  -EUS revealed pancreatic head mass.  FNA revealed CD10-positive B-cell lymphoma. Flow cytometry revealed CD10-positive lambda monotypic B-cells.  5. PET scan on 11/18/2021 revealed 6 cm hypermetabolic pancreatic head mass and borderline hypermetabolic right axillary lymph node.   -Further repeat biopsy not done because of her overall poor health.  6. mini R-CHOP x 6 cycles between 12/15/2021 and 03/30/2022.  -PET scan on 04/11/21 reveals complete response.     SUBJECTIVE:    Mrs. Rojas is a 84-year-old female with B-cell non-Hodgkin lymphoma of the pancreas, status post 6 cycles of mini R-CHOP completed in 03/0222.  She has been in complete remission.     Her vision is not good. She has macular degeneration.     She has generalized weakness. She uses walker. Weakness has been slowly getting worse. She is able to do activities of daily living slowly. No headache. She gets occasional dizziness. Memory is not good. She is more forgetful. No chest pain. No shortness of breath at rest.  Gets short of breath on minimal exertion.  No abdominal pain.  No nausea or vomiting. Appetite is good. No fever or chills.  She has grade 1 peripheral neuropathy from diabetes and chemotherapy. She has mild numbness and tingling in fingers and toes. Neuropathy is stable.      PHYSICAL EXAMINATION:    GENERAL:  She is alert and oriented x 3. ECOG PS of 2. Looks weak.  FACE:  No swelling.  EYES:  No icterus.   NECK:  Supple. No lymphadenopathy.  LUNGS:  Decreased air entry at the bases.  HEART:  Regular.  GI: Abdomen is soft. Nontender.  Difficult palpation because of her weight.  No mass felt.  EXTREMITIES:  Mild ankle edema.  SKIN:  No petechiae.     LABORATORY:  CBC, LDH and CMP were reviewed.     ASSESSMENT:    1.   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  She is in complete remission.  2.  Generalized weakness secondary to her age and multiple other medical problems.  3.  Chronic kidney disease.  4.  Mild normocytic anemia from chronic kidney disease and anemia of chronic disease.  5.  Grade 1 peripheral neuropathy.  6.  Multiple other medical problems including hypertension and diabetes mellitus.  7.  Forgetfulness.     PLAN:    -CT scan and labs in 6 months.  -Follow-up in 6 months.     DISCUSSION:        1.  Patient's overall clinical status is stable.  She has generalized weakness.  No worsening of it.     CT scan was reviewed with her.  Explained to her that there is no evidence of lymphoma.  She was happy to know that.     2.  Discussed with her regarding non-Hodgkin's lymphoma.  She has been in remission for 2 years.  Risk of recurrence is low.  Will continue to monitor her.  Will consider doing another CT scan in 12 months.     3.  Labs were reviewed with her.  She has mild anemia due to renal disease and anemia of chronic disease.  Explained to her that anemia is mild and not causing her any problem.  Her creatinine is mildly elevated.  Will continue to monitor it.     4.  She has grade 1 peripheral neuropathy.  It is not getting  worse.  Unlikely to completely resolve as she has diabetes.     5.  She and her  had few questions which were all answered.  I will see her in 6 months time with labs.     Total visit time of 30 minutes.  Time spent in today's visit, review of chart/investigations today and documentation today.

## 2024-11-21 ENCOUNTER — PATIENT OUTREACH (OUTPATIENT)
Dept: CARE COORDINATION | Facility: CLINIC | Age: 84
End: 2024-11-21
Payer: MEDICARE

## 2024-11-21 NOTE — PROGRESS NOTES
"This clinician received referral from medical assistant Shari Schoenberger who reported that at time of visit family expressed that Lucille goes through \"times of confusion\" and that this is stressful for her and for  to cope with in home. Family indicated they would be receptive to social work engagement on this issue.     This clinician reviewed chart and saw that primary care team Puja Damian and Rosanne Valadez have been actively engaged with this patient for more than 1 year.     SW updated Puja Damian who reported that primary care team will outreach to Lucille regarding this concern.     Jaja Amin, MACHO, LICSW, OSW-C  Clinical - Adult Oncology  Phone: [phone]  She/Her/Hers  [facility]: [place]  8am-4:30pm  [facility]: KM Gonzalez F 8am-4:30pm   Support Groups at [facility]: Social Work Services for Cancer Patients ([email or web address])      "

## 2024-11-26 ENCOUNTER — TELEPHONE (OUTPATIENT)
Dept: INTERNAL MEDICINE | Facility: CLINIC | Age: 84
End: 2024-11-26
Payer: MEDICARE

## 2024-11-26 NOTE — TELEPHONE ENCOUNTER
Brandie calling with [facility] for provider.     Call if HR < 60: pt's her 54 yesterday 11/25/24. Denies s/s. Pt taking blood pressure meds as prescribed.       Home Care is calling regarding an established patient with [facility].       Requesting orders from: Fausto Flynn  Provider is following patient: Yes  Is this a 60-day recertification request?  No    Orders Requested    Skilled Nursing  Request for discontinuation of care 12/9/24  Goals have been met/progressing.          Home Care is calling regarding an established patient with [facility].       Requesting orders from: Fausto Flynn  Provider is following patient: Yes  Is this a 60-day recertification request?  No    Orders Requested    HHA (Home Health Aide)  Request for discontinuation of care 11/26/24  Goals have been met/progressing.      Marybel Shelby RN         yes

## 2024-12-04 ENCOUNTER — PATIENT OUTREACH (OUTPATIENT)
Dept: CARE COORDINATION | Facility: CLINIC | Age: 84
End: 2024-12-04

## 2024-12-04 NOTE — PROGRESS NOTES
Clinic Care Coordination Contact  [facility]/Voicemail    Clinical Data: Care Coordinator Outreach    Outreach Documentation Number of Outreach Attempt   12/4/2024   2:12 PM 1       Left message on patient's voicemail with call back information and requested return call.      Plan: . Care Coordinator will try to reach patient again in 3-5 business days.    TORIE Lester   Care Coordination Team  [phone]

## 2024-12-04 NOTE — LETTER
[facility] CARE COORDINATION    December 19, 2024        Lucille Rojas  [address]  [facility] [address]          Dear Lucille,     Attached is an updated Patient Centered Plan of Care for your continued enrollment in Care Coordination. Please let us know if you have additional questions, concerns, or goals that we can assist with.    Sincerely,    TORIE Lester   Care Coordination Team  [phone]         [facility]  Patient Centered Plan of Care  About Me:        Patient Name:  Lucille Rojas    YOB: 1940  Age:         84 year old   Nito MRN:    [id number] Telephone Information:  Home Phone [phone]   Mobile [phone]       Address:  [address]  [facility] [address] Email address:  [email or web address]      Emergency Contact(s)    Name Relationship Lgl Grd Work Phone Home Phone Mobile Phone   1. EMIL ROJAS Spouse No   [phone]   2. HARRIET ROJAS Daughter No  [phone]   3. MONICA BUCKNER * Daughter No  [phone]   4. JOSEFA ROJAS Son No  [phone]   5. JULIEN VO Son   [phone]           Primary language:  English     needed? No   [place] [place] [facility]:  [phone] op. 1  Other communication barriers:None    Preferred Method of Communication:  Mail  Current living arrangement: I live in a private home with spouse    Mobility Status/ Medical Equipment: Independent w/Device        Health Maintenance  Health Maintenance Reviewed: Due/Overdue   Health Maintenance Due   Topic Date Due    HF ACTION PLAN  Never done    RSV VACCINE (1 - 1-dose 75+ series) Never done    DTAP/TDAP/TD IMMUNIZATION (2 - Td or Tdap) 01/01/2019    DIABETIC FOOT EXAM  06/29/2023    ZOSTER IMMUNIZATION (2 of 2) 08/22/2023    EYE EXAM  12/21/2023    COVID-19 Vaccine (7 - 2024-25 season) 09/01/2024    DEPRESSION 12 MO INDEX REPEAT PHQ-9  10/05/2024    MICROALBUMIN  12/04/2024           My Access Plan  Medical Emergency 911   Primary Clinic Line [facility] - [phone]   24 Hour Appointment Line [phone] or  [phone]) (toll-free)   24 Hour Nurse Line [phone] (toll-free)   Preferred Urgent Care [facility], [phone]     Preferred Hospital [facility]  [phone]     Preferred Pharmacy [id number] - [place], [place] - [address] [facility] Crisis Line The National Suicide Prevention Lifeline at 1-979.373.7385 or Text/Call 018           My Care Team Members  Patient Care Team         Relationship Specialty Notifications Start End    Fausto Flynn MD PCP - General Internal Medicine  10/6/14     Phone: [phone] Fax: [phone]         [address] [place] [address]    Fausto Flynn MD Assigned PCP   10/12/14     Phone: [phone] Fax: [phone]         [address] [place] [address]    Yonny Roman MD MD INTERNAL MEDICINE - ENDOCRINOLOGY, DIABETES & METABOLISM  12/3/19     Phone: [phone] Fax: [phone]         [facility] [address] [facility] [address]    Rosanne Valadez CHW Community Health Worker Primary Care - CC Admissions 1/22/20     Phone: [phone]         [facility]    12/27/21     TCU    Phone: [phone] Fax: [phone] [address] [facility] [address]    Rafa Kelly MA Audiologist Audiology  9/7/22     Phone: [phone] Fax: [phone] [address] [facility] [address]    Puja Damian LSW Lead Care Coordinator Primary Care - CC Admissions 2/2/23     Phone: [phone]         Linsey Benitez, CNP Assigned Heart and Vascular Provider   5/13/23     Phone: [phone] Pager: [phone] Fax: [phone] [address] [place] [address]    John Srinivasan MD  Assigned Cancer Care Provider   8/5/23     Phone: [phone] Fax: [phone]         [facility] [address] [facility] [address]    Dolly German RD Food Resource Navigator   8/14/24     Phone: [phone] Fax: [phone]         [address] [facility] [address]    Tamanna Sullivan RD Diabetes Educator Diabetes Education  12/10/24                 My Care Plans  Self Management and Treatment Plan    Care Plan  Care Plan: Social Support       Problem: Inadequate social support       Goal: I will access resources to obtain more in home support.       Start Date: 11/29/2022 Expected End Date: 2/28/2025    Recent Progress: 90%    Priority: Medium    Note:     Annual Assessment Update 12/17/24  Barriers: resource availability  Strengths: Pt is a good advocate for self.   Patient expressed understanding of goal: yes  Action steps to achieve this goal:  1. I will contact the [facility] about a M[facility]ho[facility] assessment. Update as of 12/17/24: NOMAN ALONSO will contact birgit Garcia to check in on this.     2. I will contact resources given to me. Discussed Help at your Door on 12/21/23.  Emailed birgit Rajput resources on 12/20/23.    3. We will include my daughter Radha on plans we discuss for more help at home so she is in loop and can assist with connecting to resources as needed.   4. I will contact my CHW with any needs or questions.                                   Action Plans on File:            Depression          Advance Care Plans/Directives:   Advanced Care Plan/Directives on file: Yes    Status of Document(s): No data recorded  Advanced Care Plan/Directives Type: No data recorded           My Medical and Care Information  Problem List   Patient Active Problem List   Diagnosis    Hyperlipidemia LDL goal <100    Macular degeneration    Apnea    Morbid obesity due to excess calories (H)    CKD (chronic kidney disease) stage 3, GFR 30-59 ml/min (H)    Acquired hypothyroidism     Benign essential hypertension    Gastroesophageal reflux disease without esophagitis    Type 2 diabetes mellitus with stage 3 chronic kidney disease, without long-term current use of insulin (H)    Anemia, unspecified type    MDD (major depressive disorder), recurrent episode, mild (H)    Severe anemia    Severe obesity (BMI 35.0-39.9) with comorbidity (H)    Peripheral vision loss, unspecified laterality    Type 2 DM with CKD stage 3 and hypertension (H)    Acute on chronic blood loss anemia    B-cell lymphoma of intrathoracic lymph nodes, unspecified B-cell lymphoma type (H)    SHAVON (obstructive sleep apnea)    Pancreatic mass    Nonrheumatic aortic (valve) stenosis with insufficiency    Diastolic heart failure, unspecified HF chronicity (H)    Hypothyroidism, unspecified type    Major depressive disorder with current active episode, unspecified depression episode severity, unspecified whether recurrent    Physical deconditioning    Severe obesity (BMI >= 40) (H)    Callus of foot    Non-Hodgkin's lymphoma (H)    Hypoxia    Generalized muscle weakness    Symptomatic anemia    Diverticular disease of colon    Status post coronary angiogram    Aortic stenosis, severe    Skin ulcer of great toe, unspecified laterality, limited to breakdown of skin (H)    Malignant neoplasm of head of pancreas (H)      Current Medications:  Please refer to the most recent medication list provided to you by your medical team and reach out to your provider with any questions or to make any corrections.    Care Coordination Start Date: 12/3/2019   Frequency of Care Coordination: monthly, more frequently as needed     Form Last Updated: 12/19/2024

## 2024-12-05 ENCOUNTER — OFFICE VISIT (OUTPATIENT)
Dept: INTERNAL MEDICINE | Facility: CLINIC | Age: 84
End: 2024-12-05
Payer: MEDICARE

## 2024-12-05 VITALS
WEIGHT: 222 LBS | TEMPERATURE: 97.6 F | SYSTOLIC BLOOD PRESSURE: 116 MMHG | HEIGHT: 60 IN | RESPIRATION RATE: 17 BRPM | HEART RATE: 58 BPM | OXYGEN SATURATION: 94 % | BODY MASS INDEX: 43.59 KG/M2 | DIASTOLIC BLOOD PRESSURE: 64 MMHG

## 2024-12-05 DIAGNOSIS — E03.9 ACQUIRED HYPOTHYROIDISM: Chronic | ICD-10-CM

## 2024-12-05 DIAGNOSIS — N18.30 STAGE 3 CHRONIC KIDNEY DISEASE, UNSPECIFIED WHETHER STAGE 3A OR 3B CKD (H): ICD-10-CM

## 2024-12-05 DIAGNOSIS — E11.9 TYPE 2 DIABETES, HBA1C GOAL < 8% (H): ICD-10-CM

## 2024-12-05 DIAGNOSIS — L97.511 DIABETIC ULCER OF TOE OF RIGHT FOOT ASSOCIATED WITH TYPE 2 DIABETES MELLITUS, LIMITED TO BREAKDOWN OF SKIN (H): ICD-10-CM

## 2024-12-05 DIAGNOSIS — E66.01 SEVERE OBESITY (BMI 35.0-39.9) WITH COMORBIDITY (H): ICD-10-CM

## 2024-12-05 DIAGNOSIS — C85.99 NON-HODGKIN LYMPHOMA OF SOLID ORGAN EXCLUDING SPLEEN, UNSPECIFIED NON-HODGKIN LYMPHOMA TYPE (H): ICD-10-CM

## 2024-12-05 DIAGNOSIS — I50.30 DIASTOLIC HEART FAILURE, UNSPECIFIED HF CHRONICITY (H): ICD-10-CM

## 2024-12-05 DIAGNOSIS — E78.5 HYPERLIPIDEMIA LDL GOAL <100: ICD-10-CM

## 2024-12-05 DIAGNOSIS — C85.12 B-CELL LYMPHOMA OF INTRATHORACIC LYMPH NODES, UNSPECIFIED B-CELL LYMPHOMA TYPE (H): ICD-10-CM

## 2024-12-05 DIAGNOSIS — E11.621 DIABETIC ULCER OF TOE OF RIGHT FOOT ASSOCIATED WITH TYPE 2 DIABETES MELLITUS, LIMITED TO BREAKDOWN OF SKIN (H): ICD-10-CM

## 2024-12-05 DIAGNOSIS — Z00.00 ENCOUNTER FOR SUBSEQUENT ANNUAL WELLNESS VISIT IN MEDICARE PATIENT: Primary | ICD-10-CM

## 2024-12-05 DIAGNOSIS — I10 BENIGN ESSENTIAL HYPERTENSION: ICD-10-CM

## 2024-12-05 PROBLEM — C25.0 MALIGNANT NEOPLASM OF HEAD OF PANCREAS (H): Status: ACTIVE | Noted: 2024-12-05

## 2024-12-05 LAB
BASOPHILS # BLD AUTO: 0 10E3/UL (ref 0–0.2)
BASOPHILS NFR BLD AUTO: 1 %
CHOLEST SERPL-MCNC: 141 MG/DL
EOSINOPHIL # BLD AUTO: 0.3 10E3/UL (ref 0–0.7)
EOSINOPHIL NFR BLD AUTO: 5 %
ERYTHROCYTE [DISTWIDTH] IN BLOOD BY AUTOMATED COUNT: 13.9 % (ref 10–15)
EST. AVERAGE GLUCOSE BLD GHB EST-MCNC: 117 MG/DL
FASTING STATUS PATIENT QL REPORTED: NO
HBA1C MFR BLD: 5.7 % (ref 0–5.6)
HCT VFR BLD AUTO: 36.2 % (ref 35–47)
HDLC SERPL-MCNC: 49 MG/DL
HGB BLD-MCNC: 11.3 G/DL (ref 11.7–15.7)
IMM GRANULOCYTES # BLD: 0 10E3/UL
IMM GRANULOCYTES NFR BLD: 0 %
LDLC SERPL CALC-MCNC: 76 MG/DL
LYMPHOCYTES # BLD AUTO: 1.4 10E3/UL (ref 0.8–5.3)
LYMPHOCYTES NFR BLD AUTO: 24 %
MCH RBC QN AUTO: 27.8 PG (ref 26.5–33)
MCHC RBC AUTO-ENTMCNC: 31.2 G/DL (ref 31.5–36.5)
MCV RBC AUTO: 89 FL (ref 78–100)
MONOCYTES # BLD AUTO: 0.7 10E3/UL (ref 0–1.3)
MONOCYTES NFR BLD AUTO: 11 %
NEUTROPHILS # BLD AUTO: 3.5 10E3/UL (ref 1.6–8.3)
NEUTROPHILS NFR BLD AUTO: 59 %
NONHDLC SERPL-MCNC: 92 MG/DL
PLATELET # BLD AUTO: 235 10E3/UL (ref 150–450)
RBC # BLD AUTO: 4.07 10E6/UL (ref 3.8–5.2)
TRIGL SERPL-MCNC: 80 MG/DL
TSH SERPL DL<=0.005 MIU/L-ACNC: 3.54 UIU/ML (ref 0.3–4.2)
WBC # BLD AUTO: 5.9 10E3/UL (ref 4–11)

## 2024-12-05 RX ORDER — LEVOTHYROXINE SODIUM 150 UG/1
TABLET ORAL
Qty: 90 TABLET | Refills: 2 | Status: SHIPPED | OUTPATIENT
Start: 2024-12-05

## 2024-12-05 SDOH — HEALTH STABILITY: PHYSICAL HEALTH: ON AVERAGE, HOW MANY DAYS PER WEEK DO YOU ENGAGE IN MODERATE TO STRENUOUS EXERCISE (LIKE A BRISK WALK)?: 0 DAYS

## 2024-12-05 SDOH — HEALTH STABILITY: PHYSICAL HEALTH: ON AVERAGE, HOW MANY MINUTES DO YOU ENGAGE IN EXERCISE AT THIS LEVEL?: 0 MIN

## 2024-12-05 ASSESSMENT — SOCIAL DETERMINANTS OF HEALTH (SDOH): HOW OFTEN DO YOU GET TOGETHER WITH FRIENDS OR RELATIVES?: ONCE A WEEK

## 2024-12-05 NOTE — PROGRESS NOTES
Preventive Care Visit  [facility]  Fausto Flynn MD, Internal Medicine  Dec 5, 2024      Assessment & Plan     Encounter for subsequent annual wellness visit in Medicare patient  Advised patient to update RSV and tetanus booster through his pharmacy.  She is apparently within 3 months of having COVID thus we advised observation and recheck of her COVID-vaccine at that window.    B-cell lymphoma of intrathoracic lymph nodes, unspecified B-cell lymphoma type (H)  Follow up with oncology as directed.  DME given for cane for ambulatory restriction  - Cane Order for DME - ONLY FOR DME    Type 2 diabetes, HbA1C goal < 8% (H)  Patient requesting diabetic education referral for potential continuous glucose monitor although I am unclear whether or not she would be able to use this functionally due to a combination of issues of vision, no following exceptions.  - Adult Diabetes Education  Referral; Future  - Hemoglobin A1c; Future  - Cane Order for DME - ONLY FOR DME    Diabetic ulcer of toe of right foot associated with type 2 diabetes mellitus, limited to breakdown of skin (H)  Stable at present time continue with medical management.  Recheck A1c.    Benign essential hypertension  Stable without evidence of orthostasis.    Acquired hypothyroidism  Check thyroid function panel and continue with levothyroxine  - levothyroxine (SYNTHROID/LEVOTHROID) 150 MCG tablet; TAKE ONE TABLET BY MOUTH ONE TIME DAILY  - TSH with free T4 reflex; Future    Stage 3 chronic kidney disease, unspecified whether stage 3a or 3b CKD (H)  Encouraged hydration and recommend urine sample if able  - Albumin Random Urine Quantitative with Creat Ratio; Future    Severe obesity (BMI 35.0-39.9) with comorbidity (H)  Ongoing weight loss and weight reduction.    Diastolic heart failure, unspecified HF chronicity (H)  Appears overall euvolemic.  Continue with current medical management.    Patient has been advised of split  billing requirements and indicates understanding: Yes        Counseling  Appropriate preventive services were addressed with this patient via screening, questionnaire, or discussion as appropriate for fall prevention, nutrition, physical activity, Tobacco-use cessation, social engagement, weight loss and cognition.  Checklist reviewing preventive services available has been given to the patient.  Reviewed patient's diet, addressing concerns and/or questions.   She is at risk for psychosocial distress and has been provided with information to reduce risk.   Updated plan of care.  Patient reported difficulty with activities of daily living were addressed today.Information on urinary incontinence and treatment options given to patient.       Work on weight loss  Regular exercise    Subjective   Lucille is a 84 year old, presenting for the following:  Wellness Visit      HPI    Health Care Directive  Patient has a Health Care Directive on file  Advance care planning document is on file and is current.    Other concerns:  1. Needs new DME order for cane   2. Patient having hard time testing blood sugars due to getting adequate blood sample from lancets. Would like to discuss getting a continuous glucose monitor.        12/5/2024   General Health   How would you rate your overall physical health? (!) FAIR   Feel stress (tense, anxious, or unable to sleep) To some extent      (!) STRESS CONCERN      12/5/2024   Nutrition   Diet: Regular (no restrictions)            12/5/2024   Exercise   Days per week of moderate/strenous exercise 0 days   Average minutes spent exercising at this level 0 min      (!) EXERCISE CONCERN      12/5/2024   Social Factors   Frequency of gathering with friends or relatives Once a week   Worry food won't last until get money to buy more No   Food not last or not have enough money for food? No   Do you have housing? (Housing is defined as stable permanent housing and does not include staying [facility]  in a car, in a tent, in an abandoned building, in an overnight shelter, or couch-surfing.) Yes   Are you worried about losing your housing? No   Lack of transportation? No   Unable to get utilities (heat,electricity)? No            12/5/2024   Fall Risk   Fallen 2 or more times in the past year? Yes    Trouble with walking or balance? Yes    Gait Speed Test (Document in seconds) 7.02   Gait Speed Test Interpretation Greater than 5.01 seconds - ABNORMAL       Patient-reported          12/5/2024   Activities of Daily Living- Home Safety   Needs help with the following daily activites Transportation    Shopping    Housework    Money management   Safety concerns in the home None of the above       Multiple values from one day are sorted in reverse-chronological order         12/5/2024   Dental   Dentist two times every year? Yes            12/5/2024   Hearing Screening   Hearing concerns? None of the above            12/5/2024   Driving Risk Screening   Patient/family members have concerns about driving No            12/5/2024   General Alertness/Fatigue Screening   Have you been more tired than usual lately? No            12/5/2024   Urinary Incontinence Screening   Bothered by leaking urine in past 6 months Yes            12/5/2024   TB Screening   Were you born outside of the [place]? No          Today's PHQ-9 Score:       12/5/2024     9:43 AM   PHQ-9 SCORE   PHQ-9 Total Score MyChart Incomplete         12/5/2024   Substance Use   Alcohol more than 3/day or more than 7/wk No   Do you have a current opioid prescription? No   How severe/bad is pain from 1 to 10? 0/10 (No Pain)   Do you use any other substances recreationally? No        Social History     Tobacco Use    Smoking status: Never    Smokeless tobacco: Never   Vaping Use    Vaping status: Never Used   Substance Use Topics    Alcohol use: Not Currently    Drug use: No          Mammogram Screening - After age 74- determine frequency with patient based on health  status, life expectancy and patient goals    Reviewed and updated as needed this visit by Provider                    Lab work is in processAnswers submitted by the patient for this visit:  Patient Health Questionnaire (Submitted on 12/5/2024)  PHQ9 TOTAL SCORE: Incomplete    Current providers sharing in care for this patient include:  Patient Care Team:  Fausto Flynn MD as PCP - General (Internal Medicine)  Fausto Flynn MD as Assigned PCP  Yonny Roman MD as MD (INTERNAL MEDICINE - ENDOCRINOLOGY, DIABETES & METABOLISM)  Rosanne Valadez CHW as Community Health Worker (Primary Care - CC)  [facility] as Audiologist (Audiology)  Puja Damian LSW as Lead Care Coordinator (Primary Care - CC)  Linsey Benitez CNP as Assigned Heart and Vascular Provider  John Srinivasan MD as Assigned Cancer Care Provider  Dolly German RD as Food Resource Navigator    The following health maintenance items are reviewed in Epic and correct as of today:  Health Maintenance   Topic Date Due    HF ACTION PLAN  Never done    RSV VACCINE (1 - 1-dose 75+ series) Never done    DTAP/TDAP/TD IMMUNIZATION (2 - Td or Tdap) 01/01/2019    DIABETIC FOOT EXAM  06/29/2023    ANNUAL REVIEW OF HM ORDERS  08/17/2023    ZOSTER IMMUNIZATION (2 of 2) 08/22/2023    EYE EXAM  12/21/2023    COVID-19 Vaccine (7 - 2024-25 season) 09/01/2024    DEPRESSION 12 MO INDEX REPEAT PHQ-9  10/05/2024    MICROALBUMIN  12/04/2024    A1C  02/06/2025    PHQ-9  02/06/2025    BMP  05/20/2025    LIPID  08/06/2025    ALT  11/20/2025    CBC  11/20/2025    HEMOGLOBIN  11/20/2025    MEDICARE ANNUAL WELLNESS VISIT  12/05/2025    FALL RISK ASSESSMENT  12/05/2025    ADVANCE CARE PLANNING  12/05/2029    DEXA  01/19/2032    TSH W/FREE T4 REFLEX  Completed    DEPRESSION ACTION PLAN  Completed    INFLUENZA VACCINE  Completed    Pneumococcal Vaccine: 65+ Years  Completed    URINALYSIS  Completed     HPV IMMUNIZATION  Aged Out    MENINGITIS IMMUNIZATION  Aged Out    RSV MONOCLONAL ANTIBODY  Aged Out         Review of Systems  CONSTITUTIONAL: NEGATIVE for fever, chills, change in weight  EYES: NEGATIVE for vision changes or irritation  ENT/MOUTH: NEGATIVE for ear, mouth and throat problems  RESP: NEGATIVE for significant cough or SOB  CV: NEGATIVE for chest pain, palpitations or peripheral edema  GI: NEGATIVE for nausea, abdominal pain, heartburn, or change in bowel habits  : NEGATIVE for frequency, dysuria, or hematuria  MUSCULOSKELETAL: NEGATIVE for significant arthralgias or myalgia  NEURO: NEGATIVE for weakness, dizziness or paresthesias  ENDOCRINE: NEGATIVE for temperature intolerance, skin/hair changes  HEME: NEGATIVE for bleeding problems  PSYCHIATRIC: NEGATIVE for changes in mood or affect     Objective    Exam  /64   Pulse 58   Temp 97.6  F (36.4  C) (Temporal)   Resp 17   Ht 1.524 m (5')   Wt 100.7 kg (222 lb)   SpO2 94%   BMI 43.36 kg/m     Estimated body mass index is 43.36 kg/m  as calculated from the following:    Height as of this encounter: 1.524 m (5').    Weight as of this encounter: 100.7 kg (222 lb).    Physical Exam  GENERAL: alert and no distress  EYES: Eyes grossly normal to inspection, PERRL and conjunctivae and sclerae normal  HENT: ear canals and TM's normal, nose and mouth without ulcers or lesions  RESP: lungs clear to auscultation - no rales, rhonchi or wheezes  CV: regular rate and rhythm, normal S1 S2, no S3 or S4, no murmur, click or rub  ABDOMEN: soft, nontender, no hepatosplenomegaly, no masses and bowel sounds normal  ABDOMEN: obese  MS: no gross musculoskeletal defects noted  NEURO: No focal changes, slowed gait  PSYCH: mentation appears normal, affect normal/bright        12/5/2024   Mini Cog   Clock Draw Score 2 Normal   3 Item Recall 2 objects recalled   Mini Cog Total Score 4          Component      Latest Ref Rng 8/6/2024  9:27 AM 11/20/2024  10:06 AM    WBC      4.0 - 11.0 10e3/uL  5.9    RBC Count      3.80 - 5.20 10e6/uL  4.09    Hemoglobin      11.7 - 15.7 g/dL  11.5 (L)    Hematocrit      35.0 - 47.0 %  36.5    MCV      78 - 100 fL  89    MCH      26.5 - 33.0 pg  28.1    MCHC      31.5 - 36.5 g/dL  31.5    RDW      10.0 - 15.0 %  13.7    Platelet Count      150 - 450 10e3/uL  280    % Neutrophils      %  62    % Lymphocytes      %  21    % Monocytes      %  10    % Eosinophils      %  5    % Basophils      %  1    % Immature Granulocytes      %  0    NRBCs per 100 WBC      <1 /100  0    Absolute Neutrophils      1.6 - 8.3 10e3/uL  3.7    Absolute Lymphocytes      0.8 - 5.3 10e3/uL  1.3    Absolute Monocytes      0.0 - 1.3 10e3/uL  0.6    Absolute Eosinophils      0.0 - 0.7 10e3/uL  0.3    Absolute Basophils      0.0 - 0.2 10e3/uL  0.0    Absolute Immature Granulocytes      <=0.4 10e3/uL  0.0    Absolute NRBCs      10e3/uL  0.0    Sodium      135 - 145 mmol/L  144    Potassium      3.4 - 5.3 mmol/L  4.9    Carbon Dioxide (CO2)      22 - 29 mmol/L  31 (H)    Anion Gap      7 - 15 mmol/L  8    Urea Nitrogen      8.0 - 23.0 mg/dL  25.1 (H)    Creatinine      0.51 - 0.95 mg/dL  1.03 (H)    GFR Estimate      >60 mL/min/1.73m2  53 (L)    Calcium      8.8 - 10.4 mg/dL  9.2    Chloride      98 - 107 mmol/L  105    Glucose      70 - 99 mg/dL  104 (H)    Alkaline Phosphatase      40 - 150 U/L  121    AST      0 - 45 U/L  19    ALT      0 - 50 U/L  13    Protein Total      6.4 - 8.3 g/dL  6.9    Albumin      3.5 - 5.2 g/dL  3.8    Bilirubin Total      <=1.2 mg/dL  0.3    Cholesterol      <200 mg/dL 179     Triglycerides      <150 mg/dL 174 (H)     HDL Cholesterol      >=50 mg/dL 49 (L)     LDL Cholesterol Calculated      <=100 mg/dL 95     Non HDL Cholesterol      <130 mg/dL 130 (H)     Patient Fasting? Yes     Hemoglobin A1C      0.0 - 5.6 % 5.8 (H)               Signed Electronically by: Fausto Flynn MD

## 2024-12-09 ENCOUNTER — TELEPHONE (OUTPATIENT)
Dept: CARDIOLOGY | Facility: CLINIC | Age: 84
End: 2024-12-09
Payer: MEDICARE

## 2024-12-09 DIAGNOSIS — E78.5 HYPERLIPIDEMIA LDL GOAL <100: Primary | ICD-10-CM

## 2024-12-09 RX ORDER — ATORVASTATIN CALCIUM 80 MG/1
80 TABLET, FILM COATED ORAL DAILY
Qty: 90 TABLET | Refills: 3 | Status: SHIPPED | OUTPATIENT
Start: 2024-12-09

## 2024-12-09 NOTE — TELEPHONE ENCOUNTER
Spoke with patient and daughter to review recent lab work and recommendations per Linsey Benitez NP as below. Pt and daughter in agreement with plan. Medication sent to pt's pharmacy of choice and orders placed for labs.

## 2024-12-17 ASSESSMENT — ACTIVITIES OF DAILY LIVING (ADL): DEPENDENT_IADLS:: TRANSPORTATION;MEDICATION MANAGEMENT

## 2024-12-19 NOTE — PROGRESS NOTES
Clinic Care Coordination Contact  Clinic Care Coordination Contact  OUTREACH    Referral Information:  Referral Source: Self-patient/Caregiver    Primary Diagnosis: Psychosocial    Chief Complaint   Patient presents with    Chart Review Please     Annual Assessment        Universal Utilization: 67% Admission or ED Risk  Clinic Utilization  Difficulty keeping appointments:: No  Compliance Concerns: No  No-Show Concerns: No  Utilization      No Show Count (past year)  1             ED Visits  4             Hospital Admissions  0                    Current as of: 12/19/2024  7:45 AM                Clinical Concerns:  Current Medical Concerns:    Patient Active Problem List   Diagnosis    Hyperlipidemia LDL goal <100    Macular degeneration    Apnea    Morbid obesity due to excess calories (H)    CKD (chronic kidney disease) stage 3, GFR 30-59 ml/min (H)    Acquired hypothyroidism    Benign essential hypertension    Gastroesophageal reflux disease without esophagitis    Type 2 diabetes mellitus with stage 3 chronic kidney disease, without long-term current use of insulin (H)    Anemia, unspecified type    MDD (major depressive disorder), recurrent episode, mild (H)    Severe anemia    Severe obesity (BMI 35.0-39.9) with comorbidity (H)    Peripheral vision loss, unspecified laterality    Type 2 DM with CKD stage 3 and hypertension (H)    Acute on chronic blood loss anemia    B-cell lymphoma of intrathoracic lymph nodes, unspecified B-cell lymphoma type (H)    SHAVON (obstructive sleep apnea)    Pancreatic mass    Nonrheumatic aortic (valve) stenosis with insufficiency    Diastolic heart failure, unspecified HF chronicity (H)    Hypothyroidism, unspecified type    Major depressive disorder with current active episode, unspecified depression episode severity, unspecified whether recurrent    Physical deconditioning    Severe obesity (BMI >= 40) (H)    Callus of foot    Non-Hodgkin's lymphoma (H)    Hypoxia    Generalized  muscle weakness    Symptomatic anemia    Diverticular disease of colon    Status post coronary angiogram    Aortic stenosis, severe    Skin ulcer of great toe, unspecified laterality, limited to breakdown of skin (H)    Malignant neoplasm of head of pancreas (H)     12/17/24   NOMAN CC spoke with Lucille to do annual assessment.  Lucille stated she likes getting monthly calls from ZANE Lay.  She stated her daughter Radha calls in when medication refills are needed.  She gets some of her medications mailed in blister packs and some are picked up at pharmacy.  She said someone was there that morning to help set  up her medication but doesn't know where they were from.  NOMAN CC asked if it is ok if we communicate with  her daughter Radha to check in on what services may be useful so that Radha is in the loop an can assist with follow up.  Lucille agreed to that.  Luc[place] Care Coordination started working with her we will folow up on status of that.     Lucille is still going to [place] which she enjoys. She uses [employer] to get there.      NOMAN CC spoke to [facility] Nurse, Brandie, who discharged her about 2 weeks ago.  She shared that a patient is not considered homebound when they are able to take [employer] to a senior community program.      Current Behavioral Concerns: Brandie shared today that she can be forgetful at times      Education Provided to patient: Reviewed CC role,  she is still interested in seeing if she qualifies for a waiver.  She is agreeable to Care Coordination speaking with her daughter Radha.       Health Maintenance Reviewed: Due/Overdue   Health Maintenance Due   Topic Date Due    HF ACTION PLAN  Never done    RSV VACCINE (1 - 1-dose 75+ series) Never done    DTAP/TDAP/TD IMMUNIZATION (2 - Td or Tdap) 01/01/2019    DIABETIC FOOT EXAM  06/29/2023    ZOSTER IMMUNIZATION (2 of 2) 08/22/2023    EYE EXAM   12/21/2023    COVID-19 Vaccine (7 - 2024-25 season) 09/01/2024    DEPRESSION 12 MO INDEX REPEAT PHQ-9  10/05/2024    MICROALBUMIN  12/04/2024        Medication Management:  Medication review status: Medications reviewed and no changes reported per patient.      [facility] Nurse Brandie stated that they had a good plan in place for medication management when she was discharged.        Functional Status:  Dependent ADLs:: Ambulation-cane, Ambulation-walker  Dependent IADLs:: Transportation, Medication Management  Bed or wheelchair confined:: No  Mobility Status: Independent w/Device  Fallen 2 or more times in the past year?: (!) Yes    Living Situation:  Current living arrangement:: I live in a private home with spouse  Type of residence:: Private home - stairs    Lifestyle & Psychosocial Needs:    Social Drivers of Health     Food Insecurity: Low Risk  (12/5/2024)    Food Insecurity     Within the past 12 months, did you worry that your food would run out before you got money to buy more?: No     Within the past 12 months, did the food you bought just not last and you didn t have money to get more?: No   Depression: At risk (8/6/2024)    PHQ-2     PHQ-2 Score: 5   Housing Stability: Low Risk  (12/5/2024)    Housing Stability     Do you have housing? : Yes     Are you worried about losing your housing?: No   Tobacco Use: Low Risk  (12/5/2024)    Patient History     Smoking Tobacco Use: Never     Smokeless Tobacco Use: Never     Passive Exposure: Not on file   Financial Resource Strain: Low Risk  (12/5/2024)    Financial Resource Strain     Within the past 12 months, have you or your family members you live with been unable to get utilities (heat, electricity) when it was really needed?: No   Alcohol Use: Not on file   Transportation Needs: Low Risk  (12/5/2024)    Transportation Needs     Within the past 12 months, has lack of transportation kept you from medical appointments, getting your medicines, non-medical  meetings or appointments, work, or from getting things that you need?: No   Physical Activity: Inactive (12/5/2024)    Exercise Vital Sign     Days of Exercise per Week: 0 days     Minutes of Exercise per Session: 0 min   Interpersonal Safety: Not on file   Stress: Stress Concern Present (12/5/2024)    [employer] [place] [employer] - Occupational Stress Questionnaire     Feeling of Stress : To some extent   Social Connections: Unknown (12/5/2024)    Social Connection and Isolation Panel [NHANES]     Frequency of Communication with Friends and Family: Not on file     Frequency of Social Gatherings with Friends and Family: Once a week     Attends Amish Services: Not on file     Active Member of Clubs or Organizations: Not on file     Attends Club or Organization Meetings: Not on file     Marital Status: Not on file   Health Literacy: Not on file     Inadequate nutrition (GOAL):: No  Tube Feeding: No  Inadequate activity/exercise (GOAL):: No  Significant changes in sleep pattern (GOAL): No  Transportation means:: [employer]  Financial/Insurance concerns (GOAL):: No  Amish or spiritual beliefs that impact treatment:: No  Mental health DX:: Yes  Mental health DX how managed:: Medication  Mental health management concern (GOAL):: No  Informal Support system:: Spouse, Children             Resources and Interventions:  Current Resources:      Community Resources: Transportation Services  Supplies Currently Used at Home: Diabetic Supplies  Equipment Currently Used at Home: cane, straight, walker, standard, glucometer  Employment Status: retired         Advance Care Plan/Directive  Advanced Care Plans/Directives on file:: Yes    Referrals Placed: Community Resources, [facility]         Care Plan:  Care Plan: Social Support       Problem: Inadequate social support       Goal: I will access resources to obtain more in home support.       Start Date: 11/29/2022 Expected End Date: 2/28/2025    Recent  Progress: 90%    Priority: Medium    Note:     Annual Assessment Update 12/17/24  Barriers: resource availability  Strengths: Pt is a good advocate for self.   Patient expressed understanding of goal: yes  Action steps to achieve this goal:  1. I [place]. Update as of 12/17/24: NOMAN ALONSO will contact birgit Garcia to check in on this.     2. I will contact resources given to me. Discussed Help at your Door on 12/21/23.  Emailed birgit Rajput resources on 12/20/23.    3. We will include my daughter Radha on plans we discuss for more help at home so she is in loop and can assist with connecting to resources as needed.   4. I will contact my CHW with any needs or questions.                                   Patient/Caregiver understanding: Yes      Outreach Frequency: monthly, more frequently as needed  Future Appointments                In 2 weeks Tamanna Sullivan RD M [facility] [place], [place]    In 1 month Susy Padgett OT M [facility]    In 2 months Susy Padgett OT M [facility] [place], [place] [facility]    In 2 months Susy Padgett OT M [facility] [place], [place] [facility]    In 2 months Susy Padgett OT M [facility] [place], [facility]    In 5 months EICCT1 M [facility] [place] [facility]    In 5 months CS LAB [facility], CS    In 5 months John Srinivasan MD [facility] [place]            Plan: Lucille expressed interest in continuing receiving monthly calls form Care Coordination.  She is interested in getting more help at home. Brandie from [facility] stated that that a visitor from [facility] was to do a home safety evaluation- not sure if that happened  yet or if they continue to visit.  CHW will follow up in 2 - 3 weeks.     Delegation to CHW: Yes, CHW will:  CHW Delegation:   1)  Due Date:  Next outreach       Delegation: Please help Lucille contact county [place]. Also check in with daughter Radha to update her on status of this.  Ask her how she thinks Lucille is doing with medication management , etc .  I tried calling Radha on 12/17/24 and left a message.  Have not heard back from her yet.     TORIE Lester   Care Coordination Team  [phone]

## 2025-01-04 DIAGNOSIS — C85.12 B-CELL LYMPHOMA OF INTRATHORACIC LYMPH NODES, UNSPECIFIED B-CELL LYMPHOMA TYPE (H): ICD-10-CM

## 2025-01-06 RX ORDER — NYSTATIN 100000 [USP'U]/G
POWDER TOPICAL
Qty: 60 G | Refills: 10 | Status: SHIPPED | OUTPATIENT
Start: 2025-01-06

## 2025-01-07 ENCOUNTER — ALLIED HEALTH/NURSE VISIT (OUTPATIENT)
Dept: EDUCATION SERVICES | Facility: CLINIC | Age: 85
End: 2025-01-07
Attending: INTERNAL MEDICINE
Payer: MEDICARE

## 2025-01-07 DIAGNOSIS — E11.9 TYPE 2 DIABETES, HBA1C GOAL < 8% (H): ICD-10-CM

## 2025-01-07 PROCEDURE — G0108 DIAB MANAGE TRN  PER INDIV: HCPCS | Performed by: NUTRITIONIST

## 2025-01-07 NOTE — LETTER
1/7/2025         RE: Lucille Rojas  [id number]  [place] [address]        Dear Colleague,    Thank you for referring your patient, Lucille Rojas, to the [facility]. Please see a copy of my visit note below.    Diabetes Self-Management Education & Support    Presents for: Individual review    Type of Service: In Person Visit      Assessment  Lucille referred to discuss possible use of personal CGM.  She is legally blind and cannot monitor her blood glucose levels by herself.  She was thinking that having a CGM would be better for her.  We reviewed use of both Darrion 3+ and Dexcom G7 and she does not think these will work since she cannot see the  screen or will not be able to connect the sensors to a smartphone.  Her  currently is monitoring her blood glucose levels twice weekly and they report that the readings are averaging around 115 mg/dL and never >130 mg/dL.  Healthy eating reviewed and they are currently using appropriate carbohydrate  portions at meals, does like to snack on sweets.      Patient's most recent   Lab Results   Component Value Date    A1C 5.7 12/05/2024    A1C 5.8 04/07/2021     is meeting goal of <8.0    Diabetes knowledge and skills assessment:   Patient is knowledgeable in diabetes management concepts related to: Healthy Eating, Being Active, Monitoring, and Healthy Coping    Based on learning assessment above, most appropriate setting for further diabetes education would be: Individual setting.    Care Plan and Education Provided:  Healthy Eating: Balanced meals, Consistency in amount and timing of carbohydrate intake, and Portion control, Monitoring: Blood glucose versus Continuous Glucose Monitoring, Frequency of monitoring, and Individual glucose targets, and Healthy Coping: Benefits of making appropriate lifestyle changes    Patient verbalized understanding of diabetes self-management education concepts discussed,  opportunities for ongoing education and support, and recommendations provided today.    Plan    1) Continue to monitor blood glucose levels twice weekly.  If readings increase over 180 mg/dL contact scheduling for follow-up or reach out to provider.  2) Monitor a few times around snacking on sweets      Topics to cover at upcoming visits: Problem Solving and Reducing Risks    Follow-up:  Upcoming Diabetes Ed Appointments     Visit Type Date Time Department    DIABETES ED 1/7/2025  9:30 AM  DIABETES ED        See Care Plan for co-developed, patient-state behavior change goals.    Education Materials Provided:  No new materials provided today      Subjective/Objective  Lucille is an 84 year old year old, presenting for the following diabetes education related to: Presents for: Individual review  Accompanied by: Spouse  Diabetes education in the past 24mo: No  Focus of Visit: Monitoring  Diabetes type: Type 2  Disease course: Stable  How confident are you filling out medical forms by yourself:: Not Assessed  Transportation concerns: Yes  Difficulty affording diabetes medication?: No  Difficulty affording diabetes testing supplies?: No  Other concerns:: Legally blind  Cultural Influences/Ethnic Background:  Not  or     Diabetes Symptoms & Complications:  Diabetes Related Symptoms: None  Weight trend: Stable  Disease course: Stable  Complications assessed today?: No    Patient Problem List and Family Medical History reviewed for relevant medical history, current medical status, and diabetes risk factors.    Vitals:  There were no vitals taken for this visit.  Estimated body mass index is 43.36 kg/m  as calculated from the following:    Height as of 12/5/24: 1.524 m (5').    Weight as of 12/5/24: 100.7 kg (222 lb).   Last 3 BP:   BP Readings from Last 3 Encounters:   12/05/24 116/64   11/20/24 112/71   10/15/24 115/46       History   Smoking Status     Never   Smokeless Tobacco     Never       Labs:  Lab  "Results   Component Value Date    A1C 5.7 12/05/2024    A1C 5.8 04/07/2021     Lab Results   Component Value Date     11/20/2024     09/23/2022     08/27/2020     Lab Results   Component Value Date    LDL 76 12/05/2024    LDL 77 06/04/2020     HDL Cholesterol   Date Value Ref Range Status   06/04/2020 47 (L) >or=50 mg/dL Final     Direct Measure HDL   Date Value Ref Range Status   12/05/2024 49 (L) >=50 mg/dL Final   ]  GFR Estimate   Date Value Ref Range Status   11/20/2024 53 (L) >60 mL/min/1.73m2 Final     Comment:     eGFR calculated using 2021 CKD-EPI equation.   08/27/2020 54 (L) >60 mL/min/[1.73_m2] Final     Comment:     Non  GFR Calc  Starting 12/18/2018, serum creatinine based estimated GFR (eGFR) will be   calculated using the Chronic Kidney Disease Epidemiology Collaboration   (CKD-EPI) equation.       GFR, ESTIMATED POCT   Date Value Ref Range Status   05/31/2023 44 (L) >60 mL/min/1.73m2 Final     GFR Estimate If Black   Date Value Ref Range Status   08/27/2020 62 >60 mL/min/[1.73_m2] Final     Comment:      GFR Calc  Starting 12/18/2018, serum creatinine based estimated GFR (eGFR) will be   calculated using the Chronic Kidney Disease Epidemiology Collaboration   (CKD-EPI) equation.       Lab Results   Component Value Date    CR 1.03 11/20/2024    CR 0.98 08/27/2020     No results found for: \"MICROALBUMIN\"    Healthy Eating:  Healthy Eating Assessed Today: Yes  Cultural/Cheondoism diet restrictions?: No  Do you have any food allergies or intolerances?: No  Meal planning/habits: None  Who cooks/prepares meals for you?: Self, Spouse  Who purchases food in  your home?: Spouse, Self  How many times a week on average do you eat food made away from home (restaurant/take-out)?: 0  Meals include: Breakfast, Lunch, Dinner, Afternoon Snack  Breakfast: oatmeal with banana and walnuts  Lunch: sandwich  Dinner: Ham, eggs, mashed potato and cabbage salad  Snacks: " sweets (cookies, bars)  Beverages: Water, Diet soda  Has patient met with a dietitian in the past?: No      Monitoring:  Monitoring Assessed Today: Yes  Did patient bring glucose meter to appointment? : No  Blood Glucose Meter: Unknown  Times checking blood sugar at home (number): 2  Times checking blood sugar at home (per): Week  Blood glucose trend: No change    Healthy Coping:  Healthy Coping Assessed Today: Yes  Emotional response to diabetes: Acceptance, Confidence diabetes can be controlled  Informal Support system:: Spouse, Children  Stage of change: ACTION (Actively working towards change)    Tamanna Sullivan RD  Time Spent: 60 minutes  Encounter Type: Individual    Any diabetes medication dose changes were made via the CDCES Standing Orders under the patient's referring provider.

## 2025-01-07 NOTE — PROGRESS NOTES
Diabetes Self-Management Education & Support    Presents for: Individual review    Type of Service: In Person Visit      Assessment  Lucille referred to discuss possible use of personal CGM.  She is legally blind and cannot monitor her blood glucose levels by herself.  She was thinking that having a CGM would be better for her.  We reviewed use of both Darrion 3+ and Dexcom G7 and she does not think these will work since she cannot see the  screen or will not be able to connect the sensors to a smartphone.  Her  currently is monitoring her blood glucose levels twice weekly and they report that the readings are averaging around 115 mg/dL and never >130 mg/dL.  Healthy eating reviewed and they are currently using appropriate carbohydrate  portions at meals, does like to snack on sweets.      Patient's most recent   Lab Results   Component Value Date    A1C 5.7 12/05/2024    A1C 5.8 04/07/2021     is meeting goal of <8.0    Diabetes knowledge and skills assessment:   Patient is knowledgeable in diabetes management concepts related to: Healthy Eating, Being Active, Monitoring, and Healthy Coping    Based on learning assessment above, most appropriate setting for further diabetes education would be: Individual setting.    Care Plan and Education Provided:  Healthy Eating: Balanced meals, Consistency in amount and timing of carbohydrate intake, and Portion control, Monitoring: Blood glucose versus Continuous Glucose Monitoring, Frequency of monitoring, and Individual glucose targets, and Healthy Coping: Benefits of making appropriate lifestyle changes    Patient verbalized understanding of diabetes self-management education concepts discussed, opportunities for ongoing education and support, and recommendations provided today.    Plan    1) Continue to monitor blood glucose levels twice weekly.  If readings increase over 180 mg/dL contact scheduling for follow-up or reach out to provider.  2) Monitor a few  times around snacking on sweets      Topics to cover at upcoming visits: Problem Solving and Reducing Risks    Follow-up:  Upcoming Diabetes Ed Appointments     Visit Type Date Time Department    DIABETES ED 1/7/2025  9:30 AM  DIABETES ED        See Care Plan for co-developed, patient-state behavior change goals.    Education Materials Provided:  No new materials provided today      Subjective/Objective  Lucille is an 84 year old year old, presenting for the following diabetes education related to: Presents for: Individual review  Accompanied by: Spouse  Diabetes education in the past 24mo: No  Focus of Visit: Monitoring  Diabetes type: Type 2  Disease course: Stable  How confident are you filling out medical forms by yourself:: Not Assessed  Transportation concerns: Yes  Difficulty affording diabetes medication?: No  Difficulty affording diabetes testing supplies?: No  Other concerns:: Legally blind  Cultural Influences/Ethnic Background:  Not  or     Diabetes Symptoms & Complications:  Diabetes Related Symptoms: None  Weight trend: Stable  Disease course: Stable  Complications assessed today?: No    Patient Problem List and Family Medical History reviewed for relevant medical history, current medical status, and diabetes risk factors.    Vitals:  There were no vitals taken for this visit.  Estimated body mass index is 43.36 kg/m  as calculated from the following:    Height as of 12/5/24: 1.524 m (5').    Weight as of 12/5/24: 100.7 kg (222 lb).   Last 3 BP:   BP Readings from Last 3 Encounters:   12/05/24 116/64   11/20/24 112/71   10/15/24 115/46       History   Smoking Status    Never   Smokeless Tobacco    Never       Labs:  Lab Results   Component Value Date    A1C 5.7 12/05/2024    A1C 5.8 04/07/2021     Lab Results   Component Value Date     11/20/2024     09/23/2022     08/27/2020     Lab Results   Component Value Date    LDL 76 12/05/2024    LDL 77 06/04/2020     HDL  "Cholesterol   Date Value Ref Range Status   06/04/2020 47 (L) >or=50 mg/dL Final     Direct Measure HDL   Date Value Ref Range Status   12/05/2024 49 (L) >=50 mg/dL Final   ]  GFR Estimate   Date Value Ref Range Status   11/20/2024 53 (L) >60 mL/min/1.73m2 Final     Comment:     eGFR calculated using 2021 CKD-EPI equation.   08/27/2020 54 (L) >60 mL/min/[1.73_m2] Final     Comment:     Non  GFR Calc  Starting 12/18/2018, serum creatinine based estimated GFR (eGFR) will be   calculated using the Chronic Kidney Disease Epidemiology Collaboration   (CKD-EPI) equation.       GFR, ESTIMATED POCT   Date Value Ref Range Status   05/31/2023 44 (L) >60 mL/min/1.73m2 Final     GFR Estimate If Black   Date Value Ref Range Status   08/27/2020 62 >60 mL/min/[1.73_m2] Final     Comment:      GFR Calc  Starting 12/18/2018, serum creatinine based estimated GFR (eGFR) will be   calculated using the Chronic Kidney Disease Epidemiology Collaboration   (CKD-EPI) equation.       Lab Results   Component Value Date    CR 1.03 11/20/2024    CR 0.98 08/27/2020     No results found for: \"MICROALBUMIN\"    Healthy Eating:  Healthy Eating Assessed Today: Yes  Cultural/Evangelical diet restrictions?: No  Do you have any food allergies or intolerances?: No  Meal planning/habits: None  Who cooks/prepares meals for you?: Self, Spouse  Who purchases food in  your home?: Spouse, Self  How many times a week on average do you eat food made away from home (restaurant/take-out)?: 0  Meals include: Breakfast, Lunch, Dinner, Afternoon Snack  Breakfast: oatmeal with banana and walnuts  Lunch: sandwich  Dinner: Ham, eggs, mashed potato and cabbage salad  Snacks: sweets (cookies, bars)  Beverages: Water, Diet soda  Has patient met with a dietitian in the past?: No      Monitoring:  Monitoring Assessed Today: Yes  Did patient bring glucose meter to appointment? : No  Blood Glucose Meter: Unknown  Times checking blood sugar at home " (number): 2  Times checking blood sugar at home (per): Week  Blood glucose trend: No change    Healthy Coping:  Healthy Coping Assessed Today: Yes  Emotional response to diabetes: Acceptance, Confidence diabetes can be controlled  Informal Support system:: Spouse, Children  Stage of change: ACTION (Actively working towards change)    Tamanna Sullivan RD  Time Spent: 60 minutes  Encounter Type: Individual    Any diabetes medication dose changes were made via the CDCES Standing Orders under the patient's referring provider.

## 2025-01-07 NOTE — PATIENT INSTRUCTIONS
Fredis Adkins,    Here are some things that we discussed today.      Goals for Diabetes Care:    1. Eat 3 balanced meals each day -  Aim to eat a balanced plate - half your plate fruits/veggies, 1/4 the plate protein and 1/4 plate starch (rice, potato, pasta)    2. Check blood sugars 1-2 times each week at varying times   Blood Glucose Targets:   1. Fasting and before meal target is 80 - 130 mg/dl   2. 2 hours after a meal target is < 180 mg/dl  Always remember to bring meter and/or log book to all appointments.    3. Activity really helps improve blood sugars. Try to Incorporate 30 minutes activity into each day - does not need to be all at one time & walking counts!      Follow up with your Diabetic Educator to assess BG targets and need for modifications to medications and/or lifestyle.    Call with any questions.  Thank you!  Tamanna Harrington, RD, LD, [facility]   Certified Diabetes Care &   [facility]  Triage [phone] or Scheduling [phone]

## 2025-01-09 ENCOUNTER — PATIENT OUTREACH (OUTPATIENT)
Dept: CARE COORDINATION | Facility: CLINIC | Age: 85
End: 2025-01-09
Payer: MEDICARE

## 2025-01-09 NOTE — PROGRESS NOTES
Clinic Care Coordination Contact  The Community Health Worker called for a Care Coordination outreach to discuss [place].  Spoke with patient's daughter, Radha.  C2C on file 12/6/24.    Radha stated she is not able to talk now, she is going to an appointment.  Radha asked if she could call back to CC at a later time.  Radha thanked CHW for calling.    CHW provided the contact phone number.    CHW will outreach in 10 business days if daughter does not call back.    Rosanne Valadez, ZANE  Clinic Care Coordination  [facility] Clinics: [place] [place], [place], [facility], and [place] [facility]  Phone: [phone]

## 2025-01-22 ENCOUNTER — TELEPHONE (OUTPATIENT)
Dept: CARE COORDINATION | Facility: CLINIC | Age: 85
End: 2025-01-22
Payer: MEDICARE

## 2025-01-22 ENCOUNTER — PATIENT OUTREACH (OUTPATIENT)
Dept: CARE COORDINATION | Facility: CLINIC | Age: 85
End: 2025-01-22
Payer: MEDICARE

## 2025-01-22 NOTE — TELEPHONE ENCOUNTER
Clinic Care Coordination Contact      Patient's daughter is requesting home care- PT and nurse visits.    Please place an order for home care if possible.    Thank you.     Rosanne Valadez KM  Clinic Care Coordination  [facility] Clinics: [place], and [facility]  Phone: [phone]

## 2025-01-22 NOTE — PROGRESS NOTES
Clinic Care Coordination Contact  The Community Health Worker called for a Care Coordination outreach.  Spoke with patient's daughter, Radha.    Radha stated she has [place] for her mother.    Radha stated her mother is getting forgetful.    Radha stated her father does not want to move to an [facility].    Radha stated she purchased a medication dispenser.  Radha stated she asked her brother to managed fill and managed the medications.    CHW provided the following resources:  [facility] Front Door for a [place] [phone].    [facility] [phone]    [facility] Line [phone]    CHW will route a message to  for home care services.    CHW will outreach in one month.    ZANE Demarco  Clinic Care Coordination  [facility] Clinics: [place] [place], [place], [place], and [facility]  Phone: [phone]

## 2025-01-23 NOTE — TELEPHONE ENCOUNTER
Patient Contact    Attempt # [phone]     Was call answered?  No.  Not able to leave voicemail, call kept ringing with no answer.     [phone] Called and spoke to pt's daughter. Mentioned assistance with bathing and requested to contact pt for further information. Radha was advised that writer attempted to reach pt at home first, and was not able to leave voicemail. Plan to follow up with pt tomorrow.

## 2025-01-23 NOTE — TELEPHONE ENCOUNTER
Please note I will need more detail in order to fill out the home care referral.  They requesting physical therapy, skilled nursing, Occupational Therapy and for what specific reason.  All of these issues need to be documented to get coverage for home health care referral.  Once obtained may place order for cosign

## 2025-02-03 ENCOUNTER — MEDICAL CORRESPONDENCE (OUTPATIENT)
Dept: HEALTH INFORMATION MANAGEMENT | Facility: CLINIC | Age: 85
End: 2025-02-03
Payer: MEDICARE

## 2025-02-03 DIAGNOSIS — E66.01 MORBID OBESITY DUE TO EXCESS CALORIES (H): ICD-10-CM

## 2025-02-03 DIAGNOSIS — R06.81 APNEA: ICD-10-CM

## 2025-02-03 RX ORDER — PANTOPRAZOLE SODIUM 40 MG/1
40 TABLET, DELAYED RELEASE ORAL
Qty: 90 TABLET | Refills: 3 | Status: SHIPPED | OUTPATIENT
Start: 2025-02-03

## 2025-02-04 ENCOUNTER — THERAPY VISIT (OUTPATIENT)
Dept: OCCUPATIONAL THERAPY | Facility: CLINIC | Age: 85
End: 2025-02-04
Attending: OPHTHALMOLOGY
Payer: MEDICARE

## 2025-02-04 DIAGNOSIS — H35.3132 INTERMEDIATE STAGE NONEXUDATIVE AGE-RELATED MACULAR DEGENERATION OF BOTH EYES: Primary | ICD-10-CM

## 2025-02-04 PROCEDURE — 97535 SELF CARE MNGMENT TRAINING: CPT | Mod: GO | Performed by: OCCUPATIONAL THERAPIST

## 2025-02-13 ENCOUNTER — PATIENT OUTREACH (OUTPATIENT)
Dept: CARE COORDINATION | Facility: CLINIC | Age: 85
End: 2025-02-13
Payer: MEDICARE

## 2025-02-13 NOTE — PROGRESS NOTES
Clinic Care Coordination Contact    Patient's daughter called.    Patient's daughter requested contact numbers for home care and to return a phone call to the clinic.    CHW provided the information.    CHW will outreach in one month.    ZANE Demarco  Clinic Care Coordination  [facility] Clinics: [place], [place], [place], and [place] [facility]  Phone: [phone]

## 2025-02-18 ENCOUNTER — THERAPY VISIT (OUTPATIENT)
Dept: OCCUPATIONAL THERAPY | Facility: CLINIC | Age: 85
End: 2025-02-18
Attending: OPHTHALMOLOGY
Payer: MEDICARE

## 2025-02-18 DIAGNOSIS — H35.3190: ICD-10-CM

## 2025-02-18 DIAGNOSIS — H35.3132 INTERMEDIATE STAGE NONEXUDATIVE AGE-RELATED MACULAR DEGENERATION OF BOTH EYES: Primary | ICD-10-CM

## 2025-02-18 PROCEDURE — 97535 SELF CARE MNGMENT TRAINING: CPT | Mod: GO | Performed by: OCCUPATIONAL THERAPIST

## 2025-02-25 ENCOUNTER — THERAPY VISIT (OUTPATIENT)
Dept: OCCUPATIONAL THERAPY | Facility: CLINIC | Age: 85
End: 2025-02-25
Attending: OPHTHALMOLOGY
Payer: MEDICARE

## 2025-02-25 DIAGNOSIS — H35.3132 INTERMEDIATE STAGE NONEXUDATIVE AGE-RELATED MACULAR DEGENERATION OF BOTH EYES: Primary | ICD-10-CM

## 2025-02-25 PROCEDURE — 97535 SELF CARE MNGMENT TRAINING: CPT | Mod: GO | Performed by: OCCUPATIONAL THERAPIST

## 2025-03-04 ENCOUNTER — THERAPY VISIT (OUTPATIENT)
Dept: OCCUPATIONAL THERAPY | Facility: CLINIC | Age: 85
End: 2025-03-04
Attending: OPHTHALMOLOGY
Payer: MEDICARE

## 2025-03-04 DIAGNOSIS — H35.3190: ICD-10-CM

## 2025-03-04 DIAGNOSIS — H35.3132 INTERMEDIATE STAGE NONEXUDATIVE AGE-RELATED MACULAR DEGENERATION OF BOTH EYES: Primary | ICD-10-CM

## 2025-03-04 PROCEDURE — 97535 SELF CARE MNGMENT TRAINING: CPT | Mod: GO | Performed by: OCCUPATIONAL THERAPIST

## 2025-03-18 ENCOUNTER — OFFICE VISIT (OUTPATIENT)
Dept: INTERNAL MEDICINE | Facility: CLINIC | Age: 85
End: 2025-03-18
Payer: MEDICARE

## 2025-03-18 VITALS
OXYGEN SATURATION: 92 % | BODY MASS INDEX: 39.66 KG/M2 | TEMPERATURE: 97 F | DIASTOLIC BLOOD PRESSURE: 63 MMHG | RESPIRATION RATE: 17 BRPM | HEART RATE: 71 BPM | WEIGHT: 202 LBS | SYSTOLIC BLOOD PRESSURE: 109 MMHG | HEIGHT: 60 IN

## 2025-03-18 DIAGNOSIS — I50.30 DIASTOLIC HEART FAILURE, UNSPECIFIED HF CHRONICITY (H): ICD-10-CM

## 2025-03-18 DIAGNOSIS — E11.22 TYPE 2 DM WITH CKD STAGE 3 AND HYPERTENSION (H): Primary | ICD-10-CM

## 2025-03-18 DIAGNOSIS — C85.12 B-CELL LYMPHOMA OF INTRATHORACIC LYMPH NODES, UNSPECIFIED B-CELL LYMPHOMA TYPE (H): ICD-10-CM

## 2025-03-18 DIAGNOSIS — I12.9 TYPE 2 DM WITH CKD STAGE 3 AND HYPERTENSION (H): Primary | ICD-10-CM

## 2025-03-18 DIAGNOSIS — I10 BENIGN ESSENTIAL HYPERTENSION: ICD-10-CM

## 2025-03-18 DIAGNOSIS — I35.0 AORTIC STENOSIS, SEVERE: ICD-10-CM

## 2025-03-18 DIAGNOSIS — F33.0 MDD (MAJOR DEPRESSIVE DISORDER), RECURRENT EPISODE, MILD: ICD-10-CM

## 2025-03-18 DIAGNOSIS — H90.3 BILATERAL SENSORINEURAL HEARING LOSS: ICD-10-CM

## 2025-03-18 DIAGNOSIS — N18.30 TYPE 2 DM WITH CKD STAGE 3 AND HYPERTENSION (H): Primary | ICD-10-CM

## 2025-03-18 DIAGNOSIS — E66.01 SEVERE OBESITY (BMI 35.0-39.9) WITH COMORBIDITY (H): ICD-10-CM

## 2025-03-18 LAB
ERYTHROCYTE [DISTWIDTH] IN BLOOD BY AUTOMATED COUNT: 13.6 % (ref 10–15)
HCT VFR BLD AUTO: 41.5 % (ref 35–47)
HGB BLD-MCNC: 12.4 G/DL (ref 11.7–15.7)
MCH RBC QN AUTO: 26.4 PG (ref 26.5–33)
MCHC RBC AUTO-ENTMCNC: 29.9 G/DL (ref 31.5–36.5)
MCV RBC AUTO: 89 FL (ref 78–100)
PLATELET # BLD AUTO: 261 10E3/UL (ref 150–450)
RBC # BLD AUTO: 4.69 10E6/UL (ref 3.8–5.2)
WBC # BLD AUTO: 9.8 10E3/UL (ref 4–11)

## 2025-03-18 PROCEDURE — 3078F DIAST BP <80 MM HG: CPT | Performed by: INTERNAL MEDICINE

## 2025-03-18 PROCEDURE — 3074F SYST BP LT 130 MM HG: CPT | Performed by: INTERNAL MEDICINE

## 2025-03-18 PROCEDURE — 85027 COMPLETE CBC AUTOMATED: CPT | Performed by: INTERNAL MEDICINE

## 2025-03-18 PROCEDURE — 36415 COLL VENOUS BLD VENIPUNCTURE: CPT | Performed by: INTERNAL MEDICINE

## 2025-03-18 PROCEDURE — 82728 ASSAY OF FERRITIN: CPT | Performed by: INTERNAL MEDICINE

## 2025-03-18 PROCEDURE — 83540 ASSAY OF IRON: CPT | Performed by: INTERNAL MEDICINE

## 2025-03-18 PROCEDURE — G2211 COMPLEX E/M VISIT ADD ON: HCPCS | Performed by: INTERNAL MEDICINE

## 2025-03-18 PROCEDURE — 83550 IRON BINDING TEST: CPT | Performed by: INTERNAL MEDICINE

## 2025-03-18 PROCEDURE — 99215 OFFICE O/P EST HI 40 MIN: CPT | Performed by: INTERNAL MEDICINE

## 2025-03-18 ASSESSMENT — ANXIETY QUESTIONNAIRES
1. FEELING NERVOUS, ANXIOUS, OR ON EDGE: NEARLY EVERY DAY
IF YOU CHECKED OFF ANY PROBLEMS ON THIS QUESTIONNAIRE, HOW DIFFICULT HAVE THESE PROBLEMS MADE IT FOR YOU TO DO YOUR WORK, TAKE CARE OF THINGS AT HOME, OR GET ALONG WITH OTHER PEOPLE: EXTREMELY DIFFICULT
GAD7 TOTAL SCORE: 21
GAD7 TOTAL SCORE: 21
7. FEELING AFRAID AS IF SOMETHING AWFUL MIGHT HAPPEN: NEARLY EVERY DAY
7. FEELING AFRAID AS IF SOMETHING AWFUL MIGHT HAPPEN: NEARLY EVERY DAY
5. BEING SO RESTLESS THAT IT IS HARD TO SIT STILL: NEARLY EVERY DAY
GAD7 TOTAL SCORE: 21
8. IF YOU CHECKED OFF ANY PROBLEMS, HOW DIFFICULT HAVE THESE MADE IT FOR YOU TO DO YOUR WORK, TAKE CARE OF THINGS AT HOME, OR GET ALONG WITH OTHER PEOPLE?: EXTREMELY DIFFICULT
6. BECOMING EASILY ANNOYED OR IRRITABLE: NEARLY EVERY DAY
4. TROUBLE RELAXING: NEARLY EVERY DAY
3. WORRYING TOO MUCH ABOUT DIFFERENT THINGS: NEARLY EVERY DAY
2. NOT BEING ABLE TO STOP OR CONTROL WORRYING: NEARLY EVERY DAY

## 2025-03-18 ASSESSMENT — PATIENT HEALTH QUESTIONNAIRE - PHQ9
SUM OF ALL RESPONSES TO PHQ QUESTIONS 1-9: 25
10. IF YOU CHECKED OFF ANY PROBLEMS, HOW DIFFICULT HAVE THESE PROBLEMS MADE IT FOR YOU TO DO YOUR WORK, TAKE CARE OF THINGS AT HOME, OR GET ALONG WITH OTHER PEOPLE: EXTREMELY DIFFICULT
SUM OF ALL RESPONSES TO PHQ QUESTIONS 1-9: 25

## 2025-03-18 NOTE — PROGRESS NOTES
Assessment & Plan     Type 2 DM with CKD stage 3 and hypertension (H)  Lab Results   Component Value Date    A1C 5.7 12/05/2024    A1C 5.8 08/06/2024    A1C 5.5 12/04/2023    A1C 5.6 05/31/2023    A1C 6.1 01/02/2023    A1C 5.8 04/07/2021    A1C 5.6 06/04/2020    A1C 5.7 06/12/2018    A1C 6.2 09/06/2017    A1C 5.6 12/01/2016   Essentially stable at present time.    - Home Care Referral    B-cell lymphoma of intrathoracic lymph nodes, unspecified B-cell lymphoma type (H)  Following with oncology.  Currently holding iron replacement at this point.  Recheck CBC and iron studies.  Currently iron replacement is not listed on the patient's medication list  - Home Care Referral  - CBC with platelets; Future  - Ferritin; Future  - Iron & Iron Binding Capacity; Future    Benign essential hypertension  Stable on therapy.  Continue with medical manage    Severe obesity (BMI 35.0-39.9) with comorbidity (H)  Encouraged ongoing weight loss and weight reduction  - Home Care Referral    Diastolic heart failure, unspecified HF chronicity (H)  Appears relatively euvolemic.  - Home Care Referral    Aortic stenosis, severe  Noted as baseline history status post TAVR  - Home Care Referral    Bilateral sensorineural hearing loss  Placed per patient request.  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  No thoughts of harm to self or others.  A lot of the score dependent on the patient's living situation which will hopefully improve with home care evaluation.  Continue with SSRI therapy  Plan:         Advised family that they might want to consider more aggressively pursuing assisted living.  I have placed a home health care referral for which a multitude of issues are needed in regards to patient care, safety, transportation, medication management etc.    BMI  Estimated body mass index is 39.45 kg/m  as calculated from the following:     Height as of this encounter: 1.524 m (5').    Weight as of this encounter: 91.6 kg (202 lb).   Weight management plan: Discussed healthy diet and exercise guidelines    Depression Screening Follow Up        3/18/2025     1:05 PM   PHQ   PHQ-9 Total Score 25    Q9: Thoughts of better off dead/self-harm past 2 weeks Several days   F/U: Thoughts of suicide or self-harm Yes   F/U: Self harm-plan No   F/U: Self-harm action No   F/U: Safety concerns Yes       Patient-reported       Discussed the following ways the patient can remain in a safe environment:  be around others    See Patient Instructions    Subjective   Lucille is a 84 year old, presenting for the following health issues:  Orders    Patient requesting home care orders. Needs assistance with showering, dressing and interested in receiving PT or OT. Patient also having some confusion with medications and supplements. Daughter did purchase a home pill dispenser but is requesting assistance with getting is set-up.     Patient and family not familiar with current names of medications as they come in pill packs.     Comes today with family members concerned about patient being at home alone with .  They are not managing well.  They are interested in a home health care referral.    Has been seen in the hematology and oncology clinic for her B-cell lymphoma.  A future lab appointment with upcoming CT scan is scheduled in May.    Complaining of some mild hearing loss and would like an audiological assessment.    She is currently taking her SSRI.    The patient and her  desire to potentially try and stay in her colon if possible.  We had a long discussion in regards to these issues with the family.  Difficult for  to care of wife.  Members are not able to be there all the time.    History of Present Illness       Mental Health Follow-up:  Patient presents to follow-up on Depression & Anxiety.Patient's depression since last visit has been:   Worse  The patient is having other symptoms associated with depression.  Patient's anxiety since last visit has been:  Worse  The patient is having other symptoms associated with anxiety.  Any significant life events: relationship concerns, financial concerns and health concerns  Patient is feeling anxious or having panic attacks.  Patient has no concerns about alcohol or drug use.    Reason for visit:  Evaluation for in home nurse rehab and services    She eats 0-1 servings of fruits and vegetables daily.She consumes 2 sweetened beverage(s) daily.She exercises with enough effort to increase her heart rate 9 or less minutes per day.  She exercises with enough effort to increase her heart rate 3 or less days per week. She is missing 7 dose(s) of medications per week.        Review of Systems:    CONSTITUTIONAL: NEGATIVE for fever, chills, change in weight  EYES: NEGATIVE for vision changes or irritation  ENT/MOUTH: NEGATIVE for ear, mouth and throat problems  RESP: NEGATIVE for significant cough or SOB  CV: NEGATIVE for chest pain, palpitations or peripheral edema  GI: NEGATIVE for nausea, abdominal pain, heartburn, or change in bowel habits  : NEGATIVE for frequency, dysuria, or hematuria  MUSCULOSKELETAL:  + for significant arthralgias or myalgia  NEURO: NEGATIVE for weakness, dizziness or paresthesias  HEME: NEGATIVE for bleeding problems  PSYCHIATRIC: noted for changes in mood or affect she spends a lot of time alone with her .      Objective    /63   Pulse 71   Temp 97  F (36.1  C) (Temporal)   Resp 17   Ht 1.524 m (5')   Wt 91.6 kg (202 lb)   SpO2 92%   BMI 39.45 kg/m    Body mass index is 39.45 kg/m .      Physical Exam:    GENERAL: alert using walker  EYES: Eyes grossly normal to inspection, PERRL and conjunctivae and sclerae normal  HENT: ear canals and TM's normal, nose and mouth without ulcers or lesions  RESP: lungs clear to auscultation - no rales, rhonchi or wheezes  CV: regular  rate and rhythm, normal S1 S2, no S3 or S4, no murmur, click or rub, no peripheral edema  ABDOMEN: obese  NEURO: mild memory changes per baseline  PSYCH: mentation appears flat.          > 60 minutes spent with patient and family.    Signed Electronically by: Fausto Flynn MD

## 2025-03-19 DIAGNOSIS — D64.9 SYMPTOMATIC ANEMIA: Primary | ICD-10-CM

## 2025-03-19 DIAGNOSIS — C85.12 B-CELL LYMPHOMA OF INTRATHORACIC LYMPH NODES, UNSPECIFIED B-CELL LYMPHOMA TYPE (H): ICD-10-CM

## 2025-03-19 LAB
FERRITIN SERPL-MCNC: 30 NG/ML (ref 11–328)
IRON BINDING CAPACITY (ROCHE): 274 UG/DL (ref 240–430)
IRON SATN MFR SERPL: 14 % (ref 15–46)
IRON SERPL-MCNC: 39 UG/DL (ref 37–145)

## 2025-03-19 RX ORDER — FERROUS SULFATE 325(65) MG
325 TABLET ORAL
Qty: 90 TABLET | Refills: 0 | Status: SHIPPED | OUTPATIENT
Start: 2025-03-19

## 2025-03-24 ENCOUNTER — NURSE TRIAGE (OUTPATIENT)
Dept: INTERNAL MEDICINE | Facility: CLINIC | Age: 85
End: 2025-03-24
Payer: MEDICARE

## 2025-03-24 NOTE — TELEPHONE ENCOUNTER
Home Care is calling regarding an established patient with [facility].  Requesting orders from: Fausto Flynn  OTHER ACTION: RN provided verbal orders. Please review PCP Notification  Is this a request for a temporary pause in the home care episode?  No    PCP Notification:  Pt received pre-packaged medication and may have taken a double dose of all of Monday and Tuesday medications of:     Aspirin  Atorvastatin  Citalopram  Furosemide  Metoprolol succinate    HR is 52. Pt denied being dizzy or symptomatic per home care RN. Temp 97.7, RR 16, /68, O2 95%. Blood glucose 140.    RN advised home care nurse to connect with patient and call poison control number in the meantime. Otherwise sending message to PCP to review and advise on.     Orders Requested    Skilled Nursing  Request for initial certification (first set of orders)   Frequency: 2x per week for 1 week, then 1x a week for 7 weeks  RN gave verbal order: Yes      Physical Therapy  Request for initial evaluation and treatment (one time)   RN gave verbal order: Yes      Occupational Therapy  Request for initial evaluation and treatment (one time)   RN gave verbal order: Yes      Social Work  Request for initial evaluation and treatment (one time)   RN gave verbal order: Yes      HHA (Home Health Aide)  Request for initial certification (first set of orders)   Frequency: 1x a week for 8 weeks  RN gave verbal order: Yes       Contacts       Contact Date/Time Type Contact Phone/Fax    03/24/2025 04:36 PM CDT Phone (Incoming) [facility] [phone]     confidential VM            Suly Das RN    Reason for Disposition   Caller provides unclear information about type or amount of substance    Additional Information   Negative: Chemical in the eye   Negative: Chemical on the skin   Negative: Epinephrine (such as from Epi-Pen) accidental injection   Negative: CAUSTIC ACID OR ALKALI ingestion (e.g., toilet , drain , lye,  Clinitest tablets, ammonia, bleaches) AND any symptoms (e.g., difficulty breathing, drooling, mouth pain, sore throat, stridor)   Negative: HYDROCARBON PRODUCT ingestion (e.g., kerosene, gasoline, benzene, furniture polish, lighter fluid) AND any symptoms (e.g., coughing, difficulty breathing, vomiting)   Negative: Poison Center advised patient to go to ED and caller seeking second opinion   Negative: Patient sounds very sick or weak to the triager   Negative: SEVERE difficulty breathing (e.g., struggling for each breath, speaks in single words)   Negative: Bluish (or gray) lips or face   Negative: Slow, shallow and weak breathing   Negative: Difficult to awaken or acting confused (e.g., disoriented, slurred speech)   Negative: Seizure   Negative: Shock suspected (e.g., cold/pale/clammy skin, too weak to stand, low BP, rapid pulse)   Negative: Suicide attempt, known or suspected   Negative: Intentional overdose and suicidal thoughts or ideas   Negative: Sounds like a life-threatening emergency to the triager   Negative: All other POTENTIALLY POISONOUS SUBSTANCES (e.g., nearly all chemicals, plants) (Exception: Known harmless substances or asymptomatic double dose of OTC drug.)   Negative: Opioid (narcotic) overdose known or suspected and NO symptoms   Negative: Wild mushroom ingestion, questions about   Negative: Mercury spill (e.g., broken glass thermometer, broken spiral CFL lightbulb)   Negative: DOUBLE DOSE (an extra dose or lesser amount) of prescription drug and any symptoms (e.g., dizziness, nausea, pain, sleepiness)   Negative: DOUBLE DOSE (an extra dose or lesser amount) of over-the-counter (OTC) drug and any symptoms (e.g., dizziness, nausea, pain, sleepiness)   Negative: Took another person's prescription drug   Negative: CAUSTIC ACID OR ALKALI ingestion (e.g., toilet , drain , lye, Clinitest tablets, ammonia, bleaches) AND NO symptoms   Negative: More than a DOUBLE DOSE of a prescription  or over-the-counter (OTC) drug   Negative: HYDROCARBON product ingestion (e.g., kerosene, gasoline, benzene, furniture polish, lighter fluid) AND NO symptoms   Negative: Carbon monoxide exposure suspected    Protocols used: Poisoning-A-[place]    Suly Das RN

## 2025-03-24 NOTE — TELEPHONE ENCOUNTER
Not great but likely no harm having taken double dose of medications x 1-2 days.     No intervention needed at this time.

## 2025-03-26 ENCOUNTER — PATIENT OUTREACH (OUTPATIENT)
Dept: CARE COORDINATION | Facility: CLINIC | Age: 85
End: 2025-03-26
Payer: MEDICARE

## 2025-03-26 NOTE — PROGRESS NOTES
Clinic Care Coordination Contact  Community Health Worker Follow Up    Care Gaps:     Health Maintenance Due   Topic Date Due    HF ACTION PLAN  Never done    RSV VACCINE (1 - 1-dose 75+ series) Never done    DTAP/TDAP/TD IMMUNIZATION (2 - Td or Tdap) 01/01/2019    DIABETIC FOOT EXAM  06/29/2023    ZOSTER IMMUNIZATION (2 of 2) 08/22/2023    COVID-19 Vaccine (7 - 2024-25 season) 09/01/2024    MICROALBUMIN  12/04/2024     Did Not Address    Care Plan:   Care Plan: Social Support       Problem: Inadequate social support       Goal: I will access resources to obtain more in home support.       Start Date: 11/29/2022 Expected End Date: 5/22/2025    This Visit's Progress: 90% Recent Progress: 90%    Priority: Medium    Note:     Annual Assessment Update 12/17/24  Barriers: resource availability  Strengths: Pt is a good advocate for self.   Patient expressed understanding of goal: yes  Action steps to achieve this goal:  1. I will contact the [facility] about a MNChoices assessment. Update as of 12/17/24: NOMAN ALONSO will contact birgit Garcia to check in on this.     2. I will contact resources given to me. Discussed Help at your Door on 12/21/23.  Emailed birgit Rajput resources on 12/20/23.    3. We will include my daughter Radha on plans we discuss for more help at home so she is in loop and can assist with connecting to resources as needed.   4. I will contact my CHW with any needs or questions.                               Discussed:  Patient stated she is suppose to have helpers clean / sort out her house.  Patient stated she does not remember if the person is from the [facility] or from somewhere else.  Patient stated her  is not helping her with going through boxes.  Patient stated her children are not helping her either.  Patient stated her  is getting upset with her due to all the boxes and stuff around the house.  Patient stated she has family heirlooms from 1800's along with boxes of  photos.  Patient stated she is planning on putting items on a table and have her family come over and take what they may want.  Patient stated she tries to bring items to [place] to place on the free shelve when she is able.  Patient stated her medications changed.  Patient stated someone from home care will be visiting her today.      Intervention and Education during outreach:     CHW encouraged patient to contact CC if there are any other changes or resources needed.  Patient acknowledged understanding.     CHW Plan: will outreach in one month    ZANE Demarco  Clinic Care Coordination  [facility] Clinics: [place], [place], [facility], and [place] [facility]  Phone: [phone]

## 2025-04-08 ENCOUNTER — TELEPHONE (OUTPATIENT)
Dept: INTERNAL MEDICINE | Facility: CLINIC | Age: 85
End: 2025-04-08
Payer: MEDICARE

## 2025-04-08 NOTE — TELEPHONE ENCOUNTER
Status Update - Any recommendations?    Who is Calling: Mila PT, [facility]     Current weight parameters are set at 216 - 226 lbs.    Pt wt documented at:    4/3/26 (4pm)     220.6 lbs    4/7/26 (am)       210 lbs    4/8/26 (4pm)     214.2 lbs    Pt weighing herself daily but not always writing it down.     Does caller want a call/response back: Call back if there are any recommendations.  Call [facility] office at [phone] and ask for a nurse.   - Any recommendations?   - Change parameters?   - Specific directions for home care?    Jess Soria, , [place]   4/8/2025 at 5:50 PM

## 2025-04-14 ENCOUNTER — TELEPHONE (OUTPATIENT)
Dept: INTERNAL MEDICINE | Facility: CLINIC | Age: 85
End: 2025-04-14
Payer: MEDICARE

## 2025-04-14 NOTE — TELEPHONE ENCOUNTER
Patient Contact    Attempt # 2 Mila out of office, call transferred to  (Amy). Left message to call clinic back.     Was call answered?  No.  Left message on voicemail with information to call me back.

## 2025-04-14 NOTE — TELEPHONE ENCOUNTER
Amy is looking for weight parameter changes.     Parameters to be less than 195 or greater than 210 lbs. Patient is taking her medications now. Her diet and meds are making it so that her weight is headed in the right direction. Encouraging paid PCA help. Which is helping the patient.     Routing request to PCP.     MARISSA Quinteros  [facility]

## 2025-04-15 NOTE — TELEPHONE ENCOUNTER
Called and left message for Amy with the provider's response. Advised Amy to call the clinic back with any additional questions and/or concerns.     Natalie Dyer RN

## 2025-04-22 DIAGNOSIS — Z53.9 DIAGNOSIS NOT YET DEFINED: Primary | ICD-10-CM

## 2025-04-22 PROCEDURE — G0180 MD CERTIFICATION HHA PATIENT: HCPCS | Performed by: INTERNAL MEDICINE

## 2025-04-29 ENCOUNTER — OFFICE VISIT (OUTPATIENT)
Dept: AUDIOLOGY | Facility: CLINIC | Age: 85
End: 2025-04-29
Payer: MEDICARE

## 2025-04-29 DIAGNOSIS — H90.3 BILATERAL SENSORINEURAL HEARING LOSS: ICD-10-CM

## 2025-04-29 NOTE — CONFIDENTIAL NOTE
Interpersonal Safety (Abuse) Screening Follow Up        Do you feel physically and emotionally safe where you currently live?: No (Patient feels physically safe; however, she does not feel safe with her current nurse)  Within the past 12 months, have you been hit, slapped, kicked or otherwise physically hurt by someone?: No  Within the past 12 months, have you been humiliated or emotionally abused in other ways by your partner or ex-partner?: No    Summary of concern/details of incident: Patient reports she has macular degeneration and has difficulty seeing her medications. She does not feel physically threatened, but she is uncomfortable when the nurse comes to her home twice per week. Her son, Troy, reports that they are working on getting her a new nurse. The patient stated that she feels safe going home tonight.     No further action needed at this time.

## 2025-04-29 NOTE — PROGRESS NOTES
AUDIOLOGY REPORT    SUBJECTIVE: Lucille Rojas is a 84 year old female who was seen at the [facility] on 4/29/25 for audiologic evaluation, referred by, Fausto Flynn MD. They were accompanied today by their son, Troy. The patient has not been seen previously in this clinic. The patient's medical history is significant for memory loss. The patient had their hearing tested at [facility] on 2/4/25 which revealed mild to moderate sensorineural hearing loss in the right ear and moderate rising to mild sloping to severe asymmetric sensorineural hearing loss in the left ear. The patient was told that her left ear drum is abnormal and she thought she was seeing ENT today. The patient denies ear pain, drainage, aural fullness, ear surgery, and noise exposure.       OBJECTIVE:    Falls Risk Screening  Falls Risk Completed by: Audiology  Have you fallen 2 or more times in the past year? Yes, fell 3x  Have you fallen and had an injury in the past year? No  Is the patient receiving Physical Therapy services? No  Fall Screen Comments: Patient is receiving services through OT.             Otoscopic exam indicates non-occluding cerumen bilaterally     Pure Tone Thresholds assessed using conventional audiometry with fair reliability from 250-8000 Hz bilaterally using insert earphones and circumaural headphones. Fair reliability due to inconsistent responses and required several reminders throughout puretone testing and speech testing. Patient also fell asleep during testing.    RIGHT:  Normal sloping to moderate sensorineural hearing loss     LEFT:    Normal sloping to profound sensorineural hearing loss with 10-65 dB asymmetries from 500-8000 Hz    Tympanogram:    RIGHT: normal eardrum mobility    LEFT:   normal eardrum mobility    Reflexes (reported by stimulus ear): 1000 Hz  RIGHT: Ipsilateral is present at normal levels  RIGHT: Contralateral is absent at frequencies tested  LEFT:    Ipsilateral is absent at frequencies tested  LEFT:   Contralateral is present at normal levels    Speech Reception Threshold:    RIGHT: 25 dB HL    LEFT:   45 dB HL (10 dB masking)    Word Recognition Score:     RIGHT: 72% at 75 dB HL using NU-6 recorded word list.     LEFT:   20% at 85 dB HL using NU-6 recorded word list.    *Interpret scores with caution as the patient stopped responding, so it is unclear if there is a true decrease or if she did not understand the task    Length of appointment: 1.5 hours    ASSESSMENT: Today's results reveal normal to moderate sensorineural hearing loss in the right ear and mild to profound sensorineural hearing loss in the left ear. Compared to their previous audiogram from [facility] dated 2/4/25, hearing in the right ear has improved 15 dB at 250 Hz, and hearing in the left ear has improved 25 dB at 250 Hz, improved 10 dB from 5255-9381 Hz, and worsened 10 dB from 7959-9125 Hz. Word recognition testing in the right ear is stable and the left ear has worsened from 66% at 85 dB HL at [facility] to 20% today. When comparing between testing, these scores should be interpreted with caution because it is not clear if the discrepancy is due to how the words were presented during testing (I.e. recorded words vs. monitored live voice was not indicated). Between ears, it is unclear if there is a true decrease or if she did not understand the task because she stopped responding for several seconds at a time. Today s results were discussed with the patient in detail.       PLAN: It is recommended that the patient follow-up with ENT to evaluate the asymmetric hearing loss. Due to the similarities between today's test and the test from [facility], I do not believe that repeating today's test will add a significant amount of information that would change her plan of care. Lucille is a good hearing aid candidate at this time and may pursue hearing aids pending medical clearance. If she does not notice  benefit from traditional amplification then she may consider trialing a CROS on the left ear. She is encouraged to contact her insurance to determine if she has hearing aid coverage. The patient expressed agreement and understanding of the plan. Please call this clinic with questions regarding these results or recommendations.       Chilango Pradhan, CCC-A  Clinical Audiologist  [place] #[id number]

## 2025-05-01 ENCOUNTER — TELEPHONE (OUTPATIENT)
Dept: AUDIOLOGY | Facility: CLINIC | Age: 85
End: 2025-05-01
Payer: MEDICARE

## 2025-05-01 NOTE — CONFIDENTIAL NOTE
Radha stated that they like Patience, the [place] nurse, and the other nurse, Angela from [facility], is quite rude. Radha is currently interviewing home health aides. I assured Radha that I was not filing a complaint because they were in the process of hiring a new nurse. She is encouraged to reach out to her mom's PCP if there are concerns and need assistance finding a nurse. We reviewed that Lucille will follow-up with ENT and then may pursue hearing aids pending medical clearance. Radha was very appreciative of the call.     Viviane Pradhan, CCC-A  Audiologist

## 2025-05-01 NOTE — TELEPHONE ENCOUNTER
Spoke with patient's daughter, Radha, regarding scheduling  with ENT for an asymmetric hearing loss. Hearing test completed on 4/29/25. Informed daughter no additional hearing test is needed at this time.     Additionally, patient's daughter wanted Steph to know that the patient has 2 different RN's and the RN with [facility] is not the RN that the patient is having issues with. Daughter is requesting a call from Steph to speak on this topic in greater detail.     Sent appointment letter to confirmed address and provided [facility] [employer] [facility] number for help with access.-Appt Per PT's Daughter, Radha

## 2025-05-05 ENCOUNTER — PATIENT OUTREACH (OUTPATIENT)
Dept: CARE COORDINATION | Facility: CLINIC | Age: 85
End: 2025-05-05
Payer: MEDICARE

## 2025-05-05 NOTE — PROGRESS NOTES
Clinic Care Coordination Contact  [facility]/Karthikeyan    Clinical Data: Care Coordinator Outreach    Outreach Documentation Number of Outreach Attempt   5/5/2025  11:46 AM 1       Left message on patient's daughter, Radha lee with call back information and requested return call.      Plan: Care Coordinator will try to reach patient again in 10 business days.    ZANE Demarco  Clinic Care Coordination  [facility]: [facility], [place], [place], and [place] [place] [facility]  Phone: [phone]

## 2025-05-06 NOTE — LETTER
6/23/2022         RE: Lucille Rojas  [address] [place] [address]        Dear Colleague,    Thank you for referring your patient, Lucille Rojas, to the [facility]. Please see a copy of my visit note below.    Oncology Rooming Note    June 23, 2022 9:32 AM   Lucille Rojas is a 82 year old female who presents for:    Chief Complaint   Patient presents with     Oncology Clinic Visit     Initial Vitals: There were no vitals taken for this visit. Estimated body mass index is 47.42 kg/m  as calculated from the following:    Height as of 4/28/22: 1.524 m (5').    Weight as of 4/28/22: 110.1 kg (242 lb 12.8 oz). There is no height or weight on file to calculate BSA.  Data Unavailable Comment: Data Unavailable   No LMP recorded. Patient has had a hysterectomy.  Allergies reviewed: Yes  Medications reviewed: Yes    Medications: Medication refills not needed today.  Pharmacy name entered into In[employer]:    [id number] - [place], [place] - [address] [facility] [place] - [place], [place] - [address] [address]  [facility]    Clinical concerns: no      Odlais Biswas CMA              Oncology/Hematology Visit Note  Jun 23, 2022    Reason for Visit: follow up of non-Hodgkin's lymphoma involving the pancreas  EUS revealed pancreatic head mass.  FNA revealed CD10-positive B-cell lymphoma. Flow cytometry revealed CD10-positive lambda monotypic B-cells.  PET scan on 11/18/2021 revealed 6 cm hypermetabolic pancreatic head mass and borderline hypermetabolic right axillary lymph node.     Patient of Dr. Srinivasan  03/30/2022-status post  6 cycles of mini R-CHOP    Patient comes for routine follow-up and labs    Interval History:  Denies fever chills sweats cough shortness of breath chest pain nausea vomiting diarrhea pain bleeding patient reports she is able to take short walks.      Review of Systems:  14 point      [facility] - PODIATRY    Today's Date: 05/09/2025     Patient Name: Fiordaliza Lyons  MRN: [id number]  CSN: [id number]  PCP: Brittany Solis MD  Referring Provider: No ref. provider found    SUBJECTIVE     Chief Complaint   Patient presents with    Follow-up     PCP: Brittany Solis MD 05/02/25  Return in about 10 weeks (around 5/7/2025) for Follow-up with APRN, Follow-up in Foot Care Clinic.   Pt states she is here today for diabetic foot/nail care. Pt states right foot always hurts. XRAYS done today. Pain level 3/10    Diabetes     70 mg/dLbg (currently)     HPI: Fiordaliza Lyons, a 61 y.o.female, comes to clinic as a(n) established patient presenting for diabetic foot exam and complaining of toenail/callus issues. Patient has h/o alcoholic hepatitis, breast cysts, DM I, endometreosis, GERD, HLD, HTN, pancreatitis, sleep apnea . Patient is IDDM with last stated BG level of 70mg/dl.  Last A1c 4.9%. Continues to relay issues with right hip and low back pain as well as nerve damage after having low back surgery. Residual weakness to her right lower leg has resulted in mild foot drop. Continues to follow with Dr. Larsen. Continues to relay mild numbness and tingling in her feet.  Denies open wounds or sores today.  Toenails in need of care due to length, thickness and irregularity.  She is complaining of an increased amount of pain to her right foot most notably around her first MTPJ.  Reports she did have an updated x-ray performed this morning before her visit.  Notes that she does like to do a lot of walking and is typically limping by the end of a short distance walk.  She has days where the pain is not so bothersome then she has days where it is severe.  Is unsure if she is ready for a surgical procedure at this time.  Notes right greater toenail is painful upon application of pressure.  Denies drainage Wrights pain 3 out of 10 currently.  Relates previous treatment(s) including foot care by  ROS of systems including Constitutional, Eyes, Respiratory, Cardiovascular, Gastroenterology, Genitourinary, Integumentary, Muscularskeletal, Psychiatric were all negative except for pertinent positives noted in my HPI.    Physical Examination:  General: The patient is a pleasant female in no acute distress.  HEENT: EOMI, PERRL. Sclerae are anicteric. Oral mucosa is pink and moist with no lesions or thrush.   Lymph: Neck is supple with no lymphadenopathy in the cervical or supraclavicular areas.   Heart: Regular rate and rhythm.   Lungs: Clear to auscultation bilaterally.   GI: Bowel sounds present, soft, nontender with no palpable hepatosplenomegaly or masses.   Extremities: No lower extremity edema noted bilaterally.   Skin: No rashes, petechiae, or bruising noted on exposed skin.    Laboratory Data:  CBC CMP results reviewed    Assessment and Plan:  This is a 82-year-old female with    Lymphoma  non-Hodgkin's lymphoma involving the pancreas  Patient of Dr. Srinivasan  03/30/2022-status post  6 cycles of mini R-CHOP followed by Neulasta  Patient reports overall feeling well  04/11/2022-PET scan reveals complete remission  Labs reviewed abnormalities discussed  Schedule patient with Dr. Srinivasan in 6 weeks with labs      Anemia  Patient is asymptomatic  S/p EGD on 09/21/21 revealed a few gastric erosions and a few tiny aphthous ulcers in the duodenum, but not severe enough  Iron deficiency anemia  -status post Venofer  -Currently taking oral iron  Overall stable hemoglobin  Transfuse for hemoglobin less than 7 or symptomatic      Elevated blood sugar  Follow-up with primary care        Call our clinic with any changes in health condition or questions    ALIE Carreon [facility]     Chart documentation with Dragon Voice recognition Software. Although reviewed after completion, some words and grammatical errors may remain.            Again, thank you for allowing me to participate in the  podiatry . Denies any constitutional symptoms. No other pedal complaints at this time.    Past Medical History:   Diagnosis Date    Alcoholic hepatitis with ascites     Allergic rhinitis     Cancer     ovarian    Cirrhosis 2021    Degeneration of lumbar or lumbosacral intervertebral disc 09/15/2022    Diabetes mellitus     Disease of thyroid gland     Elevated d-dimer     Endometriosis     Fatty liver     GERD (gastroesophageal reflux disease)     History of transfusion     Hyperlipidemia     Hypertension     Low back pain     Lumbosacral disc disease 2022    Osteoporosis     Ovarian cyst     Pancreatitis     Postmenopausal     Rectal mass     Rectal prolapse     Sleep apnea     has corrrected it self with w/ weight loss     Past Surgical History:   Procedure Laterality Date    ARM NERVE EXPLORATION Left     6 nerves scraped ,cleaned nerves off    BACK SURGERY  2023    BILATERAL SALPINGO OOPHORECTOMY  2008    CARPAL TUNNEL RELEASE  2016    CHOLECYSTECTOMY  1996    COLONOSCOPY  07/2017    Dr Murphy- per patient normal     COLONOSCOPY N/A 11/09/2021    Procedure: COLONOSCOPY WITH ANESTHESIA;  Surgeon: Ray Serna MD;  Location: [facility] ENDOSCOPY;  Service: Gastroenterology;  Laterality: N/A;  pre rectal mass  post  Dr Shields    COLONOSCOPY N/A 1/20/2025    Procedure: COLONOSCOPY WITH ANESTHESIA;  Surgeon: Ray Serna MD;  Location: [facility] ENDOSCOPY;  Service: Gastroenterology;  Laterality: N/A;  pre polyp  hx  post diverticulosis,polyp, hemorrfoids    Dr. Shields    DILATION AND CURETTAGE, DIAGNOSTIC / THERAPEUTIC      ENDOSCOPY N/A 11/09/2021    Procedure: ESOPHAGOGASTRODUODENOSCOPY WITH ANESTHESIA;  Surgeon: Ray Serna MD;  Location: [facility] ENDOSCOPY;  Service: Gastroenterology;  Laterality: N/A;  pre anemia  post hiatal hernia; gastric polyp  Dr. Shields    EYE SURGERY Bilateral     cataract with lens    HEMORRHOIDECTOMY SIGMOIDOSCOPY N/A 01/09/2024    Procedure: HEMORRHOIDECTOMY WITH EXAM UNDER  "ANESTHESIA;  Surgeon: Rocio Read MD;  Location:  [facility] OR;  Service: General;  Laterality: N/A;    KNEE SURGERY Right 1982    LUMBAR LAMINECTOMY WITH FUSION Right 05/01/2023    Procedure: LUMBAR DISCECTOMY, JADON- LAMINECTOMY TRANSFORAMINAL LUMBAR INTERBODY FUSION, POSTERIOR PEDICLE SCREW INSTRUMENTATION, L5-S1 RIGHT. USE OF THE ROBOT;  Surgeon: Nathaniel Tesfaye MD;  Location:  [facility] OR;  Service: Neurosurgery;  Laterality: Right;    SPINAL FUSION  2023    TRIGGER POINT INJECTION  2023     Family History   Problem Relation Age of Onset    Colon polyps Father     Ovarian cancer Mother     Colon polyps Brother     Ovarian cancer Paternal Grandmother     Ovarian cancer Maternal Grandmother     Ovarian cancer Maternal Aunt     Breast cancer Neg Hx     Colon cancer Neg Hx      Social History     Socioeconomic History    Marital status: Single   Tobacco Use    Smoking status: Never     Passive exposure: Never    Smokeless tobacco: Never   Vaping Use    Vaping status: Never Used   Substance and Sexual Activity    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 90 tablet 3    BD Pen Needle Tatyana U/F 32G X 4 MM misc 4 (Four) Times a Day. use as directed      Continuous Blood Gluc Sensor (Dexcom G6 Sensor) See Admin Instructions.      Continuous Blood Gluc Transmit (Dexcom G6 Transmitter) misc See Admin Instructions.      gabapentin (NEURONTIN) 300 MG capsule Take 1 capsule by mouth Every Night.      Gvoke HypoPen 2-Pack 1 MG/0.2ML solution auto-injector       insulin aspart " care of your patient.        Sincerely,        ALIE Carreon CNP     (novoLOG FLEXPEN) 100 UNIT/ML solution pen-injector sc pen Inject 2-5 Units under the skin into the appropriate area as directed 3 (Three) Times a Day With Meals. 2-5 units with meals sliding scale      Insulin Glargine, 2 Unit Dial, (Toujeo Max SoloStar) 300 UNIT/ML solution pen-injector injection Inject 14 Units under the skin into the appropriate area as directed.      levothyroxine (SYNTHROID, LEVOTHROID) 50 MCG tablet Take 1 tablet by mouth Every Morning.      losartan (COZAAR) 25 MG tablet Take 1 tablet by mouth Daily. 90 tablet 3    magnesium oxide (MAG-OX) 400 MG tablet Take 1 tablet by mouth Daily.      Multiple Vitamins-Iron (multivitamin with iron) tablet tablet Take 1 tablet by mouth Daily.      Nutritional Supplements (JUICE PLUS FIBRE PO) Take 1 dose by mouth Daily.      pantoprazole (PROTONIX) 40 MG EC tablet Take 1 tablet by mouth Daily.      Potassium 99 MG tablet Take  by mouth.      Specialty Vitamins Products (NERVIVE NERVE RELIEF PO) Take  by mouth.      VITAMIN D-VITAMIN K PO Take  by mouth 1 (One) Time Per Week.       No current facility-administered medications for this visit.     Review of Systems   Constitutional:  Negative for chills and fever.   HENT:  Negative for congestion.    Respiratory:  Negative for shortness of breath.    Cardiovascular:  Positive for leg swelling. Negative for chest pain.   Gastrointestinal:  Negative for constipation, diarrhea, nausea and vomiting.   Musculoskeletal:  Positive for arthralgias and back pain.        Right foot pain   Skin:  Negative for wound.        Thickened, elongated toenails. CAllus   Neurological:  Positive for weakness and numbness.   Psychiatric/Behavioral:  Negative for agitation, behavioral problems and confusion.        OBJECTIVE     Vitals:    05/09/25 0809   BP: 122/80   Pulse: 61   SpO2: 96%                   PHYSICAL EXAM  GEN:   Accompanied by none.     Foot/Ankle Exam    GENERAL  Diabetic foot exam performed    Appearance:   appears stated age  Orientation:  AAOx3  Affect:  appropriate  Gait:  unimpaired  Assistance:  independent  Right shoe gear: casual shoe  Left shoe gear: casual shoe    VASCULAR     Right Foot Vascularity   Dorsalis pedis:  2+  Posterior tibial:  1+  Skin temperature:  warm  Edema grading:  Trace  CFT:  3  Pedal hair growth:  Present  Varicosities:  mild varicosities     Left Foot Vascularity   Dorsalis pedis:  2+  Posterior tibial:  1+  Skin temperature:  warm  Edema grading:  Trace  CFT:  3  Pedal hair growth:  Present  Varicosities:  mild varicosities     NEUROLOGIC     Right Foot Neurologic   Light touch sensation: diminished  Vibratory sensation: diminished  Hot/Cold sensation: diminished  Protective Sensation using Harlan-Paul Monofilament:   Sites intact: 5  Sites tested: 10     Left Foot Neurologic   Light touch sensation: diminished  Vibratory sensation: diminished  Hot/Cold sensation:  diminished  Protective Sensation using Harlan-Paul Monofilament:   Sites intact: 5  Sites tested: 10    MUSCULOSKELETAL     Right Foot Musculoskeletal   Ecchymosis:  none  Tenderness:  MTP 1 dorsal tenderness and toenail problem    Arch:  Normal  Hallux valgus: No    Hallux limitus: Yes (Dorsal spurring)       Left Foot Musculoskeletal   Ecchymosis:  none  Tenderness:  toenail problem  Arch:  Normal  Hallux valgus: No      MUSCLE STRENGTH     Right Foot Muscle Strength   Foot dorsiflexion:  4  Foot plantar flexion:  5  Foot inversion:  4+  Foot eversion:  5     Left Foot Muscle Strength   Foot dorsiflexion:  5  Foot plantar flexion:  5  Foot inversion:  5  Foot eversion:  5    RANGE OF MOTION     Right Foot Range of Motion   Foot and ankle ROM within normal limits       Left Foot Range of Motion   Foot and ankle ROM within normal limits      DERMATOLOGIC      Right Foot Dermatologic   Skin  Right foot skin is intact.   Nails  1.  Positive for elongated, onychomycosis, abnormal thickness and subungual debris.  2.   Positive for elongated and abnormal thickness.  3.  Positive for elongated and abnormal thickness.  4.  Positive for elongated and abnormal thickness.  5.  Positive for elongated and abnormal thickness.  Nails comment:  1-5     Left Foot Dermatologic   Skin  Left foot skin is intact.   Nails comment:  1-5  Nails  1.  Positive for elongated, onychomycosis, abnormal thickness and subungual debris.  2.  Positive for elongated and abnormal thickness.  3.  Positive for elongated and abnormal thickness.  4.  Positive for elongated and abnormally thick.  5.  Positive for elongated and abnormally thick.    Image:       RADIOLOGY/NUCLEAR:  XR Foot 3+ View Right  Result Date: 5/9/2025  Narrative: EXAMINATION: XR FOOT 3+ VW RIGHT-  5/9/2025 6:07 AM  HISTORY: right foot pain; M79.671-Pain in right foot  Images are stored in PACS per institutional protocol.  3 view RIGHT foot exam.  High-grade degenerative change at the first MTP joint. Joint space narrowing, sclerosis, spurring, and focal erosive change.  Metatarsals and toes show no fracture. Midfoot joints are normal.  Normal talus and calcaneus.      Impression: 1. High-grade degenerative change at the first MTP joint.    This report was signed and finalized on 5/9/2025 7:21 AM by Dr. Khanh Esteban MD.          LABORATORY/CULTURE RESULTS:      PATHOLOGY RESULTS:       ASSESSMENT/PLAN     Diagnoses and all orders for this visit:    1. Onychomycosis (Primary)    2. Right foot pain    3. Arthritis of first metatarsophalangeal (MTP) joint of right foot    4. Type 1 diabetes mellitus with diabetic neuropathy    5. Foot drop, right    6. Foot callus        Comprehensive lower extremity examination and evaluation was performed.  Discussed findings and treatment plan including risks, benefits, and treatment options with patient in detail. Patient agreed with treatment plan.  After verbal consent obtained, nail(s) x10 debrided of length and thickness with nail nipper without  incidence  After verbal consent obtained, calluses x1 pared utilizing dermal curette and/or scalpel without incidence  Patient may maintain nails and calluses at home utilizing emery board or pumice stone between visits as needed  Reviewed at home diabetic foot care including daily foot checks   Last A1c for review 4.9 %.  Continue control and management of Type 2 DM per PCP.  Reviewed updated x-rays today.  She does have high-grade degenerative change to her first right MTPJ.  Discussed conservative therapies versus surgical correction.  Patient would like to think on having the procedure.  Notes pain is moderate to severe at times however is unsure if she is ready for this.  Patient to notify us if she decides she would like to have further consultation with Dr. Tony to discuss possible surgery.  Otherwise patient prefers to return in 63 days for routine care.  Encouraged to call with questions or concerns.      An After Visit Summary was printed and given to the patient at discharge, including (if requested) any available informative/educational handouts regarding diagnosis, treatment, or medications. All questions were answered to patient/family satisfaction. Should symptoms fail to improve or worsen they agree to call or return to clinic or to go to the Emergency Department. Discussed the importance of following up with any needed screening tests/labs/specialist appointments and any requested follow-up recommended by me today. Importance of maintaining follow-up discussed and patient accepts that missed appointments can delay diagnosis and potentially lead to worsening of conditions.  Return in about 2 months (around 7/10/2025) for Follow-up with APRN, Follow-up in Foot Care Clinic., or sooner if acute issues arise.  Advance Care Planning   ACP discussion was declined by the patient. Patient does not have an advance directive, declines further assistance.       This document has been electronically signed by  Trish Lindquist, APRN on May 9, 2025 09:34 CDT

## 2025-05-12 ENCOUNTER — TELEPHONE (OUTPATIENT)
Dept: INTERNAL MEDICINE | Facility: CLINIC | Age: 85
End: 2025-05-12
Payer: MEDICARE

## 2025-05-12 NOTE — TELEPHONE ENCOUNTER
Home Care is calling regarding an established patient with [facility].  Requesting orders from: Fausto Flynn RN APPROVED: RN able to provide verbal orders.  Home Care will send orders for signature.  RN will close encounter.  Is this a request for a temporary pause in the home care episode?  No    Orders Requested    Skilled Nursing  Request for discontinuation of care   Goals have been met/progressing.  RN gave verbal order: Yes      Physical Therapy  Request for discontinuation of care   Goals have been met/progressing.  RN gave verbal order: Yes      Occupational Therapy  Request for discontinuation of care   Goals have been met/progressing.  RN gave verbal order: Yes      HHA (Home Health Aide)  Request for discontinuation of care   Goals have been met/progressing.  RN gave verbal order: Yes            Contacts       Contact Date/Time Type Contact Phone/Fax    05/12/2025 03:01 PM CDT Phone (Incoming) MARISSA Reyes [facility] [phone]     confidential VM          Suly Das RN

## 2025-05-14 ENCOUNTER — TELEPHONE (OUTPATIENT)
Dept: INTERNAL MEDICINE | Facility: CLINIC | Age: 85
End: 2025-05-14
Payer: MEDICARE

## 2025-05-14 NOTE — TELEPHONE ENCOUNTER
Home Care is calling regarding an established patient with [facility].  Requesting orders from: Fausto Flynn  RN APPROVED: RN able to provide verbal orders.  Home Care will send orders for signature.  RN will close encounter.  Is this a request for a temporary pause in the home care episode?  No    Orders Requested    Occupational Therapy  Request for discontinuation of care   Goals have been met/progressing.  RN gave verbal order: Yes      Okay to leave a detailed message?: Yes    Contacts       Contact Date/Time Type Contact Phone/Fax    05/14/2025 09:54 AM CDT Phone (Incoming) CHANEL Singh [facility] (Home Care) [phone]          Kemi Clements, RN

## 2025-05-19 ENCOUNTER — PATIENT OUTREACH (OUTPATIENT)
Dept: CARE COORDINATION | Facility: CLINIC | Age: 85
End: 2025-05-19
Payer: MEDICARE

## 2025-05-19 ASSESSMENT — ACTIVITIES OF DAILY LIVING (ADL): DEPENDENT_IADLS:: TRANSPORTATION;MEDICATION MANAGEMENT

## 2025-05-19 NOTE — PROGRESS NOTES
Clinic Care Coordination Contact  Care Coordination Clinician Chart Review    Situation: Patient chart reviewed by Care Coordinator.       Background: Care Coordination Program started: 12/3/2019. Initial assessment completed and patient-centered care plan(s) were developed with participation from patient. Lead CC handed patient off to CHW for continued outreaches.       Assessment: Per chart review, patient outreach completed by CC CHW on 5/13/25.  Patient is actively working to accomplish goal(s). Patient's goal(s) appropriate and relevant at this time. Patient is not due for updated Plan of Care.  Assessments will be completed annually or as needed/with change of patient status.      Care Plan: Social Support       Problem: Inadequate social support       Goal: I will access resources to obtain more in home support.       Start Date: 11/29/2022 Expected End Date: 7/22/2025    This Visit's Progress: 90% Recent Progress: 90%    Priority: Medium    Note:     Annual Assessment Update 12/17/24  Barriers: resource availability  Strengths: Pt is a good advocate for self.   Patient expressed understanding of goal: yes  Action steps to achieve this goal:  1. I will contact the [facility] about a M[facility] assessment. Update as of 12/17/24: SW CC will contact birgit Garcia to check in on this.     2. I will contact resources given to me. Discussed Help at your Door on 12/21/23.  Emailed birgit Rajput resources on 12/20/23.    3. We will include my daughter Radha on plans we discuss for more help at home so she is in loop and can assist with connecting to resources as needed.   4. I will contact my CHW with any needs or questions.                                      Plan/Recommendations: The patient will continue working with Care Coordination to achieve goal(s) as above. CHW will continue outreaches at minimum every 30 days and will involve Lead CC as needed or if patient is ready to move to Maintenance. Lead CC will  continue to monitor CHW outreaches and patient's progress to goal(s) every 6 weeks.     Plan of Care updated and sent to patient: TORIE Alas   Care Coordination Team  [phone]

## 2025-05-21 ENCOUNTER — ANCILLARY PROCEDURE (OUTPATIENT)
Dept: CT IMAGING | Facility: CLINIC | Age: 85
End: 2025-05-21
Attending: INTERNAL MEDICINE
Payer: MEDICARE

## 2025-05-21 ENCOUNTER — RESULTS FOLLOW-UP (OUTPATIENT)
Dept: ONCOLOGY | Facility: CLINIC | Age: 85
End: 2025-05-21

## 2025-05-21 ENCOUNTER — LAB (OUTPATIENT)
Dept: LAB | Facility: CLINIC | Age: 85
End: 2025-05-21
Payer: MEDICARE

## 2025-05-21 DIAGNOSIS — C85.12 B-CELL LYMPHOMA OF INTRATHORACIC LYMPH NODES, UNSPECIFIED B-CELL LYMPHOMA TYPE (H): ICD-10-CM

## 2025-05-21 LAB
ALBUMIN SERPL BCG-MCNC: 3.8 G/DL (ref 3.5–5.2)
ALP SERPL-CCNC: 128 U/L (ref 40–150)
ALT SERPL W P-5'-P-CCNC: 10 U/L (ref 0–50)
ANION GAP SERPL CALCULATED.3IONS-SCNC: 8 MMOL/L (ref 7–15)
AST SERPL W P-5'-P-CCNC: 23 U/L (ref 0–45)
BILIRUB SERPL-MCNC: 0.3 MG/DL
BUN SERPL-MCNC: 31.7 MG/DL (ref 8–23)
CALCIUM SERPL-MCNC: 9 MG/DL (ref 8.8–10.4)
CHLORIDE SERPL-SCNC: 102 MMOL/L (ref 98–107)
CREAT BLD-MCNC: 1.2 MG/DL (ref 0.5–1)
CREAT SERPL-MCNC: 1.11 MG/DL (ref 0.51–0.95)
EGFRCR SERPLBLD CKD-EPI 2021: 44 ML/MIN/1.73M2
EGFRCR SERPLBLD CKD-EPI 2021: 48 ML/MIN/1.73M2
ERYTHROCYTE [DISTWIDTH] IN BLOOD BY AUTOMATED COUNT: 15.5 % (ref 10–15)
GLUCOSE SERPL-MCNC: 90 MG/DL (ref 70–99)
HCO3 SERPL-SCNC: 31 MMOL/L (ref 22–29)
HCT VFR BLD AUTO: 38.8 % (ref 35–47)
HGB BLD-MCNC: 11.5 G/DL (ref 11.7–15.7)
MCH RBC QN AUTO: 25.8 PG (ref 26.5–33)
MCHC RBC AUTO-ENTMCNC: 29.6 G/DL (ref 31.5–36.5)
MCV RBC AUTO: 87 FL (ref 78–100)
PLATELET # BLD AUTO: 212 10E3/UL (ref 150–450)
POTASSIUM SERPL-SCNC: 4.2 MMOL/L (ref 3.4–5.3)
PROT SERPL-MCNC: 6.4 G/DL (ref 6.4–8.3)
RBC # BLD AUTO: 4.46 10E6/UL (ref 3.8–5.2)
SODIUM SERPL-SCNC: 141 MMOL/L (ref 135–145)
WBC # BLD AUTO: 5.9 10E3/UL (ref 4–11)

## 2025-05-21 PROCEDURE — 82565 ASSAY OF CREATININE: CPT

## 2025-05-21 PROCEDURE — 83550 IRON BINDING TEST: CPT | Performed by: INTERNAL MEDICINE

## 2025-05-21 PROCEDURE — 36415 COLL VENOUS BLD VENIPUNCTURE: CPT

## 2025-05-21 PROCEDURE — 82728 ASSAY OF FERRITIN: CPT | Performed by: INTERNAL MEDICINE

## 2025-05-21 PROCEDURE — 250N000009 HC RX 250: Performed by: INTERNAL MEDICINE

## 2025-05-21 PROCEDURE — 85027 COMPLETE CBC AUTOMATED: CPT

## 2025-05-21 PROCEDURE — 74177 CT ABD & PELVIS W/CONTRAST: CPT

## 2025-05-21 PROCEDURE — 250N000011 HC RX IP 250 OP 636: Performed by: INTERNAL MEDICINE

## 2025-05-21 RX ORDER — IOPAMIDOL 755 MG/ML
111 INJECTION, SOLUTION INTRAVASCULAR ONCE
Status: COMPLETED | OUTPATIENT
Start: 2025-05-21 | End: 2025-05-21

## 2025-05-21 RX ADMIN — SODIUM CHLORIDE 50 ML: 9 INJECTION, SOLUTION INTRAVENOUS at 09:26

## 2025-05-21 RX ADMIN — IOPAMIDOL 111 ML: 755 INJECTION, SOLUTION INTRAVENOUS at 09:26

## 2025-05-22 ENCOUNTER — LAB (OUTPATIENT)
Dept: INFUSION THERAPY | Facility: CLINIC | Age: 85
End: 2025-05-22
Attending: INTERNAL MEDICINE
Payer: MEDICARE

## 2025-05-22 ENCOUNTER — ONCOLOGY VISIT (OUTPATIENT)
Dept: ONCOLOGY | Facility: CLINIC | Age: 85
End: 2025-05-22
Attending: INTERNAL MEDICINE
Payer: MEDICARE

## 2025-05-22 ENCOUNTER — RESULTS FOLLOW-UP (OUTPATIENT)
Dept: ONCOLOGY | Facility: CLINIC | Age: 85
End: 2025-05-22

## 2025-05-22 VITALS
HEART RATE: 56 BPM | DIASTOLIC BLOOD PRESSURE: 72 MMHG | RESPIRATION RATE: 16 BRPM | SYSTOLIC BLOOD PRESSURE: 117 MMHG | BODY MASS INDEX: 41.56 KG/M2 | WEIGHT: 212.8 LBS | OXYGEN SATURATION: 92 %

## 2025-05-22 DIAGNOSIS — C85.12 B-CELL LYMPHOMA OF INTRATHORACIC LYMPH NODES, UNSPECIFIED B-CELL LYMPHOMA TYPE (H): ICD-10-CM

## 2025-05-22 DIAGNOSIS — D64.9 ANEMIA, UNSPECIFIED TYPE: ICD-10-CM

## 2025-05-22 DIAGNOSIS — I95.0 IDIOPATHIC HYPOTENSION: Primary | ICD-10-CM

## 2025-05-22 DIAGNOSIS — I35.0 AORTIC STENOSIS, SEVERE: ICD-10-CM

## 2025-05-22 LAB
FERRITIN SERPL-MCNC: 30 NG/ML (ref 11–328)
IRON BINDING CAPACITY (ROCHE): 255 UG/DL (ref 240–430)
IRON SATN MFR SERPL: 13 % (ref 15–46)
IRON SERPL-MCNC: 32 UG/DL (ref 37–145)

## 2025-05-22 PROCEDURE — G0463 HOSPITAL OUTPT CLINIC VISIT: HCPCS | Performed by: INTERNAL MEDICINE

## 2025-05-22 RX ORDER — METOPROLOL SUCCINATE 25 MG/1
12.5 TABLET, EXTENDED RELEASE ORAL DAILY
COMMUNITY
Start: 2025-05-22

## 2025-05-22 ASSESSMENT — PAIN SCALES - GENERAL: PAINLEVEL_OUTOF10: NO PAIN (0)

## 2025-05-22 NOTE — RESULT ENCOUNTER NOTE
Dear Ms. Rojas,    Iron level remains low.  Please continue with oral iron pill.    Please, call me with any questions.    John Srinivasan MD

## 2025-05-22 NOTE — RESULT ENCOUNTER NOTE
Dear Ms. Crystal,    CT scan is good. No evidence of lymphoma.    Please, call me with any questions.    John Srinivasan MD

## 2025-05-22 NOTE — PROGRESS NOTES
Oncology Rooming Note    May 22, 2025 1:00 PM   Lucille Rojas is a 85 year old female who presents for:    Chief Complaint   Patient presents with    Oncology Clinic Visit     Initial Vitals: There were no vitals taken for this visit. Estimated body mass index is 41.99 kg/m  as calculated from the following:    Height as of 3/18/25: 1.524 m (5').    Weight as of 5/15/25: 97.5 kg (215 lb). There is no height or weight on file to calculate BSA.  Data Unavailable Comment: Data Unavailable   No LMP recorded. Patient has had a hysterectomy.  Allergies reviewed: Yes  Medications reviewed: Yes    Medications: Medication refills not needed today.  Pharmacy name entered into [employer]:    [id number] [place], [place] - [address] [facility] [place], [place] - [address]    Frailty Screening:   Is the patient here for a new oncology consult visit in cancer care? 2. No    PHQ9:  Did this patient require a PHQ9?: No      Clinical concerns:  doctor was notified.      Tianna Beckman, CHELO

## 2025-05-22 NOTE — RESULT ENCOUNTER NOTE
Dear Ms. Rojas,    Blood test is stable.    Please, call me with any questions.    John Srinivasan MD

## 2025-05-22 NOTE — LETTER
5/22/2025      Lucille Rojas  [address] [facility] [address]      Dear Colleague,    Thank you for referring your patient, Lucille Rojas, to the [facility]. Please see a copy of my visit note below.    Oncology Rooming Note    May 22, 2025 1:00 PM   Lucille Rojas is a 85 year old female who presents for:    Chief Complaint   Patient presents with     Oncology Clinic Visit     Initial Vitals: There were no vitals taken for this visit. Estimated body mass index is 41.99 kg/m  as calculated from the following:    Height as of 3/18/25: 1.524 m (5').    Weight as of 5/15/25: 97.5 kg (215 lb). There is no height or weight on file to calculate BSA.  Data Unavailable Comment: Data Unavailable   No LMP recorded. Patient has had a hysterectomy.  Allergies reviewed: Yes  Medications reviewed: Yes    Medications: Medication refills not needed today.  Pharmacy name entered into Jet:    [id number] - [place], [place] - [address]. [facility] [place], [place] [facility] [address]  [facility] [address]    Frailty Screening:   Is the patient here for a new oncology consult visit in cancer care? 2. No    PHQ9:  Did this patient require a PHQ9?: No      Clinical concerns:  doctor was notified.      Tianna Beckman, [facility]                ONCOLOGY HISTORY: Ms. 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  1. On 09/20/2021:   -Hemoglobin of 4.7 with MCV of 67. Normal WBC and platelets.  -Iron of 9 and saturation index of 2%.   -CT chest, abdomen and pelvis reveals large pancreatic head mass.  This encases the celiac trunk, superior mesenteric artery and superior mesenteric vein.  There is moderate-size right pleural effusion. No lymphadenopathy.  2. Thoracocentesis on 09/22/2021. Cytology is negative for  malignancy.  3. EUS on 09/21/2021 revealed is a mass in the uncinate process of the pancreas measuring 33 mm.  There was evidence of invasion into superior mesenteric artery and the celiac trunk. No enlarged lymph nodes seen. FNA revealed atypical cells.    4. EGD and EUS repeated on 10/05/2021.  -EGD is normal.  -EUS revealed pancreatic head mass.  FNA revealed CD10-positive B-cell lymphoma. Flow cytometry revealed CD10-positive lambda monotypic B-cells.  5. PET scan on 11/18/2021 revealed 6 cm hypermetabolic pancreatic head mass and borderline hypermetabolic right axillary lymph node.   -Further repeat biopsy not done because of her overall poor health.  6. mini R-CHOP x 6 cycles between 12/15/2021 and 03/30/2022.  -PET scan on 04/11/21 reveals complete response.     SUBJECTIVE:   came with the patient.     Mrs. Rojas is an 85-year-old female with B-cell non-Hodgkin lymphoma of the pancreas, status post 6 cycles of mini R-CHOP completed in 03/2022. She has been in complete remission.    CT scan on 05/21/25: No significant change. No evidence of progressive lymphoma in the chest, abdomen, or pelvis.     Patient overall doing well for her age.  She has generalized weakness.  She uses a walker.  As per the , patient is able to do activities of daily living slowly.  She has not been falling down.    Patient's memory is not good.  She is more forgetful. As per the , at times she is more irritable.     Her vision is not good. She has macular degeneration.  Her hearing also is not good     Patient denies any headache.  She gets occasional dizziness.  No falls. No chest pain.  No shortness of breath at rest.  No abdominal pain.  No vomiting.  Appetite is fairly good.  No urinary complaints.  No bowel problem.  No bleeding.  No fever or night sweats. She has mild numbness and tingling in fingers and toes from diabetes and chemotherapy. Neuropathy is stable.      PHYSICAL EXAMINATION:    GENERAL:  She  is alert and oriented x 3.  She looks weak.  Not in any distress.  Vitals: Reviewed.  Rest of the system is not examined     LABORATORY:  CBC and CMP reviewed.     ASSESSMENT:    1.   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  No evidence of recurrence.  2.  Generalized weakness secondary to her age and multiple other medical problems.  3.  Chronic kidney disease.  Stable.  4.  Mild normocytic anemia from iron deficiency, chronic kidney disease and anemia of chronic disease.  5.  Grade 1 peripheral neuropathy.  Stable.  6.  Bradycardia from metoprolol.  7.  Multiple other medical problems including hypertension and diabetes mellitus.     PLAN:    -Decrease metoprolol to 12.5 mg a day.  -Continue oral iron  -See me in 6 months with labs.  -Will consider CT scan in 1 year.     DISCUSSION:  1.  Patient is overall doing well for her age.  CT scan was reviewed with the patient and her .  No evidence of malignancy.  She was happy to know that.    Explained to the patient that risk of recurrence of lymphoma is low.  Will continue to monitor her.  I will see her in 6 months.  Will consider CT scan in 1 year.    2.  When she came to the clinic, she was found to be hypotensive and bradycardic.  Repeat blood pressure is better.  She remained bradycardic.  EKG was done.  It reveals bradycardia.    She is on metoprolol ER 25 mg a day.  Advised her to decrease it to 12.5 mg a day.    Patient advised to follow-up with her PCP regarding her low blood pressure and low pulse.    3.  Patient has mild normocytic anemia.  Labs in March 2025 had revealed iron deficiency.  She is on ferrous sulfate.  She will continue on it.  She is not having any constipation or abdominal pain from it.  We will recheck iron and ferritin today.    4.  Patient has generalized weakness.  She uses a walker.  Her vision is decreased.  Hearing is decreased.   She is at high risk of falls.  Advised her to be careful and avoid falls.    5.  I will see her in 6 months time with labs.  Advised them to call us with any questions or concerns.    Total visit time of 40 minutes.  Time spent in today's visit, review of chart/investigations today and documentation today.       Again, thank you for allowing me to participate in the care of your patient.        Sincerely,        John Srinivasan MD    Electronically signed

## 2025-05-22 NOTE — LETTER
5/22/2025         RE: Lucille Rojas  [address] [facility] [address]      Oncology Rooming Note    May 22, 2025 1:00 PM   Lucille Rojas is a 85 year old female who presents for:    Chief Complaint   Patient presents with     Oncology Clinic Visit     Initial Vitals: There were no vitals taken for this visit. Estimated body mass index is 41.99 kg/m  as calculated from the following:    Height as of 3/18/25: 1.524 m (5').    Weight as of 5/15/25: 97.5 kg (215 lb). There is no height or weight on file to calculate BSA.  Data Unavailable Comment: Data Unavailable   No LMP recorded. Patient has had a hysterectomy.  Allergies reviewed: Yes  Medications reviewed: Yes    Medications: Medication refills not needed today.  Pharmacy name entered into [employer]:    [id number] - [place], [place] - [address]. [facility] [place], [place] - [address]  [facility] [address]    Frailty Screening:   Is the patient here for a new oncology consult visit in cancer care? 2. No    PHQ9:  Did this patient require a PHQ9?: No      Clinical concerns:  doctor was notified.      Tianna Beckman, [facility]                ONCOLOGY HISTORY: Ms. 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  1. On 09/20/2021:   -Hemoglobin of 4.7 with MCV of 67. Normal WBC and platelets.  -Iron of 9 and saturation index of 2%.   -CT chest, abdomen and pelvis reveals large pancreatic head mass.  This encases the celiac trunk, superior mesenteric artery and superior mesenteric vein.  There is moderate-size right pleural effusion. No lymphadenopathy.  2. Thoracocentesis on 09/22/2021. Cytology is negative for malignancy.  3. EUS on 09/21/2021 revealed is a mass in the uncinate process of the pancreas measuring 33 mm.  There was evidence of invasion into superior  mesenteric artery and the celiac trunk. No enlarged lymph nodes seen. FNA revealed atypical cells.    4. EGD and EUS repeated on 10/05/2021.  -EGD is normal.  -EUS revealed pancreatic head mass.  FNA revealed CD10-positive B-cell lymphoma. Flow cytometry revealed CD10-positive lambda monotypic B-cells.  5. PET scan on 11/18/2021 revealed 6 cm hypermetabolic pancreatic head mass and borderline hypermetabolic right axillary lymph node.   -Further repeat biopsy not done because of her overall poor health.  6. mini R-CHOP x 6 cycles between 12/15/2021 and 03/30/2022.  -PET scan on 04/11/21 reveals complete response.     SUBJECTIVE:   came with the patient.     Mrs. Rojas is an 85-year-old female with B-cell non-Hodgkin lymphoma of the pancreas, status post 6 cycles of mini R-CHOP completed in 03/2022. She has been in complete remission.    CT scan on 05/21/25: No significant change. No evidence of progressive lymphoma in the chest, abdomen, or pelvis.     Patient overall doing well for her age.  She has generalized weakness.  She uses a walker.  As per the , patient is able to do activities of daily living slowly.  She has not been falling down.    Patient's memory is not good.  She is more forgetful. As per the , at times she is more irritable.     Her vision is not good. She has macular degeneration.  Her hearing also is not good     Patient denies any headache.  She gets occasional dizziness.  No falls. No chest pain.  No shortness of breath at rest.  No abdominal pain.  No vomiting.  Appetite is fairly good.  No urinary complaints.  No bowel problem.  No bleeding.  No fever or night sweats. She has mild numbness and tingling in fingers and toes from diabetes and chemotherapy. Neuropathy is stable.      PHYSICAL EXAMINATION:    GENERAL:  She is alert and oriented x 3.  She looks weak.  Not in any distress.  Vitals: Reviewed.  Rest of the system is not examined     LABORATORY:  CBC and CMP  reviewed.     ASSESSMENT:    1.   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  No evidence of recurrence.  2.  Generalized weakness secondary to her age and multiple other medical problems.  3.  Chronic kidney disease.  Stable.  4.  Mild normocytic anemia from iron deficiency, chronic kidney disease and anemia of chronic disease.  5.  Grade 1 peripheral neuropathy.  Stable.  6.  Bradycardia from metoprolol.  7.  Multiple other medical problems including hypertension and diabetes mellitus.     PLAN:    -Decrease metoprolol to 12.5 mg a day.  -Continue oral iron  -See me in 6 months with labs.  -Will consider CT scan in 1 year.     DISCUSSION:  1.  Patient is overall doing well for her age.  CT scan was reviewed with the patient and her .  No evidence of malignancy.  She was happy to know that.    Explained to the patient that risk of recurrence of lymphoma is low.  Will continue to monitor her.  I will see her in 6 months.  Will consider CT scan in 1 year.    2.  When she came to the clinic, she was found to be hypotensive and bradycardic.  Repeat blood pressure is better.  She remained bradycardic.  EKG was done.  It reveals bradycardia.    She is on metoprolol ER 25 mg a day.  Advised her to decrease it to 12.5 mg a day.    Patient advised to follow-up with her PCP regarding her low blood pressure and low pulse.    3.  Patient has mild normocytic anemia.  Labs in March 2025 had revealed iron deficiency.  She is on ferrous sulfate.  She will continue on it.  She is not having any constipation or abdominal pain from it.  We will recheck iron and ferritin today.    4.  Patient has generalized weakness.  She uses a walker.  Her vision is decreased.  Hearing is decreased.  She is at high risk of falls.  Advised her to be careful and avoid falls.    5.  I will see her in 6 months time with labs.  Advised them to call us  with any questions or concerns.    Total visit time of 40 minutes.  Time spent in today's visit, review of chart/investigations today and documentation today.         John Srinivasan MD

## 2025-05-22 NOTE — PROGRESS NOTES
ONCOLOGY HISTORY: Ms. 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  1. On 09/20/2021:   -Hemoglobin of 4.7 with MCV of 67. Normal WBC and platelets.  -Iron of 9 and saturation index of 2%.   -CT chest, abdomen and pelvis reveals large pancreatic head mass.  This encases the celiac trunk, superior mesenteric artery and superior mesenteric vein.  There is moderate-size right pleural effusion. No lymphadenopathy.  2. Thoracocentesis on 09/22/2021. Cytology is negative for malignancy.  3. EUS on 09/21/2021 revealed is a mass in the uncinate process of the pancreas measuring 33 mm.  There was evidence of invasion into superior mesenteric artery and the celiac trunk. No enlarged lymph nodes seen. FNA revealed atypical cells.    4. EGD and EUS repeated on 10/05/2021.  -EGD is normal.  -EUS revealed pancreatic head mass.  FNA revealed CD10-positive B-cell lymphoma. Flow cytometry revealed CD10-positive lambda monotypic B-cells.  5. PET scan on 11/18/2021 revealed 6 cm hypermetabolic pancreatic head mass and borderline hypermetabolic right axillary lymph node.   -Further repeat biopsy not done because of her overall poor health.  6. mini R-CHOP x 6 cycles between 12/15/2021 and 03/30/2022.  -PET scan on 04/11/21 reveals complete response.     SUBJECTIVE:   came with the patient.     Mrs. Rojas is an 85-year-old female with B-cell non-Hodgkin lymphoma of the pancreas, status post 6 cycles of mini R-CHOP completed in 03/2022. She has been in complete remission.    CT scan on 05/21/25: No significant change. No evidence of progressive lymphoma in the chest, abdomen, or pelvis.     Patient overall doing well for her age.  She has generalized weakness.  She uses a walker.  As per the , patient is able to do activities of daily living slowly.  She has not been falling down.    Patient's memory is not good.  She is more forgetful. As per the , at times she is more  irritable.     Her vision is not good. She has macular degeneration.  Her hearing also is not good     Patient denies any headache.  She gets occasional dizziness.  No falls. No chest pain.  No shortness of breath at rest.  No abdominal pain.  No vomiting.  Appetite is fairly good.  No urinary complaints.  No bowel problem.  No bleeding.  No fever or night sweats. She has mild numbness and tingling in fingers and toes from diabetes and chemotherapy. Neuropathy is stable.      PHYSICAL EXAMINATION:    GENERAL:  She is alert and oriented x 3.  She looks weak.  Not in any distress.  Vitals: Reviewed.  Rest of the system is not examined     LABORATORY:  CBC and CMP reviewed.     ASSESSMENT:    1.   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  No evidence of recurrence.  2.  Generalized weakness secondary to her age and multiple other medical problems.  3.  Chronic kidney disease.  Stable.  4.  Mild normocytic anemia from iron deficiency, chronic kidney disease and anemia of chronic disease.  5.  Grade 1 peripheral neuropathy.  Stable.  6.  Bradycardia from metoprolol.  7.  Multiple other medical problems including hypertension and diabetes mellitus.     PLAN:    -Decrease metoprolol to 12.5 mg a day.  -Continue oral iron  -See me in 6 months with labs.  -Will consider CT scan in 1 year.     DISCUSSION:  1.  Patient is overall doing well for her age.  CT scan was reviewed with the patient and her .  No evidence of malignancy.  She was happy to know that.    Explained to the patient that risk of recurrence of lymphoma is low.  Will continue to monitor her.  I will see her in 6 months.  Will consider CT scan in 1 year.    2.  When she came to the clinic, she was found to be hypotensive and bradycardic.  Repeat blood pressure is better.  She remained bradycardic.  EKG was done.  It reveals bradycardia.    She is on  metoprolol ER 25 mg a day.  Advised her to decrease it to 12.5 mg a day.    Patient advised to follow-up with her PCP regarding her low blood pressure and low pulse.    3.  Patient has mild normocytic anemia.  Labs in March 2025 had revealed iron deficiency.  She is on ferrous sulfate.  She will continue on it.  She is not having any constipation or abdominal pain from it.  We will recheck iron and ferritin today.    4.  Patient has generalized weakness.  She uses a walker.  Her vision is decreased.  Hearing is decreased.  She is at high risk of falls.  Advised her to be careful and avoid falls.    5.  I will see her in 6 months time with labs.  Advised them to call us with any questions or concerns.    Total visit time of 40 minutes.  Time spent in today's visit, review of chart/investigations today and documentation today.

## 2025-05-23 DIAGNOSIS — I50.30 DIASTOLIC HEART FAILURE, UNSPECIFIED HF CHRONICITY (H): ICD-10-CM

## 2025-05-27 RX ORDER — FUROSEMIDE 20 MG/1
20 TABLET ORAL
Qty: 30 TABLET | Refills: 11 | Status: SHIPPED | OUTPATIENT
Start: 2025-05-27

## 2025-05-29 DIAGNOSIS — Z53.9 DIAGNOSIS NOT YET DEFINED: Primary | ICD-10-CM

## 2025-06-05 ENCOUNTER — NURSE TRIAGE (OUTPATIENT)
Dept: INTERNAL MEDICINE | Facility: CLINIC | Age: 85
End: 2025-06-05
Payer: MEDICARE

## 2025-06-05 NOTE — TELEPHONE ENCOUNTER
Nurse Triage SBAR    Is this a 2nd Level Triage? YES, LICENSED PRACTITIONER REVIEW IS REQUIRED    Situation: Patient is having 10 out of 10 leg pain.  To the point that she cannot sleep and it hurts when she is getting out of bed.  Patient denies chest pain or any other symptoms.  Patient does state that it hurts when she touches her knee.  She does not notice any redness or swelling but it is incredibly painful.  The left leg only.  The pain started a couple of weeks ago.   Spoke with AD[facility] provider, agreeable that patient can be seen first thing tomorrow.  Patient does not have a ride to the [facility] today but can get one tomorrow.  Nurse told patient that if leg should be more painful or other worrying symptoms like fever present then patient needs to be seen at the ER tonight.        Assessment: Patient needs to be assessed    Protocol Recommended Disposition:   Call ADS/Go to ED/UCC Now (Or To Office with PCP Approval)    Recommendation: Routing to ADS    Routed to provider and ADS    Does the patient meet one of the following criteria for ADS visit consideration?  The pain started a couple of weeks ago 16+ years old, with an [facility] PCP     TIP  Providers, please consider if this condition is appropriate for management at one of our Acute and Diagnostic Services sites.     If patient is a good candidate, please use dotphrase <dot>triageresponse and select Refer to ADS to document.      Reason for Disposition   Thigh or calf pain in only one leg and present > 1 hour    Additional Information   Negative: Looks like a broken bone or dislocated joint (e.g., crooked or deformed)   Negative: Sounds like a life-threatening emergency to the triager   Negative: Followed a hip injury   Negative: Followed a knee injury   Negative: Followed an ankle injury   Negative: Back pain radiating (shooting) into leg(s)   Negative: Foot pain is main symptom   Negative: Ankle pain is main symptom   Negative: Knee pain is main symptom    "Negative: Leg swelling is main symptom   Negative: Chest pain   Negative: Difficulty breathing   Negative: Entire foot is cool or blue in comparison to other side   Negative: Unable to walk   Negative: Fever and red area (or area very tender to touch)   Negative: Swollen joint and fever    Answer Assessment - Initial Assessment Questions  1. ONSET: \"When did the pain start?\"       Pain started a couple of weeks ago,   2. LOCATION: \"Where is the pain located?\"       Left   3. PAIN: \"How bad is the pain?\"    (Scale 1-10; or mild, moderate, severe)      10/01  4. WORK OR EXERCISE: \"Has there been any recent work or exercise that involved this part of the body?\"       no  5. CAUSE: \"What do you think is causing the leg pain?\"      Darius   6. OTHER SYMPTOMS: \"Do you have any other symptoms?\" (e.g., chest pain, back pain, breathing difficulty, swelling, rash, fever, numbness, weakness)      Is not swollen or red touch it hurts  7. PREGNANCY: \"Is there any chance you are pregnant?\" \"When was your last menstrual period?\"      no    Protocols used: Leg Pain-A-[place]    "

## 2025-06-05 NOTE — TELEPHONE ENCOUNTER
Referral to Acute and Diagnostic Services    [phone] (Randi) Randi- [address] [facility], [address], [place] [address]    Transition to Acute & Diagnostic Services Clinic has been discussed with patient, and she agrees with next level of care.   Patient understands that evaluation/treatment at [facility] typically takes significantly longer than in clinic/urgent care (>2 hours).  The [facility] Acute and Diagnostics Services Clinic has been contacted by provider/staff to confirm patient acceptance.         Special issues:      None                     The following provider has assessed this patient for intervention at [facility], and directed the patient for referral: Fausto Flynn MD

## 2025-06-06 ENCOUNTER — HOSPITAL ENCOUNTER (OUTPATIENT)
Dept: ULTRASOUND IMAGING | Facility: CLINIC | Age: 85
Discharge: HOME | End: 2025-06-06
Attending: FAMILY MEDICINE | Admitting: FAMILY MEDICINE
Payer: MEDICARE

## 2025-06-06 ENCOUNTER — ANCILLARY PROCEDURE (OUTPATIENT)
Dept: GENERAL RADIOLOGY | Facility: CLINIC | Age: 85
End: 2025-06-06
Attending: FAMILY MEDICINE
Payer: MEDICARE

## 2025-06-06 PROCEDURE — 93925 LOWER EXTREMITY STUDY: CPT

## 2025-06-06 PROCEDURE — 73562 X-RAY EXAM OF KNEE 3: CPT | Mod: TC | Performed by: RADIOLOGY

## 2025-06-06 PROCEDURE — 93970 EXTREMITY STUDY: CPT

## 2025-06-14 ENCOUNTER — HEALTH MAINTENANCE LETTER (OUTPATIENT)
Age: 85
End: 2025-06-14

## 2025-06-18 ENCOUNTER — PATIENT OUTREACH (OUTPATIENT)
Dept: CARE COORDINATION | Facility: CLINIC | Age: 85
End: 2025-06-18
Payer: MEDICARE

## 2025-06-18 ENCOUNTER — TELEPHONE (OUTPATIENT)
Dept: CARE COORDINATION | Facility: CLINIC | Age: 85
End: 2025-06-18
Payer: MEDICARE

## 2025-06-18 DIAGNOSIS — E11.22 TYPE 2 DIABETES MELLITUS WITH STAGE 3 CHRONIC KIDNEY DISEASE, WITHOUT LONG-TERM CURRENT USE OF INSULIN, UNSPECIFIED WHETHER STAGE 3A OR 3B CKD (H): Primary | ICD-10-CM

## 2025-06-18 DIAGNOSIS — N18.30 TYPE 2 DIABETES MELLITUS WITH STAGE 3 CHRONIC KIDNEY DISEASE, WITHOUT LONG-TERM CURRENT USE OF INSULIN, UNSPECIFIED WHETHER STAGE 3A OR 3B CKD (H): Primary | ICD-10-CM

## 2025-06-18 NOTE — TELEPHONE ENCOUNTER
Called and spoke with pt's daughter (C2C on file) to discuss.     She needed to know what the pt's last A1C was.   Informed her it was 5.7%.     She does not currently have any information re: the sore on pt's foot, but will look at it later today and call the clinic back.    They are also requesting new orders for diabetic shoes. Order pended, routing to PCP for review.     Please call pt's daughter back with an update or if any further information is needed.     Thank you,  Kemi Clements RN

## 2025-06-18 NOTE — PROGRESS NOTES
Clinic Care Coordination Contact  Community Health Worker Follow Up    Care Gaps:     Health Maintenance Due   Topic Date Due    HF ACTION PLAN  Never done    RSV VACCINE (1 - 1-dose 75+ series) Never done    DTAP/TDAP/TD VACCINE (2 - Td or Tdap) 01/01/2019    DIABETIC FOOT EXAM  06/29/2023    ZOSTER VACCINE (2 of 2) 08/22/2023    COVID-19 VACCINE (7 - 2024-25 season) 09/01/2024    MICROALBUMIN  12/04/2024    A1C  06/05/2025       Did Not Address    Care Plan:   Care Plan: Social Support       Problem: Inadequate social support       Goal: I will access resources to obtain more in home support.       Start Date: 11/29/2022 Expected End Date: 7/22/2025    This Visit's Progress: 90% Recent Progress: 90%    Priority: Medium    Note:     Annual Assessment Update 12/17/24  Barriers: resource availability  Strengths: Pt is a good advocate for self.   Patient expressed understanding of goal: yes  Action steps to achieve this goal:  1. I will contact the [facility] about a MNChoices assessment. Update as of 12/17/24: NOMAN ALONSO will contact daughter Radha to check in on this.     2. I will contact resources given to me. Discussed Help at your Door on 12/21/23.  Emailed birgit Rajput resources on 12/20/23.    3. We will include my daughter Radha on plans we discuss for more help at home so she is in loop and can assist with connecting to resources as needed.   4. I will contact my CHW with any needs or questions.                               Discussed:  Spoke with patient's daughter, Radha.  C2C on file.    Patient's daughter stated a HHA visits once a week for four hours.  Patient's daughter may increase the schedule to twice a week, four hours each visit- total eight hours.  Patient's daughter stated her mother does not want to increase the hours due to cost.  Patient's daughter stated her father makes healthy meals that include many vegetables.  Patient's daughter stated her mother's cognitive is declining.  Patient's  daughter stated her mother does much better in the afternoons & evenings.  Her mother does not do well in the mornings.  Patient's daughter stated her mother's eyesight is getting worse.  Patient's daughter stated her mother has a sore of the bottom of her foot- not sure which foot.  Patient's daughter stated she has some questions regarding lab results.        Intervention and Education during outreach:     CHW encouraged patient to contact CC if there are any other changes or resources needed.  Patient acknowledged understanding.        CHW Plan: will route a message to [facility].    CHW will outreach in one month- new goals or move to maintenance.      ZANE Demarco  Clinic Care Coordination  [facility] Clinics: [place], [place], [place], and [place] [facility]  Phone: [phone]

## 2025-06-18 NOTE — TELEPHONE ENCOUNTER
Clinic Care Coordination Contact    Spoke with patient's daughter, Radha.  C2C on file.    Patient's daughter has some questions regarding lab results from PCP visit.  Patient's daughter stated her mother has a sore on the bottom of her foot, not sure which foot.    Please call patient's daughter to discuss further [phone].    Thank you.    Rosanne Valadez, KM  Clinic Care Coordination  [facility] Clinics: [place], [place], [place], and [place] [facility]  Phone: [phone]

## 2025-07-03 ENCOUNTER — PATIENT OUTREACH (OUTPATIENT)
Dept: CARE COORDINATION | Facility: CLINIC | Age: 85
End: 2025-07-03
Payer: MEDICARE

## 2025-07-03 NOTE — PROGRESS NOTES
Care Coordination Clinician Chart Review    Situation: Patient chart reviewed by  Care Coordinator.       Background: Care Coordination Program started: 12/3/2019. Initial assessment completed and patient-centered care plan(s) were developed with participation from patient. Lead CC handed patient off to CHW for continued outreaches.       Assessment: Per chart review, patient outreach completed by CC CHW on 6/18/25.  Patient is actively working to accomplish goal(s).Has obtained additional help in the home.  Patient's goal(s) appropriate and relevant at this time. Patient is not due for updated Plan of Care.  Assessments will be completed annually or as needed/with change of patient status.      Care Plan: Social Support       Problem: Inadequate social support       Goal: I will access resources to obtain more in home support.       Start Date: 11/29/2022 Expected End Date: 7/22/2025    This Visit's Progress: 90% Recent Progress: 90%    Priority: Medium    Note:     Annual Assessment Update 12/17/24  Barriers: resource availability  Strengths: Pt is a good advocate for self.   Patient expressed understanding of goal: yes  Action steps to achieve this goal:  1. I will contact the [facility] about a MNChoices assessment. Update as of 12/17/24:  CC will contact birgit Garcia to check in on this.     2. I will contact resources given to me. Discussed Help at your Door on 12/21/23.  Emailed birgit Rajput resources on 12/20/23.    3. We will include my daughter Radha on plans we discuss for more help at home so she is in loop and can assist with connecting to resources as needed.   4. I will contact my CHW with any needs or questions.                                      Plan/Recommendations: The patient will continue working with Care Coordination to achieve goal(s) as above. CHW will continue outreaches at minimum every 30 days and will involve Lead CC as needed or if patient is ready to move to Maintenance. Lead  CC will continue to monitor CHW outreaches and patient's progress to goal(s) every 6 weeks.     Plan of Care updated and sent to patient: No      TORIE Tsang / TORIE Hawthorne  [facility]   Care Coordination  [facility]  7/3/2025 11:50 AM

## 2025-07-17 ENCOUNTER — PATIENT OUTREACH (OUTPATIENT)
Dept: CARE COORDINATION | Facility: CLINIC | Age: 85
End: 2025-07-17
Payer: MEDICARE

## 2025-07-17 NOTE — PROGRESS NOTES
Clinic Care Coordination Contact    The Community Health Worker called for a Care Coordination outreach to follow up on goals and action steps. Spoke with patient's daughter, Radha.     Patient's daughter stated she is currently at an eye appointment and requested a call back later next week.    CHW will outreach in 5 - 7 business days.    ZANE Demarco  Clinic Care Coordination  [facility] Clinics: [place], [place], [facility], and [place] [facility]  Phone: [phone]

## 2025-08-04 ENCOUNTER — PATIENT OUTREACH (OUTPATIENT)
Dept: CARE COORDINATION | Facility: CLINIC | Age: 85
End: 2025-08-04
Payer: MEDICARE

## 2025-08-18 ENCOUNTER — PATIENT OUTREACH (OUTPATIENT)
Dept: CARE COORDINATION | Facility: CLINIC | Age: 85
End: 2025-08-18
Payer: MEDICARE

## (undated) DEVICE — TOTE ANGIO CORP PC15AT SAN32CC83O

## (undated) DEVICE — SYSTEM PANNUS RETENTION 4 PAD 2 STRAP CZ-PRS-04

## (undated) DEVICE — GLIDEWIRE TERUMO .035X180CM 1.5,, J-TIP GR3525

## (undated) DEVICE — INTRO TERUMO 6FRX25CM W/MARKER RSB603

## (undated) DEVICE — SHEATH PINNACLE DESTINATION 6FRX45CM ST

## (undated) DEVICE — GAUGE DEPTH LOCATOR MANTA 8FR 188F

## (undated) DEVICE — GUIDEWIRE VASC SAFARI2 0.035X275CM H74939406XS1

## (undated) DEVICE — WIRE GUIDE 0.035"X150CM EMERALD STR 502542

## (undated) DEVICE — Device

## (undated) DEVICE — SOL WATER IRRIG 1000ML BOTTLE 2F7114

## (undated) DEVICE — INTRO TERUMO 7FRX25CM W/MARKER RSB703

## (undated) DEVICE — MANIFOLD KIT ANGIO AUTOMATED 014613

## (undated) DEVICE — GUIDEWIRE HI-TORQUE VERSACORE MOD J 1

## (undated) DEVICE — KIT HAND CONTROL ANGIOTOUCH ACIST 65CM AT-P65

## (undated) DEVICE — INTRO GLIDESHEATH SLENDER 6FR 10X45CM 60-1060

## (undated) DEVICE — GUIDEWIRE AMPLATZ 0.035"X145CM  SUPER STIFF 640-104

## (undated) DEVICE — DEFIB PRO-PADZ LVP LQD GEL ADULT 8900-2105-01

## (undated) DEVICE — CATH ANGIO INFINITI PIGTAIL 145 6 SH 6FRX110CM  534-652S

## (undated) DEVICE — CATH EP PACEL 5FRX110CM 1MM RIGHT HEART CURVE 401763

## (undated) DEVICE — WIRE GUIDE 0.035"X260CM SAFE-T-J EXCHANGE G00517

## (undated) DEVICE — CATH ANGIO SUPERTORQUE AL1 6FRX100CM 532-645

## (undated) DEVICE — RAD CLOSURE ANGIOSEAL 8FR  610131

## (undated) DEVICE — RAD G/W INQWIRE .035X260CM J-TIP EXCHANGE IQ35F260J1O5RS

## (undated) DEVICE — CATH LAUNCHER 6FR EBU 3.5 LA6EBU35

## (undated) DEVICE — 5FR X 100CM INFINITI TL DIAGNOSTIC CATHETER, JUDKINS LEFT CORONARY, JL 3.5, FEMORAL SELECTIVE THRULUMEN, SMALL, 0.038IN MAX GUIDEWIRE (EA/1)

## (undated) DEVICE — GW VASC OMNIWIRE J L185CM PRESSURE 89185J

## (undated) DEVICE — INFLATION DEVICE ATRION QL2530

## (undated) DEVICE — LINE MONITOR NASAL SMART CAPNOLINE ADULT LONG 12463

## (undated) DEVICE — SLEEVE TR BAND RADIAL COMPRESSION DEVICE 29CM XX-RF06L

## (undated) DEVICE — IMP VALVE AORTIC SAPEIN 3 TAVR 23MM COMMANDER S3UCM223A

## (undated) DEVICE — GUIDEWIRE VASC 0.014INX180CM RUNTHROUGH 25-1011

## (undated) DEVICE — 14FR EDWARDS ESHEATH+ INTRODUCER SET

## (undated) DEVICE — CATH ANGIO INFINITI JR4 4FRX100CM 538421

## (undated) DEVICE — RAD INTRODUCER KIT MICRO 5FRX10CM .018 NITINOL G/W

## (undated) RX ORDER — CLINDAMYCIN PHOSPHATE 900 MG/50ML
INJECTION, SOLUTION INTRAVENOUS
Status: DISPENSED
Start: 2022-08-23

## (undated) RX ORDER — HEPARIN SODIUM 1000 [USP'U]/ML
INJECTION, SOLUTION INTRAVENOUS; SUBCUTANEOUS
Status: DISPENSED
Start: 2022-07-15

## (undated) RX ORDER — HEPARIN SODIUM 200 [USP'U]/100ML
INJECTION, SOLUTION INTRAVENOUS
Status: DISPENSED
Start: 2022-07-15

## (undated) RX ORDER — FENTANYL CITRATE 50 UG/ML
INJECTION, SOLUTION INTRAMUSCULAR; INTRAVENOUS
Status: DISPENSED
Start: 2021-12-22

## (undated) RX ORDER — HEPARIN SODIUM (PORCINE) LOCK FLUSH IV SOLN 100 UNIT/ML 100 UNIT/ML
SOLUTION INTRAVENOUS
Status: DISPENSED
Start: 2022-03-01

## (undated) RX ORDER — HEPARIN SODIUM 200 [USP'U]/100ML
INJECTION, SOLUTION INTRAVENOUS
Status: DISPENSED
Start: 2022-08-23

## (undated) RX ORDER — CEFAZOLIN SODIUM 2 G/100ML
INJECTION, SOLUTION INTRAVENOUS
Status: DISPENSED
Start: 2021-12-13

## (undated) RX ORDER — FENTANYL CITRATE 50 UG/ML
INJECTION, SOLUTION INTRAMUSCULAR; INTRAVENOUS
Status: DISPENSED
Start: 2022-07-15

## (undated) RX ORDER — VERAPAMIL HYDROCHLORIDE 2.5 MG/ML
INJECTION, SOLUTION INTRAVENOUS
Status: DISPENSED
Start: 2022-07-15

## (undated) RX ORDER — HEPARIN SODIUM 1000 [USP'U]/ML
INJECTION, SOLUTION INTRAVENOUS; SUBCUTANEOUS
Status: DISPENSED
Start: 2022-08-23

## (undated) RX ORDER — POTASSIUM CHLORIDE 1500 MG/1
TABLET, EXTENDED RELEASE ORAL
Status: DISPENSED
Start: 2022-07-15

## (undated) RX ORDER — NITROGLYCERIN 5 MG/ML
VIAL (ML) INTRAVENOUS
Status: DISPENSED
Start: 2022-08-23

## (undated) RX ORDER — FENTANYL CITRATE 50 UG/ML
INJECTION, SOLUTION INTRAMUSCULAR; INTRAVENOUS
Status: DISPENSED
Start: 2022-10-07

## (undated) RX ORDER — NITROGLYCERIN 5 MG/ML
VIAL (ML) INTRAVENOUS
Status: DISPENSED
Start: 2022-07-15

## (undated) RX ORDER — LIDOCAINE HYDROCHLORIDE 10 MG/ML
INJECTION, SOLUTION EPIDURAL; INFILTRATION; INTRACAUDAL; PERINEURAL
Status: DISPENSED
Start: 2022-08-23

## (undated) RX ORDER — LIDOCAINE HYDROCHLORIDE 10 MG/ML
INJECTION, SOLUTION INFILTRATION; PERINEURAL
Status: DISPENSED
Start: 2021-12-13

## (undated) RX ORDER — HEPARIN SODIUM (PORCINE) LOCK FLUSH IV SOLN 100 UNIT/ML 100 UNIT/ML
SOLUTION INTRAVENOUS
Status: DISPENSED
Start: 2022-07-15

## (undated) RX ORDER — FENTANYL CITRATE 50 UG/ML
INJECTION, SOLUTION INTRAMUSCULAR; INTRAVENOUS
Status: DISPENSED
Start: 2021-12-13

## (undated) RX ORDER — ASPIRIN 325 MG
TABLET ORAL
Status: DISPENSED
Start: 2022-07-15

## (undated) RX ORDER — LIDOCAINE HYDROCHLORIDE 10 MG/ML
INJECTION, SOLUTION EPIDURAL; INFILTRATION; INTRACAUDAL; PERINEURAL
Status: DISPENSED
Start: 2022-07-15

## (undated) RX ORDER — HEPARIN SODIUM (PORCINE) LOCK FLUSH IV SOLN 100 UNIT/ML 100 UNIT/ML
SOLUTION INTRAVENOUS
Status: DISPENSED
Start: 2021-12-13

## (undated) RX ORDER — VERAPAMIL HYDROCHLORIDE 2.5 MG/ML
INJECTION, SOLUTION INTRAVENOUS
Status: DISPENSED
Start: 2022-08-23

## (undated) RX ORDER — HEPARIN SODIUM (PORCINE) LOCK FLUSH IV SOLN 100 UNIT/ML 100 UNIT/ML
SOLUTION INTRAVENOUS
Status: DISPENSED
Start: 2022-02-21